# Patient Record
Sex: FEMALE | Race: WHITE | NOT HISPANIC OR LATINO | Employment: OTHER | URBAN - METROPOLITAN AREA
[De-identification: names, ages, dates, MRNs, and addresses within clinical notes are randomized per-mention and may not be internally consistent; named-entity substitution may affect disease eponyms.]

---

## 2017-03-23 ENCOUNTER — HOSPITAL ENCOUNTER (OUTPATIENT)
Dept: RADIOLOGY | Facility: HOSPITAL | Age: 77
Discharge: HOME | End: 2017-03-23
Attending: INTERNAL MEDICINE
Payer: MEDICARE

## 2017-03-23 DIAGNOSIS — Z12.31 VISIT FOR SCREENING MAMMOGRAM: ICD-10-CM

## 2017-03-23 PROCEDURE — 77063 BREAST TOMOSYNTHESIS BI: CPT | Mod: 26,,, | Performed by: RADIOLOGY

## 2017-03-23 PROCEDURE — 77067 SCR MAMMO BI INCL CAD: CPT | Mod: 26,,, | Performed by: RADIOLOGY

## 2017-03-23 PROCEDURE — 77067 SCR MAMMO BI INCL CAD: CPT | Mod: TC

## 2017-12-11 ENCOUNTER — HOSPITAL ENCOUNTER (OUTPATIENT)
Dept: RADIOLOGY | Facility: HOSPITAL | Age: 77
Discharge: HOME | End: 2017-12-11
Attending: ORTHOPAEDIC SURGERY
Payer: MEDICARE

## 2017-12-11 DIAGNOSIS — S82.009A PATELLAR FRACTURE: Primary | ICD-10-CM

## 2017-12-11 DIAGNOSIS — S82.009A PATELLAR FRACTURE: ICD-10-CM

## 2017-12-11 PROCEDURE — 73560 X-RAY EXAM OF KNEE 1 OR 2: CPT | Mod: 26,RT,, | Performed by: RADIOLOGY

## 2017-12-11 PROCEDURE — 73560 X-RAY EXAM OF KNEE 1 OR 2: CPT | Mod: TC,RT

## 2018-01-15 ENCOUNTER — HOSPITAL ENCOUNTER (OUTPATIENT)
Dept: RADIOLOGY | Facility: HOSPITAL | Age: 78
Discharge: HOME | End: 2018-01-15
Attending: ORTHOPAEDIC SURGERY
Payer: MEDICARE

## 2018-01-15 DIAGNOSIS — S82.009A: ICD-10-CM

## 2018-01-15 DIAGNOSIS — S82.009A: Primary | ICD-10-CM

## 2018-01-15 PROCEDURE — 73560 X-RAY EXAM OF KNEE 1 OR 2: CPT | Mod: TC,FY,RT

## 2018-01-15 PROCEDURE — 73560 X-RAY EXAM OF KNEE 1 OR 2: CPT | Mod: 26,RT,, | Performed by: RADIOLOGY

## 2018-03-06 DIAGNOSIS — N18.30 CHRONIC RENAL DISEASE, STAGE III: Primary | ICD-10-CM

## 2018-03-08 DIAGNOSIS — Z12.39 SCREENING BREAST EXAMINATION: Primary | ICD-10-CM

## 2018-03-12 DIAGNOSIS — M19.90 OSTEOARTHRITIS: Primary | ICD-10-CM

## 2018-03-14 ENCOUNTER — HOSPITAL ENCOUNTER (OUTPATIENT)
Dept: RADIOLOGY | Facility: HOSPITAL | Age: 78
Discharge: HOME | End: 2018-03-14
Attending: INTERNAL MEDICINE
Payer: MEDICARE

## 2018-03-14 ENCOUNTER — INFUSION (OUTPATIENT)
Dept: INFUSION THERAPY | Facility: HOSPITAL | Age: 78
End: 2018-03-14
Attending: INTERNAL MEDICINE
Payer: MEDICARE

## 2018-03-14 VITALS
OXYGEN SATURATION: 98 % | TEMPERATURE: 99 F | DIASTOLIC BLOOD PRESSURE: 86 MMHG | HEART RATE: 64 BPM | RESPIRATION RATE: 20 BRPM | SYSTOLIC BLOOD PRESSURE: 176 MMHG

## 2018-03-14 DIAGNOSIS — M17.10 PRIMARY LOCALIZED OSTEOARTHROSIS, LOWER LEG: Primary | ICD-10-CM

## 2018-03-14 DIAGNOSIS — N18.30 CHRONIC RENAL DISEASE, STAGE III: ICD-10-CM

## 2018-03-14 PROCEDURE — 76770 US EXAM ABDO BACK WALL COMP: CPT | Mod: 26,,, | Performed by: RADIOLOGY

## 2018-03-14 PROCEDURE — 63600175 PHARM REV CODE 636 W HCPCS: Mod: JG | Performed by: INTERNAL MEDICINE

## 2018-03-14 PROCEDURE — 76770 US EXAM ABDO BACK WALL COMP: CPT | Mod: TC

## 2018-03-14 PROCEDURE — 96372 THER/PROPH/DIAG INJ SC/IM: CPT

## 2018-03-14 RX ADMIN — DENOSUMAB 60 MG: 60 INJECTION SUBCUTANEOUS at 12:03

## 2018-03-14 NOTE — PLAN OF CARE
Problem: Patient Care Overview  Goal: Plan of Care Review  Outcome: Ongoing (interventions implemented as appropriate)  Patient received Prolia injection. Tolerated well. No reactions noted during visit. Received discharge instructions and verbalized understanding.

## 2018-04-04 ENCOUNTER — HOSPITAL ENCOUNTER (OUTPATIENT)
Dept: RADIOLOGY | Facility: HOSPITAL | Age: 78
Discharge: HOME | End: 2018-04-04
Attending: INTERNAL MEDICINE
Payer: MEDICARE

## 2018-04-04 VITALS — HEIGHT: 62 IN | WEIGHT: 205 LBS | BODY MASS INDEX: 37.73 KG/M2

## 2018-04-04 DIAGNOSIS — Z12.39 SCREENING BREAST EXAMINATION: ICD-10-CM

## 2018-04-04 PROCEDURE — 77067 SCR MAMMO BI INCL CAD: CPT | Mod: TC

## 2018-04-04 PROCEDURE — 77063 BREAST TOMOSYNTHESIS BI: CPT | Mod: 26,,, | Performed by: RADIOLOGY

## 2018-04-04 PROCEDURE — 77067 SCR MAMMO BI INCL CAD: CPT | Mod: 26,,, | Performed by: RADIOLOGY

## 2018-05-02 ENCOUNTER — LAB VISIT (OUTPATIENT)
Dept: LAB | Facility: HOSPITAL | Age: 78
End: 2018-05-02
Attending: INTERNAL MEDICINE
Payer: MEDICARE

## 2018-05-02 ENCOUNTER — INITIAL CONSULT (OUTPATIENT)
Dept: HEMATOLOGY/ONCOLOGY | Facility: CLINIC | Age: 78
End: 2018-05-02
Payer: MEDICARE

## 2018-05-02 VITALS
HEART RATE: 52 BPM | TEMPERATURE: 99 F | WEIGHT: 207.44 LBS | SYSTOLIC BLOOD PRESSURE: 150 MMHG | BODY MASS INDEX: 36.75 KG/M2 | OXYGEN SATURATION: 95 % | HEIGHT: 63 IN | DIASTOLIC BLOOD PRESSURE: 66 MMHG

## 2018-05-02 DIAGNOSIS — R77.8 ABNORMAL SPEP: Primary | ICD-10-CM

## 2018-05-02 DIAGNOSIS — R77.8 ABNORMAL SPEP: ICD-10-CM

## 2018-05-02 DIAGNOSIS — N18.9 CHRONIC KIDNEY DISEASE, UNSPECIFIED CKD STAGE: ICD-10-CM

## 2018-05-02 LAB
ALBUMIN SERPL BCP-MCNC: 4.2 G/DL
ALP SERPL-CCNC: 71 U/L
ALT SERPL W/O P-5'-P-CCNC: 23 U/L
ANION GAP SERPL CALC-SCNC: 9 MMOL/L
AST SERPL-CCNC: 20 U/L
BASOPHILS # BLD AUTO: 0.02 K/UL
BASOPHILS NFR BLD: 0.4 %
BILIRUB SERPL-MCNC: 0.7 MG/DL
BUN SERPL-MCNC: 24 MG/DL
CALCIUM SERPL-MCNC: 9.2 MG/DL
CHLORIDE SERPL-SCNC: 105 MMOL/L
CO2 SERPL-SCNC: 24 MMOL/L
CREAT SERPL-MCNC: 1.6 MG/DL
DIFFERENTIAL METHOD: ABNORMAL
EOSINOPHIL # BLD AUTO: 0.1 K/UL
EOSINOPHIL NFR BLD: 2.4 %
ERYTHROCYTE [DISTWIDTH] IN BLOOD BY AUTOMATED COUNT: 13.5 %
ERYTHROCYTE [SEDIMENTATION RATE] IN BLOOD BY WESTERGREN METHOD: 19 MM/HR
EST. GFR  (AFRICAN AMERICAN): 36 ML/MIN/1.73 M^2
EST. GFR  (NON AFRICAN AMERICAN): 31 ML/MIN/1.73 M^2
GLUCOSE SERPL-MCNC: 111 MG/DL
HCT VFR BLD AUTO: 46.4 %
HGB BLD-MCNC: 15.6 G/DL
LDH SERPL L TO P-CCNC: 190 U/L
LYMPHOCYTES # BLD AUTO: 1.3 K/UL
LYMPHOCYTES NFR BLD: 23.6 %
MCH RBC QN AUTO: 31.2 PG
MCHC RBC AUTO-ENTMCNC: 33.6 G/DL
MCV RBC AUTO: 93 FL
MONOCYTES # BLD AUTO: 0.5 K/UL
MONOCYTES NFR BLD: 9.5 %
NEUTROPHILS # BLD AUTO: 3.5 K/UL
NEUTROPHILS NFR BLD: 64.1 %
PLATELET # BLD AUTO: 189 K/UL
PMV BLD AUTO: 10.5 FL
POTASSIUM SERPL-SCNC: 5.3 MMOL/L
PROT SERPL-MCNC: 7.9 G/DL
RBC # BLD AUTO: 5 M/UL
SODIUM SERPL-SCNC: 138 MMOL/L
WBC # BLD AUTO: 5.5 K/UL

## 2018-05-02 PROCEDURE — 82232 ASSAY OF BETA-2 PROTEIN: CPT

## 2018-05-02 PROCEDURE — 83615 LACTATE (LD) (LDH) ENZYME: CPT

## 2018-05-02 PROCEDURE — 86334 IMMUNOFIX E-PHORESIS SERUM: CPT

## 2018-05-02 PROCEDURE — 84165 PROTEIN E-PHORESIS SERUM: CPT | Mod: 26,,, | Performed by: PATHOLOGY

## 2018-05-02 PROCEDURE — 85652 RBC SED RATE AUTOMATED: CPT

## 2018-05-02 PROCEDURE — 83520 IMMUNOASSAY QUANT NOS NONAB: CPT | Mod: 59

## 2018-05-02 PROCEDURE — 86334 IMMUNOFIX E-PHORESIS SERUM: CPT | Mod: 26,,, | Performed by: PATHOLOGY

## 2018-05-02 PROCEDURE — 99204 OFFICE O/P NEW MOD 45 MIN: CPT | Mod: S$GLB,,, | Performed by: INTERNAL MEDICINE

## 2018-05-02 PROCEDURE — 36415 COLL VENOUS BLD VENIPUNCTURE: CPT

## 2018-05-02 PROCEDURE — 82784 ASSAY IGA/IGD/IGG/IGM EACH: CPT | Mod: 59

## 2018-05-02 PROCEDURE — 3077F SYST BP >= 140 MM HG: CPT | Mod: CPTII,S$GLB,, | Performed by: INTERNAL MEDICINE

## 2018-05-02 PROCEDURE — 99999 PR PBB SHADOW E&M-EST. PATIENT-LVL IV: CPT | Mod: PBBFAC,,, | Performed by: INTERNAL MEDICINE

## 2018-05-02 PROCEDURE — 3078F DIAST BP <80 MM HG: CPT | Mod: CPTII,S$GLB,, | Performed by: INTERNAL MEDICINE

## 2018-05-02 PROCEDURE — 80053 COMPREHEN METABOLIC PANEL: CPT

## 2018-05-02 PROCEDURE — 85025 COMPLETE CBC W/AUTO DIFF WBC: CPT

## 2018-05-02 PROCEDURE — 84165 PROTEIN E-PHORESIS SERUM: CPT

## 2018-05-02 RX ORDER — METOPROLOL TARTRATE 50 MG/1
50 TABLET ORAL DAILY
COMMUNITY
Start: 2018-04-19 | End: 2018-07-02 | Stop reason: SDUPTHER

## 2018-05-02 RX ORDER — FUROSEMIDE 20 MG/1
TABLET ORAL
COMMUNITY
Start: 2018-04-26 | End: 2018-07-02 | Stop reason: SDUPTHER

## 2018-05-02 RX ORDER — FEBUXOSTAT 40 MG/1
TABLET ORAL
COMMUNITY
Start: 2018-03-07 | End: 2018-05-02

## 2018-05-02 RX ORDER — LISINOPRIL 10 MG/1
10 TABLET ORAL DAILY
COMMUNITY
Start: 2018-05-01 | End: 2018-08-20 | Stop reason: DRUGHIGH

## 2018-05-02 RX ORDER — ERGOCALCIFEROL 1.25 MG/1
50000 CAPSULE ORAL
COMMUNITY
End: 2019-09-30

## 2018-05-02 RX ORDER — ALLOPURINOL 100 MG/1
100 TABLET ORAL DAILY
COMMUNITY
Start: 2018-04-26 | End: 2019-07-02

## 2018-05-02 NOTE — LETTER
May 7, 2018      Nargis Boyle MD  [address] [place] [address] [address]  [place] [address]           [facility]  [address] [facility] [place] [address]  Phone: [phone]          Patient: Barbara Rizo   MR Number: [id number]   YOB: 1940   Date of Visit: 5/2/2018       Dear Dr. Nargis Boyle:    Thank you for referring Barbara Rizo to me for evaluation. Attached you will find relevant portions of my assessment and plan of care.    If you have questions, please do not hesitate to call me. I look forward to following Barbara Rizo along with you.    Sincerely,    Blayne Adams    Enclosure  CC:  No Recipients    If you would like to receive this communication electronically, please contact [email or web address] or [phone] to request more information on [facility] access.    For providers and/or their staff who would like to refer a patient to [facility], please contact us through our one-stop-shop provider referral line, [facility], at [phone].    If you feel you have received this communication in error or would no longer like to receive these types of communications, please e-mail [email or web address]

## 2018-05-02 NOTE — PROGRESS NOTES
Subjective:       Patient ID: Barbara Rizo is a 78 y.o. female.    Chief Complaint: Consult    HPI   REASON FOR CONSULTATION ABNORMAL SPEP  REFERRING PHYSICIAN: Nargis Boyle MD    78-year-old with medical history of CKD  stage III, hypertension, osteopenia, vitamin-D deficiency, type 2 diabetes mellitus seen today in consultation for abnormal lab findings revealing abnormal SPEP. Patient's  is a patient of mine. She is followed by Dr. Boyle for CKD stage 3. Patient reports her kidney function has been relatively stable.  Appetite and weight stable.  No shortness of breath, chest pain. No bony pain. No prior history of anemia.  Patient reports history of hemochromatosis for which she has undergone phlebotomy in  remote past.  No melena, hematochezia or change in bowel habits.  No fatigue, lightheadedness or shortness of breath.  Laboratory studies from 03/22/2018 from outside facility reveal abnormal SPEP reveals elevation of beta 2 globulins. Serum immunofixation reveals a faint IgG lambda monoclonal immunoglobulin.  Biochemical profile reveals a BUN of 33 creatinine of 1.64 mg/dL calcium of 9.9 mg/dL albumin of 4.7.  She is here for further evaluation        Past Medical History:   Diagnosis Date    Diabetes mellitus type II     Fatty liver     GERD (gastroesophageal reflux disease)     Hemochromatosis     Hyperlipidemia     Hypertension     Vaginal delivery     x2    Vitamin D deficiency disease     Wears partial dentures     upper removable bridge       Past Surgical History:   Procedure Laterality Date    BREAST BIOPSY      CHOLECYSTECTOMY  08/2015    EYE SURGERY      Cat Ext  OU    HYSTERECTOMY      partial due to uterine fibroids    TOTAL KNEE ARTHROPLASTY Right 2015    left knee done 5/2013     Current MEDS: Reviewed and as per MEDCHART    Review of patient's allergies indicates:  No Known Allergies    Review of Systems   Constitutional: Negative for appetite change, fatigue, fever  "and unexpected weight change.   HENT: Negative for mouth sores.    Eyes: Negative for visual disturbance.   Respiratory: Negative for cough and shortness of breath.    Cardiovascular: Negative for chest pain.   Gastrointestinal: Negative for abdominal pain and diarrhea.   Genitourinary: Negative for frequency.   Musculoskeletal: Negative for back pain.   Skin: Negative for rash.   Neurological: Negative for headaches.   Hematological: Negative for adenopathy.   Psychiatric/Behavioral: The patient is not nervous/anxious.        Objective:       Vitals:    05/02/18 1351 05/02/18 1355   BP: (!) 145/62 (!) 150/66   BP Location: Right arm Left arm   Patient Position: Sitting Sitting   BP Method: Medium (Automatic) Medium (Automatic)   Pulse: (!) 49 (!) 52   Temp: 98.7 °F (37.1 °C)    TempSrc: Oral    SpO2: (!) 94% 95%   Weight: 94.1 kg (207 lb 7.3 oz)    Height: 5' 3" (1.6 m)        Physical Exam   Constitutional: She is oriented to person, place, and time. She appears well-developed and well-nourished.   HENT:   Head: Normocephalic.   Mouth/Throat: Oropharynx is clear and moist. No oropharyngeal exudate.   Eyes: Conjunctivae and lids are normal. Pupils are equal, round, and reactive to light. No scleral icterus.   Neck: Normal range of motion. Neck supple. No thyromegaly present.   Cardiovascular: Normal rate, regular rhythm and normal heart sounds.    No murmur heard.  Pulmonary/Chest: Breath sounds normal. She has no wheezes. She has no rales.   Abdominal: Soft. Bowel sounds are normal. She exhibits no distension and no mass. There is no hepatosplenomegaly. There is no tenderness. There is no rebound and no guarding.   Musculoskeletal: Normal range of motion. She exhibits no edema or tenderness.   Lymphadenopathy:     She has no cervical adenopathy.     She has no axillary adenopathy.        Right: No supraclavicular adenopathy present.        Left: No supraclavicular adenopathy present.   Neurological: She is alert " and oriented to person, place, and time. No cranial nerve deficit. Coordination normal.   Skin: Skin is warm and dry. No ecchymosis, no petechiae and no rash noted. No erythema.   Psychiatric: She has a normal mood and affect.     Labs ( outside facility: Reviewed    Assessment:       1. Abnormal SPEP    2. Chronic kidney disease, unspecified CKD stage        Plan:   78-year-old with multiple medical diagnoses with abnormal SPEP of undetermined significance.  Findings suggestive possibly result of acute inflammation.  Plan further testing including repeat SPEP, serum immunoglobulin free light chains, CBC, CMP, quantitative immunoglobulins, beta 2 microglobulin, LDH, sed rate.  Patient will follow up in 2 weeks to review results of workup.  All questions posed answered to satisfaction    Thank you for allowing me to participate in the care of your patient    Cc: Nargis Boyle M.D.

## 2018-05-03 LAB
B2 MICROGLOB SERPL-MCNC: 5.4 UG/ML
IGA SERPL-MCNC: 433 MG/DL
IGG SERPL-MCNC: 1621 MG/DL
IGM SERPL-MCNC: 34 MG/DL

## 2018-05-04 LAB
ALBUMIN SERPL ELPH-MCNC: 4.4 G/DL
ALPHA1 GLOB SERPL ELPH-MCNC: 0.31 G/DL
ALPHA2 GLOB SERPL ELPH-MCNC: 1.15 G/DL
B-GLOBULIN SERPL ELPH-MCNC: 0.99 G/DL
GAMMA GLOB SERPL ELPH-MCNC: 1.65 G/DL
KAPPA LC SER QL IA: 5.09 MG/DL
KAPPA LC/LAMBDA SER IA: 1.76
LAMBDA LC SER QL IA: 2.89 MG/DL
PROT SERPL-MCNC: 8.5 G/DL

## 2018-05-07 LAB
INTERPRETATION SERPL IFE-IMP: NORMAL
PATHOLOGIST INTERPRETATION IFE: NORMAL
PATHOLOGIST INTERPRETATION SPE: NORMAL

## 2018-05-16 ENCOUNTER — OFFICE VISIT (OUTPATIENT)
Dept: HEMATOLOGY/ONCOLOGY | Facility: CLINIC | Age: 78
End: 2018-05-16
Payer: MEDICARE

## 2018-05-16 VITALS
HEART RATE: 80 BPM | TEMPERATURE: 99 F | OXYGEN SATURATION: 98 % | DIASTOLIC BLOOD PRESSURE: 74 MMHG | BODY MASS INDEX: 37.1 KG/M2 | WEIGHT: 209.44 LBS | SYSTOLIC BLOOD PRESSURE: 138 MMHG

## 2018-05-16 DIAGNOSIS — R77.8 ABNORMAL SPEP: Primary | ICD-10-CM

## 2018-05-16 DIAGNOSIS — N18.9 CHRONIC KIDNEY DISEASE, UNSPECIFIED CKD STAGE: ICD-10-CM

## 2018-05-16 PROCEDURE — 3078F DIAST BP <80 MM HG: CPT | Mod: CPTII,S$GLB,, | Performed by: INTERNAL MEDICINE

## 2018-05-16 PROCEDURE — 99999 PR PBB SHADOW E&M-EST. PATIENT-LVL III: CPT | Mod: PBBFAC,,, | Performed by: INTERNAL MEDICINE

## 2018-05-16 PROCEDURE — 99214 OFFICE O/P EST MOD 30 MIN: CPT | Mod: S$GLB,,, | Performed by: INTERNAL MEDICINE

## 2018-05-16 PROCEDURE — 3075F SYST BP GE 130 - 139MM HG: CPT | Mod: CPTII,S$GLB,, | Performed by: INTERNAL MEDICINE

## 2018-05-16 RX ORDER — AMOXICILLIN 500 MG
1 CAPSULE ORAL DAILY
COMMUNITY
End: 2021-10-15 | Stop reason: CLARIF

## 2018-05-16 NOTE — PROGRESS NOTES
Subjective:       Patient ID: Barbara Rizo is a 78 y.o. female.    Chief Complaint: Follow-up    HPI     78-year-old with medical history of CKD  stage III, hypertension, osteopenia, vitamin-D deficiency, type 2 diabetes mellitus seen today in consultation for abnormal lab findings revealing abnormal SPEP. Patient's  is a patient of mine. She is followed by Dr. Boyle for CKD stage 3. Patient reports her kidney function has been relatively stable.  Appetite and weight stable.  No shortness of breath, chest pain. No bony pain. No prior history of anemia.  Patient reports history of hemochromatosis for which she has undergone phlebotomy in  remote past.  No melena, hematochezia or change in bowel habits.  No fatigue, lightheadedness or shortness of breath.  Laboratory studies from 03/22/2018 from outside facility reveal abnormal SPEP reveals elevation of beta 2 globulins. Serum immunofixation reveals a faint IgG lambda monoclonal immunoglobulin.  Biochemical profile reveals a BUN of 33 creatinine of 1.64 mg/dL calcium of 9.9 mg/dL albumin of 4.7    Today, she is doing well.  No fatigue/SOB/lightheadedness  Appetite and weight stable.       CBC reveals wbc 5.5 Hb15.6g/dl Hct 46.4% plt 189k.    SPEP- No monoclonal peaks identified      Past Medical History:   Diagnosis Date    Diabetes mellitus type II     Fatty liver     GERD (gastroesophageal reflux disease)     Hemochromatosis     Hyperlipidemia     Hypertension     Vaginal delivery     x2    Vitamin D deficiency disease     Wears partial dentures     upper removable bridge       Past Surgical History:   Procedure Laterality Date    BREAST BIOPSY      CHOLECYSTECTOMY  08/2015    EYE SURGERY      Cat Ext  OU    HYSTERECTOMY      partial due to uterine fibroids    TOTAL KNEE ARTHROPLASTY Right 2015    left knee done 5/2013       Review of Systems   Constitutional: Negative for appetite change, fatigue, fever and unexpected weight change.   HENT:  Negative for mouth sores.    Eyes: Negative for visual disturbance.   Respiratory: Negative for cough and shortness of breath.    Cardiovascular: Negative for chest pain.   Gastrointestinal: Negative for abdominal pain and diarrhea.   Genitourinary: Negative for frequency.   Musculoskeletal: Negative for back pain.   Skin: Negative for rash.   Neurological: Negative for headaches.   Hematological: Negative for adenopathy.   Psychiatric/Behavioral: The patient is not nervous/anxious.        Objective:       Vitals:    05/16/18 1301   BP: 138/74   BP Location: Right arm   Patient Position: Sitting   BP Method: Large (Manual)   Pulse: 80   Temp: 98.5 °F (36.9 °C)   TempSrc: Oral   SpO2: 98%   Weight: 95 kg (209 lb 7 oz)       Physical Exam   Constitutional: She is oriented to person, place, and time. She appears well-developed and well-nourished.   HENT:   Head: Normocephalic.   Mouth/Throat: Oropharynx is clear and moist. No oropharyngeal exudate.   Eyes: Conjunctivae and lids are normal. Pupils are equal, round, and reactive to light. No scleral icterus.   Neck: Normal range of motion. Neck supple. No thyromegaly present.   Cardiovascular: Normal rate, regular rhythm and normal heart sounds.    No murmur heard.  Pulmonary/Chest: Breath sounds normal. She has no wheezes. She has no rales.   Abdominal: Soft. Bowel sounds are normal. She exhibits no distension and no mass. There is no hepatosplenomegaly. There is no tenderness. There is no rebound and no guarding.   Musculoskeletal: Normal range of motion. She exhibits no edema or tenderness.   Lymphadenopathy:     She has no cervical adenopathy.     She has no axillary adenopathy.        Right: No supraclavicular adenopathy present.        Left: No supraclavicular adenopathy present.   Neurological: She is alert and oriented to person, place, and time. No cranial nerve deficit. Coordination normal.   Skin: Skin is warm and dry. No ecchymosis, no petechiae and no rash  noted. No erythema.   Psychiatric: She has a normal mood and affect.       Lab Results   Component Value Date    WBC 5.50 05/02/2018    HGB 15.6 05/02/2018    HCT 46.4 05/02/2018    MCV 93 05/02/2018     05/02/2018         Results for FAISAL ORNELAS (MRN [id number]) as of 5/16/2018 13:21   Ref. Range 5/2/2018 14:54   [place]man Free Light Chains Latest Ref Range: 0.33 - 1.94 mg/dL 5.09 (H)   Lambda Free Light Chains Latest Ref Range: 0.57 - 2.63 mg/dL 2.89 (H)   Kappa/Lambda FLC Ratio Latest Ref Range: 0.26 - 1.65  1.76 (H)         Increased total protein.   No monoclonal peaks identified.   There is a linear irregularity in the gamma region in a polyclonal   background. Correlate with reflexed NERIS results for clarification.     Assessment:       1. Abnormal SPEP    2. Chronic kidney disease, unspecified CKD stage        Plan:       Pt clinically stable  Hb 15. 6g/dl  Repeat SPEP- no monoclonal peaks  Abnl K?L FLCR in setting of renal insufficiency unclear  Plan urine studies    Follow-up in 2mo with cbc,cmp, urine studies prior to f/u       Cc: Nargis Boyle M.D.

## 2018-05-18 ENCOUNTER — LAB VISIT (OUTPATIENT)
Dept: LAB | Facility: HOSPITAL | Age: 78
End: 2018-05-18
Attending: INTERNAL MEDICINE
Payer: MEDICARE

## 2018-05-18 DIAGNOSIS — R77.8 ABNORMAL SPEP: ICD-10-CM

## 2018-05-18 DIAGNOSIS — N18.9 CHRONIC KIDNEY DISEASE, UNSPECIFIED CKD STAGE: ICD-10-CM

## 2018-05-18 LAB
PROT 24H UR-MRATE: 162 MG/SPEC
PROT UR-MCNC: 8 MG/DL
URINE COLLECTION DURATION: 24 HR
URINE VOLUME: 2025 ML

## 2018-05-18 PROCEDURE — 86335 IMMUNFIX E-PHORSIS/URINE/CSF: CPT

## 2018-05-18 PROCEDURE — 86335 IMMUNFIX E-PHORSIS/URINE/CSF: CPT | Mod: 26,,, | Performed by: PATHOLOGY

## 2018-05-18 PROCEDURE — 84156 ASSAY OF PROTEIN URINE: CPT

## 2018-05-21 LAB
INTERPRETATION UR IFE-IMP: NORMAL
PATHOLOGIST INTERPRETATION UIFE: NORMAL

## 2018-07-02 ENCOUNTER — LAB VISIT (OUTPATIENT)
Dept: LAB | Facility: HOSPITAL | Age: 78
End: 2018-07-02
Attending: FAMILY MEDICINE
Payer: MEDICARE

## 2018-07-02 ENCOUNTER — OFFICE VISIT (OUTPATIENT)
Dept: FAMILY MEDICINE | Facility: CLINIC | Age: 78
End: 2018-07-02
Payer: MEDICARE

## 2018-07-02 VITALS
TEMPERATURE: 98 F | OXYGEN SATURATION: 97 % | WEIGHT: 207.25 LBS | SYSTOLIC BLOOD PRESSURE: 132 MMHG | HEART RATE: 51 BPM | DIASTOLIC BLOOD PRESSURE: 86 MMHG | BODY MASS INDEX: 33.31 KG/M2 | HEIGHT: 66 IN

## 2018-07-02 DIAGNOSIS — Z00.00 WELL WOMAN EXAM WITHOUT GYNECOLOGICAL EXAM: ICD-10-CM

## 2018-07-02 DIAGNOSIS — M85.9 DISORDER OF BONE DENSITY AND STRUCTURE, UNSPECIFIED: ICD-10-CM

## 2018-07-02 DIAGNOSIS — M89.9 DISORDER OF BONE: ICD-10-CM

## 2018-07-02 DIAGNOSIS — I11.9 BENIGN HYPERTENSIVE HEART DISEASE WITHOUT HEART FAILURE: ICD-10-CM

## 2018-07-02 DIAGNOSIS — R33.9 URINARY RETENTION: ICD-10-CM

## 2018-07-02 DIAGNOSIS — Z23 IMMUNIZATION DUE: ICD-10-CM

## 2018-07-02 DIAGNOSIS — K21.00 GASTROESOPHAGEAL REFLUX DISEASE WITH ESOPHAGITIS: ICD-10-CM

## 2018-07-02 DIAGNOSIS — N18.30 TYPE 2 DIABETES MELLITUS WITH STAGE 3 CHRONIC KIDNEY DISEASE, WITHOUT LONG-TERM CURRENT USE OF INSULIN: ICD-10-CM

## 2018-07-02 DIAGNOSIS — E83.119 HEMOCHROMATOSIS, UNSPECIFIED HEMOCHROMATOSIS TYPE: ICD-10-CM

## 2018-07-02 DIAGNOSIS — I10 ESSENTIAL HYPERTENSION: ICD-10-CM

## 2018-07-02 DIAGNOSIS — E66.01 MORBID OBESITY: ICD-10-CM

## 2018-07-02 DIAGNOSIS — N18.30 TYPE 2 DIABETES MELLITUS WITH STAGE 3 CHRONIC KIDNEY DISEASE, WITHOUT LONG-TERM CURRENT USE OF INSULIN: Primary | ICD-10-CM

## 2018-07-02 DIAGNOSIS — M17.0 PRIMARY OSTEOARTHRITIS OF BOTH KNEES: ICD-10-CM

## 2018-07-02 DIAGNOSIS — N31.2 ATONIC NEUROGENIC BLADDER: ICD-10-CM

## 2018-07-02 DIAGNOSIS — E78.2 MIXED HYPERLIPIDEMIA: ICD-10-CM

## 2018-07-02 DIAGNOSIS — E11.22 TYPE 2 DIABETES MELLITUS WITH STAGE 3 CHRONIC KIDNEY DISEASE, WITHOUT LONG-TERM CURRENT USE OF INSULIN: Primary | ICD-10-CM

## 2018-07-02 DIAGNOSIS — E11.22 TYPE 2 DIABETES MELLITUS WITH STAGE 3 CHRONIC KIDNEY DISEASE, WITHOUT LONG-TERM CURRENT USE OF INSULIN: ICD-10-CM

## 2018-07-02 DIAGNOSIS — E55.9 VITAMIN D DEFICIENCY: ICD-10-CM

## 2018-07-02 LAB
CHOLEST SERPL-MCNC: 134 MG/DL
CHOLEST/HDLC SERPL: 3 {RATIO}
HDLC SERPL-MCNC: 45 MG/DL
HDLC SERPL: 33.6 %
LDLC SERPL CALC-MCNC: 62.4 MG/DL
NONHDLC SERPL-MCNC: 89 MG/DL
TRIGL SERPL-MCNC: 133 MG/DL

## 2018-07-02 PROCEDURE — 36415 COLL VENOUS BLD VENIPUNCTURE: CPT | Mod: PN

## 2018-07-02 PROCEDURE — 83036 HEMOGLOBIN GLYCOSYLATED A1C: CPT

## 2018-07-02 PROCEDURE — 99204 OFFICE O/P NEW MOD 45 MIN: CPT | Mod: S$GLB,,, | Performed by: FAMILY MEDICINE

## 2018-07-02 PROCEDURE — 80061 LIPID PANEL: CPT

## 2018-07-02 PROCEDURE — 3075F SYST BP GE 130 - 139MM HG: CPT | Mod: CPTII,S$GLB,, | Performed by: FAMILY MEDICINE

## 2018-07-02 PROCEDURE — 99499 UNLISTED E&M SERVICE: CPT | Mod: S$GLB,,, | Performed by: FAMILY MEDICINE

## 2018-07-02 PROCEDURE — 90670 PCV13 VACCINE IM: CPT | Mod: S$GLB,,, | Performed by: FAMILY MEDICINE

## 2018-07-02 PROCEDURE — G0009 ADMIN PNEUMOCOCCAL VACCINE: HCPCS | Mod: S$GLB,,, | Performed by: FAMILY MEDICINE

## 2018-07-02 PROCEDURE — 3079F DIAST BP 80-89 MM HG: CPT | Mod: CPTII,S$GLB,, | Performed by: FAMILY MEDICINE

## 2018-07-02 PROCEDURE — 99999 PR PBB SHADOW E&M-EST. PATIENT-LVL IV: CPT | Mod: PBBFAC,,, | Performed by: FAMILY MEDICINE

## 2018-07-02 RX ORDER — METOPROLOL TARTRATE 50 MG/1
50 TABLET ORAL DAILY
Qty: 90 TABLET | Refills: 1 | Status: SHIPPED | OUTPATIENT
Start: 2018-07-02 | End: 2018-09-10 | Stop reason: SDUPTHER

## 2018-07-02 RX ORDER — FUROSEMIDE 20 MG/1
TABLET ORAL
Qty: 36 TABLET | Refills: 1 | Status: ON HOLD | OUTPATIENT
Start: 2018-07-02 | End: 2019-02-06 | Stop reason: HOSPADM

## 2018-07-02 RX ORDER — FOLIC ACID 1 MG/1
1 TABLET ORAL DAILY
Qty: 90 TABLET | Refills: 1 | Status: SHIPPED | OUTPATIENT
Start: 2018-07-02 | End: 2019-01-08 | Stop reason: SDUPTHER

## 2018-07-02 RX ORDER — ATORVASTATIN CALCIUM 40 MG/1
40 TABLET, FILM COATED ORAL DAILY
Qty: 90 TABLET | Refills: 1 | Status: SHIPPED | OUTPATIENT
Start: 2018-07-02 | End: 2019-01-08 | Stop reason: SDUPTHER

## 2018-07-02 NOTE — PROGRESS NOTES
(9:35 AM) - PCV 13 vaccine was given IM in the left deltoid. Tolerated well. Pt went to lab dept after injection.

## 2018-07-02 NOTE — PROGRESS NOTES
"Well Woman VISIT      CHIEF COMPLAINT  Chief Complaint   Patient presents with    [facility]       HPI  Barbara Rizo is a 78 y.o. female who presents for physical.     Social Factors  Tobacco use: No  Ready to Quit: No  Alcohol: No  Intimate partner violence screening  "Do you feel safe in your current relationship?" Yes   "Have you ever been in a relationship in which your partner frightened you or hurt you?" No  Living Will/POA: No  Regular Exercise: No    Depression  Over the past two weeks, have you felt down, depressed, or hopeless? No  Over the past two weeks, have you felt little interest or pleasure in doing things? No      CHD, HTN, DM2  CHD Risk Factors: advanced age (older than 55 for men, 65 for women), diabetes mellitus, dyslipidemia and obesity (BMI >= 30 kg/m2)  Women 45 years and older should be screened for dyslipidemia if at increased risk of CHD  Women 20 to 45 years of age should be screened for dyslipidemia if at increased risk of CHD  Asymptomatic adults with sustained blood pressure greater than 135/80 mm Hg (treated or untreated) should be screened for type 2 diabetes mellitus    Estimated body mass index is 33.45 kg/m² as calculated from the following:    Height as of this encounter: 5' 6" (1.676 m).    Weight as of this encounter: 94 kg (207 lb 3.7 oz).      Screening  Mammogram needed: UTD  Colonoscopy needed: UTD  Osteoporosis screen needed: ordered today     Women 50 to 74 years of age should be screened for breast cancer with mammography biennially.  Women should be screened for cervical cancer with Pap tests beginning at 21 years of age. Low-risk women should receive Pap testing every three years. Co-testing for human papillomavirus is an option beginning at 30 years of age, and can extend the screening interval to five years. Cervical cancer screening should be discontinued at 65 years of age or after total hysterectomy if the woman has a benign gynecologic " "history  Adults 50 to 75 years of age should be screened for colorectal cancer with an FOBT annually, sigmoidoscopy every five years with an FOBT every three years, or colonoscopy every 10 years.  Women 65 years and older should be screened for osteoporosis. Women younger than 65 years should be screened if the risk of fracture is greater than or equal to that of a 65-year-old white woman without additional risk factors.      Immunizations  delayed    ALLERGIES and MEDS were verified.   PMHx, PSHx, FHx, SOCIALHx were updated as pertinent.    REVIEW OF SYSTEMS  Review of Systems   Constitutional: Negative.    HENT: Negative.    Eyes: Negative.    Respiratory: Negative.    Cardiovascular: Negative.    Gastrointestinal: Negative.    Genitourinary: Negative.    Musculoskeletal: Negative.    Skin: Negative.    Neurological: Negative.          PHYSICAL EXAM  VITAL SIGNS: /86   Pulse (!) 51   Temp 98 °F (36.7 °C) (Oral)   Ht 5' 6" (1.676 m)   Wt 94 kg (207 lb 3.7 oz)   SpO2 97%   BMI 33.45 kg/m²   GEN: Well developed, Well nourished, No acute distress.  HENT: Normocephalic, Atraumatic, Bilateral external ears normal, Nose normal, Oropharynx moist, No oral exudates.   Eyes: PERRL, EOMI, Conjunctiva normal, No discharge.   Neck: Supple, No tenderness.  Lymphatic: No cervical or supraclavicular lymphadenopathy noted.   Cardiovascular: Normal heart rate, Normal rhythm, No murmurs, No rubs, No gallops.   Thorax & Lungs: Normal breath sounds, No respiratory distress, No wheezing.  Abdomen: Soft, No tenderness, Bowel sounds normal.  Breast: deferred  Genital:deferred   Skin: Warm, Dry, No erythema, No rash.   Extremities: No edema, No tenderness.       ASSESSMENT/PLAN    Barbara was seen today for establish care.    Diagnoses and all orders for this visit:    Type 2 diabetes mellitus with stage 3 chronic kidney disease, without long-term current use of insulin  -     Ambulatory referral to Optometry  -     Ambulatory " referral to Podiatry  -     Lipid panel; Future  -     Hemoglobin A1c; Future  -     atorvastatin (LIPITOR) 40 MG tablet; Take 1 tablet (40 mg total) by mouth once daily.    Vitamin D deficiency    Gastroesophageal reflux disease with esophagitis    Primary osteoarthritis of both knees  -     DXA Bone Density Spine And Hip; Future    Atonic neurogenic bladder    Urinary retention    Mixed hyperlipidemia    Benign hypertensive heart disease without heart failure    Essential hypertension  -     Hemoglobin A1c; Future  -     metoprolol tartrate (LOPRESSOR) 50 MG tablet; Take 1 tablet (50 mg total) by mouth once daily.  -     furosemide (LASIX) 20 MG tablet; Take Monday, Wednesday, friday  -     atorvastatin (LIPITOR) 40 MG tablet; Take 1 tablet (40 mg total) by mouth once daily.    Hemochromatosis, unspecified hemochromatosis type  -     folic acid (FOLVITE) 1 MG tablet; Take 1 tablet (1 mg total) by mouth once daily.    Morbid obesity    Immunization due  -     (In Office Administered) Pneumococcal Conjugate Vaccine (13 Valent) (IM)    Disorder of bone density and structure, unspecified   -     DXA Bone Density Spine And Hip; Future    Disorder of bone   -     DXA Bone Density Spine And Hip; Future    Well woman exam without gynecological exam    Other orders  -     Cancel: (In Office Administered) Tdap Vaccine       1.  Patient followed by Dr. Boyle for CKD  2.  Patient followed by [facility] cardiology  3.  Hem/onc evaluating for possible Multiple myeloma  4.  Labs to be done today  5.  Medications refilled    FOLLOW UP: 3 months    Paras Oro MD

## 2018-07-03 ENCOUNTER — HOSPITAL ENCOUNTER (OUTPATIENT)
Dept: RADIOLOGY | Facility: HOSPITAL | Age: 78
Discharge: HOME | End: 2018-07-03
Attending: FAMILY MEDICINE
Payer: MEDICARE

## 2018-07-03 DIAGNOSIS — M89.9 DISORDER OF BONE: ICD-10-CM

## 2018-07-03 DIAGNOSIS — M17.0 PRIMARY OSTEOARTHRITIS OF BOTH KNEES: ICD-10-CM

## 2018-07-03 DIAGNOSIS — M85.9 DISORDER OF BONE DENSITY AND STRUCTURE, UNSPECIFIED: ICD-10-CM

## 2018-07-03 LAB
ESTIMATED AVG GLUCOSE: 120 MG/DL
HBA1C MFR BLD HPLC: 5.8 %

## 2018-07-03 PROCEDURE — 77080 DXA BONE DENSITY AXIAL: CPT | Mod: TC

## 2018-07-03 PROCEDURE — 77080 DXA BONE DENSITY AXIAL: CPT | Mod: 26,,, | Performed by: RADIOLOGY

## 2018-07-09 ENCOUNTER — LAB VISIT (OUTPATIENT)
Dept: LAB | Facility: HOSPITAL | Age: 78
End: 2018-07-09
Attending: INTERNAL MEDICINE
Payer: MEDICARE

## 2018-07-09 DIAGNOSIS — N18.9 CHRONIC KIDNEY DISEASE, UNSPECIFIED CKD STAGE: ICD-10-CM

## 2018-07-09 DIAGNOSIS — R77.8 ABNORMAL SPEP: ICD-10-CM

## 2018-07-09 LAB
ALBUMIN SERPL BCP-MCNC: 3.7 G/DL
ALP SERPL-CCNC: 63 U/L
ALT SERPL W/O P-5'-P-CCNC: 23 U/L
ANION GAP SERPL CALC-SCNC: 8 MMOL/L
AST SERPL-CCNC: 14 U/L
BASOPHILS # BLD AUTO: 0.01 K/UL
BASOPHILS NFR BLD: 0.2 %
BILIRUB SERPL-MCNC: 0.7 MG/DL
BUN SERPL-MCNC: 31 MG/DL
CALCIUM SERPL-MCNC: 9.5 MG/DL
CHLORIDE SERPL-SCNC: 109 MMOL/L
CO2 SERPL-SCNC: 25 MMOL/L
CREAT SERPL-MCNC: 1.6 MG/DL
DIFFERENTIAL METHOD: NORMAL
EOSINOPHIL # BLD AUTO: 0.2 K/UL
EOSINOPHIL NFR BLD: 3.3 %
ERYTHROCYTE [DISTWIDTH] IN BLOOD BY AUTOMATED COUNT: 13.6 %
EST. GFR  (AFRICAN AMERICAN): 36 ML/MIN/1.73 M^2
EST. GFR  (NON AFRICAN AMERICAN): 31 ML/MIN/1.73 M^2
GLUCOSE SERPL-MCNC: 111 MG/DL
HCT VFR BLD AUTO: 40.6 %
HGB BLD-MCNC: 14.3 G/DL
IGA SERPL-MCNC: 370 MG/DL
IGG SERPL-MCNC: 1337 MG/DL
IGM SERPL-MCNC: 28 MG/DL
LYMPHOCYTES # BLD AUTO: 1.2 K/UL
LYMPHOCYTES NFR BLD: 23.8 %
MCH RBC QN AUTO: 31 PG
MCHC RBC AUTO-ENTMCNC: 35.2 G/DL
MCV RBC AUTO: 88 FL
MONOCYTES # BLD AUTO: 0.4 K/UL
MONOCYTES NFR BLD: 8.2 %
NEUTROPHILS # BLD AUTO: 3.1 K/UL
NEUTROPHILS NFR BLD: 64.3 %
PLATELET # BLD AUTO: 194 K/UL
PMV BLD AUTO: 10.9 FL
POTASSIUM SERPL-SCNC: 4.6 MMOL/L
PROT SERPL-MCNC: 7.7 G/DL
RBC # BLD AUTO: 4.62 M/UL
SODIUM SERPL-SCNC: 142 MMOL/L
WBC # BLD AUTO: 4.88 K/UL

## 2018-07-09 PROCEDURE — 86334 IMMUNOFIX E-PHORESIS SERUM: CPT

## 2018-07-09 PROCEDURE — 86334 IMMUNOFIX E-PHORESIS SERUM: CPT | Mod: 26,,, | Performed by: PATHOLOGY

## 2018-07-09 PROCEDURE — 85025 COMPLETE CBC W/AUTO DIFF WBC: CPT

## 2018-07-09 PROCEDURE — 36415 COLL VENOUS BLD VENIPUNCTURE: CPT

## 2018-07-09 PROCEDURE — 84165 PROTEIN E-PHORESIS SERUM: CPT

## 2018-07-09 PROCEDURE — 80053 COMPREHEN METABOLIC PANEL: CPT

## 2018-07-09 PROCEDURE — 83520 IMMUNOASSAY QUANT NOS NONAB: CPT

## 2018-07-09 PROCEDURE — 82784 ASSAY IGA/IGD/IGG/IGM EACH: CPT | Mod: 59

## 2018-07-09 PROCEDURE — 84165 PROTEIN E-PHORESIS SERUM: CPT | Mod: 26,,, | Performed by: PATHOLOGY

## 2018-07-10 LAB
ALBUMIN SERPL ELPH-MCNC: 3.74 G/DL
ALPHA1 GLOB SERPL ELPH-MCNC: 0.27 G/DL
ALPHA2 GLOB SERPL ELPH-MCNC: 0.99 G/DL
B-GLOBULIN SERPL ELPH-MCNC: 0.87 G/DL
GAMMA GLOB SERPL ELPH-MCNC: 1.33 G/DL
KAPPA LC SER QL IA: 4.46 MG/DL
KAPPA LC/LAMBDA SER IA: 1.72
LAMBDA LC SER QL IA: 2.6 MG/DL
PROT SERPL-MCNC: 7.2 G/DL

## 2018-07-18 ENCOUNTER — OFFICE VISIT (OUTPATIENT)
Dept: HEMATOLOGY/ONCOLOGY | Facility: CLINIC | Age: 78
End: 2018-07-18
Payer: MEDICARE

## 2018-07-18 VITALS
BODY MASS INDEX: 33.82 KG/M2 | SYSTOLIC BLOOD PRESSURE: 138 MMHG | DIASTOLIC BLOOD PRESSURE: 78 MMHG | HEART RATE: 53 BPM | OXYGEN SATURATION: 95 % | WEIGHT: 209.56 LBS | TEMPERATURE: 99 F

## 2018-07-18 DIAGNOSIS — D47.2 MGUS (MONOCLONAL GAMMOPATHY OF UNKNOWN SIGNIFICANCE): Primary | ICD-10-CM

## 2018-07-18 DIAGNOSIS — N18.9 CHRONIC KIDNEY DISEASE, UNSPECIFIED CKD STAGE: ICD-10-CM

## 2018-07-18 PROCEDURE — 99213 OFFICE O/P EST LOW 20 MIN: CPT | Mod: S$GLB,,, | Performed by: INTERNAL MEDICINE

## 2018-07-18 PROCEDURE — 99999 PR PBB SHADOW E&M-EST. PATIENT-LVL III: CPT | Mod: PBBFAC,,, | Performed by: INTERNAL MEDICINE

## 2018-07-18 PROCEDURE — 3078F DIAST BP <80 MM HG: CPT | Mod: CPTII,S$GLB,, | Performed by: INTERNAL MEDICINE

## 2018-07-18 PROCEDURE — 3075F SYST BP GE 130 - 139MM HG: CPT | Mod: CPTII,S$GLB,, | Performed by: INTERNAL MEDICINE

## 2018-07-18 NOTE — PROGRESS NOTES
Subjective:       Patient ID: Barbara Rizo is a 78 y.o. female.    Chief Complaint: Follow-up    HPI     78-year-old with medical history of CKD  stage III, hypertension, osteopenia, vitamin-D deficiency, type 2 diabetes mellitus seen today for MGUS.. Patient's  is a patient of mine. She is followed by Dr. Boyle for CKD stage 3. .  Appetite and weight stable.  No shortness of breath, chest pain. No bony pain. No prior history of anemia.  Patient reports history of hemochromatosis for which she has undergone phlebotomy in  remote past.  No melena, hematochezia or change in bowel habits.  No fatigue, lightheadedness or shortness of breath.  Laboratory studies from 03/22/2018 from outside facility reveal abnormal SPEP reveals elevation of beta 2 globulins. Serum immunofixation reveals a faint IgG lambda monoclonal immunoglobulin.  Biochemical profile reveals a BUN of 33 creatinine of 1.64 mg/dL calcium of 9.9 mg/dL albumin of 4.7    Today, she is doing well.  No fatigue  No SOB  No lightheadedness   Appetite and weight stable.     No bony pain.       CBC reveals wbc 4.8  Hb 14.3 g/dl Hct 40.6 % plt 194k.    SPEP 7/9/2018  paraprotein band is present in gamma = 0.40 g/dL.   .     Biochemical profile reveals a BUN of 31 creatinine of 1.6 mg/dL calcium of 9.5 mg/dL albumin of 3.7      Past Medical History:   Diagnosis Date    Diabetes mellitus type II     Fatty liver     GERD (gastroesophageal reflux disease)     Hemochromatosis     Hyperlipidemia     Hypertension     Vaginal delivery     x2    Vitamin D deficiency disease     Wears partial dentures     upper removable bridge       Past Surgical History:   Procedure Laterality Date    BREAST BIOPSY      CHOLECYSTECTOMY  08/2015    EYE SURGERY      Cat Ext  OU    HYSTERECTOMY      partial due to uterine fibroids    TOTAL KNEE ARTHROPLASTY Right 2015    left knee done 5/2013       Review of Systems   Constitutional: Negative for appetite change,  fatigue, fever and unexpected weight change.   HENT: Negative for mouth sores.    Eyes: Negative for visual disturbance.   Respiratory: Negative for cough and shortness of breath.    Cardiovascular: Negative for chest pain.   Gastrointestinal: Negative for abdominal pain and diarrhea.   Genitourinary: Negative for frequency.   Musculoskeletal: Negative for back pain.   Skin: Negative for rash.   Neurological: Negative for headaches.   Hematological: Negative for adenopathy.   Psychiatric/Behavioral: The patient is not nervous/anxious.        Objective:       Vitals:    07/18/18 1330   BP: 138/78   BP Location: Left arm   Patient Position: Sitting   BP Method: Medium (Automatic)   Pulse: (!) 53   Temp: 98.6 °F (37 °C)   TempSrc: Oral   SpO2: 95%   Weight: 95.1 kg (209 lb 8.8 oz)       Physical Exam   Constitutional: She is oriented to person, place, and time. She appears well-developed and well-nourished.   HENT:   Head: Normocephalic.   Mouth/Throat: Oropharynx is clear and moist. No oropharyngeal exudate.   Eyes: Conjunctivae and lids are normal. Pupils are equal, round, and reactive to light. No scleral icterus.   Neck: Normal range of motion. Neck supple. No thyromegaly present.   Cardiovascular: Normal rate, regular rhythm and normal heart sounds.    No murmur heard.  Pulmonary/Chest: Breath sounds normal. She has no wheezes. She has no rales.   Abdominal: Soft. Bowel sounds are normal. She exhibits no distension and no mass. There is no hepatosplenomegaly. There is no tenderness. There is no rebound and no guarding.   Musculoskeletal: Normal range of motion. She exhibits no edema or tenderness.   Lymphadenopathy:     She has no cervical adenopathy.     She has no axillary adenopathy.        Right: No supraclavicular adenopathy present.        Left: No supraclavicular adenopathy present.   Neurological: She is alert and oriented to person, place, and time. No cranial nerve deficit. Coordination normal.   Skin:  Skin is warm and dry. No ecchymosis, no petechiae and no rash noted. No erythema.   Psychiatric: She has a normal mood and affect.       Lab Results   Component Value Date    WBC 4.88 07/09/2018    HGB 14.3 07/09/2018    HCT 40.6 07/09/2018    MCV 88 07/09/2018     07/09/2018         Results for FAISAL ORNELAS (MRN [id number]) as of 5/16/2018 13:21   Ref. Range 5/2/2018 14:54   [place] Free Light Chains Latest Ref Range: 0.33 - 1.94 mg/dL 5.09 (H)   Lambda Free Light Chains Latest Ref Range: 0.57 - 2.63 mg/dL 2.89 (H)   Kappa/Lambda FLC Ratio Latest Ref Range: 0.26 - 1.65  1.76 (H)         SPEP 7/9/2018  paraprotein band is present in gamma = 0.40 g/dL.  A free lambda light chain specific monoclonal band is present in   gamma.     UPEP- no monoclonal peaks  Assessment:       1. MGUS (monoclonal gammopathy of unknown significance)    2. Chronic kidney disease, unspecified CKD stage        Plan:       Pt clinically stable  Hb 14.3 g/dl-stable   SPEP 7/9/2018  paraprotein band is present in gamma = 0.40 g/dL  Abnl K/L FLCR in setting of renal insufficiency -possibly related to renal insufficiency   BUN/Cr 31/1.6  ( cr 1. 6mg/dl 5/2018)   UPEP- no monoclonal peaks   No signs of disease progression     Follow-up in 3mo with cbc,cmp, SPEP,FLC, Quant Ig, B-2 microglobulin prior to f/u       Cc: Nargis Boyle M.D.

## 2018-08-20 ENCOUNTER — OFFICE VISIT (OUTPATIENT)
Dept: PODIATRY | Facility: CLINIC | Age: 78
End: 2018-08-20
Payer: MEDICARE

## 2018-08-20 VITALS
DIASTOLIC BLOOD PRESSURE: 90 MMHG | HEIGHT: 66 IN | WEIGHT: 209.69 LBS | BODY MASS INDEX: 33.7 KG/M2 | SYSTOLIC BLOOD PRESSURE: 126 MMHG

## 2018-08-20 DIAGNOSIS — E11.9 COMPREHENSIVE DIABETIC FOOT EXAMINATION, TYPE 2 DM, ENCOUNTER FOR: Primary | ICD-10-CM

## 2018-08-20 DIAGNOSIS — M20.41 HAMMER TOES OF BOTH FEET: ICD-10-CM

## 2018-08-20 DIAGNOSIS — M20.5X2 HALLUX LIMITUS, ACQUIRED, LEFT: ICD-10-CM

## 2018-08-20 DIAGNOSIS — M20.42 HAMMER TOES OF BOTH FEET: ICD-10-CM

## 2018-08-20 DIAGNOSIS — M20.5X1 HALLUX LIMITUS, ACQUIRED, RIGHT: ICD-10-CM

## 2018-08-20 PROCEDURE — 3074F SYST BP LT 130 MM HG: CPT | Mod: CPTII,S$GLB,, | Performed by: PODIATRIST

## 2018-08-20 PROCEDURE — 99999 PR PBB SHADOW E&M-EST. PATIENT-LVL III: CPT | Mod: PBBFAC,,, | Performed by: PODIATRIST

## 2018-08-20 PROCEDURE — 99203 OFFICE O/P NEW LOW 30 MIN: CPT | Mod: S$GLB,,, | Performed by: PODIATRIST

## 2018-08-20 PROCEDURE — 3080F DIAST BP >= 90 MM HG: CPT | Mod: CPTII,S$GLB,, | Performed by: PODIATRIST

## 2018-08-20 RX ORDER — LISINOPRIL 40 MG/1
TABLET ORAL
Refills: 3 | Status: ON HOLD | COMMUNITY
Start: 2018-08-16 | End: 2019-02-06 | Stop reason: HOSPADM

## 2018-08-20 NOTE — LETTER
August 20, 2018      Paras Oro MD  [address]  [place] [address]           [facility]  [address]  [place] [address]  Phone: [phone]          Patient: Barbara Rizo   MR Number: [id number]   YOB: 1940   Date of Visit: 8/20/2018       Dear Dr. Paras VALDEZ Page:    Thank you for referring Barbara Rizo to me for evaluation. Attached you will find relevant portions of my assessment and plan of care.    If you have questions, please do not hesitate to call me. I look forward to following Barbara Rizo along with you.    Sincerely,    Catrachita Alcantara DPM    Enclosure  CC:  No Recipients    If you would like to receive this communication electronically, please contact [email or web address] or [phone] to request more information on [facility] access.    For providers and/or their staff who would like to refer a patient to [facility], please contact us through our one-stop-shop provider referral line, [facility] , at [phone].    If you feel you have received this communication in error or would no longer like to receive these types of communications, please e-mail [email or web address]

## 2018-08-20 NOTE — PROGRESS NOTES
Subjective:      Patient ID: Barbara Rizo is a 78 y.o. female.    Chief Complaint: Diabetes Mellitus (pcp Page 7-2-18); Diabetic Foot Exam; and Nail Care    Barbara is a 78 y.o. female who presents to the clinic upon referral from Dr. Oro  for evaluation and treatment of diabetic feet. Barbara has a past medical history of Diabetes mellitus type II, Fatty liver, GERD (gastroesophageal reflux disease), Hemochromatosis, Hyperlipidemia, Hypertension, Vaginal delivery, Vitamin D deficiency disease, and Wears partial dentures. The patient has no major complaints with feet. Chief concern is how to care for feet as a diabetic.    PCP: Paras Oro MD    Date Last Seen by PCP:   Chief Complaint   Patient presents with    Diabetes Mellitus     pcp Page 7-2-18    Diabetic Foot Exam    Nail Care         Current shoe gear: [employer]    Hemoglobin A1C   Date Value Ref Range Status   07/02/2018 5.8 (H) 4.0 - 5.6 % Final     Comment:     ADA Screening Guidelines:  5.7-6.4%  Consistent with prediabetes  >or=6.5%  Consistent with diabetes  High levels of fetal hemoglobin interfere with the HbA1C  assay. Heterozygous hemoglobin variants (HbS, HgC, etc)do  not significantly interfere with this assay.   However, presence of multiple variants may affect accuracy.     08/29/2015 6.2 4.5 - 6.2 % Final   07/22/2014 6.2 4.5 - 6.2 % Final               Patient Active Problem List   Diagnosis    Diabetes mellitus, type 2    GERD (gastroesophageal reflux disease)    Hyperlipidemia    Essential hypertension    Hemochromatosis    DJD (degenerative joint disease) of knee    Vitamin D deficiency    Benign hypertensive heart disease without heart failure    Other and unspecified hyperlipidemia    Primary localized osteoarthrosis, lower leg    Anemia in chronic kidney disease(285.21)    Morbid obesity    Physical deconditioning    Oropharyngeal dysphagia    Esophagitis    Urinary retention    Atonic neurogenic bladder     Incomplete bladder emptying    Constipation, slow transit    OAB (overactive bladder)       Current Outpatient Medications on File Prior to Visit   Medication Sig Dispense Refill    allopurinol (ZYLOPRIM) 100 MG tablet Take 100 mg by mouth once daily.       atorvastatin (LIPITOR) 40 MG tablet Take 1 tablet (40 mg total) by mouth once daily. 90 tablet 1    blood sugar diagnostic Strp Check blood sugar twice a day.Testing strips and lancets. 50 each 22    ergocalciferol (VITAMIN D2) 50,000 unit Cap Take 50,000 Units by mouth every 7 days.      fish oil-omega-3 fatty acids 300-1,000 mg capsule Take 1 capsule by mouth once daily.      folic acid (FOLVITE) 1 MG tablet Take 1 tablet (1 mg total) by mouth once daily. 90 tablet 1    furosemide (LASIX) 20 MG tablet Take Monday, Wednesday, friday 36 tablet 1    lisinopril (PRINIVIL,ZESTRIL) 40 MG tablet TK 1 T PO HS  3    metoprolol tartrate (LOPRESSOR) 50 MG tablet Take 1 tablet (50 mg total) by mouth once daily. 90 tablet 1    blood-glucose meter (FREESTYLE SYSTEM KIT) kit Use as instructed 1 each 0    [DISCONTINUED] lisinopril 10 MG tablet Take 10 mg by mouth once daily.        No current facility-administered medications on file prior to visit.        Review of patient's allergies indicates:  No Known Allergies    Past Surgical History:   Procedure Laterality Date    BREAST BIOPSY      CHOLECYSTECTOMY  08/2015    EYE SURGERY      Cat Ext  OU    HYSTERECTOMY      partial due to uterine fibroids    TOTAL KNEE ARTHROPLASTY Right 2015    left knee done 5/2013       Family History   Problem Relation Age of Onset    Cancer Mother         stomach    Hypertension Mother     Aneurysm Father         abdominal       Social History     Socioeconomic History    Marital status:      Spouse name: Not on file    Number of children: Not on file    Years of education: Not on file    Highest education level: Not on file   Social Needs    Financial resource  "strain: Not on file    Food insecurity - worry: Not on file    Food insecurity - inability: Not on file    Transportation needs - medical: Not on file    Transportation needs - non-medical: Not on file   Occupational History    Not on file   Tobacco Use    Smoking status: Former Smoker    Smokeless tobacco: Never Used   Substance and Sexual Activity    Alcohol use: Yes     Alcohol/week: 0.0 oz     Comment: occasional/ on a weekend    Drug use: No    Sexual activity: Not Currently   Other Topics Concern    Not on file   Social History Narrative    Not on file       Review of Systems   Constitution: Negative for chills, fever and weakness.   Cardiovascular: Negative for claudication and leg swelling.   Respiratory: Negative for cough and shortness of breath.    Skin: Positive for dry skin. Negative for itching, nail changes and rash.   Musculoskeletal: Positive for arthritis, joint pain and myalgias. Negative for falls, joint swelling and muscle weakness.   Gastrointestinal: Negative for diarrhea, nausea and vomiting.   Neurological: Negative for numbness, paresthesias and tremors.   Psychiatric/Behavioral: Negative for altered mental status and hallucinations.           Objective:      Vitals:    08/20/18 0932   BP: (!) 126/90   Weight: 95.1 kg (209 lb 10.5 oz)   Height: 5' 6" (1.676 m)   PainSc: 0-No pain       Physical Exam   Constitutional: She appears well-developed and well-nourished.  Non-toxic appearance. She does not have a sickly appearance. No distress.   alert and oriented x 3.    Cardiovascular:   Pulses:       Dorsalis pedis pulses are 2+ on the right side, and 2+ on the left side.        Posterior tibial pulses are 2+ on the right side, and 2+ on the left side.    Capillary refill time is within normal limits. Digital hair present.    Pulmonary/Chest: No respiratory distress.   Musculoskeletal: She exhibits no deformity.        Right ankle: No tenderness. No lateral malleolus, no medial " malleolus, no AITFL, no CF ligament and no posterior TFL tenderness found. Achilles tendon exhibits no pain, no defect and normal Ding's test results.        Left ankle: No tenderness. No lateral malleolus, no medial malleolus, no AITFL, no CF ligament and no posterior TFL tenderness found. Achilles tendon exhibits no pain, no defect and normal Ding's test results.        Right foot: There is no tenderness and no bony tenderness.        Left foot: There is no tenderness and no bony tenderness.   Decreased first MPJ range of motion both weightbearing and nonweightbearing, no crepitus observed the first MP joint, +dorsal flag sign.Mild  bunion deformity is observed .    Decreased stride, station of gait.  apropulsive toe off.  Increased angle and base of gait.    Patient has hammertoes of digits 2-5 bilateral partially reducible without symptom today.     Muscle strength is 5/5 in all groups bilaterally.           Feet:   Right Foot:   Protective Sensation: 5 sites tested. 5 sites sensed.   Left Foot:   Protective Sensation: 5 sites tested. 5 sites sensed.   Lymphadenopathy:   No lymphatic streaking     Neurological: She displays no atrophy. No sensory deficit.   Light touch present     Skin: Skin is warm, dry and intact. No rash noted. She is not diaphoretic. No cyanosis. No pallor. Nails show no clubbing.   Skin is of normal turgor.   Normal temperature gradient.  Examination of the skin reveals no evidence of significant rashes, open lesions, suspicious appearing nevi or other concerning lesions.     Toenails 1-5 bilaterally are neatly trimmed; of normal color and thickness   Psychiatric: Her mood appears not anxious. Her affect is not inappropriate. Her speech is not slurred. She is not combative. She is communicative. She is attentive.   Nursing note and vitals reviewed.            Assessment:       Encounter Diagnoses   Name Primary?    Comprehensive diabetic foot examination, type 2 DM, encounter for  Yes    Hallux limitus, acquired, left     Hallux limitus, acquired, right     Hammer toes of both feet          Plan:     Problem List Items Addressed This Visit     None      Visit Diagnoses     Comprehensive diabetic foot examination, type 2 DM, encounter for    -  Primary    Relevant Orders    DIABETIC SHOES FOR HOME USE    Hallux limitus, acquired, left        Relevant Orders    DIABETIC SHOES FOR HOME USE    Hallux limitus, acquired, right        Relevant Orders    DIABETIC SHOES FOR HOME USE    Hammer toes of both feet        Relevant Orders    DIABETIC SHOES FOR HOME USE         I counseled the patient on her conditions, their implications and medical management.    Orders Placed This Encounter    DIABETIC SHOES FOR HOME USE       Greater than 50% of this visit spent on counseling and coordination of care.    Education about the diabetic foot, neuropathy, and prevention of limb loss.    Shoe inspection. Diabetic Foot Education. Patient reminded of the importance of good nutrition/healthy diet/weight management and blood sugar control to help prevent podiatric complications of diabetes. Patient instructed on proper foot hygeine. Wear comfortable, proper fitting shoes. Wash feet daily. Dry well. After drying, apply moisturizer to feet (no lotion to webspaces). Inspect feet daily for skin breaks, blisters, swelling, or redness. Wear cotton socks (preferably white)  Change socks every day. Do NOT walk barefoot. Do NOT use heating pads or hot water soaks. We discussed wearing proper shoe gear, daily foot inspections, never walking without protective shoe gear.     Discussed edema control and the importance of daily moisturizer to the feet such as Gold bonds diabetic foot cream    Rx diabetic shoes for protection and support    She will continue to monitor the areas daily, inspect her feet, wear protective shoe gear when ambulatory, moisturizer to maintain skin integrity and follow in this office in  approximately 12 months, sooner p.r.n.

## 2018-08-20 NOTE — PATIENT INSTRUCTIONS
Recommend lotions: eucerin, eucerin for diabetics, aquaphor, A&D ointment, gold bond for diabetics, sween, Kendleton's Bees all purpose baby ointment,  urea 40 with aloe (found on amazon.com)    Shoe recommendations: (try 6pm.com, zappos.com , nordstromrack.[email or web address], or shoes.[email or web address] for discounted prices) you can visit DSW shoes in [place]  or [employer] [facility] in the [facility] (there are also several shoe brand outlets in the [facility])    Asics (GT 2000 or gel foundations), new balance stability type shoes, saucony (stabil c3),  Canales (GTS or Beast or transcend), vionic, propet (tennis shoe)    sofft brand, clarks, crocs, aerosoles, naturalizers, SAS, ecco, born, myles chaudhari, rockports (dress shoes)    Vionic, burkenstocks, fitflops, propet (sandals)  Nike comfort thong sandals, crocs, propet (house shoes)    Nail Home remedy:  Vicks Vapor rub to nails for easier managability    Occasional soaks for 15-20 mins in luke warm water with 1 cup of listerine and 1 cup of apple cider vinegar are ok You may add several drops of oil of oregano or tea tree oil as well        Diabetes: Inspecting Your Feet  Diabetes increases your chances of developing foot problems. So inspect your feet every day. This helps you find small skin irritations before they become serious infections. If you have trouble seeing the bottoms of your feet, use a mirror or ask a family member or friend to help.     Pressure spots on the bottom of the foot are common areas where problems develop.   How to check your feet  Below are tips to help you look for foot problems. Try to check your feet at the same time each day, such as when you get out of bed in the morning:  · Check the top of each foot. The tops of toes, back of the heel, and outer edge of the foot can get a lot of rubbing from poor-fitting shoes.  · Check the bottom of each foot. Daily wear and tear often leads to problems at pressure spots.  · Check the toes and nails. Fungal infections often occur  between toes. Toenail problems can also be a sign of fungal infections or lead to breaks in the skin.  · Check your shoes, too. Loose objects inside a shoe can injure the foot. Use your hand to feel inside your shoes for things like fouzia, loose stitching, or rough areas that could irritate your skin.  Warning signs  Look for any color changes in the foot. Redness with streaks can signal a severe infection, which needs immediate medical attention. Tell your doctor right away if you have any of these problems:  · Swelling, sometimes with color changes, may be a sign of poor blood flow or infection. Symptoms include tenderness and an increase in the size of your foot.  · Warm or hot areas on your feet may be signs of infection. A foot that is cold may not be getting enough blood.  · Sensations such as burning, tingling, or pins and needles can be signs of a problem. Also check for areas that may be numb.  · Hot spots are caused by friction or pressure. Look for hot spots in areas that get a lot of rubbing. Hot spots can turn into blisters, calluses, or sores.  · Cracks and sores are caused by dry or irritated skin. They are a sign that the skin is breaking down, which can lead to infection.  · Toenail problems to watch for include nails growing into the skin (ingrown toenail) and causing redness or pain. Thick, yellow, or discolored nails can signal a fungal infection.  · Drainage and odor can develop from untreated sores and ulcers. Call your doctor right away if you notice white or yellow drainage, bleeding, or unpleasant odor.   © 2957-1394 [employer]. [address] [place] [address]. All rights reserved. This information is not intended as a substitute for professional medical care. Always follow your healthcare professional's instructions.        Step-by-Step:  Inspecting Your Feet (Diabetes)    Date Last Reviewed: 10/1/2016  © 2626-9473 [employer]. [address] [place] [address]  Road, [place] [address]. All rights reserved. This information is not intended as a substitute for professional medical care. Always follow your healthcare professional's instructions.

## 2018-09-06 NOTE — PROGRESS NOTES
Please let patient know that her bone density test showed mild thinning of the bones.  I would recommend she take OTC calcium-Vitamin D3 supplement daily and will repeat her bone density test in 2 years.    Thanks,  Dr. Oro

## 2018-09-10 DIAGNOSIS — I10 ESSENTIAL HYPERTENSION: ICD-10-CM

## 2018-09-11 ENCOUNTER — TELEPHONE (OUTPATIENT)
Dept: FAMILY MEDICINE | Facility: CLINIC | Age: 78
End: 2018-09-11

## 2018-09-11 RX ORDER — METOPROLOL TARTRATE 50 MG/1
100 TABLET ORAL DAILY
Qty: 180 TABLET | Refills: 4 | Status: SHIPPED | OUTPATIENT
Start: 2018-09-11 | End: 2019-04-17

## 2018-09-11 NOTE — TELEPHONE ENCOUNTER
----- Message from Paras Oro MD sent at 9/6/2018  2:49 PM CDT -----  Please let patient know that her bone density test showed mild thinning of the bones.  I would recommend she take OTC calcium-Vitamin D3 supplement daily and will repeat her bone density test in 2 years.    Thanks,  Dr. Oro

## 2018-09-11 NOTE — TELEPHONE ENCOUNTER
----- Message from Evelyn Flores sent at 9/11/2018 11:41 AM CDT -----  Contact: Self/ [phone]  Pt returning call to office. Thank you.

## 2018-09-17 ENCOUNTER — CLINICAL SUPPORT (OUTPATIENT)
Dept: FAMILY MEDICINE | Facility: CLINIC | Age: 78
End: 2018-09-17
Payer: MEDICARE

## 2018-09-17 VITALS
TEMPERATURE: 98 F | SYSTOLIC BLOOD PRESSURE: 145 MMHG | HEART RATE: 62 BPM | DIASTOLIC BLOOD PRESSURE: 70 MMHG | WEIGHT: 210.13 LBS | BODY MASS INDEX: 33.91 KG/M2

## 2018-09-17 DIAGNOSIS — I10 ESSENTIAL HYPERTENSION: Primary | ICD-10-CM

## 2018-09-17 PROCEDURE — 99212 OFFICE O/P EST SF 10 MIN: CPT | Mod: PBBFAC,PN

## 2018-09-17 PROCEDURE — 99999 PR PBB SHADOW E&M-EST. PATIENT-LVL II: CPT | Mod: PBBFAC,,,

## 2018-09-17 PROCEDURE — 99499 UNLISTED E&M SERVICE: CPT | Mod: S$PBB,,, | Performed by: FAMILY MEDICINE

## 2018-09-17 NOTE — PROGRESS NOTES
Barbara Rizo 78 y.o. female is here today for Blood Pressure check.   History of HTN yes.    Review of patient's allergies indicates:  No Known Allergies  Creatinine   Date Value Ref Range Status   07/09/2018 1.6 (H) 0.5 - 1.4 mg/dL Final     Sodium   Date Value Ref Range Status   07/09/2018 142 136 - 145 mmol/L Final     Potassium   Date Value Ref Range Status   07/09/2018 4.6 3.5 - 5.1 mmol/L Final   ]  Patient verifies taking blood pressure medications on a regular basis at the same time of the day.     Current Outpatient Medications:     allopurinol (ZYLOPRIM) 100 MG tablet, Take 100 mg by mouth once daily. , Disp: , Rfl:     atorvastatin (LIPITOR) 40 MG tablet, Take 1 tablet (40 mg total) by mouth once daily., Disp: 90 tablet, Rfl: 1    blood sugar diagnostic Strp, Check blood sugar twice a day.Testing strips and lancets., Disp: 50 each, Rfl: 22    blood-glucose meter (FREESTYLE SYSTEM KIT) kit, Use as instructed, Disp: 1 each, Rfl: 0    ergocalciferol (VITAMIN D2) 50,000 unit Cap, Take 50,000 Units by mouth every 7 days., Disp: , Rfl:     fish oil-omega-3 fatty acids 300-1,000 mg capsule, Take 1 capsule by mouth once daily., Disp: , Rfl:     folic acid (FOLVITE) 1 MG tablet, Take 1 tablet (1 mg total) by mouth once daily., Disp: 90 tablet, Rfl: 1    furosemide (LASIX) 20 MG tablet, Take Monday, Wednesday, friday, Disp: 36 tablet, Rfl: 1    lisinopril (PRINIVIL,ZESTRIL) 40 MG tablet, TK 1 T PO HS, Disp: , Rfl: 3    metoprolol tartrate (LOPRESSOR) 50 MG tablet, Take 2 tablets (100 mg total) by mouth once daily., Disp: 180 tablet, Rfl: 4  Does patient have record of home blood pressure readings no. Readings have been averaging n/a.   Last dose of blood pressure medication was taken at this A.M.  Patient is asymptomatic.   Complains of n/a.    BP: (!) 145/70 , Pulse: 62 .    Blood pressure reading after 15 minutes was 145/70, Pulse 62  Dr. Oro notified.

## 2018-09-19 ENCOUNTER — INFUSION (OUTPATIENT)
Dept: INFUSION THERAPY | Facility: HOSPITAL | Age: 78
End: 2018-09-19
Attending: FAMILY MEDICINE
Payer: MEDICARE

## 2018-09-19 VITALS
SYSTOLIC BLOOD PRESSURE: 167 MMHG | RESPIRATION RATE: 16 BRPM | DIASTOLIC BLOOD PRESSURE: 77 MMHG | TEMPERATURE: 98 F | OXYGEN SATURATION: 99 % | HEART RATE: 52 BPM

## 2018-09-19 DIAGNOSIS — M17.10 PRIMARY LOCALIZED OSTEOARTHROSIS, LOWER LEG: Primary | ICD-10-CM

## 2018-09-19 PROCEDURE — 96372 THER/PROPH/DIAG INJ SC/IM: CPT

## 2018-09-19 PROCEDURE — 63600175 PHARM REV CODE 636 W HCPCS: Mod: JG | Performed by: INTERNAL MEDICINE

## 2018-09-19 RX ADMIN — DENOSUMAB 60 MG: 60 INJECTION SUBCUTANEOUS at 08:09

## 2018-09-19 NOTE — PLAN OF CARE
Problem: Patient Care Overview  Goal: Plan of Care Review  Outcome: Ongoing (interventions implemented as appropriate)  Pt presented for prolia injection. VSS. Afebrile. Prolia injection to L arm. Pt tolerated well. Distress tool score of 0. Pt reports no changes in medical history or medications. AVS with future appts provided.

## 2018-09-24 ENCOUNTER — OFFICE VISIT (OUTPATIENT)
Dept: FAMILY MEDICINE | Facility: CLINIC | Age: 78
End: 2018-09-24
Payer: MEDICARE

## 2018-09-24 VITALS
WEIGHT: 210.31 LBS | RESPIRATION RATE: 14 BRPM | SYSTOLIC BLOOD PRESSURE: 132 MMHG | OXYGEN SATURATION: 94 % | HEIGHT: 66 IN | DIASTOLIC BLOOD PRESSURE: 82 MMHG | BODY MASS INDEX: 33.8 KG/M2 | HEART RATE: 56 BPM | TEMPERATURE: 98 F

## 2018-09-24 DIAGNOSIS — I10 ESSENTIAL HYPERTENSION: Primary | ICD-10-CM

## 2018-09-24 PROCEDURE — 99999 PR PBB SHADOW E&M-EST. PATIENT-LVL IV: CPT | Mod: PBBFAC,,, | Performed by: FAMILY MEDICINE

## 2018-09-24 PROCEDURE — 1101F PT FALLS ASSESS-DOCD LE1/YR: CPT | Mod: CPTII,,, | Performed by: FAMILY MEDICINE

## 2018-09-24 PROCEDURE — 90662 IIV NO PRSV INCREASED AG IM: CPT | Mod: PBBFAC,PN

## 2018-09-24 PROCEDURE — 3079F DIAST BP 80-89 MM HG: CPT | Mod: CPTII,,, | Performed by: FAMILY MEDICINE

## 2018-09-24 PROCEDURE — 99214 OFFICE O/P EST MOD 30 MIN: CPT | Mod: S$PBB,,, | Performed by: FAMILY MEDICINE

## 2018-09-24 PROCEDURE — 99214 OFFICE O/P EST MOD 30 MIN: CPT | Mod: PBBFAC,PN,25 | Performed by: FAMILY MEDICINE

## 2018-09-24 PROCEDURE — 3075F SYST BP GE 130 - 139MM HG: CPT | Mod: CPTII,,, | Performed by: FAMILY MEDICINE

## 2018-09-24 NOTE — PROGRESS NOTES
Routine Office Visit    Patient Name: Barbara Rizo    : 1940  MRN: [id number]    Subjective:  Barbara is a 78 y.o. female who presents today for:    1. htn  Patient presenting today for follow up of blood pressure.  She has been taking medications as prescribed.  She does have ckd stage 3 that is followed by nephrology (Dr. Boyle).  There has been no chest pain, weakness, numbness, or blurred vision.     Past Medical History  Past Medical History:   Diagnosis Date    Diabetes mellitus type II     Fatty liver     GERD (gastroesophageal reflux disease)     Hemochromatosis     Hyperlipidemia     Hypertension     Vaginal delivery     x2    Vitamin D deficiency disease     Wears partial dentures     upper removable bridge       Past Surgical History  Past Surgical History:   Procedure Laterality Date    ARTHROPLASTY, KNEE, TOTAL Left 2013    [facility]    ARTHROPLASTY-KNEE Right 8/3/2015    [facility]    BREAST BIOPSY      CHEST DRAINAGE N/A 2015    [facility] Bonnie Surgeon [facility]    CHOLECYSTECTOMY  2015    CHOLECYSTECTOMY-LAPAROSCOPIC N/A 2015    [facility]    CYSTOSCOPY - URODYNAMICS FLOW STUDY  (C-ARM) N/A 2016    [facility]    ESOPHAGOGASTRODUODENOSCOPY (EGD) Left 10/15/2015    [facility] Rolando [facility] ENDO    EYE SURGERY      Cat Ext  OU    HYSTERECTOMY      partial due to uterine fibroids    INCISION AND DRAINAGE (I&D), ABSCESS Right 2015    Performed by Bonnie Surgeon [facility]NIE    TOTAL KNEE ARTHROPLASTY Right     left knee done 2013    TRANSESOPHAGEAL ECHOCARDIOGRAM (TALHA) N/A 2015    Performed by [facility]uri Lomas [facility] at [facility] CATH LAB       Family History  Family History   Problem Relation Age of Onset    Cancer Mother         stomach    Hypertension Mother     Aneurysm  Father         abdominal       Social History  Social History     Socioeconomic History    Marital status:      Spouse name: Not on file    Number of children: Not on file    Years of education: Not on file    Highest education level: Not on file   Social Needs    Financial resource strain: Not on file    Food insecurity - worry: Not on file    Food insecurity - inability: Not on file    Transportation needs - medical: Not on file    Transportation needs - non-medical: Not on file   Occupational History    Not on file   Tobacco Use    Smoking status: Former Smoker    Smokeless tobacco: Never Used   Substance and Sexual Activity    Alcohol use: Yes     Alcohol/week: 0.0 oz     Comment: occasional/ on a weekend    Drug use: No    Sexual activity: Not Currently   Other Topics Concern    Not on file   Social History Narrative    Not on file       Current Medications  Current Outpatient Medications on File Prior to Visit   Medication Sig Dispense Refill    allopurinol (ZYLOPRIM) 100 MG tablet Take 100 mg by mouth once daily.       atorvastatin (LIPITOR) 40 MG tablet Take 1 tablet (40 mg total) by mouth once daily. 90 tablet 1    blood sugar diagnostic Strp Check blood sugar twice a day.Testing strips and lancets. 50 each 22    ergocalciferol (VITAMIN D2) 50,000 unit Cap Take 50,000 Units by mouth every 7 days.      fish oil-omega-3 fatty acids 300-1,000 mg capsule Take 1 capsule by mouth once daily.      folic acid (FOLVITE) 1 MG tablet Take 1 tablet (1 mg total) by mouth once daily. 90 tablet 1    furosemide (LASIX) 20 MG tablet Take Monday, Wednesday, friday 36 tablet 1    lisinopril (PRINIVIL,ZESTRIL) 40 MG tablet TK 1 T PO HS  3    metoprolol tartrate (LOPRESSOR) 50 MG tablet Take 2 tablets (100 mg total) by mouth once daily. 180 tablet 4    blood-glucose meter (FREESTYLE SYSTEM KIT) kit Use as instructed 1 each 0     No current facility-administered medications on file prior to  "visit.        Allergies   Review of patient's allergies indicates:  No Known Allergies    Review of Systems (Pertinent positives)  Review of Systems   Constitutional: Negative.    HENT: Negative.    Eyes: Negative.    Respiratory: Negative.    Cardiovascular: Negative.    Gastrointestinal: Negative.    Skin: Negative.    Neurological: Negative.          /82   Pulse (!) 56   Temp 98 °F (36.7 °C) (Oral)   Resp 14   Ht 5' 6" (1.676 m)   Wt 95.4 kg (210 lb 5.1 oz)   SpO2 (!) 94%   BMI 33.95 kg/m²     GENERAL APPEARANCE: in no apparent distress and well developed and well nourished  HEENT: PERRL, EOMI, Sclera clear, anicteric, Oropharynx clear, no lesions, Neck supple with midline trachea  NECK: normal, supple, no adenopathy, thyroid normal in size  RESPIRATORY: appears well, vitals normal, no respiratory distress, acyanotic, normal RR, chest clear, no wheezing, crepitations, rhonchi, normal symmetric air entry  HEART: regular rate and rhythm, S1, S2 normal, no murmur, click, rub or gallop.    ABDOMEN: abdomen is soft without tenderness, no masses, no hernias, no organomegaly, no rebound, no guarding. Suprapubic tenderness absent. No CVA tenderness.  NEUROLOGIC: normal without focal findings, CN II-XII are intact.  SKIN: no rashes, no wounds, no other lesions  PSYCH: Alert, oriented x 3, thought content appropriate, speech normal, pleasant and cooperative, good eye contact, well groomed   intelligence.    Assessment/Plan:  Barbara Rizo is a 78 y.o. female who presents today for :    Barbara was seen today for follow-up.    Diagnoses and all orders for this visit:    Essential hypertension    Other orders  -     Cancel: (In Office Administered) Tdap Vaccine  -     Influenza - High Dose (65+) (PF) (IM)      1.  Blood pressure stable  2.  Flu shot given  3.  Follow up 3 months or sooner if needed    Paras Oro MD    "

## 2018-10-08 ENCOUNTER — LAB VISIT (OUTPATIENT)
Dept: LAB | Facility: HOSPITAL | Age: 78
End: 2018-10-08
Attending: INTERNAL MEDICINE
Payer: MEDICARE

## 2018-10-08 DIAGNOSIS — N18.9 CHRONIC KIDNEY DISEASE, UNSPECIFIED CKD STAGE: ICD-10-CM

## 2018-10-08 DIAGNOSIS — D47.2 MGUS (MONOCLONAL GAMMOPATHY OF UNKNOWN SIGNIFICANCE): ICD-10-CM

## 2018-10-08 LAB
ALBUMIN SERPL BCP-MCNC: 3.9 G/DL
ALP SERPL-CCNC: 73 U/L
ALT SERPL W/O P-5'-P-CCNC: 17 U/L
ANION GAP SERPL CALC-SCNC: 8 MMOL/L
AST SERPL-CCNC: 13 U/L
B2 MICROGLOB SERPL-MCNC: 5 UG/ML
BASOPHILS # BLD AUTO: 0.02 K/UL
BASOPHILS NFR BLD: 0.4 %
BILIRUB SERPL-MCNC: 0.8 MG/DL
BUN SERPL-MCNC: 22 MG/DL
CALCIUM SERPL-MCNC: 10.1 MG/DL
CHLORIDE SERPL-SCNC: 102 MMOL/L
CO2 SERPL-SCNC: 28 MMOL/L
CREAT SERPL-MCNC: 1.6 MG/DL
DIFFERENTIAL METHOD: ABNORMAL
EOSINOPHIL # BLD AUTO: 0.2 K/UL
EOSINOPHIL NFR BLD: 3.6 %
ERYTHROCYTE [DISTWIDTH] IN BLOOD BY AUTOMATED COUNT: 13.3 %
EST. GFR  (AFRICAN AMERICAN): 36 ML/MIN/1.73 M^2
EST. GFR  (NON AFRICAN AMERICAN): 31 ML/MIN/1.73 M^2
GLUCOSE SERPL-MCNC: 115 MG/DL
HCT VFR BLD AUTO: 43.1 %
HGB BLD-MCNC: 14.6 G/DL
IGA SERPL-MCNC: 399 MG/DL
IGG SERPL-MCNC: 1354 MG/DL
IGM SERPL-MCNC: 36 MG/DL
LYMPHOCYTES # BLD AUTO: 1.2 K/UL
LYMPHOCYTES NFR BLD: 25.2 %
MCH RBC QN AUTO: 31.9 PG
MCHC RBC AUTO-ENTMCNC: 33.9 G/DL
MCV RBC AUTO: 94 FL
MONOCYTES # BLD AUTO: 0.4 K/UL
MONOCYTES NFR BLD: 8.6 %
NEUTROPHILS # BLD AUTO: 3 K/UL
NEUTROPHILS NFR BLD: 62.2 %
PLATELET # BLD AUTO: 177 K/UL
PMV BLD AUTO: 10.3 FL
POTASSIUM SERPL-SCNC: 4.5 MMOL/L
PROT SERPL-MCNC: 7.9 G/DL
RBC # BLD AUTO: 4.57 M/UL
SODIUM SERPL-SCNC: 138 MMOL/L
WBC # BLD AUTO: 4.76 K/UL

## 2018-10-08 PROCEDURE — 82784 ASSAY IGA/IGD/IGG/IGM EACH: CPT | Mod: 59

## 2018-10-08 PROCEDURE — 36415 COLL VENOUS BLD VENIPUNCTURE: CPT

## 2018-10-08 PROCEDURE — 84165 PROTEIN E-PHORESIS SERUM: CPT | Mod: 26,,, | Performed by: PATHOLOGY

## 2018-10-08 PROCEDURE — 83520 IMMUNOASSAY QUANT NOS NONAB: CPT | Mod: 59

## 2018-10-08 PROCEDURE — 84165 PROTEIN E-PHORESIS SERUM: CPT

## 2018-10-08 PROCEDURE — 85025 COMPLETE CBC W/AUTO DIFF WBC: CPT

## 2018-10-08 PROCEDURE — 82232 ASSAY OF BETA-2 PROTEIN: CPT

## 2018-10-08 PROCEDURE — 80053 COMPREHEN METABOLIC PANEL: CPT

## 2018-10-09 LAB
ALBUMIN SERPL ELPH-MCNC: 3.75 G/DL
ALPHA1 GLOB SERPL ELPH-MCNC: 0.29 G/DL
ALPHA2 GLOB SERPL ELPH-MCNC: 1.04 G/DL
B-GLOBULIN SERPL ELPH-MCNC: 0.89 G/DL
GAMMA GLOB SERPL ELPH-MCNC: 1.33 G/DL
KAPPA LC SER QL IA: 5.54 MG/DL
KAPPA LC/LAMBDA SER IA: 1.87
LAMBDA LC SER QL IA: 2.96 MG/DL
PATHOLOGIST INTERPRETATION SPE: NORMAL
PROT SERPL-MCNC: 7.3 G/DL

## 2018-10-18 ENCOUNTER — OFFICE VISIT (OUTPATIENT)
Dept: HEMATOLOGY/ONCOLOGY | Facility: CLINIC | Age: 78
End: 2018-10-18
Payer: MEDICARE

## 2018-10-18 VITALS
TEMPERATURE: 98 F | WEIGHT: 205 LBS | BODY MASS INDEX: 36.32 KG/M2 | SYSTOLIC BLOOD PRESSURE: 158 MMHG | HEART RATE: 55 BPM | DIASTOLIC BLOOD PRESSURE: 60 MMHG | HEIGHT: 63 IN | OXYGEN SATURATION: 96 %

## 2018-10-18 DIAGNOSIS — D47.2 MGUS (MONOCLONAL GAMMOPATHY OF UNKNOWN SIGNIFICANCE): Primary | ICD-10-CM

## 2018-10-18 PROCEDURE — 3078F DIAST BP <80 MM HG: CPT | Mod: CPTII,,, | Performed by: INTERNAL MEDICINE

## 2018-10-18 PROCEDURE — 1101F PT FALLS ASSESS-DOCD LE1/YR: CPT | Mod: CPTII,,, | Performed by: INTERNAL MEDICINE

## 2018-10-18 PROCEDURE — 99214 OFFICE O/P EST MOD 30 MIN: CPT | Mod: PBBFAC | Performed by: INTERNAL MEDICINE

## 2018-10-18 PROCEDURE — 99213 OFFICE O/P EST LOW 20 MIN: CPT | Mod: S$PBB,,, | Performed by: INTERNAL MEDICINE

## 2018-10-18 PROCEDURE — 99999 PR PBB SHADOW E&M-EST. PATIENT-LVL IV: CPT | Mod: PBBFAC,,, | Performed by: INTERNAL MEDICINE

## 2018-10-18 PROCEDURE — 3077F SYST BP >= 140 MM HG: CPT | Mod: CPTII,,, | Performed by: INTERNAL MEDICINE

## 2018-10-18 NOTE — PROGRESS NOTES
Subjective:       Patient ID: Barbara Rizo is a 78 y.o. female.    Chief Complaint: Follow-up    HPI     Diagnosis: MGUS    78-year-old with medical history of CKD  stage III, hypertension, osteopenia, vitamin-D deficiency, type 2 diabetes mellitus seen today for MGUS.. Patient's  is a patient of mine. She is followed by Dr. Byole for CKD stage 3. .  Appetite and weight stable.  No shortness of breath, chest pain. No bony pain. No prior history of anemia.  Patient reports history of hemochromatosis for which she has undergone phlebotomy in  remote past.  No melena, hematochezia or change in bowel habits.  No fatigue, lightheadedness or shortness of breath.  Laboratory studies from 03/22/2018 from outside facility reveal abnormal SPEP reveals elevation of beta 2 globulins. Serum immunofixation reveals a faint IgG lambda monoclonal immunoglobulin.  Biochemical profile reveals a BUN of 33 creatinine of 1.64 mg/dL calcium of 9.9 mg/dL albumin of 4.7    Today, she is grieving over recent death of her    was a pt of mine   recently underwent lung surgery  She reports he developed progressive weakness, fatigue   She reports she called EMS when pt unresponsive and pt pronounced dead    She has no other issues   No fatigue  No SOB  No lightheadedness   Appetite and weight stable.     No bony pain.       CBC reveals wbc 4.76  Hb 14.6 g/dl Hct 42.1 % plt 177k.    SPEP 7/9/2018  paraprotein band is present in gamma = 0.40 g/dL.   .     Biochemical profile reveals a BUN of 22 creatinine of 1.6 mg/dL calcium of 10.1mg/dL albumin of 3.9      Past Medical History:   Diagnosis Date    Diabetes mellitus type II     Fatty liver     GERD (gastroesophageal reflux disease)     Hemochromatosis     Hyperlipidemia     Hypertension     Vaginal delivery     x2    Vitamin D deficiency disease     Wears partial dentures     upper removable bridge       Past Surgical History:   Procedure Laterality Date  "   ARTHROPLASTY, KNEE, TOTAL Left 5/13/2013    [facility] OR    ARTHROPLASTY-KNEE Right 8/3/2015    [facility] OR    BREAST BIOPSY      CHEST DRAINAGE N/A 9/18/2015    Performed by Bonnie Surgeon at [facility]NIE    CHOLECYSTECTOMY  08/2015    CHOLECYSTECTOMY-LAPAROSCOPIC N/A 8/26/2015    [facility] by Bony Alexander [facility] at [facility] OR    CYSTOSCOPY - URODYNAMICS FLOW STUDY  (C-ARM) N/A 11/4/2016    Performed by Reina Camp [facility] at [facility] OR    ESOPHAGOGASTRODUODENOSCOPY (EGD) Left 10/15/2015    Performed by Rolando Robert MD at [facility] ENDO    EYE SURGERY      Cat Ext  OU    HYSTERECTOMY      partial due to uterine fibroids    INCISION AND DRAINAGE (I&D), ABSCESS Right 9/14/2015    Performed by [facility] Surgeon [facility]    TOTAL KNEE ARTHROPLASTY Right 2015    left knee done 5/2013    TRANSESOPHAGEAL ECHOCARDIOGRAM (TALAH) N/A 9/21/2015    Performed by Amauri Lomas MD at [facility] CATH LAB       Review of Systems   Constitutional: Negative for appetite change, fatigue, fever and unexpected weight change.   HENT: Negative for mouth sores.    Eyes: Negative for visual disturbance.   Respiratory: Negative for cough and shortness of breath.    Cardiovascular: Negative for chest pain.   Gastrointestinal: Negative for abdominal pain and diarrhea.   Genitourinary: Negative for frequency.   Musculoskeletal: Negative for back pain.   Skin: Negative for rash.   Neurological: Negative for headaches.   Hematological: Negative for adenopathy.   Psychiatric/Behavioral: The patient is not nervous/anxious.        Objective:       Vitals:    10/18/18 1335 10/18/18 1336   BP: (!) 195/81 (!) 158/60   BP Location: Left arm Left arm   Patient Position: Sitting Sitting   BP Method: Medium (Automatic) Medium (Automatic)   Pulse: (!) 55    Temp: 98.4 °F (36.9 °C)    TempSrc: Oral    SpO2: 96%    Weight: 93 kg (205 lb 0.4 oz)    Height: 5' 3" (1.6 m)  "       Physical Exam   Constitutional: She is oriented to person, place, and time. She appears well-developed and well-nourished.   HENT:   Head: Normocephalic.   Mouth/Throat: Oropharynx is clear and moist. No oropharyngeal exudate.   Eyes: Conjunctivae and lids are normal. Pupils are equal, round, and reactive to light. No scleral icterus.   Neck: Normal range of motion. Neck supple. No thyromegaly present.   Cardiovascular: Normal rate, regular rhythm and normal heart sounds.   No murmur heard.  Pulmonary/Chest: Breath sounds normal. She has no wheezes. She has no rales.   Abdominal: Soft. Bowel sounds are normal. She exhibits no distension and no mass. There is no hepatosplenomegaly. There is no tenderness. There is no rebound and no guarding.   Musculoskeletal: Normal range of motion. She exhibits no edema or tenderness.   Lymphadenopathy:     She has no cervical adenopathy.     She has no axillary adenopathy.        Right: No supraclavicular adenopathy present.        Left: No supraclavicular adenopathy present.   Neurological: She is alert and oriented to person, place, and time. No cranial nerve deficit. Coordination normal.   Skin: Skin is warm and dry. No ecchymosis, no petechiae and no rash noted. No erythema.   Psychiatric: She has a normal mood and affect.       Lab Results   Component Value Date    WBC 4.76 10/08/2018    HGB 14.6 10/08/2018    HCT 43.1 10/08/2018    MCV 94 10/08/2018     10/08/2018       Results for FAISAL ORNELAS (MRN [id number]) as of 10/18/2018 13:43   Ref. Range 7/9/2018 08:31 10/8/2018 09:04   Artas Free Light Chains Latest Ref Range: 0.33 - 1.94 mg/dL 4.46 (H) 5.54 (H)   Lambda Free Light Chains Latest Ref Range: 0.57 - 2.63 mg/dL 2.60 2.96 (H)   Kappa/Lambda FLC Ratio Latest Ref Range: 0.26 - 1.65  1.72 (H) 1.87 (H)             SPEP 10/8/2018  Normal total protein. A paraprotein band is present in gamma = 0.20   g/dL; previously 0.40 g/dL.            UPEP- no monoclonal  peaks  Assessment:       1. MGUS (monoclonal gammopathy of unknown significance)        Plan:       Pt clinically stable  Hb 14.6 g/dl-stable   SPEP 10/8/2018  M spike 0.2/g/dl  Abnl K/L FLCR in setting of renal insufficiency -possibly related to renal insufficiency   BUN/Cr 22/1.6  (  Cr  1. 6mg/dl 5/2018)   UPEP- no monoclonal peaks   No signs of disease progression     Follow-up in 4mo with cbc,cmp, SPEP,FLC, Quant Ig, B-2 microglobulin prior to f/u       Cc: Nargis Boyle M.D.

## 2018-11-27 ENCOUNTER — OFFICE VISIT (OUTPATIENT)
Dept: CARDIOLOGY | Facility: CLINIC | Age: 78
End: 2018-11-27
Payer: MEDICARE

## 2018-11-27 VITALS
WEIGHT: 207.25 LBS | SYSTOLIC BLOOD PRESSURE: 190 MMHG | BODY MASS INDEX: 36.72 KG/M2 | DIASTOLIC BLOOD PRESSURE: 92 MMHG | HEIGHT: 63 IN

## 2018-11-27 DIAGNOSIS — E78.2 MIXED HYPERLIPIDEMIA: Primary | ICD-10-CM

## 2018-11-27 DIAGNOSIS — I10 ESSENTIAL HYPERTENSION: ICD-10-CM

## 2018-11-27 DIAGNOSIS — I10 HYPERTENSION: ICD-10-CM

## 2018-11-27 DIAGNOSIS — N18.30 TYPE 2 DIABETES MELLITUS WITH STAGE 3 CHRONIC KIDNEY DISEASE, WITHOUT LONG-TERM CURRENT USE OF INSULIN: ICD-10-CM

## 2018-11-27 DIAGNOSIS — E11.22 TYPE 2 DIABETES MELLITUS WITH STAGE 3 CHRONIC KIDNEY DISEASE, WITHOUT LONG-TERM CURRENT USE OF INSULIN: ICD-10-CM

## 2018-11-27 DIAGNOSIS — I48.0 PAF (PAROXYSMAL ATRIAL FIBRILLATION): ICD-10-CM

## 2018-11-27 PROCEDURE — 99499 UNLISTED E&M SERVICE: CPT | Mod: S$GLB,,, | Performed by: INTERNAL MEDICINE

## 2018-11-27 PROCEDURE — 3080F DIAST BP >= 90 MM HG: CPT | Mod: CPTII,S$GLB,, | Performed by: INTERNAL MEDICINE

## 2018-11-27 PROCEDURE — 1101F PT FALLS ASSESS-DOCD LE1/YR: CPT | Mod: CPTII,S$GLB,, | Performed by: INTERNAL MEDICINE

## 2018-11-27 PROCEDURE — 93000 ELECTROCARDIOGRAM COMPLETE: CPT | Mod: S$GLB,,, | Performed by: INTERNAL MEDICINE

## 2018-11-27 PROCEDURE — 99214 OFFICE O/P EST MOD 30 MIN: CPT | Mod: S$GLB,,, | Performed by: INTERNAL MEDICINE

## 2018-11-27 PROCEDURE — 3077F SYST BP >= 140 MM HG: CPT | Mod: CPTII,S$GLB,, | Performed by: INTERNAL MEDICINE

## 2018-11-27 PROCEDURE — 99999 PR PBB SHADOW E&M-EST. PATIENT-LVL III: CPT | Mod: PBBFAC,,, | Performed by: INTERNAL MEDICINE

## 2018-11-27 RX ORDER — AMLODIPINE BESYLATE 5 MG/1
5 TABLET ORAL DAILY
Qty: 90 TABLET | Refills: 3 | Status: SHIPPED | OUTPATIENT
Start: 2018-11-27 | End: 2019-09-11

## 2018-11-27 NOTE — PROGRESS NOTES
Subjective:    Patient ID:  Barbara Rizo is a 78 y.o. female who presents for follow-up of Hypertension      HPI   A-fib - isolated episode - TALHA/CV 9/21/15 - no longer on amiodarone or OAC, HTN, DM, HLD, MGUS    Last saw me during admission 8/2015    Patient is a 75 y.o. female with history as stated below presented from home with  and report that when EMS arrived her HR was Afib in 200's. They tried to cardiovert her in home but unsuccessful. She was responsive on arrival and able to state that she has not had much of an appetite but denies abdominal pain, fever, chills, cough, chest pain, SOB, or changes to her right knee for which she recently had surgery. She denies dysuria, back pain and hematuria. Her kidney function is not at baseline with BUN/Cr 38/3.7. She has loose stools but not diarrhea and mild nausea. Her BP was quite hypotensive after 4 liters of NS IVF and she was started on levophed. She has marginal UOP and no known h/o CKD. Her sodium is also quite low at 129 and Lactic acidosis of 9.8. She has been started on broad spectrum ABX and admitted to ICU in septic shock with unknown source at this time.      EKG A-fib  LAFB - no acute STT changes  Currently NSR on IV amiodarone  Denies CP - alert today  Known to Dr Montero  Last stress test 1/20/15  LVEF: 60 %  Impression: NORMAL MYOCARDIAL PERFUSION  1. The perfusion scan is free of evidence for myocardial ischemia or injury.   2. Resting wall motion is physiologic.   3. Resting LV function is normal.     Echo 9/16/15  1 - Hyperdynamic left ventricular systolic function (EF >70%).   2 - Biatrial enlargement.   3 - The estimated PA systolic pressure is 40 mmHg.   4 - Mild mitral regurgitation.   5 - Moderate tricuspid regurgitation.   6 - Mild pulmonic regurgitation.     Recently followed with [facility]  Denies A-fib since 2015  Says she had an echo and holter 2 months ago  BP not well controlled  EKG sinus bradycardia 51 otherwise  ok    Review of Systems   Constitution: Negative for decreased appetite.   HENT: Negative for ear discharge.    Eyes: Negative for blurred vision.   Respiratory: Negative for hemoptysis.    Endocrine: Negative for polyphagia.   Hematologic/Lymphatic: Negative for adenopathy.   Skin: Negative for color change.   Musculoskeletal: Negative for joint swelling.   Neurological: Negative for brief paralysis.   Psychiatric/Behavioral: Negative for hallucinations.        Objective:    Physical Exam   Constitutional: She is oriented to person, place, and time. She appears well-developed and well-nourished.   HENT:   Head: Normocephalic and atraumatic.   Eyes: Conjunctivae are normal. Pupils are equal, round, and reactive to light.   Neck: Normal range of motion. Neck supple.   Cardiovascular: Normal rate, normal heart sounds and intact distal pulses.   Pulmonary/Chest: Effort normal and breath sounds normal.   Abdominal: Soft. Bowel sounds are normal.   Musculoskeletal: Normal range of motion.   Neurological: She is alert and oriented to person, place, and time.   Skin: Skin is warm and dry.         Assessment:       1. Mixed hyperlipidemia    2. Essential hypertension    3. Type 2 diabetes mellitus with stage 3 chronic kidney disease, without long-term current use of insulin    4. PAF (paroxysmal atrial fibrillation)         Plan:       Obtain recent echo and stress report  Add norvasc 5 qd for BP  OV 3 months

## 2018-11-30 ENCOUNTER — TELEPHONE (OUTPATIENT)
Dept: ADMINISTRATIVE | Facility: HOSPITAL | Age: 78
End: 2018-11-30

## 2019-01-08 DIAGNOSIS — I10 ESSENTIAL HYPERTENSION: ICD-10-CM

## 2019-01-08 DIAGNOSIS — N18.30 TYPE 2 DIABETES MELLITUS WITH STAGE 3 CHRONIC KIDNEY DISEASE, WITHOUT LONG-TERM CURRENT USE OF INSULIN: ICD-10-CM

## 2019-01-08 DIAGNOSIS — E11.22 TYPE 2 DIABETES MELLITUS WITH STAGE 3 CHRONIC KIDNEY DISEASE, WITHOUT LONG-TERM CURRENT USE OF INSULIN: ICD-10-CM

## 2019-01-08 DIAGNOSIS — E83.119 HEMOCHROMATOSIS, UNSPECIFIED HEMOCHROMATOSIS TYPE: ICD-10-CM

## 2019-01-08 RX ORDER — FOLIC ACID 1 MG/1
1 TABLET ORAL DAILY
Qty: 90 TABLET | Refills: 1 | Status: SHIPPED | OUTPATIENT
Start: 2019-01-08 | End: 2019-07-05 | Stop reason: SDUPTHER

## 2019-01-08 RX ORDER — ATORVASTATIN CALCIUM 40 MG/1
40 TABLET, FILM COATED ORAL DAILY
Qty: 90 TABLET | Refills: 1 | Status: SHIPPED | OUTPATIENT
Start: 2019-01-08 | End: 2019-07-08 | Stop reason: SDUPTHER

## 2019-01-08 NOTE — TELEPHONE ENCOUNTER
----- Message from Rhina Carvajal sent at 1/8/2019  8:31 AM CST -----  Contact: self  atorvastatin (LIPITOR) 40 MG tablet  folic acid (FOLVITE) 1 MG tablet    Pt calling to request a refill of the above to be sent to [facility]. Please call [phone].

## 2019-01-28 ENCOUNTER — TELEPHONE (OUTPATIENT)
Dept: FAMILY MEDICINE | Facility: CLINIC | Age: 79
End: 2019-01-28

## 2019-01-28 ENCOUNTER — HOSPITAL ENCOUNTER (INPATIENT)
Facility: HOSPITAL | Age: 79
LOS: 9 days | Discharge: HOME HEALTH | DRG: 308 | End: 2019-02-06
Attending: EMERGENCY MEDICINE | Admitting: INTERNAL MEDICINE
Payer: MEDICARE

## 2019-01-28 ENCOUNTER — OFFICE VISIT (OUTPATIENT)
Dept: FAMILY MEDICINE | Facility: CLINIC | Age: 79
End: 2019-01-28
Payer: MEDICARE

## 2019-01-28 VITALS
HEIGHT: 63 IN | SYSTOLIC BLOOD PRESSURE: 132 MMHG | BODY MASS INDEX: 36.32 KG/M2 | TEMPERATURE: 98 F | HEART RATE: 139 BPM | OXYGEN SATURATION: 87 % | DIASTOLIC BLOOD PRESSURE: 84 MMHG | WEIGHT: 205 LBS

## 2019-01-28 DIAGNOSIS — I48.0 PAROXYSMAL ATRIAL FIBRILLATION: Primary | ICD-10-CM

## 2019-01-28 DIAGNOSIS — I48.91 ATRIAL FIBRILLATION WITH RAPID VENTRICULAR RESPONSE: ICD-10-CM

## 2019-01-28 DIAGNOSIS — J06.9 UPPER RESPIRATORY TRACT INFECTION, UNSPECIFIED TYPE: ICD-10-CM

## 2019-01-28 DIAGNOSIS — R06.02 SHORTNESS OF BREATH: ICD-10-CM

## 2019-01-28 DIAGNOSIS — I48.91 ATRIAL FIBRILLATION WITH RVR: ICD-10-CM

## 2019-01-28 DIAGNOSIS — I50.32 CHRONIC DIASTOLIC CHF (CONGESTIVE HEART FAILURE): Primary | ICD-10-CM

## 2019-01-28 DIAGNOSIS — I48.91 A-FIB: ICD-10-CM

## 2019-01-28 DIAGNOSIS — I48.0 PAROXYSMAL ATRIAL FIBRILLATION: ICD-10-CM

## 2019-01-28 DIAGNOSIS — R06.02 SOB (SHORTNESS OF BREATH): ICD-10-CM

## 2019-01-28 PROBLEM — D47.2 MGUS (MONOCLONAL GAMMOPATHY OF UNKNOWN SIGNIFICANCE): Chronic | Status: ACTIVE | Noted: 2018-10-18

## 2019-01-28 PROBLEM — N18.30 CKD (CHRONIC KIDNEY DISEASE) STAGE 3, GFR 30-59 ML/MIN: Chronic | Status: ACTIVE | Noted: 2019-01-28

## 2019-01-28 PROBLEM — E66.9 OBESITY, CLASS II, BMI 35-39.9: Chronic | Status: ACTIVE | Noted: 2019-01-28

## 2019-01-28 PROBLEM — M1A.9XX0 CHRONIC GOUT: Chronic | Status: ACTIVE | Noted: 2019-01-28

## 2019-01-28 LAB
ALBUMIN SERPL BCP-MCNC: 3.7 G/DL
ALP SERPL-CCNC: 83 U/L
ALT SERPL W/O P-5'-P-CCNC: 22 U/L
ANION GAP SERPL CALC-SCNC: 13 MMOL/L
AST SERPL-CCNC: 18 U/L
BASOPHILS # BLD AUTO: 0.02 K/UL
BASOPHILS NFR BLD: 0.3 %
BILIRUB SERPL-MCNC: 1.2 MG/DL
BNP SERPL-MCNC: 1497 PG/ML
BUN SERPL-MCNC: 20 MG/DL
CALCIUM SERPL-MCNC: 9.5 MG/DL
CHLORIDE SERPL-SCNC: 100 MMOL/L
CO2 SERPL-SCNC: 22 MMOL/L
CREAT SERPL-MCNC: 1.6 MG/DL
CTP QC/QA: YES
D DIMER PPP IA.FEU-MCNC: 0.49 MG/L FEU
DIFFERENTIAL METHOD: ABNORMAL
EOSINOPHIL # BLD AUTO: 0 K/UL
EOSINOPHIL NFR BLD: 0 %
ERYTHROCYTE [DISTWIDTH] IN BLOOD BY AUTOMATED COUNT: 13.9 %
EST. GFR  (AFRICAN AMERICAN): 36 ML/MIN/1.73 M^2
EST. GFR  (NON AFRICAN AMERICAN): 31 ML/MIN/1.73 M^2
FLUAV AG NPH QL: NEGATIVE
FLUBV AG NPH QL: NEGATIVE
GLUCOSE SERPL-MCNC: 115 MG/DL
HCT VFR BLD AUTO: 45.1 %
HGB BLD-MCNC: 14.6 G/DL
LACTATE SERPL-SCNC: 1.4 MMOL/L
LYMPHOCYTES # BLD AUTO: 0.7 K/UL
LYMPHOCYTES NFR BLD: 10.5 %
MCH RBC QN AUTO: 31 PG
MCHC RBC AUTO-ENTMCNC: 32.4 G/DL
MCV RBC AUTO: 96 FL
MONOCYTES # BLD AUTO: 0.7 K/UL
MONOCYTES NFR BLD: 10.3 %
NEUTROPHILS # BLD AUTO: 5 K/UL
NEUTROPHILS NFR BLD: 78.9 %
PLATELET # BLD AUTO: 149 K/UL
PMV BLD AUTO: 10.9 FL
POCT GLUCOSE: 117 MG/DL (ref 70–110)
POCT GLUCOSE: 127 MG/DL (ref 70–110)
POTASSIUM SERPL-SCNC: 4.4 MMOL/L
PROT SERPL-MCNC: 7.5 G/DL
RBC # BLD AUTO: 4.71 M/UL
SODIUM SERPL-SCNC: 135 MMOL/L
TROPONIN I SERPL DL<=0.01 NG/ML-MCNC: 0.02 NG/ML
TROPONIN I SERPL DL<=0.01 NG/ML-MCNC: 0.03 NG/ML
TROPONIN I SERPL DL<=0.01 NG/ML-MCNC: 0.03 NG/ML
WBC # BLD AUTO: 6.38 K/UL

## 2019-01-28 PROCEDURE — 99999 PR PBB SHADOW E&M-EST. PATIENT-LVL III: ICD-10-PCS | Mod: PBBFAC,,, | Performed by: FAMILY MEDICINE

## 2019-01-28 PROCEDURE — 93005 ELECTROCARDIOGRAM TRACING: CPT | Mod: PN

## 2019-01-28 PROCEDURE — 85025 COMPLETE CBC W/AUTO DIFF WBC: CPT

## 2019-01-28 PROCEDURE — 25000003 PHARM REV CODE 250: Performed by: EMERGENCY MEDICINE

## 2019-01-28 PROCEDURE — 82962 GLUCOSE BLOOD TEST: CPT

## 2019-01-28 PROCEDURE — 96376 TX/PRO/DX INJ SAME DRUG ADON: CPT | Mod: 59

## 2019-01-28 PROCEDURE — 20000000 HC ICU ROOM

## 2019-01-28 PROCEDURE — 25000003 PHARM REV CODE 250: Performed by: INTERNAL MEDICINE

## 2019-01-28 PROCEDURE — 63600175 PHARM REV CODE 636 W HCPCS: Performed by: INTERNAL MEDICINE

## 2019-01-28 PROCEDURE — 3079F PR MOST RECENT DIASTOLIC BLOOD PRESSURE 80-89 MM HG: ICD-10-PCS | Mod: CPTII,S$GLB,, | Performed by: FAMILY MEDICINE

## 2019-01-28 PROCEDURE — 96374 THER/PROPH/DIAG INJ IV PUSH: CPT | Mod: 59

## 2019-01-28 PROCEDURE — 25000242 PHARM REV CODE 250 ALT 637 W/ HCPCS: Performed by: EMERGENCY MEDICINE

## 2019-01-28 PROCEDURE — 87040 BLOOD CULTURE FOR BACTERIA: CPT | Mod: 59

## 2019-01-28 PROCEDURE — 1101F PR PT FALLS ASSESS DOC 0-1 FALLS W/OUT INJ PAST YR: ICD-10-PCS | Mod: CPTII,S$GLB,, | Performed by: FAMILY MEDICINE

## 2019-01-28 PROCEDURE — 99499 RISK ADDL DX/OHS AUDIT: ICD-10-PCS | Mod: S$GLB,,, | Performed by: FAMILY MEDICINE

## 2019-01-28 PROCEDURE — 93005 ELECTROCARDIOGRAM TRACING: CPT

## 2019-01-28 PROCEDURE — 96367 TX/PROPH/DG ADDL SEQ IV INF: CPT

## 2019-01-28 PROCEDURE — 83605 ASSAY OF LACTIC ACID: CPT

## 2019-01-28 PROCEDURE — 93010 EKG 12-LEAD: ICD-10-PCS | Mod: S$GLB,,, | Performed by: INTERNAL MEDICINE

## 2019-01-28 PROCEDURE — 93010 ELECTROCARDIOGRAM REPORT: CPT | Mod: ,,, | Performed by: INTERNAL MEDICINE

## 2019-01-28 PROCEDURE — 99999 PR PBB SHADOW E&M-EST. PATIENT-LVL III: CPT | Mod: PBBFAC,,, | Performed by: FAMILY MEDICINE

## 2019-01-28 PROCEDURE — 3075F SYST BP GE 130 - 139MM HG: CPT | Mod: CPTII,S$GLB,, | Performed by: FAMILY MEDICINE

## 2019-01-28 PROCEDURE — 96365 THER/PROPH/DIAG IV INF INIT: CPT

## 2019-01-28 PROCEDURE — 96375 TX/PRO/DX INJ NEW DRUG ADDON: CPT

## 2019-01-28 PROCEDURE — 96366 THER/PROPH/DIAG IV INF ADDON: CPT

## 2019-01-28 PROCEDURE — 99214 PR OFFICE/OUTPT VISIT, EST, LEVL IV, 30-39 MIN: ICD-10-PCS | Mod: S$GLB,,, | Performed by: FAMILY MEDICINE

## 2019-01-28 PROCEDURE — 83880 ASSAY OF NATRIURETIC PEPTIDE: CPT

## 2019-01-28 PROCEDURE — 99291 CRITICAL CARE FIRST HOUR: CPT | Mod: 25

## 2019-01-28 PROCEDURE — 63600175 PHARM REV CODE 636 W HCPCS: Performed by: EMERGENCY MEDICINE

## 2019-01-28 PROCEDURE — 93010 ELECTROCARDIOGRAM REPORT: CPT | Mod: 76,,, | Performed by: INTERNAL MEDICINE

## 2019-01-28 PROCEDURE — 84484 ASSAY OF TROPONIN QUANT: CPT | Mod: 91

## 2019-01-28 PROCEDURE — 85379 FIBRIN DEGRADATION QUANT: CPT

## 2019-01-28 PROCEDURE — 80053 COMPREHEN METABOLIC PANEL: CPT

## 2019-01-28 PROCEDURE — 94761 N-INVAS EAR/PLS OXIMETRY MLT: CPT

## 2019-01-28 PROCEDURE — 3079F DIAST BP 80-89 MM HG: CPT | Mod: CPTII,S$GLB,, | Performed by: FAMILY MEDICINE

## 2019-01-28 PROCEDURE — 93010 ELECTROCARDIOGRAM REPORT: CPT | Mod: S$GLB,,, | Performed by: INTERNAL MEDICINE

## 2019-01-28 PROCEDURE — 3075F PR MOST RECENT SYSTOLIC BLOOD PRESS GE 130-139MM HG: ICD-10-PCS | Mod: CPTII,S$GLB,, | Performed by: FAMILY MEDICINE

## 2019-01-28 PROCEDURE — 36415 COLL VENOUS BLD VENIPUNCTURE: CPT

## 2019-01-28 PROCEDURE — 93010 EKG 12-LEAD: ICD-10-PCS | Mod: ,,, | Performed by: INTERNAL MEDICINE

## 2019-01-28 PROCEDURE — 1101F PT FALLS ASSESS-DOCD LE1/YR: CPT | Mod: CPTII,S$GLB,, | Performed by: FAMILY MEDICINE

## 2019-01-28 PROCEDURE — 94640 AIRWAY INHALATION TREATMENT: CPT

## 2019-01-28 PROCEDURE — 99214 OFFICE O/P EST MOD 30 MIN: CPT | Mod: S$GLB,,, | Performed by: FAMILY MEDICINE

## 2019-01-28 PROCEDURE — 99499 UNLISTED E&M SERVICE: CPT | Mod: S$GLB,,, | Performed by: FAMILY MEDICINE

## 2019-01-28 PROCEDURE — 27000221 HC OXYGEN, UP TO 24 HOURS

## 2019-01-28 RX ORDER — METOPROLOL TARTRATE 50 MG/1
100 TABLET ORAL DAILY
Status: DISCONTINUED | OUTPATIENT
Start: 2019-01-29 | End: 2019-01-30

## 2019-01-28 RX ORDER — GLUCAGON 1 MG
1 KIT INJECTION
Status: DISCONTINUED | OUTPATIENT
Start: 2019-01-28 | End: 2019-02-06 | Stop reason: HOSPADM

## 2019-01-28 RX ORDER — AMLODIPINE BESYLATE 5 MG/1
5 TABLET ORAL DAILY
Status: DISCONTINUED | OUTPATIENT
Start: 2019-01-29 | End: 2019-01-31

## 2019-01-28 RX ORDER — ALLOPURINOL 100 MG/1
100 TABLET ORAL DAILY
Status: DISCONTINUED | OUTPATIENT
Start: 2019-01-29 | End: 2019-02-06 | Stop reason: HOSPADM

## 2019-01-28 RX ORDER — ASPIRIN 325 MG
325 TABLET, DELAYED RELEASE (ENTERIC COATED) ORAL DAILY
Status: DISCONTINUED | OUTPATIENT
Start: 2019-01-29 | End: 2019-01-30

## 2019-01-28 RX ORDER — CLONIDINE HYDROCHLORIDE 0.1 MG/1
0.1 TABLET ORAL 3 TIMES DAILY PRN
Status: DISCONTINUED | OUTPATIENT
Start: 2019-01-28 | End: 2019-02-06 | Stop reason: HOSPADM

## 2019-01-28 RX ORDER — INSULIN ASPART 100 [IU]/ML
0-5 INJECTION, SOLUTION INTRAVENOUS; SUBCUTANEOUS
Status: DISCONTINUED | OUTPATIENT
Start: 2019-01-28 | End: 2019-02-06 | Stop reason: HOSPADM

## 2019-01-28 RX ORDER — ASPIRIN 325 MG
325 TABLET ORAL
Status: COMPLETED | OUTPATIENT
Start: 2019-01-28 | End: 2019-01-28

## 2019-01-28 RX ORDER — ATORVASTATIN CALCIUM 40 MG/1
40 TABLET, FILM COATED ORAL DAILY
Status: DISCONTINUED | OUTPATIENT
Start: 2019-01-29 | End: 2019-02-06 | Stop reason: HOSPADM

## 2019-01-28 RX ORDER — RAMELTEON 8 MG/1
8 TABLET ORAL NIGHTLY PRN
Status: DISCONTINUED | OUTPATIENT
Start: 2019-01-28 | End: 2019-02-06 | Stop reason: HOSPADM

## 2019-01-28 RX ORDER — IBUPROFEN 200 MG
24 TABLET ORAL
Status: DISCONTINUED | OUTPATIENT
Start: 2019-01-28 | End: 2019-02-06 | Stop reason: HOSPADM

## 2019-01-28 RX ORDER — AMOXICILLIN 250 MG
1 CAPSULE ORAL 2 TIMES DAILY PRN
Status: DISCONTINUED | OUTPATIENT
Start: 2019-01-28 | End: 2019-02-06 | Stop reason: HOSPADM

## 2019-01-28 RX ORDER — FUROSEMIDE 10 MG/ML
20 INJECTION INTRAMUSCULAR; INTRAVENOUS
Status: DISCONTINUED | OUTPATIENT
Start: 2019-01-28 | End: 2019-01-28

## 2019-01-28 RX ORDER — DOXYCYCLINE HYCLATE 100 MG
100 TABLET ORAL EVERY 12 HOURS
Status: DISCONTINUED | OUTPATIENT
Start: 2019-01-28 | End: 2019-01-29

## 2019-01-28 RX ORDER — LISINOPRIL 20 MG/1
40 TABLET ORAL DAILY
Status: DISCONTINUED | OUTPATIENT
Start: 2019-01-29 | End: 2019-01-31

## 2019-01-28 RX ORDER — PANTOPRAZOLE SODIUM 40 MG/1
40 TABLET, DELAYED RELEASE ORAL DAILY
Status: DISCONTINUED | OUTPATIENT
Start: 2019-01-29 | End: 2019-01-28

## 2019-01-28 RX ORDER — DILTIAZEM HYDROCHLORIDE 5 MG/ML
15 INJECTION INTRAVENOUS
Status: COMPLETED | OUTPATIENT
Start: 2019-01-28 | End: 2019-01-28

## 2019-01-28 RX ORDER — FUROSEMIDE 10 MG/ML
40 INJECTION INTRAMUSCULAR; INTRAVENOUS ONCE
Status: COMPLETED | OUTPATIENT
Start: 2019-01-28 | End: 2019-01-28

## 2019-01-28 RX ORDER — ENOXAPARIN SODIUM 100 MG/ML
1 INJECTION SUBCUTANEOUS EVERY 12 HOURS
Status: DISCONTINUED | OUTPATIENT
Start: 2019-01-28 | End: 2019-01-30

## 2019-01-28 RX ORDER — IPRATROPIUM BROMIDE AND ALBUTEROL SULFATE 2.5; .5 MG/3ML; MG/3ML
3 SOLUTION RESPIRATORY (INHALATION) EVERY 5 MIN PRN
Status: DISCONTINUED | OUTPATIENT
Start: 2019-01-28 | End: 2019-01-28

## 2019-01-28 RX ORDER — FUROSEMIDE 10 MG/ML
40 INJECTION INTRAMUSCULAR; INTRAVENOUS
Status: DISCONTINUED | OUTPATIENT
Start: 2019-01-28 | End: 2019-01-28

## 2019-01-28 RX ORDER — IBUPROFEN 200 MG
16 TABLET ORAL
Status: DISCONTINUED | OUTPATIENT
Start: 2019-01-28 | End: 2019-02-06 | Stop reason: HOSPADM

## 2019-01-28 RX ORDER — FUROSEMIDE 10 MG/ML
20 INJECTION INTRAMUSCULAR; INTRAVENOUS 2 TIMES DAILY
Status: DISCONTINUED | OUTPATIENT
Start: 2019-01-29 | End: 2019-01-31

## 2019-01-28 RX ORDER — ACETAMINOPHEN 500 MG
500 TABLET ORAL EVERY 6 HOURS PRN
Status: DISCONTINUED | OUTPATIENT
Start: 2019-01-28 | End: 2019-02-06 | Stop reason: HOSPADM

## 2019-01-28 RX ORDER — SODIUM CHLORIDE 0.9 % (FLUSH) 0.9 %
3 SYRINGE (ML) INJECTION
Status: DISCONTINUED | OUTPATIENT
Start: 2019-01-28 | End: 2019-01-28

## 2019-01-28 RX ORDER — AMOXICILLIN 500 MG/1
TABLET, FILM COATED ORAL
Refills: 0 | Status: ON HOLD | COMMUNITY
Start: 2018-12-07 | End: 2019-02-06 | Stop reason: HOSPADM

## 2019-01-28 RX ADMIN — FUROSEMIDE 20 MG: 10 INJECTION, SOLUTION INTRAMUSCULAR; INTRAVENOUS at 02:01

## 2019-01-28 RX ADMIN — CEFTRIAXONE 1 G: 1 INJECTION, SOLUTION INTRAVENOUS at 02:01

## 2019-01-28 RX ADMIN — AMIODARONE HYDROCHLORIDE 150 MG: 1.5 INJECTION, SOLUTION INTRAVENOUS at 01:01

## 2019-01-28 RX ADMIN — AMIODARONE HYDROCHLORIDE 1 MG/MIN: 1.8 INJECTION, SOLUTION INTRAVENOUS at 01:01

## 2019-01-28 RX ADMIN — FUROSEMIDE 40 MG: 10 INJECTION, SOLUTION INTRAVENOUS at 07:01

## 2019-01-28 RX ADMIN — ENOXAPARIN SODIUM 90 MG: 100 INJECTION SUBCUTANEOUS at 07:01

## 2019-01-28 RX ADMIN — DILTIAZEM HYDROCHLORIDE 15 MG: 5 INJECTION INTRAVENOUS at 12:01

## 2019-01-28 RX ADMIN — AZITHROMYCIN MONOHYDRATE 500 MG: 500 INJECTION, POWDER, LYOPHILIZED, FOR SOLUTION INTRAVENOUS at 03:01

## 2019-01-28 RX ADMIN — ASPIRIN 325 MG ORAL TABLET 325 MG: 325 PILL ORAL at 12:01

## 2019-01-28 RX ADMIN — DOXYCYCLINE HYCLATE 100 MG: 100 TABLET, COATED ORAL at 10:01

## 2019-01-28 RX ADMIN — AMIODARONE HYDROCHLORIDE 0.5 MG/MIN: 1.8 INJECTION, SOLUTION INTRAVENOUS at 07:01

## 2019-01-28 RX ADMIN — IPRATROPIUM BROMIDE AND ALBUTEROL SULFATE 3 ML: .5; 3 SOLUTION RESPIRATORY (INHALATION) at 01:01

## 2019-01-28 NOTE — PROGRESS NOTES
Patient, Barbara Rizo (MRN #[id number]), presented with a recorded BMI of 36.32 kg/m^2 and a documented comorbidity(s):  - Atrial Fibrillation  to which the severe obesity is a contributing factor. This is consistent with the definition of severe obesity (BMI 35.0-39.9) with comorbidity (ICD-10 E66.01, Z68.35). The patient's severe obesity was monitored, evaluated, addressed and/or treated. This addendum to the medical record is made on 01/28/2019.

## 2019-01-28 NOTE — ED PROVIDER NOTES
Encounter Date: 1/28/2019  SORT: This is a 78 y.o. female with PMHx Afib, DM, GERD, HLD, HTN who presents for emergent consideration of shortness of breath which began on Saturday. Pt was sent by PCP this  Morning for Afib and low O2 sats (87-89% at PCP). Patient will be moved to a room when one is available. Orders placed.  KRISTOFER Roldan, RYAN     SCRIBE #1 NOTE: I, Nicolás Frazier am scribing for, and in the presence of,  Malvin Mayen MD. I have scribed the following portions of the note - Other sections scribed: HPI, ROS.       History     Chief Complaint   Patient presents with    Shortness of Breath     sob since Saturday, nonproductive cough; sent from doctor for afib and low O2 sats; denies pain     CC: SOB    79 y/o female with a-fib with cardioversion (2015), DM type II, fatty liver, hemochromatosis, HLD, HTN, and Vitamin D deficiency disease presents to the ED for emergent evaluation of gradual onset SOB, cough, and wheezing that started x2 days ago. The patient presented to her PCP this morning for her symptoms, where she had an EKG that revealed a-fib; so she was advised to present to this facility. The patient is also c/o leg swelling. The patient is not taking any blood thinners. The patient denies chest pain, fever, chills, pain with breathing, palpitations, or abdominal pain. No other symptoms reported.      The history is provided by the patient. No  was used.     Review of patient's allergies indicates:  No Known Allergies  Past Medical History:   Diagnosis Date    A-fib     Diabetes mellitus type II     Fatty liver     GERD (gastroesophageal reflux disease)     Hemochromatosis     Hyperlipidemia     Hypertension     Vaginal delivery     x2    Vitamin D deficiency disease     Wears partial dentures     upper removable bridge     Past Surgical History:   Procedure Laterality Date    ARTHROPLASTY, KNEE, TOTAL Left 5/13/2013    Performed by Gabriel WOODSON  [facility] OR    ARTHROPLASTY-KNEE Right 8/3/2015    Performed by [facility] OR    BREAST BIOPSY      CHEST DRAINAGE N/A 9/18/2015    Performed by Bonnie Surgeon at [facility]NIE    CHOLECYSTECTOMY  08/2015    CHOLECYSTECTOMY-LAPAROSCOPIC N/A 8/26/2015    [facility]formed by Bony Alexander MD at [facility] OR    CYSTOSCOPY - URODYNAMICS FLOW STUDY  (C-ARM) N/A 11/4/2016    Performed by Reina Camp MD at [facility] OR    ESOPHAGOGASTRODUODENOSCOPY (EGD) Left 10/15/2015    Performed by Rolando Robert MD at [facility] ENDO    EYE SURGERY      Cat Ext  OU    HYSTERECTOMY      partial due to uterine fibroids    INCISION AND DRAINAGE (I&D), ABSCESS Right 9/14/2015    Performed by Bonnie Surgeon at [facility]    TOTAL KNEE ARTHROPLASTY Right 2015    left knee done 5/2013    TRANSESOPHAGEAL ECHOCARDIOGRAM (TALHA) N/A 9/21/2015    Performed by Amauri Lomas MD at [facility] CATH LAB     Family History   Problem Relation Age of Onset    Cancer Mother         stomach    Hypertension Mother     Aneurysm Father         abdominal     Social History     Tobacco Use    Smoking status: Former Smoker    Smokeless tobacco: Never Used   Substance Use Topics    Alcohol use: Yes     Alcohol/week: 0.0 oz     Comment: occasional/ on a weekend    Drug use: No     Review of Systems   Constitutional: Negative for chills and fever.   HENT: Negative for congestion, ear pain, rhinorrhea and sore throat.    Eyes: Negative for redness.   Respiratory: Positive for cough, shortness of breath and wheezing.         (-) pain with breathing   Cardiovascular: Positive for leg swelling. Negative for chest pain and palpitations.   Gastrointestinal: Negative for abdominal pain, diarrhea, nausea and vomiting.   Genitourinary: Negative for decreased urine volume, difficulty urinating, dysuria, frequency, hematuria and urgency.   Musculoskeletal: Negative for back pain and neck pain.   Skin: Negative for rash.   Neurological:  Negative for headaches.   Psychiatric/Behavioral: Negative for confusion.   All other systems reviewed and are negative.      Physical Exam     Initial Vitals   BP Pulse Resp Temp SpO2   01/28/19 1137 01/28/19 1137 01/28/19 1137 01/28/19 1315 01/28/19 1137   (!) 133/91 (!) 140 20 98.8 °F (37.1 °C) (!) 91 %      MAP       --                Physical Exam    Nursing note and vitals reviewed.  Constitutional: She appears well-developed and well-nourished.   Eyes: EOM are normal. Pupils are equal, round, and reactive to light.   Neck: Normal range of motion. Neck supple. No thyromegaly present. No JVD present.   Cardiovascular: Intact distal pulses. Exam reveals no gallop and no friction rub.    No murmur heard.  Heart rate in the 140s   Pulmonary/Chest: No respiratory distress. She has wheezes. She has rales.   Abdominal: Soft. Bowel sounds are normal. There is no tenderness.   Musculoskeletal: Normal range of motion. She exhibits no edema or tenderness.   Neurological: She is alert and oriented to person, place, and time. She has normal strength.   Skin: Skin is warm and dry.         ED Course   Critical Care  Date/Time: 1/28/2019 8:42 PM  Performed by: Malvin Mayen MD  Authorized by: Malvin Mayen MD   Direct patient critical care time: 20 minutes  Additional history critical care time: 10 minutes  Ordering / reviewing critical care time: 10 minutes  Documentation critical care time: 10 minutes  Consulting other physicians critical care time: 10 minutes  Other critical care time: 10 (admission) minutes  Total critical care time (exclusive of procedural time) : 70 minutes  Critical care time was exclusive of separately billable procedures and treating other patients and teaching time.  Critical care was necessary to treat or prevent imminent or life-threatening deterioration of the following conditions: cardiac failure, circulatory failure and respiratory failure.  Critical care was time spent personally  by me on the following activities: development of treatment plan with patient or surrogate, discussions with consultants, interpretation of cardiac output measurements, evaluation of patient's response to treatment, examination of patient, obtaining history from patient or surrogate, ordering and performing treatments and interventions, ordering and review of laboratory studies, ordering and review of radiographic studies, pulse oximetry, re-evaluation of patient's condition and review of old charts.        Labs Reviewed   CBC W/ AUTO DIFFERENTIAL - Abnormal; Notable for the following components:       Result Value    Platelets 149 (*)     Lymph # 0.7 (*)     Gran% 78.9 (*)     Lymph% 10.5 (*)     All other components within normal limits   COMPREHENSIVE METABOLIC PANEL - Abnormal; Notable for the following components:    Sodium 135 (*)     CO2 22 (*)     Glucose 115 (*)     Creatinine 1.6 (*)     Total Bilirubin 1.2 (*)     eGFR if  36 (*)     eGFR if non  31 (*)     All other components within normal limits   TROPONIN I - Abnormal; Notable for the following components:    Troponin I 0.033 (*)     All other components within normal limits   TROPONIN I - Abnormal; Notable for the following components:    Troponin I 0.033 (*)     All other components within normal limits   B-TYPE NATRIURETIC PEPTIDE - Abnormal; Notable for the following components:    BNP 1,497 (*)     All other components within normal limits   POCT GLUCOSE - Abnormal; Notable for the following components:    POCT Glucose 127 (*)     All other components within normal limits   CULTURE, BLOOD   CULTURE, BLOOD   D DIMER, QUANTITATIVE   LACTIC ACID, PLASMA   POCT INFLUENZA A/B   POCT GLUCOSE MONITORING CONTINUOUS     EKG Readings: (Independently Interpreted)   Initial Reading: No STEMI. Rhythm: Atrial Fibrillation. Heart Rate: 146. Conduction: LAFP.   Age indeterminate septal infarct     ECG Results          EKG 12-lead  (Final result)  Result time 01/28/19 16:16:05    Final result by Interface, Lab In [facility] (01/28/19 16:16:05)                 Narrative:    Test Reason : R06.02,    Vent. Rate : 121 BPM     Atrial Rate : 133 BPM     P-R Int : 000 ms          QRS Dur : 098 ms      QT Int : 348 ms       P-R-T Axes : 000 267 074 degrees     QTc Int : 494 ms    Atrial fibrillation with rapid ventricular response  Right superior axis deviation  Septal infarct (cited on or before 28-JAN-2019)  Abnormal ECG  When compared with ECG of 28-JAN-2019 12:07,  No significant change was found  Confirmed by Sundeep Montero MD ([id number]) on 1/28/2019 4:15:55 PM    Referred By: ZACHARIAH LARSON           Confirmed By:Sundeep Montero MD                             EKG 12-lead (Final result)  Result time 01/28/19 16:16:01    Final result by Interface, Lab In [facility] (01/28/19 16:16:01)                 Narrative:    Test Reason :     Vent. Rate : 146 BPM     Atrial Rate : 174 BPM     P-R Int : 000 ms          QRS Dur : 094 ms      QT Int : 318 ms       P-R-T Axes : 000 -82 086 degrees     QTc Int : 495 ms    Atrial fibrillation with rapid ventricular response with premature  ventricular or aberrantly conducted complexes  Left anterior fascicular block  Septal infarct ,age undetermined  Abnormal ECG    Confirmed by Sundeep Montero MD ([id number]) on 1/28/2019 4:15:48 PM    Referred By: ZACHARIAH LARSON           Confirmed By:Sundeep Montero MD                            Imaging Results          X-Ray Chest PA And Lateral (Final result)  Result time 01/28/19 12:47:08    Final result by Julian Rahman MD (01/28/19 12:47:08)                 Impression:      1. Right greater than left bibasilar airspace opacities which may reflect atelectasis and/or pneumonia.  2. Borderline interstitial edema or sizable pleural effusion.      Electronically signed by: Julian Rahman  Date:    01/28/2019  Time:    12:47             Narrative:    EXAMINATION:  XR CHEST PA AND LATERAL    CLINICAL  HISTORY:  Shortness of breath    TECHNIQUE:  PA and lateral views of the chest were performed.    COMPARISON:  Chest radiograph 10/12/2015    FINDINGS:  Cardiomediastinal silhouette is within normal limits.    Right greater than left bibasilar airspace opacities which may reflect atelectasis and/or pneumonia.  Borderline interstitial edema or sizable pleural effusion.  No pneumothorax.    Multilevel degenerative changes of the imaged spine.                              X-Rays:   Independently Interpreted Readings:   Chest X-Ray: Borderline interstitial edema and right greater than left bibasilar airspace opacities       patient presents with shortness of breath.  Found to have oxygen saturation of 87% on room air and be in atrial fibrillation with rapid ventricular response at primary care office.  Sent to the emergency room.  Cardizem initially lower heart rate to 105 but then heart rate went back into the 140s.  The Cardizem lower the patient's blood pressure to systolic blood pressure of 98.  Patient was asymptomatic of the relative hypotension.  Placed on amiodarone drip.  Will admit to the ICU and consult Cardiology.  Discussed with Dr. Roblero.  Chest x-ray consistent with either pneumonia or pulmonary edema.  Patient initially given IV antibiotics.  No fever no leukocytosis and no productive cough.  In discussion with admitting physician will hold further antibiotics at this time.  Will gently diurese.  Patient had episode of atrial fibrillation in 2015 and underwent transesophageal echocardiogram and synchronized cardioversion.  Patient does not have symptomatology of atrial fibrillation.  No chest pain. No palpitations.  She does not feel her heart racing.          Scribe Attestation:   Scribe #1: I performed the above scribed service and the documentation accurately describes the services I performed. I attest to the accuracy of the note.    Attending Attestation:           Physician Attestation for  Scribe:  Physician Attestation Statement for Scribe #1: I, Malvin Mayen MD, reviewed documentation, as scribed by Nicolás Frazier in my presence, and it is both accurate and complete.                    Clinical Impression:   The primary encounter diagnosis was Atrial fibrillation with RVR. Diagnoses of Shortness of breath and SOB (shortness of breath) were also pertinent to this visit.                             Malvin Mayen MD  01/28/19 [id number]

## 2019-01-28 NOTE — ED TRIAGE NOTES
"Pt c/o cough, congestion, night sweats since Saturday. Pt seen at PCP office was told oxygen levels were too low and advised pt to go to ED. Pt refused ambulance, arrived private vehicle. Pt daughter at bedside states "the doctor said she was in afib too" pt has history of Afib  "

## 2019-01-28 NOTE — PROGRESS NOTES
Subjective:       Patient ID: Barbara Rizo is a 78 y.o. female.    Chief Complaint: Cough and Wheezing    HPI   79 yo female, w/ h/o pAFib, presents w/ cough. Pt states this started about 3 days ago. Endorses fever, sob, wheezing, congestion, and rhinorrhea. Pt at the [facility] has a HR of high 130s-140s. Per chart review, cards note states she did not have afib since 2015. Pt denies any cp at this time.    Review of Systems   Constitutional: Positive for fever.   HENT: Positive for congestion, postnasal drip and rhinorrhea. Negative for sore throat.    Respiratory: Positive for cough. Negative for shortness of breath and wheezing.    Cardiovascular: Positive for palpitations.   Genitourinary: Negative.    Musculoskeletal: Negative.    Neurological: Negative.    Psychiatric/Behavioral: Negative.         Past Medical History:   Diagnosis Date    Diabetes mellitus type II     Fatty liver     GERD (gastroesophageal reflux disease)     Hemochromatosis     Hyperlipidemia     Hypertension     Vaginal delivery     x2    Vitamin D deficiency disease     Wears partial dentures     upper removable bridge     Past Surgical History:   Procedure Laterality Date    ARTHROPLASTY, KNEE, TOTAL Left 5/13/2013    [facility] by [facility] at [facility] OR    ARTHROPLASTY-KNEE Right 8/3/2015    [facility] by [facility] at [facility] OR    BREAST BIOPSY      CHEST DRAINAGE N/A 9/18/2015    Performed by Bonnie Surgeon at [facility] BONNIE    CHOLECYSTECTOMY  08/2015    CHOLECYSTECTOMY-LAPAROSCOPIC N/A 8/26/2015    Performed by Bony Alexander MD at [facility] OR    CYSTOSCOPY - URODYNAMICS FLOW STUDY  (C-ARM) N/A 11/4/2016    Performed by Reina Camp MD at [facility] OR    ESOPHAGOGASTRODUODENOSCOPY (EGD) Left 10/15/2015    Performed by Rolando Robert MD at [facility] ENDO    EYE SURGERY      Cat Ext  OU    HYSTERECTOMY      partial due to uterine fibroids    INCISION AND DRAINAGE (I&D), ABSCESS Right 9/14/2015     Performed by Bonnie [facility]geon at [facility]IE    TOTAL KNEE ARTHROPLASTY Right 2015    left knee done 5/2013    TRANSESOPHAGEAL ECHOCARDIOGRAM (TALHA) N/A 9/21/2015    Performed by Amauri Lomas MD at [facility] CATH LAB     Family History   Problem Relation Age of Onset    Cancer Mother         stomach    Hypertension Mother     Aneurysm Father         abdominal     Social History     Socioeconomic History    Marital status:      Spouse name: None    Number of children: None    Years of education: None    Highest education level: None   Social Needs    Financial resource strain: None    Food insecurity - worry: None    Food insecurity - inability: None    Transportation needs - medical: None    Transportation needs - non-medical: None   Occupational History    None   Tobacco Use    Smoking status: Former Smoker    Smokeless tobacco: Never Used   Substance and Sexual Activity    Alcohol use: Yes     Alcohol/week: 0.0 oz     Comment: occasional/ on a weekend    Drug use: No    Sexual activity: Not Currently   Other Topics Concern    None   Social History Narrative    None        Objective:      Vitals:    01/28/19 1032   BP: 132/84   Pulse: (!) 139   Temp: 98.3 °F (36.8 °C)     Physical Exam   Constitutional: She is oriented to person, place, and time. No distress.   HENT:   Head: Normocephalic and atraumatic.   Eyes: Conjunctivae are normal.   Neck: Neck supple.   Cardiovascular: Normal heart sounds. An irregularly irregular rhythm present. Tachycardia present. Exam reveals no gallop and no friction rub.   No murmur heard.  Pulmonary/Chest: Effort normal and breath sounds normal. She has no wheezes. She has no rales.   Abdominal: Soft. Bowel sounds are normal. There is no tenderness.   Musculoskeletal: She exhibits no edema.   Lymphadenopathy:     She has no cervical adenopathy.   Neurological: She is alert and oriented to person, place, and time.   Skin: Skin is warm and dry.   Psychiatric: She  has a normal mood and affect. Her behavior is normal. Judgment and thought content normal.        Assessment:       1. Paroxysmal atrial fibrillation    2. Upper respiratory tract infection, unspecified type        Plan:       Paroxysmal atrial fibrillation  - EKG confirms Afib RVR. Showed HR of 136 but her HR is around 135-140s Her O2 is 87-88%.  - Possibly pt has a URI that triggered her AFib.  - Advised pt to go to the ED due to her symptoms. She refused an ambulance and stated she will get her daughter to drive her there.  -     IN OFFICE EKG 12-LEAD (to Muse)      Follow-up if symptoms worsen or fail to improve.            Toney Brown MD  1/28/2019 11:07 AM

## 2019-01-28 NOTE — TELEPHONE ENCOUNTER
The pt calls through the emergency line to report SOB, cough, low grade fever. Advised pt if she is having trouble breathing she should go to the ER. Pt states that she is not short of breath currently. She is at a hearing aid appointment. She would like to be seen in the office today. Appointment made for 10:40AM with Dr. Brown.

## 2019-01-28 NOTE — ED NOTES
Pt AAOx4, RR even, labored. Pt O2 88% on RA. 2 L O2 applied pt sat 96%. Attached to cardiac monitor pt currently afib , pulse ox, NIBP cuff. Family at bedside. Side rails up x 2, call light in reach. Awaiting lab results. Will continue to monitor pt.

## 2019-01-29 PROBLEM — G93.41 METABOLIC ENCEPHALOPATHY: Status: ACTIVE | Noted: 2019-01-29

## 2019-01-29 LAB
ALBUMIN SERPL BCP-MCNC: 3.3 G/DL
ALLENS TEST: ABNORMAL
ALP SERPL-CCNC: 70 U/L
ALT SERPL W/O P-5'-P-CCNC: 20 U/L
ANION GAP SERPL CALC-SCNC: 13 MMOL/L
AORTIC ROOT ANNULUS: 2.54 CM
AORTIC VALVE CUSP SEPERATION: 1.37 CM
ASCENDING AORTA: 2.16 CM
AST SERPL-CCNC: 18 U/L
AV INDEX (PROSTH): 0.42
AV MEAN GRADIENT: 15.54 MMHG
AV PEAK GRADIENT: 22.28 MMHG
AV VALVE AREA: 1.3 CM2
AV VELOCITY RATIO: 0.54
BACTERIA #/AREA URNS HPF: ABNORMAL /HPF
BASOPHILS # BLD AUTO: 0.02 K/UL
BASOPHILS NFR BLD: 0.3 %
BILIRUB SERPL-MCNC: 0.7 MG/DL
BILIRUB UR QL STRIP: NEGATIVE
BSA FOR ECHO PROCEDURE: 2.01 M2
BUN SERPL-MCNC: 23 MG/DL
CALCIUM SERPL-MCNC: 8.9 MG/DL
CHLORIDE SERPL-SCNC: 98 MMOL/L
CLARITY UR: ABNORMAL
CO2 SERPL-SCNC: 24 MMOL/L
COLOR UR: YELLOW
CREAT SERPL-MCNC: 1.7 MG/DL
CV ECHO LV RWT: 0.42 CM
DELSYS: ABNORMAL
DIFFERENTIAL METHOD: ABNORMAL
DOP CALC AO PEAK VEL: 2.36 M/S
DOP CALC AO VTI: 40.57 CM
DOP CALC LVOT AREA: 3.14 CM2
DOP CALC LVOT DIAMETER: 2 CM
DOP CALC LVOT PEAK VEL: 1.28 M/S
DOP CALC LVOT STROKE VOLUME: 52.94 CM3
DOP CALCLVOT PEAK VEL VTI: 16.86 CM
ECHO LV POSTERIOR WALL: 0.94 CM (ref 0.6–1.1)
EOSINOPHIL # BLD AUTO: 0 K/UL
EOSINOPHIL NFR BLD: 0.3 %
ERYTHROCYTE [DISTWIDTH] IN BLOOD BY AUTOMATED COUNT: 13.9 %
EST. GFR  (AFRICAN AMERICAN): 33 ML/MIN/1.73 M^2
EST. GFR  (NON AFRICAN AMERICAN): 29 ML/MIN/1.73 M^2
ESTIMATED AVG GLUCOSE: 126 MG/DL
FIO2: 100
FLOW: 15
FRACTIONAL SHORTENING: 30 % (ref 28–44)
GLUCOSE SERPL-MCNC: 109 MG/DL
GLUCOSE UR QL STRIP: NEGATIVE
HBA1C MFR BLD HPLC: 6 %
HCO3 UR-SCNC: 26.4 MMOL/L (ref 24–28)
HCT VFR BLD AUTO: 42.7 %
HGB BLD-MCNC: 13.7 G/DL
HGB UR QL STRIP: ABNORMAL
HYALINE CASTS #/AREA URNS LPF: 0 /LPF
INTERVENTRICULAR SEPTUM: 1.3 CM (ref 0.6–1.1)
IVRT: 0.03 MSEC
KETONES UR QL STRIP: NEGATIVE
LA MAJOR: 6.67 CM
LA MINOR: 6.78 CM
LA WIDTH: 4.31 CM
LEFT ATRIUM SIZE: 5.57 CM
LEFT ATRIUM VOLUME INDEX: 70.8 ML/M2
LEFT ATRIUM VOLUME: 137.22 CM3
LEFT INTERNAL DIMENSION IN SYSTOLE: 3.14 CM (ref 2.1–4)
LEFT VENTRICLE DIASTOLIC VOLUME INDEX: 47.44 ML/M2
LEFT VENTRICLE DIASTOLIC VOLUME: 91.93 ML
LEFT VENTRICLE MASS INDEX: 92.3 G/M2
LEFT VENTRICLE SYSTOLIC VOLUME INDEX: 20.2 ML/M2
LEFT VENTRICLE SYSTOLIC VOLUME: 39.1 ML
LEFT VENTRICULAR INTERNAL DIMENSION IN DIASTOLE: 4.49 CM (ref 3.5–6)
LEFT VENTRICULAR MASS: 178.9 G
LEUKOCYTE ESTERASE UR QL STRIP: ABNORMAL
LYMPHOCYTES # BLD AUTO: 0.7 K/UL
LYMPHOCYTES NFR BLD: 10.8 %
MAGNESIUM SERPL-MCNC: 1.4 MG/DL
MCH RBC QN AUTO: 30.6 PG
MCHC RBC AUTO-ENTMCNC: 32.1 G/DL
MCV RBC AUTO: 96 FL
MICROSCOPIC COMMENT: ABNORMAL
MODE: ABNORMAL
MONOCYTES # BLD AUTO: 0.8 K/UL
MONOCYTES NFR BLD: 12.4 %
NEUTROPHILS # BLD AUTO: 4.7 K/UL
NEUTROPHILS NFR BLD: 76.2 %
NITRITE UR QL STRIP: NEGATIVE
PCO2 BLDA: 49.2 MMHG (ref 35–45)
PH SMN: 7.34 [PH] (ref 7.35–7.45)
PH UR STRIP: 5 [PH] (ref 5–8)
PHOSPHATE SERPL-MCNC: 3.3 MG/DL
PISA TR MAX VEL: 3.32 M/S
PLATELET # BLD AUTO: 132 K/UL
PMV BLD AUTO: 11 FL
PO2 BLDA: 76 MMHG (ref 80–100)
POC BE: 0 MMOL/L
POC SATURATED O2: 94 % (ref 95–100)
POC TCO2: 28 MMOL/L (ref 23–27)
POCT GLUCOSE: 145 MG/DL (ref 70–110)
POTASSIUM SERPL-SCNC: 3.8 MMOL/L
PROT SERPL-MCNC: 6.8 G/DL
PROT UR QL STRIP: ABNORMAL
PV PEAK VELOCITY: 1.11 CM/S
RA MAJOR: 5.84 CM
RA PRESSURE: 8 MMHG
RA WIDTH: 4.4 CM
RBC # BLD AUTO: 4.47 M/UL
RBC #/AREA URNS HPF: 5 /HPF (ref 0–4)
RIGHT VENTRICULAR END-DIASTOLIC DIMENSION: 4.72 CM
RV TISSUE DOPPLER FREE WALL SYSTOLIC VELOCITY 1 (APICAL 4 CHAMBER VIEW): 10.17 M/S
SAMPLE: ABNORMAL
SINUS: 2.69 CM
SITE: ABNORMAL
SODIUM SERPL-SCNC: 135 MMOL/L
SP GR UR STRIP: 1.01 (ref 1–1.03)
SQUAMOUS #/AREA URNS HPF: 3 /HPF
STJ: 1.94 CM
TR MAX PG: 44.09 MMHG
TRICUSPID ANNULAR PLANE SYSTOLIC EXCURSION: 1.64 CM
TV REST PULMONARY ARTERY PRESSURE: 52 MMHG
URN SPEC COLLECT METH UR: ABNORMAL
UROBILINOGEN UR STRIP-ACNC: NEGATIVE EU/DL
WBC # BLD AUTO: 6.22 K/UL
WBC #/AREA URNS HPF: 5 /HPF (ref 0–5)

## 2019-01-29 PROCEDURE — 94640 AIRWAY INHALATION TREATMENT: CPT

## 2019-01-29 PROCEDURE — 63600175 PHARM REV CODE 636 W HCPCS: Performed by: EMERGENCY MEDICINE

## 2019-01-29 PROCEDURE — 84100 ASSAY OF PHOSPHORUS: CPT

## 2019-01-29 PROCEDURE — 83735 ASSAY OF MAGNESIUM: CPT

## 2019-01-29 PROCEDURE — 82803 BLOOD GASES ANY COMBINATION: CPT

## 2019-01-29 PROCEDURE — 25000003 PHARM REV CODE 250: Performed by: HOSPITALIST

## 2019-01-29 PROCEDURE — 99291 PR CRITICAL CARE, E/M 30-74 MINUTES: ICD-10-PCS | Mod: ,,, | Performed by: INTERNAL MEDICINE

## 2019-01-29 PROCEDURE — 99291 CRITICAL CARE FIRST HOUR: CPT | Mod: ,,, | Performed by: INTERNAL MEDICINE

## 2019-01-29 PROCEDURE — 63600175 PHARM REV CODE 636 W HCPCS: Performed by: INTERNAL MEDICINE

## 2019-01-29 PROCEDURE — 81000 URINALYSIS NONAUTO W/SCOPE: CPT

## 2019-01-29 PROCEDURE — 63600175 PHARM REV CODE 636 W HCPCS: Performed by: HOSPITALIST

## 2019-01-29 PROCEDURE — 94761 N-INVAS EAR/PLS OXIMETRY MLT: CPT

## 2019-01-29 PROCEDURE — 36415 COLL VENOUS BLD VENIPUNCTURE: CPT

## 2019-01-29 PROCEDURE — 27000221 HC OXYGEN, UP TO 24 HOURS

## 2019-01-29 PROCEDURE — 25000003 PHARM REV CODE 250: Performed by: INTERNAL MEDICINE

## 2019-01-29 PROCEDURE — 80053 COMPREHEN METABOLIC PANEL: CPT

## 2019-01-29 PROCEDURE — 25000242 PHARM REV CODE 250 ALT 637 W/ HCPCS: Performed by: HOSPITALIST

## 2019-01-29 PROCEDURE — 25000242 PHARM REV CODE 250 ALT 637 W/ HCPCS: Performed by: INTERNAL MEDICINE

## 2019-01-29 PROCEDURE — 25000003 PHARM REV CODE 250: Performed by: EMERGENCY MEDICINE

## 2019-01-29 PROCEDURE — 36600 WITHDRAWAL OF ARTERIAL BLOOD: CPT

## 2019-01-29 PROCEDURE — 83036 HEMOGLOBIN GLYCOSYLATED A1C: CPT

## 2019-01-29 PROCEDURE — 85025 COMPLETE CBC W/AUTO DIFF WBC: CPT

## 2019-01-29 PROCEDURE — 20000000 HC ICU ROOM

## 2019-01-29 PROCEDURE — 99900035 HC TECH TIME PER 15 MIN (STAT)

## 2019-01-29 RX ORDER — LEVALBUTEROL INHALATION SOLUTION 0.63 MG/3ML
0.63 SOLUTION RESPIRATORY (INHALATION) EVERY 8 HOURS
Status: DISCONTINUED | OUTPATIENT
Start: 2019-01-29 | End: 2019-02-06 | Stop reason: HOSPADM

## 2019-01-29 RX ORDER — BENZONATATE 100 MG/1
100 CAPSULE ORAL 3 TIMES DAILY PRN
Status: DISCONTINUED | OUTPATIENT
Start: 2019-01-29 | End: 2019-02-06 | Stop reason: HOSPADM

## 2019-01-29 RX ORDER — LORAZEPAM 2 MG/ML
1 INJECTION INTRAMUSCULAR EVERY 8 HOURS PRN
Status: DISCONTINUED | OUTPATIENT
Start: 2019-01-29 | End: 2019-02-01

## 2019-01-29 RX ORDER — IPRATROPIUM BROMIDE 0.5 MG/2.5ML
0.5 SOLUTION RESPIRATORY (INHALATION) EVERY 8 HOURS PRN
Status: DISPENSED | OUTPATIENT
Start: 2019-01-29 | End: 2019-01-29

## 2019-01-29 RX ORDER — HALOPERIDOL 5 MG/ML
5 INJECTION INTRAMUSCULAR ONCE
Status: COMPLETED | OUTPATIENT
Start: 2019-01-29 | End: 2019-01-29

## 2019-01-29 RX ORDER — LEVALBUTEROL INHALATION SOLUTION 0.63 MG/3ML
0.63 SOLUTION RESPIRATORY (INHALATION) EVERY 8 HOURS
Status: DISCONTINUED | OUTPATIENT
Start: 2019-01-29 | End: 2019-01-29

## 2019-01-29 RX ORDER — METOPROLOL TARTRATE 1 MG/ML
5 INJECTION, SOLUTION INTRAVENOUS
Status: COMPLETED | OUTPATIENT
Start: 2019-01-29 | End: 2019-01-29

## 2019-01-29 RX ORDER — MAGNESIUM SULFATE 1 G/100ML
1 INJECTION INTRAVENOUS ONCE
Status: COMPLETED | OUTPATIENT
Start: 2019-01-29 | End: 2019-01-29

## 2019-01-29 RX ORDER — LEVALBUTEROL INHALATION SOLUTION 0.63 MG/3ML
0.63 SOLUTION RESPIRATORY (INHALATION) EVERY 8 HOURS PRN
Status: DISCONTINUED | OUTPATIENT
Start: 2019-01-29 | End: 2019-01-29

## 2019-01-29 RX ORDER — DILTIAZEM HYDROCHLORIDE 5 MG/ML
10 INJECTION INTRAVENOUS ONCE
Status: COMPLETED | OUTPATIENT
Start: 2019-01-29 | End: 2019-01-29

## 2019-01-29 RX ADMIN — ASPIRIN 325 MG: 325 TABLET, DELAYED RELEASE ORAL at 08:01

## 2019-01-29 RX ADMIN — LEVALBUTEROL HYDROCHLORIDE 0.63 MG: 0.63 SOLUTION RESPIRATORY (INHALATION) at 02:01

## 2019-01-29 RX ADMIN — AMLODIPINE BESYLATE 5 MG: 5 TABLET ORAL at 08:01

## 2019-01-29 RX ADMIN — LEVALBUTEROL HYDROCHLORIDE 0.63 MG: 0.63 SOLUTION RESPIRATORY (INHALATION) at 11:01

## 2019-01-29 RX ADMIN — AMIODARONE HYDROCHLORIDE 0.5 MG/MIN: 1.8 INJECTION, SOLUTION INTRAVENOUS at 06:01

## 2019-01-29 RX ADMIN — DILTIAZEM HYDROCHLORIDE 10 MG: 5 INJECTION INTRAVENOUS at 02:01

## 2019-01-29 RX ADMIN — LORAZEPAM 1 MG: 2 INJECTION, SOLUTION INTRAMUSCULAR; INTRAVENOUS at 01:01

## 2019-01-29 RX ADMIN — ENOXAPARIN SODIUM 90 MG: 100 INJECTION SUBCUTANEOUS at 09:01

## 2019-01-29 RX ADMIN — MAGNESIUM SULFATE 1 G: 1 INJECTION INTRAVENOUS at 03:01

## 2019-01-29 RX ADMIN — METOPROLOL TARTRATE 5 MG: 1 INJECTION, SOLUTION INTRAVENOUS at 06:01

## 2019-01-29 RX ADMIN — LISINOPRIL 40 MG: 20 TABLET ORAL at 08:01

## 2019-01-29 RX ADMIN — ENOXAPARIN SODIUM 90 MG: 100 INJECTION SUBCUTANEOUS at 08:01

## 2019-01-29 RX ADMIN — METOPROLOL TARTRATE 5 MG: 1 INJECTION, SOLUTION INTRAVENOUS at 03:01

## 2019-01-29 RX ADMIN — ATORVASTATIN CALCIUM 40 MG: 40 TABLET, FILM COATED ORAL at 08:01

## 2019-01-29 RX ADMIN — PIPERACILLIN AND TAZOBACTAM 4.5 G: 4; .5 INJECTION, POWDER, LYOPHILIZED, FOR SOLUTION INTRAVENOUS; PARENTERAL at 03:01

## 2019-01-29 RX ADMIN — DOXYCYCLINE HYCLATE 100 MG: 100 TABLET, COATED ORAL at 08:01

## 2019-01-29 RX ADMIN — HALOPERIDOL LACTATE 5 MG: 5 INJECTION, SOLUTION INTRAMUSCULAR at 02:01

## 2019-01-29 RX ADMIN — FUROSEMIDE 20 MG: 10 INJECTION, SOLUTION INTRAVENOUS at 08:01

## 2019-01-29 RX ADMIN — PIPERACILLIN AND TAZOBACTAM 4.5 G: 4; .5 INJECTION, POWDER, LYOPHILIZED, FOR SOLUTION INTRAVENOUS; PARENTERAL at 10:01

## 2019-01-29 RX ADMIN — PROMETHAZINE HYDROCHLORIDE 6.25 MG: 25 INJECTION INTRAMUSCULAR; INTRAVENOUS at 09:01

## 2019-01-29 RX ADMIN — ALLOPURINOL 100 MG: 100 TABLET ORAL at 08:01

## 2019-01-29 RX ADMIN — LEVALBUTEROL HYDROCHLORIDE 0.63 MG: 0.63 SOLUTION RESPIRATORY (INHALATION) at 10:01

## 2019-01-29 RX ADMIN — METOPROLOL TARTRATE 100 MG: 50 TABLET ORAL at 08:01

## 2019-01-29 NOTE — HOSPITAL COURSE
Pt admitted to ICU with afib rvr on amiodarone infusion. Pt with elevated HR  and after TALHA done patient became very agitated and anxious. Ativan 1mg IV given and symptoms got worse. HR up into the 170s and O2 sats dropped to 77%. Placed on NRB. Improved O2 sats. 5mg IV haldol given as patient was trying to get out of bed and pulling oxygen mask off. Cardizem 10mg Iv given with improved HR to 120s-130s. xopenex scheduled Q 8 hours. Changed to zosyn with temp 102F.   1/30- successful cardioversion, post procedure confused and pulling oxygen off, desaturation, constant re-direction, monitored in ICU overnight. Changed to oral amiodarone. eliquis for anticoagulation. Holding parameters on BP meds. IV steroids, nebs and antibiotic for probable lower resp infection. IV lasix as Bp tolerates.   1/31- HR remain under control NSR 60-70s.On amiodarone, metoprolol and eliquis.  Resp status improved and weaned oxygen. Steroids changed to oral route. DC zosyn and switch to augmentin. Add acapella to nebs. Cr increased from baseline (1.5-1.7). Gentle IVF and monitor with repeat BMP later, was stable,. PT,OT consulted for dc planning. Weaning oxygen. PT/OT consulted and rec H/H without equipment. The patient was transferred to the floor on 1/31. Nephrology was consulted for worsening renal function.     2/2- pt transferred with afib rvr. Successful cardioversion again . Continued oral amiodarone and BB.  still has aggressive cough and URI symptoms. On mucolytic and antibiotic.   2/3- HR 50-60s sinus. Cr sightly higher, sodium 128. BNP 1500. Lasix x one dose. Bringing up more mucus. Steroids weaned 20mg. Still faint wheezes on exam.  Patient had worsening ARF on CKD 3, keeped on george and monitor,nephrology was following.reconsulted PT,OT.fley was removed latera nd she had improvement in ARF.  Changed diet for low slat diet duo to hyponatremia with improvement.  She did well with PT,OT.  Her wheezing and respiratory status is  improved,  Patient has been discharged home with HH and PO Abx and OAC and amiodarone and follow up with PCP,nephrology and cardiology in next few days.

## 2019-01-29 NOTE — SUBJECTIVE & OBJECTIVE
Past Medical History:   Diagnosis Date    A-fib     Diabetes mellitus type II     Fatty liver     GERD (gastroesophageal reflux disease)     Hemochromatosis     Hyperlipidemia     Hypertension     Vaginal delivery     x2    Vitamin D deficiency disease     Wears partial dentures     upper removable bridge       Past Surgical History:   Procedure Laterality Date    ARTHROPLASTY, KNEE, TOTAL Left 5/13/2013    Performed by [facility] at [facility] OR    ARTHROPLASTY-KNEE Right 8/3/2015    Performed by Gabriel [facility] at [facility] OR    BREAST BIOPSY      CHEST DRAINAGE N/A 9/18/2015    Performed by Bonnie Surgeon at [facility] BONNIE    CHOLECYSTECTOMY  08/2015    CHOLECYSTECTOMY-LAPAROSCOPIC N/A 8/26/2015    Performed by Bony Alexander MD at [facility] OR    CYSTOSCOPY - URODYNAMICS FLOW STUDY  (C-ARM) N/A 11/4/2016    Performed by Reina Camp MD at [facility] OR    ESOPHAGOGASTRODUODENOSCOPY (EGD) Left 10/15/2015    Performed by Rolando Robert MD at [facility] ENDO    EYE SURGERY      Cat Ext  OU    HYSTERECTOMY      partial due to uterine fibroids    INCISION AND DRAINAGE (I&D), ABSCESS Right 9/14/2015    Performed by Bonnie Surgeon at [facility]NIE    TOTAL KNEE ARTHROPLASTY Right 2015    left knee done 5/2013    TRANSESOPHAGEAL ECHOCARDIOGRAM (TALHA) N/A 9/21/2015    Performed by Amauri Lomas MD at [facility] CATH LAB       Review of patient's allergies indicates:  No Known Allergies    No current facility-administered medications on file prior to encounter.      Current Outpatient Medications on File Prior to Encounter   Medication Sig    allopurinol (ZYLOPRIM) 100 MG tablet Take 100 mg by mouth once daily.     amLODIPine (NORVASC) 5 MG tablet Take 1 tablet (5 mg total) by mouth once daily.    atorvastatin (LIPITOR) 40 MG tablet Take 1 tablet (40 mg total) by mouth once daily.    ergocalciferol (VITAMIN D2) 50,000 unit Cap Take 50,000 Units by mouth every 7 days.    fish oil-omega-3 fatty acids  300-1,000 mg capsule Take 1 capsule by mouth once daily.    folic acid (FOLVITE) 1 MG tablet Take 1 tablet (1 mg total) by mouth once daily.    furosemide (LASIX) 20 MG tablet Take Monday, Wednesday, friday    amoxicillin (AMOXIL) 500 MG Tab TK 4 TS PO 1 HOUR PRIOR TO DAPP    blood sugar diagnostic Strp Check blood sugar twice a day.Testing strips and lancets.    blood-glucose meter (FREESTYLE SYSTEM KIT) kit Use as instructed    lisinopril (PRINIVIL,ZESTRIL) 40 MG tablet TK 1 T PO HS    metoprolol tartrate (LOPRESSOR) 50 MG tablet Take 2 tablets (100 mg total) by mouth once daily.     Family History     Problem Relation (Age of Onset)    Aneurysm Father    Cancer Mother    Hypertension Mother        Tobacco Use    Smoking status: Former Smoker    Smokeless tobacco: Never Used   Substance and Sexual Activity    Alcohol use: Yes     Alcohol/week: 0.0 oz     Comment: occasional/ on a weekend    Drug use: No    Sexual activity: Not Currently     Review of Systems   Constitutional: Positive for activity change, appetite change and fever. Negative for chills, diaphoresis, fatigue and unexpected weight change.   HENT: Negative.    Eyes: Negative.    Respiratory: Positive for cough, shortness of breath and wheezing. Negative for chest tightness.    Cardiovascular: Negative for chest pain, palpitations and leg swelling.   Gastrointestinal: Negative for abdominal distention, abdominal pain, blood in stool, constipation, diarrhea, nausea and vomiting.   Genitourinary: Negative for dysuria and hematuria.   Musculoskeletal: Negative.    Skin: Negative.    Neurological: Negative for dizziness, seizures, syncope, weakness and light-headedness.   Psychiatric/Behavioral: Negative.      Objective:     Vital Signs (Most Recent):  Temp: 98.2 °F (36.8 °C) (01/28/19 1901)  Pulse: (!) 122 (01/28/19 2043)  Resp: (!) 24 (01/28/19 2043)  BP: 102/68 (01/28/19 1731)  SpO2: (!) 94 % (01/28/19 2043) Vital Signs (24h Range):  Temp:   [97.9 °F (36.6 °C)-98.8 °F (37.1 °C)] 98.2 °F (36.8 °C)  Pulse:  [116-142] 122  Resp:  [17-25] 24  SpO2:  [87 %-96 %] 94 %  BP: ()/(56-91) 102/68     Weight: 92 kg (202 lb 13.2 oz)  Body mass index is 35.93 kg/m².    Physical Exam   Constitutional: She is oriented to person, place, and time. She appears well-developed and well-nourished. No distress.   HENT:   Head: Normocephalic and atraumatic.   Right Ear: External ear normal.   Left Ear: External ear normal.   Nose: Nose normal.   Eyes: Right eye exhibits no discharge. Left eye exhibits no discharge.   Neck: Normal range of motion.   Cardiovascular:   Tachycardic, irregular, nor murmurs or gallops   Pulmonary/Chest:   Mildly increased respiratory effort with diffuse bilateral wheezing, no crackles   Abdominal: Soft. Bowel sounds are normal. She exhibits no distension. There is no tenderness. There is no rebound and no guarding.   Musculoskeletal: Normal range of motion. She exhibits no edema.   Neurological: She is alert and oriented to person, place, and time.   Skin: Skin is warm and dry. She is not diaphoretic. No erythema.   Psychiatric: She has a normal mood and affect. Her behavior is normal. Judgment and thought content normal.   Nursing note and vitals reviewed.          Significant Labs: All pertinent labs within the past 24 hours have been reviewed.    Significant Imaging: I have reviewed and interpreted all pertinent imaging results/findings within the past 24 hours.

## 2019-01-29 NOTE — ASSESSMENT & PLAN NOTE
On amiodarone and metoprolol  Follow-up echo when rate and rhythm were stable  Consider ischemic workup when stable  Will need to address OAC prior to discharge  If refractory will plan for TALHA/DC cardioversion in a.m.

## 2019-01-29 NOTE — ASSESSMENT & PLAN NOTE
Pt had previous prolonged ICU stay and hospitalization due to mental status changes. Etiology unknown. Daughter reports no known use of alcohol or controlled substances for anxiety or sleep. She did to respond well to ativan. Haldol given and mental status improved. Restraints in place until patient behavior controlled and not pulling oxygen off.     Blood culture NTD  Urine culture pending  Probable pneumonia   - zosyn, dc doxycyline

## 2019-01-29 NOTE — HPI
77 y/o female with a-fib with cardioversion (2015), DM type II, fatty liver, hemochromatosis, HLD, HTN, and Vitamin D deficiency disease presents to the ED for emergent evaluation of gradual onset SOB, cough, and wheezing that started x2 days ago. The patient presented to her PCP this morning for her symptoms, where she had an EKG that revealed a-fib; so she was advised to present to this facility. The patient is also c/o leg swelling. The patient is not taking any blood thinners. The patient denies chest pain, fever, chills, pain with breathing, palpitations, or abdominal pain. No other symptoms reported.    Known to me remotely from clinic.  She was admitted in 2015 and underwent cardioversion by doctor Lomas.  She recently was seen by him in clinic.  She presents with AFib with RVR now on amiodarone infusion in the ICU.  She denies any cardiopulmonary complaints currently.  She has not experienced any chest pain, shortness of breath or palpitations.  She denies any PND, orthopnea or lower extremity edema.  She denies any dizziness, presyncope or syncope.

## 2019-01-29 NOTE — ASSESSMENT & PLAN NOTE
Her diabetes is well-controlled and she is currently not on home medications.  Will provide insulin sliding scale.

## 2019-01-29 NOTE — PROGRESS NOTES
Results for FAISAL ORNELAS (MRN [id number]) as of 1/29/2019 15:13   Ref. Range 1/29/2019 14:57   POC PH Latest Ref Range: 7.35 - 7.45  7.338 (L)   POC PCO2 Latest Ref Range: 35 - 45 mmHg 49.2 (H)   POC PO2 Latest Ref Range: 80 - 100 mmHg 76 (L)   POC BE Latest Ref Range: -2 to 2 mmol/L 0   POC HCO3 Latest Ref Range: 24 - 28 mmol/L 26.4   POC SATURATED O2 Latest Ref Range: 95 - 100 % 94 (L)   POC TCO2 Latest Ref Range: 23 - 27 mmol/L 28 (H)   FiO2 Unknown 100   Flow Unknown 15   Sample Unknown ARTERIAL   DelSys Unknown NRB   Allens Test Unknown Pass   Site Unknown RR   Mode Unknown SPONT

## 2019-01-29 NOTE — PROGRESS NOTES
[facility] Hospital Medicine  Progress Note    Patient Name: Barbara Rizo  MRN: [id number]  Patient Class: IP- Inpatient   Admission Date: 1/28/2019  Length of Stay: 1 days  Attending Physician: Emma Richards MD  Primary Care Provider: Paras Oro MD        Subjective:     Principal Problem:Atrial fibrillation with rapid ventricular response    HPI:  Ms. Barbara Rizo is a 78 y.o. female with essential hypertension, hyperlipidemia (LDL 62.4 Jul 2018), type 2 diabetes mellitus (HbA1c 5.8% Jul 2018), CKD Stage 3, paroxysmal atrial fibrillation ([id number] score 5) not on chronic anticoagulation, obesity (BMI 36.0), MGUS, and chronic gout who presents to [facility] ED with complaints of coughing for three days.  She started to have a non-productive cough, wheezing, and mild dyspnea three days ago which has steadily been worsening since onset.  She also complains of a subjective fever but otherwise denies any nausea, vomiting, chest pain, palpitations, pleurisy, nor any diaphoresis.  She denies any recent travel, hemoptysis, nor any lower extremity pain or swelling.  She does say that she's under a lot of stress recently with her ex- dying yesterday at [facility] due to complications from a heart valve replacement two weeks ago.  She does say that she may have had sick contacts while visiting her ex-.  Her appetite has been poor but she denies any weight loss.    While at her ENT's appointment today she decided to schedule an appointment with her PCP to evaluate her upper airway complaints.  She was unable to see her regular PCP, Dr. Paras Oro, but was able to be seen by another physician who became concerned when she was noted to be in AFib with RVR on EKG.  He recommended EMS transportation to the ED but she refused and decided to drive herself.  Her cardiologist is Dr. Amauri Lomas.    Hospital Course:  Pt admitted to ICU with afib rvr on amiodarone  infusion. Pt with elevated HR today and after TALHA done patient became very agitated and anxious. Ativan 1mg IV given and symptoms got worse. HR up into the 170s and O2 sats dropped to 77%. Placed on NRB. Improved O2 sats. 5mg IV haldol given as patient was trying to get out of bed and pulling oxygen mask off. Cardizem 10mg Iv given with improved HR to 120s-130s. xopenex scheduled Q 8 hours. Changed to zosyn with temp 102F.     Interval History: pt restless and trying to get out of bed + hallucinations, increased WOB    Review of Systems   Unable to perform ROS: Mental status change     Objective:     Vital Signs (Most Recent):  Temp: 99.8 °F (37.7 °C) (01/29/19 1115)  Pulse: (!) 135 (01/29/19 1448)  Resp: (!) 26 (01/29/19 1448)  BP: (!) 158/74 (01/29/19 0730)  SpO2: 100 % (01/29/19 1448) Vital Signs (24h Range):  Temp:  [97.9 °F (36.6 °C)-100.1 °F (37.8 °C)] 99.8 °F (37.7 °C)  Pulse:  [116-142] 135  Resp:  [17-37] 26  SpO2:  [89 %-100 %] 100 %  BP: ()/(56-94) 158/74     Weight: 91.2 kg (201 lb)  Body mass index is 35.61 kg/m².    Intake/Output Summary (Last 24 hours) at 1/29/2019 1512  Last data filed at 1/29/2019 1100  Gross per 24 hour   Intake 1472.22 ml   Output 2755 ml   Net -1282.78 ml      Physical Exam   Constitutional: She appears distressed.   HENT:   Head: Normocephalic and atraumatic.   Neck: Normal range of motion. Neck supple. No JVD present.   Cardiovascular:   irr irr S1 S2, tachycardia    Pulmonary/Chest: She is in respiratory distress. She has wheezes.   Increased effort   Abdominal: Soft. Bowel sounds are normal. She exhibits no distension.   Musculoskeletal: Normal range of motion. She exhibits no edema.   Neurological: She is alert.   Oriented x2   Skin: Skin is warm. Capillary refill takes 2 to 3 seconds. She is diaphoretic.   Psychiatric:   Restless and anxious       Significant Labs:   ABGs:   Recent Labs   Lab 01/29/19  1457   PH 7.338*   PCO2 49.2*   HCO3 26.4   POCSATURATED 94*   BE  0     Blood Culture:   Recent Labs   Lab 01/28/19  1427 01/28/19  1433   LABBLOO No Growth to date  No Growth to date No Growth to date  No Growth to date     BMP:   Recent Labs   Lab 01/29/19  0222      *   K 3.8   CL 98   CO2 24   BUN 23   CREATININE 1.7*   CALCIUM 8.9   MG 1.4*     CBC:   Recent Labs   Lab 01/28/19  1205 01/29/19  0222   WBC 6.38 6.22   HGB 14.6 13.7   HCT 45.1 42.7   * 132*     Cardiac Markers:   Recent Labs   Lab 01/28/19  1205   BNP 1,497*     Coagulation: No results for input(s): PT, INR, APTT in the last 48 hours.  Lactic Acid:   Recent Labs   Lab 01/28/19  1427   LACTATE 1.4     POCT Glucose:   Recent Labs   Lab 01/28/19  1211 01/28/19  2220   POCTGLUCOSE 127* 117*     Troponin:   Recent Labs   Lab 01/28/19  1205 01/28/19  1427 01/28/19  1835   TROPONINI 0.033* 0.033* 0.018     TSH: No results for input(s): TSH in the last 4320 hours.  Urine Studies: No results for input(s): COLORU, APPEARANCEUA, PHUR, SPECGRAV, PROTEINUA, GLUCUA, KETONESU, BILIRUBINUA, OCCULTUA, NITRITE, UROBILINOGEN, LEUKOCYTESUR, RBCUA, WBCUA, BACTERIA, SQUAMEPITHEL, HYALINECASTS in the last 48 hours.    Invalid input(s): WRIGHTSUR    Significant Imaging: I have reviewed all pertinent imaging results/findings within the past 24 hours.    Assessment/Plan:      * Atrial fibrillation with rapid ventricular response    She was noted to be in RVR in her PCP's clinic today for which she was instructed to present to the ED.  I have reviewed the EKG and it reveals atrial fibrillation with rapid ventricular response with an average heart rate of 146 BPMs, but it went as high as 150 BPM.  He was given a dose of diltiazem without much improvement and subsequently started on an amiodarone infusion.  She has a [id number] score of 5--unclear why she isn't on anticoagulation other than aspirin.  Her last TTE in our system was in Sept 2015 so that will be repeated.  Will consult Cardiology for further  recommendations.    Amiodarone infusion  Full dose lovenox  Plan for cardioversion if does not convert to NSR on amiodarone infusion - pending improved respiratory status       Metabolic encephalopathy    Pt had previous prolonged ICU stay and hospitalization due to mental status changes. Etiology unknown. Daughter reports no known use of alcohol or controlled substances for anxiety or sleep. She did to respond well to ativan. Haldol given and mental status improved. Restraints in place until patient behavior controlled and not pulling oxygen off.     Blood culture NTD  Urine culture pending  Probable pneumonia   - zosyn, dc doxycyline          Chronic gout    Stable; will continue her home regimen of allopurinol.     Obesity, Class II, BMI 35-39.9    The patient has been counseled on the negative impact that obesity imparts on her health and was encouraged to make lifestyle changes in order to lose weight and decrease her modifiable risk factors.     CKD Stage 3    Her renal function appears to be at her baseline; will continue to monitor her urine output.     Paroxysmal atrial fibrillation    As addressed above.     MGUS (monoclonal gammopathy of unknown significance)    Stable; will encourage continued follow-up with her hematologist.     Essential hypertension    Patient's blood pressure is well-controlled; will continue home regimen of amlodipine, furosemide, lisinopril, and metoprolol, and provide as-needed clonidine.     Hyperlipidemia    Well-controlled; will continue patient's home regimen of atorvastatin.     Type 2 diabetes mellitus, controlled, with renal complications    Her diabetes is well-controlled and she is currently not on home medications.  Will provide insulin sliding scale.       VTE Risk Mitigation (From admission, onward)        Ordered     enoxaparin injection 90 mg  Every 12 hours      01/28/19 1801     IP VTE HIGH RISK PATIENT  Once      01/28/19 1801          Critical care time spent on  the evaluation and treatment of severe organ dysfunction, review of pertinent labs and imaging studies, discussions with consulting providers and discussions with patient/family: 45 minutes.    Emma Richards MD  Department of Hospital Medicine   [facility]

## 2019-01-29 NOTE — SUBJECTIVE & OBJECTIVE
Interval History: pt restless and trying to get out of bed + hallucinations, increased WOB    Review of Systems   Unable to perform ROS: Mental status change     Objective:     Vital Signs (Most Recent):  Temp: 99.8 °F (37.7 °C) (01/29/19 1115)  Pulse: (!) 135 (01/29/19 1448)  Resp: (!) 26 (01/29/19 1448)  BP: (!) 158/74 (01/29/19 0730)  SpO2: 100 % (01/29/19 1448) Vital Signs (24h Range):  Temp:  [97.9 °F (36.6 °C)-100.1 °F (37.8 °C)] 99.8 °F (37.7 °C)  Pulse:  [116-142] 135  Resp:  [17-37] 26  SpO2:  [89 %-100 %] 100 %  BP: ()/(56-94) 158/74     Weight: 91.2 kg (201 lb)  Body mass index is 35.61 kg/m².    Intake/Output Summary (Last 24 hours) at 1/29/2019 1512  Last data filed at 1/29/2019 1100  Gross per 24 hour   Intake 1472.22 ml   Output 2755 ml   Net -1282.78 ml      Physical Exam   Constitutional: She appears distressed.   HENT:   Head: Normocephalic and atraumatic.   Neck: Normal range of motion. Neck supple. No JVD present.   Cardiovascular:   irr irr S1 S2, tachycardia    Pulmonary/Chest: She is in respiratory distress. She has wheezes.   Increased effort   Abdominal: Soft. Bowel sounds are normal. She exhibits no distension.   Musculoskeletal: Normal range of motion. She exhibits no edema.   Neurological: She is alert.   Oriented x2   Skin: Skin is warm. Capillary refill takes 2 to 3 seconds. She is diaphoretic.   Psychiatric:   Restless and anxious       Significant Labs:   ABGs:   Recent Labs   Lab 01/29/19  1457   PH 7.338*   PCO2 49.2*   HCO3 26.4   POCSATURATED 94*   BE 0     Blood Culture:   Recent Labs   Lab 01/28/19  1427 01/28/19  1433   LABBLOO No Growth to date  No Growth to date No Growth to date  No Growth to date     BMP:   Recent Labs   Lab 01/29/19  0222      *   K 3.8   CL 98   CO2 24   BUN 23   CREATININE 1.7*   CALCIUM 8.9   MG 1.4*     CBC:   Recent Labs   Lab 01/28/19  1205 01/29/19  0222   WBC 6.38 6.22   HGB 14.6 13.7   HCT 45.1 42.7   * 132*     Cardiac  Markers:   Recent Labs   Lab 01/28/19  1205   BNP 1,497*     Coagulation: No results for input(s): PT, INR, APTT in the last 48 hours.  Lactic Acid:   Recent Labs   Lab 01/28/19  1427   LACTATE 1.4     POCT Glucose:   Recent Labs   Lab 01/28/19  1211 01/28/19  2220   POCTGLUCOSE 127* 117*     Troponin:   Recent Labs   Lab 01/28/19  1205 01/28/19  1427 01/28/19  1835   TROPONINI 0.033* 0.033* 0.018     TSH: No results for input(s): TSH in the last 4320 hours.  Urine Studies: No results for input(s): COLORU, APPEARANCEUA, PHUR, SPECGRAV, PROTEINUA, GLUCUA, KETONESU, BILIRUBINUA, OCCULTUA, NITRITE, UROBILINOGEN, LEUKOCYTESUR, RBCUA, WBCUA, BACTERIA, SQUAMEPITHEL, HYALINECASTS in the last 48 hours.    Invalid input(s): RAMIRO    Significant Imaging: I have reviewed all pertinent imaging results/findings within the past 24 hours.

## 2019-01-29 NOTE — PLAN OF CARE
01/29/19 1537   Discharge Assessment   Assessment Type Discharge Planning Assessment   Confirmed/corrected address and phone number on facesheet? Yes   Assessment information obtained from? Medical Record   Prior to hospitilization cognitive status: Alert/Oriented   Prior to hospitalization functional status: Independent   Current cognitive status: Alert/Oriented   Current Functional Status: Independent   Facility Arrived From: home   Lives With friend(s)   Able to Return to Prior Arrangements yes   Is patient able to care for self after discharge? Yes   Who are your caregiver(s) and their phone number(s)? Abebe   Patient's perception of discharge disposition admitted as an inpatient   Readmission Within the Last 30 Days no previous admission in last 30 days   Patient currently being followed by outpatient case management? No   Patient currently receives any other outside agency services? No   Equipment Currently Used at Home glucometer   Do you have any problems affording any of your prescribed medications? No   Is the patient taking medications as prescribed? yes   Does the patient have transportation home? Yes   Transportation Anticipated family or friend will provide   Does the patient receive services at the Coumadin Clinic? No   Discharge Plan A Home with family;Home Health   Discharge Plan B Home with family  (with follow ups)   DME Needed Upon Discharge  none     [facility] [address] - [address] [address]  Phone: [phone] Fax: [phone]    [facility] - [facility], [address] [address]  Phone: [phone] Fax: [phone]    [facility] [address] [place] [address] [place] - [address] [facility] [address]  [address]  [place] [address]  Phone: [phone] Fax: [phone]

## 2019-01-29 NOTE — H&P
[facility] Hospital Medicine  History & Physical    Patient Name: Barbara Rizo  MRN: [id number]  Admission Date: 1/28/2019  Attending Physician: Rhina Kamara MD   Primary Care Provider: Paras Oro MD         Patient information was obtained from patient.     Subjective:     Principal Problem:Atrial fibrillation with rapid ventricular response    Chief Complaint: Coughing for three days.    HPI: Ms. Barbara Rizo is a 78 y.o. female with essential hypertension, hyperlipidemia (LDL 62.4 Jul 2018), type 2 diabetes mellitus (HbA1c 5.8% Jul 2018), CKD Stage 3, paroxysmal atrial fibrillation ([id number] score 5) not on chronic anticoagulation, obesity (BMI 36.0), MGUS, and chronic gout who presents to [facility] ED with complaints of coughing for three days.  She started to have a non-productive cough, wheezing, and mild dyspnea three days ago which has steadily been worsening since onset.  She also complains of a subjective fever but otherwise denies any nausea, vomiting, chest pain, palpitations, pleurisy, nor any diaphoresis.  She denies any recent travel, hemoptysis, nor any lower extremity pain or swelling.  She does say that she's under a lot of stress recently with her ex- dying yesterday at [facility] due to complications from a heart valve replacement two weeks ago.  She does say that she may have had sick contacts while visiting her ex-.  Her appetite has been poor but she denies any weight loss.    While at her ENT's appointment today she decided to schedule an appointment with her PCP to evaluate her upper airway complaints.  She was unable to see her regular PCP, Dr. Paras Oro, but was able to be seen by another physician who became concerned when she was noted to be in AFib with RVR on EKG.  He recommended EMS transportation to the ED but she refused and decided to drive herself.  Her cardiologist is Dr. Amauri Lomas.    Chart  Review:  Previous Hospitalizations  Date Hospital Diagnosis   Nov 2016 [facility]- Complex urodynamics with fluoroscopy   Aug 21, 2015 [facility]- AFib with RVR s/p DCCV, intubation, cholecystitis s/p cholecystectomy    Aug 3, 2015 [facility]- Right total knee arthroplasty   May 2013 [facility] Left total knee arthroplasty     Outpatient Follow-Up  Date of Visit Physician Service   Jan 2019 Toney Brown MD Primary Care   Nov 2018 Amauri Lomas MD Cardiology    Oct 2018 Lalita Rawls MD Hematology    Aug 2018 Catrachita Alcantara DPM Podiatry    Sept 2018 PETROS Camp MD Urology      Past Medical History:   Diagnosis Date    A-fib     Diabetes mellitus type II     Fatty liver     GERD (gastroesophageal reflux disease)     Hemochromatosis     Hyperlipidemia     Hypertension     Vaginal delivery     x2    Vitamin D deficiency disease     Wears partial dentures     upper removable bridge       Past Surgical History:   Procedure Laterality Date    ARTHROPLASTY, KNEE, TOTAL Left 5/13/2013    [facility]formed [facility] Gabriel Minor [facility] at [facility] OR    ARTHROPLASTY-KNEE Right 8/3/2015    [facility] Gabriel [facility] at [facility] OR    BREAST BIOPSY      CHEST DRAINAGE N/A 9/18/2015    Performed by Bonnie Surgeon at [facility]    CHOLECYSTECTOMY  08/2015    CHOLECYSTECTOMY-LAPAROSCOPIC N/A 8/26/2015    Performed by Bony Alexander MD at [facility] OR    CYSTOSCOPY - URODYNAMICS FLOW STUDY  (C-ARM) N/A 11/4/2016    Performed by Reina Camp MD at [facility] OR    ESOPHAGOGASTRODUODENOSCOPY (EGD) Left 10/15/2015    Performed by Rolando Robert MD at [facility] ENDO    EYE SURGERY      Cat Ext  OU    HYSTERECTOMY      partial due to uterine fibroids    INCISION AND DRAINAGE (I&D), ABSCESS Right 9/14/2015    Performed by Bonnie Surgeon at [facility] [place]    TOTAL KNEE ARTHROPLASTY Right 2015    left knee done 5/2013    TRANSESOPHAGEAL ECHOCARDIOGRAM (TALHA) N/A 9/21/2015    Performed by Amauri Lomas MD at [facility] CATH LAB       Review of patient's  allergies indicates:  No Known Allergies    No current facility-administered medications on file prior to encounter.      Current Outpatient Medications on File Prior to Encounter   Medication Sig    allopurinol (ZYLOPRIM) 100 MG tablet Take 100 mg by mouth once daily.     amLODIPine (NORVASC) 5 MG tablet Take 1 tablet (5 mg total) by mouth once daily.    atorvastatin (LIPITOR) 40 MG tablet Take 1 tablet (40 mg total) by mouth once daily.    ergocalciferol (VITAMIN D2) 50,000 unit Cap Take 50,000 Units by mouth every 7 days.    fish oil-omega-3 fatty acids 300-1,000 mg capsule Take 1 capsule by mouth once daily.    folic acid (FOLVITE) 1 MG tablet Take 1 tablet (1 mg total) by mouth once daily.    furosemide (LASIX) 20 MG tablet Take Monday, Wednesday, friday    amoxicillin (AMOXIL) 500 MG Tab TK 4 TS PO 1 HOUR PRIOR TO DAPP    blood sugar diagnostic Strp Check blood sugar twice a day.Testing strips and lancets.    blood-glucose meter (FREESTYLE SYSTEM KIT) kit Use as instructed    lisinopril (PRINIVIL,ZESTRIL) 40 MG tablet TK 1 T PO HS    metoprolol tartrate (LOPRESSOR) 50 MG tablet Take 2 tablets (100 mg total) by mouth once daily.     Family History     Problem Relation (Age of Onset)    Aneurysm Father    Cancer Mother    Hypertension Mother        Tobacco Use    Smoking status: Former Smoker    Smokeless tobacco: Never Used   Substance and Sexual Activity    Alcohol use: Yes     Alcohol/week: 0.0 oz     Comment: occasional/ on a weekend    Drug use: No    Sexual activity: Not Currently     Review of Systems   Constitutional: Positive for activity change, appetite change and fever. Negative for chills, diaphoresis, fatigue and unexpected weight change.   HENT: Negative.    Eyes: Negative.    Respiratory: Positive for cough, shortness of breath and wheezing. Negative for chest tightness.    Cardiovascular: Negative for chest pain, palpitations and leg swelling.   Gastrointestinal: Negative for  abdominal distention, abdominal pain, blood in stool, constipation, diarrhea, nausea and vomiting.   Genitourinary: Negative for dysuria and hematuria.   Musculoskeletal: Negative.    Skin: Negative.    Neurological: Negative for dizziness, seizures, syncope, weakness and light-headedness.   Psychiatric/Behavioral: Negative.      Objective:     Vital Signs (Most Recent):  Temp: 98.2 °F (36.8 °C) (01/28/19 1901)  Pulse: (!) 122 (01/28/19 2043)  Resp: (!) 24 (01/28/19 2043)  BP: 102/68 (01/28/19 1731)  SpO2: (!) 94 % (01/28/19 2043) Vital Signs (24h Range):  Temp:  [97.9 °F (36.6 °C)-98.8 °F (37.1 °C)] 98.2 °F (36.8 °C)  Pulse:  [116-142] 122  Resp:  [17-25] 24  SpO2:  [87 %-96 %] 94 %  BP: ()/(56-91) 102/68     Weight: 92 kg (202 lb 13.2 oz)  Body mass index is 35.93 kg/m².    Physical Exam   Constitutional: She is oriented to person, place, and time. She appears well-developed and well-nourished. No distress.   HENT:   Head: Normocephalic and atraumatic.   Right Ear: External ear normal.   Left Ear: External ear normal.   Nose: Nose normal.   Eyes: Right eye exhibits no discharge. Left eye exhibits no discharge.   Neck: Normal range of motion.   Cardiovascular:   Tachycardic, irregular, nor murmurs or gallops   Pulmonary/Chest:   Mildly increased respiratory effort with diffuse bilateral wheezing, no crackles   Abdominal: Soft. Bowel sounds are normal. She exhibits no distension. There is no tenderness. There is no rebound and no guarding.   Musculoskeletal: Normal range of motion. She exhibits no edema.   Neurological: She is alert and oriented to person, place, and time.   Skin: Skin is warm and dry. She is not diaphoretic. No erythema.   Psychiatric: She has a normal mood and affect. Her behavior is normal. Judgment and thought content normal.   Nursing note and vitals reviewed.          Significant Labs: All pertinent labs within the past 24 hours have been reviewed.    Significant Imaging: I have reviewed  and interpreted all pertinent imaging results/findings within the past 24 hours.    Assessment/Plan:     * Atrial fibrillation with rapid ventricular response    She was noted to be in RVR in her PCP's clinic today for which she was instructed to present to the ED.  I have reviewed the EKG and it reveals atrial fibrillation with rapid ventricular response with an average heart rate of 146 BPMs, but it went as high as 150 BPM.  He was given a dose of diltiazem without much improvement and subsequently started on an amiodarone infusion.  She has a [id number] score of 5--unclear why she isn't on anticoagulation other than aspirin.  Her last TTE in our system was in Sept 2015 so that will be repeated.  Will consult Cardiology for further recommendations.     Essential hypertension    Patient's blood pressure is well-controlled; will continue home regimen of amlodipine, furosemide, lisinopril, and metoprolol, and provide as-needed clonidine.     Hyperlipidemia    Well-controlled; will continue patient's home regimen of atorvastatin.     Type 2 diabetes mellitus, controlled, with renal complications    Her diabetes is well-controlled and she is currently not on home medications.  Will provide insulin sliding scale.     CKD Stage 3    Her renal function appears to be at her baseline; will continue to monitor her urine output.     Paroxysmal atrial fibrillation    As addressed above.     Obesity, Class II, BMI 35-39.9    The patient has been counseled on the negative impact that obesity imparts on her health and was encouraged to make lifestyle changes in order to lose weight and decrease her modifiable risk factors.     MGUS (monoclonal gammopathy of unknown significance)    Stable; will encourage continued follow-up with her hematologist.     Chronic gout    Stable; will continue her home regimen of allopurinol.       VTE Risk Mitigation (From admission, onward)        Ordered     enoxaparin injection 90 mg  Every 12  hours      01/28/19 1801     IP VTE HIGH RISK PATIENT  Once      01/28/19 1801        Critical care time spent on the evaluation and treatment of severe organ dysfunction, review of pertinent labs and imaging studies, discussions with consulting providers and discussions with patient/family: 60 minutes.       Joellen Frazier M.D.  [facility]  Department of Hospital Medicine  [facility]  Pager: [phone]

## 2019-01-29 NOTE — CONSULTS
[facility]  Cardiology  Consult Note    Patient Name: Barbara Rizo  MRN: [id number]  Admission Date: 1/28/2019  Hospital Length of Stay: 1 days  Code Status: Full Code   Attending Provider: Emma Richards MD   Consulting Provider: Sundeep Montero MD  Primary Care Physician: Paras Oro MD  Principal Problem:Atrial fibrillation with rapid ventricular response    Patient information was obtained from patient, past medical records and ER records.     Inpatient consult to Cardiology  Consult performed by: Sundeep Montero MD  Consult ordered by: Joellen Frazier MD  Reason for consult: AFib with RVR        Subjective:     Chief Complaint:  Shortness of breath     HPI:      79 y/o female with a-fib with cardioversion (2015), DM type II, fatty liver, hemochromatosis, HLD, HTN, and Vitamin D deficiency disease presents to the ED for emergent evaluation of gradual onset SOB, cough, and wheezing that started x2 days ago. The patient presented to her PCP this morning for her symptoms, where she had an EKG that revealed a-fib; so she was advised to present to this facility. The patient is also c/o leg swelling. The patient is not taking any blood thinners. The patient denies chest pain, fever, chills, pain with breathing, palpitations, or abdominal pain. No other symptoms reported.    Known to me remotely from clinic.  She was admitted in 2015 and underwent cardioversion by doctor Lomas.  She recently was seen by him in clinic.  She presents with AFib with RVR now on amiodarone infusion in the ICU.  She denies any cardiopulmonary complaints currently.  She has not experienced any chest pain, shortness of breath or palpitations.  She denies any PND, orthopnea or lower extremity edema.  She denies any dizziness, presyncope or syncope.    Past Medical History:   Diagnosis Date    A-fib     Diabetes mellitus type II     Fatty liver     GERD (gastroesophageal reflux disease)     Hemochromatosis      Hyperlipidemia     Hypertension     Vaginal delivery     x2    Vitamin D deficiency disease     Wears partial dentures     upper removable bridge       Past Surgical History:   Procedure Laterality Date    ARTHROPLASTY, KNEE, TOTAL Left 5/13/2013    Performed by [facility] OR    ARTHROPLASTY-KNEE Right 8/3/2015    Performed by [facility] at [facility] OR    BREAST BIOPSY      CHEST DRAINAGE N/A 9/18/2015    Performed by Bonnie Surgeon at [facility]    CHOLECYSTECTOMY  08/2015    CHOLECYSTECTOMY-LAPAROSCOPIC N/A 8/26/2015    Performed by Bony Alexander MD at [facility] OR    CYSTOSCOPY - URODYNAMICS FLOW STUDY  (C-ARM) N/A 11/4/2016    Performed by Reina Camp MD at [facility] OR    ESOPHAGOGASTRODUODENOSCOPY (EGD) Left 10/15/2015    Performed by Rolando Robert MD at [facility] ENDO    EYE SURGERY      Cat Ext  OU    HYSTERECTOMY      partial due to uterine fibroids    INCISION AND DRAINAGE (I&D), ABSCESS Right 9/14/2015    Performed by Bonnie Surgeon at [facility]    TOTAL KNEE ARTHROPLASTY Right 2015    left knee done 5/2013    TRANSESOPHAGEAL ECHOCARDIOGRAM (TALHA) N/A 9/21/2015    Performed by Amauri Lomas MD at [facility] CATH LAB       Review of patient's allergies indicates:  No Known Allergies    No current facility-administered medications on file prior to encounter.      Current Outpatient Medications on File Prior to Encounter   Medication Sig    allopurinol (ZYLOPRIM) 100 MG tablet Take 100 mg by mouth once daily.     amLODIPine (NORVASC) 5 MG tablet Take 1 tablet (5 mg total) by mouth once daily.    atorvastatin (LIPITOR) 40 MG tablet Take 1 tablet (40 mg total) by mouth once daily.    ergocalciferol (VITAMIN D2) 50,000 unit Cap Take 50,000 Units by mouth every 7 days.    fish oil-omega-3 fatty acids 300-1,000 mg capsule Take 1 capsule by mouth once daily.    folic acid (FOLVITE) 1 MG tablet Take 1 tablet (1 mg total) by mouth once daily.    furosemide  (LASIX) 20 MG tablet Take Monday, Wednesday, friday    amoxicillin (AMOXIL) 500 MG Tab TK 4 TS PO 1 HOUR PRIOR TO DAPP    blood sugar diagnostic Strp Check blood sugar twice a day.Testing strips and lancets.    blood-glucose meter (FREESTYLE SYSTEM KIT) kit Use as instructed    lisinopril (PRINIVIL,ZESTRIL) 40 MG tablet TK 1 T PO HS    metoprolol tartrate (LOPRESSOR) 50 MG tablet Take 2 tablets (100 mg total) by mouth once daily.     Family History     Problem Relation (Age of Onset)    Aneurysm Father    Cancer Mother    Hypertension Mother        Tobacco Use    Smoking status: Former Smoker    Smokeless tobacco: Never Used   Substance and Sexual Activity    Alcohol use: Yes     Alcohol/week: 0.0 oz     Comment: occasional/ on a weekend    Drug use: No    Sexual activity: Not Currently     Review of Systems   Constitution: Negative.   HENT: Negative.    Eyes: Negative.    Cardiovascular: Positive for irregular heartbeat and palpitations. Negative for chest pain, dyspnea on exertion, leg swelling, near-syncope, orthopnea, paroxysmal nocturnal dyspnea and syncope.   Respiratory: Positive for shortness of breath.    Skin: Negative.    Musculoskeletal: Negative.    Gastrointestinal: Negative for abdominal pain, constipation and diarrhea.   Genitourinary: Negative for dysuria.   Neurological: Negative.    Psychiatric/Behavioral: Negative.      Objective:     Vital Signs (Most Recent):  Temp: 99.8 °F (37.7 °C) (01/29/19 1115)  Pulse: (!) 130 (01/29/19 1030)  Resp: (!) 21 (01/29/19 1030)  BP: (!) 158/74 (01/29/19 0730)  SpO2: (!) 90 % (01/29/19 1030) Vital Signs (24h Range):  Temp:  [97.9 °F (36.6 °C)-100.1 °F (37.8 °C)] 99.8 °F (37.7 °C)  Pulse:  [116-142] 130  Resp:  [17-37] 21  SpO2:  [89 %-96 %] 90 %  BP: ()/(56-94) 158/74     Weight: 91.2 kg (201 lb)  Body mass index is 35.61 kg/m².    SpO2: (!) 90 %  O2 Device (Oxygen Therapy): nasal cannula      Intake/Output Summary (Last 24 hours) at 1/29/2019  1445  Last data filed at 1/29/2019 1100  Gross per 24 hour   Intake 1472.22 ml   Output 2755 ml   Net -1282.78 ml       Lines/Drains/Airways     Drain                 Urethral Catheter 01/29/19 0301 16 Fr. less than 1 day          Peripheral Intravenous Line                 Peripheral IV - Single Lumen 01/28/19 1200 Right Forearm 1 day         Peripheral IV - Single Lumen 01/28/19 1252 Left Antecubital 1 day         Peripheral IV - Single Lumen 01/29/19 0920 Right Wrist less than 1 day         Peripheral IV - Single Lumen 01/29/19 0930 Anterior;Right Upper Arm less than 1 day                Physical Exam   Constitutional: She is oriented to person, place, and time. She appears well-developed and well-nourished.   HENT:   Head: Normocephalic and atraumatic.   Eyes: Conjunctivae and EOM are normal. Pupils are equal, round, and reactive to light.   Neck: Normal range of motion. Neck supple. No thyromegaly present.   Cardiovascular: An irregularly irregular rhythm present. Tachycardia present.   No murmur heard.  Pulmonary/Chest: Effort normal and breath sounds normal. No respiratory distress.   Abdominal: Soft. Bowel sounds are normal.   Musculoskeletal: She exhibits no edema.   Neurological: She is alert and oriented to person, place, and time.   Skin: Skin is warm and dry.   Psychiatric: She has a normal mood and affect. Her behavior is normal.       Significant Labs:   CMP   Recent Labs   Lab 01/28/19  1205 01/29/19 0222   * 135*   K 4.4 3.8    98   CO2 22* 24   * 109   BUN 20 23   CREATININE 1.6* 1.7*   CALCIUM 9.5 8.9   PROT 7.5 6.8   ALBUMIN 3.7 3.3*   BILITOT 1.2* 0.7   ALKPHOS 83 70   AST 18 18   ALT 22 20   ANIONGAP 13 13   ESTGFRAFRICA 36* 33*   EGFRNONAA 31* 29*   , CBC   Recent Labs   Lab 01/28/19  1205 01/29/19 0222   WBC 6.38 6.22   HGB 14.6 13.7   HCT 45.1 42.7   * 132*   , INR No results for input(s): INR, PROTIME in the last 48 hours., Lipid Panel No results for input(s):  CHOL, HDL, LDLCALC, TRIG, CHOLHDL in the last 48 hours. and Troponin   Recent Labs   Lab 01/28/19  1205 01/28/19  1427 01/28/19  1835   TROPONINI 0.033* 0.033* 0.018       Significant Imaging: Echocardiogram:   2D echo with color flow doppler:   Results for orders placed or performed during the hospital encounter of 08/21/15   2D echo with color flow doppler   Result Value Ref Range    QEF 75 55 - 65    Mitral Valve Regurgitation MILD     Est. PA Systolic Pressure 40.21 (A)     Tricuspid Valve Regurgitation MODERATE (A)      Assessment and Plan:     * Atrial fibrillation with rapid ventricular response    On amiodarone and metoprolol  Follow-up echo when rate and rhythm were stable  Consider ischemic workup when stable  Will need to address OAC prior to discharge  If refractory will plan for TALHA/DC cardioversion in a.m.         Essential hypertension           Type 2 diabetes mellitus, controlled, with renal complications    Per IM     Hyperlipidemia    On statin     MGUS (monoclonal gammopathy of unknown significance)               VTE Risk Mitigation (From admission, onward)        Ordered     enoxaparin injection 90 mg  Every 12 hours      01/28/19 1801     IP VTE HIGH RISK PATIENT  Once      01/28/19 1801        * total ICU time spent on case greater than 35 minutes    Thank you for your consult. I will follow-up with patient. Please contact us if you have any additional questions.    Sundeep Montero MD  Cardiology   [facility]

## 2019-01-29 NOTE — ASSESSMENT & PLAN NOTE
She was noted to be in RVR in her PCP's clinic today for which she was instructed to present to the ED.  I have reviewed the EKG and it reveals atrial fibrillation with rapid ventricular response with an average heart rate of 146 BPMs, but it went as high as 150 BPM.  He was given a dose of diltiazem without much improvement and subsequently started on an amiodarone infusion.  She has a [id number]-[id number] score of 5--unclear why she isn't on anticoagulation other than aspirin.  Her last TTE in our system was in Sept 2015 so that will be repeated.  Will consult Cardiology for further recommendations.

## 2019-01-29 NOTE — NURSING
Pt arrived to ICU via stretcher, Amio gtt at 1mg/min, on 2L O2, SOB and non productive cough noted,   AAO X4, Skin intact, oriented pt to room, questions answered, concern addressed, call light within reach, no acute distress noted at present time, will continue to monitor.

## 2019-01-29 NOTE — ASSESSMENT & PLAN NOTE
Patient's blood pressure is well-controlled; will continue home regimen of amlodipine, furosemide, lisinopril, and metoprolol, and provide as-needed clonidine.

## 2019-01-29 NOTE — PLAN OF CARE
Patient AAOx4. No acute distress. Afebrile. A.Fib on cardiac monitor. Had to obtain 5mg of Lopressor IVP prn for increased HR. BP WNL. Nasal cannula at 2L. PRN neb treatments obtained for wheezing. 16f george inserted and draining to gravity to help prevent workload of heart while getting out of bed every hour to void. SCDs intact to bilateral lower extremities. Accu-checks AC/HS ordered. Consult cardiology this AM. No falls during shift. Skin intact. Patient aware of plan of care.

## 2019-01-29 NOTE — HPI
Ms. Barbara Rizo is a 78 y.o. female with essential hypertension, hyperlipidemia (LDL 62.4 Jul 2018), type 2 diabetes mellitus (HbA1c 5.8% Jul 2018), CKD Stage 3, paroxysmal atrial fibrillation ([id number]-LELw score 5) not on chronic anticoagulation, obesity (BMI 36.0), MGUS, and chronic gout who presents to [facility] ED with complaints of coughing for three days.  She started to have a non-productive cough, wheezing, and mild dyspnea three days ago which has steadily been worsening since onset.  She also complains of a subjective fever but otherwise denies any nausea, vomiting, chest pain, palpitations, pleurisy, nor any diaphoresis.  She denies any recent travel, hemoptysis, nor any lower extremity pain or swelling.  She does say that she's under a lot of stress recently with her ex- dying yesterday at [facility] due to complications from a heart valve replacement two weeks ago.  She does say that she may have had sick contacts while visiting her ex-.  Her appetite has been poor but she denies any weight loss.    While at her ENT's appointment today she decided to schedule an appointment with her PCP to evaluate her upper airway complaints.  She was unable to see her regular PCP, Dr. Paras Oro, but was able to be seen by another physician who became concerned when she was noted to be in AFib with RVR on EKG.  He recommended EMS transportation to the ED but she refused and decided to drive herself.  Her cardiologist is Dr. Amauri Lomas.

## 2019-01-29 NOTE — SUBJECTIVE & OBJECTIVE
Past Medical History:   Diagnosis Date    A-fib     Diabetes mellitus type II     Fatty liver     GERD (gastroesophageal reflux disease)     Hemochromatosis     Hyperlipidemia     Hypertension     Vaginal delivery     x2    Vitamin D deficiency disease     Wears partial dentures     upper removable bridge       Past Surgical History:   Procedure Laterality Date    ARTHROPLASTY, KNEE, TOTAL Left 5/13/2013    [facility]formed by [facility] OR    ARTHROPLASTY-KNEE Right 8/3/2015    Performed by Gabriel [facility] at [facility] OR    BREAST BIOPSY      CHEST DRAINAGE N/A 9/18/2015    Performed by Bonnie Surgeon at [facility]    CHOLECYSTECTOMY  08/2015    CHOLECYSTECTOMY-LAPAROSCOPIC N/A 8/26/2015    Performed by Bony Alexander MD at [facility] OR    CYSTOSCOPY - URODYNAMICS FLOW STUDY  (C-ARM) N/A 11/4/2016    Performed by Reina Camp MD at [facility] OR    ESOPHAGOGASTRODUODENOSCOPY (EGD) Left 10/15/2015    Performed by Rolando Robert MD at [facility] ENDO    EYE SURGERY      Cat Ext  OU    HYSTERECTOMY      partial due to uterine fibroids    INCISION AND DRAINAGE (I&D), ABSCESS Right 9/14/2015    Performed by Bonnie Surgeon at [facility]    TOTAL KNEE ARTHROPLASTY Right 2015    left knee done 5/2013    TRANSESOPHAGEAL ECHOCARDIOGRAM (TALHA) N/A 9/21/2015    Performed by Amauri Lomas MD at [facility] CATH LAB       Review of patient's allergies indicates:  No Known Allergies    No current facility-administered medications on file prior to encounter.      Current Outpatient Medications on File Prior to Encounter   Medication Sig    allopurinol (ZYLOPRIM) 100 MG tablet Take 100 mg by mouth once daily.     amLODIPine (NORVASC) 5 MG tablet Take 1 tablet (5 mg total) by mouth once daily.    atorvastatin (LIPITOR) 40 MG tablet Take 1 tablet (40 mg total) by mouth once daily.    ergocalciferol (VITAMIN D2) 50,000 unit Cap Take 50,000 Units by mouth every 7 days.    fish oil-omega-3 fatty acids  300-1,000 mg capsule Take 1 capsule by mouth once daily.    folic acid (FOLVITE) 1 MG tablet Take 1 tablet (1 mg total) by mouth once daily.    furosemide (LASIX) 20 MG tablet Take Monday, Wednesday, friday    amoxicillin (AMOXIL) 500 MG Tab TK 4 TS PO 1 HOUR PRIOR TO DAPP    blood sugar diagnostic Strp Check blood sugar twice a day.Testing strips and lancets.    blood-glucose meter (FREESTYLE SYSTEM KIT) kit Use as instructed    lisinopril (PRINIVIL,ZESTRIL) 40 MG tablet TK 1 T PO HS    metoprolol tartrate (LOPRESSOR) 50 MG tablet Take 2 tablets (100 mg total) by mouth once daily.     Family History     Problem Relation (Age of Onset)    Aneurysm Father    Cancer Mother    Hypertension Mother        Tobacco Use    Smoking status: Former Smoker    Smokeless tobacco: Never Used   Substance and Sexual Activity    Alcohol use: Yes     Alcohol/week: 0.0 oz     Comment: occasional/ on a weekend    Drug use: No    Sexual activity: Not Currently     Review of Systems   Constitution: Negative.   HENT: Negative.    Eyes: Negative.    Cardiovascular: Positive for irregular heartbeat and palpitations. Negative for chest pain, dyspnea on exertion, leg swelling, near-syncope, orthopnea, paroxysmal nocturnal dyspnea and syncope.   Respiratory: Positive for shortness of breath.    Skin: Negative.    Musculoskeletal: Negative.    Gastrointestinal: Negative for abdominal pain, constipation and diarrhea.   Genitourinary: Negative for dysuria.   Neurological: Negative.    Psychiatric/Behavioral: Negative.      Objective:     Vital Signs (Most Recent):  Temp: 99.8 °F (37.7 °C) (01/29/19 1115)  Pulse: (!) 130 (01/29/19 1030)  Resp: (!) 21 (01/29/19 1030)  BP: (!) 158/74 (01/29/19 0730)  SpO2: (!) 90 % (01/29/19 1030) Vital Signs (24h Range):  Temp:  [97.9 °F (36.6 °C)-100.1 °F (37.8 °C)] 99.8 °F (37.7 °C)  Pulse:  [116-142] 130  Resp:  [17-37] 21  SpO2:  [89 %-96 %] 90 %  BP: ()/(56-94) 158/74     Weight: 91.2 kg (201  lb)  Body mass index is 35.61 kg/m².    SpO2: (!) 90 %  O2 Device (Oxygen Therapy): nasal cannula      Intake/Output Summary (Last 24 hours) at 1/29/2019 1445  Last data filed at 1/29/2019 1100  Gross per 24 hour   Intake 1472.22 ml   Output 2755 ml   Net -1282.78 ml       Lines/Drains/Airways     Drain                 Urethral Catheter 01/29/19 0301 16 Fr. less than 1 day          Peripheral Intravenous Line                 Peripheral IV - Single Lumen 01/28/19 1200 Right Forearm 1 day         Peripheral IV - Single Lumen 01/28/19 1252 Left Antecubital 1 day         Peripheral IV - Single Lumen 01/29/19 0920 Right Wrist less than 1 day         Peripheral IV - Single Lumen 01/29/19 0930 Anterior;Right Upper Arm less than 1 day                Physical Exam   Constitutional: She is oriented to person, place, and time. She appears well-developed and well-nourished.   HENT:   Head: Normocephalic and atraumatic.   Eyes: Conjunctivae and EOM are normal. Pupils are equal, round, and reactive to light.   Neck: Normal range of motion. Neck supple. No thyromegaly present.   Cardiovascular: An irregularly irregular rhythm present. Tachycardia present.   No murmur heard.  Pulmonary/Chest: Effort normal and breath sounds normal. No respiratory distress.   Abdominal: Soft. Bowel sounds are normal.   Musculoskeletal: She exhibits no edema.   Neurological: She is alert and oriented to person, place, and time.   Skin: Skin is warm and dry.   Psychiatric: She has a normal mood and affect. Her behavior is normal.       Significant Labs:   CMP   Recent Labs   Lab 01/28/19  1205 01/29/19 0222   * 135*   K 4.4 3.8    98   CO2 22* 24   * 109   BUN 20 23   CREATININE 1.6* 1.7*   CALCIUM 9.5 8.9   PROT 7.5 6.8   ALBUMIN 3.7 3.3*   BILITOT 1.2* 0.7   ALKPHOS 83 70   AST 18 18   ALT 22 20   ANIONGAP 13 13   ESTGFRAFRICA 36* 33*   EGFRNONAA 31* 29*   , CBC   Recent Labs   Lab 01/28/19  1205 01/29/19 0222   WBC 6.38 6.22    HGB 14.6 13.7   HCT 45.1 42.7   * 132*   , INR No results for input(s): INR, PROTIME in the last 48 hours., Lipid Panel No results for input(s): CHOL, HDL, LDLCALC, TRIG, CHOLHDL in the last 48 hours. and Troponin   Recent Labs   Lab 01/28/19  1205 01/28/19  1427 01/28/19  1835   TROPONINI 0.033* 0.033* 0.018       Significant Imaging: Echocardiogram:   2D echo with color flow doppler:   Results for orders placed or performed during the hospital encounter of 08/21/15   2D echo with color flow doppler   Result Value Ref Range    QEF 75 55 - 65    Mitral Valve Regurgitation MILD     Est. PA Systolic Pressure 40.21 (A)     Tricuspid Valve Regurgitation MODERATE (A)

## 2019-01-30 ENCOUNTER — ANESTHESIA EVENT (OUTPATIENT)
Dept: CARDIOLOGY | Facility: HOSPITAL | Age: 79
DRG: 308 | End: 2019-01-30
Payer: MEDICARE

## 2019-01-30 ENCOUNTER — ANESTHESIA (OUTPATIENT)
Dept: CARDIOLOGY | Facility: HOSPITAL | Age: 79
DRG: 308 | End: 2019-01-30
Payer: MEDICARE

## 2019-01-30 LAB
FLUAV AG SPEC QL IA: NEGATIVE
FLUBV AG SPEC QL IA: NEGATIVE
POCT GLUCOSE: 139 MG/DL (ref 70–110)
POCT GLUCOSE: 142 MG/DL (ref 70–110)
POCT GLUCOSE: 145 MG/DL (ref 70–110)
POCT GLUCOSE: 227 MG/DL (ref 70–110)
SPECIMEN SOURCE: NORMAL

## 2019-01-30 PROCEDURE — D9220A PRA ANESTHESIA: ICD-10-PCS | Mod: CRNA,,, | Performed by: NURSE ANESTHETIST, CERTIFIED REGISTERED

## 2019-01-30 PROCEDURE — D9220A PRA ANESTHESIA: Mod: ANES,,, | Performed by: ANESTHESIOLOGY

## 2019-01-30 PROCEDURE — 94761 N-INVAS EAR/PLS OXIMETRY MLT: CPT

## 2019-01-30 PROCEDURE — 25000003 PHARM REV CODE 250: Performed by: INTERNAL MEDICINE

## 2019-01-30 PROCEDURE — 99233 PR SUBSEQUENT HOSPITAL CARE,LEVL III: ICD-10-PCS | Mod: 25,,, | Performed by: INTERNAL MEDICINE

## 2019-01-30 PROCEDURE — 87400 INFLUENZA A/B EACH AG IA: CPT | Mod: 59

## 2019-01-30 PROCEDURE — 63600175 PHARM REV CODE 636 W HCPCS: Performed by: EMERGENCY MEDICINE

## 2019-01-30 PROCEDURE — 37000008 HC ANESTHESIA 1ST 15 MINUTES: Performed by: INTERNAL MEDICINE

## 2019-01-30 PROCEDURE — 25000003 PHARM REV CODE 250: Performed by: EMERGENCY MEDICINE

## 2019-01-30 PROCEDURE — 94640 AIRWAY INHALATION TREATMENT: CPT

## 2019-01-30 PROCEDURE — D9220A PRA ANESTHESIA: ICD-10-PCS | Mod: ANES,,, | Performed by: ANESTHESIOLOGY

## 2019-01-30 PROCEDURE — 37000009 HC ANESTHESIA EA ADD 15 MINS: Performed by: INTERNAL MEDICINE

## 2019-01-30 PROCEDURE — 27000221 HC OXYGEN, UP TO 24 HOURS

## 2019-01-30 PROCEDURE — 87086 URINE CULTURE/COLONY COUNT: CPT

## 2019-01-30 PROCEDURE — 25000003 PHARM REV CODE 250: Performed by: NURSE ANESTHETIST, CERTIFIED REGISTERED

## 2019-01-30 PROCEDURE — 63600175 PHARM REV CODE 636 W HCPCS: Performed by: INTERNAL MEDICINE

## 2019-01-30 PROCEDURE — 25000003 PHARM REV CODE 250: Performed by: HOSPITALIST

## 2019-01-30 PROCEDURE — 63600175 PHARM REV CODE 636 W HCPCS: Performed by: NURSE ANESTHETIST, CERTIFIED REGISTERED

## 2019-01-30 PROCEDURE — 63600175 PHARM REV CODE 636 W HCPCS: Performed by: ANESTHESIOLOGY

## 2019-01-30 PROCEDURE — D9220A PRA ANESTHESIA: Mod: CRNA,,, | Performed by: NURSE ANESTHETIST, CERTIFIED REGISTERED

## 2019-01-30 PROCEDURE — 20000000 HC ICU ROOM

## 2019-01-30 PROCEDURE — 99233 SBSQ HOSP IP/OBS HIGH 50: CPT | Mod: 25,,, | Performed by: INTERNAL MEDICINE

## 2019-01-30 PROCEDURE — 25000242 PHARM REV CODE 250 ALT 637 W/ HCPCS: Performed by: HOSPITALIST

## 2019-01-30 PROCEDURE — 63600175 PHARM REV CODE 636 W HCPCS: Performed by: HOSPITALIST

## 2019-01-30 RX ORDER — METHYLPREDNISOLONE SOD SUCC 125 MG
80 VIAL (EA) INJECTION EVERY 6 HOURS
Status: COMPLETED | OUTPATIENT
Start: 2019-01-30 | End: 2019-01-31

## 2019-01-30 RX ORDER — METOPROLOL TARTRATE 50 MG/1
100 TABLET ORAL 2 TIMES DAILY
Status: DISCONTINUED | OUTPATIENT
Start: 2019-01-30 | End: 2019-01-31

## 2019-01-30 RX ORDER — LIDOCAINE HCL/PF 100 MG/5ML
SYRINGE (ML) INTRAVENOUS
Status: DISCONTINUED | OUTPATIENT
Start: 2019-01-30 | End: 2019-01-30

## 2019-01-30 RX ORDER — AMIODARONE HYDROCHLORIDE 200 MG/1
200 TABLET ORAL 2 TIMES DAILY
Status: DISCONTINUED | OUTPATIENT
Start: 2019-01-30 | End: 2019-02-01

## 2019-01-30 RX ORDER — SUCCINYLCHOLINE CHLORIDE 20 MG/ML
INJECTION INTRAMUSCULAR; INTRAVENOUS
Status: DISCONTINUED | OUTPATIENT
Start: 2019-01-30 | End: 2019-01-30

## 2019-01-30 RX ORDER — SODIUM CHLORIDE 9 MG/ML
INJECTION, SOLUTION INTRAVENOUS CONTINUOUS PRN
Status: DISCONTINUED | OUTPATIENT
Start: 2019-01-30 | End: 2019-01-30

## 2019-01-30 RX ORDER — FENTANYL CITRATE 50 UG/ML
100 INJECTION, SOLUTION INTRAMUSCULAR; INTRAVENOUS ONCE
Status: COMPLETED | OUTPATIENT
Start: 2019-01-30 | End: 2019-01-30

## 2019-01-30 RX ORDER — PROPOFOL 10 MG/ML
VIAL (ML) INTRAVENOUS
Status: DISCONTINUED | OUTPATIENT
Start: 2019-01-30 | End: 2019-01-30

## 2019-01-30 RX ORDER — ONDANSETRON 2 MG/ML
4 INJECTION INTRAMUSCULAR; INTRAVENOUS EVERY 8 HOURS PRN
Status: DISCONTINUED | OUTPATIENT
Start: 2019-01-30 | End: 2019-02-06 | Stop reason: HOSPADM

## 2019-01-30 RX ORDER — SODIUM CHLORIDE 9 MG/ML
INJECTION, SOLUTION INTRAVENOUS CONTINUOUS
Status: DISCONTINUED | OUTPATIENT
Start: 2019-01-30 | End: 2019-01-31

## 2019-01-30 RX ORDER — PHENYLEPHRINE HYDROCHLORIDE 10 MG/ML
INJECTION INTRAVENOUS
Status: DISCONTINUED | OUTPATIENT
Start: 2019-01-30 | End: 2019-01-30

## 2019-01-30 RX ADMIN — PHENYLEPHRINE HYDROCHLORIDE 200 MCG: 10 INJECTION INTRAVENOUS at 01:01

## 2019-01-30 RX ADMIN — PROPOFOL 150 MG: 10 INJECTION, EMULSION INTRAVENOUS at 01:01

## 2019-01-30 RX ADMIN — APIXABAN 2.5 MG: 2.5 TABLET, FILM COATED ORAL at 08:01

## 2019-01-30 RX ADMIN — METOPROLOL TARTRATE 100 MG: 50 TABLET ORAL at 09:01

## 2019-01-30 RX ADMIN — INSULIN ASPART 1 UNITS: 100 INJECTION, SOLUTION INTRAVENOUS; SUBCUTANEOUS at 10:01

## 2019-01-30 RX ADMIN — PIPERACILLIN AND TAZOBACTAM 4.5 G: 4; .5 INJECTION, POWDER, LYOPHILIZED, FOR SOLUTION INTRAVENOUS; PARENTERAL at 07:01

## 2019-01-30 RX ADMIN — PROPOFOL 20 MG: 10 INJECTION, EMULSION INTRAVENOUS at 02:01

## 2019-01-30 RX ADMIN — PIPERACILLIN AND TAZOBACTAM 4.5 G: 4; .5 INJECTION, POWDER, LYOPHILIZED, FOR SOLUTION INTRAVENOUS; PARENTERAL at 03:01

## 2019-01-30 RX ADMIN — PHENYLEPHRINE HYDROCHLORIDE 100 MCG: 10 INJECTION INTRAVENOUS at 02:01

## 2019-01-30 RX ADMIN — LEVALBUTEROL HYDROCHLORIDE 0.63 MG: 0.63 SOLUTION RESPIRATORY (INHALATION) at 11:01

## 2019-01-30 RX ADMIN — METHYLPREDNISOLONE SODIUM SUCCINATE 80 MG: 125 INJECTION, POWDER, FOR SOLUTION INTRAMUSCULAR; INTRAVENOUS at 06:01

## 2019-01-30 RX ADMIN — FUROSEMIDE 20 MG: 10 INJECTION, SOLUTION INTRAVENOUS at 09:01

## 2019-01-30 RX ADMIN — LEVALBUTEROL HYDROCHLORIDE 0.63 MG: 0.63 SOLUTION RESPIRATORY (INHALATION) at 04:01

## 2019-01-30 RX ADMIN — LEVALBUTEROL HYDROCHLORIDE 0.63 MG: 0.63 SOLUTION RESPIRATORY (INHALATION) at 08:01

## 2019-01-30 RX ADMIN — ALLOPURINOL 100 MG: 100 TABLET ORAL at 09:01

## 2019-01-30 RX ADMIN — INSULIN ASPART 2 UNITS: 100 INJECTION, SOLUTION INTRAVENOUS; SUBCUTANEOUS at 04:01

## 2019-01-30 RX ADMIN — PIPERACILLIN AND TAZOBACTAM 4.5 G: 4; .5 INJECTION, POWDER, LYOPHILIZED, FOR SOLUTION INTRAVENOUS; PARENTERAL at 10:01

## 2019-01-30 RX ADMIN — SODIUM CHLORIDE: 0.9 INJECTION, SOLUTION INTRAVENOUS at 06:01

## 2019-01-30 RX ADMIN — ATORVASTATIN CALCIUM 40 MG: 40 TABLET, FILM COATED ORAL at 09:01

## 2019-01-30 RX ADMIN — AMLODIPINE BESYLATE 5 MG: 5 TABLET ORAL at 09:01

## 2019-01-30 RX ADMIN — PHENYLEPHRINE HYDROCHLORIDE 200 MCG: 10 INJECTION INTRAVENOUS at 02:01

## 2019-01-30 RX ADMIN — PHENYLEPHRINE HYDROCHLORIDE 100 MCG: 10 INJECTION INTRAVENOUS at 01:01

## 2019-01-30 RX ADMIN — METHYLPREDNISOLONE SODIUM SUCCINATE 80 MG: 125 INJECTION, POWDER, FOR SOLUTION INTRAMUSCULAR; INTRAVENOUS at 11:01

## 2019-01-30 RX ADMIN — FENTANYL CITRATE 50 MCG: 50 INJECTION INTRAMUSCULAR; INTRAVENOUS at 01:01

## 2019-01-30 RX ADMIN — SUCCINYLCHOLINE CHLORIDE 120 MG: 20 INJECTION, SOLUTION INTRAMUSCULAR; INTRAVENOUS at 01:01

## 2019-01-30 RX ADMIN — SODIUM CHLORIDE: 9 INJECTION, SOLUTION INTRAVENOUS at 01:01

## 2019-01-30 RX ADMIN — AMIODARONE HYDROCHLORIDE 200 MG: 200 TABLET ORAL at 03:01

## 2019-01-30 RX ADMIN — METHYLPREDNISOLONE SODIUM SUCCINATE 80 MG: 125 INJECTION, POWDER, FOR SOLUTION INTRAMUSCULAR; INTRAVENOUS at 10:01

## 2019-01-30 RX ADMIN — LIDOCAINE HYDROCHLORIDE 100 MG: 20 INJECTION, SOLUTION INTRAVENOUS at 01:01

## 2019-01-30 RX ADMIN — ASPIRIN 325 MG: 325 TABLET, DELAYED RELEASE ORAL at 09:01

## 2019-01-30 RX ADMIN — AMIODARONE HYDROCHLORIDE 0.5 MG/MIN: 1.8 INJECTION, SOLUTION INTRAVENOUS at 04:01

## 2019-01-30 RX ADMIN — ENOXAPARIN SODIUM 90 MG: 100 INJECTION SUBCUTANEOUS at 09:01

## 2019-01-30 NOTE — SUBJECTIVE & OBJECTIVE
Interval History: confused and restless post procedure     Review of Systems   Unable to perform ROS: Mental status change     Objective:     Vital Signs (Most Recent):  Temp: 98.8 °F (37.1 °C) (01/30/19 1423)  Pulse: 60 (01/30/19 1609)  Resp: 18 (01/30/19 1609)  BP: 102/60 (01/30/19 1448)  SpO2: (!) 86 % (01/30/19 1609) Vital Signs (24h Range):  Temp:  [97.5 °F (36.4 °C)-100.5 °F (38.1 °C)] 98.8 °F (37.1 °C)  Pulse:  [] 60  Resp:  [17-40] 18  SpO2:  [86 %-100 %] 86 %  BP: ()/() 102/60     Weight: 91.6 kg (201 lb 15.1 oz)  Body mass index is 35.77 kg/m².    Intake/Output Summary (Last 24 hours) at 1/30/2019 1630  Last data filed at 1/30/2019 1423  Gross per 24 hour   Intake 766.71 ml   Output 1635 ml   Net -868.29 ml      Physical Exam   Constitutional: She appears distressed.   HENT:   Head: Normocephalic and atraumatic.   Neck: Normal range of motion. Neck supple. No JVD present.   Cardiovascular:   irr irr S1 S2, tachycardia    Pulmonary/Chest: She is in respiratory distress. She has wheezes.   Increased effort   Abdominal: Soft. Bowel sounds are normal. She exhibits no distension.   Musculoskeletal: Normal range of motion. She exhibits no edema.   Neurological: She is alert.   Oriented x2   Skin: Skin is warm. Capillary refill takes 2 to 3 seconds. She is diaphoretic.   Psychiatric:   Restless and anxious       Significant Labs:   Blood Culture: No results for input(s): LABBLOO in the last 48 hours.  BMP:   Recent Labs   Lab 01/29/19 0222      *   K 3.8   CL 98   CO2 24   BUN 23   CREATININE 1.7*   CALCIUM 8.9   MG 1.4*     CBC:   Recent Labs   Lab 01/29/19 0222   WBC 6.22   HGB 13.7   HCT 42.7   *     POCT Glucose:   Recent Labs   Lab 01/29/19  1152 01/29/19  1723 01/30/19  0730   POCTGLUCOSE 145* 145* 139*     Urine Culture: No results for input(s): LABURIN in the last 48 hours.  Urine Studies:   Recent Labs   Lab 01/29/19  1458   COLORU Yellow   APPEARANCEUA Cloudy*    PHUR 5.0   SPECGRAV 1.015   PROTEINUA 2+*   GLUCUA Negative   KETONESU Negative   BILIRUBINUA Negative   OCCULTUA 3+*   NITRITE Negative   UROBILINOGEN Negative   LEUKOCYTESUR 2+*   RBCUA 5*   WBCUA 5   BACTERIA Occasional   SQUAMEPITHEL 3   HYALINECASTS 0       Significant Imaging: I have reviewed all pertinent imaging results/findings within the past 24 hours.

## 2019-01-30 NOTE — PLAN OF CARE
Problem: Adult Inpatient Plan of Care  Goal: Plan of Care Review  Outcome: Ongoing (interventions implemented as appropriate)  Pt remains in ICU pleasantly confused, Haldol and ativan given X1, HR A-fib 130's on Amio gitt, temp 102.2 max, Piperacillin IV started per MD order  and UA  reflex to Cx, adequate UO per george cath,  NPO after MN for possible cardioversion, plan of care reviewed with pt and daughter, pt remains free of injury, safety maintained, no acute distress noted at present time will continue to monitor.

## 2019-01-30 NOTE — SUBJECTIVE & OBJECTIVE
Interval History:  Denies any chest pain or shortness of breath this morning.  Nursing said she had a episode of breathing issues last night this stabilized with breathing treatment.    Telemetry:  AFib with RVR    Review of Systems   All other systems reviewed and are negative.    Objective:     Vital Signs (Most Recent):  Temp: (!) 100.5 °F (38.1 °C) (01/30/19 0730)  Pulse: (!) 148 (01/30/19 0800)  Resp: (!) 25 (01/30/19 0800)  BP: (!) 151/90 (01/30/19 0800)  SpO2: (!) 90 % (01/30/19 0800) Vital Signs (24h Range):  Temp:  [97.5 °F (36.4 °C)-100.5 °F (38.1 °C)] 100.5 °F (38.1 °C)  Pulse:  [118-156] 148  Resp:  [17-40] 25  SpO2:  [87 %-100 %] 90 %  BP: ()/(51-97) 151/90     Weight: 91.2 kg (201 lb)  Body mass index is 35.61 kg/m².     SpO2: (!) 90 %  O2 Device (Oxygen Therapy): nasal cannula      Intake/Output Summary (Last 24 hours) at 1/30/2019 0812  Last data filed at 1/30/2019 0732  Gross per 24 hour   Intake 618.01 ml   Output 2005 ml   Net -1386.99 ml       Lines/Drains/Airways     Drain                 Urethral Catheter 01/29/19 0301 16 Fr. 1 day          Peripheral Intravenous Line                 Peripheral IV - Single Lumen 01/28/19 1200 Right Forearm 1 day         Peripheral IV - Single Lumen 01/29/19 0920 Right Wrist less than 1 day                Physical Exam   Constitutional: She is oriented to person, place, and time. She appears well-developed and well-nourished.   HENT:   Head: Normocephalic and atraumatic.   Eyes: Conjunctivae and EOM are normal. Pupils are equal, round, and reactive to light.   Neck: Normal range of motion. Neck supple. No thyromegaly present.   Cardiovascular: An irregularly irregular rhythm present. Tachycardia present.   No murmur heard.  Pulmonary/Chest: Effort normal and breath sounds normal. No respiratory distress.   Abdominal: Soft. Bowel sounds are normal.   Musculoskeletal: She exhibits no edema.   Neurological: She is alert and oriented to person, place, and  time.   Skin: Skin is warm and dry.   Psychiatric: She has a normal mood and affect. Her behavior is normal.       Significant Labs:   BMP:   Recent Labs   Lab 01/28/19  1205 01/29/19  0222   * 109   * 135*   K 4.4 3.8    98   CO2 22* 24   BUN 20 23   CREATININE 1.6* 1.7*   CALCIUM 9.5 8.9   MG  --  1.4*    and CBC   Recent Labs   Lab 01/28/19  1205 01/29/19  0222   WBC 6.38 6.22   HGB 14.6 13.7   HCT 45.1 42.7   * 132*       Significant Imaging: Echocardiogram:   Transthoracic echo (TTE) complete (Cupid Only):   Results for orders placed or performed during the hospital encounter of 01/28/19   Transthoracic echo (TTE) complete (Cupid Only)   Result Value Ref Range    BSA 2.01 m2    LA WIDTH 4.31 cm    AORTIC VALVE CUSP SEPERATION 1.37 cm    PV PEAK VELOCITY 1.11 cm/s    LVIDD 4.49 3.5 - 6.0 cm    IVS 1.30 (A) 0.6 - 1.1 cm    PW 0.94 0.6 - 1.1 cm    Ao root annulus 2.54 cm    LVIDS 3.14 2.1 - 4.0 cm    FS 30 28 - 44 %    LA volume 137.22 cm3    Sinus 2.69 cm    STJ 1.94 cm    Ascending aorta 2.16 cm    LV mass 178.90 g    LA size 5.57 cm    RVDD 4.72 cm    TAPSE 1.64 cm    RV S' 10.17 m/s    Left Ventricle Relative Wall Thickness 0.42 cm    AV mean gradient 15.54 mmHg    AV valve area 1.30 cm2    AV Velocity Ratio 0.54     AV index (prosthetic) 0.42     IVRT 0.03 msec    LVOT diameter 2.00 cm    LVOT area 3.14 cm2    LVOT peak wes 2.9092570532 m/s    LVOT peak VTI 16.86 cm    Ao peak wes 2.36 m/s    Ao VTI 40.57 cm    LVOT stroke volume 52.94 cm3    AV peak gradient 22.28 mmHg    TR Max Wes 3.32 m/s    LV Systolic Volume 39.10 mL    LV Systolic Volume Index 20.2 mL/m2    LV Diastolic Volume 91.93 mL    LV Diastolic Volume Index 47.44 mL/m2    LA Volume Index 70.8 mL/m2    LV Mass Index 92.3 g/m2    RA Major Axis 5.84 cm    Left Atrium Minor Axis 6.78 cm    Left Atrium Major Axis 6.67 cm    Triscuspid Valve Regurgitation Peak Gradient 44.09 mmHg    RA Width 4.40 cm    Right Atrial Pressure  (from IVC) 8 mmHg    TV rest pulmonary artery pressure 52 mmHg

## 2019-01-30 NOTE — PROGRESS NOTES
[facility]  Cardiology  Progress Note    Patient Name: Barbara Rizo  MRN: [id number]  Admission Date: 1/28/2019  Hospital Length of Stay: 2 days  Code Status: Full Code   Attending Physician: Emma Richards MD   Primary Care Physician: Paras Oro MD  Expected Discharge Date:   Principal Problem:Atrial fibrillation with rapid ventricular response    Subjective:     Hospital Course:   1-29:  Admitted with AFib with RVR    Interval History:  Denies any chest pain or shortness of breath this morning.  Nursing said she had a episode of breathing issues last night this stabilized with breathing treatment.    Telemetry:  AFib with RVR    Review of Systems   All other systems reviewed and are negative.    Objective:     Vital Signs (Most Recent):  Temp: (!) 100.5 °F (38.1 °C) (01/30/19 0730)  Pulse: (!) 148 (01/30/19 0800)  Resp: (!) 25 (01/30/19 0800)  BP: (!) 151/90 (01/30/19 0800)  SpO2: (!) 90 % (01/30/19 0800) Vital Signs (24h Range):  Temp:  [97.5 °F (36.4 °C)-100.5 °F (38.1 °C)] 100.5 °F (38.1 °C)  Pulse:  [118-156] 148  Resp:  [17-40] 25  SpO2:  [87 %-100 %] 90 %  BP: ()/(51-97) 151/90     Weight: 91.2 kg (201 lb)  Body mass index is 35.61 kg/m².     SpO2: (!) 90 %  O2 Device (Oxygen Therapy): nasal cannula      Intake/Output Summary (Last 24 hours) at 1/30/2019 0812  Last data filed at 1/30/2019 0732  Gross per 24 hour   Intake 618.01 ml   Output 2005 ml   Net -1386.99 ml       Lines/Drains/Airways     Drain                 Urethral Catheter 01/29/19 0301 16 Fr. 1 day          Peripheral Intravenous Line                 Peripheral IV - Single Lumen 01/28/19 1200 Right Forearm 1 day         Peripheral IV - Single Lumen 01/29/19 0920 Right Wrist less than 1 day                Physical Exam   Constitutional: She is oriented to person, place, and time. She appears well-developed and well-nourished.   HENT:   Head: Normocephalic and atraumatic.   Eyes: Conjunctivae and EOM are normal.  Pupils are equal, round, and reactive to light.   Neck: Normal range of motion. Neck supple. No thyromegaly present.   Cardiovascular: An irregularly irregular rhythm present. Tachycardia present.   No murmur heard.  Pulmonary/Chest: Effort normal and breath sounds normal. No respiratory distress.   Abdominal: Soft. Bowel sounds are normal.   Musculoskeletal: She exhibits no edema.   Neurological: She is alert and oriented to person, place, and time.   Skin: Skin is warm and dry.   Psychiatric: She has a normal mood and affect. Her behavior is normal.       Significant Labs:   BMP:   Recent Labs   Lab 01/28/19  1205 01/29/19  0222   * 109   * 135*   K 4.4 3.8    98   CO2 22* 24   BUN 20 23   CREATININE 1.6* 1.7*   CALCIUM 9.5 8.9   MG  --  1.4*    and CBC   Recent Labs   Lab 01/28/19  1205 01/29/19  0222   WBC 6.38 6.22   HGB 14.6 13.7   HCT 45.1 42.7   * 132*       Significant Imaging: Echocardiogram:   Transthoracic echo (TTE) complete (Cupid Only):   Results for orders placed or performed during the hospital encounter of 01/28/19   Transthoracic echo (TTE) complete (Cupid Only)   Result Value Ref Range    BSA 2.01 m2    LA WIDTH 4.31 cm    AORTIC VALVE CUSP SEPERATION 1.37 cm    PV PEAK VELOCITY 1.11 cm/s    LVIDD 4.49 3.5 - 6.0 cm    IVS 1.30 (A) 0.6 - 1.1 cm    PW 0.94 0.6 - 1.1 cm    Ao root annulus 2.54 cm    LVIDS 3.14 2.1 - 4.0 cm    FS 30 28 - 44 %    LA volume 137.22 cm3    Sinus 2.69 cm    STJ 1.94 cm    Ascending aorta 2.16 cm    LV mass 178.90 g    LA size 5.57 cm    RVDD 4.72 cm    TAPSE 1.64 cm    RV S' 10.17 m/s    Left Ventricle Relative Wall Thickness 0.42 cm    AV mean gradient 15.54 mmHg    AV valve area 1.30 cm2    AV Velocity Ratio 0.54     AV index (prosthetic) 0.42     IVRT 0.03 msec    LVOT diameter 2.00 cm    LVOT area 3.14 cm2    LVOT peak devan 4.5998445907 m/s    LVOT peak VTI 16.86 cm    Ao peak devan 2.36 m/s    Ao VTI 40.57 cm    LVOT stroke volume 52.94 cm3     AV peak gradient 22.28 mmHg    TR Max Wes 3.32 m/s    LV Systolic Volume 39.10 mL    LV Systolic Volume Index 20.2 mL/m2    LV Diastolic Volume 91.93 mL    LV Diastolic Volume Index 47.44 mL/m2    LA Volume Index 70.8 mL/m2    LV Mass Index 92.3 g/m2    RA Major Axis 5.84 cm    Left Atrium Minor Axis 6.78 cm    Left Atrium Major Axis 6.67 cm    Triscuspid Valve Regurgitation Peak Gradient 44.09 mmHg    RA Width 4.40 cm    Right Atrial Pressure (from IVC) 8 mmHg    TV rest pulmonary artery pressure 52 mmHg     Assessment and Plan:     Brief HPI:     * Atrial fibrillation with rapid ventricular response    On amiodarone and metoprolol  Follow-up echo when rate and rhythm were stable, limited post-conversion is successful  Consider ischemic workup when stable  Will need to address OAC prior to discharge  Plan for TALHA/DC cardioversion today    **Risks/benefits of the procedure were d/w the patient including throat irritation, aspiration, etc.  Patient understands and wishes to proceed.  Consent was placed on the chart.         Essential hypertension           Type 2 diabetes mellitus, controlled, with renal complications    Per IM     Hyperlipidemia    On statin     MGUS (monoclonal gammopathy of unknown significance)               VTE Risk Mitigation (From admission, onward)        Ordered     enoxaparin injection 90 mg  Every 12 hours      01/28/19 1801     IP VTE HIGH RISK PATIENT  Once      01/28/19 1801        * 25 min spent on case    Sundeep Montero MD  Cardiology  [facility]

## 2019-01-30 NOTE — ANESTHESIA PREPROCEDURE EVALUATION
01/30/2019  Barbara Rizo is a 78 y.o., female.    Anesthesia Evaluation    I have reviewed the Patient Summary Reports.    I have reviewed the Nursing Notes.   I have reviewed the Medications.     Review of Systems  Anesthesia Hx:  No problems with previous Anesthesia  History of prior surgery of interest to airway management or planning: Denies Family Hx of Anesthesia complications.   Denies Personal Hx of Anesthesia complications.   Social:  Former Smoker    Hematology/Oncology:  Hematology Normal   Oncology Normal     EENT/Dental:EENT/Dental Normal   Cardiovascular:   Hypertension Dysrhythmias atrial fibrillation ECG has been reviewed.    Pulmonary:   Shortness of breath Pulmonary HTN.    Renal/:   Chronic Renal Disease, CRI    Hepatic/GI:   GERD Liver Disease,    Musculoskeletal:   Arthritis     Neurological:   Neuromuscular Disease, Metabolic encephalopathy hx.    Endocrine:   Diabetes    Dermatological:  Skin Normal    Psych:  Psychiatric Normal           Physical Exam  General:  Well nourished    Airway/Jaw/Neck:  Airway Findings: Mallampati: II TM Distance: 4 - 6 cm      Dental:  DENTAL FINDINGS: Normal   Chest/Lungs:  Chest/Lungs Findings: (severe) Expiratory Wheezes, Mod.     Heart/Vascular:  Heart Findings: Normal       Mental Status:  Mental Status Findings:  Cooperative, Alert and Oriented         Anesthesia Plan  Type of Anesthesia, risks & benefits discussed:  Anesthesia Type:  general  Patient's Preference:   Intra-op Monitoring Plan: standard ASA monitors  Intra-op Monitoring Plan Comments:   Post Op Pain Control Plan: multimodal analgesia, IV/PO Opioids PRN and per primary service following discharge from PACU  Post Op Pain Control Plan Comments:   Induction:   IV  Beta Blocker:  Patient is on a Beta-Blocker and has received one dose within the past 24 hours (No further documentation  required).       Informed Consent: Patient understands risks and agrees with Anesthesia plan.  Questions answered. Anesthesia consent signed with patient.  ASA Score: 3     Day of Surgery Review of History & Physical: I have interviewed and examined the patient. I have reviewed the patient's H&P dated: 01/30/19. There are no significant changes.  H&P update referred to the provider.  H&P completed by Anesthesiologist.   Anesthesia Plan Notes: Patient has what appears to be a pneaumonia and a fib..would try to optimize lungs before procedure..will discuss with cardiology..        Ready For Surgery From Anesthesia Perspective.

## 2019-01-30 NOTE — ASSESSMENT & PLAN NOTE
Pt had previous prolonged ICU stay and hospitalization due to mental status changes. Etiology unknown. Daughter reports no known use of alcohol or controlled substances for anxiety or sleep. She did to respond well to ativan. Haldol given and mental status improved. Restraints in place until patient behavior controlled and not pulling oxygen off.     Blood culture NTD  Urine culture pending  Probable pneumonia   - zosyn, dc doxycyline , nebs

## 2019-01-30 NOTE — HOSPITAL COURSE
1-29:  Admitted with AFib with RVR  1-30:  Underwent TALHA/DC cardioversion successfully  2-1 Back in A-fib 120s. SOB  2-3 Still coughing and SOB. NSR 60       2/2/19 CV - 360J converted A-fib to sinus bradycardia 55. Change amiodarone to po. Ok for telemetry           Echo 1/29/19  · TDs  · Normal left ventricular systolic function. The estimated ejection fraction is 55% * specificity decreased secondary to tachyarrhythmia  · Indeterminate left ventricular diastolic function.  · Severe left atrial enlargement.  · Mild-to-moderate mitral regurgitation.  · Intermediate central venous pressure (8 mm Hg).  · The estimated PA systolic pressure is 52 mm Hg  · Pulmonary hypertension present.

## 2019-01-30 NOTE — PLAN OF CARE
Problem: Adult Inpatient Plan of Care  Goal: Plan of Care Review  Pt sleeping most of night. -160 of night. WILKINSON. NC in use. Rolle patent. No falls or injury noted. Amiodarone mg/min .5mg/min infusing w/o diff. NPO after MN for cardioversion.

## 2019-01-30 NOTE — PROGRESS NOTES
[facility] Hospital Medicine  Progress Note    Patient Name: Barbara Rizo  MRN: [id number]  Patient Class: IP- Inpatient   Admission Date: 1/28/2019  Length of Stay: 2 days  Attending Physician: Emma Richards MD  Primary Care Provider: Paras Oro MD        Subjective:     Principal Problem:Atrial fibrillation with rapid ventricular response    HPI:  Ms. Barbara Rizo is a 78 y.o. female with essential hypertension, hyperlipidemia (LDL 62.4 Jul 2018), type 2 diabetes mellitus (HbA1c 5.8% Jul 2018), CKD Stage 3, paroxysmal atrial fibrillation ([id number]-YVGd score 5) not on chronic anticoagulation, obesity (BMI 36.0), MGUS, and chronic gout who presents to [facility] ED with complaints of coughing for three days.  She started to have a non-productive cough, wheezing, and mild dyspnea three days ago which has steadily been worsening since onset.  She also complains of a subjective fever but otherwise denies any nausea, vomiting, chest pain, palpitations, pleurisy, nor any diaphoresis.  She denies any recent travel, hemoptysis, nor any lower extremity pain or swelling.  She does say that she's under a lot of stress recently with her ex- dying yesterday at [facility] due to complications from a heart valve replacement two weeks ago.  She does say that she may have had sick contacts while visiting her ex-.  Her appetite has been poor but she denies any weight loss.    While at her ENT's appointment today she decided to schedule an appointment with her PCP to evaluate her upper airway complaints.  She was unable to see her regular PCP, Dr. Paras Oro, but was able to be seen by another physician who became concerned when she was noted to be in AFib with RVR on EKG.  He recommended EMS transportation to the ED but she refused and decided to drive herself.  Her cardiologist is Dr. Amauri Lomas.    Hospital Course:  Pt admitted to ICU with afib rvr on amiodarone  infusion. Pt with elevated HR today and after TALHA done patient became very agitated and anxious. Ativan 1mg IV given and symptoms got worse. HR up into the 170s and O2 sats dropped to 77%. Placed on NRB. Improved O2 sats. 5mg IV haldol given as patient was trying to get out of bed and pulling oxygen mask off. Cardizem 10mg Iv given with improved HR to 120s-130s. xopenex scheduled Q 8 hours. Changed to zosyn with temp 102F.     1/30- successful cardioversion, post procedure confused and pulling oxygen off, desaturation, constant re-direction, monitor in ICU overnight. Changed to oral amiodarone. eliquis for anticoagulation. Holding parameters on BP meds. IV steroids, nebs and antibiotic for probable lower resp infection. IV lasix as Bp tolerates.     Interval History: confused and restless post procedure     Review of Systems   Unable to perform ROS: Mental status change     Objective:     Vital Signs (Most Recent):  Temp: 98.8 °F (37.1 °C) (01/30/19 1423)  Pulse: 60 (01/30/19 1609)  Resp: 18 (01/30/19 1609)  BP: 102/60 (01/30/19 1448)  SpO2: (!) 86 % (01/30/19 1609) Vital Signs (24h Range):  Temp:  [97.5 °F (36.4 °C)-100.5 °F (38.1 °C)] 98.8 °F (37.1 °C)  Pulse:  [] 60  Resp:  [17-40] 18  SpO2:  [86 %-100 %] 86 %  BP: ()/() 102/60     Weight: 91.6 kg (201 lb 15.1 oz)  Body mass index is 35.77 kg/m².    Intake/Output Summary (Last 24 hours) at 1/30/2019 1630  Last data filed at 1/30/2019 1423  Gross per 24 hour   Intake 766.71 ml   Output 1635 ml   Net -868.29 ml      Physical Exam   Constitutional: She appears distressed.   HENT:   Head: Normocephalic and atraumatic.   Neck: Normal range of motion. Neck supple. No JVD present.   Cardiovascular:   irr irr S1 S2, tachycardia    Pulmonary/Chest: She is in respiratory distress. She has wheezes.   Increased effort   Abdominal: Soft. Bowel sounds are normal. She exhibits no distension.   Musculoskeletal: Normal range of motion. She exhibits no edema.    Neurological: She is alert.   Oriented x2   Skin: Skin is warm. Capillary refill takes 2 to 3 seconds. She is diaphoretic.   Psychiatric:   Restless and anxious       Significant Labs:   Blood Culture: No results for input(s): LABBLOO in the last 48 hours.  BMP:   Recent Labs   Lab 01/29/19 0222      *   K 3.8   CL 98   CO2 24   BUN 23   CREATININE 1.7*   CALCIUM 8.9   MG 1.4*     CBC:   Recent Labs   Lab 01/29/19 0222   WBC 6.22   HGB 13.7   HCT 42.7   *     POCT Glucose:   Recent Labs   Lab 01/29/19  1152 01/29/19  1723 01/30/19  0730   POCTGLUCOSE 145* 145* 139*     Urine Culture: No results for input(s): LABURIN in the last 48 hours.  Urine Studies:   Recent Labs   Lab 01/29/19  1458   COLORU Yellow   APPEARANCEUA Cloudy*   PHUR 5.0   SPECGRAV 1.015   PROTEINUA 2+*   GLUCUA Negative   KETONESU Negative   BILIRUBINUA Negative   OCCULTUA 3+*   NITRITE Negative   UROBILINOGEN Negative   LEUKOCYTESUR 2+*   RBCUA 5*   WBCUA 5   BACTERIA Occasional   SQUAMEPITHEL 3   HYALINECASTS 0       Significant Imaging: I have reviewed all pertinent imaging results/findings within the past 24 hours.    Assessment/Plan:      * Atrial fibrillation with rapid ventricular response    She was noted to be in RVR in her PCP's clinic today for which she was instructed to present to the ED.  I have reviewed the EKG and it reveals atrial fibrillation with rapid ventricular response with an average heart rate of 146 BPMs, but it went as high as 150 BPM.  He was given a dose of diltiazem without much improvement and subsequently started on an amiodarone infusion.  She has a [id number] score of 5--unclear why she isn't on anticoagulation other than aspirin.  Her last TTE in our system was in Sept 2015 so that will be repeated.  Will consult Cardiology for further recommendations.    Oral amiodarone   eliquis  S/p successful cardioversion 1/30       Metabolic encephalopathy    Pt had previous prolonged ICU stay and  hospitalization due to mental status changes. Etiology unknown. Daughter reports no known use of alcohol or controlled substances for anxiety or sleep. She did to respond well to ativan. Haldol given and mental status improved. Restraints in place until patient behavior controlled and not pulling oxygen off.     Blood culture NTD  Urine culture pending  Probable pneumonia   - zosyn, dc doxycyline , nebs         Chronic gout    Stable; will continue her home regimen of allopurinol.     Obesity, Class II, BMI 35-39.9    The patient has been counseled on the negative impact that obesity imparts on her health and was encouraged to make lifestyle changes in order to lose weight and decrease her modifiable risk factors.     CKD Stage 3    Her renal function appears to be at her baseline; will continue to monitor her urine output.     Paroxysmal atrial fibrillation    As addressed above.     MGUS (monoclonal gammopathy of unknown significance)    Stable; will encourage continued follow-up with her hematologist.     Essential hypertension    Patient's blood pressure is well-controlled; will continue home regimen of amlodipine, furosemide, lisinopril, and metoprolol, and provide as-needed clonidine.     Hyperlipidemia    Well-controlled; will continue patient's home regimen of atorvastatin.     Type 2 diabetes mellitus, controlled, with renal complications    Her diabetes is well-controlled and she is currently not on home medications.  Will provide insulin sliding scale.       VTE Risk Mitigation (From admission, onward)        Ordered     apixaban tablet 2.5 mg  2 times daily      01/30/19 1542     IP VTE HIGH RISK PATIENT  Once      01/28/19 1801          Critical care time spent on the evaluation and treatment of severe organ dysfunction, review of pertinent labs and imaging studies, discussions with consulting providers and discussions with patient/family: 40 minutes.    Emma Richards MD  Department of [facility]  Medicine   [facility]

## 2019-01-30 NOTE — PROGRESS NOTES
1000 pt with wheezing auscultated, denies shortness of breath sat 88% on 5lnc. No signs of distress. Pending cardioversion today. Cath lab rn Alejandro at bedside to assess pt. Anesthesia updated per Alejandro clemente on pt's wheezing and febrile. Informed md Prejeant of pt's wheezing and febrile temp 100.5. MD will place orders.   1315 anesthesia in room for cardioversion with cath lab rn's.   1650 informed md Prejeant, pt has only had 10ml/hr of urine for the past 2 hours. md will assess pt.

## 2019-01-30 NOTE — ASSESSMENT & PLAN NOTE
On amiodarone and metoprolol  Follow-up echo when rate and rhythm were stable, limited post-conversion is successful  Consider ischemic workup when stable  Will need to address OAC prior to discharge  Plan for TALHA/DC cardioversion today    **Risks/benefits of the procedure were d/w the patient including throat irritation, aspiration, etc.  Patient understands and wishes to proceed.  Consent was placed on the chart.

## 2019-01-30 NOTE — PLAN OF CARE
Problem: Adult Inpatient Plan of Care  Goal: Plan of Care Review  Outcome: Ongoing (interventions implemented as appropriate)  No plan of care reviewed. No falls/injuries this shift. Cardioversion completed, 200Jx1, pt converted to NSR. Febrile today. BLood sugar monitored. Xray completed. Steroids started. Diet advanced.

## 2019-01-30 NOTE — TRANSFER OF CARE
"Anesthesia Transfer of Care Note    Patient: Barbara Rizo    Procedure(s) Performed: Procedure(s) (LRB):  TRANSESOPHAGEAL ECHOCARDIOGRAM WITH POSSIBLE CARDIOVERSION (TALHA W/ POSS CARDIOVERSION) (N/A)    Patient location: ICU    Anesthesia Type: general    Post pain: adequate analgesia    Post assessment: no apparent anesthetic complications    Post vital signs: stable    Level of consciousness: awake, alert and oriented    Nausea/Vomiting: no nausea/vomiting    Complications: none    Transfer of care protocol was followed      Last vitals:   Visit Vitals  BP (!) 156/69 (BP Location: Left arm)   Pulse 61   Temp 37.1 °C (98.8 °F) (Oral)   Resp (!) 23   Ht 5' 3" (1.6 m)   Wt 91.6 kg (201 lb 15.1 oz)   SpO2 95%   Breastfeeding? No   BMI 35.77 kg/m²     "

## 2019-01-30 NOTE — CARE UPDATE
Discussed with anesthesiology Dr. Carrera.  He is concerned regarding tenuous pulmonary status of the patient.  Patient has refractory AFib failing beta-blocker and amiodarone loading IV.  With high ventricular response she has had increasing pulmonary vascular congestion on top of underlying lung disease precipitating her respiratory decline.  Again patient will benefit from rhythm management at this time which will facilitate diuresis and then subsequent treatment of underlying lung disease.  They will electively intubate the patient for the procedure.

## 2019-01-30 NOTE — PROCEDURES
Transesophageal echocardiogram/DC cardioversion:    Indication:  Refractory AFib with RVR    Procedure detail:  Anesthesia performed elective intubation prior to the procedure.  TALHA probe was passed without any difficulty.  Consent was obtained for the procedure prior to initiation.  Standard images were obtained and a 200 joule shock was delivered converting the patient to normal sinus rhythm.    Recommendation:  -change to oral amiodarone  -add Eliquis for OAC  -outpatient ischemic workup  -okay to telemetry from CV standpoint if stable and patient able to be extubated

## 2019-01-30 NOTE — ASSESSMENT & PLAN NOTE
She was noted to be in RVR in her PCP's clinic today for which she was instructed to present to the ED.  I have reviewed the EKG and it reveals atrial fibrillation with rapid ventricular response with an average heart rate of 146 BPMs, but it went as high as 150 BPM.  He was given a dose of diltiazem without much improvement and subsequently started on an amiodarone infusion.  She has a [id number] score of 5--unclear why she isn't on anticoagulation other than aspirin.  Her last TTE in our system was in Sept 2015 so that will be repeated.  Will consult Cardiology for further recommendations.    Oral amiodarone   eliquis  S/p successful cardioversion 1/30

## 2019-01-31 PROBLEM — I48.91 ATRIAL FIBRILLATION WITH RAPID VENTRICULAR RESPONSE: Status: ACTIVE | Noted: 2019-01-31

## 2019-01-31 PROBLEM — I48.91 ATRIAL FIBRILLATION WITH RAPID VENTRICULAR RESPONSE: Status: RESOLVED | Noted: 2019-01-28 | Resolved: 2019-01-31

## 2019-01-31 PROBLEM — J20.9 ACUTE BRONCHITIS: Status: ACTIVE | Noted: 2019-01-31

## 2019-01-31 PROBLEM — G93.41 METABOLIC ENCEPHALOPATHY: Status: RESOLVED | Noted: 2019-01-29 | Resolved: 2019-01-31

## 2019-01-31 LAB
ANION GAP SERPL CALC-SCNC: 11 MMOL/L
ANION GAP SERPL CALC-SCNC: 11 MMOL/L
BASOPHILS # BLD AUTO: 0.02 K/UL
BASOPHILS NFR BLD: 0.4 %
BNP SERPL-MCNC: 2003 PG/ML
BUN SERPL-MCNC: 31 MG/DL
BUN SERPL-MCNC: 40 MG/DL
CALCIUM SERPL-MCNC: 7.4 MG/DL
CALCIUM SERPL-MCNC: 7.7 MG/DL
CHLORIDE SERPL-SCNC: 97 MMOL/L
CHLORIDE SERPL-SCNC: 99 MMOL/L
CO2 SERPL-SCNC: 25 MMOL/L
CO2 SERPL-SCNC: 25 MMOL/L
CREAT SERPL-MCNC: 2.6 MG/DL
CREAT SERPL-MCNC: 3 MG/DL
DIFFERENTIAL METHOD: ABNORMAL
EOSINOPHIL # BLD AUTO: 0 K/UL
EOSINOPHIL NFR BLD: 0 %
ERYTHROCYTE [DISTWIDTH] IN BLOOD BY AUTOMATED COUNT: 14.1 %
EST. GFR  (AFRICAN AMERICAN): 17 ML/MIN/1.73 M^2
EST. GFR  (AFRICAN AMERICAN): 20 ML/MIN/1.73 M^2
EST. GFR  (NON AFRICAN AMERICAN): 14 ML/MIN/1.73 M^2
EST. GFR  (NON AFRICAN AMERICAN): 17 ML/MIN/1.73 M^2
GLUCOSE SERPL-MCNC: 159 MG/DL
GLUCOSE SERPL-MCNC: 171 MG/DL
HCT VFR BLD AUTO: 42.9 %
HGB BLD-MCNC: 13.6 G/DL
LYMPHOCYTES # BLD AUTO: 0.8 K/UL
LYMPHOCYTES NFR BLD: 14.3 %
MCH RBC QN AUTO: 31.3 PG
MCHC RBC AUTO-ENTMCNC: 31.7 G/DL
MCV RBC AUTO: 99 FL
MONOCYTES # BLD AUTO: 0.1 K/UL
MONOCYTES NFR BLD: 2.6 %
NEUTROPHILS # BLD AUTO: 4.5 K/UL
NEUTROPHILS NFR BLD: 82.7 %
PLATELET # BLD AUTO: 115 K/UL
PMV BLD AUTO: 11.2 FL
POCT GLUCOSE: 155 MG/DL (ref 70–110)
POCT GLUCOSE: 163 MG/DL (ref 70–110)
POCT GLUCOSE: 170 MG/DL (ref 70–110)
POCT GLUCOSE: 203 MG/DL (ref 70–110)
POTASSIUM SERPL-SCNC: 3.8 MMOL/L
POTASSIUM SERPL-SCNC: 4 MMOL/L
RBC # BLD AUTO: 4.35 M/UL
SODIUM SERPL-SCNC: 133 MMOL/L
SODIUM SERPL-SCNC: 135 MMOL/L
WBC # BLD AUTO: 5.44 K/UL

## 2019-01-31 PROCEDURE — 25000003 PHARM REV CODE 250: Performed by: INTERNAL MEDICINE

## 2019-01-31 PROCEDURE — 36415 COLL VENOUS BLD VENIPUNCTURE: CPT

## 2019-01-31 PROCEDURE — 99232 PR SUBSEQUENT HOSPITAL CARE,LEVL II: ICD-10-PCS | Mod: ,,, | Performed by: INTERNAL MEDICINE

## 2019-01-31 PROCEDURE — 25000003 PHARM REV CODE 250: Performed by: HOSPITALIST

## 2019-01-31 PROCEDURE — 63600175 PHARM REV CODE 636 W HCPCS: Performed by: HOSPITALIST

## 2019-01-31 PROCEDURE — 85025 COMPLETE CBC W/AUTO DIFF WBC: CPT

## 2019-01-31 PROCEDURE — 21400001 HC TELEMETRY ROOM

## 2019-01-31 PROCEDURE — 99232 SBSQ HOSP IP/OBS MODERATE 35: CPT | Mod: ,,, | Performed by: INTERNAL MEDICINE

## 2019-01-31 PROCEDURE — 94761 N-INVAS EAR/PLS OXIMETRY MLT: CPT

## 2019-01-31 PROCEDURE — 25000242 PHARM REV CODE 250 ALT 637 W/ HCPCS: Performed by: HOSPITALIST

## 2019-01-31 PROCEDURE — 97162 PT EVAL MOD COMPLEX 30 MIN: CPT

## 2019-01-31 PROCEDURE — 97535 SELF CARE MNGMENT TRAINING: CPT

## 2019-01-31 PROCEDURE — 94640 AIRWAY INHALATION TREATMENT: CPT

## 2019-01-31 PROCEDURE — 83880 ASSAY OF NATRIURETIC PEPTIDE: CPT

## 2019-01-31 PROCEDURE — 80048 BASIC METABOLIC PNL TOTAL CA: CPT | Mod: 91

## 2019-01-31 PROCEDURE — 94799 UNLISTED PULMONARY SVC/PX: CPT

## 2019-01-31 PROCEDURE — 97165 OT EVAL LOW COMPLEX 30 MIN: CPT

## 2019-01-31 RX ORDER — SODIUM CHLORIDE 9 MG/ML
INJECTION, SOLUTION INTRAVENOUS CONTINUOUS
Status: DISCONTINUED | OUTPATIENT
Start: 2019-01-31 | End: 2019-02-03

## 2019-01-31 RX ORDER — AMOXICILLIN AND CLAVULANATE POTASSIUM 500; 125 MG/1; MG/1
1 TABLET, FILM COATED ORAL 2 TIMES DAILY
Status: DISCONTINUED | OUTPATIENT
Start: 2019-01-31 | End: 2019-02-06 | Stop reason: HOSPADM

## 2019-01-31 RX ORDER — PREDNISONE 20 MG/1
40 TABLET ORAL DAILY
Status: DISCONTINUED | OUTPATIENT
Start: 2019-01-31 | End: 2019-02-04

## 2019-01-31 RX ORDER — METOPROLOL TARTRATE 25 MG/1
25 TABLET, FILM COATED ORAL 2 TIMES DAILY
Status: DISCONTINUED | OUTPATIENT
Start: 2019-01-31 | End: 2019-02-06 | Stop reason: HOSPADM

## 2019-01-31 RX ORDER — GUAIFENESIN 600 MG/1
600 TABLET, EXTENDED RELEASE ORAL 2 TIMES DAILY
Status: DISCONTINUED | OUTPATIENT
Start: 2019-01-31 | End: 2019-02-06 | Stop reason: HOSPADM

## 2019-01-31 RX ADMIN — SODIUM CHLORIDE: 0.9 INJECTION, SOLUTION INTRAVENOUS at 11:01

## 2019-01-31 RX ADMIN — SODIUM CHLORIDE 1000 ML: 0.9 INJECTION, SOLUTION INTRAVENOUS at 08:01

## 2019-01-31 RX ADMIN — PREDNISONE 40 MG: 20 TABLET ORAL at 01:01

## 2019-01-31 RX ADMIN — INSULIN ASPART 3 UNITS: 100 INJECTION, SOLUTION INTRAVENOUS; SUBCUTANEOUS at 11:01

## 2019-01-31 RX ADMIN — AMIODARONE HYDROCHLORIDE 200 MG: 200 TABLET ORAL at 10:01

## 2019-01-31 RX ADMIN — APIXABAN 2.5 MG: 2.5 TABLET, FILM COATED ORAL at 10:01

## 2019-01-31 RX ADMIN — APIXABAN 2.5 MG: 2.5 TABLET, FILM COATED ORAL at 08:01

## 2019-01-31 RX ADMIN — ALLOPURINOL 100 MG: 100 TABLET ORAL at 08:01

## 2019-01-31 RX ADMIN — AMOXICILLIN AND CLAVULANATE POTASSIUM 500 MG: 500; 125 TABLET, FILM COATED ORAL at 10:01

## 2019-01-31 RX ADMIN — LEVALBUTEROL HYDROCHLORIDE 0.63 MG: 0.63 SOLUTION RESPIRATORY (INHALATION) at 07:01

## 2019-01-31 RX ADMIN — AMIODARONE HYDROCHLORIDE 200 MG: 200 TABLET ORAL at 08:01

## 2019-01-31 RX ADMIN — ATORVASTATIN CALCIUM 40 MG: 40 TABLET, FILM COATED ORAL at 08:01

## 2019-01-31 RX ADMIN — METOPROLOL TARTRATE 25 MG: 25 TABLET ORAL at 08:01

## 2019-01-31 RX ADMIN — GUAIFENESIN 600 MG: 600 TABLET, EXTENDED RELEASE ORAL at 10:01

## 2019-01-31 RX ADMIN — METOPROLOL TARTRATE 25 MG: 25 TABLET ORAL at 10:01

## 2019-01-31 RX ADMIN — METHYLPREDNISOLONE SODIUM SUCCINATE 80 MG: 125 INJECTION, POWDER, FOR SOLUTION INTRAMUSCULAR; INTRAVENOUS at 06:01

## 2019-01-31 RX ADMIN — SODIUM CHLORIDE: 0.9 INJECTION, SOLUTION INTRAVENOUS at 04:01

## 2019-01-31 RX ADMIN — LEVALBUTEROL HYDROCHLORIDE 0.63 MG: 0.63 SOLUTION RESPIRATORY (INHALATION) at 04:01

## 2019-01-31 RX ADMIN — PIPERACILLIN AND TAZOBACTAM 4.5 G: 4; .5 INJECTION, POWDER, LYOPHILIZED, FOR SOLUTION INTRAVENOUS; PARENTERAL at 06:01

## 2019-01-31 NOTE — PT/OT/SLP EVAL
Physical Therapy Evaluation    Patient Name:  Barbara Rizo   MRN:  [id number]    Recommendations:     Discharge Recommendations:  (home with home health PT and assistance from daughter )   Discharge Equipment Recommendations: none   Barriers to discharge: None    Assessment:     Barbara Rizo is a 78 y.o. female admitted with a medical diagnosis of Atrial fibrillation with rapid ventricular response.  She presents with the following impairments/functional limitations:  weakness, impaired endurance, impaired functional mobilty, gait instability, impaired balance, decreased lower extremity function, impaired cardiopulmonary response to activity.    Rehab Prognosis: Good; patient would benefit from acute skilled PT services to address these deficits and reach maximum level of function.    Recent Surgery: Procedure(s) (LRB):  TRANSESOPHAGEAL ECHOCARDIOGRAM WITH POSSIBLE CARDIOVERSION (TALHA W/ POSS CARDIOVERSION) (N/A) 1 Day Post-Op    Plan:     During this hospitalization, patient to be seen 6 x/week to address the identified rehab impairments via gait training, therapeutic activities, therapeutic exercises and progress toward the following goals:    · Plan of Care Expires:  02/14/19    Subjective     Chief Complaint: Was independent until all of this started on Monday.   Patient/Family Comments/goals: To get back to being independent.   Pain/Comfort:  · Pain Rating 1: 0/10    Living Environment:  Patient has been staying with her daughter since her partner passed away recently. Prior to admission, patients level of function was independent. She was driving and taking care of her partner. Equipment used at home: walker, rolling, bedside commode, cane, straight, shower chair (was not using DME). At her daughter's house there is 1 step to enter, an elevated toilet, and a walk in shower. Upon discharge, patient will have assistance from daughter.    Objective:     Communicated with nurse Schaefer prior to session.   Patient found supine in bed with george catheter, telemetry, peripheral IV, oxygen upon PT entry to room. She had multiple family members including her daughter in the waiting room that observed the patient walking in the hallway.     General Precautions: Standard, fall   Orthopedic Precautions:N/A   Braces: N/A     Exams:  · Cognitive Exam:  Patient is oriented to Person, Place, Time and Situation  · Gross Motor Coordination:  WFL  · Sensation:    · -       Intact  · Skin Integrity/Edema:      · -       Skin integrity: Visible skin intact  · RLE ROM: WFL  · RLE Strength: WFL  · LLE ROM: WFL  · LLE Strength: WFL    Functional Mobility:  · Bed Mobility:     · Rolling Left:  stand by assistance  · Rolling Right: stand by assistance  · Supine to Sit: contact guard assistance  · Transfers:     · Sit to Stand:  contact guard assistance with rolling walker  · Gait:  Patient ambulated 150ft with Rolling Walker and CGA using 3-point gait. Patient demonstrated decreased jocelyn and decreased velocity of limb motion during gait due to impaired balance and decreased endurance. Patient limited in ambulation distance due to decreased endurance. She needed verbal cues for pursed lip breathing.     AM-PAC 6 CLICK MOBILITY  Total Score:21     Patient left up in chair with all lines intact, call button in reach, nurse Olive notified and family present.    GOALS:   Multidisciplinary Problems     Physical Therapy Goals        Problem: Physical Therapy Goal    Goal Priority Disciplines Outcome Goal Variances Interventions   Physical Therapy Goal     PT, PT/OT      Description:  Goals to be met by: 19    Patient will increase functional independence with mobility by performin. Sit to stand transfer with Modified [place]  2. Gait x250 feet with Modified [place] using Rolling Walker if needed  3. Lower extremity exercise program x30 reps per handout, with independence                      History:     Past Medical  History:   Diagnosis Date    A-fib     Diabetes mellitus type II     Fatty liver     GERD (gastroesophageal reflux disease)     Hemochromatosis     Hyperlipidemia     Hypertension     Vaginal delivery     x2    Vitamin D deficiency disease     Wears partial dentures     upper removable bridge       Past Surgical History:   Procedure Laterality Date    ARTHROPLASTY, KNEE, TOTAL Left 5/13/2013    [facility] at [facility] OR    ARTHROPLASTY-KNEE Right 8/3/2015    Performed by [facility] at [facility] OR    BREAST BIOPSY      CHEST DRAINAGE N/A 9/18/2015    Performed by Bonnie Surgeon at [facility] BONNIE    CHOLECYSTECTOMY  08/2015    CHOLECYSTECTOMY-LAPAROSCOPIC N/A 8/26/2015    Performed by Bony Alexander MD at [facility] OR    CYSTOSCOPY - URODYNAMICS FLOW STUDY  (C-ARM) N/A 11/4/2016    Performed by Reina Camp MD at [facility] OR    ESOPHAGOGASTRODUODENOSCOPY (EGD) Left 10/15/2015    Performed by Rolando Robert MD at [facility] ENDO    EYE SURGERY      Cat Ext  OU    HYSTERECTOMY      partial due to uterine fibroids    INCISION AND DRAINAGE (I&D), ABSCESS Right 9/14/2015    Performed by Bonnie Surgeon at [facility] B[facility]NIE    TOTAL KNEE ARTHROPLASTY Right 2015    left knee done 5/2013    TRANSESOPHAGEAL ECHOCARDIOGRAM (TALHA) N/A 9/21/2015    Performed by Amauri Lomas MD at [facility] CATH LAB    TRANSESOPHAGEAL ECHOCARDIOGRAM WITH POSSIBLE CARDIOVERSION (TALHA W/ POSS CARDIOVERSION) N/A 1/30/2019    Performed by Sundeep Monetro MD at [facility] CATH LAB       Clinical Decision Making:     History  Co-morbidities and personal factors that may impact the plan of care Examination  Body Structures and Functions, activity limitations and participation restrictions that may impact the plan of care Clinical Presentation   Decision Making/ Complexity Score   Co-morbidities:   [] Time since onset of injury / illness / exacerbation  [x] Status of current condition  []Patient's cognitive status and  safety concerns    [] Multiple Medical Problems (see med hx)  Personal Factors:   [] Patient's age  [] Prior Level of function   [] Patient's home situation (environment and family support)  [] Patient's level of motivation  [] Expected progression of patient      HISTORY:(criteria)    [] 83019 - no personal factors/history    [x] 38323 - has 1-2 personal factor/comorbidity     [] 28120 - has >3 personal factor/comorbidity     Body Regions:  [] Objective examination findings  [] Head     []  Neck  [] Trunk   [] Upper Extremity  [] Lower Extremity    Body Systems:  [] For communication ability, affect, cognition, language, and learning style: the assessment of the ability to make needs known, consciousness, orientation (person, place, and time), expected emotional /behavioral responses, and learning preferences (eg, learning barriers, education  needs)  [x] For the neuromuscular system: a general assessment of gross coordinated movement (eg, balance, gait, locomotion, transfers, and transitions) and motor function  (motor control and motor learning)  [] For the musculoskeletal system: the assessment of gross symmetry, gross range of motion, gross strength, height, and weight  [] For the integumentary system: the assessment of pliability(texture), presence of scar formation, skin color, and skin integrity  [x] For cardiovascular/pulmonary system: the assessment of heart rate, respiratory rate, blood pressure, and edema     Activity limitations:    [] Patient's cognitive status and saf ety concerns          [] Status of current condition      [] Weight bearing restriction  [x] Cardiopulmunary Restriction    Participation Restrictions:   [] Goals and goal agreement with the patient     [] Rehab potential (prognosis) and probable outcome      Examination of Body System: (criteria)    [] 20284 - addressing 1-2 elements    [x] 98909 - addressing a total of 3 or more elements     [] 49609 -  Addressing a total of 4 or more  elements         Clinical Presentation: (criteria)  Evolving - 98769     On examination of body system using standardized tests and measures patient presents with 3 or more elements from any of the following: body structures and functions, activity limitations, and/or participation restrictions.  Leading to a clinical presentation that is considered evolving with changing characteristics                              Clinical Decision Making  (Eval Complexity):  Moderate - 80692     Time Tracking:     PT Received On: 01/31/19  PT Start Time: 1615     PT Stop Time: 1642  PT Total Time (min): 27 min     Billable Minutes: Evaluation  15 with OT eval and treat      Karla Guadalupe, PT  01/31/2019

## 2019-01-31 NOTE — NURSING
2100: Held metoprolol d/t low HR and BP.   0030: Pt with continued decreased UO approx 10cc/hr. Bladder scan showed 15cc. Rolle irrigated with 60cc sterile water. Returned 75cc. MD notified of low UO and borderline low BP.   0130: MD updated NS orders to 125cc/hr for 25 hrs. Change made. Will continue to monitor.

## 2019-01-31 NOTE — SUBJECTIVE & OBJECTIVE
Interval History: pt oriented and calm, cooperative    Review of Systems   HENT: Positive for congestion.    Eyes: Negative.    Respiratory: Positive for cough. Negative for shortness of breath.    Cardiovascular: Negative.    Gastrointestinal: Negative.    Genitourinary: Negative.    Musculoskeletal: Positive for arthralgias.   Neurological: Positive for weakness.     Objective:     Vital Signs (Most Recent):  Temp: 98 °F (36.7 °C) (01/31/19 1145)  Pulse: 65 (01/31/19 1307)  Resp: (!) 21 (01/31/19 1307)  BP: 110/61 (01/31/19 1302)  SpO2: (!) 92 % (01/31/19 1307) Vital Signs (24h Range):  Temp:  [97.7 °F (36.5 °C)-98.8 °F (37.1 °C)] 98 °F (36.7 °C)  Pulse:  [54-82] 65  Resp:  [16-33] 21  SpO2:  [86 %-100 %] 92 %  BP: ()/(48-73) 110/61     Weight: 93.2 kg (205 lb 7.5 oz)  Body mass index is 36.4 kg/m².    Intake/Output Summary (Last 24 hours) at 1/31/2019 1317  Last data filed at 1/31/2019 1200  Gross per 24 hour   Intake 3632.56 ml   Output 250 ml   Net 3382.56 ml      Physical Exam   Constitutional: She is oriented to person, place, and time. She appears well-developed. No distress.   HENT:   Head: Normocephalic and atraumatic.   Mouth/Throat: Oropharynx is clear and moist.   Neck: Normal range of motion. Neck supple. No JVD present.   Cardiovascular: Normal rate, regular rhythm and intact distal pulses.   Pulmonary/Chest: She has no wheezes.   Bilateral rhonchi    Abdominal: Soft. Bowel sounds are normal. She exhibits no distension.   Musculoskeletal: Normal range of motion. She exhibits no edema.   Neurological: She is alert and oriented to person, place, and time.   Skin: Skin is warm and dry. Capillary refill takes 2 to 3 seconds. No erythema.   Psychiatric: She has a normal mood and affect. Her behavior is normal.       Significant Labs:   Blood Culture: No results for input(s): LABBLOO in the last 48 hours.  BMP:   Recent Labs   Lab 01/31/19  0217   *   *   K 4.0   CL 99   CO2 25   BUN 31*    CREATININE 2.6*   CALCIUM 7.7*     CBC:   Recent Labs   Lab 01/31/19  0217   WBC 5.44   HGB 13.6   HCT 42.9   *     POCT Glucose:   Recent Labs   Lab 01/30/19  1629 01/30/19  2230 01/31/19  0638   POCTGLUCOSE 203* 227* 155*     Urine Culture:   Recent Labs   Lab 01/30/19  1356   LABURIN No growth to date       Significant Imaging: I have reviewed all pertinent imaging results/findings within the past 24 hours.

## 2019-01-31 NOTE — PROGRESS NOTES
1800 Pt with decreased urine output 10 ml/hr last 3 hours. sbp 90's. Bladder scan 13 ml. Discussed plan of care with MD Richards new orders received. Lasix due, md informed rn to not give this dose of lasix due to decreased bp. Parameters placed on bp medications.

## 2019-01-31 NOTE — NURSING TRANSFER
Nursing Transfer Note      1/31/2019 1420    Transfer To: [id number]    Transfer via bed    Transfer with cardiac monitoring    Transported by rn and transport    Medicines sent: novolog and amoxcicillan    Chart send with patient: Yes    Notified: daughter    Patient reassessed at: upon arrival  Upon arrival to floor: cardiac monitor applied, patient oriented to room, call bell in reach and bed in lowest position

## 2019-01-31 NOTE — PROGRESS NOTES
[facility] Hospital Medicine  Progress Note    Patient Name: Barbara Rizo  MRN: [id number]  Patient Class: IP- Inpatient   Admission Date: 1/28/2019  Length of Stay: 3 days  Attending Physician: Emma Richards MD  Primary Care Provider: Paras Oro MD        Subjective:     Principal Problem:Atrial fibrillation with rapid ventricular response    HPI:  Ms. Barbara Rizo is a 78 y.o. female with essential hypertension, hyperlipidemia (LDL 62.4 Jul 2018), type 2 diabetes mellitus (HbA1c 5.8% Jul 2018), CKD Stage 3, paroxysmal atrial fibrillation ([id number] score 5) not on chronic anticoagulation, obesity (BMI 36.0), MGUS, and chronic gout who presents to [facility] ED with complaints of coughing for three days.  She started to have a non-productive cough, wheezing, and mild dyspnea three days ago which has steadily been worsening since onset.  She also complains of a subjective fever but otherwise denies any nausea, vomiting, chest pain, palpitations, pleurisy, nor any diaphoresis.  She denies any recent travel, hemoptysis, nor any lower extremity pain or swelling.  She does say that she's under a lot of stress recently with her ex- dying yesterday at [facility] due to complications from a heart valve replacement two weeks ago.  She does say that she may have had sick contacts while visiting her ex-.  Her appetite has been poor but she denies any weight loss.    While at her ENT's appointment today she decided to schedule an appointment with her PCP to evaluate her upper airway complaints.  She was unable to see her regular PCP, Dr. Paras Oro, but was able to be seen by another physician who became concerned when she was noted to be in AFib with RVR on EKG.  He recommended EMS transportation to the ED but she refused and decided to drive herself.  Her cardiologist is Dr. Amauri Lomas.    Hospital Course:  Pt admitted to ICU with afib rvr on amiodarone  infusion. Pt with elevated HR today and after TALHA done patient became very agitated and anxious. Ativan 1mg IV given and symptoms got worse. HR up into the 170s and O2 sats dropped to 77%. Placed on NRB. Improved O2 sats. 5mg IV haldol given as patient was trying to get out of bed and pulling oxygen mask off. Cardizem 10mg Iv given with improved HR to 120s-130s. xopenex scheduled Q 8 hours. Changed to zosyn with temp 102F.     1/30- successful cardioversion, post procedure confused and pulling oxygen off, desaturation, constant re-direction, monitor in ICU overnight. Changed to oral amiodarone. eliquis for anticoagulation. Holding parameters on BP meds. IV steroids, nebs and antibiotic for probable lower resp infection. IV lasix as Bp tolerates.   1/31- HR remain under control NSR 60-70s.On amiodarone, metoprolol and eliquis.  Resp status improved and weaning oxygen. Steroids changed to oral route. DC zosyn and switch to augmentin. Add acapella to nebs. Cr increased from baseline (1.5-1.7). Gentle IVF and monitor with repeat BMP later this evening. PT?OT consulted for dc planning. Weaning oxygen.     Interval History: pt oriented and calm, cooperative    Review of Systems   HENT: Positive for congestion.    Eyes: Negative.    Respiratory: Positive for cough. Negative for shortness of breath.    Cardiovascular: Negative.    Gastrointestinal: Negative.    Genitourinary: Negative.    Musculoskeletal: Positive for arthralgias.   Neurological: Positive for weakness.     Objective:     Vital Signs (Most Recent):  Temp: 98 °F (36.7 °C) (01/31/19 1145)  Pulse: 65 (01/31/19 1307)  Resp: (!) 21 (01/31/19 1307)  BP: 110/61 (01/31/19 1302)  SpO2: (!) 92 % (01/31/19 1307) Vital Signs (24h Range):  Temp:  [97.7 °F (36.5 °C)-98.8 °F (37.1 °C)] 98 °F (36.7 °C)  Pulse:  [54-82] 65  Resp:  [16-33] 21  SpO2:  [86 %-100 %] 92 %  BP: ()/(48-73) 110/61     Weight: 93.2 kg (205 lb 7.5 oz)  Body mass index is 36.4  kg/m².    Intake/Output Summary (Last 24 hours) at 1/31/2019 1317  Last data filed at 1/31/2019 1200  Gross per 24 hour   Intake 3632.56 ml   Output 250 ml   Net 3382.56 ml      Physical Exam   Constitutional: She is oriented to person, place, and time. She appears well-developed. No distress.   HENT:   Head: Normocephalic and atraumatic.   Mouth/Throat: Oropharynx is clear and moist.   Neck: Normal range of motion. Neck supple. No JVD present.   Cardiovascular: Normal rate, regular rhythm and intact distal pulses.   Pulmonary/Chest: She has no wheezes.   Bilateral rhonchi    Abdominal: Soft. Bowel sounds are normal. She exhibits no distension.   Musculoskeletal: Normal range of motion. She exhibits no edema.   Neurological: She is alert and oriented to person, place, and time.   Skin: Skin is warm and dry. Capillary refill takes 2 to 3 seconds. No erythema.   Psychiatric: She has a normal mood and affect. Her behavior is normal.       Significant Labs:   Blood Culture: No results for input(s): LABBLOO in the last 48 hours.  BMP:   Recent Labs   Lab 01/31/19 0217   *   *   K 4.0   CL 99   CO2 25   BUN 31*   CREATININE 2.6*   CALCIUM 7.7*     CBC:   Recent Labs   Lab 01/31/19 0217   WBC 5.44   HGB 13.6   HCT 42.9   *     POCT Glucose:   Recent Labs   Lab 01/30/19  1629 01/30/19  2230 01/31/19  0638   POCTGLUCOSE 203* 227* 155*     Urine Culture:   Recent Labs   Lab 01/30/19  1356   LABURIN No growth to date       Significant Imaging: I have reviewed all pertinent imaging results/findings within the past 24 hours.    Assessment/Plan:      Acute bronchitis    Nebs  acapella  Wean oxygen as tolerated  Mucolytic  augmentin   Prednisone taper        Atrial fibrillation with rapid ventricular response    Successful cardioversion 1/30  Amiodarone, metoprolol and eliquis  Follow up cardiology        Chronic gout    Stable; will continue her home regimen of allopurinol.     Obesity, Class II, BMI 35-39.9     The patient has been counseled on the negative impact that obesity imparts on her health and was encouraged to make lifestyle changes in order to lose weight and decrease her modifiable risk factors.     CKD Stage 3    FELICIA on CKD secondary to intravascular hypovolemia  Gentle IVF   BMP later today and in am  Avoid nephrotoxins     Paroxysmal atrial fibrillation    As addressed above.     MGUS (monoclonal gammopathy of unknown significance)    Stable; will encourage continued follow-up with her hematologist.     Essential hypertension    Patient's blood pressure is well-controlled; will continue home regimen of amlodipine, furosemide, lisinopril, and metoprolol, and provide as-needed clonidine.     Hyperlipidemia    Well-controlled; will continue patient's home regimen of atorvastatin.     Type 2 diabetes mellitus, controlled, with renal complications    Her diabetes is well-controlled and she is currently not on home medications.  Will provide insulin sliding scale.       VTE Risk Mitigation (From admission, onward)        Ordered     apixaban tablet 2.5 mg  2 times daily      01/30/19 1542     IP VTE HIGH RISK PATIENT  Once      01/28/19 1801          Critical care time spent on the evaluation and treatment of severe organ dysfunction, review of pertinent labs and imaging studies, discussions with consulting providers and discussions with patient/family: 40 minutes.    Emma Richards MD  Department of Hospital Medicine   [facility]

## 2019-01-31 NOTE — ASSESSMENT & PLAN NOTE
FELICIA on CKD secondary to intravascular hypovolemia  Gentle IVF   BMP later today and in am  Avoid nephrotoxins

## 2019-01-31 NOTE — SUBJECTIVE & OBJECTIVE
Interval History:  Resting comfortably this a.m..  Discussed with nursing regarding increase in serum creatinine and patient not eating or drinking with recent death of partner of 30 years.    Telemetry:  Normal sinus rhythm    Review of Systems   All other systems reviewed and are negative.    Objective:     Vital Signs (Most Recent):  Temp: 97.7 °F (36.5 °C) (01/31/19 0301)  Pulse: 66 (01/31/19 0746)  Resp: (!) 22 (01/31/19 0746)  BP: 109/65 (01/31/19 0700)  SpO2: 100 % (01/31/19 0746) Vital Signs (24h Range):  Temp:  [97.7 °F (36.5 °C)-100.5 °F (38.1 °C)] 97.7 °F (36.5 °C)  Pulse:  [] 66  Resp:  [16-33] 22  SpO2:  [86 %-100 %] 100 %  BP: ()/() 109/65     Weight: 93.2 kg (205 lb 7.5 oz)  Body mass index is 36.4 kg/m².     SpO2: 100 %  O2 Device (Oxygen Therapy): nasal cannula      Intake/Output Summary (Last 24 hours) at 1/31/2019 0817  Last data filed at 1/31/2019 0700  Gross per 24 hour   Intake 1869.53 ml   Output 415 ml   Net 1454.53 ml       Lines/Drains/Airways     Drain                 Urethral Catheter 01/29/19 0301 16 Fr. 2 days          Peripheral Intravenous Line                 Peripheral IV - Single Lumen 01/29/19 0920 Right Wrist 1 day         Peripheral IV - Single Lumen 01/29/19 0930 Anterior;Right Upper Arm 1 day                Physical Exam   Constitutional: She is oriented to person, place, and time. No distress.   Cardiovascular: Normal rate and regular rhythm.   Pulmonary/Chest: Effort normal and breath sounds normal.   Musculoskeletal: She exhibits no edema.   Neurological: She is alert and oriented to person, place, and time.       Significant Labs:   BMP:   Recent Labs   Lab 01/31/19 0217   *   *   K 4.0   CL 99   CO2 25   BUN 31*   CREATININE 2.6*   CALCIUM 7.7*    and CBC   Recent Labs   Lab 01/31/19 0217   WBC 5.44   HGB 13.6   HCT 42.9   *       Significant Imaging: Echocardiogram:   Transthoracic echo (TTE) complete (Cupid Only):   Results for  orders placed or performed during the hospital encounter of 01/28/19   Transthoracic echo (TTE) complete (Cupid Only)   Result Value Ref Range    BSA 2.01 m2    LA WIDTH 4.31 cm    AORTIC VALVE CUSP SEPERATION 1.37 cm    PV PEAK VELOCITY 1.11 cm/s    LVIDD 4.49 3.5 - 6.0 cm    IVS 1.30 (A) 0.6 - 1.1 cm    PW 0.94 0.6 - 1.1 cm    Ao root annulus 2.54 cm    LVIDS 3.14 2.1 - 4.0 cm    FS 30 28 - 44 %    LA volume 137.22 cm3    Sinus 2.69 cm    STJ 1.94 cm    Ascending aorta 2.16 cm    LV mass 178.90 g    LA size 5.57 cm    RVDD 4.72 cm    TAPSE 1.64 cm    RV S' 10.17 m/s    Left Ventricle Relative Wall Thickness 0.42 cm    AV mean gradient 15.54 mmHg    AV valve area 1.30 cm2    AV Velocity Ratio 0.54     AV index (prosthetic) 0.42     IVRT 0.03 msec    LVOT diameter 2.00 cm    LVOT area 3.14 cm2    LVOT peak wes 9.7488122085 m/s    LVOT peak VTI 16.86 cm    Ao peak wes 2.36 m/s    Ao VTI 40.57 cm    LVOT stroke volume 52.94 cm3    AV peak gradient 22.28 mmHg    TR Max Wes 3.32 m/s    LV Systolic Volume 39.10 mL    LV Systolic Volume Index 20.2 mL/m2    LV Diastolic Volume 91.93 mL    LV Diastolic Volume Index 47.44 mL/m2    LA Volume Index 70.8 mL/m2    LV Mass Index 92.3 g/m2    RA Major Axis 5.84 cm    Left Atrium Minor Axis 6.78 cm    Left Atrium Major Axis 6.67 cm    Triscuspid Valve Regurgitation Peak Gradient 44.09 mmHg    RA Width 4.40 cm    Right Atrial Pressure (from IVC) 8 mmHg    TV rest pulmonary artery pressure 52 mmHg

## 2019-01-31 NOTE — PLAN OF CARE
Problem: Physical Therapy Goal  Goal: Physical Therapy Goal  Goals to be met by: 19    Patient will increase functional independence with mobility by performin. Sit to stand transfer with Modified [place]  2. Gait x250 feet with Modified [place] using Rolling Walker if needed  3. Lower extremity exercise program x30 reps per handout, with independence      Patient ambulated 150ft with RW and CGA. She would benefit from HH PT at discharge.

## 2019-01-31 NOTE — ASSESSMENT & PLAN NOTE
Status post TALHA/DC cardioversion to normal sinus rhythm  EF post cardioversion appears low normal  Stable on p.o. metoprolol and amiodarone --> decreased dosing with bradycardia and marginal SBP  Added Eliquis for OAC

## 2019-01-31 NOTE — PLAN OF CARE
Problem: Adult Inpatient Plan of Care  Goal: Plan of Care Review  Pt able to transfer to tele. No falls/injuries this shift. Skin intact. Monitoring blood sugar. Encouraging po intake. Pt remains in NSR. IVF continued. Rolle remains intact, pt with low urine output, monitoring closely.

## 2019-01-31 NOTE — PROGRESS NOTES
[facility]  Cardiology  Progress Note    Patient Name: Barbara Rizo  MRN: [id number]  Admission Date: 1/28/2019  Hospital Length of Stay: 3 days  Code Status: Full Code   Attending Physician: Emma Richards MD   Primary Care Physician: Paras Oro MD  Expected Discharge Date:   Principal Problem:Atrial fibrillation with rapid ventricular response    Subjective:     Hospital Course:   1-29:  Admitted with AFib with RVR  1-30:  Underwent TALHA/DC cardioversion successfully    Interval History:  Resting comfortably this a.m..  Discussed with nursing regarding increase in serum creatinine and patient not eating or drinking with recent death of partner of 30 years.    Telemetry:  Normal sinus rhythm    Review of Systems   All other systems reviewed and are negative.    Objective:     Vital Signs (Most Recent):  Temp: 97.7 °F (36.5 °C) (01/31/19 0301)  Pulse: 66 (01/31/19 0746)  Resp: (!) 22 (01/31/19 0746)  BP: 109/65 (01/31/19 0700)  SpO2: 100 % (01/31/19 0746) Vital Signs (24h Range):  Temp:  [97.7 °F (36.5 °C)-100.5 °F (38.1 °C)] 97.7 °F (36.5 °C)  Pulse:  [] 66  Resp:  [16-33] 22  SpO2:  [86 %-100 %] 100 %  BP: ()/() 109/65     Weight: 93.2 kg (205 lb 7.5 oz)  Body mass index is 36.4 kg/m².     SpO2: 100 %  O2 Device (Oxygen Therapy): nasal cannula      Intake/Output Summary (Last 24 hours) at 1/31/2019 0817  Last data filed at 1/31/2019 0700  Gross per 24 hour   Intake 1869.53 ml   Output 415 ml   Net 1454.53 ml       Lines/Drains/Airways     Drain                 Urethral Catheter 01/29/19 0301 16 Fr. 2 days          Peripheral Intravenous Line                 Peripheral IV - Single Lumen 01/29/19 0920 Right Wrist 1 day         Peripheral IV - Single Lumen 01/29/19 0930 Anterior;Right Upper Arm 1 day                Physical Exam   Constitutional: She is oriented to person, place, and time. No distress.   Cardiovascular: Normal rate and regular rhythm.   Pulmonary/Chest:  Effort normal and breath sounds normal.   Musculoskeletal: She exhibits no edema.   Neurological: She is alert and oriented to person, place, and time.       Significant Labs:   BMP:   Recent Labs   Lab 01/31/19 0217   *   *   K 4.0   CL 99   CO2 25   BUN 31*   CREATININE 2.6*   CALCIUM 7.7*    and CBC   Recent Labs   Lab 01/31/19 0217   WBC 5.44   HGB 13.6   HCT 42.9   *       Significant Imaging: Echocardiogram:   Transthoracic echo (TTE) complete (Cupid Only):   Results for orders placed or performed during the hospital encounter of 01/28/19   Transthoracic echo (TTE) complete (Cupid Only)   Result Value Ref Range    BSA 2.01 m2    LA WIDTH 4.31 cm    AORTIC VALVE CUSP SEPERATION 1.37 cm    PV PEAK VELOCITY 1.11 cm/s    LVIDD 4.49 3.5 - 6.0 cm    IVS 1.30 (A) 0.6 - 1.1 cm    PW 0.94 0.6 - 1.1 cm    Ao root annulus 2.54 cm    LVIDS 3.14 2.1 - 4.0 cm    FS 30 28 - 44 %    LA volume 137.22 cm3    Sinus 2.69 cm    STJ 1.94 cm    Ascending aorta 2.16 cm    LV mass 178.90 g    LA size 5.57 cm    RVDD 4.72 cm    TAPSE 1.64 cm    RV S' 10.17 m/s    Left Ventricle Relative Wall Thickness 0.42 cm    AV mean gradient 15.54 mmHg    AV valve area 1.30 cm2    AV Velocity Ratio 0.54     AV index (prosthetic) 0.42     IVRT 0.03 msec    LVOT diameter 2.00 cm    LVOT area 3.14 cm2    LVOT peak wes 7.5420440120 m/s    LVOT peak VTI 16.86 cm    Ao peak wes 2.36 m/s    Ao VTI 40.57 cm    LVOT stroke volume 52.94 cm3    AV peak gradient 22.28 mmHg    TR Max Wes 3.32 m/s    LV Systolic Volume 39.10 mL    LV Systolic Volume Index 20.2 mL/m2    LV Diastolic Volume 91.93 mL    LV Diastolic Volume Index 47.44 mL/m2    LA Volume Index 70.8 mL/m2    LV Mass Index 92.3 g/m2    RA Major Axis 5.84 cm    Left Atrium Minor Axis 6.78 cm    Left Atrium Major Axis 6.67 cm    Triscuspid Valve Regurgitation Peak Gradient 44.09 mmHg    RA Width 4.40 cm    Right Atrial Pressure (from IVC) 8 mmHg    TV rest pulmonary artery pressure  52 mmHg     Assessment and Plan:     Brief HPI:     * Atrial fibrillation with rapid ventricular response    Status post TALHA/DC cardioversion to normal sinus rhythm  EF post cardioversion appears low normal  Stable on p.o. metoprolol and amiodarone --> decreased dosing with bradycardia and marginal SBP  Added Eliquis for OAC       Essential hypertension    Currently holding ACE-inhibitor and amlodipine  Add back if needed       Type 2 diabetes mellitus, controlled, with renal complications    Per IM     Hyperlipidemia    On statin     MGUS (monoclonal gammopathy of unknown significance)           CKD Stage 3    Acute on chronic failure this a.m.  Bolus 1 L and continue IVF with normal saline         VTE Risk Mitigation (From admission, onward)        Ordered     apixaban tablet 2.5 mg  2 times daily      01/30/19 1542     IP VTE HIGH RISK PATIENT  Once      01/28/19 1801          Sundeep Montero MD  Cardiology  [facility]

## 2019-01-31 NOTE — NURSING
Note that patient transferred from room 339 B to room 326 as patient and family reported requesting a private room prior to leaving ICU;    Family happy with new room;    Will continue to f/u;

## 2019-01-31 NOTE — PLAN OF CARE
Problem: Adult Inpatient Plan of Care  Goal: Plan of Care Review  Outcome: Ongoing (interventions implemented as appropriate)  Pt AAOx4, 5 L NC, NSR/SB on cardiac monitor. BP running low but MAP >65. Low UO this shift despite increase of fluids to 125 cc/hr. MD aware. No BM. No falls, injury, or skin breakdown this shift. Plan of care reviewed.

## 2019-02-01 LAB
ANION GAP SERPL CALC-SCNC: 13 MMOL/L
BACTERIA UR CULT: NO GROWTH
BUN SERPL-MCNC: 46 MG/DL
CALCIUM SERPL-MCNC: 7.2 MG/DL
CHLORIDE SERPL-SCNC: 95 MMOL/L
CO2 SERPL-SCNC: 20 MMOL/L
CREAT SERPL-MCNC: 3.5 MG/DL
CREAT UR-MCNC: 66.6 MG/DL
CREAT UR-MCNC: 66.6 MG/DL
EOSINOPHIL URNS QL WRIGHT STN: NORMAL
EST. GFR  (AFRICAN AMERICAN): 14 ML/MIN/1.73 M^2
EST. GFR  (NON AFRICAN AMERICAN): 12 ML/MIN/1.73 M^2
GLUCOSE SERPL-MCNC: 150 MG/DL
POCT GLUCOSE: 140 MG/DL (ref 70–110)
POCT GLUCOSE: 145 MG/DL (ref 70–110)
POCT GLUCOSE: 221 MG/DL (ref 70–110)
POCT GLUCOSE: 236 MG/DL (ref 70–110)
POCT GLUCOSE: 256 MG/DL (ref 70–110)
POTASSIUM SERPL-SCNC: 3.6 MMOL/L
PROT UR-MCNC: 39 MG/DL
PROT/CREAT UR: 0.59 MG/G{CREAT}
SODIUM SERPL-SCNC: 128 MMOL/L
SODIUM UR-SCNC: 20 MMOL/L

## 2019-02-01 PROCEDURE — 63600175 PHARM REV CODE 636 W HCPCS: Performed by: HOSPITALIST

## 2019-02-01 PROCEDURE — 25000242 PHARM REV CODE 250 ALT 637 W/ HCPCS: Performed by: HOSPITALIST

## 2019-02-01 PROCEDURE — 25000003 PHARM REV CODE 250: Performed by: INTERNAL MEDICINE

## 2019-02-01 PROCEDURE — 82570 ASSAY OF URINE CREATININE: CPT

## 2019-02-01 PROCEDURE — 99233 PR SUBSEQUENT HOSPITAL CARE,LEVL III: ICD-10-PCS | Mod: ,,, | Performed by: INTERNAL MEDICINE

## 2019-02-01 PROCEDURE — 99233 SBSQ HOSP IP/OBS HIGH 50: CPT | Mod: ,,, | Performed by: INTERNAL MEDICINE

## 2019-02-01 PROCEDURE — 84300 ASSAY OF URINE SODIUM: CPT

## 2019-02-01 PROCEDURE — 87205 SMEAR GRAM STAIN: CPT

## 2019-02-01 PROCEDURE — 20000000 HC ICU ROOM

## 2019-02-01 PROCEDURE — 94761 N-INVAS EAR/PLS OXIMETRY MLT: CPT

## 2019-02-01 PROCEDURE — 94799 UNLISTED PULMONARY SVC/PX: CPT

## 2019-02-01 PROCEDURE — 36415 COLL VENOUS BLD VENIPUNCTURE: CPT

## 2019-02-01 PROCEDURE — 80048 BASIC METABOLIC PNL TOTAL CA: CPT

## 2019-02-01 PROCEDURE — 63600175 PHARM REV CODE 636 W HCPCS: Performed by: INTERNAL MEDICINE

## 2019-02-01 PROCEDURE — 94640 AIRWAY INHALATION TREATMENT: CPT

## 2019-02-01 PROCEDURE — 25000003 PHARM REV CODE 250: Performed by: HOSPITALIST

## 2019-02-01 PROCEDURE — 99900035 HC TECH TIME PER 15 MIN (STAT)

## 2019-02-01 PROCEDURE — 27000221 HC OXYGEN, UP TO 24 HOURS

## 2019-02-01 RX ORDER — LOPERAMIDE HYDROCHLORIDE 2 MG/1
2 CAPSULE ORAL 4 TIMES DAILY PRN
Status: DISCONTINUED | OUTPATIENT
Start: 2019-02-01 | End: 2019-02-06 | Stop reason: HOSPADM

## 2019-02-01 RX ADMIN — ALLOPURINOL 100 MG: 100 TABLET ORAL at 09:02

## 2019-02-01 RX ADMIN — METOPROLOL TARTRATE 25 MG: 25 TABLET ORAL at 09:02

## 2019-02-01 RX ADMIN — PREDNISONE 40 MG: 20 TABLET ORAL at 09:02

## 2019-02-01 RX ADMIN — AMIODARONE HYDROCHLORIDE 1 MG/MIN: 1.8 INJECTION, SOLUTION INTRAVENOUS at 12:02

## 2019-02-01 RX ADMIN — AMIODARONE HYDROCHLORIDE 200 MG: 200 TABLET ORAL at 09:02

## 2019-02-01 RX ADMIN — BENZONATATE 100 MG: 100 CAPSULE ORAL at 05:02

## 2019-02-01 RX ADMIN — AMOXICILLIN AND CLAVULANATE POTASSIUM 500 MG: 500; 125 TABLET, FILM COATED ORAL at 09:02

## 2019-02-01 RX ADMIN — LEVALBUTEROL HYDROCHLORIDE 0.63 MG: 0.63 SOLUTION RESPIRATORY (INHALATION) at 07:02

## 2019-02-01 RX ADMIN — APIXABAN 2.5 MG: 2.5 TABLET, FILM COATED ORAL at 09:02

## 2019-02-01 RX ADMIN — LOPERAMIDE HYDROCHLORIDE 2 MG: 2 CAPSULE ORAL at 11:02

## 2019-02-01 RX ADMIN — LEVALBUTEROL HYDROCHLORIDE 0.63 MG: 0.63 SOLUTION RESPIRATORY (INHALATION) at 11:02

## 2019-02-01 RX ADMIN — GUAIFENESIN 600 MG: 600 TABLET, EXTENDED RELEASE ORAL at 09:02

## 2019-02-01 RX ADMIN — ATORVASTATIN CALCIUM 40 MG: 40 TABLET, FILM COATED ORAL at 09:02

## 2019-02-01 RX ADMIN — INSULIN ASPART 2 UNITS: 100 INJECTION, SOLUTION INTRAVENOUS; SUBCUTANEOUS at 05:02

## 2019-02-01 RX ADMIN — INSULIN ASPART 2 UNITS: 100 INJECTION, SOLUTION INTRAVENOUS; SUBCUTANEOUS at 09:02

## 2019-02-01 RX ADMIN — LEVALBUTEROL HYDROCHLORIDE 0.63 MG: 0.63 SOLUTION RESPIRATORY (INHALATION) at 03:02

## 2019-02-01 RX ADMIN — AMIODARONE HYDROCHLORIDE 150 MG: 1.5 INJECTION, SOLUTION INTRAVENOUS at 12:02

## 2019-02-01 NOTE — PT/OT/SLP DISCHARGE
Physical Therapy Discharge Summary    Name: Barbara Rizo  MRN: [id number]   Principal Problem: Atrial fibrillation with rapid ventricular response     Patient Discharged from acute Physical Therapy on 19.  Please refer to prior PT noted date on 19 for functional status.     Assessment:     Patient transferred to ICU secondary to A fib with RVR and will be started on IV amiodarone drip per Dr Lomas note.      Objective:     GOALS:   Multidisciplinary Problems     Physical Therapy Goals        Problem: Physical Therapy Goal    Goal Priority Disciplines Outcome Goal Variances Interventions   Physical Therapy Goal     PT, PT/OT      Description:  Goals to be met by: 19    Patient will increase functional independence with mobility by performin. Sit to stand transfer with Modified [place]  2. Gait x250 feet with Modified [place] using Rolling Walker if needed  3. Lower extremity exercise program x30 reps per handout, with independence                      Reasons for Discontinuation of Therapy Services  Patient is unable to continue work toward goals because of medical complications.      Plan:     Patient Discharged to: ICU. Please re-consult PT when patient is medically able to participate in PT again.     Karla Guadalupe, PT  2019

## 2019-02-01 NOTE — SUBJECTIVE & OBJECTIVE
Interval History:     Review of Systems   Constitution: Negative for decreased appetite.   HENT: Negative for ear discharge.    Eyes: Negative for blurred vision.   Respiratory: Negative for hemoptysis.    Endocrine: Negative for polyphagia.   Hematologic/Lymphatic: Negative for adenopathy.   Skin: Negative for color change.   Musculoskeletal: Negative for joint swelling.   Neurological: Negative for brief paralysis.   Psychiatric/Behavioral: Negative for hallucinations.     Objective:     Vital Signs (Most Recent):  Temp: 97.6 °F (36.4 °C) (02/01/19 0731)  Pulse: 66 (02/01/19 0738)  Resp: 18 (02/01/19 0738)  BP: 113/61 (02/01/19 0731)  SpO2: (!) 93 % (02/01/19 0738) Vital Signs (24h Range):  Temp:  [97.6 °F (36.4 °C)-98 °F (36.7 °C)] 97.6 °F (36.4 °C)  Pulse:  [60-74] 66  Resp:  [17-26] 18  SpO2:  [92 %-95 %] 93 %  BP: (101-139)/(58-88) 113/61     Weight: 93.2 kg (205 lb 7.5 oz)  Body mass index is 36.4 kg/m².     SpO2: (!) 93 %  O2 Device (Oxygen Therapy): nasal cannula      Intake/Output Summary (Last 24 hours) at 2/1/2019 1056  Last data filed at 2/1/2019 0600  Gross per 24 hour   Intake 400 ml   Output 1045 ml   Net -645 ml       Lines/Drains/Airways     Drain                 Urethral Catheter 01/29/19 0301 16 Fr. 3 days          Peripheral Intravenous Line                 Peripheral IV - Single Lumen 01/29/19 0920 Right Wrist 3 days         Peripheral IV - Single Lumen 01/29/19 0930 Anterior;Right Upper Arm 3 days                Physical Exam   Constitutional: She is oriented to person, place, and time. She appears well-developed and well-nourished.   HENT:   Head: Normocephalic and atraumatic.   Eyes: Conjunctivae and EOM are normal. Pupils are equal, round, and reactive to light.   Neck: Normal range of motion. Neck supple. No thyromegaly present.   Cardiovascular: An irregularly irregular rhythm present. Tachycardia present.   No murmur heard.  Pulmonary/Chest: Effort normal and breath sounds normal. No  respiratory distress.   Abdominal: Soft. Bowel sounds are normal.   Musculoskeletal: She exhibits no edema.   Neurological: She is alert and oriented to person, place, and time.   Skin: Skin is warm and dry.   Psychiatric: She has a normal mood and affect. Her behavior is normal.       Significant Labs: All pertinent lab results from the last 24 hours have been reviewed.    Significant Imaging: Echocardiogram:   2D echo with color flow doppler:   Results for orders placed or performed during the hospital encounter of 08/21/15   2D echo with color flow doppler   Result Value Ref Range    QEF 75 55 - 65    Mitral Valve Regurgitation MILD     Est. PA Systolic Pressure 40.21 (A)     Tricuspid Valve Regurgitation MODERATE (A)

## 2019-02-01 NOTE — CONSULTS
Dictation #1  MRN:[id number]  CSN:[id number]    Consult dictated # [id number]    Reduce IVF rate to 75 cc/hr  Avoid nephrotoxin  CMP in am  We'll follow  Thanks

## 2019-02-01 NOTE — ASSESSMENT & PLAN NOTE
The patient has been counseled on the negative impact that obesity imparts on her health and was encouraged to make lifestyle changes in order to lose weight and decrease her modifiable risk factors. Body mass index is 36.4 kg/m².

## 2019-02-01 NOTE — PT/OT/SLP PROGRESS
Occupational Therapy      Patient Name:  Barbara Rizo   MRN:  [id number]    Patient not seen today secondary to Other (Per patient's nurse, patient is back in AFib with RVR and being transferred to ICU ). Will follow-up as able.    NIKO Young  2/1/2019

## 2019-02-01 NOTE — PT/OT/SLP EVAL
Occupational Therapy   Evaluation/Treatment    Name: Barbara Rizo  MRN: [id number]  Admitting Diagnosis:  Atrial fibrillation with rapid ventricular response 2 Days Post-Op    Recommendations:     Discharge Recommendations: ( OT with family assistance)  Discharge Equipment Recommendations:  none  Barriers to discharge:  None    Assessment:     Barbara Rizo is a 78 y.o. female with a medical diagnosis of Atrial fibrillation with rapid ventricular response.  She presents with self care and functional mobility deficits R/T generalized weakness . Performance deficits affecting function: weakness, impaired endurance, impaired self care skills, impaired balance, gait instability, impaired functional mobilty, impaired cardiopulmonary response to activity.      Rehab Prognosis: Good; patient would benefit from acute skilled OT services to address these deficits and reach maximum level of function.       Plan:     Patient to be seen 3 x/week to address the above listed problems via self-care/home management, therapeutic exercises, therapeutic activities  · Plan of Care Expires: 02/14/19  · Plan of Care Reviewed with: patient, daughter    Subjective     Chief Complaint: having too many bowel movements  Patient/Family Comments/goals:The patient will go to her daughter's house.    Occupational Profile:  Living Environment: Patient has been staying with her daughter since her partner passed away recently. .  Previous level of function: Prior to admission, patients level of function was independent  Roles and Routines: The patient was driving and assisting with the care of her partner.  Equipment Used at Home:  walker, rolling, bedside commode, cane, straight, shower chair  Assistance upon Discharge:  daughter    Pain/Comfort:  · Pain Rating 1: 0/10    Patients cultural, spiritual, Yarsanism conflicts given the current situation: no    Objective:     Communicated with:nurseOlive* prior to session.  Patient found HOB  elevated with peripheral IV, oxygen, telemetry, george catheter upon OT entry to room.    General Precautions: Standard, fall, diabetic   Orthopedic Precautions:N/A   Braces: N/A     Occupational Performance:    Bed Mobility:    · Patient completed Rolling/Turning to Left with  stand by assistance  · Patient completed Rolling/Turning to Right with stand by assistance  · Patient completed Scooting/Bridging with stand by assistance  · Patient completed Supine to Sit with contact guard assistance    Functional Mobility/Transfers:  · Patient completed Sit <> Stand Transfer with contact guard assistance  with  rolling walker   · Functional Mobility: The patient was able to amb using a RW with CGA. The patient removed O2 to amb. The patient's PSO2 was in the 80's after amb and O2 NC 2L was placed on the patient and nurse was notified.    Activities of Daily Living:  · Upper Body Dressing: contact guard assistance to don back gown  · Lower Body Dressing: dependence to don B socks  · Toileting: The patient was incontinent of liquid stool and was dependent for pericare      Cognitive/Visual Perceptual:  Cognitive/Psychosocial Skills:     -       Oriented to: Person, Place, Time and Situation   -       Follows Commands/attention:Follows two-step commands  -       Communication: clear/fluent  -       Memory: No Deficits noted  -       Safety awareness/insight to disability: intact   -       Mood/Affect/Coping skills/emotional control: Appropriate to situation    Physical Exam:  Balance: -       fair+  Postural examination/scapula alignment:    -       No postural abnormalities identified  Skin integrity: Visible skin intact  Edema:  None noted  Upper Extremity Range of Motion:     -       Right Upper Extremity: WFL  -       Left Upper Extremity: WFL  Upper Extremity Strength:    -       Right Upper Extremity: WFL  -       Left Upper Extremity: WFL    AMPAC 6 Click ADL:  AMPAC Total Score: 19    Treatment &  Education:  Education:  OT eval is complete. The patient was educated re: OT role and POC. The patient was able to roll L<>R in bed for pericare and don of diaper. The patient was educated re: the need to request nursing assist to return to bed.    Patient left up in chair with all lines intact, call button in reach, nurseOlive notified and family present    GOALS:   Multidisciplinary Problems     Occupational Therapy Goals        Problem: Occupational Therapy Goal    Goal Priority Disciplines Outcome Interventions   Occupational Therapy Goal     OT, PT/OT Ongoing (interventions implemented as appropriate)    Description:  Goals to be met by: 2/15/19    Patient will increase functional independence with ADLs by performing:    UE Dressing with Modified [place] and Supervision.  Grooming while standing at sink with Modified [place] and Supervision.  Toileting from toilet with Supervision for hygiene and clothing management.   Supine to sit with Modified [place] and Supervision.  Step transfer with Supervision  Toilet transfer to toilet with Supervision.  Upper extremity exercise program x15 reps per handout, with assistance as needed.                      History:     Past Medical History:   Diagnosis Date    A-fib     Diabetes mellitus type II     Fatty liver     GERD (gastroesophageal reflux disease)     Hemochromatosis     Hyperlipidemia     Hypertension     Vaginal delivery     x2    Vitamin D deficiency disease     Wears partial dentures     upper removable bridge       Past Surgical History:   Procedure Laterality Date    ARTHROPLASTY, KNEE, TOTAL Left 5/13/2013    [facility]    ARTHROPLASTY-KNEE Right 8/3/2015    [facility]    BREAST BIOPSY      CHEST DRAINAGE N/A 9/18/2015    [facility]formed [facility] Joel [facility] JOEL    CHOLECYSTECTOMY  08/2015    CHOLECYSTECTOMY-LAPAROSCOPIC N/A 8/26/2015    Performed by  "Bony Alexander MD at [facility] OR    CYSTOSCOPY - URODYNAMICS FLOW STUDY  (C-ARM) N/A 2016    Performed by Reina Camp MD at [facility] OR    ESOPHAGOGASTRODUODENOSCOPY (EGD) Left 10/15/2015    Performed by Rolando Robert MD at [facility] ENDO    EYE SURGERY      Cat Ext  OU    HYSTERECTOMY      partial due to uterine fibroids    INCISION AND DRAINAGE (I&D), ABSCESS Right 2015    Performed by Bonnie Surgeon at [facility] BONNIE    TOTAL KNEE ARTHROPLASTY Right     left knee done 2013    TRANSESOPHAGEAL ECHOCARDIOGRAM (TALHA) N/A 2015    Performed by Amauri Lomas MD at [facility] CATH LAB    TRANSESOPHAGEAL ECHOCARDIOGRAM WITH POSSIBLE CARDIOVERSION (TALHA W/ POSS CARDIOVERSION) N/A 2019    Performed by Sundeep Montero MD at [facility] CATH LAB       Clinical Decision Makin.  OT Low:  "Pt evaluation falls under low complexity for evaluation coding due to performance deficits noted in 1-3 areas as stated above and 0 co-morbities affecting current functional status. Data obtained from problem focused assessments. No modifications or assistance was required for completion of evaluation. Only brief occupational profile and history review completed."     Time Tracking:     OT Date of Treatment: 19  OT Start Time:   OT Stop Time: 164  OT Total Time (min): 25 min    Billable Minutes:Evaluation 15 (with PT)  Self Care/Home Management 10  Total Time 25    Mary Cox OT  2019    "

## 2019-02-01 NOTE — PROGRESS NOTES
[facility]  Cardiology  Progress Note    Patient Name: Barbara Rizo  MRN: [id number]  Admission Date: 1/28/2019  Hospital Length of Stay: 4 days  Code Status: Full Code   Attending Physician: Az Stapleton MD   Primary Care Physician: Paras Oro MD  Expected Discharge Date:   Principal Problem:Atrial fibrillation with rapid ventricular response    Subjective:     Hospital Course:   1-29:  Admitted with AFib with RVR  1-30:  Underwent TALHA/DC cardioversion successfully  2-1 Back in A-fib 120s. SOB    Echo 1/29/19  · TDs  · Normal left ventricular systolic function. The estimated ejection fraction is 55% * specificity decreased secondary to tachyarrhythmia  · Indeterminate left ventricular diastolic function.  · Severe left atrial enlargement.  · Mild-to-moderate mitral regurgitation.  · Intermediate central venous pressure (8 mm Hg).  · The estimated PA systolic pressure is 52 mm Hg  Pulmonary hypertension present.    No new subjective & objective note has been filed under this hospital service since the last note was generated.    Assessment and Plan:     Brief HPI:     Atrial fibrillation with rapid ventricular response    Back in A-fib RVR. Transfer back to ICU for IV amiodarone. Consider CV in AM if A-fib persists     CKD Stage 3    Acute on chronic failure this a.m.  Bolus 1 L and continue IVF with normal saline     MGUS (monoclonal gammopathy of unknown significance)           Essential hypertension    Currently holding ACE-inhibitor and amlodipine  Add back if needed       Hyperlipidemia    On statin     Type 2 diabetes mellitus, controlled, with renal complications    Per IM         VTE Risk Mitigation (From admission, onward)        Ordered     apixaban tablet 2.5 mg  2 times daily      01/30/19 1542     IP VTE HIGH RISK PATIENT  Once      01/28/19 1801          Amauri Lomas MD  Cardiology  [facility]   normal...

## 2019-02-01 NOTE — PLAN OF CARE
Problem: Fall Injury Risk  Goal: Absence of Fall and Fall-Related Injury  Outcome: Outcome(s) achieved Date Met: 02/01/19  Fall risk screening assessment completed.  Call light within reach.  Family at bedside to assist with basic needs.  Pt rounded on a minimum of an hour each shift.    Problem: Adult Inpatient Plan of Care  Goal: Absence of Hospital-Acquired Illness or Injury  Outcome: Outcome(s) achieved Date Met: 02/01/19  SCD's placed.   Fall risk screening assessment completed.  Goal: Optimal Comfort and Wellbeing  Outcome: Ongoing (interventions implemented as appropriate)  Denies c/o pain at this point of shift.       Problem: Diabetes Comorbidity  Goal: Blood Glucose Level Within Desired Range  Outcome: Ongoing (interventions implemented as appropriate)  Monitoring blood glucose before meals and at bedtime.   Sliding scale insulin administered as needed.       Problem: Arrhythmia/Dysrhythmia  Goal: Normalized Cardiac Rhythm  Outcome: Ongoing (interventions implemented as appropriate)  Pt transferred from telemetry to the ICU due to converting into A-fib with rapid RVR.   Started Pt on an Amiodarone gtt.   Amiodarone bolus administered and currently infusing at 1 mcg/min.  Remains in atrial fibrillation at this time.      Problem: Skin Injury Risk Increased  Goal: Skin Health and Integrity  Outcome: Ongoing (interventions implemented as appropriate)  Non-blanchable redness noted to buttocks.  Pt able to move and reposition self with minimal assistance.

## 2019-02-01 NOTE — ASSESSMENT & PLAN NOTE
FELICIA on CKD secondary to intravascular hypovolemia  Gentle IVF   BMP later today and in am  Avoid nephrotoxins    Now with acute renal failure. Nephrology consulted.

## 2019-02-01 NOTE — PLAN OF CARE
Problem: Occupational Therapy Goal  Goal: Occupational Therapy Goal  Goals to be met by: 2/15/19    Patient will increase functional independence with ADLs by performing:    UE Dressing with Modified [place] and Supervision.  Grooming while standing at sink with Modified [place] and Supervision.  Toileting from toilet with Supervision for hygiene and clothing management.   Supine to sit with Modified [place] and Supervision.  Step transfer with Supervision  Toilet transfer to toilet with Supervision.  Upper extremity exercise program x15 reps per handout, with assistance as needed.    Outcome: Ongoing (interventions implemented as appropriate)  The patient will benefit from OT to address functional deficits.    Comments: The patient is able to amb using a RW with CGA to the bathroom.    The patient will benefit from  OT and family assistance upon D/C to home.

## 2019-02-01 NOTE — ASSESSMENT & PLAN NOTE
She was noted to be in RVR in her PCP's clinic today for which she was instructed to present to the ED.  I have reviewed the EKG and it reveals atrial fibrillation with rapid ventricular response with an average heart rate of 146 BPMs, but it went as high as 150 BPM.  He was given a dose of diltiazem without much improvement and subsequently started on an amiodarone infusion.  She has a [id number] score of 5--unclear why she isn't on anticoagulation other than aspirin.  Her last TTE in our system was in Sept 2015 so that will be repeated.  Will consult Cardiology for further recommendations.    Oral amiodarone   eliquis  S/p successful cardioversion 1/30

## 2019-02-01 NOTE — PT/OT/SLP PROGRESS
Occupational Therapy      Patient Name:  Barbara Rizo   MRN:  [id number]     The patient was transferred to ICU 2* A fib with RVR. OT will Discontinue. Please re-consult when appropriate.    Mary Cox OT  2/1/2019

## 2019-02-01 NOTE — PLAN OF CARE
Problem: Diabetes Comorbidity  Goal: Blood Glucose Level Within Desired Range  Outcome: Ongoing (interventions implemented as appropriate)  Intervention: Maintain Glycemic Control   02/01/19 0746   Monitor and Manage Ketoacidosis   Glycemic Management blood glucose monitoring         Problem: Infection  Goal: Infection Symptom Resolution  Outcome: Ongoing (interventions implemented as appropriate)  Intervention: Prevent or Manage Infection   02/01/19 0746   Prevent or Manage Infection   Fever Reduction/Comfort Measures medication administered;fluid intake increased;lightweight clothing   Infection Management aseptic technique maintained

## 2019-02-01 NOTE — PLAN OF CARE
02/01/19 1632   Discharge Reassessment   Assessment Type Discharge Planning Reassessment   Provided patient/caregiver education on the expected discharge date and the discharge plan Yes   Do you have any problems affording any of your prescribed medications? No   Discharge Plan A Home;Home with family;Home Health   Discharge Plan B (tbd)   Can the patient answer the patient profile reliably? Yes, cognitively intact   How does the patient rate their overall health at the present time? Good   Describe the patient's ability to walk at the present time. No restrictions   How often would a person be available to care for the patient? Whenever needed   Number of comorbid conditions (as recorded on the chart) None   During the past month, has the patient often been bothered by feeling down, depressed or hopeless? No   During the past month, has the patient often been bothered by little interest or pleasure in doing things? No

## 2019-02-01 NOTE — PROVIDER TRANSFER
Pts HR between 115-120's, no complaints of SOB or discomfort.  notified, new orders will be placed to send pt back to ICU.

## 2019-02-01 NOTE — SUBJECTIVE & OBJECTIVE
Interval History: No new issues.       Review of Systems   Constitutional: Negative for activity change.   Respiratory: Negative for chest tightness and shortness of breath.    Cardiovascular: Negative for chest pain.   Gastrointestinal: Negative for abdominal pain.   Genitourinary: Negative for difficulty urinating.     Objective:     Vital Signs (Most Recent):  Temp: 97.6 °F (36.4 °C) (02/01/19 0731)  Pulse: 66 (02/01/19 0738)  Resp: 18 (02/01/19 0738)  BP: 113/61 (02/01/19 0731)  SpO2: (!) 93 % (02/01/19 0738) Vital Signs (24h Range):  Temp:  [97.6 °F (36.4 °C)-98 °F (36.7 °C)] 97.6 °F (36.4 °C)  Pulse:  [60-74] 66  Resp:  [17-26] 18  SpO2:  [92 %-95 %] 93 %  BP: (101-139)/(58-88) 113/61     Weight: 93.2 kg (205 lb 7.5 oz)  Body mass index is 36.4 kg/m².    Intake/Output Summary (Last 24 hours) at 2/1/2019 1009  Last data filed at 2/1/2019 0600  Gross per 24 hour   Intake 400 ml   Output 1045 ml   Net -645 ml      Physical Exam   Constitutional: She is oriented to person, place, and time. She appears well-developed and well-nourished.   HENT:   Head: Atraumatic.   Cardiovascular: Normal rate and regular rhythm. Exam reveals no gallop and no friction rub.   Pulmonary/Chest: Effort normal and breath sounds normal. No respiratory distress. She has no wheezes. She has no rales.   Abdominal: Soft. Bowel sounds are normal.   Neurological: She is alert and oriented to person, place, and time.   Vitals reviewed.      Significant Labs:   BMP:   Recent Labs   Lab 01/31/19  1554   *   *   K 3.8   CL 97   CO2 25   BUN 40*   CREATININE 3.0*   CALCIUM 7.4*     CBC:   Recent Labs   Lab 01/31/19  0217   WBC 5.44   HGB 13.6   HCT 42.9   *       Significant Imaging:

## 2019-02-01 NOTE — PROGRESS NOTES
[facility]  Cardiology  Progress Note    Patient Name: Barbara Rizo  MRN: [id number]  Admission Date: 1/28/2019  Hospital Length of Stay: 4 days  Code Status: Full Code   Attending Physician: Az Stapleton MD   Primary Care Physician: Paras Oro MD  Expected Discharge Date:   Principal Problem:Atrial fibrillation with rapid ventricular response    Subjective:     Hospital Course:   1-29:  Admitted with AFib with RVR  1-30:  Underwent TALHA/DC cardioversion successfully  2-1 Back in A-fib 120s. SOB    Echo 1/29/19  · TDs  · Normal left ventricular systolic function. The estimated ejection fraction is 55% * specificity decreased secondary to tachyarrhythmia  · Indeterminate left ventricular diastolic function.  · Severe left atrial enlargement.  · Mild-to-moderate mitral regurgitation.  · Intermediate central venous pressure (8 mm Hg).  · The estimated PA systolic pressure is 52 mm Hg  Pulmonary hypertension present.    Interval History:     Review of Systems   Constitution: Negative for decreased appetite.   HENT: Negative for ear discharge.    Eyes: Negative for blurred vision.   Respiratory: Negative for hemoptysis.    Endocrine: Negative for polyphagia.   Hematologic/Lymphatic: Negative for adenopathy.   Skin: Negative for color change.   Musculoskeletal: Negative for joint swelling.   Neurological: Negative for brief paralysis.   Psychiatric/Behavioral: Negative for hallucinations.     Objective:     Vital Signs (Most Recent):  Temp: 97.6 °F (36.4 °C) (02/01/19 0731)  Pulse: 66 (02/01/19 0738)  Resp: 18 (02/01/19 0738)  BP: 113/61 (02/01/19 0731)  SpO2: (!) 93 % (02/01/19 0738) Vital Signs (24h Range):  Temp:  [97.6 °F (36.4 °C)-98 °F (36.7 °C)] 97.6 °F (36.4 °C)  Pulse:  [60-74] 66  Resp:  [17-26] 18  SpO2:  [92 %-95 %] 93 %  BP: (101-139)/(58-88) 113/61     Weight: 93.2 kg (205 lb 7.5 oz)  Body mass index is 36.4 kg/m².     SpO2: (!) 93 %  O2 Device (Oxygen Therapy): nasal  cannula      Intake/Output Summary (Last 24 hours) at 2/1/2019 1056  Last data filed at 2/1/2019 0600  Gross per 24 hour   Intake 400 ml   Output 1045 ml   Net -645 ml       Lines/Drains/Airways     Drain                 Urethral Catheter 01/29/19 0301 16 Fr. 3 days          Peripheral Intravenous Line                 Peripheral IV - Single Lumen 01/29/19 0920 Right Wrist 3 days         Peripheral IV - Single Lumen 01/29/19 0930 Anterior;Right Upper Arm 3 days                Physical Exam   Constitutional: She is oriented to person, place, and time. She appears well-developed and well-nourished.   HENT:   Head: Normocephalic and atraumatic.   Eyes: Conjunctivae and EOM are normal. Pupils are equal, round, and reactive to light.   Neck: Normal range of motion. Neck supple. No thyromegaly present.   Cardiovascular: An irregularly irregular rhythm present. Tachycardia present.   No murmur heard.  Pulmonary/Chest: Effort normal and breath sounds normal. No respiratory distress.   Abdominal: Soft. Bowel sounds are normal.   Musculoskeletal: She exhibits no edema.   Neurological: She is alert and oriented to person, place, and time.   Skin: Skin is warm and dry.   Psychiatric: She has a normal mood and affect. Her behavior is normal.       Significant Labs: All pertinent lab results from the last 24 hours have been reviewed.    Significant Imaging: Echocardiogram:   2D echo with color flow doppler:   Results for orders placed or performed during the hospital encounter of 08/21/15   2D echo with color flow doppler   Result Value Ref Range    QEF 75 55 - 65    Mitral Valve Regurgitation MILD     Est. PA Systolic Pressure 40.21 (A)     Tricuspid Valve Regurgitation MODERATE (A)      Assessment and Plan:     Brief HPI:     Atrial fibrillation with rapid ventricular response    Back in A-fib RVR. Transfer back to ICU for IV amiodarone. Consider CV in AM if A-fib persists     CKD Stage 3    Acute on chronic failure this  a.m.  Bolus 1 L and continue IVF with normal saline     MGUS (monoclonal gammopathy of unknown significance)           Essential hypertension    Currently holding ACE-inhibitor and amlodipine  Add back if needed       Hyperlipidemia    On statin     Type 2 diabetes mellitus, controlled, with renal complications    Per IM         VTE Risk Mitigation (From admission, onward)        Ordered     apixaban tablet 2.5 mg  2 times daily      01/30/19 1542     IP VTE HIGH RISK PATIENT  Once      01/28/19 1801          Amauri Lomas MD  Cardiology  [facility]

## 2019-02-01 NOTE — PROGRESS NOTES
[facility] Hospital Medicine  Progress Note    Patient Name: Barbara Rizo  MRN: [id number]  Patient Class: IP- Inpatient   Admission Date: 1/28/2019  Length of Stay: 4 days  Attending Physician: Az Stapleton MD  Primary Care Provider: Paras Oro MD        Subjective:     Principal Problem:Atrial fibrillation with rapid ventricular response    HPI:  Ms. Barbara Rizo is a 78 y.o. female with essential hypertension, hyperlipidemia (LDL 62.4 Jul 2018), type 2 diabetes mellitus (HbA1c 5.8% Jul 2018), CKD Stage 3, paroxysmal atrial fibrillation (ABX2VL1-BDIm score 5) not on chronic anticoagulation, obesity (BMI 36.0), MGUS, and chronic gout who presents to [facility] ED with complaints of coughing for three days.  She started to have a non-productive cough, wheezing, and mild dyspnea three days ago which has steadily been worsening since onset.  She also complains of a subjective fever but otherwise denies any nausea, vomiting, chest pain, palpitations, pleurisy, nor any diaphoresis.  She denies any recent travel, hemoptysis, nor any lower extremity pain or swelling.  She does say that she's under a lot of stress recently with her ex- dying yesterday at [facility] due to complications from a heart valve replacement two weeks ago.  She does say that she may have had sick contacts while visiting her ex-.  Her appetite has been poor but she denies any weight loss.    While at her ENT's appointment today she decided to schedule an appointment with her PCP to evaluate her upper airway complaints.  She was unable to see her regular PCP, Dr. Paras Oro, but was able to be seen by another physician who became concerned when she was noted to be in AFib with RVR on EKG.  He recommended EMS transportation to the ED but she refused and decided to drive herself.  Her cardiologist is Dr. Amauri Lomas.    Hospital Course:  Pt admitted to ICU with afib rvr on  amiodarone infusion. Pt with elevated HR today and after TALHA done patient became very agitated and anxious. Ativan 1mg IV given and symptoms got worse. HR up into the 170s and O2 sats dropped to 77%. Placed on NRB. Improved O2 sats. 5mg IV haldol given as patient was trying to get out of bed and pulling oxygen mask off. Cardizem 10mg Iv given with improved HR to 120s-130s. xopenex scheduled Q 8 hours. Changed to zosyn with temp 102F.     1/30- successful cardioversion, post procedure confused and pulling oxygen off, desaturation, constant re-direction, monitor in ICU overnight. Changed to oral amiodarone. eliquis for anticoagulation. Holding parameters on BP meds. IV steroids, nebs and antibiotic for probable lower resp infection. IV lasix as Bp tolerates.   1/31- HR remain under control NSR 60-70s.On amiodarone, metoprolol and eliquis.  Resp status improved and weaning oxygen. Steroids changed to oral route. DC zosyn and switch to augmentin. Add acapella to nebs. Cr increased from baseline (1.5-1.7). Gentle IVF and monitor with repeat BMP later this evening. PT?OT consulted for dc planning. Weaning oxygen. PT/OT consulted and rec H/H without equipment. The patient was transferred to the floor on 1/31. Nephrology was consulted for worsening renal function.     Interval History: No new issues.       Review of Systems   Constitutional: Negative for activity change.   Respiratory: Negative for chest tightness and shortness of breath.    Cardiovascular: Negative for chest pain.   Gastrointestinal: Negative for abdominal pain.   Genitourinary: Negative for difficulty urinating.     Objective:     Vital Signs (Most Recent):  Temp: 97.6 °F (36.4 °C) (02/01/19 0731)  Pulse: 66 (02/01/19 0738)  Resp: 18 (02/01/19 0738)  BP: 113/61 (02/01/19 0731)  SpO2: (!) 93 % (02/01/19 0738) Vital Signs (24h Range):  Temp:  [97.6 °F (36.4 °C)-98 °F (36.7 °C)] 97.6 °F (36.4 °C)  Pulse:  [60-74] 66  Resp:  [17-26] 18  SpO2:  [92 %-95 %] 93  %  BP: (101-139)/(58-88) 113/61     Weight: 93.2 kg (205 lb 7.5 oz)  Body mass index is 36.4 kg/m².    Intake/Output Summary (Last 24 hours) at 2/1/2019 1009  Last data filed at 2/1/2019 0600  Gross per 24 hour   Intake 400 ml   Output 1045 ml   Net -645 ml      Physical Exam   Constitutional: She is oriented to person, place, and time. She appears well-developed and well-nourished.   HENT:   Head: Atraumatic.   Cardiovascular: Normal rate and regular rhythm. Exam reveals no gallop and no friction rub.   Pulmonary/Chest: Effort normal and breath sounds normal. No respiratory distress. She has no wheezes. She has no rales.   Abdominal: Soft. Bowel sounds are normal.   Neurological: She is alert and oriented to person, place, and time.   Vitals reviewed.      Significant Labs:   BMP:   Recent Labs   Lab 01/31/19  1554   *   *   K 3.8   CL 97   CO2 25   BUN 40*   CREATININE 3.0*   CALCIUM 7.4*     CBC:   Recent Labs   Lab 01/31/19  0217   WBC 5.44   HGB 13.6   HCT 42.9   *       Significant Imaging:    Assessment/Plan:      Acute bronchitis    Nebs  acapella  Wean oxygen as tolerated  Mucolytic  augmentin   Prednisone taper        Atrial fibrillation with rapid ventricular response    Successful cardioversion 1/30  Amiodarone, metoprolol and eliquis  Follow up cardiology        Chronic gout    Stable; will continue her home regimen of allopurinol.     Obesity, Class II, BMI 35-39.9    The patient has been counseled on the negative impact that obesity imparts on her health and was encouraged to make lifestyle changes in order to lose weight and decrease her modifiable risk factors. Body mass index is 36.4 kg/m².       CKD Stage 3    FELICIA on CKD secondary to intravascular hypovolemia  Gentle IVF   BMP later today and in am  Avoid nephrotoxins    Now with acute renal failure. Nephrology consulted.      Paroxysmal atrial fibrillation    As addressed above.     MGUS (monoclonal gammopathy of unknown  significance)    Stable; will encourage continued follow-up with her hematologist.     Essential hypertension    Patient's blood pressure is well-controlled; will continue home regimen of amlodipine, furosemide, lisinopril, and metoprolol, and provide as-needed clonidine.     Hyperlipidemia    Well-controlled; will continue patient's home regimen of atorvastatin.     Type 2 diabetes mellitus, controlled, with renal complications    Her diabetes is well-controlled and she is currently not on home medications.  Will provide insulin sliding scale.     Debility- PT/OT rec: H/H no equipment.     VTE Risk Mitigation (From admission, onward)        Ordered     apixaban tablet 2.5 mg  2 times daily      01/30/19 1542     IP VTE HIGH RISK PATIENT  Once      01/28/19 1801        Renal consult today. PT/OT. Follow cards recs. Home Sun/Mon?       Az Hansen MD  Department of Hospital Medicine   [facility]

## 2019-02-01 NOTE — NURSING
Received report from TOSHIA Schaefer. Pt Awake, alert, and without any signs of distress. Bed low, side  Rails up x3 , call bell placed within reach. Instructed to call for assistance. Will continue to monitor

## 2019-02-02 ENCOUNTER — ANESTHESIA (OUTPATIENT)
Dept: CARDIOLOGY | Facility: HOSPITAL | Age: 79
DRG: 308 | End: 2019-02-02
Payer: MEDICARE

## 2019-02-02 ENCOUNTER — ANESTHESIA EVENT (OUTPATIENT)
Dept: CARDIOLOGY | Facility: HOSPITAL | Age: 79
DRG: 308 | End: 2019-02-02
Payer: MEDICARE

## 2019-02-02 LAB
ALBUMIN SERPL BCP-MCNC: 2.8 G/DL
ALP SERPL-CCNC: 48 U/L
ALT SERPL W/O P-5'-P-CCNC: 23 U/L
ANION GAP SERPL CALC-SCNC: 11 MMOL/L
AST SERPL-CCNC: 16 U/L
BACTERIA BLD CULT: NORMAL
BACTERIA BLD CULT: NORMAL
BILIRUB SERPL-MCNC: 0.4 MG/DL
BUN SERPL-MCNC: 51 MG/DL
CALCIUM SERPL-MCNC: 6.8 MG/DL
CHLORIDE SERPL-SCNC: 96 MMOL/L
CO2 SERPL-SCNC: 19 MMOL/L
CREAT SERPL-MCNC: 3.7 MG/DL
EST. GFR  (AFRICAN AMERICAN): 13 ML/MIN/1.73 M^2
EST. GFR  (NON AFRICAN AMERICAN): 11 ML/MIN/1.73 M^2
GLUCOSE SERPL-MCNC: 138 MG/DL
POCT GLUCOSE: 126 MG/DL (ref 70–110)
POCT GLUCOSE: 154 MG/DL (ref 70–110)
POCT GLUCOSE: 182 MG/DL (ref 70–110)
POTASSIUM SERPL-SCNC: 4 MMOL/L
PROT SERPL-MCNC: 6.3 G/DL
SODIUM SERPL-SCNC: 126 MMOL/L
URATE SERPL-MCNC: 6 MG/DL

## 2019-02-02 PROCEDURE — 25000003 PHARM REV CODE 250: Performed by: NURSE ANESTHETIST, CERTIFIED REGISTERED

## 2019-02-02 PROCEDURE — 93005 ELECTROCARDIOGRAM TRACING: CPT

## 2019-02-02 PROCEDURE — 80053 COMPREHEN METABOLIC PANEL: CPT

## 2019-02-02 PROCEDURE — D9220A PRA ANESTHESIA: Mod: CRNA,,, | Performed by: NURSE ANESTHETIST, CERTIFIED REGISTERED

## 2019-02-02 PROCEDURE — 93010 EKG 12-LEAD: ICD-10-PCS | Mod: ,,, | Performed by: INTERNAL MEDICINE

## 2019-02-02 PROCEDURE — 25000242 PHARM REV CODE 250 ALT 637 W/ HCPCS: Performed by: HOSPITALIST

## 2019-02-02 PROCEDURE — 63600175 PHARM REV CODE 636 W HCPCS: Performed by: HOSPITALIST

## 2019-02-02 PROCEDURE — D9220A PRA ANESTHESIA: Mod: ANES,,, | Performed by: ANESTHESIOLOGY

## 2019-02-02 PROCEDURE — D9220A PRA ANESTHESIA: ICD-10-PCS | Mod: ANES,,, | Performed by: ANESTHESIOLOGY

## 2019-02-02 PROCEDURE — 92960 PR CARDIOVERSION, ELECTIVE;EXTERN: ICD-10-PCS | Mod: ,,, | Performed by: INTERNAL MEDICINE

## 2019-02-02 PROCEDURE — 25000003 PHARM REV CODE 250: Performed by: INTERNAL MEDICINE

## 2019-02-02 PROCEDURE — 94799 UNLISTED PULMONARY SVC/PX: CPT

## 2019-02-02 PROCEDURE — 63600175 PHARM REV CODE 636 W HCPCS: Performed by: INTERNAL MEDICINE

## 2019-02-02 PROCEDURE — 92960 CARDIOVERSION ELECTRIC EXT: CPT | Performed by: INTERNAL MEDICINE

## 2019-02-02 PROCEDURE — 36415 COLL VENOUS BLD VENIPUNCTURE: CPT

## 2019-02-02 PROCEDURE — 37000008 HC ANESTHESIA 1ST 15 MINUTES: Performed by: ANESTHESIOLOGY

## 2019-02-02 PROCEDURE — 27000221 HC OXYGEN, UP TO 24 HOURS

## 2019-02-02 PROCEDURE — 20000000 HC ICU ROOM

## 2019-02-02 PROCEDURE — 63600175 PHARM REV CODE 636 W HCPCS: Performed by: NURSE ANESTHETIST, CERTIFIED REGISTERED

## 2019-02-02 PROCEDURE — 84550 ASSAY OF BLOOD/URIC ACID: CPT

## 2019-02-02 PROCEDURE — D9220A PRA ANESTHESIA: ICD-10-PCS | Mod: CRNA,,, | Performed by: NURSE ANESTHETIST, CERTIFIED REGISTERED

## 2019-02-02 PROCEDURE — 87205 SMEAR GRAM STAIN: CPT

## 2019-02-02 PROCEDURE — 87070 CULTURE OTHR SPECIMN AEROBIC: CPT

## 2019-02-02 PROCEDURE — 94761 N-INVAS EAR/PLS OXIMETRY MLT: CPT

## 2019-02-02 PROCEDURE — 25000003 PHARM REV CODE 250: Performed by: HOSPITALIST

## 2019-02-02 PROCEDURE — 87106 FUNGI IDENTIFICATION YEAST: CPT

## 2019-02-02 PROCEDURE — 92960 CARDIOVERSION ELECTRIC EXT: CPT | Mod: ,,, | Performed by: INTERNAL MEDICINE

## 2019-02-02 PROCEDURE — 93010 ELECTROCARDIOGRAM REPORT: CPT | Mod: ,,, | Performed by: INTERNAL MEDICINE

## 2019-02-02 PROCEDURE — 94640 AIRWAY INHALATION TREATMENT: CPT

## 2019-02-02 RX ORDER — LIDOCAINE HCL/PF 100 MG/5ML
SYRINGE (ML) INTRAVENOUS
Status: DISCONTINUED | OUTPATIENT
Start: 2019-02-02 | End: 2019-02-02

## 2019-02-02 RX ORDER — SODIUM BICARBONATE 1 MEQ/ML
50 SYRINGE (ML) INTRAVENOUS ONCE
Status: COMPLETED | OUTPATIENT
Start: 2019-02-02 | End: 2019-02-02

## 2019-02-02 RX ORDER — PROPOFOL 10 MG/ML
VIAL (ML) INTRAVENOUS
Status: DISCONTINUED | OUTPATIENT
Start: 2019-02-02 | End: 2019-02-02

## 2019-02-02 RX ORDER — AMIODARONE HYDROCHLORIDE 200 MG/1
200 TABLET ORAL 2 TIMES DAILY
Status: DISCONTINUED | OUTPATIENT
Start: 2019-02-02 | End: 2019-02-06 | Stop reason: HOSPADM

## 2019-02-02 RX ORDER — SODIUM CHLORIDE 9 MG/ML
INJECTION, SOLUTION INTRAVENOUS CONTINUOUS PRN
Status: DISCONTINUED | OUTPATIENT
Start: 2019-02-02 | End: 2019-02-02

## 2019-02-02 RX ORDER — TRAMADOL HYDROCHLORIDE 50 MG/1
50 TABLET ORAL EVERY 8 HOURS PRN
Status: DISCONTINUED | OUTPATIENT
Start: 2019-02-02 | End: 2019-02-06 | Stop reason: HOSPADM

## 2019-02-02 RX ADMIN — GUAIFENESIN 600 MG: 600 TABLET, EXTENDED RELEASE ORAL at 09:02

## 2019-02-02 RX ADMIN — APIXABAN 2.5 MG: 2.5 TABLET, FILM COATED ORAL at 09:02

## 2019-02-02 RX ADMIN — SODIUM CHLORIDE: 0.9 INJECTION, SOLUTION INTRAVENOUS at 12:02

## 2019-02-02 RX ADMIN — PREDNISONE 40 MG: 20 TABLET ORAL at 09:02

## 2019-02-02 RX ADMIN — AMIODARONE HYDROCHLORIDE 0.5 MG/MIN: 1.8 INJECTION, SOLUTION INTRAVENOUS at 06:02

## 2019-02-02 RX ADMIN — AMIODARONE HYDROCHLORIDE 200 MG: 200 TABLET ORAL at 09:02

## 2019-02-02 RX ADMIN — LIDOCAINE HYDROCHLORIDE 100 MG: 20 INJECTION, SOLUTION INTRAVENOUS at 12:02

## 2019-02-02 RX ADMIN — ATORVASTATIN CALCIUM 40 MG: 40 TABLET, FILM COATED ORAL at 09:02

## 2019-02-02 RX ADMIN — LEVALBUTEROL HYDROCHLORIDE 0.63 MG: 0.63 SOLUTION RESPIRATORY (INHALATION) at 03:02

## 2019-02-02 RX ADMIN — SODIUM BICARBONATE 50 MEQ: 84 INJECTION INTRAVENOUS at 10:02

## 2019-02-02 RX ADMIN — BENZONATATE 100 MG: 100 CAPSULE ORAL at 04:02

## 2019-02-02 RX ADMIN — AMOXICILLIN AND CLAVULANATE POTASSIUM 500 MG: 500; 125 TABLET, FILM COATED ORAL at 09:02

## 2019-02-02 RX ADMIN — LEVALBUTEROL HYDROCHLORIDE 0.63 MG: 0.63 SOLUTION RESPIRATORY (INHALATION) at 07:02

## 2019-02-02 RX ADMIN — SODIUM CHLORIDE: 0.9 INJECTION, SOLUTION INTRAVENOUS at 04:02

## 2019-02-02 RX ADMIN — AMIODARONE HYDROCHLORIDE 200 MG: 200 TABLET ORAL at 01:02

## 2019-02-02 RX ADMIN — PROPOFOL 50 MG: 10 INJECTION, EMULSION INTRAVENOUS at 12:02

## 2019-02-02 RX ADMIN — METOPROLOL TARTRATE 25 MG: 25 TABLET ORAL at 10:02

## 2019-02-02 RX ADMIN — AMOXICILLIN AND CLAVULANATE POTASSIUM 500 MG: 500; 125 TABLET, FILM COATED ORAL at 10:02

## 2019-02-02 RX ADMIN — ALLOPURINOL 100 MG: 100 TABLET ORAL at 09:02

## 2019-02-02 RX ADMIN — METOPROLOL TARTRATE 25 MG: 25 TABLET ORAL at 09:02

## 2019-02-02 RX ADMIN — SODIUM CHLORIDE: 0.9 INJECTION, SOLUTION INTRAVENOUS at 01:02

## 2019-02-02 NOTE — ANESTHESIA PREPROCEDURE EVALUATION
02/02/2019  Barbara Rizo is a 78 y.o., female.    Anesthesia Evaluation         Review of Systems  Anesthesia Hx:  No problems with previous Anesthesia   Social:  Non-Smoker, Social Alcohol Use    Hematology/Oncology:  Hematology Normal   Oncology Normal     EENT/Dental:EENT/Dental Normal   Cardiovascular:   Hypertension Dysrhythmias (new onset ..cecilio done on wednesday..back in afib) atrial fibrillation    Pulmonary:   Pneumonia (being treated)    Renal/:   Chronic Renal Disease (new onset insuff..seen by renal)    Hepatic/GI:   GERD Liver Disease,    Musculoskeletal:   Arthritis     Neurological:   Neuromuscular Disease,    Endocrine:   Diabetes    Dermatological:  Skin Normal    Psych:  Psychiatric Normal           Physical Exam  General:  Well nourished    Airway/Jaw/Neck:  Airway Findings: Mallampati: II TM Distance: 4 - 6 cm      Dental:  Dental Findings: (upper incisors capped)   Chest/Lungs:  Chest/Lungs Clear    Heart/Vascular:  Heart Findings: Normal       Mental Status:  Mental Status Findings:  Cooperative, Alert and Oriented         Anesthesia Plan  Type of Anesthesia, risks & benefits discussed:  Anesthesia Type:  general  Patient's Preference:   Intra-op Monitoring Plan: standard ASA monitors  Intra-op Monitoring Plan Comments:   Post Op Pain Control Plan: multimodal analgesia, IV/PO Opioids PRN and per primary service following discharge from PACU  Post Op Pain Control Plan Comments:   Induction:    Beta Blocker:  Patient is not currently on a Beta-Blocker (No further documentation required).       Informed Consent: Patient understands risks and agrees with Anesthesia plan.  Questions answered. Anesthesia consent signed with patient.  ASA Score: 3     Day of Surgery Review of History & Physical:    H&P update referred to the provider.  H&P completed by Anesthesiologist.       Ready For  Surgery From Anesthesia Perspective.

## 2019-02-02 NOTE — PLAN OF CARE
Problem: Adult Inpatient Plan of Care  Goal: Plan of Care Review  Outcome: Ongoing (interventions implemented as appropriate)  Pt remains in the ICU this shift. AAO. Cardioversion done today, now in NSR. amio gtt turned off and started on PO amio. 50 mEq of Bicarb given x1. NS decreased to 50mL/HR. Pt with low appetite, boost supplement ordered. Rolle remains in place. No falls, injuries, or new skin breakdown, precautions maintained for all.

## 2019-02-02 NOTE — TRANSFER OF CARE
"Anesthesia Transfer of Care Note    Patient: Barbara Rizo    Procedure(s) Performed: Procedure(s) (LRB):  Cardioversion/Defibrillation (N/A)    Patient location: ICU    Anesthesia Type: general    Transport from OR: Transported from OR on 100% O2 by closed face mask with adequate spontaneous ventilation    Post pain: adequate analgesia    Post assessment: no apparent anesthetic complications and tolerated procedure well    Post vital signs: stable    Level of consciousness: awake, alert and oriented    Nausea/Vomiting: no nausea/vomiting    Complications: none    Transfer of care protocol was followed      Last vitals:   Visit Vitals  BP (!) 98/54 (BP Location: Left arm, Patient Position: Lying)   Pulse (!) 50   Temp 36.5 °C (97.7 °F) (Oral)   Resp (!) 22   Ht 5' 3" (1.6 m)   Wt 102 kg (224 lb 13.9 oz)   SpO2 100%   Breastfeeding? No   BMI 39.83 kg/m²     "

## 2019-02-02 NOTE — PROGRESS NOTES
[facility] Hospital Medicine  Progress Note    Patient Name: Barbara Rizo  MRN: [id number]  Patient Class: IP- Inpatient   Admission Date: 1/28/2019  Length of Stay: 5 days  Attending Physician: Emma Richards MD  Primary Care Provider: Paras Oro MD        Subjective:     Principal Problem:Atrial fibrillation with rapid ventricular response    HPI:  Ms. Barbara Rizo is a 78 y.o. female with essential hypertension, hyperlipidemia (LDL 62.4 Jul 2018), type 2 diabetes mellitus (HbA1c 5.8% Jul 2018), CKD Stage 3, paroxysmal atrial fibrillation ([id number] score 5) not on chronic anticoagulation, obesity (BMI 36.0), MGUS, and chronic gout who presents to [facility] ED with complaints of coughing for three days.  She started to have a non-productive cough, wheezing, and mild dyspnea three days ago which has steadily been worsening since onset.  She also complains of a subjective fever but otherwise denies any nausea, vomiting, chest pain, palpitations, pleurisy, nor any diaphoresis.  She denies any recent travel, hemoptysis, nor any lower extremity pain or swelling.  She does say that she's under a lot of stress recently with her ex- dying yesterday at [facility] due to complications from a heart valve replacement two weeks ago.  She does say that she may have had sick contacts while visiting her ex-.  Her appetite has been poor but she denies any weight loss.    While at her ENT's appointment today she decided to schedule an appointment with her PCP to evaluate her upper airway complaints.  She was unable to see her regular PCP, Dr. Paras Oro, but was able to be seen by another physician who became concerned when she was noted to be in AFib with RVR on EKG.  He recommended EMS transportation to the ED but she refused and decided to drive herself.  Her cardiologist is Dr. Amauri Lomas.    Hospital Course:  Pt admitted to ICU with afib rvr on amiodarone  infusion. Pt with elevated HR today and after TALHA done patient became very agitated and anxious. Ativan 1mg IV given and symptoms got worse. HR up into the 170s and O2 sats dropped to 77%. Placed on NRB. Improved O2 sats. 5mg IV haldol given as patient was trying to get out of bed and pulling oxygen mask off. Cardizem 10mg Iv given with improved HR to 120s-130s. xopenex scheduled Q 8 hours. Changed to zosyn with temp 102F.     1/30- successful cardioversion, post procedure confused and pulling oxygen off, desaturation, constant re-direction, monitor in ICU overnight. Changed to oral amiodarone. eliquis for anticoagulation. Holding parameters on BP meds. IV steroids, nebs and antibiotic for probable lower resp infection. IV lasix as Bp tolerates.   1/31- HR remain under control NSR 60-70s.On amiodarone, metoprolol and eliquis.  Resp status improved and weaning oxygen. Steroids changed to oral route. DC zosyn and switch to augmentin. Add acapella to nebs. Cr increased from baseline (1.5-1.7). Gentle IVF and monitor with repeat BMP later this evening. PT?OT consulted for dc planning. Weaning oxygen. PT/OT consulted and rec H/H without equipment. The patient was transferred to the floor on 1/31. Nephrology was consulted for worsening renal function.     2/2- pt transferred with blayne rvr. Successful cardioversion again today. Continue oral amiodarone and BB. CXR pending, still has aggressive cough and URI symptoms. On mucolytic and antibiotic.     Interval History: cough not improving, drop in O2 sats    Review of Systems   HENT: Positive for congestion.    Eyes: Negative.    Respiratory: Positive for cough. Negative for shortness of breath.    Cardiovascular: Negative.    Gastrointestinal: Negative.    Genitourinary: Negative.    Musculoskeletal: Positive for arthralgias.   Neurological: Positive for weakness.     Objective:     Vital Signs (Most Recent):  Temp: 97.7 °F (36.5 °C) (02/02/19 1246)  Pulse: 64 (02/02/19  1545)  Resp: (!) 54 (02/02/19 1545)  BP: 135/60 (02/02/19 1530)  SpO2: (!) 89 % (02/02/19 1545) Vital Signs (24h Range):  Temp:  [97.7 °F (36.5 °C)-98 °F (36.7 °C)] 97.7 °F (36.5 °C)  Pulse:  [] 64  Resp:  [11-54] 54  SpO2:  [82 %-100 %] 89 %  BP: ()/(54-89) 135/60     Weight: 102 kg (224 lb 13.9 oz)  Body mass index is 39.83 kg/m².    Intake/Output Summary (Last 24 hours) at 2/2/2019 1617  Last data filed at 2/2/2019 1500  Gross per 24 hour   Intake 2032.22 ml   Output 980 ml   Net 1052.22 ml      Physical Exam   Constitutional: She is oriented to person, place, and time. She appears well-developed. No distress.   HENT:   Head: Normocephalic and atraumatic.   Mouth/Throat: Oropharynx is clear and moist.   Neck: Normal range of motion. Neck supple. No JVD present.   Cardiovascular: Normal rate, regular rhythm and intact distal pulses.   Pulmonary/Chest: She has no wheezes.   Bilateral rhonchi    Abdominal: Soft. Bowel sounds are normal. She exhibits no distension.   Musculoskeletal: Normal range of motion. She exhibits no edema.   Neurological: She is alert and oriented to person, place, and time.   Skin: Skin is warm and dry. Capillary refill takes 2 to 3 seconds. No erythema.   Psychiatric: She has a normal mood and affect. Her behavior is normal.       Significant Labs:   BMP:   Recent Labs   Lab 02/02/19  0442   *   *   K 4.0   CL 96   CO2 19*   BUN 51*   CREATININE 3.7*   CALCIUM 6.8*     CBC: No results for input(s): WBC, HGB, HCT, PLT in the last 48 hours.  POCT Glucose:   Recent Labs   Lab 02/01/19  2125 02/02/19  0718 02/02/19  1122   POCTGLUCOSE 236* 154* 126*       Significant Imaging: I have reviewed all pertinent imaging results/findings within the past 24 hours.    Assessment/Plan:      Acute bronchitis    Nebs  acapella  Wean oxygen as tolerated  Mucolytic  augmentin   Prednisone taper     CXR pending     Atrial fibrillation with rapid ventricular response    Successful  cardioversion 1/30, went back into afib rvr and transferred back to ICU 2/1- cardioverted into NSR 2/2  Amiodarone, metoprolol and eliquis  Follow up cardiology     Monitor in ICU overnight      Chronic gout    Stable; will continue her home regimen of allopurinol.     Obesity, Class II, BMI 35-39.9    The patient has been counseled on the negative impact that obesity imparts on her health and was encouraged to make lifestyle changes in order to lose weight and decrease her modifiable risk factors. Body mass index is 36.4 kg/m².       CKD Stage 3    FELICIA on CKD secondary to intravascular hypovolemia  Gentle IVF   BMP later today and in am  Avoid nephrotoxins    Now with acute renal failure. Nephrology consulted.      Paroxysmal atrial fibrillation    As addressed above.     MGUS (monoclonal gammopathy of unknown significance)    Stable; will encourage continued follow-up with her hematologist.     Essential hypertension    Patient's blood pressure is well-controlled; will continue home regimen of amlodipine, furosemide, lisinopril, and metoprolol, and provide as-needed clonidine.     Hyperlipidemia    Well-controlled; will continue patient's home regimen of atorvastatin.     Type 2 diabetes mellitus, controlled, with renal complications    Her diabetes is well-controlled and she is currently not on home medications.  Will provide insulin sliding scale.       VTE Risk Mitigation (From admission, onward)        Ordered     apixaban tablet 2.5 mg  2 times daily      01/30/19 1542     IP VTE HIGH RISK PATIENT  Once      01/28/19 1801          Critical care time spent on the evaluation and treatment of severe organ dysfunction, review of pertinent labs and imaging studies, discussions with consulting providers and discussions with patient/family: 30 minutes.    Emma Richards MD  Department of Hospital Medicine   [facility]

## 2019-02-02 NOTE — PROCEDURES
"Barbara Rizo is a 78 y.o. female patient.    Temp: 98 °F (36.7 °C) (02/02/19 1200)  Pulse: 109 (02/02/19 1200)  Resp: 19 (02/02/19 1200)  BP: 117/78 (02/02/19 1200)  SpO2: (!) 92 % (02/02/19 1200)  Weight: 102 kg (224 lb 13.9 oz) (02/02/19 0300)  Height: 5' 3" (160 cm) (01/29/19 1115)       Procedures     CV   Dr Lomas  Pre-op Dx A-fib  Post-op Dx same  Specimen none  EBL < 50 cc    2/2/19 CV - 360J converted A-fib to sinus bradycardia 55. Change amiodarone to po. Ok for telemetry    Amauri Lomas  2/2/2019  "

## 2019-02-02 NOTE — CONSULTS
REASON FOR CONSULTATION:  Renal failure.    HISTORY OF PRESENT ILLNESS:  The patient is a 78-year-old lady with past medical   history significant for hypertension, diabetes type 2, paroxysmal AFib,   monoclonal gammopathy with uncertain significant who was admitted to the   hospital with AFib with rapid ventricular response on January 28th and the   patient underwent TALHA with DC cardioversion which was successful and the patient   was transferred to the Telemetry Unit; however, the patient was also found with   elevation of serum BUN and creatinine and we were consulted to see the patient   for evaluation of acute renal failure.  The patient went back to AFib with rapid   ventricular response earlier and the patient was transferred back to the ICU.    At this time, the patient denied any chest pain, shortness of breath, fever,   chills, nausea or vomiting.    PAST MEDICAL HISTORY:  As above.    MEDICATIONS:  The patient is currently receiving allopurinol 100 mg p.o. daily,   Augmentin 500 mg p.o. b.i.d., apixaban 2.5 mg p.o. b.i.d., Lipitor 40 mg p.o.   daily, nebulizer with Xopenex at q. 8 hours, Lopressor at 25 mg p.o. b.i.d. and   prednisone 40 mg p.o. daily and the patient currently also on IV fluid with   normal saline at 125 mL per hour.    FAMILY HISTORY:  Noncontributory.    SOCIAL HISTORY:  The patient denied any history of tobacco use, alcohol abuse or   IV drug use.    PHYSICAL EXAMINATION:  GENERAL:  The patient is awake, alert and in no apparent distress.  VITAL SIGNS:  Temperature 97.8 with a blood pressure of 131/72 with a pulse of   115, respirations of 19.  HEENT:  Pupils equal, round and reactive to light.  EOMI.  Oral mucosa is dry.  HEART:  Irregular irregular rhythm and rate.  LUNGS:  Clear to auscultation and percussion bilaterally.  ABDOMEN:  Soft, positive bowel sounds, nondistended, nontender.  EXTREMITIES:  Without any edema.    LABORATORY DATA:  WBC is 5.44, hemoglobin is 13.6, hematocrit  is 42.9 and   platelet count is 115.  Sodium is 128, potassium is 3.6, chloride 95, CO2 20,   BUN is 46, creatinine 3.5 and glucose 150.    IMPRESSION:  1.  Acute kidney injury.  Clinically, the patient appeared to be prerenal.  2.  Chronic kidney disease, stage III.  3.  Atrial fibrillation with rapid ventricular response.  4.  Hypertension.  5.  Diabetes type 2.  6.  Monoclonal gammopathy of uncertain significance.    DISCUSSION AND RECOMMENDATION:  At this time, we will continue the patient with   IV fluid; however, we got to reduce the IV fluid rate to 75 mL per hour.  We   will also send urine for sodium, creatinine and eosinophils.  We will watch the   patient's renal function closely.  We will continue to follow the patient with   you.    Thank you for the courtesy of the consultation and allowing me to participate in   the patient's care.      JENNIFER  dd: 02/01/2019 12:41:21 (CST)  td: 02/01/2019 18:40:05 (CST)  Doc ID   #[id number]  Job ID #[id number]    CC:

## 2019-02-02 NOTE — SUBJECTIVE & OBJECTIVE
Interval History: cough not improving, drop in O2 sats    Review of Systems   HENT: Positive for congestion.    Eyes: Negative.    Respiratory: Positive for cough. Negative for shortness of breath.    Cardiovascular: Negative.    Gastrointestinal: Negative.    Genitourinary: Negative.    Musculoskeletal: Positive for arthralgias.   Neurological: Positive for weakness.     Objective:     Vital Signs (Most Recent):  Temp: 97.7 °F (36.5 °C) (02/02/19 1246)  Pulse: 64 (02/02/19 1545)  Resp: (!) 54 (02/02/19 1545)  BP: 135/60 (02/02/19 1530)  SpO2: (!) 89 % (02/02/19 1545) Vital Signs (24h Range):  Temp:  [97.7 °F (36.5 °C)-98 °F (36.7 °C)] 97.7 °F (36.5 °C)  Pulse:  [] 64  Resp:  [11-54] 54  SpO2:  [82 %-100 %] 89 %  BP: ()/(54-89) 135/60     Weight: 102 kg (224 lb 13.9 oz)  Body mass index is 39.83 kg/m².    Intake/Output Summary (Last 24 hours) at 2/2/2019 1617  Last data filed at 2/2/2019 1500  Gross per 24 hour   Intake 2032.22 ml   Output 980 ml   Net 1052.22 ml      Physical Exam   Constitutional: She is oriented to person, place, and time. She appears well-developed. No distress.   HENT:   Head: Normocephalic and atraumatic.   Mouth/Throat: Oropharynx is clear and moist.   Neck: Normal range of motion. Neck supple. No JVD present.   Cardiovascular: Normal rate, regular rhythm and intact distal pulses.   Pulmonary/Chest: She has no wheezes.   Bilateral rhonchi    Abdominal: Soft. Bowel sounds are normal. She exhibits no distension.   Musculoskeletal: Normal range of motion. She exhibits no edema.   Neurological: She is alert and oriented to person, place, and time.   Skin: Skin is warm and dry. Capillary refill takes 2 to 3 seconds. No erythema.   Psychiatric: She has a normal mood and affect. Her behavior is normal.       Significant Labs:   BMP:   Recent Labs   Lab 02/02/19  0442   *   *   K 4.0   CL 96   CO2 19*   BUN 51*   CREATININE 3.7*   CALCIUM 6.8*     CBC: No results for  input(s): WBC, HGB, HCT, PLT in the last 48 hours.  POCT Glucose:   Recent Labs   Lab 02/01/19  2125 02/02/19  0718 02/02/19  1122   POCTGLUCOSE 236* 154* 126*       Significant Imaging: I have reviewed all pertinent imaging results/findings within the past 24 hours.

## 2019-02-02 NOTE — CARE UPDATE
Still with A-fib on IV amiodarone  CV today - no TALHA needed with recent negative TALHA for thrombus

## 2019-02-02 NOTE — PLAN OF CARE
Problem: Adult Inpatient Plan of Care  Goal: Plan of Care Review  Outcome: Ongoing (interventions implemented as appropriate)  Patient remains in ICU in Atrial fib with rate 105. Continues on amiodarone drip at 0.5mg. Wheezing noted and SOB with minimal exertion. Resp tx q6hr. Rolle drained 325ml clear yellow urine. Patient face is red and blanchable redness noted to groin and buttocks. Turns self in bed. Fall prevention discussed. Continues on NS at 75ml/hr.

## 2019-02-02 NOTE — ASSESSMENT & PLAN NOTE
Successful cardioversion 1/30, went back into afib rvr and transferred back to ICU 2/1- cardioverted into NSR 2/2  Amiodarone, metoprolol and eliquis  Follow up cardiology     Monitor in ICU overnight

## 2019-02-02 NOTE — PROGRESS NOTES
Date of Admission:1/28/2019    SUBJECTIVE: notes feeling ok    Current Facility-Administered Medications   Medication    0.9%  NaCl infusion    acetaminophen tablet 500 mg    allopurinol tablet 100 mg    amiodarone 360 mg/200 mL (1.8 mg/mL) infusion    amoxicillin-clavulanate 500-125mg per tablet 500 mg    apixaban tablet 2.5 mg    atorvastatin tablet 40 mg    benzonatate capsule 100 mg    cloNIDine tablet 0.1 mg    dextrose 50% injection 12.5 g    dextrose 50% injection 25 g    glucagon (human recombinant) injection 1 mg    glucose chewable tablet 16 g    glucose chewable tablet 24 g    guaiFENesin 12 hr tablet 600 mg    insulin aspart U-100 pen 0-5 Units    levalbuterol nebulizer solution 0.63 mg    loperamide capsule 2 mg    metoprolol tartrate (LOPRESSOR) tablet 25 mg    ondansetron injection 4 mg    predniSONE tablet 40 mg    promethazine (PHENERGAN) 6.25 mg in dextrose 5 % 50 mL IVPB    ramelteon tablet 8 mg    senna-docusate 8.6-50 mg per tablet 1 tablet    sodium bicarbonate solution 50 mEq       Wt Readings from Last 3 Encounters:   02/02/19 102 kg (224 lb 13.9 oz)   01/30/19 91.2 kg (201 lb)   01/28/19 93 kg (205 lb 0.4 oz)     Temp Readings from Last 3 Encounters:   02/02/19 98 °F (36.7 °C) (Oral)   01/28/19 98.3 °F (36.8 °C) (Oral)   10/18/18 98.4 °F (36.9 °C) (Oral)     BP Readings from Last 3 Encounters:   02/02/19 113/73   01/28/19 132/84   11/27/18 (!) 190/92     Pulse Readings from Last 3 Encounters:   02/02/19 110   01/28/19 (!) 139   10/18/18 (!) 55       Intake/Output Summary (Last 24 hours) at 2/2/2019 0945  Last data filed at 2/2/2019 0800  Gross per 24 hour   Intake 7723.94 ml   Output 565 ml   Net 7158.94 ml       PE:  GEN:wd female in nad  HEENT:ncat,eomi,mm  CVS:s1s2 tachy  PULM:ctab  ABD:+bs,soft,nd  EXT:no leg edema  NEURO:awake    Recent Labs   Lab 02/02/19  0442   *   *   K 4.0   CL 96   CO2 19*   BUN 51*   CREATININE 3.7*   CALCIUM 6.8*       Lab  Results   Component Value Date    CALCIUM 6.8 (LL) 02/02/2019    PHOS 3.3 01/29/2019     Corrected ca- 7.8    No results for input(s): WBC, RBC, HGB, HCT, PLT, MCV, MCH, MCHC in the last 24 hours.    correcte  A/P:  1.FELICIA. On ckd3. Poor uop. Following.  Ck urine cx ngtd. Following creatinine. atn 2nd to afib I suspect.  2.AFIB w/rvr. Rate up. Cardioversion today. Following. Cont eliquis.  3.hyponatremia. Ck urine sodium/osm.  4.MGUS.  Following. Mild proteinuria.   5.bronchitis. Consider weaning steroids.  Following augmentin. Dose ok for gfr.  6.htn. NO ace/arb. bp ok.  7.gout.cont tx.

## 2019-02-03 PROBLEM — N18.9 ACUTE KIDNEY INJURY SUPERIMPOSED ON CHRONIC KIDNEY DISEASE: Status: ACTIVE | Noted: 2019-01-28

## 2019-02-03 PROBLEM — N17.9 ACUTE KIDNEY INJURY SUPERIMPOSED ON CHRONIC KIDNEY DISEASE: Status: ACTIVE | Noted: 2019-01-28

## 2019-02-03 PROBLEM — I48.91 ATRIAL FIBRILLATION WITH RAPID VENTRICULAR RESPONSE: Status: RESOLVED | Noted: 2019-01-31 | Resolved: 2019-02-03

## 2019-02-03 PROBLEM — I48.91 ATRIAL FIBRILLATION WITH RAPID VENTRICULAR RESPONSE: Status: ACTIVE | Noted: 2019-02-03

## 2019-02-03 LAB
ANION GAP SERPL CALC-SCNC: 13 MMOL/L
BNP SERPL-MCNC: 1539 PG/ML
BUN SERPL-MCNC: 57 MG/DL
CA-I BLDV-SCNC: 0.95 MMOL/L
CALCIUM SERPL-MCNC: 6.9 MG/DL
CHLORIDE SERPL-SCNC: 97 MMOL/L
CO2 SERPL-SCNC: 18 MMOL/L
CREAT SERPL-MCNC: 3.9 MG/DL
EST. GFR  (AFRICAN AMERICAN): 12 ML/MIN/1.73 M^2
EST. GFR  (NON AFRICAN AMERICAN): 11 ML/MIN/1.73 M^2
GLUCOSE SERPL-MCNC: 129 MG/DL
OSMOLALITY SERPL: 287 MOSM/KG
OSMOLALITY UR: 220 MOSM/KG
POCT GLUCOSE: 114 MG/DL (ref 70–110)
POCT GLUCOSE: 115 MG/DL (ref 70–110)
POCT GLUCOSE: 153 MG/DL (ref 70–110)
POCT GLUCOSE: 166 MG/DL (ref 70–110)
POCT GLUCOSE: 216 MG/DL (ref 70–110)
POTASSIUM SERPL-SCNC: 4 MMOL/L
SODIUM SERPL-SCNC: 128 MMOL/L

## 2019-02-03 PROCEDURE — 25000003 PHARM REV CODE 250: Performed by: INTERNAL MEDICINE

## 2019-02-03 PROCEDURE — 99232 PR SUBSEQUENT HOSPITAL CARE,LEVL II: ICD-10-PCS | Mod: ,,, | Performed by: INTERNAL MEDICINE

## 2019-02-03 PROCEDURE — 25000003 PHARM REV CODE 250: Performed by: HOSPITALIST

## 2019-02-03 PROCEDURE — 63600175 PHARM REV CODE 636 W HCPCS: Performed by: INTERNAL MEDICINE

## 2019-02-03 PROCEDURE — 83880 ASSAY OF NATRIURETIC PEPTIDE: CPT

## 2019-02-03 PROCEDURE — 63600175 PHARM REV CODE 636 W HCPCS: Performed by: HOSPITALIST

## 2019-02-03 PROCEDURE — 80048 BASIC METABOLIC PNL TOTAL CA: CPT

## 2019-02-03 PROCEDURE — 99232 SBSQ HOSP IP/OBS MODERATE 35: CPT | Mod: ,,, | Performed by: INTERNAL MEDICINE

## 2019-02-03 PROCEDURE — 94799 UNLISTED PULMONARY SVC/PX: CPT

## 2019-02-03 PROCEDURE — 83930 ASSAY OF BLOOD OSMOLALITY: CPT

## 2019-02-03 PROCEDURE — 27000221 HC OXYGEN, UP TO 24 HOURS

## 2019-02-03 PROCEDURE — 36415 COLL VENOUS BLD VENIPUNCTURE: CPT

## 2019-02-03 PROCEDURE — 21400001 HC TELEMETRY ROOM

## 2019-02-03 PROCEDURE — 25000242 PHARM REV CODE 250 ALT 637 W/ HCPCS: Performed by: HOSPITALIST

## 2019-02-03 PROCEDURE — 94640 AIRWAY INHALATION TREATMENT: CPT

## 2019-02-03 PROCEDURE — 83935 ASSAY OF URINE OSMOLALITY: CPT

## 2019-02-03 PROCEDURE — 82330 ASSAY OF CALCIUM: CPT

## 2019-02-03 RX ORDER — FUROSEMIDE 10 MG/ML
40 INJECTION INTRAMUSCULAR; INTRAVENOUS ONCE
Status: COMPLETED | OUTPATIENT
Start: 2019-02-03 | End: 2019-02-03

## 2019-02-03 RX ADMIN — LEVALBUTEROL HYDROCHLORIDE 0.63 MG: 0.63 SOLUTION RESPIRATORY (INHALATION) at 03:02

## 2019-02-03 RX ADMIN — APIXABAN 2.5 MG: 2.5 TABLET, FILM COATED ORAL at 09:02

## 2019-02-03 RX ADMIN — RAMELTEON 8 MG: 8 TABLET, FILM COATED ORAL at 08:02

## 2019-02-03 RX ADMIN — GUAIFENESIN 600 MG: 600 TABLET, EXTENDED RELEASE ORAL at 09:02

## 2019-02-03 RX ADMIN — LEVALBUTEROL HYDROCHLORIDE 0.63 MG: 0.63 SOLUTION RESPIRATORY (INHALATION) at 12:02

## 2019-02-03 RX ADMIN — APIXABAN 2.5 MG: 2.5 TABLET, FILM COATED ORAL at 08:02

## 2019-02-03 RX ADMIN — PREDNISONE 40 MG: 20 TABLET ORAL at 09:02

## 2019-02-03 RX ADMIN — FUROSEMIDE 40 MG: 10 INJECTION, SOLUTION INTRAVENOUS at 10:02

## 2019-02-03 RX ADMIN — BENZONATATE 100 MG: 100 CAPSULE ORAL at 12:02

## 2019-02-03 RX ADMIN — LEVALBUTEROL HYDROCHLORIDE 0.63 MG: 0.63 SOLUTION RESPIRATORY (INHALATION) at 08:02

## 2019-02-03 RX ADMIN — AMIODARONE HYDROCHLORIDE 200 MG: 200 TABLET ORAL at 08:02

## 2019-02-03 RX ADMIN — AMIODARONE HYDROCHLORIDE 200 MG: 200 TABLET ORAL at 09:02

## 2019-02-03 RX ADMIN — ACETAMINOPHEN 500 MG: 500 TABLET, FILM COATED ORAL at 02:02

## 2019-02-03 RX ADMIN — ATORVASTATIN CALCIUM 40 MG: 40 TABLET, FILM COATED ORAL at 09:02

## 2019-02-03 RX ADMIN — GUAIFENESIN 600 MG: 600 TABLET, EXTENDED RELEASE ORAL at 08:02

## 2019-02-03 RX ADMIN — AMOXICILLIN AND CLAVULANATE POTASSIUM 500 MG: 500; 125 TABLET, FILM COATED ORAL at 08:02

## 2019-02-03 RX ADMIN — LOPERAMIDE HYDROCHLORIDE 2 MG: 2 CAPSULE ORAL at 11:02

## 2019-02-03 RX ADMIN — INSULIN ASPART 2 UNITS: 100 INJECTION, SOLUTION INTRAVENOUS; SUBCUTANEOUS at 04:02

## 2019-02-03 RX ADMIN — ALLOPURINOL 100 MG: 100 TABLET ORAL at 09:02

## 2019-02-03 RX ADMIN — CALCIUM GLUCONATE 500 MG: 94 INJECTION, SOLUTION INTRAVENOUS at 08:02

## 2019-02-03 RX ADMIN — METOPROLOL TARTRATE 25 MG: 25 TABLET ORAL at 08:02

## 2019-02-03 RX ADMIN — AMOXICILLIN AND CLAVULANATE POTASSIUM 500 MG: 500; 125 TABLET, FILM COATED ORAL at 09:02

## 2019-02-03 NOTE — PROGRESS NOTES
[facility] Hospital Medicine  Progress Note    Patient Name: Barbara Rizo  MRN: [id number]  Patient Class: IP- Inpatient   Admission Date: 1/28/2019  Length of Stay: 6 days  Attending Physician: Emma Richards MD  Primary Care Provider: Paras Oro MD        Subjective:     Principal Problem:Atrial fibrillation with rapid ventricular response    HPI:  Ms. Barbara Rizo is a 78 y.o. female with essential hypertension, hyperlipidemia (LDL 62.4 Jul 2018), type 2 diabetes mellitus (HbA1c 5.8% Jul 2018), CKD Stage 3, paroxysmal atrial fibrillation ([id number] score 5) not on chronic anticoagulation, obesity (BMI 36.0), MGUS, and chronic gout who presents to [facility] ED with complaints of coughing for three days.  She started to have a non-productive cough, wheezing, and mild dyspnea three days ago which has steadily been worsening since onset.  She also complains of a subjective fever but otherwise denies any nausea, vomiting, chest pain, palpitations, pleurisy, nor any diaphoresis.  She denies any recent travel, hemoptysis, nor any lower extremity pain or swelling.  She does say that she's under a lot of stress recently with her ex- dying yesterday at [facility] due to complications from a heart valve replacement two weeks ago.  She does say that she may have had sick contacts while visiting her ex-.  Her appetite has been poor but she denies any weight loss.    While at her ENT's appointment today she decided to schedule an appointment with her PCP to evaluate her upper airway complaints.  She was unable to see her regular PCP, Dr. Paras Oro, but was able to be seen by another physician who became concerned when she was noted to be in AFib with RVR on EKG.  He recommended EMS transportation to the ED but she refused and decided to drive herself.  Her cardiologist is Dr. Amauri Lomas.    Hospital Course:  Pt admitted to ICU with afib rvr on amiodarone  infusion. Pt with elevated HR today and after TALHA done patient became very agitated and anxious. Ativan 1mg IV given and symptoms got worse. HR up into the 170s and O2 sats dropped to 77%. Placed on NRB. Improved O2 sats. 5mg IV haldol given as patient was trying to get out of bed and pulling oxygen mask off. Cardizem 10mg Iv given with improved HR to 120s-130s. xopenex scheduled Q 8 hours. Changed to zosyn with temp 102F.     1/30- successful cardioversion, post procedure confused and pulling oxygen off, desaturation, constant re-direction, monitor in ICU overnight. Changed to oral amiodarone. eliquis for anticoagulation. Holding parameters on BP meds. IV steroids, nebs and antibiotic for probable lower resp infection. IV lasix as Bp tolerates.   1/31- HR remain under control NSR 60-70s.On amiodarone, metoprolol and eliquis.  Resp status improved and weaning oxygen. Steroids changed to oral route. DC zosyn and switch to augmentin. Add acapella to nebs. Cr increased from baseline (1.5-1.7). Gentle IVF and monitor with repeat BMP later this evening. PT?OT consulted for dc planning. Weaning oxygen. PT/OT consulted and rec H/H without equipment. The patient was transferred to the floor on 1/31. Nephrology was consulted for worsening renal function.     2/2- pt transferred with blayne rvr. Successful cardioversion again today. Continue oral amiodarone and BB. CXR pending, still has aggressive cough and URI symptoms. On mucolytic and antibiotic.   2/3- HR 50-60s sinus. Cr sightly higher, sodium 128. BNP 1500. Lasix x one dose. Bringing up more mucus. Steroids weaned 20mg. Still faint wheezes on exam. Stable for transfer to telemetry.    Interval History: pt reports bringing up more mucus, chest is sore from cough    Review of Systems   HENT: Positive for congestion.    Eyes: Negative.    Respiratory: Positive for cough. Negative for shortness of breath.    Cardiovascular: Negative.    Gastrointestinal: Negative.     Genitourinary: Negative.    Musculoskeletal: Positive for arthralgias.   Neurological: Positive for weakness.     Objective:     Vital Signs (Most Recent):  Temp: 98.3 °F (36.8 °C) (02/03/19 1115)  Pulse: 71 (02/03/19 1200)  Resp: (!) 28 (02/03/19 1200)  BP: 117/61 (02/03/19 1200)  SpO2: 96 % (02/03/19 1200) Vital Signs (24h Range):  Temp:  [96.6 °F (35.9 °C)-98.3 °F (36.8 °C)] 98.3 °F (36.8 °C)  Pulse:  [50-72] 71  Resp:  [13-54] 28  SpO2:  [82 %-100 %] 96 %  BP: ()/(53-71) 117/61     Weight: 102 kg (224 lb 13.9 oz)  Body mass index is 39.83 kg/m².    Intake/Output Summary (Last 24 hours) at 2/3/2019 1233  Last data filed at 2/3/2019 1115  Gross per 24 hour   Intake 1440 ml   Output 952 ml   Net 488 ml      Physical Exam   Constitutional: She is oriented to person, place, and time. She appears well-developed. No distress.   HENT:   Head: Normocephalic and atraumatic.   Mouth/Throat: Oropharynx is clear and moist.   Neck: Normal range of motion. Neck supple. No JVD present.   Cardiovascular: Normal rate, regular rhythm and intact distal pulses.   Pulmonary/Chest: She has wheezes.   Bilateral rhonchi    Abdominal: Soft. Bowel sounds are normal. She exhibits no distension.   Musculoskeletal: Normal range of motion. She exhibits no edema.   Neurological: She is alert and oriented to person, place, and time.   Skin: Skin is warm and dry. Capillary refill takes 2 to 3 seconds. No erythema.   Psychiatric: She has a normal mood and affect. Her behavior is normal.       Significant Labs:   BMP:   Recent Labs   Lab 02/03/19  0450   *   *   K 4.0   CL 97   CO2 18*   BUN 57*   CREATININE 3.9*   CALCIUM 6.9*       Significant Imaging: I have reviewed all pertinent imaging results/findings within the past 24 hours.    Assessment/Plan:      Atrial fibrillation with rapid ventricular response    Resolved   Cardioverted x2        Acute bronchitis    Nebs  acapella - not available   Wean oxygen as  tolerated  Mucolytic  augmentin   Prednisone taper   Sputum culture- no significant growth, normal clifford     CXR 2/2- no acute changes     Chronic gout    Stable; will continue her home regimen of allopurinol.     Obesity, Class II, BMI 35-39.9    The patient has been counseled on the negative impact that obesity imparts on her health and was encouraged to make lifestyle changes in order to lose weight and decrease her modifiable risk factors. Body mass index is 36.4 kg/m².       Acute kidney injury superimposed on chronic kidney disease    FELICIA on CKD secondary to intravascular hypovolemia  Gentle IVF - dc'd  BMP daily  Avoid nephrotoxins    Now with acute renal failure. Nephrology consulted.   LAsix x one dose        Paroxysmal atrial fibrillation    Amiodarone   Beta blocker  eliquis - renal dose   Follow up cardiology      MGUS (monoclonal gammopathy of unknown significance)    Stable; will encourage continued follow-up with her hematologist.     Essential hypertension    Patient's blood pressure is well-controlled; will continue home regimen of amlodipine, furosemide, lisinopril, and metoprolol, and provide as-needed clonidine.     Hyperlipidemia    Well-controlled; will continue patient's home regimen of atorvastatin.     Type 2 diabetes mellitus, controlled, with renal complications    Her diabetes is well-controlled and she is currently not on home medications.  Will provide insulin sliding scale.       VTE Risk Mitigation (From admission, onward)        Ordered     apixaban tablet 2.5 mg  2 times daily      01/30/19 1542     IP VTE HIGH RISK PATIENT  Once      01/28/19 1801          Critical care time spent on the evaluation and treatment of severe organ dysfunction, review of pertinent labs and imaging studies, discussions with consulting providers and discussions with patient/family: 35 minutes.    Emma Richards MD  Department of Hospital Medicine   [facility]

## 2019-02-03 NOTE — PROGRESS NOTES
[facility]  Cardiology  Progress Note    Patient Name: Barbara Rizo  MRN: [id number]  Admission Date: 1/28/2019  Hospital Length of Stay: 6 days  Code Status: Full Code   Attending Physician: Emma Richards MD   Primary Care Physician: Paras Oro MD  Expected Discharge Date:   Principal Problem:Atrial fibrillation with rapid ventricular response    Subjective:     Hospital Course:   1-29:  Admitted with AFib with RVR  1-30:  Underwent TALHA/DC cardioversion successfully  2-1 Back in A-fib 120s. SOB  2-3 Still coughing and SOB. NSR 60       2/2/19 CV - 360J converted A-fib to sinus bradycardia 55. Change amiodarone to po. Ok for telemetry           Echo 1/29/19  · TDs  · Normal left ventricular systolic function. The estimated ejection fraction is 55% * specificity decreased secondary to tachyarrhythmia  · Indeterminate left ventricular diastolic function.  · Severe left atrial enlargement.  · Mild-to-moderate mitral regurgitation.  · Intermediate central venous pressure (8 mm Hg).  · The estimated PA systolic pressure is 52 mm Hg  Pulmonary hypertension present.    Interval History:     Review of Systems   Constitution: Negative for decreased appetite.   HENT: Negative for ear discharge.    Eyes: Negative for blurred vision.   Respiratory: Negative for hemoptysis.    Endocrine: Negative for polyphagia.   Hematologic/Lymphatic: Negative for adenopathy.   Skin: Negative for color change.   Musculoskeletal: Negative for joint swelling.   Neurological: Negative for brief paralysis.   Psychiatric/Behavioral: Negative for hallucinations.     Objective:     Vital Signs (Most Recent):  Temp: 96.6 °F (35.9 °C) (02/03/19 0715)  Pulse: 62 (02/03/19 0900)  Resp: 19 (02/03/19 0900)  BP: (!) 115/53 (02/03/19 0900)  SpO2: 98 % (02/03/19 0900) Vital Signs (24h Range):  Temp:  [96.6 °F (35.9 °C)-98.1 °F (36.7 °C)] 96.6 °F (35.9 °C)  Pulse:  [] 62  Resp:  [13-54] 19  SpO2:  [82 %-100 %] 98 %  BP:  ()/(53-87) 115/53     Weight: 102 kg (224 lb 13.9 oz)  Body mass index is 39.83 kg/m².     SpO2: 98 %  O2 Device (Oxygen Therapy): nasal cannula      Intake/Output Summary (Last 24 hours) at 2/3/2019 0921  Last data filed at 2/3/2019 0900  Gross per 24 hour   Intake 1682.18 ml   Output 940 ml   Net 742.18 ml       Lines/Drains/Airways     Drain                 Urethral Catheter 01/29/19 0301 16 Fr. 5 days          Peripheral Intravenous Line                 Peripheral IV - Single Lumen 01/29/19 0920 Right Wrist 5 days         Peripheral IV - Single Lumen 01/29/19 0930 Anterior;Right Upper Arm 4 days                Physical Exam   Constitutional: She is oriented to person, place, and time. She appears well-developed and well-nourished.   HENT:   Head: Normocephalic and atraumatic.   Eyes: Conjunctivae are normal. Pupils are equal, round, and reactive to light.   Neck: Normal range of motion. Neck supple.   Cardiovascular: Normal rate, normal heart sounds and intact distal pulses.   Pulmonary/Chest: Effort normal. She has wheezes.   Abdominal: Soft. Bowel sounds are normal.   Musculoskeletal: Normal range of motion.   Neurological: She is alert and oriented to person, place, and time.   Skin: Skin is warm and dry.       Significant Labs: All pertinent lab results from the last 24 hours have been reviewed.    Significant Imaging: Echocardiogram:   2D echo with color flow doppler:   Results for orders placed or performed during the hospital encounter of 08/21/15   2D echo with color flow doppler   Result Value Ref Range    QEF 75 55 - 65    Mitral Valve Regurgitation MILD     Est. PA Systolic Pressure 40.21 (A)     Tricuspid Valve Regurgitation MODERATE (A)      Assessment and Plan:     Brief HPI:     Atrial fibrillation with rapid ventricular response    Back in A-fib RVR. S/P CV to NSR. On PO amiodarone. Will f/u prn     CKD Stage 3    Acute on chronic failure this a.m.  Bolus 1 L and continue IVF with normal  saline     MGUS (monoclonal gammopathy of unknown significance)           Essential hypertension    Currently holding ACE-inhibitor and amlodipine  Add back if needed       Hyperlipidemia    On statin     Type 2 diabetes mellitus, controlled, with renal complications    Per IM         VTE Risk Mitigation (From admission, onward)        Ordered     apixaban tablet 2.5 mg  2 times daily      01/30/19 1542     IP VTE HIGH RISK PATIENT  Once      01/28/19 1809          Amauri Lomas MD  Cardiology  [facility]

## 2019-02-03 NOTE — PROGRESS NOTES
Date of Admission:1/28/2019    SUBJECTIVE: notes  stll coughing    Current Facility-Administered Medications   Medication    acetaminophen tablet 500 mg    allopurinol tablet 100 mg    amiodarone tablet 200 mg    amoxicillin-clavulanate 500-125mg per tablet 500 mg    apixaban tablet 2.5 mg    atorvastatin tablet 40 mg    benzonatate capsule 100 mg    cloNIDine tablet 0.1 mg    dextrose 50% injection 12.5 g    dextrose 50% injection 25 g    glucagon (human recombinant) injection 1 mg    glucose chewable tablet 16 g    glucose chewable tablet 24 g    guaiFENesin 12 hr tablet 600 mg    insulin aspart U-100 pen 0-5 Units    levalbuterol nebulizer solution 0.63 mg    loperamide capsule 2 mg    metoprolol tartrate (LOPRESSOR) tablet 25 mg    ondansetron injection 4 mg    predniSONE tablet 40 mg    promethazine (PHENERGAN) 6.25 mg in dextrose 5 % 50 mL IVPB    ramelteon tablet 8 mg    senna-docusate 8.6-50 mg per tablet 1 tablet    traMADol tablet 50 mg       Wt Readings from Last 3 Encounters:   02/02/19 102 kg (224 lb 13.9 oz)   01/30/19 91.2 kg (201 lb)   01/28/19 93 kg (205 lb 0.4 oz)     Temp Readings from Last 3 Encounters:   02/03/19 96.6 °F (35.9 °C) (Axillary)   01/28/19 98.3 °F (36.8 °C) (Oral)   10/18/18 98.4 °F (36.9 °C) (Oral)     BP Readings from Last 3 Encounters:   02/03/19 (!) 118/56   01/28/19 132/84   11/27/18 (!) 190/92     Pulse Readings from Last 3 Encounters:   02/03/19 (!) 57   01/28/19 (!) 139   10/18/18 (!) 55       Intake/Output Summary (Last 24 hours) at 2/3/2019 1031  Last data filed at 2/3/2019 1000  Gross per 24 hour   Intake 1573.4 ml   Output 957 ml   Net 616.4 ml       PE:  GEN:wd female in nad  HEENT:ncat,eomi,mm  CVS:s1s2 marlon  PULM:some wheezing  ABD:+bs,soft,nd  EXT:1+ leg edema  NEURO:awake    Recent Labs   Lab 02/03/19  0450   *   *   K 4.0   CL 97   CO2 18*   BUN 57*   CREATININE 3.9*   CALCIUM 6.9*       Lab Results   Component Value Date     CALCIUM 6.9 (LL) 02/03/2019    PHOS 3.3 01/29/2019     Corrected ca- 7.9    No results for input(s): WBC, RBC, HGB, HCT, PLT, MCV, MCH, MCHC in the last 24 hours.    correcte  A/P:  1.FELICIA. On ckd3. Poor uop. Following.  Ck urine cx ngtd. Following creatinine. atn 2nd to afib I suspect. Rate better. Dc ivfs now pt drinking well.  2.AFIB w/rvr. Rate up. Cardioversion today. Following. Cont eliquis.  3.hyponatremia. Ck urine sodium/osm.  Camejo  4.MGUS.  Following. Mild proteinuria.   5.bronchitis. Consider weaning steroids.  Following augmentin. Dose ok for gfr.  6.htn. NO ace/arb. bp ok.  7.gout.cont tx.  8.volume overload some. Dc ivfs. Lasix x 1.

## 2019-02-03 NOTE — SUBJECTIVE & OBJECTIVE
Interval History: pt reports bringing up more mucus, chest is sore from cough    Review of Systems   HENT: Positive for congestion.    Eyes: Negative.    Respiratory: Positive for cough. Negative for shortness of breath.    Cardiovascular: Negative.    Gastrointestinal: Negative.    Genitourinary: Negative.    Musculoskeletal: Positive for arthralgias.   Neurological: Positive for weakness.     Objective:     Vital Signs (Most Recent):  Temp: 98.3 °F (36.8 °C) (02/03/19 1115)  Pulse: 71 (02/03/19 1200)  Resp: (!) 28 (02/03/19 1200)  BP: 117/61 (02/03/19 1200)  SpO2: 96 % (02/03/19 1200) Vital Signs (24h Range):  Temp:  [96.6 °F (35.9 °C)-98.3 °F (36.8 °C)] 98.3 °F (36.8 °C)  Pulse:  [50-72] 71  Resp:  [13-54] 28  SpO2:  [82 %-100 %] 96 %  BP: ()/(53-71) 117/61     Weight: 102 kg (224 lb 13.9 oz)  Body mass index is 39.83 kg/m².    Intake/Output Summary (Last 24 hours) at 2/3/2019 1233  Last data filed at 2/3/2019 1115  Gross per 24 hour   Intake 1440 ml   Output 952 ml   Net 488 ml      Physical Exam   Constitutional: She is oriented to person, place, and time. She appears well-developed. No distress.   HENT:   Head: Normocephalic and atraumatic.   Mouth/Throat: Oropharynx is clear and moist.   Neck: Normal range of motion. Neck supple. No JVD present.   Cardiovascular: Normal rate, regular rhythm and intact distal pulses.   Pulmonary/Chest: She has wheezes.   Bilateral rhonchi    Abdominal: Soft. Bowel sounds are normal. She exhibits no distension.   Musculoskeletal: Normal range of motion. She exhibits no edema.   Neurological: She is alert and oriented to person, place, and time.   Skin: Skin is warm and dry. Capillary refill takes 2 to 3 seconds. No erythema.   Psychiatric: She has a normal mood and affect. Her behavior is normal.       Significant Labs:   BMP:   Recent Labs   Lab 02/03/19  0450   *   *   K 4.0   CL 97   CO2 18*   BUN 57*   CREATININE 3.9*   CALCIUM 6.9*       Significant  Imaging: I have reviewed all pertinent imaging results/findings within the past 24 hours.

## 2019-02-03 NOTE — SUBJECTIVE & OBJECTIVE
Interval History:     Review of Systems   Constitution: Negative for decreased appetite.   HENT: Negative for ear discharge.    Eyes: Negative for blurred vision.   Respiratory: Negative for hemoptysis.    Endocrine: Negative for polyphagia.   Hematologic/Lymphatic: Negative for adenopathy.   Skin: Negative for color change.   Musculoskeletal: Negative for joint swelling.   Neurological: Negative for brief paralysis.   Psychiatric/Behavioral: Negative for hallucinations.     Objective:     Vital Signs (Most Recent):  Temp: 96.6 °F (35.9 °C) (02/03/19 0715)  Pulse: 62 (02/03/19 0900)  Resp: 19 (02/03/19 0900)  BP: (!) 115/53 (02/03/19 0900)  SpO2: 98 % (02/03/19 0900) Vital Signs (24h Range):  Temp:  [96.6 °F (35.9 °C)-98.1 °F (36.7 °C)] 96.6 °F (35.9 °C)  Pulse:  [] 62  Resp:  [13-54] 19  SpO2:  [82 %-100 %] 98 %  BP: ()/(53-87) 115/53     Weight: 102 kg (224 lb 13.9 oz)  Body mass index is 39.83 kg/m².     SpO2: 98 %  O2 Device (Oxygen Therapy): nasal cannula      Intake/Output Summary (Last 24 hours) at 2/3/2019 0921  Last data filed at 2/3/2019 0900  Gross per 24 hour   Intake 1682.18 ml   Output 940 ml   Net 742.18 ml       Lines/Drains/Airways     Drain                 Urethral Catheter 01/29/19 0301 16 Fr. 5 days          Peripheral Intravenous Line                 Peripheral IV - Single Lumen 01/29/19 0920 Right Wrist 5 days         Peripheral IV - Single Lumen 01/29/19 0930 Anterior;Right Upper Arm 4 days                Physical Exam   Constitutional: She is oriented to person, place, and time. She appears well-developed and well-nourished.   HENT:   Head: Normocephalic and atraumatic.   Eyes: Conjunctivae are normal. Pupils are equal, round, and reactive to light.   Neck: Normal range of motion. Neck supple.   Cardiovascular: Normal rate, normal heart sounds and intact distal pulses.   Pulmonary/Chest: Effort normal. She has wheezes.   Abdominal: Soft. Bowel sounds are normal.   Musculoskeletal:  Normal range of motion.   Neurological: She is alert and oriented to person, place, and time.   Skin: Skin is warm and dry.       Significant Labs: All pertinent lab results from the last 24 hours have been reviewed.    Significant Imaging: Echocardiogram:   2D echo with color flow doppler:   Results for orders placed or performed during the hospital encounter of 08/21/15   2D echo with color flow doppler   Result Value Ref Range    QEF 75 55 - 65    Mitral Valve Regurgitation MILD     Est. PA Systolic Pressure 40.21 (A)     Tricuspid Valve Regurgitation MODERATE (A)

## 2019-02-03 NOTE — PLAN OF CARE
Problem: Adult Inpatient Plan of Care  Goal: Plan of Care Review  Outcome: Ongoing (interventions implemented as appropriate)  Pt being transferred to tele this evening. AAOx4. VSS. Oxygen weaned to 2L NC, tolerating well. Remains with poor appetite  40mg of lasix given x1. No falls, injuries, or new skin breakdown, precautions maintained for all.

## 2019-02-03 NOTE — ASSESSMENT & PLAN NOTE
Nebs  acapella - not available   Wean oxygen as tolerated  Mucolytic  augmentin   Prednisone taper   Sputum culture- no significant growth, normal clifford     CXR 2/2- no acute changes

## 2019-02-03 NOTE — ASSESSMENT & PLAN NOTE
FELICIA on CKD secondary to intravascular hypovolemia  Gentle IVF - dc'd  BMP daily  Avoid nephrotoxins    Now with acute renal failure. Nephrology consulted.   LAsix x one dose

## 2019-02-03 NOTE — PROVIDER TRANSFER
Pt admitted to ICU with afib rvr on amiodarone infusion 1/28. Successful cardioversion 1/30. Also has fever and URI. Treated initially with zosyn, IV steroids and nebs. Antibiotic chnaged to augmentin and weaned to prednisone. Pt was transferred to floor 1/31. Nephrology was consulted on transfer as Cr began to rise and UOP dropped. She was given IVF with very little oral intake at the time. She went back into afib rvr and transferred to ICU 2/1. Successful cardioversion 2/2. HR in 60s. Weaning oxygen, prednisone down to 20mg. Still has faint wheeze and cough, but improved. BNP elevated and appear volume overloaded. Lasix x one dose given. Nephrology following. PT/OT already made recs for HH. Ok for transfer to floor.

## 2019-02-03 NOTE — PLAN OF CARE
Problem: Adult Inpatient Plan of Care  Goal: Plan of Care Review  Outcome: Ongoing (interventions implemented as appropriate)  Patient AAO Xs 4 on 3 L NC. Multiple BM this shift. PRN cough medicine given once. Rolle in place with adequate clear urine. VSS and afebrile. Normal sinus on monitor. Patient has forceful cough. Patient a little anxious but was able to redirect attention and calm down. Call light given to patient. No falls, injuries, or skin breakdown this shift.

## 2019-02-04 PROBLEM — N18.30 ACUTE RENAL FAILURE SUPERIMPOSED ON STAGE 3 CHRONIC KIDNEY DISEASE: Status: ACTIVE | Noted: 2019-01-28

## 2019-02-04 PROBLEM — N18.30 STAGE 3 CHRONIC KIDNEY DISEASE: Status: ACTIVE | Noted: 2019-02-04

## 2019-02-04 LAB
ANION GAP SERPL CALC-SCNC: 11 MMOL/L
BUN SERPL-MCNC: 63 MG/DL
CALCIUM SERPL-MCNC: 7 MG/DL
CHLORIDE SERPL-SCNC: 96 MMOL/L
CO2 SERPL-SCNC: 21 MMOL/L
CREAT SERPL-MCNC: 4 MG/DL
EST. GFR  (AFRICAN AMERICAN): 12 ML/MIN/1.73 M^2
EST. GFR  (NON AFRICAN AMERICAN): 10 ML/MIN/1.73 M^2
GLUCOSE SERPL-MCNC: 127 MG/DL
POCT GLUCOSE: 118 MG/DL (ref 70–110)
POCT GLUCOSE: 124 MG/DL (ref 70–110)
POCT GLUCOSE: 149 MG/DL (ref 70–110)
POCT GLUCOSE: 207 MG/DL (ref 70–110)
POCT GLUCOSE: 224 MG/DL (ref 70–110)
POTASSIUM SERPL-SCNC: 4.1 MMOL/L
SODIUM SERPL-SCNC: 128 MMOL/L

## 2019-02-04 PROCEDURE — 25000003 PHARM REV CODE 250: Performed by: HOSPITALIST

## 2019-02-04 PROCEDURE — 94799 UNLISTED PULMONARY SVC/PX: CPT

## 2019-02-04 PROCEDURE — 36415 COLL VENOUS BLD VENIPUNCTURE: CPT

## 2019-02-04 PROCEDURE — 25000242 PHARM REV CODE 250 ALT 637 W/ HCPCS: Performed by: HOSPITALIST

## 2019-02-04 PROCEDURE — 80048 BASIC METABOLIC PNL TOTAL CA: CPT

## 2019-02-04 PROCEDURE — 25000003 PHARM REV CODE 250: Performed by: INTERNAL MEDICINE

## 2019-02-04 PROCEDURE — 94761 N-INVAS EAR/PLS OXIMETRY MLT: CPT

## 2019-02-04 PROCEDURE — 27000221 HC OXYGEN, UP TO 24 HOURS

## 2019-02-04 PROCEDURE — 21400001 HC TELEMETRY ROOM

## 2019-02-04 PROCEDURE — 94640 AIRWAY INHALATION TREATMENT: CPT

## 2019-02-04 PROCEDURE — 99900035 HC TECH TIME PER 15 MIN (STAT)

## 2019-02-04 PROCEDURE — 63600175 PHARM REV CODE 636 W HCPCS: Performed by: HOSPITALIST

## 2019-02-04 RX ORDER — PREDNISONE 20 MG/1
20 TABLET ORAL DAILY
Status: DISCONTINUED | OUTPATIENT
Start: 2019-02-04 | End: 2019-02-06 | Stop reason: HOSPADM

## 2019-02-04 RX ADMIN — METOPROLOL TARTRATE 25 MG: 25 TABLET ORAL at 08:02

## 2019-02-04 RX ADMIN — GUAIFENESIN 600 MG: 600 TABLET, EXTENDED RELEASE ORAL at 09:02

## 2019-02-04 RX ADMIN — AMOXICILLIN AND CLAVULANATE POTASSIUM 500 MG: 500; 125 TABLET, FILM COATED ORAL at 09:02

## 2019-02-04 RX ADMIN — ALLOPURINOL 100 MG: 100 TABLET ORAL at 08:02

## 2019-02-04 RX ADMIN — ATORVASTATIN CALCIUM 40 MG: 40 TABLET, FILM COATED ORAL at 08:02

## 2019-02-04 RX ADMIN — APIXABAN 2.5 MG: 2.5 TABLET, FILM COATED ORAL at 08:02

## 2019-02-04 RX ADMIN — ACETAMINOPHEN 500 MG: 500 TABLET, FILM COATED ORAL at 09:02

## 2019-02-04 RX ADMIN — ACETAMINOPHEN 500 MG: 500 TABLET, FILM COATED ORAL at 05:02

## 2019-02-04 RX ADMIN — LEVALBUTEROL HYDROCHLORIDE 0.63 MG: 0.63 SOLUTION RESPIRATORY (INHALATION) at 03:02

## 2019-02-04 RX ADMIN — AMIODARONE HYDROCHLORIDE 200 MG: 200 TABLET ORAL at 09:02

## 2019-02-04 RX ADMIN — LEVALBUTEROL HYDROCHLORIDE 0.63 MG: 0.63 SOLUTION RESPIRATORY (INHALATION) at 08:02

## 2019-02-04 RX ADMIN — AMIODARONE HYDROCHLORIDE 200 MG: 200 TABLET ORAL at 08:02

## 2019-02-04 RX ADMIN — INSULIN ASPART 2 UNITS: 100 INJECTION, SOLUTION INTRAVENOUS; SUBCUTANEOUS at 06:02

## 2019-02-04 RX ADMIN — APIXABAN 2.5 MG: 2.5 TABLET, FILM COATED ORAL at 09:02

## 2019-02-04 RX ADMIN — AMOXICILLIN AND CLAVULANATE POTASSIUM 500 MG: 500; 125 TABLET, FILM COATED ORAL at 08:02

## 2019-02-04 RX ADMIN — PREDNISONE 20 MG: 20 TABLET ORAL at 08:02

## 2019-02-04 RX ADMIN — LEVALBUTEROL HYDROCHLORIDE 0.63 MG: 0.63 SOLUTION RESPIRATORY (INHALATION) at 12:02

## 2019-02-04 RX ADMIN — GUAIFENESIN 600 MG: 600 TABLET, EXTENDED RELEASE ORAL at 08:02

## 2019-02-04 NOTE — NURSING TRANSFER
Nursing Transfer Note      2/3/2019     Transfer To: [id number]    Transfer via bed    Transfer with  to O2, cardiac monitoring    Transported by RN and transport    Medicines sent: yes    Chart send with patient: Yes    Notified: niece at bedside     Patient reassessed at: 2/3/19 1800     Upon arrival to floor: cardiac monitor applied, patient oriented to room, call bell in reach and bed in lowest position

## 2019-02-04 NOTE — SUBJECTIVE & OBJECTIVE
Interval History: pt. Is sinus at this time.    Review of Systems   HENT: Positive for congestion.    Eyes: Negative.    Respiratory: Positive for cough. Negative for shortness of breath.    Cardiovascular: Negative.    Gastrointestinal: Negative.    Genitourinary: Negative.    Musculoskeletal: Positive for arthralgias.   Neurological: Positive for weakness.     Objective:     Vital Signs (Most Recent):  Temp: 98.3 °F (36.8 °C) (02/04/19 0727)  Pulse: 68 (02/04/19 0817)  Resp: 20 (02/04/19 0817)  BP: 124/61 (02/04/19 0727)  SpO2: (!) 94 % (02/04/19 0817) Vital Signs (24h Range):  Temp:  [97.5 °F (36.4 °C)-98.3 °F (36.8 °C)] 98.3 °F (36.8 °C)  Pulse:  [56-71] 68  Resp:  [15-32] 20  SpO2:  [92 %-98 %] 94 %  BP: ()/(47-64) 124/61     Weight: 102 kg (224 lb 13.9 oz)  Body mass index is 39.83 kg/m².    Intake/Output Summary (Last 24 hours) at 2/4/2019 1047  Last data filed at 2/4/2019 0800  Gross per 24 hour   Intake 700 ml   Output 1525 ml   Net -825 ml      Physical Exam   Constitutional: She is oriented to person, place, and time. She appears well-developed. No distress.   HENT:   Head: Normocephalic and atraumatic.   Mouth/Throat: Oropharynx is clear and moist.   Neck: Normal range of motion. Neck supple. No JVD present.   Cardiovascular: Normal rate, regular rhythm and intact distal pulses.   Pulmonary/Chest: She has wheezes.   Bilateral rhonchi    Abdominal: Soft. Bowel sounds are normal. She exhibits no distension.   Musculoskeletal: Normal range of motion. She exhibits no edema.   Neurological: She is alert and oriented to person, place, and time.   Skin: Skin is warm and dry. Capillary refill takes 2 to 3 seconds. No erythema.   Psychiatric: She has a normal mood and affect. Her behavior is normal.       Significant Labs:   BMP:   Recent Labs   Lab 02/04/19  0524   *   *   K 4.1   CL 96   CO2 21*   BUN 63*   CREATININE 4.0*   CALCIUM 7.0*       Significant Imaging: I have reviewed all  pertinent imaging results/findings within the past 24 hours.

## 2019-02-04 NOTE — PROGRESS NOTES
Awake alert oriented NAD    Denies CNS ENT CP GI  RHEUM OR DERM SX  Past Medical History:   Diagnosis Date    A-fib     Diabetes mellitus type II     Fatty liver     GERD (gastroesophageal reflux disease)     Hemochromatosis     Hyperlipidemia     Hypertension     Vaginal delivery     x2    Vitamin D deficiency disease     Wears partial dentures     upper removable bridge     Review of patient's allergies indicates:  No Known Allergies    Current Facility-Administered Medications   Medication    acetaminophen tablet 500 mg    allopurinol tablet 100 mg    amiodarone tablet 200 mg    amoxicillin-clavulanate 500-125mg per tablet 500 mg    apixaban tablet 2.5 mg    atorvastatin tablet 40 mg    benzonatate capsule 100 mg    cloNIDine tablet 0.1 mg    dextrose 50% injection 12.5 g    dextrose 50% injection 25 g    glucagon (human recombinant) injection 1 mg    glucose chewable tablet 16 g    glucose chewable tablet 24 g    guaiFENesin 12 hr tablet 600 mg    insulin aspart U-100 pen 0-5 Units    levalbuterol nebulizer solution 0.63 mg    loperamide capsule 2 mg    metoprolol tartrate (LOPRESSOR) tablet 25 mg    ondansetron injection 4 mg    predniSONE tablet 20 mg    promethazine (PHENERGAN) 6.25 mg in dextrose 5 % 50 mL IVPB    ramelteon tablet 8 mg    senna-docusate 8.6-50 mg per tablet 1 tablet    traMADol tablet 50 mg       LABS    Recent Results (from the past 24 hour(s))   POCT glucose    Collection Time: 02/03/19  4:38 PM   Result Value Ref Range    POCT Glucose 216 (H) 70 - 110 mg/dL   POCT glucose    Collection Time: 02/03/19  9:53 PM   Result Value Ref Range    POCT Glucose 153 (H) 70 - 110 mg/dL   Basic metabolic panel    Collection Time: 02/04/19  5:24 AM   Result Value Ref Range    Sodium 128 (L) 136 - 145 mmol/L    Potassium 4.1 3.5 - 5.1 mmol/L    Chloride 96 95 - 110 mmol/L    CO2 21 (L) 23 - 29 mmol/L    Glucose 127 (H) 70 - 110 mg/dL    BUN, Bld 63 (H) 8 - 23 mg/dL     Creatinine 4.0 (H) 0.5 - 1.4 mg/dL    Calcium 7.0 (L) 8.7 - 10.5 mg/dL    Anion Gap 11 8 - 16 mmol/L    eGFR if African American 12 (A) >60 mL/min/1.73 m^2    eGFR if non African American 10 (A) >60 mL/min/1.73 m^2   POCT glucose    Collection Time: 02/04/19  7:23 AM   Result Value Ref Range    POCT Glucose 124 (H) 70 - 110 mg/dL   POCT glucose    Collection Time: 02/04/19 11:37 AM   Result Value Ref Range    POCT Glucose 118 (H) 70 - 110 mg/dL   ]    I/O last 3 completed shifts:  In: 1770 [P.O.:920; I.V.:750; IV Piggyback:100]  Out: 1147 [Urine:1147]    Vitals:    02/04/19 0426 02/04/19 0727 02/04/19 0817 02/04/19 1141   BP: 127/63 124/61  (!) 129/57   Pulse: 62 65 68 65   Resp: 17 16 20 18   Temp: 97.5 °F (36.4 °C) 98.3 °F (36.8 °C)  97.8 °F (36.6 °C)   TempSrc: Oral Oral  Oral   SpO2: (!) 93% (!) 92% (!) 94% (!) 92%   Weight:       Height:           No Jvd, Thyromegaly or Lymphadenopathy  Lungs: Fairly clear anteriorly and laterally  Cor: IRR no G or rubs  Abd: Soft benign good bowel sounds non tender  Ext: Pos edema    A)  FELICIA with fair uo, creat up but seems to be peaking  Mild proteinuria  Hyponatremia  Afib  MGUS  Bronchitis  HTN well controled on present rx  Gout asx at this time  DM  Pulmonary     P)  Renal diabetic diet  Maintain hydration  Avoid nephrotoxic medications  Adjust all medications to the degree of renal function  No hd for now

## 2019-02-04 NOTE — NURSING
Patient arrived to unit via bed with transport, from ICU. Telemetry monitor in place, O2 in place. Patient appears to be in no apparent distress. Will continue to monitor.

## 2019-02-04 NOTE — ASSESSMENT & PLAN NOTE
FELICIA on CKD secondary to intravascular hypovolemia  Gentle IVF - dc'd  BMP daily  Avoid nephrotoxins    Now with acute renal failure. Nephrology consulted.   LAsix x one dose   Patient has worsening ARF,will keep on george and monitor,nephrology is following.reconsulted PT,OT.

## 2019-02-04 NOTE — ANESTHESIA POSTPROCEDURE EVALUATION
"Anesthesia Post Evaluation    Patient: Barbara Rizo    Procedure(s) Performed: Procedure(s) (LRB):  Cardioversion/Defibrillation (N/A)    Final Anesthesia Type: general  Patient location during evaluation: PACU  Patient participation: Yes- Able to Participate  Level of consciousness: awake and alert, oriented and awake  Post-procedure vital signs: reviewed and stable  Pain management: adequate  Airway patency: patent  PONV status at discharge: No PONV  Anesthetic complications: no      Cardiovascular status: blood pressure returned to baseline, hemodynamically stable and stable  Respiratory status: unassisted and spontaneous ventilation  Hydration status: euvolemic  Follow-up not needed.        Visit Vitals  /63 (BP Location: Left arm, Patient Position: Lying)   Pulse 62   Temp 36.4 °C (97.5 °F) (Oral)   Resp 17   Ht 5' 3" (1.6 m)   Wt 102 kg (224 lb 13.9 oz)   SpO2 (!) 93%   Breastfeeding? No   BMI 39.83 kg/m²       Pain/Yessy Score: Pain Rating Prior to Med Admin: 3 (2/4/2019  5:48 AM)  Pain Rating Post Med Admin: 0 (2/3/2019  3:15 PM)        "

## 2019-02-04 NOTE — PT/OT/SLP PROGRESS
Physical Therapy      Patient Name:  Barbara Rizo   MRN:  [id number]    Patient not seen today secondary to other (just got back in bed after sitting in the chair with nursing staff assistance). Patient agreeable to participate in PT in AM. Will follow-up again in AM.    Karla Guadalupe, PT

## 2019-02-04 NOTE — PLAN OF CARE
02/04/19 1126   Discharge Reassessment   Assessment Type Discharge Planning Reassessment   Provided patient/caregiver education on the expected discharge date and the discharge plan Yes   Do you have any problems affording any of your prescribed medications? No   Discharge Plan A Home with family;Home Health   Discharge Plan B (TBD)   DME Needed Upon Discharge  none   Patient choice form signed by patient/caregiver N/A   Anticipated Discharge Disposition Home-Health   Can the patient answer the patient profile reliably? Yes, cognitively intact   How does the patient rate their overall health at the present time? Good   Describe the patient's ability to walk at the present time. No restrictions   How often would a person be available to care for the patient? Whenever needed   Number of comorbid conditions (as recorded on the chart) None   During the past month, has the patient often been bothered by feeling down, depressed or hopeless? No   During the past month, has the patient often been bothered by little interest or pleasure in doing things? No   Post-Acute Status   Post-Acute Authorization (home possible HH)

## 2019-02-04 NOTE — NURSING
End of shift bedside report given to TOSHIA Crain. Patient in no apparent distress.     12 hour chart check complete.

## 2019-02-04 NOTE — PROGRESS NOTES
[facility] Hospital Medicine  Progress Note    Patient Name: Barbara Rizo  MRN: [id number]  Patient Class: IP- Inpatient   Admission Date: 1/28/2019  Length of Stay: 7 days  Attending Physician: Salazar Kirby MD  Primary Care Provider: Paras Oro MD        Subjective:     Principal Problem:Atrial fibrillation with rapid ventricular response    HPI:  Ms. Barbara Rizo is a 78 y.o. female with essential hypertension, hyperlipidemia (LDL 62.4 Jul 2018), type 2 diabetes mellitus (HbA1c 5.8% Jul 2018), CKD Stage 3, paroxysmal atrial fibrillation ([id number] score 5) not on chronic anticoagulation, obesity (BMI 36.0), MGUS, and chronic gout who presents to [facility] ED with complaints of coughing for three days.  She started to have a non-productive cough, wheezing, and mild dyspnea three days ago which has steadily been worsening since onset.  She also complains of a subjective fever but otherwise denies any nausea, vomiting, chest pain, palpitations, pleurisy, nor any diaphoresis.  She denies any recent travel, hemoptysis, nor any lower extremity pain or swelling.  She does say that she's under a lot of stress recently with her ex- dying yesterday at [facility] due to complications from a heart valve replacement two weeks ago.  She does say that she may have had sick contacts while visiting her ex-.  Her appetite has been poor but she denies any weight loss.    While at her ENT's appointment today she decided to schedule an appointment with her PCP to evaluate her upper airway complaints.  She was unable to see her regular PCP, Dr. Paras Oro, but was able to be seen by another physician who became concerned when she was noted to be in AFib with RVR on EKG.  He recommended EMS transportation to the ED but she refused and decided to drive herself.  Her cardiologist is Dr. Amauri Lomas.    Hospital Course:  Pt admitted to ICU with afib rvr on  amiodarone infusion. Pt with elevated HR today and after TALHA done patient became very agitated and anxious. Ativan 1mg IV given and symptoms got worse. HR up into the 170s and O2 sats dropped to 77%. Placed on NRB. Improved O2 sats. 5mg IV haldol given as patient was trying to get out of bed and pulling oxygen mask off. Cardizem 10mg Iv given with improved HR to 120s-130s. xopenex scheduled Q 8 hours. Changed to zosyn with temp 102F.     1/30- successful cardioversion, post procedure confused and pulling oxygen off, desaturation, constant re-direction, monitor in ICU overnight. Changed to oral amiodarone. eliquis for anticoagulation. Holding parameters on BP meds. IV steroids, nebs and antibiotic for probable lower resp infection. IV lasix as Bp tolerates.   1/31- HR remain under control NSR 60-70s.On amiodarone, metoprolol and eliquis.  Resp status improved and weaning oxygen. Steroids changed to oral route. DC zosyn and switch to augmentin. Add acapella to nebs. Cr increased from baseline (1.5-1.7). Gentle IVF and monitor with repeat BMP later this evening. PT?OT consulted for dc planning. Weaning oxygen. PT/OT consulted and rec H/H without equipment. The patient was transferred to the floor on 1/31. Nephrology was consulted for worsening renal function.     2/2- pt transferred with afib rvr. Successful cardioversion again today. Continue oral amiodarone and BB. CXR pending, still has aggressive cough and URI symptoms. On mucolytic and antibiotic.   2/3- HR 50-60s sinus. Cr sightly higher, sodium 128. BNP 1500. Lasix x one dose. Bringing up more mucus. Steroids weaned 20mg. Still faint wheezes on exam.  Patient has worsening ARF,will keep on george and monitor,nephrology is following.reconsulted PT,OT.    Interval History: pt. Is sinus at this time.    Review of Systems   HENT: Positive for congestion.    Eyes: Negative.    Respiratory: Positive for cough. Negative for shortness of breath.    Cardiovascular:  Negative.    Gastrointestinal: Negative.    Genitourinary: Negative.    Musculoskeletal: Positive for arthralgias.   Neurological: Positive for weakness.     Objective:     Vital Signs (Most Recent):  Temp: 98.3 °F (36.8 °C) (02/04/19 0727)  Pulse: 68 (02/04/19 0817)  Resp: 20 (02/04/19 0817)  BP: 124/61 (02/04/19 0727)  SpO2: (!) 94 % (02/04/19 0817) Vital Signs (24h Range):  Temp:  [97.5 °F (36.4 °C)-98.3 °F (36.8 °C)] 98.3 °F (36.8 °C)  Pulse:  [56-71] 68  Resp:  [15-32] 20  SpO2:  [92 %-98 %] 94 %  BP: ()/(47-64) 124/61     Weight: 102 kg (224 lb 13.9 oz)  Body mass index is 39.83 kg/m².    Intake/Output Summary (Last 24 hours) at 2/4/2019 1047  Last data filed at 2/4/2019 0800  Gross per 24 hour   Intake 700 ml   Output 1525 ml   Net -825 ml      Physical Exam   Constitutional: She is oriented to person, place, and time. She appears well-developed. No distress.   HENT:   Head: Normocephalic and atraumatic.   Mouth/Throat: Oropharynx is clear and moist.   Neck: Normal range of motion. Neck supple. No JVD present.   Cardiovascular: Normal rate, regular rhythm and intact distal pulses.   Pulmonary/Chest: She has wheezes.   Bilateral rhonchi    Abdominal: Soft. Bowel sounds are normal. She exhibits no distension.   Musculoskeletal: Normal range of motion. She exhibits no edema.   Neurological: She is alert and oriented to person, place, and time.   Skin: Skin is warm and dry. Capillary refill takes 2 to 3 seconds. No erythema.   Psychiatric: She has a normal mood and affect. Her behavior is normal.       Significant Labs:   BMP:   Recent Labs   Lab 02/04/19  0524   *   *   K 4.1   CL 96   CO2 21*   BUN 63*   CREATININE 4.0*   CALCIUM 7.0*       Significant Imaging: I have reviewed all pertinent imaging results/findings within the past 24 hours.    Assessment/Plan:      Stage 3 chronic kidney disease    Will be monitored.       Atrial fibrillation with rapid ventricular response    Resolved    Cardioverted x2   Sinus on tele.       Acute bronchitis    Nebs  acapella - not available   Wean oxygen as tolerated  Mucolytic  augmentin   Prednisone taper   Sputum culture- no significant growth, normal clifford     CXR 2/2- no acute changes     Chronic gout    Stable; will continue her home regimen of allopurinol.     Obesity, Class II, BMI 35-39.9    The patient has been counseled on the negative impact that obesity imparts on her health and was encouraged to make lifestyle changes in order to lose weight and decrease her modifiable risk factors. Body mass index is 36.4 kg/m².       Acute renal failure superimposed on stage 3 chronic kidney disease    FELICIA on CKD secondary to intravascular hypovolemia  Gentle IVF - dc'd  BMP daily  Avoid nephrotoxins    Now with acute renal failure. Nephrology consulted.   LAsix x one dose   Patient has worsening ARF,will keep on george and monitor,nephrology is following.reconsulted PT,OT.       Paroxysmal atrial fibrillation    Amiodarone   Beta blocker  eliquis - renal dose   Follow up cardiology   Sinus at this time.     MGUS (monoclonal gammopathy of unknown significance)    Stable; will encourage continued follow-up with her hematologist.     Essential hypertension    Patient's blood pressure is well-controlled; will continue home regimen of amlodipine, furosemide, lisinopril, and metoprolol, and provide as-needed clonidine.     Hyperlipidemia    Well-controlled; will continue patient's home regimen of atorvastatin.     Type 2 diabetes mellitus, controlled, with renal complications    Her diabetes is well-controlled and she is currently not on home medications.  Will provide insulin sliding scale.       VTE Risk Mitigation (From admission, onward)        Ordered     apixaban tablet 2.5 mg  2 times daily      01/30/19 1542     IP VTE HIGH RISK PATIENT  Once      01/28/19 1801              Salazar Kirby MD  Department of Hospital Medicine   [facility]

## 2019-02-05 LAB
ANION GAP SERPL CALC-SCNC: 9 MMOL/L
BACTERIA SPEC AEROBE CULT: NORMAL
BUN SERPL-MCNC: 61 MG/DL
CALCIUM SERPL-MCNC: 7.4 MG/DL
CHLORIDE SERPL-SCNC: 97 MMOL/L
CO2 SERPL-SCNC: 21 MMOL/L
CREAT SERPL-MCNC: 3.8 MG/DL
EST. GFR  (AFRICAN AMERICAN): 13 ML/MIN/1.73 M^2
EST. GFR  (NON AFRICAN AMERICAN): 11 ML/MIN/1.73 M^2
GLUCOSE SERPL-MCNC: 106 MG/DL
GRAM STN SPEC: NORMAL
POCT GLUCOSE: 105 MG/DL (ref 70–110)
POCT GLUCOSE: 106 MG/DL (ref 70–110)
POCT GLUCOSE: 137 MG/DL (ref 70–110)
POCT GLUCOSE: 203 MG/DL (ref 70–110)
POCT GLUCOSE: 205 MG/DL (ref 70–110)
POTASSIUM SERPL-SCNC: 4.3 MMOL/L
SODIUM SERPL-SCNC: 127 MMOL/L

## 2019-02-05 PROCEDURE — 27000221 HC OXYGEN, UP TO 24 HOURS

## 2019-02-05 PROCEDURE — 25000003 PHARM REV CODE 250: Performed by: INTERNAL MEDICINE

## 2019-02-05 PROCEDURE — 99900035 HC TECH TIME PER 15 MIN (STAT)

## 2019-02-05 PROCEDURE — 80048 BASIC METABOLIC PNL TOTAL CA: CPT

## 2019-02-05 PROCEDURE — 94640 AIRWAY INHALATION TREATMENT: CPT

## 2019-02-05 PROCEDURE — 97168 OT RE-EVAL EST PLAN CARE: CPT

## 2019-02-05 PROCEDURE — 94799 UNLISTED PULMONARY SVC/PX: CPT

## 2019-02-05 PROCEDURE — 63600175 PHARM REV CODE 636 W HCPCS: Performed by: HOSPITALIST

## 2019-02-05 PROCEDURE — 25000003 PHARM REV CODE 250: Performed by: HOSPITALIST

## 2019-02-05 PROCEDURE — 97164 PT RE-EVAL EST PLAN CARE: CPT

## 2019-02-05 PROCEDURE — 21400001 HC TELEMETRY ROOM

## 2019-02-05 PROCEDURE — 25000242 PHARM REV CODE 250 ALT 637 W/ HCPCS: Performed by: HOSPITALIST

## 2019-02-05 PROCEDURE — 36415 COLL VENOUS BLD VENIPUNCTURE: CPT

## 2019-02-05 PROCEDURE — 94761 N-INVAS EAR/PLS OXIMETRY MLT: CPT

## 2019-02-05 RX ADMIN — PREDNISONE 20 MG: 20 TABLET ORAL at 09:02

## 2019-02-05 RX ADMIN — METOPROLOL TARTRATE 25 MG: 25 TABLET ORAL at 09:02

## 2019-02-05 RX ADMIN — BENZONATATE 100 MG: 100 CAPSULE ORAL at 09:02

## 2019-02-05 RX ADMIN — LEVALBUTEROL HYDROCHLORIDE 0.63 MG: 0.63 SOLUTION RESPIRATORY (INHALATION) at 01:02

## 2019-02-05 RX ADMIN — AMIODARONE HYDROCHLORIDE 200 MG: 200 TABLET ORAL at 09:02

## 2019-02-05 RX ADMIN — ACETAMINOPHEN 500 MG: 500 TABLET, FILM COATED ORAL at 05:02

## 2019-02-05 RX ADMIN — GUAIFENESIN 600 MG: 600 TABLET, EXTENDED RELEASE ORAL at 09:02

## 2019-02-05 RX ADMIN — ALLOPURINOL 100 MG: 100 TABLET ORAL at 09:02

## 2019-02-05 RX ADMIN — ATORVASTATIN CALCIUM 40 MG: 40 TABLET, FILM COATED ORAL at 09:02

## 2019-02-05 RX ADMIN — INSULIN ASPART 2 UNITS: 100 INJECTION, SOLUTION INTRAVENOUS; SUBCUTANEOUS at 06:02

## 2019-02-05 RX ADMIN — LEVALBUTEROL HYDROCHLORIDE 0.63 MG: 0.63 SOLUTION RESPIRATORY (INHALATION) at 08:02

## 2019-02-05 RX ADMIN — APIXABAN 2.5 MG: 2.5 TABLET, FILM COATED ORAL at 09:02

## 2019-02-05 RX ADMIN — TRAMADOL HYDROCHLORIDE 50 MG: 50 TABLET, FILM COATED ORAL at 09:02

## 2019-02-05 RX ADMIN — AMOXICILLIN AND CLAVULANATE POTASSIUM 500 MG: 500; 125 TABLET, FILM COATED ORAL at 09:02

## 2019-02-05 RX ADMIN — LEVALBUTEROL HYDROCHLORIDE 0.63 MG: 0.63 SOLUTION RESPIRATORY (INHALATION) at 05:02

## 2019-02-05 NOTE — PLAN OF CARE
Problem: Diabetes Comorbidity  Goal: Blood Glucose Level Within Desired Range    Intervention: Maintain Glycemic Control   02/05/19 0607   Monitor and Manage Ketoacidosis   Glycemic Management blood glucose monitoring         Problem: Skin Injury Risk Increased  Goal: Skin Health and Integrity    Intervention: Optimize Skin Protection   02/05/19 0607   Prevent Additional Skin Injury   Head of Bed (HOB) HOB at 30-45 degrees;HOB at 20-30 degrees   Pressure Reduction Techniques frequent weight shift encouraged;weight shift assistance provided   Monitor and Manage Hypervolemia   Skin Protection incontinence pads utilized     Intervention: Promote and Optimize Oral Intake   02/05/19 0607   Monitor and Manage Anemia   Oral Nutrition Promotion rest periods promoted         Problem: Infection  Goal: Infection Symptom Resolution    Intervention: Prevent or Manage Infection   02/05/19 0607   Prevent or Manage Infection   Fever Reduction/Comfort Measures medication administered   Infection Management cultures obtained and sent to lab

## 2019-02-05 NOTE — SUBJECTIVE & OBJECTIVE
Interval History: pt. Is sinus at this time.    Review of Systems   HENT: Positive for congestion.    Eyes: Negative.    Respiratory: Positive for cough. Negative for shortness of breath.    Cardiovascular: Negative.    Gastrointestinal: Negative.    Genitourinary: Negative.    Musculoskeletal: Positive for arthralgias.   Neurological: Positive for weakness.     Objective:     Vital Signs (Most Recent):  Temp: 98.1 °F (36.7 °C) (02/05/19 1136)  Pulse: 60 (02/05/19 1136)  Resp: 18 (02/05/19 1136)  BP: 132/63 (02/05/19 1136)  SpO2: 97 % (02/05/19 1136) Vital Signs (24h Range):  Temp:  [97.5 °F (36.4 °C)-98.3 °F (36.8 °C)] 98.1 °F (36.7 °C)  Pulse:  [57-75] 60  Resp:  [17-19] 18  SpO2:  [91 %-97 %] 97 %  BP: (127-142)/(60-70) 132/63     Weight: 102 kg (224 lb 13.9 oz)  Body mass index is 39.83 kg/m².    Intake/Output Summary (Last 24 hours) at 2/5/2019 1239  Last data filed at 2/5/2019 1145  Gross per 24 hour   Intake 40 ml   Output 2325 ml   Net -2285 ml      Physical Exam   Constitutional: She is oriented to person, place, and time. She appears well-developed. No distress.   HENT:   Head: Normocephalic and atraumatic.   Mouth/Throat: Oropharynx is clear and moist.   Neck: Normal range of motion. Neck supple. No JVD present.   Cardiovascular: Normal rate, regular rhythm and intact distal pulses.   Pulmonary/Chest: She has wheezes.   Bilateral rhonchi    Abdominal: Soft. Bowel sounds are normal. She exhibits no distension.   Musculoskeletal: Normal range of motion. She exhibits no edema.   Neurological: She is alert and oriented to person, place, and time.   Skin: Skin is warm and dry. Capillary refill takes 2 to 3 seconds. No erythema.   Psychiatric: She has a normal mood and affect. Her behavior is normal.       Significant Labs:   BMP:   Recent Labs   Lab 02/05/19  0434      *   K 4.3   CL 97   CO2 21*   BUN 61*   CREATININE 3.8*   CALCIUM 7.4*       Significant Imaging: I have reviewed all pertinent  imaging results/findings within the past 24 hours.

## 2019-02-05 NOTE — ASSESSMENT & PLAN NOTE
FELICIA on CKD secondary to intravascular hypovolemia  Gentle IVF - dc'd  BMP daily  Avoid nephrotoxins    Now with acute renal failure. Nephrology consulted.   LAsix x one dose   Patient has worsening ARF,will keep on george and monitor,nephrology is following.reconsulted PT,OT.  Some improvement today,need ,monitior another 1-2 days.

## 2019-02-05 NOTE — PROGRESS NOTES
[facility] Hospital Medicine  Progress Note    Patient Name: Barbara Rizo  MRN: [id number]  Patient Class: IP- Inpatient   Admission Date: 1/28/2019  Length of Stay: 8 days  Attending Physician: Salazar Kirby MD  Primary Care Provider: Paras Oro MD        Subjective:     Principal Problem:Atrial fibrillation with rapid ventricular response    HPI:  Ms. Barbara Rizo is a 78 y.o. female with essential hypertension, hyperlipidemia (LDL 62.4 Jul 2018), type 2 diabetes mellitus (HbA1c 5.8% Jul 2018), CKD Stage 3, paroxysmal atrial fibrillation ([id number] score 5) not on chronic anticoagulation, obesity (BMI 36.0), MGUS, and chronic gout who presents to [facility] ED with complaints of coughing for three days.  She started to have a non-productive cough, wheezing, and mild dyspnea three days ago which has steadily been worsening since onset.  She also complains of a subjective fever but otherwise denies any nausea, vomiting, chest pain, palpitations, pleurisy, nor any diaphoresis.  She denies any recent travel, hemoptysis, nor any lower extremity pain or swelling.  She does say that she's under a lot of stress recently with her ex- dying yesterday at [facility] due to complications from a heart valve replacement two weeks ago.  She does say that she may have had sick contacts while visiting her ex-.  Her appetite has been poor but she denies any weight loss.    While at her ENT's appointment today she decided to schedule an appointment with her PCP to evaluate her upper airway complaints.  She was unable to see her regular PCP, Dr. Paras Oro, but was able to be seen by another physician who became concerned when she was noted to be in AFib with RVR on EKG.  He recommended EMS transportation to the ED but she refused and decided to drive herself.  Her cardiologist is Dr. Amauri Lomas.    Hospital Course:  Pt admitted to ICU with afib rvr on  amiodarone infusion. Pt with elevated HR today and after TALHA done patient became very agitated and anxious. Ativan 1mg IV given and symptoms got worse. HR up into the 170s and O2 sats dropped to 77%. Placed on NRB. Improved O2 sats. 5mg IV haldol given as patient was trying to get out of bed and pulling oxygen mask off. Cardizem 10mg Iv given with improved HR to 120s-130s. xopenex scheduled Q 8 hours. Changed to zosyn with temp 102F.     1/30- successful cardioversion, post procedure confused and pulling oxygen off, desaturation, constant re-direction, monitor in ICU overnight. Changed to oral amiodarone. eliquis for anticoagulation. Holding parameters on BP meds. IV steroids, nebs and antibiotic for probable lower resp infection. IV lasix as Bp tolerates.   1/31- HR remain under control NSR 60-70s.On amiodarone, metoprolol and eliquis.  Resp status improved and weaning oxygen. Steroids changed to oral route. DC zosyn and switch to augmentin. Add acapella to nebs. Cr increased from baseline (1.5-1.7). Gentle IVF and monitor with repeat BMP later this evening. PT?OT consulted for dc planning. Weaning oxygen. PT/OT consulted and rec H/H without equipment. The patient was transferred to the floor on 1/31. Nephrology was consulted for worsening renal function.     2/2- pt transferred with afib rvr. Successful cardioversion again today. Continue oral amiodarone and BB. CXR pending, still has aggressive cough and URI symptoms. On mucolytic and antibiotic.   2/3- HR 50-60s sinus. Cr sightly higher, sodium 128. BNP 1500. Lasix x one dose. Bringing up more mucus. Steroids weaned 20mg. Still faint wheezes on exam.  Patient has worsening ARF,will keep on george and monitor,nephrology is following.reconsulted PT,OT.today slight improvement in CRT.  Changed diet for low slat diet duo to hyponatremia.    Interval History: pt. Is sinus at this time.    Review of Systems   HENT: Positive for congestion.    Eyes: Negative.     Respiratory: Positive for cough. Negative for shortness of breath.    Cardiovascular: Negative.    Gastrointestinal: Negative.    Genitourinary: Negative.    Musculoskeletal: Positive for arthralgias.   Neurological: Positive for weakness.     Objective:     Vital Signs (Most Recent):  Temp: 98.1 °F (36.7 °C) (02/05/19 1136)  Pulse: 60 (02/05/19 1136)  Resp: 18 (02/05/19 1136)  BP: 132/63 (02/05/19 1136)  SpO2: 97 % (02/05/19 1136) Vital Signs (24h Range):  Temp:  [97.5 °F (36.4 °C)-98.3 °F (36.8 °C)] 98.1 °F (36.7 °C)  Pulse:  [57-75] 60  Resp:  [17-19] 18  SpO2:  [91 %-97 %] 97 %  BP: (127-142)/(60-70) 132/63     Weight: 102 kg (224 lb 13.9 oz)  Body mass index is 39.83 kg/m².    Intake/Output Summary (Last 24 hours) at 2/5/2019 1239  Last data filed at 2/5/2019 1145  Gross per 24 hour   Intake 40 ml   Output 2325 ml   Net -2285 ml      Physical Exam   Constitutional: She is oriented to person, place, and time. She appears well-developed. No distress.   HENT:   Head: Normocephalic and atraumatic.   Mouth/Throat: Oropharynx is clear and moist.   Neck: Normal range of motion. Neck supple. No JVD present.   Cardiovascular: Normal rate, regular rhythm and intact distal pulses.   Pulmonary/Chest: She has wheezes.   Bilateral rhonchi    Abdominal: Soft. Bowel sounds are normal. She exhibits no distension.   Musculoskeletal: Normal range of motion. She exhibits no edema.   Neurological: She is alert and oriented to person, place, and time.   Skin: Skin is warm and dry. Capillary refill takes 2 to 3 seconds. No erythema.   Psychiatric: She has a normal mood and affect. Her behavior is normal.       Significant Labs:   BMP:   Recent Labs   Lab 02/05/19  0434      *   K 4.3   CL 97   CO2 21*   BUN 61*   CREATININE 3.8*   CALCIUM 7.4*       Significant Imaging: I have reviewed all pertinent imaging results/findings within the past 24 hours.    Assessment/Plan:      Stage 3 chronic kidney disease    Will be  monitored.       Atrial fibrillation with rapid ventricular response    Resolved   Cardioverted x2   Sinus on tele.       Acute bronchitis    Nebs  acapella - not available   Wean oxygen as tolerated  Mucolytic  augmentin   Prednisone taper   Sputum culture- no significant growth, normal clifford     CXR 2/2- no acute changes     Chronic gout    Stable; will continue her home regimen of allopurinol.     Obesity, Class II, BMI 35-39.9    The patient has been counseled on the negative impact that obesity imparts on her health and was encouraged to make lifestyle changes in order to lose weight and decrease her modifiable risk factors. Body mass index is 36.4 kg/m².       Acute renal failure superimposed on stage 3 chronic kidney disease    FELICIA on CKD secondary to intravascular hypovolemia  Gentle IVF - dc'd  BMP daily  Avoid nephrotoxins    Now with acute renal failure. Nephrology consulted.   LAsix x one dose   Patient has worsening ARF,will keep on george and monitor,nephrology is following.reconsulted PT,OT.  Some improvement today,need ,monitior another 1-2 days.       Paroxysmal atrial fibrillation    Amiodarone   Beta blocker  eliquis - renal dose   Follow up cardiology   Sinus at this time.     MGUS (monoclonal gammopathy of unknown significance)    Stable; will encourage continued follow-up with her hematologist.     Essential hypertension    Patient's blood pressure is well-controlled; will continue home regimen of amlodipine, furosemide, lisinopril, and metoprolol, and provide as-needed clonidine.     Hyperlipidemia    Well-controlled; will continue patient's home regimen of atorvastatin.     Type 2 diabetes mellitus, controlled, with renal complications    Her diabetes is well-controlled and she is currently not on home medications.  Will provide insulin sliding scale.     chronic diastolic CHF,lasxi on hold dup to ARF        VTE Risk Mitigation (From admission, onward)        Ordered     apixaban tablet 2.5 mg   2 times daily      01/30/19 1542     IP VTE HIGH RISK PATIENT  Once      01/28/19 1801              Salazar Kirby MD  Department of Hospital Medicine   [facility]

## 2019-02-05 NOTE — PLAN OF CARE
Problem: Physical Therapy Goal  Goal: Physical Therapy Goal  Goals to be met by: 19    Patient will increase functional independence with mobility by performin. Sit to stand transfer with Modified [place]  2. Gait x250 feet with Modified [place] using Rolling Walker if needed  3. Lower extremity exercise program x30 reps per handout, with independence     Outcome: Ongoing (interventions implemented as appropriate)    Patient would continue to benefit from PT while in the hospital. She is okay to ambulate with nursing staff assistance and rolling walker.

## 2019-02-05 NOTE — PROGRESS NOTES
Awake alert oriented NAD    Still coughing     Denies CNS ENT GI  RHEUM OR DERM SX  Past Medical History:   Diagnosis Date    A-fib     Diabetes mellitus type II     Fatty liver     GERD (gastroesophageal reflux disease)     Hemochromatosis     Hyperlipidemia     Hypertension     Vaginal delivery     x2    Vitamin D deficiency disease     Wears partial dentures     upper removable bridge     Review of patient's allergies indicates:  No Known Allergies    Current Facility-Administered Medications   Medication    acetaminophen tablet 500 mg    allopurinol tablet 100 mg    amiodarone tablet 200 mg    amoxicillin-clavulanate 500-125mg per tablet 500 mg    apixaban tablet 2.5 mg    atorvastatin tablet 40 mg    benzonatate capsule 100 mg    cloNIDine tablet 0.1 mg    dextrose 50% injection 12.5 g    dextrose 50% injection 25 g    glucagon (human recombinant) injection 1 mg    glucose chewable tablet 16 g    glucose chewable tablet 24 g    guaiFENesin 12 hr tablet 600 mg    insulin aspart U-100 pen 0-5 Units    levalbuterol nebulizer solution 0.63 mg    loperamide capsule 2 mg    metoprolol tartrate (LOPRESSOR) tablet 25 mg    ondansetron injection 4 mg    predniSONE tablet 20 mg    promethazine (PHENERGAN) 6.25 mg in dextrose 5 % 50 mL IVPB    ramelteon tablet 8 mg    senna-docusate 8.6-50 mg per tablet 1 tablet    traMADol tablet 50 mg       LABS    Recent Results (from the past 24 hour(s))   POCT glucose    Collection Time: 02/04/19  3:33 PM   Result Value Ref Range    POCT Glucose 224 (H) 70 - 110 mg/dL   POCT glucose    Collection Time: 02/04/19  8:34 PM   Result Value Ref Range    POCT Glucose 207 (H) 70 - 110 mg/dL   POCT glucose    Collection Time: 02/04/19 10:40 PM   Result Value Ref Range    POCT Glucose 149 (H) 70 - 110 mg/dL   Basic metabolic panel    Collection Time: 02/05/19  4:34 AM   Result Value Ref Range    Sodium 127 (L) 136 - 145 mmol/L    Potassium 4.3 3.5 - 5.1  mmol/L    Chloride 97 95 - 110 mmol/L    CO2 21 (L) 23 - 29 mmol/L    Glucose 106 70 - 110 mg/dL    BUN, Bld 61 (H) 8 - 23 mg/dL    Creatinine 3.8 (H) 0.5 - 1.4 mg/dL    Calcium 7.4 (L) 8.7 - 10.5 mg/dL    Anion Gap 9 8 - 16 mmol/L    eGFR if African American 13 (A) >60 mL/min/1.73 m^2    eGFR if non African American 11 (A) >60 mL/min/1.73 m^2   POCT glucose    Collection Time: 02/05/19  7:34 AM   Result Value Ref Range    POCT Glucose 106 70 - 110 mg/dL   ]    I/O last 3 completed shifts:  In: 80 [P.O.:80]  Out: 2825 [Urine:2825]    Vitals:    02/05/19 0521 02/05/19 0737 02/05/19 0833 02/05/19 1136   BP: 132/60 133/63  132/63   Pulse: 60 60 75 60   Resp: 18 18 19 18   Temp: 97.5 °F (36.4 °C) 97.6 °F (36.4 °C)  98.1 °F (36.7 °C)   TempSrc: Oral Oral  Oral   SpO2: (!) 93% (!) 93% (!) 92% 97%   Weight:       Height:           No Jvd, Thyromegaly or Lymphadenopathy  Lungs: Fairly clear anteriorly and laterally  Cor: RRR no G or rubs  Abd: Soft benign good bowel sounds non tender  Ext: No E C C    A)  FELICIA with good uo, creat better seems to be peaking  Mild proteinuria  Hyponatremia  Afib  MGUS  Bronchitis  HTN well controled on present rx  Gout asx at this time  DM  Pulmonary      P)  Renal diabetic diet  Maintain hydration  Avoid nephrotoxic medications  Adjust all medications to the degree of renal function  No hd for now

## 2019-02-05 NOTE — PLAN OF CARE
Problem: Occupational Therapy Goal  Goal: Occupational Therapy Goal  Goals to be met by: 2/19/19    Patient will increase functional independence with ADLs by performing:    UE Dressing with Modified [place] and Supervision.  Grooming while standing at sink with Modified [place] and Supervision.  Toileting from toilet with Supervision for hygiene and clothing management.   Supine to sit with Modified [place] and Supervision.  Step transfer with Supervision  Toilet transfer to toilet with Supervision.  Upper extremity exercise program x15 reps per handout, with assistance as needed.     Outcome: Ongoing (interventions implemented as appropriate)  The patient participated in OT re_eval. Patient will benefit from OT to address functional deficits.    Comments: The patient will benefit from  OT.

## 2019-02-05 NOTE — ANESTHESIA POSTPROCEDURE EVALUATION
"Anesthesia Post Evaluation    Patient: Barbara Rizo    Procedure(s) Performed: Procedure(s) (LRB):  TRANSESOPHAGEAL ECHOCARDIOGRAM WITH POSSIBLE CARDIOVERSION (TALHA W/ POSS CARDIOVERSION) (N/A)    Final Anesthesia Type: general  Patient location during evaluation: ICU  Patient participation: Yes- Able to Participate  Level of consciousness: awake and alert  Post-procedure vital signs: reviewed and stable  Pain management: adequate  Airway patency: patent  PONV status at discharge: No PONV  Anesthetic complications: no      Cardiovascular status: blood pressure returned to baseline and hemodynamically stable  Respiratory status: unassisted and spontaneous ventilation  Hydration status: euvolemic  Follow-up not needed (Report given to ICU staff. Please call with further concerns. ).        Visit Vitals  /63 (BP Location: Left arm, Patient Position: Lying)   Pulse (!) 57   Temp 36.6 °C (97.9 °F) (Oral)   Resp 17   Ht 5' 3" (1.6 m)   Wt 102 kg (224 lb 13.9 oz)   SpO2 (!) 93%   Breastfeeding? No   BMI 39.83 kg/m²       Pain/Yessy Score: Pain Rating Prior to Med Admin: 5 (2/4/2019  9:01 PM)  Pain Rating Post Med Admin: 0 (2/3/2019  3:15 PM)        "

## 2019-02-05 NOTE — PT/OT/SLP RE-EVAL
Physical Therapy Re-evaluation    Patient Name:  Barbara Rizo   MRN:  [id number]    Recommendations:     Discharge Recommendations:  (home health PT with assistance from daughter)   Discharge Equipment Recommendations: none   Barriers to discharge: None    Assessment:     Barbara Rizo is a 78 y.o. female admitted with a medical diagnosis of Atrial fibrillation with rapid ventricular response.  She presents with the following impairments/functional limitations:  weakness, impaired endurance, impaired functional mobilty, gait instability, impaired balance, decreased lower extremity function, decreased safety awareness, impaired cardiopulmonary response to activity.    Rehab Prognosis:  good; patient would benefit from acute skilled PT services to address these deficits and reach maximum level of function.      Recent Surgery: Procedure(s) (LRB):  Cardioversion/Defibrillation (N/A) 3 Days Post-Op    Plan:     During this hospitalization, patient to be seen 5 x/week to address the above listed problems via gait training, therapeutic activities, therapeutic exercises  · Plan of Care Expires:  02/19/19   Plan of Care Reviewed with: patient, son    Subjective     Communicated with nurse Crowley prior to session.  Patient found supine in bed with son present upon PT entry to room, agreeable to evaluation.      Chief Complaint: Tired from sitting on edge of bed for breakfast.   Patient comments/goals: To go home.   Pain/Comfort:  · Pain Rating 1: 5/10  · Location - Side 1: Right  · Location 1: (due to coughing)  · Pain Addressed 1: Distraction, Cessation of Activity    Objective:     Patient found with: george catheter, telemetry, peripheral IV, oxygen     General Precautions: Standard, fall   Orthopedic Precautions:N/A   Braces: N/A     Exams:  · Cognitive Exam:  Patient is oriented to Person, Place and Situation  · Gross Motor Coordination:  WFL  · Sensation:    · -       Intact  · Skin Integrity/Edema:      · -        Skin integrity: Visible skin intact  · RLE ROM: WFL  · RLE Strength: WFL  · LLE ROM: WFL  · LLE Strength: WFL    Functional Mobility:  · Bed Mobility:     · Supine to Sit: stand by assistance  · Sit to Supine: stand by assistance  · Transfers:     · Sit to Stand:  stand by assistance with rolling walker  · Gait:  Patient ambulated 100ft with 3L NC O2, Rolling Walker, and CGA using 3-point gait. Patient demonstrated decreased jocelyn and decreased velocity of limb motion during gait due to impaired balance and decreased endurance. She required verbal cues for pursed lip breathing.     AM-PAC 6 CLICK MOBILITY  Total Score:21     Patient declined wanting to sit in the chair due to fatigue from sitting on edge of bed for breakfast this AM. Patient left supine with all lines intact, call button in reach, nurse Carline notified and son present.    GOALS:   Multidisciplinary Problems     Physical Therapy Goals        Problem: Physical Therapy Goal    Goal Priority Disciplines Outcome Goal Variances Interventions   Physical Therapy Goal     PT, PT/OT Ongoing (interventions implemented as appropriate)     Description:  Goals to be met by: 19    Patient will increase functional independence with mobility by performin. Sit to stand transfer with Modified [place]  2. Gait x250 feet with Modified [place] using Rolling Walker if needed  3. Lower extremity exercise program x30 reps per handout, with independence                      History:     Past Medical History:   Diagnosis Date    A-fib     Diabetes mellitus type II     Fatty liver     GERD (gastroesophageal reflux disease)     Hemochromatosis     Hyperlipidemia     Hypertension     Vaginal delivery     x2    Vitamin D deficiency disease     Wears partial dentures     upper removable bridge       Past Surgical History:   Procedure Laterality Date    ARTHROPLASTY, KNEE, TOTAL Left 2013    [facility]     ARTHROPLASTY-KNEE Right 8/3/2015    [facility]    BREAST BIOPSY      CHEST DRAINAGE N/A 9/18/2015    Performed by Bonnie Surgeon at [facility] BONNIE    CHOLECYSTECTOMY  08/2015    CHOLECYSTECTOMY-LAPAROSCOPIC N/A 8/26/2015    Performed by Bony Alexander MD at [facility] OR    CYSTOSCOPY - URODYNAMICS FLOW STUDY  (C-ARM) N/A 11/4/2016    Performed by Reina Camp MD at [facility] OR    ESOPHAGOGASTRODUODENOSCOPY (EGD) Left 10/15/2015    Performed by Rolando Robert MD at [facility] ENDO    EYE SURGERY      Cat Ext  OU    HYSTERECTOMY      partial due to uterine fibroids    INCISION AND DRAINAGE (I&D), ABSCESS Right 9/14/2015    Performed by Bonnie Surgeon at [facility] BONNIE    TOTAL KNEE ARTHROPLASTY Right 2015    left knee done 5/2013    TRANSESOPHAGEAL ECHOCARDIOGRAM (TALHA) N/A 9/21/2015    Performed by Amauri Lomas MD at [facility] CATH LAB    TRANSESOPHAGEAL ECHOCARDIOGRAM WITH POSSIBLE CARDIOVERSION (TALHA W/ POSS CARDIOVERSION) N/A 1/30/2019    Performed by Sundeep Montero MD at [facility] CATH LAB       Time Tracking:     PT Received On: 02/05/19  PT Start Time: 0957     PT Stop Time: 1015  PT Total Time (min): 18 min     Billable Minutes: Re-eval 18      Karla Guadalupe, PT  02/05/2019

## 2019-02-06 ENCOUNTER — NURSE TRIAGE (OUTPATIENT)
Dept: ADMINISTRATIVE | Facility: CLINIC | Age: 79
End: 2019-02-06

## 2019-02-06 VITALS
TEMPERATURE: 99 F | BODY MASS INDEX: 39.77 KG/M2 | SYSTOLIC BLOOD PRESSURE: 137 MMHG | RESPIRATION RATE: 18 BRPM | OXYGEN SATURATION: 97 % | HEIGHT: 63 IN | HEART RATE: 57 BPM | DIASTOLIC BLOOD PRESSURE: 65 MMHG | WEIGHT: 224.44 LBS

## 2019-02-06 PROBLEM — I50.32 CHRONIC DIASTOLIC CHF (CONGESTIVE HEART FAILURE): Status: ACTIVE | Noted: 2019-02-06

## 2019-02-06 LAB
ANION GAP SERPL CALC-SCNC: 9 MMOL/L
BUN SERPL-MCNC: 59 MG/DL
CALCIUM SERPL-MCNC: 8.4 MG/DL
CHLORIDE SERPL-SCNC: 99 MMOL/L
CO2 SERPL-SCNC: 23 MMOL/L
CREAT SERPL-MCNC: 3.7 MG/DL
EST. GFR  (AFRICAN AMERICAN): 13 ML/MIN/1.73 M^2
EST. GFR  (NON AFRICAN AMERICAN): 11 ML/MIN/1.73 M^2
GLUCOSE SERPL-MCNC: 89 MG/DL
POCT GLUCOSE: 112 MG/DL (ref 70–110)
POCT GLUCOSE: 91 MG/DL (ref 70–110)
POTASSIUM SERPL-SCNC: 5 MMOL/L
SODIUM SERPL-SCNC: 131 MMOL/L

## 2019-02-06 PROCEDURE — 94799 UNLISTED PULMONARY SVC/PX: CPT

## 2019-02-06 PROCEDURE — 94640 AIRWAY INHALATION TREATMENT: CPT

## 2019-02-06 PROCEDURE — 25000003 PHARM REV CODE 250: Performed by: INTERNAL MEDICINE

## 2019-02-06 PROCEDURE — 36415 COLL VENOUS BLD VENIPUNCTURE: CPT

## 2019-02-06 PROCEDURE — 80048 BASIC METABOLIC PNL TOTAL CA: CPT

## 2019-02-06 PROCEDURE — 25000003 PHARM REV CODE 250: Performed by: HOSPITALIST

## 2019-02-06 PROCEDURE — 94761 N-INVAS EAR/PLS OXIMETRY MLT: CPT

## 2019-02-06 PROCEDURE — 63600175 PHARM REV CODE 636 W HCPCS: Performed by: HOSPITALIST

## 2019-02-06 PROCEDURE — 25000242 PHARM REV CODE 250 ALT 637 W/ HCPCS: Performed by: HOSPITALIST

## 2019-02-06 PROCEDURE — 27000221 HC OXYGEN, UP TO 24 HOURS

## 2019-02-06 RX ORDER — AMOXICILLIN AND CLAVULANATE POTASSIUM 500; 125 MG/1; MG/1
1 TABLET, FILM COATED ORAL 2 TIMES DAILY
Qty: 10 TABLET | Refills: 0 | Status: SHIPPED | OUTPATIENT
Start: 2019-02-06 | End: 2019-02-11

## 2019-02-06 RX ORDER — AMIODARONE HYDROCHLORIDE 200 MG/1
200 TABLET ORAL 2 TIMES DAILY
Qty: 60 TABLET | Refills: 0 | Status: SHIPPED | OUTPATIENT
Start: 2019-02-06 | End: 2019-02-13 | Stop reason: SDUPTHER

## 2019-02-06 RX ADMIN — AMIODARONE HYDROCHLORIDE 200 MG: 200 TABLET ORAL at 08:02

## 2019-02-06 RX ADMIN — METOPROLOL TARTRATE 25 MG: 25 TABLET ORAL at 08:02

## 2019-02-06 RX ADMIN — LOPERAMIDE HYDROCHLORIDE 2 MG: 2 CAPSULE ORAL at 05:02

## 2019-02-06 RX ADMIN — PREDNISONE 20 MG: 20 TABLET ORAL at 08:02

## 2019-02-06 RX ADMIN — AMOXICILLIN AND CLAVULANATE POTASSIUM 500 MG: 500; 125 TABLET, FILM COATED ORAL at 08:02

## 2019-02-06 RX ADMIN — LEVALBUTEROL HYDROCHLORIDE 0.63 MG: 0.63 SOLUTION RESPIRATORY (INHALATION) at 12:02

## 2019-02-06 RX ADMIN — APIXABAN 2.5 MG: 2.5 TABLET, FILM COATED ORAL at 08:02

## 2019-02-06 RX ADMIN — ATORVASTATIN CALCIUM 40 MG: 40 TABLET, FILM COATED ORAL at 08:02

## 2019-02-06 RX ADMIN — ALLOPURINOL 100 MG: 100 TABLET ORAL at 08:02

## 2019-02-06 RX ADMIN — LEVALBUTEROL HYDROCHLORIDE 0.63 MG: 0.63 SOLUTION RESPIRATORY (INHALATION) at 07:02

## 2019-02-06 RX ADMIN — GUAIFENESIN 600 MG: 600 TABLET, EXTENDED RELEASE ORAL at 08:02

## 2019-02-06 RX ADMIN — LEVALBUTEROL HYDROCHLORIDE 0.63 MG: 0.63 SOLUTION RESPIRATORY (INHALATION) at 03:02

## 2019-02-06 NOTE — NURSING
Testing for home O2    O2 Sats on RA at rest=81 %    O2 Sats on RA with exercise=77 %    O2 Sats on 2L O2 with exercise 91 %

## 2019-02-06 NOTE — PLAN OF CARE
Problem: Diabetes Comorbidity  Goal: Blood Glucose Level Within Desired Range  Outcome: Ongoing (interventions implemented as appropriate)  Intervention: Maintain Glycemic Control   02/06/19 0512   Monitor and Manage Ketoacidosis   Glycemic Management blood glucose monitoring         Problem: Infection  Goal: Infection Symptom Resolution  Outcome: Ongoing (interventions implemented as appropriate)  Intervention: Prevent or Manage Infection   02/06/19 0512   Prevent or Manage Infection   Fever Reduction/Comfort Measures medication administered   Infection Management aseptic technique maintained         Problem: Renal Function Impairment (Acute Kidney Injury/Impairment)  Goal: Effective Renal Function  Outcome: Ongoing (interventions implemented as appropriate)  Intervention: Monitor and Support Renal Function   02/06/19 0512   Manage Acute Allergic Reaction   Medication Review/Management medications reviewed

## 2019-02-06 NOTE — CONSULTS
Follow-up Information     Lalita Rawls MD On 2/18/2019.    Specialties:  Hematology and Oncology, Hematology  Why:  Outpatient Services @9am   Contact information:  [address]  [address]  [place] [address]  [phone]             Amauri Lomas MD On 2/13/2019.    Specialty:  Cardiology  Why:  Outpatient Services@ 9:15am   Contact information:  [address]  [address]  [place] [address]  [phone]             Paras Oro MD On 2/11/2019.    Specialty:  Family Medicine  Why:  Outpatient Services@1:00pm   Contact information:  [address] [facility] [address]  [phone]             Jaime Campos MD.    Specialty:  Nephrology  Why:  Ronni will contact patient with appointment  Contact information:  [address] [place] [address] [address]  [place] [address]  [phone]             [facility].    Why:  Home Health  Contact information:  [address]  [place] [id number] [phone]

## 2019-02-06 NOTE — PLAN OF CARE
Patient's nurse Negin informed that patient can discharge from  standpoint once patient's oxygen tank is delivered to patient's room.         02/06/19 1720   Final Note   Assessment Type Final Discharge Note   Anticipated Discharge Disposition Home   What phone number can be called within the next 1-3 days to see how you are doing after discharge? ([phone])   Hospital Follow Up  Appt(s) scheduled? Yes   Discharge plans and expectations educations in teach back method with documentation complete? Yes   Right Care Referral Info   Post Acute Recommendation No Care

## 2019-02-06 NOTE — PLAN OF CARE
[facility]    HOME HEALTH ORDERS  FACE TO FACE ENCOUNTER    Patient Name: Barbara Rizo  YOB: 1940    PCP: Paras Oro MD   PCP Address: [address] / [place] [address]  PCP Phone Number: [phone]  PCP Fax: [phone]    Encounter Date: 02/06/2019    Admit to Home Health    Diagnoses:  Active Hospital Problems    Diagnosis  POA    Stage 3 chronic kidney disease [N18.3]  Yes    Atrial fibrillation with rapid ventricular response [I48.91]  Yes    Acute bronchitis [J20.9]  Yes    Acute renal failure superimposed on stage 3 chronic kidney disease [N17.9, N18.3]  Yes    Obesity, Class II, BMI 35-39.9 [E66.9]  Yes     Chronic    Chronic gout [M1A.9XX0]  Yes     Chronic    Paroxysmal atrial fibrillation [I48.0]  Yes     Chronic    MGUS (monoclonal gammopathy of unknown significance) [D47.2]  Yes     Chronic    Essential hypertension [I10]  Yes     Chronic    Hyperlipidemia [E78.5]  Yes     Chronic    Type 2 diabetes mellitus, controlled, 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.29]  Yes     Chronic      Resolved Hospital Problems    Diagnosis Date Resolved POA    *Atrial fibrillation with rapid ventricular response [I48.91] 01/31/2019 Yes    Atrial fibrillation with rapid ventricular response [I48.91] 02/03/2019 Yes    Metabolic encephalopathy [G93.41] 01/31/2019 No       Future Appointments   Date Time Provider Department Center   2/11/2019  9:00 AM LAB,  HOSPITAL [facility] LAB [facility] Hos   2/18/2019  9:00 AM Lalita Rawls MD [facility] HEM ONC [facility] Cli   2/27/2019  1:00 PM Amauri Lomas MD [facility] CARDIO [facility] Hos   3/20/2019  1:00 PM CHAIR 06 [facility] CHEMO [facility]     Follow-up Information     Lalita Rawls MD On 2/18/2019.    Specialties:  Hematology and Oncology, Hematology  Why:  at 9am  Contact information:  [address]  [address]  [place] [address]  [phone]             Amauri Lomas MD On 2/27/2019.    Specialty:  Cardiology  Why:   at 1pm  Contact information:  [address]  [address]  [place] [facility]  [phone]             Paras Oro MD On 2/4/2019.    Specialty:  Family Medicine  Why:  at 1pm  Contact information:  [address] [address]  [phone]             Paras Oro MD In 1 week.    Specialty:  Family Medicine  Contact information:  [address] [facility]  [phone]             nephrology   In 1 week.                   I have seen and examined this patient face to face today. My clinical findings that support the need for the home health skilled services and home bound status are the following:  Weakness/numbness causing balance and gait disturbance due to Coronary Heart Disease and Weakness/Debility making it taxing to leave home.    Allergies:Review of patient's allergies indicates:  No Known Allergies    Diet: renal diet and 2 gram sodium diet 1800 ADA diet     Activities: activity as tolerated    Nursing:   SN to complete comprehensive assessment including routine vital signs. Instruct on disease process and s/s of complications to report to MD. Review/verify medication list sent home with the patient at time of discharge  and instruct patient/caregiver as needed. Frequency may be adjusted depending on start of care date.    Notify MD if SBP > 160 or < 90; DBP > 90 or < 50; HR > 120 or < 50; Temp > 101; Other:         CONSULTS:    Physical Therapy to evaluate and treat. Evaluate for home safety and equipment needs; Establish/upgrade home exercise program. Perform / instruct on therapeutic exercises, gait training, transfer training, and Range of Motion.  Occupational Therapy to evaluate and treat. Evaluate home environment for safety and equipment needs. Perform/Instruct on transfers, ADL training, ROM, and therapeutic exercises.    MISCELLANEOUS CARE:  Routine Skin for Bedridden Patients: Instruct patient/caregiver to apply moisture barrier cream to all skin folds and wet areas  in perineal area daily and after baths and all bowel movements.    WOUND CARE ORDERS  n/a      Medications: Review discharge medications with patient and family and provide education.      Current Discharge Medication List      START taking these medications    Details   amiodarone (PACERONE) 200 MG Tab Take 1 tablet (200 mg total) by mouth 2 (two) times daily.  Qty: 60 tablet, Refills: 0      amoxicillin-clavulanate 500-125mg (AUGMENTIN) 500-125 mg Tab Take 1 tablet (500 mg total) by mouth 2 (two) times daily. for 5 days  Qty: 10 tablet, Refills: 0      apixaban (ELIQUIS) 2.5 mg Tab Take 1 tablet (2.5 mg total) by mouth 2 (two) times daily.  Qty: 60 tablet, Refills: 5         CONTINUE these medications which have NOT CHANGED    Details   allopurinol (ZYLOPRIM) 100 MG tablet Take 100 mg by mouth once daily.       amLODIPine (NORVASC) 5 MG tablet Take 1 tablet (5 mg total) by mouth once daily.  Qty: 90 tablet, Refills: 3      atorvastatin (LIPITOR) 40 MG tablet Take 1 tablet (40 mg total) by mouth once daily.  Qty: 90 tablet, Refills: 1    Associated Diagnoses: Type 2 diabetes mellitus with stage 3 chronic kidney disease, without long-term current use of insulin; Essential hypertension      ergocalciferol (VITAMIN D2) 50,000 unit Cap Take 50,000 Units by mouth every 7 days.      fish oil-omega-3 fatty acids 300-1,000 mg capsule Take 1 capsule by mouth once daily.      folic acid (FOLVITE) 1 MG tablet Take 1 tablet (1 mg total) by mouth once daily.  Qty: 90 tablet, Refills: 1    Associated Diagnoses: Hemochromatosis, unspecified hemochromatosis type      blood sugar diagnostic Strp Check blood sugar twice a day.Testing strips and lancets.  Qty: 50 each, Refills: 22    Associated Diagnoses: Diabetes mellitus, new onset      blood-glucose meter (FREESTYLE SYSTEM KIT) kit Use as instructed  Qty: 1 each, Refills: 0    Associated Diagnoses: Diabetes mellitus, new onset      metoprolol tartrate (LOPRESSOR) 50 MG tablet Take  2 tablets (100 mg total) by mouth once daily.  Qty: 180 tablet, Refills: 4    Associated Diagnoses: Essential hypertension         STOP taking these medications       furosemide (LASIX) 20 MG tablet Comments:   Reason for Stopping:         amoxicillin (AMOXIL) 500 MG Tab Comments:   Reason for Stopping:         lisinopril (PRINIVIL,ZESTRIL) 40 MG tablet Comments:   Reason for Stopping:               I certify that this patient is confined to her home and needs intermittent skilled nursing care, physical therapy and occupational therapy.

## 2019-02-06 NOTE — PROGRESS NOTES
[place] contacted N to inquire of patient's auth for home oxygen. Auth # [id number]. Spoke with Saritha.     1718- [place] contacted Respiratory. Spoke with Christophe. Christophe will pull patient's oxygen. Oxygen approved by Muriel with [facility].

## 2019-02-06 NOTE — DISCHARGE SUMMARY
[facility] Hospital Medicine  Discharge Summary      Patient Name: Barbara Rizo  MRN: [id number]  Admission Date: 1/28/2019  Hospital Length of Stay: 9 days  Discharge Date and Time:  02/06/2019 10:41 AM  Attending Physician: Salazar Kirby MD   Discharging Provider: Salazar Kirby MD  Primary Care Provider: Paras Oro MD      HPI:   Ms. Barbara Rizo is a 78 y.o. female with essential hypertension, hyperlipidemia (LDL 62.4 Jul 2018), type 2 diabetes mellitus (HbA1c 5.8% Jul 2018), CKD Stage 3, paroxysmal atrial fibrillation ([id number] score 5) not on chronic anticoagulation, obesity (BMI 36.0), MGUS, and chronic gout who presents to [facility] ED with complaints of coughing for three days.  She started to have a non-productive cough, wheezing, and mild dyspnea three days ago which has steadily been worsening since onset.  She also complains of a subjective fever but otherwise denies any nausea, vomiting, chest pain, palpitations, pleurisy, nor any diaphoresis.  She denies any recent travel, hemoptysis, nor any lower extremity pain or swelling.  She does say that she's under a lot of stress recently with her ex- dying yesterday at [facility] due to complications from a heart valve replacement two weeks ago.  She does say that she may have had sick contacts while visiting her ex-.  Her appetite has been poor but she denies any weight loss.    While at her ENT's appointment today she decided to schedule an appointment with her PCP to evaluate her upper airway complaints.  She was unable to see her regular PCP, Dr. Paras Oro, but was able to be seen by another physician who became concerned when she was noted to be in AFib with RVR on EKG.  He recommended EMS transportation to the ED but she refused and decided to drive herself.  Her cardiologist is Dr. Amauri Lomas.    Procedure(s) (LRB):  Cardioversion/Defibrillation (N/A)       Hospital Course:   Pt admitted to ICU with afib rvr on amiodarone infusion. Pt with elevated HR  and after TALHA done patient became very agitated and anxious. Ativan 1mg IV given and symptoms got worse. HR up into the 170s and O2 sats dropped to 77%. Placed on NRB. Improved O2 sats. 5mg IV haldol given as patient was trying to get out of bed and pulling oxygen mask off. Cardizem 10mg Iv given with improved HR to 120s-130s. xopenex scheduled Q 8 hours. Changed to zosyn with temp 102F.   1/30- successful cardioversion, post procedure confused and pulling oxygen off, desaturation, constant re-direction, monitored in ICU overnight. Changed to oral amiodarone. eliquis for anticoagulation. Holding parameters on BP meds. IV steroids, nebs and antibiotic for probable lower resp infection. IV lasix as Bp tolerates.   1/31- HR remain under control NSR 60-70s.On amiodarone, metoprolol and eliquis.  Resp status improved and weaned oxygen. Steroids changed to oral route. DC zosyn and switch to augmentin. Add acapella to nebs. Cr increased from baseline (1.5-1.7). Gentle IVF and monitor with repeat BMP later, was stable,. PT,OT consulted for dc planning. Weaning oxygen. PT/OT consulted and rec H/H without equipment. The patient was transferred to the floor on 1/31. Nephrology was consulted for worsening renal function.     2/2- pt transferred with afib rvr. Successful cardioversion again . Continued oral amiodarone and BB.  still has aggressive cough and URI symptoms. On mucolytic and antibiotic.   2/3- HR 50-60s sinus. Cr sightly higher, sodium 128. BNP 1500. Lasix x one dose. Bringing up more mucus. Steroids weaned 20mg. Still faint wheezes on exam.  Patient had worsening ARF on CKD 3, keeped on george and monitor,nephrology was following.reconsulted PT,OT.fley was removed latera nd she had improvement in ARF.  Changed diet for low slat diet duo to hyponatremia with improvement.  She did well with PT,OT.  Her wheezing and  respiratory status is improved,  Patient has been discharged home with HH and PO Abx and OAC and amiodarone and follow up with PCP,nephrology and cardiology in next few days.     Consults:   Consults (From admission, onward)        Status Ordering Provider     Inpatient consult to Cardiology  Once     Provider:  Sundeep Montero MD    Completed JA PHILLIPS     Inpatient consult to Nephrology  Once     Provider:  Miguelito Wright MD    Completed BETTY SETH     Inpatient consult to Social Work  Once     Provider:  (Not yet assigned)    Acknowledged JONI SUTHERLAND     Inpatient consult to Social Work  Once     Provider:  (Not yet assigned)    Acknowledged JONI SUTHERLAND          No new Assessment & Plan notes have been filed under this hospital service since the last note was generated.  Service: Hospital Medicine    Final Active Diagnoses:    Diagnosis Date Noted POA    Stage 3 chronic kidney disease [N18.3] 02/04/2019 Yes    Atrial fibrillation with rapid ventricular response [I48.91] 02/03/2019 Yes    Acute bronchitis [J20.9] 01/31/2019 Yes    Acute renal failure superimposed on stage 3 chronic kidney disease [N17.9, N18.3] 01/28/2019 Yes    Obesity, Class II, BMI 35-39.9 [E66.9] 01/28/2019 Yes     Chronic    Chronic gout [M1A.9XX0] 01/28/2019 Yes     Chronic    Paroxysmal atrial fibrillation [I48.0] 11/27/2018 Yes     Chronic    MGUS (monoclonal gammopathy of unknown significance) [D47.2] 10/18/2018 Yes     Chronic    Essential hypertension [I10]  Yes     Chronic    Hyperlipidemia [E78.5]  Yes     Chronic    Type 2 diabetes mellitus, controlled, 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    Specialties:  Hematology and Oncology, Hematology  Why:  at 9am  Contact information:  [address]  [address]  [place] [address]  [phone]             Amauri Lomas MD On 2/27/2019.    Specialty:  Cardiology  Why:  at 1pm  Contact information:  [address]  [address]  [place] [address]  [phone]             Paras Oro MD On 2/4/2019.    Specialty:  Family Medicine  Why:  at 1pm  Contact information:  [address]  [place] [id number] [phone]             Paras Oro MD In 1 week.    Specialty:  Family Medicine  Contact information:  [address]  [place] [id number] [phone]             nephrology   In 1 week.               Patient Instructions:      Transesophageal echo (TALHA) with possible cardioversion   Standing Status: Future Number of Occurrences: 1 Standing Exp. Date: 01/30/20     Case Request-Cath Lab: TRANSESOPHAGEAL ECHOCARDIOGRAM WITH POSSIBLE CARDIOVERSION (TALHA W/ POSS CARDIOVERSION)     Order Specific Question Answer Comments   CPT Code: [place] ECHO HEART,TRANSESOPHAGEAL,COMPLETE [44815]    CPT Code: [place] CARDIOVERSION, ELECTIVE;EXTERN [16769]    Post-Procedure Disposition: ICU [40]    Medical Necessity: Medically Urgent [101]      Case Request-Cath Lab: Cardioversion/Defibrillation     Order Specific Question Answer Comments   CPT Code: TN CARDIOVERSION, ELECTIVE;EXTERN [28260]    Post-Procedure Disposition: ICU [40]    Medical Necessity: Medically Urgent [101]      Activity as tolerated       Significant Diagnostic Studies: Labs:   BMP:   Recent Labs   Lab 02/05/19  0434 02/06/19  0733    89   * 131*   K 4.3 5.0   CL 97 99   CO2 21* 23   BUN 61* 59*   CREATININE 3.8* 3.7*   CALCIUM 7.4* 8.4*    and CBC No results for  input(s): WBC, HGB, HCT, PLT in the last 48 hours.  Microbiology:   Blood Culture   Lab Results   Component Value Date    LABBLOO No growth after 5 days. 01/28/2019    and Urine Culture    Lab Results   Component Value Date    LABURIN No growth 01/30/2019     Radiology: X-Ray: CXR: X-Ray Chest 1 View (CXR):   Results for orders placed or performed during the hospital encounter of 01/28/19   X-Ray Chest 1 View    Narrative    EXAMINATION:  XR CHEST 1 VIEW    CLINICAL HISTORY:  cough, hypoxia;    TECHNIQUE:  Single frontal view of the chest was performed.    COMPARISON:  01/30/2019.    FINDINGS:  Heart is stable in size.  There is mild elevation of the right hemidiaphragm.  No evidence of new focal consolidation, pneumothorax, or significant effusion.      Impression    No significant change in cardiopulmonary status.      Electronically signed by: Sabrina Wagoner MD  Date:    02/02/2019  Time:    18:34    and X-Ray Chest PA and Lateral (CXR):   Results for orders placed or performed during the hospital encounter of 01/28/19   X-Ray Chest PA And Lateral    Narrative    EXAMINATION:  XR CHEST PA AND LATERAL    CLINICAL HISTORY:  Shortness of breath    TECHNIQUE:  PA and lateral views of the chest were performed.    COMPARISON:  Chest radiograph 10/12/2015    FINDINGS:  Cardiomediastinal silhouette is within normal limits.    Right greater than left bibasilar airspace opacities which may reflect atelectasis and/or pneumonia.  Borderline interstitial edema or sizable pleural effusion.  No pneumothorax.    Multilevel degenerative changes of the imaged spine.      Impression    1. Right greater than left bibasilar airspace opacities which may reflect atelectasis and/or pneumonia.  2. Borderline interstitial edema or sizable pleural effusion.      Electronically signed by: Julian Rahman  Date:    01/28/2019  Time:    12:47     Cardiac Graphics: Echocardiogram:   2D echo with color flow doppler:   Results for orders placed or  performed during the hospital encounter of 08/21/15   2D echo with color flow doppler   Result Value Ref Range    QEF 75 55 - 65    Mitral Valve Regurgitation MILD     Est. PA Systolic Pressure 40.21 (A)     Tricuspid Valve Regurgitation MODERATE (A)     and Transthoracic echo (TTE) complete (Cupid Only):   Results for orders placed or performed during the hospital encounter of 01/28/19   Transthoracic echo (TTE) complete (Cupid Only)   Result Value Ref Range    BSA 2.01 m2    LA WIDTH 4.31 cm    AORTIC VALVE CUSP SEPERATION 1.37 cm    PV PEAK VELOCITY 1.11 cm/s    LVIDD 4.49 3.5 - 6.0 cm    IVS 1.30 (A) 0.6 - 1.1 cm    PW 0.94 0.6 - 1.1 cm    Ao root annulus 2.54 cm    LVIDS 3.14 2.1 - 4.0 cm    FS 30 28 - 44 %    LA volume 137.22 cm3    Sinus 2.69 cm    STJ 1.94 cm    Ascending aorta 2.16 cm    LV mass 178.90 g    LA size 5.57 cm    RVDD 4.72 cm    TAPSE 1.64 cm    RV S' 10.17 m/s    Left Ventricle Relative Wall Thickness 0.42 cm    AV mean gradient 15.54 mmHg    AV valve area 1.30 cm2    AV Velocity Ratio 0.54     AV index (prosthetic) 0.42     IVRT 0.03 msec    LVOT diameter 2.00 cm    LVOT area 3.14 cm2    LVOT peak wes 1.1625494586 m/s    LVOT peak VTI 16.86 cm    Ao peak wes 2.36 m/s    Ao VTI 40.57 cm    LVOT stroke volume 52.94 cm3    AV peak gradient 22.28 mmHg    TR Max Wes 3.32 m/s    LV Systolic Volume 39.10 mL    LV Systolic Volume Index 20.2 mL/m2    LV Diastolic Volume 91.93 mL    LV Diastolic Volume Index 47.44 mL/m2    LA Volume Index 70.8 mL/m2    LV Mass Index 92.3 g/m2    RA Major Axis 5.84 cm    Left Atrium Minor Axis 6.78 cm    Left Atrium Major Axis 6.67 cm    Triscuspid Valve Regurgitation Peak Gradient 44.09 mmHg    RA Width 4.40 cm    Right Atrial Pressure (from IVC) 8 mmHg    TV rest pulmonary artery pressure 52 mmHg       Pending Diagnostic Studies:     None         Medications:  Reconciled Home Medications:      Medication List      START taking these medications    amiodarone 200 MG  Tab  Commonly known as:  PACERONE  Take 1 tablet (200 mg total) by mouth 2 (two) times daily.     amoxicillin-clavulanate 500-125mg 500-125 mg Tab  Commonly known as:  AUGMENTIN  Take 1 tablet (500 mg total) by mouth 2 (two) times daily. for 5 days     apixaban 2.5 mg Tab  Commonly known as:  ELIQUIS  Take 1 tablet (2.5 mg total) by mouth 2 (two) times daily.        CONTINUE taking these medications    allopurinol 100 MG tablet  Commonly known as:  ZYLOPRIM  Take 100 mg by mouth once daily.     amLODIPine 5 MG tablet  Commonly known as:  NORVASC  Take 1 tablet (5 mg total) by mouth once daily.     atorvastatin 40 MG tablet  Commonly known as:  LIPITOR  Take 1 tablet (40 mg total) by mouth once daily.     blood sugar diagnostic Strp  Check blood sugar twice a day.Testing strips and lancets.     blood-glucose meter kit  Commonly known as:  FREESTYLE SYSTEM KIT  Use as instructed     fish oil-omega-3 fatty acids 300-1,000 mg capsule  Take 1 capsule by mouth once daily.     folic acid 1 MG tablet  Commonly known as:  FOLVITE  Take 1 tablet (1 mg total) by mouth once daily.     metoprolol tartrate 50 MG tablet  Commonly known as:  LOPRESSOR  Take 2 tablets (100 mg total) by mouth once daily.     VITAMIN D2 50,000 unit Cap  Generic drug:  ergocalciferol  Take 50,000 Units by mouth every 7 days.        STOP taking these medications    amoxicillin 500 MG Tab  Commonly known as:  AMOXIL     furosemide 20 MG tablet  Commonly known as:  LASIX     lisinopril 40 MG tablet  Commonly known as:  PRINIVIL,ZESTRIL            Indwelling Lines/Drains at time of discharge:   Lines/Drains/Airways     Drain                 Urethral Catheter 01/29/19 0301 16 Fr. 8 days                Time spent on the discharge of patient: over 30  minutes  Patient was seen and examined on the date of discharge and determined to be suitable for discharge.         Salazar Kirby MD  Department of Hospital Medicine  [facility]

## 2019-02-06 NOTE — PROGRESS NOTES
Awake alert oriented NAD    Denies CNS ENT GI  RHEUM OR DERM SX    Still wheezing on coughing  Past Medical History:   Diagnosis Date    A-fib     Diabetes mellitus type II     Fatty liver     GERD (gastroesophageal reflux disease)     Hemochromatosis     Hyperlipidemia     Hypertension     Vaginal delivery     x2    Vitamin D deficiency disease     Wears partial dentures     upper removable bridge     Review of patient's allergies indicates:  No Known Allergies    Current Facility-Administered Medications   Medication    acetaminophen tablet 500 mg    allopurinol tablet 100 mg    amiodarone tablet 200 mg    amoxicillin-clavulanate 500-125mg per tablet 500 mg    apixaban tablet 2.5 mg    atorvastatin tablet 40 mg    benzonatate capsule 100 mg    cloNIDine tablet 0.1 mg    dextrose 50% injection 12.5 g    dextrose 50% injection 25 g    glucagon (human recombinant) injection 1 mg    glucose chewable tablet 16 g    glucose chewable tablet 24 g    guaiFENesin 12 hr tablet 600 mg    insulin aspart U-100 pen 0-5 Units    levalbuterol nebulizer solution 0.63 mg    loperamide capsule 2 mg    metoprolol tartrate (LOPRESSOR) tablet 25 mg    ondansetron injection 4 mg    predniSONE tablet 20 mg    promethazine (PHENERGAN) 6.25 mg in dextrose 5 % 50 mL IVPB    ramelteon tablet 8 mg    senna-docusate 8.6-50 mg per tablet 1 tablet    traMADol tablet 50 mg       LABS    Recent Results (from the past 24 hour(s))   POCT glucose    Collection Time: 02/05/19  4:23 PM   Result Value Ref Range    POCT Glucose 203 (H) 70 - 110 mg/dL   POCT glucose    Collection Time: 02/05/19  7:46 PM   Result Value Ref Range    POCT Glucose 205 (H) 70 - 110 mg/dL   POCT glucose    Collection Time: 02/05/19 10:39 PM   Result Value Ref Range    POCT Glucose 137 (H) 70 - 110 mg/dL   Basic metabolic panel    Collection Time: 02/06/19  7:33 AM   Result Value Ref Range    Sodium 131 (L) 136 - 145 mmol/L    Potassium 5.0 3.5  - 5.1 mmol/L    Chloride 99 95 - 110 mmol/L    CO2 23 23 - 29 mmol/L    Glucose 89 70 - 110 mg/dL    BUN, Bld 59 (H) 8 - 23 mg/dL    Creatinine 3.7 (H) 0.5 - 1.4 mg/dL    Calcium 8.4 (L) 8.7 - 10.5 mg/dL    Anion Gap 9 8 - 16 mmol/L    eGFR if African American 13 (A) >60 mL/min/1.73 m^2    eGFR if non African American 11 (A) >60 mL/min/1.73 m^2   POCT glucose    Collection Time: 02/06/19  7:33 AM   Result Value Ref Range    POCT Glucose 91 70 - 110 mg/dL   ]    I/O last 3 completed shifts:  In: 160 [P.O.:160]  Out: 4425 [Urine:4425]    Vitals:    02/06/19 0105 02/06/19 0427 02/06/19 0727 02/06/19 0735   BP: 127/62 (!) 146/63  136/66   Pulse: (!) 57 (!) 59 (!) 58 71   Resp: 18 20 18 18   Temp: 98.2 °F (36.8 °C) 98.6 °F (37 °C)  98.4 °F (36.9 °C)   TempSrc: Oral Oral     SpO2: 97% 97% 98%    Weight:  101.8 kg (224 lb 6.9 oz)     Height:           No Jvd, Thyromegaly or Lymphadenopathy  Lungs: Fairly clear anteriorly and laterally  Cor: RRR no G or rubs  Abd: Soft benign good bowel sounds non tender  Ext: Some edema but min    A)  FELICIA with good uo, creat in a down trend, baseline @ 1.8   Mild proteinuria  Hyponatremia  Afib  MGUS  Bronchitis  HTN well controled on present rx  Gout asx at this time  DM  Pulmonary      P)  Renal diabetic diet  Maintain hydration  Avoid nephrotoxic medications  Adjust all medications to the degree of renal function  No hd for now  Ok to dc with close follow up

## 2019-02-06 NOTE — UM SECONDARY REVIEW
[facility]    IP Extended Stay > 10     Dc home today     LOS: approved an agreement with D/C plan     Approved per  list

## 2019-02-06 NOTE — PLAN OF CARE
Problem: Infection  Goal: Infection Symptom Resolution    Intervention: Prevent or Manage Infection   02/06/19 0512   Prevent or Manage Infection   Fever Reduction/Comfort Measures medication administered   Infection Management aseptic technique maintained

## 2019-02-06 NOTE — NURSING
Bedside report received from  TOSHIA Trammell. Patient is awake and alert. Patient appears to be in no apparent distress. Safety maintained. Will continue to monitor.

## 2019-02-07 ENCOUNTER — TELEPHONE (OUTPATIENT)
Dept: CARDIOLOGY | Facility: CLINIC | Age: 79
End: 2019-02-07

## 2019-02-07 ENCOUNTER — PATIENT OUTREACH (OUTPATIENT)
Dept: ADMINISTRATIVE | Facility: CLINIC | Age: 79
End: 2019-02-07

## 2019-02-07 NOTE — PT/OT/SLP DISCHARGE
Physical Therapy Discharge Summary    Name: Barbara Rizo  MRN: [id number]   Principal Problem: Atrial fibrillation with rapid ventricular response     Patient Discharged from acute Physical Therapy on 19.  Please refer to prior PT noted date on 19 for functional status.     Assessment:     Patient appropriate for care in another setting.    Objective:     GOALS:   Multidisciplinary Problems     Physical Therapy Goals        Problem: Physical Therapy Goal    Goal Priority Disciplines Outcome Goal Variances Interventions   Physical Therapy Goal     PT, PT/OT Ongoing (interventions implemented as appropriate)     Description:  Goals to be met by: 19    Patient will increase functional independence with mobility by performin. Sit to stand transfer with Modified [place]  2. Gait x250 feet with Modified [place] using Rolling Walker if needed  3. Lower extremity exercise program x30 reps per handout, with independence                      Reasons for Discontinuation of Therapy Services  Transfer to alternate level of care.      Plan:     Patient Discharged to: Home with Home Health Service.    Karla Guadalupe, PT  2019

## 2019-02-07 NOTE — TELEPHONE ENCOUNTER
Family has questions about patient's medications. Pt was discharged today and was told that her metoprolol dose was going to be lowered. It appears that the dosage stayed the same, and patient was placed on amiodarone. Advised family to hold Metoprolol dose tonight and contact PCP in the morning for further advisement.    Reason for Disposition   Caller has NON-URGENT medication question about med that PCP prescribed and triager unable to answer question    Protocols used: ST MEDICATION QUESTION CALL-A-AH

## 2019-02-07 NOTE — PT/OT/SLP DISCHARGE
Occupational Therapy Discharge Summary    Barbara Rizo  MRN: [id number]   Principal Problem: Atrial fibrillation with rapid ventricular response      Patient Discharged from acute Occupational Therapy on 2/6/19.  Please refer to prior OT notes for functional status.    Assessment:      Patient was discharged unexpectedly.  Information required to complete an accurate discharge summary is unknown.  Refer to therapy initial evaluation and last progress note for initial and most recent functional status and goal achievement.  Recommendations made may be found in medical record.    Objective:     GOALS:   Multidisciplinary Problems     Occupational Therapy Goals        Problem: Occupational Therapy Goal    Goal Priority Disciplines Outcome Interventions   Occupational Therapy Goal     OT, PT/OT Ongoing (interventions implemented as appropriate)    Description:  Goals to be met by: 2/19/19    Patient will increase functional independence with ADLs by performing:    UE Dressing with Modified [place] and Supervision.  Grooming while standing at sink with Modified [place] and Supervision.  Toileting from toilet with Supervision for hygiene and clothing management.   Supine to sit with Modified [place] and Supervision.  Step transfer with Supervision  Toilet transfer to toilet with Supervision.  Upper extremity exercise program x15 reps per handout, with assistance as needed.                       Reasons for Discontinuation of Therapy Services  Transfer to alternate level of care.      Plan:     Patient Discharged to: Home with Home Health Service    Mary Cox OT  2/7/2019

## 2019-02-07 NOTE — Clinical Note
"Please forward this important TCC information to your provider in order to maximize the post discharge care delivery of this patient.C3 nurse spoke with Barbara Rizo  for a TCC post hospital discharge follow up call. The patient has a scheduled HOSFU appointment with Paras Oro MD on 2/11 @ 1300.Pt is still coughing per dtr, Tamar and was taking Benzonatate(Tessalon) in hosp; Tamar is req a cough med and would like to discuss w/. Also, req a glucometer called "Freestyle," to check cbg. Tamar may be reached at .THANKING YOU IN ADVANCE.Respectfully,Alyson Rangel RN.[facility]  [email or web address]   Please do not reply to this message, as this inbox is not routinely monitored. "

## 2019-02-07 NOTE — PATIENT INSTRUCTIONS
"Discharge Instructions for Heart Failure  You have been diagnosed with heart failure. The term "heart failure" sounds scary as it suggests the heart has stopped working. But it actually means the heart is not doing its job as well as it should. Heart failure happens when your heart muscle cannot keep up with your body's need for blood flow. Symptoms of heart failure can be controlled by changes in your lifestyle and by following your doctor's advice.   Home Care  Activity   Ask your doctor about an exercise program. You can benefit from simple activities such as walking or gardening. Exercising most days of the week can make you feel better. Don't be discouraged if your progress is slow at first. Rest as needed and stop activity if you develop symptoms such as chest pain, lightheadedness, or significant shortness of breath. Your doctor may prescribe a cardiac rehabilitation program. This is a program to help recover from heart disease through professional lifestyle counseling and education and medically supervised physical activity.   Diet   Follow a heart healthy diet and work hard to remove salt from your diet. Try to limit total salt intake to 2000 mg a day or less. Salt causes your body to retain water, which can make it harder for your heart to pump. You can limit salt by doing the following:  Limit canned, dried, packaged, and fast foods.  Don't add salt to your food at the table.  Season foods with herbs instead of salt when you cook.  Monitor your fluid intake. Drinking too much fluid can make heart failure worse. It is commonly advised to limit total fluid intake to less than 66 ounces (2 liters) a day.  Limit alcohol. Too much alcohol can be harmful to the heart. Alcohol should be limited to no more than one serving a day for women and two servings a day for men.  Tobacco   Break the smoking habit. Smoking increases your chance of having a heart attack, which will worsen heart failure. Quitting smoking is " the number one thing you can do to improve your health. Enroll in a stop smoking program and ask your doctor about medications or nicotine replacement therapy. These methods improve your chances of success.  Medications   Take your medications exactly as prescribed. Learn the names and purpose of each of your medications. Keep an accurate medication list with you at all times including current dosages. Don't skip doses. If you miss a dose of your medication, take it as soon as you remember, unless it's almost time for your next dose. In that case, just wait and take your next dose at the normal time. Don't take a double dose. If you are unsure, contact your doctor or pharmacist.  Weight monitoring   Weigh yourself every day. Do this at the same time of day and in the same kind of clothes. Ideally, weigh yourself first thing every morning after you empty your bladder, but before you eat breakfast. Record your weight and take a record of it to each of your doctor's visit. If your weight increases by 3 pounds in one day or 5 pounds in one week, you should contact your doctor. This is a sign that you are retaining more fluid than you should be, which can worsen heart failure.  Symptoms   Heart failure can cause a variety of symptoms including the following:  Shortness of breath  Difficulty breathing at night  Swelling in the legs and feet or in the abdomen  Becoming easily fatigued  Irregular or rapid heartbeat  Weakness or lightheadedness  It is important to know what to do if you develop signs of worsening heart failure.  Follow-Up  Make a follow-up appointment as directed by our staff. They will provide specific instruction for timing of appointments. Depending on the type and severity of heart failure you have, you may require follow up as early as 7 days from hospital discharge. Keep appointments for checkups and lab tests that are needed to check your medications and condition.  .    When to Call Your Doctor  Call  your doctor right away if you have any of the following signs of worsening heart failure:  Sudden weight gain (3 or more pounds in one day or 5 or more pounds in one week)  Trouble breathing not related to being active  New or increased swelling of your legs or ankles  Swelling or pain in your abdomen  Breathing trouble at night (waking up short of breath, needing more pillows to breathe)  Frequent coughing that doesnt go away  Feeling much more tired than usual  When to Seek Emergency Medical Attention   Call 911 right away if you develop:  Severe shortness of breath, such that you cannot catch your breath, even resting  Severe chest pain that does not resolve with rest or nitroglycerin  Pink, foamy mucus with cough and shortness of breath  A continuous rapid or irregular heartbeat  Passing out or fainting  Acute stroke symptoms such as sudden numbness or weakness on one side of your face, arm, or leg, or sudden confusion, trouble speaking or vision changes.   © 7355-1611 [employer], [address] [place] [address], [place] [address]. All rights reserved. This information is not intended as a substitute for professional medical care. Always follow your healthcare professional's instructions.

## 2019-02-07 NOTE — TELEPHONE ENCOUNTER
Patient needs to call Dr. Lomas.  I don't see any notes regarding them adjusting her medication.    Thanks  Dr. Oro

## 2019-02-07 NOTE — TELEPHONE ENCOUNTER
Returned Tamar's call. She states while patient was In the hospital she was taking metoprolol 25mg bid. She was taking 100mg prior to going in the hospital. She wants to know if she should return to 100mg daily?     Also pt. Was put on Amiodarone and Eliquis and was told by phar patient heart rate will decrease if she takes Metoprolol with these 2 new meds.    Please advise

## 2019-02-11 ENCOUNTER — LAB VISIT (OUTPATIENT)
Dept: LAB | Facility: HOSPITAL | Age: 79
End: 2019-02-11
Attending: INTERNAL MEDICINE
Payer: MEDICARE

## 2019-02-11 ENCOUNTER — OFFICE VISIT (OUTPATIENT)
Dept: FAMILY MEDICINE | Facility: CLINIC | Age: 79
End: 2019-02-11
Payer: MEDICARE

## 2019-02-11 VITALS
HEART RATE: 109 BPM | HEIGHT: 63 IN | SYSTOLIC BLOOD PRESSURE: 144 MMHG | WEIGHT: 213.63 LBS | DIASTOLIC BLOOD PRESSURE: 72 MMHG | BODY MASS INDEX: 37.85 KG/M2 | RESPIRATION RATE: 24 BRPM | TEMPERATURE: 98 F

## 2019-02-11 DIAGNOSIS — R73.03 PREDIABETES: ICD-10-CM

## 2019-02-11 DIAGNOSIS — D47.2 MGUS (MONOCLONAL GAMMOPATHY OF UNKNOWN SIGNIFICANCE): Primary | Chronic | ICD-10-CM

## 2019-02-11 DIAGNOSIS — D47.2 MGUS (MONOCLONAL GAMMOPATHY OF UNKNOWN SIGNIFICANCE): ICD-10-CM

## 2019-02-11 DIAGNOSIS — N18.3 ACUTE RENAL FAILURE SUPERIMPOSED ON STAGE 3 CHRONIC KIDNEY DISEASE, UNSPECIFIED ACUTE RENAL FAILURE TYPE: ICD-10-CM

## 2019-02-11 DIAGNOSIS — N17.9 ACUTE RENAL FAILURE SUPERIMPOSED ON STAGE 3 CHRONIC KIDNEY DISEASE, UNSPECIFIED ACUTE RENAL FAILURE TYPE: ICD-10-CM

## 2019-02-11 DIAGNOSIS — E66.01 MORBID OBESITY: ICD-10-CM

## 2019-02-11 LAB
ALBUMIN SERPL BCP-MCNC: 2.9 G/DL
ALP SERPL-CCNC: 58 U/L
ALT SERPL W/O P-5'-P-CCNC: 13 U/L
ANION GAP SERPL CALC-SCNC: 7 MMOL/L
AST SERPL-CCNC: 11 U/L
BASOPHILS # BLD AUTO: 0.01 K/UL
BASOPHILS NFR BLD: 0.1 %
BILIRUB SERPL-MCNC: 1.1 MG/DL
BUN SERPL-MCNC: 31 MG/DL
CALCIUM SERPL-MCNC: 9.7 MG/DL
CHLORIDE SERPL-SCNC: 99 MMOL/L
CO2 SERPL-SCNC: 32 MMOL/L
CREAT SERPL-MCNC: 2.5 MG/DL
DIFFERENTIAL METHOD: ABNORMAL
EOSINOPHIL # BLD AUTO: 0.1 K/UL
EOSINOPHIL NFR BLD: 1.2 %
ERYTHROCYTE [DISTWIDTH] IN BLOOD BY AUTOMATED COUNT: 14 %
EST. GFR  (AFRICAN AMERICAN): 21 ML/MIN/1.73 M^2
EST. GFR  (NON AFRICAN AMERICAN): 18 ML/MIN/1.73 M^2
GLUCOSE SERPL-MCNC: 95 MG/DL
HCT VFR BLD AUTO: 35.5 %
HGB BLD-MCNC: 11.3 G/DL
LYMPHOCYTES # BLD AUTO: 1.3 K/UL
LYMPHOCYTES NFR BLD: 11.8 %
MCH RBC QN AUTO: 30.6 PG
MCHC RBC AUTO-ENTMCNC: 31.8 G/DL
MCV RBC AUTO: 96 FL
MONOCYTES # BLD AUTO: 0.8 K/UL
MONOCYTES NFR BLD: 6.9 %
NEUTROPHILS # BLD AUTO: 9.1 K/UL
NEUTROPHILS NFR BLD: 80 %
PLATELET # BLD AUTO: 211 K/UL
PMV BLD AUTO: 10.7 FL
POTASSIUM SERPL-SCNC: 4.6 MMOL/L
PROT SERPL-MCNC: 6.1 G/DL
RBC # BLD AUTO: 3.69 M/UL
SODIUM SERPL-SCNC: 138 MMOL/L
WBC # BLD AUTO: 11.38 K/UL

## 2019-02-11 PROCEDURE — 99499 RISK ADDL DX/OHS AUDIT: ICD-10-PCS | Mod: S$GLB,,, | Performed by: FAMILY MEDICINE

## 2019-02-11 PROCEDURE — 84165 PROTEIN E-PHORESIS SERUM: CPT | Mod: 26,,, | Performed by: PATHOLOGY

## 2019-02-11 PROCEDURE — 85025 COMPLETE CBC W/AUTO DIFF WBC: CPT

## 2019-02-11 PROCEDURE — 80053 COMPREHEN METABOLIC PANEL: CPT

## 2019-02-11 PROCEDURE — 36415 COLL VENOUS BLD VENIPUNCTURE: CPT | Mod: PN

## 2019-02-11 PROCEDURE — 84165 PATHOLOGIST INTERPRETATION SPE: ICD-10-PCS | Mod: 26,,, | Performed by: PATHOLOGY

## 2019-02-11 PROCEDURE — 82784 ASSAY IGA/IGD/IGG/IGM EACH: CPT | Mod: 59

## 2019-02-11 PROCEDURE — 99496 TRANSITIONAL CARE MANAGE SERVICE 7 DAY DISCHARGE: ICD-10-PCS | Mod: S$GLB,,, | Performed by: FAMILY MEDICINE

## 2019-02-11 PROCEDURE — 99499 UNLISTED E&M SERVICE: CPT | Mod: S$GLB,,, | Performed by: FAMILY MEDICINE

## 2019-02-11 PROCEDURE — 83520 IMMUNOASSAY QUANT NOS NONAB: CPT | Mod: 59

## 2019-02-11 PROCEDURE — 84165 PROTEIN E-PHORESIS SERUM: CPT

## 2019-02-11 PROCEDURE — 99496 TRANSJ CARE MGMT HIGH F2F 7D: CPT | Mod: S$GLB,,, | Performed by: FAMILY MEDICINE

## 2019-02-11 PROCEDURE — 99999 PR PBB SHADOW E&M-EST. PATIENT-LVL III: ICD-10-PCS | Mod: PBBFAC,,, | Performed by: FAMILY MEDICINE

## 2019-02-11 PROCEDURE — 99999 PR PBB SHADOW E&M-EST. PATIENT-LVL III: CPT | Mod: PBBFAC,,, | Performed by: FAMILY MEDICINE

## 2019-02-11 RX ORDER — LISINOPRIL 40 MG/1
TABLET ORAL
COMMUNITY
Start: 2019-02-09 | End: 2019-02-18

## 2019-02-11 NOTE — PROGRESS NOTES
Transitional Care Note  Subjective:       Patient ID: Barbara Rizo is a 79 y.o. female.  Chief Complaint: hospital f/u (CHF and AFIB)    Family and/or Caretaker present at visit?  Yes, her daughter.  Diagnostic tests reviewed/disposition: No diagnosic tests pending after this hospitalization.  Disease/illness education: discussed that she has prediabetes and not type 2 DM, her CHF, and CKD  Home health/community services discussion/referrals: Patient does not have home health established from hospital visit.  They do need home health.  If needed, we will set up home health for the patient.   Establishment or re-establishment of referral orders for community resources: No other necessary community resources.   Discussion with other health care providers: No discussion with other health care providers necessary.   Patient presenting today after being admitted to the hospital for a-fib.  She states that she is doing much better.  No recent chest pain, but does continue to have shortness of breath requiring supplemental O2.  While in the hospital she suffered acute on chronic kidney disease.  She is scheduled to follow up with Dr. Boyle (nephrology), Dr. Lomas (cardiology), and will follow up with me for elevated blood sugars.  Her main concern at this time is feeling depressed due to the number of family deaths including the death of her long time boyfriend and her ex .  She feels that she may need to be on medication, but doesn't want to take anything that would effect her kidneys  She suffers with bowel incontinence that is chronic and would like medication for this.  She would like something to control her bowels.      Review of Systems   Constitutional: Positive for activity change. Negative for diaphoresis, fatigue and fever.   HENT: Positive for nosebleeds. Negative for congestion, sinus pressure and sore throat.    Eyes: Negative.    Respiratory: Negative.    Cardiovascular: Negative.     Gastrointestinal: Positive for diarrhea.   Genitourinary: Negative for decreased urine volume, dysuria, frequency and urgency.   Skin: Negative for color change, pallor, rash and wound.   Psychiatric/Behavioral: Positive for dysphoric mood.       Objective:      Physical Exam   Constitutional: She is oriented to person, place, and time. She appears well-developed and well-nourished.   HENT:   Head: Normocephalic and atraumatic.   Right Ear: External ear normal.   Left Ear: External ear normal.   Mouth/Throat: Oropharynx is clear and moist.   Eyes: Conjunctivae and EOM are normal. Pupils are equal, round, and reactive to light.   Neck: Normal range of motion. Neck supple.   Cardiovascular: Normal rate, regular rhythm and normal heart sounds.   Pulmonary/Chest: Effort normal and breath sounds normal.   Abdominal: Soft. Bowel sounds are normal.   Musculoskeletal: Normal range of motion.   Neurological: She is alert and oriented to person, place, and time.   Skin: Skin is warm and dry.       Assessment:       1. MGUS (monoclonal gammopathy of unknown significance)    2. Prediabetes    3. Acute renal failure superimposed on stage 3 chronic kidney disease, unspecified acute renal failure type    4. Morbid obesity        Plan:       1.  Labs to be done today to recheck kidney function  2.  Check labs ordered by hem/onc for MGUS  3.  Follow up with nephrology and cardiology as scheduled  4.  Gave patient information on renal diet in AVS  5.  She is to take medication as prescribed      Paras Oro MD

## 2019-02-11 NOTE — PATIENT INSTRUCTIONS
Diet for Chronic Kidney Disease  Following a special diet when you have kidney disease can help you stay as healthy as possible. Your healthcare provider or dietitian should make a special diet plan just for you.    Eating right  Here are some good eating rules to follow:  · Protein. Eating protein is important for your body. But too much protein can put a strain on your kidneys. Eating less protein may slow the progression of chronic kidney disease. Foods high in protein include meat, fish, eggs, cheese, and other dairy products. A registered dietitian can help you plan a diet that has the right amount of protein for you.  · Sodium. Having too much salt in your diet can make your body hold onto (retain) water. Ask your provider or dietitian how much sodium per day you are allowed. This will help you avoid fluid buildup in your body (fluid retention). It can also help control high blood pressure. Learn to read food labels to know how much sodium is in one serving. Foods high in salt include processed meats, canned and boxed foods, sauces, salted chips and snacks, pickled foods, frozen dinners, and restaurant and fast food.  · Fluids. If you have advanced kidney disease, you will need to limit the water and fluids you drink. If you dont, then too much water will build up in your body. The exact amount of fluid you can drink depends on how well your kidneys are working. Ask your provider how much water you can safely drink each day.  · Potassium. In advanced kidney disease, your potassium level can go dangerously high. This affects your heart. It can cause an irregular heartbeat (arrhythmia). Ask your provider or dietitian if you should limit potassium in your diet. Foods high in potassium include dairy products (milk, yogurt, cheese), dried beans, bananas, oranges, potatoes, tomatoes, spinach, cantaloupe, honeydew melon, dried fruits, and nuts.   · Calcium. Calcium is important to build strong bones. But foods  high in calcium are also high in phosphorus, which can take calcium from your bones. Limiting foods high in phosphorus will help keep calcium in your bones. Ask your provider how much calcium you should get each day.  · Phosphorus. In advanced kidney disease, your phosphorus level can go dangerously high. This affects many systems in the body and can damage your heart. Limit your intake of phosphorus-rich foods. These include dried beans and peas, nuts, peanut butter, cocoa, beer, cola drinks, and dairy products.  Date Last Reviewed: 8/1/2016 © 2000-2017 [employer]. [address] [place] [address]. All rights reserved. This information is not intended as a substitute for professional medical care. Always follow your healthcare professional's instructions.        Kidney Disease: Choosing the Right Protein for Your Body  Choosing the right type and quantity of proteins you eat is important when you have chronic kidney disease. This can affect your overall health and kidney function we well.    Choosing the right amount of protein  If you have kidney disease but are not yet on dialysis, you will most likely need a low-protein diet. This is important in slowing down the speed at which your kidneys are failing. The amount of protein you can eat daily will be calculated by the dietitian in the kidney clinic. It is based on your body weight, the degree of kidney failure, and your daily activities.  Eat your daily protein  The amount of protein that you can eat each day may change with time. Your healthcare provider determines your protein intake according to the stage of your kidney disease.  Your body weight is also a factor. If your protein intake is decreased, you may need to eat more calories from other types of food. Carbohydrates, such as bread and pasta, make good choices.  · I can eat _____ grams of protein each day.  · I should eat a total of _____ calories each day to maintain my body  weight and muscle mass.  Choosing the right type of protein  People with kidney failure have to limit the amount of phosphorus in the diet. Unfortunately, many proteins contain high amounts of phosphorus and may have to be taken in limited amounts. Milk products are a good example because they have a lot of phosphorus. The choices of protein may also be limited because of cultural, Druze, and social values. Vegetarian, vegan, kosher, and halal diets are all good examples of diets with limited protein choices. Dietitians in the clinic can work out the protein types and amounts to suit your needs.  Date Last Reviewed: 1/1/2017 © 2000-2017 [employer]. [address] [place] [address], [place] [address]. All rights reserved. This information is not intended as a substitute for professional medical care. Always follow your healthcare professional's instructions.        Diet for Chronic Kidney Disease  Following a special diet when you have kidney disease can help you stay as healthy as possible. Your healthcare provider or dietitian should make a special diet plan just for you.    Eating right  Here are some good eating rules to follow:  · Protein. Eating protein is important for your body. But too much protein can put a strain on your kidneys. Eating less protein may slow the progression of chronic kidney disease. Foods high in protein include meat, fish, eggs, cheese, and other dairy products. A registered dietitian can help you plan a diet that has the right amount of protein for you.  · Sodium. Having too much salt in your diet can make your body hold onto (retain) water. Ask your provider or dietitian how much sodium per day you are allowed. This will help you avoid fluid buildup in your body (fluid retention). It can also help control high blood pressure. Learn to read food labels to know how much sodium is in one serving. Foods high in salt include processed meats, canned and boxed foods, sauces, salted  chips and snacks, pickled foods, frozen dinners, and restaurant and fast food.  · Fluids. If you have advanced kidney disease, you will need to limit the water and fluids you drink. If you dont, then too much water will build up in your body. The exact amount of fluid you can drink depends on how well your kidneys are working. Ask your provider how much water you can safely drink each day.  · Potassium. In advanced kidney disease, your potassium level can go dangerously high. This affects your heart. It can cause an irregular heartbeat (arrhythmia). Ask your provider or dietitian if you should limit potassium in your diet. Foods high in potassium include dairy products (milk, yogurt, cheese), dried beans, bananas, oranges, potatoes, tomatoes, spinach, cantaloupe, honeydew melon, dried fruits, and nuts.   · Calcium. Calcium is important to build strong bones. But foods high in calcium are also high in phosphorus, which can take calcium from your bones. Limiting foods high in phosphorus will help keep calcium in your bones. Ask your provider how much calcium you should get each day.  · Phosphorus. In advanced kidney disease, your phosphorus level can go dangerously high. This affects many systems in the body and can damage your heart. Limit your intake of phosphorus-rich foods. These include dried beans and peas, nuts, peanut butter, cocoa, beer, cola drinks, and dairy products.  Date Last Reviewed: 8/1/2016  © 7728-1526 [employer]. [address] [place] [address], [place] [address]. All rights reserved. This information is not intended as a substitute for professional medical care. Always follow your healthcare professional's instructions.        Kidney Disease: Balancing Calcium and Phosphorus    Calcium and phosphorus are minerals found in many foods. Your body works best when these minerals are in balance. But if you have kidney disease, phosphorus may build up in your blood. This can weaken your bones over  time. To help keep your bones strong, control the amount of phosphorus in your body. This sheet tells you how.  Take phosphate binders  Phosphate binders are medicines that stick to the phosphorus in the food you eat. This keeps the phosphorus from being absorbed into your body. Instead, the phosphorus passes from your body with stool (solid waste). For best results, keep these tips in mind:  ? Use only the type of phosphate binder that your healthcare provider recommends. The type of binder that you should be taking is ___________________________  ? Always take phosphate binders as directed.  ? With meals  ? Other _________________________  ? Phosphate binders can cause constipation. You may need to eat more fiber or take stool softeners.  Limit these foods   Do not eat these foods   To keep calcium and phosphorus in balance, limit the amount of phosphorus you eat. To do so, eat less of the following foods:  · Milk  · Chocolate  · Cheese  · Beer  · Yogurt  · Soybeans  · Ice cream   Some foods are so high in phosphorus that you may need to stop eating them. Talk with your dietitian before eating these foods:  · Cola drinks  · Dried or baked beans  · Nuts and seeds of all kinds  · Peanut butter  · Split peas  · Whole-grain cereals   Date Last Reviewed: 1/1/2017  © 7447-1929 [employer]. [address] [place] [address], [place] [address]. All rights reserved. This information is not intended as a substitute for professional medical care. Always follow your healthcare professional's instructions.

## 2019-02-12 ENCOUNTER — TELEPHONE (OUTPATIENT)
Dept: FAMILY MEDICINE | Facility: CLINIC | Age: 79
End: 2019-02-12

## 2019-02-12 DIAGNOSIS — I48.0 PAROXYSMAL ATRIAL FIBRILLATION: Chronic | ICD-10-CM

## 2019-02-12 DIAGNOSIS — I50.32 CHRONIC DIASTOLIC CHF (CONGESTIVE HEART FAILURE): Primary | ICD-10-CM

## 2019-02-12 LAB
IGA SERPL-MCNC: 256 MG/DL
IGG SERPL-MCNC: 931 MG/DL
IGM SERPL-MCNC: 22 MG/DL

## 2019-02-12 NOTE — TELEPHONE ENCOUNTER
----- Message from Paras Oro MD sent at 2/12/2019  8:54 AM CST -----  Contact: daughter- Tamar- [phone]  Order has been placed    Thanks  Dr. Oro     ----- Message -----  From: Nati Ibarra LPN  Sent: 2/11/2019   4:07 PM  To: Paras Oro MD    Patient's daughter states patient needs orders for portable o2. Please advise.   ----- Message -----  From: Judie Longo  Sent: 2/11/2019   3:53 PM  To: [facility] Staff    Pt's daughter asking for a call back. She states pt just left appt, they spoke to oxygen company and they will need orders for portable oxygen.

## 2019-02-12 NOTE — TELEPHONE ENCOUNTER
Attempted to contact patient's daughter to inform her that the order has been placed for home O2. No answer, unable to LVM.

## 2019-02-13 ENCOUNTER — OFFICE VISIT (OUTPATIENT)
Dept: CARDIOLOGY | Facility: CLINIC | Age: 79
End: 2019-02-13
Payer: MEDICARE

## 2019-02-13 ENCOUNTER — TELEPHONE (OUTPATIENT)
Dept: FAMILY MEDICINE | Facility: CLINIC | Age: 79
End: 2019-02-13

## 2019-02-13 VITALS
HEART RATE: 65 BPM | SYSTOLIC BLOOD PRESSURE: 159 MMHG | DIASTOLIC BLOOD PRESSURE: 68 MMHG | WEIGHT: 211.63 LBS | HEIGHT: 63 IN | BODY MASS INDEX: 37.5 KG/M2

## 2019-02-13 DIAGNOSIS — I50.32 CHRONIC DIASTOLIC CHF (CONGESTIVE HEART FAILURE): ICD-10-CM

## 2019-02-13 DIAGNOSIS — I10 HTN (HYPERTENSION): ICD-10-CM

## 2019-02-13 DIAGNOSIS — E78.2 MIXED HYPERLIPIDEMIA: Primary | Chronic | ICD-10-CM

## 2019-02-13 DIAGNOSIS — I48.91 ATRIAL FIBRILLATION WITH RAPID VENTRICULAR RESPONSE: Primary | ICD-10-CM

## 2019-02-13 DIAGNOSIS — I48.0 PAROXYSMAL ATRIAL FIBRILLATION: Chronic | ICD-10-CM

## 2019-02-13 DIAGNOSIS — I10 ESSENTIAL HYPERTENSION: Chronic | ICD-10-CM

## 2019-02-13 LAB
ALBUMIN SERPL ELPH-MCNC: 2.89 G/DL
ALPHA1 GLOB SERPL ELPH-MCNC: 0.39 G/DL
ALPHA2 GLOB SERPL ELPH-MCNC: 0.85 G/DL
B-GLOBULIN SERPL ELPH-MCNC: 0.63 G/DL
GAMMA GLOB SERPL ELPH-MCNC: 0.84 G/DL
KAPPA LC SER QL IA: 1.9 MG/DL
KAPPA LC/LAMBDA SER IA: 1.07
LAMBDA LC SER QL IA: 1.77 MG/DL
PATHOLOGIST INTERPRETATION SPE: NORMAL
PROT SERPL-MCNC: 5.6 G/DL

## 2019-02-13 PROCEDURE — 99999 PR PBB SHADOW E&M-EST. PATIENT-LVL III: CPT | Mod: PBBFAC,,, | Performed by: INTERNAL MEDICINE

## 2019-02-13 PROCEDURE — 1101F PR PT FALLS ASSESS DOC 0-1 FALLS W/OUT INJ PAST YR: ICD-10-PCS | Mod: CPTII,S$GLB,, | Performed by: INTERNAL MEDICINE

## 2019-02-13 PROCEDURE — 99999 PR PBB SHADOW E&M-EST. PATIENT-LVL III: ICD-10-PCS | Mod: PBBFAC,,, | Performed by: INTERNAL MEDICINE

## 2019-02-13 PROCEDURE — 99499 UNLISTED E&M SERVICE: CPT | Mod: S$GLB,,, | Performed by: INTERNAL MEDICINE

## 2019-02-13 PROCEDURE — 3077F SYST BP >= 140 MM HG: CPT | Mod: CPTII,S$GLB,, | Performed by: INTERNAL MEDICINE

## 2019-02-13 PROCEDURE — 3077F PR MOST RECENT SYSTOLIC BLOOD PRESSURE >= 140 MM HG: ICD-10-PCS | Mod: CPTII,S$GLB,, | Performed by: INTERNAL MEDICINE

## 2019-02-13 PROCEDURE — 99214 PR OFFICE/OUTPT VISIT, EST, LEVL IV, 30-39 MIN: ICD-10-PCS | Mod: S$GLB,,, | Performed by: INTERNAL MEDICINE

## 2019-02-13 PROCEDURE — 93000 ELECTROCARDIOGRAM COMPLETE: CPT | Mod: S$GLB,,, | Performed by: INTERNAL MEDICINE

## 2019-02-13 PROCEDURE — 99499 RISK ADDL DX/OHS AUDIT: ICD-10-PCS | Mod: S$GLB,,, | Performed by: INTERNAL MEDICINE

## 2019-02-13 PROCEDURE — 93000 EKG 12-LEAD: ICD-10-PCS | Mod: S$GLB,,, | Performed by: INTERNAL MEDICINE

## 2019-02-13 PROCEDURE — 1101F PT FALLS ASSESS-DOCD LE1/YR: CPT | Mod: CPTII,S$GLB,, | Performed by: INTERNAL MEDICINE

## 2019-02-13 PROCEDURE — 3078F DIAST BP <80 MM HG: CPT | Mod: CPTII,S$GLB,, | Performed by: INTERNAL MEDICINE

## 2019-02-13 PROCEDURE — 3078F PR MOST RECENT DIASTOLIC BLOOD PRESSURE < 80 MM HG: ICD-10-PCS | Mod: CPTII,S$GLB,, | Performed by: INTERNAL MEDICINE

## 2019-02-13 PROCEDURE — 99214 OFFICE O/P EST MOD 30 MIN: CPT | Mod: S$GLB,,, | Performed by: INTERNAL MEDICINE

## 2019-02-13 RX ORDER — AMIODARONE HYDROCHLORIDE 200 MG/1
200 TABLET ORAL DAILY
Qty: 30 TABLET | Refills: 2 | Status: SHIPPED | OUTPATIENT
Start: 2019-02-13 | End: 2019-05-27 | Stop reason: SDUPTHER

## 2019-02-13 NOTE — PROGRESS NOTES
Subjective:    Patient ID:  Barbara Rizo is a 79 y.o. female who presents for follow-up of Hospital Follow Up      HPI     PAF - TALHA/CV 9/21/15, 1/30/19, 2/2/19 -  on amiodarone and eliquis, HTN, DM, HLD, MGUS        Admitted 1/28/19  Ms. Barbara Rizo is a 78 y.o. female with essential hypertension, hyperlipidemia (LDL 62.4 Jul 2018), type 2 diabetes mellitus (HbA1c 5.8% Jul 2018), CKD Stage 3, paroxysmal atrial fibrillation ([id number] score 5) not on chronic anticoagulation, obesity (BMI 36.0), MGUS, and chronic gout who presents to [facility] ED with complaints of coughing for three days.  She started to have a non-productive cough, wheezing, and mild dyspnea three days ago which has steadily been worsening since onset.  She also complains of a subjective fever but otherwise denies any nausea, vomiting, chest pain, palpitations, pleurisy, nor any diaphoresis.  She denies any recent travel, hemoptysis, nor any lower extremity pain or swelling.  She does say that she's under a lot of stress recently with her ex- dying yesterday at [facility] due to complications from a heart valve replacement two weeks ago.  She does say that she may have had sick contacts while visiting her ex-.  Her appetite has been poor but she denies any weight loss.     While at her ENT's appointment today she decided to schedule an appointment with her PCP to evaluate her upper airway complaints.  She was unable to see her regular PCP, Dr. Paras Oro, but was able to be seen by another physician who became concerned when she was noted to be in AFib with RVR on EKG.  He recommended EMS transportation to the ED but she refused and decided to drive herself.  Her cardiologist is Dr. Amauri Lomas.    Pt admitted to ICU with afib rvr on amiodarone infusion. Pt with elevated HR  and after TALHA done patient became very agitated and anxious. Ativan 1mg IV given and symptoms got worse. HR up into the 170s and  O2 sats dropped to 77%. Placed on NRB. Improved O2 sats. 5mg IV haldol given as patient was trying to get out of bed and pulling oxygen mask off. Cardizem 10mg Iv given with improved HR to 120s-130s. xopenex scheduled Q 8 hours. Changed to zosyn with temp 102F.   1/30- successful cardioversion, post procedure confused and pulling oxygen off, desaturation, constant re-direction, monitored in ICU overnight. Changed to oral amiodarone. eliquis for anticoagulation. Holding parameters on BP meds. IV steroids, nebs and antibiotic for probable lower resp infection. IV lasix as Bp tolerates.   1/31- HR remain under control NSR 60-70s.On amiodarone, metoprolol and eliquis.  Resp status improved and weaned oxygen. Steroids changed to oral route. DC zosyn and switch to augmentin. Add acapella to nebs. Cr increased from baseline (1.5-1.7). Gentle IVF and monitor with repeat BMP later, was stable,. PT,OT consulted for dc planning. Weaning oxygen. PT/OT consulted and rec H/H without equipment. The patient was transferred to the floor on 1/31. Nephrology was consulted for worsening renal function.      2/2- pt transferred with afib rvr. Successful cardioversion again . Continued oral amiodarone and BB.  still has aggressive cough and URI symptoms. On mucolytic and antibiotic.   2/3- HR 50-60s sinus. Cr sightly higher, sodium 128. BNP 1500. Lasix x one dose. Bringing up more mucus. Steroids weaned 20mg. Still faint wheezes on exam.  Patient had worsening ARF on CKD 3, keeped on george and monitor,nephrology was following.reconsulted PT,OT.fley was removed latera nd she had improvement in ARF.  Changed diet for low slat diet duo to hyponatremia with improvement.  She did well with PT,OT.  Her wheezing and respiratory status is improved,  Patient has been discharged home with HH and PO Abx and OAC and amiodarone and follow up with PCP,nephrology and cardiology in next few days.     1-29:  Admitted with AFib with RVR  1-30:  Underwent  TALHA/DC cardioversion successfully  2-1 Back in A-fib 120s. SOB  2-3 Still coughing and SOB. NSR 60      2/2/19 CV - 360J converted A-fib to sinus bradycardia 55. Change amiodarone to po. Ok for telemetry     Echo 1/29/19  · TDs  · Normal left ventricular systolic function. The estimated ejection fraction is 55% * specificity decreased secondary to tachyarrhythmia  · Indeterminate left ventricular diastolic function.  · Severe left atrial enlargement.  · Mild-to-moderate mitral regurgitation.  · Intermediate central venous pressure (8 mm Hg).  · The estimated PA systolic pressure is 52 mm Hg            Pulmonary hypertension present.    Less SOB since discharge  EKG NSR with PACs 65        Review of Systems   Constitution: Negative for decreased appetite.   HENT: Negative for ear discharge.    Eyes: Negative for blurred vision.   Respiratory: Negative for hemoptysis.    Endocrine: Negative for polyphagia.   Hematologic/Lymphatic: Negative for adenopathy.   Skin: Negative for color change.   Musculoskeletal: Negative for joint swelling.   Neurological: Negative for brief paralysis.   Psychiatric/Behavioral: Negative for hallucinations.        Objective:    Physical Exam   Constitutional: She is oriented to person, place, and time. She appears well-developed and well-nourished.   HENT:   Head: Normocephalic and atraumatic.   Eyes: Conjunctivae are normal. Pupils are equal, round, and reactive to light.   Neck: Normal range of motion. Neck supple.   Cardiovascular: Normal rate, normal heart sounds and intact distal pulses.   Pulmonary/Chest: Effort normal and breath sounds normal.   Abdominal: Soft. Bowel sounds are normal.   Musculoskeletal: Normal range of motion.   Neurological: She is alert and oriented to person, place, and time.   Skin: Skin is warm and dry.         Assessment:       1. Mixed hyperlipidemia    2. Essential hypertension    3. Paroxysmal atrial fibrillation    4. Chronic diastolic CHF (congestive  heart failure)         Plan:       Decrease amiodarone 200 qd  OV 2 months - will discuss changing amiodarone to flecainide at that time

## 2019-02-13 NOTE — TELEPHONE ENCOUNTER
----- Message from Karena Barraza sent at 2/13/2019 12:56 PM CST -----  [facility]  Lizbet called to request a medical necessity form for home health aide (CNA) to assist with bathing and dressing. Documents can be faxed to [facility]. Please call to advise at [phone]

## 2019-02-14 ENCOUNTER — TELEPHONE (OUTPATIENT)
Dept: FAMILY MEDICINE | Facility: CLINIC | Age: 79
End: 2019-02-14

## 2019-02-14 NOTE — TELEPHONE ENCOUNTER
----- Message from Jahaira Lazo sent at 2/14/2019  3:29 PM CST -----  Contact: Daughter- Tamar  Patient's daughter states Dr. Oro wanted to get her lab results before he would prescribe anything for her diarrhea. She would like to know if he decided what he wanted to do. Please call at [phone].

## 2019-02-15 ENCOUNTER — TELEPHONE (OUTPATIENT)
Dept: FAMILY MEDICINE | Facility: CLINIC | Age: 79
End: 2019-02-15

## 2019-02-15 NOTE — TELEPHONE ENCOUNTER
Please let patients daughter know that for now immodium is all they can do.  Lomotil as atropine and that probably wont do well with Mrs. Rizo's heart condition.  If they would like, I can get them in to GI for evaluation.    Dr. Oro

## 2019-02-15 NOTE — TELEPHONE ENCOUNTER
Patient's daughter states mother is doing much better with the diahrrea.    She states her mother is severely depressed and spoke with Dr. Oro re: getting an anti-depressant once labs were in.     She would like to know how her mother's labs came out, especially her kidney functions.     If possible she would like Dr. Oro to send her the answers through the portal.

## 2019-02-15 NOTE — TELEPHONE ENCOUNTER
----- Message from Paras Oro MD sent at 2/12/2019  3:32 PM CST -----  Contact: VANESSA Faith  Order was placed this morning    Thanks  Dr. Oro     ----- Message -----  From: Shashi Lares MA  Sent: 2/12/2019   3:29 PM  To: Paras Oro MD        ----- Message -----  From: Gavi Galvez  Sent: 2/12/2019   3:13 PM  To: Preethi Crain Staff    Patient requested portable oxygen but Home oxygen was ordered. She needs portable oxygen with the shoulder straps Please call at [phone].

## 2019-02-18 ENCOUNTER — OFFICE VISIT (OUTPATIENT)
Dept: HEMATOLOGY/ONCOLOGY | Facility: CLINIC | Age: 79
End: 2019-02-18
Payer: MEDICARE

## 2019-02-18 VITALS
WEIGHT: 192.69 LBS | SYSTOLIC BLOOD PRESSURE: 148 MMHG | HEIGHT: 62 IN | DIASTOLIC BLOOD PRESSURE: 62 MMHG | HEART RATE: 61 BPM | OXYGEN SATURATION: 96 % | BODY MASS INDEX: 35.46 KG/M2 | TEMPERATURE: 98 F

## 2019-02-18 DIAGNOSIS — N18.30 STAGE 3 CHRONIC KIDNEY DISEASE: ICD-10-CM

## 2019-02-18 DIAGNOSIS — N18.9 ANEMIA IN CHRONIC KIDNEY DISEASE, UNSPECIFIED CKD STAGE: ICD-10-CM

## 2019-02-18 DIAGNOSIS — D47.2 MGUS (MONOCLONAL GAMMOPATHY OF UNKNOWN SIGNIFICANCE): Primary | Chronic | ICD-10-CM

## 2019-02-18 DIAGNOSIS — D63.1 ANEMIA IN CHRONIC KIDNEY DISEASE, UNSPECIFIED CKD STAGE: ICD-10-CM

## 2019-02-18 DIAGNOSIS — F32.A DEPRESSION, UNSPECIFIED DEPRESSION TYPE: Primary | ICD-10-CM

## 2019-02-18 PROCEDURE — 1101F PT FALLS ASSESS-DOCD LE1/YR: CPT | Mod: CPTII,S$GLB,, | Performed by: INTERNAL MEDICINE

## 2019-02-18 PROCEDURE — 99999 PR PBB SHADOW E&M-EST. PATIENT-LVL IV: ICD-10-PCS | Mod: PBBFAC,,, | Performed by: INTERNAL MEDICINE

## 2019-02-18 PROCEDURE — 3077F SYST BP >= 140 MM HG: CPT | Mod: CPTII,S$GLB,, | Performed by: INTERNAL MEDICINE

## 2019-02-18 PROCEDURE — 99213 OFFICE O/P EST LOW 20 MIN: CPT | Mod: S$GLB,,, | Performed by: INTERNAL MEDICINE

## 2019-02-18 PROCEDURE — 3078F PR MOST RECENT DIASTOLIC BLOOD PRESSURE < 80 MM HG: ICD-10-PCS | Mod: CPTII,S$GLB,, | Performed by: INTERNAL MEDICINE

## 2019-02-18 PROCEDURE — 1101F PR PT FALLS ASSESS DOC 0-1 FALLS W/OUT INJ PAST YR: ICD-10-PCS | Mod: CPTII,S$GLB,, | Performed by: INTERNAL MEDICINE

## 2019-02-18 PROCEDURE — 3078F DIAST BP <80 MM HG: CPT | Mod: CPTII,S$GLB,, | Performed by: INTERNAL MEDICINE

## 2019-02-18 PROCEDURE — 99213 PR OFFICE/OUTPT VISIT, EST, LEVL III, 20-29 MIN: ICD-10-PCS | Mod: S$GLB,,, | Performed by: INTERNAL MEDICINE

## 2019-02-18 PROCEDURE — 3077F PR MOST RECENT SYSTOLIC BLOOD PRESSURE >= 140 MM HG: ICD-10-PCS | Mod: CPTII,S$GLB,, | Performed by: INTERNAL MEDICINE

## 2019-02-18 PROCEDURE — 99499 RISK ADDL DX/OHS AUDIT: ICD-10-PCS | Mod: S$GLB,,, | Performed by: INTERNAL MEDICINE

## 2019-02-18 PROCEDURE — 99499 UNLISTED E&M SERVICE: CPT | Mod: S$GLB,,, | Performed by: INTERNAL MEDICINE

## 2019-02-18 PROCEDURE — 99999 PR PBB SHADOW E&M-EST. PATIENT-LVL IV: CPT | Mod: PBBFAC,,, | Performed by: INTERNAL MEDICINE

## 2019-02-18 RX ORDER — FLUOXETINE 10 MG/1
10 CAPSULE ORAL DAILY
Qty: 30 CAPSULE | Refills: 1 | Status: SHIPPED | OUTPATIENT
Start: 2019-02-18 | End: 2019-04-18 | Stop reason: SDUPTHER

## 2019-02-18 NOTE — PROGRESS NOTES
Subjective:       Patient ID: Barbara Rizo is a 79 y.o. female.    Chief Complaint: Follow-up    HPI     Diagnosis: MGUS    79-year-old with medical history of CKD  stage III, hypertension, osteopenia, vitamin-D deficiency, type 2 diabetes mellitus seen today for MGUS.. Patient's  is a patient of mine. She is followed by Dr. Boyle for CKD stage 3. .  Appetite and weight stable.  No shortness of breath, chest pain. No bony pain. No prior history of anemia.  Patient reports history of hemochromatosis for which she has undergone phlebotomy in  remote past.  No melena, hematochezia or change in bowel habits.  No fatigue, lightheadedness or shortness of breath.  Laboratory studies from 03/22/2018 from outside facility reveal abnormal SPEP reveals elevation of beta 2 globulins. Serum immunofixation reveals a faint IgG lambda monoclonal immunoglobulin.  Biochemical profile reveals a BUN of 33 creatinine of 1.64 mg/dL calcium of 9.9 mg/dL albumin of 4.7    She was recently hospitalized with  AFib with rapid ventricular response on January 28th and she underwent TALHA with DC cardioversion which was successful.She was also found to have acute renal failure on CKD with peak BUN/Cr 63/4.0.   She was also treated for bronchitis    Today, she reports she is doing better  She is on home 02  Mild WILKINSON  No CP/cough  She reports swelling LE since hosp discharge  No lightheadedness   Appetite stable  She has 13 lb wt loss last month  She is following low salt diet        CBC reveals wbc 35938/mm3   Hb 11.3 g/dl Hct 35% plt 211k.    SPEP 2/11/2019 - no paraprotein bands    Biochemical profile reveals a BUN of 31  creatinine of 2.5 mg/dL calcium of 9.7mg/dL albumin of 2.9      Past Medical History:   Diagnosis Date    A-fib     Diabetes mellitus type II     Fatty liver     GERD (gastroesophageal reflux disease)     Hemochromatosis     Hyperlipidemia     Hypertension     Vaginal delivery     x2    Vitamin D deficiency  disease     Wears partial dentures     upper removable bridge       Past Surgical History:   Procedure Laterality Date    ARTHROPLASTY, KNEE, TOTAL Left 5/13/2013    [facility]    ARTHROPLASTY-KNEE Right 8/3/2015    [facility]    BREAST BIOPSY      CHEST DRAINAGE N/A 9/18/2015    Performed by Bonnie Surgeon at [facility]IE    CHOLECYSTECTOMY  08/2015    CHOLECYSTECTOMY-LAPAROSCOPIC N/A 8/26/2015    [facility] by [facility]ony Alexander [facility] at [facility] OR    CYSTOSCOPY - URODYNAMICS FLOW STUDY  (C-ARM) N/A 11/4/2016    Performed by Reina Camp [facility] at [facility] OR    ESOPHAGOGASTRODUODENOSCOPY (EGD) Left 10/15/2015    Performed by Rolando Robert [facility] at [facility] ENDO    EYE SURGERY      Cat Ext  OU    HYSTERECTOMY      partial due to uterine fibroids    INCISION AND DRAINAGE (I&D), ABSCESS Right 9/14/2015    Performed by Bonnie Surgeon [facility]    TOTAL KNEE ARTHROPLASTY Right 2015    left knee done 5/2013    TRANSESOPHAGEAL ECHOCARDIOGRAM (TALHA) N/A 9/21/2015    Performed by [facility]uri [facility]omas [facility] at [facility] CATH LAB    TRANSESOPHAGEAL ECHOCARDIOGRAM WITH POSSIBLE CARDIOVERSION (TALHA W/ POSS CARDIOVERSION) N/A 1/30/2019    Performed by Sundeep Montero MD at [facility] CATH LAB       Review of Systems   Constitutional: Negative for appetite change, fatigue, fever and unexpected weight change.   HENT: Negative for mouth sores.    Eyes: Negative for visual disturbance.   Respiratory: Negative for cough and shortness of breath.    Cardiovascular: Negative for chest pain.   Gastrointestinal: Negative for abdominal pain and diarrhea.   Genitourinary: Negative for frequency.   Musculoskeletal: Negative for back pain.   Skin: Negative for rash.   Neurological: Negative for headaches.   Hematological: Negative for adenopathy.   Psychiatric/Behavioral: The patient is not nervous/anxious.        Objective:       Vitals:    02/18/19 0903 02/18/19 0906   BP: (!)  "156/67 (!) 148/62   BP Location: Left arm Left arm   Patient Position: Sitting Sitting   BP Method: Large (Automatic) Large (Automatic)   Pulse: 61    Temp: 98.4 °F (36.9 °C)    TempSrc: Oral    SpO2: 96%    Weight: 87.4 kg (192 lb 10.9 oz)    Height: 5' 2" (1.575 m)        Physical Exam   Constitutional: She is oriented to person, place, and time. She appears well-developed and well-nourished.   On nasal cannula 02   HENT:   Head: Normocephalic.   Mouth/Throat: Oropharynx is clear and moist. No oropharyngeal exudate.   Eyes: Conjunctivae and lids are normal. No scleral icterus.   Neck: Normal range of motion. Neck supple. No thyromegaly present.   Cardiovascular: Normal rate and regular rhythm.   Murmur heard.  Pulmonary/Chest: Breath sounds normal. She has no wheezes. She has no rales.   Abdominal: Soft. Bowel sounds are normal. There is no hepatosplenomegaly. There is no tenderness. There is no rebound and no guarding.   Musculoskeletal: Normal range of motion. She exhibits edema. She exhibits no tenderness.   Neurological: She is alert and oriented to person, place, and time. No cranial nerve deficit. Coordination normal.   Skin: Skin is warm and dry. No ecchymosis, no petechiae and no rash noted. No erythema.   Psychiatric: She has a normal mood and affect.       Lab Results   Component Value Date    WBC 11.38 02/11/2019    HGB 11.3 (L) 02/11/2019    HCT 35.5 (L) 02/11/2019    MCV 96 02/11/2019     02/11/2019         Results for FAISAL ORNELAS (MRN [id number]) as of 2/18/2019 09:46   Ref. Range 7/9/2018 08:31 10/8/2018 09:04 2/11/2019 15:39   Beach Free Light Chains Latest Ref Range: 0.33 - 1.94 mg/dL 4.46 (H) 5.54 (H) 1.90   Lambda Free Light Chains Latest Ref Range: 0.57 - 2.63 mg/dL 2.60 2.96 (H) 1.77   Kappa/Lambda FLC Ratio Latest Ref Range: 0.26 - 1.65  1.72 (H) 1.87 (H) 1.07               Results for FAISAL ORNELAS (MRN [id number]) as of 2/18/2019 09:11   Ref. Range 2/11/2019 15:40   IgG - " Serum Latest Ref Range: 650 - 1600 mg/dL 931   IgM Latest Ref Range: 50 - 300 mg/dL 22 (L)   IgA Latest Ref Range: 40 - 350 mg/dL 256          SPEP 2/11/2019   Decreased total protein.No paraprotein bands identified.     UPEP- no monoclonal peaks  Assessment:       1. MGUS (monoclonal gammopathy of unknown significance)    2. Anemia in chronic kidney disease, unspecified CKD stage    3. Stage 3 chronic kidney disease        Plan:       1-3 Pt clinically stable  Hb 11.3 g/dl-stable   SPEP -no paraproteins  Serum FLC wnl   Renal function improved since recent hospitalization with BUN/Cr   31/2.5   (  Cr  1. 6mg/dl 5/2018)   UPEP- no monoclonal peaks   No signs of disease progression     Follow-up in 4mo with cbc,cmp, SPEP,FLC, Quant Ig, B-2 microglobulin prior to f/u       Cc: Nargis Boyle M.D.

## 2019-02-19 ENCOUNTER — TELEPHONE (OUTPATIENT)
Dept: FAMILY MEDICINE | Facility: CLINIC | Age: 79
End: 2019-02-19

## 2019-02-19 NOTE — TELEPHONE ENCOUNTER
----- Message from Wendy Barajas sent at 2/19/2019  9:06 AM CST -----  Contact: Self/[phone]  Patient called to request a sooner appointment. She did not leave a detailed message regarding the reason for the appointment. Thank you.

## 2019-02-19 NOTE — TELEPHONE ENCOUNTER
Patient's daughter requesting depression medication as well as home O2 orders. Message previously sent to PCP.

## 2019-02-20 ENCOUNTER — TELEPHONE (OUTPATIENT)
Dept: FAMILY MEDICINE | Facility: CLINIC | Age: 79
End: 2019-02-20

## 2019-02-20 NOTE — TELEPHONE ENCOUNTER
----- Message from Matty Brown sent at 2/20/2019  8:11 AM CST -----  Contact: SELF: [phone]  Pt 2nd request;she would like speak to nurse in regards to oxygen, lab results, and other things she may be experiencing. Please call to advise, thank you

## 2019-02-20 NOTE — TELEPHONE ENCOUNTER
They will need to call [facility] to get the forms necessary for the O2 machine she is wanting.  I have placed this order several times now    Thanks  Dr. Oro

## 2019-03-11 ENCOUNTER — OFFICE VISIT (OUTPATIENT)
Dept: FAMILY MEDICINE | Facility: CLINIC | Age: 79
End: 2019-03-11
Payer: MEDICARE

## 2019-03-11 VITALS
TEMPERATURE: 98 F | WEIGHT: 192.69 LBS | BODY MASS INDEX: 35.24 KG/M2 | DIASTOLIC BLOOD PRESSURE: 72 MMHG | OXYGEN SATURATION: 97 % | SYSTOLIC BLOOD PRESSURE: 138 MMHG | HEART RATE: 48 BPM

## 2019-03-11 DIAGNOSIS — I48.0 PAROXYSMAL ATRIAL FIBRILLATION: Chronic | ICD-10-CM

## 2019-03-11 DIAGNOSIS — N18.4 ACUTE RENAL FAILURE SUPERIMPOSED ON STAGE 4 CHRONIC KIDNEY DISEASE, UNSPECIFIED ACUTE RENAL FAILURE TYPE: Primary | ICD-10-CM

## 2019-03-11 DIAGNOSIS — N17.9 ACUTE RENAL FAILURE SUPERIMPOSED ON STAGE 4 CHRONIC KIDNEY DISEASE, UNSPECIFIED ACUTE RENAL FAILURE TYPE: Primary | ICD-10-CM

## 2019-03-11 DIAGNOSIS — I10 ESSENTIAL HYPERTENSION: Chronic | ICD-10-CM

## 2019-03-11 PROBLEM — I48.91 ATRIAL FIBRILLATION WITH RAPID VENTRICULAR RESPONSE: Status: RESOLVED | Noted: 2019-02-03 | Resolved: 2019-03-11

## 2019-03-11 PROCEDURE — 3078F PR MOST RECENT DIASTOLIC BLOOD PRESSURE < 80 MM HG: ICD-10-PCS | Mod: CPTII,S$GLB,, | Performed by: FAMILY MEDICINE

## 2019-03-11 PROCEDURE — 99214 OFFICE O/P EST MOD 30 MIN: CPT | Mod: S$GLB,,, | Performed by: FAMILY MEDICINE

## 2019-03-11 PROCEDURE — 3078F DIAST BP <80 MM HG: CPT | Mod: CPTII,S$GLB,, | Performed by: FAMILY MEDICINE

## 2019-03-11 PROCEDURE — 3075F PR MOST RECENT SYSTOLIC BLOOD PRESS GE 130-139MM HG: ICD-10-PCS | Mod: CPTII,S$GLB,, | Performed by: FAMILY MEDICINE

## 2019-03-11 PROCEDURE — 99999 PR PBB SHADOW E&M-EST. PATIENT-LVL III: ICD-10-PCS | Mod: PBBFAC,,, | Performed by: FAMILY MEDICINE

## 2019-03-11 PROCEDURE — 99499 UNLISTED E&M SERVICE: CPT | Mod: S$GLB,,, | Performed by: FAMILY MEDICINE

## 2019-03-11 PROCEDURE — 99999 PR PBB SHADOW E&M-EST. PATIENT-LVL III: CPT | Mod: PBBFAC,,, | Performed by: FAMILY MEDICINE

## 2019-03-11 PROCEDURE — 1101F PT FALLS ASSESS-DOCD LE1/YR: CPT | Mod: CPTII,S$GLB,, | Performed by: FAMILY MEDICINE

## 2019-03-11 PROCEDURE — 1101F PR PT FALLS ASSESS DOC 0-1 FALLS W/OUT INJ PAST YR: ICD-10-PCS | Mod: CPTII,S$GLB,, | Performed by: FAMILY MEDICINE

## 2019-03-11 PROCEDURE — 3075F SYST BP GE 130 - 139MM HG: CPT | Mod: CPTII,S$GLB,, | Performed by: FAMILY MEDICINE

## 2019-03-11 PROCEDURE — 99499 RISK ADDL DX/OHS AUDIT: ICD-10-PCS | Mod: S$GLB,,, | Performed by: FAMILY MEDICINE

## 2019-03-11 PROCEDURE — 99214 PR OFFICE/OUTPT VISIT, EST, LEVL IV, 30-39 MIN: ICD-10-PCS | Mod: S$GLB,,, | Performed by: FAMILY MEDICINE

## 2019-03-11 NOTE — PROGRESS NOTES
Routine Office Visit    Patient Name: Barbara Rizo    : 1940  MRN: [id number]    Subjective:  Barbara is a 79 y.o. female who presents today for:    1. FELICIA and a-fib  Patient presenting today for follow up of her FELICIA and a-fib.  She states that she weaned herself off O2 and has been doing well.  She states that she had blood work done for Dr. Boyle and was told she had improved.  She continues to be on amiodarone and metoprolol.  No chest pain or dyspnea on exertion.  She states that she feels much better.  She has no new concerns.  She states that the swelling in her arms and legs has improved significantly.  She was recently discharged from home health based off how well she is doing.  She is scheduled to see Dr. Lomas with cardiology in a couple of months and with Dr. Boyle in .    Past Medical History  Past Medical History:   Diagnosis Date    A-fib     Diabetes mellitus type II     Fatty liver     GERD (gastroesophageal reflux disease)     Hemochromatosis     Hyperlipidemia     Hypertension     Vaginal delivery     x2    Vitamin D deficiency disease     Wears partial dentures     upper removable bridge       Past Surgical History  Past Surgical History:   Procedure Laterality Date    ARTHROPLASTY, KNEE, TOTAL Left 2013    [facility]    ARTHROPLASTY-KNEE Right 8/3/2015    [facility]    BREAST BIOPSY      CHEST DRAINAGE N/A 2015    Performed by Bonnie Surgeon at [facility] B[facility]NIE    CHOLECYSTECTOMY  2015    CHOLECYSTECTOMY-LAPAROSCOPIC N/A 2015    [facility] OR    CYSTOSCOPY - URODYNAMICS FLOW STUDY  (C-ARM) N/A 2016    [facility] Camp [facility] OR    ESOPHAGOGASTRODUODENOSCOPY (EGD) Left 10/15/2015    [facility] at [facility] ENDO    EYE SURGERY      Cat Ext  OU    HYSTERECTOMY      partial due to uterine fibroids    INCISION  AND DRAINAGE (I&D), ABSCESS Right 9/14/2015    [facility]    TOTAL KNEE ARTHROPLASTY Right 2015    left knee done 5/2013    TRANSESOPHAGEAL ECHOCARDIOGRAM (TALHA) N/A 9/21/2015    [facility] C[facility] LAB    TRANSESOPHAGEAL ECHOCARDIOGRAM WITH POSSIBLE CARDIOVERSION (TALHA W/ POSS CARDIOVERSION) N/A 1/30/2019    [facility] by [facility] at [facility] CATH LAB       Family History  Family History   Problem Relation Age of Onset    Cancer Mother         stomach    Hypertension Mother     Aneurysm Father         abdominal       Social History  Social History     Socioeconomic History    Marital status:      Spouse name: Not on file    Number of children: Not on file    Years of education: Not on file    Highest education level: Not on file   Social Needs    Financial resource strain: Not on file    Food insecurity - worry: Not on file    Food insecurity - inability: Not on file    Transportation needs - medical: Not on file    Transportation needs - non-medical: Not on file   Occupational History    Not on file   Tobacco Use    Smoking status: Former Smoker    Smokeless tobacco: Never Used   Substance and Sexual Activity    Alcohol use: Yes     Alcohol/week: 0.0 oz     Comment: occasional/ on a weekend    Drug use: No    Sexual activity: Not Currently   Other Topics Concern    Not on file   Social History Narrative    Not on file       Current Medications  Current Outpatient Medications on File Prior to Visit   Medication Sig Dispense Refill    allopurinol (ZYLOPRIM) 100 MG tablet Take 100 mg by mouth once daily.       amiodarone (PACERONE) 200 MG Tab Take 1 tablet (200 mg total) by mouth once daily. 30 tablet 2    amLODIPine (NORVASC) 5 MG tablet Take 1 tablet (5 mg total) by mouth once daily. 90 tablet 3    atorvastatin (LIPITOR) 40 MG tablet Take 1 tablet (40 mg total) by mouth once daily. 90 tablet 1    ergocalciferol (VITAMIN  D2) 50,000 unit Cap Take 50,000 Units by mouth every 7 days.      fish oil-omega-3 fatty acids 300-1,000 mg capsule Take 1 capsule by mouth once daily.      FLUoxetine 10 MG capsule Take 1 capsule (10 mg total) by mouth once daily. 30 capsule 1    folic acid (FOLVITE) 1 MG tablet Take 1 tablet (1 mg total) by mouth once daily. 90 tablet 1    metoprolol tartrate (LOPRESSOR) 50 MG tablet Take 2 tablets (100 mg total) by mouth once daily. 180 tablet 4    blood-glucose meter (FREESTYLE SYSTEM KIT) kit Use as instructed 1 each 0     No current facility-administered medications on file prior to visit.        Allergies   Review of patient's allergies indicates:  No Known Allergies    Review of Systems (Pertinent positives)  Review of Systems   Constitutional: Negative.    HENT: Negative.    Eyes: Negative.    Respiratory: Negative.    Cardiovascular: Negative.    Gastrointestinal: Negative.    Genitourinary: Negative.    Musculoskeletal: Negative.    Skin: Negative.    Neurological: Negative.          /72   Pulse (!) 48   Temp 98.1 °F (36.7 °C)   Wt 87.4 kg (192 lb 10.9 oz)   SpO2 97%   BMI 35.24 kg/m²     GENERAL APPEARANCE: in no apparent distress and well developed and well nourished  HEENT: PERRLA, EOMI, Sclera clear, anicteric, Oropharynx clear, no lesions, Neck supple with midline trachea  NECK: normal, supple, no adenopathy, thyroid normal in size  RESPIRATORY: appears well, vitals normal, no respiratory distress, acyanotic, normal RR, chest clear, no wheezing, crepitations, rhonchi, normal symmetric air entry  HEART: regular rate and rhythm, S1, S2 normal, no murmur, click, rub or gallop.    ABDOMEN: abdomen is soft without tenderness, no masses, no hernias, no organomegaly, no rebound, no guarding. Suprapubic tenderness absent. No CVA tenderness.  NEUROLOGIC: normal without focal findings, CN II-XII are intact.    SKIN: no rashes, no wounds, no other lesions  PSYCH: Alert, oriented x 3, thought  content appropriate, speech normal, pleasant and cooperative, good eye contact, well groomed    Assessment/Plan:  Barbara Rizo is a 79 y.o. female who presents today for :    Barbara was seen today for 4wk follow up.    Diagnoses and all orders for this visit:    Acute renal failure superimposed on stage 4 chronic kidney disease, unspecified acute renal failure type  -     Comprehensive metabolic panel; Future    Essential hypertension    Paroxysmal atrial fibrillation      1.  Repeat CMP in 2 weeks to monitor kidney function  2.  Keep all scheduled appointments  3.  She is to monitor blood pressure and pulse and send me the readings  4.  Follow up with specialists as scheduled  5.  Avoid use of alcohol due to effects on amiodarone      Paras Oro MD

## 2019-03-12 ENCOUNTER — TELEPHONE (OUTPATIENT)
Dept: FAMILY MEDICINE | Facility: CLINIC | Age: 79
End: 2019-03-12

## 2019-03-12 NOTE — TELEPHONE ENCOUNTER
I spoke to the pt and she is requesting an order for her oxygen tank to be picked up from her home. Please advise.

## 2019-03-12 NOTE — TELEPHONE ENCOUNTER
----- Message from Wendy Barajas sent at 3/12/2019  9:38 AM CDT -----  Contact: Self/[phone]  Type: Patient Call Back    Who called: Patient    What is the request in detail: Patient would like to speak to the staff. She would like to discuss picking up her Oxygen Tank from her home. Thank you.

## 2019-03-14 ENCOUNTER — TELEPHONE (OUTPATIENT)
Dept: FAMILY MEDICINE | Facility: CLINIC | Age: 79
End: 2019-03-14

## 2019-03-14 NOTE — TELEPHONE ENCOUNTER
Please find out which oxygen supplier she is using and see if there is a form to fill out.  I have never had to order a  of an oxygen tank    Thanks    Dr. Oro

## 2019-03-14 NOTE — TELEPHONE ENCOUNTER
I spoke to the pt and she reports that [facility] supplied her oxygen. I called [employer] and they stated it would be an independent vendor that supplies the oxygen. I asked the pt to check for information at her home and to call me back. Pt verbalizes understanding.

## 2019-03-14 NOTE — TELEPHONE ENCOUNTER
Prescription written out and will be placed on your desk to be faxed tomorrow    Thanks  Dr. Oro

## 2019-03-14 NOTE — TELEPHONE ENCOUNTER
"----- Message from Kayleigh Hester sent at 3/14/2019 10:14 AM CDT -----  Contact: Danyell"[facility]" [phone]  Returning a call to Laverne"

## 2019-03-14 NOTE — TELEPHONE ENCOUNTER
Dr. Oro please write an order to discontinue oxygen and we can fax it to Amisha at [facility] [phone]

## 2019-03-18 ENCOUNTER — TELEPHONE (OUTPATIENT)
Dept: FAMILY MEDICINE | Facility: CLINIC | Age: 79
End: 2019-03-18

## 2019-03-18 NOTE — TELEPHONE ENCOUNTER
Spoke to patient. States that someone from [facility] has contacted her and they will  the tank some time tomorrow between 3pm-5pm.

## 2019-03-18 NOTE — TELEPHONE ENCOUNTER
----- Message from Judie Longo sent at 3/18/2019  8:31 AM CDT -----  Contact: self - [phone]  Pt asking to speak back to nurse regarding having her oxygen tank picked up. She states she got it from [facility] and the tank has the number [phone] on it. She states it was delivered to her daughter's house- [address]-  and that may be the reason they could not find her in their database. She states it is important to have this figured out today or she will be charged for oxygen that she is not using.

## 2019-03-27 ENCOUNTER — LAB VISIT (OUTPATIENT)
Dept: LAB | Facility: HOSPITAL | Age: 79
End: 2019-03-27
Attending: FAMILY MEDICINE
Payer: MEDICARE

## 2019-03-27 DIAGNOSIS — N18.4 ACUTE RENAL FAILURE SUPERIMPOSED ON STAGE 4 CHRONIC KIDNEY DISEASE, UNSPECIFIED ACUTE RENAL FAILURE TYPE: ICD-10-CM

## 2019-03-27 DIAGNOSIS — N17.9 ACUTE RENAL FAILURE SUPERIMPOSED ON STAGE 4 CHRONIC KIDNEY DISEASE, UNSPECIFIED ACUTE RENAL FAILURE TYPE: ICD-10-CM

## 2019-03-27 LAB
ALBUMIN SERPL BCP-MCNC: 4 G/DL (ref 3.5–5.2)
ALP SERPL-CCNC: 75 U/L (ref 55–135)
ALT SERPL W/O P-5'-P-CCNC: 12 U/L (ref 10–44)
ANION GAP SERPL CALC-SCNC: 7 MMOL/L (ref 8–16)
AST SERPL-CCNC: 10 U/L (ref 10–40)
BILIRUB SERPL-MCNC: 0.7 MG/DL (ref 0.1–1)
BUN SERPL-MCNC: 38 MG/DL (ref 8–23)
CALCIUM SERPL-MCNC: 10.2 MG/DL (ref 8.7–10.5)
CHLORIDE SERPL-SCNC: 98 MMOL/L (ref 95–110)
CO2 SERPL-SCNC: 30 MMOL/L (ref 23–29)
CREAT SERPL-MCNC: 2.4 MG/DL (ref 0.5–1.4)
EST. GFR  (AFRICAN AMERICAN): 22 ML/MIN/1.73 M^2
EST. GFR  (NON AFRICAN AMERICAN): 19 ML/MIN/1.73 M^2
GLUCOSE SERPL-MCNC: 105 MG/DL (ref 70–110)
POTASSIUM SERPL-SCNC: 4.9 MMOL/L (ref 3.5–5.1)
PROT SERPL-MCNC: 7.1 G/DL (ref 6–8.4)
SODIUM SERPL-SCNC: 135 MMOL/L (ref 136–145)

## 2019-03-27 PROCEDURE — 36415 COLL VENOUS BLD VENIPUNCTURE: CPT | Mod: PN

## 2019-03-27 PROCEDURE — 80053 COMPREHEN METABOLIC PANEL: CPT

## 2019-03-28 ENCOUNTER — TELEPHONE (OUTPATIENT)
Dept: FAMILY MEDICINE | Facility: CLINIC | Age: 79
End: 2019-03-28

## 2019-03-28 DIAGNOSIS — Z12.39 BREAST CANCER SCREENING: Primary | ICD-10-CM

## 2019-03-28 NOTE — TELEPHONE ENCOUNTER
----- Message from Paras Oro MD sent at 3/27/2019 12:04 PM CDT -----  Contact: pt  Unless she wants it, she no longer needs mammograms    Thanks  Dr. Oro     ----- Message -----  From: Shashi Lares MA  Sent: 3/27/2019   8:21 AM  To: Paras Oro MD     Pt need order for mammo   ----- Message -----  From: Melly Cardenas  Sent: 3/27/2019   8:13 AM  To: Preethi Crain Staff    Pt due for a  Mammo, please call pt to schedule..

## 2019-03-28 NOTE — TELEPHONE ENCOUNTER
Pt called back message sent to pt mammogram due .MD sent message pt no longer need unless pt wants pt called back states would like to have mammogram done after April 4th   Order needed for mammogram   Per Dr. Oro

## 2019-04-01 ENCOUNTER — TELEPHONE (OUTPATIENT)
Dept: FAMILY MEDICINE | Facility: CLINIC | Age: 79
End: 2019-04-01

## 2019-04-01 NOTE — TELEPHONE ENCOUNTER
----- Message from Jahaira Lazo sent at 4/1/2019  3:19 PM CDT -----  Contact: Self   Type:  Test Results    Who Called: Self     Name of Test (Lab/Mammo/Etc): labs     Date of Test: 3/27/19    Ordering Provider: Dr. Oro    Where the test was performed: [facility]    Would the patient rather a call back or a response via [facility]? Call     Best Call Back Number: [phone]

## 2019-04-01 NOTE — TELEPHONE ENCOUNTER
I spoke to the pt and she states that she does not have a computer and is not on the portal. Please advise.

## 2019-04-02 ENCOUNTER — TELEPHONE (OUTPATIENT)
Dept: FAMILY MEDICINE | Facility: CLINIC | Age: 79
End: 2019-04-02

## 2019-04-02 NOTE — TELEPHONE ENCOUNTER
----- Message from Paras Oro MD sent at 4/2/2019  1:24 PM CDT -----  Contact: pt  Her labs were stable.  No changes    Thanks  Dr. Oro     ----- Message -----  From: Shashi Lares MA  Sent: 4/2/2019  11:54 AM  To: Paras Oro MD    Pt requesting lab results  ----- Message -----  From: Evelyn Driver  Sent: 4/2/2019  11:30 AM  To: Preethi Crain Staff    Name of Who is Calling: pt      What is the request in detail: pt needs blood work results. Call pt      Can the clinic reply by [facility]: no      [facility]: [phone]

## 2019-04-02 NOTE — TELEPHONE ENCOUNTER
Everything looked fine.  She still needs to be drinking water more than soft drinks or flavored drinks.  Some one set up her portal account at 3/12/2019, so maybe it is her daughter that is checking??    Thanks  Dr. Oro

## 2019-04-03 ENCOUNTER — DOCUMENTATION ONLY (OUTPATIENT)
Dept: INFUSION THERAPY | Facility: HOSPITAL | Age: 79
End: 2019-04-03

## 2019-04-03 ENCOUNTER — TELEPHONE (OUTPATIENT)
Dept: FAMILY MEDICINE | Facility: CLINIC | Age: 79
End: 2019-04-03

## 2019-04-03 NOTE — TELEPHONE ENCOUNTER
Pt. Calling on lab results pt. Would like to know the Kidney level results   pt. Was advised to call medical records to have her lab results Transferred to her Dr. Which is at [facility] Dr. Boyle.  Pt. Labs are stable as per DR. Oro

## 2019-04-03 NOTE — PROGRESS NOTES
Attempted to speak with Dr. Boyle for 2 weeks. Her office paged her to the Infusion center and no response. Spoke with Dr. Boyle on 4/3. Informed her of patient's last creat of 2.4. RN asked if it is ok to give as her creatinine is higher than usual. Dr. Boyle stated that she ordered the Prolia and to give the injection. Pt informed and rescheduled for 4/4/19.

## 2019-04-03 NOTE — TELEPHONE ENCOUNTER
----- Message from Jahaira Lazo sent at 4/3/2019 10:19 AM CDT -----  Contact: Self   Type:  Patient Returning Call    Who Called: Self     Who Left Message for Patient: Shashi    Does the patient know what this is regarding?: yes , lab results     Would the patient rather a call back or a response via [facility]? Call    Best Call Back Number: [phone]

## 2019-04-04 ENCOUNTER — INFUSION (OUTPATIENT)
Dept: INFUSION THERAPY | Facility: HOSPITAL | Age: 79
End: 2019-04-04
Attending: INTERNAL MEDICINE
Payer: MEDICARE

## 2019-04-04 VITALS
RESPIRATION RATE: 18 BRPM | HEART RATE: 44 BPM | DIASTOLIC BLOOD PRESSURE: 68 MMHG | OXYGEN SATURATION: 100 % | SYSTOLIC BLOOD PRESSURE: 156 MMHG | TEMPERATURE: 98 F

## 2019-04-04 DIAGNOSIS — M17.10 PRIMARY LOCALIZED OSTEOARTHROSIS, LOWER LEG: Primary | ICD-10-CM

## 2019-04-04 PROCEDURE — 63600175 PHARM REV CODE 636 W HCPCS: Mod: JG | Performed by: INTERNAL MEDICINE

## 2019-04-04 PROCEDURE — 96372 THER/PROPH/DIAG INJ SC/IM: CPT

## 2019-04-04 RX ADMIN — DENOSUMAB 60 MG: 60 INJECTION SUBCUTANEOUS at 09:04

## 2019-04-04 NOTE — PLAN OF CARE
Problem: Adult Inpatient Plan of Care  Goal: Plan of Care Review  Outcome: Ongoing (interventions implemented as appropriate)  Arrived to unit. Pt taking calcium and vitamin d. Tolerated Proila. Pt discharged from unit. No complaints voiced.

## 2019-04-05 ENCOUNTER — TELEPHONE (OUTPATIENT)
Dept: ADMINISTRATIVE | Facility: CLINIC | Age: 79
End: 2019-04-05

## 2019-04-09 ENCOUNTER — HOSPITAL ENCOUNTER (OUTPATIENT)
Dept: RADIOLOGY | Facility: HOSPITAL | Age: 79
Discharge: HOME | End: 2019-04-09
Attending: FAMILY MEDICINE
Payer: MEDICARE

## 2019-04-09 VITALS — WEIGHT: 190 LBS | BODY MASS INDEX: 34.96 KG/M2 | HEIGHT: 62 IN

## 2019-04-09 DIAGNOSIS — Z12.39 BREAST CANCER SCREENING: ICD-10-CM

## 2019-04-09 PROCEDURE — 77067 SCR MAMMO BI INCL CAD: CPT | Mod: TC

## 2019-04-09 PROCEDURE — 77067 SCR MAMMO BI INCL CAD: CPT | Mod: 26,,, | Performed by: RADIOLOGY

## 2019-04-09 PROCEDURE — 77063 MAMMO DIGITAL SCREENING BILAT WITH TOMOSYNTHESIS_CAD: ICD-10-PCS | Mod: 26,,, | Performed by: RADIOLOGY

## 2019-04-09 PROCEDURE — 77063 BREAST TOMOSYNTHESIS BI: CPT | Mod: 26,,, | Performed by: RADIOLOGY

## 2019-04-09 PROCEDURE — 77067 MAMMO DIGITAL SCREENING BILAT WITH TOMOSYNTHESIS_CAD: ICD-10-PCS | Mod: 26,,, | Performed by: RADIOLOGY

## 2019-04-17 ENCOUNTER — OFFICE VISIT (OUTPATIENT)
Dept: CARDIOLOGY | Facility: CLINIC | Age: 79
End: 2019-04-17
Payer: MEDICARE

## 2019-04-17 VITALS
DIASTOLIC BLOOD PRESSURE: 72 MMHG | HEART RATE: 46 BPM | OXYGEN SATURATION: 97 % | SYSTOLIC BLOOD PRESSURE: 163 MMHG | BODY MASS INDEX: 33.68 KG/M2 | HEIGHT: 62 IN | WEIGHT: 183 LBS

## 2019-04-17 DIAGNOSIS — I48.0 PAROXYSMAL ATRIAL FIBRILLATION: Chronic | ICD-10-CM

## 2019-04-17 DIAGNOSIS — I50.32 CHRONIC DIASTOLIC CHF (CONGESTIVE HEART FAILURE): ICD-10-CM

## 2019-04-17 DIAGNOSIS — I10 HYPERTENSION: ICD-10-CM

## 2019-04-17 DIAGNOSIS — I10 ESSENTIAL HYPERTENSION: Primary | Chronic | ICD-10-CM

## 2019-04-17 PROCEDURE — 99499 RISK ADDL DX/OHS AUDIT: ICD-10-PCS | Mod: S$GLB,,, | Performed by: INTERNAL MEDICINE

## 2019-04-17 PROCEDURE — 3077F PR MOST RECENT SYSTOLIC BLOOD PRESSURE >= 140 MM HG: ICD-10-PCS | Mod: CPTII,S$GLB,, | Performed by: INTERNAL MEDICINE

## 2019-04-17 PROCEDURE — 99214 PR OFFICE/OUTPT VISIT, EST, LEVL IV, 30-39 MIN: ICD-10-PCS | Mod: S$GLB,,, | Performed by: INTERNAL MEDICINE

## 2019-04-17 PROCEDURE — 93000 ELECTROCARDIOGRAM COMPLETE: CPT | Mod: S$GLB,,, | Performed by: INTERNAL MEDICINE

## 2019-04-17 PROCEDURE — 1101F PR PT FALLS ASSESS DOC 0-1 FALLS W/OUT INJ PAST YR: ICD-10-PCS | Mod: CPTII,S$GLB,, | Performed by: INTERNAL MEDICINE

## 2019-04-17 PROCEDURE — 3078F PR MOST RECENT DIASTOLIC BLOOD PRESSURE < 80 MM HG: ICD-10-PCS | Mod: CPTII,S$GLB,, | Performed by: INTERNAL MEDICINE

## 2019-04-17 PROCEDURE — 99999 PR PBB SHADOW E&M-EST. PATIENT-LVL IV: CPT | Mod: PBBFAC,,, | Performed by: INTERNAL MEDICINE

## 2019-04-17 PROCEDURE — 99214 OFFICE O/P EST MOD 30 MIN: CPT | Mod: S$GLB,,, | Performed by: INTERNAL MEDICINE

## 2019-04-17 PROCEDURE — 3078F DIAST BP <80 MM HG: CPT | Mod: CPTII,S$GLB,, | Performed by: INTERNAL MEDICINE

## 2019-04-17 PROCEDURE — 99999 PR PBB SHADOW E&M-EST. PATIENT-LVL IV: ICD-10-PCS | Mod: PBBFAC,,, | Performed by: INTERNAL MEDICINE

## 2019-04-17 PROCEDURE — 3077F SYST BP >= 140 MM HG: CPT | Mod: CPTII,S$GLB,, | Performed by: INTERNAL MEDICINE

## 2019-04-17 PROCEDURE — 93000 EKG 12-LEAD: ICD-10-PCS | Mod: S$GLB,,, | Performed by: INTERNAL MEDICINE

## 2019-04-17 PROCEDURE — 1101F PT FALLS ASSESS-DOCD LE1/YR: CPT | Mod: CPTII,S$GLB,, | Performed by: INTERNAL MEDICINE

## 2019-04-17 PROCEDURE — 99499 UNLISTED E&M SERVICE: CPT | Mod: S$GLB,,, | Performed by: INTERNAL MEDICINE

## 2019-04-17 RX ORDER — METOPROLOL SUCCINATE 50 MG/1
50 TABLET, EXTENDED RELEASE ORAL DAILY
Qty: 30 TABLET | Refills: 11 | Status: SHIPPED | OUTPATIENT
Start: 2019-04-17 | End: 2019-09-11 | Stop reason: SDUPTHER

## 2019-04-18 DIAGNOSIS — F32.A DEPRESSION, UNSPECIFIED DEPRESSION TYPE: ICD-10-CM

## 2019-04-18 RX ORDER — FLUOXETINE 10 MG/1
10 CAPSULE ORAL DAILY
Qty: 30 CAPSULE | Refills: 1 | Status: SHIPPED | OUTPATIENT
Start: 2019-04-18 | End: 2019-06-18 | Stop reason: SDUPTHER

## 2019-05-17 ENCOUNTER — LAB VISIT (OUTPATIENT)
Dept: LAB | Facility: HOSPITAL | Age: 79
End: 2019-05-17
Attending: INTERNAL MEDICINE
Payer: MEDICARE

## 2019-05-17 DIAGNOSIS — D47.2 MGUS (MONOCLONAL GAMMOPATHY OF UNKNOWN SIGNIFICANCE): Chronic | ICD-10-CM

## 2019-05-17 LAB
ALBUMIN SERPL BCP-MCNC: 3.8 G/DL (ref 3.5–5.2)
ALP SERPL-CCNC: 74 U/L (ref 55–135)
ALT SERPL W/O P-5'-P-CCNC: 9 U/L (ref 10–44)
ANION GAP SERPL CALC-SCNC: 8 MMOL/L (ref 8–16)
AST SERPL-CCNC: 11 U/L (ref 10–40)
BASOPHILS # BLD AUTO: 0.01 K/UL (ref 0–0.2)
BASOPHILS NFR BLD: 0.2 % (ref 0–1.9)
BILIRUB SERPL-MCNC: 0.5 MG/DL (ref 0.1–1)
BUN SERPL-MCNC: 22 MG/DL (ref 8–23)
CALCIUM SERPL-MCNC: 10.2 MG/DL (ref 8.7–10.5)
CHLORIDE SERPL-SCNC: 98 MMOL/L (ref 95–110)
CO2 SERPL-SCNC: 28 MMOL/L (ref 23–29)
CREAT SERPL-MCNC: 1.9 MG/DL (ref 0.5–1.4)
DIFFERENTIAL METHOD: NORMAL
EOSINOPHIL # BLD AUTO: 0.1 K/UL (ref 0–0.5)
EOSINOPHIL NFR BLD: 2.3 % (ref 0–8)
ERYTHROCYTE [DISTWIDTH] IN BLOOD BY AUTOMATED COUNT: 14.3 % (ref 11.5–14.5)
EST. GFR  (AFRICAN AMERICAN): 29 ML/MIN/1.73 M^2
EST. GFR  (NON AFRICAN AMERICAN): 25 ML/MIN/1.73 M^2
GLUCOSE SERPL-MCNC: 100 MG/DL (ref 70–110)
HCT VFR BLD AUTO: 43 % (ref 37–48.5)
HGB BLD-MCNC: 14 G/DL (ref 12–16)
LYMPHOCYTES # BLD AUTO: 1.3 K/UL (ref 1–4.8)
LYMPHOCYTES NFR BLD: 21.5 % (ref 18–48)
MCH RBC QN AUTO: 30.6 PG (ref 27–31)
MCHC RBC AUTO-ENTMCNC: 32.6 G/DL (ref 32–36)
MCV RBC AUTO: 94 FL (ref 82–98)
MONOCYTES # BLD AUTO: 0.4 K/UL (ref 0.3–1)
MONOCYTES NFR BLD: 7.2 % (ref 4–15)
NEUTROPHILS # BLD AUTO: 4.2 K/UL (ref 1.8–7.7)
NEUTROPHILS NFR BLD: 68.8 % (ref 38–73)
PLATELET # BLD AUTO: 243 K/UL (ref 150–350)
PMV BLD AUTO: 10.6 FL (ref 9.2–12.9)
POTASSIUM SERPL-SCNC: 4.4 MMOL/L (ref 3.5–5.1)
PROT SERPL-MCNC: 7.7 G/DL (ref 6–8.4)
RBC # BLD AUTO: 4.57 M/UL (ref 4–5.4)
SODIUM SERPL-SCNC: 134 MMOL/L (ref 136–145)
WBC # BLD AUTO: 6.1 K/UL (ref 3.9–12.7)

## 2019-05-17 PROCEDURE — 84165 PROTEIN E-PHORESIS SERUM: CPT

## 2019-05-17 PROCEDURE — 84165 PROTEIN E-PHORESIS SERUM: CPT | Mod: 26,,, | Performed by: PATHOLOGY

## 2019-05-17 PROCEDURE — 80053 COMPREHEN METABOLIC PANEL: CPT

## 2019-05-17 PROCEDURE — 83520 IMMUNOASSAY QUANT NOS NONAB: CPT | Mod: 59

## 2019-05-17 PROCEDURE — 85025 COMPLETE CBC W/AUTO DIFF WBC: CPT

## 2019-05-17 PROCEDURE — 36415 COLL VENOUS BLD VENIPUNCTURE: CPT | Mod: PN

## 2019-05-17 PROCEDURE — 84165 PATHOLOGIST INTERPRETATION SPE: ICD-10-PCS | Mod: 26,,, | Performed by: PATHOLOGY

## 2019-05-20 ENCOUNTER — OFFICE VISIT (OUTPATIENT)
Dept: HEMATOLOGY/ONCOLOGY | Facility: CLINIC | Age: 79
End: 2019-05-20
Payer: MEDICARE

## 2019-05-20 ENCOUNTER — LAB VISIT (OUTPATIENT)
Dept: LAB | Facility: HOSPITAL | Age: 79
End: 2019-05-20
Attending: INTERNAL MEDICINE
Payer: MEDICARE

## 2019-05-20 VITALS
SYSTOLIC BLOOD PRESSURE: 153 MMHG | BODY MASS INDEX: 32.14 KG/M2 | HEART RATE: 50 BPM | TEMPERATURE: 99 F | OXYGEN SATURATION: 95 % | HEIGHT: 62 IN | DIASTOLIC BLOOD PRESSURE: 69 MMHG | WEIGHT: 174.63 LBS

## 2019-05-20 DIAGNOSIS — D47.2 MGUS (MONOCLONAL GAMMOPATHY OF UNKNOWN SIGNIFICANCE): Primary | Chronic | ICD-10-CM

## 2019-05-20 DIAGNOSIS — I10 ESSENTIAL HYPERTENSION: Chronic | ICD-10-CM

## 2019-05-20 DIAGNOSIS — Z71.89 ADVANCED CARE PLANNING/COUNSELING DISCUSSION: ICD-10-CM

## 2019-05-20 DIAGNOSIS — I48.0 PAROXYSMAL ATRIAL FIBRILLATION: Chronic | ICD-10-CM

## 2019-05-20 DIAGNOSIS — N18.30 STAGE 3 CHRONIC KIDNEY DISEASE: ICD-10-CM

## 2019-05-20 DIAGNOSIS — I50.32 CHRONIC DIASTOLIC CHF (CONGESTIVE HEART FAILURE): ICD-10-CM

## 2019-05-20 LAB
ALBUMIN SERPL BCP-MCNC: 3.9 G/DL (ref 3.5–5.2)
ALBUMIN SERPL ELPH-MCNC: 3.62 G/DL (ref 3.35–5.55)
ALP SERPL-CCNC: 76 U/L (ref 55–135)
ALPHA1 GLOB SERPL ELPH-MCNC: 0.37 G/DL (ref 0.17–0.41)
ALPHA2 GLOB SERPL ELPH-MCNC: 1.19 G/DL (ref 0.43–0.99)
ALT SERPL W/O P-5'-P-CCNC: 11 U/L (ref 10–44)
ANION GAP SERPL CALC-SCNC: 10 MMOL/L (ref 8–16)
AST SERPL-CCNC: 11 U/L (ref 10–40)
B-GLOBULIN SERPL ELPH-MCNC: 0.84 G/DL (ref 0.5–1.1)
BILIRUB SERPL-MCNC: 0.6 MG/DL (ref 0.1–1)
BUN SERPL-MCNC: 26 MG/DL (ref 8–23)
CALCIUM SERPL-MCNC: 10.1 MG/DL (ref 8.7–10.5)
CHLORIDE SERPL-SCNC: 98 MMOL/L (ref 95–110)
CO2 SERPL-SCNC: 28 MMOL/L (ref 23–29)
CREAT SERPL-MCNC: 2.2 MG/DL (ref 0.5–1.4)
EST. GFR  (AFRICAN AMERICAN): 24 ML/MIN/1.73 M^2
EST. GFR  (NON AFRICAN AMERICAN): 21 ML/MIN/1.73 M^2
GAMMA GLOB SERPL ELPH-MCNC: 1.18 G/DL (ref 0.67–1.58)
GLUCOSE SERPL-MCNC: 110 MG/DL (ref 70–110)
KAPPA LC SER QL IA: 3.99 MG/DL (ref 0.33–1.94)
KAPPA LC/LAMBDA SER IA: 1.55 (ref 0.26–1.65)
LAMBDA LC SER QL IA: 2.58 MG/DL (ref 0.57–2.63)
PATHOLOGIST INTERPRETATION SPE: NORMAL
POTASSIUM SERPL-SCNC: 4.4 MMOL/L (ref 3.5–5.1)
PROT SERPL-MCNC: 7.2 G/DL (ref 6–8.4)
PROT SERPL-MCNC: 7.9 G/DL (ref 6–8.4)
SODIUM SERPL-SCNC: 136 MMOL/L (ref 136–145)
TSH SERPL DL<=0.005 MIU/L-ACNC: 3.5 UIU/ML (ref 0.4–4)

## 2019-05-20 PROCEDURE — 99499 UNLISTED E&M SERVICE: CPT | Mod: S$GLB,,, | Performed by: INTERNAL MEDICINE

## 2019-05-20 PROCEDURE — 99499 RISK ADDL DX/OHS AUDIT: ICD-10-PCS | Mod: S$GLB,,, | Performed by: INTERNAL MEDICINE

## 2019-05-20 PROCEDURE — 3077F SYST BP >= 140 MM HG: CPT | Mod: CPTII,S$GLB,, | Performed by: INTERNAL MEDICINE

## 2019-05-20 PROCEDURE — 99999 PR PBB SHADOW E&M-EST. PATIENT-LVL IV: ICD-10-PCS | Mod: PBBFAC,,, | Performed by: INTERNAL MEDICINE

## 2019-05-20 PROCEDURE — 99487 PR COMPLX CHRON CARE MGMT, 1ST HR, PER MONTH: ICD-10-PCS | Mod: S$GLB,,, | Performed by: INTERNAL MEDICINE

## 2019-05-20 PROCEDURE — 99487 CPLX CHRNC CARE 1ST 60 MIN: CPT | Mod: S$GLB,,, | Performed by: INTERNAL MEDICINE

## 2019-05-20 PROCEDURE — 80053 COMPREHEN METABOLIC PANEL: CPT

## 2019-05-20 PROCEDURE — 3077F PR MOST RECENT SYSTOLIC BLOOD PRESSURE >= 140 MM HG: ICD-10-PCS | Mod: CPTII,S$GLB,, | Performed by: INTERNAL MEDICINE

## 2019-05-20 PROCEDURE — 99213 PR OFFICE/OUTPT VISIT, EST, LEVL III, 20-29 MIN: ICD-10-PCS | Mod: S$GLB,,, | Performed by: INTERNAL MEDICINE

## 2019-05-20 PROCEDURE — 1101F PR PT FALLS ASSESS DOC 0-1 FALLS W/OUT INJ PAST YR: ICD-10-PCS | Mod: CPTII,S$GLB,, | Performed by: INTERNAL MEDICINE

## 2019-05-20 PROCEDURE — 36415 COLL VENOUS BLD VENIPUNCTURE: CPT

## 2019-05-20 PROCEDURE — 3078F DIAST BP <80 MM HG: CPT | Mod: CPTII,S$GLB,, | Performed by: INTERNAL MEDICINE

## 2019-05-20 PROCEDURE — 84443 ASSAY THYROID STIM HORMONE: CPT

## 2019-05-20 PROCEDURE — 99999 PR PBB SHADOW E&M-EST. PATIENT-LVL IV: CPT | Mod: PBBFAC,,, | Performed by: INTERNAL MEDICINE

## 2019-05-20 PROCEDURE — 3078F PR MOST RECENT DIASTOLIC BLOOD PRESSURE < 80 MM HG: ICD-10-PCS | Mod: CPTII,S$GLB,, | Performed by: INTERNAL MEDICINE

## 2019-05-20 PROCEDURE — 99213 OFFICE O/P EST LOW 20 MIN: CPT | Mod: S$GLB,,, | Performed by: INTERNAL MEDICINE

## 2019-05-20 PROCEDURE — 1101F PT FALLS ASSESS-DOCD LE1/YR: CPT | Mod: CPTII,S$GLB,, | Performed by: INTERNAL MEDICINE

## 2019-05-20 NOTE — PROGRESS NOTES
Subjective:       Patient ID: Barbara Rizo is a 79 y.o. female.    Chief Complaint: Follow-up    HPI     Diagnosis: MGUS    79-year-old with medical history of CKD  stage III, hypertension, osteopenia, vitamin-D deficiency, type 2 diabetes mellitus seen today for MGUS.. . She is followed by Dr. Boyle for CKD stage 3. .  Appetite and weight stable.  No shortness of breath, chest pain. No bony pain. No prior history of anemia.  Patient reports history of hemochromatosis for which she has undergone phlebotomy in  remote past.  No melena, hematochezia or change in bowel habits.  No fatigue, lightheadedness or shortness of breath.  Laboratory studies from 03/22/2018 from outside facility reveal abnormal SPEP reveals elevation of beta 2 globulins. Serum immunofixation reveals a faint IgG lambda monoclonal immunoglobulin.  Biochemical profile reveals a BUN of 33 creatinine of 1.64 mg/dL calcium of 9.9 mg/dL albumin of 4.7    She was recently hospitalized with  AFib with rapid ventricular response on January 28th and she underwent TALHA with DC cardioversion which was successful.She was also found to have acute renal failure on CKD with peak BUN/Cr 63/4.0.   She was also treated for bronchitis    Today, she is on doing well  No new issues  Appetite stable  She is no longer on  home 02  No CP/cough/SOB  No lightheadedness   She is on modified diet and has had some Intentional wt loss   She is following modified low salt diet        CBC reveals wbc 6100/mm3   Hb 14 g/dl Hct 43% plt 242k.    SPEP 5/20/2019 - no paraprotein bands    Biochemical profile reveals a BUN of 26 creatinine of 2.2 mg/dL calcium of 10.1mg/dL albumin of 3.9      Past Medical History:   Diagnosis Date    A-fib     Diabetes mellitus type II     Fatty liver     GERD (gastroesophageal reflux disease)     Hemochromatosis     Hyperlipidemia     Hypertension     Vaginal delivery     x2    Vitamin D deficiency disease     Wears partial dentures      upper removable bridge       Past Surgical History:   Procedure Laterality Date    ARTHROPLASTY, KNEE, TOTAL Left 5/13/2013    [facility] OR    ARTHROPLASTY-KNEE Right 8/3/2015    [facility]formed by [facility] at [facility] OR    BREAST BIOPSY      CHEST DRAINAGE N/A 9/18/2015    Performed by Bonnie Surgeon at [facility] B[facility]NIE    CHOLECYSTECTOMY  08/2015    CHOLECYSTECTOMY-LAPAROSCOPIC N/A 8/26/2015    Performed by [facility]ony Alexander [facility] at [facility] OR    CYSTOSCOPY - URODYNAMICS FLOW STUDY  (C-ARM) N/A 11/4/2016    Performed by Reina Camp MD at [facility] OR    ESOPHAGOGASTRODUODENOSCOPY (EGD) Left 10/15/2015    Performed by Rolando Robert MD at [facility] ENDO    EYE SURGERY      Cat Ext  OU    HYSTERECTOMY      partial due to uterine fibroids    INCISION AND DRAINAGE (I&D), ABSCESS Right 9/14/2015    Performed by Bonnie Surgeon at [facility]    TOTAL KNEE ARTHROPLASTY Right 2015    left knee done 5/2013    TRANSESOPHAGEAL ECHOCARDIOGRAM (TALHA) N/A 9/21/2015    Performed by Amauri Lomas MD at [facility] CATH LAB    TRANSESOPHAGEAL ECHOCARDIOGRAM WITH POSSIBLE CARDIOVERSION (TALHA W/ POSS CARDIOVERSION) N/A 1/30/2019    Performed by Sundeep Montero MD at [facility] CATH LAB       Review of Systems   Constitutional: Negative for appetite change, fatigue, fever and unexpected weight change.   HENT: Negative for mouth sores.    Eyes: Negative for visual disturbance.   Respiratory: Negative for cough and shortness of breath.    Cardiovascular: Negative for chest pain.   Gastrointestinal: Negative for abdominal pain and diarrhea.   Genitourinary: Negative for frequency.   Musculoskeletal: Negative for back pain.   Skin: Negative for rash.   Neurological: Negative for headaches.   Hematological: Negative for adenopathy.   Psychiatric/Behavioral: The patient is not nervous/anxious.        Objective:       Vitals:    05/20/19 1411   BP: (!) 153/69   BP Location: Left arm   Patient Position:  "Sitting   BP Method: Medium (Automatic)   Pulse: (!) 50   Temp: 98.6 °F (37 °C)   TempSrc: Oral   SpO2: 95%   Weight: 79.2 kg (174 lb 9.7 oz)   Height: 5' 2" (1.575 m)       Physical Exam   Constitutional: She is oriented to person, place, and time. She appears well-developed and well-nourished.   On nasal cannula 02   HENT:   Head: Normocephalic.   Mouth/Throat: Oropharynx is clear and moist. No oropharyngeal exudate.   Eyes: Conjunctivae and lids are normal. No scleral icterus.   Neck: Normal range of motion. Neck supple. No thyromegaly present.   Cardiovascular: Normal rate and regular rhythm.   Murmur heard.  Pulmonary/Chest: Breath sounds normal. She has no wheezes. She has no rales.   Abdominal: Soft. Bowel sounds are normal. There is no hepatosplenomegaly. There is no tenderness. There is no rebound and no guarding.   Musculoskeletal: Normal range of motion. She exhibits edema. She exhibits no tenderness.   Neurological: She is alert and oriented to person, place, and time. No cranial nerve deficit. Coordination normal.   Skin: Skin is warm and dry. No ecchymosis, no petechiae and no rash noted. No erythema.   Psychiatric: She has a normal mood and affect.       Lab Results   Component Value Date    WBC 6.10 05/17/2019    HGB 14.0 05/17/2019    HCT 43.0 05/17/2019    MCV 94 05/17/2019     05/17/2019         Results for FAISAL ORNELAS (MRN [id number]) as of 6/1/2019 13:16   Ref. Range 10/8/2018 09:04 2/11/2019 15:39 5/17/2019 09:58   [place] Free Light Chains Latest Ref Range: 0.33 - 1.94 mg/dL 5.54 (H) 1.90 3.99 (H)   Lambda Free Light Chains Latest Ref Range: 0.57 - 2.63 mg/dL 2.96 (H) 1.77 2.58   Kappa/Lambda FLC Ratio Latest Ref Range: 0.26 - 1.65  1.87 (H) 1.07 1.55         Results for FAISAL ORNELAS (MRN [id number]) as of 2/18/2019 09:11   Ref. Range 2/11/2019 15:40   IgG - Serum Latest Ref Range: 650 - 1600 mg/dL 931   IgM Latest Ref Range: 50 - 300 mg/dL 22 (L)   IgA Latest Ref Range: 40 - 350 " mg/dL 256     SPEP [id number]   Normal total protein. A paraprotein band is present in gamma = 0.20   g/dL; previously 0.40 g/dL.          SPEP 5/20/2019  Electronically reviewed and signed by:   Zena Arora MD   Signed on 05/20/19 at 14:10   Normal total protein. No paraprotein bands identified.   UPEP- no monoclonal peaks  Assessment:       1. MGUS (monoclonal gammopathy of unknown significance)    2. Stage 3 chronic kidney disease    3. Advanced care planning/counseling discussion        Plan:       1-2 Pt clinically stable  Hb 14 g/dl-stable   SPEP -no paraproteins   SPEP 10/2018 - A paraprotein band is present in gamma = 0.20   g/dL; previously 0.40 g/dL  Pt no longer has M spike since 2018   Serum FLC reveals mildly elevated kappa    BUN/Cr  26/2.2   (  Cr  1. 6mg/dl 5/2018)   UPEP- no monoclonal peaks   No signs of disease progression     Follow-up in  6 mo with cbc,cmp, SPEP,FLC, Quant Ig, B-2 microglobulin prior to f/u       Advance Care Planning     Power of   I initiated the process of advance care planning today and explained the importance of this process to the patient.  I introduced the concept of advance directives to the patient, as well. Then the patient received detailed information about the importance of designating a Health Care Power of  (HCPOA). She was also instructed to communicate with this person about their wishes for future healthcare, should she become sick and lose decision-making capacity. The patient has previously appointed a HCPOA. After our discussion, the patient has decided to complete a HCPOA and has appointed her daughter and NAME:Tamar Arechiga [phone] I spent a total time of 16 minutes discussing this issue with the patient.    Advance Care Planning     Living Will  During this visit, I engaged the patient in the advance care planning process.  The patient and I reviewed the role for advance directives and their purpose in directing future healthcare if  the patient's unable to speak for him/herself.  At this point in time, the patient does have full decision-making capacity.  We discussed different extreme health states that she could experience, and reviewed what kind of medical care she would want in those situations.  The patient communicated that if she were comatose and had little chance of a meaningful recovery, she would not want machines/life-sustaining treatments used.  The patient has not completed a living will to reflect these preferences.  The patient  has already designated a healthcare power of  to make decisions on her behalf.   I spent a total of 16  minutes engaging the patient in this advance care planning discussion.                 Cc: Nargis Boyle M.D.

## 2019-05-27 ENCOUNTER — OFFICE VISIT (OUTPATIENT)
Dept: CARDIOLOGY | Facility: CLINIC | Age: 79
End: 2019-05-27
Payer: MEDICARE

## 2019-05-27 VITALS
WEIGHT: 174.19 LBS | HEIGHT: 62 IN | DIASTOLIC BLOOD PRESSURE: 70 MMHG | OXYGEN SATURATION: 97 % | HEART RATE: 50 BPM | BODY MASS INDEX: 32.05 KG/M2 | SYSTOLIC BLOOD PRESSURE: 154 MMHG

## 2019-05-27 DIAGNOSIS — I50.32 CHRONIC DIASTOLIC CHF (CONGESTIVE HEART FAILURE): ICD-10-CM

## 2019-05-27 DIAGNOSIS — I10 ESSENTIAL HYPERTENSION: Chronic | ICD-10-CM

## 2019-05-27 DIAGNOSIS — I48.0 PAROXYSMAL ATRIAL FIBRILLATION: Primary | Chronic | ICD-10-CM

## 2019-05-27 DIAGNOSIS — E78.2 MIXED HYPERLIPIDEMIA: Chronic | ICD-10-CM

## 2019-05-27 PROCEDURE — 99213 OFFICE O/P EST LOW 20 MIN: CPT | Mod: S$GLB,,, | Performed by: INTERNAL MEDICINE

## 2019-05-27 PROCEDURE — 3078F DIAST BP <80 MM HG: CPT | Mod: CPTII,S$GLB,, | Performed by: INTERNAL MEDICINE

## 2019-05-27 PROCEDURE — 99999 PR PBB SHADOW E&M-EST. PATIENT-LVL III: ICD-10-PCS | Mod: PBBFAC,,, | Performed by: INTERNAL MEDICINE

## 2019-05-27 PROCEDURE — 99213 PR OFFICE/OUTPT VISIT, EST, LEVL III, 20-29 MIN: ICD-10-PCS | Mod: S$GLB,,, | Performed by: INTERNAL MEDICINE

## 2019-05-27 PROCEDURE — 3077F PR MOST RECENT SYSTOLIC BLOOD PRESSURE >= 140 MM HG: ICD-10-PCS | Mod: CPTII,S$GLB,, | Performed by: INTERNAL MEDICINE

## 2019-05-27 PROCEDURE — 99499 RISK ADDL DX/OHS AUDIT: ICD-10-PCS | Mod: S$GLB,,, | Performed by: INTERNAL MEDICINE

## 2019-05-27 PROCEDURE — 3078F PR MOST RECENT DIASTOLIC BLOOD PRESSURE < 80 MM HG: ICD-10-PCS | Mod: CPTII,S$GLB,, | Performed by: INTERNAL MEDICINE

## 2019-05-27 PROCEDURE — 99499 UNLISTED E&M SERVICE: CPT | Mod: S$GLB,,, | Performed by: INTERNAL MEDICINE

## 2019-05-27 PROCEDURE — 1101F PR PT FALLS ASSESS DOC 0-1 FALLS W/OUT INJ PAST YR: ICD-10-PCS | Mod: CPTII,S$GLB,, | Performed by: INTERNAL MEDICINE

## 2019-05-27 PROCEDURE — 99999 PR PBB SHADOW E&M-EST. PATIENT-LVL III: CPT | Mod: PBBFAC,,, | Performed by: INTERNAL MEDICINE

## 2019-05-27 PROCEDURE — 1101F PT FALLS ASSESS-DOCD LE1/YR: CPT | Mod: CPTII,S$GLB,, | Performed by: INTERNAL MEDICINE

## 2019-05-27 PROCEDURE — 3077F SYST BP >= 140 MM HG: CPT | Mod: CPTII,S$GLB,, | Performed by: INTERNAL MEDICINE

## 2019-05-27 RX ORDER — AMIODARONE HYDROCHLORIDE 200 MG/1
TABLET ORAL
Qty: 90 TABLET | Refills: 3 | Status: SHIPPED | OUTPATIENT
Start: 2019-05-27 | End: 2020-05-17

## 2019-05-27 NOTE — PROGRESS NOTES
Subjective:    Patient ID:  Barbara Rizo is a 79 y.o. female who presents for follow-up of Hypertension      HPI     PAF - TALHA/CV 9/21/15, 1/30/19, 2/2/19 -  on amiodarone and eliquis, HTN, DM, HLD, MGUS, CRI - Cr 2.5     Admitted 1/28/19  Ms. Barbara Rizo is a 78 y.o. female with essential hypertension, hyperlipidemia (LDL 62.4 Jul 2018), type 2 diabetes mellitus (HbA1c 5.8% Jul 2018), CKD Stage 3, paroxysmal atrial fibrillation ([id number] score 5) not on chronic anticoagulation, obesity (BMI 36.0), MGUS, and chronic gout who presents to [facility] ED with complaints of coughing for three days.  She started to have a non-productive cough, wheezing, and mild dyspnea three days ago which has steadily been worsening since onset.  She also complains of a subjective fever but otherwise denies any nausea, vomiting, chest pain, palpitations, pleurisy, nor any diaphoresis.  She denies any recent travel, hemoptysis, nor any lower extremity pain or swelling.  She does say that she's under a lot of stress recently with her ex- dying yesterday at [facility] due to complications from a heart valve replacement two weeks ago.  She does say that she may have had sick contacts while visiting her ex-.  Her appetite has been poor but she denies any weight loss.     While at her ENT's appointment today she decided to schedule an appointment with her PCP to evaluate her upper airway complaints.  She was unable to see her regular PCP, Dr. Paras Oro, but was able to be seen by another physician who became concerned when she was noted to be in AFib with RVR on EKG.  He recommended EMS transportation to the ED but she refused and decided to drive herself.  Her cardiologist is Dr. Amauri Lomas.     Pt admitted to ICU with afib rvr on amiodarone infusion. Pt with elevated HR  and after TALHA done patient became very agitated and anxious. Ativan 1mg IV given and symptoms got worse. HR up into the  170s and O2 sats dropped to 77%. Placed on NRB. Improved O2 sats. 5mg IV haldol given as patient was trying to get out of bed and pulling oxygen mask off. Cardizem 10mg Iv given with improved HR to 120s-130s. xopenex scheduled Q 8 hours. Changed to zosyn with temp 102F.   1/30- successful cardioversion, post procedure confused and pulling oxygen off, desaturation, constant re-direction, monitored in ICU overnight. Changed to oral amiodarone. eliquis for anticoagulation. Holding parameters on BP meds. IV steroids, nebs and antibiotic for probable lower resp infection. IV lasix as Bp tolerates.   1/31- HR remain under control NSR 60-70s.On amiodarone, metoprolol and eliquis.  Resp status improved and weaned oxygen. Steroids changed to oral route. DC zosyn and switch to augmentin. Add acapella to nebs. Cr increased from baseline (1.5-1.7). Gentle IVF and monitor with repeat BMP later, was stable,. PT,OT consulted for dc planning. Weaning oxygen. PT/OT consulted and rec H/H without equipment. The patient was transferred to the floor on 1/31. Nephrology was consulted for worsening renal function.      2/2- pt transferred with afib rvr. Successful cardioversion again . Continued oral amiodarone and BB.  still has aggressive cough and URI symptoms. On mucolytic and antibiotic.   2/3- HR 50-60s sinus. Cr sightly higher, sodium 128. BNP 1500. Lasix x one dose. Bringing up more mucus. Steroids weaned 20mg. Still faint wheezes on exam.  Patient had worsening ARF on CKD 3, keeped on george and monitor,nephrology was following.reconsulted PT,OT.fley was removed latera nd she had improvement in ARF.  Changed diet for low slat diet duo to hyponatremia with improvement.  She did well with PT,OT.  Her wheezing and respiratory status is improved,  Patient has been discharged home with HH and PO Abx and OAC and amiodarone and follow up with PCP,nephrology and cardiology in next few days.      1-29:  Admitted with AFib with RVR  1-30:   Underwent TALHA/DC cardioversion successfully  2-1 Back in A-fib 120s. SOB  2-3 Still coughing and SOB. NSR 60      2/2/19 CV - 360J converted A-fib to sinus bradycardia 55. Change amiodarone to po. Ok for telemetry     Echo 1/29/19  · TDs  · Normal left ventricular systolic function. The estimated ejection fraction is 55% * specificity decreased secondary to tachyarrhythmia  · Indeterminate left ventricular diastolic function.  · Severe left atrial enlargement.  · Mild-to-moderate mitral regurgitation.  · Intermediate central venous pressure (8 mm Hg).  · The estimated PA systolic pressure is 52 mm Hg            Pulmonary hypertension present.     2/13/19 Less SOB since discharge  EKG NSR with PACs 65   Decrease amiodarone 200 qd  OV 2 months - will discuss changing amiodarone to flecainide at that time     4/17/19 Denies CP or SOB  EKG sinus bradycardia 44  HR mostly runs 40-50 at home  Change metoprolol 100 qd to toprol XL 50 qd - may decrease further if bradycardia persists  Discussed alternative AAD - given her repeated episodes of PAF we are both in agreement to stay on amiodarone for now  OV 1 month with TSH, CMP, EKG    Labs 5/20/19  K 4.4  Cr 2.2  TSH 3.5  LFT ok    HR improved to the low 50s  Has held eliquis the last 2 days due to bleeding from a recent skin CA removal  Denies CP or SOB    Review of Systems   Constitution: Negative for decreased appetite.   HENT: Negative for ear discharge.    Eyes: Negative for blurred vision.   Respiratory: Negative for hemoptysis.    Endocrine: Negative for polyphagia.   Hematologic/Lymphatic: Negative for adenopathy.   Skin: Negative for color change.   Musculoskeletal: Negative for joint swelling.   Genitourinary: Negative for bladder incontinence.   Neurological: Negative for brief paralysis.   Psychiatric/Behavioral: Negative for hallucinations.   Allergic/Immunologic: Negative for hives.        Objective:    Physical Exam   Constitutional: She is oriented to  person, place, and time. She appears well-developed and well-nourished.   HENT:   Head: Normocephalic and atraumatic.   Eyes: Pupils are equal, round, and reactive to light. Conjunctivae are normal.   Neck: Normal range of motion. Neck supple.   Cardiovascular: Normal rate, normal heart sounds and intact distal pulses.   Pulmonary/Chest: Effort normal and breath sounds normal.   Abdominal: Soft. Bowel sounds are normal.   Musculoskeletal: Normal range of motion.   Neurological: She is alert and oriented to person, place, and time.   Skin: Skin is warm and dry.         Assessment:       1. Paroxysmal atrial fibrillation    2. Essential hypertension    3. Mixed hyperlipidemia    4. Chronic diastolic CHF (congestive heart failure)         Plan:       Cardiac stable  OV 3 months

## 2019-06-18 ENCOUNTER — TELEPHONE (OUTPATIENT)
Dept: FAMILY MEDICINE | Facility: CLINIC | Age: 79
End: 2019-06-18

## 2019-06-18 DIAGNOSIS — F32.A DEPRESSION, UNSPECIFIED DEPRESSION TYPE: ICD-10-CM

## 2019-06-18 RX ORDER — FLUOXETINE 10 MG/1
10 CAPSULE ORAL DAILY
Qty: 30 CAPSULE | Refills: 1 | Status: SHIPPED | OUTPATIENT
Start: 2019-06-18 | End: 2019-06-18 | Stop reason: SDUPTHER

## 2019-06-18 RX ORDER — FLUOXETINE 10 MG/1
10 CAPSULE ORAL DAILY
Qty: 90 CAPSULE | Refills: 1 | Status: SHIPPED | OUTPATIENT
Start: 2019-06-18 | End: 2019-12-23 | Stop reason: SDUPTHER

## 2019-07-05 DIAGNOSIS — E83.119 HEMOCHROMATOSIS, UNSPECIFIED HEMOCHROMATOSIS TYPE: ICD-10-CM

## 2019-07-05 RX ORDER — FOLIC ACID 1 MG/1
1 TABLET ORAL DAILY
Qty: 90 TABLET | Refills: 1 | Status: SHIPPED | OUTPATIENT
Start: 2019-07-05 | End: 2020-01-21 | Stop reason: SDUPTHER

## 2019-07-08 DIAGNOSIS — I10 ESSENTIAL HYPERTENSION: ICD-10-CM

## 2019-07-08 DIAGNOSIS — E11.22 TYPE 2 DIABETES MELLITUS WITH STAGE 3 CHRONIC KIDNEY DISEASE, WITHOUT LONG-TERM CURRENT USE OF INSULIN: ICD-10-CM

## 2019-07-08 DIAGNOSIS — N18.30 TYPE 2 DIABETES MELLITUS WITH STAGE 3 CHRONIC KIDNEY DISEASE, WITHOUT LONG-TERM CURRENT USE OF INSULIN: ICD-10-CM

## 2019-07-08 RX ORDER — ATORVASTATIN CALCIUM 40 MG/1
40 TABLET, FILM COATED ORAL DAILY
Qty: 90 TABLET | Refills: 1 | Status: SHIPPED | OUTPATIENT
Start: 2019-07-08 | End: 2020-01-02 | Stop reason: SDUPTHER

## 2019-08-09 ENCOUNTER — OFFICE VISIT (OUTPATIENT)
Dept: FAMILY MEDICINE | Facility: CLINIC | Age: 79
End: 2019-08-09
Payer: MEDICARE

## 2019-08-09 VITALS
SYSTOLIC BLOOD PRESSURE: 132 MMHG | DIASTOLIC BLOOD PRESSURE: 73 MMHG | TEMPERATURE: 98 F | HEART RATE: 40 BPM | RESPIRATION RATE: 17 BRPM | BODY MASS INDEX: 34.45 KG/M2 | HEIGHT: 62 IN | OXYGEN SATURATION: 90 % | WEIGHT: 187.19 LBS

## 2019-08-09 DIAGNOSIS — I50.32 CHRONIC DIASTOLIC CHF (CONGESTIVE HEART FAILURE): Primary | ICD-10-CM

## 2019-08-09 DIAGNOSIS — I11.9 BENIGN HYPERTENSIVE HEART DISEASE WITHOUT HEART FAILURE: ICD-10-CM

## 2019-08-09 DIAGNOSIS — I10 ESSENTIAL HYPERTENSION: Chronic | ICD-10-CM

## 2019-08-09 PROCEDURE — 3078F PR MOST RECENT DIASTOLIC BLOOD PRESSURE < 80 MM HG: ICD-10-PCS | Mod: CPTII,S$GLB,, | Performed by: FAMILY MEDICINE

## 2019-08-09 PROCEDURE — 1101F PT FALLS ASSESS-DOCD LE1/YR: CPT | Mod: CPTII,S$GLB,, | Performed by: FAMILY MEDICINE

## 2019-08-09 PROCEDURE — 99214 PR OFFICE/OUTPT VISIT, EST, LEVL IV, 30-39 MIN: ICD-10-PCS | Mod: S$GLB,,, | Performed by: FAMILY MEDICINE

## 2019-08-09 PROCEDURE — 1101F PR PT FALLS ASSESS DOC 0-1 FALLS W/OUT INJ PAST YR: ICD-10-PCS | Mod: CPTII,S$GLB,, | Performed by: FAMILY MEDICINE

## 2019-08-09 PROCEDURE — 3075F SYST BP GE 130 - 139MM HG: CPT | Mod: CPTII,S$GLB,, | Performed by: FAMILY MEDICINE

## 2019-08-09 PROCEDURE — 3078F DIAST BP <80 MM HG: CPT | Mod: CPTII,S$GLB,, | Performed by: FAMILY MEDICINE

## 2019-08-09 PROCEDURE — 99999 PR PBB SHADOW E&M-EST. PATIENT-LVL IV: CPT | Mod: PBBFAC,,, | Performed by: FAMILY MEDICINE

## 2019-08-09 PROCEDURE — 99499 RISK ADDL DX/OHS AUDIT: ICD-10-PCS | Mod: S$GLB,,, | Performed by: FAMILY MEDICINE

## 2019-08-09 PROCEDURE — 99499 UNLISTED E&M SERVICE: CPT | Mod: S$GLB,,, | Performed by: FAMILY MEDICINE

## 2019-08-09 PROCEDURE — 3075F PR MOST RECENT SYSTOLIC BLOOD PRESS GE 130-139MM HG: ICD-10-PCS | Mod: CPTII,S$GLB,, | Performed by: FAMILY MEDICINE

## 2019-08-09 PROCEDURE — 99214 OFFICE O/P EST MOD 30 MIN: CPT | Mod: S$GLB,,, | Performed by: FAMILY MEDICINE

## 2019-08-09 PROCEDURE — 99999 PR PBB SHADOW E&M-EST. PATIENT-LVL IV: ICD-10-PCS | Mod: PBBFAC,,, | Performed by: FAMILY MEDICINE

## 2019-08-09 NOTE — PROGRESS NOTES
Routine Office Visit    Patient Name: Barbara Rizo    : 1940  MRN: [id number]    Subjective:  Barbara is a 79 y.o. female who presents today for:    1. Swelling of ankles  Patient presenting today for recurring swelling in both ankles.  She states that they are fine in the morning when she wakes up, but then worsens throughout the day.  She suffers with afib, htn, type 2 DM, and CKD stage 4.  She states that she sees Dr. Lomas for afib and Dr. Boyle for CKD.  Patient also follows up with Dr. Palmer for MGUS.  She states that she last saw Dr. Boyle last month and was told everything looked better.  She is not currently on any diuretic medications.  She has not had chest pain or shortness of breath that is new.  She does not wear compression socks/stokcings.  She states she would like a medication to help with swelling.    Past Medical History  Past Medical History:   Diagnosis Date    A-fib     Diabetes mellitus type II     Fatty liver     GERD (gastroesophageal reflux disease)     Hemochromatosis     Hyperlipidemia     Hypertension     Vaginal delivery     x2    Vitamin D deficiency disease     Wears partial dentures     upper removable bridge       Past Surgical History  Past Surgical History:   Procedure Laterality Date    ARTHROPLASTY, KNEE, TOTAL Left 2013    [facility]    ARTHROPLASTY-KNEE Right 8/3/2015    [facility]    BREAST BIOPSY      CHEST DRAINAGE N/A 2015    Performed by Bonnie Surgeon at [facility]    CHOLECYSTECTOMY  2015    CHOLECYSTECTOMY-LAPAROSCOPIC N/A 2015    [facility] OR    CYSTOSCOPY - URODYNAMICS FLOW STUDY  (C-ARM) N/A 2016    Performed by Reina Camp MD at [facility] OR    ESOPHAGOGASTRODUODENOSCOPY (EGD) Left 10/15/2015    Performed by Rolando Robert MD at [facility] ENDO    EYE SURGERY      Cat Ext  OU    HYSTERECTOMY      partial due  to uterine fibroids    INCISION AND DRAINAGE (I&D), ABSCESS Right 9/14/2015    [facility]    TOTAL KNEE ARTHROPLASTY Right 2015    left knee done 5/2013    TRANSESOPHAGEAL ECHOCARDIOGRAM (TALHA) N/A 9/21/2015    [facility]formed [facility] at [facility] CATH LAB    TRANSESOPHAGEAL ECHOCARDIOGRAM WITH POSSIBLE CARDIOVERSION (TALHA W/ POSS CARDIOVERSION) N/A 1/30/2019    Performed by Sundeep [facility]tero [facility] at [facility] CATH LAB       Family History  Family History   Problem Relation Age of Onset    Cancer Mother         stomach    Hypertension Mother     Aneurysm Father         abdominal       Social History  Social History     Socioeconomic History    Marital status:      Spouse name: Not on file    Number of children: Not on file    Years of education: Not on file    Highest education level: Not on file   Occupational History    Not on file   Social Needs    Financial resource strain: Not on file    Food insecurity:     Worry: Not on file     Inability: Not on file    Transportation needs:     Medical: Not on file     Non-medical: Not on file   Tobacco Use    Smoking status: Former Smoker    Smokeless tobacco: Never Used   Substance and Sexual Activity    Alcohol use: Yes     Alcohol/week: 0.0 oz     Comment: occasional/ on a weekend    Drug use: No    Sexual activity: Not Currently   Lifestyle    Physical activity:     Days per week: Not on file     Minutes per session: Not on file    Stress: Not on file   Relationships    Social connections:     Talks on phone: Not on file     Gets together: Not on file     Attends Hindu service: Not on file     Active member of club or organization: Not on file     Attends meetings of clubs or organizations: Not on file     Relationship status: Not on file   Other Topics Concern    Not on file   Social History Narrative    Not on file       Current Medications  Current Outpatient Medications on File Prior to Visit   Medication  "Sig Dispense Refill    amiodarone (PACERONE) 200 MG Tab TAKE 1 TABLET(200 MG) BY MOUTH EVERY DAY 90 tablet 3    amLODIPine (NORVASC) 5 MG tablet Take 1 tablet (5 mg total) by mouth once daily. 90 tablet 3    apixaban (ELIQUIS) 2.5 mg Tab Take 2.5 mg by mouth 2 (two) times daily.      atorvastatin (LIPITOR) 40 MG tablet Take 1 tablet (40 mg total) by mouth once daily. 90 tablet 1    ergocalciferol (VITAMIN D2) 50,000 unit Cap Take 50,000 Units by mouth every 7 days.      fish oil-omega-3 fatty acids 300-1,000 mg capsule Take 1 capsule by mouth once daily.      FLUoxetine 10 MG capsule Take 1 capsule (10 mg total) by mouth once daily. 90 capsule 1    folic acid (FOLVITE) 1 MG tablet Take 1 tablet (1 mg total) by mouth once daily. 90 tablet 1    metoprolol succinate (TOPROL-XL) 50 MG 24 hr tablet Take 1 tablet (50 mg total) by mouth once daily. 30 tablet 11    blood-glucose meter (FREESTYLE SYSTEM KIT) kit Use as instructed 1 each 0     Current Facility-Administered Medications on File Prior to Visit   Medication Dose Route Frequency Provider Last Rate Last Dose    denosumab (PROLIA) injection 60 mg  60 mg Subcutaneous 1 time in Clinic/HOD Nargis Boyle MD           Allergies   Review of patient's allergies indicates:  No Known Allergies    Review of Systems (Pertinent positives)  Review of Systems   Constitutional: Negative.    HENT: Negative.    Eyes: Negative.    Respiratory: Negative.    Cardiovascular: Positive for leg swelling. Negative for chest pain and palpitations.   Gastrointestinal: Negative.    Skin: Negative.    Neurological: Negative.          /73 (BP Location: Left arm, Patient Position: Sitting, BP Method: Medium (Automatic))   Pulse (!) 40   Temp 98.2 °F (36.8 °C) (Oral)   Resp 17   Ht 5' 2.01" (1.575 m)   Wt 84.9 kg (187 lb 2.7 oz)   SpO2 (!) 90%   BMI 34.23 kg/m²     GENERAL APPEARANCE: in no apparent distress and well developed and well nourished  HEENT: PERRL, EOMI, Sclera " clear, anicteric, Oropharynx clear, no lesions, Neck supple with midline trachea  NECK: normal, supple, no adenopathy, thyroid normal in size  RESPIRATORY: appears well, vitals normal, no respiratory distress, acyanotic, normal RR, fine crackles in bilateral bases  HEART: regular rate and rhythm, S1, S2 normal, no murmur, click, rub or gallop.    ABDOMEN: abdomen is soft without tenderness, no masses, no hernias, no organomegaly, no rebound, no guarding. Suprapubic tenderness absent. No CVA tenderness.  Extremities: warm/well perfused.  No abnormal hair patterns.  No clubbing, cyanosis; +2 edema in BLE  SKIN: no rashes, no wounds, no other lesions  PSYCH: Alert, oriented x 3, thought content appropriate, speech normal, pleasant and cooperative, good eye contact, well groomed    Assessment/Plan:  Barbara Rizo is a 79 y.o. female who presents today for :    Barbara was seen today for edema.    Diagnoses and all orders for this visit:    Chronic diastolic CHF (congestive heart failure)    Benign hypertensive heart disease without heart failure    Essential hypertension      1.  Patients weight is up today  2.  No current distress  3.  Will contact Dr. Boyle about fluid pills  4.  She is to follow up with cardiology and nephrology as scheduled  5.  Call with any concerns  6.  Follow up with me in 3 months or sooner if needed      Paras Oro MD

## 2019-08-15 ENCOUNTER — TELEPHONE (OUTPATIENT)
Dept: FAMILY MEDICINE | Facility: CLINIC | Age: 79
End: 2019-08-15

## 2019-08-15 NOTE — TELEPHONE ENCOUNTER
Spoke with pt and notified her that Dr. Oro has not heard back from the provider to refill medication.

## 2019-08-20 ENCOUNTER — TELEPHONE (OUTPATIENT)
Dept: FAMILY MEDICINE | Facility: CLINIC | Age: 79
End: 2019-08-20

## 2019-08-20 NOTE — TELEPHONE ENCOUNTER
Pt called stating her feet are swollen and she is getting ready to go on a trip. She wants to know have you heard from Dr. Boyle about receiving medication for fluid.

## 2019-08-20 NOTE — TELEPHONE ENCOUNTER
----- Message from Teetee Avila sent at 8/20/2019  2:02 PM CDT -----  Contact: self  Type: Patient Call Back    What is the request in detail: Pt calling to speak to a nurse regarding feet swelling.     Can the clinic reply by [facility]? No    Would the patient rather a call back or a response via [facility]? Call back     Best call back number: [phone]

## 2019-08-26 DIAGNOSIS — R60.0 BILATERAL LEG EDEMA: Primary | ICD-10-CM

## 2019-08-26 DIAGNOSIS — R60.0 BILATERAL LEG EDEMA: ICD-10-CM

## 2019-08-26 RX ORDER — FUROSEMIDE 20 MG/1
20 TABLET ORAL DAILY PRN
Qty: 30 TABLET | Refills: 0 | Status: SHIPPED | OUTPATIENT
Start: 2019-08-26 | End: 2019-08-26 | Stop reason: SDUPTHER

## 2019-08-26 RX ORDER — FUROSEMIDE 20 MG/1
TABLET ORAL
Qty: 90 TABLET | Refills: 0 | Status: SHIPPED | OUTPATIENT
Start: 2019-08-26 | End: 2019-11-21 | Stop reason: SDUPTHER

## 2019-08-26 NOTE — TELEPHONE ENCOUNTER
----- Message from Paras Oro MD sent at 8/26/2019 11:55 AM CDT -----  Contact: Self  Her nephrologist responded over the weekend and lasix sent in for her to take daily as needed.  She needs to follow up with Dr. Boyle as scheduled (her nephrologist)      Dr. Oro     ----- Message -----  From: Nati Ibarra LPN  Sent: 8/26/2019  10:03 AM  To: Paras Oro MD    Is patient referring to the Lasix? Please advise.   ----- Message -----  From: Nirmala Nick  Sent: 8/26/2019   9:15 AM  To: Preethi Crain Staff    Type: Patient Call Back    Who called:self    What is the request in detail: Calling to check the status on prescriptions verification from Nephrologist. Patient going out of town on Friday and need medications     Can the clinic reply by [facility]? no    Would the patient rather a call back or a response via [facility]? call    Best call back number: [phone]    Additional Information: Please contact the patient to clarify

## 2019-08-26 NOTE — TELEPHONE ENCOUNTER
Patient advised of medication called in and the need to follow up with her Nephrologist Dr. Boyle. Patient verbalized understanding of all of the above.

## 2019-09-07 ENCOUNTER — PATIENT OUTREACH (OUTPATIENT)
Dept: ADMINISTRATIVE | Facility: OTHER | Age: 79
End: 2019-09-07

## 2019-09-07 DIAGNOSIS — R73.03 PREDIABETES: Primary | ICD-10-CM

## 2019-09-11 ENCOUNTER — OFFICE VISIT (OUTPATIENT)
Dept: CARDIOLOGY | Facility: CLINIC | Age: 79
End: 2019-09-11
Payer: MEDICARE

## 2019-09-11 VITALS
WEIGHT: 187.38 LBS | OXYGEN SATURATION: 94 % | HEIGHT: 62 IN | BODY MASS INDEX: 34.48 KG/M2 | SYSTOLIC BLOOD PRESSURE: 157 MMHG | HEART RATE: 46 BPM | DIASTOLIC BLOOD PRESSURE: 77 MMHG

## 2019-09-11 DIAGNOSIS — I48.0 PAROXYSMAL ATRIAL FIBRILLATION: Primary | Chronic | ICD-10-CM

## 2019-09-11 DIAGNOSIS — I50.32 CHRONIC DIASTOLIC CHF (CONGESTIVE HEART FAILURE): ICD-10-CM

## 2019-09-11 DIAGNOSIS — E78.2 MIXED HYPERLIPIDEMIA: Chronic | ICD-10-CM

## 2019-09-11 DIAGNOSIS — I10 HYPERTENSION: ICD-10-CM

## 2019-09-11 DIAGNOSIS — I10 ESSENTIAL HYPERTENSION: Chronic | ICD-10-CM

## 2019-09-11 PROCEDURE — 3077F PR MOST RECENT SYSTOLIC BLOOD PRESSURE >= 140 MM HG: ICD-10-PCS | Mod: CPTII,S$GLB,, | Performed by: INTERNAL MEDICINE

## 2019-09-11 PROCEDURE — 99499 UNLISTED E&M SERVICE: CPT | Mod: S$GLB,,, | Performed by: INTERNAL MEDICINE

## 2019-09-11 PROCEDURE — 1101F PR PT FALLS ASSESS DOC 0-1 FALLS W/OUT INJ PAST YR: ICD-10-PCS | Mod: CPTII,S$GLB,, | Performed by: INTERNAL MEDICINE

## 2019-09-11 PROCEDURE — 99214 PR OFFICE/OUTPT VISIT, EST, LEVL IV, 30-39 MIN: ICD-10-PCS | Mod: S$GLB,,, | Performed by: INTERNAL MEDICINE

## 2019-09-11 PROCEDURE — 3078F PR MOST RECENT DIASTOLIC BLOOD PRESSURE < 80 MM HG: ICD-10-PCS | Mod: CPTII,S$GLB,, | Performed by: INTERNAL MEDICINE

## 2019-09-11 PROCEDURE — 99999 PR PBB SHADOW E&M-EST. PATIENT-LVL III: ICD-10-PCS | Mod: PBBFAC,,, | Performed by: INTERNAL MEDICINE

## 2019-09-11 PROCEDURE — 3077F SYST BP >= 140 MM HG: CPT | Mod: CPTII,S$GLB,, | Performed by: INTERNAL MEDICINE

## 2019-09-11 PROCEDURE — 99499 RISK ADDL DX/OHS AUDIT: ICD-10-PCS | Mod: S$GLB,,, | Performed by: INTERNAL MEDICINE

## 2019-09-11 PROCEDURE — 1101F PT FALLS ASSESS-DOCD LE1/YR: CPT | Mod: CPTII,S$GLB,, | Performed by: INTERNAL MEDICINE

## 2019-09-11 PROCEDURE — 3078F DIAST BP <80 MM HG: CPT | Mod: CPTII,S$GLB,, | Performed by: INTERNAL MEDICINE

## 2019-09-11 PROCEDURE — 93000 EKG 12-LEAD: ICD-10-PCS | Mod: S$GLB,,, | Performed by: INTERNAL MEDICINE

## 2019-09-11 PROCEDURE — 99999 PR PBB SHADOW E&M-EST. PATIENT-LVL III: CPT | Mod: PBBFAC,,, | Performed by: INTERNAL MEDICINE

## 2019-09-11 PROCEDURE — 93000 ELECTROCARDIOGRAM COMPLETE: CPT | Mod: S$GLB,,, | Performed by: INTERNAL MEDICINE

## 2019-09-11 PROCEDURE — 99214 OFFICE O/P EST MOD 30 MIN: CPT | Mod: S$GLB,,, | Performed by: INTERNAL MEDICINE

## 2019-09-11 RX ORDER — DOXAZOSIN 4 MG/1
4 TABLET ORAL NIGHTLY
Qty: 90 TABLET | Refills: 3 | Status: SHIPPED | OUTPATIENT
Start: 2019-09-11 | End: 2020-08-25

## 2019-09-11 RX ORDER — METOPROLOL SUCCINATE 25 MG/1
25 TABLET, EXTENDED RELEASE ORAL DAILY
Qty: 90 TABLET | Refills: 3 | Status: SHIPPED | OUTPATIENT
Start: 2019-09-11 | End: 2020-08-25

## 2019-09-11 NOTE — PROGRESS NOTES
Subjective:    Patient ID:  Barbara Rizo is a 79 y.o. female who presents for follow-up of Atrial Fibrillation      HPI     PAF - TALHA/CV 9/21/15, 1/30/19, 2/2/19 -  on amiodarone and eliquis, HTN, DM, HLD, MGUS, CRI - Cr 2.5     Admitted 1/28/19  Ms. Barbara Rizo is a 78 y.o. female with essential hypertension, hyperlipidemia (LDL 62.4 Jul 2018), type 2 diabetes mellitus (HbA1c 5.8% Jul 2018), CKD Stage 3, paroxysmal atrial fibrillation (VCJ5OP4-TDHf score 5) not on chronic anticoagulation, obesity (BMI 36.0), MGUS, and chronic gout who presents to [facility] ED with complaints of coughing for three days.  She started to have a non-productive cough, wheezing, and mild dyspnea three days ago which has steadily been worsening since onset.  She also complains of a subjective fever but otherwise denies any nausea, vomiting, chest pain, palpitations, pleurisy, nor any diaphoresis.  She denies any recent travel, hemoptysis, nor any lower extremity pain or swelling.  She does say that she's under a lot of stress recently with her ex- dying yesterday at [facility] due to complications from a heart valve replacement two weeks ago.  She does say that she may have had sick contacts while visiting her ex-.  Her appetite has been poor but she denies any weight loss.     While at her ENT's appointment today she decided to schedule an appointment with her PCP to evaluate her upper airway complaints.  She was unable to see her regular PCP, Dr. Paras Oro, but was able to be seen by another physician who became concerned when she was noted to be in AFib with RVR on EKG.  He recommended EMS transportation to the ED but she refused and decided to drive herself.  Her cardiologist is Dr. Amauri Lomas.     Pt admitted to ICU with afib rvr on amiodarone infusion. Pt with elevated HR  and after TALHA done patient became very agitated and anxious. Ativan 1mg IV given and symptoms got worse. HR up into  the 170s and O2 sats dropped to 77%. Placed on NRB. Improved O2 sats. 5mg IV haldol given as patient was trying to get out of bed and pulling oxygen mask off. Cardizem 10mg Iv given with improved HR to 120s-130s. xopenex scheduled Q 8 hours. Changed to zosyn with temp 102F.   1/30- successful cardioversion, post procedure confused and pulling oxygen off, desaturation, constant re-direction, monitored in ICU overnight. Changed to oral amiodarone. eliquis for anticoagulation. Holding parameters on BP meds. IV steroids, nebs and antibiotic for probable lower resp infection. IV lasix as Bp tolerates.   1/31- HR remain under control NSR 60-70s.On amiodarone, metoprolol and eliquis.  Resp status improved and weaned oxygen. Steroids changed to oral route. DC zosyn and switch to augmentin. Add acapella to nebs. Cr increased from baseline (1.5-1.7). Gentle IVF and monitor with repeat BMP later, was stable,. PT,OT consulted for dc planning. Weaning oxygen. PT/OT consulted and rec H/H without equipment. The patient was transferred to the floor on 1/31. Nephrology was consulted for worsening renal function.      2/2- pt transferred with afib rvr. Successful cardioversion again . Continued oral amiodarone and BB.  still has aggressive cough and URI symptoms. On mucolytic and antibiotic.   2/3- HR 50-60s sinus. Cr sightly higher, sodium 128. BNP 1500. Lasix x one dose. Bringing up more mucus. Steroids weaned 20mg. Still faint wheezes on exam.  Patient had worsening ARF on CKD 3, keeped on george and monitor,nephrology was following.reconsulted PT,OT.fley was removed latera nd she had improvement in ARF.  Changed diet for low slat diet duo to hyponatremia with improvement.  She did well with PT,OT.  Her wheezing and respiratory status is improved,  Patient has been discharged home with HH and PO Abx and OAC and amiodarone and follow up with PCP,nephrology and cardiology in next few days.      1-29:  Admitted with AFib with  RVR  1-30:  Underwent TALHA/DC cardioversion successfully  2-1 Back in A-fib 120s. SOB  2-3 Still coughing and SOB. NSR 60      2/2/19 CV - 360J converted A-fib to sinus bradycardia 55. Change amiodarone to po. Ok for telemetry     Echo 1/29/19  · TDs  · Normal left ventricular systolic function. The estimated ejection fraction is 55% * specificity decreased secondary to tachyarrhythmia  · Indeterminate left ventricular diastolic function.  · Severe left atrial enlargement.  · Mild-to-moderate mitral regurgitation.  · Intermediate central venous pressure (8 mm Hg).  · The estimated PA systolic pressure is 52 mm Hg            Pulmonary hypertension present.     2/13/19 Less SOB since discharge  EKG NSR with PACs 65   Decrease amiodarone 200 qd  OV 2 months - will discuss changing amiodarone to flecainide at that time     4/17/19 Denies CP or SOB  EKG sinus bradycardia 44  HR mostly runs 40-50 at home  Change metoprolol 100 qd to toprol XL 50 qd - may decrease further if bradycardia persists  Discussed alternative AAD - given her repeated episodes of PAF we are both in agreement to stay on amiodarone for now  OV 1 month with TSH, CMP, EKG     5/27/19 HR improved to the low 50s  Has held eliquis the last 2 days due to bleeding from a recent skin CA removal  Denies CP or SOB    Denies CP or SOB  Some LE edema  EKG sinus bradycardia 46 NSSTT changes    Review of Systems   Constitution: Negative for decreased appetite.   HENT: Negative for ear discharge.    Eyes: Negative for blurred vision.   Respiratory: Negative for hemoptysis.    Endocrine: Negative for polyphagia.   Hematologic/Lymphatic: Negative for adenopathy.   Skin: Negative for color change.   Musculoskeletal: Negative for joint swelling.   Genitourinary: Negative for bladder incontinence.   Neurological: Negative for brief paralysis.   Psychiatric/Behavioral: Negative for hallucinations.   Allergic/Immunologic: Negative for hives.        Objective:    Physical  Exam   Constitutional: She is oriented to person, place, and time. She appears well-developed and well-nourished.   HENT:   Head: Normocephalic and atraumatic.   Eyes: Pupils are equal, round, and reactive to light. Conjunctivae are normal.   Neck: Normal range of motion. Neck supple.   Cardiovascular: Normal rate, normal heart sounds and intact distal pulses.   Pulmonary/Chest: Effort normal and breath sounds normal.   Abdominal: Soft. Bowel sounds are normal.   Musculoskeletal: Normal range of motion.   Neurological: She is alert and oriented to person, place, and time.   Skin: Skin is warm and dry.         Assessment:       1. Paroxysmal atrial fibrillation    2. Mixed hyperlipidemia    3. Essential hypertension    4. Chronic diastolic CHF (congestive heart failure)         Plan:       Stop norvasc with LE edema  Add cardura 4 mg qd  Decrease toprol 25 qd with bradycardia  OV 3 months with CMP, TSH

## 2019-09-12 ENCOUNTER — TELEPHONE (OUTPATIENT)
Dept: CARDIOLOGY | Facility: CLINIC | Age: 79
End: 2019-09-12

## 2019-09-12 NOTE — TELEPHONE ENCOUNTER
Spoke with Patient and she will reach out to her Pharmacy to see as to why her co pay is higher than $30 that she has paid in the past for the same prescription generic Eliquis.  Patient will contact office back to see where the problem is.  Maybe trying to give the patient name brand versus generic?

## 2019-09-23 ENCOUNTER — LAB VISIT (OUTPATIENT)
Dept: LAB | Facility: HOSPITAL | Age: 79
End: 2019-09-23
Attending: INTERNAL MEDICINE
Payer: MEDICARE

## 2019-09-23 DIAGNOSIS — D47.2 MGUS (MONOCLONAL GAMMOPATHY OF UNKNOWN SIGNIFICANCE): Chronic | ICD-10-CM

## 2019-09-23 DIAGNOSIS — N18.30 STAGE 3 CHRONIC KIDNEY DISEASE: ICD-10-CM

## 2019-09-23 LAB
ALBUMIN SERPL BCP-MCNC: 3.4 G/DL (ref 3.5–5.2)
ALBUMIN SERPL BCP-MCNC: 3.4 G/DL (ref 3.5–5.2)
ALP SERPL-CCNC: 78 U/L (ref 55–135)
ALP SERPL-CCNC: 78 U/L (ref 55–135)
ALT SERPL W/O P-5'-P-CCNC: 14 U/L (ref 10–44)
ALT SERPL W/O P-5'-P-CCNC: 14 U/L (ref 10–44)
ANION GAP SERPL CALC-SCNC: 7 MMOL/L (ref 8–16)
ANION GAP SERPL CALC-SCNC: 7 MMOL/L (ref 8–16)
AST SERPL-CCNC: 12 U/L (ref 10–40)
AST SERPL-CCNC: 12 U/L (ref 10–40)
BASOPHILS # BLD AUTO: 0.02 K/UL (ref 0–0.2)
BASOPHILS # BLD AUTO: 0.02 K/UL (ref 0–0.2)
BASOPHILS NFR BLD: 0.4 % (ref 0–1.9)
BASOPHILS NFR BLD: 0.4 % (ref 0–1.9)
BILIRUB SERPL-MCNC: 0.6 MG/DL (ref 0.1–1)
BILIRUB SERPL-MCNC: 0.6 MG/DL (ref 0.1–1)
BUN SERPL-MCNC: 24 MG/DL (ref 8–23)
BUN SERPL-MCNC: 24 MG/DL (ref 8–23)
CALCIUM SERPL-MCNC: 9.2 MG/DL (ref 8.7–10.5)
CALCIUM SERPL-MCNC: 9.2 MG/DL (ref 8.7–10.5)
CHLORIDE SERPL-SCNC: 100 MMOL/L (ref 95–110)
CHLORIDE SERPL-SCNC: 100 MMOL/L (ref 95–110)
CO2 SERPL-SCNC: 30 MMOL/L (ref 23–29)
CO2 SERPL-SCNC: 30 MMOL/L (ref 23–29)
CREAT SERPL-MCNC: 2.1 MG/DL (ref 0.5–1.4)
CREAT SERPL-MCNC: 2.1 MG/DL (ref 0.5–1.4)
DIFFERENTIAL METHOD: ABNORMAL
DIFFERENTIAL METHOD: ABNORMAL
EOSINOPHIL # BLD AUTO: 0.1 K/UL (ref 0–0.5)
EOSINOPHIL # BLD AUTO: 0.1 K/UL (ref 0–0.5)
EOSINOPHIL NFR BLD: 2.2 % (ref 0–8)
EOSINOPHIL NFR BLD: 2.2 % (ref 0–8)
ERYTHROCYTE [DISTWIDTH] IN BLOOD BY AUTOMATED COUNT: 14.1 % (ref 11.5–14.5)
ERYTHROCYTE [DISTWIDTH] IN BLOOD BY AUTOMATED COUNT: 14.1 % (ref 11.5–14.5)
EST. GFR  (AFRICAN AMERICAN): 25 ML/MIN/1.73 M^2
EST. GFR  (AFRICAN AMERICAN): 25 ML/MIN/1.73 M^2
EST. GFR  (NON AFRICAN AMERICAN): 22 ML/MIN/1.73 M^2
EST. GFR  (NON AFRICAN AMERICAN): 22 ML/MIN/1.73 M^2
GLUCOSE SERPL-MCNC: 115 MG/DL (ref 70–110)
GLUCOSE SERPL-MCNC: 115 MG/DL (ref 70–110)
HCT VFR BLD AUTO: 42.6 % (ref 37–48.5)
HCT VFR BLD AUTO: 42.6 % (ref 37–48.5)
HGB BLD-MCNC: 13.6 G/DL (ref 12–16)
HGB BLD-MCNC: 13.6 G/DL (ref 12–16)
LYMPHOCYTES # BLD AUTO: 1 K/UL (ref 1–4.8)
LYMPHOCYTES # BLD AUTO: 1 K/UL (ref 1–4.8)
LYMPHOCYTES NFR BLD: 17.5 % (ref 18–48)
LYMPHOCYTES NFR BLD: 17.5 % (ref 18–48)
MCH RBC QN AUTO: 30.2 PG (ref 27–31)
MCH RBC QN AUTO: 30.2 PG (ref 27–31)
MCHC RBC AUTO-ENTMCNC: 31.9 G/DL (ref 32–36)
MCHC RBC AUTO-ENTMCNC: 31.9 G/DL (ref 32–36)
MCV RBC AUTO: 95 FL (ref 82–98)
MCV RBC AUTO: 95 FL (ref 82–98)
MONOCYTES # BLD AUTO: 0.3 K/UL (ref 0.3–1)
MONOCYTES # BLD AUTO: 0.3 K/UL (ref 0.3–1)
MONOCYTES NFR BLD: 5.7 % (ref 4–15)
MONOCYTES NFR BLD: 5.7 % (ref 4–15)
NEUTROPHILS # BLD AUTO: 4 K/UL (ref 1.8–7.7)
NEUTROPHILS # BLD AUTO: 4 K/UL (ref 1.8–7.7)
NEUTROPHILS NFR BLD: 74.2 % (ref 38–73)
NEUTROPHILS NFR BLD: 74.2 % (ref 38–73)
PLATELET # BLD AUTO: 175 K/UL (ref 150–350)
PLATELET # BLD AUTO: 175 K/UL (ref 150–350)
PMV BLD AUTO: 10.5 FL (ref 9.2–12.9)
PMV BLD AUTO: 10.5 FL (ref 9.2–12.9)
POTASSIUM SERPL-SCNC: 4.5 MMOL/L (ref 3.5–5.1)
POTASSIUM SERPL-SCNC: 4.5 MMOL/L (ref 3.5–5.1)
PROT SERPL-MCNC: 7.1 G/DL (ref 6–8.4)
PROT SERPL-MCNC: 7.1 G/DL (ref 6–8.4)
RBC # BLD AUTO: 4.51 M/UL (ref 4–5.4)
RBC # BLD AUTO: 4.51 M/UL (ref 4–5.4)
SODIUM SERPL-SCNC: 137 MMOL/L (ref 136–145)
SODIUM SERPL-SCNC: 137 MMOL/L (ref 136–145)
WBC # BLD AUTO: 5.43 K/UL (ref 3.9–12.7)
WBC # BLD AUTO: 5.43 K/UL (ref 3.9–12.7)

## 2019-09-23 PROCEDURE — 85025 COMPLETE CBC W/AUTO DIFF WBC: CPT

## 2019-09-23 PROCEDURE — 84165 PROTEIN E-PHORESIS SERUM: CPT

## 2019-09-23 PROCEDURE — 80053 COMPREHEN METABOLIC PANEL: CPT

## 2019-09-23 PROCEDURE — 83520 IMMUNOASSAY QUANT NOS NONAB: CPT | Mod: 59

## 2019-09-23 PROCEDURE — 36415 COLL VENOUS BLD VENIPUNCTURE: CPT | Mod: PN

## 2019-09-23 PROCEDURE — 84165 PROTEIN E-PHORESIS SERUM: CPT | Mod: 26,,, | Performed by: PATHOLOGY

## 2019-09-23 PROCEDURE — 84165 PATHOLOGIST INTERPRETATION SPE: ICD-10-PCS | Mod: 26,,, | Performed by: PATHOLOGY

## 2019-09-24 LAB
ALBUMIN SERPL ELPH-MCNC: 3.44 G/DL (ref 3.35–5.55)
ALBUMIN SERPL ELPH-MCNC: 3.44 G/DL (ref 3.35–5.55)
ALPHA1 GLOB SERPL ELPH-MCNC: 0.32 G/DL (ref 0.17–0.41)
ALPHA1 GLOB SERPL ELPH-MCNC: 0.32 G/DL (ref 0.17–0.41)
ALPHA2 GLOB SERPL ELPH-MCNC: 0.88 G/DL (ref 0.43–0.99)
ALPHA2 GLOB SERPL ELPH-MCNC: 0.88 G/DL (ref 0.43–0.99)
B-GLOBULIN SERPL ELPH-MCNC: 0.82 G/DL (ref 0.5–1.1)
B-GLOBULIN SERPL ELPH-MCNC: 0.82 G/DL (ref 0.5–1.1)
GAMMA GLOB SERPL ELPH-MCNC: 1.24 G/DL (ref 0.67–1.58)
GAMMA GLOB SERPL ELPH-MCNC: 1.24 G/DL (ref 0.67–1.58)
KAPPA LC SER QL IA: 6.71 MG/DL (ref 0.33–1.94)
KAPPA LC SER QL IA: 6.71 MG/DL (ref 0.33–1.94)
KAPPA LC/LAMBDA SER IA: 1.95 (ref 0.26–1.65)
KAPPA LC/LAMBDA SER IA: 1.95 (ref 0.26–1.65)
LAMBDA LC SER QL IA: 3.44 MG/DL (ref 0.57–2.63)
LAMBDA LC SER QL IA: 3.44 MG/DL (ref 0.57–2.63)
PATHOLOGIST INTERPRETATION SPE: NORMAL
PROT SERPL-MCNC: 6.7 G/DL (ref 6–8.4)
PROT SERPL-MCNC: 6.7 G/DL (ref 6–8.4)

## 2019-09-30 ENCOUNTER — OFFICE VISIT (OUTPATIENT)
Dept: HEMATOLOGY/ONCOLOGY | Facility: CLINIC | Age: 79
End: 2019-09-30
Payer: MEDICARE

## 2019-09-30 VITALS
WEIGHT: 180.75 LBS | BODY MASS INDEX: 33.26 KG/M2 | DIASTOLIC BLOOD PRESSURE: 65 MMHG | HEIGHT: 62 IN | TEMPERATURE: 99 F | SYSTOLIC BLOOD PRESSURE: 138 MMHG | OXYGEN SATURATION: 94 % | HEART RATE: 46 BPM

## 2019-09-30 DIAGNOSIS — N18.30 STAGE 3 CHRONIC KIDNEY DISEASE: ICD-10-CM

## 2019-09-30 DIAGNOSIS — D47.2 MGUS (MONOCLONAL GAMMOPATHY OF UNKNOWN SIGNIFICANCE): Primary | ICD-10-CM

## 2019-09-30 PROCEDURE — 99999 PR PBB SHADOW E&M-EST. PATIENT-LVL IV: ICD-10-PCS | Mod: PBBFAC,,, | Performed by: INTERNAL MEDICINE

## 2019-09-30 PROCEDURE — 99214 OFFICE O/P EST MOD 30 MIN: CPT | Mod: S$GLB,,, | Performed by: INTERNAL MEDICINE

## 2019-09-30 PROCEDURE — 99999 PR PBB SHADOW E&M-EST. PATIENT-LVL IV: CPT | Mod: PBBFAC,,, | Performed by: INTERNAL MEDICINE

## 2019-09-30 PROCEDURE — 99499 UNLISTED E&M SERVICE: CPT | Mod: S$GLB,,, | Performed by: INTERNAL MEDICINE

## 2019-09-30 PROCEDURE — 3075F PR MOST RECENT SYSTOLIC BLOOD PRESS GE 130-139MM HG: ICD-10-PCS | Mod: CPTII,S$GLB,, | Performed by: INTERNAL MEDICINE

## 2019-09-30 PROCEDURE — 99214 PR OFFICE/OUTPT VISIT, EST, LEVL IV, 30-39 MIN: ICD-10-PCS | Mod: S$GLB,,, | Performed by: INTERNAL MEDICINE

## 2019-09-30 PROCEDURE — 3075F SYST BP GE 130 - 139MM HG: CPT | Mod: CPTII,S$GLB,, | Performed by: INTERNAL MEDICINE

## 2019-09-30 PROCEDURE — 3078F PR MOST RECENT DIASTOLIC BLOOD PRESSURE < 80 MM HG: ICD-10-PCS | Mod: CPTII,S$GLB,, | Performed by: INTERNAL MEDICINE

## 2019-09-30 PROCEDURE — 3078F DIAST BP <80 MM HG: CPT | Mod: CPTII,S$GLB,, | Performed by: INTERNAL MEDICINE

## 2019-09-30 PROCEDURE — 1101F PT FALLS ASSESS-DOCD LE1/YR: CPT | Mod: CPTII,S$GLB,, | Performed by: INTERNAL MEDICINE

## 2019-09-30 PROCEDURE — 99499 RISK ADDL DX/OHS AUDIT: ICD-10-PCS | Mod: S$GLB,,, | Performed by: INTERNAL MEDICINE

## 2019-09-30 PROCEDURE — 1101F PR PT FALLS ASSESS DOC 0-1 FALLS W/OUT INJ PAST YR: ICD-10-PCS | Mod: CPTII,S$GLB,, | Performed by: INTERNAL MEDICINE

## 2019-09-30 RX ORDER — ALLOPURINOL 100 MG/1
TABLET ORAL
Status: ON HOLD | COMMUNITY
Start: 2019-09-23 | End: 2020-10-01 | Stop reason: SDUPTHER

## 2019-09-30 NOTE — PROGRESS NOTES
Subjective:       Patient ID: Barbara Rizo is a 79 y.o. female.    Chief Complaint: Follow-up         Diagnosis: MGUS    79-year-old with medical history of CKD  stage III, hypertension, osteopenia, vitamin-D deficiency, type 2 diabetes mellitus seen today for MGUS.. . She is followed by Dr. Boyle for CKD stage 3. .  Appetite and weight stable.  No shortness of breath, chest pain. No bony pain. No prior history of anemia.  Patient reports history of hemochromatosis for which she has undergone phlebotomy in  remote past.  No melena, hematochezia or change in bowel habits.  No fatigue, lightheadedness or shortness of breath.  Laboratory studies from 03/22/2018 from outside facility reveal abnormal SPEP reveals elevation of beta 2 globulins. Serum immunofixation reveals a faint IgG lambda monoclonal immunoglobulin.  Biochemical profile reveals a BUN of 33 creatinine of 1.64 mg/dL calcium of 9.9 mg/dL albumin of 4.7    She was recently hospitalized with  AFib with rapid ventricular response on January 28th and she underwent TALHA with DC cardioversion which was successful.She was also found to have acute renal failure on CKD with peak BUN/Cr 63/4.0.   She was also treated for bronchitis    Today, she is on doing well  No new issues  Appetite and weight  stable  No CP/cough/SOB  No recent infections      CBC reveals wbc 5430/mm3   Hb 13.6 g/dl Hct 43% plt 175k.    SPEP 9/23/2019 - no paraprotein bands    Biochemical profile reveals a BUN of 24 creatinine of 2.1 mg/dL calcium of 9.2mg/dL albumin of 3.4      Past Medical History:   Diagnosis Date    A-fib     Diabetes mellitus type II     Fatty liver     GERD (gastroesophageal reflux disease)     Hemochromatosis     Hyperlipidemia     Hypertension     Macular degeneration     Vaginal delivery     x2    Vitamin D deficiency disease     Wears partial dentures     upper removable bridge       Past Surgical History:   Procedure Laterality Date    BREAST BIOPSY    "   CHOLECYSTECTOMY  08/2015    EYE SURGERY      Cat Ext  OU    HYSTERECTOMY      partial due to uterine fibroids    TOTAL KNEE ARTHROPLASTY Right 2015    left knee done 5/2013       Review of Systems   Constitutional: Negative for appetite change, fatigue, fever and unexpected weight change.   HENT: Negative for mouth sores.    Eyes: Negative for visual disturbance.   Respiratory: Negative for cough and shortness of breath.    Cardiovascular: Negative for chest pain.   Gastrointestinal: Negative for abdominal pain and diarrhea.   Genitourinary: Negative for frequency.   Musculoskeletal: Negative for back pain.   Skin: Negative for rash.   Neurological: Negative for headaches.   Hematological: Negative for adenopathy.   Psychiatric/Behavioral: The patient is not nervous/anxious.        Objective:       Vitals:    09/30/19 1324   BP: 138/65   BP Location: Left arm   Patient Position: Sitting   BP Method: Medium (Automatic)   Pulse: (!) 46   Temp: 98.8 °F (37.1 °C)   TempSrc: Oral   SpO2: (!) 94%   Weight: 82 kg (180 lb 12.4 oz)   Height: 5' 2" (1.575 m)       Physical Exam   Constitutional: She is oriented to person, place, and time. She appears well-developed and well-nourished.   On nasal cannula 02   HENT:   Head: Normocephalic.   Mouth/Throat: Oropharynx is clear and moist. No oropharyngeal exudate.   Eyes: Conjunctivae and lids are normal. No scleral icterus.   Neck: Normal range of motion. Neck supple. No thyromegaly present.   Cardiovascular: Normal rate and regular rhythm.   Murmur heard.  Pulmonary/Chest: Breath sounds normal. She has no wheezes. She has no rales.   Abdominal: Soft. Bowel sounds are normal. There is no hepatosplenomegaly. There is no tenderness. There is no rebound and no guarding.   Musculoskeletal: Normal range of motion. She exhibits edema. She exhibits no tenderness.   Neurological: She is alert and oriented to person, place, and time. No cranial nerve deficit. Coordination normal. "   Skin: Skin is warm and dry. No ecchymosis, no petechiae and no rash noted. No erythema.   Psychiatric: She has a normal mood and affect.       Lab Results   Component Value Date    WBC 5.43 09/23/2019    WBC 5.43 09/23/2019    HGB 13.6 09/23/2019    HGB 13.6 09/23/2019    HCT 42.6 09/23/2019    HCT 42.6 09/23/2019    MCV 95 09/23/2019    MCV 95 09/23/2019     09/23/2019     09/23/2019         Results for FAISAL ORNELAS (MRN [id number]) as of 6/1/2019 13:16   Ref. Range 10/8/2018 09:04 2/11/2019 15:39 5/17/2019 09:58   [place] Free Light Chains Latest Ref Range: 0.33 - 1.94 mg/dL 5.54 (H) 1.90 3.99 (H)   Lambda Free Light Chains Latest Ref Range: 0.57 - 2.63 mg/dL 2.96 (H) 1.77 2.58   Kappa/Lambda FLC Ratio Latest Ref Range: 0.26 - 1.65  1.87 (H) 1.07 1.55         Results for FAISAL ORNELAS (MRN [id number]) as of 2/18/2019 09:11   Ref. Range 2/11/2019 15:40   IgG - Serum Latest Ref Range: 650 - 1600 mg/dL 931   IgM Latest Ref Range: 50 - 300 mg/dL 22 (L)   IgA Latest Ref Range: 40 - 350 mg/dL 256     SPEP [id number]   Normal total protein. A paraprotein band is present in gamma = 0.20   g/dL; previously 0.40 g/dL.        SPEP 9/23/2019 -no monoclonal peaks    UPEP- no monoclonal peaks  Assessment:       1. MGUS (monoclonal gammopathy of unknown significance)    2. Stage 3 chronic kidney disease        Plan:       1-2 Pt clinically stable  Hb 13.6  g/dl-stable   SPEP -no paraproteins   SPEP 10/2018 - A paraprotein band is present in gamma = 0.20   g/dL; previously 0.40 g/dL  Pt no longer has M spike since 2018   Serum FLC reveals mildly elevated kappa    BUN/Cr  24/2.1   (  Cr  1. 6mg/dl 5/2018)   UPEP- no monoclonal peaks   No signs of disease progression     Follow-up in  6 mo with cbc,cmp, SPEP,FLC, Quant Ig, B-2 microglobulin prior to f/u       Advance Care Planning     Power of   I initiated the process of advance care planning today and explained the importance of this process to the  patient.  I introduced the concept of advance directives to the patient, as well. Then the patient received detailed information about the importance of designating a Health Care Power of  (HCPOA). She was also instructed to communicate with this person about their wishes for future healthcare, should she become sick and lose decision-making capacity. The patient has previously appointed a HCPOA. After our discussion, the patient has decided to complete a HCPOA and has appointed her daughter and NAME:Tamar Arechiga 504  [phone] I spent a total time of 16 minutes discussing this issue with the patient.    Advance Care Planning     Living Will  During this visit, I engaged the patient in the advance care planning process.  The patient and I reviewed the role for advance directives and their purpose in directing future healthcare if the patient's unable to speak for him/herself.  At this point in time, the patient does have full decision-making capacity.  We discussed different extreme health states that she could experience, and reviewed what kind of medical care she would want in those situations.  The patient communicated that if she were comatose and had little chance of a meaningful recovery, she would not want machines/life-sustaining treatments used.  The patient has not completed a living will to reflect these preferences.  The patient  has already designated a healthcare power of  to make decisions on her behalf.   I spent a total of 16  minutes engaging the patient in this advance care planning discussion.                 Cc: Nargis Boyle M.D.

## 2019-10-07 ENCOUNTER — INFUSION (OUTPATIENT)
Dept: INFUSION THERAPY | Facility: HOSPITAL | Age: 79
End: 2019-10-07
Attending: INTERNAL MEDICINE
Payer: MEDICARE

## 2019-10-07 VITALS
TEMPERATURE: 98 F | OXYGEN SATURATION: 100 % | DIASTOLIC BLOOD PRESSURE: 68 MMHG | SYSTOLIC BLOOD PRESSURE: 135 MMHG | RESPIRATION RATE: 17 BRPM | HEART RATE: 86 BPM

## 2019-10-07 DIAGNOSIS — M17.10 PRIMARY LOCALIZED OSTEOARTHROSIS, LOWER LEG: Primary | ICD-10-CM

## 2019-10-07 PROCEDURE — 63600175 PHARM REV CODE 636 W HCPCS: Mod: JG | Performed by: INTERNAL MEDICINE

## 2019-10-07 PROCEDURE — 96372 THER/PROPH/DIAG INJ SC/IM: CPT

## 2019-10-07 RX ADMIN — DENOSUMAB 60 MG: 60 INJECTION SUBCUTANEOUS at 01:10

## 2019-10-07 NOTE — PLAN OF CARE
Pt arrived to unit. No complaints voiced. VSS. Pt denies recent dental work in the past 3 months. Pt taking calcium and vitamin d. Redness noted to left sclera. Pt states she woke up with it this morning and is using Visine. RN encouraged pt to follow up with PCP. Pt verbalized understanding. Tolerated Prolia. No reactions noted. Pt given next appt. Pt ambulated off unit, no distress noted.

## 2019-11-19 RX ORDER — AMLODIPINE BESYLATE 5 MG/1
TABLET ORAL
Qty: 90 TABLET | Refills: 3 | Status: SHIPPED | OUTPATIENT
Start: 2019-11-19 | End: 2020-06-02

## 2019-11-21 DIAGNOSIS — R60.0 BILATERAL LEG EDEMA: ICD-10-CM

## 2019-11-21 RX ORDER — FUROSEMIDE 20 MG/1
TABLET ORAL
Qty: 90 TABLET | Refills: 0 | Status: SHIPPED | OUTPATIENT
Start: 2019-11-21 | End: 2020-02-19

## 2019-12-09 ENCOUNTER — PATIENT OUTREACH (OUTPATIENT)
Dept: ADMINISTRATIVE | Facility: OTHER | Age: 79
End: 2019-12-09

## 2019-12-10 ENCOUNTER — TELEPHONE (OUTPATIENT)
Dept: FAMILY MEDICINE | Facility: CLINIC | Age: 79
End: 2019-12-10

## 2019-12-10 NOTE — TELEPHONE ENCOUNTER
----- Message from Kayleigh Hester sent at 12/10/2019  9:20 AM CST -----  Contact: self [phone]  .Type: Patient Call Back    Who called: self    What is the request in detail: Pt stated that she missed a call from staff     Can the clinic reply by [facility]? Call back     Would the patient rather a call back or a response via [facility]?  call back     Best call back number: [phone]

## 2019-12-10 NOTE — TELEPHONE ENCOUNTER
I spoke to the pt and advised that I could not see where anyone from Dr. Oro's office has called her. She will listen to her message to see what the call was regarding.

## 2019-12-11 ENCOUNTER — LAB VISIT (OUTPATIENT)
Dept: LAB | Facility: HOSPITAL | Age: 79
End: 2019-12-11
Attending: INTERNAL MEDICINE
Payer: MEDICARE

## 2019-12-11 ENCOUNTER — OFFICE VISIT (OUTPATIENT)
Dept: CARDIOLOGY | Facility: CLINIC | Age: 79
End: 2019-12-11
Payer: MEDICARE

## 2019-12-11 VITALS
SYSTOLIC BLOOD PRESSURE: 183 MMHG | DIASTOLIC BLOOD PRESSURE: 76 MMHG | WEIGHT: 183 LBS | OXYGEN SATURATION: 92 % | HEART RATE: 44 BPM | HEIGHT: 62 IN | BODY MASS INDEX: 33.68 KG/M2

## 2019-12-11 DIAGNOSIS — E78.2 MIXED HYPERLIPIDEMIA: Chronic | ICD-10-CM

## 2019-12-11 DIAGNOSIS — I10 ESSENTIAL HYPERTENSION: Primary | Chronic | ICD-10-CM

## 2019-12-11 DIAGNOSIS — I10 ESSENTIAL HYPERTENSION: Chronic | ICD-10-CM

## 2019-12-11 DIAGNOSIS — I10 HYPERTENSION: ICD-10-CM

## 2019-12-11 DIAGNOSIS — I48.0 PAROXYSMAL ATRIAL FIBRILLATION: Chronic | ICD-10-CM

## 2019-12-11 DIAGNOSIS — I50.32 CHRONIC DIASTOLIC CHF (CONGESTIVE HEART FAILURE): ICD-10-CM

## 2019-12-11 LAB
ALBUMIN SERPL BCP-MCNC: 3.8 G/DL (ref 3.5–5.2)
ALP SERPL-CCNC: 90 U/L (ref 55–135)
ALT SERPL W/O P-5'-P-CCNC: 18 U/L (ref 10–44)
ANION GAP SERPL CALC-SCNC: 10 MMOL/L (ref 8–16)
AST SERPL-CCNC: 14 U/L (ref 10–40)
BILIRUB SERPL-MCNC: 0.9 MG/DL (ref 0.1–1)
BUN SERPL-MCNC: 25 MG/DL (ref 8–23)
CALCIUM SERPL-MCNC: 9.7 MG/DL (ref 8.7–10.5)
CHLORIDE SERPL-SCNC: 104 MMOL/L (ref 95–110)
CO2 SERPL-SCNC: 24 MMOL/L (ref 23–29)
CREAT SERPL-MCNC: 2.1 MG/DL (ref 0.5–1.4)
EST. GFR  (AFRICAN AMERICAN): 25 ML/MIN/1.73 M^2
EST. GFR  (NON AFRICAN AMERICAN): 22 ML/MIN/1.73 M^2
GLUCOSE SERPL-MCNC: 107 MG/DL (ref 70–110)
POTASSIUM SERPL-SCNC: 4.9 MMOL/L (ref 3.5–5.1)
PROT SERPL-MCNC: 8.2 G/DL (ref 6–8.4)
SODIUM SERPL-SCNC: 138 MMOL/L (ref 136–145)
TSH SERPL DL<=0.005 MIU/L-ACNC: 3.96 UIU/ML (ref 0.4–4)

## 2019-12-11 PROCEDURE — 80053 COMPREHEN METABOLIC PANEL: CPT

## 2019-12-11 PROCEDURE — 99499 RISK ADDL DX/OHS AUDIT: ICD-10-PCS | Mod: S$GLB,,, | Performed by: INTERNAL MEDICINE

## 2019-12-11 PROCEDURE — 1101F PT FALLS ASSESS-DOCD LE1/YR: CPT | Mod: CPTII,S$GLB,, | Performed by: INTERNAL MEDICINE

## 2019-12-11 PROCEDURE — 93000 EKG 12-LEAD: ICD-10-PCS | Mod: S$GLB,,, | Performed by: INTERNAL MEDICINE

## 2019-12-11 PROCEDURE — 1101F PR PT FALLS ASSESS DOC 0-1 FALLS W/OUT INJ PAST YR: ICD-10-PCS | Mod: CPTII,S$GLB,, | Performed by: INTERNAL MEDICINE

## 2019-12-11 PROCEDURE — 99999 PR PBB SHADOW E&M-EST. PATIENT-LVL III: CPT | Mod: PBBFAC,,, | Performed by: INTERNAL MEDICINE

## 2019-12-11 PROCEDURE — 84443 ASSAY THYROID STIM HORMONE: CPT

## 2019-12-11 PROCEDURE — 93000 ELECTROCARDIOGRAM COMPLETE: CPT | Mod: S$GLB,,, | Performed by: INTERNAL MEDICINE

## 2019-12-11 PROCEDURE — 99214 PR OFFICE/OUTPT VISIT, EST, LEVL IV, 30-39 MIN: ICD-10-PCS | Mod: S$GLB,,, | Performed by: INTERNAL MEDICINE

## 2019-12-11 PROCEDURE — 1159F PR MEDICATION LIST DOCUMENTED IN MEDICAL RECORD: ICD-10-PCS | Mod: S$GLB,,, | Performed by: INTERNAL MEDICINE

## 2019-12-11 PROCEDURE — 3077F SYST BP >= 140 MM HG: CPT | Mod: CPTII,S$GLB,, | Performed by: INTERNAL MEDICINE

## 2019-12-11 PROCEDURE — 3078F DIAST BP <80 MM HG: CPT | Mod: CPTII,S$GLB,, | Performed by: INTERNAL MEDICINE

## 2019-12-11 PROCEDURE — 99499 UNLISTED E&M SERVICE: CPT | Mod: S$GLB,,, | Performed by: INTERNAL MEDICINE

## 2019-12-11 PROCEDURE — 99999 PR PBB SHADOW E&M-EST. PATIENT-LVL III: ICD-10-PCS | Mod: PBBFAC,,, | Performed by: INTERNAL MEDICINE

## 2019-12-11 PROCEDURE — 3077F PR MOST RECENT SYSTOLIC BLOOD PRESSURE >= 140 MM HG: ICD-10-PCS | Mod: CPTII,S$GLB,, | Performed by: INTERNAL MEDICINE

## 2019-12-11 PROCEDURE — 3078F PR MOST RECENT DIASTOLIC BLOOD PRESSURE < 80 MM HG: ICD-10-PCS | Mod: CPTII,S$GLB,, | Performed by: INTERNAL MEDICINE

## 2019-12-11 PROCEDURE — 1126F AMNT PAIN NOTED NONE PRSNT: CPT | Mod: S$GLB,,, | Performed by: INTERNAL MEDICINE

## 2019-12-11 PROCEDURE — 36415 COLL VENOUS BLD VENIPUNCTURE: CPT

## 2019-12-11 PROCEDURE — 99214 OFFICE O/P EST MOD 30 MIN: CPT | Mod: S$GLB,,, | Performed by: INTERNAL MEDICINE

## 2019-12-11 PROCEDURE — 1126F PR PAIN SEVERITY QUANTIFIED, NO PAIN PRESENT: ICD-10-PCS | Mod: S$GLB,,, | Performed by: INTERNAL MEDICINE

## 2019-12-11 PROCEDURE — 1159F MED LIST DOCD IN RCRD: CPT | Mod: S$GLB,,, | Performed by: INTERNAL MEDICINE

## 2019-12-11 NOTE — PROGRESS NOTES
Subjective:    Patient ID:  Barbara Rizo is a 79 y.o. female who presents for follow-up of Atrial Fibrillation      HPI     PAF - TALHA/CV 9/21/15, 1/30/19, 2/2/19 -  on amiodarone and eliquis, HTN, DM, HLD, MGUS, CRI - Cr 2.5     Admitted 1/28/19  Ms. Barbara Rizo is a 78 y.o. female with essential hypertension, hyperlipidemia (LDL 62.4 Jul 2018), type 2 diabetes mellitus (HbA1c 5.8% Jul 2018), CKD Stage 3, paroxysmal atrial fibrillation ([id number] score 5) not on chronic anticoagulation, obesity (BMI 36.0), MGUS, and chronic gout who presents to [facility] ED with complaints of coughing for three days.  She started to have a non-productive cough, wheezing, and mild dyspnea three days ago which has steadily been worsening since onset.  She also complains of a subjective fever but otherwise denies any nausea, vomiting, chest pain, palpitations, pleurisy, nor any diaphoresis.  She denies any recent travel, hemoptysis, nor any lower extremity pain or swelling.  She does say that she's under a lot of stress recently with her ex- dying yesterday at [facility] due to complications from a heart valve replacement two weeks ago.  She does say that she may have had sick contacts while visiting her ex-.  Her appetite has been poor but she denies any weight loss.     While at her ENT's appointment today she decided to schedule an appointment with her PCP to evaluate her upper airway complaints.  She was unable to see her regular PCP, Dr. Paras Oro, but was able to be seen by another physician who became concerned when she was noted to be in AFib with RVR on EKG.  He recommended EMS transportation to the ED but she refused and decided to drive herself.  Her cardiologist is Dr. Amauri Lomas.     Pt admitted to ICU with afib rvr on amiodarone infusion. Pt with elevated HR  and after TALHA done patient became very agitated and anxious. Ativan 1mg IV given and symptoms got worse. HR up into  the 170s and O2 sats dropped to 77%. Placed on NRB. Improved O2 sats. 5mg IV haldol given as patient was trying to get out of bed and pulling oxygen mask off. Cardizem 10mg Iv given with improved HR to 120s-130s. xopenex scheduled Q 8 hours. Changed to zosyn with temp 102F.   1/30- successful cardioversion, post procedure confused and pulling oxygen off, desaturation, constant re-direction, monitored in ICU overnight. Changed to oral amiodarone. eliquis for anticoagulation. Holding parameters on BP meds. IV steroids, nebs and antibiotic for probable lower resp infection. IV lasix as Bp tolerates.   1/31- HR remain under control NSR 60-70s.On amiodarone, metoprolol and eliquis.  Resp status improved and weaned oxygen. Steroids changed to oral route. DC zosyn and switch to augmentin. Add acapella to nebs. Cr increased from baseline (1.5-1.7). Gentle IVF and monitor with repeat BMP later, was stable,. PT,OT consulted for dc planning. Weaning oxygen. PT/OT consulted and rec H/H without equipment. The patient was transferred to the floor on 1/31. Nephrology was consulted for worsening renal function.      2/2- pt transferred with afib rvr. Successful cardioversion again . Continued oral amiodarone and BB.  still has aggressive cough and URI symptoms. On mucolytic and antibiotic.   2/3- HR 50-60s sinus. Cr sightly higher, sodium 128. BNP 1500. Lasix x one dose. Bringing up more mucus. Steroids weaned 20mg. Still faint wheezes on exam.  Patient had worsening ARF on CKD 3, keeped on george and monitor,nephrology was following.reconsulted PT,OT.fley was removed latera nd she had improvement in ARF.  Changed diet for low slat diet duo to hyponatremia with improvement.  She did well with PT,OT.  Her wheezing and respiratory status is improved,  Patient has been discharged home with HH and PO Abx and OAC and amiodarone and follow up with PCP,nephrology and cardiology in next few days.      1-29:  Admitted with AFib with  RVR  1-30:  Underwent TALHA/DC cardioversion successfully  2-1 Back in A-fib 120s. SOB  2-3 Still coughing and SOB. NSR 60      2/2/19 CV - 360J converted A-fib to sinus bradycardia 55. Change amiodarone to po. Ok for telemetry     Echo 1/29/19  · TDs  · Normal left ventricular systolic function. The estimated ejection fraction is 55% * specificity decreased secondary to tachyarrhythmia  · Indeterminate left ventricular diastolic function.  · Severe left atrial enlargement.  · Mild-to-moderate mitral regurgitation.  · Intermediate central venous pressure (8 mm Hg).  · The estimated PA systolic pressure is 52 mm Hg            Pulmonary hypertension present.     2/13/19 Less SOB since discharge  EKG NSR with PACs 65   Decrease amiodarone 200 qd  OV 2 months - will discuss changing amiodarone to flecainide at that time     4/17/19 Denies CP or SOB  EKG sinus bradycardia 44  HR mostly runs 40-50 at home  Change metoprolol 100 qd to toprol XL 50 qd - may decrease further if bradycardia persists  Discussed alternative AAD - given her repeated episodes of PAF we are both in agreement to stay on amiodarone for now  OV 1 month with TSH, CMP, EKG     5/27/19 HR improved to the low 50s  Has held eliquis the last 2 days due to bleeding from a recent skin CA removal  Denies CP or SOB     9/11/19 Denies CP or SOB  Some LE edema  EKG sinus bradycardia 46 NSSTT changes  Stop norvasc with LE edema  Add cardura 4 mg qd  Decrease toprol 25 qd with bradycardia  OV 3 months with CMP, TSH    Labs 12/11/19  K 4.9  Cr 2.1 - same  LFT ok  TSH 3.5 (5/20/19) - today's pending    12/11/19 Denies CP or SOB  LE edema resolved after stopping norvasc  BP controlled by home readings  HR runs 45-50  EKG sinus bradycardia 47 1st degree AVB  Denies dizziness    Review of Systems   Constitution: Negative for decreased appetite.   HENT: Negative for ear discharge.    Eyes: Negative for blurred vision.   Respiratory: Negative for hemoptysis.    Endocrine:  Negative for polyphagia.   Hematologic/Lymphatic: Negative for adenopathy.   Skin: Negative for color change.   Musculoskeletal: Negative for joint swelling.   Genitourinary: Negative for bladder incontinence.   Neurological: Negative for brief paralysis.   Psychiatric/Behavioral: Negative for hallucinations.   Allergic/Immunologic: Negative for hives.        Objective:    Physical Exam   Constitutional: She is oriented to person, place, and time. She appears well-developed and well-nourished.   HENT:   Head: Normocephalic and atraumatic.   Eyes: Pupils are equal, round, and reactive to light. Conjunctivae are normal.   Neck: Normal range of motion. Neck supple.   Cardiovascular: Normal rate, normal heart sounds and intact distal pulses.   Pulmonary/Chest: Effort normal and breath sounds normal.   Abdominal: Soft. Bowel sounds are normal.   Musculoskeletal: Normal range of motion.   Neurological: She is alert and oriented to person, place, and time.   Skin: Skin is warm and dry.         Assessment:       1. Essential hypertension    2. Mixed hyperlipidemia    3. Paroxysmal atrial fibrillation         Plan:       Bradycardia well tolerated - will continue Rx  OV 6 months with echo

## 2019-12-23 DIAGNOSIS — F32.A DEPRESSION, UNSPECIFIED DEPRESSION TYPE: ICD-10-CM

## 2019-12-23 RX ORDER — FLUOXETINE 10 MG/1
10 CAPSULE ORAL DAILY
Qty: 90 CAPSULE | Refills: 1 | Status: SHIPPED | OUTPATIENT
Start: 2019-12-23 | End: 2020-08-31

## 2020-01-02 DIAGNOSIS — E11.22 TYPE 2 DIABETES MELLITUS WITH STAGE 3 CHRONIC KIDNEY DISEASE, WITHOUT LONG-TERM CURRENT USE OF INSULIN: ICD-10-CM

## 2020-01-02 DIAGNOSIS — N18.30 TYPE 2 DIABETES MELLITUS WITH STAGE 3 CHRONIC KIDNEY DISEASE, WITHOUT LONG-TERM CURRENT USE OF INSULIN: ICD-10-CM

## 2020-01-02 DIAGNOSIS — I10 ESSENTIAL HYPERTENSION: ICD-10-CM

## 2020-01-02 RX ORDER — ATORVASTATIN CALCIUM 40 MG/1
40 TABLET, FILM COATED ORAL DAILY
Qty: 90 TABLET | Refills: 1 | Status: SHIPPED | OUTPATIENT
Start: 2020-01-02 | End: 2020-06-26

## 2020-01-21 DIAGNOSIS — E83.119 HEMOCHROMATOSIS, UNSPECIFIED HEMOCHROMATOSIS TYPE: ICD-10-CM

## 2020-01-21 RX ORDER — FOLIC ACID 1 MG/1
1 TABLET ORAL DAILY
Qty: 90 TABLET | Refills: 1 | Status: SHIPPED | OUTPATIENT
Start: 2020-01-21 | End: 2020-07-20

## 2020-02-19 DIAGNOSIS — R60.0 BILATERAL LEG EDEMA: ICD-10-CM

## 2020-02-19 RX ORDER — FUROSEMIDE 20 MG/1
TABLET ORAL
Qty: 90 TABLET | Refills: 0 | Status: SHIPPED | OUTPATIENT
Start: 2020-02-19 | End: 2020-05-18

## 2020-04-16 ENCOUNTER — TELEPHONE (OUTPATIENT)
Dept: CARDIOLOGY | Facility: CLINIC | Age: 80
End: 2020-04-16

## 2020-04-16 NOTE — TELEPHONE ENCOUNTER
Spoke to pt to let her know Dr. Lomas didnt order her any lab work only an echo. Pt was ok with that. I schedule her appointment for the echo but couldn't for f/u with Dr. Lomas his schedule is not open yet.     ----- Message from Kayleigh Hester sent at 4/16/2020 10:22 AM CDT -----  Contact: self : [phone]  .Type: Lab    Caller is requesting to schedule their Lab appointment prior to annual appointment.    Order is not listed in EPIC.  Please enter order and contact patient to schedule.    Name of Caller self     Preferred Date and Time of Labs: anytime before june    [place] [facility] [place]    Would the patient rather a call back or a response via [facility]? Call back     Best Call Back Number: [phone]

## 2020-05-17 DIAGNOSIS — R60.0 BILATERAL LEG EDEMA: ICD-10-CM

## 2020-05-17 RX ORDER — AMIODARONE HYDROCHLORIDE 200 MG/1
TABLET ORAL
Qty: 90 TABLET | Refills: 3 | Status: ON HOLD | OUTPATIENT
Start: 2020-05-17 | End: 2021-04-25 | Stop reason: SDUPTHER

## 2020-05-18 DIAGNOSIS — R60.0 BILATERAL LEG EDEMA: ICD-10-CM

## 2020-05-18 RX ORDER — FUROSEMIDE 20 MG/1
TABLET ORAL
Qty: 45 TABLET | Refills: 0 | Status: SHIPPED | OUTPATIENT
Start: 2020-05-18 | End: 2020-05-18

## 2020-05-18 RX ORDER — FUROSEMIDE 20 MG/1
TABLET ORAL
Qty: 45 TABLET | Refills: 0 | OUTPATIENT
Start: 2020-05-18 | End: 2020-07-04

## 2020-05-20 ENCOUNTER — TELEPHONE (OUTPATIENT)
Dept: FAMILY MEDICINE | Facility: CLINIC | Age: 80
End: 2020-05-20

## 2020-05-20 ENCOUNTER — LAB VISIT (OUTPATIENT)
Dept: LAB | Facility: HOSPITAL | Age: 80
End: 2020-05-20
Attending: INTERNAL MEDICINE
Payer: MEDICARE

## 2020-05-20 DIAGNOSIS — D47.2 MGUS (MONOCLONAL GAMMOPATHY OF UNKNOWN SIGNIFICANCE): ICD-10-CM

## 2020-05-20 DIAGNOSIS — Z12.31 ENCOUNTER FOR SCREENING MAMMOGRAM FOR MALIGNANT NEOPLASM OF BREAST: ICD-10-CM

## 2020-05-20 DIAGNOSIS — N18.30 STAGE 3 CHRONIC KIDNEY DISEASE: ICD-10-CM

## 2020-05-20 DIAGNOSIS — Z12.39 BREAST CANCER SCREENING: Primary | ICD-10-CM

## 2020-05-20 LAB
ALBUMIN SERPL BCP-MCNC: 3.8 G/DL (ref 3.5–5.2)
ALP SERPL-CCNC: 75 U/L (ref 55–135)
ALT SERPL W/O P-5'-P-CCNC: 18 U/L (ref 10–44)
ANION GAP SERPL CALC-SCNC: 10 MMOL/L (ref 8–16)
AST SERPL-CCNC: 16 U/L (ref 10–40)
BASOPHILS # BLD AUTO: 0.04 K/UL (ref 0–0.2)
BASOPHILS NFR BLD: 0.6 % (ref 0–1.9)
BILIRUB SERPL-MCNC: 0.7 MG/DL (ref 0.1–1)
BUN SERPL-MCNC: 35 MG/DL (ref 8–23)
CALCIUM SERPL-MCNC: 9.4 MG/DL (ref 8.7–10.5)
CHLORIDE SERPL-SCNC: 102 MMOL/L (ref 95–110)
CO2 SERPL-SCNC: 25 MMOL/L (ref 23–29)
CREAT SERPL-MCNC: 2.4 MG/DL (ref 0.5–1.4)
DIFFERENTIAL METHOD: ABNORMAL
EOSINOPHIL # BLD AUTO: 0.1 K/UL (ref 0–0.5)
EOSINOPHIL NFR BLD: 2.2 % (ref 0–8)
ERYTHROCYTE [DISTWIDTH] IN BLOOD BY AUTOMATED COUNT: 13.9 % (ref 11.5–14.5)
EST. GFR  (AFRICAN AMERICAN): 21 ML/MIN/1.73 M^2
EST. GFR  (NON AFRICAN AMERICAN): 19 ML/MIN/1.73 M^2
GLUCOSE SERPL-MCNC: 112 MG/DL (ref 70–110)
HCT VFR BLD AUTO: 43.6 % (ref 37–48.5)
HGB BLD-MCNC: 13.9 G/DL (ref 12–16)
IMM GRANULOCYTES # BLD AUTO: 0.02 K/UL (ref 0–0.04)
IMM GRANULOCYTES NFR BLD AUTO: 0.3 % (ref 0–0.5)
LYMPHOCYTES # BLD AUTO: 1.2 K/UL (ref 1–4.8)
LYMPHOCYTES NFR BLD: 19.4 % (ref 18–48)
MCH RBC QN AUTO: 30.5 PG (ref 27–31)
MCHC RBC AUTO-ENTMCNC: 31.9 G/DL (ref 32–36)
MCV RBC AUTO: 96 FL (ref 82–98)
MONOCYTES # BLD AUTO: 0.4 K/UL (ref 0.3–1)
MONOCYTES NFR BLD: 6.1 % (ref 4–15)
NEUTROPHILS # BLD AUTO: 4.5 K/UL (ref 1.8–7.7)
NEUTROPHILS NFR BLD: 71.4 % (ref 38–73)
NRBC BLD-RTO: 0 /100 WBC
PLATELET # BLD AUTO: 162 K/UL (ref 150–350)
PMV BLD AUTO: 10.5 FL (ref 9.2–12.9)
POTASSIUM SERPL-SCNC: 4.9 MMOL/L (ref 3.5–5.1)
PROT SERPL-MCNC: 7.5 G/DL (ref 6–8.4)
RBC # BLD AUTO: 4.56 M/UL (ref 4–5.4)
SODIUM SERPL-SCNC: 137 MMOL/L (ref 136–145)
WBC # BLD AUTO: 6.24 K/UL (ref 3.9–12.7)

## 2020-05-20 PROCEDURE — 83520 IMMUNOASSAY QUANT NOS NONAB: CPT | Mod: 59

## 2020-05-20 PROCEDURE — 84165 PROTEIN E-PHORESIS SERUM: CPT

## 2020-05-20 PROCEDURE — 84165 PROTEIN E-PHORESIS SERUM: CPT | Mod: 26,,, | Performed by: PATHOLOGY

## 2020-05-20 PROCEDURE — 82784 ASSAY IGA/IGD/IGG/IGM EACH: CPT | Mod: 59

## 2020-05-20 PROCEDURE — 80053 COMPREHEN METABOLIC PANEL: CPT

## 2020-05-20 PROCEDURE — 84165 PATHOLOGIST INTERPRETATION SPE: ICD-10-PCS | Mod: 26,,, | Performed by: PATHOLOGY

## 2020-05-20 PROCEDURE — 86334 IMMUNOFIX E-PHORESIS SERUM: CPT | Mod: 26,,, | Performed by: PATHOLOGY

## 2020-05-20 PROCEDURE — 36415 COLL VENOUS BLD VENIPUNCTURE: CPT | Mod: PN

## 2020-05-20 PROCEDURE — 85025 COMPLETE CBC W/AUTO DIFF WBC: CPT

## 2020-05-20 PROCEDURE — 86334 IMMUNOFIX E-PHORESIS SERUM: CPT

## 2020-05-20 PROCEDURE — 86334 PATHOLOGIST INTERPRETATION IFE: ICD-10-PCS | Mod: 26,,, | Performed by: PATHOLOGY

## 2020-05-20 NOTE — TELEPHONE ENCOUNTER
----- Message from Maria E Lazo sent at 5/20/2020 10:01 AM CDT -----  Patient is needing a mammogram order. Thank you!

## 2020-05-21 LAB
IGA SERPL-MCNC: 332 MG/DL (ref 40–350)
IGG SERPL-MCNC: 1338 MG/DL (ref 650–1600)
IGM SERPL-MCNC: 31 MG/DL (ref 50–300)

## 2020-05-22 LAB
ALBUMIN SERPL ELPH-MCNC: 3.72 G/DL (ref 3.35–5.55)
ALPHA1 GLOB SERPL ELPH-MCNC: 0.27 G/DL (ref 0.17–0.41)
ALPHA2 GLOB SERPL ELPH-MCNC: 0.9 G/DL (ref 0.43–0.99)
B-GLOBULIN SERPL ELPH-MCNC: 0.74 G/DL (ref 0.5–1.1)
GAMMA GLOB SERPL ELPH-MCNC: 1.16 G/DL (ref 0.67–1.58)
INTERPRETATION SERPL IFE-IMP: NORMAL
KAPPA LC SER QL IA: 5.61 MG/DL (ref 0.33–1.94)
KAPPA LC/LAMBDA SER IA: 1.79 (ref 0.26–1.65)
LAMBDA LC SER QL IA: 3.14 MG/DL (ref 0.57–2.63)
PATHOLOGIST INTERPRETATION IFE: NORMAL
PATHOLOGIST INTERPRETATION SPE: NORMAL
PROT SERPL-MCNC: 6.8 G/DL (ref 6–8.4)

## 2020-05-26 ENCOUNTER — HOSPITAL ENCOUNTER (OUTPATIENT)
Dept: RADIOLOGY | Facility: HOSPITAL | Age: 80
Discharge: HOME | End: 2020-05-26
Attending: FAMILY MEDICINE
Payer: MEDICARE

## 2020-05-26 DIAGNOSIS — Z12.31 ENCOUNTER FOR SCREENING MAMMOGRAM FOR MALIGNANT NEOPLASM OF BREAST: ICD-10-CM

## 2020-05-26 DIAGNOSIS — Z12.39 BREAST CANCER SCREENING: ICD-10-CM

## 2020-05-26 PROCEDURE — 77063 MAMMO DIGITAL SCREENING BILAT WITH TOMOSYNTHESIS_CAD: ICD-10-PCS | Mod: 26,,, | Performed by: RADIOLOGY

## 2020-05-26 PROCEDURE — 77067 MAMMO DIGITAL SCREENING BILAT WITH TOMOSYNTHESIS_CAD: ICD-10-PCS | Mod: 26,,, | Performed by: RADIOLOGY

## 2020-05-26 PROCEDURE — 77067 SCR MAMMO BI INCL CAD: CPT | Mod: TC

## 2020-05-26 PROCEDURE — 77063 BREAST TOMOSYNTHESIS BI: CPT | Mod: 26,,, | Performed by: RADIOLOGY

## 2020-05-26 PROCEDURE — 77067 SCR MAMMO BI INCL CAD: CPT | Mod: 26,,, | Performed by: RADIOLOGY

## 2020-05-27 DIAGNOSIS — Z13.9 SCREENING FOR CONDITION: Primary | ICD-10-CM

## 2020-05-27 NOTE — PROGRESS NOTES
05/27/2020      In an effort to protect our immunocompromised patients from potential exposure to COVID-19, [facility] will now require all patients receiving an infusion, an injection, and/or radiation therapy to be tested for COVID-19 prior to their appointment.  All patients currently under treatment will be tested immediately, and patients initiating new treatment cycles or with one-time appointments (injections, transfusions, etc.) must be tested within 72 hours of their appointment.     Placed COVID-19 test order for patient.  A staff member is to contact the patient in the near future to explain this process and the rationale behind it, to ask the COVID-19 screening questions, and to get the patient scheduled for their COVID-19 test.     The above was completed in accordance with instructions and guidelines set forth by [facility].     Signed,    Viraj Bravo, MANOLO     Date:  05/27/2020

## 2020-05-29 ENCOUNTER — PATIENT MESSAGE (OUTPATIENT)
Dept: ADMINISTRATIVE | Facility: HOSPITAL | Age: 80
End: 2020-05-29

## 2020-06-01 ENCOUNTER — LAB VISIT (OUTPATIENT)
Dept: FAMILY MEDICINE | Facility: CLINIC | Age: 80
End: 2020-06-01
Payer: MEDICARE

## 2020-06-01 DIAGNOSIS — Z13.9 SCREENING FOR CONDITION: ICD-10-CM

## 2020-06-01 LAB — SARS-COV-2 RNA RESP QL NAA+PROBE: NOT DETECTED

## 2020-06-01 PROCEDURE — U0003 INFECTIOUS AGENT DETECTION BY NUCLEIC ACID (DNA OR RNA); SEVERE ACUTE RESPIRATORY SYNDROME CORONAVIRUS 2 (SARS-COV-2) (CORONAVIRUS DISEASE [COVID-19]), AMPLIFIED PROBE TECHNIQUE, MAKING USE OF HIGH THROUGHPUT TECHNOLOGIES AS DESCRIBED BY CMS-2020-01-R: HCPCS

## 2020-06-02 ENCOUNTER — TELEPHONE (OUTPATIENT)
Dept: HEMATOLOGY/ONCOLOGY | Facility: CLINIC | Age: 80
End: 2020-06-02

## 2020-06-02 ENCOUNTER — OFFICE VISIT (OUTPATIENT)
Dept: HEMATOLOGY/ONCOLOGY | Facility: CLINIC | Age: 80
End: 2020-06-02
Payer: MEDICARE

## 2020-06-02 VITALS
DIASTOLIC BLOOD PRESSURE: 67 MMHG | TEMPERATURE: 98 F | BODY MASS INDEX: 35.75 KG/M2 | WEIGHT: 194.25 LBS | HEIGHT: 62 IN | OXYGEN SATURATION: 97 % | HEART RATE: 49 BPM | SYSTOLIC BLOOD PRESSURE: 156 MMHG

## 2020-06-02 DIAGNOSIS — D47.2 MGUS (MONOCLONAL GAMMOPATHY OF UNKNOWN SIGNIFICANCE): Primary | ICD-10-CM

## 2020-06-02 DIAGNOSIS — N18.30 STAGE 3 CHRONIC KIDNEY DISEASE: ICD-10-CM

## 2020-06-02 PROCEDURE — 1159F PR MEDICATION LIST DOCUMENTED IN MEDICAL RECORD: ICD-10-PCS | Mod: S$GLB,,, | Performed by: INTERNAL MEDICINE

## 2020-06-02 PROCEDURE — 3078F PR MOST RECENT DIASTOLIC BLOOD PRESSURE < 80 MM HG: ICD-10-PCS | Mod: CPTII,S$GLB,, | Performed by: INTERNAL MEDICINE

## 2020-06-02 PROCEDURE — 3078F DIAST BP <80 MM HG: CPT | Mod: CPTII,S$GLB,, | Performed by: INTERNAL MEDICINE

## 2020-06-02 PROCEDURE — 1101F PT FALLS ASSESS-DOCD LE1/YR: CPT | Mod: CPTII,S$GLB,, | Performed by: INTERNAL MEDICINE

## 2020-06-02 PROCEDURE — 1126F AMNT PAIN NOTED NONE PRSNT: CPT | Mod: S$GLB,,, | Performed by: INTERNAL MEDICINE

## 2020-06-02 PROCEDURE — 99499 UNLISTED E&M SERVICE: CPT | Mod: S$GLB,,, | Performed by: INTERNAL MEDICINE

## 2020-06-02 PROCEDURE — 99499 RISK ADDL DX/OHS AUDIT: ICD-10-PCS | Mod: S$GLB,,, | Performed by: INTERNAL MEDICINE

## 2020-06-02 PROCEDURE — 1101F PR PT FALLS ASSESS DOC 0-1 FALLS W/OUT INJ PAST YR: ICD-10-PCS | Mod: CPTII,S$GLB,, | Performed by: INTERNAL MEDICINE

## 2020-06-02 PROCEDURE — 99999 PR PBB SHADOW E&M-EST. PATIENT-LVL IV: CPT | Mod: PBBFAC,,, | Performed by: INTERNAL MEDICINE

## 2020-06-02 PROCEDURE — 3077F PR MOST RECENT SYSTOLIC BLOOD PRESSURE >= 140 MM HG: ICD-10-PCS | Mod: CPTII,S$GLB,, | Performed by: INTERNAL MEDICINE

## 2020-06-02 PROCEDURE — 99213 PR OFFICE/OUTPT VISIT, EST, LEVL III, 20-29 MIN: ICD-10-PCS | Mod: S$GLB,,, | Performed by: INTERNAL MEDICINE

## 2020-06-02 PROCEDURE — 99999 PR PBB SHADOW E&M-EST. PATIENT-LVL IV: ICD-10-PCS | Mod: PBBFAC,,, | Performed by: INTERNAL MEDICINE

## 2020-06-02 PROCEDURE — 99213 OFFICE O/P EST LOW 20 MIN: CPT | Mod: S$GLB,,, | Performed by: INTERNAL MEDICINE

## 2020-06-02 PROCEDURE — 1159F MED LIST DOCD IN RCRD: CPT | Mod: S$GLB,,, | Performed by: INTERNAL MEDICINE

## 2020-06-02 PROCEDURE — 3077F SYST BP >= 140 MM HG: CPT | Mod: CPTII,S$GLB,, | Performed by: INTERNAL MEDICINE

## 2020-06-02 PROCEDURE — 1126F PR PAIN SEVERITY QUANTIFIED, NO PAIN PRESENT: ICD-10-PCS | Mod: S$GLB,,, | Performed by: INTERNAL MEDICINE

## 2020-06-02 RX ORDER — ERGOCALCIFEROL 1.25 MG/1
50000 CAPSULE ORAL
Status: ON HOLD | COMMUNITY
End: 2023-01-01

## 2020-06-02 RX ORDER — CALCIUM CARBONATE 200(500)MG
1 TABLET,CHEWABLE ORAL DAILY
COMMUNITY
End: 2021-10-15 | Stop reason: CLARIF

## 2020-06-02 RX ORDER — LOSARTAN POTASSIUM 25 MG/1
TABLET ORAL
Status: ON HOLD | COMMUNITY
Start: 2020-04-13 | End: 2020-10-01 | Stop reason: HOSPADM

## 2020-06-02 NOTE — PROGRESS NOTES
Subjective:       Patient ID: Barbara Rizo is a 80 y.o. female.    Chief Complaint: Follow-up         Diagnosis: MGUS    80-year-old with medical history of CKD  stage III, hypertension, osteopenia, vitamin-D deficiency, type 2 diabetes mellitus seen today for MGUS.. . She is followed by Dr. Boyle for CKD stage 3. .  Appetite and weight stable.  No shortness of breath, chest pain. No bony pain. No prior history of anemia.  Patient reports history of hemochromatosis for which she has undergone phlebotomy in  remote past.  No melena, hematochezia or change in bowel habits.  No fatigue, lightheadedness or shortness of breath.  Laboratory studies from 03/22/2018 from outside facility reveal abnormal SPEP reveals elevation of beta 2 globulins. Serum immunofixation reveals a faint IgG lambda monoclonal immunoglobulin.  Biochemical profile reveals a BUN of 33 creatinine of 1.64 mg/dL calcium of 9.9 mg/dL albumin of 4.7    She was recently hospitalized with  AFib with rapid ventricular response on January 28th and she underwent TALHA with DC cardioversion which was successful.She was also found to have acute renal failure on CKD with peak BUN/Cr 63/4.0.   She was also treated for bronchitis    Doing well  No new issues  Appetite and weight  stable  No CP/cough/SOB  No feversNo recent infections      CBC reveals wbc 6240/mm3   Hb 13.9 g/dl Hct 43. 6% plt 162k.    SPEP No monoclonal peaks identified.     Biochemical profile reveals a BUN of 24 creatinine of 2.1 mg/dL calcium of 9.2mg/dL albumin of 3.4      Past Medical History:   Diagnosis Date    A-fib     Diabetes mellitus type II     Fatty liver     GERD (gastroesophageal reflux disease)     Hemochromatosis     Hyperlipidemia     Hypertension     Macular degeneration     Osteoporosis     Vaginal delivery     x2    Vitamin D deficiency disease     Wears partial dentures     upper removable bridge       Past Surgical History:   Procedure Laterality Date     "BREAST BIOPSY      CHOLECYSTECTOMY  08/2015    EYE SURGERY      Cat Ext  OU    HYSTERECTOMY      partial due to uterine fibroids    TOTAL KNEE ARTHROPLASTY Right 2015    left knee done 5/2013       Review of Systems   Constitutional: Negative for appetite change, fatigue, fever and unexpected weight change.   HENT: Negative for mouth sores.    Eyes: Negative for visual disturbance.   Respiratory: Negative for cough and shortness of breath.    Cardiovascular: Negative for chest pain.   Gastrointestinal: Negative for abdominal pain and diarrhea.   Genitourinary: Negative for frequency.   Musculoskeletal: Negative for back pain.   Skin: Negative for rash.   Neurological: Negative for headaches.   Hematological: Negative for adenopathy.   Psychiatric/Behavioral: The patient is not nervous/anxious.        Objective:       Vitals:    06/02/20 1501   BP: (!) 156/67   BP Location: Left arm   Patient Position: Sitting   BP Method: Medium (Automatic)   Pulse: (!) 49   Temp: 97.8 °F (36.6 °C)   TempSrc: Oral   SpO2: 97%   Weight: 88.1 kg (194 lb 3.6 oz)   Height: 5' 2" (1.575 m)       Physical Exam   Constitutional: She is oriented to person, place, and time. She appears well-developed and well-nourished.   On nasal cannula 02   HENT:   Head: Normocephalic.   Mouth/Throat: Oropharynx is clear and moist. No oropharyngeal exudate.   Eyes: Conjunctivae and lids are normal. No scleral icterus.   Neck: Normal range of motion. Neck supple. No thyromegaly present.   Cardiovascular: Normal rate and regular rhythm.   Murmur heard.  Pulmonary/Chest: Breath sounds normal. She has no wheezes. She has no rales.   Abdominal: Soft. Bowel sounds are normal. There is no hepatosplenomegaly. There is no tenderness. There is no rebound and no guarding.   Musculoskeletal: Normal range of motion. She exhibits edema. She exhibits no tenderness.   Neurological: She is alert and oriented to person, place, and time. No cranial nerve deficit. " Coordination normal.   Skin: Skin is warm and dry. No ecchymosis, no petechiae and no rash noted. No erythema.   Psychiatric: She has a normal mood and affect.       Lab Results   Component Value Date    WBC 6.24 05/20/2020    HGB 13.9 05/20/2020    HCT 43.6 05/20/2020    MCV 96 05/20/2020     05/20/2020         Results for FAISAL ORNELAS (MRN [id number]) as of 6/1/2019 13:16   Ref. Range 10/8/2018 09:04 2/11/2019 15:39 5/17/2019 09:58   [place] Free Light Chains Latest Ref Range: 0.33 - 1.94 mg/dL 5.54 (H) 1.90 3.99 (H)   Lambda Free Light Chains Latest Ref Range: 0.57 - 2.63 mg/dL 2.96 (H) 1.77 2.58   Kappa/Lambda FLC Ratio Latest Ref Range: 0.26 - 1.65  1.87 (H) 1.07 1.55         Results for FAISAL ORNELAS (MRN [id number]) as of 2/18/2019 09:11   Ref. Range 2/11/2019 15:40   IgG - Serum Latest Ref Range: 650 - 1600 mg/dL 931   IgM Latest Ref Range: 50 - 300 mg/dL 22 (L)   IgA Latest Ref Range: 40 - 350 mg/dL 256     SPEP [id number]   Normal total protein. A paraprotein band is present in gamma = 0.20   g/dL; previously 0.40 g/dL.        SPEP 9/23/2019 -no monoclonal peaks    UPEP- no monoclonal peaks      Results for FAISAL ORNELAS (MRN [id number]) as of 6/2/2020 15:12   Ref. Range 5/20/2020 09:55   IgG - Serum Latest Ref Range: 650 - 1600 mg/dL 1338   IgM Latest Ref Range: 50 - 300 mg/dL 31 (L)   IgA Latest Ref Range: 40 - 350 mg/dL 332       Assessment:       1. MGUS (monoclonal gammopathy of unknown significance)    2. Stage 3 chronic kidney disease        Plan:       1-2 Pt clinically stable  Hb 13.9  g/dl-stable   SPEP -no paraproteins   Pt with hx of MGUS  SPEP 5/22/2020 - No monoclonal peaks identified.   Pt no longer has M spike since 2018   Serum FLC reveals mildly elevated kappa lambda light chains and abnl K/L flcr   BUN/Cr  35/2.4   (  Cr  1. 6mg/dl 5/2018)   UPEP- no monoclonal peaks     Pt advised she no longer needs to follow-up  Pt elects to continue f/u for     Follow-up in  6 mo with  cbc,cmp, SPEP,FLC, Quant Ig, B-2 microglobulin prior to f/u       Advance Care Planning     Power of   I previously  initiated the process of advance care planning today and explained the importance of this process to the patient.  I introduced the concept of advance directives to the patient, as well. Then the patient received detailed information about the importance of designating a Health Care Power of  (HCPOA). She was also instructed to communicate with this person about their wishes for future healthcare, should she become sick and lose decision-making capacity. The patient has previously appointed a HCPOA. After our discussion, the patient has decided to complete a HCPOA and has appointed her daughter and NAME:Tamar Arechiga [phone] I spent a total time of 16 minutes discussing this issue with the patient.    Advance Care Planning     Living Will  During previous visit, I engaged the patient in the advance care planning process.  The patient and I reviewed the role for advance directives and their purpose in directing future healthcare if the patient's unable to speak for him/herself.  At this point in time, the patient does have full decision-making capacity.  We discussed different extreme health states that she could experience, and reviewed what kind of medical care she would want in those situations.  The patient communicated that if she were comatose and had little chance of a meaningful recovery, she would not want machines/life-sustaining treatments used.  The patient has not completed a living will to reflect these preferences.  The patient  has already designated a healthcare power of  to make decisions on her behalf.   I spent a total of 16  minutes engaging the patient in this advance care planning discussion.                 Cc: Nargis Byole M.D.

## 2020-06-02 NOTE — PROGRESS NOTES
Dr. Rawls & team-  The patient's COVID test came back negative, and she can therefore proceed with her appointments as scheduled unless otherwise contraindicated.  -Viraj

## 2020-06-03 ENCOUNTER — INFUSION (OUTPATIENT)
Dept: INFUSION THERAPY | Facility: HOSPITAL | Age: 80
End: 2020-06-03
Attending: INTERNAL MEDICINE
Payer: MEDICARE

## 2020-06-03 VITALS
HEART RATE: 49 BPM | TEMPERATURE: 98 F | DIASTOLIC BLOOD PRESSURE: 66 MMHG | RESPIRATION RATE: 16 BRPM | SYSTOLIC BLOOD PRESSURE: 164 MMHG | OXYGEN SATURATION: 95 %

## 2020-06-03 DIAGNOSIS — M17.10 PRIMARY LOCALIZED OSTEOARTHROSIS, LOWER LEG: Primary | ICD-10-CM

## 2020-06-03 PROCEDURE — 63600175 PHARM REV CODE 636 W HCPCS: Mod: JG | Performed by: INTERNAL MEDICINE

## 2020-06-03 PROCEDURE — 96372 THER/PROPH/DIAG INJ SC/IM: CPT

## 2020-06-03 RX ADMIN — DENOSUMAB 60 MG: 60 INJECTION SUBCUTANEOUS at 01:06

## 2020-06-03 NOTE — PLAN OF CARE
Pt arrived to unit. VSS. Pt afebrile. Covid test negative. Pt denies recent dental work or falls. Pt taking calcium and vitamin d. Tolerated Prolia. Pt has next appts. Pt ambulated off unit. No distress noted.

## 2020-06-11 ENCOUNTER — HOSPITAL ENCOUNTER (OUTPATIENT)
Dept: CARDIOLOGY | Facility: HOSPITAL | Age: 80
Discharge: HOME | End: 2020-06-11
Attending: INTERNAL MEDICINE
Payer: MEDICARE

## 2020-06-11 VITALS
HEIGHT: 62 IN | BODY MASS INDEX: 33.49 KG/M2 | WEIGHT: 182 LBS | DIASTOLIC BLOOD PRESSURE: 67 MMHG | HEART RATE: 56 BPM | SYSTOLIC BLOOD PRESSURE: 156 MMHG

## 2020-06-11 DIAGNOSIS — I10 ESSENTIAL HYPERTENSION: ICD-10-CM

## 2020-06-11 DIAGNOSIS — E78.2 MIXED HYPERLIPIDEMIA: ICD-10-CM

## 2020-06-11 DIAGNOSIS — I48.0 PAROXYSMAL ATRIAL FIBRILLATION: ICD-10-CM

## 2020-06-11 LAB
AORTIC ROOT ANNULUS: 2.35 CM
AORTIC VALVE CUSP SEPERATION: 1.69 CM
AV INDEX (PROSTH): 0.48
AV MEAN GRADIENT: 17 MMHG
AV PEAK GRADIENT: 32 MMHG
AV VALVE AREA: 1.57 CM2
AV VELOCITY RATIO: 0.51
BSA FOR ECHO PROCEDURE: 1.9 M2
CV ECHO LV RWT: 0.45 CM
DOP CALC AO PEAK VEL: 2.81 M/S
DOP CALC AO VTI: 85.41 CM
DOP CALC LVOT AREA: 3.3 CM2
DOP CALC LVOT DIAMETER: 2.04 CM
DOP CALC LVOT PEAK VEL: 1.44 M/S
DOP CALC LVOT STROKE VOLUME: 134.14 CM3
DOP CALCLVOT PEAK VEL VTI: 41.06 CM
E WAVE DECELERATION TIME: 218.83 MSEC
E/A RATIO: 2.25
ECHO LV POSTERIOR WALL: 1.24 CM (ref 0.6–1.1)
FRACTIONAL SHORTENING: 27 % (ref 28–44)
INTERVENTRICULAR SEPTUM: 1.18 CM (ref 0.6–1.1)
IVRT: 83.04 MSEC
LA MAJOR: 6.63 CM
LA MINOR: 6.93 CM
LA WIDTH: 5.75 CM
LEFT ATRIUM SIZE: 5.79 CM
LEFT ATRIUM VOLUME INDEX: 104.4 ML/M2
LEFT ATRIUM VOLUME: 191.77 CM3
LEFT INTERNAL DIMENSION IN SYSTOLE: 4.05 CM (ref 2.1–4)
LEFT VENTRICLE DIASTOLIC VOLUME INDEX: 82.75 ML/M2
LEFT VENTRICLE DIASTOLIC VOLUME: 151.96 ML
LEFT VENTRICLE MASS INDEX: 153 G/M2
LEFT VENTRICLE SYSTOLIC VOLUME INDEX: 39.2 ML/M2
LEFT VENTRICLE SYSTOLIC VOLUME: 71.92 ML
LEFT VENTRICULAR INTERNAL DIMENSION IN DIASTOLE: 5.57 CM (ref 3.5–6)
LEFT VENTRICULAR MASS: 281.21 G
MV PEAK A VEL: 0.57 M/S
MV PEAK E VEL: 1.28 M/S
PISA TR MAX VEL: 3.97 M/S
PULM VEIN S/D RATIO: 0.53
PV PEAK D VEL: 0.78 M/S
PV PEAK S VEL: 0.41 M/S
PV PEAK VELOCITY: 1.35 CM/S
RA MAJOR: 5.35 CM
RA PRESSURE: 3 MMHG
RA WIDTH: 4.46 CM
RIGHT VENTRICULAR END-DIASTOLIC DIMENSION: 3.53 CM
RV TISSUE DOPPLER FREE WALL SYSTOLIC VELOCITY 1 (APICAL 4 CHAMBER VIEW): 9.59 CM/S
SINUS: 3.04 CM
STJ: 2.13 CM
TR MAX PG: 63 MMHG
TRICUSPID ANNULAR PLANE SYSTOLIC EXCURSION: 3.05 CM
TV REST PULMONARY ARTERY PRESSURE: 66 MMHG

## 2020-06-11 PROCEDURE — 93306 TTE W/DOPPLER COMPLETE: CPT

## 2020-06-11 PROCEDURE — 93306 ECHO (CUPID ONLY): ICD-10-PCS | Mod: 26,,, | Performed by: INTERNAL MEDICINE

## 2020-06-11 PROCEDURE — 93306 TTE W/DOPPLER COMPLETE: CPT | Mod: 26,,, | Performed by: INTERNAL MEDICINE

## 2020-06-17 ENCOUNTER — PATIENT OUTREACH (OUTPATIENT)
Dept: ADMINISTRATIVE | Facility: OTHER | Age: 80
End: 2020-06-17

## 2020-06-18 ENCOUNTER — OFFICE VISIT (OUTPATIENT)
Dept: CARDIOLOGY | Facility: CLINIC | Age: 80
End: 2020-06-18
Payer: MEDICARE

## 2020-06-18 VITALS
BODY MASS INDEX: 36.1 KG/M2 | DIASTOLIC BLOOD PRESSURE: 63 MMHG | WEIGHT: 196.19 LBS | HEIGHT: 62 IN | SYSTOLIC BLOOD PRESSURE: 133 MMHG | OXYGEN SATURATION: 98 % | HEART RATE: 90 BPM

## 2020-06-18 DIAGNOSIS — I10 HYPERTENSION: ICD-10-CM

## 2020-06-18 DIAGNOSIS — I10 ESSENTIAL HYPERTENSION: Primary | Chronic | ICD-10-CM

## 2020-06-18 DIAGNOSIS — I48.0 PAROXYSMAL ATRIAL FIBRILLATION: Chronic | ICD-10-CM

## 2020-06-18 DIAGNOSIS — I50.32 CHRONIC DIASTOLIC CHF (CONGESTIVE HEART FAILURE): ICD-10-CM

## 2020-06-18 DIAGNOSIS — E78.00 PURE HYPERCHOLESTEROLEMIA: Chronic | ICD-10-CM

## 2020-06-18 PROCEDURE — 3078F DIAST BP <80 MM HG: CPT | Mod: CPTII,S$GLB,, | Performed by: INTERNAL MEDICINE

## 2020-06-18 PROCEDURE — 1101F PR PT FALLS ASSESS DOC 0-1 FALLS W/OUT INJ PAST YR: ICD-10-PCS | Mod: CPTII,S$GLB,, | Performed by: INTERNAL MEDICINE

## 2020-06-18 PROCEDURE — 99999 PR PBB SHADOW E&M-EST. PATIENT-LVL IV: CPT | Mod: PBBFAC,,, | Performed by: INTERNAL MEDICINE

## 2020-06-18 PROCEDURE — 3075F SYST BP GE 130 - 139MM HG: CPT | Mod: CPTII,S$GLB,, | Performed by: INTERNAL MEDICINE

## 2020-06-18 PROCEDURE — 1126F PR PAIN SEVERITY QUANTIFIED, NO PAIN PRESENT: ICD-10-PCS | Mod: S$GLB,,, | Performed by: INTERNAL MEDICINE

## 2020-06-18 PROCEDURE — 1159F MED LIST DOCD IN RCRD: CPT | Mod: S$GLB,,, | Performed by: INTERNAL MEDICINE

## 2020-06-18 PROCEDURE — 99214 PR OFFICE/OUTPT VISIT, EST, LEVL IV, 30-39 MIN: ICD-10-PCS | Mod: S$GLB,,, | Performed by: INTERNAL MEDICINE

## 2020-06-18 PROCEDURE — 1101F PT FALLS ASSESS-DOCD LE1/YR: CPT | Mod: CPTII,S$GLB,, | Performed by: INTERNAL MEDICINE

## 2020-06-18 PROCEDURE — 99999 PR PBB SHADOW E&M-EST. PATIENT-LVL IV: ICD-10-PCS | Mod: PBBFAC,,, | Performed by: INTERNAL MEDICINE

## 2020-06-18 PROCEDURE — 93000 EKG 12-LEAD: ICD-10-PCS | Mod: S$GLB,,, | Performed by: INTERNAL MEDICINE

## 2020-06-18 PROCEDURE — 99214 OFFICE O/P EST MOD 30 MIN: CPT | Mod: S$GLB,,, | Performed by: INTERNAL MEDICINE

## 2020-06-18 PROCEDURE — 3075F PR MOST RECENT SYSTOLIC BLOOD PRESS GE 130-139MM HG: ICD-10-PCS | Mod: CPTII,S$GLB,, | Performed by: INTERNAL MEDICINE

## 2020-06-18 PROCEDURE — 1159F PR MEDICATION LIST DOCUMENTED IN MEDICAL RECORD: ICD-10-PCS | Mod: S$GLB,,, | Performed by: INTERNAL MEDICINE

## 2020-06-18 PROCEDURE — 99499 UNLISTED E&M SERVICE: CPT | Mod: S$GLB,,, | Performed by: INTERNAL MEDICINE

## 2020-06-18 PROCEDURE — 1126F AMNT PAIN NOTED NONE PRSNT: CPT | Mod: S$GLB,,, | Performed by: INTERNAL MEDICINE

## 2020-06-18 PROCEDURE — 93000 ELECTROCARDIOGRAM COMPLETE: CPT | Mod: S$GLB,,, | Performed by: INTERNAL MEDICINE

## 2020-06-18 PROCEDURE — 3078F PR MOST RECENT DIASTOLIC BLOOD PRESSURE < 80 MM HG: ICD-10-PCS | Mod: CPTII,S$GLB,, | Performed by: INTERNAL MEDICINE

## 2020-06-18 PROCEDURE — 99499 RISK ADDL DX/OHS AUDIT: ICD-10-PCS | Mod: S$GLB,,, | Performed by: INTERNAL MEDICINE

## 2020-06-18 NOTE — PROGRESS NOTES
Patient, Barbara Rizo (MRN #[id number]), presented with a recorded BMI of 35.89 kg/m^2 and a documented comorbidity(s):  - Hypertension  - Hyperlipidemia  - Atrial Fibrillation  - Atrial Fibrillation  to which the severe obesity is a contributing factor. This is consistent with the definition of severe obesity (BMI 35.0-39.9) with comorbidity (ICD-10 E66.01, Z68.35). The patient's severe obesity was monitored, evaluated, addressed and/or treated. This addendum to the medical record is made on 06/18/2020.

## 2020-06-18 NOTE — PROGRESS NOTES
Subjective:    Patient ID:  Barbara Rizo is a 80 y.o. female who presents for follow-up of Results      HPI     PAF - TALHA/CV 9/21/15, 1/30/19, 2/2/19 -  on amiodarone and eliquis, HTN, DM, HLD, MGUS, CRI - Cr 2.5     Admitted 1/28/19  Ms. Barbara Rizo is a 78 y.o. female with essential hypertension, hyperlipidemia (LDL 62.4 Jul 2018), type 2 diabetes mellitus (HbA1c 5.8% Jul 2018), CKD Stage 3, paroxysmal atrial fibrillation ([id number] score 5) not on chronic anticoagulation, obesity (BMI 36.0), MGUS, and chronic gout who presents to [facility] ED with complaints of coughing for three days.  She started to have a non-productive cough, wheezing, and mild dyspnea three days ago which has steadily been worsening since onset.  She also complains of a subjective fever but otherwise denies any nausea, vomiting, chest pain, palpitations, pleurisy, nor any diaphoresis.  She denies any recent travel, hemoptysis, nor any lower extremity pain or swelling.  She does say that she's under a lot of stress recently with her ex- dying yesterday at [facility] due to complications from a heart valve replacement two weeks ago.  She does say that she may have had sick contacts while visiting her ex-.  Her appetite has been poor but she denies any weight loss.     While at her ENT's appointment today she decided to schedule an appointment with her PCP to evaluate her upper airway complaints.  She was unable to see her regular PCP, Dr. Paras Oro, but was able to be seen by another physician who became concerned when she was noted to be in AFib with RVR on EKG.  He recommended EMS transportation to the ED but she refused and decided to drive herself.  Her cardiologist is Dr. Amauri Lomas.     Pt admitted to ICU with afib rvr on amiodarone infusion. Pt with elevated HR  and after TALHA done patient became very agitated and anxious. Ativan 1mg IV given and symptoms got worse. HR up into the 170s  and O2 sats dropped to 77%. Placed on NRB. Improved O2 sats. 5mg IV haldol given as patient was trying to get out of bed and pulling oxygen mask off. Cardizem 10mg Iv given with improved HR to 120s-130s. xopenex scheduled Q 8 hours. Changed to zosyn with temp 102F.   1/30- successful cardioversion, post procedure confused and pulling oxygen off, desaturation, constant re-direction, monitored in ICU overnight. Changed to oral amiodarone. eliquis for anticoagulation. Holding parameters on BP meds. IV steroids, nebs and antibiotic for probable lower resp infection. IV lasix as Bp tolerates.   1/31- HR remain under control NSR 60-70s.On amiodarone, metoprolol and eliquis.  Resp status improved and weaned oxygen. Steroids changed to oral route. DC zosyn and switch to augmentin. Add acapella to nebs. Cr increased from baseline (1.5-1.7). Gentle IVF and monitor with repeat BMP later, was stable,. PT,OT consulted for dc planning. Weaning oxygen. PT/OT consulted and rec H/H without equipment. The patient was transferred to the floor on 1/31. Nephrology was consulted for worsening renal function.      2/2- pt transferred with afib rvr. Successful cardioversion again . Continued oral amiodarone and BB.  still has aggressive cough and URI symptoms. On mucolytic and antibiotic.   2/3- HR 50-60s sinus. Cr sightly higher, sodium 128. BNP 1500. Lasix x one dose. Bringing up more mucus. Steroids weaned 20mg. Still faint wheezes on exam.  Patient had worsening ARF on CKD 3, keeped on george and monitor,nephrology was following.reconsulted PT,OT.fley was removed latera nd she had improvement in ARF.  Changed diet for low slat diet duo to hyponatremia with improvement.  She did well with PT,OT.  Her wheezing and respiratory status is improved,  Patient has been discharged home with HH and PO Abx and OAC and amiodarone and follow up with PCP,nephrology and cardiology in next few days.      1-29:  Admitted with AFib with RVR  1-30:   Underwent TALHA/DC cardioversion successfully  2-1 Back in A-fib 120s. SOB  2-3 Still coughing and SOB. NSR 60      2/2/19 CV - 360J converted A-fib to sinus bradycardia 55. Change amiodarone to po. Ok for telemetry     Echo 6/11/20  · Normal left ventricular systolic function. The estimated ejection fraction is 55%.  · Concentric left ventricular hypertrophy.  · Severe left atrial enlargement.  · Indeterminate left ventricular diastolic function.  · Normal right ventricular systolic function.  · Mild-to-moderate mitral regurgitation.  · Moderate tricuspid regurgitation.  · Mild pulmonic regurgitation.  · Moderate pulmonary hypertension present.  · Normal central venous pressure (3 mmHg).  · The estimated PA systolic pressure is 66 mmHg.     2/13/19 Less SOB since discharge  EKG NSR with PACs 65   Decrease amiodarone 200 qd  OV 2 months - will discuss changing amiodarone to flecainide at that time     4/17/19 Denies CP or SOB  EKG sinus bradycardia 44  HR mostly runs 40-50 at home  Change metoprolol 100 qd to toprol XL 50 qd - may decrease further if bradycardia persists  Discussed alternative AAD - given her repeated episodes of PAF we are both in agreement to stay on amiodarone for now  OV 1 month with TSH, CMP, EKG     5/27/19 HR improved to the low 50s  Has held eliquis the last 2 days due to bleeding from a recent skin CA removal  Denies CP or SOB     9/11/19 Denies CP or SOB  Some LE edema  EKG sinus bradycardia 46 NSSTT changes  Stop norvasc with LE edema  Add cardura 4 mg qd  Decrease toprol 25 qd with bradycardia  OV 3 months with CMP, TSH     12/11/19 Denies CP or SOB  LE edema resolved after stopping norvasc  BP controlled by home readings  HR runs 45-50  EKG sinus bradycardia 47 1st degree AVB  Denies dizziness  Bradycardia well tolerated - will continue Rx  OV 6 months with echo      6/18/20 Denies CP or dizziness  Mild stable WILKINSON  EKG sinus bradycardia 44 otherwise ok      Review of Systems    Constitution: Negative for decreased appetite.   HENT: Negative for ear discharge.    Eyes: Negative for blurred vision.   Respiratory: Negative for hemoptysis.    Endocrine: Negative for polyphagia.   Hematologic/Lymphatic: Negative for adenopathy.   Skin: Negative for color change.   Musculoskeletal: Negative for joint swelling.   Genitourinary: Negative for bladder incontinence.   Neurological: Negative for brief paralysis.   Psychiatric/Behavioral: Negative for hallucinations.   Allergic/Immunologic: Negative for hives.        Objective:    Physical Exam   Constitutional: She is oriented to person, place, and time. She appears well-developed and well-nourished.   HENT:   Head: Normocephalic and atraumatic.   Eyes: Pupils are equal, round, and reactive to light. Conjunctivae are normal.   Neck: Normal range of motion. Neck supple.   Cardiovascular: Normal rate, normal heart sounds and intact distal pulses.   Pulmonary/Chest: Effort normal and breath sounds normal.   Abdominal: Soft. Bowel sounds are normal.   Musculoskeletal: Normal range of motion.   Neurological: She is alert and oriented to person, place, and time.   Skin: Skin is warm and dry.         Assessment:       1. Essential hypertension    2. Paroxysmal atrial fibrillation    3. Pure hypercholesterolemia    4. Chronic diastolic CHF (congestive heart failure)         Plan:       Bradycardia continues to be well tolerated  OV 6 months with CMP, TSH on chronic amiodarone

## 2020-07-24 ENCOUNTER — OFFICE VISIT (OUTPATIENT)
Dept: FAMILY MEDICINE | Facility: CLINIC | Age: 80
End: 2020-07-24
Payer: MEDICARE

## 2020-07-24 VITALS
TEMPERATURE: 98 F | OXYGEN SATURATION: 93 % | HEART RATE: 42 BPM | HEIGHT: 62 IN | RESPIRATION RATE: 17 BRPM | WEIGHT: 194.44 LBS | SYSTOLIC BLOOD PRESSURE: 136 MMHG | BODY MASS INDEX: 35.78 KG/M2 | DIASTOLIC BLOOD PRESSURE: 80 MMHG

## 2020-07-24 DIAGNOSIS — E55.9 VITAMIN D DEFICIENCY: ICD-10-CM

## 2020-07-24 DIAGNOSIS — M1A.09X0 IDIOPATHIC CHRONIC GOUT OF MULTIPLE SITES WITHOUT TOPHUS: Chronic | ICD-10-CM

## 2020-07-24 DIAGNOSIS — E78.00 PURE HYPERCHOLESTEROLEMIA: Chronic | ICD-10-CM

## 2020-07-24 DIAGNOSIS — N31.2 ATONIC NEUROGENIC BLADDER: ICD-10-CM

## 2020-07-24 DIAGNOSIS — I50.32 CHRONIC DIASTOLIC CHF (CONGESTIVE HEART FAILURE): ICD-10-CM

## 2020-07-24 DIAGNOSIS — I11.9 BENIGN HYPERTENSIVE HEART DISEASE WITHOUT HEART FAILURE: ICD-10-CM

## 2020-07-24 DIAGNOSIS — R73.03 PREDIABETES: ICD-10-CM

## 2020-07-24 DIAGNOSIS — I48.0 PAROXYSMAL ATRIAL FIBRILLATION: Chronic | ICD-10-CM

## 2020-07-24 DIAGNOSIS — D63.1 ANEMIA IN CHRONIC KIDNEY DISEASE, UNSPECIFIED CKD STAGE: ICD-10-CM

## 2020-07-24 DIAGNOSIS — N18.9 ANEMIA IN CHRONIC KIDNEY DISEASE, UNSPECIFIED CKD STAGE: ICD-10-CM

## 2020-07-24 DIAGNOSIS — I10 ESSENTIAL HYPERTENSION: Chronic | ICD-10-CM

## 2020-07-24 DIAGNOSIS — E66.9 OBESITY (BMI 30-39.9): ICD-10-CM

## 2020-07-24 DIAGNOSIS — D47.2 MGUS (MONOCLONAL GAMMOPATHY OF UNKNOWN SIGNIFICANCE): Chronic | ICD-10-CM

## 2020-07-24 DIAGNOSIS — Z23 IMMUNIZATION DUE: ICD-10-CM

## 2020-07-24 DIAGNOSIS — E83.119 HEMOCHROMATOSIS, UNSPECIFIED HEMOCHROMATOSIS TYPE: ICD-10-CM

## 2020-07-24 DIAGNOSIS — M17.10 PRIMARY LOCALIZED OSTEOARTHROSIS OF LOWER LEG, UNSPECIFIED LATERALITY: ICD-10-CM

## 2020-07-24 DIAGNOSIS — N18.30 STAGE 3 CHRONIC KIDNEY DISEASE: ICD-10-CM

## 2020-07-24 DIAGNOSIS — Z00.00 WELL WOMAN EXAM (NO GYNECOLOGICAL EXAM): Primary | ICD-10-CM

## 2020-07-24 PROBLEM — N17.9 ACUTE RENAL FAILURE SUPERIMPOSED ON STAGE 3 CHRONIC KIDNEY DISEASE: Status: RESOLVED | Noted: 2019-01-28 | Resolved: 2020-07-24

## 2020-07-24 PROCEDURE — 99213 OFFICE O/P EST LOW 20 MIN: CPT | Mod: 25,S$GLB,, | Performed by: FAMILY MEDICINE

## 2020-07-24 PROCEDURE — 3075F PR MOST RECENT SYSTOLIC BLOOD PRESS GE 130-139MM HG: ICD-10-PCS | Mod: CPTII,S$GLB,, | Performed by: FAMILY MEDICINE

## 2020-07-24 PROCEDURE — G0009 ADMIN PNEUMOCOCCAL VACCINE: HCPCS | Mod: PBBFAC

## 2020-07-24 PROCEDURE — 90750 HZV VACC RECOMBINANT IM: CPT | Mod: S$GLB,,, | Performed by: FAMILY MEDICINE

## 2020-07-24 PROCEDURE — 99999 PR PBB SHADOW E&M-EST. PATIENT-LVL V: CPT | Mod: PBBFAC,,, | Performed by: FAMILY MEDICINE

## 2020-07-24 PROCEDURE — 3075F SYST BP GE 130 - 139MM HG: CPT | Mod: CPTII,S$GLB,, | Performed by: FAMILY MEDICINE

## 2020-07-24 PROCEDURE — 90732 PPSV23 VACC 2 YRS+ SUBQ/IM: CPT | Mod: S$GLB,,, | Performed by: FAMILY MEDICINE

## 2020-07-24 PROCEDURE — 99999 PR PBB SHADOW E&M-EST. PATIENT-LVL V: ICD-10-PCS | Mod: PBBFAC,,, | Performed by: FAMILY MEDICINE

## 2020-07-24 PROCEDURE — 90732 PPSV23 VACC 2 YRS+ SUBQ/IM: CPT | Mod: PBBFAC

## 2020-07-24 PROCEDURE — 99213 PR OFFICE/OUTPT VISIT, EST, LEVL III, 20-29 MIN: ICD-10-PCS | Mod: 25,S$GLB,, | Performed by: FAMILY MEDICINE

## 2020-07-24 PROCEDURE — 99499 UNLISTED E&M SERVICE: CPT | Mod: S$GLB,,, | Performed by: FAMILY MEDICINE

## 2020-07-24 PROCEDURE — 90471 IMMUNIZATION ADMIN: CPT | Mod: 59,S$GLB,, | Performed by: FAMILY MEDICINE

## 2020-07-24 PROCEDURE — 90732 PNEUMOCOCCAL POLYSACCHARIDE VACCINE 23-VALENT =>2YO SQ IM: ICD-10-PCS | Mod: S$GLB,,, | Performed by: FAMILY MEDICINE

## 2020-07-24 PROCEDURE — G0009 ADMIN PNEUMOCOCCAL VACCINE: HCPCS | Mod: S$GLB,,, | Performed by: FAMILY MEDICINE

## 2020-07-24 PROCEDURE — 3079F DIAST BP 80-89 MM HG: CPT | Mod: CPTII,S$GLB,, | Performed by: FAMILY MEDICINE

## 2020-07-24 PROCEDURE — 90471 PNEUMOCOCCAL POLYSACCHARIDE VACCINE 23-VALENT =>2YO SQ IM: ICD-10-PCS | Mod: 59,S$GLB,, | Performed by: FAMILY MEDICINE

## 2020-07-24 PROCEDURE — 3079F PR MOST RECENT DIASTOLIC BLOOD PRESSURE 80-89 MM HG: ICD-10-PCS | Mod: CPTII,S$GLB,, | Performed by: FAMILY MEDICINE

## 2020-07-24 PROCEDURE — G0009 ZOSTER RECOMBINANT VACCINE: ICD-10-PCS | Mod: S$GLB,,, | Performed by: FAMILY MEDICINE

## 2020-07-24 PROCEDURE — 99499 RISK ADDL DX/OHS AUDIT: ICD-10-PCS | Mod: S$GLB,,, | Performed by: FAMILY MEDICINE

## 2020-07-24 PROCEDURE — 90750 ZOSTER RECOMBINANT VACCINE: ICD-10-PCS | Mod: S$GLB,,, | Performed by: FAMILY MEDICINE

## 2020-07-24 NOTE — PROGRESS NOTES
Pt tolerated Shingles and PCv23 injection well. Instructed to wait in the clinic for 15 minutes and report any adverse effects immediately to the nurse. Verbalized understanding.

## 2020-07-24 NOTE — PROGRESS NOTES
"  Well Woman VISIT      CHIEF COMPLAINT  Chief Complaint   Patient presents with    Follow-up       HPI  Barbara Rizo is a 80 y.o. female who presents for physical.     Social Factors  Tobacco use: No  Ready to Quit: No  Alcohol: Yes on occasion  Intimate partner violence screening  "Do you feel safe in your current relationship?" single  "Have you ever been in a relationship in which your partner frightened you or hurt you?" No  Living Will/POA: No  Regular Exercise: No    Depression  Over the past two weeks, have you felt down, depressed, or hopeless? No  Over the past two weeks, have you felt little interest or pleasure in doing things? No      CHD, HTN, DM2  CHD Risk Factors: advanced age (older than 55 for men, 65 for women), dyslipidemia, hypertension and obesity (BMI >= 30 kg/m2)  Women 45 years and older should be screened for dyslipidemia if at increased risk of CHD  Women 20 to 45 years of age should be screened for dyslipidemia if at increased risk of CHD  Asymptomatic adults with sustained blood pressure greater than 135/80 mm Hg (treated or untreated) should be screened for type 2 diabetes mellitus    Estimated body mass index is 35.56 kg/m² as calculated from the following:    Height as of this encounter: 5' 2.01" (1.575 m).    Weight as of this encounter: 88.2 kg (194 lb 7.1 oz).      Screening  Mammogram needed: n/a  Colonoscopy needed: n/a  Osteoporosis screen needed: n/a     Women 50 to 74 years of age should be screened for breast cancer with mammography biennially.  Women should be screened for cervical cancer with Pap tests beginning at 21 years of age. Low-risk women should receive Pap testing every three years. Co-testing for human papillomavirus is an option beginning at 30 years of age, and can extend the screening interval to five years. Cervical cancer screening should be discontinued at 65 years of age or after total hysterectomy if the woman has a benign gynecologic " "history  Adults 50 to 75 years of age should be screened for colorectal cancer with an FOBT annually, sigmoidoscopy every five years with an FOBT every three years, or colonoscopy every 10 years.  Women 65 years and older should be screened for osteoporosis. Women younger than 65 years should be screened if the risk of fracture is greater than or equal to that of a 65-year-old white woman without additional risk factors.      Immunizations  delayed    ALLERGIES and MEDS were verified.   PMHx, PSHx, FHx, SOCIALHx were updated as pertinent.    REVIEW OF SYSTEMS  Review of Systems   Constitutional: Negative.    HENT: Negative.    Eyes: Negative.    Respiratory: Negative.    Cardiovascular: Negative.    Genitourinary: Negative.    Musculoskeletal: Positive for joint pain.   Skin: Negative.    Neurological: Negative.    Psychiatric/Behavioral: Negative.          PHYSICAL EXAM  VITAL SIGNS: /80   Pulse (!) 42   Temp 98.2 °F (36.8 °C) (Oral)   Resp 17   Ht 5' 2.01" (1.575 m)   Wt 88.2 kg (194 lb 7.1 oz)   SpO2 (!) 93%   BMI 35.56 kg/m²   GEN: Well developed, Well nourished, No acute distress.  HENT: Normocephalic, Atraumatic, Bilateral external ears normal, Nose normal, Oropharynx moist, No oral exudates.   Eyes: PERRLA, EOMI, Conjunctiva normal, No discharge.   Neck: Supple, No tenderness.  Lymphatic: No cervical or supraclavicular lymphadenopathy noted.   Cardiovascular: Normal heart rate, Normal rhythm, No murmurs, No rubs, No gallops.   Thorax & Lungs: Normal breath sounds, No respiratory distress, No wheezing.  Abdomen: Soft, No tenderness, Bowel sounds normal.  Breast: deferred  Genital:deferred  Skin: Warm, Dry, No erythema, No rash.   Extremities: No edema, No tenderness.       ASSESSMENT/PLAN    Barbara was seen today for follow-up.    Diagnoses and all orders for this visit:    Well woman exam (no gynecological exam)    Pure hypercholesterolemia  -     Comprehensive metabolic panel; Future  -     " Lipid Panel; Future    Essential hypertension    MGUS (monoclonal gammopathy of unknown significance)    Idiopathic chronic gout of multiple sites without tophus  -     Uric acid; Future    Prediabetes  -     Hemoglobin A1C; Future    Immunization due  -     (In Office Administered) Zoster Recombinant Vaccine  -     (In Office Administered) Pneumococcal Polysaccharide Vaccine (23 Valent) (SQ/IM)    Paroxysmal atrial fibrillation    Benign hypertensive heart disease without heart failure    Chronic diastolic CHF (congestive heart failure)    Atonic neurogenic bladder    Stage 3 chronic kidney disease    Anemia in chronic kidney disease, unspecified CKD stage    Hemochromatosis, unspecified hemochromatosis type    Vitamin D deficiency    Obesity (BMI 30-39.9)    Primary localized osteoarthrosis of lower leg, unspecified laterality           FOLLOW UP: 6 months or sooner if needed      Paras Oro MD

## 2020-08-04 ENCOUNTER — TELEPHONE (OUTPATIENT)
Dept: FAMILY MEDICINE | Facility: CLINIC | Age: 80
End: 2020-08-04

## 2020-08-04 NOTE — TELEPHONE ENCOUNTER
----- Message from Lashae Wong sent at 8/4/2020 11:05 AM CDT -----  Regarding: RESULTS  Contact: Patient  Type:  Test Results    Who Called: Patient     Name of Test (Lab/Mammo/Etc):     Date of Test: 7/31/2020    Ordering Provider:  Dr. Oor     Where the test was performed: [facility]    Would the patient rather a call back or a response via [facility]? Call back     Best Call Back Number: [phone]            For Clinical Team:Has the provider reviewed the results?

## 2020-08-04 NOTE — TELEPHONE ENCOUNTER
Results were sent to her [facility] account.  Overall everything was ok, but she needs to cut back on sugar intake    Thanks  Dr. Oro

## 2020-08-10 ENCOUNTER — TELEPHONE (OUTPATIENT)
Dept: CARDIOLOGY | Facility: CLINIC | Age: 80
End: 2020-08-10

## 2020-08-10 NOTE — TELEPHONE ENCOUNTER
----- Message from Michelle Benz MA sent at 8/10/2020  2:11 PM CDT -----  Regarding: FW: Call  Contact: Patient    ----- Message -----  From: Odell Lazo  Sent: 8/10/2020   1:30 PM CDT  To: [facility] Staff  Subject: Call                                             Type: Patient Call Back    Who called:Patient    What is the request in detail: Patient is requesting an alternative for apixaban (ELIQUIS) 2.5 mg Tab. Insurance does not cover.    Can the clinic reply by [facility]? No    Would the patient rather a call back or a response via [facility]? Call    Best call back number: [phone]    Additional Information:    [id number] - [place] - [address] AT [facility] [address]  [address]  [place] [address]  Phone: [phone] Fax: [phone]

## 2020-09-10 ENCOUNTER — TELEPHONE (OUTPATIENT)
Dept: CARDIOLOGY | Facility: CLINIC | Age: 80
End: 2020-09-10

## 2020-09-10 NOTE — TELEPHONE ENCOUNTER
----- Message from Amanda Oneal MA sent at 9/10/2020  3:17 PM CDT -----  Contact: Patient [phone]    ----- Message -----  From: Dottie Lyon  Sent: 9/10/2020   3:03 PM CDT  To: [facility] Staff    Type: Patient Call Back    Who called: Patient    What is the request in detail: Patient states that she had knee replacements a few years ago. Fell on left knee 2 weeks ago. She's still bleeding from the fall, not a lot, but she's still bleeding. Legs are all red, with bruising. She's on Eliquis and is wondering if the bleeding or fall had anything to do with her heart? Didn't mess up her knee, nothing's wrong with the knee. Just took off top layer of her skin. Made a blood blister and than popped. States she stopped taking the Eliquis a week Tuesday. Would like to speak to nurse in regards to this. Please call.     Would the patient rather a call back or a response via [facility]? Call back    Best call back number: [phone]

## 2020-09-17 ENCOUNTER — ANESTHESIA (OUTPATIENT)
Dept: SURGERY | Facility: HOSPITAL | Age: 80
DRG: 605 | End: 2020-09-17
Payer: MEDICARE

## 2020-09-17 ENCOUNTER — ANESTHESIA EVENT (OUTPATIENT)
Dept: SURGERY | Facility: HOSPITAL | Age: 80
DRG: 605 | End: 2020-09-17
Payer: MEDICARE

## 2020-09-17 ENCOUNTER — HOSPITAL ENCOUNTER (INPATIENT)
Facility: HOSPITAL | Age: 80
LOS: 2 days | Discharge: HOME HEALTH | DRG: 605 | End: 2020-09-19
Attending: EMERGENCY MEDICINE | Admitting: ORTHOPAEDIC SURGERY
Payer: MEDICARE

## 2020-09-17 DIAGNOSIS — M79.89 LEFT LEG SWELLING: ICD-10-CM

## 2020-09-17 DIAGNOSIS — R00.1 BRADYCARDIA: ICD-10-CM

## 2020-09-17 DIAGNOSIS — Z87.828 H/O OPEN LEG WOUND: Primary | ICD-10-CM
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
## 2020-09-17 LAB
ALBUMIN SERPL BCP-MCNC: 3.1 G/DL (ref 3.5–5.2)
ALP SERPL-CCNC: 69 U/L (ref 55–135)
ALT SERPL W/O P-5'-P-CCNC: 35 U/L (ref 10–44)
ANION GAP SERPL CALC-SCNC: 9 MMOL/L (ref 8–16)
APTT BLDCRRT: 38.5 SEC (ref 21–32)
AST SERPL-CCNC: 20 U/L (ref 10–40)
BACTERIA #/AREA URNS HPF: ABNORMAL /HPF
BASOPHILS # BLD AUTO: 0.03 K/UL (ref 0–0.2)
BASOPHILS NFR BLD: 0.5 % (ref 0–1.9)
BILIRUB SERPL-MCNC: 0.9 MG/DL (ref 0.1–1)
BILIRUB UR QL STRIP: NEGATIVE
BUN SERPL-MCNC: 29 MG/DL (ref 8–23)
CALCIUM SERPL-MCNC: 8.2 MG/DL (ref 8.7–10.5)
CHLORIDE SERPL-SCNC: 105 MMOL/L (ref 95–110)
CLARITY UR: CLEAR
CO2 SERPL-SCNC: 23 MMOL/L (ref 23–29)
COLOR UR: YELLOW
CREAT SERPL-MCNC: 2 MG/DL (ref 0.5–1.4)
CRP SERPL-MCNC: 46.1 MG/L (ref 0–8.2)
CTP QC/QA: YES
DIFFERENTIAL METHOD: ABNORMAL
EOSINOPHIL # BLD AUTO: 0 K/UL (ref 0–0.5)
EOSINOPHIL NFR BLD: 0.7 % (ref 0–8)
ERYTHROCYTE [DISTWIDTH] IN BLOOD BY AUTOMATED COUNT: 13.6 % (ref 11.5–14.5)
ERYTHROCYTE [SEDIMENTATION RATE] IN BLOOD BY WESTERGREN METHOD: 27 MM/HR (ref 0–20)
EST. GFR  (AFRICAN AMERICAN): 27 ML/MIN/1.73 M^2
EST. GFR  (NON AFRICAN AMERICAN): 23 ML/MIN/1.73 M^2
GLUCOSE SERPL-MCNC: 104 MG/DL (ref 70–110)
GLUCOSE UR QL STRIP: NEGATIVE
HCT VFR BLD AUTO: 33.8 % (ref 37–48.5)
HGB BLD-MCNC: 10.8 G/DL (ref 12–16)
HGB UR QL STRIP: ABNORMAL
IMM GRANULOCYTES # BLD AUTO: 0.02 K/UL (ref 0–0.04)
IMM GRANULOCYTES NFR BLD AUTO: 0.4 % (ref 0–0.5)
INR PPP: 1.1 (ref 0.8–1.2)
KETONES UR QL STRIP: NEGATIVE
LEUKOCYTE ESTERASE UR QL STRIP: NEGATIVE
LYMPHOCYTES # BLD AUTO: 0.6 K/UL (ref 1–4.8)
LYMPHOCYTES NFR BLD: 10.3 % (ref 18–48)
MCH RBC QN AUTO: 31.3 PG (ref 27–31)
MCHC RBC AUTO-ENTMCNC: 32 G/DL (ref 32–36)
MCV RBC AUTO: 98 FL (ref 82–98)
MICROSCOPIC COMMENT: ABNORMAL
MONOCYTES # BLD AUTO: 0.4 K/UL (ref 0.3–1)
MONOCYTES NFR BLD: 8 % (ref 4–15)
NEUTROPHILS # BLD AUTO: 4.4 K/UL (ref 1.8–7.7)
NEUTROPHILS NFR BLD: 80.1 % (ref 38–73)
NITRITE UR QL STRIP: NEGATIVE
NRBC BLD-RTO: 0 /100 WBC
PH UR STRIP: 5 [PH] (ref 5–8)
PLATELET # BLD AUTO: 210 K/UL (ref 150–350)
PMV BLD AUTO: 10.2 FL (ref 9.2–12.9)
POTASSIUM SERPL-SCNC: 4.4 MMOL/L (ref 3.5–5.1)
PROT SERPL-MCNC: 6.8 G/DL (ref 6–8.4)
PROT UR QL STRIP: NEGATIVE
PROTHROMBIN TIME: 12.7 SEC (ref 9–12.5)
RBC # BLD AUTO: 3.45 M/UL (ref 4–5.4)
RBC #/AREA URNS HPF: 1 /HPF (ref 0–4)
SARS-COV-2 RDRP RESP QL NAA+PROBE: NEGATIVE
SODIUM SERPL-SCNC: 137 MMOL/L (ref 136–145)
SP GR UR STRIP: 1.01 (ref 1–1.03)
SQUAMOUS #/AREA URNS HPF: 0 /HPF
URN SPEC COLLECT METH UR: ABNORMAL
UROBILINOGEN UR STRIP-ACNC: NEGATIVE EU/DL
WBC # BLD AUTO: 5.52 K/UL (ref 3.9–12.7)
WBC #/AREA URNS HPF: 1 /HPF (ref 0–5)
YEAST URNS QL MICRO: ABNORMAL

## 2020-09-17 PROCEDURE — 96367 TX/PROPH/DG ADDL SEQ IV INF: CPT

## 2020-09-17 PROCEDURE — D9220A PRA ANESTHESIA: Mod: CRNA,,, | Performed by: REGISTERED NURSE

## 2020-09-17 PROCEDURE — 25000003 PHARM REV CODE 250: Performed by: ORTHOPAEDIC SURGERY

## 2020-09-17 PROCEDURE — 85025 COMPLETE CBC W/AUTO DIFF WBC: CPT

## 2020-09-17 PROCEDURE — 85610 PROTHROMBIN TIME: CPT

## 2020-09-17 PROCEDURE — 36000704 HC OR TIME LEV I 1ST 15 MIN: Performed by: ORTHOPAEDIC SURGERY

## 2020-09-17 PROCEDURE — 87186 SC STD MICRODIL/AGAR DIL: CPT | Mod: 59

## 2020-09-17 PROCEDURE — 25000003 PHARM REV CODE 250: Performed by: REGISTERED NURSE

## 2020-09-17 PROCEDURE — D9220A PRA ANESTHESIA: ICD-10-PCS | Mod: ANES,,, | Performed by: ANESTHESIOLOGY

## 2020-09-17 PROCEDURE — 87086 URINE CULTURE/COLONY COUNT: CPT

## 2020-09-17 PROCEDURE — 85652 RBC SED RATE AUTOMATED: CPT

## 2020-09-17 PROCEDURE — 27201423 OPTIME MED/SURG SUP & DEVICES STERILE SUPPLY: Performed by: ORTHOPAEDIC SURGERY

## 2020-09-17 PROCEDURE — 37000009 HC ANESTHESIA EA ADD 15 MINS: Performed by: ORTHOPAEDIC SURGERY

## 2020-09-17 PROCEDURE — D9220A PRA ANESTHESIA: ICD-10-PCS | Mod: CRNA,,, | Performed by: REGISTERED NURSE

## 2020-09-17 PROCEDURE — 71000033 HC RECOVERY, INTIAL HOUR: Performed by: ORTHOPAEDIC SURGERY

## 2020-09-17 PROCEDURE — 86140 C-REACTIVE PROTEIN: CPT

## 2020-09-17 PROCEDURE — U0002 COVID-19 LAB TEST NON-CDC: HCPCS | Performed by: NURSE PRACTITIONER

## 2020-09-17 PROCEDURE — 63600175 PHARM REV CODE 636 W HCPCS: Performed by: REGISTERED NURSE

## 2020-09-17 PROCEDURE — 63600175 PHARM REV CODE 636 W HCPCS: Performed by: NURSE PRACTITIONER

## 2020-09-17 PROCEDURE — 63600175 PHARM REV CODE 636 W HCPCS: Performed by: ORTHOPAEDIC SURGERY

## 2020-09-17 PROCEDURE — 37000008 HC ANESTHESIA 1ST 15 MINUTES: Performed by: ORTHOPAEDIC SURGERY

## 2020-09-17 PROCEDURE — 36000705 HC OR TIME LEV I EA ADD 15 MIN: Performed by: ORTHOPAEDIC SURGERY

## 2020-09-17 PROCEDURE — 96366 THER/PROPH/DIAG IV INF ADDON: CPT

## 2020-09-17 PROCEDURE — 87040 BLOOD CULTURE FOR BACTERIA: CPT | Mod: 59

## 2020-09-17 PROCEDURE — 87077 CULTURE AEROBIC IDENTIFY: CPT | Mod: 59

## 2020-09-17 PROCEDURE — 93010 ELECTROCARDIOGRAM REPORT: CPT | Mod: ,,, | Performed by: INTERNAL MEDICINE

## 2020-09-17 PROCEDURE — 93005 ELECTROCARDIOGRAM TRACING: CPT

## 2020-09-17 PROCEDURE — 11000001 HC ACUTE MED/SURG PRIVATE ROOM

## 2020-09-17 PROCEDURE — 80053 COMPREHEN METABOLIC PANEL: CPT

## 2020-09-17 PROCEDURE — 96365 THER/PROPH/DIAG IV INF INIT: CPT

## 2020-09-17 PROCEDURE — 99285 EMERGENCY DEPT VISIT HI MDM: CPT | Mod: 25

## 2020-09-17 PROCEDURE — 25000003 PHARM REV CODE 250: Performed by: NURSE PRACTITIONER

## 2020-09-17 PROCEDURE — 85730 THROMBOPLASTIN TIME PARTIAL: CPT

## 2020-09-17 PROCEDURE — 81000 URINALYSIS NONAUTO W/SCOPE: CPT

## 2020-09-17 PROCEDURE — 87070 CULTURE OTHR SPECIMN AEROBIC: CPT

## 2020-09-17 PROCEDURE — 71000039 HC RECOVERY, EACH ADD'L HOUR: Performed by: ORTHOPAEDIC SURGERY

## 2020-09-17 PROCEDURE — 93010 EKG 12-LEAD: ICD-10-PCS | Mod: ,,, | Performed by: INTERNAL MEDICINE

## 2020-09-17 PROCEDURE — 96366 THER/PROPH/DIAG IV INF ADDON: CPT | Performed by: EMERGENCY MEDICINE

## 2020-09-17 PROCEDURE — D9220A PRA ANESTHESIA: Mod: ANES,,, | Performed by: ANESTHESIOLOGY

## 2020-09-17 RX ORDER — ERGOCALCIFEROL 1.25 MG/1
50000 CAPSULE ORAL
Status: DISCONTINUED | OUTPATIENT
Start: 2020-09-18 | End: 2020-09-19 | Stop reason: HOSPADM

## 2020-09-17 RX ORDER — SODIUM CHLORIDE 0.9 % (FLUSH) 0.9 %
10 SYRINGE (ML) INJECTION
Status: DISCONTINUED | OUTPATIENT
Start: 2020-09-17 | End: 2020-09-17

## 2020-09-17 RX ORDER — CALCIUM CARBONATE 200(500)MG
1 TABLET,CHEWABLE ORAL DAILY
Status: DISCONTINUED | OUTPATIENT
Start: 2020-09-18 | End: 2020-09-19 | Stop reason: HOSPADM

## 2020-09-17 RX ORDER — DOXAZOSIN 4 MG/1
4 TABLET ORAL NIGHTLY
Status: DISCONTINUED | OUTPATIENT
Start: 2020-09-17 | End: 2020-09-19 | Stop reason: HOSPADM

## 2020-09-17 RX ORDER — FUROSEMIDE 20 MG/1
20 TABLET ORAL DAILY
Status: DISCONTINUED | OUTPATIENT
Start: 2020-09-18 | End: 2020-09-19 | Stop reason: HOSPADM

## 2020-09-17 RX ORDER — PROPOFOL 10 MG/ML
VIAL (ML) INTRAVENOUS
Status: DISCONTINUED | OUTPATIENT
Start: 2020-09-17 | End: 2020-09-17

## 2020-09-17 RX ORDER — MORPHINE SULFATE 10 MG/ML
1 INJECTION INTRAMUSCULAR; INTRAVENOUS; SUBCUTANEOUS
Status: DISCONTINUED | OUTPATIENT
Start: 2020-09-17 | End: 2020-09-19 | Stop reason: HOSPADM

## 2020-09-17 RX ORDER — HYDROCODONE BITARTRATE AND ACETAMINOPHEN 5; 325 MG/1; MG/1
1 TABLET ORAL EVERY 6 HOURS PRN
Status: DISCONTINUED | OUTPATIENT
Start: 2020-09-17 | End: 2020-09-19 | Stop reason: HOSPADM

## 2020-09-17 RX ORDER — EPHEDRINE SULFATE 50 MG/ML
INJECTION, SOLUTION INTRAVENOUS
Status: DISCONTINUED | OUTPATIENT
Start: 2020-09-17 | End: 2020-09-17

## 2020-09-17 RX ORDER — SODIUM CHLORIDE 0.9 % (FLUSH) 0.9 %
10 SYRINGE (ML) INJECTION
Status: DISCONTINUED | OUTPATIENT
Start: 2020-09-17 | End: 2020-09-19 | Stop reason: HOSPADM

## 2020-09-17 RX ORDER — LOSARTAN POTASSIUM 25 MG/1
25 TABLET ORAL DAILY
Status: DISCONTINUED | OUTPATIENT
Start: 2020-09-18 | End: 2020-09-19 | Stop reason: HOSPADM

## 2020-09-17 RX ORDER — AMIODARONE HYDROCHLORIDE 200 MG/1
200 TABLET ORAL DAILY
Status: DISCONTINUED | OUTPATIENT
Start: 2020-09-18 | End: 2020-09-19 | Stop reason: HOSPADM

## 2020-09-17 RX ORDER — FLUOXETINE 10 MG/1
10 CAPSULE ORAL DAILY
Status: DISCONTINUED | OUTPATIENT
Start: 2020-09-18 | End: 2020-09-19 | Stop reason: HOSPADM

## 2020-09-17 RX ORDER — ALLOPURINOL 100 MG/1
100 TABLET ORAL DAILY
Status: DISCONTINUED | OUTPATIENT
Start: 2020-09-18 | End: 2020-09-19 | Stop reason: HOSPADM

## 2020-09-17 RX ORDER — LIDOCAINE HYDROCHLORIDE 20 MG/ML
INJECTION INTRAVENOUS
Status: DISCONTINUED | OUTPATIENT
Start: 2020-09-17 | End: 2020-09-17

## 2020-09-17 RX ORDER — FENTANYL CITRATE 50 UG/ML
INJECTION, SOLUTION INTRAMUSCULAR; INTRAVENOUS
Status: DISCONTINUED | OUTPATIENT
Start: 2020-09-17 | End: 2020-09-17

## 2020-09-17 RX ORDER — CEFAZOLIN SODIUM 1 G/50ML
1 SOLUTION INTRAVENOUS
Status: DISCONTINUED | OUTPATIENT
Start: 2020-09-17 | End: 2020-09-19 | Stop reason: HOSPADM

## 2020-09-17 RX ORDER — ATORVASTATIN CALCIUM 40 MG/1
40 TABLET, FILM COATED ORAL DAILY
Status: DISCONTINUED | OUTPATIENT
Start: 2020-09-18 | End: 2020-09-19 | Stop reason: HOSPADM

## 2020-09-17 RX ORDER — FOLIC ACID 1 MG/1
1 TABLET ORAL DAILY
Status: DISCONTINUED | OUTPATIENT
Start: 2020-09-18 | End: 2020-09-19 | Stop reason: HOSPADM

## 2020-09-17 RX ORDER — METOPROLOL SUCCINATE 25 MG/1
25 TABLET, EXTENDED RELEASE ORAL DAILY
Status: DISCONTINUED | OUTPATIENT
Start: 2020-09-18 | End: 2020-09-19 | Stop reason: HOSPADM

## 2020-09-17 RX ORDER — ONDANSETRON 2 MG/ML
INJECTION INTRAMUSCULAR; INTRAVENOUS
Status: DISCONTINUED | OUTPATIENT
Start: 2020-09-17 | End: 2020-09-17

## 2020-09-17 RX ORDER — SODIUM CHLORIDE, SODIUM LACTATE, POTASSIUM CHLORIDE, CALCIUM CHLORIDE 600; 310; 30; 20 MG/100ML; MG/100ML; MG/100ML; MG/100ML
INJECTION, SOLUTION INTRAVENOUS CONTINUOUS PRN
Status: DISCONTINUED | OUTPATIENT
Start: 2020-09-17 | End: 2020-09-17

## 2020-09-17 RX ORDER — HYDROMORPHONE HYDROCHLORIDE 2 MG/ML
0.2 INJECTION, SOLUTION INTRAMUSCULAR; INTRAVENOUS; SUBCUTANEOUS EVERY 5 MIN PRN
Status: DISCONTINUED | OUTPATIENT
Start: 2020-09-17 | End: 2020-09-17 | Stop reason: HOSPADM

## 2020-09-17 RX ADMIN — CEFAZOLIN SODIUM 1 G: 1 SOLUTION INTRAVENOUS at 11:09

## 2020-09-17 RX ADMIN — Medication 100 MG: at 03:09

## 2020-09-17 RX ADMIN — ONDANSETRON 4 MG: 2 INJECTION, SOLUTION INTRAMUSCULAR; INTRAVENOUS at 03:09

## 2020-09-17 RX ADMIN — FENTANYL CITRATE 25 MCG: 50 INJECTION INTRAMUSCULAR; INTRAVENOUS at 03:09

## 2020-09-17 RX ADMIN — GLYCOPYRROLATE 0.2 MG: 0.2 INJECTION, SOLUTION INTRAMUSCULAR; INTRAVITREAL at 03:09

## 2020-09-17 RX ADMIN — MORPHINE SULFATE 1 MG: 10 INJECTION, SOLUTION INTRAMUSCULAR; INTRAVENOUS at 11:09

## 2020-09-17 RX ADMIN — DOXAZOSIN 4 MG: 4 TABLET ORAL at 08:09

## 2020-09-17 RX ADMIN — PROPOFOL 50 MG: 10 INJECTION, EMULSION INTRAVENOUS at 03:09

## 2020-09-17 RX ADMIN — VANCOMYCIN HYDROCHLORIDE 1250 MG: 1.25 INJECTION, POWDER, LYOPHILIZED, FOR SOLUTION INTRAVENOUS at 09:09

## 2020-09-17 RX ADMIN — EPHEDRINE SULFATE 10 MG: 50 INJECTION INTRAVENOUS at 03:09

## 2020-09-17 RX ADMIN — PIPERACILLIN AND TAZOBACTAM 4.5 G: 4; .5 INJECTION, POWDER, LYOPHILIZED, FOR SOLUTION INTRAVENOUS; PARENTERAL at 08:09

## 2020-09-17 RX ADMIN — FENTANYL CITRATE 50 MCG: 50 INJECTION INTRAMUSCULAR; INTRAVENOUS at 03:09

## 2020-09-17 RX ADMIN — HYDROCODONE BITARTRATE AND ACETAMINOPHEN 1 TABLET: 5; 325 TABLET ORAL at 08:09

## 2020-09-17 RX ADMIN — APIXABAN 2.5 MG: 2.5 TABLET, FILM COATED ORAL at 08:09

## 2020-09-17 RX ADMIN — CEFAZOLIN SODIUM 2 G: 1 POWDER, FOR SOLUTION INTRAMUSCULAR; INTRAVENOUS at 03:09

## 2020-09-17 RX ADMIN — SODIUM CHLORIDE, SODIUM LACTATE, POTASSIUM CHLORIDE, AND CALCIUM CHLORIDE: .6; .31; .03; .02 INJECTION, SOLUTION INTRAVENOUS at 02:09

## 2020-09-17 RX ADMIN — PROPOFOL 150 MG: 10 INJECTION, EMULSION INTRAVENOUS at 03:09

## 2020-09-17 NOTE — NURSING
Pt arrived AAO, unlabored breathing, calm and cooperative, orientated to the room, IV intact and infusing, ace wrap with wound vac to BERTIN Meyers at bedside.  ILIANA

## 2020-09-17 NOTE — ED PROVIDER NOTES
"Encounter Date: 9/17/2020    SCRIBE #1 NOTE: I, Katharine Ley, am scribing for, and in the presence of,  LUCIANA Brown. I have scribed the following portions of the note - Other sections scribed: HPI, ROS .       History     Chief Complaint   Patient presents with    Wound Check     Pt states she has "wound to left knee that needs to be scraped and drained". Denies pain at this time     This is a 80 y.o female who has DM, GERD, A-fib, HTN, Vitamin D deficiency presents to the ED for an evaluation for a 3 week h/o a wound to the left knee s/p a trip and fall.  She reports she is currently under the care of Dr. Minor, Orthopedics, for this problem.  She reports being placed on an antibiotic after initial onset, which she can not recall the name at this time.  She states yesterday, her antibiotic was changed to Augmentin. She reports receiving a phone call today advising that she come to the ED for an evaluation. Patient denies fever, chills, nausea, emesis, or ay other associated symptoms. No alleviating factors.    The history is provided by the patient.     Review of patient's allergies indicates:  No Known Allergies  Past Medical History:   Diagnosis Date    A-fib     Diabetes mellitus type II     Fatty liver     GERD (gastroesophageal reflux disease)     Hemochromatosis     Hyperlipidemia     Hypertension     Macular degeneration     Osteoporosis     Vaginal delivery     x2    Vitamin D deficiency disease     Wears partial dentures     upper removable bridge     Past Surgical History:   Procedure Laterality Date    BREAST BIOPSY      CHOLECYSTECTOMY  08/2015    EYE SURGERY      Cat Ext  OU    HYSTERECTOMY      partial due to uterine fibroids    TOTAL KNEE ARTHROPLASTY Right 2015    left knee done 5/2013     Family History   Problem Relation Age of Onset    Cancer Mother         stomach    Hypertension Mother     Aneurysm Father         abdominal     Social History     Tobacco Use    " Smoking status: Former Smoker    Smokeless tobacco: Never Used   Substance Use Topics    Alcohol use: Yes     Alcohol/week: 0.0 standard drinks     Comment: occasional/ on a weekend    Drug use: No     Review of Systems   Constitutional: Negative for chills and fever.   HENT: Negative for congestion, ear pain, rhinorrhea and sore throat.    Eyes: Negative for pain and visual disturbance.   Respiratory: Negative for cough and shortness of breath.    Cardiovascular: Negative for chest pain.   Gastrointestinal: Negative for abdominal pain, diarrhea, nausea and vomiting.   Genitourinary: Negative for dysuria.   Musculoskeletal: Negative for back pain and neck pain.   Skin: Positive for wound. Negative for rash.   Neurological: Negative for headaches.       Physical Exam     Initial Vitals [09/17/20 0749]   BP Pulse Resp Temp SpO2   (!) 173/68 (!) 49 20 98.4 °F (36.9 °C) 99 %      MAP       --         Physical Exam    Constitutional: She appears well-developed and well-nourished. She is not diaphoretic. No distress.   HENT:   Head: Normocephalic and atraumatic.   Neck: Normal range of motion.   Cardiovascular: Intact distal pulses. Bradycardia present.    Pulmonary/Chest: No respiratory distress.   Musculoskeletal: Normal range of motion.      Left hip: Normal.      Left knee: She exhibits laceration.        Left ankle: She exhibits swelling.      Left lower leg: Edema present.   Neurological: She is alert and oriented to person, place, and time.   Skin: Skin is warm and dry.   Psychiatric: She has a normal mood and affect. Her behavior is normal.                 ED Course   Procedures  Labs Reviewed   CBC W/ AUTO DIFFERENTIAL - Abnormal; Notable for the following components:       Result Value    RBC 3.45 (*)     Hemoglobin 10.8 (*)     Hematocrit 33.8 (*)     Mean Corpuscular Hemoglobin 31.3 (*)     Lymph # 0.6 (*)     Gran% 80.1 (*)     Lymph% 10.3 (*)     All other components within normal limits   COMPREHENSIVE  METABOLIC PANEL - Abnormal; Notable for the following components:    BUN, Bld 29 (*)     Creatinine 2.0 (*)     Calcium 8.2 (*)     Albumin 3.1 (*)     eGFR if  27 (*)     eGFR if non  23 (*)     All other components within normal limits   SEDIMENTATION RATE - Abnormal; Notable for the following components:    Sed Rate 27 (*)     All other components within normal limits   C-REACTIVE PROTEIN - Abnormal; Notable for the following components:    CRP 46.1 (*)     All other components within normal limits   CULTURE, BLOOD   CULTURE, BLOOD   CULTURE, URINE   CULTURE, AEROBIC  (SPECIFY SOURCE)   PROTIME-INR   APTT   URINALYSIS, REFLEX TO URINE CULTURE   PROTIME-INR   APTT   SARS-COV-2 RDRP GENE          Imaging Results          US Lower Extremity Veins Left (Final result)  Result time 09/17/20 10:43:20    Final result by Jewel Mckeon MD (09/17/20 10:43:20)                 Impression:      No evidence of deep venous thrombosis in the left lower extremity.      Electronically signed by: Jewel Mckeon MD  Date:    09/17/2020  Time:    10:43             Narrative:    EXAMINATION:  US LOWER EXTREMITY VEINS LEFT    CLINICAL HISTORY:  Other specified soft tissue disorders    TECHNIQUE:  Duplex and color flow Doppler evaluation and graded compression of the left lower extremity veins was performed.    COMPARISON:  None    FINDINGS:  Left thigh veins: The common femoral, femoral, popliteal, upper greater saphenous, and deep femoral veins are patent and free of thrombus. The veins are normally compressible and have normal phasic flow and augmentation response.    Left calf veins: The visualized calf veins are patent.    Contralateral CFV: The contralateral (right) common femoral vein is patent and free of thrombus.                               X-Ray Knee 3 View Left (Final result)  Result time 09/17/20 08:57:46    Final result by Jewel Mckeon MD (09/17/20 08:57:46)                 Impression:       1. No acute fractures.  2. Questionable small suprapatellar joint effusion.  3. Extensive prepatellar soft tissue swelling associated with laceration as above.      Electronically signed by: Jewel Mckeon MD  Date:    09/17/2020  Time:    08:57             Narrative:    EXAMINATION:  XR KNEE 3 VIEW LEFT    CLINICAL HISTORY:  Unspecified fall, initial encounter    TECHNIQUE:  AP, lateral, and Merchant views of the left knee were performed.    COMPARISON:  Left knee 05/13/2013    FINDINGS:  Large cutaneous defects along the anterior knee below the level of the patella likely representing lacerations.  There is significant subcutaneous soft tissue swelling in the prepatellar region likely related to the laceration.    Stable postoperative changes from total left knee arthroplasty.  There is no loosening of the prostheses or acute fractures.  There is no dislocations.  There is a questionable small joint effusion.                                 Medical Decision Making:   ED Management:  80-year-old female presents the ED with left knee wound x 3 weeks.  She denies fever, chills, nausea, vomiting.  Chronic appearing wound noted to the left knee with edema to the lower leg.  Pulses intact.  Patient given vanc and Zosyn for possible cellulitis.  Afebrile.  No leukocytosis on CBC.  Blood cultures are pending.  Ultrasound negative for DVT.  Ortho advised the patient to come to the ED for admission for I and D and wound VAC placement in the OR.  Patient took all of her medications today, including her Eliquis.  Will make patient NPO.  Ortho will admit the patient to the hospital.  Patient is agreeable to the plan of care.    This case was discussed my attending physician who examined the patient and agrees my plan of care.            Scribe Attestation:   Scribe #1: I performed the above scribed service and the documentation accurately describes the services I performed. I attest to the accuracy of the note.                       Clinical Impression:     ICD-10-CM ICD-9-CM   1. H/O open leg wound  Z87.828 V15.59   2. Fall  W19.XXXA E888.9   3. Bradycardia  R00.1 427.89   4. Left leg swelling  M79.89 729.81                      Disposition:   Disposition: Placed in Observation  Condition: Stable     ED Disposition Condition    Observation                  IKEN, personally performed the services described in this documentation. All medical record entries made by the scribe were at my direction and in my presence. I have reviewed the chart and agree that the record reflects my personal performance and is accurate and complete.           Judie George, ARISTIDES  09/17/20 1123

## 2020-09-17 NOTE — H&P
Patient known to us. Will plan for I&D of left knee today at 230/3. Will see, consent, and full H&P to follow. Keep  NPO.

## 2020-09-17 NOTE — H&P
"[facility]  Orthopedics  H&P    Patient Name: Barbara Rizo  MRN: [id number]  Admission Date: 9/17/2020  Primary Care Provider: Paras Oro MD    Patient information was obtained from patient and ER records.     Subjective:     Principal Problem:<principal problem not specified> left knee pain and swelling    Chief Complaint:   Chief Complaint   Patient presents with    Wound Check     Pt states she has "wound to left knee that needs to be scraped and drained". Denies pain at this time        HPI: Patient is a 79 yo female who has been followed recently in clinic by Dr Wagoner. She has a sig pshx of left TKA. Presented to the clinic yesterday  with a CC of right knee pain and swelling after a fall 3 weeks ago. Her wound has been worsening since her fall and she was advised to come to the ED to be admitted for surgical care.     Past Medical History:   Diagnosis Date    A-fib     CKD (chronic kidney disease)     Diabetes mellitus type II     Fatty liver     GERD (gastroesophageal reflux disease)     Hearing loss of both ears     wears bilateral hearing aids    Hemochromatosis     History of anemia due to CKD     History of pleural effusion     with thoracentesis    Hyperlipidemia     Hypertension     Macular degeneration     Osteoarthritis     Left knee    Osteoporosis     Vaginal delivery     x2    Vitamin D deficiency disease     Wears partial dentures     upper removable bridge       Past Surgical History:   Procedure Laterality Date    BREAST BIOPSY      BREAST SURGERY Bilateral     Reduction r/t h/o fibrocystic disease    CHOLECYSTECTOMY  08/2015    EYE SURGERY      Cat Ext  OU    HYSTERECTOMY      partial due to uterine fibroids    JOINT REPLACEMENT Left 05/13/2013    TKR    JOINT REPLACEMENT Right 08/03/2015    TKR    THORACENTESIS      TOTAL KNEE ARTHROPLASTY Right 2015    left knee done 5/2013       Review of patient's allergies indicates:  No Known " "Allergies    No current facility-administered medications for this encounter.      Facility-Administered Medications Ordered in Other Encounters   Medication    denosumab (PROLIA) injection 60 mg     Family History     Problem Relation (Age of Onset)    Aneurysm Father    Cancer Mother    Hypertension Mother        Tobacco Use    Smoking status: Former Smoker    Smokeless tobacco: Never Used   Substance and Sexual Activity    Alcohol use: Not Currently     Alcohol/week: 0.0 standard drinks     Comment: occasional/ on a weekend    Drug use: No    Sexual activity: Not Currently     Review of Systems   HENT: Negative.    Eyes: Negative.    Cardiovascular: Negative.    Respiratory: Negative.    Endocrine: Negative.    Hematologic/Lymphatic: Negative.    Skin: Negative.    Musculoskeletal:        Left knee pain, swelling, drainage   Gastrointestinal: Negative.    Genitourinary: Negative.    Neurological: Negative.    Psychiatric/Behavioral: Negative.      Objective:     Vital Signs (Most Recent):  Temp: 98.4 °F (36.9 °C) (09/17/20 0749)  Pulse: (!) 41 (09/17/20 1302)  Resp: 15 (09/17/20 1302)  BP: (!) 163/70 (09/17/20 1333)  SpO2: 99 % (09/17/20 0749) Vital Signs (24h Range):  Temp:  [98.4 °F (36.9 °C)] 98.4 °F (36.9 °C)  Pulse:  [41-49] 41  Resp:  [13-22] 15  SpO2:  [99 %] 99 %  BP: (148-173)/(65-70) 163/70     Weight: 88 kg (194 lb)  Height: 5' 2" (157.5 cm)  Body mass index is 35.48 kg/m².      Intake/Output Summary (Last 24 hours) at 9/17/2020 1459  Last data filed at 9/17/2020 0944  Gross per 24 hour   Intake 100 ml   Output --   Net 100 ml       General    Constitutional: She is oriented to person, place, and time. She appears well-developed and well-nourished.   HENT:   Nose: Nose normal.   Eyes: Pupils are equal, round, and reactive to light.   Neck: Normal range of motion.   Cardiovascular: Normal rate.    Pulmonary/Chest: Effort normal.   Abdominal: Soft.   Neurological: She is alert and oriented to " person, place, and time.   Psychiatric: She has a normal mood and affect.             Left Knee Exam     Comments:  Left knee: + Swelling and erythema throughout left LE.  2 areas over anterior knee with deeper tissue exposed. No active bleeding. Appears to have been where previous blisters ruptured over TKA scar. Confirms sensation throughout left LE. Able to wiggle all toes and flex/extend her ankle. Skin warm to touch. DP pulse appreciated.       Significant Labs:   CBC:   Recent Labs   Lab 09/17/20  0815   WBC 5.52   HGB 10.8*   HCT 33.8*        All pertinent labs within the past 24 hours have been reviewed.    Significant Imaging: X-Ray: I have reviewed all pertinent results/findings and my personal findings are:  1. No acute fractures. 1. No acute fractures.  2. Questionable small suprapatellar joint effusion.  3. Extensive prepatellar soft tissue swelling associated with laceration as above.    WBC: 5.52  Hct: 33.8    Assessment/Plan:     Active Diagnoses:    Diagnosis Date Noted POA    H/O open leg wound [Z87.828] 09/17/2020 Not Applicable      Problems Resolved During this Admission:     A/P: Left knee open wound  1) admit to ortho surgery  2) NPO  3) to OR today for I&D left knee with possible wound closure    MARY Lucas  Orthopedics  [facility]

## 2020-09-17 NOTE — OP NOTE
[facility]  Surgery Department  Operative Note    SUMMARY     Date of Procedure: 9/17/2020     Procedure: Procedure(s) (LRB):  INCISION AND DRAINAGE, KNEE (Left)  WOUND VAC APPLICATION (Left)     Surgeon(s) and Role:     * Rolando Wagoner MD - Primary    Assisting Surgeon: James    Pre-Operative Diagnosis: Left knee skin necrosis    Post-Operative Diagnosis: Post-Op Diagnosis Codes:     same    Anesthesia: Choice    Technical Procedures Used: I&D left knee with wound vac placement    Description of the Findings of the Procedure: open wound    Significant Surgical Tasks Conducted by the Assistant(s), if Applicable:  Position, prep, drape, retract, placement of wound VAC and transported the patient    Complications: No    Estimated Blood Loss (EBL):minimal           Implants: * No implants in log * none    Specimens:   Specimen (12h ago, onward)    None                  Condition: Good    Disposition: PACU - hemodynamically stable.    Attestation: I was present and scrubbed for the entire procedure.     Procedure in detail:    After proper consents were obtained, patient was taken operating room and administered general anesthesia.  Left lower extremity was then prepped and draped in normal sterile fashion and the limb was elevated, tourniquet was inflated.  Incision was made around the open wounds with removal of the skin edges.  Deeper dissection revealed a large hematoma which was evacuated in its entirety.  Some friable fat was removed but there was no purulence or sign of infection and there was no deep tracking into the knee joint itself.  Once debridement was complete the wound was irrigated with 3 L normal saline with Betadine with the Pulsavac.  Proximal and distal extent of the incision was closed with 3 O nylon.  Small wound VAC was then fashioned to fit the defect in the skin.  Dressing was placed and the wound VAC was turned on and noted to be functioning appropriately.  Tourniquet  was then deflated and patient was aroused in the operating room, transferred to recovery in stable condition

## 2020-09-17 NOTE — ANESTHESIA PREPROCEDURE EVALUATION
09/17/2020  Barbara Rizo is a 80 y.o., female.    Anesthesia Evaluation    I have reviewed the Patient Summary Reports.    I have reviewed the Nursing Notes.       Review of Systems  Anesthesia Hx:  No problems with previous Anesthesia    Social:  Former Smoker, Alcohol Use    Hematology/Oncology:         -- Anemia: Hematology Comments: On Eliquis-last dose   Cardiovascular:   Denies Pacemaker. Hypertension Dysrhythmias atrial fibrillation CHF hyperlipidemia 6/1/2020 Echo:  · Normal left ventricular systolic function. The estimated ejection fraction is 55%.  · Concentric left ventricular hypertrophy.  · Severe left atrial enlargement.  · Indeterminate left ventricular diastolic function.  · Normal right ventricular systolic function.  · Mild-to-moderate mitral regurgitation.  · Moderate tricuspid regurgitation.  · Mild pulmonic regurgitation.  · Moderate pulmonary hypertension present.  · Normal central venous pressure (3 mmHg).  · The estimated PA systolic pressure is 66 mmHg.   Pulmonary:   Denies Pneumonia Denies COPD.  Denies Asthma.  Denies Recent URI. pulm HTN; PAP 66   Renal/:   Chronic Renal Disease, CRI    Hepatic/GI:   GERD Liver Disease,    Musculoskeletal:   Arthritis  Left knee wound/swelling s/p fall   Neurological:  Neurology Normal    Endocrine:   Diabetes        Physical Exam  General:  Well nourished, Morbid Obesity    Airway/Jaw/Neck:  AIRWAY FINDINGS: Normal      Chest/Lungs:  Chest/Lungs Clear    Heart/Vascular:  Heart Findings: Normal       Mental Status:  Mental Status Findings:  Cooperative       Wt Readings from Last 3 Encounters:   09/17/20 88 kg (194 lb)   07/24/20 88.2 kg (194 lb 7.1 oz)   06/18/20 89 kg (196 lb 3.4 oz)     Temp Readings from Last 3 Encounters:   09/17/20 36.9 °C (98.4 °F) (Oral)   07/24/20 36.8 °C (98.2 °F) (Oral)   06/03/20 36.9 °C (98.4 °F)     BP  Readings from Last 3 Encounters:   09/17/20 (!) 166/70   07/24/20 136/80   06/18/20 133/63     Pulse Readings from Last 3 Encounters:   09/17/20 (!) 48   07/24/20 (!) 42   06/18/20 90     Lab Results   Component Value Date    WBC 5.52 09/17/2020    HGB 10.8 (L) 09/17/2020    HCT 33.8 (L) 09/17/2020    MCV 98 09/17/2020     09/17/2020       CMP  Sodium   Date Value Ref Range Status   09/17/2020 137 136 - 145 mmol/L Final     Potassium   Date Value Ref Range Status   09/17/2020 4.4 3.5 - 5.1 mmol/L Final     Chloride   Date Value Ref Range Status   09/17/2020 105 95 - 110 mmol/L Final     CO2   Date Value Ref Range Status   09/17/2020 23 23 - 29 mmol/L Final     Glucose   Date Value Ref Range Status   09/17/2020 104 70 - 110 mg/dL Final     BUN, Bld   Date Value Ref Range Status   09/17/2020 29 (H) 8 - 23 mg/dL Final     Creatinine   Date Value Ref Range Status   09/17/2020 2.0 (H) 0.5 - 1.4 mg/dL Final     Calcium   Date Value Ref Range Status   09/17/2020 8.2 (L) 8.7 - 10.5 mg/dL Final     Total Protein   Date Value Ref Range Status   09/17/2020 6.8 6.0 - 8.4 g/dL Final     Albumin   Date Value Ref Range Status   09/17/2020 3.1 (L) 3.5 - 5.2 g/dL Final     Total Bilirubin   Date Value Ref Range Status   09/17/2020 0.9 0.1 - 1.0 mg/dL Final     Comment:     For infants and newborns, interpretation of results should be based  on gestational age, weight and in agreement with clinical  observations.  Premature Infant recommended reference ranges:  Up to 24 hours.............<8.0 mg/dL  Up to 48 hours............<12.0 mg/dL  3-5 days..................<15.0 mg/dL  6-29 days.................<15.0 mg/dL       Alkaline Phosphatase   Date Value Ref Range Status   09/17/2020 69 55 - 135 U/L Final     AST   Date Value Ref Range Status   09/17/2020 20 10 - 40 U/L Final     ALT   Date Value Ref Range Status   09/17/2020 35 10 - 44 U/L Final     Anion Gap   Date Value Ref Range Status   09/17/2020 9 8 - 16 mmol/L Final      eGFR if    Date Value Ref Range Status   09/17/2020 27 (A) >60 mL/min/1.73 m^2 Final     eGFR if non    Date Value Ref Range Status   09/17/2020 23 (A) >60 mL/min/1.73 m^2 Final     Comment:     Calculation used to obtain the estimated glomerular filtration  rate (eGFR) is the CKD-EPI equation.        9/17/2020 COVID negative    Anesthesia Plan  Type of Anesthesia, risks & benefits discussed:  Anesthesia Type:  general  Patient's Preference:   Intra-op Monitoring Plan: standard ASA monitors  Intra-op Monitoring Plan Comments:   Post Op Pain Control Plan:   Post Op Pain Control Plan Comments:   Induction:   IV  Beta Blocker:  Patient is on a Beta-Blocker and has received one dose within the past 24 hours (No further documentation required).       Informed Consent: Patient understands risks and agrees with Anesthesia plan.  Questions answered. Anesthesia consent signed with patient.  ASA Score: 3     Day of Surgery Review of History & Physical:  There are no significant changes.          Ready For Surgery From Anesthesia Perspective.

## 2020-09-17 NOTE — ANESTHESIA PROCEDURE NOTES
Intubation  Performed by: Tess Santiago CRNA  Authorized by: Skip Savage MD     Intubation:     Induction:  Intravenous    Intubated:  Postinduction    Attempts:  1    Attempted By:  CRNA    Difficult Airway Encountered?: No      Complications:  None    Airway Device:  Supraglottic airway/LMA    Airway Device Size:  4.0    Style/Cuff Inflation:  Cuffed    Placement Verified By:  Capnometry    Complicating Factors:  None

## 2020-09-17 NOTE — ED NOTES
"Patient arrived accompanied by daughter with report of tripping and falling approx 3 weeks ago with resulting wound to left knee.  Initially patient noted a "carpet burn type wound but states blood blister developed at site by evening.  Yesterday patient noted that blister opened and drained dark red liquid and she noted wounds were much worse than she expected.  She is supposed to have wound "scraped" today.  "

## 2020-09-17 NOTE — ED NOTES
Assisted up to bathroom for void and specimen collection.  Tolerated activity without complication.

## 2020-09-17 NOTE — TRANSFER OF CARE
"Anesthesia Transfer of Care Note    Patient: Barbara Rizo    Procedure(s) Performed: Procedure(s) (LRB):  INCISION AND DRAINAGE, KNEE (Left)  WOUND VAC APPLICATION (Left)    Patient location: PACU    Anesthesia Type: general    Transport from OR: Transported from OR on room air with adequate spontaneous ventilation    Post pain: adequate analgesia    Post assessment: no apparent anesthetic complications and tolerated procedure well    Post vital signs: stable    Level of consciousness: awake    Nausea/Vomiting: no nausea/vomiting    Complications: none    Transfer of care protocol was followed      Last vitals:   Visit Vitals  BP (!) 120/53 (BP Location: Right arm, Patient Position: Lying)   Pulse 60   Temp 36.4 °C (97.5 °F) (Tympanic)   Resp 16   Ht 5' 2" (1.575 m)   Wt 88 kg (194 lb)   SpO2 100%   Breastfeeding No   BMI 35.48 kg/m²     "

## 2020-09-18 LAB
ALBUMIN SERPL BCP-MCNC: 2.5 G/DL (ref 3.5–5.2)
ALP SERPL-CCNC: 54 U/L (ref 55–135)
ALT SERPL W/O P-5'-P-CCNC: 27 U/L (ref 10–44)
ANION GAP SERPL CALC-SCNC: 5 MMOL/L (ref 8–16)
AST SERPL-CCNC: 19 U/L (ref 10–40)
BASOPHILS # BLD AUTO: 0.03 K/UL (ref 0–0.2)
BASOPHILS NFR BLD: 0.6 % (ref 0–1.9)
BILIRUB SERPL-MCNC: 0.4 MG/DL (ref 0.1–1)
BUN SERPL-MCNC: 23 MG/DL (ref 8–23)
CALCIUM SERPL-MCNC: 7.5 MG/DL (ref 8.7–10.5)
CHLORIDE SERPL-SCNC: 104 MMOL/L (ref 95–110)
CO2 SERPL-SCNC: 25 MMOL/L (ref 23–29)
CREAT SERPL-MCNC: 1.9 MG/DL (ref 0.5–1.4)
DIFFERENTIAL METHOD: ABNORMAL
EOSINOPHIL # BLD AUTO: 0.1 K/UL (ref 0–0.5)
EOSINOPHIL NFR BLD: 1.8 % (ref 0–8)
ERYTHROCYTE [DISTWIDTH] IN BLOOD BY AUTOMATED COUNT: 13.9 % (ref 11.5–14.5)
EST. GFR  (AFRICAN AMERICAN): 28 ML/MIN/1.73 M^2
EST. GFR  (NON AFRICAN AMERICAN): 25 ML/MIN/1.73 M^2
GLUCOSE SERPL-MCNC: 91 MG/DL (ref 70–110)
HCT VFR BLD AUTO: 28.2 % (ref 37–48.5)
HGB BLD-MCNC: 9.2 G/DL (ref 12–16)
IMM GRANULOCYTES # BLD AUTO: 0.01 K/UL (ref 0–0.04)
IMM GRANULOCYTES NFR BLD AUTO: 0.2 % (ref 0–0.5)
LYMPHOCYTES # BLD AUTO: 0.7 K/UL (ref 1–4.8)
LYMPHOCYTES NFR BLD: 14.6 % (ref 18–48)
MCH RBC QN AUTO: 33.2 PG (ref 27–31)
MCHC RBC AUTO-ENTMCNC: 32.6 G/DL (ref 32–36)
MCV RBC AUTO: 102 FL (ref 82–98)
MONOCYTES # BLD AUTO: 0.5 K/UL (ref 0.3–1)
MONOCYTES NFR BLD: 9.1 % (ref 4–15)
NEUTROPHILS # BLD AUTO: 3.8 K/UL (ref 1.8–7.7)
NEUTROPHILS NFR BLD: 73.7 % (ref 38–73)
NRBC BLD-RTO: 0 /100 WBC
PLATELET # BLD AUTO: 154 K/UL (ref 150–350)
PMV BLD AUTO: 9.7 FL (ref 9.2–12.9)
POTASSIUM SERPL-SCNC: 4.6 MMOL/L (ref 3.5–5.1)
PROT SERPL-MCNC: 5.6 G/DL (ref 6–8.4)
RBC # BLD AUTO: 2.77 M/UL (ref 4–5.4)
SODIUM SERPL-SCNC: 134 MMOL/L (ref 136–145)
WBC # BLD AUTO: 5.08 K/UL (ref 3.9–12.7)

## 2020-09-18 PROCEDURE — 85025 COMPLETE CBC W/AUTO DIFF WBC: CPT

## 2020-09-18 PROCEDURE — 63600175 PHARM REV CODE 636 W HCPCS: Performed by: ORTHOPAEDIC SURGERY

## 2020-09-18 PROCEDURE — 94761 N-INVAS EAR/PLS OXIMETRY MLT: CPT

## 2020-09-18 PROCEDURE — 25000003 PHARM REV CODE 250: Performed by: ORTHOPAEDIC SURGERY

## 2020-09-18 PROCEDURE — 11000001 HC ACUTE MED/SURG PRIVATE ROOM

## 2020-09-18 PROCEDURE — 36415 COLL VENOUS BLD VENIPUNCTURE: CPT

## 2020-09-18 PROCEDURE — 97116 GAIT TRAINING THERAPY: CPT

## 2020-09-18 PROCEDURE — 97161 PT EVAL LOW COMPLEX 20 MIN: CPT

## 2020-09-18 PROCEDURE — 80053 COMPREHEN METABOLIC PANEL: CPT

## 2020-09-18 RX ADMIN — CEFAZOLIN SODIUM 1 G: 1 SOLUTION INTRAVENOUS at 06:09

## 2020-09-18 RX ADMIN — CEFAZOLIN SODIUM 1 G: 1 SOLUTION INTRAVENOUS at 04:09

## 2020-09-18 RX ADMIN — AMIODARONE HYDROCHLORIDE 200 MG: 200 TABLET ORAL at 09:09

## 2020-09-18 RX ADMIN — FLUOXETINE 10 MG: 10 CAPSULE ORAL at 09:09

## 2020-09-18 RX ADMIN — DOXAZOSIN 4 MG: 4 TABLET ORAL at 09:09

## 2020-09-18 RX ADMIN — CEFAZOLIN SODIUM 1 G: 1 SOLUTION INTRAVENOUS at 11:09

## 2020-09-18 RX ADMIN — CALCIUM CARBONATE (ANTACID) CHEW TAB 500 MG 500 MG: 500 CHEW TAB at 09:09

## 2020-09-18 RX ADMIN — ERGOCALCIFEROL 50000 UNITS: 1.25 CAPSULE ORAL at 09:09

## 2020-09-18 RX ADMIN — APIXABAN 2.5 MG: 2.5 TABLET, FILM COATED ORAL at 09:09

## 2020-09-18 RX ADMIN — LOSARTAN POTASSIUM 25 MG: 25 TABLET, FILM COATED ORAL at 09:09

## 2020-09-18 RX ADMIN — ATORVASTATIN CALCIUM 40 MG: 40 TABLET, FILM COATED ORAL at 09:09

## 2020-09-18 RX ADMIN — METOPROLOL SUCCINATE 25 MG: 25 TABLET, EXTENDED RELEASE ORAL at 09:09

## 2020-09-18 RX ADMIN — FUROSEMIDE 20 MG: 20 TABLET ORAL at 09:09

## 2020-09-18 RX ADMIN — ALLOPURINOL 100 MG: 100 TABLET ORAL at 09:09

## 2020-09-18 RX ADMIN — FOLIC ACID 1 MG: 1 TABLET ORAL at 09:09

## 2020-09-18 NOTE — PLAN OF CARE
Problem: Physical Therapy Goal  Goal: Physical Therapy Goal  Description: Goals to be met by:      Patient will increase functional independence with mobility by performin. Sit to stand transfer with Modified [place]  2. Bed to chair transfer with Modified [place]   3. Gait  x 150 feet with Modified [place].     Outcome: Ongoing, Progressing    HHPT. Already has RW.   See on Sat

## 2020-09-18 NOTE — PT/OT/SLP EVAL
Physical Therapy Evaluation    Patient Name:  Barbara Rizo   MRN:  [id number]    Recommendations:     Discharge Recommendations:  home health PT   Discharge Equipment Recommendations: none   Barriers to discharge: None    Assessment:     Barbara Rizo is a 80 y.o. female admitted with a medical diagnosis of H/O open leg wound.  She presents with the following impairments/functional limitations:  weakness, gait instability, impaired balance, impaired joint extensibility, impaired muscle length, impaired skin, impaired functional mobilty, edema .    Pt is safely ambulating with RW today. Pain well controlled. Encouraged pt to mobilize with RN staff for assist with wound vac. Progressive Mobility Level 3: Recommend patient be assisted out of bed to chair, toilet, and/or bedside commode with </= minimum assistance.       Rehab Prognosis: Good; patient would benefit from acute skilled PT services to address these deficits and reach maximum level of function.    Recent Surgery: Procedure(s) (LRB):  INCISION AND DRAINAGE, KNEE (Left)  WOUND VAC APPLICATION (Left) 1 Day Post-Op    Plan:     During this hospitalization, patient to be seen 6 x/week to address the identified rehab impairments via gait training, therapeutic activities, therapeutic exercises and progress toward the following goals:    · Plan of Care Expires:  09/25/20    Subjective     Chief Complaint: diaper loose  Patient/Family Comments/goals: agreed to get OOB and ambulate  Pain/Comfort:  · Pain Rating 1: 0/10    Patients cultural, spiritual, Jew conflicts given the current situation: no    Living Environment:  Home alone but to stay with dtr temporarily upon DC where there are no RADHA.   Prior to admission, patients level of function was Ind with all. amb with RW upon waking but not all day and not in community.  Equipment used at home: walker, rolling.  DME owned (not currently used): none.  Upon discharge, patient will have assistance from  matthew  .    Objective:     Communicated with TOSHIA Pagan prior to session.  Patient found HOB elevated with wound vac, peripheral IV  upon PT entry to room.    General Precautions: Standard, fall   Orthopedic Precautions:(min L knee flex per ortho)   Braces: N/A     Exams:  · very [place], even with B/L hearing aids in  · Cognitive Exam:  Patient is oriented to Person, Place, Time and Situation  · Gross Motor Coordination:  WFL  · Postural Exam:  Patient presented with the following abnormalities:    · -       No postural abnormalities identified  · Sensation:    · -       Intact  · Skin Integrity/Edema:      · -       Edema: Pitting , moderate, L lower leg  · RUE ROM: WFL  · RUE Strength: WFL  · LUE ROM: WFL  · LUE Strength: WFL  · RLE ROM: WFL  · RLE Strength: WFL  · LLE ROM: Deficits: knee AROM ~ 50% normal due to ortho precautions. Ankle and hip WFL  · LLE Strength: WFL    Functional Mobility:  · Bed Mobility:     · Supine to Sit: modified independence  · Transfers:     · Sit to Stand:  modified independence with no AD  · Gait: with RW ~ 100 ft with decreased speed, no LOB or SOB on RA O2, smooth and reciprocal stride  · Balance: good with gait    Therapeutic Activities and Exercises:   pt performed x 20 reps AROM APs and x 15 reps AROM short arc quads    AM-PAC 6 CLICK MOBILITY  Total Score:22     Patient left up in chair with call button in reach and PCT and dietary  present.    GOALS:   Multidisciplinary Problems     Physical Therapy Goals        Problem: Physical Therapy Goal    Goal Priority Disciplines Outcome Goal Variances Interventions   Physical Therapy Goal     PT, PT/OT Ongoing, Progressing     Description: Goals to be met by:      Patient will increase functional independence with mobility by performin. Sit to stand transfer with Modified [place]  2. Bed to chair transfer with Modified [place]   3. Gait  x 150 feet with Modified [place].                      History:     Past  Medical History:   Diagnosis Date    A-fib     CKD (chronic kidney disease)     Diabetes mellitus type II     Fatty liver     GERD (gastroesophageal reflux disease)     Hearing loss of both ears     wears bilateral hearing aids    Hemochromatosis     History of anemia due to CKD     History of pleural effusion     with thoracentesis    Hyperlipidemia     Hypertension     Macular degeneration     Osteoarthritis     Left knee    Osteoporosis     Vaginal delivery     x2    Vitamin D deficiency disease     Wears partial dentures     upper removable bridge       Past Surgical History:   Procedure Laterality Date    BREAST BIOPSY      BREAST SURGERY Bilateral     Reduction r/t h/o fibrocystic disease    CHOLECYSTECTOMY  08/2015    EYE SURGERY      Cat Ext  OU    HYSTERECTOMY      partial due to uterine fibroids    JOINT REPLACEMENT Left 05/13/2013    TKR    JOINT REPLACEMENT Right 08/03/2015    TKR    THORACENTESIS      TOTAL KNEE ARTHROPLASTY Right 2015    left knee done 5/2013       Time Tracking:     PT Received On: 09/18/20  PT Start Time: 0938     PT Stop Time: 1003  PT Total Time (min): 25 min     Billable Minutes: Evaluation 15 and Gait Training 10      Ana Eason, PT  09/18/2020

## 2020-09-18 NOTE — PLAN OF CARE
09/18/20 1146   Discharge Assessment   Assessment Type Discharge Planning Assessment   Confirmed/corrected address and phone number on facesheet? Yes   Assessment information obtained from? Patient;Caregiver   Communicated expected length of stay with patient/caregiver yes   Prior to hospitilization cognitive status: Alert/Oriented   Prior to hospitalization functional status: Assistive Equipment   Current cognitive status: Alert/Oriented   Current Functional Status: Assistive Equipment   Facility Arrived From: Home   Able to Return to Prior Arrangements yes   Is patient able to care for self after discharge? Yes   Who are your caregiver(s) and their phone number(s)? Tamar Foster [phone]   Patient's perception of discharge disposition home or selfcare;home health   Readmission Within the Last 30 Days no previous admission in last 30 days   Patient currently being followed by outpatient case management? No   Patient currently receives any other outside agency services? No   Equipment Currently Used at Home walker, rolling;bedside commode;shower chair   Do you have any problems affording any of your prescribed medications? No   Is the patient taking medications as prescribed? yes   Does the patient have transportation home? Yes   Transportation Anticipated family or friend will provide   Dialysis Name and Scheduled days N/A   Does the patient receive services at the Coumadin Clinic? No  (According to pt, she takes eliquis.)   Discharge Plan B Home with family;Home Health   Patient/Family in Agreement with Plan yes   Does the patient have transportation to healthcare appointments? Yes

## 2020-09-18 NOTE — PLAN OF CARE
09/18/20 1148   Post-Acute Status   Post-Acute Authorization Home Health;HME   HME Status Pending Payor Review  (Waiting approval for wound vac.)   Home Health Status Pending Payor Review   Patient choice form signed by patient/caregiver List with quality metrics by geographic area provided   Discharge Delays None known at this time   Discharge Plan   Discharge Plan A Home with family;Home Health     SW to pt's room ro discuss d/c planning and help at home. Pt's daughter, Tamar, at bedside and confirm pt will stay at her home for recovery and she will help at home. Tamar provided SW with address: [address] [place] [address]. Pt confirmed she had [facility] in the past and would like to resume.     Referral for wound vac sent to Soraya with MARILY[employer] @ [email or web address] waiting response. JEANNE also faxed medical necessity form, HH orders, and supporting documentation to N via fax at [phone]. SW waiting confirmation services will be provided.

## 2020-09-18 NOTE — NURSING
Pt resting quietly in bed with eyes closed. Resp unlabored. NAD noted. Easily arousable to verbal stimuli. Offers no complaints at present. Awaiting MD orders/ disposition. Bed rails up x 2 with bed locked in lowest position. Call light inr each. Will continue to monitor. ADMIT evaluation continues.

## 2020-09-18 NOTE — NURSING
Pt with incontinent episode of bowel and bladder. Padding changed and full hygiene measures performed.

## 2020-09-18 NOTE — PROGRESS NOTES
Patient doing well. Working on home wound vac. Once that is set up, she will be able to d/c home.   VSSAF    Left knee: wound vac in place and functioning properly. NVI distally.  Hct: 28.2    A/P: POD#1 I&D left knee with wound vac placement  1) up with PT- WBAT  2) wound vac for home use  3) ok to discharge once HH is set up and she has wound vac- rx's in chart  4) f/u with Dr Wagoner 1 week postop

## 2020-09-18 NOTE — PLAN OF CARE
Problem: Fall Injury Risk  Goal: Absence of Fall and Fall-Related Injury  Outcome: Ongoing, Progressing  Intervention: Identify and Manage Contributors to Fall Injury Risk  Flowsheets (Taken 9/18/2020 1703)  Self-Care Promotion:   independence encouraged   BADL personal objects within reach   BADL personal routines maintained  Medication Review/Management: medications reviewed  Intervention: Promote Injury-Free Environment  Flowsheets (Taken 9/18/2020 1703)  Safety Promotion/Fall Prevention:   assistive device/personal item within reach   bed alarm set   Fall Risk reviewed with patient/family   medications reviewed   nonskid shoes/socks when out of bed   side rails raised x 2   instructed to call staff for mobility  Environmental Safety Modification:   assistive device/personal items within reach   clutter free environment maintained   room near unit station   room organization consistent     Problem: Adult Inpatient Plan of Care  Goal: Plan of Care Review  Outcome: Ongoing, Progressing  Goal: Patient-Specific Goal (Individualization)  Outcome: Ongoing, Progressing  Goal: Absence of Hospital-Acquired Illness or Injury  Outcome: Ongoing, Progressing  Intervention: Identify and Manage Fall Risk  Flowsheets (Taken 9/18/2020 1703)  Safety Promotion/Fall Prevention:   assistive device/personal item within reach   bed alarm set   Fall Risk reviewed with patient/family   medications reviewed   nonskid shoes/socks when out of bed   side rails raised x 2   instructed to call staff for mobility  Intervention: Prevent VTE (venous thromboembolism)  Flowsheets (Taken 9/18/2020 1703)  VTE Prevention/Management:   fluids promoted   ambulation promoted   bleeding risk assessed  Goal: Optimal Comfort and Wellbeing  Outcome: Ongoing, Progressing  Intervention: Provide Person-Centered Care  Flowsheets (Taken 9/18/2020 1703)  Trust Relationship/Rapport:   care explained   questions answered   questions encouraged  Goal: Readiness for  Transition of Care  Outcome: Ongoing, Progressing  Goal: Rounds/Family Conference  Outcome: Ongoing, Progressing     Problem: Skin Injury Risk Increased  Goal: Skin Health and Integrity  Outcome: Ongoing, Progressing  Intervention: Optimize Skin Protection  Flowsheets (Taken 9/18/2020 1703)  Pressure Reduction Techniques: frequent weight shift encouraged  Head of Bed (HOB): HOB at 30-45 degrees  Intervention: Promote and Optimize Oral Intake  Flowsheets (Taken 9/18/2020 1703)  Oral Nutrition Promotion: rest periods promoted

## 2020-09-18 NOTE — PLAN OF CARE
[facility]  Home Health Hub: [phone]      HOME HEALTH ORDERS    Patient Name: Barbara Rizo  YOB: 1940    PCP: Paras Oro MD   PCP Address: [address] [place] [address]  PCP Phone Number: [phone]  PCP Fax: [phone]    Admit to Home Health    Diagnosis:   Active Hospital Problems    Diagnosis  POA    *H/O open leg wound [Z87.828]  Not Applicable      Resolved Hospital Problems   No resolved problems to display.       Patient is homebound due to:  H/O open leg wound    Allergies: Review of patient's allergies indicates:  No Known Allergies    Activities as tolerated: SN to instruct patient on importance of home exercise program. Utilize tool for education.    Nursing:     Skilled nurse to assess all home equipment such as oxygen, nebulizers, hospital beds etc to determine if the equipment is functioning properly and patient/caregiver understanding how to use properly.     If equipment is not functioning properly please contact supplier/ to resolve.        SN to reinforce education as instructed on admit visit and continue education on the below items on subsequent visits:   Wound vac changes every 3 days    CONSULTS:    Aide to provide assistance with personal care, ADLs, and vital signs.    MISCELLANEOUS CARE  Wound Care Orders:  yes:  Wound Vac:   Location:  Left knee           Dressing changes every Monday, Wednesday and Friday.        ET Consult      WOUND CARE ORDERS  yes:  Wound Vac:   Location:  Left knee           Dressing changes every Monday, Wednesday and Friday.        ET Consult      Medications: Review discharge medications with patient and family and provide education.      Current Discharge Medication List      CONTINUE these medications which have NOT CHANGED    Details   allopurinol (ZYLOPRIM) 100 MG tablet       amiodarone (PACERONE) 200 MG Tab TAKE 1 TABLET(200 MG) BY MOUTH EVERY DAY  Qty: 90 tablet, Refills: 3      apixaban  (ELIQUIS) 2.5 mg Tab Take 1 tablet (2.5 mg total) by mouth 2 (two) times daily.  Qty: 180 tablet, Refills: 3      atorvastatin (LIPITOR) 40 MG tablet TAKE 1 TABLET(40 MG) BY MOUTH EVERY DAY  Qty: 90 tablet, Refills: 1    Associated Diagnoses: Type 2 diabetes mellitus with stage 3 chronic kidney disease, without long-term current use of insulin; Essential hypertension      calcium carbonate (TUMS) 200 mg calcium (500 mg) chewable tablet Take 1 tablet by mouth once daily.      doxazosin (CARDURA) 4 MG tablet TAKE 1 TABLET(4 MG) BY MOUTH EVERY EVENING  Qty: 90 tablet, Refills: 3      fish oil-omega-3 fatty acids 300-1,000 mg capsule Take 1 capsule by mouth once daily.      FLUoxetine 10 MG capsule TAKE 1 CAPSULE(10 MG) BY MOUTH EVERY DAY  Qty: 90 capsule, Refills: 1    Associated Diagnoses: Depression, unspecified depression type      folic acid (FOLVITE) 1 MG tablet TAKE 1 TABLET(1 MG) BY MOUTH EVERY DAY  Qty: 90 tablet, Refills: 1    Associated Diagnoses: Hemochromatosis, unspecified hemochromatosis type      furosemide (LASIX) 20 MG tablet TAKE 1 TABLET BY MOUTH EVERY DAY AS NEEDED  Qty: 45 tablet, Refills: 0    Associated Diagnoses: Bilateral leg edema      metoprolol succinate (TOPROL-XL) 25 MG 24 hr tablet TAKE 1 TABLET(25 MG) BY MOUTH EVERY DAY  Qty: 90 tablet, Refills: 3      blood-glucose meter (FREESTYLE SYSTEM KIT) kit Use as instructed  Qty: 1 each, Refills: 0    Associated Diagnoses: Diabetes mellitus, new onset      ergocalciferol (ERGOCALCIFEROL) 50,000 unit Cap Take 50,000 Units by mouth every 7 days.      losartan (COZAAR) 25 MG tablet TK 1 T PO QD    Comments: .         Norco 5/325 1 tablet po, q 6hours prn pain  Bactrim DS 1 tablet po q 12 hours for 7 days    I have seen and examined this patient face to face today. My clinical findings that support the need for the home health skilled services and home bound status are the following:  Requiring assistive device to leave home due to unsteady gait  caused by  Surgery.    I certify that this patient is confined to her home and needs intermittent skilled nursing care.    MARY Lucas  09/18/2020

## 2020-09-18 NOTE — NURSING
"Assumed care of pt bed 34. Pt complaint of fall x 3 weeks ago. Pt with wound vac to LLE. Pt states " I fell at a restaurant. It made a blood blister on my knee and it was about three weeks yesterday that I fell. Well that blood blister ended up popping and all this stuff came out so I had to come here. " Wound vac in place with sanguinous drainage approx 125. Will document in flow sheets. Pt states " I sometimes have little accidents would you all be able to put some extra pads beneath me." Will place padding beneath patient. Assessment done per flow sheet. NAD noted. Awaiting MD orders/ disposition. Bed rails up x 2 with bed locked in lowest position. Call light in reach. Will continue to monitor. ADMIT evaluation continues.   "

## 2020-09-19 ENCOUNTER — HOSPITAL ENCOUNTER (EMERGENCY)
Facility: HOSPITAL | Age: 80
Discharge: HOME | End: 2020-09-19
Attending: EMERGENCY MEDICINE
Payer: MEDICARE

## 2020-09-19 ENCOUNTER — NURSE TRIAGE (OUTPATIENT)
Dept: ADMINISTRATIVE | Facility: CLINIC | Age: 80
End: 2020-09-19

## 2020-09-19 VITALS
BODY MASS INDEX: 37.08 KG/M2 | WEIGHT: 201.5 LBS | RESPIRATION RATE: 18 BRPM | DIASTOLIC BLOOD PRESSURE: 65 MMHG | SYSTOLIC BLOOD PRESSURE: 152 MMHG | HEIGHT: 62 IN | TEMPERATURE: 98 F | HEART RATE: 42 BPM | OXYGEN SATURATION: 99 %

## 2020-09-19 VITALS
DIASTOLIC BLOOD PRESSURE: 68 MMHG | HEART RATE: 49 BPM | TEMPERATURE: 98 F | SYSTOLIC BLOOD PRESSURE: 154 MMHG | HEIGHT: 55 IN | OXYGEN SATURATION: 95 % | WEIGHT: 194 LBS | BODY MASS INDEX: 44.9 KG/M2 | RESPIRATION RATE: 16 BRPM

## 2020-09-19 DIAGNOSIS — Z98.890 STATUS POST KNEE SURGERY: ICD-10-CM

## 2020-09-19 DIAGNOSIS — Z74.1: Primary | ICD-10-CM

## 2020-09-19 LAB
ALBUMIN SERPL BCP-MCNC: 2.7 G/DL (ref 3.5–5.2)
ALP SERPL-CCNC: 62 U/L (ref 55–135)
ALT SERPL W/O P-5'-P-CCNC: 23 U/L (ref 10–44)
ANION GAP SERPL CALC-SCNC: 7 MMOL/L (ref 8–16)
AST SERPL-CCNC: 24 U/L (ref 10–40)
BACTERIA UR CULT: NORMAL
BASOPHILS # BLD AUTO: 0.03 K/UL (ref 0–0.2)
BASOPHILS NFR BLD: 0.5 % (ref 0–1.9)
BILIRUB SERPL-MCNC: 0.4 MG/DL (ref 0.1–1)
BUN SERPL-MCNC: 18 MG/DL (ref 8–23)
CALCIUM SERPL-MCNC: 7.7 MG/DL (ref 8.7–10.5)
CHLORIDE SERPL-SCNC: 102 MMOL/L (ref 95–110)
CO2 SERPL-SCNC: 23 MMOL/L (ref 23–29)
CREAT SERPL-MCNC: 1.9 MG/DL (ref 0.5–1.4)
DIFFERENTIAL METHOD: ABNORMAL
EOSINOPHIL # BLD AUTO: 0.1 K/UL (ref 0–0.5)
EOSINOPHIL NFR BLD: 2.2 % (ref 0–8)
ERYTHROCYTE [DISTWIDTH] IN BLOOD BY AUTOMATED COUNT: 13.2 % (ref 11.5–14.5)
EST. GFR  (AFRICAN AMERICAN): 28 ML/MIN/1.73 M^2
EST. GFR  (NON AFRICAN AMERICAN): 25 ML/MIN/1.73 M^2
GLUCOSE SERPL-MCNC: 95 MG/DL (ref 70–110)
HCT VFR BLD AUTO: 31.5 % (ref 37–48.5)
HGB BLD-MCNC: 9.9 G/DL (ref 12–16)
IMM GRANULOCYTES # BLD AUTO: 0.02 K/UL (ref 0–0.04)
IMM GRANULOCYTES NFR BLD AUTO: 0.4 % (ref 0–0.5)
LYMPHOCYTES # BLD AUTO: 0.8 K/UL (ref 1–4.8)
LYMPHOCYTES NFR BLD: 14.6 % (ref 18–48)
MCH RBC QN AUTO: 31.1 PG (ref 27–31)
MCHC RBC AUTO-ENTMCNC: 31.4 G/DL (ref 32–36)
MCV RBC AUTO: 99 FL (ref 82–98)
MONOCYTES # BLD AUTO: 0.3 K/UL (ref 0.3–1)
MONOCYTES NFR BLD: 5.8 % (ref 4–15)
NEUTROPHILS # BLD AUTO: 4.3 K/UL (ref 1.8–7.7)
NEUTROPHILS NFR BLD: 76.5 % (ref 38–73)
NRBC BLD-RTO: 0 /100 WBC
PLATELET # BLD AUTO: 184 K/UL (ref 150–350)
PMV BLD AUTO: 10.3 FL (ref 9.2–12.9)
POTASSIUM SERPL-SCNC: 4.8 MMOL/L (ref 3.5–5.1)
PROT SERPL-MCNC: 6 G/DL (ref 6–8.4)
RBC # BLD AUTO: 3.18 M/UL (ref 4–5.4)
SODIUM SERPL-SCNC: 132 MMOL/L (ref 136–145)
WBC # BLD AUTO: 5.56 K/UL (ref 3.9–12.7)

## 2020-09-19 PROCEDURE — 99281 EMR DPT VST MAYX REQ PHY/QHP: CPT

## 2020-09-19 PROCEDURE — 85025 COMPLETE CBC W/AUTO DIFF WBC: CPT

## 2020-09-19 PROCEDURE — 97110 THERAPEUTIC EXERCISES: CPT | Mod: CQ

## 2020-09-19 PROCEDURE — 25000003 PHARM REV CODE 250: Performed by: ORTHOPAEDIC SURGERY

## 2020-09-19 PROCEDURE — 94761 N-INVAS EAR/PLS OXIMETRY MLT: CPT

## 2020-09-19 PROCEDURE — 63600175 PHARM REV CODE 636 W HCPCS: Performed by: ORTHOPAEDIC SURGERY

## 2020-09-19 PROCEDURE — 80053 COMPREHEN METABOLIC PANEL: CPT

## 2020-09-19 PROCEDURE — 36415 COLL VENOUS BLD VENIPUNCTURE: CPT

## 2020-09-19 PROCEDURE — 97116 GAIT TRAINING THERAPY: CPT | Mod: CQ

## 2020-09-19 RX ORDER — LOPERAMIDE HYDROCHLORIDE 2 MG/1
2 CAPSULE ORAL ONCE
Status: COMPLETED | OUTPATIENT
Start: 2020-09-19 | End: 2020-09-19

## 2020-09-19 RX ORDER — LOPERAMIDE HYDROCHLORIDE 2 MG/1
2 CAPSULE ORAL 4 TIMES DAILY PRN
Status: DISCONTINUED | OUTPATIENT
Start: 2020-09-19 | End: 2020-09-19

## 2020-09-19 RX ADMIN — FOLIC ACID 1 MG: 1 TABLET ORAL at 08:09

## 2020-09-19 RX ADMIN — ALLOPURINOL 100 MG: 100 TABLET ORAL at 08:09

## 2020-09-19 RX ADMIN — APIXABAN 2.5 MG: 2.5 TABLET, FILM COATED ORAL at 08:09

## 2020-09-19 RX ADMIN — ATORVASTATIN CALCIUM 40 MG: 40 TABLET, FILM COATED ORAL at 08:09

## 2020-09-19 RX ADMIN — CEFAZOLIN SODIUM 1 G: 1 SOLUTION INTRAVENOUS at 06:09

## 2020-09-19 RX ADMIN — CALCIUM CARBONATE (ANTACID) CHEW TAB 500 MG 500 MG: 500 CHEW TAB at 08:09

## 2020-09-19 RX ADMIN — LOPERAMIDE HYDROCHLORIDE 2 MG: 2 CAPSULE ORAL at 10:09

## 2020-09-19 RX ADMIN — LOSARTAN POTASSIUM 25 MG: 25 TABLET, FILM COATED ORAL at 08:09

## 2020-09-19 RX ADMIN — FLUOXETINE 10 MG: 10 CAPSULE ORAL at 08:09

## 2020-09-19 RX ADMIN — AMIODARONE HYDROCHLORIDE 200 MG: 200 TABLET ORAL at 08:09

## 2020-09-19 NOTE — PROGRESS NOTES
[facility] CASE MANAGEMENT                  WRITTEN DISCHARGE INFORMATION      APPOINTMENTS AND RESOURCES TO HELP YOU MANAGE YOUR CARE AT HOME BASED ON YOUR PREFERENCES:  (If an appointment is not scheduled for you when you leave the hospital, call your doctor to schedule a follow up visit within a week)    Follow-up Information     Paras Oro MD. Schedule an appointment as soon as possible for a visit in 1 week.    Specialties: Family Medicine, Wound Care  Why: for Hospital Follow up  Contact information:  [address]  [place] [address]  [phone]             Rolando Wagoner MD. Schedule an appointment as soon as possible for a visit in 2 weeks.    Specialty: Orthopedic Surgery  Why: for Orthopedic follow up  Contact information:  [address]  [address]  [place] [address]  [phone]             [facility].    Specialty: Home Health Services  Why: Home Health  Contact information:  [address]  [place] [address]  [phone]             [employer] wound vac.    Why: for wound vac supplies                 Healthy Living Instructions to HELP MANAGE YOUR CARE AT HOME:  Things You are responsible for:  1.    Getting your prescriptions filled   2.    Taking your medications as directed, DO NOT MISS ANY DOSES!  3.    Following the diet and exercise recommended by your doctor  4.    Going to your follow-up doctor appointment. This is important because it allows the doctor to monitor your progress and determine if any changes need to made to your treatment plan.  5. If you have any questions about MANAGING YOUR CARE AT HOME Call the Nurse Care Line for 24/7 Assistance [phone]       Please answer any calls you may receive from [facility]. We want to continue to support you as you manage your healthcare needs. [facility] is happy to have the opportunity to serve you.      Thank you for choosing [facility] for your healthcare needs!  Your [facility] Case Management  Team,

## 2020-09-19 NOTE — PT/OT/SLP PROGRESS
Physical Therapy Treatment    Patient Name:  Barbara Rizo   MRN:  [id number]    Recommendations:     Discharge Recommendations:  home health PT   Discharge Equipment Recommendations: none   Barriers to discharge: None    Assessment:     Barbara Rizo is a 80 y.o. female admitted with a medical diagnosis of H/O open leg wound.  She presents with the following impairments/functional limitations:  weakness, decreased lower extremity function, decreased ROM, edema, orthopedic precautions, impaired skin, impaired muscle length, impaired endurance, gait instability.    Pt continues to make progress towards goals. Pt ambulated ~200ft with RW, Mod A and ~12ft x2 trials with no AD, Supervision. Received handout for HEP with education of minimal L knee bending, pt verbalized understanding. All questions/concerns met prior to pt discharging today.    Rehab Prognosis: Good; patient would benefit from acute skilled PT services to address these deficits and reach maximum level of function.    Recent Surgery: Procedure(s) (LRB):  INCISION AND DRAINAGE, KNEE (Left)  WOUND VAC APPLICATION (Left) 2 Days Post-Op    Plan:     During this hospitalization, patient to be seen 6 x/week to address the identified rehab impairments via gait training, therapeutic activities, therapeutic exercises and progress toward the following goals:    · Plan of Care Expires:  09/25/20    Subjective     Chief Complaint: wanting to go home  Patient/Family Comments/goals: Pt reports she is able to walk.  Pain/Comfort:  · Pain Rating 1: 0/10      Objective:     Communicated with pt's nurseCaroline, prior to session.  Patient found up in BSchair with daughter present with peripheral IV, wound vac upon PT entry to room.     General Precautions: Standard, fall   Orthopedic Precautions:(minimal L knee flexion per ortho)   Braces: N/A     Functional Mobility:  · Transfers: from B[place] x2 trials    · Sit to Stand:  modified independence with no AD and  rolling walker  · Gait: Pt ambulated ~200ft with RW, Mod I and ~12ft x2 trials with no AD, Sup. Assistance for wound vac. Decreased jocelyn, step length, velocity of limb, postural control, safety and endurance.  · Balance: good sitting and standing      AM-PAC 6 CLICK MOBILITY  Turning over in bed (including adjusting bedclothes, sheets and blankets)?: 4  Sitting down on and standing up from a chair with arms (e.g., wheelchair, bedside commode, etc.): 4  Moving from lying on back to sitting on the side of the bed?: 4  Moving to and from a bed to a chair (including a wheelchair)?: 4  Need to walk in hospital room?: 4  Climbing 3-5 steps with a railing?: 3  Basic Mobility Total Score: 23       Therapeutic Activities and Exercises:   Educated pt not to place pillow under knee for comfort, daughter and pt verbalized understanding.  Received handout for HEP with education provided on all exercises. Omitted the following exercises on pt's handout: supine heel slides, bridges. Informed pt of minimal knee flexion, pt aware and verbalized understanding. Minimal knee flexion also written on HEP that was placed in pt's blue folder.    Seated therex to BLEs x15 reps AROM: Marching, LAQs (minimal knee flexion), heel raises/toe raises, hip abduction/adduction, quat sets reclined in BSchair  Dynamic standing at sink for handwashing, Mod I with no AD.    · Pt educated on PTA role/POC.   · Importance of OOB activity with staff assistance.  · Importance of sitting up in the chair throughout the day as tolerated, especially for meals   · Safety during functional t/f and mobility with use of nursing/rehab staff  · White board updated   · Multiple self-care tasks/functional mobility completed- assistance level noted above   · All questions/concerns answered within  scope of practice      Patient left up in BSchair with tray table and all needs in reach/met with all lines intact, call button in reach, pt's nurseCaroline, notified and  daughter present..    GOALS:   Multidisciplinary Problems     Physical Therapy Goals        Problem: Physical Therapy Goal    Goal Priority Disciplines Outcome Goal Variances Interventions   Physical Therapy Goal     PT, PT/OT Ongoing, Progressing     Description: Goals to be met by:      Patient will increase functional independence with mobility by performin. Sit to stand transfer with Modified [place]  2. Bed to chair transfer with Modified [place]   3. Gait  x 150 feet with Modified [place].                      Time Tracking:     PT Received On: 20  PT Start Time: 1000     PT Stop Time: 1026  PT Total Time (min): 26 min     Billable Minutes: Gait Training 15 and Therapeutic Exercise 11    Treatment Type: Treatment  PT/PTA: PTA     PTA Visit Number: 1     Sirena Fatou, PTA  2020

## 2020-09-19 NOTE — ED PROVIDER NOTES
Encounter Date: 9/19/2020       History     Chief Complaint   Patient presents with    wound vac malfunction     pt discharged from [facility] at 12n with woundvac on left knee.machine started t obeep.told by company to come to ED     HPI   This 80-year-old female presents to the emergency room complaining that her wound VAC giving an air signal.  It states that line is occluded.  The patient is in the emergency room requesting to have the wound VAC per sore to a functional condition.  The patient was discharged from the hospital at 12 noon today.  She is recovering from a procedure to open her left knee scraped it and clean it out.  Review of patient's allergies indicates:  No Known Allergies  Past Medical History:   Diagnosis Date    A-fib     CKD (chronic kidney disease)     Diabetes mellitus type II     Fatty liver     GERD (gastroesophageal reflux disease)     Hearing loss of both ears     wears bilateral hearing aids    Hemochromatosis     History of anemia due to CKD     History of pleural effusion     with thoracentesis    Hyperlipidemia     Hypertension     Macular degeneration     Osteoarthritis     Left knee    Osteoporosis     Vaginal delivery     x2    Vitamin D deficiency disease     Wears partial dentures     upper removable bridge     Past Surgical History:   Procedure Laterality Date    BREAST BIOPSY      BREAST SURGERY Bilateral     Reduction r/t h/o fibrocystic disease    CHOLECYSTECTOMY  08/2015    EYE SURGERY      Cat Ext  OU    HYSTERECTOMY      partial due to uterine fibroids    INCISION AND DRAINAGE OF KNEE Left 9/17/2020    Procedure: INCISION AND DRAINAGE, KNEE;  Surgeon: Rolando Wagoner MD;  Location: [facility];  Service: Orthopedics;  Laterality: Left;    JOINT REPLACEMENT Left 05/13/2013    TKR    JOINT REPLACEMENT Right 08/03/2015    TKR    THORACENTESIS      TOTAL KNEE ARTHROPLASTY Right 2015    left knee done 5/2013    WOUND DRESSING Left 9/17/2020    Procedure:  WOUND VAC APPLICATION;  Surgeon: Rolando Wagoner MD;  Location: [facility];  Service: Orthopedics;  Laterality: Left;     Family History   Problem Relation Age of Onset    Cancer Mother         stomach    Hypertension Mother     Aneurysm Father         abdominal     Social History     Tobacco Use    Smoking status: Former Smoker    Smokeless tobacco: Never Used   Substance Use Topics    Alcohol use: Not Currently     Alcohol/week: 0.0 standard drinks     Comment: occasional/ on a weekend    Drug use: No     Review of Systems  The patient was questioned specifically with regard to the following.  General: Fever, chills, sweats. Neuro: Headache. Eyes: eye problems. ENT: Ear pain, sore throat. Cardiovascular: Chest pain. Respiratory: Cough, shortness of breath. Gastrointestinal: Abdominal pain, vomiting, diarrhea. Genitourinary: Painful urination.  Musculoskeletal: Arm and leg problems. Skin: Rash.  The review of systems was negative except for the following:  Left knee pain, wound VAC not working.  Physical Exam     Initial Vitals [09/19/20 1621]   BP Pulse Resp Temp SpO2   (!) 154/65 (!) 52 18 98.5 °F (36.9 °C) 100 %      MAP       --         Physical Exam  The patient was examined specifically for the following:   General:No significant distress, Good color, Warm and dry. Head and neck:Scalp atraumatic, Neck supple. Neurological:Appropriate conversation, Gross motor deficits. Eyes:Conjugate gaze, Clear corneas. ENT: No epistaxis. Cardiac: Regular rate and rhythm, Grossly normal heart tones. Pulmonary: Wheezing, Rales. Gastrointestinal: Abdominal tenderness, Abdominal distention. Musculoskeletal: Extremity deformity, Apparent pain with range of motion of the joints. Skin: Rash.   The findings on examination were normal except for the following:  The patient has a dressing on the left leg.  There is a wound VAC line coming dressing.  The wound VAC is flashing in air message that tube is blocked.  ED Course    Procedures  Labs Reviewed - No data to display       Imaging Results    None       Medical decision making:  Given the above the nursing supervisor raciel was able to restore the function of the wound VAC.  The patient will be discharged.                                 Clinical Impression:     ICD-10-CM ICD-9-CM   1. Assistance needed for dressing  Z74.1 V60.89   2. Status post knee surgery  Z98.890 V45.89                          ED Disposition Condition    Discharge Stable        ED Prescriptions     None        Follow-up Information     Follow up With Specialties Details Why Contact Info    Rolando Wagoner MD Orthopedic Surgery In 3 days  [address]  [address]  [place] [facility] [address]  [phone]                                         Selwyn Damon MD  09/19/20 8244     ACT Team informed of plan

## 2020-09-19 NOTE — NURSING
Gave patient and daughter discharge instructions, prescriptions for antibiotic and pain med. Discussed signs and symptoms of infection and DVT; verbalized understanding with teach back. Patient dc home via wheelchair with transporter with home wound vac in place, CDI, wound vac supplies in box. No distress noted.

## 2020-09-19 NOTE — PLAN OF CARE
Awake, Alert, and oriented. Wound Vac in place and draining with occasional blockage alarms. Voids on BSC with standby assistance. Bradycardic.  Vital signs stable.  No S/S of distress.

## 2020-09-19 NOTE — ED TRIAGE NOTES
"Patient presents to the ED via personal transport with c/o wound vac malfunction on L knee. Pt's daughter states that the machine said "low pressure". Pt was d/c from [facility] today at 1200. Daughter states that pt got knee scraped and holes are draining. Home health nurse can't go see patient until tomorrow. Daughter stated that machine went from stating "low pressure" to "blockage" and was told by the  nurse to come to the ED. NAD noted.   "

## 2020-09-19 NOTE — ED NOTES
Called house sup about wound vac. States that he will send a charge nurse from upstairs to try and work on it.

## 2020-09-19 NOTE — PROGRESS NOTES
Patient doing well. Working on home wound vac.  Patient reports minimal pain nurse reports no acute events overnight     VSSAF      Physical exam:  Alert awake and oriented fully  Neurovascular intact operative extremity warm well perfused with palpable pulses    Left knee: wound vac in place and functioning properly. NVI distally.       A/P:  Status post I&D left knee with wound vac placement  1) up with PT- [facility]AT  2) wound vac for home use  3) ok to discharge as  reports home health and wound VAC is established    Mckenzie RUVALCABA

## 2020-09-19 NOTE — NURSING
Notified Dr Rincon patient has diarrhea, 4 today, requesting immodium. Verbalized understanding, received order for Immodium now and instruct patient to get over the counter med if needed.    Instructed patient and daughter of the above; says she has Immodium at home.

## 2020-09-19 NOTE — PLAN OF CARE
09/18/20 1130   Medicare Message   Important Message from Medicare regarding Discharge Appeal Rights Given to patient/caregiver;Explained to patient/caregiver;Signed/date by patient/caregiver

## 2020-09-19 NOTE — DISCHARGE INSTRUCTIONS
Please return immediately if you get worse or if new problems develop.  Please follow-up with your orthopedist this week.

## 2020-09-19 NOTE — PLAN OF CARE
"   09/19/20 1516   Final Note   Assessment Type Final Discharge Note   Anticipated Discharge Disposition [facility]   Hospital Follow Up  Appt(s) scheduled? Yes   Discharge plans and expectations educations in teach back method with documentation complete? Yes   Right Care Referral Info   Post Acute Recommendation Home-care   Referral Type    Facility Name [facility]   Post-Acute Status   Post-Acute Authorization Home [facility]   Home Health Status Set-up Complete   Discharge Delays None known at this time   EDUCATION:  Things You are responsible For To Manage Your Care At Home:  1.    Getting your prescriptions filled   2.    Taking your medications as directed, DO NOT MISS ANY DOSES!  3.    Going to your follow-up doctor appointment. This is important because it  allow the doctor to monitor your progress and determine if  any changes need to made to your treatment plan.  Call [facility] Help at home number for new or repeated problems / symptoms   Call 911 for CP and / or SOB    Patient agreed to all above    Ms Lopez provided with educational information on post op.  Information reviewed and placed in :My Healthcare Packet" to be brought home for her to use as resource after discharge.  Information included:  problems and symptoms to look for and call the doctor if experiencing, and symptoms that may indicate a medical emergency: CALL 911.      All questions answered.  Teach back method used.    Patient stated, "Temp 100.5 or nausea call the doctor".       "

## 2020-09-19 NOTE — NURSING
Patient wound vac dressing on LLE is CDI wound; wound vac wnl.  Switched to home wound vac as ordered; educated patient and daughter on use and to call MD, company or home health with concerns, excess drainage or to trouble shoot for beeping.

## 2020-09-19 NOTE — PLAN OF CARE
Problem: Physical Therapy Goal  Goal: Physical Therapy Goal  Description: Goals to be met by:      Patient will increase functional independence with mobility by performin. Sit to stand transfer with Modified [place]  2. Bed to chair transfer with Modified [place]   3. Gait  x 150 feet with Modified [place].     Outcome: Ongoing, Progressing   Pt continues to make progress towards goals. Pt ambulated ~200ft with RW, Mod A and ~12ft x2 trials with no AD, Supervision. Received handout for HEP with education of minimal L knee bending, pt verbalized understanding. All questions/concerns met prior to pt discharging today

## 2020-09-19 NOTE — TELEPHONE ENCOUNTER
Reason for Disposition   Health Information question, no triage required and triager able to answer question    Additional Information   Negative: [1] Caller is not with the adult (patient) AND [2] reporting urgent symptoms   Negative: Lab result questions   Negative: Medication questions   Negative: Caller can't be reached by phone   Negative: Caller has already spoken to PCP or another triager   Negative: RN needs further essential information from caller in order to complete triage   Negative: Requesting regular office appointment   Negative: [1] Caller requesting NON-URGENT health information AND [2] PCP's office is the best resource    Protocols used: INFORMATION ONLY CALL-A-  Daughter called re d/d today with wound vac. Just off phone with wound vac machine company . Device saying low pressure alarm. Caller states pt has [employer]  [phone]. Spoke with nurse Luna with [employer]. Daughter on conference call with [employer] nurse. Pt has [employer] wound vac.   states that the soonest she can come out if the day after discharge. wound vac was disconnected for pt to go to bathroom. Appears that wound vac is working properly now.  Nurse states to apply dry dressing if machine not working.  nurse will come out to see pt pepe. call back with questions.

## 2020-09-20 LAB
BACTERIA SPEC AEROBE CULT: ABNORMAL

## 2020-09-21 ENCOUNTER — PATIENT OUTREACH (OUTPATIENT)
Dept: ADMINISTRATIVE | Facility: CLINIC | Age: 80
End: 2020-09-21

## 2020-09-21 NOTE — PATIENT INSTRUCTIONS
Wound Check After Surgery, No Complication  Surgery involves cutting through layers of skin, fatty tissue, muscle, and sometimes bone and cartilage. Sutures (stitches) or staples are used to close all layers of the wound. The sutures on the inside will dissolve in about 2 to 3 weeks. Any sutures or staples used on the outside need to be removed in about 7 to 14 days, depending on the location.  It is normal to have some clear or bloody discharge on the wound covering or bandage (dressing) for the first few days after surgery. If your wound was sutured (sewn) closed, you should not have to change the dressing more than twice a day in the first few days. Bleeding or discharge requiring more frequent dressing changes can be a sign of a problem.  It is normal to feel pain at the incision site. The pain decreases as the wound heals. Most of the pain and soreness from the skin incision should go away by the time the sutures or staples are removed. Soreness and pain from deeper tissues may last another week or two.  Pain that continues more than a few weeks after surgery or pain that worsens anytime after surgery can be a sign of a problem, such as:  · Infection  · Separation of wound edges  · Collection of blood or other below the skin  Home care  Different types of surgery require different types of care and dressing changes. It is important to follow all instructions and advice from your surgeon, as well as other members of your healthcare team.  Wound care  · Keep the wound clean, as directed by your healthcare provider.  · Change the dressing as directed. Change the dressing sooner if it becomes wet or stained with blood or fluid from the wound.  · Bathe with a sponge (no shower or tub baths) for the first few days after surgery, or until there is no more drainage from the wound. Unless you received different instructions from your surgeon, you can then shower. Do not soak the area in water (no baths or swimming)  until the tape, sutures, or staples are removed and any wound opening has dried out and healed.  Changing the dressing  · Wash your hands before changing the dressings.  · Carefully remove the dressing and tape; dont just yank it off. If it sticks to the wound, you may need to wet it a little to remove it, unless your healthcare provider told you not to wet it.  · Wash your hands again before putting on a new, clean dressing.  · Gently clean the wound with clean water (or saline) using gauze or a clean washcloth. Do not rub it or pick at it.  · Do not use soap, alcohol, hydrogen peroxide, or any other cleanser.  · If you were told to dry the wound before putting on a new dressing, gently pat it dry. Do not rub.  · Throw out the old dressing. Do not reuse it!  · Wash your hands again when you are done.  Types of dressings  Your healthcare team will tell you what type of dressing to put on your wound. Follow your healthcare teams instructions carefully, and contact them if you have any questions. Two common types of dressings are described below. You may have one of these or another type.  · Dry dressing. Use dry gauze. If the wound is still draining, use a nonadherent dressing, which shouldnt stick to the wound.  · Wet-to-dry dressing. Wet the gauze, and squeeze out the excess water (or saline), before putting it on. Then, cover this with a dry pad.  Medicines  · If you were given antibiotics, take them until they are used up or your healthcare provider tells you to stop. It is important to finish the antibiotics even though you feel better, to make sure the infection has cleared.  · You can take acetaminophen or ibuprofen for pain, unless you were given a different pain medicine to use. (Note: If you have chronic liver or kidney disease, or have ever had a stomach ulcer or gastrointestinal bleeding, or are taking blood thinner medicines, talk with your healthcare provider before using these  medicines.)  · Aspirin should never be used in anyone under 18 years of age who is ill with a fever. It may cause severe liver damage.  Follow-up care  Follow up with your healthcare provider, or as advised, for your next wound check or removal of your sutures, staples, or tape.  · If a culture was done, you will be notified if the results will affect your treatment. You can call as directed for the results.  · If imaging tests, such as X-rays, an ultrasound, or CT scan were done, they will be reviewed by a specialist. You will be notified of the results, especially if they affect treatment.  Call 911  Call emergency services right away if any of these occur:  · Trouble breathing or swallowing, wheezing  · Hoarse voice or trouble speaking  · Extreme confusion  · Extreme drowsiness or trouble awakening  · Fainting or loss of consciousness  · Rapid heart rate or very slow heart rate  · Vomiting blood, or large amounts of blood in stool  · Discomfort in the center of the chest that feels like pressure, squeezing, a sense of fullness, or pain.  · Discomfort or pain in other upper body areas, such as the back, one or both arms, neck, jaw, or stomach  · Stroke symptoms (spot a stroke FAST)  ¨ F: Face drooping. One side of the face is numb or droops.  ¨ A: Arm weakness. One arm feels weak or numb.  ¨ S: Speech difficulty: Speech is slurred, or the person is unable to speak.  ¨ T: Time to call 911. Even if symptoms go away, call 911.  When to seek medical advice  Call your healthcare provider right away if any of the following occur:  · Increasing pain at the site of surgery  · Fever over 100.4º F (38º C)  · Redness around the wound  · Fluid, pus, or blood draining from the wound  · Vomiting, constipation, or diarrhea  Date Last Reviewed: 9/27/2015  © 0466-7379 [employer]. [address] [place] [address], [place] [address]. All rights reserved. This information is not intended as a substitute for professional  medical care. Always follow your healthcare professional's instructions.

## 2020-09-22 LAB
BACTERIA BLD CULT: NORMAL
BACTERIA BLD CULT: NORMAL

## 2020-09-23 NOTE — PHYSICIAN QUERY
"PT Name: Barbara Rizo  MR #: [id number]    Procedure Clarification     CDS/: Vero Lord RN               Contact information:[email or web address]  This form is a permanent document in the medical record.    Query Date: September 23, 2020  By submitting this query, we are merely seeking further clarification of documentation. Please utilize your independent clinical judgment when addressing the question(s) below.    The Medical Record contains the following:   Indicator Supporting Clinical Findings Location in Medical Record   x Documentation of "Debridement" -Left knee skin necrosis  -Incision was made around the open wounds with removal of the skin edges.  Deeper dissection revealed a large hematoma which was evacuated in its entirety. -Some friable fat was removed but there was no purulence or sign of infection and there was no deep tracking into the knee joint itself.  - Once debridement was complete the wound was irrigated with 3 L normal saline with Betadine with the Pulsavac.   - Proximal and distal extent of the incision was closed with 3 O nylon.    -Small wound VAC was then fashioned to fit the defect in the skin.     OP note 9/17   x Documentation of "I&D" - I&D left knee with wound vac placement   OP note 9/17   x Other DM2, CKD, A-fib, osteoporosis, vitamin D deficiency.  Left knee: + Swelling and erythema throughout left LE.  2 areas over anterior knee with deeper tissue exposed. No active bleeding. Appears to have been where previous blisters ruptured over TKA scar     Orthopedic surgery H&P     Excisional debridement is a surgical removal of nonvitalized tissue, necrosis or slough. The use of a sharp instrument does not always indicate that an excisional debridement was performed.    Nonexcisional debridement is the scraping, washing, irrigating, brushing away or removal of loose tissue fragments.    Provider, please provide further clarification on the procedure performed on ___L " knee open wounds            :    [   ] Excisional Debridement of subcutaneous tissue/fascia   [   ] Excisional Debridement of muscle        [  X ] Nonexcisional Debridement of subcutaneous tissue/fascia   [   ] Nonexcisional Debridement of muscle     [   ] Other Procedure (please specify): _____________   [  ] Clinically Undetermined

## 2020-09-24 NOTE — ANESTHESIA POSTPROCEDURE EVALUATION
Anesthesia Post Evaluation    Patient: Barbara Rizo    Procedure(s) Performed: Procedure(s) (LRB):  INCISION AND DRAINAGE, KNEE (Left)  WOUND VAC APPLICATION (Left)    Final Anesthesia Type: general    Patient location during evaluation: PACU  Patient participation: Yes- Able to Participate  Level of consciousness: awake and alert  Post-procedure vital signs: reviewed and stable  Pain management: adequate  Airway patency: patent    PONV status at discharge: No PONV  Anesthetic complications: no      Cardiovascular status: blood pressure returned to baseline and hemodynamically stable  Respiratory status: unassisted and spontaneous ventilation  Hydration status: euvolemic  Follow-up not needed.          Vitals Value Taken Time   /68 09/19/20 1842   Temp 36.8 °C (98.3 °F) 09/19/20 1842   Pulse 49 09/19/20 1842   Resp 16 09/19/20 1842   SpO2 95 % 09/19/20 1842         Event Time   Out of Recovery 17:06:26         Pain/Yessy Score: No data recorded

## 2020-09-25 NOTE — DISCHARGE SUMMARY
Orthopaedic Discharge Summary  Barbara Rizo, 1940   [id number], [id number]      SUMMARY     Admit Date: 9/17/2020    Attending Physician:Alonzo Rincon MD    Discharge Date: 9/19/2020 12:06 PM ;     Principal Diagnosis: total joint arthroplasty  Secondary Diagnosis:   1. H/O open leg wound    2. Fall    3. Bradycardia    4. Left leg swelling              History of Present Illness: Barbara Rizo is a 80 y.o. female with a history significant for degenerative joint disease that has been refractory to all conservative/non-operative modalities.  An extensive discussion was had concerning the risks, benefits, alternatives, and indications for surgical intervention.  All questions were answered, and informed consent was obtained. No guarantees were made. The patient elected to proceed with surgery.      Hospital Course: On the aforementioned date, the patient underwent a total joint replacement. See the operative note for details. Post-operatively the patient was transferred to the recovery room and then to the floor.    The interim of the hospital stay from arrival on the floor up to discharge has been largely uncomplicated. The patient progressed well with physical therapy on a daily basis. See the PT notes for details. Post operative pain was controlled and the patient was felt ready for discharge.     Currently the patient is ambulating with moderate assistance and the physical therapy team feels that the patient's progress is sufficient to allow the patient to be discharged home safely.     Disposition: Stable. Discharged to home with home health.    Activity: As tolerated with assistive devices PRN.     Diet: Resume a healthy, balanced diet    Follow-up: Patient will follow-up with the attending surgeon [facility] within two weeks of the operative date for wound check and re-evaluation of the post-operative plan.     Medications: See below for detailed medication reconciliation. This  includes  DVT prophylaxis  for 30 days following joint replacement surgery.    The patient was instructed to monitor for signs and symptoms of infection and to contact the orthopaedic surgery clinic with any questions or concerns.      Patient Instructions:   Discharge Medication List as of 9/19/2020 11:23 AM      CONTINUE these medications which have NOT CHANGED    Details   allopurinol (ZYLOPRIM) 100 MG tablet Starting Mon 9/23/2019, Historical Med      amiodarone (PACERONE) 200 MG Tab TAKE 1 TABLET(200 MG) BY MOUTH EVERY DAY, Normal      apixaban (ELIQUIS) 2.5 mg Tab Take 1 tablet (2.5 mg total) by mouth 2 (two) times daily., Starting Wed 9/11/2019, Normal      atorvastatin (LIPITOR) 40 MG tablet TAKE 1 TABLET(40 MG) BY MOUTH EVERY DAY, Normal      blood-glucose meter (FREESTYLE SYSTEM KIT) kit Use as instructed, Print      calcium carbonate (TUMS) 200 mg calcium (500 mg) chewable tablet Take 1 tablet by mouth once daily., Historical Med      doxazosin (CARDURA) 4 MG tablet TAKE 1 TABLET(4 MG) BY MOUTH EVERY EVENING, Normal      ergocalciferol (ERGOCALCIFEROL) 50,000 unit Cap Take 50,000 Units by mouth every 7 days., Historical Med      fish oil-omega-3 fatty acids 300-1,000 mg capsule Take 1 capsule by mouth once daily., Historical Med      FLUoxetine 10 MG capsule TAKE 1 CAPSULE(10 MG) BY MOUTH EVERY DAY, Normal      folic acid (FOLVITE) 1 MG tablet TAKE 1 TABLET(1 MG) BY MOUTH EVERY DAY, Normal      furosemide (LASIX) 20 MG tablet TAKE 1 TABLET BY MOUTH EVERY DAY AS NEEDED, Normal      losartan (COZAAR) 25 MG tablet TK 1 T PO QD, Historical Med      metoprolol succinate (TOPROL-XL) 25 MG 24 hr tablet TAKE 1 TABLET(25 MG) BY MOUTH EVERY DAY, Normal             Discharge Procedure Orders (must include Diet, Follow-up, Activity)  No discharge procedures on file.         DAYSI Rincon.

## 2020-09-27 ENCOUNTER — HOSPITAL ENCOUNTER (INPATIENT)
Facility: HOSPITAL | Age: 80
LOS: 4 days | Discharge: HOME HEALTH | DRG: 641 | End: 2020-10-01
Attending: EMERGENCY MEDICINE | Admitting: INTERNAL MEDICINE
Payer: MEDICARE

## 2020-09-27 DIAGNOSIS — E11.9 DIABETES MELLITUS, NEW ONSET: ICD-10-CM

## 2020-09-27 DIAGNOSIS — T14.90XA WOUNDS AND INJURIES: ICD-10-CM

## 2020-09-27 DIAGNOSIS — I70.448: ICD-10-CM

## 2020-09-27 DIAGNOSIS — R42 DIZZINESS: ICD-10-CM

## 2020-09-27 DIAGNOSIS — E87.1 HYPONATREMIA: Primary | ICD-10-CM

## 2020-09-27 DIAGNOSIS — R60.0 BILATERAL LEG EDEMA: ICD-10-CM

## 2020-09-27 LAB
ALBUMIN SERPL BCP-MCNC: 2.9 G/DL (ref 3.5–5.2)
ALP SERPL-CCNC: 55 U/L (ref 55–135)
ALT SERPL W/O P-5'-P-CCNC: 26 U/L (ref 10–44)
ANION GAP SERPL CALC-SCNC: 6 MMOL/L (ref 8–16)
ANION GAP SERPL CALC-SCNC: 9 MMOL/L (ref 8–16)
AST SERPL-CCNC: 20 U/L (ref 10–40)
BASOPHILS # BLD AUTO: 0.02 K/UL (ref 0–0.2)
BASOPHILS NFR BLD: 0.3 % (ref 0–1.9)
BILIRUB SERPL-MCNC: 0.4 MG/DL (ref 0.1–1)
BNP SERPL-MCNC: 1057 PG/ML (ref 0–99)
BUN SERPL-MCNC: 17 MG/DL (ref 8–23)
BUN SERPL-MCNC: 17 MG/DL (ref 8–23)
CALCIUM SERPL-MCNC: 7.3 MG/DL (ref 8.7–10.5)
CALCIUM SERPL-MCNC: 7.8 MG/DL (ref 8.7–10.5)
CHLORIDE SERPL-SCNC: 93 MMOL/L (ref 95–110)
CHLORIDE SERPL-SCNC: 95 MMOL/L (ref 95–110)
CO2 SERPL-SCNC: 21 MMOL/L (ref 23–29)
CO2 SERPL-SCNC: 22 MMOL/L (ref 23–29)
CREAT SERPL-MCNC: 2 MG/DL (ref 0.5–1.4)
CREAT SERPL-MCNC: 2 MG/DL (ref 0.5–1.4)
CTP QC/QA: YES
DIFFERENTIAL METHOD: ABNORMAL
EOSINOPHIL # BLD AUTO: 0 K/UL (ref 0–0.5)
EOSINOPHIL NFR BLD: 0.5 % (ref 0–8)
ERYTHROCYTE [DISTWIDTH] IN BLOOD BY AUTOMATED COUNT: 13.6 % (ref 11.5–14.5)
EST. GFR  (AFRICAN AMERICAN): 27 ML/MIN/1.73 M^2
EST. GFR  (AFRICAN AMERICAN): 27 ML/MIN/1.73 M^2
EST. GFR  (NON AFRICAN AMERICAN): 23 ML/MIN/1.73 M^2
EST. GFR  (NON AFRICAN AMERICAN): 23 ML/MIN/1.73 M^2
GLUCOSE SERPL-MCNC: 115 MG/DL (ref 70–110)
GLUCOSE SERPL-MCNC: 122 MG/DL (ref 70–110)
HCT VFR BLD AUTO: 27 % (ref 37–48.5)
HGB BLD-MCNC: 9 G/DL (ref 12–16)
IMM GRANULOCYTES # BLD AUTO: 0.04 K/UL (ref 0–0.04)
IMM GRANULOCYTES NFR BLD AUTO: 0.7 % (ref 0–0.5)
LYMPHOCYTES # BLD AUTO: 0.6 K/UL (ref 1–4.8)
LYMPHOCYTES NFR BLD: 9.7 % (ref 18–48)
MCH RBC QN AUTO: 31 PG (ref 27–31)
MCHC RBC AUTO-ENTMCNC: 33.3 G/DL (ref 32–36)
MCV RBC AUTO: 93 FL (ref 82–98)
MONOCYTES # BLD AUTO: 0.5 K/UL (ref 0.3–1)
MONOCYTES NFR BLD: 7.5 % (ref 4–15)
NEUTROPHILS # BLD AUTO: 5 K/UL (ref 1.8–7.7)
NEUTROPHILS NFR BLD: 81.3 % (ref 38–73)
NRBC BLD-RTO: 0 /100 WBC
PLATELET # BLD AUTO: 171 K/UL (ref 150–350)
PMV BLD AUTO: 11.4 FL (ref 9.2–12.9)
POTASSIUM SERPL-SCNC: 5.5 MMOL/L (ref 3.5–5.1)
POTASSIUM SERPL-SCNC: 5.5 MMOL/L (ref 3.5–5.1)
PROT SERPL-MCNC: 5.5 G/DL (ref 6–8.4)
RBC # BLD AUTO: 2.9 M/UL (ref 4–5.4)
SARS-COV-2 RDRP RESP QL NAA+PROBE: NEGATIVE
SODIUM SERPL-SCNC: 123 MMOL/L (ref 136–145)
SODIUM SERPL-SCNC: 123 MMOL/L (ref 136–145)
TROPONIN I SERPL DL<=0.01 NG/ML-MCNC: 0.02 NG/ML (ref 0–0.03)
WBC # BLD AUTO: 6.11 K/UL (ref 3.9–12.7)

## 2020-09-27 PROCEDURE — 36415 COLL VENOUS BLD VENIPUNCTURE: CPT

## 2020-09-27 PROCEDURE — 93010 EKG 12-LEAD: ICD-10-PCS | Mod: ,,, | Performed by: INTERNAL MEDICINE

## 2020-09-27 PROCEDURE — 85025 COMPLETE CBC W/AUTO DIFF WBC: CPT

## 2020-09-27 PROCEDURE — 96360 HYDRATION IV INFUSION INIT: CPT

## 2020-09-27 PROCEDURE — 94761 N-INVAS EAR/PLS OXIMETRY MLT: CPT

## 2020-09-27 PROCEDURE — 25000003 PHARM REV CODE 250: Performed by: PHYSICIAN ASSISTANT

## 2020-09-27 PROCEDURE — 96361 HYDRATE IV INFUSION ADD-ON: CPT

## 2020-09-27 PROCEDURE — 93010 ELECTROCARDIOGRAM REPORT: CPT | Mod: ,,, | Performed by: INTERNAL MEDICINE

## 2020-09-27 PROCEDURE — 80048 BASIC METABOLIC PNL TOTAL CA: CPT

## 2020-09-27 PROCEDURE — 25000003 PHARM REV CODE 250: Performed by: INTERNAL MEDICINE

## 2020-09-27 PROCEDURE — 84484 ASSAY OF TROPONIN QUANT: CPT

## 2020-09-27 PROCEDURE — 93005 ELECTROCARDIOGRAM TRACING: CPT

## 2020-09-27 PROCEDURE — 21400001 HC TELEMETRY ROOM

## 2020-09-27 PROCEDURE — U0002 COVID-19 LAB TEST NON-CDC: HCPCS | Performed by: PHYSICIAN ASSISTANT

## 2020-09-27 PROCEDURE — 99285 EMERGENCY DEPT VISIT HI MDM: CPT | Mod: 25

## 2020-09-27 PROCEDURE — 87040 BLOOD CULTURE FOR BACTERIA: CPT

## 2020-09-27 PROCEDURE — 83880 ASSAY OF NATRIURETIC PEPTIDE: CPT

## 2020-09-27 PROCEDURE — 80053 COMPREHEN METABOLIC PANEL: CPT

## 2020-09-27 RX ORDER — ALBUTEROL SULFATE 2.5 MG/.5ML
2.5 SOLUTION RESPIRATORY (INHALATION) EVERY 4 HOURS PRN
Status: DISCONTINUED | OUTPATIENT
Start: 2020-09-27 | End: 2020-10-01 | Stop reason: HOSPADM

## 2020-09-27 RX ORDER — ALLOPURINOL 100 MG/1
100 TABLET ORAL DAILY
Status: DISCONTINUED | OUTPATIENT
Start: 2020-09-27 | End: 2020-10-01 | Stop reason: HOSPADM

## 2020-09-27 RX ORDER — FLUOXETINE 10 MG/1
10 CAPSULE ORAL DAILY
Status: DISCONTINUED | OUTPATIENT
Start: 2020-09-27 | End: 2020-09-28

## 2020-09-27 RX ORDER — ENOXAPARIN SODIUM 100 MG/ML
30 INJECTION SUBCUTANEOUS EVERY 24 HOURS
Status: DISCONTINUED | OUTPATIENT
Start: 2020-09-27 | End: 2020-09-27

## 2020-09-27 RX ORDER — SODIUM CHLORIDE 9 MG/ML
INJECTION, SOLUTION INTRAVENOUS CONTINUOUS
Status: DISCONTINUED | OUTPATIENT
Start: 2020-09-27 | End: 2020-09-28

## 2020-09-27 RX ORDER — SODIUM CHLORIDE 9 MG/ML
1000 INJECTION, SOLUTION INTRAVENOUS
Status: COMPLETED | OUTPATIENT
Start: 2020-09-27 | End: 2020-09-27

## 2020-09-27 RX ORDER — AMIODARONE HYDROCHLORIDE 200 MG/1
200 TABLET ORAL DAILY
Status: DISCONTINUED | OUTPATIENT
Start: 2020-09-27 | End: 2020-10-01 | Stop reason: HOSPADM

## 2020-09-27 RX ORDER — SODIUM CHLORIDE 0.9 % (FLUSH) 0.9 %
10 SYRINGE (ML) INJECTION
Status: DISCONTINUED | OUTPATIENT
Start: 2020-09-27 | End: 2020-10-01 | Stop reason: HOSPADM

## 2020-09-27 RX ORDER — ATORVASTATIN CALCIUM 40 MG/1
40 TABLET, FILM COATED ORAL DAILY
Status: DISCONTINUED | OUTPATIENT
Start: 2020-09-27 | End: 2020-10-01 | Stop reason: HOSPADM

## 2020-09-27 RX ORDER — LOSARTAN POTASSIUM 25 MG/1
25 TABLET ORAL DAILY
Status: DISCONTINUED | OUTPATIENT
Start: 2020-09-27 | End: 2020-10-01

## 2020-09-27 RX ORDER — FOLIC ACID 1 MG/1
1 TABLET ORAL DAILY
Status: DISCONTINUED | OUTPATIENT
Start: 2020-09-27 | End: 2020-10-01 | Stop reason: HOSPADM

## 2020-09-27 RX ORDER — METOPROLOL SUCCINATE 25 MG/1
25 TABLET, EXTENDED RELEASE ORAL DAILY
Status: DISCONTINUED | OUTPATIENT
Start: 2020-09-27 | End: 2020-09-28

## 2020-09-27 RX ORDER — DOXAZOSIN 4 MG/1
4 TABLET ORAL NIGHTLY
Status: DISCONTINUED | OUTPATIENT
Start: 2020-09-27 | End: 2020-10-01 | Stop reason: HOSPADM

## 2020-09-27 RX ORDER — ONDANSETRON 2 MG/ML
4 INJECTION INTRAMUSCULAR; INTRAVENOUS EVERY 6 HOURS PRN
Status: DISCONTINUED | OUTPATIENT
Start: 2020-09-27 | End: 2020-10-01 | Stop reason: HOSPADM

## 2020-09-27 RX ORDER — CALCIUM CARBONATE 200(500)MG
1 TABLET,CHEWABLE ORAL DAILY
Status: DISCONTINUED | OUTPATIENT
Start: 2020-09-27 | End: 2020-10-01 | Stop reason: HOSPADM

## 2020-09-27 RX ADMIN — FOLIC ACID 1 MG: 1 TABLET ORAL at 04:09

## 2020-09-27 RX ADMIN — SODIUM CHLORIDE 1000 ML: 0.9 INJECTION, SOLUTION INTRAVENOUS at 10:09

## 2020-09-27 RX ADMIN — FLUOXETINE 10 MG: 10 CAPSULE ORAL at 04:09

## 2020-09-27 RX ADMIN — ALLOPURINOL 100 MG: 100 TABLET ORAL at 04:09

## 2020-09-27 RX ADMIN — SODIUM CHLORIDE: 0.9 INJECTION, SOLUTION INTRAVENOUS at 04:09

## 2020-09-27 RX ADMIN — LOSARTAN POTASSIUM 25 MG: 25 TABLET, FILM COATED ORAL at 04:09

## 2020-09-27 RX ADMIN — APIXABAN 2.5 MG: 2.5 TABLET, FILM COATED ORAL at 09:09

## 2020-09-27 RX ADMIN — METOPROLOL SUCCINATE 25 MG: 25 TABLET, EXTENDED RELEASE ORAL at 04:09

## 2020-09-27 RX ADMIN — DOXAZOSIN 4 MG: 4 TABLET ORAL at 09:09

## 2020-09-27 RX ADMIN — CALCIUM CARBONATE (ANTACID) CHEW TAB 500 MG 500 MG: 500 CHEW TAB at 04:09

## 2020-09-27 RX ADMIN — AMIODARONE HYDROCHLORIDE 200 MG: 200 TABLET ORAL at 04:09

## 2020-09-27 RX ADMIN — ATORVASTATIN CALCIUM 40 MG: 40 TABLET, FILM COATED ORAL at 04:09

## 2020-09-27 NOTE — NURSING
Received report from TOSHIA Michelle in the ER. Pt received bolus in ER.  Awaiting pt's arrival to the floor

## 2020-09-27 NOTE — ED PROVIDER NOTES
Encounter Date: 9/27/2020    SCRIBE #1 NOTE: I, Raffy Sorensen, am scribing for, and in the presence of,  Fish Serrano PA-C. I have scribed the following portions of the note - Other sections scribed: HPI, ROS, PE.       History     Chief Complaint   Patient presents with    Fatigue     pt here for gen weakness and dizziness onset one week. seen by primary last week, also fall while at provider, wound to right knee and right forearm, bandages in place.      This is a 80-year-old female with a PMHx of Diabetes Mellitus, GERD, Hyperlipidemia, and Hypertension who presents to the ED complaining of generalized weakness that onset one week ago. The patient's daughter reports the patient has had left knee surgery on 9/17 in which a wound vac was placed. She also notes that the wound vac keeps getting clogged. Associated symptoms include diaphoresis, lightheadedness, loss of appetite, and fatigue. Patient denies fever, chills, rhinorrhea, sore throat, abdominal pain, vomiting, diarrhea, nausea, urinary symptoms, or dizziness. No alleviating or worsening factors. The patient has incurred 2 new scrapes since the surgery, one being on her right knee. Patient's daughter notes visible discoloration to the lower left extremity. Patient is taking Eliquis (2.5 mg). No known drug allergies.     The history is provided by a relative and the patient. No  was used.     Review of patient's allergies indicates:  No Known Allergies  Past Medical History:   Diagnosis Date    A-fib     CKD (chronic kidney disease)     Diabetes mellitus type II     Fatty liver     GERD (gastroesophageal reflux disease)     Hearing loss of both ears     wears bilateral hearing aids    Hemochromatosis     History of anemia due to CKD     History of pleural effusion     with thoracentesis    Hyperlipidemia     Hypertension     Macular degeneration     Osteoarthritis     Left knee    Osteoporosis     Vaginal delivery     x2     Vitamin D deficiency disease     Wears partial dentures     upper removable bridge     Past Surgical History:   Procedure Laterality Date    BREAST BIOPSY      BREAST SURGERY Bilateral     Reduction r/t h/o fibrocystic disease    CHOLECYSTECTOMY  08/2015    EYE SURGERY      Cat Ext  OU    HYSTERECTOMY      partial due to uterine fibroids    INCISION AND DRAINAGE OF KNEE Left 9/17/2020    Procedure: INCISION AND DRAINAGE, KNEE;  Surgeon: Rolando Wagoner MD;  Location: [facility] OR;  Service: Orthopedics;  Laterality: Left;    JOINT REPLACEMENT Left 05/13/2013    TKR    JOINT REPLACEMENT Right 08/03/2015    TKR    THORACENTESIS      TOTAL KNEE ARTHROPLASTY Right 2015    left knee done 5/2013    WOUND DRESSING Left 9/17/2020    Procedure: WOUND VAC APPLICATION;  Surgeon: Rolando Wagoner MD;  Location: [facility] OR;  Service: Orthopedics;  Laterality: Left;     Family History   Problem Relation Age of Onset    Cancer Mother         stomach    Hypertension Mother     Aneurysm Father         abdominal     Social History     Tobacco Use    Smoking status: Former Smoker    Smokeless tobacco: Never Used   Substance Use Topics    Alcohol use: Not Currently     Alcohol/week: 0.0 standard drinks     Comment: occasional/ on a weekend    Drug use: No     Review of Systems   Constitutional: Positive for appetite change (reduced), diaphoresis and fatigue. Negative for chills and fever.   HENT: Negative for congestion, rhinorrhea and sore throat.    Eyes: Negative for visual disturbance.   Respiratory: Negative for cough and shortness of breath.    Cardiovascular: Negative for chest pain.   Gastrointestinal: Negative for abdominal pain, diarrhea, nausea and vomiting.   Genitourinary: Negative for dysuria, frequency and hematuria.   Musculoskeletal: Negative for back pain.   Skin: Positive for color change (left lower extremity). Negative for rash.   Neurological: Positive for weakness (generalized) and  light-headedness. Negative for dizziness and headaches.       Physical Exam     Initial Vitals [09/27/20 0942]   BP Pulse Resp Temp SpO2   (!) 149/61 (!) 44 18 98 °F (36.7 °C) 96 %      MAP       --         Physical Exam    Nursing note and vitals reviewed.  Constitutional: She appears well-developed and well-nourished. No distress.   HENT:   Head: Normocephalic and atraumatic.   Right Ear: Tympanic membrane normal.   Left Ear: Tympanic membrane normal.   Nose: Nose normal.   Mouth/Throat: Uvula is midline, oropharynx is clear and moist and mucous membranes are normal.   Eyes: EOM are normal. Pupils are equal, round, and reactive to light.   Neck: Trachea normal, normal range of motion, full passive range of motion without pain and phonation normal. Neck supple. No stridor present. No spinous process tenderness and no muscular tenderness present. Normal range of motion present. No neck rigidity.   Cardiovascular: Normal rate, regular rhythm and normal heart sounds. Exam reveals no gallop and no friction rub.    No murmur heard.  Pulmonary/Chest: Effort normal and breath sounds normal. No respiratory distress. She has no wheezes. She has no rhonchi. She has no rales.   Abdominal: Soft. Bowel sounds are normal. There is no abdominal tenderness. There is no rebound and no guarding.   Musculoskeletal: Normal range of motion.   Neurological: She is alert and oriented to person, place, and time. She has normal strength. No cranial nerve deficit or sensory deficit.   Skin: Skin is warm and dry. Capillary refill takes less than 2 seconds.   Psychiatric: She has a normal mood and affect.         ED Course   Procedures  Labs Reviewed   CBC W/ AUTO DIFFERENTIAL - Abnormal; Notable for the following components:       Result Value    RBC 2.90 (*)     Hemoglobin 9.0 (*)     Hematocrit 27.0 (*)     Immature Granulocytes 0.7 (*)     Lymph # 0.6 (*)     Gran% 81.3 (*)     Lymph% 9.7 (*)     All other components within normal limits    COMPREHENSIVE METABOLIC PANEL - Abnormal; Notable for the following components:    Sodium 123 (*)     Potassium 5.5 (*)     CO2 22 (*)     Glucose 115 (*)     Creatinine 2.0 (*)     Calcium 7.3 (*)     Total Protein 5.5 (*)     Albumin 2.9 (*)     Anion Gap 6 (*)     eGFR if  27 (*)     eGFR if non  23 (*)     All other components within normal limits    Narrative:     Recoll. [id number] by [facility] at 09/27/2020 11:29, reason: Specimen   hemolyzed; Tube has been refrigerated; CALLED TO JEAN PIERRE MAGUIRE   B-TYPE NATRIURETIC PEPTIDE - Abnormal; Notable for the following components:    BNP 1,057 (*)     All other components within normal limits   TROPONIN I    Narrative:     Recoll. [id number] by [facility] at 09/27/2020 11:29, reason: Specimen   hemolyzed; Tube has been refrigerated; CALLED TO JEAN PIERRE MAGUIRE   SARS-COV-2 RDRP GENE        ECG Results          EKG 12-lead (Final result)  Result time 09/28/20 18:21:29    Final result by Interface, Lab In [facility] (09/28/20 18:21:29)                 Narrative:    Test Reason : R42,    Vent. Rate : 043 BPM     Atrial Rate : 043 BPM     P-R Int : 282 ms          QRS Dur : 136 ms      QT Int : 536 ms       P-R-T Axes : 078 -83 070 degrees     QTc Int : 452 ms    Marked sinus bradycardia with 1st degree A-V block  Left axis deviation  Nonspecific intraventricular block  Lateral infarct (cited on or before 18-JUN-2020)  Abnormal ECG  When compared with ECG of 17-SEP-2020 08:41,  Significant changes have occurred  Confirmed by Topher Simons MD ([id number]) on 9/28/2020 6:21:18 PM    Referred By: [email or web address]   SELF           Confirmed By:Topher Simons MD                            Imaging Results          X-Ray Chest AP Portable (Final result)  Result time 09/27/20 10:34:32    Final result by Rui Tomlin MD (09/27/20 10:34:32)                 Impression:      As above      Electronically signed by: Rui Tomlin MD  Date:    09/27/2020  Time:    10:34              Narrative:    EXAMINATION:  XR CHEST AP PORTABLE    CLINICAL HISTORY:  WEAKNESS;    TECHNIQUE:  Single frontal view of the chest was performed.    COMPARISON:  02/02/2019    FINDINGS:  Cardiac silhouette appears enlarged.  There is suggestion of some enlargement of the main pulmonary trunk which can be associated with pulmonary arterial hypertension.  Atherosclerotic calcifications are again noted involving the abdominal aorta.    Elevation of the right hemidiaphragm appears similar to prior.  Right hilar fullness is likely related to prominent vasculature accentuated by low lung volumes and appears similar to some prior examinations.  Consider follow-up examination with PA and lateral views of the chest.  Subsegmental atelectasis noted in the right lung base.  Left lung appears grossly clear.  No pneumothorax visualized.                                 Medical Decision Making:   Clinical Tests:   Lab Tests: Ordered and Reviewed  Radiological Study: Ordered and Reviewed  Medical Tests: Ordered and Reviewed  ED Management:  Hemodynamically stable.  Nontoxic and in no acute distress.  Patient is overall well-appearing, pleasant, conversational.  Hyponatremic with sodium of 123.  Creatinine 2.0 but not far from baseline.  Stable anemia.  Discussed patient with Hospital Medicine and patient will be admitted to inpatient medicine for further treatment evaluation of hyponatremia.  Patient and patient's daughter verbalized understanding and are agreeable with plan.            Scribe Attestation:   Scribe #1: I performed the above scribed service and the documentation accurately describes the services I performed. I attest to the accuracy of the note.                      Clinical Impression:     ICD-10-CM ICD-9-CM   1. Dizziness  R42 780.4   2. Hyponatremia  E87.1 276.1   3. Wounds and injuries  T14.90XA 959.9   4. 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 Dizziness    2. Hyponatremia    3. Wounds and injuries    4. 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 All medical record entries made by the scribe were at my direction and in my presence.  I have reviewed the chart and agree that the record reflects my personal performance and is accurate and complete.                 Fish Serrano PA-C  09/30/20 2100

## 2020-09-27 NOTE — ASSESSMENT & PLAN NOTE
Likely from excess H20. Will start on NS. BMP's every 6 hours.  Should mostly correct in next 24 hours

## 2020-09-27 NOTE — SUBJECTIVE & OBJECTIVE
Past Medical History:   Diagnosis Date    A-fib     CKD (chronic kidney disease)     Diabetes mellitus type II     Fatty liver     GERD (gastroesophageal reflux disease)     Hearing loss of both ears     wears bilateral hearing aids    Hemochromatosis     History of anemia due to CKD     History of pleural effusion     with thoracentesis    Hyperlipidemia     Hypertension     Macular degeneration     Osteoarthritis     Left knee    Osteoporosis     Vaginal delivery     x2    Vitamin D deficiency disease     Wears partial dentures     upper removable bridge       Past Surgical History:   Procedure Laterality Date    BREAST BIOPSY      BREAST SURGERY Bilateral     Reduction r/t h/o fibrocystic disease    CHOLECYSTECTOMY  08/2015    EYE SURGERY      Cat Ext  OU    HYSTERECTOMY      partial due to uterine fibroids    INCISION AND DRAINAGE OF KNEE Left 9/17/2020    Procedure: INCISION AND DRAINAGE, KNEE;  Surgeon: Rolando Wagoner MD;  Location: [facility] OR;  Service: Orthopedics;  Laterality: Left;    JOINT REPLACEMENT Left 05/13/2013    TKR    JOINT REPLACEMENT Right 08/03/2015    TKR    THORACENTESIS      TOTAL KNEE ARTHROPLASTY Right 2015    left knee done 5/2013    WOUND DRESSING Left 9/17/2020    Procedure: WOUND VAC APPLICATION;  Surgeon: Rolando Wagoner MD;  Location: [facility] OR;  Service: Orthopedics;  Laterality: Left;       Review of patient's allergies indicates:  No Known Allergies    Current Facility-Administered Medications on File Prior to Encounter   Medication    denosumab (PROLIA) injection 60 mg     Current Outpatient Medications on File Prior to Encounter   Medication Sig    allopurinol (ZYLOPRIM) 100 MG tablet     amiodarone (PACERONE) 200 MG Tab TAKE 1 TABLET(200 MG) BY MOUTH EVERY DAY    apixaban (ELIQUIS) 2.5 mg Tab Take 1 tablet (2.5 mg total) by mouth 2 (two) times daily.    atorvastatin (LIPITOR) 40 MG tablet TAKE 1 TABLET(40 MG) BY MOUTH EVERY DAY    calcium  carbonate (TUMS) 200 mg calcium (500 mg) chewable tablet Take 1 tablet by mouth once daily.    doxazosin (CARDURA) 4 MG tablet TAKE 1 TABLET(4 MG) BY MOUTH EVERY EVENING    ergocalciferol (ERGOCALCIFEROL) 50,000 unit Cap Take 50,000 Units by mouth every 7 days.    fish oil-omega-3 fatty acids 300-1,000 mg capsule Take 1 capsule by mouth once daily.    FLUoxetine 10 MG capsule TAKE 1 CAPSULE(10 MG) BY MOUTH EVERY DAY    folic acid (FOLVITE) 1 MG tablet TAKE 1 TABLET(1 MG) BY MOUTH EVERY DAY    furosemide (LASIX) 20 MG tablet TAKE 1 TABLET BY MOUTH EVERY DAY AS NEEDED    losartan (COZAAR) 25 MG tablet TK 1 T PO QD    metoprolol succinate (TOPROL-XL) 25 MG 24 hr tablet TAKE 1 TABLET(25 MG) BY MOUTH EVERY DAY    blood-glucose meter (FREESTYLE SYSTEM KIT) kit Use as instructed     Family History     Problem Relation (Age of Onset)    Aneurysm Father    Cancer Mother    Hypertension Mother        Tobacco Use    Smoking status: Former Smoker    Smokeless tobacco: Never Used   Substance and Sexual Activity    Alcohol use: Not Currently     Alcohol/week: 0.0 standard drinks     Comment: occasional/ on a weekend    Drug use: No    Sexual activity: Not Currently     Review of Systems   Constitutional: Positive for activity change, appetite change and fatigue. Negative for chills, diaphoresis, fever and unexpected weight change.   HENT: Negative for congestion and dental problem.    Eyes: Negative for discharge.   Respiratory: Negative for apnea, cough, choking, chest tightness, shortness of breath and stridor.    Cardiovascular: Negative for chest pain, palpitations and leg swelling.   Gastrointestinal: Negative for abdominal distention, abdominal pain, anal bleeding, blood in stool, constipation, rectal pain and vomiting.   Endocrine: Negative for cold intolerance.   Genitourinary: Negative for difficulty urinating and dyspareunia.   Musculoskeletal: Negative for arthralgias.   Neurological: Positive for  dizziness and light-headedness.   Psychiatric/Behavioral: Negative for agitation, behavioral problems and confusion.     Objective:     Vital Signs (Most Recent):  Temp: 98 °F (36.7 °C) (09/27/20 0942)  Pulse: (!) 44(pt states normally runs low) (09/27/20 0942)  Resp: 18 (09/27/20 0942)  BP: (!) 149/61 (09/27/20 0942)  SpO2: 96 % (09/27/20 0942) Vital Signs (24h Range):  Temp:  [98 °F (36.7 °C)] 98 °F (36.7 °C)  Pulse:  [44] 44  Resp:  [18] 18  SpO2:  [96 %] 96 %  BP: (149)/(61) 149/61     Weight: 88 kg (194 lb)  Body mass index is 35.48 kg/m².    Physical Exam  Vitals signs and nursing note reviewed.   Constitutional:       General: She is not in acute distress.     Appearance: Normal appearance. She is obese. She is not ill-appearing, toxic-appearing or diaphoretic.   HENT:      Head: Normocephalic and atraumatic.   Cardiovascular:      Rate and Rhythm: Normal rate and regular rhythm.      Heart sounds: No friction rub. No gallop.    Pulmonary:      Effort: Pulmonary effort is normal. No respiratory distress.      Breath sounds: No wheezing or rales.   Abdominal:      General: Abdomen is flat. Bowel sounds are normal. There is no distension.      Palpations: Abdomen is soft.   Skin:     Comments: L knee wrapped    Neurological:      Mental Status: She is alert and oriented to person, place, and time.   Psychiatric:         Mood and Affect: Mood normal.         Behavior: Behavior normal.             Significant Labs:   BMP:   Recent Labs   Lab 09/27/20  1133   *   *   K 5.5*   CL 95   CO2 22*   BUN 17   CREATININE 2.0*   CALCIUM 7.3*     CBC:   Recent Labs   Lab 09/27/20  1027   WBC 6.11   HGB 9.0*   HCT 27.0*          Significant Imaging:

## 2020-09-27 NOTE — HPI
Mrs. Rizo is a 81 yo F who presents with a CC of fatigue, dizziness and falls at home. She is s/p recent surgery with a discharge a week ago. She presents to the ED and is found to have a NA of 123. The patient's daughter states that the patient has been drinking a lot of water. No HCTZ. No new meds.  No ETOH use.  The patient is non-ill appearing. Lives currently with patient's daughter while recovering from surgery.

## 2020-09-27 NOTE — H&P
[facility] Medicine  History & Physical    Patient Name: Brabara Rizo  MRN: [id number]  Admission Date: 9/27/2020  Attending Physician: Sherman Valadez MD   Primary Care Provider: Paras Oro MD         Patient information was obtained from patient and ER records.     Subjective:     Principal Problem:Hyponatremia    Chief Complaint:   Chief Complaint   Patient presents with    Fatigue     pt here for gen weakness and dizziness onset one week. seen by primary last week, also fall while at provider, wound to right knee and right forearm, bandages in place.         HPI: Mrs. Rizo is a 79 yo F who presents with a CC of fatigue, dizziness and falls at home. She is s/p recent surgery with a discharge a week ago. She presents to the ED and is found to have a NA of 123. The patient's daughter states that the patient has been drinking a lot of water. No HCTZ. No new meds.  No ETOH use.  The patient is non-ill appearing. Lives currently with patient's daughter while recovering from surgery.     Past Medical History:   Diagnosis Date    A-fib     CKD (chronic kidney disease)     Diabetes mellitus type II     Fatty liver     GERD (gastroesophageal reflux disease)     Hearing loss of both ears     wears bilateral hearing aids    Hemochromatosis     History of anemia due to CKD     History of pleural effusion     with thoracentesis    Hyperlipidemia     Hypertension     Macular degeneration     Osteoarthritis     Left knee    Osteoporosis     Vaginal delivery     x2    Vitamin D deficiency disease     Wears partial dentures     upper removable bridge       Past Surgical History:   Procedure Laterality Date    BREAST BIOPSY      BREAST SURGERY Bilateral     Reduction r/t h/o fibrocystic disease    CHOLECYSTECTOMY  08/2015    EYE SURGERY      Cat Ext  OU    HYSTERECTOMY      partial due to uterine fibroids    INCISION AND DRAINAGE OF KNEE Left 9/17/2020     Procedure: INCISION AND DRAINAGE, KNEE;  Surgeon: Rolando Wagoner MD;  Location: [facility] OR;  Service: Orthopedics;  Laterality: Left;    JOINT REPLACEMENT Left 05/13/2013    TKR    JOINT REPLACEMENT Right 08/03/2015    TKR    THORACENTESIS      TOTAL KNEE ARTHROPLASTY Right 2015    left knee done 5/2013    WOUND DRESSING Left 9/17/2020    Procedure: WOUND VAC APPLICATION;  Surgeon: Rolando Wagoner MD;  Location: [facility] OR;  Service: Orthopedics;  Laterality: Left;       Review of patient's allergies indicates:  No Known Allergies    Current Facility-Administered Medications on File Prior to Encounter   Medication    denosumab (PROLIA) injection 60 mg     Current Outpatient Medications on File Prior to Encounter   Medication Sig    allopurinol (ZYLOPRIM) 100 MG tablet     amiodarone (PACERONE) 200 MG Tab TAKE 1 TABLET(200 MG) BY MOUTH EVERY DAY    apixaban (ELIQUIS) 2.5 mg Tab Take 1 tablet (2.5 mg total) by mouth 2 (two) times daily.    atorvastatin (LIPITOR) 40 MG tablet TAKE 1 TABLET(40 MG) BY MOUTH EVERY DAY    calcium carbonate (TUMS) 200 mg calcium (500 mg) chewable tablet Take 1 tablet by mouth once daily.    doxazosin (CARDURA) 4 MG tablet TAKE 1 TABLET(4 MG) BY MOUTH EVERY EVENING    ergocalciferol (ERGOCALCIFEROL) 50,000 unit Cap Take 50,000 Units by mouth every 7 days.    fish oil-omega-3 fatty acids 300-1,000 mg capsule Take 1 capsule by mouth once daily.    FLUoxetine 10 MG capsule TAKE 1 CAPSULE(10 MG) BY MOUTH EVERY DAY    folic acid (FOLVITE) 1 MG tablet TAKE 1 TABLET(1 MG) BY MOUTH EVERY DAY    furosemide (LASIX) 20 MG tablet TAKE 1 TABLET BY MOUTH EVERY DAY AS NEEDED    losartan (COZAAR) 25 MG tablet TK 1 T PO QD    metoprolol succinate (TOPROL-XL) 25 MG 24 hr tablet TAKE 1 TABLET(25 MG) BY MOUTH EVERY DAY    blood-glucose meter (FREESTYLE SYSTEM KIT) kit Use as instructed     Family History     Problem Relation (Age of Onset)    Aneurysm Father    Cancer Mother    Hypertension  Mother        Tobacco Use    Smoking status: Former Smoker    Smokeless tobacco: Never Used   Substance and Sexual Activity    Alcohol use: Not Currently     Alcohol/week: 0.0 standard drinks     Comment: occasional/ on a weekend    Drug use: No    Sexual activity: Not Currently     Review of Systems   Constitutional: Positive for activity change, appetite change and fatigue. Negative for chills, diaphoresis, fever and unexpected weight change.   HENT: Negative for congestion and dental problem.    Eyes: Negative for discharge.   Respiratory: Negative for apnea, cough, choking, chest tightness, shortness of breath and stridor.    Cardiovascular: Negative for chest pain, palpitations and leg swelling.   Gastrointestinal: Negative for abdominal distention, abdominal pain, anal bleeding, blood in stool, constipation, rectal pain and vomiting.   Endocrine: Negative for cold intolerance.   Genitourinary: Negative for difficulty urinating and dyspareunia.   Musculoskeletal: Negative for arthralgias.   Neurological: Positive for dizziness and light-headedness.   Psychiatric/Behavioral: Negative for agitation, behavioral problems and confusion.     Objective:     Vital Signs (Most Recent):  Temp: 98 °F (36.7 °C) (09/27/20 0942)  Pulse: (!) 44(pt states normally runs low) (09/27/20 0942)  Resp: 18 (09/27/20 0942)  BP: (!) 149/61 (09/27/20 0942)  SpO2: 96 % (09/27/20 0942) Vital Signs (24h Range):  Temp:  [98 °F (36.7 °C)] 98 °F (36.7 °C)  Pulse:  [44] 44  Resp:  [18] 18  SpO2:  [96 %] 96 %  BP: (149)/(61) 149/61     Weight: 88 kg (194 lb)  Body mass index is 35.48 kg/m².    Physical Exam  Vitals signs and nursing note reviewed.   Constitutional:       General: She is not in acute distress.     Appearance: Normal appearance. She is obese. She is not ill-appearing, toxic-appearing or diaphoretic.   HENT:      Head: Normocephalic and atraumatic.   Cardiovascular:      Rate and Rhythm: Normal rate and regular rhythm.       Heart sounds: No friction rub. No gallop.    Pulmonary:      Effort: Pulmonary effort is normal. No respiratory distress.      Breath sounds: No wheezing or rales.   Abdominal:      General: Abdomen is flat. Bowel sounds are normal. There is no distension.      Palpations: Abdomen is soft.   Skin:     Comments: L knee wrapped    Neurological:      Mental Status: She is alert and oriented to person, place, and time.   Psychiatric:         Mood and Affect: Mood normal.         Behavior: Behavior normal.             Significant Labs:   BMP:   Recent Labs   Lab 09/27/20  1133   *   *   K 5.5*   CL 95   CO2 22*   BUN 17   CREATININE 2.0*   CALCIUM 7.3*     CBC:   Recent Labs   Lab 09/27/20  1027   WBC 6.11   HGB 9.0*   HCT 27.0*          Significant Imaging:     Assessment/Plan:     * Hyponatremia  Likely from excess H20. Will start on NS. BMP's every 6 hours.  Should mostly correct in next 24 hours        Chronic diastolic CHF (congestive heart failure)  No acute issues       Stage 3 chronic kidney disease  At baseline       Chronic gout  Continue Allopurinol.      Obesity, Class II, BMI 35-39.9  Body mass index is 35.48 kg/m².  Weight loss as out patient       Physical deconditioning  With falls. Will consult PT/OT      Anemia in chronic kidney disease  No acute issues       Primary localized osteoarthrosis, lower leg  Recent knee surgery. Wet to dry dressing's. Daily       Essential hypertension  Resume home meds      Hyperlipidemia  Resume statin       GERD (gastroesophageal reflux disease)  Resume home meds.        VTE Risk Mitigation (From admission, onward)    None             Az Hansen MD  Department of Hospital Medicine   [facility]

## 2020-09-27 NOTE — HOSPITAL COURSE
Admitted with hyponatremia, 123 on admit, down to 119 at the lowest. Started on IVF. Nephrology consulted. Started free water restriction. Suspect euvolemic hyponatremia due to excess free water intake + fluoxetine use. TSH and cortisol normal. Hyponatremia improved to 131 on day of discharge. Patient is not symptomatic. Discharged to home with home health. Follow up with Nephrology, PCP, and Ortho.

## 2020-09-27 NOTE — NURSING
Pt H2T assessment completed. LLE warm to touch with cold foot. Bruising noted to LLE. Ace bandage intact from wound care performed by ED. Skin dry and peeling on LLE. Notified charge nurse Samira to assess leg. Dr. Stapleton to be  Notified of finding. Will continue to monitor.

## 2020-09-27 NOTE — ED TRIAGE NOTES
Patient report generalized weakness, multiple falls past week, recent washout of right knee incision on 09/17/20, is doing daily wet to dry dressings. Skin teras to left knee and left forearm.

## 2020-09-28 PROBLEM — R00.1 BRADYCARDIA: Status: ACTIVE | Noted: 2020-09-28

## 2020-09-28 PROBLEM — E87.5 HYPERKALEMIA: Status: ACTIVE | Noted: 2020-09-28

## 2020-09-28 PROBLEM — I27.20 PULMONARY HYPERTENSION: Status: ACTIVE | Noted: 2020-09-28

## 2020-09-28 LAB
ANION GAP SERPL CALC-SCNC: 3 MMOL/L (ref 8–16)
ANION GAP SERPL CALC-SCNC: 7 MMOL/L (ref 8–16)
ANION GAP SERPL CALC-SCNC: 7 MMOL/L (ref 8–16)
ANION GAP SERPL CALC-SCNC: 8 MMOL/L (ref 8–16)
BACTERIA #/AREA URNS HPF: ABNORMAL /HPF
BILIRUB UR QL STRIP: NEGATIVE
BUN SERPL-MCNC: 17 MG/DL (ref 8–23)
CALCIUM SERPL-MCNC: 7.3 MG/DL (ref 8.7–10.5)
CALCIUM SERPL-MCNC: 7.3 MG/DL (ref 8.7–10.5)
CALCIUM SERPL-MCNC: 7.4 MG/DL (ref 8.7–10.5)
CALCIUM SERPL-MCNC: 7.6 MG/DL (ref 8.7–10.5)
CHLORIDE SERPL-SCNC: 93 MMOL/L (ref 95–110)
CHLORIDE SERPL-SCNC: 95 MMOL/L (ref 95–110)
CHLORIDE SERPL-SCNC: 95 MMOL/L (ref 95–110)
CHLORIDE SERPL-SCNC: 97 MMOL/L (ref 95–110)
CLARITY UR: CLEAR
CO2 SERPL-SCNC: 17 MMOL/L (ref 23–29)
CO2 SERPL-SCNC: 19 MMOL/L (ref 23–29)
CO2 SERPL-SCNC: 21 MMOL/L (ref 23–29)
CO2 SERPL-SCNC: 21 MMOL/L (ref 23–29)
COLOR UR: YELLOW
CREAT SERPL-MCNC: 1.9 MG/DL (ref 0.5–1.4)
EST. GFR  (AFRICAN AMERICAN): 28 ML/MIN/1.73 M^2
EST. GFR  (NON AFRICAN AMERICAN): 25 ML/MIN/1.73 M^2
GLUCOSE SERPL-MCNC: 100 MG/DL (ref 70–110)
GLUCOSE SERPL-MCNC: 103 MG/DL (ref 70–110)
GLUCOSE SERPL-MCNC: 113 MG/DL (ref 70–110)
GLUCOSE SERPL-MCNC: 47 MG/DL (ref 70–110)
GLUCOSE UR QL STRIP: NEGATIVE
HGB UR QL STRIP: NEGATIVE
HYALINE CASTS #/AREA URNS LPF: ABNORMAL /LPF
KETONES UR QL STRIP: NEGATIVE
LEUKOCYTE ESTERASE UR QL STRIP: ABNORMAL
MICROSCOPIC COMMENT: ABNORMAL
NITRITE UR QL STRIP: NEGATIVE
PH UR STRIP: 6 [PH] (ref 5–8)
POCT GLUCOSE: 106 MG/DL (ref 70–110)
POCT GLUCOSE: 108 MG/DL (ref 70–110)
POCT GLUCOSE: 116 MG/DL (ref 70–110)
POCT GLUCOSE: 174 MG/DL (ref 70–110)
POTASSIUM SERPL-SCNC: 5.3 MMOL/L (ref 3.5–5.1)
POTASSIUM SERPL-SCNC: 5.4 MMOL/L (ref 3.5–5.1)
POTASSIUM SERPL-SCNC: 5.5 MMOL/L (ref 3.5–5.1)
POTASSIUM SERPL-SCNC: 6.6 MMOL/L (ref 3.5–5.1)
PROT UR QL STRIP: ABNORMAL
RBC #/AREA URNS HPF: 2 /HPF (ref 0–4)
SODIUM SERPL-SCNC: 119 MMOL/L (ref 136–145)
SODIUM SERPL-SCNC: 119 MMOL/L (ref 136–145)
SODIUM SERPL-SCNC: 122 MMOL/L (ref 136–145)
SODIUM SERPL-SCNC: 123 MMOL/L (ref 136–145)
SP GR UR STRIP: 1.01 (ref 1–1.03)
SQUAMOUS #/AREA URNS HPF: ABNORMAL /HPF
URN SPEC COLLECT METH UR: ABNORMAL
UROBILINOGEN UR STRIP-ACNC: NEGATIVE EU/DL
WBC #/AREA URNS HPF: 10 /HPF (ref 0–5)

## 2020-09-28 PROCEDURE — 25000003 PHARM REV CODE 250: Performed by: INTERNAL MEDICINE

## 2020-09-28 PROCEDURE — 97161 PT EVAL LOW COMPLEX 20 MIN: CPT

## 2020-09-28 PROCEDURE — 97110 THERAPEUTIC EXERCISES: CPT

## 2020-09-28 PROCEDURE — 21400001 HC TELEMETRY ROOM

## 2020-09-28 PROCEDURE — 94761 N-INVAS EAR/PLS OXIMETRY MLT: CPT

## 2020-09-28 PROCEDURE — 83930 ASSAY OF BLOOD OSMOLALITY: CPT

## 2020-09-28 PROCEDURE — 97530 THERAPEUTIC ACTIVITIES: CPT

## 2020-09-28 PROCEDURE — 36415 COLL VENOUS BLD VENIPUNCTURE: CPT

## 2020-09-28 PROCEDURE — 81000 URINALYSIS NONAUTO W/SCOPE: CPT

## 2020-09-28 PROCEDURE — 99222 PR INITIAL HOSPITAL CARE,LEVL II: ICD-10-PCS | Mod: ,,, | Performed by: INTERNAL MEDICINE

## 2020-09-28 PROCEDURE — 25000003 PHARM REV CODE 250: Performed by: HOSPITALIST

## 2020-09-28 PROCEDURE — 63600175 PHARM REV CODE 636 W HCPCS: Performed by: HOSPITALIST

## 2020-09-28 PROCEDURE — 80048 BASIC METABOLIC PNL TOTAL CA: CPT | Mod: 91

## 2020-09-28 PROCEDURE — 80048 BASIC METABOLIC PNL TOTAL CA: CPT

## 2020-09-28 PROCEDURE — 99222 1ST HOSP IP/OBS MODERATE 55: CPT | Mod: ,,, | Performed by: INTERNAL MEDICINE

## 2020-09-28 PROCEDURE — 97166 OT EVAL MOD COMPLEX 45 MIN: CPT

## 2020-09-28 RX ORDER — SODIUM CHLORIDE 9 MG/ML
INJECTION, SOLUTION INTRAVENOUS CONTINUOUS
Status: ACTIVE | OUTPATIENT
Start: 2020-09-28 | End: 2020-09-30

## 2020-09-28 RX ADMIN — INSULIN HUMAN 10 UNITS: 100 INJECTION, SOLUTION PARENTERAL at 03:09

## 2020-09-28 RX ADMIN — SODIUM CHLORIDE: 0.9 INJECTION, SOLUTION INTRAVENOUS at 01:09

## 2020-09-28 RX ADMIN — DOXAZOSIN 4 MG: 4 TABLET ORAL at 08:09

## 2020-09-28 RX ADMIN — SODIUM CHLORIDE 150 ML/HR: 0.9 INJECTION, SOLUTION INTRAVENOUS at 09:09

## 2020-09-28 RX ADMIN — FOLIC ACID 1 MG: 1 TABLET ORAL at 09:09

## 2020-09-28 RX ADMIN — SODIUM CHLORIDE: 0.9 INJECTION, SOLUTION INTRAVENOUS at 04:09

## 2020-09-28 RX ADMIN — CALCIUM CARBONATE (ANTACID) CHEW TAB 500 MG 500 MG: 500 CHEW TAB at 09:09

## 2020-09-28 RX ADMIN — APIXABAN 2.5 MG: 2.5 TABLET, FILM COATED ORAL at 08:09

## 2020-09-28 RX ADMIN — DEXTROSE MONOHYDRATE 25 G: 25 INJECTION, SOLUTION INTRAVENOUS at 07:09

## 2020-09-28 RX ADMIN — ATORVASTATIN CALCIUM 40 MG: 40 TABLET, FILM COATED ORAL at 09:09

## 2020-09-28 RX ADMIN — AMIODARONE HYDROCHLORIDE 200 MG: 200 TABLET ORAL at 09:09

## 2020-09-28 RX ADMIN — APIXABAN 2.5 MG: 2.5 TABLET, FILM COATED ORAL at 09:09

## 2020-09-28 RX ADMIN — FLUOXETINE 10 MG: 10 CAPSULE ORAL at 09:09

## 2020-09-28 RX ADMIN — CALCIUM GLUCONATE 1000 MG: 98 INJECTION, SOLUTION INTRAVENOUS at 03:09

## 2020-09-28 RX ADMIN — LOSARTAN POTASSIUM 25 MG: 25 TABLET, FILM COATED ORAL at 09:09

## 2020-09-28 RX ADMIN — ALLOPURINOL 100 MG: 100 TABLET ORAL at 09:09

## 2020-09-28 RX ADMIN — SODIUM CHLORIDE: 0.9 INJECTION, SOLUTION INTRAVENOUS at 08:09

## 2020-09-28 NOTE — PT/OT/SLP EVAL
Occupational Therapy   Evaluation and Treatment    Name: Barbara Rizo  MRN: [id number]  Admitting Diagnosis:  Hyponatremia      Recommendations:     Discharge Recommendations: home health OT, home health PT  Discharge Equipment Recommendations:  other (see comments)(No DME needs anticipated)  Barriers to discharge:       Assessment:   Initial OT eval/treat complete.  Supine>sit with HOB slightly elevated and sit>supine with bed flat with supervision.  Increased time and 4-5 bouts of forward scoots seated EOB with supervision to allow for feet to be supported at floor.  Sit>stand from EOB with RW and CGA for steadying/safety; verbal cue needed for safe hand placement prior to stance.  Ambulating short household distance bed<>bathroom with RW and CGA for steadying/safety.  Step transfer to standard toilet with MIN A for lowering.  Able to doff socks seated EOB with downward reaching and use of plantar and dorsi flexion to clear toes/heels for task and also able to don sock to RLE with same technique and increased time with verbal cues for seated rest breaks and deep breathing throughout task; required assist to don sock to LLE this day.  Has RW, SPC, grab bars outside tub/shower combo, shower chair, and 3-in-1 commode.  No DME needs anticipated.  Recommend post acute HH OT/PT.  Daughter able to assist at home as needed.  To benefit from continued acute care OT services to increase independence in self-care/functional transfers.  OT to follow.     Barbara Rizo is a 80 y.o. female with a medical diagnosis of Hyponatremia.  She presents with below deficits decreasing independence in self-care/functional transfers.  Performance deficits affecting function: weakness, impaired endurance, impaired self care skills, impaired functional mobilty, gait instability, impaired balance, orthopedic precautions, impaired skin, decreased upper extremity function, decreased lower extremity function, decreased safety awareness,  decreased ROM.      Rehab Prognosis: Good; patient would benefit from acute skilled OT services to address these deficits and reach maximum level of function.       Plan:     Patient to be seen 5 x/week to address the above listed problems via self-care/home management, therapeutic activities, therapeutic exercises  · Plan of Care Expires: 10/12/20  · Plan of Care Reviewed with: patient, daughter    Subjective     Chief Complaint: No c/o pain.   Patient/Family Comments/goals: Daughter reports that prior to 9/17/2020, Pt. Was completely independent.    Occupational Profile:  Currently living with her daughter at her daughter's home; [facility] with threshold entry with walk-in shower and convenience height toilet.  Pt. Reports that at her house she has a tub/shower combo with exterior grab bar and shower chair and standard toilet.  Pt. Also reports that she has a 3-in-1 commode and RW.  Prior to 9/17/20 Pt. And daughter reporting that she was living alone and completely independent.  Daughter assists occasionally with LB dressing tasks.  Equipment Used at Home:  3-in-1 commode, shower chair, walker, rolling  Assistance upon Discharge: Daughter able to assist.    Pain/Comfort:  · Pain Rating 1: 0/10  · Pain Rating Post-Intervention 1: 0/10    Patients cultural, spiritual, Religion conflicts given the current situation: other (see comments)(None stated.)    Objective:     Communicated with: Bebeto WYATT RN prior to session.  Patient found HOB elevated with peripheral IV, telemetry upon OT entry to room.    General Precautions: Standard, fall   Orthopedic Precautions:LLE weight bearing as tolerated(minimal LLE-knee flex per ortho note from last admit)   Braces: N/A     Occupational Performance:    Bed Mobility:    Supine>sit with HOB slightly elevated and sit>supine with bed flat with supervision.  Increased time and 4-5 bouts of forward scoots seated EOB with supervision to allow for feet to be supported at floor.      Functional Mobility/Transfers:  Sit>stand from EOB with RW and CGA for steadying/safety; verbal cue needed for safe hand placement prior to stance.  Ambulating short household distance bed<>bathroom with RW and CGA for steadying/safety.  Step transfer to standard toilet with MIN A for lowering.        Activities of Daily Living:  Able to doff socks seated EOB with downward reaching and use of plantar and dorsi flexion to clear toes/heels for task and also able to don sock to RLE with same technique and increased time with verbal cues for seated rest breaks and deep breathing throughout task; required assist to don sock to LLE this day.      Cognitive/Visual Perceptual:  Cognitive/Psychosocial Skills:  -       Oriented to: Person, Place, Time and Situation   -       Follows Commands/attention:Follows one-step commands and Follows two-step commands  -       Communication: clear/fluent; [place]  -       Memory: No Deficits noted  -       Safety awareness/insight to disability: impaired   -       Mood/Affect/Coping skills/emotional control: Appropriate to situation and Cooperative  Visual/Perceptual:  -grossly intact    Physical Exam:  Postural examination/scapula alignment: -       Rounded shoulders  -       Forward head  Skin integrity: Visible skin intact with dressing noted to B-knee  Edema:  None noted to BUE  Sensation: -       Intact light touch  Upper Extremity Range of Motion:  -       Right Upper Extremity: WFL  -       Left Upper Extremity: WFL  Upper Extremity Strength: -       Right Upper Extremity: 4-/5 gross except shoulder 3+/5  -       Left Upper Extremity: 4-/5 gross except shoulder 3+/5   Strength: -       Right Upper Extremity: WFL  -       Left Upper Extremity: WFL  Fine Motor Coordination: -       Intact  Left hand thumb/finger opposition skills and Right hand thumb/finger opposition skills    AMPAC 6 Click ADL:  AMPAC Total Score: 18    Treatment & Education:  Educated on role of OT, POC,  energy conservation techniques, and functional transfer/ADL safety.  Education:    Patient left HOB elevated with all lines intact, call button in reach, bed alarm on, nursing notified and daughter present    GOALS:   Multidisciplinary Problems     Occupational Therapy Goals        Problem: Occupational Therapy Goal    Goal Priority Disciplines Outcome Interventions   Occupational Therapy Goal     OT, PT/OT Ongoing, Progressing    Description: Goals to be met by: 10/12/2020     Patient will increase functional independence with ADLs by performing:    UE Dressing with Modified [place].  LE Dressing with Modified [place] and Assistive Devices as needed.  Grooming while standing at sink with Modified [place].  Toileting from bedside commode with Modified [place] for hygiene and clothing management.   Toilet transfer to bedside commode with Modified [place].                     History:     Past Medical History:   Diagnosis Date    A-fib     CKD (chronic kidney disease)     Diabetes mellitus type II     Fatty liver     GERD (gastroesophageal reflux disease)     Hearing loss of both ears     wears bilateral hearing aids    Hemochromatosis     History of anemia due to CKD     History of pleural effusion     with thoracentesis    Hyperlipidemia     Hypertension     Macular degeneration     Osteoarthritis     Left knee    Osteoporosis     Vaginal delivery     x2    Vitamin D deficiency disease     Wears partial dentures     upper removable bridge       Past Surgical History:   Procedure Laterality Date    BREAST BIOPSY      BREAST SURGERY Bilateral     Reduction r/t h/o fibrocystic disease    CHOLECYSTECTOMY  08/2015    EYE SURGERY      Cat Ext  OU    HYSTERECTOMY      partial due to uterine fibroids    INCISION AND DRAINAGE OF KNEE Left 9/17/2020    Procedure: INCISION AND DRAINAGE, KNEE;  Surgeon: Rolando Wagoner MD;  Location: [facility] OR;  Service: Orthopedics;  Laterality:  Left;    JOINT REPLACEMENT Left 05/13/2013    TKR    JOINT REPLACEMENT Right 08/03/2015    TKR    THORACENTESIS      TOTAL KNEE ARTHROPLASTY Right 2015    left knee done 5/2013    WOUND DRESSING Left 9/17/2020    Procedure: WOUND VAC APPLICATION;  Surgeon: Rolando Wagoner MD;  Location: [facility];  Service: Orthopedics;  Laterality: Left;       Time Tracking:     OT Date of Treatment: 09/28/20  OT Start Time: 1339  OT Stop Time: 1404  OT Total Time (min): 25 min    Billable Minutes:Evaluation 15  Therapeutic Activity 10    Bina Polanco, OT  9/28/2020

## 2020-09-28 NOTE — ASSESSMENT & PLAN NOTE
Likely from excess H20. Will start on NS. BMP's every 6 hours.  Should mostly correct in next 24 hours    No change. Fluid restriction. NS.  Continue current plan   Patient feels much better.

## 2020-09-28 NOTE — PLAN OF CARE
Problem: Physical Therapy Goal  Goal: Physical Therapy Goal  Description: Goals to be met by: 10/12/20     Patient will increase functional independence with mobility by performin. Supine to sit with Modified [place]  2. Rolling to Left and Right with Modified [place]  3. Sit to stand transfer with Modified [place] using RW  4. Bed to chair transfer with Modified [place] using Rolling Walker  5. Gait >150 feet with Modified [place] using Rolling Walker   6. Lower extremity exercise program 3 sets x10 reps per handout, with independence    Outcome: Ongoing, Progressing    Pt ambulated ~70 ft with CGA using RW.

## 2020-09-28 NOTE — CONSULTS
[facility]  Cardiology  Consult Note    Patient Name: Barbara Rizo  MRN: [id number]  Admission Date: 9/27/2020  Hospital Length of Stay: 1 days  Code Status: Full Code   Attending Provider: Az Stapleton MD   Consulting Provider: Topher Simons MD  Primary Care Physician: Paras Oro MD  Principal Problem:Hyponatremia    Patient information was obtained from patient, relative(s), past medical records and ER records.     Inpatient consult to Cardiology  Consult performed by: Topher Simons MD  Consult ordered by: Az Stapleton MD        Subjective:     Chief Complaint:  Dizziness and fatigue     HPI:   Barbara Rizo is a 79 yo F who presents with complaints of fatigue, dizziness and falls at home.  Recent total joint arthroplasty.  She has been living with her daughter while she recovers from surgery.  We are consulted secondary to bradycardia.  She has a history of paroxysmal atrial fibrillation.  On amiodarone and metoprolol.  On presentation sodium noted to be 123.  Glucose 47.  H&H 9/27.  H&H was 10 and 33 around time of surgery.  EKG showed sinus bradycardia with heart rate in 40s.  Interventricular conduction delay.  Similar to EKG from 09/17/2020.  Patient is followed by Dr. Lomas.    Echocardiogram 06/11/2020:    · Normal left ventricular systolic function. The estimated ejection fraction is 55%.  · Concentric left ventricular hypertrophy.  · Severe left atrial enlargement.  · Indeterminate left ventricular diastolic function.  · Normal right ventricular systolic function.  · Mild-to-moderate mitral regurgitation.  · Moderate tricuspid regurgitation.  · Mild pulmonic regurgitation.  · Moderate pulmonary hypertension present.  · Normal central venous pressure (3 mmHg).  The estimated PA systolic pressure is 66 mmHg.    Past Medical History:   Diagnosis Date    A-fib     CKD (chronic kidney disease)     Diabetes mellitus type II     Fatty liver     GERD  (gastroesophageal reflux disease)     Hearing loss of both ears     wears bilateral hearing aids    Hemochromatosis     History of anemia due to CKD     History of pleural effusion     with thoracentesis    Hyperlipidemia     Hypertension     Macular degeneration     Osteoarthritis     Left knee    Osteoporosis     Vaginal delivery     x2    Vitamin D deficiency disease     Wears partial dentures     upper removable bridge       Past Surgical History:   Procedure Laterality Date    BREAST BIOPSY      BREAST SURGERY Bilateral     Reduction r/t h/o fibrocystic disease    CHOLECYSTECTOMY  08/2015    EYE SURGERY      Cat Ext  OU    HYSTERECTOMY      partial due to uterine fibroids    INCISION AND DRAINAGE OF KNEE Left 9/17/2020    Procedure: INCISION AND DRAINAGE, KNEE;  Surgeon: Rolando Wagoner MD;  Location: [facility] OR;  Service: Orthopedics;  Laterality: Left;    JOINT REPLACEMENT Left 05/13/2013    TKR    JOINT REPLACEMENT Right 08/03/2015    TKR    THORACENTESIS      TOTAL KNEE ARTHROPLASTY Right 2015    left knee done 5/2013    WOUND DRESSING Left 9/17/2020    Procedure: WOUND VAC APPLICATION;  Surgeon: Rolando Wagoner MD;  Location: [facility] OR;  Service: Orthopedics;  Laterality: Left;       Review of patient's allergies indicates:  No Known Allergies    Current Facility-Administered Medications on File Prior to Encounter   Medication    denosumab (PROLIA) injection 60 mg     Current Outpatient Medications on File Prior to Encounter   Medication Sig    allopurinol (ZYLOPRIM) 100 MG tablet     amiodarone (PACERONE) 200 MG Tab TAKE 1 TABLET(200 MG) BY MOUTH EVERY DAY    apixaban (ELIQUIS) 2.5 mg Tab Take 1 tablet (2.5 mg total) by mouth 2 (two) times daily.    atorvastatin (LIPITOR) 40 MG tablet TAKE 1 TABLET(40 MG) BY MOUTH EVERY DAY    calcium carbonate (TUMS) 200 mg calcium (500 mg) chewable tablet Take 1 tablet by mouth once daily.    doxazosin (CARDURA) 4 MG tablet TAKE 1 TABLET(4 MG)  BY MOUTH EVERY EVENING    ergocalciferol (ERGOCALCIFEROL) 50,000 unit Cap Take 50,000 Units by mouth every 7 days.    fish oil-omega-3 fatty acids 300-1,000 mg capsule Take 1 capsule by mouth once daily.    FLUoxetine 10 MG capsule TAKE 1 CAPSULE(10 MG) BY MOUTH EVERY DAY    folic acid (FOLVITE) 1 MG tablet TAKE 1 TABLET(1 MG) BY MOUTH EVERY DAY    furosemide (LASIX) 20 MG tablet TAKE 1 TABLET BY MOUTH EVERY DAY AS NEEDED    losartan (COZAAR) 25 MG tablet TK 1 T PO QD    metoprolol succinate (TOPROL-XL) 25 MG 24 hr tablet TAKE 1 TABLET(25 MG) BY MOUTH EVERY DAY    blood-glucose meter (FREESTYLE SYSTEM KIT) kit Use as instructed     Family History     Problem Relation (Age of Onset)    Aneurysm Father    Cancer Mother    Hypertension Mother        Tobacco Use    Smoking status: Former Smoker    Smokeless tobacco: Never Used   Substance and Sexual Activity    Alcohol use: Not Currently     Alcohol/week: 0.0 standard drinks     Comment: occasional/ on a weekend    Drug use: No    Sexual activity: Not Currently     Review of Systems   Cardiovascular: Negative for chest pain, dyspnea on exertion, irregular heartbeat, leg swelling, near-syncope, orthopnea, palpitations, paroxysmal nocturnal dyspnea and syncope.   Respiratory: Negative for shortness of breath.    Musculoskeletal: Positive for joint pain.   Neurological: Positive for weakness. Negative for dizziness, focal weakness, light-headedness and numbness.     Objective:     Vital Signs (Most Recent):  Temp: 98 °F (36.7 °C) (09/28/20 1100)  Pulse: (!) 37 (09/28/20 1100)  Resp: 16 (09/28/20 1100)  BP: (!) 144/66 (09/28/20 1100)  SpO2: 96 % (09/28/20 1100) Vital Signs (24h Range):  Temp:  [97.4 °F (36.3 °C)-98 °F (36.7 °C)] 98 °F (36.7 °C)  Pulse:  [35-42] 37  Resp:  [16-20] 16  SpO2:  [94 %-97 %] 96 %  BP: (111-150)/(56-84) 144/66     Weight: 88 kg (194 lb)  Body mass index is 35.48 kg/m².    SpO2: 96 %  O2 Device (Oxygen Therapy): room  air      Intake/Output Summary (Last 24 hours) at 9/28/2020 1239  Last data filed at 9/28/2020 0830  Gross per 24 hour   Intake 220 ml   Output --   Net 220 ml       Lines/Drains/Airways     Peripheral Intravenous Line                 Peripheral IV - Single Lumen 09/27/20 1025 20 G Left Wrist 1 day                Physical Exam   Constitutional: She is oriented to person, place, and time. No distress.   Neck: No JVD present. Carotid bruit is not present.   Cardiovascular: Regular rhythm and intact distal pulses. Bradycardia present.   Murmur heard.   Systolic murmur is present with a grade of 2/6.  Pulmonary/Chest: Effort normal and breath sounds normal.   Abdominal: Soft. Bowel sounds are normal. There is no abdominal tenderness.   Musculoskeletal:      Left ankle: She exhibits swelling.      Right lower leg: No edema.      Left lower leg: No edema.   Neurological: She is alert and oriented to person, place, and time.   Skin: She is not diaphoretic.   Psychiatric: She has a normal mood and affect. Her speech is normal and behavior is normal.   Vitals reviewed.      Significant Labs:   CMP   Recent Labs   Lab 09/27/20  1133 09/27/20  1754 09/28/20  0021 09/28/20  0611   * 123* 122* 123*   K 5.5* 5.5* 6.6* 5.4*   CL 95 93* 97 95   CO2 22* 21* 17* 21*   * 122* 103 47*   BUN 17 17 17 17   CREATININE 2.0* 2.0* 1.9* 1.9*   CALCIUM 7.3* 7.8* 7.3* 7.4*   PROT 5.5*  --   --   --    ALBUMIN 2.9*  --   --   --    BILITOT 0.4  --   --   --    ALKPHOS 55  --   --   --    AST 20  --   --   --    ALT 26  --   --   --    ANIONGAP 6* 9 8 7*   ESTGFRAFRICA 27* 27* 28* 28*   EGFRNONAA 23* 23* 25* 25*   , CBC   Recent Labs   Lab 09/27/20  1027   WBC 6.11   HGB 9.0*   HCT 27.0*      , Lipid Panel No results for input(s): CHOL, HDL, LDLCALC, TRIG, CHOLHDL in the last 48 hours. and Troponin   Recent Labs   Lab 09/27/20  1133   TROPONINI 0.021       Significant Imaging: Echocardiogram:   Transthoracic echo (TTE)  complete (Cupid Only):   Results for orders placed or performed during the hospital encounter of 06/11/20   Echo   Result Value Ref Range    BSA 1.9 m2    LA WIDTH 5.75 cm    AORTIC VALVE CUSP SEPERATION 1.69 cm    PV PEAK VELOCITY 1.35 cm/s    LVIDd 5.57 3.5 - 6.0 cm    IVS 1.18 (A) 0.6 - 1.1 cm    Posterior Wall 1.24 (A) 0.6 - 1.1 cm    Ao root annulus 2.35 cm    LVIDs 4.05 (A) 2.1 - 4.0 cm    FS 27 28 - 44 %    LA volume 191.77 cm3    Sinus 3.04 cm    STJ 2.13 cm    LV mass 281.21 g    LA size 5.79 cm    RVDD 3.53 cm    TAPSE 3.05 cm    RV S' 9.59 cm/s    Left Ventricle Relative Wall Thickness 0.45 cm    AV mean gradient 17 mmHg    AV valve area 1.57 cm2    AV Velocity Ratio 0.51     AV index (prosthetic) 0.48     E/A ratio 2.25     E wave decelartion time 218.83 msec    IVRT 83.04 msec    Pulm vein S/D ratio 0.53     LVOT diameter 2.04 cm    LVOT area 3.3 cm2    LVOT peak wes 1.44 m/s    LVOT peak VTI 41.06 cm    Ao peak wes 2.81 m/s    Ao VTI 85.41 cm    LVOT stroke volume 134.14 cm3    AV peak gradient 32 mmHg    MV Peak E Wes 1.28 m/s    TR Max Wes 3.97 m/s    MV Peak A Wes 0.57 m/s    PV Peak S Wes 0.41 m/s    PV Peak D Wes 0.78 m/s    LV Systolic Volume 71.92 mL    LV Systolic Volume Index 39.2 mL/m2    LV Diastolic Volume 151.96 mL    LV Diastolic Volume Index 82.75 mL/m2    LA Volume Index 104.4 mL/m2    LV Mass Index 153 g/m2    RA Major Axis 5.35 cm    Left Atrium Minor Axis 6.93 cm    Left Atrium Major Axis 6.63 cm    Triscuspid Valve Regurgitation Peak Gradient 63 mmHg    RA Width 4.46 cm    Right Atrial Pressure (from IVC) 3 mmHg    TV rest pulmonary artery pressure 66 mmHg    Narrative    · Normal left ventricular systolic function. The estimated ejection   fraction is 55%.  · Concentric left ventricular hypertrophy.  · Severe left atrial enlargement.  · Indeterminate left ventricular diastolic function.  · Normal right ventricular systolic function.  · Mild-to-moderate mitral regurgitation.  ·  Moderate tricuspid regurgitation.  · Mild pulmonic regurgitation.  · Moderate pulmonary hypertension present.  · Normal central venous pressure (3 mmHg).  · The estimated PA systolic pressure is 66 mmHg.        Assessment and Plan:     Pulmonary hypertension  Moderate by last echocardiogram    Bradycardia  On amiodarone and metoprolol.  History of paroxysmal atrial fibrillation.  No evidence of chronotropic incompetence at this time.  As an outpatient she can have an event monitor.  At this point she is limited physically.  Can it say that her weakness is solely due to her heart rate.  She has had poor p.o. intake.  Hypoglycemic.  Hyponatremic.  We can decrease her amiodarone and metoprolol.  If her current AFib can consider sick sinus syndrome.    Paroxysmal atrial fibrillation  Anticoagulated.  On rate control medications.    Oropharyngeal dysphagia  Poor p.o. intake postprocedure.  Patient states that she does have difficulty swallowing.    Essential hypertension  Monitor    Hyperlipidemia  On statin      VTE Risk Mitigation (From admission, onward)         Ordered     apixaban tablet 2.5 mg  2 times daily      09/27/20 1409     IP VTE HIGH RISK PATIENT  Once      09/27/20 1545     Place sequential compression device  Until discontinued      09/27/20 1545                Thank you for your consult. I will follow-up with patient. Please contact us if you have any additional questions.    Topher Simons MD  Cardiology   [facility]

## 2020-09-28 NOTE — NURSING
Bedside Report given to TOSHIA Jones Walking rounds completed. Visualized and assessed patient NAD noted. Safety precautions maintained and call light within reach.     Chart check completed.

## 2020-09-28 NOTE — PT/OT/SLP EVAL
Physical Therapy Evaluation    Patient Name:  Barbara Rizo   MRN:  [id number]    Recommendations:     Discharge Recommendations:  home health PT(with family assistance)   Discharge Equipment Recommendations: none   Barriers to discharge: None    Assessment:     Barbara Rizo is a 80 y.o. female admitted with a medical diagnosis of Hyponatremia.  She presents with the following impairments/functional limitations:  weakness, impaired endurance, impaired functional mobilty, impaired balance, decreased lower extremity function, decreased safety awareness, decreased ROM, impaired skin, edema, orthopedic precautions.    Rehab Prognosis: Good; patient would benefit from acute skilled PT services to address these deficits and reach maximum level of function.    Recent Surgery: * No surgery found *      Plan:     During this hospitalization, patient to be seen 5 x/week to address the identified rehab impairments via gait training, therapeutic activities, therapeutic exercises and progress toward the following goals:    · Plan of Care Expires:  10/12/20    Subjective     Chief Complaint: weakness  Patient/Family Comments/goals: Pt agreeable to therapy.   Pain/Comfort:  · Pain Rating 1: 0/10    Living Environment:  Pt lives with DTR in a [facility] with 1 RADHA at entry.   Prior to admission, patients level of function was mod I with ambulation using RW.  Pt was driving and living by herself prior to getting sick.  Equipment used at home: walker, rolling, bedside commode, shower chair.  Upon discharge, patient will have assistance from daughter.    Objective:     Communicated with nurse Tate prior to session.  Patient found HOB elevated with bed alarm, peripheral IV, telemetry upon PT entry to room.    General Precautions: Standard, fall   Orthopedic Precautions:LLE weight bearing as tolerated(Per ortho from last admission, pt to be limited with minimal L knee flex.)   Braces: N/A     Exams:  · Cognitive Exam:  Patient was  able to follow 2 step commands 2* [place].   · Gross Motor Coordination:  WFL  · Postural Exam:  Patient presented with the following abnormalities:    · -       Rounded shoulders  · Sensation:    · -       Intact  light/touch BLE  · Skin Integrity/Edema:      · -       Skin integrity: L knee ACE wrap intact (Per chart pt still with L knee open wound, wound care consulted on this admission.  Per pt/DTR, pt had a wound vac on L knee at home however was not working appropriately.)  · -       Edema: Mild LLE  · BLE ROM: WFL; Due to ortho restriction for minimal L knee flex, FROM of L knee flex NT.  · BLE Strength: WFL    Functional Mobility:  Pt was pleasant, just [place] despite B hearing aids.  Pt with generalized weakness and decreased endurance, DTR would like HHPT to restart.    · Bed Mobility:     · Scooting: contact guard assistance  · Supine to Sit: contact guard assistance with HOB elevated  · Transfers:     · Sit to Stand:  contact guard assistance with rolling walker  · Bed to Chair: minimum assistance with  rolling walker  using  Step Transfer; Pt with fatigue after ambulation, decreased safety awareness with stand>sit into bedside chair.  Pt required min VC's for safety and proper technique.    · Gait: Pt ambulated ~70 ft with CGA using RW.  Pt with decreased step length and jocelyn.  Pt with decreased endurance.    · Balance: Pt with fair dynamic standing balance.    Therapeutic Activities and Exercises:  BLE seated therex x10 reps: AP, LAQ, and hip flex    Pt reported that she was given HEP for supine/seated LE therex from last hospital admission and does not need a new one.  Pt was encouraged to continue LE therex at home with L knee precaution for minimal knee flex only.  Pt verbalized good understanding.      AM-PAC 6 CLICK MOBILITY  Total Score:20     Patient left up in chair with all lines intact, call button in reach, chair alarm on, nurse Bebeto notified and daughter present.  Lunch tray set-up.      GOALS:   Multidisciplinary Problems     Physical Therapy Goals        Problem: Physical Therapy Goal    Goal Priority Disciplines Outcome Goal Variances Interventions   Physical Therapy Goal     PT, PT/OT Ongoing, Progressing     Description: Goals to be met by: 10/12/20     Patient will increase functional independence with mobility by performin. Supine to sit with Modified [place]  2. Rolling to Left and Right with Modified [place]  3. Sit to stand transfer with Modified [place] using RW  4. Bed to chair transfer with Modified [place] using Rolling Walker  5. Gait >150 feet with Modified [place] using Rolling Walker   6. Lower extremity exercise program 3 sets x10 reps per handout, with independence                     History:     Past Medical History:   Diagnosis Date    A-fib     CKD (chronic kidney disease)     Diabetes mellitus type II     Fatty liver     GERD (gastroesophageal reflux disease)     Hearing loss of both ears     wears bilateral hearing aids    Hemochromatosis     History of anemia due to CKD     History of pleural effusion     with thoracentesis    Hyperlipidemia     Hypertension     Macular degeneration     Osteoarthritis     Left knee    Osteoporosis     Vaginal delivery     x2    Vitamin D deficiency disease     Wears partial dentures     upper removable bridge       Past Surgical History:   Procedure Laterality Date    BREAST BIOPSY      BREAST SURGERY Bilateral     Reduction r/t h/o fibrocystic disease    CHOLECYSTECTOMY  2015    EYE SURGERY      Cat Ext  OU    HYSTERECTOMY      partial due to uterine fibroids    INCISION AND DRAINAGE OF KNEE Left 2020    Procedure: INCISION AND DRAINAGE, KNEE;  Surgeon: Rolando Wagoner MD;  Location: [facility] OR;  Service: Orthopedics;  Laterality: Left;    JOINT REPLACEMENT Left 2013    TKR    JOINT REPLACEMENT Right 2015    TKR    THORACENTESIS      TOTAL KNEE ARTHROPLASTY  Right 2015    left knee done 5/2013    WOUND DRESSING Left 9/17/2020    Procedure: WOUND VAC APPLICATION;  Surgeon: Rolando Wagoner MD;  Location: [facility];  Service: Orthopedics;  Laterality: Left;       Time Tracking:     PT Received On: 09/28/20  PT Start Time: 1124     PT Stop Time: 1154  PT Total Time (min): 30 min     Billable Minutes: Evaluation 20 min and Therapeutic Exercise 10 min      Sole [place], PT  09/28/2020

## 2020-09-28 NOTE — CONSULTS
81 yo female well known to us from a recent left knee I&D presented to the ED yesterday with a CC of generalized weakness. Her daughter was with her and states that she has been more fatigued and dizzy since her surgery approx 2 weeks ago. She was seen in the office last week and had a fall while in clinic.   VSSAF    Left knee: Lower leg cool to touch, worse distal to knee and foot. Confirms sensation throughout left, foot, and toes. Left knee skin with darkened edges around surgical wound. No bleeding, no active drainage.   WBC: 6.11  Hct: 27.0    A/P: s/p I&D left knee/ hyponatremia  1) cont med mgmt  2) vascular consulted for left LE eval  3) ortho will continue to follow

## 2020-09-28 NOTE — CONSULTS
Consulted per Ortho for VAC to left knee  A 80 year old female admitted 9/27/20 from home with hyponatremia; chronic diastolic CHF; CKD stage 3; chronic gout; obesity; physical deconditioning; anemia in CKD; primary localized osteoarthrosis lower leg; essential hypertension; hyperlipidemia; GERD  PMH: Afib; CKD; DM II; fatty liver; GERD; hearing loss; hemochromatosis; anemia due to CKD; pleural effusion; HLD; HTN; macular degeneration; osteoarthritis left knee; osteoporosis; Vit D deficiency; S/P left TKR May 2013; S/P right TKR August 2015; S/P I&D left knee and placement of VAC 9/17/2020 9/19 Patient to ER after discharge due to problem with VAC; family reports VAC removed 9/21 and local wound care with NS gauze started due to wound bleeding and foam becoming occluded  Family reports having an appointment to see Dr. Clements, Plastics, tomorrow, but rescheduled due to current hospitalization.  Assessment:  Photodocumentation    Left knee wound- Opening 10 cm(L) 6 cm(W) 2 mm(D) with buds of granulation. Left lower leg with evolving ecchymosis from fall. Discoloration extends to toes, but family reports much better. Nursing reports getting pulse with doppler. Left foot warm to touch.   Daughter reports patient slipping, but not falling last week at MD office with development of skin tears on right arm and right leg.  Right lower leg- Skin tear with foam dressing with scant bloody drainage visible on dressing. No surrounding erythema/ecchymosis    Right forearm skin tear- 2 cm skin tear with skin flap and clot. No surrounding erythema/ecchymosis.   Treatment:  Ortho PA visited patient and said Dr. Wagoner to visit and assess wounds.  Will await Dr. Wagoner visit to determine if NPWT is to be replaced since was not effective at home and treatment plan was changed to local wound care.   Continue local wound care to skin tears on right arm and lower leg per WOGs.   Discussed with family returning NPWT to [facility] if not used  when discharged home.   1600 Assessed left knee wound with Dr. Wagoner. Will perform local wound care to left knee wound with Endoform every Monday and Thursday.   Treatment plan discussed with family and MD.

## 2020-09-28 NOTE — PLAN OF CARE
Problem: Occupational Therapy Goal  Goal: Occupational Therapy Goal  Description: Goals to be met by: 10/12/2020     Patient will increase functional independence with ADLs by performing:    UE Dressing with Modified [place].  LE Dressing with Modified [place] and Assistive Devices as needed.  Grooming while standing at sink with Modified [place].  Toileting from bedside commode with Modified [place] for hygiene and clothing management.   Toilet transfer to bedside commode with Modified [place].    Outcome: Ongoing, Progressing     Initial OT eval/treat complete.  Has RW, SPC, grab bars outside tub/shower combo, shower chair, and 3-in-1 commode.  No DME needs anticipated.  Recommend post acute HH OT/PT.  Daughter able to assist at home.  To benefit from continued acute care OT services to increase independence in self-care/functional transfers.  OT to follow.

## 2020-09-28 NOTE — NURSING
Lab called patient's glucose 47 mg/dl . D50 25 gms ivp given will recheck glucose in 15 minutes.

## 2020-09-28 NOTE — PLAN OF CARE
Problem: Fall Injury Risk  Goal: Absence of Fall and Fall-Related Injury  Outcome: Ongoing, Progressing     Problem: Adult Inpatient Plan of Care  Goal: Plan of Care Review  Outcome: Ongoing, Progressing     Problem: Adult Inpatient Plan of Care  Goal: Absence of Hospital-Acquired Illness or Injury  Outcome: Ongoing, Progressing     Problem: Adult Inpatient Plan of Care  Goal: Optimal Comfort and Wellbeing  Outcome: Ongoing, Progressing     Problem: Adult Inpatient Plan of Care  Goal: Readiness for Transition of Care  Outcome: Ongoing, Progressing     Problem: Skin Injury Risk Increased  Goal: Skin Health and Integrity  Outcome: Ongoing, Progressing

## 2020-09-28 NOTE — PLAN OF CARE
09/28/20 0850   Discharge Assessment   Assessment Type Discharge Planning Assessment   Confirmed/corrected address and phone number on facesheet? Yes   Assessment information obtained from? Patient;Caregiver   Communicated expected length of stay with patient/caregiver yes   Prior to hospitilization cognitive status: Alert/Oriented   Prior to hospitalization functional status: Independent;Assistive Equipment   Current cognitive status: Alert/Oriented   Current Functional Status: Independent;Assistive Equipment   Facility Arrived From: Home   Lives With alone   Able to Return to Prior Arrangements yes  (Live around around daughter, but has been staying with daughter since surgery.)   Is patient able to care for self after discharge? Yes   Who are your caregiver(s) and their phone number(s)? Tamar pt's daughter, [phone]   Patient's perception of discharge disposition home or selfcare;home health   Readmission Within the Last 30 Days current reason for admission unrelated to previous admission   If yes, most recent facility name: [facility]   Patient currently being followed by outpatient case management? Yes   Patient currently receives any other outside agency services? No   Equipment Currently Used at Home walker, rolling;bedside commode;shower chair   Do you have any problems affording any of your prescribed medications? No   Is the patient taking medications as prescribed? yes   Does the patient have transportation home? Yes   Transportation Anticipated family or friend will provide   Dialysis Name and Scheduled days N.A   Does the patient receive services at the [facility]? No  (Pt takes eliquis.)   Discharge Plan A Home with family;Home Health   DME Needed Upon Discharge  other (see comments)  (TBD.)   Patient/Family in Agreement with Plan no   Readmission Questionnaire   At the time of your discharge, did someone talk to you about what your health problems were? Yes   At the time of discharge, did  someone talk to you about what to watch out for regarding worsening of your health problem? Yes   At the time of discharge, did someone talk to you about what to do if you experienced worsening of your health problem? Yes   At the time of discharge, did someone talk to you about which medication to take when you left the hospital and which ones to stop taking? Yes   At the time of discharge, did someone talk to you about when and where to follow up with a doctor after you left the hospital? Yes   What do you believe caused you to be sick enough to be re-admitted? Weakness   How often do you need to have someone help you when you read instructions, pamphlets, or other written material from your doctor or pharmacy? Always   Do you have problems taking your medications as prescribed? Yes   Do you have any problems affording any of  your prescribed medications? Yes   Do you have problems obtaining/receiving your medications? No   Does the patient have transportation to healthcare appointments? Yes   Living Arrangements house   Does the patient have family/friends to help with healtcare needs after discharge? yes   Does your caregiver provide all the help you need? Yes   Are you currently feeling confused? No   Are you currently having problems thinking? No   Are you currently having memory problems? No   Have you felt down, depressed, or hopeless? 0   Have you felt little interest or pleasure in doing things? 0     Pt was previously admitted for surgery. Pt discharged home to daughter's home, and Tamar helps at home. According to Tamar, therapy came out, assessed pt, and determined pt did not need therapy. According to Tamar, therapy for discontinued, however, pt begun to get weaker throughout the week.     Family requested pt d/c with HH.     Person who will help at home if needed:  Tamar, pt's daughter.     Preferred pharmacy:   [facility] [place] [facility] [address] [place] [address]  "[id number]  Phone: [phone] Fax: [phone]    [address] [facility] [place] [place] - [address] [place] [address]  Phone: [phone] Fax: [phone]    [id number] [place] - [address] AT [facility] [address]  [address]  [place] [address]  Phone: [phone] Fax: [phone]     "Help at home Questions" discussed and placed in "My Health Packet" and placed at bedside.     Preferred Appointment time: Morning appts.        "

## 2020-09-28 NOTE — SUBJECTIVE & OBJECTIVE
Interval History: Feels better.       Review of Systems   Constitutional: Negative for activity change, appetite change, chills, diaphoresis, fatigue, fever and unexpected weight change.   HENT: Negative for congestion and dental problem.    Eyes: Negative for discharge.   Respiratory: Negative for apnea, cough, choking, chest tightness, shortness of breath and stridor.    Cardiovascular: Negative for chest pain, palpitations and leg swelling.   Gastrointestinal: Negative for abdominal distention, abdominal pain, anal bleeding, blood in stool, constipation, rectal pain and vomiting.   Endocrine: Negative for cold intolerance.   Genitourinary: Negative for difficulty urinating and dyspareunia.   Musculoskeletal: Negative for arthralgias.   Neurological: Negative for dizziness and light-headedness.   Psychiatric/Behavioral: Negative for agitation, behavioral problems and confusion.     Objective:     Vital Signs (Most Recent):  Temp: 97.4 °F (36.3 °C) (09/28/20 0729)  Pulse: (!) 39 (09/28/20 0729)  Resp: 16 (09/28/20 0729)  BP: (!) 140/65 (09/28/20 0729)  SpO2: 96 % (09/28/20 0729) Vital Signs (24h Range):  Temp:  [97.4 °F (36.3 °C)-98 °F (36.7 °C)] 97.4 °F (36.3 °C)  Pulse:  [35-42] 39  Resp:  [16-20] 16  SpO2:  [94 %-97 %] 96 %  BP: (111-150)/(56-84) 140/65     Weight: 88 kg (194 lb)  Body mass index is 35.48 kg/m².    Intake/Output Summary (Last 24 hours) at 9/28/2020 1011  Last data filed at 9/28/2020 0514  Gross per 24 hour   Intake 297.92 ml   Output --   Net 297.92 ml      Physical Exam  Vitals signs and nursing note reviewed.   Constitutional:       General: She is not in acute distress.     Appearance: Normal appearance. She is obese. She is not ill-appearing, toxic-appearing or diaphoretic.   HENT:      Head: Normocephalic and atraumatic.   Cardiovascular:      Rate and Rhythm: Normal rate and regular rhythm.      Heart sounds: No friction rub. No gallop.    Pulmonary:      Effort: Pulmonary effort is  normal. No respiratory distress.      Breath sounds: No wheezing or rales.   Abdominal:      General: Abdomen is flat. Bowel sounds are normal. There is no distension.      Palpations: Abdomen is soft.   Skin:     Comments: L knee wrapped    Neurological:      Mental Status: She is alert and oriented to person, place, and time.   Psychiatric:         Mood and Affect: Mood normal.         Behavior: Behavior normal.         Significant Labs:   CBC:   Recent Labs   Lab 09/27/20  1027   WBC 6.11   HGB 9.0*   HCT 27.0*        CMP:   Recent Labs   Lab 09/27/20  1133 09/27/20  1754 09/28/20  0021 09/28/20  0611   * 123* 122* 123*   K 5.5* 5.5* 6.6* 5.4*   CL 95 93* 97 95   CO2 22* 21* 17* 21*   * 122* 103 47*   BUN 17 17 17 17   CREATININE 2.0* 2.0* 1.9* 1.9*   CALCIUM 7.3* 7.8* 7.3* 7.4*   PROT 5.5*  --   --   --    ALBUMIN 2.9*  --   --   --    BILITOT 0.4  --   --   --    ALKPHOS 55  --   --   --    AST 20  --   --   --    ALT 26  --   --   --    ANIONGAP 6* 9 8 7*   EGFRNONAA 23* 23* 25* 25*       Significant Imaging

## 2020-09-28 NOTE — ASSESSMENT & PLAN NOTE
On amiodarone and metoprolol.  History of paroxysmal atrial fibrillation.  No evidence of chronotropic incompetence at this time.  As an outpatient she can have an event monitor.  At this point she is limited physically.  Can it say that her weakness is solely due to her heart rate.  She has had poor p.o. intake.  Hypoglycemic.  Hyponatremic.  We can decrease her amiodarone and metoprolol.  If her current AFib can consider sick sinus syndrome.

## 2020-09-28 NOTE — NURSING
[facility] [place]    Date of Visit: 09/28/2020  Time of Visit: 0550    Admit Date: 9/27/2020  LOS: 1    Code Status: Full Code     Attending Physician: Az Stapleton MD    TRIGGER:    Reason for Round:  RN concerned    ASSESSMENT:     Abnormal Vital Signs:  Clinical Issues: Circulatory  MEWS Score:3     INTERVENTIONS/ RECOMMENDATIONS:     Pt Hr 35, asymptomatic. AAOx4. No weakness, lightheadedness, N/V. K was 6.6 earlier in shift, orders received by nurse from Dr. Joshua to correct. Dr. Joshua notified of pt heart rate and orders to add cards consult received.    Discussed plan of care with RNsarah.    PHYSICIAN ESCALATION:     Yes/No  yes    Orders received and case discussed with Dr. Joshua .    Disposition: Remain in room 307.    FOLLOW-UP/CONTINGENCY:     Call back the Rapid Response Nurse at [phone] for additional questions or concerns.

## 2020-09-28 NOTE — SUBJECTIVE & OBJECTIVE
Past Medical History:   Diagnosis Date    A-fib     CKD (chronic kidney disease)     Diabetes mellitus type II     Fatty liver     GERD (gastroesophageal reflux disease)     Hearing loss of both ears     wears bilateral hearing aids    Hemochromatosis     History of anemia due to CKD     History of pleural effusion     with thoracentesis    Hyperlipidemia     Hypertension     Macular degeneration     Osteoarthritis     Left knee    Osteoporosis     Vaginal delivery     x2    Vitamin D deficiency disease     Wears partial dentures     upper removable bridge       Past Surgical History:   Procedure Laterality Date    BREAST BIOPSY      BREAST SURGERY Bilateral     Reduction r/t h/o fibrocystic disease    CHOLECYSTECTOMY  08/2015    EYE SURGERY      Cat Ext  OU    HYSTERECTOMY      partial due to uterine fibroids    INCISION AND DRAINAGE OF KNEE Left 9/17/2020    Procedure: INCISION AND DRAINAGE, KNEE;  Surgeon: Rolando Wagoner MD;  Location: [facility] OR;  Service: Orthopedics;  Laterality: Left;    JOINT REPLACEMENT Left 05/13/2013    TKR    JOINT REPLACEMENT Right 08/03/2015    TKR    THORACENTESIS      TOTAL KNEE ARTHROPLASTY Right 2015    left knee done 5/2013    WOUND DRESSING Left 9/17/2020    Procedure: WOUND VAC APPLICATION;  Surgeon: Rolando Wagoner MD;  Location: [facility] OR;  Service: Orthopedics;  Laterality: Left;       Review of patient's allergies indicates:  No Known Allergies    Current Facility-Administered Medications on File Prior to Encounter   Medication    denosumab (PROLIA) injection 60 mg     Current Outpatient Medications on File Prior to Encounter   Medication Sig    allopurinol (ZYLOPRIM) 100 MG tablet     amiodarone (PACERONE) 200 MG Tab TAKE 1 TABLET(200 MG) BY MOUTH EVERY DAY    apixaban (ELIQUIS) 2.5 mg Tab Take 1 tablet (2.5 mg total) by mouth 2 (two) times daily.    atorvastatin (LIPITOR) 40 MG tablet TAKE 1 TABLET(40 MG) BY MOUTH EVERY DAY    calcium  carbonate (TUMS) 200 mg calcium (500 mg) chewable tablet Take 1 tablet by mouth once daily.    doxazosin (CARDURA) 4 MG tablet TAKE 1 TABLET(4 MG) BY MOUTH EVERY EVENING    ergocalciferol (ERGOCALCIFEROL) 50,000 unit Cap Take 50,000 Units by mouth every 7 days.    fish oil-omega-3 fatty acids 300-1,000 mg capsule Take 1 capsule by mouth once daily.    FLUoxetine 10 MG capsule TAKE 1 CAPSULE(10 MG) BY MOUTH EVERY DAY    folic acid (FOLVITE) 1 MG tablet TAKE 1 TABLET(1 MG) BY MOUTH EVERY DAY    furosemide (LASIX) 20 MG tablet TAKE 1 TABLET BY MOUTH EVERY DAY AS NEEDED    losartan (COZAAR) 25 MG tablet TK 1 T PO QD    metoprolol succinate (TOPROL-XL) 25 MG 24 hr tablet TAKE 1 TABLET(25 MG) BY MOUTH EVERY DAY    blood-glucose meter (FREESTYLE SYSTEM KIT) kit Use as instructed     Family History     Problem Relation (Age of Onset)    Aneurysm Father    Cancer Mother    Hypertension Mother        Tobacco Use    Smoking status: Former Smoker    Smokeless tobacco: Never Used   Substance and Sexual Activity    Alcohol use: Not Currently     Alcohol/week: 0.0 standard drinks     Comment: occasional/ on a weekend    Drug use: No    Sexual activity: Not Currently     Review of Systems   Cardiovascular: Negative for chest pain, dyspnea on exertion, irregular heartbeat, leg swelling, near-syncope, orthopnea, palpitations, paroxysmal nocturnal dyspnea and syncope.   Respiratory: Negative for shortness of breath.    Musculoskeletal: Positive for joint pain.   Neurological: Positive for weakness. Negative for dizziness, focal weakness, light-headedness and numbness.     Objective:     Vital Signs (Most Recent):  Temp: 98 °F (36.7 °C) (09/28/20 1100)  Pulse: (!) 37 (09/28/20 1100)  Resp: 16 (09/28/20 1100)  BP: (!) 144/66 (09/28/20 1100)  SpO2: 96 % (09/28/20 1100) Vital Signs (24h Range):  Temp:  [97.4 °F (36.3 °C)-98 °F (36.7 °C)] 98 °F (36.7 °C)  Pulse:  [35-42] 37  Resp:  [16-20] 16  SpO2:  [94 %-97 %] 96 %  BP:  (111-150)/(56-84) 144/66     Weight: 88 kg (194 lb)  Body mass index is 35.48 kg/m².    SpO2: 96 %  O2 Device (Oxygen Therapy): room air      Intake/Output Summary (Last 24 hours) at 9/28/2020 1239  Last data filed at 9/28/2020 0830  Gross per 24 hour   Intake 220 ml   Output --   Net 220 ml       Lines/Drains/Airways     Peripheral Intravenous Line                 Peripheral IV - Single Lumen 09/27/20 1025 20 G Left Wrist 1 day                Physical Exam   Constitutional: She is oriented to person, place, and time. No distress.   Neck: No JVD present. Carotid bruit is not present.   Cardiovascular: Regular rhythm and intact distal pulses. Bradycardia present.   Murmur heard.   Systolic murmur is present with a grade of 2/6.  Pulmonary/Chest: Effort normal and breath sounds normal.   Abdominal: Soft. Bowel sounds are normal. There is no abdominal tenderness.   Musculoskeletal:      Left ankle: She exhibits swelling.      Right lower leg: No edema.      Left lower leg: No edema.   Neurological: She is alert and oriented to person, place, and time.   Skin: She is not diaphoretic.   Psychiatric: She has a normal mood and affect. Her speech is normal and behavior is normal.   Vitals reviewed.      Significant Labs:   CMP   Recent Labs   Lab 09/27/20  1133 09/27/20  1754 09/28/20  0021 09/28/20  0611   * 123* 122* 123*   K 5.5* 5.5* 6.6* 5.4*   CL 95 93* 97 95   CO2 22* 21* 17* 21*   * 122* 103 47*   BUN 17 17 17 17   CREATININE 2.0* 2.0* 1.9* 1.9*   CALCIUM 7.3* 7.8* 7.3* 7.4*   PROT 5.5*  --   --   --    ALBUMIN 2.9*  --   --   --    BILITOT 0.4  --   --   --    ALKPHOS 55  --   --   --    AST 20  --   --   --    ALT 26  --   --   --    ANIONGAP 6* 9 8 7*   ESTGFRAFRICA 27* 27* 28* 28*   EGFRNONAA 23* 23* 25* 25*   , CBC   Recent Labs   Lab 09/27/20  1027   WBC 6.11   HGB 9.0*   HCT 27.0*      , Lipid Panel No results for input(s): CHOL, HDL, LDLCALC, TRIG, CHOLHDL in the last 48 hours. and  Troponin   Recent Labs   Lab 09/27/20  1133   TROPONINI 0.021       Significant Imaging: Echocardiogram:   Transthoracic echo (TTE) complete (Cupid Only):   Results for orders placed or performed during the hospital encounter of 06/11/20   Echo   Result Value Ref Range    BSA 1.9 m2    LA WIDTH 5.75 cm    AORTIC VALVE CUSP SEPERATION 1.69 cm    PV PEAK VELOCITY 1.35 cm/s    LVIDd 5.57 3.5 - 6.0 cm    IVS 1.18 (A) 0.6 - 1.1 cm    Posterior Wall 1.24 (A) 0.6 - 1.1 cm    Ao root annulus 2.35 cm    LVIDs 4.05 (A) 2.1 - 4.0 cm    FS 27 28 - 44 %    LA volume 191.77 cm3    Sinus 3.04 cm    STJ 2.13 cm    LV mass 281.21 g    LA size 5.79 cm    RVDD 3.53 cm    TAPSE 3.05 cm    RV S' 9.59 cm/s    Left Ventricle Relative Wall Thickness 0.45 cm    AV mean gradient 17 mmHg    AV valve area 1.57 cm2    AV Velocity Ratio 0.51     AV index (prosthetic) 0.48     E/A ratio 2.25     E wave decelartion time 218.83 msec    IVRT 83.04 msec    Pulm vein S/D ratio 0.53     LVOT diameter 2.04 cm    LVOT area 3.3 cm2    LVOT peak wes 1.44 m/s    LVOT peak VTI 41.06 cm    Ao peak wes 2.81 m/s    Ao VTI 85.41 cm    LVOT stroke volume 134.14 cm3    AV peak gradient 32 mmHg    MV Peak E Wes 1.28 m/s    TR Max Wes 3.97 m/s    MV Peak A Wes 0.57 m/s    PV Peak S Wes 0.41 m/s    PV Peak D Wes 0.78 m/s    LV Systolic Volume 71.92 mL    LV Systolic Volume Index 39.2 mL/m2    LV Diastolic Volume 151.96 mL    LV Diastolic Volume Index 82.75 mL/m2    LA Volume Index 104.4 mL/m2    LV Mass Index 153 g/m2    RA Major Axis 5.35 cm    Left Atrium Minor Axis 6.93 cm    Left Atrium Major Axis 6.63 cm    Triscuspid Valve Regurgitation Peak Gradient 63 mmHg    RA Width 4.46 cm    Right Atrial Pressure (from IVC) 3 mmHg    TV rest pulmonary artery pressure 66 mmHg    Narrative    · Normal left ventricular systolic function. The estimated ejection   fraction is 55%.  · Concentric left ventricular hypertrophy.  · Severe left atrial enlargement.  · Indeterminate  left ventricular diastolic function.  · Normal right ventricular systolic function.  · Mild-to-moderate mitral regurgitation.  · Moderate tricuspid regurgitation.  · Mild pulmonic regurgitation.  · Moderate pulmonary hypertension present.  · Normal central venous pressure (3 mmHg).  · The estimated PA systolic pressure is 66 mmHg.

## 2020-09-28 NOTE — HPI
Barbara Rizo is a 79 yo F who presents with complaints of fatigue, dizziness and falls at home.  Recent total joint arthroplasty.  She has been living with her daughter while she recovers from surgery.  We are consulted secondary to bradycardia.  She has a history of paroxysmal atrial fibrillation.  On amiodarone and metoprolol.  On presentation sodium noted to be 123.  Glucose 47.  H&H 9/27.  H&H was 10 and 33 around time of surgery.  EKG showed sinus bradycardia with heart rate in 40s.  Interventricular conduction delay.  Similar to EKG from 09/17/2020.  Patient is followed by Dr. Lomas.    Echocardiogram 06/11/2020:    · Normal left ventricular systolic function. The estimated ejection fraction is 55%.  · Concentric left ventricular hypertrophy.  · Severe left atrial enlargement.  · Indeterminate left ventricular diastolic function.  · Normal right ventricular systolic function.  · Mild-to-moderate mitral regurgitation.  · Moderate tricuspid regurgitation.  · Mild pulmonic regurgitation.  · Moderate pulmonary hypertension present.  · Normal central venous pressure (3 mmHg).  · The estimated PA systolic pressure is 66 mmHg.

## 2020-09-28 NOTE — PROGRESS NOTES
[facility] Hospital Medicine  Progress Note    Patient Name: Barbara Rizo  MRN: [id number]  Patient Class: IP- Inpatient   Admission Date: 9/27/2020  Length of Stay: 1 days  Attending Physician: Az Stapleton MD  Primary Care Provider: Paras Oro MD        Subjective:     Principal Problem:Hyponatremia        HPI:  Mrs. Rizo is a 81 yo F who presents with a CC of fatigue, dizziness and falls at home. She is s/p recent surgery with a discharge a week ago. She presents to the ED and is found to have a NA of 123. The patient's daughter states that the patient has been drinking a lot of water. No HCTZ. No new meds.  No ETOH use.  The patient is non-ill appearing. Lives currently with patient's daughter while recovering from surgery.     Overview/Hospital Course:  peri Rizo is a 81 yo F who presents with a CC of fatigue, dizziness and falls at home. She is s/p recent surgery with a discharge a week ago. She presents to the ED and is found to have a NA of 123. The patient's daughter states that the patient has been drinking a lot of water. No HCTZ. No new meds.  No ETOH use.  The patient was started on NS with BMP's every 6 hours. PT/OT were consulted.     Interval History: Feels better.       Review of Systems   Constitutional: Negative for activity change, appetite change, chills, diaphoresis, fatigue, fever and unexpected weight change.   HENT: Negative for congestion and dental problem.    Eyes: Negative for discharge.   Respiratory: Negative for apnea, cough, choking, chest tightness, shortness of breath and stridor.    Cardiovascular: Negative for chest pain, palpitations and leg swelling.   Gastrointestinal: Negative for abdominal distention, abdominal pain, anal bleeding, blood in stool, constipation, rectal pain and vomiting.   Endocrine: Negative for cold intolerance.   Genitourinary: Negative for difficulty urinating and dyspareunia.   Musculoskeletal: Negative for  arthralgias.   Neurological: Negative for dizziness and light-headedness.   Psychiatric/Behavioral: Negative for agitation, behavioral problems and confusion.     Objective:     Vital Signs (Most Recent):  Temp: 97.4 °F (36.3 °C) (09/28/20 0729)  Pulse: (!) 39 (09/28/20 0729)  Resp: 16 (09/28/20 0729)  BP: (!) 140/65 (09/28/20 0729)  SpO2: 96 % (09/28/20 0729) Vital Signs (24h Range):  Temp:  [97.4 °F (36.3 °C)-98 °F (36.7 °C)] 97.4 °F (36.3 °C)  Pulse:  [35-42] 39  Resp:  [16-20] 16  SpO2:  [94 %-97 %] 96 %  BP: (111-150)/(56-84) 140/65     Weight: 88 kg (194 lb)  Body mass index is 35.48 kg/m².    Intake/Output Summary (Last 24 hours) at 9/28/2020 1011  Last data filed at 9/28/2020 0514  Gross per 24 hour   Intake 297.92 ml   Output --   Net 297.92 ml      Physical Exam  Vitals signs and nursing note reviewed.   Constitutional:       General: She is not in acute distress.     Appearance: Normal appearance. She is obese. She is not ill-appearing, toxic-appearing or diaphoretic.   HENT:      Head: Normocephalic and atraumatic.   Cardiovascular:      Rate and Rhythm: Normal rate and regular rhythm.      Heart sounds: No friction rub. No gallop.    Pulmonary:      Effort: Pulmonary effort is normal. No respiratory distress.      Breath sounds: No wheezing or rales.   Abdominal:      General: Abdomen is flat. Bowel sounds are normal. There is no distension.      Palpations: Abdomen is soft.   Skin:     Comments: L knee wrapped    Neurological:      Mental Status: She is alert and oriented to person, place, and time.   Psychiatric:         Mood and Affect: Mood normal.         Behavior: Behavior normal.         Significant Labs:   CBC:   Recent Labs   Lab 09/27/20  1027   WBC 6.11   HGB 9.0*   HCT 27.0*        CMP:   Recent Labs   Lab 09/27/20  1133 09/27/20  1754 09/28/20  0021 09/28/20  0611   * 123* 122* 123*   K 5.5* 5.5* 6.6* 5.4*   CL 95 93* 97 95   CO2 22* 21* 17* 21*   * 122* 103 47*   BUN 17  17 17 17   CREATININE 2.0* 2.0* 1.9* 1.9*   CALCIUM 7.3* 7.8* 7.3* 7.4*   PROT 5.5*  --   --   --    ALBUMIN 2.9*  --   --   --    BILITOT 0.4  --   --   --    ALKPHOS 55  --   --   --    AST 20  --   --   --    ALT 26  --   --   --    ANIONGAP 6* 9 8 7*   EGFRNONAA 23* 23* 25* 25*       Significant Imaging      Assessment/Plan:      * Hyponatremia  Likely from excess H20. Will start on NS. BMP's every 6 hours.  Should mostly correct in next 24 hours    No change. Fluid restriction. NS.  Continue current plan   Patient feels much better.         Bradycardia  HR of 39. D/c BB for now.         Chronic diastolic CHF (congestive heart failure)  No acute issues       Stage 3 chronic kidney disease  At baseline       Chronic gout  Continue Allopurinol.      Obesity, Class II, BMI 35-39.9  Body mass index is 35.48 kg/m².  Weight loss as out patient       Physical deconditioning  With falls. Will consult PT/OT      Anemia in chronic kidney disease  No acute issues       Primary localized osteoarthrosis, lower leg  Recent knee surgery. Wet to dry dressing's. Daily       Essential hypertension  Resume home meds      Hyperlipidemia  Resume statin       GERD (gastroesophageal reflux disease)  Resume home meds.      Hypoglycemia this am. Not sure why. Will follow.     VTE Risk Mitigation (From admission, onward)         Ordered     apixaban tablet 2.5 mg  2 times daily      09/27/20 1409     IP VTE HIGH RISK PATIENT  Once      09/27/20 1545     Place sequential compression device  Until discontinued      09/27/20 1545                Discharge Planning   SALLY:      Code Status: Full Code   Is the patient medically ready for discharge?:     Reason for patient still in hospital (select all that apply): Patient unstable  Discharge Plan A: Home with family, Home Health                  Az Hansen MD  Department of Hospital Medicine   [facility]

## 2020-09-29 PROBLEM — S81.001A OPEN WOUND OF RIGHT KNEE: Status: ACTIVE | Noted: 2020-09-29

## 2020-09-29 PROBLEM — S81.002A OPEN WOUND OF LEFT KNEE: Status: ACTIVE | Noted: 2020-09-29

## 2020-09-29 LAB
ANION GAP SERPL CALC-SCNC: 6 MMOL/L (ref 8–16)
ANION GAP SERPL CALC-SCNC: 6 MMOL/L (ref 8–16)
ANION GAP SERPL CALC-SCNC: 9 MMOL/L (ref 8–16)
BUN SERPL-MCNC: 14 MG/DL (ref 8–23)
BUN SERPL-MCNC: 14 MG/DL (ref 8–23)
BUN SERPL-MCNC: 15 MG/DL (ref 8–23)
CALCIUM SERPL-MCNC: 7.3 MG/DL (ref 8.7–10.5)
CALCIUM SERPL-MCNC: 7.3 MG/DL (ref 8.7–10.5)
CALCIUM SERPL-MCNC: 7.5 MG/DL (ref 8.7–10.5)
CHLORIDE SERPL-SCNC: 100 MMOL/L (ref 95–110)
CHLORIDE SERPL-SCNC: 97 MMOL/L (ref 95–110)
CHLORIDE SERPL-SCNC: 98 MMOL/L (ref 95–110)
CHLORIDE UR-SCNC: 49 MMOL/L (ref 25–200)
CO2 SERPL-SCNC: 15 MMOL/L (ref 23–29)
CO2 SERPL-SCNC: 19 MMOL/L (ref 23–29)
CO2 SERPL-SCNC: 22 MMOL/L (ref 23–29)
CREAT SERPL-MCNC: 1.8 MG/DL (ref 0.5–1.4)
CREAT SERPL-MCNC: 1.8 MG/DL (ref 0.5–1.4)
CREAT SERPL-MCNC: 1.9 MG/DL (ref 0.5–1.4)
CREAT UR-MCNC: 40.8 MG/DL (ref 15–325)
CREAT UR-MCNC: 40.8 MG/DL (ref 15–325)
EOSINOPHIL URNS QL WRIGHT STN: NORMAL
EST. GFR  (AFRICAN AMERICAN): 28 ML/MIN/1.73 M^2
EST. GFR  (AFRICAN AMERICAN): 30 ML/MIN/1.73 M^2
EST. GFR  (AFRICAN AMERICAN): 30 ML/MIN/1.73 M^2
EST. GFR  (NON AFRICAN AMERICAN): 25 ML/MIN/1.73 M^2
EST. GFR  (NON AFRICAN AMERICAN): 26 ML/MIN/1.73 M^2
EST. GFR  (NON AFRICAN AMERICAN): 26 ML/MIN/1.73 M^2
GLUCOSE SERPL-MCNC: 107 MG/DL (ref 70–110)
GLUCOSE SERPL-MCNC: 86 MG/DL (ref 70–110)
GLUCOSE SERPL-MCNC: 91 MG/DL (ref 70–110)
LEFT ARM BP: 119 MMHG
LEFT TBI: 0
LEFT TOE PRESSURE: 0 MMHG
OSMOLALITY SERPL: 265 MOSM/KG (ref 275–295)
POCT GLUCOSE: 115 MG/DL (ref 70–110)
POCT GLUCOSE: 116 MG/DL (ref 70–110)
POCT GLUCOSE: 135 MG/DL (ref 70–110)
POCT GLUCOSE: 93 MG/DL (ref 70–110)
POTASSIUM SERPL-SCNC: 5.2 MMOL/L (ref 3.5–5.1)
POTASSIUM SERPL-SCNC: 5.2 MMOL/L (ref 3.5–5.1)
POTASSIUM SERPL-SCNC: 5.6 MMOL/L (ref 3.5–5.1)
POTASSIUM UR-SCNC: 12 MMOL/L (ref 15–95)
PROT UR-MCNC: 14 MG/DL
PROT/CREAT UR: 0.34 MG/G{CREAT} (ref 0–0.2)
RIGHT ARM BP: 114 MMHG
RIGHT TBI: 0
RIGHT TOE PRESSURE: 0 MMHG
SODIUM SERPL-SCNC: 122 MMOL/L (ref 136–145)
SODIUM SERPL-SCNC: 124 MMOL/L (ref 136–145)
SODIUM SERPL-SCNC: 126 MMOL/L (ref 136–145)
SODIUM UR-SCNC: 46 MMOL/L (ref 20–250)
SODIUM UR-SCNC: 46 MMOL/L (ref 20–250)

## 2020-09-29 PROCEDURE — 80048 BASIC METABOLIC PNL TOTAL CA: CPT

## 2020-09-29 PROCEDURE — 99900035 HC TECH TIME PER 15 MIN (STAT)

## 2020-09-29 PROCEDURE — 84540 ASSAY OF URINE/UREA-N: CPT

## 2020-09-29 PROCEDURE — 84156 ASSAY OF PROTEIN URINE: CPT

## 2020-09-29 PROCEDURE — 97535 SELF CARE MNGMENT TRAINING: CPT

## 2020-09-29 PROCEDURE — 83930 ASSAY OF BLOOD OSMOLALITY: CPT

## 2020-09-29 PROCEDURE — 80048 BASIC METABOLIC PNL TOTAL CA: CPT | Mod: 91

## 2020-09-29 PROCEDURE — 97530 THERAPEUTIC ACTIVITIES: CPT | Mod: CQ

## 2020-09-29 PROCEDURE — 87205 SMEAR GRAM STAIN: CPT

## 2020-09-29 PROCEDURE — 25000003 PHARM REV CODE 250: Performed by: INTERNAL MEDICINE

## 2020-09-29 PROCEDURE — 99232 PR SUBSEQUENT HOSPITAL CARE,LEVL II: ICD-10-PCS | Mod: ,,, | Performed by: INTERNAL MEDICINE

## 2020-09-29 PROCEDURE — 99232 SBSQ HOSP IP/OBS MODERATE 35: CPT | Mod: ,,, | Performed by: INTERNAL MEDICINE

## 2020-09-29 PROCEDURE — 94761 N-INVAS EAR/PLS OXIMETRY MLT: CPT

## 2020-09-29 PROCEDURE — 83935 ASSAY OF URINE OSMOLALITY: CPT

## 2020-09-29 PROCEDURE — 97116 GAIT TRAINING THERAPY: CPT | Mod: CQ

## 2020-09-29 PROCEDURE — 36415 COLL VENOUS BLD VENIPUNCTURE: CPT

## 2020-09-29 PROCEDURE — 82436 ASSAY OF URINE CHLORIDE: CPT

## 2020-09-29 PROCEDURE — 84300 ASSAY OF URINE SODIUM: CPT

## 2020-09-29 PROCEDURE — 99223 1ST HOSP IP/OBS HIGH 75: CPT | Mod: ,,, | Performed by: SURGERY

## 2020-09-29 PROCEDURE — 84133 ASSAY OF URINE POTASSIUM: CPT

## 2020-09-29 PROCEDURE — 21400001 HC TELEMETRY ROOM

## 2020-09-29 PROCEDURE — 99223 PR INITIAL HOSPITAL CARE,LEVL III: ICD-10-PCS | Mod: ,,, | Performed by: SURGERY

## 2020-09-29 RX ADMIN — FOLIC ACID 1 MG: 1 TABLET ORAL at 10:09

## 2020-09-29 RX ADMIN — CALCIUM CARBONATE (ANTACID) CHEW TAB 500 MG 500 MG: 500 CHEW TAB at 10:09

## 2020-09-29 RX ADMIN — ATORVASTATIN CALCIUM 40 MG: 40 TABLET, FILM COATED ORAL at 10:09

## 2020-09-29 RX ADMIN — APIXABAN 2.5 MG: 2.5 TABLET, FILM COATED ORAL at 08:09

## 2020-09-29 RX ADMIN — AMIODARONE HYDROCHLORIDE 200 MG: 200 TABLET ORAL at 10:09

## 2020-09-29 RX ADMIN — LOSARTAN POTASSIUM 25 MG: 25 TABLET, FILM COATED ORAL at 10:09

## 2020-09-29 RX ADMIN — APIXABAN 2.5 MG: 2.5 TABLET, FILM COATED ORAL at 10:09

## 2020-09-29 RX ADMIN — DOXAZOSIN 4 MG: 4 TABLET ORAL at 08:09

## 2020-09-29 RX ADMIN — ALLOPURINOL 100 MG: 100 TABLET ORAL at 10:09

## 2020-09-29 NOTE — PLAN OF CARE
Nephrology consult received. Follows with [facility] outpatient, will reconsult their group.    Thank you for this consult. Please call with any questions or concerns    Tess Kirkpatrick PA-C  [facility]  [phone]

## 2020-09-29 NOTE — ASSESSMENT & PLAN NOTE
09/28/20: No acute issues   09/29/20: Cardiology following; Creatine 2.0->1.8; improving continue current treatment. Holding nephrotoxic meds for now including lasix - continue at discharge. Monitor [facility] closely

## 2020-09-29 NOTE — ASSESSMENT & PLAN NOTE
-Imaging reviewed.  Pt appears to have adequate perfusion to heal wound.  Will continue to follow closely.

## 2020-09-29 NOTE — ASSESSMENT & PLAN NOTE
Recent surgery as stated in HPI; PHI's 9/29/20 Show good flow; no fever or white count  Home with Home Health & PT

## 2020-09-29 NOTE — ASSESSMENT & PLAN NOTE
On amiodarone and metoprolol.  History of paroxysmal atrial fibrillation.  No evidence of chronotropic incompetence at this time.  As an outpatient she can have an event monitor.  At this point she is limited physically.  Can it say that her weakness is solely due to her heart rate.  She has had poor p.o. intake.  Hypoglycemic.  Hyponatremic.  We can decrease her amiodarone and metoprolol.  If her current AFib can consider sick sinus syndrome.    09/29/2020-outpatient event monitor

## 2020-09-29 NOTE — PT/OT/SLP PROGRESS
Physical Therapy Treatment    Patient Name:  Barbara Rizo   MRN:  [id number]    Recommendations:     Discharge Recommendations:  home health PT(with family assistance)   Discharge Equipment Recommendations: (possible needs for home O2 with activity)   Barriers to discharge: decreased O2 sats with ambulation    Assessment:     Barbara Rizo is a 80 y.o. female admitted with a medical diagnosis of Hyponatremia.  She presents with the following impairments/functional limitations:  weakness, impaired functional mobilty, impaired endurance, impaired self care skills, decreased lower extremity function, decreased ROM, edema, orthopedic precautions, impaired skin, impaired balance, decreased upper extremity function, decreased coordination, gait instability, decreased safety awareness, impaired cardiopulmonary response to activity.    Pt continues to make progress towards goals. Pt ambulated ~200ft with RW, SBA on RA with fatigue and SOB noted. Pt's O2 sats ~77% requiring seated rest break and pursed lip breathing to increase to 97%. Pt's HR ~44-50 BPM.  Pt up in chair at end of session. Pt would continue to benefit from HHPT services at time of discharge.     Rehab Prognosis: Fair; patient would benefit from acute skilled PT services to address these deficits and reach maximum level of function.    Recent Surgery: * No surgery found *      Plan:     During this hospitalization, patient to be seen 5 x/week to address the identified rehab impairments via gait training, therapeutic activities, therapeutic exercises and progress toward the following goals:    · Plan of Care Expires:  10/12/20    Subjective     Chief Complaint: tired  Patient/Family Comments/goals: Pt reports she returned to her room not to long ago.   Pain/Comfort:  · Pain Rating 1: 0/10      Objective:     Communicated with pt's nurse, Bebeto, prior to session.  Patient found HOB elevated with daughter present with telemetry, peripheral  IV(ecchymosis to LLE and foot) upon PT entry to room.     General Precautions: Standard, fall, hearing impaired([place] (has hearing aids)- speak into pt's ears)   Orthopedic Precautions:LLE weight bearing as tolerated(minimal knee flexion to LLE- per ortho note from last admit)   Braces: N/A     Functional Mobility:  · Bed Mobility:     · Scooting: stand by assistance  · Supine to Sit: stand by assistance  · Transfers:     · Sit to Stand:  contact guard assistance with rolling walker  · Gait: ~8ft with RW, SBA from EOB>sink and ~200ft with RW, SBA on RA with O2 sats dropping to 77%. Required pursed lip breathing and seated rest break to increase to 97%. Pt recovered quickly. SOB and fatigue noted. Decreased jocelyn, step length, velocity of limb, postural control and endurance. Decreased HR 44-50 BPM.  · Balance: good sitting and fair+ standing      AM-PAC 6 CLICK MOBILITY  Turning over in bed (including adjusting bedclothes, sheets and blankets)?: 4  Sitting down on and standing up from a chair with arms (e.g., wheelchair, bedside commode, etc.): 3  Moving from lying on back to sitting on the side of the bed?: 4  Moving to and from a bed to a chair (including a wheelchair)?: 3  Need to walk in hospital room?: 3  Climbing 3-5 steps with a railing?: 3  Basic Mobility Total Score: 20       Therapeutic Activities and Exercises:  Dynamic standing at the sink with RW, SBA for brushing of teeth.   Unable to perform exercises due to Phlebotomist present to take blood samples from pt and lunch being delivered. Pt set up for lunch with lunch tray. All needs within reach/met at this time.    · Pt educated on PTA role/POC.   · Importance of OOB activity with staff assistance.  · Importance of sitting up in the chair throughout the day as tolerated, especially for meals   · Safety during functional t/f and mobility with use of AD and nursing or rehab staff  · White board updated   · Multiple self-care tasks/functional mobility  completed- assistance level noted above   · All questions/concerns answered within  scope of practice    Pt encouraged to use call button for assistance for all OOB/OOchair activity 2/2 fall risk. Pt verbalized understanding      Patient left reclined in [place] with B heels elevated, lunch tray with all lines intact, call button in reach, chair alarm on, pt's nurse, Bebeto, notified and daughter present..    GOALS:   Multidisciplinary Problems     Physical Therapy Goals        Problem: Physical Therapy Goal    Goal Priority Disciplines Outcome Goal Variances Interventions   Physical Therapy Goal     PT, PT/OT Ongoing, Progressing     Description: Goals to be met by: 10/12/20     Patient will increase functional independence with mobility by performin. Supine to sit with Modified [place]  2. Rolling to Left and Right with Modified [place]  3. Sit to stand transfer with Modified [place] using RW  4. Bed to chair transfer with Modified [place] using Rolling Walker  5. Gait >150 feet with Modified [place] using Rolling Walker   6. Lower extremity exercise program 3 sets x10 reps per handout, with independence                     Time Tracking:     PT Received On:    PT Start Time: 1122     PT Stop Time: 1150  PT Total Time (min): 28 min     Billable Minutes: Gait Training 14 and Therapeutic Activity 14    Treatment Type: Treatment  PT/PTA: PTA     PTA Visit Number: 1     Sirena Fatou, PTA  2020

## 2020-09-29 NOTE — SUBJECTIVE & OBJECTIVE
Medications Prior to Admission   Medication Sig Dispense Refill Last Dose    allopurinol (ZYLOPRIM) 100 MG tablet    9/27/2020    amiodarone (PACERONE) 200 MG Tab TAKE 1 TABLET(200 MG) BY MOUTH EVERY DAY 90 tablet 3 9/27/2020    apixaban (ELIQUIS) 2.5 mg Tab Take 1 tablet (2.5 mg total) by mouth 2 (two) times daily. 180 tablet 3 9/27/2020    atorvastatin (LIPITOR) 40 MG tablet TAKE 1 TABLET(40 MG) BY MOUTH EVERY DAY 90 tablet 1 9/27/2020    calcium carbonate (TUMS) 200 mg calcium (500 mg) chewable tablet Take 1 tablet by mouth once daily.   9/27/2020    doxazosin (CARDURA) 4 MG tablet TAKE 1 TABLET(4 MG) BY MOUTH EVERY EVENING 90 tablet 3 9/27/2020    ergocalciferol (ERGOCALCIFEROL) 50,000 unit Cap Take 50,000 Units by mouth every 7 days.   9/27/2020    fish oil-omega-3 fatty acids 300-1,000 mg capsule Take 1 capsule by mouth once daily.   9/27/2020    FLUoxetine 10 MG capsule TAKE 1 CAPSULE(10 MG) BY MOUTH EVERY DAY 90 capsule 1 9/27/2020    folic acid (FOLVITE) 1 MG tablet TAKE 1 TABLET(1 MG) BY MOUTH EVERY DAY 90 tablet 1 9/27/2020    furosemide (LASIX) 20 MG tablet TAKE 1 TABLET BY MOUTH EVERY DAY AS NEEDED 45 tablet 0 Past Week    losartan (COZAAR) 25 MG tablet TK 1 T PO QD   9/27/2020    metoprolol succinate (TOPROL-XL) 25 MG 24 hr tablet TAKE 1 TABLET(25 MG) BY MOUTH EVERY DAY 90 tablet 3 9/27/2020    blood-glucose meter (FREESTYLE SYSTEM KIT) kit Use as instructed 1 each 0        Review of patient's allergies indicates:  No Known Allergies    Past Medical History:   Diagnosis Date    A-fib     CKD (chronic kidney disease)     Diabetes mellitus type II     Fatty liver     GERD (gastroesophageal reflux disease)     Hearing loss of both ears     wears bilateral hearing aids    Hemochromatosis     History of anemia due to CKD     History of pleural effusion     with thoracentesis    Hyperlipidemia     Hypertension     Macular degeneration     Osteoarthritis     Left knee     Osteoporosis     Vaginal delivery     x2    Vitamin D deficiency disease     Wears partial dentures     upper removable bridge     Past Surgical History:   Procedure Laterality Date    BREAST BIOPSY      BREAST SURGERY Bilateral     Reduction r/t h/o fibrocystic disease    CHOLECYSTECTOMY  08/2015    EYE SURGERY      Cat Ext  OU    HYSTERECTOMY      partial due to uterine fibroids    INCISION AND DRAINAGE OF KNEE Left 9/17/2020    Procedure: INCISION AND DRAINAGE, KNEE;  Surgeon: Rolando Wagoner MD;  Location: [facility] OR;  Service: Orthopedics;  Laterality: Left;    JOINT REPLACEMENT Left 05/13/2013    TKR    JOINT REPLACEMENT Right 08/03/2015    TKR    THORACENTESIS      TOTAL KNEE ARTHROPLASTY Right 2015    left knee done 5/2013    WOUND DRESSING Left 9/17/2020    Procedure: WOUND VAC APPLICATION;  Surgeon: Rolando Wagoner MD;  Location: [facility] OR;  Service: Orthopedics;  Laterality: Left;     Family History     Problem Relation (Age of Onset)    Aneurysm Father    Cancer Mother    Hypertension Mother        Tobacco Use    Smoking status: Former Smoker    Smokeless tobacco: Never Used   Substance and Sexual Activity    Alcohol use: Not Currently     Alcohol/week: 0.0 standard drinks     Comment: occasional/ on a weekend    Drug use: No    Sexual activity: Not Currently     Review of Systems   Constitutional: Negative for chills and fever.   HENT: Negative for congestion.    Eyes: Negative for visual disturbance.   Respiratory: Negative for shortness of breath.    Cardiovascular: Negative for chest pain.   Gastrointestinal: Negative for abdominal distention.   Endocrine: Negative for cold intolerance.   Genitourinary: Negative for flank pain.   Musculoskeletal: Negative for back pain.   Skin: Positive for color change and wound. Negative for pallor and rash.   Allergic/Immunologic: Negative for immunocompromised state.   Neurological: Negative for dizziness.   Hematological: Does not bruise/bleed  easily.   Psychiatric/Behavioral: Negative for agitation.     Objective:     Vital Signs (Most Recent):  Temp: 97.9 °F (36.6 °C) (09/29/20 1500)  Pulse: (!) 42 (09/29/20 1500)  Resp: 17 (09/29/20 1500)  BP: (!) 158/67 (09/29/20 1500)  SpO2: 97 % (09/29/20 1500) Vital Signs (24h Range):  Temp:  [97.4 °F (36.3 °C)-97.9 °F (36.6 °C)] 97.9 °F (36.6 °C)  Pulse:  [40-43] 42  Resp:  [16-19] 17  SpO2:  [93 %-97 %] 97 %  BP: (109-158)/(61-70) 158/67     Weight: 88 kg (194 lb)  Body mass index is 35.48 kg/m².    Physical Exam  Vitals signs reviewed.   Constitutional:       General: She is not in acute distress.     Appearance: She is well-developed. She is not diaphoretic.   HENT:      Head: Normocephalic and atraumatic.   Eyes:      Conjunctiva/sclera: Conjunctivae normal.   Neck:      Musculoskeletal: Neck supple.   Cardiovascular:      Rate and Rhythm: Normal rate.      Pulses:           Femoral pulses are 2+ on the right side and 2+ on the left side.       Popliteal pulses are 2+ on the left side.        Dorsalis pedis pulses are detected w/ Doppler on the left side.        Posterior tibial pulses are detected w/ Doppler on the left side.   Pulmonary:      Effort: Pulmonary effort is normal.   Abdominal:      General: There is no distension.      Palpations: Abdomen is soft. There is no mass.      Tenderness: There is no abdominal tenderness. There is no guarding or rebound.      Hernia: No hernia is present.   Musculoskeletal: Normal range of motion.         General: No deformity.   Skin:     Findings: No rash.          Neurological:      Mental Status: She is alert and oriented to person, place, and time.         Significant Labs:  All pertinent labs from the last 24 hours have been reviewed.    Significant Diagnostics:  I have reviewed all pertinent imaging results/findings within the past 24 hours.

## 2020-09-29 NOTE — PLAN OF CARE
Problem: Physical Therapy Goal  Goal: Physical Therapy Goal  Description: Goals to be met by: 10/12/20     Patient will increase functional independence with mobility by performin. Supine to sit with Modified [place]  2. Rolling to Left and Right with Modified [place]  3. Sit to stand transfer with Modified [place] using RW  4. Bed to chair transfer with Modified [place] using Rolling Walker  5. Gait >150 feet with Modified [place] using Rolling Walker   6. Lower extremity exercise program 3 sets x10 reps per handout, with independence    Outcome: Ongoing, Progressing   Pt continues to make progress towards goals. Pt ambulated ~200ft with RW, SBA on RA with fatigue and SOB noted. Pt's O2 sats ~77% requiring seated rest break and pursed lip breathing to increase to 97%. Pt's HR ~44-50 BPM.  Pt up in chair at end of session. Pt would continue to benefit from HHPT services at time of discharge.

## 2020-09-29 NOTE — CONSULTS
[facility]  Vascular Surgery  Consult Note    Inpatient consult to Vascular Surgery  Consult performed by: Dru Leblanc MD  Consult ordered by: MARY Lucas        Subjective:     Chief Complaint/Reason for Admission: L knee wound    History of Present Illness:             Dru Leblanc MD [facility]                         09/29/2020    HPI:  Barbara Rizo is a 80 y.o. female with   Patient Active Problem List   Diagnosis    GERD (gastroesophageal reflux disease)    Hyperlipidemia    Essential hypertension    Hemochromatosis    DJD (degenerative joint disease) of knee    Vitamin D deficiency    Benign hypertensive heart disease without heart failure    Other and unspecified hyperlipidemia    Primary localized osteoarthrosis, lower leg    Anemia in chronic kidney disease    Morbid obesity    Physical deconditioning    Oropharyngeal dysphagia    Esophagitis    Urinary retention    Atonic neurogenic bladder    Incomplete bladder emptying    Constipation, slow transit    OAB (overactive bladder)    MGUS (monoclonal gammopathy of unknown significance)    Paroxysmal atrial fibrillation    Obesity, Class II, BMI 35-39.9    Chronic gout    Acute bronchitis    Stage 3 chronic kidney disease    Chronic diastolic CHF (congestive heart failure)    Prediabetes    H/O open leg wound    Hyponatremia    Hyperkalemia    Bradycardia    Pulmonary hypertension    Open wound of right knee    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  Patient states location is L knee occurring for a few days.  Associated signs and symptoms include discoloration and pain.  Quality is aching and severity is 5/10; s/p debridement L knee wound by Ortho this week.  Symptoms began after a fall recently.  Alleviating factors include  wound care.  Worsening factors include pressure.    no MI  no Stroke  Tobacco use: denies    Past Medical History:   Diagnosis Date    A-fib     CKD (chronic kidney disease)     Diabetes mellitus type II     Fatty liver     GERD (gastroesophageal reflux disease)     Hearing loss of both ears     wears bilateral hearing aids    Hemochromatosis     History of anemia due to CKD     History of pleural effusion     with thoracentesis    Hyperlipidemia     Hypertension     Macular degeneration     Osteoarthritis     Left knee    Osteoporosis     Vaginal delivery     x2    Vitamin D deficiency disease     Wears partial dentures     upper removable bridge     Past Surgical History:   Procedure Laterality Date    BREAST BIOPSY      BREAST SURGERY Bilateral     Reduction r/t h/o fibrocystic disease    CHOLECYSTECTOMY  08/2015    EYE SURGERY      Cat Ext  OU    HYSTERECTOMY      partial due to uterine fibroids    INCISION AND DRAINAGE OF KNEE Left 9/17/2020    Procedure: INCISION AND DRAINAGE, KNEE;  Surgeon: Rolando Wagoner MD;  Location: [facility] OR;  Service: Orthopedics;  Laterality: Left;    JOINT REPLACEMENT Left 05/13/2013    TKR    JOINT REPLACEMENT Right 08/03/2015    TKR    THORACENTESIS      TOTAL KNEE ARTHROPLASTY Right 2015    left knee done 5/2013    WOUND DRESSING Left 9/17/2020    Procedure: WOUND VAC APPLICATION;  Surgeon: Rolando Wagoner MD;  Location: [facility] OR;  Service: Orthopedics;  Laterality: Left;     Family History   Problem Relation Age of Onset    Cancer Mother         stomach    Hypertension Mother     Aneurysm Father         abdominal     Social History     Socioeconomic History    Marital status:      Spouse name: Not on file    Number of children: Not on file    Years of education: Not on file    Highest education level: Not on file   Occupational History    Not on file   Social Needs    Financial resource strain: Not on file    Food insecurity      Worry: Not on file     Inability: Not on file    Transportation needs     Medical: Not on file     Non-medical: Not on file   Tobacco Use    Smoking status: Former Smoker    Smokeless tobacco: Never Used   Substance and Sexual Activity    Alcohol use: Not Currently     Alcohol/week: 0.0 standard drinks     Comment: occasional/ on a weekend    Drug use: No    Sexual activity: Not Currently   Lifestyle    Physical activity     Days per week: Not on file     Minutes per session: Not on file    Stress: Not on file   Relationships    Social connections     Talks on phone: Not on file     Gets together: Not on file     Attends Synagogue service: Not on file     Active member of club or organization: Not on file     Attends meetings of clubs or organizations: Not on file     Relationship status: Not on file   Other Topics Concern    Not on file   Social History Narrative    Not on file       Current Facility-Administered Medications:     0.9%  NaCl infusion, , Intravenous, Continuous, Vishal Vizcaino MD, Last Rate: 75 mL/hr at 09/29/20 0842    albuterol sulfate nebulizer solution 2.5 mg, 2.5 mg, Nebulization, Q4H PRN, Az Stapleton MD    allopurinoL tablet 100 mg, 100 mg, Oral, Daily, Az Stapleton MD, 100 mg at 09/29/20 1028    amiodarone tablet 200 mg, 200 mg, Oral, Daily, Az Stapleton MD, 200 mg at 09/29/20 1028    apixaban tablet 2.5 mg, 2.5 mg, Oral, BID, Az Stapleton MD, 2.5 mg at 09/29/20 1028    atorvastatin tablet 40 mg, 40 mg, Oral, Daily, Az Stapleton MD, 40 mg at 09/29/20 1026    calcium carbonate 200 mg calcium (500 mg) chewable tablet 500 mg, 1 tablet, Oral, Daily, Az Stapleton MD, 500 mg at 09/29/20 1028    dextrose 50% injection 25 g, 25 g, Intravenous, PRN, 25 g at 09/28/20 0733 **AND** [COMPLETED] insulin regular injection 10 Units, 10 Units, Intravenous, Once, 10 Units at 09/28/20 0348 **AND** [COMPLETED] calcium gluconate 1g in  dextrose 5% 100mL (ready to mix system), 1,000 mg, Intravenous, Once, Fish Joshua MD, 1,000 mg at 09/28/20 0311    doxazosin tablet 4 mg, 4 mg, Oral, QHS, Az Stapleton MD, 4 mg at 09/28/20 2033    folic acid tablet 1 mg, 1 mg, Oral, Daily, Az Stapleton MD, 1 mg at 09/29/20 1028    influenza (QUADRIVALENT ADJUVANTED PF) vaccine 0.5 mL, 0.5 mL, Intramuscular, Prior to discharge, Az Stapleton MD    losartan tablet 25 mg, 25 mg, Oral, Daily, Az Stapleton MD, 25 mg at 09/29/20 1026    ondansetron injection 4 mg, 4 mg, Intravenous, Q6H PRN, Az Stapleton MD    sodium chloride 0.9% flush 10 mL, 10 mL, Intravenous, PRN, Fish Serrano PATessC    Facility-Administered Medications Ordered in Other Encounters:     denosumab (PROLIA) injection 60 mg, 60 mg, Subcutaneous, 1 time in Clinic/HOD, Nargis Boyle MD      Medications Prior to Admission   Medication Sig Dispense Refill Last Dose    allopurinol (ZYLOPRIM) 100 MG tablet    9/27/2020    amiodarone (PACERONE) 200 MG Tab TAKE 1 TABLET(200 MG) BY MOUTH EVERY DAY 90 tablet 3 9/27/2020    apixaban (ELIQUIS) 2.5 mg Tab Take 1 tablet (2.5 mg total) by mouth 2 (two) times daily. 180 tablet 3 9/27/2020    atorvastatin (LIPITOR) 40 MG tablet TAKE 1 TABLET(40 MG) BY MOUTH EVERY DAY 90 tablet 1 9/27/2020    calcium carbonate (TUMS) 200 mg calcium (500 mg) chewable tablet Take 1 tablet by mouth once daily.   9/27/2020    doxazosin (CARDURA) 4 MG tablet TAKE 1 TABLET(4 MG) BY MOUTH EVERY EVENING 90 tablet 3 9/27/2020    ergocalciferol (ERGOCALCIFEROL) 50,000 unit Cap Take 50,000 Units by mouth every 7 days.   9/27/2020    fish oil-omega-3 fatty acids 300-1,000 mg capsule Take 1 capsule by mouth once daily.   9/27/2020    FLUoxetine 10 MG capsule TAKE 1 CAPSULE(10 MG) BY MOUTH EVERY DAY 90 capsule 1 9/27/2020    folic acid (FOLVITE) 1 MG tablet TAKE 1 TABLET(1 MG) BY MOUTH EVERY DAY 90 tablet 1 9/27/2020    furosemide  (LASIX) 20 MG tablet TAKE 1 TABLET BY MOUTH EVERY DAY AS NEEDED 45 tablet 0 Past Week    losartan (COZAAR) 25 MG tablet TK 1 T PO QD   9/27/2020    metoprolol succinate (TOPROL-XL) 25 MG 24 hr tablet TAKE 1 TABLET(25 MG) BY MOUTH EVERY DAY 90 tablet 3 9/27/2020    blood-glucose meter (FREESTYLE SYSTEM KIT) kit Use as instructed 1 each 0        Review of patient's allergies indicates:  No Known Allergies    Past Medical History:   Diagnosis Date    A-fib     CKD (chronic kidney disease)     Diabetes mellitus type II     Fatty liver     GERD (gastroesophageal reflux disease)     Hearing loss of both ears     wears bilateral hearing aids    Hemochromatosis     History of anemia due to CKD     History of pleural effusion     with thoracentesis    Hyperlipidemia     Hypertension     Macular degeneration     Osteoarthritis     Left knee    Osteoporosis     Vaginal delivery     x2    Vitamin D deficiency disease     Wears partial dentures     upper removable bridge     Past Surgical History:   Procedure Laterality Date    BREAST BIOPSY      BREAST SURGERY Bilateral     Reduction r/t h/o fibrocystic disease    CHOLECYSTECTOMY  08/2015    EYE SURGERY      Cat Ext  OU    HYSTERECTOMY      partial due to uterine fibroids    INCISION AND DRAINAGE OF KNEE Left 9/17/2020    Procedure: INCISION AND DRAINAGE, KNEE;  Surgeon: Rolando Wagoner MD;  Location: [facility] OR;  Service: Orthopedics;  Laterality: Left;    JOINT REPLACEMENT Left 05/13/2013    TKR    JOINT REPLACEMENT Right 08/03/2015    TKR    THORACENTESIS      TOTAL KNEE ARTHROPLASTY Right 2015    left knee done 5/2013    WOUND DRESSING Left 9/17/2020    Procedure: WOUND VAC APPLICATION;  Surgeon: Rolando Wagoner MD;  Location: [facility] OR;  Service: Orthopedics;  Laterality: Left;     Family History     Problem Relation (Age of Onset)    Aneurysm Father    Cancer Mother    Hypertension Mother        Tobacco Use    Smoking status: Former Smoker     Smokeless tobacco: Never Used   Substance and Sexual Activity    Alcohol use: Not Currently     Alcohol/week: 0.0 standard drinks     Comment: occasional/ on a weekend    Drug use: No    Sexual activity: Not Currently     Review of Systems   Constitutional: Negative for chills and fever.   HENT: Negative for congestion.    Eyes: Negative for visual disturbance.   Respiratory: Negative for shortness of breath.    Cardiovascular: Negative for chest pain.   Gastrointestinal: Negative for abdominal distention.   Endocrine: Negative for cold intolerance.   Genitourinary: Negative for flank pain.   Musculoskeletal: Negative for back pain.   Skin: Positive for color change and wound. Negative for pallor and rash.   Allergic/Immunologic: Negative for immunocompromised state.   Neurological: Negative for dizziness.   Hematological: Does not bruise/bleed easily.   Psychiatric/Behavioral: Negative for agitation.     Objective:     Vital Signs (Most Recent):  Temp: 97.9 °F (36.6 °C) (09/29/20 1500)  Pulse: (!) 42 (09/29/20 1500)  Resp: 17 (09/29/20 1500)  BP: (!) 158/67 (09/29/20 1500)  SpO2: 97 % (09/29/20 1500) Vital Signs (24h Range):  Temp:  [97.4 °F (36.3 °C)-97.9 °F (36.6 °C)] 97.9 °F (36.6 °C)  Pulse:  [40-43] 42  Resp:  [16-19] 17  SpO2:  [93 %-97 %] 97 %  BP: (109-158)/(61-70) 158/67     Weight: 88 kg (194 lb)  Body mass index is 35.48 kg/m².    Physical Exam  Vitals signs reviewed.   Constitutional:       General: She is not in acute distress.     Appearance: She is well-developed. She is not diaphoretic.   HENT:      Head: Normocephalic and atraumatic.   Eyes:      Conjunctiva/sclera: Conjunctivae normal.   Neck:      Musculoskeletal: Neck supple.   Cardiovascular:      Rate and Rhythm: Normal rate.      Pulses:           Femoral pulses are 2+ on the right side and 2+ on the left side.       Popliteal pulses are 2+ on the left side.        Dorsalis pedis pulses are detected w/ Doppler on the left side.         Posterior tibial pulses are detected w/ Doppler on the left side.   Pulmonary:      Effort: Pulmonary effort is normal.   Abdominal:      General: There is no distension.      Palpations: Abdomen is soft. There is no mass.      Tenderness: There is no abdominal tenderness. There is no guarding or rebound.      Hernia: No hernia is present.   Musculoskeletal: Normal range of motion.         General: No deformity.   Skin:     Findings: No rash.          Neurological:      Mental Status: She is alert and oriented to person, place, and time.         Significant Labs:  All pertinent labs from the last 24 hours have been reviewed.    Significant Diagnostics:  I have reviewed all pertinent imaging results/findings within the past 24 hours.    Assessment/Plan:     Open wound of left knee  -Imaging reviewed.  Pt appears to have adequate perfusion to heal wound.  Will continue to follow closely.    Stage 3 chronic kidney disease  -rec Nephrology mgmt        Thank you for your consult. I will follow-up with patient. Please contact us if you have any additional questions.    Dru Leblanc MD  Vascular Surgery  [facility]

## 2020-09-29 NOTE — ASSESSMENT & PLAN NOTE
Seen by cardiology - has been on amiodarone and metoprolol - suggest event monitor outpatient after discharge - recommended decreasing metoprolol & amio; Anticoagulated & on rate controlled medications

## 2020-09-29 NOTE — SUBJECTIVE & OBJECTIVE
Interval History: Feels better. Na+, Ka+, and creatine slowly improving, continues to have residual profound fatigue      Review of Systems   Constitutional: Positive for fatigue. Negative for activity change, appetite change, chills, diaphoresis, fever and unexpected weight change.   HENT: Negative for congestion and dental problem.    Eyes: Negative for discharge.   Respiratory: Negative for apnea, cough, choking, chest tightness, shortness of breath and stridor.    Cardiovascular: Negative for chest pain, palpitations and leg swelling.   Gastrointestinal: Negative for abdominal distention, abdominal pain, anal bleeding, blood in stool, constipation, rectal pain and vomiting.   Endocrine: Negative for cold intolerance.   Genitourinary: Negative for difficulty urinating and dyspareunia.   Musculoskeletal: Positive for arthralgias and joint swelling.   Neurological: Positive for weakness and numbness. Negative for dizziness and light-headedness.   Psychiatric/Behavioral: Negative for agitation, behavioral problems and confusion.     Objective:     Vital Signs (Most Recent):  Temp: 97.9 °F (36.6 °C) (09/29/20 1500)  Pulse: (!) 42 (09/29/20 1500)  Resp: 17 (09/29/20 1500)  BP: (!) 158/67 (09/29/20 1500)  SpO2: 97 % (09/29/20 1500) Vital Signs (24h Range):  Temp:  [97.4 °F (36.3 °C)-97.9 °F (36.6 °C)] 97.9 °F (36.6 °C)  Pulse:  [40-43] 42  Resp:  [16-19] 17  SpO2:  [93 %-97 %] 97 %  BP: (109-158)/(61-70) 158/67     Weight: 88 kg (194 lb)  Body mass index is 35.48 kg/m².    Intake/Output Summary (Last 24 hours) at 9/29/2020 1505  Last data filed at 9/29/2020 1219  Gross per 24 hour   Intake 300 ml   Output 300 ml   Net 0 ml      Physical Exam  Vitals signs and nursing note reviewed.   Constitutional:       General: She is not in acute distress.     Appearance: Normal appearance. She is obese. She is not ill-appearing, toxic-appearing or diaphoretic.   HENT:      Head: Normocephalic and atraumatic.      Nose: Nose  normal.      Mouth/Throat:      Mouth: Mucous membranes are dry.   Eyes:      Extraocular Movements: Extraocular movements intact.      Pupils: Pupils are equal, round, and reactive to light.   Neck:      Musculoskeletal: Normal range of motion and neck supple.   Cardiovascular:      Rate and Rhythm: Normal rate and regular rhythm.      Heart sounds: No friction rub. No gallop.    Pulmonary:      Effort: Pulmonary effort is normal. No respiratory distress.      Breath sounds: No wheezing or rales.   Abdominal:      General: Abdomen is flat. Bowel sounds are normal. There is no distension.      Palpations: Abdomen is soft.   Musculoskeletal:      Comments: Debility; Right knee dressing steri-strips intact, pulses palpated, no draining or streaking noted   Skin:     Comments: L knee wrapped    Neurological:      Mental Status: She is alert and oriented to person, place, and time.      Motor: Weakness present.      Gait: Gait abnormal.   Psychiatric:         Mood and Affect: Mood normal.         Behavior: Behavior normal.         Significant Labs:   CBC:   No results for input(s): WBC, HGB, HCT, PLT in the last 48 hours.  CMP:   Recent Labs   Lab 09/29/20  0019 09/29/20  0607 09/29/20  1153   * 126* 124*   K 5.6* 5.2* 5.2*   CL 97 98 100   CO2 19* 22* 15*   GLU 91 86 107   BUN 15 14 14   CREATININE 1.9* 1.8* 1.8*   CALCIUM 7.3* 7.3* 7.5*   ANIONGAP 6* 6* 9   EGFRNONAA 25* 26* 26*       Significant Imaging

## 2020-09-29 NOTE — CONSULTS
"                                         Renal Consult    Date of Admission:  9/27/2020  9:45 AM        Chief Complaint:   Chief Complaint   Patient presents with    Fatigue     pt here for gen weakness and dizziness onset one week. seen by primary last week, also fall while at provider, wound to right knee and right forearm, bandages in place.        HPI: 79 y/o female with a PMHx. Significant for: DM-2, HTN, CKD-3 followed by Dr. Boyle (baseline creatinine around 2) Fatty liver, osteoporosis, anemia due to CKD, fatty liver and HLD  who presented to [facility] with a CC of nausea,  fatigue, dizziness and falls at home since discharged from Hospital.  Pte. s/p recent surgery 2nd. Open leg wound the result of a fall at home and discharged a week ago (9/25)  Initial Labs. In ED significant for a serum Na+ of 123,  K+ of 5.5 and creatinine of 2.  As per H&P:  "daughter states that the patient has been drinking a lot of water lately"  No HCTZ, no new meds and no ETOH use.        PMH:  Past Medical History:   Diagnosis Date    A-fib     CKD (chronic kidney disease)     Diabetes mellitus type II     Fatty liver     GERD (gastroesophageal reflux disease)     Hearing loss of both ears     wears bilateral hearing aids    Hemochromatosis     History of anemia due to CKD     History of pleural effusion     with thoracentesis    Hyperlipidemia     Hypertension     Macular degeneration     Osteoarthritis     Left knee    Osteoporosis     Vaginal delivery     x2    Vitamin D deficiency disease     Wears partial dentures     upper removable bridge       PSH:  Past Surgical History:   Procedure Laterality Date    BREAST BIOPSY      BREAST SURGERY Bilateral     Reduction r/t h/o fibrocystic disease    CHOLECYSTECTOMY  08/2015    EYE SURGERY      Cat Ext  OU    HYSTERECTOMY      partial due to uterine fibroids    INCISION AND DRAINAGE OF KNEE Left 9/17/2020    Procedure: INCISION AND DRAINAGE, KNEE;  Surgeon: Rolando STUART" MD Rizwana;  Location: [facility] OR;  Service: Orthopedics;  Laterality: Left;    JOINT REPLACEMENT Left 05/13/2013    TKR    JOINT REPLACEMENT Right 08/03/2015    TKR    THORACENTESIS      TOTAL KNEE ARTHROPLASTY Right 2015    left knee done 5/2013    WOUND DRESSING Left 9/17/2020    Procedure: WOUND VAC APPLICATION;  Surgeon: Rolando Wagoner MD;  Location: [facility] OR;  Service: Orthopedics;  Laterality: Left;       Allergies:  Review of patient's allergies indicates:  No Known Allergies    Current Facility-Administered Medications on File Prior to Encounter   Medication Dose Route Frequency Provider Last Rate Last Dose    denosumab (PROLIA) injection 60 mg  60 mg Subcutaneous 1 time in Clinic/HOD Nargis Boyle MD         Current Outpatient Medications on File Prior to Encounter   Medication Sig Dispense Refill    allopurinol (ZYLOPRIM) 100 MG tablet       amiodarone (PACERONE) 200 MG Tab TAKE 1 TABLET(200 MG) BY MOUTH EVERY DAY 90 tablet 3    apixaban (ELIQUIS) 2.5 mg Tab Take 1 tablet (2.5 mg total) by mouth 2 (two) times daily. 180 tablet 3    atorvastatin (LIPITOR) 40 MG tablet TAKE 1 TABLET(40 MG) BY MOUTH EVERY DAY 90 tablet 1    calcium carbonate (TUMS) 200 mg calcium (500 mg) chewable tablet Take 1 tablet by mouth once daily.      doxazosin (CARDURA) 4 MG tablet TAKE 1 TABLET(4 MG) BY MOUTH EVERY EVENING 90 tablet 3    ergocalciferol (ERGOCALCIFEROL) 50,000 unit Cap Take 50,000 Units by mouth every 7 days.      fish oil-omega-3 fatty acids 300-1,000 mg capsule Take 1 capsule by mouth once daily.      FLUoxetine 10 MG capsule TAKE 1 CAPSULE(10 MG) BY MOUTH EVERY DAY 90 capsule 1    folic acid (FOLVITE) 1 MG tablet TAKE 1 TABLET(1 MG) BY MOUTH EVERY DAY 90 tablet 1    furosemide (LASIX) 20 MG tablet TAKE 1 TABLET BY MOUTH EVERY DAY AS NEEDED 45 tablet 0    losartan (COZAAR) 25 MG tablet TK 1 T PO QD      metoprolol succinate (TOPROL-XL) 25 MG 24 hr tablet TAKE 1 TABLET(25 MG) BY MOUTH EVERY DAY 90  tablet 3    blood-glucose meter (FREESTYLE SYSTEM KIT) kit Use as instructed 1 each 0       Medications:  Current Facility-Administered Medications   Medication Dose Route Frequency Provider Last Rate Last Dose    0.9%  NaCl infusion   Intravenous Continuous Rhina Kamara MD 75 mL/hr at 09/29/20 0842      albuterol sulfate nebulizer solution 2.5 mg  2.5 mg Nebulization Q4H PRN Az Stapleton MD        allopurinoL tablet 100 mg  100 mg Oral Daily Az Stapleton MD   100 mg at 09/29/20 1028    amiodarone tablet 200 mg  200 mg Oral Daily Az Stapleton MD   200 mg at 09/29/20 1028    apixaban tablet 2.5 mg  2.5 mg Oral BID Az Stapleton MD   2.5 mg at 09/29/20 1028    atorvastatin tablet 40 mg  40 mg Oral Daily Az Stapleton MD   40 mg at 09/29/20 1026    calcium carbonate 200 mg calcium (500 mg) chewable tablet 500 mg  1 tablet Oral Daily Az Stapleton MD   500 mg at 09/29/20 1028    dextrose 50% injection 25 g  25 g Intravenous PRN Fish Joshua MD   25 g at 09/28/20 0733    doxazosin tablet 4 mg  4 mg Oral QHS Az Stapleton MD   4 mg at 09/28/20 2033    folic acid tablet 1 mg  1 mg Oral Daily Az Stapleton MD   1 mg at 09/29/20 1028    influenza (QUADRIVALENT ADJUVANTED PF) vaccine 0.5 mL  0.5 mL Intramuscular Prior to discharge Az Stapleton MD        losartan tablet 25 mg  25 mg Oral Daily Az Stapleton MD   25 mg at 09/29/20 1026    ondansetron injection 4 mg  4 mg Intravenous Q6H PRN Az Stapleton MD        sodium chloride 0.9% flush 10 mL  10 mL Intravenous PRN Fish Serrano PA-C         Facility-Administered Medications Ordered in Other Encounters   Medication Dose Route Frequency Provider Last Rate Last Dose    denosumab (PROLIA) injection 60 mg  60 mg Subcutaneous 1 time in Clinic/HOD Nargis Boyle MD           FamHx:  Family History   Problem Relation Age of Onset    Cancer Mother         stomach     Hypertension Mother     Aneurysm Father         abdominal       SocHx:  Social History     Socioeconomic History    Marital status:      Spouse name: Not on file    Number of children: Not on file    Years of education: Not on file    Highest education level: Not on file   Occupational History    Not on file   Social Needs    Financial resource strain: Not on file    Food insecurity     Worry: Not on file     Inability: Not on file    Transportation needs     Medical: Not on file     Non-medical: Not on file   Tobacco Use    Smoking status: Former Smoker    Smokeless tobacco: Never Used   Substance and Sexual Activity    Alcohol use: Not Currently     Alcohol/week: 0.0 standard drinks     Comment: occasional/ on a weekend    Drug use: No    Sexual activity: Not Currently   Lifestyle    Physical activity     Days per week: Not on file     Minutes per session: Not on file    Stress: Not on file   Relationships    Social connections     Talks on phone: Not on file     Gets together: Not on file     Attends [employer] service: Not on file     Active member of club or organization: Not on file     Attends meetings of clubs or organizations: Not on file     Relationship status: Not on file   Other Topics Concern    Not on file   Social History Narrative    Not on file           Review of Systems: see H&P       Physical Exam:  Vitals:   Vitals:    09/29/20 1105   BP: 109/61   Pulse: (!) 41   Resp: 16   Temp: 97.9 °F (36.6 °C)       I/O last 3 completed shifts:  In: 400 [P.O.:300; Other:100]  Out: -   I/O this shift:  In: 300 [P.O.:300]  Out: 300 [Urine:300]    General: No apparent distress.   Neck: supple   Lungs: Unlabored breathing  Heart: RRR  Abdomen: n/a  Ext: n/a  Neurologic: Awake and alert, oriented x 3      Laboratories:    No results for input(s): WBC, RBC, HGB, HCT, PLT, MCV, MCH, MCHC in the last 24 hours.    Recent Labs   Lab 09/29/20  0607   CALCIUM 7.3*   *   K 5.2*   CO2 22*    CL 98   BUN 14   CREATININE 1.8*       No results for input(s): COLORU, CLARITYU, SPECGRAV, PHUR, PROTEINUA, GLUCOSEU, BLOODU, WBCU, RBCU, BACTERIA, MUCUS in the last 24 hours.    Invalid input(s):  BILIRUBINCON    Microbiology Results (last 7 days)     Procedure Component Value Units Date/Time    Blood culture #1 **CANNOT BE ORDERED STAT** [[id number]] Collected: 09/27/20 1027    Order Status: Completed Specimen: Blood from Peripheral, Wrist, Left Updated: 09/28/20 1503     Blood Culture, Routine No Growth to date      No Growth to date    Blood culture #2 **CANNOT BE ORDERED STAT** [[id number]] Collected: 09/27/20 1045    Order Status: Completed Specimen: Blood from Peripheral, Hand, Left Updated: 09/28/20 1503     Blood Culture, Routine No Growth to date      No Growth to date            Diagnostic Tests:    CXR:    FINDINGS:   Cardiac silhouette appears enlarged.  There is suggestion of some enlargement of the main pulmonary trunk which can be associated with pulmonary arterial hypertension.  Atherosclerotic calcifications are again noted involving the abdominal aorta.    Elevation of the right hemidiaphragm appears similar to prior.  Right hilar fullness is likely related to prominent vasculature accentuated by low lung volumes and appears similar to some prior examinations.  Consider follow-up examination with PA and lateral views of the chest.  Subsegmental atelectasis noted in the right lung base.  Left lung appears grossly clear.  No pneumothorax visualized.    Impression:       As above       Electronically signed by: Rui Tomlin MD   Date: 09/27/2020              Assessment:    79 y/o female admitted with:    - Hyponatremia likely euvolemic: pain, nausea, excessive water intake  - CKD-3  - HyperK+ pte. Was on ACEI at home  - Hx. DM-2  - UA with 1+ protein  - Hx. HTN  - Hypoalbuminemia  - Hx. Recent fall with open leg wound requiring Surgery        Plan:    - IV n.s.   - p.o. fluid restriction  -  Check urine Osm  - Cardiac ADA diet o.k. for now  - Check TSH and Cortisol  - Glycemic control and other problems per admitting    Above discussed with Pte. And Daughter in detail.

## 2020-09-29 NOTE — PROGRESS NOTES
[facility] Medicine  Progress Note    Patient Name: Barbara Rizo  MRN: [id number]  Patient Class: IP- Inpatient   Admission Date: 9/27/2020  Length of Stay: 2 days  Attending Physician: Rhina Kamara MD  Primary Care Provider: Paars Oro MD        Subjective:     Principal Problem:Hyponatremia        HPI:  Mrs. Rizo is a 79 yo F who presents with a CC of fatigue, dizziness and falls at home. She is s/p recent surgery with a discharge a week ago. She presents to the ED and is found to have a NA of 123. The patient's daughter states that the patient has been drinking a lot of water. No HCTZ. No new meds.  No ETOH use.  The patient is non-ill appearing. Lives currently with patient's daughter while recovering from surgery.     Overview/Hospital Course:  Sallie is a 79 yo F who presents with a CC of fatigue, dizziness and falls at home. She is s/p recent surgery (Right Knee I&D [9/17/20]) with a discharge a week ago. She presents to the ED and is found to have a NA of 123. The patient's daughter states that the patient has been drinking a lot of water. No HCTZ. No new meds.  No ETOH use. She is noted to be taking amiodarone which although rarely, can contribute to hyponatremia. On lasix + lisinopril at home. The patient was started on NS with BMP's every 6 hours. PT/OT were consulted.     PLAN: Continue close monitoring of electrolytes, Cardiology and Nephrology following; Cardiology following medication changes [dec amio/meto]; Nephrology consulted forCKDIII-IV [urine studies]; (at discharge FU w/Cards - OP event monitor; HH/PT/OT)        Interval History: Feels better. Na+, Ka+, and creatine slowly improving, continues to have residual profound fatigue      Review of Systems   Constitutional: Positive for fatigue. Negative for activity change, appetite change, chills, diaphoresis, fever and unexpected weight change.   HENT: Negative for congestion and dental problem.     Eyes: Negative for discharge.   Respiratory: Negative for apnea, cough, choking, chest tightness, shortness of breath and stridor.    Cardiovascular: Negative for chest pain, palpitations and leg swelling.   Gastrointestinal: Negative for abdominal distention, abdominal pain, anal bleeding, blood in stool, constipation, rectal pain and vomiting.   Endocrine: Negative for cold intolerance.   Genitourinary: Negative for difficulty urinating and dyspareunia.   Musculoskeletal: Positive for arthralgias and joint swelling.   Neurological: Positive for weakness and numbness. Negative for dizziness and light-headedness.   Psychiatric/Behavioral: Negative for agitation, behavioral problems and confusion.     Objective:     Vital Signs (Most Recent):  Temp: 97.9 °F (36.6 °C) (09/29/20 1500)  Pulse: (!) 42 (09/29/20 1500)  Resp: 17 (09/29/20 1500)  BP: (!) 158/67 (09/29/20 1500)  SpO2: 97 % (09/29/20 1500) Vital Signs (24h Range):  Temp:  [97.4 °F (36.3 °C)-97.9 °F (36.6 °C)] 97.9 °F (36.6 °C)  Pulse:  [40-43] 42  Resp:  [16-19] 17  SpO2:  [93 %-97 %] 97 %  BP: (109-158)/(61-70) 158/67     Weight: 88 kg (194 lb)  Body mass index is 35.48 kg/m².    Intake/Output Summary (Last 24 hours) at 9/29/2020 1505  Last data filed at 9/29/2020 1219  Gross per 24 hour   Intake 300 ml   Output 300 ml   Net 0 ml      Physical Exam  Vitals signs and nursing note reviewed.   Constitutional:       General: She is not in acute distress.     Appearance: Normal appearance. She is obese. She is not ill-appearing, toxic-appearing or diaphoretic.   HENT:      Head: Normocephalic and atraumatic.      Nose: Nose normal.      Mouth/Throat:      Mouth: Mucous membranes are dry.   Eyes:      Extraocular Movements: Extraocular movements intact.      Pupils: Pupils are equal, round, and reactive to light.   Neck:      Musculoskeletal: Normal range of motion and neck supple.   Cardiovascular:      Rate and Rhythm: Normal rate and regular rhythm.       Heart sounds: No friction rub. No gallop.    Pulmonary:      Effort: Pulmonary effort is normal. No respiratory distress.      Breath sounds: No wheezing or rales.   Abdominal:      General: Abdomen is flat. Bowel sounds are normal. There is no distension.      Palpations: Abdomen is soft.   Musculoskeletal:      Comments: Debility; Right knee dressing steri-strips intact, pulses palpated, no draining or streaking noted   Skin:     Comments: L knee wrapped    Neurological:      Mental Status: She is alert and oriented to person, place, and time.      Motor: Weakness present.      Gait: Gait abnormal.   Psychiatric:         Mood and Affect: Mood normal.         Behavior: Behavior normal.         Significant Labs:   CBC:   No results for input(s): WBC, HGB, HCT, PLT in the last 48 hours.  CMP:   Recent Labs   Lab 09/29/20  0019 09/29/20  0607 09/29/20  1153   * 126* 124*   K 5.6* 5.2* 5.2*   CL 97 98 100   CO2 19* 22* 15*   GLU 91 86 107   BUN 15 14 14   CREATININE 1.9* 1.8* 1.8*   CALCIUM 7.3* 7.3* 7.5*   ANIONGAP 6* 6* 9   EGFRNONAA 25* 26* 26*       Significant Imaging      Assessment/Plan:      * Hyponatremia  Likely from excess H20. Will start on NS. BMP's every 6 hours.  Improving with fluid restriction, NS; monitor labs closely - fatigue        Stage 3 chronic kidney disease  At baseline       Paroxysmal atrial fibrillation  Seen by cardiology - has been on amiodarone and metoprolol - suggest event monitor outpatient after discharge - recommended decreasing metoprolol & amio; Anticoagulated & on rate controlled medications      Open wound of right knee  Recent surgery as stated in HPI; PHI's 9/29/20 Show good flow; no fever or white count  Home with Home Health & PT    Chronic diastolic CHF (congestive heart failure)  09/28/20: No acute issues   09/29/20: Cardiology following; Creatine 2.0->1.8; improving continue current treatment. Holding nephrotoxic meds for now including lasix - continue at discharge.  Monitor FV closely      Anemia in chronic kidney disease  09/28/20: No acute issues  09/29/20: Nephrology now following      Primary localized osteoarthrosis, lower leg  Recent knee surgery. Wet to dry dressing's. Daily -- PHI showed patent major arteries BL with good flow: Seen by PT/OT, discharge with HH/PT/OT s    Essential hypertension  Resume home meds      Hyperlipidemia  Resume statin       Physical deconditioning  With falls. Evaluated by PT/OT - Home with Home Health  PT/OT      Bradycardia  HR of 39. D/c BB for now.         Chronic gout  Continue Allopurinol.      Obesity, Class II, BMI 35-39.9  Body mass index is 35.48 kg/m².  Weight loss as out patient       GERD (gastroesophageal reflux disease)  Resume home meds.      VTE Risk Mitigation (From admission, onward)         Ordered     apixaban tablet 2.5 mg  2 times daily      09/27/20 1409     IP VTE HIGH RISK PATIENT  Once      09/27/20 1545     Place sequential compression device  Until discontinued      09/27/20 1545                Discharge Planning   SALLY:      Code Status: Full Code   Is the patient medically ready for discharge?:     Reason for patient still in hospital (select all that apply): Patient trending condition, Treatment and Consult recommendations  Discharge Plan A: Home with family, Home Health        Li Iqbal, MANOLO, APRN, FNP-C  Department of [facility] Medicine - [facility]  [address] [place] [address]  Office [phone]; Pager [phone]

## 2020-09-29 NOTE — HPI
Dru Leblanc MD [facility]                         09/29/2020    HPI:  Barbara Rizo is a 80 y.o. female with   Patient Active Problem List   Diagnosis    GERD (gastroesophageal reflux disease)    Hyperlipidemia    Essential hypertension    Hemochromatosis    DJD (degenerative joint disease) of knee    Vitamin D deficiency    Benign hypertensive heart disease without heart failure    Other and unspecified hyperlipidemia    Primary localized osteoarthrosis, lower leg    Anemia in chronic kidney disease    Morbid obesity    Physical deconditioning    Oropharyngeal dysphagia    Esophagitis    Urinary retention    Atonic neurogenic bladder    Incomplete bladder emptying    Constipation, slow transit    OAB (overactive bladder)    MGUS (monoclonal gammopathy of unknown significance)    Paroxysmal atrial fibrillation    Obesity, Class II, BMI 35-39.9    Chronic gout    Acute bronchitis    Stage 3 chronic kidney disease    Chronic diastolic CHF (congestive heart failure)    Prediabetes    H/O open leg wound    Hyponatremia    Hyperkalemia    Bradycardia    Pulmonary hypertension    Open wound of right knee    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  Patient states location is L knee occurring for a few days.  Associated signs and symptoms include discoloration and pain.  Quality is aching and severity is 5/10; s/p debridement L knee wound by Ortho this week.  Symptoms began after a fall recently.  Alleviating factors include wound care.  Worsening factors include pressure.    no MI  no Stroke  Tobacco use: denies    Past Medical History:   Diagnosis Date    A-fib     CKD (chronic kidney disease)     Diabetes mellitus type II     Fatty liver     GERD (gastroesophageal reflux disease)     Hearing loss of both ears      wears bilateral hearing aids    Hemochromatosis     History of anemia due to CKD     History of pleural effusion     with thoracentesis    Hyperlipidemia     Hypertension     Macular degeneration     Osteoarthritis     Left knee    Osteoporosis     Vaginal delivery     x2    Vitamin D deficiency disease     Wears partial dentures     upper removable bridge     Past Surgical History:   Procedure Laterality Date    BREAST BIOPSY      BREAST SURGERY Bilateral     Reduction r/t h/o fibrocystic disease    CHOLECYSTECTOMY  08/2015    EYE SURGERY      Cat Ext  OU    HYSTERECTOMY      partial due to uterine fibroids    INCISION AND DRAINAGE OF KNEE Left 9/17/2020    Procedure: INCISION AND DRAINAGE, KNEE;  Surgeon: Rolando Wagoner MD;  Location: [facility] OR;  Service: Orthopedics;  Laterality: Left;    JOINT REPLACEMENT Left 05/13/2013    TKR    JOINT REPLACEMENT Right 08/03/2015    TKR    THORACENTESIS      TOTAL KNEE ARTHROPLASTY Right 2015    left knee done 5/2013    WOUND DRESSING Left 9/17/2020    Procedure: WOUND VAC APPLICATION;  Surgeon: Rolando Wagoner MD;  Location: [facility] OR;  Service: Orthopedics;  Laterality: Left;     Family History   Problem Relation Age of Onset    Cancer Mother         stomach    Hypertension Mother     Aneurysm Father         abdominal     Social History     Socioeconomic History    Marital status:      Spouse name: Not on file    Number of children: Not on file    Years of education: Not on file    Highest education level: Not on file   Occupational History    Not on file   Social Needs    Financial resource strain: Not on file    Food insecurity     Worry: Not on file     Inability: Not on file    Transportation needs     Medical: Not on file     Non-medical: Not on file   Tobacco Use    Smoking status: Former Smoker    Smokeless tobacco: Never Used   Substance and Sexual Activity    Alcohol use: Not Currently     Alcohol/week: 0.0 standard  drinks     Comment: occasional/ on a weekend    Drug use: No    Sexual activity: Not Currently   Lifestyle    Physical activity     Days per week: Not on file     Minutes per session: Not on file    Stress: Not on file   Relationships    Social connections     Talks on phone: Not on file     Gets together: Not on file     Attends L[employer] service: Not on file     Active member of club or organization: Not on file     Attends meetings of clubs or organizations: Not on file     Relationship status: Not on file   Other Topics Concern    Not on file   Social History Narrative    Not on file       Current Facility-Administered Medications:     0.9%  NaCl infusion, , Intravenous, Continuous, Vishal Vizcaino MD, Last Rate: 75 mL/hr at 09/29/20 0842    albuterol sulfate nebulizer solution 2.5 mg, 2.5 mg, Nebulization, Q4H PRN, Az Stapleton MD    allopurinoL tablet 100 mg, 100 mg, Oral, Daily, Az Stapleton MD, 100 mg at 09/29/20 1028    amiodarone tablet 200 mg, 200 mg, Oral, Daily, Az Stapleton MD, 200 mg at 09/29/20 1028    apixaban tablet 2.5 mg, 2.5 mg, Oral, BID, Az Stapleton MD, 2.5 mg at 09/29/20 1028    atorvastatin tablet 40 mg, 40 mg, Oral, Daily, Az Stapleton MD, 40 mg at 09/29/20 1026    calcium carbonate 200 mg calcium (500 mg) chewable tablet 500 mg, 1 tablet, Oral, Daily, Az Stapleton MD, 500 mg at 09/29/20 1028    dextrose 50% injection 25 g, 25 g, Intravenous, PRN, 25 g at 09/28/20 0733 **AND** [COMPLETED] insulin regular injection 10 Units, 10 Units, Intravenous, Once, 10 Units at 09/28/20 0348 **AND** [COMPLETED] calcium gluconate 1g in dextrose 5% 100mL (ready to mix system), 1,000 mg, Intravenous, Once, Fish Joshua MD, 1,000 mg at 09/28/20 0311    doxazosin tablet 4 mg, 4 mg, Oral, QHS, Az Stapleton MD, 4 mg at 09/28/20 2033    folic acid tablet 1 mg, 1 mg, Oral, Daily, Az Stapleton MD, 1 mg at 09/29/20  1028    influenza (QUADRIVALENT ADJUVANTED PF) vaccine 0.5 mL, 0.5 mL, Intramuscular, Prior to discharge, Az Stapleton MD    losartan tablet 25 mg, 25 mg, Oral, Daily, Az Stapleton MD, 25 mg at 09/29/20 1026    ondansetron injection 4 mg, 4 mg, Intravenous, Q6H PRN, Az Stapleton MD    sodium chloride 0.9% flush 10 mL, 10 mL, Intravenous, PRN, Fish Serrano PA-C    Facility-Administered Medications Ordered in Other Encounters:     denosumab (PROLIA) injection 60 mg, 60 mg, Subcutaneous, 1 time in Clinic/HOD, Nargis Boyle MD

## 2020-09-29 NOTE — PT/OT/SLP PROGRESS
Occupational Therapy   Treatment    Name: Barbara Rizo  MRN: [id number]  Admitting Diagnosis:  Hyponatremia       Recommendations:     Discharge Recommendations: home health OT  Discharge Equipment Recommendations:  other (see comments)(per PT, pt may need home O2)  Barriers to discharge:  None    Assessment:     Barbara Rizo is a 80 y.o. female with a medical diagnosis of Hyponatremia.  She presents with improving self care and functional mobility. Performance deficits affecting function are impaired endurance, weakness, impaired self care skills, impaired functional mobilty, decreased coordination, impaired fine motor, orthopedic precautions.     Rehab Prognosis:  Good; patient would benefit from acute skilled OT services to address these deficits and reach maximum level of function.       Plan:     Patient to be seen 3 x/week, 5 x/week to address the above listed problems via self-care/home management, therapeutic activities, therapeutic exercises  · Plan of Care Expires: 10/12/20  · Plan of Care Reviewed with: patient    Subjective     Pain/Comfort:  Pain Rating 1: 0/10    Objective:     Communicated with: Nurse Tate prior to session.  Patient found HOB elevated with peripheral IV, telemetry upon OT entry to room.    General Precautions: Standard, fall, hearing impaired   Orthopedic Precautions:LLE weight bearing as tolerated   Braces: N/A     Occupational Performance:     Bed Mobility:    · Patient completed Rolling/Turning to Left with  modified independence and for increased time and effort  · Patient completed Scooting/Bridging with modified independence  · Patient completed Supine to Sit with modified independence, with side rail and increased time and effort     Functional Mobility/Transfers:  · Patient completed Sit <> Stand Transfer with stand by assistance  with  rolling walker   · Patient completed Bed <> Chair Transfer using Step Transfer technique with contact guard assistance with  rolling walker  · Functional Mobility: Pt performed functional ambulation within room between bed and chair across room with CGA, no LOB, no c/o pain, demoing proper safety awareness.     Activities of Daily Living:  · Feeding:  minimum assistance requires assist to open factory sealed pudding container d/t weakness in pinch / hand strength  · Upper Body Dressing: minimum assistance to don back gown.      [facility] 6 Click ADL: 16    Treatment & Education:  Bed mobility, OT educated pt on increasing activity level to promote recovery, ADL training, T/F training.     Patient left up in chair with all lines intact, call button in reach, Nurse Bebeto  present and eating dinner seated in chairEducation:      GOALS:   Multidisciplinary Problems     Occupational Therapy Goals        Problem: Occupational Therapy Goal    Goal Priority Disciplines Outcome Interventions   Occupational Therapy Goal     OT, PT/OT Ongoing, Progressing    Description: Goals to be met by: 10/12/2020     Patient will increase functional independence with ADLs by performing:    UE Dressing with Modified [place].  LE Dressing with Modified [place] and Assistive Devices as needed.  Grooming while standing at sink with Modified [place].  Toileting from bedside commode with Modified [place] for hygiene and clothing management.   Toilet transfer to bedside commode with Modified [place].                     Time Tracking:     OT Date of Treatment: 09/29/20  OT Start Time: 1630  OT Stop Time: 1654  OT Total Time (min): 24 mins     Billable Minutes:Self Care/Home Management 24 mins  Total Time 24 mins    Adelaida Wing, OT  9/29/2020

## 2020-09-29 NOTE — PLAN OF CARE
Problem: Fall Injury Risk  Goal: Absence of Fall and Fall-Related Injury  Outcome: Ongoing, Not Progressing     Problem: Adult Inpatient Plan of Care  Goal: Plan of Care Review  Outcome: Ongoing, Not Progressing  Goal: Patient-Specific Goal (Individualization)  Outcome: Ongoing, Not Progressing  Goal: Absence of Hospital-Acquired Illness or Injury  Outcome: Ongoing, Not Progressing  Goal: Optimal Comfort and Wellbeing  Outcome: Ongoing, Not Progressing  Goal: Readiness for Transition of Care  Outcome: Ongoing, Not Progressing  Goal: Rounds/Family Conference  Outcome: Ongoing, Not Progressing     Problem: Skin Injury Risk Increased  Goal: Skin Health and Integrity  Outcome: Ongoing, Not Progressing     Problem: Infection  Goal: Infection Symptom Resolution  Outcome: Ongoing, Not Progressing

## 2020-09-29 NOTE — PLAN OF CARE
Problem: Fall Injury Risk  Goal: Absence of Fall and Fall-Related Injury  Outcome: Ongoing, Progressing     Problem: Adult Inpatient Plan of Care  Goal: Plan of Care Review  Outcome: Ongoing, Progressing     Problem: Adult Inpatient Plan of Care  Goal: Absence of Hospital-Acquired Illness or Injury  Outcome: Ongoing, Progressing     Problem: Adult Inpatient Plan of Care  Goal: Optimal Comfort and Wellbeing  Outcome: Ongoing, Progressing     Problem: Adult Inpatient Plan of Care  Goal: Readiness for Transition of Care  Outcome: Ongoing, Progressing     Problem: Skin Injury Risk Increased  Goal: Skin Health and Integrity  Outcome: Ongoing, Progressing     Problem: Infection  Goal: Infection Symptom Resolution  Outcome: Ongoing, Progressing

## 2020-09-29 NOTE — ASSESSMENT & PLAN NOTE
Likely from excess H20. Will start on NS. BMP's every 6 hours.  Improving with fluid restriction, NS; monitor labs closely - fatigue

## 2020-09-29 NOTE — NURSING
Patient returned to unit from scheduled procedure. Patient awake, alert and oriented. Patient without c/o discomfort. Tele monitoring in progress. No apparent distress noted.

## 2020-09-29 NOTE — PROGRESS NOTES
[facility]  Cardiology  Progress Note    Patient Name: Barbara Rizo  MRN: [id number]  Admission Date: 9/27/2020  Hospital Length of Stay: 2 days  Code Status: Full Code   Attending Physician: Rhina Kamara MD   Primary Care Physician: Paras Oro MD  Expected Discharge Date:   Principal Problem:Hyponatremia    Subjective:     Hospital Course:   No notes on file        Review of Systems   Cardiovascular: Negative for chest pain, dyspnea on exertion, irregular heartbeat, leg swelling, near-syncope, orthopnea, palpitations, paroxysmal nocturnal dyspnea and syncope.   Respiratory: Negative for shortness of breath.    Neurological: Positive for weakness.     Objective:     Vital Signs (Most Recent):  Temp: 97.6 °F (36.4 °C) (09/29/20 0734)  Pulse: (!) 41 (09/29/20 0734)  Resp: 16 (09/29/20 0734)  BP: (!) 143/64 (09/29/20 0734)  SpO2: 95 % (09/29/20 0734) Vital Signs (24h Range):  Temp:  [97.4 °F (36.3 °C)-98 °F (36.7 °C)] 97.6 °F (36.4 °C)  Pulse:  [37-43] 41  Resp:  [16-19] 16  SpO2:  [93 %-96 %] 95 %  BP: (138-156)/(63-70) 143/64     Weight: 88 kg (194 lb)  Body mass index is 35.48 kg/m².     SpO2: 95 %  O2 Device (Oxygen Therapy): room air      Intake/Output Summary (Last 24 hours) at 9/29/2020 1032  Last data filed at 9/29/2020 0537  Gross per 24 hour   Intake 180 ml   Output --   Net 180 ml       Lines/Drains/Airways     Peripheral Intravenous Line                 Peripheral IV - Single Lumen 09/27/20 1025 20 G Left Wrist 2 days                Physical Exam   Constitutional: She is oriented to person, place, and time. No distress.   Neck: No JVD present. Carotid bruit is not present.   Cardiovascular: Regular rhythm. Bradycardia present.   Murmur heard.   Holosystolic murmur is present with a grade of 2/6.  Pulmonary/Chest: Effort normal and breath sounds normal.   Abdominal: Soft. Bowel sounds are normal. There is no abdominal tenderness.   Musculoskeletal:      Right lower leg: No  edema.      Left lower leg: No edema.   Neurological: She is alert and oriented to person, place, and time.   Skin: She is not diaphoretic.        Psychiatric: She has a normal mood and affect. Her speech is normal and behavior is normal.   Vitals reviewed.      Significant Labs:   BMP:   Recent Labs   Lab 09/28/20  1831 09/29/20  0019 09/29/20  0607   * 91 86   * 122* 126*   K 5.5* 5.6* 5.2*   CL 95 97 98   CO2 21* 19* 22*   BUN 17 15 14   CREATININE 1.9* 1.9* 1.8*   CALCIUM 7.3* 7.3* 7.3*   , CBC No results for input(s): WBC, HGB, HCT, PLT in the last 48 hours., Lipid Panel No results for input(s): CHOL, HDL, LDLCALC, TRIG, CHOLHDL in the last 48 hours. and Troponin   Recent Labs   Lab 09/27/20  1133   TROPONINI 0.021       Significant Imaging: Echocardiogram:   Transthoracic echo (TTE) complete (Cupid Only):   Results for orders placed or performed during the hospital encounter of 06/11/20   Echo   Result Value Ref Range    BSA 1.9 m2    LA WIDTH 5.75 cm    AORTIC VALVE CUSP SEPERATION 1.69 cm    PV PEAK VELOCITY 1.35 cm/s    LVIDd 5.57 3.5 - 6.0 cm    IVS 1.18 (A) 0.6 - 1.1 cm    Posterior Wall 1.24 (A) 0.6 - 1.1 cm    Ao root annulus 2.35 cm    LVIDs 4.05 (A) 2.1 - 4.0 cm    FS 27 28 - 44 %    LA volume 191.77 cm3    Sinus 3.04 cm    STJ 2.13 cm    LV mass 281.21 g    LA size 5.79 cm    RVDD 3.53 cm    TAPSE 3.05 cm    RV S' 9.59 cm/s    Left Ventricle Relative Wall Thickness 0.45 cm    AV mean gradient 17 mmHg    AV valve area 1.57 cm2    AV Velocity Ratio 0.51     AV index (prosthetic) 0.48     E/A ratio 2.25     E wave decelartion time 218.83 msec    IVRT 83.04 msec    Pulm vein S/D ratio 0.53     LVOT diameter 2.04 cm    LVOT area 3.3 cm2    LVOT peak wes 1.44 m/s    LVOT peak VTI 41.06 cm    Ao peak wes 2.81 m/s    Ao VTI 85.41 cm    LVOT stroke volume 134.14 cm3    AV peak gradient 32 mmHg    MV Peak E Wes 1.28 m/s    TR Max Wes 3.97 m/s    MV Peak A Wes 0.57 m/s    PV Peak S Wes 0.41 m/s     PV Peak D Wes 0.78 m/s    LV Systolic Volume 71.92 mL    LV Systolic Volume Index 39.2 mL/m2    LV Diastolic Volume 151.96 mL    LV Diastolic Volume Index 82.75 mL/m2    LA Volume Index 104.4 mL/m2    LV Mass Index 153 g/m2    RA Major Axis 5.35 cm    Left Atrium Minor Axis 6.93 cm    Left Atrium Major Axis 6.63 cm    Triscuspid Valve Regurgitation Peak Gradient 63 mmHg    RA Width 4.46 cm    Right Atrial Pressure (from IVC) 3 mmHg    TV rest pulmonary artery pressure 66 mmHg    Narrative    · Normal left ventricular systolic function. The estimated ejection   fraction is 55%.  · Concentric left ventricular hypertrophy.  · Severe left atrial enlargement.  · Indeterminate left ventricular diastolic function.  · Normal right ventricular systolic function.  · Mild-to-moderate mitral regurgitation.  · Moderate tricuspid regurgitation.  · Mild pulmonic regurgitation.  · Moderate pulmonary hypertension present.  · Normal central venous pressure (3 mmHg).  · The estimated PA systolic pressure is 66 mmHg.        Assessment and Plan:         Pulmonary hypertension  Moderate by last echocardiogram    Bradycardia  On amiodarone and metoprolol.  History of paroxysmal atrial fibrillation.  No evidence of chronotropic incompetence at this time.  As an outpatient she can have an event monitor.  At this point she is limited physically.  Can it say that her weakness is solely due to her heart rate.  She has had poor p.o. intake.  Hypoglycemic.  Hyponatremic.  We can decrease her amiodarone and metoprolol.  If her current AFib can consider sick sinus syndrome.    09/29/2020-outpatient event monitor    Chronic diastolic CHF (congestive heart failure)  Compensated    Paroxysmal atrial fibrillation  Anticoagulated.  On rate control medications.    Oropharyngeal dysphagia  Poor p.o. intake postprocedure.  Patient states that she does have difficulty swallowing.    Essential hypertension  Monitor    Hyperlipidemia  On statin      VTE Risk  Mitigation (From admission, onward)         Ordered     apixaban tablet 2.5 mg  2 times daily      09/27/20 1409     IP VTE HIGH RISK PATIENT  Once      09/27/20 1545     Place sequential compression device  Until discontinued      09/27/20 1545                Will sign off for now.  Please re-consult if needed.  Patient to follow-up with Dr. Lomas 2 weeks post hospitalization.    Topher Simons MD  Cardiology  [facility]

## 2020-09-29 NOTE — PLAN OF CARE
Problem: Occupational Therapy Goal  Goal: Occupational Therapy Goal  Description: Goals to be met by: 10/12/2020     Patient will increase functional independence with ADLs by performing:    UE Dressing with Modified [place].  LE Dressing with Modified [place] and Assistive Devices as needed.  Grooming while standing at sink with Modified [place].  Toileting from bedside commode with Modified [place] for hygiene and clothing management.   Toilet transfer to bedside commode with Modified [place].    Outcome: Ongoing, Progressing  Pt progressing well towards goals.

## 2020-09-29 NOTE — SUBJECTIVE & OBJECTIVE
Review of Systems   Cardiovascular: Negative for chest pain, dyspnea on exertion, irregular heartbeat, leg swelling, near-syncope, orthopnea, palpitations, paroxysmal nocturnal dyspnea and syncope.   Respiratory: Negative for shortness of breath.    Neurological: Positive for weakness.     Objective:     Vital Signs (Most Recent):  Temp: 97.6 °F (36.4 °C) (09/29/20 0734)  Pulse: (!) 41 (09/29/20 0734)  Resp: 16 (09/29/20 0734)  BP: (!) 143/64 (09/29/20 0734)  SpO2: 95 % (09/29/20 0734) Vital Signs (24h Range):  Temp:  [97.4 °F (36.3 °C)-98 °F (36.7 °C)] 97.6 °F (36.4 °C)  Pulse:  [37-43] 41  Resp:  [16-19] 16  SpO2:  [93 %-96 %] 95 %  BP: (138-156)/(63-70) 143/64     Weight: 88 kg (194 lb)  Body mass index is 35.48 kg/m².     SpO2: 95 %  O2 Device (Oxygen Therapy): room air      Intake/Output Summary (Last 24 hours) at 9/29/2020 1032  Last data filed at 9/29/2020 0537  Gross per 24 hour   Intake 180 ml   Output --   Net 180 ml       Lines/Drains/Airways     Peripheral Intravenous Line                 Peripheral IV - Single Lumen 09/27/20 1025 20 G Left Wrist 2 days                Physical Exam   Constitutional: She is oriented to person, place, and time. No distress.   Neck: No JVD present. Carotid bruit is not present.   Cardiovascular: Regular rhythm. Bradycardia present.   Murmur heard.   Holosystolic murmur is present with a grade of 2/6.  Pulmonary/Chest: Effort normal and breath sounds normal.   Abdominal: Soft. Bowel sounds are normal. There is no abdominal tenderness.   Musculoskeletal:      Right lower leg: No edema.      Left lower leg: No edema.   Neurological: She is alert and oriented to person, place, and time.   Skin: She is not diaphoretic.        Psychiatric: She has a normal mood and affect. Her speech is normal and behavior is normal.   Vitals reviewed.      Significant Labs:   BMP:   Recent Labs   Lab 09/28/20  1831 09/29/20  0019 09/29/20  0607   * 91 86   * 122* 126*   K 5.5*  5.6* 5.2*   CL 95 97 98   CO2 21* 19* 22*   BUN 17 15 14   CREATININE 1.9* 1.9* 1.8*   CALCIUM 7.3* 7.3* 7.3*   , CBC No results for input(s): WBC, HGB, HCT, PLT in the last 48 hours., Lipid Panel No results for input(s): CHOL, HDL, LDLCALC, TRIG, CHOLHDL in the last 48 hours. and Troponin   Recent Labs   Lab 09/27/20  1133   TROPONINI 0.021       Significant Imaging: Echocardiogram:   Transthoracic echo (TTE) complete (Cupid Only):   Results for orders placed or performed during the hospital encounter of 06/11/20   Echo   Result Value Ref Range    BSA 1.9 m2    LA WIDTH 5.75 cm    AORTIC VALVE CUSP SEPERATION 1.69 cm    PV PEAK VELOCITY 1.35 cm/s    LVIDd 5.57 3.5 - 6.0 cm    IVS 1.18 (A) 0.6 - 1.1 cm    Posterior Wall 1.24 (A) 0.6 - 1.1 cm    Ao root annulus 2.35 cm    LVIDs 4.05 (A) 2.1 - 4.0 cm    FS 27 28 - 44 %    LA volume 191.77 cm3    Sinus 3.04 cm    STJ 2.13 cm    LV mass 281.21 g    LA size 5.79 cm    RVDD 3.53 cm    TAPSE 3.05 cm    RV S' 9.59 cm/s    Left Ventricle Relative Wall Thickness 0.45 cm    AV mean gradient 17 mmHg    AV valve area 1.57 cm2    AV Velocity Ratio 0.51     AV index (prosthetic) 0.48     E/A ratio 2.25     E wave decelartion time 218.83 msec    IVRT 83.04 msec    Pulm vein S/D ratio 0.53     LVOT diameter 2.04 cm    LVOT area 3.3 cm2    LVOT peak wes 1.44 m/s    LVOT peak VTI 41.06 cm    Ao peak wes 2.81 m/s    Ao VTI 85.41 cm    LVOT stroke volume 134.14 cm3    AV peak gradient 32 mmHg    MV Peak E Wes 1.28 m/s    TR Max Wes 3.97 m/s    MV Peak A Wes 0.57 m/s    PV Peak S Wes 0.41 m/s    PV Peak D Wes 0.78 m/s    LV Systolic Volume 71.92 mL    LV Systolic Volume Index 39.2 mL/m2    LV Diastolic Volume 151.96 mL    LV Diastolic Volume Index 82.75 mL/m2    LA Volume Index 104.4 mL/m2    LV Mass Index 153 g/m2    RA Major Axis 5.35 cm    Left Atrium Minor Axis 6.93 cm    Left Atrium Major Axis 6.63 cm    Triscuspid Valve Regurgitation Peak Gradient 63 mmHg    RA Width 4.46 cm     Right Atrial Pressure (from IVC) 3 mmHg    TV rest pulmonary artery pressure 66 mmHg    Narrative    · Normal left ventricular systolic function. The estimated ejection   fraction is 55%.  · Concentric left ventricular hypertrophy.  · Severe left atrial enlargement.  · Indeterminate left ventricular diastolic function.  · Normal right ventricular systolic function.  · Mild-to-moderate mitral regurgitation.  · Moderate tricuspid regurgitation.  · Mild pulmonic regurgitation.  · Moderate pulmonary hypertension present.  · Normal central venous pressure (3 mmHg).  · The estimated PA systolic pressure is 66 mmHg.

## 2020-09-29 NOTE — CARE UPDATE
S:   Call was serum sodium of 119.  Same result as 6 hr previously.    O:   Recent Labs   Lab 09/27/20  1133  09/28/20  0611 09/28/20  1202 09/28/20  1831   *   < > 123* 119* 119*   K 5.5*   < > 5.4* 5.3* 5.5*   CL 95   < > 95 93* 95   CO2 22*   < > 21* 19* 21*   BUN 17   < > 17 17 17   CREATININE 2.0*   < > 1.9* 1.9* 1.9*   CALCIUM 7.3*   < > 7.4* 7.6* 7.3*   PROT 5.5*  --   --   --   --    BILITOT 0.4  --   --   --   --    ALKPHOS 55  --   --   --   --    ALT 26  --   --   --   --    AST 20  --   --   --   --     < > = values in this interval not displayed.         A/P:  Persistent hyponatremia.    · Fluoxetine discontinued as this can cause hyponatremia  · Continue fluid restriction 1000 cc per day  · Urine electrolytes and urine/serum osmolality ordered  · Nephrology consult        ===============================================================    Fish Joshua MD, MPH  Department of Hospital Medicine   [facility]  [phone]  (7pm - 6am)

## 2020-09-29 NOTE — ASSESSMENT & PLAN NOTE
Recent knee surgery. Wet to dry dressing's. Daily -- PHI showed patent major arteries BL with good flow: Seen by PT/OT, discharge with HH/PT/OT s

## 2020-09-30 LAB
ANION GAP SERPL CALC-SCNC: 7 MMOL/L (ref 8–16)
BASOPHILS # BLD AUTO: 0.03 K/UL (ref 0–0.2)
BASOPHILS NFR BLD: 0.5 % (ref 0–1.9)
BUN SERPL-MCNC: 12 MG/DL (ref 8–23)
CALCIUM SERPL-MCNC: 7.4 MG/DL (ref 8.7–10.5)
CHLORIDE SERPL-SCNC: 103 MMOL/L (ref 95–110)
CO2 SERPL-SCNC: 18 MMOL/L (ref 23–29)
CORTIS SERPL-MCNC: 15.2 UG/DL
CREAT SERPL-MCNC: 1.8 MG/DL (ref 0.5–1.4)
DIFFERENTIAL METHOD: ABNORMAL
EOSINOPHIL # BLD AUTO: 0.2 K/UL (ref 0–0.5)
EOSINOPHIL NFR BLD: 2.9 % (ref 0–8)
ERYTHROCYTE [DISTWIDTH] IN BLOOD BY AUTOMATED COUNT: 13.7 % (ref 11.5–14.5)
EST. GFR  (AFRICAN AMERICAN): 30 ML/MIN/1.73 M^2
EST. GFR  (NON AFRICAN AMERICAN): 26 ML/MIN/1.73 M^2
GLUCOSE SERPL-MCNC: 86 MG/DL (ref 70–110)
HCT VFR BLD AUTO: 29.7 % (ref 37–48.5)
HGB BLD-MCNC: 9.5 G/DL (ref 12–16)
IMM GRANULOCYTES # BLD AUTO: 0.02 K/UL (ref 0–0.04)
IMM GRANULOCYTES NFR BLD AUTO: 0.4 % (ref 0–0.5)
LYMPHOCYTES # BLD AUTO: 0.7 K/UL (ref 1–4.8)
LYMPHOCYTES NFR BLD: 13.2 % (ref 18–48)
MCH RBC QN AUTO: 30.4 PG (ref 27–31)
MCHC RBC AUTO-ENTMCNC: 32 G/DL (ref 32–36)
MCV RBC AUTO: 95 FL (ref 82–98)
MONOCYTES # BLD AUTO: 0.5 K/UL (ref 0.3–1)
MONOCYTES NFR BLD: 8.6 % (ref 4–15)
NEUTROPHILS # BLD AUTO: 4.2 K/UL (ref 1.8–7.7)
NEUTROPHILS NFR BLD: 74.4 % (ref 38–73)
NRBC BLD-RTO: 0 /100 WBC
OSMOLALITY SERPL: 259 MOSM/KG (ref 275–295)
OSMOLALITY UR: 201 MOSM/KG (ref 50–1200)
PLATELET # BLD AUTO: 148 K/UL (ref 150–350)
PMV BLD AUTO: 10 FL (ref 9.2–12.9)
POCT GLUCOSE: 121 MG/DL (ref 70–110)
POCT GLUCOSE: 131 MG/DL (ref 70–110)
POCT GLUCOSE: 188 MG/DL (ref 70–110)
POCT GLUCOSE: 98 MG/DL (ref 70–110)
POTASSIUM SERPL-SCNC: 5.1 MMOL/L (ref 3.5–5.1)
RBC # BLD AUTO: 3.13 M/UL (ref 4–5.4)
SODIUM SERPL-SCNC: 128 MMOL/L (ref 136–145)
TSH SERPL DL<=0.005 MIU/L-ACNC: 2.31 UIU/ML (ref 0.4–4)
UUN UR-MCNC: 154 MG/DL (ref 140–1050)
WBC # BLD AUTO: 5.6 K/UL (ref 3.9–12.7)

## 2020-09-30 PROCEDURE — 84443 ASSAY THYROID STIM HORMONE: CPT

## 2020-09-30 PROCEDURE — 85025 COMPLETE CBC W/AUTO DIFF WBC: CPT

## 2020-09-30 PROCEDURE — 25000003 PHARM REV CODE 250: Performed by: INTERNAL MEDICINE

## 2020-09-30 PROCEDURE — 36415 COLL VENOUS BLD VENIPUNCTURE: CPT

## 2020-09-30 PROCEDURE — 99900035 HC TECH TIME PER 15 MIN (STAT)

## 2020-09-30 PROCEDURE — 80048 BASIC METABOLIC PNL TOTAL CA: CPT

## 2020-09-30 PROCEDURE — 94761 N-INVAS EAR/PLS OXIMETRY MLT: CPT

## 2020-09-30 PROCEDURE — 97110 THERAPEUTIC EXERCISES: CPT | Mod: CQ

## 2020-09-30 PROCEDURE — 97116 GAIT TRAINING THERAPY: CPT | Mod: CQ

## 2020-09-30 PROCEDURE — 82533 TOTAL CORTISOL: CPT

## 2020-09-30 PROCEDURE — 21400001 HC TELEMETRY ROOM

## 2020-09-30 RX ORDER — SODIUM BICARBONATE 325 MG/1
1300 TABLET ORAL 3 TIMES DAILY
Status: DISCONTINUED | OUTPATIENT
Start: 2020-09-30 | End: 2020-10-01 | Stop reason: HOSPADM

## 2020-09-30 RX ADMIN — APIXABAN 2.5 MG: 2.5 TABLET, FILM COATED ORAL at 08:09

## 2020-09-30 RX ADMIN — AMIODARONE HYDROCHLORIDE 200 MG: 200 TABLET ORAL at 08:09

## 2020-09-30 RX ADMIN — ALLOPURINOL 100 MG: 100 TABLET ORAL at 08:09

## 2020-09-30 RX ADMIN — ATORVASTATIN CALCIUM 40 MG: 40 TABLET, FILM COATED ORAL at 08:09

## 2020-09-30 RX ADMIN — DOXAZOSIN 4 MG: 4 TABLET ORAL at 08:09

## 2020-09-30 RX ADMIN — SODIUM BICARBONATE TAB 325 MG 1300 MG: 325 TAB at 08:09

## 2020-09-30 RX ADMIN — SODIUM BICARBONATE TAB 325 MG 1300 MG: 325 TAB at 04:09

## 2020-09-30 RX ADMIN — LOSARTAN POTASSIUM 25 MG: 25 TABLET, FILM COATED ORAL at 08:09

## 2020-09-30 RX ADMIN — SODIUM CHLORIDE: 0.9 INJECTION, SOLUTION INTRAVENOUS at 06:09

## 2020-09-30 RX ADMIN — CALCIUM CARBONATE (ANTACID) CHEW TAB 500 MG 500 MG: 500 CHEW TAB at 08:09

## 2020-09-30 RX ADMIN — FOLIC ACID 1 MG: 1 TABLET ORAL at 08:09

## 2020-09-30 NOTE — PROGRESS NOTES
[facility] Hospital Medicine  Progress Note    Patient Name: Barbara Rizo  MRN: [id number]  Patient Class: IP- Inpatient   Admission Date: 9/27/2020  Length of Stay: 3 days  Attending Physician: Wendy Drew MD  Primary Care Provider: Paras Oro MD        Subjective:     Principal Problem:Hyponatremia        HPI:  Mrs. Rizo is a 79 yo F who presents with a CC of fatigue, dizziness and falls at home. She is s/p recent surgery with a discharge a week ago. She presents to the ED and is found to have a NA of 123. The patient's daughter states that the patient has been drinking a lot of water. No HCTZ. No new meds.  No ETOH use.  The patient is non-ill appearing. Lives currently with patient's daughter while recovering from surgery.     Overview/Hospital Course:  Admitted with hyponatremia, 123 on admit, down to 119 at the lowest. Started on IVF. Nephrology consulted. Started free water restriction. Suspect euvolemic hyponatremia due to excess free water intake. TSH and cortisol normal. Hyponatremia improving.       Interval History: Feeling well today with no complaints. Does not complain of fatigue.     Review of Systems   Constitutional: Negative for chills, fatigue and fever.   Respiratory: Negative for shortness of breath.    Cardiovascular: Negative for chest pain.   Gastrointestinal: Negative for abdominal pain, nausea and vomiting.   Musculoskeletal: Negative for arthralgias.   Skin: Positive for wound.   Neurological: Negative for light-headedness and headaches.     Objective:     Vital Signs (Most Recent):  Temp: 98.3 °F (36.8 °C) (09/30/20 1111)  Pulse: (!) 44 (09/30/20 1111)  Resp: 16 (09/30/20 1111)  BP: (!) 119/57 (09/30/20 1111)  SpO2: 95 % (09/30/20 1111) Vital Signs (24h Range):  Temp:  [97.6 °F (36.4 °C)-98.3 °F (36.8 °C)] 98.3 °F (36.8 °C)  Pulse:  [42-62] 44  Resp:  [16-18] 16  SpO2:  [93 %-97 %] 95 %  BP: (119-158)/(57-69) 119/57     Weight: 88 kg (194 lb)  Body  mass index is 35.48 kg/m².    Intake/Output Summary (Last 24 hours) at 9/30/2020 1202  Last data filed at 9/30/2020 0500  Gross per 24 hour   Intake 540 ml   Output --   Net 540 ml      Physical Exam  Vitals signs and nursing note reviewed.   Constitutional:       General: She is not in acute distress.     Appearance: She is obese. She is not ill-appearing or toxic-appearing.   HENT:      Head: Normocephalic and atraumatic.      Nose: Nose normal.      Mouth/Throat:      Mouth: Mucous membranes are moist.   Cardiovascular:      Rate and Rhythm: Regular rhythm. Bradycardia present.      Heart sounds: Normal heart sounds. No murmur. No gallop.    Pulmonary:      Effort: Pulmonary effort is normal. No respiratory distress.      Breath sounds: Normal breath sounds. No wheezing or rales.      Comments: Room air  Abdominal:      General: Bowel sounds are normal. There is no distension.      Palpations: Abdomen is soft.      Tenderness: There is no abdominal tenderness. There is no guarding.   Musculoskeletal:         General: No swelling.      Comments: R knee with bandage in place, some blood on bandage. L knee with bandage in place, some serous fluid on bandage   Neurological:      General: No focal deficit present.      Mental Status: She is alert and oriented to person, place, and time.         Significant Labs: All pertinent labs within the past 24 hours have been reviewed.    Significant Imaging: I have reviewed and interpreted all pertinent imaging results/findings within the past 24 hours.      Assessment/Plan:      * Hyponatremia  Na 123 on admit, decreased to 119, now improving  Previously on IVF. Now on free water restriction  TSH, cortisol normal  Does not appear volume overloaded-- not from CHF  Suspect from excess water intake  Nephro following  Monitor BID        Atherosclerosis of native artery of left lower extremity  As seen on toe pressures 9/29  Vascular consulted-- has enough flow to heal her knee  wounds      Open wound of left knee  Recent surgery as stated in HPI; PHI's 9/29/20 Show good flow; no fever or white count  Home with Home Health & PT    Pulmonary hypertension  As seen on TTE 6/2020  Suspect WHO2 due to L heart disease      Bradycardia  Sinus bradycardia  Discontinued BB  Continue amio per Cardiology  Cardiology following         Hyperkalemia  Improving-- monitor on losartan      Chronic diastolic CHF (congestive heart failure)  Appears compensated with no signs of volume overload  Holding lasix in setting of hyponatremia  Monitor volume status      Stage 3 chronic kidney disease  At baseline - Cr 1.8  On sodium bicarb per Nephro  K 5.1 today-- monitor  Does not appear hypervolemic  Nephro following      Chronic gout  Continue Allopurinol.      Obesity, Class II, BMI 35-39.9  Body mass index is 35.48 kg/m².  Weight loss as out patient       Paroxysmal atrial fibrillation  Currently in sinus rhythm    Rate control: HR in 40s-50s. BB discontinued  Rhythm control: on amio, decreased by Cardiology due to bradycardia  Anticoagulation: eliquis    Discussed HR persistently 40s-50s with cardiology. Recommend continuing amio and following up as outpatient      Physical deconditioning  With falls. Evaluated by PT/OT - Home with Home Health  PT/OT      Anemia in chronic kidney disease  No acute issues  No recent iron panel- will need to check at follow up      Primary localized osteoarthrosis, lower leg  Recent knee surgery. Wet to dry dressing's. Daily -- PHI showed patent major arteries BL with good flow: Seen by PT/OT, discharge with HH/PT/OT     Essential hypertension  BP is generally controlled  Continue losartan-- monitor K (previously hyperkalemic) and Cr (at baseline 1.8 today)      Hyperlipidemia  Last lipid panel 7/2020 well controlled  Continue atorvastatin, though this may be able to be discontinued as outpatient         VTE Risk Mitigation (From admission, onward)         Ordered     apixaban  tablet 2.5 mg  2 times daily      09/27/20 1409     IP VTE HIGH RISK PATIENT  Once      09/27/20 1545     Place sequential compression device  Until discontinued      09/27/20 1545                Discharge Planning   SALLY:      Code Status: Full Code   Is the patient medically ready for discharge?:     Reason for patient still in hospital (select all that apply): Patient trending condition  Discharge Plan A: Home with family, Home Health          12:16 PM  Called daughter Tamar Arechiga. Updated her on plan of care. Plan for home health at discharge. Daughter asking for aide- will see if that is available.         Wendy Drew MD  Department of Hospital Medicine   [facility]

## 2020-09-30 NOTE — PROGRESS NOTES
Patient doing well and understands the plan to see plastics as scheduled and follow up with Dr Wagoner as needed. Vascular saw her and feels she has adequate perfusion.  VSSAF    Left LE: Skin over foot warmer to touch today. Dressing c/d/i.   Hct: 29.7    A/P: s/p I&d left knee  1) cont med mgmt per primary and consulting teams  2) cont wound care  3) no further plans from ortho on this admission

## 2020-09-30 NOTE — ASSESSMENT & PLAN NOTE
Recent knee surgery. Wet to dry dressing's. Daily -- PHI showed patent major arteries BL with good flow: Seen by PT/OT, discharge with HH/PT/OT

## 2020-09-30 NOTE — PT/OT/SLP PROGRESS
Physical Therapy Treatment    Patient Name:  Barbara Rizo   MRN:  [id number]    Recommendations:     Discharge Recommendations:  home health PT(with family assistance)   Discharge Equipment Recommendations: (possible needs for home O2 with activity)   Barriers to discharge: decreased O2 sats with ambulation    Assessment:     Barbara Rizo is a 80 y.o. female admitted with a medical diagnosis of Hyponatremia.  She presents with the following impairments/functional limitations:  weakness, impaired endurance, impaired self care skills, impaired functional mobilty, gait instability, impaired balance, pain, impaired cardiopulmonary response to activity, decreased safety awareness, edema, impaired skin, decreased lower extremity function, decreased ROM, decreased coordination .    Pt continues to make progress towards goals. Pt ambulated ~160,100ft with RW, SBA on RA with pt's O2 sats dropping as low as 78% during each trial. Pt able to quickly recover to 93% with standing/seated rest break and pursed lip breathing. Pt up in chair at end of session. Pt would continue to benefit from HHPT services at time of discharge.          Rehab Prognosis: Fair; patient would benefit from acute skilled PT services to address these deficits and reach maximum level of function.    Recent Surgery: * No surgery found *      Plan:     During this hospitalization, patient to be seen 5 x/week to address the identified rehab impairments via gait training, therapeutic activities, therapeutic exercises and progress toward the following goals:    · Plan of Care Expires:  10/12/20    Subjective     Chief Complaint: IV placement  Patient/Family Comments/goals: Pt agreeable to ambulate. Pt reports her O2 was in the high 90s this morning.   Pain/Comfort:  · Pain Rating 1: 0/10      Objective:     Communicated with pt's nurseMaile prior to session.  Patient found HOB elevated with son present with peripheral IV, telemetry upon PT entry to  room.     General Precautions: Standard, fall, hearing impaired   Orthopedic Precautions:LLE weight bearing as tolerated   Braces: N/A     Functional Mobility:  · Bed Mobility:     · Scooting: stand by assistance  · Supine to Sit: stand by assistance  · Transfers: x2 from EOB     · Sit to Stand:  stand by assistance with rolling walker  · Gait: Pt ambulated ~160ft with RW, SBA with O2 sats dropping to 78% requiring standing rest break and pursed lip breathing to quickly recover to 93%. Pt ambulated ~100ft with RW, SBA with O2 sats dropping to 78% again with seated rest break and pursed lip breathingt to quickly recover to 93%.  · Balance: good sitting and fair+ standing      AM-PAC 6 CLICK MOBILITY  Turning over in bed (including adjusting bedclothes, sheets and blankets)?: 4  Sitting down on and standing up from a chair with arms (e.g., wheelchair, bedside commode, etc.): 4  Moving from lying on back to sitting on the side of the bed?: 4  Moving to and from a bed to a chair (including a wheelchair)?: 3  Need to walk in hospital room?: 3  Climbing 3-5 steps with a railing?: 3  Basic Mobility Total Score: 21       Therapeutic Activities and Exercises:   Seated therex to BLEs x15 reps AROM: Marching, LAQs, Heel raises, toe raises, hip abduction, hip adduction  Pt with decreased endurance and SOB.  Pt requires verbal cues for pursed lip breathing technique.        · Pt educated on PTA role/POC.   · Importance of OOB activity with staff assistance.  · Importance of sitting up in the chair throughout the day as tolerated, especially for meals   · Safety during functional t/f and mobility with use of AD and nursing or rehab staff  · White board updated   · Multiple self-care tasks/functional mobility completed- assistance level noted above   · All questions/concerns answered within  scope of practice     Pt encouraged to use call button for assistance for all OOB/OOchair activity 2/2 fall risk. Pt verbalized  understanding         Patient left reclinced in BSchairw tih B heels elevated, tray table and all needs within reach with all lines intact, call button in reach, chair alarm on and pt's son present and pt's nurse, Roxane  notified..    GOALS:   Multidisciplinary Problems     Physical Therapy Goals        Problem: Physical Therapy Goal    Goal Priority Disciplines Outcome Goal Variances Interventions   Physical Therapy Goal     PT, PT/OT Ongoing, Progressing     Description: Goals to be met by: 10/12/20     Patient will increase functional independence with mobility by performin. Supine to sit with Modified [place]  2. Rolling to Left and Right with Modified [place]  3. Sit to stand transfer with Modified [place] using RW  4. Bed to chair transfer with Modified [place] using Rolling Walker  5. Gait >150 feet with Modified [place] using Rolling Walker   6. Lower extremity exercise program 3 sets x10 reps per handout, with independence                     Time Tracking:     PT Received On: 20  PT Start Time: 1035     PT Stop Time: 1059  PT Total Time (min): 24 min     Billable Minutes: Gait Training 14 and Therapeutic Exercise 10    Treatment Type: Treatment  PT/PTA: PTA     PTA Visit Number: 2     Sirena Lainez, PTA  2020

## 2020-09-30 NOTE — ASSESSMENT & PLAN NOTE
Appears compensated with no signs of volume overload  Holding lasix in setting of hyponatremia  Monitor volume status

## 2020-09-30 NOTE — PLAN OF CARE
09/30/20 1530   Medicare Message   Important Message from Medicare regarding Discharge Appeal Rights Other (comments)   To patient's room to discuss IMM.  Procedure in progress.  TN to return at a later time.

## 2020-09-30 NOTE — NURSING
Patient resting in bed. Telemetry monitoring in progress. Reviewed POC with patient, patient verbalized understanding. Bed in lowest position, bed alarm set, wheels locked and call light within reach. Will continue to monitor.

## 2020-09-30 NOTE — PLAN OF CARE
Problem: Physical Therapy Goal  Goal: Physical Therapy Goal  Description: Goals to be met by: 10/12/20     Patient will increase functional independence with mobility by performin. Supine to sit with Modified [place]  2. Rolling to Left and Right with Modified [place]  3. Sit to stand transfer with Modified [place] using RW  4. Bed to chair transfer with Modified [place] using Rolling Walker  5. Gait >150 feet with Modified [place] using Rolling Walker   6. Lower extremity exercise program 3 sets x10 reps per handout, with independence    Outcome: Ongoing, Progressing   Pt continues to make progress towards goals. Pt ambulated ~160,100ft with RW, SBA on RA with pt's O2 sats dropping as low as 78% during each trial. Pt able to quickly recover to 93% with standing/seated rest break and pursed lip breathing. Pt up in chair at end of session. Pt would continue to benefit from HHPT services at time of discharge.

## 2020-09-30 NOTE — PT/OT/SLP PROGRESS
Occupational Therapy      Patient Name:  Barbara Rizo   MRN:  [id number]    Patient not seen today secondary to Other (Comment)(pt eating dinner and requested OT come back tomorrow). Will follow-up as able per POC.    Adelaida Wing, OT  9/30/2020

## 2020-09-30 NOTE — ASSESSMENT & PLAN NOTE
Currently in sinus rhythm    Rate control: HR in 40s-50s. BB discontinued  Rhythm control: on amio, decreased by Cardiology due to bradycardia  Anticoagulation: eliquis    Discussed HR persistently 40s-50s with cardiology. Recommend continuing amio and following up as outpatient

## 2020-09-30 NOTE — ASSESSMENT & PLAN NOTE
Na 123 on admit, decreased to 119, now improving  Previously on IVF. Now on free water restriction  TSH, cortisol normal  Does not appear volume overloaded-- not from CHF  Suspect from excess water intake  Nephro following  Monitor BID

## 2020-09-30 NOTE — SUBJECTIVE & OBJECTIVE
Interval History: Feeling well today with no complaints. Does not complain of fatigue.     Review of Systems   Constitutional: Negative for chills, fatigue and fever.   Respiratory: Negative for shortness of breath.    Cardiovascular: Negative for chest pain.   Gastrointestinal: Negative for abdominal pain, nausea and vomiting.   Musculoskeletal: Negative for arthralgias.   Skin: Positive for wound.   Neurological: Negative for light-headedness and headaches.     Objective:     Vital Signs (Most Recent):  Temp: 98.3 °F (36.8 °C) (09/30/20 1111)  Pulse: (!) 44 (09/30/20 1111)  Resp: 16 (09/30/20 1111)  BP: (!) 119/57 (09/30/20 1111)  SpO2: 95 % (09/30/20 1111) Vital Signs (24h Range):  Temp:  [97.6 °F (36.4 °C)-98.3 °F (36.8 °C)] 98.3 °F (36.8 °C)  Pulse:  [42-62] 44  Resp:  [16-18] 16  SpO2:  [93 %-97 %] 95 %  BP: (119-158)/(57-69) 119/57     Weight: 88 kg (194 lb)  Body mass index is 35.48 kg/m².    Intake/Output Summary (Last 24 hours) at 9/30/2020 1202  Last data filed at 9/30/2020 0500  Gross per 24 hour   Intake 540 ml   Output --   Net 540 ml      Physical Exam  Vitals signs and nursing note reviewed.   Constitutional:       General: She is not in acute distress.     Appearance: She is obese. She is not ill-appearing or toxic-appearing.   HENT:      Head: Normocephalic and atraumatic.      Nose: Nose normal.      Mouth/Throat:      Mouth: Mucous membranes are moist.   Cardiovascular:      Rate and Rhythm: Regular rhythm. Bradycardia present.      Heart sounds: Normal heart sounds. No murmur. No gallop.    Pulmonary:      Effort: Pulmonary effort is normal. No respiratory distress.      Breath sounds: Normal breath sounds. No wheezing or rales.      Comments: Room air  Abdominal:      General: Bowel sounds are normal. There is no distension.      Palpations: Abdomen is soft.      Tenderness: There is no abdominal tenderness. There is no guarding.   Musculoskeletal:         General: No swelling.      Comments: DEVIKA  knee with bandage in place, some blood on bandage. L knee with bandage in place, some serous fluid on bandage   Neurological:      General: No focal deficit present.      Mental Status: She is alert and oriented to person, place, and time.         Significant Labs: All pertinent labs within the past 24 hours have been reviewed.    Significant Imaging: I have reviewed and interpreted all pertinent imaging results/findings within the past 24 hours.

## 2020-09-30 NOTE — NURSING
IV placement by house supervisor unsuccessful and this primary RN. Dr. Drew notified, order received for consult for IV placement by anesthesia.

## 2020-09-30 NOTE — ASSESSMENT & PLAN NOTE
BP is generally controlled  Continue losartan-- monitor K (previously hyperkalemic) and Cr (at baseline 1.8 today)

## 2020-09-30 NOTE — ASSESSMENT & PLAN NOTE
Last lipid panel 7/2020 well controlled  Continue atorvastatin, though this may be able to be discontinued as outpatient

## 2020-09-30 NOTE — ASSESSMENT & PLAN NOTE
At baseline - Cr 1.8  On sodium bicarb per Nephro  K 5.1 today-- monitor  Does not appear hypervolemic  Nephro following

## 2020-10-01 VITALS
TEMPERATURE: 99 F | SYSTOLIC BLOOD PRESSURE: 152 MMHG | BODY MASS INDEX: 35.7 KG/M2 | WEIGHT: 194 LBS | OXYGEN SATURATION: 97 % | RESPIRATION RATE: 19 BRPM | HEART RATE: 53 BPM | DIASTOLIC BLOOD PRESSURE: 67 MMHG | HEIGHT: 62 IN

## 2020-10-01 LAB
ANION GAP SERPL CALC-SCNC: 6 MMOL/L (ref 8–16)
BUN SERPL-MCNC: 12 MG/DL (ref 8–23)
CALCIUM SERPL-MCNC: 7.3 MG/DL (ref 8.7–10.5)
CHLORIDE SERPL-SCNC: 105 MMOL/L (ref 95–110)
CO2 SERPL-SCNC: 20 MMOL/L (ref 23–29)
CREAT SERPL-MCNC: 1.7 MG/DL (ref 0.5–1.4)
EST. GFR  (AFRICAN AMERICAN): 33 ML/MIN/1.73 M^2
EST. GFR  (NON AFRICAN AMERICAN): 28 ML/MIN/1.73 M^2
GLUCOSE SERPL-MCNC: 88 MG/DL (ref 70–110)
POCT GLUCOSE: 109 MG/DL (ref 70–110)
POCT GLUCOSE: 83 MG/DL (ref 70–110)
POTASSIUM SERPL-SCNC: 5.3 MMOL/L (ref 3.5–5.1)
SODIUM SERPL-SCNC: 131 MMOL/L (ref 136–145)

## 2020-10-01 PROCEDURE — 97530 THERAPEUTIC ACTIVITIES: CPT | Mod: CQ

## 2020-10-01 PROCEDURE — 25000003 PHARM REV CODE 250: Performed by: INTERNAL MEDICINE

## 2020-10-01 PROCEDURE — 80048 BASIC METABOLIC PNL TOTAL CA: CPT

## 2020-10-01 RX ORDER — LOSARTAN POTASSIUM 25 MG/1
25 TABLET ORAL DAILY
Status: DISCONTINUED | OUTPATIENT
Start: 2020-10-02 | End: 2020-10-01 | Stop reason: HOSPADM

## 2020-10-01 RX ORDER — FUROSEMIDE 20 MG/1
20 TABLET ORAL DAILY PRN
Qty: 45 TABLET | Refills: 1 | Status: SHIPPED | OUTPATIENT
Start: 2020-10-01 | End: 2020-11-12

## 2020-10-01 RX ORDER — SODIUM BICARBONATE 650 MG/1
1300 TABLET ORAL 3 TIMES DAILY
Qty: 180 TABLET | Refills: 11 | COMMUNITY
Start: 2020-10-01 | End: 2021-04-24

## 2020-10-01 RX ORDER — ALLOPURINOL 100 MG/1
100 TABLET ORAL DAILY
Qty: 30 TABLET | Refills: 2 | Status: SHIPPED | OUTPATIENT
Start: 2020-10-01 | End: 2020-12-30

## 2020-10-01 RX ADMIN — APIXABAN 2.5 MG: 2.5 TABLET, FILM COATED ORAL at 08:10

## 2020-10-01 RX ADMIN — SODIUM BICARBONATE TAB 325 MG 1300 MG: 325 TAB at 08:10

## 2020-10-01 RX ADMIN — CALCIUM CARBONATE (ANTACID) CHEW TAB 500 MG 500 MG: 500 CHEW TAB at 08:10

## 2020-10-01 RX ADMIN — ATORVASTATIN CALCIUM 40 MG: 40 TABLET, FILM COATED ORAL at 08:10

## 2020-10-01 RX ADMIN — ALLOPURINOL 100 MG: 100 TABLET ORAL at 08:10

## 2020-10-01 RX ADMIN — AMIODARONE HYDROCHLORIDE 200 MG: 200 TABLET ORAL at 08:10

## 2020-10-01 RX ADMIN — FOLIC ACID 1 MG: 1 TABLET ORAL at 08:10

## 2020-10-01 NOTE — PROGRESS NOTES
Renal Progress Note    Date of Admission:  9/27/2020  9:45 AM    Length of Stay: 4  Days    Subjective: No complaints    Objective:    Current Facility-Administered Medications   Medication    albuterol sulfate nebulizer solution 2.5 mg    allopurinoL tablet 100 mg    amiodarone tablet 200 mg    apixaban tablet 2.5 mg    atorvastatin tablet 40 mg    calcium carbonate 200 mg calcium (500 mg) chewable tablet 500 mg    dextrose 50% injection 25 g    doxazosin tablet 4 mg    folic acid tablet 1 mg    influenza (QUADRIVALENT ADJUVANTED PF) vaccine 0.5 mL    [START ON 10/2/2020] losartan tablet 25 mg    ondansetron injection 4 mg    sodium bicarbonate tablet 1,300 mg    sodium chloride 0.9% flush 10 mL     Facility-Administered Medications Ordered in Other Encounters   Medication    denosumab (PROLIA) injection 60 mg       Vitals:    09/30/20 1953 10/01/20 0021 10/01/20 0554 10/01/20 0732   BP: (!) 148/65 (!) 148/70 (!) 148/64 (!) 159/72   BP Location: Left arm Left arm Left arm Left arm   Patient Position: Lying Lying Lying Lying   Pulse: (!) 49 (!) 50 66 (!) 51   Resp: 18 18 18 19   Temp: 97.5 °F (36.4 °C) 98.1 °F (36.7 °C) 98.1 °F (36.7 °C) 98.1 °F (36.7 °C)   TempSrc: Oral Oral Oral Oral   SpO2: 95% (!) 94% (!) 94% 95%   Weight:       Height:           I/O last 3 completed shifts:  In: 417 [P.O.:417]  Out: 600 [Urine:600]  I/O this shift:  In: 240 [P.O.:240]  Out: 300 [Urine:300]      Physical Exam:     General: NAD  Neck: supple  Heart: RRR  Lungs: unlabored breathing  Abdomen: n/a  Limbs: n/a  Neurologic: AAO x 3      Laboratories:    No results for input(s): WBC, RBC, HGB, HCT, PLT, MCV, MCH, MCHC in the last 24 hours.    Recent Labs   Lab 10/01/20  0405   CALCIUM 7.3*   *   K 5.3*   CO2 20*      BUN 12   CREATININE 1.7*       No results for input(s): COLORU, CLARITYU, SPECGRAV, PHUR, PROTEINUA, GLUCOSEU, BLOODU, WBCU, RBCU, BACTERIA, MUCUS in the last 24  hours.    Invalid input(s):  BILIRUBINCON    Microbiology Results (last 7 days)     Procedure Component Value Units Date/Time    Blood culture #1 **CANNOT BE ORDERED STAT** [[id number]] Collected: 09/27/20 1027    Order Status: Completed Specimen: Blood from Peripheral, Wrist, Left Updated: 09/30/20 1503     Blood Culture, Routine No Growth to date      No Growth to date      No Growth to date      No Growth to date    Blood culture #2 **CANNOT BE ORDERED STAT** [[id number]] Collected: 09/27/20 1045    Order Status: Completed Specimen: Blood from Peripheral, Hand, Left Updated: 09/30/20 1503     Blood Culture, Routine No Growth to date      No Growth to date      No Growth to date      No Growth to date            Diagnostic Tests: n/a        Assessment:       79 y/o female admitted with:     - Hyponatremia slowly improving  - Normal TSH and Cortisol  - CKD-3  - HyperK+  - Mild NAGMA  - Hx. DM-2  - UA with 1+ protein  - Hx. HTN  - Hypoalbuminemia  - Hx. Recent fall with open leg wound requiring Surgery  - Anemia et?        Plan:         - 1200cc p.o. fluid restriction  - Oral Na+HCO3-  - Oral K+ binder (Na+ zirconium p.o.)  - Cardiac ADA diet o.k. for now  - NO ACEI or ARBs  - Discharge home today as per admitting  - Dr. Tamar rider f/u Pte. In Clinic 2 weeks from today (office will contact Pte.)

## 2020-10-01 NOTE — NURSING
Report given to PAUL Ruiz. Patient resting comfortably in bed. Denies any complaints or needs at this time. Bed in lowest position, wheels locked and call light within reach. POC reviewed with patient, patient verbalized understanding.

## 2020-10-01 NOTE — PROGRESS NOTES
"Routine dressing change to left lower leg wound- Small amount serous drainage from wound with moderate amount of "melt-down" of Endoform. Wound has developed multiple buds of granulation in base. Remains discolored along edges, but slightly lighter in color.   Cleansed wound with Vashe. Applied 3M Cavilon No Sting Film Barrier to wound edges. Covered with Endoform and bordered gauze.   HH to continue treatment plan under appointment with Plastics. Patient and daughter's questions answered. Daughter to return [facility] to [facility].   "

## 2020-10-01 NOTE — PROGRESS NOTES
TN taught Symptoms and Problems for HYPONATREMIA home care with pt and DERRICK/LOUIE  with teach back:  1. NAUSEA, 2.DIZZINESS. TN placed education sheet in [employer]..     Help at home will be from LOUIE MEZA, assisting in pt's recovery.    Preference is for [facility] on [address] and [address].     TN taught patient about things SHE is responsible for when discharged TO HELP WITH HIS RECOVERY:  Particularly on how to Manage HER Care At Home:  1. Getting her prescriptions filled.  2. Taking her medications as directed. DO NOT MISS ANY DOSES!  3. Going to her follow-up doctor appointments.   .  Judie Dumont, RN, BSN, STN CCM

## 2020-10-01 NOTE — NURSING
Received report from night nurse PAUL Ruiz. Patient comfortable in bed with no signs of acute distress at time of report. Safety precautions maintained. Will continue to monitor.

## 2020-10-01 NOTE — PLAN OF CARE
[facility] HOME HEALTH ORDERS  FACE TO FACE ENCOUNTER    Patient Name: Barbara Rizo  YOB: 1940    PCP: Paras Oro MD   PCP Address: [address] [facility] / [place] [address]  PCP Phone Number: [phone]  PCP Fax: [phone]    Encounter Date: 10/01/2020    Admit to Home Health    Diagnoses:  Active Hospital Problems    Diagnosis  POA    *Hyponatremia [E87.1]  Yes    Open wound of left knee [S81.002A]  Yes    Atherosclerosis of native artery of left lower extremity [I70.202]  Yes    Hyperkalemia [E87.5]  Yes    Bradycardia [R00.1]  Yes    Pulmonary hypertension [I27.20]  Yes    Chronic diastolic CHF (congestive heart failure) [I50.32]  Yes    Stage 3 chronic kidney disease [N18.30]  Yes    Chronic gout [M1A.9XX0]  Yes     Chronic    Obesity, Class II, BMI 35-39.9 [E66.9]  Yes     Chronic    Paroxysmal atrial fibrillation [I48.0]  Yes     Chronic    Physical deconditioning [R53.81]  Yes    Anemia in chronic kidney disease [N18.9, D63.1]  Yes    Primary localized osteoarthrosis, lower leg [M17.10]  Yes    Essential hypertension [I10]  Yes     Chronic    Hyperlipidemia [E78.5]  Yes     Chronic      Resolved Hospital Problems    Diagnosis Date Resolved POA    GERD (gastroesophageal reflux disease) [K21.9] 09/30/2020 Yes       Future Appointments   Date Time Provider Department Center   10/5/2020  1:00 PM Paras Oro MD [facility]   12/4/2020  1:30 PM [address]   1/25/2021  9:00 AM Paras Oro MD [facility]     Follow-up Information     Amauri Lomas MD In 2 weeks.    Specialty: Cardiology  Contact information:  [address]  [address]  [place] [address] [phone]             Dur Leblanc MD In 4 weeks.    Specialties: Vascular Surgery, Surgery  Contact information:  [address]  [address]  [place] [id number] [phone]                     I have seen and examined this patient  face to face today. My clinical findings that support the need for the home health skilled services and home bound status are the following:  Weakness/numbness causing balance and gait disturbance due to Joint Replacement and Weakness/Debility making it taxing to leave home.    Allergies:Review of patient's allergies indicates:  No Known Allergies    Diet: renal diet    Activities: activity as tolerated    Nursing:   SN to complete comprehensive assessment including routine vital signs. Instruct on disease process and s/s of complications to report to MD. Review/verify medication list sent home with the patient at time of discharge  and instruct patient/caregiver as needed. Frequency may be adjusted depending on start of care date.    Notify MD if SBP > 160 or < 90; DBP > 90 or < 50; HR > 120 or < 50; Temp > 101      CONSULTS:    Physical Therapy to evaluate and treat. Evaluate for home safety and equipment needs; Establish/upgrade home exercise program. Perform / instruct on therapeutic exercises, gait training, transfer training, and Range of Motion.  Occupational Therapy to evaluate and treat. Evaluate home environment for safety and equipment needs. Perform/Instruct on transfers, ADL training, ROM, and therapeutic exercises.  Aide to provide assistance with personal care, ADLs, and vital signs.    MISCELLANEOUS CARE:  N/A    WOUND CARE ORDERS  yes:  Surgical Wound:  Location: left knee, right lower leg, right forearm    Consult ET nurse  Local wound care to left knee every Monday and Thursday- Cleanse with Vashe. Apply Endoform to wound. Cover with bordered gauze dressing.    Right lower leg and right forearm skin tears: cleanse with normal saline, apply foam dressing. Change weekly.       Medications: Review discharge medications with patient and family and provide education.      Current Discharge Medication List      START taking these medications    Details   sodium bicarbonate 650 MG tablet Take 2 tablets  (1,300 mg total) by mouth 3 (three) times daily.  Qty: 180 tablet, Refills: 11         CONTINUE these medications which have CHANGED    Details   allopurinoL (ZYLOPRIM) 100 MG tablet Take 1 tablet (100 mg total) by mouth once daily.  Qty: 30 tablet, Refills: 2      furosemide (LASIX) 20 MG tablet Take 1 tablet (20 mg total) by mouth daily as needed.  Qty: 45 tablet, Refills: 1    Associated Diagnoses: Bilateral leg edema         CONTINUE these medications which have NOT CHANGED    Details   amiodarone (PACERONE) 200 MG Tab TAKE 1 TABLET(200 MG) BY MOUTH EVERY DAY  Qty: 90 tablet, Refills: 3      apixaban (ELIQUIS) 2.5 mg Tab Take 1 tablet (2.5 mg total) by mouth 2 (two) times daily.  Qty: 180 tablet, Refills: 3      atorvastatin (LIPITOR) 40 MG tablet TAKE 1 TABLET(40 MG) BY MOUTH EVERY DAY  Qty: 90 tablet, Refills: 1    Associated Diagnoses: Type 2 diabetes mellitus with stage 3 chronic kidney disease, without long-term current use of insulin; Essential hypertension      calcium carbonate (TUMS) 200 mg calcium (500 mg) chewable tablet Take 1 tablet by mouth once daily.      ergocalciferol (ERGOCALCIFEROL) 50,000 unit Cap Take 50,000 Units by mouth every 7 days.      fish oil-omega-3 fatty acids 300-1,000 mg capsule Take 1 capsule by mouth once daily.      folic acid (FOLVITE) 1 MG tablet TAKE 1 TABLET(1 MG) BY MOUTH EVERY DAY  Qty: 90 tablet, Refills: 1    Associated Diagnoses: Hemochromatosis, unspecified hemochromatosis type         STOP taking these medications       doxazosin (CARDURA) 4 MG tablet Comments:   Reason for Stopping:         FLUoxetine 10 MG capsule Comments:   Reason for Stopping:         losartan (COZAAR) 25 MG tablet Comments:   Reason for Stopping:         metoprolol succinate (TOPROL-XL) 25 MG 24 hr tablet Comments:   Reason for Stopping:         blood-glucose meter (FREESTYLE SYSTEM KIT) kit Comments:   Reason for Stopping:               I certify that this patient is confined to her home and  needs intermittent skilled nursing care, physical therapy and occupational therapy.    Wendy Drew MD  10/01/2020  9:59 AM

## 2020-10-01 NOTE — PROGRESS NOTES
[facility] CASE MANAGEMENT                  WRITTEN DISCHARGE INFORMATION      APPOINTMENTS AND RESOURCES TO HELP YOU MANAGE YOUR CARE AT HOME BASED ON YOUR PREFERENCES:  (If an appointment is not scheduled for you when you leave the hospital, call your doctor to schedule a follow up visit within a week)    Follow-up Information     Amauri Lomas MD On 10/14/2020.    Specialty: Cardiology  Why: @1:00pm, for Cardiology follow up  Contact information:  [address]  [address]  [facility] [address]  [phone]             Dru Leblanc MD On 10/22/2020.    Specialties: Vascular Surgery, Surgery  Why: @3:30pm for vascular follow up  Contact information:  [address]  [address]  [place] [address]  [phone]             Paras Oro MD On 10/5/2020.    Specialties: Family Medicine, Wound Care  Why: @1:00pm, for Hospital Follow up  Contact information:  [address] [facility] [address]  [phone]             Nargis Boyle MD.    Specialty: Nephrology  Why: OFFICE WILL CALL HER WITH APPOINTMENT  Contact information:  [address] [place] [address] [address]  [facility] [place] [address]  [phone]             [facility]    Specialty: Home Health Services  Why: Home Health  Contact information:  [address]  [facility] [id number] [phone]                   Healthy Living Instructions to HELP MANAGE YOUR CARE AT HOME:  Things You are responsible for:  1.    Getting your prescriptions filled   2.    Taking your medications as directed, DO NOT MISS ANY DOSES!  3.    Following the diet and exercise recommended by your doctor  4.    Going to your follow-up doctor appointment. This is important because it allows the doctor to monitor your progress and determine if any changes need to made to your treatment plan.  5. If you have any questions about MANAGING YOUR CARE AT HOME Call the Nurse Care Line for 24/7 Assistance [phone]       Please answer any calls you may receive from [facility].  We want to continue to support you as you manage your healthcare needs. [facility] is happy to have the opportunity to serve you.      Thank you for choosing [facility] for your healthcare needs!  Your [facility] Case Management Team,

## 2020-10-01 NOTE — NURSING
Provided patient with discharge education. Discussed medications, upcoming appointments, and signs and symptoms that would indicate return to hospital following discharge. Patient was able to understand instructions as well as caregiver. Patient comfortable with no signs of distress during instructions.

## 2020-10-01 NOTE — PLAN OF CARE
Problem: Fall Injury Risk  Goal: Absence of Fall and Fall-Related Injury  Outcome: Met     Problem: Adult Inpatient Plan of Care  Goal: Plan of Care Review  Outcome: Met  Goal: Patient-Specific Goal (Individualization)  Outcome: Met  Goal: Absence of Hospital-Acquired Illness or Injury  Outcome: Met  Goal: Optimal Comfort and Wellbeing  Outcome: Met  Goal: Readiness for Transition of Care  Outcome: Met  Goal: Rounds/Family Conference  Outcome: Met     Problem: Skin Injury Risk Increased  Goal: Skin Health and Integrity  Outcome: Met     Problem: Infection  Goal: Infection Symptom Resolution  Outcome: Met     Problem: Wound  Goal: Optimal Wound Healing  Outcome: Met

## 2020-10-01 NOTE — PLAN OF CARE
Problem: Physical Therapy Goal  Goal: Physical Therapy Goal  Description: Goals to be met by: 10/12/20     Patient will increase functional independence with mobility by performin. Supine to sit with Modified [place]  2. Rolling to Left and Right with Modified [place]  3. Sit to stand transfer with Modified [place] using RW  4. Bed to chair transfer with Modified [place] using Rolling Walker  5. Gait >150 feet with Modified [place] using Rolling Walker   6. Lower extremity exercise program 3 sets x10 reps per handout, with independence    Outcome: Ongoing, Progressing   Pt ambulated 20 ft in room with RW,SBA/S .

## 2020-10-01 NOTE — PT/OT/SLP PROGRESS
Physical Therapy Treatment    Patient Name:  Barbara Rizo   MRN:  [id number]    Recommendations:     Discharge Recommendations:  home health PT(with family assistance)   Discharge Equipment Recommendations: (possible needs for home O2 with activity)   Barriers to discharge: None    Assessment:     Barbara Rizo is a 80 y.o. female admitted with a medical diagnosis of Hyponatremia.  She presents with the following impairments/functional limitations:  weakness, gait instability, impaired balance, decreased upper extremity function, decreased lower extremity function, pain, decreased safety awareness, decreased ROM, orthopedic precautions, impaired skin .    Rehab Prognosis: Good; patient would benefit from acute skilled PT services to address these deficits and reach maximum level of function.    Recent Surgery: * No surgery found *      Plan:     During this hospitalization, patient to be seen 5 x/week to address the identified rehab impairments via gait training, therapeutic activities, therapeutic exercises and progress toward the following goals:    · Plan of Care Expires:  10/12/20    Subjective     Chief Complaint: none   Patient/Family Comments/goals: to go home  Pain/Comfort:  · Pain Rating 1: 0/10  · Pain Rating Post-Intervention 1: 0/10      Objective:     Communicated with nurse  prior to session.  Patient found HOB elevated with bed alarm, telemetry upon PT entry to room.     General Precautions: Standard, fall, respiratory   Orthopedic Precautions:LLE weight bearing as tolerated   Braces: N/A     Functional Mobility:  · Bed Mobility:     · Rolling Right: modified independence  · Scooting: modified independence  · Supine to Sit: modified independence,HOB elevated   · Transfers:     · Sit to Stand:  stand by assistance with rolling walker  · Gait: pt ambulated ~ 20 ft from bed>the sink > chair with RW, SBA/S. Noted with decreased jocelyn, decreased step length, decreased weight shifting ability, no  LOB. VC's for safety technique and walker management, limited with further gait training 2* to lunch tray arrived , pt requested to eat prior going home.    · Balance:  Good in sitting, fair+ in standing.       AM-PAC 6 CLICK MOBILITY  Turning over in bed (including adjusting bedclothes, sheets and blankets)?: 4  Sitting down on and standing up from a chair with arms (e.g., wheelchair, bedside commode, etc.): 4  Moving from lying on back to sitting on the side of the bed?: 4  Moving to and from a bed to a chair (including a wheelchair)?: 4  Need to walk in hospital room?: 4  Climbing 3-5 steps with a railing?: 3  Basic Mobility Total Score: 23       Therapeutic Activities and Exercises:   pt tolerated standing balance at the sink for ADL's with RW, SBA/S.  Educated pt on safety awareness/ technique with all OOB mobility     Patient left up in chair with lunch tray table set up  all lines intact, call button in reach, nurse  notified and daughter  present..    GOALS:   Multidisciplinary Problems     Physical Therapy Goals        Problem: Physical Therapy Goal    Goal Priority Disciplines Outcome Goal Variances Interventions   Physical Therapy Goal     PT, PT/OT Ongoing, Progressing     Description: Goals to be met by: 10/12/20     Patient will increase functional independence with mobility by performin. Supine to sit with Modified [place]  2. Rolling to Left and Right with Modified [place]  3. Sit to stand transfer with Modified [place] using RW  4. Bed to chair transfer with Modified [place] using Rolling Walker  5. Gait >150 feet with Modified [place] using Rolling Walker   6. Lower extremity exercise program 3 sets x10 reps per handout, with independence                     Time Tracking:     PT Received On: 10/01/20  PT Start Time: 1138     PT Stop Time: 1153  PT Total Time (min): 15 min     Billable Minutes: Therapeutic Activity 15    Treatment Type: Treatment  PT/PTA: PTA     PTA  Visit Number: 2     Verena Collins, PTA  10/01/2020

## 2020-10-01 NOTE — PLAN OF CARE
10/01/20 1115   Medicare Message   Important Message from Medicare regarding Discharge Appeal Rights Given to patient/caregiver;Explained to patient/caregiver;Signed/date by patient/caregiver   BEGUM Message   Medicare Outpatient and Observation Notification regarding financial responsibility Given to patient/caregiver;Explained to patient/caregiver;Signed/date by patient/caregiver   Error (No moon delivered)

## 2020-10-01 NOTE — PLAN OF CARE
10/01/20 1528   Final Note   Assessment Type Final Discharge Note   Anticipated Discharge Disposition Home-Health   Hospital Follow Up  Appt(s) scheduled? Yes   Discharge plans and expectations educations in teach back method with documentation complete? Yes   Right Care Referral Info   Post Acute Recommendation Home-care   Referral Type    Facility Name [facility]   Post-Acute Status   Post-Acute Authorization Home [facility]   Home Health Status Set-up Complete   Discharge Delays None known at this time   Call received from Zena at [facility] that patient will resume with [facility]  Nurse informed that all CM needs are met

## 2020-10-01 NOTE — DISCHARGE SUMMARY
[facility] Hospital Medicine  Discharge Summary      Patient Name: Barbara Rizo  MRN: [id number]  Admission Date: 9/27/2020  Hospital Length of Stay: 4 days  Discharge Date and Time:  10/01/2020 10:00 AM  Attending Physician: Leon Worthington MD   Discharging Provider: Leon Worthington MD  Primary Care Provider: Paras Oro MD      HPI:   Mrs. Rizo is a 79 yo F who presents with a CC of fatigue, dizziness and falls at home. She is s/p recent surgery with a discharge a week ago. She presents to the ED and is found to have a NA of 123. The patient's daughter states that the patient has been drinking a lot of water. No HCTZ. No new meds.  No ETOH use.  The patient is non-ill appearing. Lives currently with patient's daughter while recovering from surgery.     * No surgery found *      Hospital Course:   Admitted with hyponatremia, 123 on admit, down to 119 at the lowest. Started on IVF. Nephrology consulted. Started free water restriction. Suspect euvolemic hyponatremia due to excess free water intake + fluoxetine use. TSH and cortisol normal. Hyponatremia improved to 131 on day of discharge. Patient is not symptomatic. Discharged to home with home health. Follow up with Nephrology, PCP, and Ortho.     On discharge, metoprolol discontinued for bradycardia after discussing with Cardiology. Amiodarone continued. Losartan also discontinued on discharge due to intermittent hyperkalemia. Started bicarb supplementation for acidosis in CKD3. Fluoxetine discontinued after discussing with patient; she has no depressive symptoms. Discussed plan of care with patient and her daughter at bedside.        Consults:   Consults (From admission, onward)        Status Ordering Provider     Inpatient consult to Anesthesiology  Once     Provider:  (Not yet assigned)    Acknowledged LEON WORTHINGTON     Inpatient consult to Cardiology  Once     Provider:  Topher Simons MD    Completed DEV LAMB  NIRALI.     Inpatient consult to Nephrology  Once     Provider:  Nargis Boyle MD    Acknowledged JUNE LOZANO     Inpatient consult to Orthopedic Surgery  Once     Provider:  Jim Eastman MD    Completed BETTY SETH     Inpatient consult to Vascular Surgery  Once     Provider:  Dru Leblanc MD    Completed MARILEE DE DIOS          No new Assessment & Plan notes have been filed under this hospital service since the last note was generated.  Service: Hospital Medicine    Final Active Diagnoses:    Diagnosis Date Noted POA    PRINCIPAL PROBLEM:  Hyponatremia [E87.1] 09/27/2020 Yes    Open wound of left knee [S81.002A] 09/29/2020 Yes    Atherosclerosis of native artery of left lower extremity [I70.202]  Yes    Hyperkalemia [E87.5] 09/28/2020 Yes    Bradycardia [R00.1] 09/28/2020 Yes    Pulmonary hypertension [I27.20] 09/28/2020 Yes    Chronic diastolic CHF (congestive heart failure) [I50.32] 02/06/2019 Yes    Stage 3 chronic kidney disease [N18.30] 02/04/2019 Yes    Chronic gout [M1A.9XX0] 01/28/2019 Yes     Chronic    Obesity, Class II, BMI 35-39.9 [E66.9] 01/28/2019 Yes     Chronic    Paroxysmal atrial fibrillation [I48.0] 11/27/2018 Yes     Chronic    Physical deconditioning [R53.81] 10/16/2015 Yes    Anemia in chronic kidney disease [N18.9, D63.1] 09/01/2015 Yes    Primary localized osteoarthrosis, lower leg [M17.10] 08/03/2015 Yes    Essential hypertension [I10]  Yes     Chronic    Hyperlipidemia [E78.5]  Yes     Chronic      Problems Resolved During this Admission:    Diagnosis Date Noted Date Resolved POA    GERD (gastroesophageal reflux disease) [K21.9]  09/30/2020 Yes       Discharged Condition: good    Disposition: [place]-Health Care [facility]    Follow Up:  Follow-up Information     Amauri Lomas MD In 2 weeks.    Specialty: Cardiology  Contact information:  [address]  [address] [place] [id number] [phone]             Dru Leblanc MD In 4 weeks.     Specialties: Vascular Surgery, Surgery  Contact information:  [address]  [address]  [place] [address]  [phone]                 Patient Instructions:      Diet renal     Notify your health care provider if you experience any of the following:  temperature >100.4     Notify your health care provider if you experience any of the following:  persistent nausea and vomiting or diarrhea     Notify your health care provider if you experience any of the following:  severe uncontrolled pain     Notify your health care provider if you experience any of the following:  redness, tenderness, or signs of infection (pain, swelling, redness, odor or green/yellow discharge around incision site)     Notify your health care provider if you experience any of the following:  difficulty breathing or increased cough     Notify your health care provider if you experience any of the following:  severe persistent headache     Notify your health care provider if you experience any of the following:  worsening rash     Notify your health care provider if you experience any of the following:  persistent dizziness, light-headedness, or visual disturbances     Notify your health care provider if you experience any of the following:  increased confusion or weakness     Activity as tolerated       Significant Diagnostic Studies: Labs: All labs within the past 24 hours have been reviewed    Pending Diagnostic Studies:     None         Medications:  Reconciled Home Medications:      Medication List      START taking these medications    sodium bicarbonate 650 MG tablet  Take 2 tablets (1,300 mg total) by mouth 3 (three) times daily.        CHANGE how you take these medications    allopurinoL 100 MG tablet  Commonly known as: ZYLOPRIM  Take 1 tablet (100 mg total) by mouth once daily.  What changed:   · how much to take  · how to take this  · when to take this        CONTINUE taking these medications    amiodarone 200 MG Tab  Commonly known  as: PACERONE  TAKE 1 TABLET(200 MG) BY MOUTH EVERY DAY     apixaban 2.5 mg Tab  Commonly known as: ELIQUIS  Take 1 tablet (2.5 mg total) by mouth 2 (two) times daily.     atorvastatin 40 MG tablet  Commonly known as: LIPITOR  TAKE 1 TABLET(40 MG) BY MOUTH EVERY DAY     calcium carbonate 200 mg calcium (500 mg) chewable tablet  Commonly known as: TUMS  Take 1 tablet by mouth once daily.     ergocalciferol 50,000 unit Cap  Commonly known as: ERGOCALCIFEROL  Take 50,000 Units by mouth every 7 days.     fish oil-omega-3 fatty acids 300-1,000 mg capsule  Take 1 capsule by mouth once daily.     folic acid 1 MG tablet  Commonly known as: FOLVITE  TAKE 1 TABLET(1 MG) BY MOUTH EVERY DAY     furosemide 20 MG tablet  Commonly known as: LASIX  Take 1 tablet (20 mg total) by mouth daily as needed.        STOP taking these medications    blood-glucose meter kit  Commonly known as: FREESTYLE SYSTEM KIT     doxazosin 4 MG tablet  Commonly known as: CARDURA     FLUoxetine 10 MG capsule     losartan 25 MG tablet  Commonly known as: COZAAR     metoprolol succinate 25 MG 24 hr tablet  Commonly known as: TOPROL-XL            Indwelling Lines/Drains at time of discharge:   Lines/Drains/Airways     None                 Time spent on the discharge of patient: 45 minutes  Patient was seen and examined on the date of discharge and determined to be suitable for discharge.         Wendy Drew MD  Department of Hospital Medicine  [facility]

## 2020-10-02 ENCOUNTER — PATIENT OUTREACH (OUTPATIENT)
Dept: ADMINISTRATIVE | Facility: CLINIC | Age: 80
End: 2020-10-02

## 2020-10-02 LAB
BACTERIA BLD CULT: NORMAL
BACTERIA BLD CULT: NORMAL

## 2020-10-05 ENCOUNTER — OFFICE VISIT (OUTPATIENT)
Dept: FAMILY MEDICINE | Facility: CLINIC | Age: 80
End: 2020-10-05
Payer: MEDICARE

## 2020-10-05 VITALS
SYSTOLIC BLOOD PRESSURE: 124 MMHG | WEIGHT: 192.25 LBS | TEMPERATURE: 99 F | RESPIRATION RATE: 17 BRPM | HEART RATE: 66 BPM | OXYGEN SATURATION: 97 % | BODY MASS INDEX: 35.38 KG/M2 | DIASTOLIC BLOOD PRESSURE: 62 MMHG | HEIGHT: 62 IN

## 2020-10-05 DIAGNOSIS — E87.1 HYPONATREMIA: Primary | ICD-10-CM

## 2020-10-05 DIAGNOSIS — Z23 IMMUNIZATION DUE: ICD-10-CM

## 2020-10-05 PROCEDURE — 90471 ZOSTER RECOMBINANT VACCINE: ICD-10-PCS | Mod: S$GLB,,, | Performed by: FAMILY MEDICINE

## 2020-10-05 PROCEDURE — 90694 FLU VACCINE - QUADRIVALENT - ADJUVANTED: ICD-10-PCS | Mod: S$GLB,,, | Performed by: FAMILY MEDICINE

## 2020-10-05 PROCEDURE — 99213 PR OFFICE/OUTPT VISIT, EST, LEVL III, 20-29 MIN: ICD-10-PCS | Mod: 25,S$GLB,, | Performed by: FAMILY MEDICINE

## 2020-10-05 PROCEDURE — 99999 PR PBB SHADOW E&M-EST. PATIENT-LVL V: CPT | Mod: PBBFAC,,, | Performed by: FAMILY MEDICINE

## 2020-10-05 PROCEDURE — 90750 HZV VACC RECOMBINANT IM: CPT | Mod: S$GLB,,, | Performed by: FAMILY MEDICINE

## 2020-10-05 PROCEDURE — 90694 VACC AIIV4 NO PRSRV 0.5ML IM: CPT | Mod: S$GLB,,, | Performed by: FAMILY MEDICINE

## 2020-10-05 PROCEDURE — 90750 ZOSTER RECOMBINANT VACCINE: ICD-10-PCS | Mod: S$GLB,,, | Performed by: FAMILY MEDICINE

## 2020-10-05 PROCEDURE — G0008 ADMIN INFLUENZA VIRUS VAC: HCPCS | Mod: 59,S$GLB,, | Performed by: FAMILY MEDICINE

## 2020-10-05 PROCEDURE — 99999 PR PBB SHADOW E&M-EST. PATIENT-LVL V: ICD-10-PCS | Mod: PBBFAC,,, | Performed by: FAMILY MEDICINE

## 2020-10-05 PROCEDURE — 99213 OFFICE O/P EST LOW 20 MIN: CPT | Mod: 25,S$GLB,, | Performed by: FAMILY MEDICINE

## 2020-10-05 PROCEDURE — G0008 FLU VACCINE - QUADRIVALENT - ADJUVANTED: ICD-10-PCS | Mod: 59,S$GLB,, | Performed by: FAMILY MEDICINE

## 2020-10-05 PROCEDURE — 90471 IMMUNIZATION ADMIN: CPT | Mod: S$GLB,,, | Performed by: FAMILY MEDICINE

## 2020-10-05 RX ORDER — SULFAMETHOXAZOLE AND TRIMETHOPRIM 800; 160 MG/1; MG/1
TABLET ORAL
COMMUNITY
Start: 2020-09-25 | End: 2021-04-24

## 2020-10-05 RX ORDER — HYDROCODONE BITARTRATE AND ACETAMINOPHEN 5; 325 MG/1; MG/1
1 TABLET ORAL EVERY 6 HOURS PRN
Status: ON HOLD | COMMUNITY
Start: 2020-09-19 | End: 2021-04-25 | Stop reason: HOSPADM

## 2020-10-05 RX ORDER — MINOCYCLINE HYDROCHLORIDE 100 MG/1
TABLET ORAL
COMMUNITY
Start: 2020-09-08 | End: 2021-04-24

## 2020-10-05 RX ORDER — AMLODIPINE BESYLATE 5 MG/1
TABLET ORAL
COMMUNITY
Start: 2020-08-13 | End: 2020-11-10 | Stop reason: SDUPTHER

## 2020-10-05 RX ORDER — AMOXICILLIN AND CLAVULANATE POTASSIUM 500; 125 MG/1; MG/1
TABLET, FILM COATED ORAL
COMMUNITY
Start: 2020-09-16 | End: 2021-04-24

## 2020-10-05 NOTE — PROGRESS NOTES
Transitional Care Note  Subjective:       Patient ID: Barbara Rizo is a 80 y.o. female.  Chief Complaint: Hospital Follow Up    Family and/or Caretaker present at visit?  Yes.  Diagnostic tests reviewed/disposition: No diagnosic tests pending after this hospitalization.  Disease/illness education: eating and drinking the proper foods and drinks  Home health/community services discussion/referrals: Patient does not have home health established from hospital visit.  They do not need home health.  If needed, we will set up home health for the patient.   Establishment or re-establishment of referral orders for community resources: No other necessary community resources.   Discussion with other health care providers: No discussion with other health care providers necessary.   Patient presenting today for follow up after being admitted for dizziness secondary to hyponatremia.  She states that she had thought she was anemic from her surgery in early September, but when she got to the hospital it was found that her sodium was very low.  She states that she had not been eating well or drinking well prior to this most recent admission.  She was stopped on her zoloft and metoprolol and states she does feel better.  She reports being told by cardiology that it was ok for her pulse to be in the 40's if she wasn't feeling dizzy or light headed.  Her daughter states that between her surgery in early September and being admitted in late September she had not eaten much.  After her most recent admission she is eating and drinking much better    Review of Systems   Constitutional: Positive for activity change and appetite change. Negative for chills, diaphoresis, fatigue and fever.   HENT: Negative for congestion, drooling, ear discharge, ear pain, postnasal drip and rhinorrhea.    Eyes: Negative.    Respiratory: Negative.    Cardiovascular: Negative.    Gastrointestinal: Negative.    Endocrine: Negative.    Genitourinary:  Negative.    Musculoskeletal: Negative.    Neurological: Positive for dizziness.       Objective:      Physical Exam  Constitutional:       Appearance: Normal appearance.   HENT:      Right Ear: External ear normal.      Left Ear: External ear normal.      Nose: Nose normal.      Mouth/Throat:      Mouth: Mucous membranes are moist.      Pharynx: Oropharynx is clear.   Eyes:      Extraocular Movements: Extraocular movements intact.      Pupils: Pupils are equal, round, and reactive to light.   Neck:      Musculoskeletal: Normal range of motion and neck supple.   Cardiovascular:      Rate and Rhythm: Normal rate and regular rhythm.      Pulses: Normal pulses.      Heart sounds: Normal heart sounds.   Pulmonary:      Effort: Pulmonary effort is normal. No respiratory distress.      Breath sounds: No wheezing.   Abdominal:      General: There is no distension.      Palpations: Abdomen is soft.      Tenderness: There is no abdominal tenderness.   Skin:     General: Skin is warm and dry.   Neurological:      General: No focal deficit present.      Mental Status: She is alert. Mental status is at baseline.         Assessment:       1. Hyponatremia    2. Immunization due        Plan:       1. Repeat labs to be done today  2.  Immunizations to be done today  3.  Call with any concerns  4.  Follow up with specialists as scheduled  5.  Take all medications as dircted      Paras Oro MD

## 2020-10-14 ENCOUNTER — HOSPITAL ENCOUNTER (EMERGENCY)
Facility: HOSPITAL | Age: 80
Discharge: HOME | End: 2020-10-14
Attending: STUDENT IN AN ORGANIZED HEALTH CARE EDUCATION/TRAINING PROGRAM
Payer: MEDICARE

## 2020-10-14 ENCOUNTER — OFFICE VISIT (OUTPATIENT)
Dept: CARDIOLOGY | Facility: CLINIC | Age: 80
End: 2020-10-14
Payer: MEDICARE

## 2020-10-14 VITALS
BODY MASS INDEX: 32.46 KG/M2 | HEIGHT: 62 IN | HEART RATE: 130 BPM | DIASTOLIC BLOOD PRESSURE: 60 MMHG | WEIGHT: 176.38 LBS | OXYGEN SATURATION: 94 % | SYSTOLIC BLOOD PRESSURE: 130 MMHG

## 2020-10-14 VITALS
HEART RATE: 64 BPM | OXYGEN SATURATION: 96 % | SYSTOLIC BLOOD PRESSURE: 132 MMHG | RESPIRATION RATE: 15 BRPM | DIASTOLIC BLOOD PRESSURE: 63 MMHG | HEIGHT: 62 IN | TEMPERATURE: 98 F | WEIGHT: 176 LBS | BODY MASS INDEX: 32.39 KG/M2

## 2020-10-14 DIAGNOSIS — E78.00 PURE HYPERCHOLESTEROLEMIA: Chronic | ICD-10-CM

## 2020-10-14 DIAGNOSIS — I48.91 ATRIAL FIBRILLATION WITH RVR: Primary | ICD-10-CM

## 2020-10-14 DIAGNOSIS — I48.0 PAROXYSMAL ATRIAL FIBRILLATION: Primary | Chronic | ICD-10-CM

## 2020-10-14 DIAGNOSIS — R00.0 TACHYCARDIA: ICD-10-CM

## 2020-10-14 DIAGNOSIS — I10 HYPERTENSION: ICD-10-CM

## 2020-10-14 DIAGNOSIS — I10 ESSENTIAL HYPERTENSION: Chronic | ICD-10-CM

## 2020-10-14 DIAGNOSIS — R00.1 BRADYCARDIA: ICD-10-CM

## 2020-10-14 DIAGNOSIS — I50.32 CHRONIC DIASTOLIC CHF (CONGESTIVE HEART FAILURE): ICD-10-CM

## 2020-10-14 PROCEDURE — 99283 EMERGENCY DEPT VISIT LOW MDM: CPT | Mod: 25

## 2020-10-14 PROCEDURE — 93010 EKG 12-LEAD: ICD-10-PCS | Mod: S$GLB,,, | Performed by: INTERNAL MEDICINE

## 2020-10-14 PROCEDURE — 1159F MED LIST DOCD IN RCRD: CPT | Mod: S$GLB,,, | Performed by: INTERNAL MEDICINE

## 2020-10-14 PROCEDURE — 3075F PR MOST RECENT SYSTOLIC BLOOD PRESS GE 130-139MM HG: ICD-10-PCS | Mod: CPTII,S$GLB,, | Performed by: INTERNAL MEDICINE

## 2020-10-14 PROCEDURE — 93005 ELECTROCARDIOGRAM TRACING: CPT

## 2020-10-14 PROCEDURE — 93010 ELECTROCARDIOGRAM REPORT: CPT | Mod: S$GLB,,, | Performed by: INTERNAL MEDICINE

## 2020-10-14 PROCEDURE — 1126F PR PAIN SEVERITY QUANTIFIED, NO PAIN PRESENT: ICD-10-PCS | Mod: S$GLB,,, | Performed by: INTERNAL MEDICINE

## 2020-10-14 PROCEDURE — 99499 RISK ADDL DX/OHS AUDIT: ICD-10-PCS | Mod: S$GLB,,, | Performed by: INTERNAL MEDICINE

## 2020-10-14 PROCEDURE — 1101F PT FALLS ASSESS-DOCD LE1/YR: CPT | Mod: CPTII,S$GLB,, | Performed by: INTERNAL MEDICINE

## 2020-10-14 PROCEDURE — 99214 PR OFFICE/OUTPT VISIT, EST, LEVL IV, 30-39 MIN: ICD-10-PCS | Mod: S$GLB,,, | Performed by: INTERNAL MEDICINE

## 2020-10-14 PROCEDURE — 3075F SYST BP GE 130 - 139MM HG: CPT | Mod: CPTII,S$GLB,, | Performed by: INTERNAL MEDICINE

## 2020-10-14 PROCEDURE — 99999 PR PBB SHADOW E&M-EST. PATIENT-LVL IV: CPT | Mod: PBBFAC,,, | Performed by: INTERNAL MEDICINE

## 2020-10-14 PROCEDURE — 3078F DIAST BP <80 MM HG: CPT | Mod: CPTII,S$GLB,, | Performed by: INTERNAL MEDICINE

## 2020-10-14 PROCEDURE — 3078F PR MOST RECENT DIASTOLIC BLOOD PRESSURE < 80 MM HG: ICD-10-PCS | Mod: CPTII,S$GLB,, | Performed by: INTERNAL MEDICINE

## 2020-10-14 PROCEDURE — 99999 PR PBB SHADOW E&M-EST. PATIENT-LVL IV: ICD-10-PCS | Mod: PBBFAC,,, | Performed by: INTERNAL MEDICINE

## 2020-10-14 PROCEDURE — 1101F PR PT FALLS ASSESS DOC 0-1 FALLS W/OUT INJ PAST YR: ICD-10-PCS | Mod: CPTII,S$GLB,, | Performed by: INTERNAL MEDICINE

## 2020-10-14 PROCEDURE — 1126F AMNT PAIN NOTED NONE PRSNT: CPT | Mod: S$GLB,,, | Performed by: INTERNAL MEDICINE

## 2020-10-14 PROCEDURE — 99499 UNLISTED E&M SERVICE: CPT | Mod: S$GLB,,, | Performed by: INTERNAL MEDICINE

## 2020-10-14 PROCEDURE — 1159F PR MEDICATION LIST DOCUMENTED IN MEDICAL RECORD: ICD-10-PCS | Mod: S$GLB,,, | Performed by: INTERNAL MEDICINE

## 2020-10-14 PROCEDURE — 99214 OFFICE O/P EST MOD 30 MIN: CPT | Mod: S$GLB,,, | Performed by: INTERNAL MEDICINE

## 2020-10-14 NOTE — ED PROVIDER NOTES
Encounter Date: 10/14/2020    SCRIBE #1 NOTE: I, Karena Boateng, am scribing for, and in the presence of,  Nelly Butts DO. I have scribed the following portions of the note - Other sections scribed: HPI, ROS, PE.       History     Chief Complaint   Patient presents with    Tachycardia     Patient sent from Dr. Lomas office due to rapid heartbeat. Patient denies chest pain, shortness of breath, palpitations.      CC: Tachycardia    81 y/o female with a PMHx of Atrial fibrillation, CKD, DM, and HTN who presents to the ED from cardiology clinic for evaluation after being found to have AFib with RVR.  She is currently asymptomatic at this time. Pt was seen for a follow up visit after recent hospital admission for hyponatremia, hyperkalemia, and hyperglycemia on 09/27/2020 and discharged on 10/01/2020. Per medical records, she was noted to have Atrial flutter with RVR on EKG done at 13:52 during clinic visit by Dr. Lomas. Denies having any other symptoms prior to her clinic visit. No nausea, emesis, abdominal pain, numbness, tingling, leg swelling, or any other associated symptoms. No known COVID-19 exposure. No cough, congestion, sore throat, fever, or other COVID-19 related symptoms. Patient takes Eliquis. Hx of cardioversion twice last year. Most recent ECHO on 6/11/2020 showed Normal left ventricular systolic function. The estimated ejection fraction is 55%.         The history is provided by the patient.     Review of patient's allergies indicates:  No Known Allergies  Past Medical History:   Diagnosis Date    A-fib     CKD (chronic kidney disease)     Diabetes mellitus type II     Fatty liver     GERD (gastroesophageal reflux disease)     Hearing loss of both ears     wears bilateral hearing aids    Hemochromatosis     History of anemia due to CKD     History of pleural effusion     with thoracentesis    Hyperlipidemia     Hypertension     Macular degeneration     Osteoarthritis     Left  knee    Osteoporosis     Vaginal delivery     x2    Vitamin D deficiency disease     Wears partial dentures     upper removable bridge     Past Surgical History:   Procedure Laterality Date    BREAST BIOPSY      BREAST SURGERY Bilateral     Reduction r/t h/o fibrocystic disease    CHOLECYSTECTOMY  08/2015    EYE SURGERY      Cat Ext  OU    HYSTERECTOMY      partial due to uterine fibroids    INCISION AND DRAINAGE OF KNEE Left 9/17/2020    Procedure: INCISION AND DRAINAGE, KNEE;  Surgeon: Rolando Wagoner MD;  Location: [facility] OR;  Service: Orthopedics;  Laterality: Left;    JOINT REPLACEMENT Left 05/13/2013    TKR    JOINT REPLACEMENT Right 08/03/2015    TKR    THORACENTESIS      TOTAL KNEE ARTHROPLASTY Right 2015    left knee done 5/2013    WOUND DRESSING Left 9/17/2020    Procedure: WOUND VAC APPLICATION;  Surgeon: Rolando Wagoner MD;  Location: [facility] OR;  Service: Orthopedics;  Laterality: Left;     Family History   Problem Relation Age of Onset    Cancer Mother         stomach    Hypertension Mother     Aneurysm Father         abdominal     Social History     Tobacco Use    Smoking status: Former Smoker    Smokeless tobacco: Never Used   Substance Use Topics    Alcohol use: Not Currently     Alcohol/week: 0.0 standard drinks     Comment: occasional/ on a weekend    Drug use: No     Review of Systems   Constitutional: Negative for appetite change, chills and fever.   HENT: Negative for congestion and drooling.    Eyes: Negative for redness and visual disturbance.   Respiratory: Negative for cough and shortness of breath.    Cardiovascular: Negative for chest pain, palpitations and leg swelling.   Gastrointestinal: Negative for abdominal pain, nausea and vomiting.   Genitourinary: Negative for dysuria and hematuria.   Musculoskeletal: Negative for back pain.   Skin: Negative for rash.   Neurological: Negative for syncope, weakness, light-headedness and numbness.   Psychiatric/Behavioral:  Negative for confusion.       Physical Exam     Initial Vitals   BP Pulse Resp Temp SpO2   10/14/20 1410 10/14/20 1410 10/14/20 1410 10/14/20 1410 10/14/20 1430   104/68 (!) 135 18 98.1 °F (36.7 °C) 97 %      MAP       --                Physical Exam    Nursing note and vitals reviewed.  Constitutional: She appears well-developed and well-nourished. She is not diaphoretic.  Non-toxic appearance. She does not have a sickly appearance. She does not appear ill.   HENT:   Head: Normocephalic and atraumatic.   Eyes: Conjunctivae and EOM are normal. Pupils are equal, round, and reactive to light.   Neck: Normal range of motion.   Cardiovascular: Normal rate, regular rhythm and normal heart sounds. Exam reveals no gallop and no friction rub.    No murmur heard.  Pulmonary/Chest: Breath sounds normal. No respiratory distress. She has no wheezes. She has no rhonchi. She has no rales.   Abdominal: Soft. She exhibits no distension. There is no abdominal tenderness. There is no rebound and no guarding.   Musculoskeletal: Normal range of motion. No tenderness or edema.   Neurological: She is alert and oriented to person, place, and time. She has normal strength.   Skin: Skin is warm and dry. Capillary refill takes less than 2 seconds.         ED Course   Procedures  Labs Reviewed - No data to display  EKG Readings: (Independently Interpreted)   Initial Reading: No STEMI. Previous EKG: Compared with most recent EKG Previous EKG Date: 9/27/2020. Rhythm: Atrial fibrillation VS Atrial flutter. Heart Rate: 91 bpm. Conduction: RBBB.   No obvious changes.      ECG Results          EKG 12-lead (In process)  Result time 10/15/20 08:34:52    In process by Interface, Lab In [facility] (10/15/20 08:34:52)                 Narrative:    Test Reason : R00.0,    Vent. Rate : 133 BPM     Atrial Rate : 072 BPM     P-R Int : 000 ms          QRS Dur : 162 ms      QT Int : 392 ms       P-R-T Axes : 000 257 031 degrees     QTc Int : 583 ms    Wide QRS  tachycardia  Right bundle branch block  Septal infarct ,age undetermined  Lateral infarct ,age undetermined  Abnormal ECG  When compared with ECG of 27-SEP-2020 10:05,  Wide QRS tachycardia has replaced Sinus rhythm  Vent. rate has increased BY  90 BPM    Referred By: [email or web address]   SELF           Confirmed By:                             Imaging Results    None          Medical Decision Making:   History:   Old Medical Records: I decided to obtain old medical records.  Independently Interpreted Test(s):   I have ordered and independently interpreted EKG Reading(s) - see prior notes  Clinical Tests:   Medical Tests: Ordered and Reviewed  ED Management:  This is an emergent evaluation of a 79 y/o female with a PMHx of Atrial fibrillation, CKD, DM, and HTN who presents to the ED from cardiology clinic for evaluation after being found to have AFib with RVR and who is currently asymptomatic.  Initial vitals in the emergency department with a temperature of 98.1°, pulse of 135, respirations 18 with a blood pressure 104/68.  Repeat vitals pulse 65, blood pressure 143/71 with a respirations of 20 in pulse ox of 96% on room air.  Physical exam notable for nontoxic-appearing female. Heart RRR, no murmurs, gallops or rubs. Lungs CTAB, no wheeze, rales or rhonchi.  Remainder of exam benign.  DDx includes but is not limited to AFib versus a flutter versus other atrial tachycardia versus ACS.  Given history and presentation, an EKG was obtained in triage which showed AFib versus a flutter with a rate of 91 beats per minute, right bundle-branch block no obvious ischemic changes.  EKG from clinic visit today at bedside does show AFib with RVR with a rate of 133. Case discussed with Dr. Lomas who recommended no further workup at this time given rate control atrial tachycardia and that patient restart her home metoprolol 25 mg daily, continue previously prescribed amiodarone, and follow-up with him in his office in 1 week.           Nelly Butts D.O  EMERGENCY MEDICINE  3:06 PM 10/14/2020              Scribe Attestation:   Scribe #1: I performed the above scribed service and the documentation accurately describes the services I performed. I attest to the accuracy of the note.                      Clinical Impression:     ICD-10-CM ICD-9-CM   1. Atrial fibrillation with RVR  I48.91 427.31   2. Tachycardia  R00.0 785.0                          ED Disposition Condition    Discharge Stable        ED Prescriptions     None        Follow-up Information     Follow up With Specialties Details Why Contact Info    Amauri Lomas MD Cardiology Schedule an appointment as soon as possible for a visit in 1 week Emergency Room Follow-up [address]  [address]  [place] [address]  [phone]      [facility] Emergency Medicine Go to  If symptoms worsen [address]  [facility] [id number] [phone]                            I, Nelly Butts DO, personally performed the services described in this documentation. All medical record entries made by the scribe were at my direction and in my presence.  I have reviewed the chart and agree that the record reflects my personal performance and is accurate and complete.           Nelly Butts DO  10/15/20 [id number]

## 2020-10-14 NOTE — PROGRESS NOTES
Subjective:    Patient ID:  Barbara Rizo is a 80 y.o. female who presents for follow-up of Hypertension      HPI     PAF - TALHA/CV 9/21/15, 1/30/19, 2/2/19 -  on amiodarone and eliquis, HTN, DM, HLD, MGUS, CRI - Cr 2.5     Admitted 1/28/19  Ms. Barbara Rizo is a 78 y.o. female with essential hypertension, hyperlipidemia (LDL 62.4 Jul 2018), type 2 diabetes mellitus (HbA1c 5.8% Jul 2018), CKD Stage 3, paroxysmal atrial fibrillation ([id number] score 5) not on chronic anticoagulation, obesity (BMI 36.0), MGUS, and chronic gout who presents to [facility] ED with complaints of coughing for three days.  She started to have a non-productive cough, wheezing, and mild dyspnea three days ago which has steadily been worsening since onset.  She also complains of a subjective fever but otherwise denies any nausea, vomiting, chest pain, palpitations, pleurisy, nor any diaphoresis.  She denies any recent travel, hemoptysis, nor any lower extremity pain or swelling.  She does say that she's under a lot of stress recently with her ex- dying yesterday at [facility] due to complications from a heart valve replacement two weeks ago.  She does say that she may have had sick contacts while visiting her ex-.  Her appetite has been poor but she denies any weight loss.     While at her ENT's appointment today she decided to schedule an appointment with her PCP to evaluate her upper airway complaints.  She was unable to see her regular PCP, Dr. Paras Oro, but was able to be seen by another physician who became concerned when she was noted to be in AFib with RVR on EKG.  He recommended EMS transportation to the ED but she refused and decided to drive herself.  Her cardiologist is Dr. Amauri Lomas.     Pt admitted to ICU with afib rvr on amiodarone infusion. Pt with elevated HR  and after TALHA done patient became very agitated and anxious. Ativan 1mg IV given and symptoms got worse. HR up into the  170s and O2 sats dropped to 77%. Placed on NRB. Improved O2 sats. 5mg IV haldol given as patient was trying to get out of bed and pulling oxygen mask off. Cardizem 10mg Iv given with improved HR to 120s-130s. xopenex scheduled Q 8 hours. Changed to zosyn with temp 102F.   1/30- successful cardioversion, post procedure confused and pulling oxygen off, desaturation, constant re-direction, monitored in ICU overnight. Changed to oral amiodarone. eliquis for anticoagulation. Holding parameters on BP meds. IV steroids, nebs and antibiotic for probable lower resp infection. IV lasix as Bp tolerates.   1/31- HR remain under control NSR 60-70s.On amiodarone, metoprolol and eliquis.  Resp status improved and weaned oxygen. Steroids changed to oral route. DC zosyn and switch to augmentin. Add acapella to nebs. Cr increased from baseline (1.5-1.7). Gentle IVF and monitor with repeat BMP later, was stable,. PT,OT consulted for dc planning. Weaning oxygen. PT/OT consulted and rec H/H without equipment. The patient was transferred to the floor on 1/31. Nephrology was consulted for worsening renal function.      2/2- pt transferred with afib rvr. Successful cardioversion again . Continued oral amiodarone and BB.  still has aggressive cough and URI symptoms. On mucolytic and antibiotic.   2/3- HR 50-60s sinus. Cr sightly higher, sodium 128. BNP 1500. Lasix x one dose. Bringing up more mucus. Steroids weaned 20mg. Still faint wheezes on exam.  Patient had worsening ARF on CKD 3, keeped on george and monitor,nephrology was following.reconsulted PT,OT.fley was removed latera nd she had improvement in ARF.  Changed diet for low slat diet duo to hyponatremia with improvement.  She did well with PT,OT.  Her wheezing and respiratory status is improved,  Patient has been discharged home with HH and PO Abx and OAC and amiodarone and follow up with PCP,nephrology and cardiology in next few days.      1-29:  Admitted with AFib with RVR  1-30:   Underwent TALHA/DC cardioversion successfully  2-1 Back in A-fib 120s. SOB  2-3 Still coughing and SOB. NSR 60      2/2/19 CV - 360J converted A-fib to sinus bradycardia 55. Change amiodarone to po. Ok for telemetry     Echo 6/11/20  · Normal left ventricular systolic function. The estimated ejection fraction is 55%.  · Concentric left ventricular hypertrophy.  · Severe left atrial enlargement.  · Indeterminate left ventricular diastolic function.  · Normal right ventricular systolic function.  · Mild-to-moderate mitral regurgitation.  · Moderate tricuspid regurgitation.  · Mild pulmonic regurgitation.  · Moderate pulmonary hypertension present.  · Normal central venous pressure (3 mmHg).  · The estimated PA systolic pressure is 66 mmHg.     2/13/19 Less SOB since discharge  EKG NSR with PACs 65   Decrease amiodarone 200 qd  OV 2 months - will discuss changing amiodarone to flecainide at that time     4/17/19 Denies CP or SOB  EKG sinus bradycardia 44  HR mostly runs 40-50 at home  Change metoprolol 100 qd to toprol XL 50 qd - may decrease further if bradycardia persists  Discussed alternative AAD - given her repeated episodes of PAF we are both in agreement to stay on amiodarone for now  OV 1 month with TSH, CMP, EKG     5/27/19 HR improved to the low 50s  Has held eliquis the last 2 days due to bleeding from a recent skin CA removal  Denies CP or SOB     9/11/19 Denies CP or SOB  Some LE edema  EKG sinus bradycardia 46 NSSTT changes  Stop norvasc with LE edema  Add cardura 4 mg qd  Decrease toprol 25 qd with bradycardia  OV 3 months with CMP, TSH     12/11/19 Denies CP or SOB  LE edema resolved after stopping norvasc  BP controlled by home readings  HR runs 45-50  EKG sinus bradycardia 47 1st degree AVB  Denies dizziness  Bradycardia well tolerated - will continue Rx  OV 6 months with echo      6/18/20 Denies CP or dizziness  Mild stable WILKINSON  EKG sinus bradycardia 44 otherwise ok  Bradycardia continues to be well  tolerated  OV 6 months with CMP, TSH on chronic amiodarone     Admitted 9/27/20  Mrs. Rizo is a 81 yo F who presents with a CC of fatigue, dizziness and falls at home. She is s/p recent surgery with a discharge a week ago. She presents to the ED and is found to have a NA of 123. The patient's daughter states that the patient has been drinking a lot of water. No HCTZ. No new meds.  No ETOH use.  The patient is non-ill appearing. Lives currently with patient's daughter while recovering from surgery.     Admitted with hyponatremia, 123 on admit, down to 119 at the lowest. Started on IVF. Nephrology consulted. Started free water restriction. Suspect euvolemic hyponatremia due to excess free water intake + fluoxetine use. TSH and cortisol normal. Hyponatremia improved to 131 on day of discharge. Patient is not symptomatic. Discharged to home with home health. Follow up with Nephrology, PCP, and Ortho.      On discharge, metoprolol discontinued for bradycardia after discussing with Cardiology. Amiodarone continued. Losartan also discontinued on discharge due to intermittent hyperkalemia. Started bicarb supplementation for acidosis in CKD3. Fluoxetine discontinued after discussing with patient; she has no depressive symptoms. Discussed plan of care with patient and her daughter at bedside.     10/14/20 EKG  - suspect this is A-flutter 2:1 with aberrancy  Denies CP, SOB, or palpitations    Review of Systems   Constitution: Negative for decreased appetite.   HENT: Negative for ear discharge.    Eyes: Negative for blurred vision.   Respiratory: Negative for hemoptysis.    Endocrine: Negative for polyphagia.   Hematologic/Lymphatic: Negative for adenopathy.   Skin: Negative for color change.   Musculoskeletal: Negative for joint swelling.   Genitourinary: Negative for bladder incontinence.   Neurological: Negative for brief paralysis.   Psychiatric/Behavioral: Negative for hallucinations.   Allergic/Immunologic:  Negative for hives.        Objective:    Physical Exam   Constitutional: She is oriented to person, place, and time. She appears well-developed and well-nourished.   HENT:   Head: Normocephalic and atraumatic.   Eyes: Pupils are equal, round, and reactive to light. Conjunctivae are normal.   Neck: Normal range of motion. Neck supple.   Cardiovascular: Normal rate, normal heart sounds and intact distal pulses.   Pulmonary/Chest: Effort normal and breath sounds normal.   Abdominal: Soft. Bowel sounds are normal.   Musculoskeletal: Normal range of motion.   Neurological: She is alert and oriented to person, place, and time.   Skin: Skin is warm and dry.         Assessment:       1. Paroxysmal atrial fibrillation    2. Essential hypertension    3. Pure hypercholesterolemia    4. Chronic diastolic CHF (congestive heart failure)    5. Bradycardia         Plan:       Appears to be in A-flutter RVR - will send to the ER for admission. If issues with tachy-marlon continue will likely end up needing PPM

## 2020-10-14 NOTE — DISCHARGE INSTRUCTIONS
Please return to the ER if you have worsening symptoms, chest pain, shortness of breath, if you pass out, if you are unable to keep down fluids, if you are unable urinate or have other concerning symptoms.    Please restart home metoprolol 25 mg daily and continue home Amiodarone.

## 2020-10-22 ENCOUNTER — OFFICE VISIT (OUTPATIENT)
Dept: VASCULAR SURGERY | Facility: CLINIC | Age: 80
End: 2020-10-22
Payer: MEDICARE

## 2020-10-22 VITALS
WEIGHT: 184.31 LBS | HEIGHT: 62 IN | DIASTOLIC BLOOD PRESSURE: 78 MMHG | SYSTOLIC BLOOD PRESSURE: 150 MMHG | BODY MASS INDEX: 33.92 KG/M2

## 2020-10-22 DIAGNOSIS — I70.248 ATHEROSCLEROSIS OF NATIVE ARTERY OF LEFT LOWER EXTREMITY WITH ULCERATION OF OTHER PART OF LOWER LEG: Primary | ICD-10-CM

## 2020-10-22 PROCEDURE — 99999 PR PBB SHADOW E&M-EST. PATIENT-LVL III: CPT | Mod: PBBFAC,,, | Performed by: SURGERY

## 2020-10-22 PROCEDURE — 99999 PR PBB SHADOW E&M-EST. PATIENT-LVL III: ICD-10-PCS | Mod: PBBFAC,,, | Performed by: SURGERY

## 2020-10-22 PROCEDURE — 3077F PR MOST RECENT SYSTOLIC BLOOD PRESSURE >= 140 MM HG: ICD-10-PCS | Mod: CPTII,S$GLB,, | Performed by: SURGERY

## 2020-10-22 PROCEDURE — 1159F PR MEDICATION LIST DOCUMENTED IN MEDICAL RECORD: ICD-10-PCS | Mod: S$GLB,,, | Performed by: SURGERY

## 2020-10-22 PROCEDURE — 99499 UNLISTED E&M SERVICE: CPT | Mod: S$GLB,,, | Performed by: SURGERY

## 2020-10-22 PROCEDURE — 1126F PR PAIN SEVERITY QUANTIFIED, NO PAIN PRESENT: ICD-10-PCS | Mod: S$GLB,,, | Performed by: SURGERY

## 2020-10-22 PROCEDURE — 3288F PR FALLS RISK ASSESSMENT DOCUMENTED: ICD-10-PCS | Mod: CPTII,S$GLB,, | Performed by: SURGERY

## 2020-10-22 PROCEDURE — 1126F AMNT PAIN NOTED NONE PRSNT: CPT | Mod: S$GLB,,, | Performed by: SURGERY

## 2020-10-22 PROCEDURE — 99215 OFFICE O/P EST HI 40 MIN: CPT | Mod: S$GLB,,, | Performed by: SURGERY

## 2020-10-22 PROCEDURE — 3077F SYST BP >= 140 MM HG: CPT | Mod: CPTII,S$GLB,, | Performed by: SURGERY

## 2020-10-22 PROCEDURE — 1100F PR PT FALLS ASSESS DOC 2+ FALLS/FALL W/INJURY/YR: ICD-10-PCS | Mod: CPTII,S$GLB,, | Performed by: SURGERY

## 2020-10-22 PROCEDURE — 3288F FALL RISK ASSESSMENT DOCD: CPT | Mod: CPTII,S$GLB,, | Performed by: SURGERY

## 2020-10-22 PROCEDURE — 1100F PTFALLS ASSESS-DOCD GE2>/YR: CPT | Mod: CPTII,S$GLB,, | Performed by: SURGERY

## 2020-10-22 PROCEDURE — 3078F DIAST BP <80 MM HG: CPT | Mod: CPTII,S$GLB,, | Performed by: SURGERY

## 2020-10-22 PROCEDURE — 3078F PR MOST RECENT DIASTOLIC BLOOD PRESSURE < 80 MM HG: ICD-10-PCS | Mod: CPTII,S$GLB,, | Performed by: SURGERY

## 2020-10-22 PROCEDURE — 99215 PR OFFICE/OUTPT VISIT, EST, LEVL V, 40-54 MIN: ICD-10-PCS | Mod: S$GLB,,, | Performed by: SURGERY

## 2020-10-22 PROCEDURE — 1159F MED LIST DOCD IN RCRD: CPT | Mod: S$GLB,,, | Performed by: SURGERY

## 2020-10-22 PROCEDURE — 99499 RISK ADDL DX/OHS AUDIT: ICD-10-PCS | Mod: S$GLB,,, | Performed by: SURGERY

## 2020-10-22 RX ORDER — METOPROLOL SUCCINATE 25 MG/1
25 TABLET, EXTENDED RELEASE ORAL DAILY
COMMUNITY
End: 2021-01-04 | Stop reason: SDUPTHER

## 2020-10-22 NOTE — LETTER
October 22, 2020        Paras Oro MD  [address] [facility] [address]             [facility]  [address]  [facility] [address]  Phone: [phone]  Fax: [phone]   Patient: Barbara Rizo   MR Number: [id number]   YOB: 1940   Date of Visit: 10/22/2020       Dear Dr. Oro:    Thank you for referring Barbara Rizo to me for evaluation. Below are the relevant portions of my assessment and plan of care.            If you have questions, please do not hesitate to call me. I look forward to following Barbara along with you.    Sincerely,      MD KYLAH Flores MD Hui Jin Kim, MD Jonathan C. Boraski, MD

## 2020-10-22 NOTE — PROGRESS NOTES
Dru Leblanc MD [facility]                         10/22/2020    HPI:  Barbara Rizo is a 80 y.o. female with   Patient Active Problem List   Diagnosis    Hyperlipidemia    Essential hypertension    Hemochromatosis    DJD (degenerative joint disease) of knee    Vitamin D deficiency    Benign hypertensive heart disease without heart failure    Other and unspecified hyperlipidemia    Primary localized osteoarthrosis, lower leg    Anemia in chronic kidney disease    Morbid obesity    Physical deconditioning    Esophagitis    Urinary retention    Atonic neurogenic bladder    Incomplete bladder emptying    Constipation, slow transit    OAB (overactive bladder)    MGUS (monoclonal gammopathy of unknown significance)    Paroxysmal atrial fibrillation    Obesity, Class II, BMI 35-39.9    Chronic gout    Acute bronchitis    Stage 3 chronic kidney disease    Chronic diastolic CHF (congestive heart failure)    Prediabetes    H/O open leg wound    Hyponatremia    Hyperkalemia    Bradycardia    Pulmonary hypertension    Open wound of left knee    Atherosclerosis of native artery of left lower extremity    being managed by PCP and specialists who is here today for evaluation of L knee wound.  Patient states location is L knee occurring for a few days.  Associated signs and symptoms include discoloration and pain.  Quality is aching and severity is 5/10; s/p debridement L knee wound by Ortho this week.  Symptoms began after a fall recently.  Alleviating factors include wound care.  Worsening factors include pressure.    no MI  no Stroke  Tobacco use: denies    10/2020:  States wound is healing and planning for skin graft with Plastics next week pending Cardiac clearance.    Past Medical History:   Diagnosis Date    A-fib     CKD (chronic kidney disease)     Diabetes mellitus type II     Fatty liver     GERD (gastroesophageal reflux  disease)     Hearing loss of both ears     wears bilateral hearing aids    Hemochromatosis     History of anemia due to CKD     History of pleural effusion     with thoracentesis    Hyperlipidemia     Hypertension     Macular degeneration     Osteoarthritis     Left knee    Osteoporosis     Vaginal delivery     x2    Vitamin D deficiency disease     Wears partial dentures     upper removable bridge     Past Surgical History:   Procedure Laterality Date    BREAST BIOPSY      BREAST SURGERY Bilateral     Reduction r/t h/o fibrocystic disease    CHOLECYSTECTOMY  08/2015    EYE SURGERY      Cat Ext  OU    HYSTERECTOMY      partial due to uterine fibroids    INCISION AND DRAINAGE OF KNEE Left 9/17/2020    Procedure: INCISION AND DRAINAGE, KNEE;  Surgeon: Rolando Wagoner MD;  Location: [facility] OR;  Service: Orthopedics;  Laterality: Left;    JOINT REPLACEMENT Left 05/13/2013    TKR    JOINT REPLACEMENT Right 08/03/2015    TKR    THORACENTESIS      TOTAL KNEE ARTHROPLASTY Right 2015    left knee done 5/2013    WOUND DRESSING Left 9/17/2020    Procedure: WOUND VAC APPLICATION;  Surgeon: Rolando Wagoner MD;  Location: [facility] OR;  Service: Orthopedics;  Laterality: Left;     Family History   Problem Relation Age of Onset    Cancer Mother         stomach    Hypertension Mother     Aneurysm Father         abdominal     Social History     Socioeconomic History    Marital status:      Spouse name: Not on file    Number of children: Not on file    Years of education: Not on file    Highest education level: Not on file   Occupational History    Not on file   Social Needs    Financial resource strain: Not on file    Food insecurity     Worry: Not on file     Inability: Not on file    Transportation needs     Medical: Not on file     Non-medical: Not on file   Tobacco Use    Smoking status: Former Smoker    Smokeless tobacco: Never Used   Substance and Sexual Activity    Alcohol use: Not  Currently     Alcohol/week: 0.0 standard drinks     Comment: occasional/ on a weekend    Drug use: No    Sexual activity: Not Currently   Lifestyle    Physical activity     Days per week: Not on file     Minutes per session: Not on file    Stress: Not on file   Relationships    Social connections     Talks on phone: Not on file     Gets together: Not on file     Attends Gnosticism service: Not on file     Active member of club or organization: Not on file     Attends meetings of clubs or organizations: Not on file     Relationship status: Not on file   Other Topics Concern    Not on file   Social History Narrative    Not on file       Current Outpatient Medications:     allopurinoL (ZYLOPRIM) 100 MG tablet, Take 1 tablet (100 mg total) by mouth once daily., Disp: 30 tablet, Rfl: 2    amiodarone (PACERONE) 200 MG Tab, TAKE 1 TABLET(200 MG) BY MOUTH EVERY DAY, Disp: 90 tablet, Rfl: 3    apixaban (ELIQUIS) 2.5 mg Tab, Take 1 tablet (2.5 mg total) by mouth 2 (two) times daily., Disp: 180 tablet, Rfl: 3    atorvastatin (LIPITOR) 40 MG tablet, TAKE 1 TABLET(40 MG) BY MOUTH EVERY DAY, Disp: 90 tablet, Rfl: 1    calcium carbonate (TUMS) 200 mg calcium (500 mg) chewable tablet, Take 1 tablet by mouth once daily., Disp: , Rfl:     ergocalciferol (ERGOCALCIFEROL) 50,000 unit Cap, Take 50,000 Units by mouth every 7 days., Disp: , Rfl:     fish oil-omega-3 fatty acids 300-1,000 mg capsule, Take 1 capsule by mouth once daily., Disp: , Rfl:     folic acid (FOLVITE) 1 MG tablet, TAKE 1 TABLET(1 MG) BY MOUTH EVERY DAY, Disp: 90 tablet, Rfl: 1    furosemide (LASIX) 20 MG tablet, Take 1 tablet (20 mg total) by mouth daily as needed., Disp: 45 tablet, Rfl: 1    metoprolol succinate (TOPROL-XL) 25 MG 24 hr tablet, Take 25 mg by mouth once daily., Disp: , Rfl:     sodium bicarbonate 650 MG tablet, Take 2 tablets (1,300 mg total) by mouth 3 (three) times daily., Disp: 180 tablet, Rfl: 11    amLODIPine (NORVASC) 5 MG  tablet, , Disp: , Rfl:     amoxicillin-clavulanate 500-125mg (AUGMENTIN) 500-125 mg Tab, TK 1 T PO TID FOR 5 DAYS, Disp: , Rfl:     HYDROcodone-acetaminophen (NORCO) 5-325 mg per tablet, Take 1 tablet by mouth every 6 (six) hours as needed., Disp: , Rfl:     minocycline (DYNACIN) 100 MG tablet, TK 1 T PO BID FOR 10 DOSES, Disp: , Rfl:     sulfamethoxazole-trimethoprim 800-160mg (BACTRIM DS) 800-160 mg Tab, TK 1 T PO  Q 12 H, Disp: , Rfl:   No current facility-administered medications for this visit.     Facility-Administered Medications Ordered in Other Visits:     denosumab (PROLIA) injection 60 mg, 60 mg, Subcutaneous, 1 time in Clinic/HOD, Nargis Boyle MD    REVIEW OF SYSTEMS:  General: No fevers or chills; ENT: No sore throat; Allergy and Immunology: no persistent infections; Hematological and Lymphatic: No history of bleeding or easy bruising; Endocrine: negative; Respiratory: no cough, shortness of breath, or wheezing; Cardiovascular: no chest pain or dyspnea on exertion; no claudication, no rest pain; Gastrointestinal: no abdominal pain/back, change in bowel habits, or bloody stools; Genito-Urinary: no dysuria, trouble voiding, or hematuria; Musculoskeletal: negative, + wound; Neurological: no TIA or stroke symptoms; Psychiatric: no nervousness, anxiety or depression.    PHYSICAL EXAM:                General appearance:  Alert, well-appearing, and in no distress.  Oriented to person, place, and time                    Neurological: Normal speech, no focal findings noted; CN II - XII grossly intact. RLE with sensation to light touch, LLE with sensation to light touch.            Musculoskeletal: Digits/nail without cyanosis/clubbing.  Strength 5/5 BLE.                    Neck: Supple, no significant adenopathy, no carotid bruit can be auscultated                  Chest:  Clear to auscultation, no wheezes, rales or rhonchi, symmetric air entry. No use of accessory muscles               Cardiac: Normal rate  and regular rhythm, S1 and S2 normal            Abdomen: Soft, nontender, nondistended, no masses or organomegaly, no hernia     No rebound tenderness noted; bowel sounds normal     Pulsatile aortic mass is non palpable.     No groin adenopathy      Extremities:2+ R femoral pulse, 2+ L femoral pulse     2+ R popliteal pulse, 2+ L popliteal pulse     doppler+ R PT pulse, doppler+ L PT pulse     doppler+ R DP pulse, doppler+ L DP pulse     1+ RLE edema, 1+ LLE edema    Skin: RLE without tissue loss; LLE with improved L knee wound without infection, no other LLE tissue loss    LAB RESULTS:  No results found for: CBC  Lab Results   Component Value Date    LABPROT 12.7 (H) 09/17/2020    INR 1.1 09/17/2020     Lab Results   Component Value Date     (L) 10/05/2020    K 5.1 10/05/2020    CL 97 10/05/2020    CO2 29 10/05/2020     (H) 10/05/2020    BUN 14 10/05/2020    CREATININE 2.1 (H) 10/05/2020    CALCIUM 8.7 10/05/2020    ANIONGAP 7 (L) 10/05/2020    EGFRNONAA 22 (A) 10/05/2020     Lab Results   Component Value Date    WBC 5.60 09/30/2020    RBC 3.13 (L) 09/30/2020    HGB 9.5 (L) 09/30/2020    HCT 29.7 (L) 09/30/2020    MCV 95 09/30/2020    MCH 30.4 09/30/2020    MCHC 32.0 09/30/2020    RDW 13.7 09/30/2020     (L) 09/30/2020    MPV 10.0 09/30/2020    GRAN 4.2 09/30/2020    GRAN 74.4 (H) 09/30/2020    LYMPH 0.7 (L) 09/30/2020    LYMPH 13.2 (L) 09/30/2020    MONO 0.5 09/30/2020    MONO 8.6 09/30/2020    EOS 0.2 09/30/2020    BASO 0.03 09/30/2020    EOSINOPHIL 2.9 09/30/2020    BASOPHIL 0.5 09/30/2020    DIFFMETHOD Automated 09/30/2020     .  Lab Results   Component Value Date    HGBA1C 5.4 07/24/2020       IMAGING:  All pertinent imaging has been reviewed and interpreted independently.    PHI reviewed.  >1 bilaterally    IMP/PLAN:  80 y.o. female with   Patient Active Problem List   Diagnosis    Hyperlipidemia    Essential hypertension    Hemochromatosis    DJD (degenerative joint disease) of knee     Vitamin D deficiency    Benign hypertensive heart disease without heart failure    Other and unspecified hyperlipidemia    Primary localized osteoarthrosis, lower leg    Anemia in chronic kidney disease    Morbid obesity    Physical deconditioning    Esophagitis    Urinary retention    Atonic neurogenic bladder    Incomplete bladder emptying    Constipation, slow transit    OAB (overactive bladder)    MGUS (monoclonal gammopathy of unknown significance)    Paroxysmal atrial fibrillation    Obesity, Class II, BMI 35-39.9    Chronic gout    Acute bronchitis    Stage 3 chronic kidney disease    Chronic diastolic CHF (congestive heart failure)    Prediabetes    H/O open leg wound    Hyponatremia    Hyperkalemia    Bradycardia    Pulmonary hypertension    Open wound of left knee    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    -BLE microvascular PVD with no claudication, no rest pain, healing L knee wound.  Imaging reviewed. - rec daily ASA, cont wound care  -Exercise  -Heart healthy lifestyle  -RTC 6 weeks for further evaluation    I spent 13 minutes evaluating this patient and greater than 50% of the time was spent counseling, coordinator care and discussing the plan of care.  All questions were answered and patient stated understanding with agreement with the above treatment plan.    Dru Leblanc MD [facility]  Vascular and Endovascular Surgery

## 2020-10-22 NOTE — PATIENT INSTRUCTIONS
Wound Care  Taking proper care of your wound will help it heal. Your healthcare provider may show you how to clean and dress the wound. He or she will also explain how to tell if the wound is healing normally. If you are unsure of how to take care of the wound, be sure to clarify what dressing to use and how often you should change the bandages. Here are the basic steps.     A wound that's not healing normally may be dark in color or have white streaks.   Wash your hands  Tips for washing your hands include:  · Use liquid soap and lather for 2 minutes. Scrub between your fingers and under your nails.  · Rinse with warm water, keeping your fingers pointing down.  · Use a paper towel to dry your hands and to turn off the faucet.  Remove the used dressing  Here are suggestions for removing the dressing:  · If dressing changes cause you pain, be sure to take your pain medicine as prescribed by your healthcare provider 30 minutes before dressing changes.  · Set up your supplies.  · Put on disposable gloves if youre dressing a wound for someone else or your wound is infected.  · Loosen the tape by pulling gently toward the wound.  · Gently take off the old dressing. If the dressing is stuck to the wound, moisten it with saline (if available) or clean water.  · If you have a drain or tube in the wound, be careful not to pull on it.  · Remove the dressing 1 layer at a time and put it in a plastic bag.  · Remove your gloves.  Inspect and dress the wound  Check the wound carefully:  · Each time you change the dressing, inspect the wound carefully to be sure its healing normally by making sure your wound appears to be pink and moist, and is free of infection.    · Wash your hands again. Put on a new pair of gloves.  · Clean and dress the wound as directed by your healthcare provider or nurse. Do not put anything in the wound that is not prescribed or directed by your healthcare provider. If you have a drain or tube, be  careful not to pull on it. Make sure to secure the drain or tube as well.  · Put all supplies in a plastic bag. Seal the bag and put it in the trash.  · Be sure to wash your hands again.  Call your healthcare provider  Call your healthcare provider if you see any of the following signs of a problem:  · Bleeding that soaks the dressing  · Pink fluid weeping from the wound  · Increased drainage or drainage that is yellow, yellow-green, or foul-smelling  · Increased swelling or pain, or redness or swelling in the skin around the wound  · A change in the color of the wound, or if streaks develop in a direction away from the wound  · The area between any stitches opens up  · An increase in the size of the wound  · A fever of 100.4°F (38°C) or higher, or as directed by your healthcare provider  · Chills, increased fatigue, or a loss of appetite      Date Last Reviewed: 7/30/2015  © 9744-1847 [employer]. [address] [place] [address], [place] [address]. All rights reserved. This information is not intended as a substitute for professional medical care. Always follow your healthcare professional's instructions.

## 2020-10-23 ENCOUNTER — OFFICE VISIT (OUTPATIENT)
Dept: CARDIOLOGY | Facility: CLINIC | Age: 80
End: 2020-10-23
Payer: MEDICARE

## 2020-10-23 VITALS
WEIGHT: 180.75 LBS | OXYGEN SATURATION: 93 % | BODY MASS INDEX: 33.26 KG/M2 | SYSTOLIC BLOOD PRESSURE: 187 MMHG | HEART RATE: 43 BPM | HEIGHT: 62 IN | DIASTOLIC BLOOD PRESSURE: 74 MMHG

## 2020-10-23 DIAGNOSIS — R00.1 BRADYCARDIA: ICD-10-CM

## 2020-10-23 DIAGNOSIS — I11.9 BENIGN HYPERTENSIVE HEART DISEASE WITHOUT HEART FAILURE: ICD-10-CM

## 2020-10-23 DIAGNOSIS — I48.0 PAROXYSMAL ATRIAL FIBRILLATION: Chronic | ICD-10-CM

## 2020-10-23 DIAGNOSIS — I48.91 ATRIAL FIBRILLATION: ICD-10-CM

## 2020-10-23 DIAGNOSIS — I50.32 CHRONIC DIASTOLIC CHF (CONGESTIVE HEART FAILURE): Primary | ICD-10-CM

## 2020-10-23 DIAGNOSIS — I10 ESSENTIAL HYPERTENSION: Chronic | ICD-10-CM

## 2020-10-23 DIAGNOSIS — E78.00 PURE HYPERCHOLESTEROLEMIA: Chronic | ICD-10-CM

## 2020-10-23 PROCEDURE — 99999 PR PBB SHADOW E&M-EST. PATIENT-LVL IV: ICD-10-PCS | Mod: PBBFAC,,, | Performed by: INTERNAL MEDICINE

## 2020-10-23 PROCEDURE — 3077F SYST BP >= 140 MM HG: CPT | Mod: CPTII,S$GLB,, | Performed by: INTERNAL MEDICINE

## 2020-10-23 PROCEDURE — 99999 PR PBB SHADOW E&M-EST. PATIENT-LVL IV: CPT | Mod: PBBFAC,,, | Performed by: INTERNAL MEDICINE

## 2020-10-23 PROCEDURE — 1159F MED LIST DOCD IN RCRD: CPT | Mod: S$GLB,,, | Performed by: INTERNAL MEDICINE

## 2020-10-23 PROCEDURE — 3077F PR MOST RECENT SYSTOLIC BLOOD PRESSURE >= 140 MM HG: ICD-10-PCS | Mod: CPTII,S$GLB,, | Performed by: INTERNAL MEDICINE

## 2020-10-23 PROCEDURE — 1101F PT FALLS ASSESS-DOCD LE1/YR: CPT | Mod: CPTII,S$GLB,, | Performed by: INTERNAL MEDICINE

## 2020-10-23 PROCEDURE — 93000 EKG 12-LEAD: ICD-10-PCS | Mod: S$GLB,,, | Performed by: INTERNAL MEDICINE

## 2020-10-23 PROCEDURE — 3078F DIAST BP <80 MM HG: CPT | Mod: CPTII,S$GLB,, | Performed by: INTERNAL MEDICINE

## 2020-10-23 PROCEDURE — 99214 OFFICE O/P EST MOD 30 MIN: CPT | Mod: S$GLB,,, | Performed by: INTERNAL MEDICINE

## 2020-10-23 PROCEDURE — 1159F PR MEDICATION LIST DOCUMENTED IN MEDICAL RECORD: ICD-10-PCS | Mod: S$GLB,,, | Performed by: INTERNAL MEDICINE

## 2020-10-23 PROCEDURE — 99499 RISK ADDL DX/OHS AUDIT: ICD-10-PCS | Mod: S$GLB,,, | Performed by: INTERNAL MEDICINE

## 2020-10-23 PROCEDURE — 3078F PR MOST RECENT DIASTOLIC BLOOD PRESSURE < 80 MM HG: ICD-10-PCS | Mod: CPTII,S$GLB,, | Performed by: INTERNAL MEDICINE

## 2020-10-23 PROCEDURE — 1101F PR PT FALLS ASSESS DOC 0-1 FALLS W/OUT INJ PAST YR: ICD-10-PCS | Mod: CPTII,S$GLB,, | Performed by: INTERNAL MEDICINE

## 2020-10-23 PROCEDURE — 93000 ELECTROCARDIOGRAM COMPLETE: CPT | Mod: S$GLB,,, | Performed by: INTERNAL MEDICINE

## 2020-10-23 PROCEDURE — 1126F AMNT PAIN NOTED NONE PRSNT: CPT | Mod: S$GLB,,, | Performed by: INTERNAL MEDICINE

## 2020-10-23 PROCEDURE — 99214 PR OFFICE/OUTPT VISIT, EST, LEVL IV, 30-39 MIN: ICD-10-PCS | Mod: S$GLB,,, | Performed by: INTERNAL MEDICINE

## 2020-10-23 PROCEDURE — 1126F PR PAIN SEVERITY QUANTIFIED, NO PAIN PRESENT: ICD-10-PCS | Mod: S$GLB,,, | Performed by: INTERNAL MEDICINE

## 2020-10-23 PROCEDURE — 99499 UNLISTED E&M SERVICE: CPT | Mod: S$GLB,,, | Performed by: INTERNAL MEDICINE

## 2020-10-23 NOTE — PROGRESS NOTES
Subjective:    Patient ID:  Barbara Rizo is a 80 y.o. female who presents for follow-up of Atrial Fibrillation      HPI        PAF - TALHA/CV 9/21/15, 1/30/19, 2/2/19 -  on amiodarone and eliquis, HTN, DM, HLD, MGUS, CRI - Cr 2.5     Admitted 1/28/19  Ms. Barbara Rizo is a 78 y.o. female with essential hypertension, hyperlipidemia (LDL 62.4 Jul 2018), type 2 diabetes mellitus (HbA1c 5.8% Jul 2018), CKD Stage 3, paroxysmal atrial fibrillation ([id number] score 5) not on chronic anticoagulation, obesity (BMI 36.0), MGUS, and chronic gout who presents to [facility] ED with complaints of coughing for three days.  She started to have a non-productive cough, wheezing, and mild dyspnea three days ago which has steadily been worsening since onset.  She also complains of a subjective fever but otherwise denies any nausea, vomiting, chest pain, palpitations, pleurisy, nor any diaphoresis.  She denies any recent travel, hemoptysis, nor any lower extremity pain or swelling.  She does say that she's under a lot of stress recently with her ex- dying yesterday at [facility] due to complications from a heart valve replacement two weeks ago.  She does say that she may have had sick contacts while visiting her ex-.  Her appetite has been poor but she denies any weight loss.     While at her ENT's appointment today she decided to schedule an appointment with her PCP to evaluate her upper airway complaints.  She was unable to see her regular PCP, Dr. Paras Oro, but was able to be seen by another physician who became concerned when she was noted to be in AFib with RVR on EKG.  He recommended EMS transportation to the ED but she refused and decided to drive herself.  Her cardiologist is Dr. Amauri Lomas.     Pt admitted to ICU with afib rvr on amiodarone infusion. Pt with elevated HR  and after TALHA done patient became very agitated and anxious. Ativan 1mg IV given and symptoms got worse. HR up  into the 170s and O2 sats dropped to 77%. Placed on NRB. Improved O2 sats. 5mg IV haldol given as patient was trying to get out of bed and pulling oxygen mask off. Cardizem 10mg Iv given with improved HR to 120s-130s. xopenex scheduled Q 8 hours. Changed to zosyn with temp 102F.   1/30- successful cardioversion, post procedure confused and pulling oxygen off, desaturation, constant re-direction, monitored in ICU overnight. Changed to oral amiodarone. eliquis for anticoagulation. Holding parameters on BP meds. IV steroids, nebs and antibiotic for probable lower resp infection. IV lasix as Bp tolerates.   1/31- HR remain under control NSR 60-70s.On amiodarone, metoprolol and eliquis.  Resp status improved and weaned oxygen. Steroids changed to oral route. DC zosyn and switch to augmentin. Add acapella to nebs. Cr increased from baseline (1.5-1.7). Gentle IVF and monitor with repeat BMP later, was stable,. PT,OT consulted for dc planning. Weaning oxygen. PT/OT consulted and rec H/H without equipment. The patient was transferred to the floor on 1/31. Nephrology was consulted for worsening renal function.      2/2- pt transferred with afib rvr. Successful cardioversion again . Continued oral amiodarone and BB.  still has aggressive cough and URI symptoms. On mucolytic and antibiotic.   2/3- HR 50-60s sinus. Cr sightly higher, sodium 128. BNP 1500. Lasix x one dose. Bringing up more mucus. Steroids weaned 20mg. Still faint wheezes on exam.  Patient had worsening ARF on CKD 3, keeped on george and monitor,nephrology was following.reconsulted PT,OT.fley was removed latera nd she had improvement in ARF.  Changed diet for low slat diet duo to hyponatremia with improvement.  She did well with PT,OT.  Her wheezing and respiratory status is improved,  Patient has been discharged home with HH and PO Abx and OAC and amiodarone and follow up with PCP,nephrology and cardiology in next few days.      1-29:  Admitted with AFib with  RVR  1-30:  Underwent TALHA/DC cardioversion successfully  2-1 Back in A-fib 120s. SOB  2-3 Still coughing and SOB. NSR 60      2/2/19 CV - 360J converted A-fib to sinus bradycardia 55. Change amiodarone to po. Ok for telemetry     Echo 6/11/20  · Normal left ventricular systolic function. The estimated ejection fraction is 55%.  · Concentric left ventricular hypertrophy.  · Severe left atrial enlargement.  · Indeterminate left ventricular diastolic function.  · Normal right ventricular systolic function.  · Mild-to-moderate mitral regurgitation.  · Moderate tricuspid regurgitation.  · Mild pulmonic regurgitation.  · Moderate pulmonary hypertension present.  · Normal central venous pressure (3 mmHg).  · The estimated PA systolic pressure is 66 mmHg.     2/13/19 Less SOB since discharge  EKG NSR with PACs 65   Decrease amiodarone 200 qd  OV 2 months - will discuss changing amiodarone to flecainide at that time     4/17/19 Denies CP or SOB  EKG sinus bradycardia 44  HR mostly runs 40-50 at home  Change metoprolol 100 qd to toprol XL 50 qd - may decrease further if bradycardia persists  Discussed alternative AAD - given her repeated episodes of PAF we are both in agreement to stay on amiodarone for now  OV 1 month with TSH, CMP, EKG     5/27/19 HR improved to the low 50s  Has held eliquis the last 2 days due to bleeding from a recent skin CA removal  Denies CP or SOB     9/11/19 Denies CP or SOB  Some LE edema  EKG sinus bradycardia 46 NSSTT changes  Stop norvasc with LE edema  Add cardura 4 mg qd  Decrease toprol 25 qd with bradycardia  OV 3 months with CMP, TSH     12/11/19 Denies CP or SOB  LE edema resolved after stopping norvasc  BP controlled by home readings  HR runs 45-50  EKG sinus bradycardia 47 1st degree AVB  Denies dizziness  Bradycardia well tolerated - will continue Rx  OV 6 months with echo      6/18/20 Denies CP or dizziness  Mild stable WILKINSON  EKG sinus bradycardia 44 otherwise ok  Bradycardia continues  to be well tolerated  OV 6 months with CMP, TSH on chronic amiodarone     Admitted 9/27/20  Mrs. Rizo is a 79 yo F who presents with a CC of fatigue, dizziness and falls at home. She is s/p recent surgery with a discharge a week ago. She presents to the ED and is found to have a NA of 123. The patient's daughter states that the patient has been drinking a lot of water. No HCTZ. No new meds.  No ETOH use.  The patient is non-ill appearing. Lives currently with patient's daughter while recovering from surgery.      Admitted with hyponatremia, 123 on admit, down to 119 at the lowest. Started on IVF. Nephrology consulted. Started free water restriction. Suspect euvolemic hyponatremia due to excess free water intake + fluoxetine use. TSH and cortisol normal. Hyponatremia improved to 131 on day of discharge. Patient is not symptomatic. Discharged to home with home health. Follow up with Nephrology, PCP, and Ortho.      On discharge, metoprolol discontinued for bradycardia after discussing with Cardiology. Amiodarone continued. Losartan also discontinued on discharge due to intermittent hyperkalemia. Started bicarb supplementation for acidosis in CKD3. Fluoxetine discontinued after discussing with patient; she has no depressive symptoms. Discussed plan of care with patient and her daughter at bedside.      10/14/20 EKG  - suspect this is A-flutter 2:1 with aberrancy  Denies CP, SOB, or palpitations  Appears to be in A-flutter RVR - will send to the ER for admission. If issues with tachy-marlon continue will likely end up needing PPM    10/23/20 HR were better controlled when she went to ER. She was restarted on metoprolol and discharged. HR mostly 40-50s at home. Bradycardia well tolerated. Need clearance for skin graft surgery at  on left leg  EKG sinus bradycardia 43       Review of Systems   Constitution: Negative for decreased appetite.   HENT: Negative for ear discharge.    Eyes: Negative for blurred  vision.   Respiratory: Negative for hemoptysis.    Endocrine: Negative for polyphagia.   Hematologic/Lymphatic: Negative for adenopathy.   Skin: Negative for color change.   Musculoskeletal: Negative for joint swelling.   Genitourinary: Negative for bladder incontinence.   Neurological: Negative for brief paralysis.   Psychiatric/Behavioral: Negative for hallucinations.   Allergic/Immunologic: Negative for hives.        Objective:    Physical Exam   Constitutional: She is oriented to person, place, and time. She appears well-developed and well-nourished.   HENT:   Head: Normocephalic and atraumatic.   Eyes: Pupils are equal, round, and reactive to light. Conjunctivae are normal.   Neck: Normal range of motion. Neck supple.   Cardiovascular: Normal rate, normal heart sounds and intact distal pulses.   Pulmonary/Chest: Effort normal and breath sounds normal.   Abdominal: Soft. Bowel sounds are normal.   Musculoskeletal: Normal range of motion.   Neurological: She is alert and oriented to person, place, and time.   Skin: Skin is warm and dry.         Assessment:       1. Chronic diastolic CHF (congestive heart failure)    2. Bradycardia    3. Paroxysmal atrial fibrillation    4. Essential hypertension    5. Pure hypercholesterolemia    6. Benign hypertensive heart disease without heart failure         Plan:       Back in NSR - bradycardia well tolerated. No indication for PPM at this point  Cleared for skin graft surgery at moderate cardiac risk - ok to hold eliquis 2 days before  OV 3 months  Continue Rx for PAF, HTN, CHF

## 2020-10-26 ENCOUNTER — DOCUMENT SCAN (OUTPATIENT)
Dept: HOME HEALTH SERVICES | Facility: HOSPITAL | Age: 80
End: 2020-10-26
Payer: MEDICARE

## 2020-10-29 ENCOUNTER — DOCUMENT SCAN (OUTPATIENT)
Dept: HOME HEALTH SERVICES | Facility: HOSPITAL | Age: 80
End: 2020-10-29
Payer: MEDICARE

## 2020-11-10 RX ORDER — AMLODIPINE BESYLATE 5 MG/1
5 TABLET ORAL DAILY
Qty: 90 TABLET | Refills: 3 | Status: SHIPPED | OUTPATIENT
Start: 2020-11-10 | End: 2020-11-11 | Stop reason: SDUPTHER

## 2020-11-11 RX ORDER — AMLODIPINE BESYLATE 5 MG/1
5 TABLET ORAL DAILY
Qty: 90 TABLET | Refills: 3 | Status: SHIPPED | OUTPATIENT
Start: 2020-11-11 | End: 2021-01-25 | Stop reason: SDUPTHER

## 2020-12-01 ENCOUNTER — OFFICE VISIT (OUTPATIENT)
Dept: CARDIOLOGY | Facility: CLINIC | Age: 80
End: 2020-12-01
Payer: MEDICARE

## 2020-12-01 VITALS
HEIGHT: 62 IN | BODY MASS INDEX: 33.26 KG/M2 | OXYGEN SATURATION: 96 % | SYSTOLIC BLOOD PRESSURE: 193 MMHG | HEART RATE: 48 BPM | WEIGHT: 180.75 LBS | DIASTOLIC BLOOD PRESSURE: 81 MMHG

## 2020-12-01 DIAGNOSIS — E78.00 PURE HYPERCHOLESTEROLEMIA: Chronic | ICD-10-CM

## 2020-12-01 DIAGNOSIS — I50.32 CHRONIC DIASTOLIC CHF (CONGESTIVE HEART FAILURE): ICD-10-CM

## 2020-12-01 DIAGNOSIS — R00.1 BRADYCARDIA: ICD-10-CM

## 2020-12-01 DIAGNOSIS — I48.0 PAROXYSMAL ATRIAL FIBRILLATION: Chronic | ICD-10-CM

## 2020-12-01 DIAGNOSIS — I10 ESSENTIAL HYPERTENSION: Primary | Chronic | ICD-10-CM

## 2020-12-01 PROCEDURE — 1126F PR PAIN SEVERITY QUANTIFIED, NO PAIN PRESENT: ICD-10-PCS | Mod: S$GLB,,, | Performed by: INTERNAL MEDICINE

## 2020-12-01 PROCEDURE — 3288F PR FALLS RISK ASSESSMENT DOCUMENTED: ICD-10-PCS | Mod: CPTII,S$GLB,, | Performed by: INTERNAL MEDICINE

## 2020-12-01 PROCEDURE — 99999 PR PBB SHADOW E&M-EST. PATIENT-LVL III: ICD-10-PCS | Mod: PBBFAC,,, | Performed by: INTERNAL MEDICINE

## 2020-12-01 PROCEDURE — 1101F PR PT FALLS ASSESS DOC 0-1 FALLS W/OUT INJ PAST YR: ICD-10-PCS | Mod: CPTII,S$GLB,, | Performed by: INTERNAL MEDICINE

## 2020-12-01 PROCEDURE — 1159F MED LIST DOCD IN RCRD: CPT | Mod: S$GLB,,, | Performed by: INTERNAL MEDICINE

## 2020-12-01 PROCEDURE — 3077F SYST BP >= 140 MM HG: CPT | Mod: CPTII,S$GLB,, | Performed by: INTERNAL MEDICINE

## 2020-12-01 PROCEDURE — 99499 UNLISTED E&M SERVICE: CPT | Mod: S$GLB,,, | Performed by: INTERNAL MEDICINE

## 2020-12-01 PROCEDURE — 99214 OFFICE O/P EST MOD 30 MIN: CPT | Mod: S$GLB,,, | Performed by: INTERNAL MEDICINE

## 2020-12-01 PROCEDURE — 1126F AMNT PAIN NOTED NONE PRSNT: CPT | Mod: S$GLB,,, | Performed by: INTERNAL MEDICINE

## 2020-12-01 PROCEDURE — 99214 PR OFFICE/OUTPT VISIT, EST, LEVL IV, 30-39 MIN: ICD-10-PCS | Mod: S$GLB,,, | Performed by: INTERNAL MEDICINE

## 2020-12-01 PROCEDURE — 3077F PR MOST RECENT SYSTOLIC BLOOD PRESSURE >= 140 MM HG: ICD-10-PCS | Mod: CPTII,S$GLB,, | Performed by: INTERNAL MEDICINE

## 2020-12-01 PROCEDURE — 3079F PR MOST RECENT DIASTOLIC BLOOD PRESSURE 80-89 MM HG: ICD-10-PCS | Mod: CPTII,S$GLB,, | Performed by: INTERNAL MEDICINE

## 2020-12-01 PROCEDURE — 1159F PR MEDICATION LIST DOCUMENTED IN MEDICAL RECORD: ICD-10-PCS | Mod: S$GLB,,, | Performed by: INTERNAL MEDICINE

## 2020-12-01 PROCEDURE — 99499 RISK ADDL DX/OHS AUDIT: ICD-10-PCS | Mod: S$GLB,,, | Performed by: INTERNAL MEDICINE

## 2020-12-01 PROCEDURE — 1101F PT FALLS ASSESS-DOCD LE1/YR: CPT | Mod: CPTII,S$GLB,, | Performed by: INTERNAL MEDICINE

## 2020-12-01 PROCEDURE — 3079F DIAST BP 80-89 MM HG: CPT | Mod: CPTII,S$GLB,, | Performed by: INTERNAL MEDICINE

## 2020-12-01 PROCEDURE — 3288F FALL RISK ASSESSMENT DOCD: CPT | Mod: CPTII,S$GLB,, | Performed by: INTERNAL MEDICINE

## 2020-12-01 PROCEDURE — 99999 PR PBB SHADOW E&M-EST. PATIENT-LVL III: CPT | Mod: PBBFAC,,, | Performed by: INTERNAL MEDICINE

## 2020-12-01 NOTE — PROGRESS NOTES
Subjective:    Patient ID:  Barbara Rizo is a 80 y.o. female who presents for follow-up of Atrial Fibrillation      HPI     PAF - TALHA/CV 9/21/15, 1/30/19, 2/2/19 -  on amiodarone and eliquis, HTN, DM, HLD, MGUS, CRI - Cr 2.5     Admitted 1/28/19  Ms. Barbara Rizo is a 78 y.o. female with essential hypertension, hyperlipidemia (LDL 62.4 Jul 2018), type 2 diabetes mellitus (HbA1c 5.8% Jul 2018), CKD Stage 3, paroxysmal atrial fibrillation ([id number] score 5) not on chronic anticoagulation, obesity (BMI 36.0), MGUS, and chronic gout who presents to [facility] ED with complaints of coughing for three days.  She started to have a non-productive cough, wheezing, and mild dyspnea three days ago which has steadily been worsening since onset.  She also complains of a subjective fever but otherwise denies any nausea, vomiting, chest pain, palpitations, pleurisy, nor any diaphoresis.  She denies any recent travel, hemoptysis, nor any lower extremity pain or swelling.  She does say that she's under a lot of stress recently with her ex- dying yesterday at [facility] due to complications from a heart valve replacement two weeks ago.  She does say that she may have had sick contacts while visiting her ex-.  Her appetite has been poor but she denies any weight loss.     While at her ENT's appointment today she decided to schedule an appointment with her PCP to evaluate her upper airway complaints.  She was unable to see her regular PCP, Dr. Paras Oro, but was able to be seen by another physician who became concerned when she was noted to be in AFib with RVR on EKG.  He recommended EMS transportation to the ED but she refused and decided to drive herself.  Her cardiologist is Dr. Amauri Lomas.     Pt admitted to ICU with afib rvr on amiodarone infusion. Pt with elevated HR  and after TALHA done patient became very agitated and anxious. Ativan 1mg IV given and symptoms got worse. HR up into  the 170s and O2 sats dropped to 77%. Placed on NRB. Improved O2 sats. 5mg IV haldol given as patient was trying to get out of bed and pulling oxygen mask off. Cardizem 10mg Iv given with improved HR to 120s-130s. xopenex scheduled Q 8 hours. Changed to zosyn with temp 102F.   1/30- successful cardioversion, post procedure confused and pulling oxygen off, desaturation, constant re-direction, monitored in ICU overnight. Changed to oral amiodarone. eliquis for anticoagulation. Holding parameters on BP meds. IV steroids, nebs and antibiotic for probable lower resp infection. IV lasix as Bp tolerates.   1/31- HR remain under control NSR 60-70s.On amiodarone, metoprolol and eliquis.  Resp status improved and weaned oxygen. Steroids changed to oral route. DC zosyn and switch to augmentin. Add acapella to nebs. Cr increased from baseline (1.5-1.7). Gentle IVF and monitor with repeat BMP later, was stable,. PT,OT consulted for dc planning. Weaning oxygen. PT/OT consulted and rec H/H without equipment. The patient was transferred to the floor on 1/31. Nephrology was consulted for worsening renal function.      2/2- pt transferred with afib rvr. Successful cardioversion again . Continued oral amiodarone and BB.  still has aggressive cough and URI symptoms. On mucolytic and antibiotic.   2/3- HR 50-60s sinus. Cr sightly higher, sodium 128. BNP 1500. Lasix x one dose. Bringing up more mucus. Steroids weaned 20mg. Still faint wheezes on exam.  Patient had worsening ARF on CKD 3, keeped on george and monitor,nephrology was following.reconsulted PT,OT.fley was removed latera nd she had improvement in ARF.  Changed diet for low slat diet duo to hyponatremia with improvement.  She did well with PT,OT.  Her wheezing and respiratory status is improved,  Patient has been discharged home with HH and PO Abx and OAC and amiodarone and follow up with PCP,nephrology and cardiology in next few days.      1-29:  Admitted with AFib with  RVR  1-30:  Underwent TALHA/DC cardioversion successfully  2-1 Back in A-fib 120s. SOB  2-3 Still coughing and SOB. NSR 60      2/2/19 CV - 360J converted A-fib to sinus bradycardia 55. Change amiodarone to po. Ok for telemetry     Echo 6/11/20  · Normal left ventricular systolic function. The estimated ejection fraction is 55%.  · Concentric left ventricular hypertrophy.  · Severe left atrial enlargement.  · Indeterminate left ventricular diastolic function.  · Normal right ventricular systolic function.  · Mild-to-moderate mitral regurgitation.  · Moderate tricuspid regurgitation.  · Mild pulmonic regurgitation.  · Moderate pulmonary hypertension present.  · Normal central venous pressure (3 mmHg).  · The estimated PA systolic pressure is 66 mmHg.     2/13/19 Less SOB since discharge  EKG NSR with PACs 65   Decrease amiodarone 200 qd  OV 2 months - will discuss changing amiodarone to flecainide at that time     4/17/19 Denies CP or SOB  EKG sinus bradycardia 44  HR mostly runs 40-50 at home  Change metoprolol 100 qd to toprol XL 50 qd - may decrease further if bradycardia persists  Discussed alternative AAD - given her repeated episodes of PAF we are both in agreement to stay on amiodarone for now  OV 1 month with TSH, CMP, EKG     5/27/19 HR improved to the low 50s  Has held eliquis the last 2 days due to bleeding from a recent skin CA removal  Denies CP or SOB     9/11/19 Denies CP or SOB  Some LE edema  EKG sinus bradycardia 46 NSSTT changes  Stop norvasc with LE edema  Add cardura 4 mg qd  Decrease toprol 25 qd with bradycardia  OV 3 months with CMP, TSH     12/11/19 Denies CP or SOB  LE edema resolved after stopping norvasc  BP controlled by home readings  HR runs 45-50  EKG sinus bradycardia 47 1st degree AVB  Denies dizziness  Bradycardia well tolerated - will continue Rx  OV 6 months with echo      6/18/20 Denies CP or dizziness  Mild stable WILKINSON  EKG sinus bradycardia 44 otherwise ok  Bradycardia continues  to be well tolerated  OV 6 months with CMP, TSH on chronic amiodarone     Admitted 9/27/20  Mrs. Rizo is a 81 yo F who presents with a CC of fatigue, dizziness and falls at home. She is s/p recent surgery with a discharge a week ago. She presents to the ED and is found to have a NA of 123. The patient's daughter states that the patient has been drinking a lot of water. No HCTZ. No new meds.  No ETOH use.  The patient is non-ill appearing. Lives currently with patient's daughter while recovering from surgery.      Admitted with hyponatremia, 123 on admit, down to 119 at the lowest. Started on IVF. Nephrology consulted. Started free water restriction. Suspect euvolemic hyponatremia due to excess free water intake + fluoxetine use. TSH and cortisol normal. Hyponatremia improved to 131 on day of discharge. Patient is not symptomatic. Discharged to home with home health. Follow up with Nephrology, PCP, and Ortho.      On discharge, metoprolol discontinued for bradycardia after discussing with Cardiology. Amiodarone continued. Losartan also discontinued on discharge due to intermittent hyperkalemia. Started bicarb supplementation for acidosis in CKD3. Fluoxetine discontinued after discussing with patient; she has no depressive symptoms. Discussed plan of care with patient and her daughter at bedside.      10/14/20 EKG  - suspect this is A-flutter 2:1 with aberrancy  Denies CP, SOB, or palpitations  Appears to be in A-flutter RVR - will send to the ER for admission. If issues with tachy-marlon continue will likely end up needing PPM     10/23/20 HR were better controlled when she went to ER. She was restarted on metoprolol and discharged. HR mostly 40-50s at home. Bradycardia well tolerated. Need clearance for skin graft surgery at  on left leg  EKG sinus bradycardia 43   Back in NSR - bradycardia well tolerated. No indication for PPM at this point  Cleared for skin graft surgery at moderate cardiac risk - ok  to hold eliquis 2 days before  OV 3 months  Continue Rx for PAF, HTN, CHF     12/1/20 Denies CP or SOB. Did well after her skin graft surgery  HR 45-50. BP elevated - controlled by outside readings    Review of Systems   Constitution: Negative for decreased appetite.   HENT: Negative for ear discharge.    Eyes: Negative for blurred vision.   Respiratory: Negative for hemoptysis.    Endocrine: Negative for polyphagia.   Hematologic/Lymphatic: Negative for adenopathy.   Skin: Negative for color change.   Musculoskeletal: Negative for joint swelling.   Genitourinary: Negative for bladder incontinence.   Neurological: Negative for brief paralysis.   Psychiatric/Behavioral: Negative for hallucinations.   Allergic/Immunologic: Negative for hives.        Objective:    Physical Exam   Constitutional: She is oriented to person, place, and time. She appears well-developed and well-nourished.   HENT:   Head: Normocephalic and atraumatic.   Eyes: Pupils are equal, round, and reactive to light. Conjunctivae are normal.   Neck: Normal range of motion. Neck supple.   Cardiovascular: Normal rate, normal heart sounds and intact distal pulses.   Pulmonary/Chest: Effort normal and breath sounds normal.   Abdominal: Soft. Bowel sounds are normal.   Musculoskeletal: Normal range of motion.   Neurological: She is alert and oriented to person, place, and time.   Skin: Skin is warm and dry.         Assessment:       1. Essential hypertension    2. Paroxysmal atrial fibrillation    3. Pure hypercholesterolemia    4. Chronic diastolic CHF (congestive heart failure)    5. Bradycardia         Plan:       Continue Rx for PAF, HTN, HLD, CHF  OV 3 months

## 2020-12-02 ENCOUNTER — DOCUMENT SCAN (OUTPATIENT)
Dept: HOME HEALTH SERVICES | Facility: HOSPITAL | Age: 80
End: 2020-12-02
Payer: MEDICARE

## 2020-12-03 ENCOUNTER — OFFICE VISIT (OUTPATIENT)
Dept: VASCULAR SURGERY | Facility: CLINIC | Age: 80
End: 2020-12-03
Payer: MEDICARE

## 2020-12-03 ENCOUNTER — PATIENT OUTREACH (OUTPATIENT)
Dept: ADMINISTRATIVE | Facility: OTHER | Age: 80
End: 2020-12-03

## 2020-12-03 VITALS
BODY MASS INDEX: 33.26 KG/M2 | HEIGHT: 62 IN | WEIGHT: 180.75 LBS | SYSTOLIC BLOOD PRESSURE: 190 MMHG | DIASTOLIC BLOOD PRESSURE: 88 MMHG

## 2020-12-03 DIAGNOSIS — I70.248 ATHEROSCLEROSIS OF NATIVE ARTERY OF LEFT LOWER EXTREMITY WITH ULCERATION OF OTHER PART OF LOWER LEG: Primary | ICD-10-CM

## 2020-12-03 PROCEDURE — 99999 PR PBB SHADOW E&M-EST. PATIENT-LVL IV: CPT | Mod: PBBFAC,,, | Performed by: SURGERY

## 2020-12-03 PROCEDURE — 3288F FALL RISK ASSESSMENT DOCD: CPT | Mod: CPTII,S$GLB,, | Performed by: SURGERY

## 2020-12-03 PROCEDURE — 1101F PR PT FALLS ASSESS DOC 0-1 FALLS W/OUT INJ PAST YR: ICD-10-PCS | Mod: CPTII,S$GLB,, | Performed by: SURGERY

## 2020-12-03 PROCEDURE — 3079F DIAST BP 80-89 MM HG: CPT | Mod: CPTII,S$GLB,, | Performed by: SURGERY

## 2020-12-03 PROCEDURE — 99499 UNLISTED E&M SERVICE: CPT | Mod: S$GLB,,, | Performed by: SURGERY

## 2020-12-03 PROCEDURE — 99499 RISK ADDL DX/OHS AUDIT: ICD-10-PCS | Mod: S$GLB,,, | Performed by: SURGERY

## 2020-12-03 PROCEDURE — 1126F AMNT PAIN NOTED NONE PRSNT: CPT | Mod: S$GLB,,, | Performed by: SURGERY

## 2020-12-03 PROCEDURE — 1101F PT FALLS ASSESS-DOCD LE1/YR: CPT | Mod: CPTII,S$GLB,, | Performed by: SURGERY

## 2020-12-03 PROCEDURE — 99214 OFFICE O/P EST MOD 30 MIN: CPT | Mod: S$GLB,,, | Performed by: SURGERY

## 2020-12-03 PROCEDURE — 99999 PR PBB SHADOW E&M-EST. PATIENT-LVL IV: ICD-10-PCS | Mod: PBBFAC,,, | Performed by: SURGERY

## 2020-12-03 PROCEDURE — 1126F PR PAIN SEVERITY QUANTIFIED, NO PAIN PRESENT: ICD-10-PCS | Mod: S$GLB,,, | Performed by: SURGERY

## 2020-12-03 PROCEDURE — 99214 PR OFFICE/OUTPT VISIT, EST, LEVL IV, 30-39 MIN: ICD-10-PCS | Mod: S$GLB,,, | Performed by: SURGERY

## 2020-12-03 PROCEDURE — 3077F SYST BP >= 140 MM HG: CPT | Mod: CPTII,S$GLB,, | Performed by: SURGERY

## 2020-12-03 PROCEDURE — 1159F MED LIST DOCD IN RCRD: CPT | Mod: S$GLB,,, | Performed by: SURGERY

## 2020-12-03 PROCEDURE — 1159F PR MEDICATION LIST DOCUMENTED IN MEDICAL RECORD: ICD-10-PCS | Mod: S$GLB,,, | Performed by: SURGERY

## 2020-12-03 PROCEDURE — 3077F PR MOST RECENT SYSTOLIC BLOOD PRESSURE >= 140 MM HG: ICD-10-PCS | Mod: CPTII,S$GLB,, | Performed by: SURGERY

## 2020-12-03 PROCEDURE — 3079F PR MOST RECENT DIASTOLIC BLOOD PRESSURE 80-89 MM HG: ICD-10-PCS | Mod: CPTII,S$GLB,, | Performed by: SURGERY

## 2020-12-03 PROCEDURE — 3288F PR FALLS RISK ASSESSMENT DOCUMENTED: ICD-10-PCS | Mod: CPTII,S$GLB,, | Performed by: SURGERY

## 2020-12-03 NOTE — PATIENT INSTRUCTIONS
Peripheral Artery Disease (PAD)     Peripheral artery disease (PAD) occurs when the arteries that carry blood to the limbs are narrowed or blocked. This is usually due to a buildup of a fatty substance called plaque in the walls of the arteries.  PAD most often affects the arteries in the legs. When these arteries are narrowed or blocked, blood flow to the legs is reduced. This can cause leg and foot pain and other symptoms. If severe enough, reduced blood flow to the legs can lead to tissue death (gangrene) and the loss of a toe, foot, or leg. Having PAD also makes it more likely that arteries in other body areas are blocked. For instance, arteries that carry blood to the heart or brain may be affected. This raises the chances of heart attack and stroke.  Risk factors  Certain factors can make PAD more likely. They include:  · Smoking  · Diabetes  · High blood pressure  · Unhealthy cholesterol levels  · Obesity  · Inactive lifestyle  · Older age  · Family history of PAD  Symptoms  Many people with PAD have no symptoms. If symptoms do occur, they can include:  · Pain in the muscles of the calves, thighs or hips that gets worse with activity and better with rest (intermittent claudication)  · Achy, tired, or heavy feeling in the legs  · Weakness, numbness, tingling, or loss of feeling in the legs  · Changes in skin color of the legs  · Sores on the legs and feet  · Cold leg, feet, or toes  · Pain the feet or toes even when lying down (rest pain)  Home care  PAD is a chronic (lifelong) condition. Treatment is focused on managing your condition and lowering your health risks. This may include doing the following:  · If you smoke, quit. This helps prevent further damage to your arteries and lowers your health risks. Ask your provider about medicines or products that can help you quit smoking. Also consider joining a stop-smoking program or support group.  · Be more active. This helps you lose weight and manage  problems such as high blood pressure and unhealthy cholesterol levels. Start a walking program if advised to by your provider. Your provider may also help you form a safe exercise program that is right for your needs.  · Make healthy eating changes. This includes eating less fat, salt, and sugar.  · Take medicines for high blood pressure, unhealthy cholesterol levels, and diabetes as directed.  · Have your blood pressure and cholesterol levels checked as often as directed.  · If you have diabetes, try to keep your blood sugar well controlled. Test your blood sugar as directed.  · If you are overweight, talk to your provider about forming a weight-loss plan.  · Watch for cuts, scrapes, or open sores on your feet. Poor blood flow to the feet may slow healing and increase the risk of infection from these problems.   Follow-up care  Follow up with your healthcare provider, or as advised. If imaging tests such as ultrasound were done, they will be reviewed by a doctor. You will be told the results and any new findings that may affect your care.  When to seek medical advice   Call your healthcare provider right away if any of these occur:  · Sudden severe pain in the legs or feet  · Sudden cold, pale or blue color in the legs or feet  · Weakness or numbness in the legs or feet that worsens  · Any sore or wound in the legs or feet that wont heal  · Weak pulse in your legs or feet  Know the Signs of Heart Attack and Stroke  People with PAD are at high risk for heart attack and stroke. Knowing the signs of these problems can help you protect your health and get help when you need it. Call 911 right away if you have any of the following:  Signs of a Heart Attack  · Chest discomfort, such as pain, aching, tightness, or pressure that lasts more than a few minutes, or that comes and goes  · Pain or discomfort in the arms, back, shoulders, neck, or jaw  · Shortness of breath  · Sweating (often a cold, clammy  sweat)  · Nausea  · Lightheadedness  Signs of a Stroke  · Sudden numbness or weakness of the face, arms, or legs, especially on one side  · Sudden confusion or trouble speaking or understanding  · Sudden trouble seeing in one or both eyes  · Sudden trouble walking, dizziness, or loss of balance  · Sudden, severe headache with no known cause   Date Last Reviewed: 9/21/2015  © 5424-5893 [employer]. [address] [place] [address], [place] [id number]. All rights reserved. This information is not intended as a substitute for professional medical care. Always follow your healthcare professional's instructions.

## 2020-12-03 NOTE — PROGRESS NOTES
Dru Leblanc MD [facility]                         12/03/2020    HPI:  Barbara Rizo is a 80 y.o. female with   Patient Active Problem List   Diagnosis    Hyperlipidemia    Essential hypertension    Hemochromatosis    DJD (degenerative joint disease) of knee    Vitamin D deficiency    Benign hypertensive heart disease without heart failure    Other and unspecified hyperlipidemia    Primary localized osteoarthrosis, lower leg    Anemia in chronic kidney disease    Morbid obesity    Physical deconditioning    Esophagitis    Urinary retention    Atonic neurogenic bladder    Incomplete bladder emptying    Constipation, slow transit    OAB (overactive bladder)    MGUS (monoclonal gammopathy of unknown significance)    Paroxysmal atrial fibrillation    Obesity, Class II, BMI 35-39.9    Chronic gout    Acute bronchitis    Stage 3 chronic kidney disease    Chronic diastolic CHF (congestive heart failure)    Prediabetes    H/O open leg wound    Hyponatremia    Hyperkalemia    Bradycardia    Pulmonary hypertension    Open wound of left knee    Atherosclerosis of native artery of left lower extremity    being managed by PCP and specialists who is here today for evaluation of L knee wound.  Patient states location is L knee occurring for a few days.  Associated signs and symptoms include discoloration and pain.  Quality is aching and severity is 5/10; s/p debridement L knee wound by Ortho this week.  Symptoms began after a fall recently.  Alleviating factors include wound care.  Worsening factors include pressure.    no MI  no Stroke  Tobacco use: denies    10/2020:  States wound is healing and planning for skin graft with Plastics next week pending Cardiac clearance.    Past Medical History:   Diagnosis Date    A-fib     CKD (chronic kidney disease)     Diabetes mellitus type II     Fatty liver     GERD (gastroesophageal reflux  disease)     Hearing loss of both ears     wears bilateral hearing aids    Hemochromatosis     History of anemia due to CKD     History of pleural effusion     with thoracentesis    Hyperlipidemia     Hypertension     Macular degeneration     Osteoarthritis     Left knee    Osteoporosis     Vaginal delivery     x2    Vitamin D deficiency disease     Wears partial dentures     upper removable bridge     Past Surgical History:   Procedure Laterality Date    BREAST BIOPSY      BREAST SURGERY Bilateral     Reduction r/t h/o fibrocystic disease    CHOLECYSTECTOMY  08/2015    EYE SURGERY      Cat Ext  OU    HYSTERECTOMY      partial due to uterine fibroids    INCISION AND DRAINAGE OF KNEE Left 9/17/2020    Procedure: INCISION AND DRAINAGE, KNEE;  Surgeon: Rolando Wagoner MD;  Location: [facility] OR;  Service: Orthopedics;  Laterality: Left;    JOINT REPLACEMENT Left 05/13/2013    TKR    JOINT REPLACEMENT Right 08/03/2015    TKR    THORACENTESIS      TOTAL KNEE ARTHROPLASTY Right 2015    left knee done 5/2013    WOUND DRESSING Left 9/17/2020    Procedure: WOUND VAC APPLICATION;  Surgeon: Rolando Wagoner MD;  Location: [facility] OR;  Service: Orthopedics;  Laterality: Left;     Family History   Problem Relation Age of Onset    Cancer Mother         stomach    Hypertension Mother     Aneurysm Father         abdominal     Social History     Socioeconomic History    Marital status:      Spouse name: Not on file    Number of children: Not on file    Years of education: Not on file    Highest education level: Not on file   Occupational History    Not on file   Social Needs    Financial resource strain: Not on file    Food insecurity     Worry: Not on file     Inability: Not on file    Transportation needs     Medical: Not on file     Non-medical: Not on file   Tobacco Use    Smoking status: Former Smoker    Smokeless tobacco: Never Used   Substance and Sexual Activity    Alcohol use: Not  Currently     Alcohol/week: 0.0 standard drinks     Comment: occasional/ on a weekend    Drug use: No    Sexual activity: Not Currently   Lifestyle    Physical activity     Days per week: Not on file     Minutes per session: Not on file    Stress: Not on file   Relationships    Social connections     Talks on phone: Not on file     Gets together: Not on file     Attends Congregation service: Not on file     Active member of club or organization: Not on file     Attends meetings of clubs or organizations: Not on file     Relationship status: Not on file   Other Topics Concern    Not on file   Social History Narrative    Not on file       Current Outpatient Medications:     allopurinoL (ZYLOPRIM) 100 MG tablet, Take 1 tablet (100 mg total) by mouth once daily., Disp: 30 tablet, Rfl: 2    amiodarone (PACERONE) 200 MG Tab, TAKE 1 TABLET(200 MG) BY MOUTH EVERY DAY, Disp: 90 tablet, Rfl: 3    amLODIPine (NORVASC) 5 MG tablet, Take 1 tablet (5 mg total) by mouth once daily., Disp: 90 tablet, Rfl: 3    apixaban (ELIQUIS) 2.5 mg Tab, Take 1 tablet (2.5 mg total) by mouth 2 (two) times daily., Disp: 180 tablet, Rfl: 3    atorvastatin (LIPITOR) 40 MG tablet, TAKE 1 TABLET(40 MG) BY MOUTH EVERY DAY, Disp: 90 tablet, Rfl: 1    calcium carbonate (TUMS) 200 mg calcium (500 mg) chewable tablet, Take 1 tablet by mouth once daily., Disp: , Rfl:     ergocalciferol (ERGOCALCIFEROL) 50,000 unit Cap, Take 50,000 Units by mouth every 7 days., Disp: , Rfl:     folic acid (FOLVITE) 1 MG tablet, TAKE 1 TABLET(1 MG) BY MOUTH EVERY DAY, Disp: 90 tablet, Rfl: 1    furosemide (LASIX) 20 MG tablet, TAKE 1 TABLET BY MOUTH EVERY DAY AS NEEDED, Disp: 45 tablet, Rfl: 1    metoprolol succinate (TOPROL-XL) 25 MG 24 hr tablet, Take 25 mg by mouth once daily., Disp: , Rfl:     sodium bicarbonate 650 MG tablet, Take 2 tablets (1,300 mg total) by mouth 3 (three) times daily., Disp: 180 tablet, Rfl: 11    amoxicillin-clavulanate 500-125mg  (AUGMENTIN) 500-125 mg Tab, TK 1 T PO TID FOR 5 DAYS, Disp: , Rfl:     fish oil-omega-3 fatty acids 300-1,000 mg capsule, Take 1 capsule by mouth once daily., Disp: , Rfl:     HYDROcodone-acetaminophen (NORCO) 5-325 mg per tablet, Take 1 tablet by mouth every 6 (six) hours as needed., Disp: , Rfl:     minocycline (DYNACIN) 100 MG tablet, TK 1 T PO BID FOR 10 DOSES, Disp: , Rfl:     sulfamethoxazole-trimethoprim 800-160mg (BACTRIM DS) 800-160 mg Tab, TK 1 T PO  Q 12 H, Disp: , Rfl:   No current facility-administered medications for this visit.     Facility-Administered Medications Ordered in Other Visits:     denosumab (PROLIA) injection 60 mg, 60 mg, Subcutaneous, 1 time in Clinic/HOD, Nargis Boyle MD    REVIEW OF SYSTEMS:  General: No fevers or chills; ENT: No sore throat; Allergy and Immunology: no persistent infections; Hematological and Lymphatic: No history of bleeding or easy bruising; Endocrine: negative; Respiratory: no cough, shortness of breath, or wheezing; Cardiovascular: no chest pain or dyspnea on exertion; no claudication, no rest pain; Gastrointestinal: no abdominal pain/back, change in bowel habits, or bloody stools; Genito-Urinary: no dysuria, trouble voiding, or hematuria; Musculoskeletal: negative, + wound; Neurological: no TIA or stroke symptoms; Psychiatric: no nervousness, anxiety or depression.    PHYSICAL EXAM:                General appearance:  Alert, well-appearing, and in no distress.  Oriented to person, place, and time                    Neurological: Normal speech, no focal findings noted; CN II - XII grossly intact. RLE with sensation to light touch, LLE with sensation to light touch.            Musculoskeletal: Digits/nail without cyanosis/clubbing.  Strength 5/5 BLE.                    Neck: Supple, no significant adenopathy, no carotid bruit can be auscultated                  Chest:  Clear to auscultation, no wheezes, rales or rhonchi, symmetric air entry. No use of  accessory muscles               Cardiac: Normal rate and regular rhythm, S1 and S2 normal            Abdomen: Soft, nontender, nondistended, no masses or organomegaly, no hernia     No rebound tenderness noted; bowel sounds normal     Pulsatile aortic mass is non palpable.     No groin adenopathy      Extremities:2+ R femoral pulse, 2+ L femoral pulse     2+ R popliteal pulse, 2+ L popliteal pulse     doppler+ R PT pulse, doppler+ L PT pulse     doppler+ R DP pulse, doppler+ L DP pulse     1+ RLE edema, 1+ LLE edema    Skin: RLE without tissue loss; LLE with improved L knee wound without infection, no other LLE tissue loss    LAB RESULTS:  No results found for: CBC  Lab Results   Component Value Date    LABPROT 12.7 (H) 09/17/2020    INR 1.1 09/17/2020     Lab Results   Component Value Date     (L) 10/05/2020    K 5.1 10/05/2020    CL 97 10/05/2020    CO2 29 10/05/2020     (H) 10/05/2020    BUN 14 10/05/2020    CREATININE 2.1 (H) 10/05/2020    CALCIUM 8.7 10/05/2020    ANIONGAP 7 (L) 10/05/2020    EGFRNONAA 22 (A) 10/05/2020     Lab Results   Component Value Date    WBC 5.60 09/30/2020    RBC 3.13 (L) 09/30/2020    HGB 9.5 (L) 09/30/2020    HCT 29.7 (L) 09/30/2020    MCV 95 09/30/2020    MCH 30.4 09/30/2020    MCHC 32.0 09/30/2020    RDW 13.7 09/30/2020     (L) 09/30/2020    MPV 10.0 09/30/2020    GRAN 4.2 09/30/2020    GRAN 74.4 (H) 09/30/2020    LYMPH 0.7 (L) 09/30/2020    LYMPH 13.2 (L) 09/30/2020    MONO 0.5 09/30/2020    MONO 8.6 09/30/2020    EOS 0.2 09/30/2020    BASO 0.03 09/30/2020    EOSINOPHIL 2.9 09/30/2020    BASOPHIL 0.5 09/30/2020    DIFFMETHOD Automated 09/30/2020     .  Lab Results   Component Value Date    HGBA1C 5.4 07/24/2020       IMAGING:  All pertinent imaging has been reviewed and interpreted independently.    PHI reviewed.  >1 bilaterally    IMP/PLAN:  80 y.o. female with   Patient Active Problem List   Diagnosis    Hyperlipidemia    Essential hypertension     Hemochromatosis    DJD (degenerative joint disease) of knee    Vitamin D deficiency    Benign hypertensive heart disease without heart failure    Other and unspecified hyperlipidemia    Primary localized osteoarthrosis, lower leg    Anemia in chronic kidney disease    Morbid obesity    Physical deconditioning    Esophagitis    Urinary retention    Atonic neurogenic bladder    Incomplete bladder emptying    Constipation, slow transit    OAB (overactive bladder)    MGUS (monoclonal gammopathy of unknown significance)    Paroxysmal atrial fibrillation    Obesity, Class II, BMI 35-39.9    Chronic gout    Acute bronchitis    Stage 3 chronic kidney disease    Chronic diastolic CHF (congestive heart failure)    Prediabetes    H/O open leg wound    Hyponatremia    Hyperkalemia    Bradycardia    Pulmonary hypertension    Open wound of left knee    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    -BLE microvascular PVD with no claudication, no rest pain, healing L knee wound.  Imaging reviewed. - rec daily ASA, cont wound care  -Exercise  -Heart healthy lifestyle  -RTC 6 weeks for further evaluation    I spent 13 minutes evaluating this patient and greater than 50% of the time was spent counseling, coordinator care and discussing the plan of care.  All questions were answered and patient stated understanding with agreement with the above treatment plan.    Dru Leblanc MD [facility]  Vascular and Endovascular Surgery

## 2020-12-03 NOTE — PROGRESS NOTES
Dru Leblanc MD [facility]                         12/03/2020    HPI:  Barbara Rizo is a 80 y.o. female with   Patient Active Problem List   Diagnosis    Hyperlipidemia    Essential hypertension    Hemochromatosis    DJD (degenerative joint disease) of knee    Vitamin D deficiency    Benign hypertensive heart disease without heart failure    Other and unspecified hyperlipidemia    Primary localized osteoarthrosis, lower leg    Anemia in chronic kidney disease    Morbid obesity    Physical deconditioning    Esophagitis    Urinary retention    Atonic neurogenic bladder    Incomplete bladder emptying    Constipation, slow transit    OAB (overactive bladder)    MGUS (monoclonal gammopathy of unknown significance)    Paroxysmal atrial fibrillation    Obesity, Class II, BMI 35-39.9    Chronic gout    Acute bronchitis    Stage 3 chronic kidney disease    Chronic diastolic CHF (congestive heart failure)    Prediabetes    H/O open leg wound    Hyponatremia    Hyperkalemia    Bradycardia    Pulmonary hypertension    Open wound of left knee    Atherosclerosis of native artery of left lower extremity    being managed by PCP and specialists who is here today for evaluation of L knee wound.  Patient states location is L knee occurring for a few days.  Associated signs and symptoms include discoloration and pain.  Quality is aching and severity is 5/10; s/p debridement L knee wound by Ortho this week.  Symptoms began after a fall recently.  Alleviating factors include wound care.  Worsening factors include pressure.    no MI  no Stroke  Tobacco use: denies    10/2020:  States wound is healing and planning for skin graft with Plastics next week pending Cardiac clearance.    12/2020:  S/p skin graft and healing well.    Past Medical History:   Diagnosis Date    A-fib     CKD (chronic kidney disease)     Diabetes mellitus type II     Fatty  liver     GERD (gastroesophageal reflux disease)     Hearing loss of both ears     wears bilateral hearing aids    Hemochromatosis     History of anemia due to CKD     History of pleural effusion     with thoracentesis    Hyperlipidemia     Hypertension     Macular degeneration     Osteoarthritis     Left knee    Osteoporosis     Vaginal delivery     x2    Vitamin D deficiency disease     Wears partial dentures     upper removable bridge     Past Surgical History:   Procedure Laterality Date    BREAST BIOPSY      BREAST SURGERY Bilateral     Reduction r/t h/o fibrocystic disease    CHOLECYSTECTOMY  08/2015    EYE SURGERY      Cat Ext  OU    HYSTERECTOMY      partial due to uterine fibroids    INCISION AND DRAINAGE OF KNEE Left 9/17/2020    Procedure: INCISION AND DRAINAGE, KNEE;  Surgeon: Rolando Wagoner MD;  Location: [facility] OR;  Service: Orthopedics;  Laterality: Left;    JOINT REPLACEMENT Left 05/13/2013    TKR    JOINT REPLACEMENT Right 08/03/2015    TKR    THORACENTESIS      TOTAL KNEE ARTHROPLASTY Right 2015    left knee done 5/2013    WOUND DRESSING Left 9/17/2020    Procedure: WOUND VAC APPLICATION;  Surgeon: Rolando Wagoner MD;  Location: [facility] OR;  Service: Orthopedics;  Laterality: Left;     Family History   Problem Relation Age of Onset    Cancer Mother         stomach    Hypertension Mother     Aneurysm Father         abdominal     Social History     Socioeconomic History    Marital status:      Spouse name: Not on file    Number of children: Not on file    Years of education: Not on file    Highest education level: Not on file   Occupational History    Not on file   Social Needs    Financial resource strain: Not on file    Food insecurity     Worry: Not on file     Inability: Not on file    Transportation needs     Medical: Not on file     Non-medical: Not on file   Tobacco Use    Smoking status: Former Smoker    Smokeless tobacco: Never Used   Substance and  Sexual Activity    Alcohol use: Not Currently     Alcohol/week: 0.0 standard drinks     Comment: occasional/ on a weekend    Drug use: No    Sexual activity: Not Currently   Lifestyle    Physical activity     Days per week: Not on file     Minutes per session: Not on file    Stress: Not on file   Relationships    Social connections     Talks on phone: Not on file     Gets together: Not on file     Attends Faith service: Not on file     Active member of club or organization: Not on file     Attends meetings of clubs or organizations: Not on file     Relationship status: Not on file   Other Topics Concern    Not on file   Social History Narrative    Not on file       Current Outpatient Medications:     allopurinoL (ZYLOPRIM) 100 MG tablet, Take 1 tablet (100 mg total) by mouth once daily., Disp: 30 tablet, Rfl: 2    amiodarone (PACERONE) 200 MG Tab, TAKE 1 TABLET(200 MG) BY MOUTH EVERY DAY, Disp: 90 tablet, Rfl: 3    amLODIPine (NORVASC) 5 MG tablet, Take 1 tablet (5 mg total) by mouth once daily., Disp: 90 tablet, Rfl: 3    apixaban (ELIQUIS) 2.5 mg Tab, Take 1 tablet (2.5 mg total) by mouth 2 (two) times daily., Disp: 180 tablet, Rfl: 3    atorvastatin (LIPITOR) 40 MG tablet, TAKE 1 TABLET(40 MG) BY MOUTH EVERY DAY, Disp: 90 tablet, Rfl: 1    calcium carbonate (TUMS) 200 mg calcium (500 mg) chewable tablet, Take 1 tablet by mouth once daily., Disp: , Rfl:     ergocalciferol (ERGOCALCIFEROL) 50,000 unit Cap, Take 50,000 Units by mouth every 7 days., Disp: , Rfl:     folic acid (FOLVITE) 1 MG tablet, TAKE 1 TABLET(1 MG) BY MOUTH EVERY DAY, Disp: 90 tablet, Rfl: 1    furosemide (LASIX) 20 MG tablet, TAKE 1 TABLET BY MOUTH EVERY DAY AS NEEDED, Disp: 45 tablet, Rfl: 1    metoprolol succinate (TOPROL-XL) 25 MG 24 hr tablet, Take 25 mg by mouth once daily., Disp: , Rfl:     sodium bicarbonate 650 MG tablet, Take 2 tablets (1,300 mg total) by mouth 3 (three) times daily., Disp: 180 tablet, Rfl: 11     amoxicillin-clavulanate 500-125mg (AUGMENTIN) 500-125 mg Tab, TK 1 T PO TID FOR 5 DAYS, Disp: , Rfl:     fish oil-omega-3 fatty acids 300-1,000 mg capsule, Take 1 capsule by mouth once daily., Disp: , Rfl:     HYDROcodone-acetaminophen (NORCO) 5-325 mg per tablet, Take 1 tablet by mouth every 6 (six) hours as needed., Disp: , Rfl:     minocycline (DYNACIN) 100 MG tablet, TK 1 T PO BID FOR 10 DOSES, Disp: , Rfl:     sulfamethoxazole-trimethoprim 800-160mg (BACTRIM DS) 800-160 mg Tab, TK 1 T PO  Q 12 H, Disp: , Rfl:   No current facility-administered medications for this visit.     Facility-Administered Medications Ordered in Other Visits:     denosumab (PROLIA) injection 60 mg, 60 mg, Subcutaneous, 1 time in Clinic/HOD, Nargis Boyle MD    REVIEW OF SYSTEMS:  General: No fevers or chills; ENT: No sore throat; Allergy and Immunology: no persistent infections; Hematological and Lymphatic: No history of bleeding or easy bruising; Endocrine: negative; Respiratory: no cough, shortness of breath, or wheezing; Cardiovascular: no chest pain or dyspnea on exertion; no claudication, no rest pain; Gastrointestinal: no abdominal pain/back, change in bowel habits, or bloody stools; Genito-Urinary: no dysuria, trouble voiding, or hematuria; Musculoskeletal: negative, + wound; Neurological: no TIA or stroke symptoms; Psychiatric: no nervousness, anxiety or depression.    PHYSICAL EXAM:                General appearance:  Alert, well-appearing, and in no distress.  Oriented to person, place, and time                    Neurological: Normal speech, no focal findings noted; CN II - XII grossly intact. RLE with sensation to light touch, LLE with sensation to light touch.            Musculoskeletal: Digits/nail without cyanosis/clubbing.  Strength 5/5 BLE.                    Neck: Supple, no significant adenopathy, no carotid bruit can be auscultated                  Chest:  Clear to auscultation, no wheezes, rales or rhonchi,  symmetric air entry. No use of accessory muscles               Cardiac: Normal rate and regular rhythm, S1 and S2 normal            Abdomen: Soft, nontender, nondistended, no masses or organomegaly, no hernia     No rebound tenderness noted; bowel sounds normal     Pulsatile aortic mass is non palpable.     No groin adenopathy      Extremities:2+ R femoral pulse, 2+ L femoral pulse     2+ R popliteal pulse, 2+ L popliteal pulse     doppler+ R PT pulse, doppler+ L PT pulse     doppler+ R DP pulse, doppler+ L DP pulse     1+ RLE edema, 1+ LLE edema    Skin: RLE without tissue loss; LLE with skin graft to L knee wound without infection, healing well, no other LLE tissue loss    LAB RESULTS:  No results found for: CBC  Lab Results   Component Value Date    LABPROT 12.7 (H) 09/17/2020    INR 1.1 09/17/2020     Lab Results   Component Value Date     (L) 10/05/2020    K 5.1 10/05/2020    CL 97 10/05/2020    CO2 29 10/05/2020     (H) 10/05/2020    BUN 14 10/05/2020    CREATININE 2.1 (H) 10/05/2020    CALCIUM 8.7 10/05/2020    ANIONGAP 7 (L) 10/05/2020    EGFRNONAA 22 (A) 10/05/2020     Lab Results   Component Value Date    WBC 5.60 09/30/2020    RBC 3.13 (L) 09/30/2020    HGB 9.5 (L) 09/30/2020    HCT 29.7 (L) 09/30/2020    MCV 95 09/30/2020    MCH 30.4 09/30/2020    MCHC 32.0 09/30/2020    RDW 13.7 09/30/2020     (L) 09/30/2020    MPV 10.0 09/30/2020    GRAN 4.2 09/30/2020    GRAN 74.4 (H) 09/30/2020    LYMPH 0.7 (L) 09/30/2020    LYMPH 13.2 (L) 09/30/2020    MONO 0.5 09/30/2020    MONO 8.6 09/30/2020    EOS 0.2 09/30/2020    BASO 0.03 09/30/2020    EOSINOPHIL 2.9 09/30/2020    BASOPHIL 0.5 09/30/2020    DIFFMETHOD Automated 09/30/2020     .  Lab Results   Component Value Date    HGBA1C 5.4 07/24/2020       IMAGING:  All pertinent imaging has been reviewed and interpreted independently.    PHI reviewed.  >1 bilaterally    IMP/PLAN:  80 y.o. female with   Patient Active Problem List   Diagnosis     Hyperlipidemia    Essential hypertension    Hemochromatosis    DJD (degenerative joint disease) of knee    Vitamin D deficiency    Benign hypertensive heart disease without heart failure    Other and unspecified hyperlipidemia    Primary localized osteoarthrosis, lower leg    Anemia in chronic kidney disease    Morbid obesity    Physical deconditioning    Esophagitis    Urinary retention    Atonic neurogenic bladder    Incomplete bladder emptying    Constipation, slow transit    OAB (overactive bladder)    MGUS (monoclonal gammopathy of unknown significance)    Paroxysmal atrial fibrillation    Obesity, Class II, BMI 35-39.9    Chronic gout    Acute bronchitis    Stage 3 chronic kidney disease    Chronic diastolic CHF (congestive heart failure)    Prediabetes    H/O open leg wound    Hyponatremia    Hyperkalemia    Bradycardia    Pulmonary hypertension    Open wound of left knee    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    -BLE microvascular PVD with no claudication, no rest pain, healing L knee wound s/p skin grafting healing well.  Imaging reviewed. - rec daily ASA, cont postop skin graft care  -Exercise  -Heart healthy lifestyle  -RTC prn    I spent 11 minutes evaluating this patient and greater than 50% of the time was spent counseling, coordinator care and discussing the plan of care.  All questions were answered and patient stated understanding with agreement with the above treatment plan.    Dru Leblanc MD [facility]  Vascular and Endovascular Surgery

## 2020-12-03 NOTE — LETTER
December 3, 2020        Paras Oro MD  [address] [facility] [address]             [facility] Vascular Surgery  [address]  [facility] [address]  Phone: [phone]  Fax: [phone]   Patient: Barbara Rizo   MR Number: [id number]   YOB: 1940   Date of Visit: 12/3/2020       Dear Dr. Oro:    Thank you for referring Barbara Rizo to me for evaluation. Below are the relevant portions of my assessment and plan of care.            If you have questions, please do not hesitate to call me. I look forward to following Barbara along with you.    Sincerely,      Dru Leblanc MD           CC  No Recipients

## 2020-12-04 ENCOUNTER — DOCUMENT SCAN (OUTPATIENT)
Dept: HOME HEALTH SERVICES | Facility: HOSPITAL | Age: 80
End: 2020-12-04
Payer: MEDICARE

## 2020-12-08 ENCOUNTER — INFUSION (OUTPATIENT)
Dept: INFUSION THERAPY | Facility: HOSPITAL | Age: 80
End: 2020-12-08
Attending: INTERNAL MEDICINE
Payer: MEDICARE

## 2020-12-08 VITALS
OXYGEN SATURATION: 96 % | RESPIRATION RATE: 17 BRPM | TEMPERATURE: 98 F | SYSTOLIC BLOOD PRESSURE: 170 MMHG | HEART RATE: 50 BPM | DIASTOLIC BLOOD PRESSURE: 84 MMHG

## 2020-12-08 DIAGNOSIS — M17.10 PRIMARY LOCALIZED OSTEOARTHROSIS, LOWER LEG: Primary | ICD-10-CM

## 2020-12-08 PROCEDURE — 63600175 PHARM REV CODE 636 W HCPCS: Mod: JG | Performed by: INTERNAL MEDICINE

## 2020-12-08 PROCEDURE — 96372 THER/PROPH/DIAG INJ SC/IM: CPT

## 2020-12-08 RX ADMIN — DENOSUMAB 60 MG: 60 INJECTION SUBCUTANEOUS at 01:12

## 2020-12-08 NOTE — PLAN OF CARE
Patient arrived to unit for Prolia injection. Pt reports recent fall with injury. Left leg swollen, bruised, and had skin graft to knee due to fall. Pt ambulating well. Labs reviewed, Calcium level 9.6 noted. Pt reports continued Vitamin D and Calcium supplements regimen. Denies dental procedures or jaw pain in the last 3 months.Plan of care reviewed, patient agreeable to plan. Patient tolerated injection well. VSS. Discharge instructions reviewed, patient instructed to return Laxmi 15. Patient ambulated off unit unassisted by self. Patient in NAD at time of discharge.

## 2020-12-17 ENCOUNTER — DOCUMENT SCAN (OUTPATIENT)
Dept: HOME HEALTH SERVICES | Facility: HOSPITAL | Age: 80
End: 2020-12-17
Payer: MEDICARE

## 2020-12-23 DIAGNOSIS — E11.22 TYPE 2 DIABETES MELLITUS WITH STAGE 3 CHRONIC KIDNEY DISEASE, WITHOUT LONG-TERM CURRENT USE OF INSULIN: ICD-10-CM

## 2020-12-23 DIAGNOSIS — N18.30 TYPE 2 DIABETES MELLITUS WITH STAGE 3 CHRONIC KIDNEY DISEASE, WITHOUT LONG-TERM CURRENT USE OF INSULIN: ICD-10-CM

## 2020-12-23 DIAGNOSIS — I10 ESSENTIAL HYPERTENSION: ICD-10-CM

## 2020-12-24 RX ORDER — ATORVASTATIN CALCIUM 40 MG/1
40 TABLET, FILM COATED ORAL DAILY
Qty: 90 TABLET | Refills: 1 | Status: SHIPPED | OUTPATIENT
Start: 2020-12-24 | End: 2021-07-15

## 2021-01-04 RX ORDER — METOPROLOL SUCCINATE 25 MG/1
25 TABLET, EXTENDED RELEASE ORAL DAILY
Qty: 90 TABLET | Refills: 1 | Status: ON HOLD | OUTPATIENT
Start: 2021-01-04 | End: 2021-04-25 | Stop reason: HOSPADM

## 2021-01-05 ENCOUNTER — TELEPHONE (OUTPATIENT)
Dept: FAMILY MEDICINE | Facility: CLINIC | Age: 81
End: 2021-01-05

## 2021-01-05 DIAGNOSIS — M1A.09X0 IDIOPATHIC CHRONIC GOUT OF MULTIPLE SITES WITHOUT TOPHUS: Chronic | ICD-10-CM

## 2021-01-05 DIAGNOSIS — E78.00 PURE HYPERCHOLESTEROLEMIA: Primary | Chronic | ICD-10-CM

## 2021-01-05 DIAGNOSIS — I10 ESSENTIAL HYPERTENSION: Chronic | ICD-10-CM

## 2021-01-05 DIAGNOSIS — I48.0 PAROXYSMAL ATRIAL FIBRILLATION: Chronic | ICD-10-CM

## 2021-01-05 DIAGNOSIS — I50.32 CHRONIC DIASTOLIC CHF (CONGESTIVE HEART FAILURE): ICD-10-CM

## 2021-01-05 DIAGNOSIS — D47.2 MGUS (MONOCLONAL GAMMOPATHY OF UNKNOWN SIGNIFICANCE): Chronic | ICD-10-CM

## 2021-01-05 DIAGNOSIS — E83.119 HEMOCHROMATOSIS, UNSPECIFIED HEMOCHROMATOSIS TYPE: ICD-10-CM

## 2021-01-05 DIAGNOSIS — R73.03 PREDIABETES: ICD-10-CM

## 2021-01-05 DIAGNOSIS — I70.202 ATHEROSCLEROSIS OF NATIVE ARTERY OF LEFT LOWER EXTREMITY, WITH UNSPECIFIED PRESENCE OF CLINICAL MANIFESTATION: ICD-10-CM

## 2021-01-05 DIAGNOSIS — E66.9 OBESITY, CLASS II, BMI 35-39.9: Chronic | ICD-10-CM

## 2021-01-12 LAB
LEFT EYE DM RETINOPATHY: NEGATIVE
RIGHT EYE DM RETINOPATHY: NEGATIVE

## 2021-01-19 ENCOUNTER — LAB VISIT (OUTPATIENT)
Dept: LAB | Facility: HOSPITAL | Age: 81
End: 2021-01-19
Attending: FAMILY MEDICINE
Payer: MEDICARE

## 2021-01-19 DIAGNOSIS — E78.00 PURE HYPERCHOLESTEROLEMIA: Chronic | ICD-10-CM

## 2021-01-19 DIAGNOSIS — E66.9 OBESITY, CLASS II, BMI 35-39.9: Chronic | ICD-10-CM

## 2021-01-19 DIAGNOSIS — I10 ESSENTIAL HYPERTENSION: Chronic | ICD-10-CM

## 2021-01-19 DIAGNOSIS — M1A.09X0 IDIOPATHIC CHRONIC GOUT OF MULTIPLE SITES WITHOUT TOPHUS: Chronic | ICD-10-CM

## 2021-01-19 DIAGNOSIS — D47.2 MGUS (MONOCLONAL GAMMOPATHY OF UNKNOWN SIGNIFICANCE): Chronic | ICD-10-CM

## 2021-01-19 DIAGNOSIS — R73.03 PREDIABETES: ICD-10-CM

## 2021-01-19 LAB
ALBUMIN SERPL BCP-MCNC: 3.7 G/DL (ref 3.5–5.2)
ALP SERPL-CCNC: 88 U/L (ref 55–135)
ALT SERPL W/O P-5'-P-CCNC: 16 U/L (ref 10–44)
ANION GAP SERPL CALC-SCNC: 6 MMOL/L (ref 8–16)
AST SERPL-CCNC: 14 U/L (ref 10–40)
BASOPHILS # BLD AUTO: 0.04 K/UL (ref 0–0.2)
BASOPHILS NFR BLD: 0.6 % (ref 0–1.9)
BILIRUB SERPL-MCNC: 1 MG/DL (ref 0.1–1)
BUN SERPL-MCNC: 29 MG/DL (ref 8–23)
CALCIUM SERPL-MCNC: 9 MG/DL (ref 8.7–10.5)
CHLORIDE SERPL-SCNC: 104 MMOL/L (ref 95–110)
CHOLEST SERPL-MCNC: 127 MG/DL (ref 120–199)
CHOLEST/HDLC SERPL: 2.5 {RATIO} (ref 2–5)
CO2 SERPL-SCNC: 30 MMOL/L (ref 23–29)
CREAT SERPL-MCNC: 1.8 MG/DL (ref 0.5–1.4)
DIFFERENTIAL METHOD: NORMAL
EOSINOPHIL # BLD AUTO: 0.1 K/UL (ref 0–0.5)
EOSINOPHIL NFR BLD: 1.3 % (ref 0–8)
ERYTHROCYTE [DISTWIDTH] IN BLOOD BY AUTOMATED COUNT: 14.5 % (ref 11.5–14.5)
EST. GFR  (AFRICAN AMERICAN): 30 ML/MIN/1.73 M^2
EST. GFR  (NON AFRICAN AMERICAN): 26 ML/MIN/1.73 M^2
GLUCOSE SERPL-MCNC: 102 MG/DL (ref 70–110)
HCT VFR BLD AUTO: 45.6 % (ref 37–48.5)
HDLC SERPL-MCNC: 51 MG/DL (ref 40–75)
HDLC SERPL: 40.2 % (ref 20–50)
HGB BLD-MCNC: 14.9 G/DL (ref 12–16)
IMM GRANULOCYTES # BLD AUTO: 0.03 K/UL (ref 0–0.04)
IMM GRANULOCYTES NFR BLD AUTO: 0.5 % (ref 0–0.5)
LDLC SERPL CALC-MCNC: 56.4 MG/DL (ref 63–159)
LYMPHOCYTES # BLD AUTO: 1.2 K/UL (ref 1–4.8)
LYMPHOCYTES NFR BLD: 19.4 % (ref 18–48)
MCH RBC QN AUTO: 30.2 PG (ref 27–31)
MCHC RBC AUTO-ENTMCNC: 32.7 G/DL (ref 32–36)
MCV RBC AUTO: 92 FL (ref 82–98)
MONOCYTES # BLD AUTO: 0.5 K/UL (ref 0.3–1)
MONOCYTES NFR BLD: 8.3 % (ref 4–15)
NEUTROPHILS # BLD AUTO: 4.4 K/UL (ref 1.8–7.7)
NEUTROPHILS NFR BLD: 69.9 % (ref 38–73)
NONHDLC SERPL-MCNC: 76 MG/DL
NRBC BLD-RTO: 0 /100 WBC
PLATELET # BLD AUTO: 163 K/UL (ref 150–350)
PMV BLD AUTO: 10.8 FL (ref 9.2–12.9)
POTASSIUM SERPL-SCNC: 5 MMOL/L (ref 3.5–5.1)
PROT SERPL-MCNC: 7.5 G/DL (ref 6–8.4)
RBC # BLD AUTO: 4.94 M/UL (ref 4–5.4)
SODIUM SERPL-SCNC: 140 MMOL/L (ref 136–145)
TRIGL SERPL-MCNC: 98 MG/DL (ref 30–150)
URATE SERPL-MCNC: 5.1 MG/DL (ref 2.4–5.7)
WBC # BLD AUTO: 6.23 K/UL (ref 3.9–12.7)

## 2021-01-19 PROCEDURE — 80053 COMPREHEN METABOLIC PANEL: CPT

## 2021-01-19 PROCEDURE — 84550 ASSAY OF BLOOD/URIC ACID: CPT

## 2021-01-19 PROCEDURE — 85025 COMPLETE CBC W/AUTO DIFF WBC: CPT

## 2021-01-19 PROCEDURE — 80061 LIPID PANEL: CPT

## 2021-01-19 PROCEDURE — 83036 HEMOGLOBIN GLYCOSYLATED A1C: CPT

## 2021-01-19 PROCEDURE — 36415 COLL VENOUS BLD VENIPUNCTURE: CPT | Mod: PN

## 2021-01-20 LAB
ESTIMATED AVG GLUCOSE: 120 MG/DL (ref 68–131)
HBA1C MFR BLD HPLC: 5.8 % (ref 4–5.6)

## 2021-01-25 ENCOUNTER — OFFICE VISIT (OUTPATIENT)
Dept: FAMILY MEDICINE | Facility: CLINIC | Age: 81
End: 2021-01-25
Payer: MEDICARE

## 2021-01-25 VITALS
HEIGHT: 62 IN | WEIGHT: 191.13 LBS | OXYGEN SATURATION: 98 % | HEART RATE: 50 BPM | BODY MASS INDEX: 35.17 KG/M2 | TEMPERATURE: 98 F | DIASTOLIC BLOOD PRESSURE: 73 MMHG | RESPIRATION RATE: 18 BRPM | SYSTOLIC BLOOD PRESSURE: 127 MMHG

## 2021-01-25 DIAGNOSIS — I70.248 ATHEROSCLEROSIS OF NATIVE ARTERY OF LEFT LOWER EXTREMITY WITH ULCERATION OF OTHER PART OF LOWER LEG: ICD-10-CM

## 2021-01-25 DIAGNOSIS — I48.0 PAROXYSMAL ATRIAL FIBRILLATION: ICD-10-CM

## 2021-01-25 DIAGNOSIS — I50.32 CHRONIC DIASTOLIC CHF (CONGESTIVE HEART FAILURE): ICD-10-CM

## 2021-01-25 DIAGNOSIS — I70.202 ATHEROSCLEROSIS OF NATIVE ARTERY OF LEFT LOWER EXTREMITY, WITH UNSPECIFIED PRESENCE OF CLINICAL MANIFESTATION: ICD-10-CM

## 2021-01-25 DIAGNOSIS — N18.32 TYPE 2 DIABETES MELLITUS WITH STAGE 3B CHRONIC KIDNEY DISEASE, WITHOUT LONG-TERM CURRENT USE OF INSULIN: ICD-10-CM

## 2021-01-25 DIAGNOSIS — E11.22 TYPE 2 DIABETES MELLITUS WITH STAGE 3B CHRONIC KIDNEY DISEASE, WITHOUT LONG-TERM CURRENT USE OF INSULIN: ICD-10-CM

## 2021-01-25 DIAGNOSIS — E66.01 MORBID (SEVERE) OBESITY DUE TO EXCESS CALORIES: ICD-10-CM

## 2021-01-25 DIAGNOSIS — I10 BENIGN ESSENTIAL HTN: Primary | ICD-10-CM

## 2021-01-25 DIAGNOSIS — N18.4 CHRONIC KIDNEY DISEASE, STAGE 4 (SEVERE): ICD-10-CM

## 2021-01-25 DIAGNOSIS — N25.81 SECONDARY HYPERPARATHYROIDISM OF RENAL ORIGIN: ICD-10-CM

## 2021-01-25 PROBLEM — I70.25 ATHEROSCLEROSIS OF NATIVE ARTERY OF EXTREMITY WITH ULCERATION: Status: ACTIVE | Noted: 2021-01-25

## 2021-01-25 PROCEDURE — 3074F PR MOST RECENT SYSTOLIC BLOOD PRESSURE < 130 MM HG: ICD-10-PCS | Mod: CPTII,S$GLB,, | Performed by: FAMILY MEDICINE

## 2021-01-25 PROCEDURE — 99999 PR PBB SHADOW E&M-EST. PATIENT-LVL V: CPT | Mod: PBBFAC,,, | Performed by: FAMILY MEDICINE

## 2021-01-25 PROCEDURE — 1101F PT FALLS ASSESS-DOCD LE1/YR: CPT | Mod: CPTII,S$GLB,, | Performed by: FAMILY MEDICINE

## 2021-01-25 PROCEDURE — 99214 PR OFFICE/OUTPT VISIT, EST, LEVL IV, 30-39 MIN: ICD-10-PCS | Mod: S$GLB,,, | Performed by: FAMILY MEDICINE

## 2021-01-25 PROCEDURE — 1101F PR PT FALLS ASSESS DOC 0-1 FALLS W/OUT INJ PAST YR: ICD-10-PCS | Mod: CPTII,S$GLB,, | Performed by: FAMILY MEDICINE

## 2021-01-25 PROCEDURE — 1159F PR MEDICATION LIST DOCUMENTED IN MEDICAL RECORD: ICD-10-PCS | Mod: S$GLB,,, | Performed by: FAMILY MEDICINE

## 2021-01-25 PROCEDURE — 99499 RISK ADDL DX/OHS AUDIT: ICD-10-PCS | Mod: S$GLB,,, | Performed by: FAMILY MEDICINE

## 2021-01-25 PROCEDURE — 99214 OFFICE O/P EST MOD 30 MIN: CPT | Mod: S$GLB,,, | Performed by: FAMILY MEDICINE

## 2021-01-25 PROCEDURE — 3288F FALL RISK ASSESSMENT DOCD: CPT | Mod: CPTII,S$GLB,, | Performed by: FAMILY MEDICINE

## 2021-01-25 PROCEDURE — 99499 UNLISTED E&M SERVICE: CPT | Mod: S$GLB,,, | Performed by: FAMILY MEDICINE

## 2021-01-25 PROCEDURE — 1126F PR PAIN SEVERITY QUANTIFIED, NO PAIN PRESENT: ICD-10-PCS | Mod: S$GLB,,, | Performed by: FAMILY MEDICINE

## 2021-01-25 PROCEDURE — 1159F MED LIST DOCD IN RCRD: CPT | Mod: S$GLB,,, | Performed by: FAMILY MEDICINE

## 2021-01-25 PROCEDURE — 3288F PR FALLS RISK ASSESSMENT DOCUMENTED: ICD-10-PCS | Mod: CPTII,S$GLB,, | Performed by: FAMILY MEDICINE

## 2021-01-25 PROCEDURE — 99999 PR PBB SHADOW E&M-EST. PATIENT-LVL V: ICD-10-PCS | Mod: PBBFAC,,, | Performed by: FAMILY MEDICINE

## 2021-01-25 PROCEDURE — 3078F DIAST BP <80 MM HG: CPT | Mod: CPTII,S$GLB,, | Performed by: FAMILY MEDICINE

## 2021-01-25 PROCEDURE — 1126F AMNT PAIN NOTED NONE PRSNT: CPT | Mod: S$GLB,,, | Performed by: FAMILY MEDICINE

## 2021-01-25 PROCEDURE — 3078F PR MOST RECENT DIASTOLIC BLOOD PRESSURE < 80 MM HG: ICD-10-PCS | Mod: CPTII,S$GLB,, | Performed by: FAMILY MEDICINE

## 2021-01-25 PROCEDURE — 3074F SYST BP LT 130 MM HG: CPT | Mod: CPTII,S$GLB,, | Performed by: FAMILY MEDICINE

## 2021-01-25 RX ORDER — AMLODIPINE BESYLATE 5 MG/1
5 TABLET ORAL DAILY
Qty: 90 TABLET | Refills: 3 | Status: SHIPPED | OUTPATIENT
Start: 2021-01-25 | End: 2021-10-06

## 2021-01-25 RX ORDER — ALLOPURINOL 100 MG/1
TABLET ORAL
COMMUNITY
Start: 2020-12-22 | End: 2023-01-01

## 2021-02-05 ENCOUNTER — PATIENT OUTREACH (OUTPATIENT)
Dept: ADMINISTRATIVE | Facility: HOSPITAL | Age: 81
End: 2021-02-05

## 2021-03-01 ENCOUNTER — OFFICE VISIT (OUTPATIENT)
Dept: CARDIOLOGY | Facility: CLINIC | Age: 81
End: 2021-03-01
Payer: MEDICARE

## 2021-03-01 VITALS
DIASTOLIC BLOOD PRESSURE: 72 MMHG | OXYGEN SATURATION: 95 % | BODY MASS INDEX: 35.86 KG/M2 | SYSTOLIC BLOOD PRESSURE: 128 MMHG | HEIGHT: 62 IN | WEIGHT: 194.88 LBS | HEART RATE: 60 BPM

## 2021-03-01 DIAGNOSIS — R00.1 BRADYCARDIA: ICD-10-CM

## 2021-03-01 DIAGNOSIS — E78.00 PURE HYPERCHOLESTEROLEMIA: Chronic | ICD-10-CM

## 2021-03-01 DIAGNOSIS — I50.32 CHRONIC DIASTOLIC CHF (CONGESTIVE HEART FAILURE): ICD-10-CM

## 2021-03-01 DIAGNOSIS — I10 ESSENTIAL HYPERTENSION: Chronic | ICD-10-CM

## 2021-03-01 DIAGNOSIS — I48.0 PAROXYSMAL ATRIAL FIBRILLATION: Primary | Chronic | ICD-10-CM

## 2021-03-01 PROCEDURE — 1126F PR PAIN SEVERITY QUANTIFIED, NO PAIN PRESENT: ICD-10-PCS | Mod: S$GLB,,, | Performed by: INTERNAL MEDICINE

## 2021-03-01 PROCEDURE — 3078F DIAST BP <80 MM HG: CPT | Mod: CPTII,S$GLB,, | Performed by: INTERNAL MEDICINE

## 2021-03-01 PROCEDURE — 99499 RISK ADDL DX/OHS AUDIT: ICD-10-PCS | Mod: S$GLB,,, | Performed by: INTERNAL MEDICINE

## 2021-03-01 PROCEDURE — 99499 UNLISTED E&M SERVICE: CPT | Mod: S$GLB,,, | Performed by: INTERNAL MEDICINE

## 2021-03-01 PROCEDURE — 3288F FALL RISK ASSESSMENT DOCD: CPT | Mod: CPTII,S$GLB,, | Performed by: INTERNAL MEDICINE

## 2021-03-01 PROCEDURE — 99214 OFFICE O/P EST MOD 30 MIN: CPT | Mod: S$GLB,,, | Performed by: INTERNAL MEDICINE

## 2021-03-01 PROCEDURE — 3078F PR MOST RECENT DIASTOLIC BLOOD PRESSURE < 80 MM HG: ICD-10-PCS | Mod: CPTII,S$GLB,, | Performed by: INTERNAL MEDICINE

## 2021-03-01 PROCEDURE — 1100F PTFALLS ASSESS-DOCD GE2>/YR: CPT | Mod: CPTII,S$GLB,, | Performed by: INTERNAL MEDICINE

## 2021-03-01 PROCEDURE — 1159F PR MEDICATION LIST DOCUMENTED IN MEDICAL RECORD: ICD-10-PCS | Mod: S$GLB,,, | Performed by: INTERNAL MEDICINE

## 2021-03-01 PROCEDURE — 3074F PR MOST RECENT SYSTOLIC BLOOD PRESSURE < 130 MM HG: ICD-10-PCS | Mod: CPTII,S$GLB,, | Performed by: INTERNAL MEDICINE

## 2021-03-01 PROCEDURE — 3074F SYST BP LT 130 MM HG: CPT | Mod: CPTII,S$GLB,, | Performed by: INTERNAL MEDICINE

## 2021-03-01 PROCEDURE — 1100F PR PT FALLS ASSESS DOC 2+ FALLS/FALL W/INJURY/YR: ICD-10-PCS | Mod: CPTII,S$GLB,, | Performed by: INTERNAL MEDICINE

## 2021-03-01 PROCEDURE — 93000 ELECTROCARDIOGRAM COMPLETE: CPT | Mod: S$GLB,,, | Performed by: INTERNAL MEDICINE

## 2021-03-01 PROCEDURE — 99999 PR PBB SHADOW E&M-EST. PATIENT-LVL IV: CPT | Mod: PBBFAC,,, | Performed by: INTERNAL MEDICINE

## 2021-03-01 PROCEDURE — 1159F MED LIST DOCD IN RCRD: CPT | Mod: S$GLB,,, | Performed by: INTERNAL MEDICINE

## 2021-03-01 PROCEDURE — 99214 PR OFFICE/OUTPT VISIT, EST, LEVL IV, 30-39 MIN: ICD-10-PCS | Mod: S$GLB,,, | Performed by: INTERNAL MEDICINE

## 2021-03-01 PROCEDURE — 93000 EKG 12-LEAD: ICD-10-PCS | Mod: S$GLB,,, | Performed by: INTERNAL MEDICINE

## 2021-03-01 PROCEDURE — 1126F AMNT PAIN NOTED NONE PRSNT: CPT | Mod: S$GLB,,, | Performed by: INTERNAL MEDICINE

## 2021-03-01 PROCEDURE — 3288F PR FALLS RISK ASSESSMENT DOCUMENTED: ICD-10-PCS | Mod: CPTII,S$GLB,, | Performed by: INTERNAL MEDICINE

## 2021-03-01 PROCEDURE — 99999 PR PBB SHADOW E&M-EST. PATIENT-LVL IV: ICD-10-PCS | Mod: PBBFAC,,, | Performed by: INTERNAL MEDICINE

## 2021-04-24 PROBLEM — R55 SYNCOPE: Status: ACTIVE | Noted: 2021-04-24

## 2021-04-24 PROBLEM — I49.5 SINUS BRADY-TACHY SYNDROME: Status: ACTIVE | Noted: 2021-04-24

## 2021-04-24 PROBLEM — Z71.89 ACP (ADVANCE CARE PLANNING): Status: ACTIVE | Noted: 2021-04-24

## 2021-04-24 PROBLEM — R79.89 ELEVATED TROPONIN: Status: ACTIVE | Noted: 2021-04-24

## 2021-04-26 ENCOUNTER — TELEPHONE (OUTPATIENT)
Dept: CARDIOLOGY | Facility: CLINIC | Age: 81
End: 2021-04-26

## 2021-04-26 ENCOUNTER — TELEPHONE (OUTPATIENT)
Dept: FAMILY MEDICINE | Facility: CLINIC | Age: 81
End: 2021-04-26

## 2021-05-03 ENCOUNTER — OFFICE VISIT (OUTPATIENT)
Dept: FAMILY MEDICINE | Facility: CLINIC | Age: 81
End: 2021-05-03
Payer: MEDICARE

## 2021-05-03 VITALS
OXYGEN SATURATION: 95 % | SYSTOLIC BLOOD PRESSURE: 148 MMHG | HEIGHT: 62 IN | RESPIRATION RATE: 16 BRPM | HEART RATE: 65 BPM | WEIGHT: 198.63 LBS | BODY MASS INDEX: 36.55 KG/M2 | DIASTOLIC BLOOD PRESSURE: 86 MMHG | TEMPERATURE: 98 F

## 2021-05-03 DIAGNOSIS — I70.202 ATHEROSCLEROSIS OF NATIVE ARTERY OF LEFT LOWER EXTREMITY, WITH UNSPECIFIED PRESENCE OF CLINICAL MANIFESTATION: ICD-10-CM

## 2021-05-03 DIAGNOSIS — R55 SYNCOPE, UNSPECIFIED SYNCOPE TYPE: ICD-10-CM

## 2021-05-03 DIAGNOSIS — N18.4 CHRONIC KIDNEY DISEASE, STAGE 4 (SEVERE): ICD-10-CM

## 2021-05-03 DIAGNOSIS — I10 ESSENTIAL HYPERTENSION: Chronic | ICD-10-CM

## 2021-05-03 DIAGNOSIS — I48.0 PAROXYSMAL ATRIAL FIBRILLATION: Primary | Chronic | ICD-10-CM

## 2021-05-03 DIAGNOSIS — M17.10 PRIMARY LOCALIZED OSTEOARTHROSIS OF LOWER LEG, UNSPECIFIED LATERALITY: ICD-10-CM

## 2021-05-03 PROBLEM — J20.9 ACUTE BRONCHITIS: Status: RESOLVED | Noted: 2019-01-31 | Resolved: 2021-05-03

## 2021-05-03 PROBLEM — I70.25 ATHEROSCLEROSIS OF NATIVE ARTERY OF EXTREMITY WITH ULCERATION: Status: RESOLVED | Noted: 2021-01-25 | Resolved: 2021-05-03

## 2021-05-03 PROBLEM — E87.1 HYPONATREMIA: Status: RESOLVED | Noted: 2020-09-27 | Resolved: 2021-05-03

## 2021-05-03 PROBLEM — Z87.828 H/O OPEN LEG WOUND: Status: RESOLVED | Noted: 2020-09-17 | Resolved: 2021-05-03

## 2021-05-03 PROBLEM — N18.30 STAGE 3 CHRONIC KIDNEY DISEASE: Status: RESOLVED | Noted: 2019-02-04 | Resolved: 2021-05-03

## 2021-05-03 PROCEDURE — 1126F AMNT PAIN NOTED NONE PRSNT: CPT | Mod: S$GLB,,, | Performed by: FAMILY MEDICINE

## 2021-05-03 PROCEDURE — 1101F PR PT FALLS ASSESS DOC 0-1 FALLS W/OUT INJ PAST YR: ICD-10-PCS | Mod: CPTII,S$GLB,, | Performed by: FAMILY MEDICINE

## 2021-05-03 PROCEDURE — 99499 UNLISTED E&M SERVICE: CPT | Mod: S$GLB,,, | Performed by: FAMILY MEDICINE

## 2021-05-03 PROCEDURE — 1126F PR PAIN SEVERITY QUANTIFIED, NO PAIN PRESENT: ICD-10-PCS | Mod: S$GLB,,, | Performed by: FAMILY MEDICINE

## 2021-05-03 PROCEDURE — 99499 RISK ADDL DX/OHS AUDIT: ICD-10-PCS | Mod: S$GLB,,, | Performed by: FAMILY MEDICINE

## 2021-05-03 PROCEDURE — 99214 PR OFFICE/OUTPT VISIT, EST, LEVL IV, 30-39 MIN: ICD-10-PCS | Mod: S$GLB,,, | Performed by: FAMILY MEDICINE

## 2021-05-03 PROCEDURE — 1159F PR MEDICATION LIST DOCUMENTED IN MEDICAL RECORD: ICD-10-PCS | Mod: S$GLB,,, | Performed by: FAMILY MEDICINE

## 2021-05-03 PROCEDURE — 99999 PR PBB SHADOW E&M-EST. PATIENT-LVL IV: ICD-10-PCS | Mod: PBBFAC,,, | Performed by: FAMILY MEDICINE

## 2021-05-03 PROCEDURE — 1159F MED LIST DOCD IN RCRD: CPT | Mod: S$GLB,,, | Performed by: FAMILY MEDICINE

## 2021-05-03 PROCEDURE — 3288F FALL RISK ASSESSMENT DOCD: CPT | Mod: CPTII,S$GLB,, | Performed by: FAMILY MEDICINE

## 2021-05-03 PROCEDURE — 1101F PT FALLS ASSESS-DOCD LE1/YR: CPT | Mod: CPTII,S$GLB,, | Performed by: FAMILY MEDICINE

## 2021-05-03 PROCEDURE — 99214 OFFICE O/P EST MOD 30 MIN: CPT | Mod: S$GLB,,, | Performed by: FAMILY MEDICINE

## 2021-05-03 PROCEDURE — 99999 PR PBB SHADOW E&M-EST. PATIENT-LVL IV: CPT | Mod: PBBFAC,,, | Performed by: FAMILY MEDICINE

## 2021-05-03 PROCEDURE — 3288F PR FALLS RISK ASSESSMENT DOCUMENTED: ICD-10-PCS | Mod: CPTII,S$GLB,, | Performed by: FAMILY MEDICINE

## 2021-05-06 ENCOUNTER — OFFICE VISIT (OUTPATIENT)
Dept: CARDIOLOGY | Facility: CLINIC | Age: 81
End: 2021-05-06
Payer: MEDICARE

## 2021-05-06 VITALS
OXYGEN SATURATION: 98 % | BODY MASS INDEX: 36.17 KG/M2 | DIASTOLIC BLOOD PRESSURE: 84 MMHG | WEIGHT: 196.56 LBS | HEART RATE: 70 BPM | SYSTOLIC BLOOD PRESSURE: 130 MMHG | HEIGHT: 62 IN

## 2021-05-06 DIAGNOSIS — E11.22 TYPE 2 DIABETES MELLITUS WITH STAGE 3B CHRONIC KIDNEY DISEASE, WITHOUT LONG-TERM CURRENT USE OF INSULIN: ICD-10-CM

## 2021-05-06 DIAGNOSIS — I10 ESSENTIAL HYPERTENSION: Primary | Chronic | ICD-10-CM

## 2021-05-06 DIAGNOSIS — N18.32 TYPE 2 DIABETES MELLITUS WITH STAGE 3B CHRONIC KIDNEY DISEASE, WITHOUT LONG-TERM CURRENT USE OF INSULIN: ICD-10-CM

## 2021-05-06 DIAGNOSIS — I50.32 CHRONIC DIASTOLIC CHF (CONGESTIVE HEART FAILURE): ICD-10-CM

## 2021-05-06 DIAGNOSIS — E78.5 HYPERLIPIDEMIA, UNSPECIFIED HYPERLIPIDEMIA TYPE: Chronic | ICD-10-CM

## 2021-05-06 DIAGNOSIS — I70.202 ATHEROSCLEROSIS OF NATIVE ARTERY OF LEFT LOWER EXTREMITY, WITH UNSPECIFIED PRESENCE OF CLINICAL MANIFESTATION: ICD-10-CM

## 2021-05-06 DIAGNOSIS — R00.1 SINUS BRADYCARDIA: ICD-10-CM

## 2021-05-06 DIAGNOSIS — R55 SYNCOPE, UNSPECIFIED SYNCOPE TYPE: ICD-10-CM

## 2021-05-06 DIAGNOSIS — I48.0 PAROXYSMAL ATRIAL FIBRILLATION: Chronic | ICD-10-CM

## 2021-05-06 PROCEDURE — 1126F PR PAIN SEVERITY QUANTIFIED, NO PAIN PRESENT: ICD-10-PCS | Mod: S$GLB,,, | Performed by: INTERNAL MEDICINE

## 2021-05-06 PROCEDURE — 99214 PR OFFICE/OUTPT VISIT, EST, LEVL IV, 30-39 MIN: ICD-10-PCS | Mod: S$GLB,,, | Performed by: INTERNAL MEDICINE

## 2021-05-06 PROCEDURE — 93000 EKG 12-LEAD: ICD-10-PCS | Mod: S$GLB,,, | Performed by: INTERNAL MEDICINE

## 2021-05-06 PROCEDURE — 3288F FALL RISK ASSESSMENT DOCD: CPT | Mod: CPTII,S$GLB,, | Performed by: INTERNAL MEDICINE

## 2021-05-06 PROCEDURE — 3288F PR FALLS RISK ASSESSMENT DOCUMENTED: ICD-10-PCS | Mod: CPTII,S$GLB,, | Performed by: INTERNAL MEDICINE

## 2021-05-06 PROCEDURE — 99214 OFFICE O/P EST MOD 30 MIN: CPT | Mod: S$GLB,,, | Performed by: INTERNAL MEDICINE

## 2021-05-06 PROCEDURE — 1159F MED LIST DOCD IN RCRD: CPT | Mod: S$GLB,,, | Performed by: INTERNAL MEDICINE

## 2021-05-06 PROCEDURE — 3079F PR MOST RECENT DIASTOLIC BLOOD PRESSURE 80-89 MM HG: ICD-10-PCS | Mod: CPTII,S$GLB,, | Performed by: INTERNAL MEDICINE

## 2021-05-06 PROCEDURE — 99999 PR PBB SHADOW E&M-EST. PATIENT-LVL IV: CPT | Mod: PBBFAC,,, | Performed by: INTERNAL MEDICINE

## 2021-05-06 PROCEDURE — 3079F DIAST BP 80-89 MM HG: CPT | Mod: CPTII,S$GLB,, | Performed by: INTERNAL MEDICINE

## 2021-05-06 PROCEDURE — 99499 RISK ADDL DX/OHS AUDIT: ICD-10-PCS | Mod: S$GLB,,, | Performed by: INTERNAL MEDICINE

## 2021-05-06 PROCEDURE — 1101F PR PT FALLS ASSESS DOC 0-1 FALLS W/OUT INJ PAST YR: ICD-10-PCS | Mod: CPTII,S$GLB,, | Performed by: INTERNAL MEDICINE

## 2021-05-06 PROCEDURE — 3075F SYST BP GE 130 - 139MM HG: CPT | Mod: CPTII,S$GLB,, | Performed by: INTERNAL MEDICINE

## 2021-05-06 PROCEDURE — 3075F PR MOST RECENT SYSTOLIC BLOOD PRESS GE 130-139MM HG: ICD-10-PCS | Mod: CPTII,S$GLB,, | Performed by: INTERNAL MEDICINE

## 2021-05-06 PROCEDURE — 1126F AMNT PAIN NOTED NONE PRSNT: CPT | Mod: S$GLB,,, | Performed by: INTERNAL MEDICINE

## 2021-05-06 PROCEDURE — 99499 UNLISTED E&M SERVICE: CPT | Mod: S$GLB,,, | Performed by: INTERNAL MEDICINE

## 2021-05-06 PROCEDURE — 99999 PR PBB SHADOW E&M-EST. PATIENT-LVL IV: ICD-10-PCS | Mod: PBBFAC,,, | Performed by: INTERNAL MEDICINE

## 2021-05-06 PROCEDURE — 1159F PR MEDICATION LIST DOCUMENTED IN MEDICAL RECORD: ICD-10-PCS | Mod: S$GLB,,, | Performed by: INTERNAL MEDICINE

## 2021-05-06 PROCEDURE — 93000 ELECTROCARDIOGRAM COMPLETE: CPT | Mod: S$GLB,,, | Performed by: INTERNAL MEDICINE

## 2021-05-06 PROCEDURE — 1101F PT FALLS ASSESS-DOCD LE1/YR: CPT | Mod: CPTII,S$GLB,, | Performed by: INTERNAL MEDICINE

## 2021-05-07 ENCOUNTER — TELEPHONE (OUTPATIENT)
Dept: CARDIOLOGY | Facility: CLINIC | Age: 81
End: 2021-05-07

## 2021-06-01 DIAGNOSIS — Z12.31 BREAST CANCER SCREENING BY MAMMOGRAM: ICD-10-CM

## 2021-06-01 DIAGNOSIS — Z12.31 ENCOUNTER FOR SCREENING MAMMOGRAM FOR MALIGNANT NEOPLASM OF BREAST: Primary | ICD-10-CM

## 2021-06-08 ENCOUNTER — HOSPITAL ENCOUNTER (OUTPATIENT)
Dept: RADIOLOGY | Facility: HOSPITAL | Age: 81
Discharge: HOME | End: 2021-06-08
Attending: FAMILY MEDICINE
Payer: MEDICARE

## 2021-06-08 VITALS — BODY MASS INDEX: 36.15 KG/M2 | HEIGHT: 62 IN | WEIGHT: 196.44 LBS

## 2021-06-08 DIAGNOSIS — Z12.31 ENCOUNTER FOR SCREENING MAMMOGRAM FOR MALIGNANT NEOPLASM OF BREAST: ICD-10-CM

## 2021-06-08 PROCEDURE — 77067 SCR MAMMO BI INCL CAD: CPT | Mod: 26,,, | Performed by: RADIOLOGY

## 2021-06-08 PROCEDURE — 77067 MAMMO DIGITAL SCREENING BILAT WITH TOMO: ICD-10-PCS | Mod: 26,,, | Performed by: RADIOLOGY

## 2021-06-08 PROCEDURE — 77063 MAMMO DIGITAL SCREENING BILAT WITH TOMO: ICD-10-PCS | Mod: 26,,, | Performed by: RADIOLOGY

## 2021-06-08 PROCEDURE — 77063 BREAST TOMOSYNTHESIS BI: CPT | Mod: 26,,, | Performed by: RADIOLOGY

## 2021-06-08 PROCEDURE — 77067 SCR MAMMO BI INCL CAD: CPT | Mod: TC

## 2021-06-11 ENCOUNTER — PATIENT MESSAGE (OUTPATIENT)
Dept: FAMILY MEDICINE | Facility: CLINIC | Age: 81
End: 2021-06-11

## 2021-06-11 DIAGNOSIS — R92.8 ABNORMAL MAMMOGRAM: Primary | ICD-10-CM

## 2021-06-15 ENCOUNTER — INFUSION (OUTPATIENT)
Dept: INFUSION THERAPY | Facility: HOSPITAL | Age: 81
End: 2021-06-15
Attending: INTERNAL MEDICINE
Payer: MEDICARE

## 2021-06-15 VITALS
SYSTOLIC BLOOD PRESSURE: 117 MMHG | DIASTOLIC BLOOD PRESSURE: 82 MMHG | TEMPERATURE: 98 F | HEART RATE: 76 BPM | RESPIRATION RATE: 18 BRPM | OXYGEN SATURATION: 100 %

## 2021-06-15 DIAGNOSIS — M17.10 PRIMARY LOCALIZED OSTEOARTHROSIS, LOWER LEG: Primary | ICD-10-CM

## 2021-06-15 PROCEDURE — 63600175 PHARM REV CODE 636 W HCPCS: Mod: JG | Performed by: INTERNAL MEDICINE

## 2021-06-15 PROCEDURE — 96372 THER/PROPH/DIAG INJ SC/IM: CPT

## 2021-06-15 RX ADMIN — DENOSUMAB 60 MG: 60 INJECTION SUBCUTANEOUS at 01:06

## 2021-06-21 ENCOUNTER — HOSPITAL ENCOUNTER (OUTPATIENT)
Dept: RADIOLOGY | Facility: HOSPITAL | Age: 81
Discharge: HOME | End: 2021-06-21
Attending: FAMILY MEDICINE
Payer: MEDICARE

## 2021-06-21 ENCOUNTER — TELEPHONE (OUTPATIENT)
Dept: CARDIOLOGY | Facility: CLINIC | Age: 81
End: 2021-06-21

## 2021-06-21 DIAGNOSIS — R92.8 ABNORMAL MAMMOGRAM: ICD-10-CM

## 2021-06-21 PROCEDURE — 77061 BREAST TOMOSYNTHESIS UNI: CPT | Mod: 26,LT,, | Performed by: RADIOLOGY

## 2021-06-21 PROCEDURE — 77065 DX MAMMO INCL CAD UNI: CPT | Mod: 26,LT,, | Performed by: RADIOLOGY

## 2021-06-21 PROCEDURE — 76642 US BREAST LEFT LIMITED: ICD-10-PCS | Mod: 26,LT,, | Performed by: RADIOLOGY

## 2021-06-21 PROCEDURE — 77065 MAMMO DIGITAL DIAGNOSTIC LEFT WITH TOMO: ICD-10-PCS | Mod: 26,LT,, | Performed by: RADIOLOGY

## 2021-06-21 PROCEDURE — 76642 ULTRASOUND BREAST LIMITED: CPT | Mod: 26,LT,, | Performed by: RADIOLOGY

## 2021-06-21 PROCEDURE — 77061 MAMMO DIGITAL DIAGNOSTIC LEFT WITH TOMO: ICD-10-PCS | Mod: 26,LT,, | Performed by: RADIOLOGY

## 2021-06-21 PROCEDURE — 77061 BREAST TOMOSYNTHESIS UNI: CPT | Mod: TC,LT

## 2021-06-21 PROCEDURE — 76642 ULTRASOUND BREAST LIMITED: CPT | Mod: TC,LT

## 2021-07-14 ENCOUNTER — HOSPITAL ENCOUNTER (OUTPATIENT)
Dept: RADIOLOGY | Facility: HOSPITAL | Age: 81
Discharge: HOME | End: 2021-07-14
Attending: FAMILY MEDICINE
Payer: MEDICARE

## 2021-07-14 ENCOUNTER — TELEPHONE (OUTPATIENT)
Dept: CARDIOLOGY | Facility: HOSPITAL | Age: 81
End: 2021-07-14

## 2021-07-14 DIAGNOSIS — N64.89 HEMATOMA (NONTRAUMATIC) OF BREAST: ICD-10-CM

## 2021-07-14 DIAGNOSIS — R92.8 ABNORMAL MAMMOGRAM OF LEFT BREAST: ICD-10-CM

## 2021-07-14 PROCEDURE — 76642 ULTRASOUND BREAST LIMITED: CPT | Mod: TC,LT

## 2021-07-14 PROCEDURE — 76642 ULTRASOUND BREAST LIMITED: CPT | Mod: 26,LT,, | Performed by: RADIOLOGY

## 2021-07-14 PROCEDURE — 88305 TISSUE EXAM BY PATHOLOGIST: ICD-10-PCS | Mod: 26,,, | Performed by: PATHOLOGY

## 2021-07-14 PROCEDURE — 19081 BX BREAST 1ST LESION STRTCTC: CPT | Mod: LT,,, | Performed by: RADIOLOGY

## 2021-07-14 PROCEDURE — 19081 MAMMO BREAST STEREOTACTIC BREAST BIOPSY LEFT: ICD-10-PCS | Mod: LT,,, | Performed by: RADIOLOGY

## 2021-07-14 PROCEDURE — 88305 TISSUE EXAM BY PATHOLOGIST: CPT | Mod: 26,,, | Performed by: PATHOLOGY

## 2021-07-14 PROCEDURE — 76642 US BREAST LEFT LIMITED: ICD-10-PCS | Mod: 26,LT,, | Performed by: RADIOLOGY

## 2021-07-14 PROCEDURE — 25000003 PHARM REV CODE 250: Performed by: RADIOLOGY

## 2021-07-14 PROCEDURE — 19081 BX BREAST 1ST LESION STRTCTC: CPT | Mod: LT

## 2021-07-14 PROCEDURE — 88305 TISSUE EXAM BY PATHOLOGIST: CPT | Performed by: PATHOLOGY

## 2021-07-14 RX ORDER — LIDOCAINE HYDROCHLORIDE AND EPINEPHRINE 10; 10 MG/ML; UG/ML
20 INJECTION, SOLUTION INFILTRATION; PERINEURAL ONCE
Status: COMPLETED | OUTPATIENT
Start: 2021-07-14 | End: 2021-07-14

## 2021-07-14 RX ORDER — LIDOCAINE HYDROCHLORIDE 20 MG/ML
20 INJECTION, SOLUTION INFILTRATION; PERINEURAL ONCE
Status: COMPLETED | OUTPATIENT
Start: 2021-07-14 | End: 2021-07-14

## 2021-07-14 RX ADMIN — LIDOCAINE HYDROCHLORIDE 7 ML: 20 INJECTION, SOLUTION INFILTRATION; PERINEURAL at 10:07

## 2021-07-14 RX ADMIN — LIDOCAINE HYDROCHLORIDE,EPINEPHRINE BITARTRATE 15 ML: 10; .01 INJECTION, SOLUTION INFILTRATION; PERINEURAL at 10:07

## 2021-07-19 ENCOUNTER — OFFICE VISIT (OUTPATIENT)
Dept: SURGERY | Facility: CLINIC | Age: 81
End: 2021-07-19
Payer: MEDICARE

## 2021-07-19 ENCOUNTER — PATIENT OUTREACH (OUTPATIENT)
Dept: ADMINISTRATIVE | Facility: OTHER | Age: 81
End: 2021-07-19

## 2021-07-19 ENCOUNTER — HOSPITAL ENCOUNTER (OUTPATIENT)
Dept: RADIOLOGY | Facility: HOSPITAL | Age: 81
Discharge: HOME | End: 2021-07-19
Attending: FAMILY MEDICINE
Payer: MEDICARE

## 2021-07-19 ENCOUNTER — TELEPHONE (OUTPATIENT)
Dept: CARDIOLOGY | Facility: CLINIC | Age: 81
End: 2021-07-19

## 2021-07-19 VITALS
SYSTOLIC BLOOD PRESSURE: 157 MMHG | HEART RATE: 67 BPM | BODY MASS INDEX: 36.07 KG/M2 | DIASTOLIC BLOOD PRESSURE: 72 MMHG | HEIGHT: 62 IN | WEIGHT: 196 LBS

## 2021-07-19 DIAGNOSIS — R92.8 ABNORMAL MAMMOGRAM: ICD-10-CM

## 2021-07-19 DIAGNOSIS — N64.89 HEMATOMA OF LEFT BREAST: Primary | ICD-10-CM

## 2021-07-19 DIAGNOSIS — R92.8 ABNORMAL MAMMOGRAM OF LEFT BREAST: ICD-10-CM

## 2021-07-19 PROCEDURE — 99202 PR OFFICE/OUTPT VISIT, NEW, LEVL II, 15-29 MIN: ICD-10-PCS | Mod: S$GLB,,, | Performed by: SURGERY

## 2021-07-19 PROCEDURE — 76642 ULTRASOUND BREAST LIMITED: CPT | Mod: TC,LT

## 2021-07-19 PROCEDURE — 3077F PR MOST RECENT SYSTOLIC BLOOD PRESSURE >= 140 MM HG: ICD-10-PCS | Mod: CPTII,S$GLB,, | Performed by: SURGERY

## 2021-07-19 PROCEDURE — 76642 US BREAST LEFT LIMITED: ICD-10-PCS | Mod: 26,LT,, | Performed by: RADIOLOGY

## 2021-07-19 PROCEDURE — 3077F SYST BP >= 140 MM HG: CPT | Mod: CPTII,S$GLB,, | Performed by: SURGERY

## 2021-07-19 PROCEDURE — 1126F PR PAIN SEVERITY QUANTIFIED, NO PAIN PRESENT: ICD-10-PCS | Mod: CPTII,S$GLB,, | Performed by: SURGERY

## 2021-07-19 PROCEDURE — 3078F DIAST BP <80 MM HG: CPT | Mod: CPTII,S$GLB,, | Performed by: SURGERY

## 2021-07-19 PROCEDURE — 99202 OFFICE O/P NEW SF 15 MIN: CPT | Mod: S$GLB,,, | Performed by: SURGERY

## 2021-07-19 PROCEDURE — 3288F PR FALLS RISK ASSESSMENT DOCUMENTED: ICD-10-PCS | Mod: CPTII,S$GLB,, | Performed by: SURGERY

## 2021-07-19 PROCEDURE — 1160F RVW MEDS BY RX/DR IN RCRD: CPT | Mod: CPTII,S$GLB,, | Performed by: SURGERY

## 2021-07-19 PROCEDURE — 1101F PR PT FALLS ASSESS DOC 0-1 FALLS W/OUT INJ PAST YR: ICD-10-PCS | Mod: CPTII,S$GLB,, | Performed by: SURGERY

## 2021-07-19 PROCEDURE — 99999 PR PBB SHADOW E&M-EST. PATIENT-LVL III: ICD-10-PCS | Mod: PBBFAC,,, | Performed by: SURGERY

## 2021-07-19 PROCEDURE — 76642 ULTRASOUND BREAST LIMITED: CPT | Mod: 26,LT,, | Performed by: RADIOLOGY

## 2021-07-19 PROCEDURE — 1159F MED LIST DOCD IN RCRD: CPT | Mod: CPTII,S$GLB,, | Performed by: SURGERY

## 2021-07-19 PROCEDURE — 3288F FALL RISK ASSESSMENT DOCD: CPT | Mod: CPTII,S$GLB,, | Performed by: SURGERY

## 2021-07-19 PROCEDURE — 99999 PR PBB SHADOW E&M-EST. PATIENT-LVL III: CPT | Mod: PBBFAC,,, | Performed by: SURGERY

## 2021-07-19 PROCEDURE — 1101F PT FALLS ASSESS-DOCD LE1/YR: CPT | Mod: CPTII,S$GLB,, | Performed by: SURGERY

## 2021-07-19 PROCEDURE — 1159F PR MEDICATION LIST DOCUMENTED IN MEDICAL RECORD: ICD-10-PCS | Mod: CPTII,S$GLB,, | Performed by: SURGERY

## 2021-07-19 PROCEDURE — 1160F PR REVIEW ALL MEDS BY PRESCRIBER/CLIN PHARMACIST DOCUMENTED: ICD-10-PCS | Mod: CPTII,S$GLB,, | Performed by: SURGERY

## 2021-07-19 PROCEDURE — 1126F AMNT PAIN NOTED NONE PRSNT: CPT | Mod: CPTII,S$GLB,, | Performed by: SURGERY

## 2021-07-19 PROCEDURE — 3078F PR MOST RECENT DIASTOLIC BLOOD PRESSURE < 80 MM HG: ICD-10-PCS | Mod: CPTII,S$GLB,, | Performed by: SURGERY

## 2021-07-21 LAB
COMMENT: NORMAL
FINAL PATHOLOGIC DIAGNOSIS: NORMAL
GROSS: NORMAL
Lab: NORMAL

## 2021-08-02 ENCOUNTER — OFFICE VISIT (OUTPATIENT)
Dept: SURGERY | Facility: CLINIC | Age: 81
End: 2021-08-02
Payer: MEDICARE

## 2021-08-02 ENCOUNTER — OFFICE VISIT (OUTPATIENT)
Dept: FAMILY MEDICINE | Facility: CLINIC | Age: 81
End: 2021-08-02
Payer: MEDICARE

## 2021-08-02 VITALS
RESPIRATION RATE: 16 BRPM | WEIGHT: 200.38 LBS | OXYGEN SATURATION: 95 % | SYSTOLIC BLOOD PRESSURE: 120 MMHG | HEIGHT: 62 IN | DIASTOLIC BLOOD PRESSURE: 80 MMHG | BODY MASS INDEX: 36.87 KG/M2 | HEART RATE: 66 BPM

## 2021-08-02 VITALS
WEIGHT: 196 LBS | DIASTOLIC BLOOD PRESSURE: 89 MMHG | HEART RATE: 74 BPM | HEIGHT: 62 IN | SYSTOLIC BLOOD PRESSURE: 162 MMHG | BODY MASS INDEX: 36.07 KG/M2

## 2021-08-02 DIAGNOSIS — I50.32 CHRONIC DIASTOLIC CHF (CONGESTIVE HEART FAILURE): ICD-10-CM

## 2021-08-02 DIAGNOSIS — N18.4 CHRONIC KIDNEY DISEASE, STAGE 4 (SEVERE): ICD-10-CM

## 2021-08-02 DIAGNOSIS — D63.1 ANEMIA IN CHRONIC KIDNEY DISEASE, UNSPECIFIED CKD STAGE: ICD-10-CM

## 2021-08-02 DIAGNOSIS — E66.9 OBESITY, CLASS II, BMI 35-39.9: Chronic | ICD-10-CM

## 2021-08-02 DIAGNOSIS — D47.2 MGUS (MONOCLONAL GAMMOPATHY OF UNKNOWN SIGNIFICANCE): Chronic | ICD-10-CM

## 2021-08-02 DIAGNOSIS — I10 ESSENTIAL HYPERTENSION: Chronic | ICD-10-CM

## 2021-08-02 DIAGNOSIS — C50.812 MALIGNANT NEOPLASM OF OVERLAPPING SITES OF LEFT FEMALE BREAST, UNSPECIFIED ESTROGEN RECEPTOR STATUS: Primary | ICD-10-CM

## 2021-08-02 DIAGNOSIS — I70.202 ATHEROSCLEROSIS OF NATIVE ARTERY OF LEFT LOWER EXTREMITY, WITH UNSPECIFIED PRESENCE OF CLINICAL MANIFESTATION: ICD-10-CM

## 2021-08-02 DIAGNOSIS — C50.912 CARCINOMA OF LEFT FEMALE BREAST, UNSPECIFIED ESTROGEN RECEPTOR STATUS, UNSPECIFIED SITE OF BREAST: Primary | ICD-10-CM

## 2021-08-02 DIAGNOSIS — I48.0 PAROXYSMAL ATRIAL FIBRILLATION: Chronic | ICD-10-CM

## 2021-08-02 DIAGNOSIS — N18.9 ANEMIA IN CHRONIC KIDNEY DISEASE, UNSPECIFIED CKD STAGE: ICD-10-CM

## 2021-08-02 DIAGNOSIS — R73.03 PREDIABETES: ICD-10-CM

## 2021-08-02 PROBLEM — C50.919 CARCINOMA OF BREAST: Status: ACTIVE | Noted: 2021-08-02

## 2021-08-02 PROCEDURE — 1160F PR REVIEW ALL MEDS BY PRESCRIBER/CLIN PHARMACIST DOCUMENTED: ICD-10-PCS | Mod: CPTII,S$GLB,, | Performed by: FAMILY MEDICINE

## 2021-08-02 PROCEDURE — 3288F PR FALLS RISK ASSESSMENT DOCUMENTED: ICD-10-PCS | Mod: CPTII,S$GLB,, | Performed by: SURGERY

## 2021-08-02 PROCEDURE — 1101F PR PT FALLS ASSESS DOC 0-1 FALLS W/OUT INJ PAST YR: ICD-10-PCS | Mod: CPTII,S$GLB,, | Performed by: FAMILY MEDICINE

## 2021-08-02 PROCEDURE — 99999 PR PBB SHADOW E&M-EST. PATIENT-LVL III: ICD-10-PCS | Mod: PBBFAC,,, | Performed by: SURGERY

## 2021-08-02 PROCEDURE — 3288F FALL RISK ASSESSMENT DOCD: CPT | Mod: CPTII,S$GLB,, | Performed by: FAMILY MEDICINE

## 2021-08-02 PROCEDURE — 3074F SYST BP LT 130 MM HG: CPT | Mod: CPTII,S$GLB,, | Performed by: FAMILY MEDICINE

## 2021-08-02 PROCEDURE — 3079F DIAST BP 80-89 MM HG: CPT | Mod: CPTII,S$GLB,, | Performed by: SURGERY

## 2021-08-02 PROCEDURE — 1159F MED LIST DOCD IN RCRD: CPT | Mod: CPTII,S$GLB,, | Performed by: SURGERY

## 2021-08-02 PROCEDURE — 99215 OFFICE O/P EST HI 40 MIN: CPT | Mod: S$GLB,,, | Performed by: SURGERY

## 2021-08-02 PROCEDURE — 1160F PR REVIEW ALL MEDS BY PRESCRIBER/CLIN PHARMACIST DOCUMENTED: ICD-10-PCS | Mod: CPTII,S$GLB,, | Performed by: SURGERY

## 2021-08-02 PROCEDURE — 1101F PT FALLS ASSESS-DOCD LE1/YR: CPT | Mod: CPTII,S$GLB,, | Performed by: FAMILY MEDICINE

## 2021-08-02 PROCEDURE — 1101F PR PT FALLS ASSESS DOC 0-1 FALLS W/OUT INJ PAST YR: ICD-10-PCS | Mod: CPTII,S$GLB,, | Performed by: SURGERY

## 2021-08-02 PROCEDURE — 99215 OFFICE O/P EST HI 40 MIN: CPT | Mod: S$GLB,,, | Performed by: FAMILY MEDICINE

## 2021-08-02 PROCEDURE — 1160F RVW MEDS BY RX/DR IN RCRD: CPT | Mod: CPTII,S$GLB,, | Performed by: FAMILY MEDICINE

## 2021-08-02 PROCEDURE — 3079F PR MOST RECENT DIASTOLIC BLOOD PRESSURE 80-89 MM HG: ICD-10-PCS | Mod: CPTII,S$GLB,, | Performed by: FAMILY MEDICINE

## 2021-08-02 PROCEDURE — 1101F PT FALLS ASSESS-DOCD LE1/YR: CPT | Mod: CPTII,S$GLB,, | Performed by: SURGERY

## 2021-08-02 PROCEDURE — 1160F RVW MEDS BY RX/DR IN RCRD: CPT | Mod: CPTII,S$GLB,, | Performed by: SURGERY

## 2021-08-02 PROCEDURE — 3288F PR FALLS RISK ASSESSMENT DOCUMENTED: ICD-10-PCS | Mod: CPTII,S$GLB,, | Performed by: FAMILY MEDICINE

## 2021-08-02 PROCEDURE — 99215 PR OFFICE/OUTPT VISIT, EST, LEVL V, 40-54 MIN: ICD-10-PCS | Mod: S$GLB,,, | Performed by: FAMILY MEDICINE

## 2021-08-02 PROCEDURE — 3074F PR MOST RECENT SYSTOLIC BLOOD PRESSURE < 130 MM HG: ICD-10-PCS | Mod: CPTII,S$GLB,, | Performed by: FAMILY MEDICINE

## 2021-08-02 PROCEDURE — 3077F PR MOST RECENT SYSTOLIC BLOOD PRESSURE >= 140 MM HG: ICD-10-PCS | Mod: CPTII,S$GLB,, | Performed by: SURGERY

## 2021-08-02 PROCEDURE — 99999 PR PBB SHADOW E&M-EST. PATIENT-LVL III: CPT | Mod: PBBFAC,,, | Performed by: SURGERY

## 2021-08-02 PROCEDURE — 1159F MED LIST DOCD IN RCRD: CPT | Mod: CPTII,S$GLB,, | Performed by: FAMILY MEDICINE

## 2021-08-02 PROCEDURE — 1126F PR PAIN SEVERITY QUANTIFIED, NO PAIN PRESENT: ICD-10-PCS | Mod: CPTII,S$GLB,, | Performed by: SURGERY

## 2021-08-02 PROCEDURE — 3288F FALL RISK ASSESSMENT DOCD: CPT | Mod: CPTII,S$GLB,, | Performed by: SURGERY

## 2021-08-02 PROCEDURE — 3079F PR MOST RECENT DIASTOLIC BLOOD PRESSURE 80-89 MM HG: ICD-10-PCS | Mod: CPTII,S$GLB,, | Performed by: SURGERY

## 2021-08-02 PROCEDURE — 1126F AMNT PAIN NOTED NONE PRSNT: CPT | Mod: CPTII,S$GLB,, | Performed by: FAMILY MEDICINE

## 2021-08-02 PROCEDURE — 1159F PR MEDICATION LIST DOCUMENTED IN MEDICAL RECORD: ICD-10-PCS | Mod: CPTII,S$GLB,, | Performed by: FAMILY MEDICINE

## 2021-08-02 PROCEDURE — 1159F PR MEDICATION LIST DOCUMENTED IN MEDICAL RECORD: ICD-10-PCS | Mod: CPTII,S$GLB,, | Performed by: SURGERY

## 2021-08-02 PROCEDURE — 99999 PR PBB SHADOW E&M-EST. PATIENT-LVL III: CPT | Mod: PBBFAC,,, | Performed by: FAMILY MEDICINE

## 2021-08-02 PROCEDURE — 1126F AMNT PAIN NOTED NONE PRSNT: CPT | Mod: CPTII,S$GLB,, | Performed by: SURGERY

## 2021-08-02 PROCEDURE — 3079F DIAST BP 80-89 MM HG: CPT | Mod: CPTII,S$GLB,, | Performed by: FAMILY MEDICINE

## 2021-08-02 PROCEDURE — 3077F SYST BP >= 140 MM HG: CPT | Mod: CPTII,S$GLB,, | Performed by: SURGERY

## 2021-08-02 PROCEDURE — 99215 PR OFFICE/OUTPT VISIT, EST, LEVL V, 40-54 MIN: ICD-10-PCS | Mod: S$GLB,,, | Performed by: SURGERY

## 2021-08-02 PROCEDURE — 99999 PR PBB SHADOW E&M-EST. PATIENT-LVL III: ICD-10-PCS | Mod: PBBFAC,,, | Performed by: FAMILY MEDICINE

## 2021-08-02 PROCEDURE — 1126F PR PAIN SEVERITY QUANTIFIED, NO PAIN PRESENT: ICD-10-PCS | Mod: CPTII,S$GLB,, | Performed by: FAMILY MEDICINE

## 2021-08-04 ENCOUNTER — PATIENT MESSAGE (OUTPATIENT)
Dept: ADMINISTRATIVE | Facility: HOSPITAL | Age: 81
End: 2021-08-04

## 2021-08-06 ENCOUNTER — OFFICE VISIT (OUTPATIENT)
Dept: CARDIOLOGY | Facility: CLINIC | Age: 81
End: 2021-08-06
Payer: MEDICARE

## 2021-08-06 VITALS
WEIGHT: 191.81 LBS | BODY MASS INDEX: 35.3 KG/M2 | OXYGEN SATURATION: 95 % | HEIGHT: 62 IN | SYSTOLIC BLOOD PRESSURE: 140 MMHG | DIASTOLIC BLOOD PRESSURE: 86 MMHG | HEART RATE: 67 BPM

## 2021-08-06 DIAGNOSIS — I27.20 PULMONARY HYPERTENSION: Primary | ICD-10-CM

## 2021-08-06 DIAGNOSIS — I50.32 CHRONIC DIASTOLIC CHF (CONGESTIVE HEART FAILURE): ICD-10-CM

## 2021-08-06 DIAGNOSIS — E78.5 HYPERLIPIDEMIA, UNSPECIFIED HYPERLIPIDEMIA TYPE: Chronic | ICD-10-CM

## 2021-08-06 DIAGNOSIS — I10 ESSENTIAL HYPERTENSION: Chronic | ICD-10-CM

## 2021-08-06 DIAGNOSIS — I48.0 PAROXYSMAL ATRIAL FIBRILLATION: Chronic | ICD-10-CM

## 2021-08-06 PROCEDURE — 1160F PR REVIEW ALL MEDS BY PRESCRIBER/CLIN PHARMACIST DOCUMENTED: ICD-10-PCS | Mod: CPTII,S$GLB,, | Performed by: INTERNAL MEDICINE

## 2021-08-06 PROCEDURE — 1126F PR PAIN SEVERITY QUANTIFIED, NO PAIN PRESENT: ICD-10-PCS | Mod: CPTII,S$GLB,, | Performed by: INTERNAL MEDICINE

## 2021-08-06 PROCEDURE — 3079F PR MOST RECENT DIASTOLIC BLOOD PRESSURE 80-89 MM HG: ICD-10-PCS | Mod: CPTII,S$GLB,, | Performed by: INTERNAL MEDICINE

## 2021-08-06 PROCEDURE — 93000 ELECTROCARDIOGRAM COMPLETE: CPT | Mod: S$GLB,,, | Performed by: INTERNAL MEDICINE

## 2021-08-06 PROCEDURE — 1159F MED LIST DOCD IN RCRD: CPT | Mod: CPTII,S$GLB,, | Performed by: INTERNAL MEDICINE

## 2021-08-06 PROCEDURE — 3079F DIAST BP 80-89 MM HG: CPT | Mod: CPTII,S$GLB,, | Performed by: INTERNAL MEDICINE

## 2021-08-06 PROCEDURE — 99499 UNLISTED E&M SERVICE: CPT | Mod: S$GLB,,, | Performed by: INTERNAL MEDICINE

## 2021-08-06 PROCEDURE — 3077F PR MOST RECENT SYSTOLIC BLOOD PRESSURE >= 140 MM HG: ICD-10-PCS | Mod: CPTII,S$GLB,, | Performed by: INTERNAL MEDICINE

## 2021-08-06 PROCEDURE — 99999 PR PBB SHADOW E&M-EST. PATIENT-LVL IV: CPT | Mod: PBBFAC,,, | Performed by: INTERNAL MEDICINE

## 2021-08-06 PROCEDURE — 3288F PR FALLS RISK ASSESSMENT DOCUMENTED: ICD-10-PCS | Mod: CPTII,S$GLB,, | Performed by: INTERNAL MEDICINE

## 2021-08-06 PROCEDURE — 3288F FALL RISK ASSESSMENT DOCD: CPT | Mod: CPTII,S$GLB,, | Performed by: INTERNAL MEDICINE

## 2021-08-06 PROCEDURE — 1101F PT FALLS ASSESS-DOCD LE1/YR: CPT | Mod: CPTII,S$GLB,, | Performed by: INTERNAL MEDICINE

## 2021-08-06 PROCEDURE — 1160F RVW MEDS BY RX/DR IN RCRD: CPT | Mod: CPTII,S$GLB,, | Performed by: INTERNAL MEDICINE

## 2021-08-06 PROCEDURE — 99999 PR PBB SHADOW E&M-EST. PATIENT-LVL IV: ICD-10-PCS | Mod: PBBFAC,,, | Performed by: INTERNAL MEDICINE

## 2021-08-06 PROCEDURE — 1159F PR MEDICATION LIST DOCUMENTED IN MEDICAL RECORD: ICD-10-PCS | Mod: CPTII,S$GLB,, | Performed by: INTERNAL MEDICINE

## 2021-08-06 PROCEDURE — 3077F SYST BP >= 140 MM HG: CPT | Mod: CPTII,S$GLB,, | Performed by: INTERNAL MEDICINE

## 2021-08-06 PROCEDURE — 1126F AMNT PAIN NOTED NONE PRSNT: CPT | Mod: CPTII,S$GLB,, | Performed by: INTERNAL MEDICINE

## 2021-08-06 PROCEDURE — 93000 EKG 12-LEAD: ICD-10-PCS | Mod: S$GLB,,, | Performed by: INTERNAL MEDICINE

## 2021-08-06 PROCEDURE — 99214 PR OFFICE/OUTPT VISIT, EST, LEVL IV, 30-39 MIN: ICD-10-PCS | Mod: 25,S$GLB,, | Performed by: INTERNAL MEDICINE

## 2021-08-06 PROCEDURE — 99499 RISK ADDL DX/OHS AUDIT: ICD-10-PCS | Mod: S$GLB,,, | Performed by: INTERNAL MEDICINE

## 2021-08-06 PROCEDURE — 1101F PR PT FALLS ASSESS DOC 0-1 FALLS W/OUT INJ PAST YR: ICD-10-PCS | Mod: CPTII,S$GLB,, | Performed by: INTERNAL MEDICINE

## 2021-08-06 PROCEDURE — 99214 OFFICE O/P EST MOD 30 MIN: CPT | Mod: 25,S$GLB,, | Performed by: INTERNAL MEDICINE

## 2021-08-09 LAB
LEFT EYE DM RETINOPATHY: NEGATIVE
RIGHT EYE DM RETINOPATHY: NEGATIVE

## 2021-08-10 ENCOUNTER — PATIENT OUTREACH (OUTPATIENT)
Dept: ADMINISTRATIVE | Facility: HOSPITAL | Age: 81
End: 2021-08-10

## 2021-08-22 PROBLEM — C50.812 MALIGNANT NEOPLASM OF OVERLAPPING SITES OF LEFT FEMALE BREAST: Status: ACTIVE | Noted: 2021-08-22

## 2021-09-20 ENCOUNTER — OFFICE VISIT (OUTPATIENT)
Dept: SURGERY | Facility: CLINIC | Age: 81
End: 2021-09-20
Payer: MEDICARE

## 2021-09-20 VITALS
WEIGHT: 191 LBS | DIASTOLIC BLOOD PRESSURE: 70 MMHG | HEIGHT: 62 IN | BODY MASS INDEX: 35.15 KG/M2 | HEART RATE: 71 BPM | SYSTOLIC BLOOD PRESSURE: 148 MMHG

## 2021-09-20 DIAGNOSIS — Z17.0 MALIGNANT NEOPLASM OF OVERLAPPING SITES OF LEFT BREAST IN FEMALE, ESTROGEN RECEPTOR POSITIVE: Primary | ICD-10-CM

## 2021-09-20 DIAGNOSIS — C50.812 MALIGNANT NEOPLASM OF OVERLAPPING SITES OF LEFT BREAST IN FEMALE, ESTROGEN RECEPTOR POSITIVE: Primary | ICD-10-CM

## 2021-09-20 PROCEDURE — 99999 PR PBB SHADOW E&M-EST. PATIENT-LVL III: CPT | Mod: PBBFAC,,, | Performed by: SURGERY

## 2021-09-20 PROCEDURE — 99213 OFFICE O/P EST LOW 20 MIN: CPT | Mod: S$GLB,,, | Performed by: SURGERY

## 2021-09-20 PROCEDURE — 99999 PR PBB SHADOW E&M-EST. PATIENT-LVL III: ICD-10-PCS | Mod: PBBFAC,,, | Performed by: SURGERY

## 2021-09-20 PROCEDURE — 99213 PR OFFICE/OUTPT VISIT, EST, LEVL III, 20-29 MIN: ICD-10-PCS | Mod: S$GLB,,, | Performed by: SURGERY

## 2021-09-22 ENCOUNTER — TELEPHONE (OUTPATIENT)
Dept: SURGERY | Facility: CLINIC | Age: 81
End: 2021-09-22

## 2021-09-22 DIAGNOSIS — C50.812 MALIGNANT NEOPLASM OF OVERLAPPING SITES OF LEFT FEMALE BREAST, UNSPECIFIED ESTROGEN RECEPTOR STATUS: Primary | ICD-10-CM

## 2021-09-25 ENCOUNTER — TELEPHONE (OUTPATIENT)
Dept: CARDIOLOGY | Facility: CLINIC | Age: 81
End: 2021-09-25

## 2021-09-27 ENCOUNTER — PATIENT MESSAGE (OUTPATIENT)
Dept: SURGERY | Facility: CLINIC | Age: 81
End: 2021-09-27

## 2021-09-27 ENCOUNTER — TELEPHONE (OUTPATIENT)
Dept: CARDIOLOGY | Facility: CLINIC | Age: 81
End: 2021-09-27

## 2021-10-05 ENCOUNTER — IMMUNIZATION (OUTPATIENT)
Dept: PHARMACY | Facility: CLINIC | Age: 81
End: 2021-10-05
Payer: MEDICARE

## 2021-10-05 DIAGNOSIS — Z23 NEED FOR VACCINATION: Primary | ICD-10-CM

## 2021-10-06 ENCOUNTER — OFFICE VISIT (OUTPATIENT)
Dept: FAMILY MEDICINE | Facility: CLINIC | Age: 81
End: 2021-10-06
Payer: MEDICARE

## 2021-10-06 VITALS
WEIGHT: 200.19 LBS | BODY MASS INDEX: 36.84 KG/M2 | SYSTOLIC BLOOD PRESSURE: 138 MMHG | HEIGHT: 62 IN | DIASTOLIC BLOOD PRESSURE: 84 MMHG | HEART RATE: 69 BPM | OXYGEN SATURATION: 99 % | RESPIRATION RATE: 16 BRPM

## 2021-10-06 DIAGNOSIS — R73.03 PREDIABETES: ICD-10-CM

## 2021-10-06 DIAGNOSIS — I10 ESSENTIAL HYPERTENSION: Primary | Chronic | ICD-10-CM

## 2021-10-06 PROCEDURE — 3288F FALL RISK ASSESSMENT DOCD: CPT | Mod: CPTII,S$GLB,, | Performed by: FAMILY MEDICINE

## 2021-10-06 PROCEDURE — 1159F MED LIST DOCD IN RCRD: CPT | Mod: CPTII,S$GLB,, | Performed by: FAMILY MEDICINE

## 2021-10-06 PROCEDURE — 99999 PR PBB SHADOW E&M-EST. PATIENT-LVL III: ICD-10-PCS | Mod: PBBFAC,,, | Performed by: FAMILY MEDICINE

## 2021-10-06 PROCEDURE — 1101F PR PT FALLS ASSESS DOC 0-1 FALLS W/OUT INJ PAST YR: ICD-10-PCS | Mod: CPTII,S$GLB,, | Performed by: FAMILY MEDICINE

## 2021-10-06 PROCEDURE — 3079F PR MOST RECENT DIASTOLIC BLOOD PRESSURE 80-89 MM HG: ICD-10-PCS | Mod: CPTII,S$GLB,, | Performed by: FAMILY MEDICINE

## 2021-10-06 PROCEDURE — 3075F SYST BP GE 130 - 139MM HG: CPT | Mod: CPTII,S$GLB,, | Performed by: FAMILY MEDICINE

## 2021-10-06 PROCEDURE — 1160F PR REVIEW ALL MEDS BY PRESCRIBER/CLIN PHARMACIST DOCUMENTED: ICD-10-PCS | Mod: CPTII,S$GLB,, | Performed by: FAMILY MEDICINE

## 2021-10-06 PROCEDURE — 1126F PR PAIN SEVERITY QUANTIFIED, NO PAIN PRESENT: ICD-10-PCS | Mod: CPTII,S$GLB,, | Performed by: FAMILY MEDICINE

## 2021-10-06 PROCEDURE — 1159F PR MEDICATION LIST DOCUMENTED IN MEDICAL RECORD: ICD-10-PCS | Mod: CPTII,S$GLB,, | Performed by: FAMILY MEDICINE

## 2021-10-06 PROCEDURE — 99999 PR PBB SHADOW E&M-EST. PATIENT-LVL III: CPT | Mod: PBBFAC,,, | Performed by: FAMILY MEDICINE

## 2021-10-06 PROCEDURE — 1126F AMNT PAIN NOTED NONE PRSNT: CPT | Mod: CPTII,S$GLB,, | Performed by: FAMILY MEDICINE

## 2021-10-06 PROCEDURE — 3079F DIAST BP 80-89 MM HG: CPT | Mod: CPTII,S$GLB,, | Performed by: FAMILY MEDICINE

## 2021-10-06 PROCEDURE — 1101F PT FALLS ASSESS-DOCD LE1/YR: CPT | Mod: CPTII,S$GLB,, | Performed by: FAMILY MEDICINE

## 2021-10-06 PROCEDURE — 3075F PR MOST RECENT SYSTOLIC BLOOD PRESS GE 130-139MM HG: ICD-10-PCS | Mod: CPTII,S$GLB,, | Performed by: FAMILY MEDICINE

## 2021-10-06 PROCEDURE — 99214 PR OFFICE/OUTPT VISIT, EST, LEVL IV, 30-39 MIN: ICD-10-PCS | Mod: S$GLB,,, | Performed by: FAMILY MEDICINE

## 2021-10-06 PROCEDURE — 3288F PR FALLS RISK ASSESSMENT DOCUMENTED: ICD-10-PCS | Mod: CPTII,S$GLB,, | Performed by: FAMILY MEDICINE

## 2021-10-06 PROCEDURE — 1160F RVW MEDS BY RX/DR IN RCRD: CPT | Mod: CPTII,S$GLB,, | Performed by: FAMILY MEDICINE

## 2021-10-06 PROCEDURE — 99214 OFFICE O/P EST MOD 30 MIN: CPT | Mod: S$GLB,,, | Performed by: FAMILY MEDICINE

## 2021-10-06 RX ORDER — AMLODIPINE BESYLATE 10 MG/1
10 TABLET ORAL DAILY
Qty: 90 TABLET | Refills: 3 | Status: SHIPPED | OUTPATIENT
Start: 2021-10-06 | End: 2022-09-28

## 2021-10-15 ENCOUNTER — TELEPHONE (OUTPATIENT)
Dept: SURGERY | Facility: CLINIC | Age: 81
End: 2021-10-15

## 2021-10-15 ENCOUNTER — HOSPITAL ENCOUNTER (OUTPATIENT)
Dept: PREADMISSION TESTING | Facility: HOSPITAL | Age: 81
Discharge: HOME | End: 2021-10-15
Attending: SURGERY
Payer: MEDICARE

## 2021-10-15 VITALS
RESPIRATION RATE: 18 BRPM | SYSTOLIC BLOOD PRESSURE: 165 MMHG | HEIGHT: 62 IN | DIASTOLIC BLOOD PRESSURE: 84 MMHG | HEART RATE: 67 BPM | TEMPERATURE: 97 F | WEIGHT: 202.38 LBS | BODY MASS INDEX: 37.24 KG/M2 | OXYGEN SATURATION: 96 %

## 2021-10-15 DIAGNOSIS — E83.119 HEMOCHROMATOSIS, UNSPECIFIED HEMOCHROMATOSIS TYPE: ICD-10-CM

## 2021-10-15 DIAGNOSIS — Z01.818 PREOPERATIVE TESTING: Primary | ICD-10-CM

## 2021-10-15 LAB
ALBUMIN SERPL BCP-MCNC: 4 G/DL (ref 3.5–5.2)
ALP SERPL-CCNC: 73 U/L (ref 55–135)
ALT SERPL W/O P-5'-P-CCNC: 19 U/L (ref 10–44)
ANION GAP SERPL CALC-SCNC: 12 MMOL/L (ref 8–16)
AST SERPL-CCNC: 20 U/L (ref 10–40)
BASOPHILS # BLD AUTO: 0.05 K/UL (ref 0–0.2)
BASOPHILS NFR BLD: 0.7 % (ref 0–1.9)
BILIRUB SERPL-MCNC: 0.7 MG/DL (ref 0.1–1)
BUN SERPL-MCNC: 37 MG/DL (ref 8–23)
CALCIUM SERPL-MCNC: 10.5 MG/DL (ref 8.7–10.5)
CHLORIDE SERPL-SCNC: 103 MMOL/L (ref 95–110)
CO2 SERPL-SCNC: 22 MMOL/L (ref 23–29)
CREAT SERPL-MCNC: 2.1 MG/DL (ref 0.5–1.4)
DIFFERENTIAL METHOD: ABNORMAL
EOSINOPHIL # BLD AUTO: 0.1 K/UL (ref 0–0.5)
EOSINOPHIL NFR BLD: 1.1 % (ref 0–8)
ERYTHROCYTE [DISTWIDTH] IN BLOOD BY AUTOMATED COUNT: 13.2 % (ref 11.5–14.5)
EST. GFR  (AFRICAN AMERICAN): 25 ML/MIN/1.73 M^2
EST. GFR  (NON AFRICAN AMERICAN): 22 ML/MIN/1.73 M^2
GLUCOSE SERPL-MCNC: 110 MG/DL (ref 70–110)
HCT VFR BLD AUTO: 49.3 % (ref 37–48.5)
HGB BLD-MCNC: 16.9 G/DL (ref 12–16)
IMM GRANULOCYTES # BLD AUTO: 0.01 K/UL (ref 0–0.04)
IMM GRANULOCYTES NFR BLD AUTO: 0.1 % (ref 0–0.5)
LYMPHOCYTES # BLD AUTO: 1.5 K/UL (ref 1–4.8)
LYMPHOCYTES NFR BLD: 20.3 % (ref 18–48)
MCH RBC QN AUTO: 31.8 PG (ref 27–31)
MCHC RBC AUTO-ENTMCNC: 34.3 G/DL (ref 32–36)
MCV RBC AUTO: 93 FL (ref 82–98)
MONOCYTES # BLD AUTO: 0.6 K/UL (ref 0.3–1)
MONOCYTES NFR BLD: 8.1 % (ref 4–15)
NEUTROPHILS # BLD AUTO: 5.2 K/UL (ref 1.8–7.7)
NEUTROPHILS NFR BLD: 69.7 % (ref 38–73)
NRBC BLD-RTO: 0 /100 WBC
PLATELET # BLD AUTO: 199 K/UL (ref 150–450)
PMV BLD AUTO: 9.5 FL (ref 9.2–12.9)
POTASSIUM SERPL-SCNC: 4.4 MMOL/L (ref 3.5–5.1)
PROT SERPL-MCNC: 8.7 G/DL (ref 6–8.4)
RBC # BLD AUTO: 5.32 M/UL (ref 4–5.4)
SODIUM SERPL-SCNC: 137 MMOL/L (ref 136–145)
WBC # BLD AUTO: 7.49 K/UL (ref 3.9–12.7)

## 2021-10-15 PROCEDURE — 36415 COLL VENOUS BLD VENIPUNCTURE: CPT | Performed by: SURGERY

## 2021-10-15 PROCEDURE — 80053 COMPREHEN METABOLIC PANEL: CPT | Performed by: SURGERY

## 2021-10-15 PROCEDURE — 85025 COMPLETE CBC W/AUTO DIFF WBC: CPT | Performed by: SURGERY

## 2021-10-15 RX ORDER — FOLIC ACID 1 MG/1
1000 TABLET ORAL DAILY
Qty: 90 TABLET | Refills: 0 | Status: SHIPPED | OUTPATIENT
Start: 2021-10-15 | End: 2022-01-28

## 2021-10-18 ENCOUNTER — HOSPITAL ENCOUNTER (OUTPATIENT)
Facility: HOSPITAL | Age: 81
Discharge: HOME | End: 2021-10-18
Attending: SURGERY | Admitting: SURGERY
Payer: MEDICARE

## 2021-10-18 ENCOUNTER — ANESTHESIA EVENT (OUTPATIENT)
Dept: SURGERY | Facility: HOSPITAL | Age: 81
End: 2021-10-18
Payer: MEDICARE

## 2021-10-18 ENCOUNTER — ANESTHESIA (OUTPATIENT)
Dept: SURGERY | Facility: HOSPITAL | Age: 81
End: 2021-10-18
Payer: MEDICARE

## 2021-10-18 ENCOUNTER — HOSPITAL ENCOUNTER (OUTPATIENT)
Dept: RADIOLOGY | Facility: HOSPITAL | Age: 81
Discharge: HOME | End: 2021-10-18
Attending: SURGERY
Payer: MEDICARE

## 2021-10-18 ENCOUNTER — HOSPITAL ENCOUNTER (OUTPATIENT)
Dept: RADIOLOGY | Facility: HOSPITAL | Age: 81
Discharge: HOME | End: 2021-10-18
Attending: SURGERY | Admitting: SURGERY
Payer: MEDICARE

## 2021-10-18 VITALS
OXYGEN SATURATION: 94 % | RESPIRATION RATE: 16 BRPM | HEART RATE: 77 BPM | TEMPERATURE: 98 F | BODY MASS INDEX: 37.02 KG/M2 | SYSTOLIC BLOOD PRESSURE: 144 MMHG | DIASTOLIC BLOOD PRESSURE: 72 MMHG | WEIGHT: 202.38 LBS

## 2021-10-18 DIAGNOSIS — C50.812 MALIGNANT NEOPLASM OF OVERLAPPING SITES OF LEFT FEMALE BREAST, UNSPECIFIED ESTROGEN RECEPTOR STATUS: ICD-10-CM

## 2021-10-18 DIAGNOSIS — C50.812 MALIGNANT NEOPLASM OF OVERLAPPING SITES OF LEFT FEMALE BREAST: ICD-10-CM

## 2021-10-18 DIAGNOSIS — C50.919 BREAST CANCER: ICD-10-CM

## 2021-10-18 DIAGNOSIS — C50.812 MALIGNANT NEOPLASM OF OVERLAPPING SITES OF LEFT FEMALE BREAST, UNSPECIFIED ESTROGEN RECEPTOR STATUS: Primary | ICD-10-CM

## 2021-10-18 PROCEDURE — 38525 PR BIOPSY/REM LYMPH NODES, AXILLARY: ICD-10-PCS | Mod: 51,LT,, | Performed by: SURGERY

## 2021-10-18 PROCEDURE — D9220A PRA ANESTHESIA: Mod: CRNA,,, | Performed by: REGISTERED NURSE

## 2021-10-18 PROCEDURE — 27201423 OPTIME MED/SURG SUP & DEVICES STERILE SUPPLY: Performed by: SURGERY

## 2021-10-18 PROCEDURE — 88342 IMHCHEM/IMCYTCHM 1ST ANTB: CPT | Mod: 26,XU,, | Performed by: PATHOLOGY

## 2021-10-18 PROCEDURE — 63600175 PHARM REV CODE 636 W HCPCS: Performed by: REGISTERED NURSE

## 2021-10-18 PROCEDURE — 38792 PR IDENTIFY SENTINEL 2DE: ICD-10-PCS | Mod: LT,,, | Performed by: SURGERY

## 2021-10-18 PROCEDURE — D9220A PRA ANESTHESIA: ICD-10-PCS | Mod: ANES,,, | Performed by: ANESTHESIOLOGY

## 2021-10-18 PROCEDURE — 88341 PR IHC OR ICC EACH ADD'L SINGLE ANTIBODY  STAINPR: ICD-10-PCS | Mod: 26,XU,, | Performed by: PATHOLOGY

## 2021-10-18 PROCEDURE — 88307 TISSUE EXAM BY PATHOLOGIST: CPT | Mod: 26,,, | Performed by: PATHOLOGY

## 2021-10-18 PROCEDURE — 71000016 HC POSTOP RECOV ADDL HR: Performed by: SURGERY

## 2021-10-18 PROCEDURE — 76098 X-RAY EXAM SURGICAL SPECIMEN: CPT | Mod: TC

## 2021-10-18 PROCEDURE — 71000015 HC POSTOP RECOV 1ST HR: Performed by: SURGERY

## 2021-10-18 PROCEDURE — 88341 IMHCHEM/IMCYTCHM EA ADD ANTB: CPT | Mod: 26,XU,, | Performed by: PATHOLOGY

## 2021-10-18 PROCEDURE — 19281 PERQ DEVICE BREAST 1ST IMAG: CPT | Mod: LT

## 2021-10-18 PROCEDURE — 38525 BIOPSY/REMOVAL LYMPH NODES: CPT | Mod: 51,LT,, | Performed by: SURGERY

## 2021-10-18 PROCEDURE — D9220A PRA ANESTHESIA: ICD-10-PCS | Mod: CRNA,,, | Performed by: REGISTERED NURSE

## 2021-10-18 PROCEDURE — 25000003 PHARM REV CODE 250: Performed by: REGISTERED NURSE

## 2021-10-18 PROCEDURE — 88342 CHG IMMUNOCYTOCHEMISTRY: ICD-10-PCS | Mod: 26,XU,, | Performed by: PATHOLOGY

## 2021-10-18 PROCEDURE — 88307 TISSUE EXAM BY PATHOLOGIST: CPT | Mod: 59 | Performed by: PATHOLOGY

## 2021-10-18 PROCEDURE — 88307 PR  SURG PATH,LEVEL V: ICD-10-PCS | Mod: 26,,, | Performed by: PATHOLOGY

## 2021-10-18 PROCEDURE — 88341 IMHCHEM/IMCYTCHM EA ADD ANTB: CPT | Performed by: PATHOLOGY

## 2021-10-18 PROCEDURE — 71000039 HC RECOVERY, EACH ADD'L HOUR: Performed by: SURGERY

## 2021-10-18 PROCEDURE — 63600175 PHARM REV CODE 636 W HCPCS: Performed by: STUDENT IN AN ORGANIZED HEALTH CARE EDUCATION/TRAINING PROGRAM

## 2021-10-18 PROCEDURE — 37000009 HC ANESTHESIA EA ADD 15 MINS: Performed by: SURGERY

## 2021-10-18 PROCEDURE — 25000003 PHARM REV CODE 250: Performed by: ANESTHESIOLOGY

## 2021-10-18 PROCEDURE — 19281 MAMMO NEEDLE LOC WITH MAMMO GUIDANCE 1ST SITE: ICD-10-PCS | Mod: LT,,, | Performed by: RADIOLOGY

## 2021-10-18 PROCEDURE — 19301 PARTIAL MASTECTOMY: CPT | Mod: LT,,, | Performed by: SURGERY

## 2021-10-18 PROCEDURE — 36000707: Performed by: SURGERY

## 2021-10-18 PROCEDURE — 71000033 HC RECOVERY, INTIAL HOUR: Performed by: SURGERY

## 2021-10-18 PROCEDURE — 63600175 PHARM REV CODE 636 W HCPCS: Performed by: ANESTHESIOLOGY

## 2021-10-18 PROCEDURE — D9220A PRA ANESTHESIA: Mod: ANES,,, | Performed by: ANESTHESIOLOGY

## 2021-10-18 PROCEDURE — 25000003 PHARM REV CODE 250: Performed by: STUDENT IN AN ORGANIZED HEALTH CARE EDUCATION/TRAINING PROGRAM

## 2021-10-18 PROCEDURE — 25000003 PHARM REV CODE 250: Performed by: SURGERY

## 2021-10-18 PROCEDURE — 88360 TUMOR IMMUNOHISTOCHEM/MANUAL: CPT | Mod: 26,,, | Performed by: PATHOLOGY

## 2021-10-18 PROCEDURE — 38792 RA TRACER ID OF SENTINL NODE: CPT | Mod: LT,,, | Performed by: SURGERY

## 2021-10-18 PROCEDURE — 36000706: Performed by: SURGERY

## 2021-10-18 PROCEDURE — 37000008 HC ANESTHESIA 1ST 15 MINUTES: Performed by: SURGERY

## 2021-10-18 PROCEDURE — 19301 PR MASTECTOMY, PARTIAL: ICD-10-PCS | Mod: LT,,, | Performed by: SURGERY

## 2021-10-18 PROCEDURE — A9520 TC99 TILMANOCEPT DIAG 0.5MCI: HCPCS

## 2021-10-18 PROCEDURE — 88360 TUMOR IMMUNOHISTOCHEM/MANUAL: CPT | Mod: 59 | Performed by: PATHOLOGY

## 2021-10-18 PROCEDURE — 19281 PERQ DEVICE BREAST 1ST IMAG: CPT | Mod: LT,,, | Performed by: RADIOLOGY

## 2021-10-18 PROCEDURE — 88342 IMHCHEM/IMCYTCHM 1ST ANTB: CPT | Performed by: PATHOLOGY

## 2021-10-18 PROCEDURE — 88360 PR  TUMOR IMMUNOHISTOCHEM/MANUAL: ICD-10-PCS | Mod: 26,,, | Performed by: PATHOLOGY

## 2021-10-18 RX ORDER — MIDAZOLAM HYDROCHLORIDE 1 MG/ML
INJECTION, SOLUTION INTRAMUSCULAR; INTRAVENOUS
Status: DISCONTINUED | OUTPATIENT
Start: 2021-10-18 | End: 2021-10-18

## 2021-10-18 RX ORDER — LIDOCAINE HYDROCHLORIDE 20 MG/ML
20 INJECTION, SOLUTION INFILTRATION; PERINEURAL ONCE
Status: COMPLETED | OUTPATIENT
Start: 2021-10-18 | End: 2021-10-18

## 2021-10-18 RX ORDER — DEXAMETHASONE SODIUM PHOSPHATE 4 MG/ML
INJECTION, SOLUTION INTRA-ARTICULAR; INTRALESIONAL; INTRAMUSCULAR; INTRAVENOUS; SOFT TISSUE
Status: DISCONTINUED | OUTPATIENT
Start: 2021-10-18 | End: 2021-10-18

## 2021-10-18 RX ORDER — ONDANSETRON 4 MG/1
8 TABLET, ORALLY DISINTEGRATING ORAL EVERY 8 HOURS PRN
Status: DISCONTINUED | OUTPATIENT
Start: 2021-10-18 | End: 2021-10-18 | Stop reason: HOSPADM

## 2021-10-18 RX ORDER — ONDANSETRON 2 MG/ML
INJECTION INTRAMUSCULAR; INTRAVENOUS
Status: DISCONTINUED
Start: 2021-10-18 | End: 2021-10-18 | Stop reason: HOSPADM

## 2021-10-18 RX ORDER — ONDANSETRON 2 MG/ML
4 INJECTION INTRAMUSCULAR; INTRAVENOUS ONCE
Status: COMPLETED | OUTPATIENT
Start: 2021-10-18 | End: 2021-10-18

## 2021-10-18 RX ORDER — SUCCINYLCHOLINE CHLORIDE 20 MG/ML
INJECTION INTRAMUSCULAR; INTRAVENOUS
Status: DISCONTINUED | OUTPATIENT
Start: 2021-10-18 | End: 2021-10-18

## 2021-10-18 RX ORDER — SODIUM CHLORIDE 0.9 G/100ML
IRRIGANT IRRIGATION
Status: DISCONTINUED | OUTPATIENT
Start: 2021-10-18 | End: 2021-10-18 | Stop reason: HOSPADM

## 2021-10-18 RX ORDER — HYDROMORPHONE HYDROCHLORIDE 2 MG/ML
0.2 INJECTION, SOLUTION INTRAMUSCULAR; INTRAVENOUS; SUBCUTANEOUS EVERY 5 MIN PRN
Status: DISCONTINUED | OUTPATIENT
Start: 2021-10-18 | End: 2021-10-18 | Stop reason: HOSPADM

## 2021-10-18 RX ORDER — KETAMINE HYDROCHLORIDE 100 MG/ML
INJECTION, SOLUTION INTRAMUSCULAR; INTRAVENOUS
Status: DISCONTINUED | OUTPATIENT
Start: 2021-10-18 | End: 2021-10-18

## 2021-10-18 RX ORDER — FENTANYL CITRATE 50 UG/ML
INJECTION, SOLUTION INTRAMUSCULAR; INTRAVENOUS
Status: DISCONTINUED | OUTPATIENT
Start: 2021-10-18 | End: 2021-10-18

## 2021-10-18 RX ORDER — LIDOCAINE HYDROCHLORIDE 20 MG/ML
INJECTION INTRAVENOUS
Status: DISCONTINUED | OUTPATIENT
Start: 2021-10-18 | End: 2021-10-18

## 2021-10-18 RX ORDER — HYDROCODONE BITARTRATE AND ACETAMINOPHEN 5; 325 MG/1; MG/1
1 TABLET ORAL EVERY 4 HOURS PRN
Status: DISCONTINUED | OUTPATIENT
Start: 2021-10-18 | End: 2021-10-18 | Stop reason: HOSPADM

## 2021-10-18 RX ORDER — FENTANYL CITRATE 50 UG/ML
25 INJECTION, SOLUTION INTRAMUSCULAR; INTRAVENOUS EVERY 5 MIN PRN
Status: DISCONTINUED | OUTPATIENT
Start: 2021-10-18 | End: 2021-10-18 | Stop reason: HOSPADM

## 2021-10-18 RX ORDER — CEFAZOLIN SODIUM 2 G/50ML
2 SOLUTION INTRAVENOUS
Status: COMPLETED | OUTPATIENT
Start: 2021-10-18 | End: 2021-10-18

## 2021-10-18 RX ORDER — BUPIVACAINE HYDROCHLORIDE 2.5 MG/ML
INJECTION, SOLUTION EPIDURAL; INFILTRATION; INTRACAUDAL
Status: DISCONTINUED | OUTPATIENT
Start: 2021-10-18 | End: 2021-10-18 | Stop reason: HOSPADM

## 2021-10-18 RX ORDER — HYDROCODONE BITARTRATE AND ACETAMINOPHEN 5; 325 MG/1; MG/1
1 TABLET ORAL EVERY 6 HOURS PRN
Qty: 10 TABLET | Refills: 0 | Status: SHIPPED | OUTPATIENT
Start: 2021-10-18 | End: 2021-12-26

## 2021-10-18 RX ORDER — ONDANSETRON 2 MG/ML
INJECTION INTRAMUSCULAR; INTRAVENOUS
Status: DISCONTINUED | OUTPATIENT
Start: 2021-10-18 | End: 2021-10-18

## 2021-10-18 RX ORDER — PROPOFOL 10 MG/ML
VIAL (ML) INTRAVENOUS
Status: DISCONTINUED | OUTPATIENT
Start: 2021-10-18 | End: 2021-10-18

## 2021-10-18 RX ORDER — EPHEDRINE SULFATE 50 MG/ML
INJECTION, SOLUTION INTRAVENOUS
Status: DISCONTINUED | OUTPATIENT
Start: 2021-10-18 | End: 2021-10-18

## 2021-10-18 RX ORDER — ACETAMINOPHEN 10 MG/ML
1000 INJECTION, SOLUTION INTRAVENOUS ONCE
Status: COMPLETED | OUTPATIENT
Start: 2021-10-18 | End: 2021-10-18

## 2021-10-18 RX ORDER — SODIUM CHLORIDE 0.9 % (FLUSH) 0.9 %
3 SYRINGE (ML) INJECTION
Status: DISCONTINUED | OUTPATIENT
Start: 2021-10-18 | End: 2021-10-18 | Stop reason: HOSPADM

## 2021-10-18 RX ORDER — SODIUM CHLORIDE 9 MG/ML
INJECTION, SOLUTION INTRAVENOUS CONTINUOUS
Status: DISCONTINUED | OUTPATIENT
Start: 2021-10-18 | End: 2021-10-18 | Stop reason: HOSPADM

## 2021-10-18 RX ADMIN — CEFAZOLIN SODIUM 2 G: 2 SOLUTION INTRAVENOUS at 12:10

## 2021-10-18 RX ADMIN — ACETAMINOPHEN 1000 MG: 1000 INJECTION, SOLUTION INTRAVENOUS at 03:10

## 2021-10-18 RX ADMIN — PROPOFOL 50 MG: 10 INJECTION, EMULSION INTRAVENOUS at 12:10

## 2021-10-18 RX ADMIN — KETAMINE HYDROCHLORIDE 10 MG: 100 INJECTION, SOLUTION, CONCENTRATE INTRAMUSCULAR; INTRAVENOUS at 02:10

## 2021-10-18 RX ADMIN — PROMETHAZINE HYDROCHLORIDE 12.5 MG: 25 INJECTION INTRAMUSCULAR; INTRAVENOUS at 03:10

## 2021-10-18 RX ADMIN — FENTANYL CITRATE 25 MCG: 50 INJECTION, SOLUTION INTRAMUSCULAR; INTRAVENOUS at 12:10

## 2021-10-18 RX ADMIN — EPHEDRINE SULFATE 10 MG: 50 INJECTION INTRAVENOUS at 12:10

## 2021-10-18 RX ADMIN — KETAMINE HYDROCHLORIDE 20 MG: 100 INJECTION, SOLUTION, CONCENTRATE INTRAMUSCULAR; INTRAVENOUS at 01:10

## 2021-10-18 RX ADMIN — SODIUM CHLORIDE: 0.9 INJECTION, SOLUTION INTRAVENOUS at 09:10

## 2021-10-18 RX ADMIN — PROPOFOL 80 MG: 10 INJECTION, EMULSION INTRAVENOUS at 12:10

## 2021-10-18 RX ADMIN — PROPOFOL 40 MG: 10 INJECTION, EMULSION INTRAVENOUS at 01:10

## 2021-10-18 RX ADMIN — FENTANYL CITRATE 75 MCG: 50 INJECTION, SOLUTION INTRAMUSCULAR; INTRAVENOUS at 12:10

## 2021-10-18 RX ADMIN — MIDAZOLAM HYDROCHLORIDE 1 MG: 1 INJECTION, SOLUTION INTRAMUSCULAR; INTRAVENOUS at 12:10

## 2021-10-18 RX ADMIN — LIDOCAINE HYDROCHLORIDE 100 MG: 20 INJECTION, SOLUTION INTRAVENOUS at 12:10

## 2021-10-18 RX ADMIN — KETAMINE HYDROCHLORIDE 10 MG: 100 INJECTION, SOLUTION, CONCENTRATE INTRAMUSCULAR; INTRAVENOUS at 01:10

## 2021-10-18 RX ADMIN — SUCCINYLCHOLINE CHLORIDE 140 MG: 20 INJECTION, SOLUTION INTRAMUSCULAR; INTRAVENOUS at 12:10

## 2021-10-18 RX ADMIN — DEXAMETHASONE SODIUM PHOSPHATE 4 MG: 4 INJECTION, SOLUTION INTRAMUSCULAR; INTRAVENOUS at 12:10

## 2021-10-18 RX ADMIN — ONDANSETRON 4 MG: 2 INJECTION, SOLUTION INTRAMUSCULAR; INTRAVENOUS at 02:10

## 2021-10-18 RX ADMIN — ONDANSETRON 4 MG: 2 INJECTION INTRAMUSCULAR; INTRAVENOUS at 03:10

## 2021-11-01 LAB
COMMENT: NORMAL
FINAL PATHOLOGIC DIAGNOSIS: NORMAL
GROSS: NORMAL
Lab: NORMAL
SUPPLEMENTAL DIAGNOSIS: NORMAL

## 2021-11-03 ENCOUNTER — PATIENT MESSAGE (OUTPATIENT)
Dept: ADMINISTRATIVE | Facility: HOSPITAL | Age: 81
End: 2021-11-03
Payer: MEDICARE

## 2021-11-07 NOTE — PROGRESS NOTES
[facility]       Post-Op        REFERRING PHYSICIAN:  Paras Oro MD    MEDICAL ONCOLOGIST:    Pending   RADIATION ONCOLOGIST:   Pending    DIAGNOSIS:    This is a 81 y.o. 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      TREATMENT SUMMARY:  The patient is status post   MASTECTOMY, PARTIAL -- wire - Left, Injection, For [place] Node Identification - Left, Biopsy, Lymph Node, [place] - Left, and Lymphadenectomy, Axillary - Left on 10/18/2021.  Final pathology showed     Final Pathologic Diagnosis   Date/Time Value Ref Range Status   10/18/2021 02:22 PM   Corrected    1. BREAST, LEFT, LUMPECTOMY:  - Invasive ductal carcinoma of breast with lobular features, see Tumor  Synoptic.  - Size of carcinoma:  5 MM.  - Mayur Histologic Score: Grade 1 of 3.                    Tubule Formation:  3                    Nuclear Pleomorphism:  1                    Mitotic Activity:  1  - No definitive in-situ carcinoma identified.  - Margins:  ·tab Invasive carcinoma is less than 1 MM from nearest medial margin.  ·tab Invasive carcinoma is 23 MM from nearest lateral margin.  ·tab All nonspecifically stated margins are presumed to be at least 10 MM.  - No lymphovascular invasion.  - No skin present for evaluation.  - Microcalcifications:  Not identified.  - Pathologic staging: pT1a (sn)N0  2. AXILLARY SENTINEL LYMPH NODE, , BIOPSY:  - One benign axillary sentinel lymph node, negative for metastatic carcinoma  (0/1).  - Immunohistochemical staining for CK WSK, Cam 5.2 and CK AE1/AE3.  3. BREAST, PRESUMED LEFT, NEW INFERIOR MARGIN, EXCISION:  - Negative for atypia or carcinoma.  - Benign breast tissue with predominantly fatty stroma.  4. BREAST, PRESUMED LEFT, NEW POSTERIOR MARGIN, EXCISION:  - Negative for atypia or carcinoma.  - Benign breast tissue with predominantly fatty stroma.  TUMOR SYNOPTIC: (INVASIVE CARCINOMA OF THE BREAST:  Resection)  Standard(s) : AJCC-UICC 8  SPECIMEN  Procedure  Excision (less than total mastectomy)  Specimen Laterality  Left  TUMOR  +Tumor Site  Clock position  Specify Clock Position (select all that apply)    6 o'clock  Histologic Type  Invasive carcinoma with features of (specify): lobular carcinoma  Histologic Grade (Majestic Histologic Score)  Majestic Score  Glandular (Acinar) / Tubular Differentiation    Score 3 (less than 10% of tumor area forming glandular / tubular  structures)  Nuclear Pleomorphism    Score 1 (Nuclei small with little increase in size in comparison with  normal breast epithelial cells, regular outlines, uniform nuclear    chromatin, little variation in size)  Mitotic Rate    Score 1  Overall Grade    Grade 1 (scores of 3, 4 or 5)  Tumor Size  Greatest dimension of largest invasive focus greater than 1 mm (specify exact  measurement in Millimeters (mm)): 5 mm  +Tumor Focality  Single focus of invasive carcinoma  Ductal Carcinoma In Situ (DCIS)  Not identified  +Lobular Carcinoma In Situ (LCIS)  Not identified  Tumor Extent  Tumor Extent (required only if skin, nipple, or skeletal muscle  are present and involved) (select all that apply)    Not applicable (skin, nipple, and skeletal muscle are absent OR are  uninvolved)  +Lymphovascular Invasion  Not identified  +Dermal Lymphovascular Invasion  No skin present  +Microcalcifications  Not identified  Treatment Effect in the Breast  No known presurgical therapy  Treatment Effect in the Lymph Nodes  Not applicable  MARGINS  Margin Status for Invasive Carcinoma  All margins negative for invasive carcinoma  Distance from Invasive Carcinoma  to Closest Margin    Specify in Millimeters (mm)    Less than: 1 mm  +Closest Margin(s) to Invasive Carcinoma (select all that apply)    Medial  Margin Status for DCIS  Not applicable (no DCIS in specimen)  REGIONAL LYMPH NODES  Regional Lymph Node Status  Regional lymph nodes present: All regional lymph  nodes negative for tumor  Total Number of Lymph Nodes Examined (sentinel and non-sentinel)    Exact number (specify): 1  Number of Stendal Nodes Examined (if applicable)    Exact number (specify): 1  PATHOLOGIC STAGE CLASSIFICATION (pTNM, AJCC 8th Edition)  pT Category  pT1a: Tumor greater than 1 mm but less than or equal to 5 mm in greatest  dimension  Regional Lymph Nodes Modifier  (sn): Stendal node(s) evaluated. If 6 or more nodes (sentinel or  nonsentinel) are removed, this modifier should not be used.  pN Category  pN0: No regional lymph node metastasis identified or ITCs only  SPECIAL STUDIES  +Breast Biomarker Testing Pending and will be included in a supplemental  Report    Supplemental Diagnosis BREAST BIOMARKERS:   ER:  Positive (99% of tumor cells demonstrate strong nuclear   immunoreactivity).   KS:  Positive (95% of tumor cells demonstrate moderate to strong nuclear   immunoreactivity).   [id number]:  Negative (0).   Ki-67 proliferative index:  10%.   Immunohistochemical staining is interpreted in the setting of appropriate   positive and negative controls.                Comment:     Interp By nIge Vazquez MD, Signed on 11/01/2021 at 10:01       INTERVAL HISTORY:   Barbara Rizo comes in for a post-op check.  She denies fever, chills, chest pain or shortness of breath.  Her pain is well controlled.      MEDICATIONS:  Current Outpatient Medications   Medication Sig Dispense Refill    allopurinoL (ZYLOPRIM) 100 MG tablet TAKE 1 TABLET BY MOUTH ONCE DAILY      amiodarone (PACERONE) 200 MG Tab TAKE ONE-HALF TABLET BY MOUTH EVERY DAY 45 tablet 3    amLODIPine (NORVASC) 10 MG tablet Take 1 tablet (10 mg total) by mouth once daily. 90 tablet 3    atorvastatin (LIPITOR) 40 MG tablet TAKE ONE TABLET BY MOUTH EVERY DAY 90 tablet 1    ELIQUIS 2.5 mg Tab TAKE ONE TABLET BY MOUTH TWICE DAILY 60 tablet 11    ergocalciferol (ERGOCALCIFEROL) 50,000 unit Cap Take 50,000 Units by mouth every 30 days.        folic acid (FOLVITE) 1 MG tablet Take 1 tablet (1,000 mcg total) by mouth once daily. 90 tablet 0    furosemide (LASIX) 20 MG tablet TAKE ONE TABLET BY MOUTH EVERY DAY AS NEEDED 90 tablet 3    HYDROcodone-acetaminophen (NORCO) 5-325 mg per tablet Take 1 tablet by mouth every 6 (six) hours as needed for Pain. 10 tablet 0    metoprolol succinate (TOPROL-XL) 25 MG 24 hr tablet Take 0.5 tablets (12.5 mg total) by mouth once daily. Only take if HR > 60 15 tablet 0     No current facility-administered medications for this visit.     Facility-Administered Medications Ordered in Other Visits   Medication Dose Route Frequency Provider Last Rate Last Admin    denosumab (PROLIA) injection 60 mg  60 mg Subcutaneous 1 time in Clinic/HOD Nargis Boyle MD           ALLERGIES:   Review of patient's allergies indicates:  No Known Allergies    PHYSICAL EXAMINATION:   General:  This is a well appearing female with appropriate speech, affect and gait.     Breast:  Incision clean, dry, and intact. Bruising of the lateral left breast.    IMPRESSION:   The patient has had an uneventful postoperative course.    PLAN:   1. return in June 2022 for a follow up office visit and breast exam  2. bilateral mammogram in June 2022.  3. The patient is advised in continued exam of the breast chest wall and to report to this office sooner should she note any areas of abnormality or concern.   4.  She has been instructed to meet with med onc and rad onc for discussion of adjuvant treatment recommendations

## 2021-11-08 ENCOUNTER — OFFICE VISIT (OUTPATIENT)
Dept: SURGERY | Facility: CLINIC | Age: 81
End: 2021-11-08
Payer: MEDICARE

## 2021-11-08 VITALS
DIASTOLIC BLOOD PRESSURE: 85 MMHG | SYSTOLIC BLOOD PRESSURE: 161 MMHG | BODY MASS INDEX: 37.17 KG/M2 | HEIGHT: 62 IN | HEART RATE: 70 BPM | WEIGHT: 202 LBS

## 2021-11-08 DIAGNOSIS — C50.812 MALIGNANT NEOPLASM OF OVERLAPPING SITES OF LEFT BREAST IN FEMALE, ESTROGEN RECEPTOR POSITIVE: ICD-10-CM

## 2021-11-08 DIAGNOSIS — Z17.0 MALIGNANT NEOPLASM OF OVERLAPPING SITES OF LEFT BREAST IN FEMALE, ESTROGEN RECEPTOR POSITIVE: ICD-10-CM

## 2021-11-08 DIAGNOSIS — Z12.31 SCREENING MAMMOGRAM FOR HIGH-RISK PATIENT: Primary | ICD-10-CM

## 2021-11-08 PROCEDURE — 3077F PR MOST RECENT SYSTOLIC BLOOD PRESSURE >= 140 MM HG: ICD-10-PCS | Mod: CPTII,S$GLB,, | Performed by: SURGERY

## 2021-11-08 PROCEDURE — 1159F MED LIST DOCD IN RCRD: CPT | Mod: CPTII,S$GLB,, | Performed by: SURGERY

## 2021-11-08 PROCEDURE — 1101F PT FALLS ASSESS-DOCD LE1/YR: CPT | Mod: CPTII,S$GLB,, | Performed by: SURGERY

## 2021-11-08 PROCEDURE — 1159F PR MEDICATION LIST DOCUMENTED IN MEDICAL RECORD: ICD-10-PCS | Mod: CPTII,S$GLB,, | Performed by: SURGERY

## 2021-11-08 PROCEDURE — 3077F SYST BP >= 140 MM HG: CPT | Mod: CPTII,S$GLB,, | Performed by: SURGERY

## 2021-11-08 PROCEDURE — 1160F PR REVIEW ALL MEDS BY PRESCRIBER/CLIN PHARMACIST DOCUMENTED: ICD-10-PCS | Mod: CPTII,S$GLB,, | Performed by: SURGERY

## 2021-11-08 PROCEDURE — 3288F FALL RISK ASSESSMENT DOCD: CPT | Mod: CPTII,S$GLB,, | Performed by: SURGERY

## 2021-11-08 PROCEDURE — 1101F PR PT FALLS ASSESS DOC 0-1 FALLS W/OUT INJ PAST YR: ICD-10-PCS | Mod: CPTII,S$GLB,, | Performed by: SURGERY

## 2021-11-08 PROCEDURE — 99999 PR PBB SHADOW E&M-EST. PATIENT-LVL III: ICD-10-PCS | Mod: PBBFAC,,, | Performed by: SURGERY

## 2021-11-08 PROCEDURE — 1126F AMNT PAIN NOTED NONE PRSNT: CPT | Mod: CPTII,S$GLB,, | Performed by: SURGERY

## 2021-11-08 PROCEDURE — 99024 POSTOP FOLLOW-UP VISIT: CPT | Mod: S$GLB,,, | Performed by: SURGERY

## 2021-11-08 PROCEDURE — 99999 PR PBB SHADOW E&M-EST. PATIENT-LVL III: CPT | Mod: PBBFAC,,, | Performed by: SURGERY

## 2021-11-08 PROCEDURE — 99024 PR POST-OP FOLLOW-UP VISIT: ICD-10-PCS | Mod: S$GLB,,, | Performed by: SURGERY

## 2021-11-08 PROCEDURE — 1160F RVW MEDS BY RX/DR IN RCRD: CPT | Mod: CPTII,S$GLB,, | Performed by: SURGERY

## 2021-11-08 PROCEDURE — 3288F PR FALLS RISK ASSESSMENT DOCUMENTED: ICD-10-PCS | Mod: CPTII,S$GLB,, | Performed by: SURGERY

## 2021-11-08 PROCEDURE — 3079F DIAST BP 80-89 MM HG: CPT | Mod: CPTII,S$GLB,, | Performed by: SURGERY

## 2021-11-08 PROCEDURE — 3079F PR MOST RECENT DIASTOLIC BLOOD PRESSURE 80-89 MM HG: ICD-10-PCS | Mod: CPTII,S$GLB,, | Performed by: SURGERY

## 2021-11-08 PROCEDURE — 1126F PR PAIN SEVERITY QUANTIFIED, NO PAIN PRESENT: ICD-10-PCS | Mod: CPTII,S$GLB,, | Performed by: SURGERY

## 2021-11-09 ENCOUNTER — TELEPHONE (OUTPATIENT)
Dept: SURGERY | Facility: CLINIC | Age: 81
End: 2021-11-09
Payer: MEDICARE

## 2021-11-10 ENCOUNTER — TELEPHONE (OUTPATIENT)
Dept: SURGERY | Facility: CLINIC | Age: 81
End: 2021-11-10
Payer: MEDICARE

## 2021-11-24 ENCOUNTER — PES CALL (OUTPATIENT)
Dept: ADMINISTRATIVE | Facility: CLINIC | Age: 81
End: 2021-11-24
Payer: MEDICARE

## 2021-12-16 ENCOUNTER — INFUSION (OUTPATIENT)
Dept: INFUSION THERAPY | Facility: HOSPITAL | Age: 81
End: 2021-12-16
Attending: INTERNAL MEDICINE
Payer: MEDICARE

## 2021-12-16 VITALS
RESPIRATION RATE: 16 BRPM | HEART RATE: 71 BPM | DIASTOLIC BLOOD PRESSURE: 71 MMHG | TEMPERATURE: 99 F | OXYGEN SATURATION: 95 % | SYSTOLIC BLOOD PRESSURE: 169 MMHG

## 2021-12-16 DIAGNOSIS — M17.10 PRIMARY LOCALIZED OSTEOARTHROSIS, LOWER LEG: Primary | ICD-10-CM

## 2021-12-16 PROCEDURE — 96372 THER/PROPH/DIAG INJ SC/IM: CPT

## 2021-12-16 PROCEDURE — 63600175 PHARM REV CODE 636 W HCPCS: Mod: JG | Performed by: INTERNAL MEDICINE

## 2021-12-16 RX ADMIN — DENOSUMAB 60 MG: 60 INJECTION SUBCUTANEOUS at 01:12

## 2021-12-26 ENCOUNTER — HOSPITAL ENCOUNTER (OUTPATIENT)
Facility: HOSPITAL | Age: 81
Discharge: HOME HEALTH | End: 2021-12-29
Attending: EMERGENCY MEDICINE | Admitting: INTERNAL MEDICINE
Payer: MEDICARE

## 2021-12-26 DIAGNOSIS — R07.9 ACUTE CHEST PAIN: Primary | ICD-10-CM

## 2021-12-26 DIAGNOSIS — I50.33 ACUTE ON CHRONIC DIASTOLIC HEART FAILURE: ICD-10-CM

## 2021-12-26 DIAGNOSIS — R06.02 SHORTNESS OF BREATH: ICD-10-CM

## 2021-12-26 DIAGNOSIS — I50.33 ACUTE ON CHRONIC DIASTOLIC CONGESTIVE HEART FAILURE: ICD-10-CM

## 2021-12-26 DIAGNOSIS — N18.9 CHRONIC RENAL FAILURE, UNSPECIFIED CKD STAGE: ICD-10-CM

## 2021-12-26 DIAGNOSIS — R06.02 SOB (SHORTNESS OF BREATH): ICD-10-CM

## 2021-12-26 LAB
ALBUMIN SERPL BCP-MCNC: 3.7 G/DL (ref 3.5–5.2)
ALP SERPL-CCNC: 89 U/L (ref 55–135)
ALT SERPL W/O P-5'-P-CCNC: 22 U/L (ref 10–44)
ANION GAP SERPL CALC-SCNC: 14 MMOL/L (ref 8–16)
AST SERPL-CCNC: 23 U/L (ref 10–40)
BASOPHILS # BLD AUTO: 0.03 K/UL (ref 0–0.2)
BASOPHILS NFR BLD: 0.3 % (ref 0–1.9)
BILIRUB SERPL-MCNC: 1.2 MG/DL (ref 0.1–1)
BNP SERPL-MCNC: 1190 PG/ML (ref 0–99)
BUN SERPL-MCNC: 28 MG/DL (ref 8–23)
CALCIUM SERPL-MCNC: 8 MG/DL (ref 8.7–10.5)
CHLORIDE SERPL-SCNC: 94 MMOL/L (ref 95–110)
CO2 SERPL-SCNC: 19 MMOL/L (ref 23–29)
CREAT SERPL-MCNC: 2.3 MG/DL (ref 0.5–1.4)
CTP QC/QA: YES
CTP QC/QA: YES
DIFFERENTIAL METHOD: ABNORMAL
EOSINOPHIL # BLD AUTO: 0 K/UL (ref 0–0.5)
EOSINOPHIL NFR BLD: 0.1 % (ref 0–8)
ERYTHROCYTE [DISTWIDTH] IN BLOOD BY AUTOMATED COUNT: 13.7 % (ref 11.5–14.5)
EST. GFR  (AFRICAN AMERICAN): 22 ML/MIN/1.73 M^2
EST. GFR  (NON AFRICAN AMERICAN): 19 ML/MIN/1.73 M^2
GLUCOSE SERPL-MCNC: 96 MG/DL (ref 70–110)
HCT VFR BLD AUTO: 39.9 % (ref 37–48.5)
HGB BLD-MCNC: 13.5 G/DL (ref 12–16)
IMM GRANULOCYTES # BLD AUTO: 0.04 K/UL (ref 0–0.04)
IMM GRANULOCYTES NFR BLD AUTO: 0.5 % (ref 0–0.5)
LYMPHOCYTES # BLD AUTO: 0.9 K/UL (ref 1–4.8)
LYMPHOCYTES NFR BLD: 10.8 % (ref 18–48)
MAGNESIUM SERPL-MCNC: 2 MG/DL (ref 1.6–2.6)
MCH RBC QN AUTO: 31 PG (ref 27–31)
MCHC RBC AUTO-ENTMCNC: 33.8 G/DL (ref 32–36)
MCV RBC AUTO: 92 FL (ref 82–98)
MONOCYTES # BLD AUTO: 0.5 K/UL (ref 0.3–1)
MONOCYTES NFR BLD: 6 % (ref 4–15)
NEUTROPHILS # BLD AUTO: 7.1 K/UL (ref 1.8–7.7)
NEUTROPHILS NFR BLD: 82.3 % (ref 38–73)
NRBC BLD-RTO: 0 /100 WBC
PLATELET # BLD AUTO: 195 K/UL (ref 150–450)
PMV BLD AUTO: 10.1 FL (ref 9.2–12.9)
POTASSIUM SERPL-SCNC: 5 MMOL/L (ref 3.5–5.1)
PROT SERPL-MCNC: 8.1 G/DL (ref 6–8.4)
RBC # BLD AUTO: 4.35 M/UL (ref 4–5.4)
SARS-COV-2 RDRP RESP QL NAA+PROBE: NEGATIVE
SARS-COV-2 RDRP RESP QL NAA+PROBE: NEGATIVE
SODIUM SERPL-SCNC: 127 MMOL/L (ref 136–145)
TROPONIN I SERPL DL<=0.01 NG/ML-MCNC: 0.05 NG/ML (ref 0–0.03)
TROPONIN I SERPL DL<=0.01 NG/ML-MCNC: 0.05 NG/ML (ref 0–0.03)
TSH SERPL DL<=0.005 MIU/L-ACNC: 1.87 UIU/ML (ref 0.4–4)
WBC # BLD AUTO: 8.6 K/UL (ref 3.9–12.7)

## 2021-12-26 PROCEDURE — 63600175 PHARM REV CODE 636 W HCPCS: Performed by: EMERGENCY MEDICINE

## 2021-12-26 PROCEDURE — G0378 HOSPITAL OBSERVATION PER HR: HCPCS

## 2021-12-26 PROCEDURE — 21400001 HC TELEMETRY ROOM

## 2021-12-26 PROCEDURE — 93005 ELECTROCARDIOGRAM TRACING: CPT

## 2021-12-26 PROCEDURE — 84484 ASSAY OF TROPONIN QUANT: CPT | Mod: 91 | Performed by: EMERGENCY MEDICINE

## 2021-12-26 PROCEDURE — 96374 THER/PROPH/DIAG INJ IV PUSH: CPT

## 2021-12-26 PROCEDURE — 99285 EMERGENCY DEPT VISIT HI MDM: CPT | Mod: 25

## 2021-12-26 PROCEDURE — 84484 ASSAY OF TROPONIN QUANT: CPT | Performed by: HOSPITALIST

## 2021-12-26 PROCEDURE — 85025 COMPLETE CBC W/AUTO DIFF WBC: CPT | Performed by: EMERGENCY MEDICINE

## 2021-12-26 PROCEDURE — 93010 EKG 12-LEAD: ICD-10-PCS | Mod: ,,, | Performed by: INTERNAL MEDICINE

## 2021-12-26 PROCEDURE — U0002 COVID-19 LAB TEST NON-CDC: HCPCS | Performed by: EMERGENCY MEDICINE

## 2021-12-26 PROCEDURE — 83735 ASSAY OF MAGNESIUM: CPT | Performed by: HOSPITALIST

## 2021-12-26 PROCEDURE — 83036 HEMOGLOBIN GLYCOSYLATED A1C: CPT | Performed by: HOSPITALIST

## 2021-12-26 PROCEDURE — 25000003 PHARM REV CODE 250: Performed by: HOSPITALIST

## 2021-12-26 PROCEDURE — 84443 ASSAY THYROID STIM HORMONE: CPT | Performed by: EMERGENCY MEDICINE

## 2021-12-26 PROCEDURE — 83880 ASSAY OF NATRIURETIC PEPTIDE: CPT | Performed by: EMERGENCY MEDICINE

## 2021-12-26 PROCEDURE — 80053 COMPREHEN METABOLIC PANEL: CPT | Performed by: EMERGENCY MEDICINE

## 2021-12-26 PROCEDURE — 93010 ELECTROCARDIOGRAM REPORT: CPT | Mod: ,,, | Performed by: INTERNAL MEDICINE

## 2021-12-26 RX ORDER — ALLOPURINOL 100 MG/1
100 TABLET ORAL DAILY
Status: DISCONTINUED | OUTPATIENT
Start: 2021-12-27 | End: 2021-12-29 | Stop reason: HOSPADM

## 2021-12-26 RX ORDER — AMLODIPINE BESYLATE 5 MG/1
10 TABLET ORAL DAILY
Status: DISCONTINUED | OUTPATIENT
Start: 2021-12-27 | End: 2021-12-29 | Stop reason: HOSPADM

## 2021-12-26 RX ORDER — FUROSEMIDE 10 MG/ML
40 INJECTION INTRAMUSCULAR; INTRAVENOUS 2 TIMES DAILY WITH MEALS
Status: DISCONTINUED | OUTPATIENT
Start: 2021-12-27 | End: 2021-12-27

## 2021-12-26 RX ORDER — FOLIC ACID 1 MG/1
1000 TABLET ORAL DAILY
Status: DISCONTINUED | OUTPATIENT
Start: 2021-12-27 | End: 2021-12-29 | Stop reason: HOSPADM

## 2021-12-26 RX ORDER — ONDANSETRON 2 MG/ML
4 INJECTION INTRAMUSCULAR; INTRAVENOUS EVERY 8 HOURS PRN
Status: DISCONTINUED | OUTPATIENT
Start: 2021-12-26 | End: 2021-12-29 | Stop reason: HOSPADM

## 2021-12-26 RX ORDER — ATORVASTATIN CALCIUM 40 MG/1
40 TABLET, FILM COATED ORAL DAILY
Status: DISCONTINUED | OUTPATIENT
Start: 2021-12-27 | End: 2021-12-29 | Stop reason: HOSPADM

## 2021-12-26 RX ORDER — AMIODARONE HYDROCHLORIDE 100 MG/1
100 TABLET ORAL DAILY
Status: DISCONTINUED | OUTPATIENT
Start: 2021-12-27 | End: 2021-12-29 | Stop reason: HOSPADM

## 2021-12-26 RX ORDER — TALC
6 POWDER (GRAM) TOPICAL NIGHTLY PRN
Status: DISCONTINUED | OUTPATIENT
Start: 2021-12-26 | End: 2021-12-29 | Stop reason: HOSPADM

## 2021-12-26 RX ORDER — POLYETHYLENE GLYCOL 3350 17 G/17G
17 POWDER, FOR SOLUTION ORAL DAILY
Status: DISCONTINUED | OUTPATIENT
Start: 2021-12-27 | End: 2021-12-29 | Stop reason: HOSPADM

## 2021-12-26 RX ORDER — ACETAMINOPHEN 325 MG/1
650 TABLET ORAL EVERY 6 HOURS PRN
Status: DISCONTINUED | OUTPATIENT
Start: 2021-12-26 | End: 2021-12-29 | Stop reason: HOSPADM

## 2021-12-26 RX ORDER — SODIUM CHLORIDE 0.9 % (FLUSH) 0.9 %
10 SYRINGE (ML) INJECTION
Status: DISCONTINUED | OUTPATIENT
Start: 2021-12-26 | End: 2021-12-29 | Stop reason: HOSPADM

## 2021-12-26 RX ORDER — FUROSEMIDE 10 MG/ML
80 INJECTION INTRAMUSCULAR; INTRAVENOUS
Status: COMPLETED | OUTPATIENT
Start: 2021-12-26 | End: 2021-12-26

## 2021-12-26 RX ADMIN — APIXABAN 2.5 MG: 2.5 TABLET, FILM COATED ORAL at 09:12

## 2021-12-26 RX ADMIN — FUROSEMIDE 80 MG: 10 INJECTION, SOLUTION INTRAMUSCULAR; INTRAVENOUS at 05:12

## 2021-12-26 NOTE — ED PROVIDER NOTES
Encounter Date: 12/26/2021       History     Chief Complaint   Patient presents with    Back Pain     Patient reports SOB with CP , states more SOB with exertion, denies any CP at present but back pain.     Chest Pain    Shortness of Breath     HPI   This 81-year-old white female presents to the emergency room complaining of shortness of breath and chest tightness with exertion over the course of the last 4 days.  The patient also has some thoracic back pain, midthoracic back around her bra strap.  Her symptoms are definitely worse with exertion.  The patient has a history of atrial fibrillation she has been cardioverted in the past.  She has no history of coronary artery disease respiratory illness.  She has had atrial fibrillation.  Review of patient's allergies indicates:  No Known Allergies  Past Medical History:   Diagnosis Date    A-fib     CKD (chronic kidney disease)     Diabetes mellitus type II     Fatty liver     GERD (gastroesophageal reflux disease)     Hearing loss of both ears     wears bilateral hearing aids    Hemochromatosis     History of anemia due to CKD     History of pleural effusion     with thoracentesis    Hyperlipidemia     Hypertension     Macular degeneration     Osteoarthritis     Left knee    Osteoporosis     Vaginal delivery     x2    Vitamin D deficiency disease     Wears partial dentures     upper removable bridge     Past Surgical History:   Procedure Laterality Date    AXILLARY NODE DISSECTION Left 10/18/2021    Procedure: LYMPHADENECTOMY, AXILLARY;  Surgeon: Darlene Roca MD;  [facility];  Service: General;  Laterality: Left;    BREAST BIOPSY      BREAST SURGERY Bilateral     Reduction r/t h/o fibrocystic disease    CHOLECYSTECTOMY  08/2015    EYE SURGERY      Cat Ext  OU    HYSTERECTOMY      partial due to uterine fibroids    INCISION AND DRAINAGE OF KNEE Left 9/17/2020    Procedure: INCISION AND DRAINAGE, KNEE;  Surgeon: Rolando Wagoner MD;   Location: [facility] OR;  Service: Orthopedics;  Laterality: Left;    INJECTION FOR SENTINEL NODE IDENTIFICATION Left 10/18/2021    Procedure: INJECTION, FOR SENTINEL NODE IDENTIFICATION;  Surgeon: Darlene Roca MD;  Location: [facility] OR;  Service: General;  Laterality: Left;    JOINT REPLACEMENT Left 05/13/2013    TKR    JOINT REPLACEMENT Right 08/03/2015    TKR    MASTECTOMY, PARTIAL Left 10/18/2021    Procedure: MASTECTOMY, PARTIAL -- wire;  Surgeon: Darlene Roca MD;  Location: [facility] OR;  Service: General;  Laterality: Left;  RN PREOP 10/15/2021   VACCINATED-----NEED H/P AND ORDERS    SENTINEL LYMPH NODE BIOPSY Left 10/18/2021    Procedure: BIOPSY, LYMPH NODE, SENTINEL;  Surgeon: Darlene Roca MD;  Location: [facility] OR;  Service: General;  Laterality: Left;    THORACENTESIS      TOTAL KNEE ARTHROPLASTY Right 2015    left knee done 5/2013    WOUND DRESSING Left 9/17/2020    Procedure: WOUND VAC APPLICATION;  Surgeon: Rolando Wagoner MD;  Location: [facility] OR;  Service: Orthopedics;  Laterality: Left;     Family History   Problem Relation Age of Onset    Cancer Mother         stomach    Hypertension Mother     Aneurysm Father         abdominal     Social History     Tobacco Use    Smoking status: Former Smoker    Smokeless tobacco: Never Used   Substance Use Topics    Alcohol use: Not Currently     Alcohol/week: 0.0 standard drinks     Comment: occasional/ on a weekend    Drug use: No     Review of Systems  The patient was questioned specifically with regard to the following.  General: Fever, chills, sweats. Neuro: Headache. Eyes: eye problems. ENT: Ear pain, sore throat. Cardiovascular: Chest pain. Respiratory: Cough, shortness of breath. Gastrointestinal: Abdominal pain, vomiting, diarrhea. Genitourinary: Painful urination.  Musculoskeletal: Arm and leg problems. Skin: Rash.  The review of systems was negative except for the following:  Exertional dyspnea and chest pressure.  Physical Exam     Initial Vitals  [12/26/21 1248]   BP Pulse Resp Temp SpO2   (!) 153/67 64 18 98.4 °F (36.9 °C) (!) 89 %      MAP       --         Physical Exam  The patient was examined specifically for the following:   General:No significant distress, Good color, Warm and dry. Head and neck:Scalp atraumatic, Neck supple. Neurological:Appropriate conversation, Gross motor deficits. Eyes:Conjugate gaze, Clear corneas. ENT: No epistaxis. Cardiac: Regular rate and rhythm, Grossly normal heart tones. Pulmonary: Wheezing, Rales. Gastrointestinal: Abdominal tenderness, Abdominal distention. Musculoskeletal: Extremity deformity, Apparent pain with range of motion of the joints. Skin: Rash.   The findings on examination were normal except for the following:  The patient has an elevated BMI.  The lungs are clear and free of wheezing rales rubs or rhonchi.  Heart tones are normal.  The patient has regular rate and rhythm.  Oxygen saturations are 89%.  ED Course   Critical Care    Date/Time: 1/19/2022 4:29 PM  Performed by: Selwyn Damon MD  Authorized by: Dejan Herrera MD   Direct patient critical care time: 22 minutes  Additional history critical care time: 11 minutes  Ordering / reviewing critical care time: 11 minutes  Documentation critical care time: 11 minutes  Consulting other physicians critical care time: 5 minutes  Total critical care time (exclusive of procedural time) : 60 minutes  Critical care time was exclusive of separately billable procedures and treating other patients and teaching time.  Critical care was necessary to treat or prevent imminent or life-threatening deterioration of the following conditions: respiratory failure.  Critical care was time spent personally by me on the following activities: discussions with primary provider, development of treatment plan with patient or surrogate, examination of patient, ordering and performing treatments and interventions, ordering and review of radiographic studies, re-evaluation of  patient's condition, review of old charts, pulse oximetry, ordering and review of laboratory studies, obtaining history from patient or surrogate and evaluation of patient's response to treatment.        Labs Reviewed   CBC W/ AUTO DIFFERENTIAL - Abnormal; Notable for the following components:       Result Value    Lymph # 0.9 (*)     Gran % 82.3 (*)     Lymph % 10.8 (*)     All other components within normal limits   COMPREHENSIVE METABOLIC PANEL - Abnormal; Notable for the following components:    Sodium 127 (*)     Chloride 94 (*)     CO2 19 (*)     BUN 28 (*)     Creatinine 2.3 (*)     Calcium 8.0 (*)     Total Bilirubin 1.2 (*)     eGFR if  22 (*)     eGFR if non  19 (*)     All other components within normal limits   TROPONIN I - Abnormal; Notable for the following components:    Troponin I 0.051 (*)     All other components within normal limits   B-TYPE NATRIURETIC PEPTIDE - Abnormal; Notable for the following components:    BNP 1,190 (*)     All other components within normal limits   TSH   SARS-COV-2 RDRP GENE    Narrative:     This test utilizes isothermal nucleic acid amplification   technology to detect the SARS-CoV-2 RdRp nucleic acid segment.   The analytical sensitivity (limit of detection) is 125 genome   equivalents/mL.   A POSITIVE result implies infection with the SARS-CoV-2 virus;   the patient is presumed to be contagious.     A NEGATIVE result means that SARS-CoV-2 nucleic acids are not   present above the limit of detection. A NEGATIVE result should be   treated as presumptive. It does not rule out the possibility of   COVID-19 and should not be the sole basis for treatment decisions.   If COVID-19 is strongly suspected based on clinical and exposure   history, re-testing using an alternate molecular assay should be   considered.   This test is only for use under the Food and Drug   Administration s Emergency Use Authorization (EUA).   Commercial kits are  provided by [employer].   Performance characteristics of the EUA have been independently   verified by [facility] Department of   Pathology and Laboratory Medicine.   _________________________________________________________________   The authorized Fact Sheet for Healthcare Providers and the authorized Fact   Sheet for Patients of the ID NOW COVID-19 are available on the FDA   website:     https://www.fda.gov/media/075521/download  https://www.fda.gov/media/413955/download       SARS-COV-2 RDRP GENE     EKG Readings: (Independently Interpreted)   This patient is in a right bundle branch block.  The patient is in a sinus rhythm with a first-degree AV block.  There are nonspecific ST segment and T-wave changes.  There is no definite evidence of acute myocardial infarction or malignant arrhythmia.       Imaging Results          X-Ray Chest 1 View (Final result)  Result time 12/26/21 16:43:53    Final result by Negro Head MD (12/26/21 16:43:53)                 Impression:      As above.      Electronically signed by: Negro Head MD  Date:    12/26/2021  Time:    16:43             Narrative:    EXAMINATION:  XR CHEST 1 VIEW    CLINICAL HISTORY:  Shortness of breath    TECHNIQUE:  Single frontal view of the chest was performed.    COMPARISON:  04/24/2021    FINDINGS:  There is bilateral perihilar and lower lung zone interstitial prominence, increased from prior.  No confluent consolidation.  No pneumothorax.  There is persistent elevation of the right hemidiaphragm.  No pleural effusion or pneumothorax.  Cardiac silhouette is stable.                              Medical decision making:  Given the above this patient presents to the emergency with exertional dyspnea and shortness of breath.  She has a history of congestive heart failure.  Chest x-ray shows pulmonary edema.  The BNP is elevated troponin is elevated.  I will admit this patient to trend troponins, diuresis, and further evaluation  by Cardiology.  Oxygen saturations are 89% on room air in the emergency room.  Patient has chronic renal insufficiency.  She may benefit from diuresis.    The following is taken from the discharge summary on April 25th 2021.   Patient is followed by Dr. Lomas.      HPI:   The patient is a pleasant 81 year old female with history of paroxysmal atrial fibrillation on amiodarone, metoprolol, eliquis , sinus bradycardia, hypertension, hyperlipidemia (followed by Dr. Lomas)  CKD stage 4, followed by Dr. Boyle, was brought to the ER for evaluation of syncopal episode. Patient reported to have loss of consciousness for a brief time, per daughter.  She was noted to be unconscious then sternal rub was done and then was able to regain consciousness. Per patient, she felt hot prior to LOC.  She denies palpitations, lightheadedness. She reported drinking two small flutes of champagne few minutes prior and 3 mixed drinks last night.  In the ER, she was noted to have sinus bradycardia with RBBB, troponin mildly elevated 0.04 and creatinine 2.3 (from 1.8 in January).  Per daughter, she was seen by Dr. Boyle on 4/20 and was restarted on Losartan 25 mg po daily due to uncontrolled BP.  Per patient, she too has not drank fluids as usual in the last couple of days.  Patient was ordered to give NS 500ml bolus. [facility] called for admission.      No noted seizure like activity, incontinence.        * No surgery found *       Hospital Course:   The patient is placed under observation in telemetry floor with consult to cardiology. Patient's home losartan was discontinued and given IVF. Renal functions improved. Patient had mildly elevated troponin. ECHO showed normal systolic function (EF70%) with grade 2 diastolic dysfunction , moderate AS.  Patient was also noted to be bradycardic and toprol xl was discontinued and Amiodarone decreased to 100 mg from 200 mg daily. Patient was instructed to check HR and if it is greater than 60, may take 0.5  tab (I.e. 12.5 mg) of toprol.  Patient to follow up with her primary cardiologist (Dr. Lomas).  More so, patient 's BP stable (off losartan). She needs to check her BP and if SBP greater than 160 , may get Norvasc 10 mg till seen by PCP or nephrologist. Repeat BMP as OP.  The patient will follow up with her nephrologist (Dr. Boyle). With no recurrence of symptoms and clearance from consultants, the patient was discharged home with stable vital signs.     Medications   furosemide injection 80 mg (has no administration in time range)                          Clinical Impression:   Final diagnoses:  [R06.02] SOB (shortness of breath)  [R06.02] Shortness of breath  [R07.9] Acute chest pain (Primary)  [N18.9] Chronic renal failure, unspecified CKD stage  [I50.33] Acute on chronic diastolic congestive heart failure          ED Disposition Condition    Admit               Selwyn Damon MD  12/26/21 1749       Selwyn Damon MD  01/19/22 3953     acne vulgaris to forehead

## 2021-12-27 PROBLEM — I50.33 ACUTE ON CHRONIC DIASTOLIC HEART FAILURE: Status: RESOLVED | Noted: 2019-02-06 | Resolved: 2021-12-27

## 2021-12-27 PROBLEM — E87.1 HYPONATREMIA: Status: RESOLVED | Noted: 2020-09-27 | Resolved: 2021-12-27

## 2021-12-27 PROBLEM — N18.4 TYPE 2 DIABETES MELLITUS WITH STAGE 4 CHRONIC KIDNEY DISEASE, WITHOUT LONG-TERM CURRENT USE OF INSULIN: Status: ACTIVE | Noted: 2021-01-25

## 2021-12-27 PROBLEM — R79.89 ELEVATED TROPONIN: Status: RESOLVED | Noted: 2021-04-24 | Resolved: 2021-12-27

## 2021-12-27 PROBLEM — I70.0 AORTIC ATHEROSCLEROSIS: Status: ACTIVE | Noted: 2021-12-27

## 2021-12-27 LAB
ANION GAP SERPL CALC-SCNC: 14 MMOL/L (ref 8–16)
AORTIC ROOT ANNULUS: 2.93 CM
AORTIC VALVE CUSP SEPERATION: 1.81 CM
ASCENDING AORTA: 2.53 CM
AV INDEX (PROSTH): 0.43
AV MEAN GRADIENT: 22 MMHG
AV PEAK GRADIENT: 33 MMHG
AV VALVE AREA: 1.29 CM2
AV VELOCITY RATIO: 0.44
BSA FOR ECHO PROCEDURE: 1.98 M2
BUN SERPL-MCNC: 29 MG/DL (ref 8–23)
CALCIUM SERPL-MCNC: 7.8 MG/DL (ref 8.7–10.5)
CHLORIDE SERPL-SCNC: 97 MMOL/L (ref 95–110)
CO2 SERPL-SCNC: 21 MMOL/L (ref 23–29)
CREAT SERPL-MCNC: 2.2 MG/DL (ref 0.5–1.4)
CV ECHO LV RWT: 0.64 CM
DOP CALC AO PEAK VEL: 2.88 M/S
DOP CALC AO VTI: 72.97 CM
DOP CALC LVOT AREA: 3 CM2
DOP CALC LVOT DIAMETER: 1.96 CM
DOP CALC LVOT PEAK VEL: 1.27 M/S
DOP CALC LVOT STROKE VOLUME: 93.85 CM3
DOP CALCLVOT PEAK VEL VTI: 31.12 CM
E WAVE DECELERATION TIME: 214.35 MSEC
E/A RATIO: 1.13
E/E' RATIO: 21.64 M/S
ECHO LV POSTERIOR WALL: 1.55 CM (ref 0.6–1.1)
EJECTION FRACTION: 60 %
EST. GFR  (AFRICAN AMERICAN): 24 ML/MIN/1.73 M^2
EST. GFR  (NON AFRICAN AMERICAN): 21 ML/MIN/1.73 M^2
ESTIMATED AVG GLUCOSE: 111 MG/DL (ref 68–131)
FRACTIONAL SHORTENING: 34 % (ref 28–44)
GLUCOSE SERPL-MCNC: 93 MG/DL (ref 70–110)
HBA1C MFR BLD: 5.5 % (ref 4–5.6)
INTERVENTRICULAR SEPTUM: 1.51 CM (ref 0.6–1.1)
IVRT: 141.87 MSEC
LA MAJOR: 7.35 CM
LA MINOR: 6.77 CM
LA WIDTH: 4.91 CM
LEFT ATRIUM SIZE: 5.5 CM
LEFT ATRIUM VOLUME INDEX: 85.1 ML/M2
LEFT ATRIUM VOLUME: 161.78 CM3
LEFT INTERNAL DIMENSION IN SYSTOLE: 3.19 CM (ref 2.1–4)
LEFT VENTRICLE DIASTOLIC VOLUME INDEX: 58.21 ML/M2
LEFT VENTRICLE DIASTOLIC VOLUME: 110.59 ML
LEFT VENTRICLE MASS INDEX: 168 G/M2
LEFT VENTRICLE SYSTOLIC VOLUME INDEX: 21.4 ML/M2
LEFT VENTRICLE SYSTOLIC VOLUME: 40.63 ML
LEFT VENTRICULAR INTERNAL DIMENSION IN DIASTOLE: 4.86 CM (ref 3.5–6)
LEFT VENTRICULAR MASS: 318.43 G
LV LATERAL E/E' RATIO: 17 M/S
LV SEPTAL E/E' RATIO: 29.75 M/S
MV PEAK A VEL: 1.05 M/S
MV PEAK E VEL: 1.19 M/S
MV STENOSIS PRESSURE HALF TIME: 62.16 MS
MV VALVE AREA P 1/2 METHOD: 3.54 CM2
PISA TR MAX VEL: 3.56 M/S
POTASSIUM SERPL-SCNC: 3.9 MMOL/L (ref 3.5–5.1)
PULM VEIN S/D RATIO: 0.98
PV PEAK D VEL: 0.44 M/S
PV PEAK S VEL: 0.43 M/S
PV PEAK VELOCITY: 1.45 CM/S
RA MAJOR: 5.83 CM
RA PRESSURE: 15 MMHG
RA WIDTH: 4.57 CM
RIGHT VENTRICULAR END-DIASTOLIC DIMENSION: 4.84 CM
RV TISSUE DOPPLER FREE WALL SYSTOLIC VELOCITY 1 (APICAL 4 CHAMBER VIEW): 11.78 CM/S
SINUS: 2.82 CM
SODIUM SERPL-SCNC: 132 MMOL/L (ref 136–145)
STJ: 1.94 CM
TDI LATERAL: 0.07 M/S
TDI SEPTAL: 0.04 M/S
TDI: 0.06 M/S
TR MAX PG: 51 MMHG
TRICUSPID ANNULAR PLANE SYSTOLIC EXCURSION: 2.19 CM
TROPONIN I SERPL DL<=0.01 NG/ML-MCNC: 0.05 NG/ML (ref 0–0.03)
TV REST PULMONARY ARTERY PRESSURE: 66 MMHG

## 2021-12-27 PROCEDURE — 36415 COLL VENOUS BLD VENIPUNCTURE: CPT | Performed by: HOSPITALIST

## 2021-12-27 PROCEDURE — 25000003 PHARM REV CODE 250: Performed by: INTERNAL MEDICINE

## 2021-12-27 PROCEDURE — 25000003 PHARM REV CODE 250: Performed by: HOSPITALIST

## 2021-12-27 PROCEDURE — 96376 TX/PRO/DX INJ SAME DRUG ADON: CPT

## 2021-12-27 PROCEDURE — G0378 HOSPITAL OBSERVATION PER HR: HCPCS

## 2021-12-27 PROCEDURE — 99223 1ST HOSP IP/OBS HIGH 75: CPT | Mod: ,,, | Performed by: INTERNAL MEDICINE

## 2021-12-27 PROCEDURE — 63600175 PHARM REV CODE 636 W HCPCS: Performed by: INTERNAL MEDICINE

## 2021-12-27 PROCEDURE — 80048 BASIC METABOLIC PNL TOTAL CA: CPT | Performed by: HOSPITALIST

## 2021-12-27 PROCEDURE — 99223 PR INITIAL HOSPITAL CARE,LEVL III: ICD-10-PCS | Mod: ,,, | Performed by: INTERNAL MEDICINE

## 2021-12-27 PROCEDURE — 84484 ASSAY OF TROPONIN QUANT: CPT | Performed by: HOSPITALIST

## 2021-12-27 RX ORDER — FUROSEMIDE 10 MG/ML
60 INJECTION INTRAMUSCULAR; INTRAVENOUS 2 TIMES DAILY
Status: DISCONTINUED | OUTPATIENT
Start: 2021-12-27 | End: 2021-12-28

## 2021-12-27 RX ORDER — FUROSEMIDE 20 MG/1
20 TABLET ORAL 2 TIMES DAILY
Qty: 60 TABLET | Refills: 5 | Status: SHIPPED | OUTPATIENT
Start: 2021-12-27 | End: 2022-01-01

## 2021-12-27 RX ORDER — FUROSEMIDE 40 MG/1
40 TABLET ORAL 2 TIMES DAILY
Status: DISCONTINUED | OUTPATIENT
Start: 2021-12-27 | End: 2021-12-27

## 2021-12-27 RX ADMIN — AMLODIPINE BESYLATE 10 MG: 5 TABLET ORAL at 10:12

## 2021-12-27 RX ADMIN — AMIODARONE HYDROCHLORIDE 100 MG: 100 TABLET ORAL at 10:12

## 2021-12-27 RX ADMIN — FUROSEMIDE 40 MG: 40 TABLET ORAL at 10:12

## 2021-12-27 RX ADMIN — APIXABAN 2.5 MG: 2.5 TABLET, FILM COATED ORAL at 10:12

## 2021-12-27 RX ADMIN — FOLIC ACID 1000 MCG: 1 TABLET ORAL at 10:12

## 2021-12-27 RX ADMIN — FUROSEMIDE 60 MG: 10 INJECTION, SOLUTION INTRAMUSCULAR; INTRAVENOUS at 09:12

## 2021-12-27 RX ADMIN — APIXABAN 2.5 MG: 2.5 TABLET, FILM COATED ORAL at 09:12

## 2021-12-27 RX ADMIN — ATORVASTATIN CALCIUM 40 MG: 40 TABLET, FILM COATED ORAL at 10:12

## 2021-12-27 RX ADMIN — ALLOPURINOL 100 MG: 100 TABLET ORAL at 10:12

## 2021-12-27 RX ADMIN — FUROSEMIDE 60 MG: 10 INJECTION, SOLUTION INTRAMUSCULAR; INTRAVENOUS at 01:12

## 2021-12-27 NOTE — NURSING
Testing for Home O2    O2 Sats on RA at rest = 88%    O2 sats on RA with exercise  = 83%    O2 sats on _2_L O2 with exercise = 93%

## 2021-12-27 NOTE — HPI
81-year-old white female presents to the emergency room complaining of shortness of breath and chest tightness with exertion over the course of the last 4 days.  The patient also has some thoracic back pain, midthoracic back around her bra strap.  Her symptoms are definitely worse with exertion.  The patient has a history of atrial fibrillation she has been cardioverted in the past.  She has no history of coronary artery disease respiratory illness.    Pt follows with Dr. Lomas.    Cardiology consulted for CP/SOB/CHF.    The patient presents with several days of progressive lower extremity edema and dyspnea on exertion.  She is also describing some posterior thoracic pain, although this is not clearly exertional.  She denies any palpitations or syncope.  She did receive diuresis and is feeling much better with resolution of her shortness of breath and edema.  She denies any marvin angina or chest discomfort.  Her troponins are flat in the setting of CKD 4.

## 2021-12-27 NOTE — H&P
[facility] Medicine  History & Physical    Patient Name: Barbara Rizo  MRN: [id number]  Patient Class: IP- Inpatient  Admission Date: 12/26/2021  Attending Physician: Az Stapleton MD   Primary Care Provider: Paras Oro MD         Patient information was obtained from patient and past medical records.     Subjective:     Principal Problem:<principal problem not specified>    Chief Complaint:   Chief Complaint   Patient presents with    Back Pain     Patient reports SOB with CP , states more SOB with exertion, denies any CP at present but back pain.     Chest Pain    Shortness of Breath        HPI: 81 year old female presents to ED with c/o back pain and shortness of breath for 4 days. Patient symptoms worsen with exertion.  Patient currently O2 @2L NC sats 94-96%.  PMHx of AFIB, CKD, GERD, [place] in both ears, HLD, HTN, Left breast cancer, S/p mastectomy.        Past Medical History:   Diagnosis Date    A-fib     CKD (chronic kidney disease)     Diabetes mellitus type II     Fatty liver     GERD (gastroesophageal reflux disease)     Hearing loss of both ears     wears bilateral hearing aids    Hemochromatosis     History of anemia due to CKD     History of pleural effusion     with thoracentesis    Hyperlipidemia     Hypertension     Macular degeneration     Osteoarthritis     Left knee    Osteoporosis     Vaginal delivery     x2    Vitamin D deficiency disease     Wears partial dentures     upper removable bridge       Past Surgical History:   Procedure Laterality Date    AXILLARY NODE DISSECTION Left 10/18/2021    Procedure: LYMPHADENECTOMY, AXILLARY;  Surgeon: Darlene Roca MD;  Location[facility];  Service: General;  Laterality: Left;    BREAST BIOPSY      BREAST SURGERY Bilateral     Reduction r/t h/o fibrocystic disease    CHOLECYSTECTOMY  08/2015    EYE SURGERY      Cat Ext  OU    HYSTERECTOMY      partial due to uterine fibroids    INCISION AND  DRAINAGE OF KNEE Left 9/17/2020    Procedure: INCISION AND DRAINAGE, KNEE;  Surgeon: Rolando Wagoner MD;  Location: [facility] OR;  Service: Orthopedics;  Laterality: Left;    INJECTION FOR SENTINEL NODE IDENTIFICATION Left 10/18/2021    Procedure: INJECTION, FOR SENTINEL NODE IDENTIFICATION;  Surgeon: Darlene Roca MD;  Location: [facility] OR;  Service: General;  Laterality: Left;    JOINT REPLACEMENT Left 05/13/2013    TKR    JOINT REPLACEMENT Right 08/03/2015    TKR    MASTECTOMY, PARTIAL Left 10/18/2021    Procedure: MASTECTOMY, PARTIAL -- wire;  Surgeon: Darlene Roca MD;  Location: [facility] OR;  Service: General;  Laterality: Left;  RN PREOP 10/15/2021   VACCINATED-----NEED H/P AND ORDERS    SENTINEL LYMPH NODE BIOPSY Left 10/18/2021    Procedure: BIOPSY, LYMPH NODE, SENTINEL;  Surgeon: Darlene Roca MD;  Location: [facility] OR;  Service: General;  Laterality: Left;    THORACENTESIS      TOTAL KNEE ARTHROPLASTY Right 2015    left knee done 5/2013    WOUND DRESSING Left 9/17/2020    Procedure: WOUND VAC APPLICATION;  Surgeon: Rolando Wagoner MD;  Location: [facility] OR;  Service: Orthopedics;  Laterality: Left;       Review of patient's allergies indicates:  No Known Allergies    Current Facility-Administered Medications on File Prior to Encounter   Medication    denosumab (PROLIA) injection 60 mg     Current Outpatient Medications on File Prior to Encounter   Medication Sig    allopurinoL (ZYLOPRIM) 100 MG tablet TAKE 1 TABLET BY MOUTH ONCE DAILY    amiodarone (PACERONE) 200 MG Tab TAKE ONE-HALF TABLET BY MOUTH EVERY DAY    amLODIPine (NORVASC) 10 MG tablet Take 1 tablet (10 mg total) by mouth once daily.    atorvastatin (LIPITOR) 40 MG tablet TAKE ONE TABLET BY MOUTH EVERY DAY    ELIQUIS 2.5 mg Tab TAKE ONE TABLET BY MOUTH TWICE DAILY    ergocalciferol (ERGOCALCIFEROL) 50,000 unit Cap Take 50,000 Units by mouth every 30 days.     folic acid (FOLVITE) 1 MG tablet Take 1 tablet (1,000 mcg total) by mouth once  daily.    furosemide (LASIX) 20 MG tablet TAKE ONE TABLET BY MOUTH EVERY DAY AS NEEDED    metoprolol succinate (TOPROL-XL) 25 MG 24 hr tablet Take 0.5 tablets (12.5 mg total) by mouth once daily. Only take if HR > 60    [DISCONTINUED] HYDROcodone-acetaminophen (NORCO) 5-325 mg per tablet Take 1 tablet by mouth every 6 (six) hours as needed for Pain. (Patient not taking: Reported on 11/8/2021)     Family History     Problem Relation (Age of Onset)    Aneurysm Father    Cancer Mother    Hypertension Mother        Tobacco Use    Smoking status: Former Smoker    Smokeless tobacco: Never Used   Substance and Sexual Activity    Alcohol use: Not Currently     Alcohol/week: 0.0 standard drinks     Comment: occasional/ on a weekend    Drug use: No    Sexual activity: Not Currently     Review of Systems   Constitutional: Negative for chills, fatigue and fever.   Eyes: Negative for photophobia and visual disturbance.   Respiratory: Positive for shortness of breath. Negative for cough.    Cardiovascular: Negative for chest pain, palpitations and leg swelling.   Gastrointestinal: Negative for abdominal pain, diarrhea, nausea and vomiting.   Genitourinary: Negative for dysuria, frequency and urgency.   Musculoskeletal: Positive for back pain.   Skin: Negative for pallor, rash and wound.   Neurological: Negative for light-headedness and headaches.   Psychiatric/Behavioral: Negative for confusion and decreased concentration.     Objective:     Vital Signs (Most Recent):  Temp: 97.2 °F (36.2 °C) (12/26/21 2014)  Pulse: 62 (12/26/21 2014)  Resp: 20 (12/26/21 2014)  BP: 128/74 (12/26/21 2014)  SpO2: 96 % (12/26/21 2014) Vital Signs (24h Range):  Temp:  [97.2 °F (36.2 °C)-98.4 °F (36.9 °C)] 97.2 °F (36.2 °C)  Pulse:  [60-67] 62  Resp:  [18-57] 20  SpO2:  [88 %-98 %] 96 %  BP: (128-156)/(57-87) 128/74     Weight: 89.4 kg (197 lb)  Body mass index is 36.03 kg/m².    Physical Exam  HENT:      Head: Normocephalic and atraumatic.       Right Ear: Decreased hearing noted.      Left Ear: Decreased hearing noted.   Eyes:      Extraocular Movements: Extraocular movements intact.      Pupils: Pupils are equal, round, and reactive to light.   Cardiovascular:      Rate and Rhythm: Normal rate and regular rhythm.      Pulses: Normal pulses.   Pulmonary:      Breath sounds: Normal breath sounds.   Abdominal:      General: Bowel sounds are normal.      Palpations: Abdomen is soft.   Musculoskeletal:      Cervical back: Normal range of motion.   Skin:     General: Skin is warm and dry.   Neurological:      Mental Status: She is alert and oriented to person, place, and time.   Psychiatric:         Mood and Affect: Mood normal.         Behavior: Behavior normal.           CRANIAL NERVES     CN III, IV, VI   Pupils are equal, round, and reactive to light.       Significant Labs: All pertinent labs within the past 24 hours have been reviewed.    Significant Imaging: I have reviewed all pertinent imaging results/findings within the past 24 hours.  I have reviewed and interpreted all pertinent imaging results/findings within the past 24 hours.    Assessment/Plan:     * Acute on chronic diastolic heart failure  -consulted cardiology  -IV Lasix  -echo in am  -strict I&Os  -daily wgt  -fluid restriction  -trend troponin      Chronic kidney disease, stage 4 (severe)  -baseline creatinine between 2.0-2.3  -trend level       Hyponatremia  -free water fluid restriction  -trend level      Chronic gout  -resume home med      Obesity, Class II, BMI 35-39.9  -consult dietician      Paroxysmal atrial fibrillation  -resume home med      Essential hypertension  -resume homed med      Hyperlipidemia  Resume home med      VTE Risk Mitigation (From admission, onward)         Ordered     apixaban tablet 2.5 mg  2 times daily         12/26/21 1915     IP VTE HIGH RISK PATIENT  Once         12/26/21 1915     Place sequential compression device  Until discontinued         12/26/21  1915              Meng Montanez NP  Department of Hospital Medicine   [facility] - Telemetry

## 2021-12-27 NOTE — SUBJECTIVE & OBJECTIVE
Past Medical History:   Diagnosis Date    A-fib     CKD (chronic kidney disease)     Diabetes mellitus type II     Fatty liver     GERD (gastroesophageal reflux disease)     Hearing loss of both ears     wears bilateral hearing aids    Hemochromatosis     History of anemia due to CKD     History of pleural effusion     with thoracentesis    Hyperlipidemia     Hypertension     Macular degeneration     Osteoarthritis     Left knee    Osteoporosis     Vaginal delivery     x2    Vitamin D deficiency disease     Wears partial dentures     upper removable bridge       Past Surgical History:   Procedure Laterality Date    AXILLARY NODE DISSECTION Left 10/18/2021    Procedure: LYMPHADENECTOMY, AXILLARY;  Surgeon: Darlene Roca MD;  Location: [facility] OR;  Service: General;  Laterality: Left;    BREAST BIOPSY      BREAST SURGERY Bilateral     Reduction r/t h/o fibrocystic disease    CHOLECYSTECTOMY  08/2015    EYE SURGERY      Cat Ext  OU    HYSTERECTOMY      partial due to uterine fibroids    INCISION AND DRAINAGE OF KNEE Left 9/17/2020    Procedure: INCISION AND DRAINAGE, KNEE;  Surgeon: Rolando Wagoner MD;  Location: [facility] OR;  Service: Orthopedics;  Laterality: Left;    INJECTION FOR SENTINEL NODE IDENTIFICATION Left 10/18/2021    Procedure: INJECTION, FOR SENTINEL NODE IDENTIFICATION;  Surgeon: Darlene Roca MD;  Location: [facility];  Service: General;  Laterality: Left;    JOINT REPLACEMENT Left 05/13/2013    TKR    JOINT REPLACEMENT Right 08/03/2015    TKR    MASTECTOMY, PARTIAL Left 10/18/2021    Procedure: MASTECTOMY, PARTIAL -- wire;  Surgeon: Darlene Roca MD;  Location: [facility] OR;  Service: General;  Laterality: Left;  RN PREOP 10/15/2021   VACCINATED-----NEED H/P AND ORDERS    SENTINEL LYMPH NODE BIOPSY Left 10/18/2021    Procedure: BIOPSY, LYMPH NODE, SENTINEL;  Surgeon: Darlene Roca MD;  Location: [facility];  Service: General;  Laterality: Left;    THORACENTESIS      TOTAL KNEE  ARTHROPLASTY Right 2015    left knee done 5/2013    WOUND DRESSING Left 9/17/2020    Procedure: WOUND VAC APPLICATION;  Surgeon: Rolando Wagoner MD;  Location: [facility];  Service: Orthopedics;  Laterality: Left;       Review of patient's allergies indicates:  No Known Allergies    Current Facility-Administered Medications on File Prior to Encounter   Medication    denosumab (PROLIA) injection 60 mg     Current Outpatient Medications on File Prior to Encounter   Medication Sig    allopurinoL (ZYLOPRIM) 100 MG tablet TAKE 1 TABLET BY MOUTH ONCE DAILY    amiodarone (PACERONE) 200 MG Tab TAKE ONE-HALF TABLET BY MOUTH EVERY DAY    amLODIPine (NORVASC) 10 MG tablet Take 1 tablet (10 mg total) by mouth once daily.    atorvastatin (LIPITOR) 40 MG tablet TAKE ONE TABLET BY MOUTH EVERY DAY    ELIQUIS 2.5 mg Tab TAKE ONE TABLET BY MOUTH TWICE DAILY    ergocalciferol (ERGOCALCIFEROL) 50,000 unit Cap Take 50,000 Units by mouth every 30 days.     folic acid (FOLVITE) 1 MG tablet Take 1 tablet (1,000 mcg total) by mouth once daily.    furosemide (LASIX) 20 MG tablet TAKE ONE TABLET BY MOUTH EVERY DAY AS NEEDED    metoprolol succinate (TOPROL-XL) 25 MG 24 hr tablet Take 0.5 tablets (12.5 mg total) by mouth once daily. Only take if HR > 60    [DISCONTINUED] HYDROcodone-acetaminophen (NORCO) 5-325 mg per tablet Take 1 tablet by mouth every 6 (six) hours as needed for Pain. (Patient not taking: Reported on 11/8/2021)     Family History     Problem Relation (Age of Onset)    Aneurysm Father    Cancer Mother    Hypertension Mother        Tobacco Use    Smoking status: Former Smoker    Smokeless tobacco: Never Used   Substance and Sexual Activity    Alcohol use: Not Currently     Alcohol/week: 0.0 standard drinks     Comment: occasional/ on a weekend    Drug use: No    Sexual activity: Not Currently     Review of Systems   Constitutional: Negative for chills, fatigue and fever.   Eyes: Negative for photophobia and visual  disturbance.   Respiratory: Positive for shortness of breath. Negative for cough.    Cardiovascular: Negative for chest pain, palpitations and leg swelling.   Gastrointestinal: Negative for abdominal pain, diarrhea, nausea and vomiting.   Genitourinary: Negative for dysuria, frequency and urgency.   Musculoskeletal: Positive for back pain.   Skin: Negative for pallor, rash and wound.   Neurological: Negative for light-headedness and headaches.   Psychiatric/Behavioral: Negative for confusion and decreased concentration.     Objective:     Vital Signs (Most Recent):  Temp: 97.2 °F (36.2 °C) (12/26/21 2014)  Pulse: 62 (12/26/21 2014)  Resp: 20 (12/26/21 2014)  BP: 128/74 (12/26/21 2014)  SpO2: 96 % (12/26/21 2014) Vital Signs (24h Range):  Temp:  [97.2 °F (36.2 °C)-98.4 °F (36.9 °C)] 97.2 °F (36.2 °C)  Pulse:  [60-67] 62  Resp:  [18-57] 20  SpO2:  [88 %-98 %] 96 %  BP: (128-156)/(57-87) 128/74     Weight: 89.4 kg (197 lb)  Body mass index is 36.03 kg/m².    Physical Exam  HENT:      Head: Normocephalic and atraumatic.      Right Ear: Decreased hearing noted.      Left Ear: Decreased hearing noted.   Eyes:      Extraocular Movements: Extraocular movements intact.      Pupils: Pupils are equal, round, and reactive to light.   Cardiovascular:      Rate and Rhythm: Normal rate and regular rhythm.      Pulses: Normal pulses.   Pulmonary:      Breath sounds: Normal breath sounds.   Abdominal:      General: Bowel sounds are normal.      Palpations: Abdomen is soft.   Musculoskeletal:      Cervical back: Normal range of motion.   Skin:     General: Skin is warm and dry.   Neurological:      Mental Status: She is alert and oriented to person, place, and time.   Psychiatric:         Mood and Affect: Mood normal.         Behavior: Behavior normal.           CRANIAL NERVES     CN III, IV, VI   Pupils are equal, round, and reactive to light.       Significant Labs: All pertinent labs within the past 24 hours have been  reviewed.    Significant Imaging: I have reviewed all pertinent imaging results/findings within the past 24 hours.  I have reviewed and interpreted all pertinent imaging results/findings within the past 24 hours.

## 2021-12-27 NOTE — ASSESSMENT & PLAN NOTE
Seems to be presenting issue  Pt has known diastolich CHF and AS (moderate)  Sxs much improved after diuresis  No overt angina (posterior thoracic pain noted)  Troponins flat and similar to prior levels in setting of CKD4.  EKG unchanged, doubt ACS.  Check echo  If no changes noted, no plan for inpatient isch eval.  Can consider as outpatient if sxs recur.  Change lasix to PO  Cont amlod, hydrala/imdur as contingency

## 2021-12-27 NOTE — HPI
81 year old female presents to ED with c/o back pain and shortness of breath for 4 days. Patient symptoms worsen with exertion.  Patient currently O2 @2L NC sats 94-96%.  PMHx of AFIB, CKD, GERD, [place] in both ears, HLD, HTN, Left breast cancer, S/p mastectomy.

## 2021-12-27 NOTE — PROGRESS NOTES
Follow-up Information     Anjel Shaikh NP. Go on 12/30/2021.    Specialty: Family Medicine  Why: Primary Care follow up, please arrive at 10:00 AM.   Contact information:  [address] [id number]  [phone]             Amauri Lomas MD. Go on 1/19/2022.    Specialty: Cardiology  Why: Cardiology follow up, please arrive at 10:00 AM. Dr. Lomas' office may contact you with a sooner appointment date.   Contact information:  [address]  [address]  [facility] [address]  [phone]

## 2021-12-27 NOTE — NURSING
Received report from TOSHIA Purcell. Patient lying in bed resting, NAD noted. Safety Precautions maintained, Will Monitor.

## 2021-12-27 NOTE — CONSULTS
[facility] - Telemetry  Cardiology  Consult Note    Patient Name: Barbara Rizo  MRN: [id number]  Admission Date: 12/26/2021  Hospital Length of Stay: 1 days  Code Status: Full Code   Attending Provider: Az Stapleton MD   Consulting Provider: Fish Vera MD  Primary Care Physician: Paras Oro MD  Principal Problem:Acute on chronic diastolic heart failure    Patient information was obtained from patient and ER records.     Inpatient consult to Cardiology  Consult performed by: Fish Vera MD  Consult ordered by: Diego Dela Cruz Jr., NP  Reason for consult: CHF        Subjective:     Chief Complaint:  SOB     HPI:   81-year-old white female presents to the emergency room complaining of shortness of breath over the course of the last 4 days.  The patient also has some thoracic back pain, midthoracic back around her bra strap.  Her symptoms are definitely worse with exertion.  The patient has a history of atrial fibrillation she has been cardioverted in the past.  She has no history of coronary artery disease respiratory illness.    Pt follows with Dr. Lomas.    Cardiology consulted for SOB/CHF.    The patient presents with several days of progressive lower extremity edema and dyspnea on exertion.  She is also describing some posterior thoracic pain, although this is not clearly exertional.  She denies any palpitations or syncope.  She did receive diuresis and is feeling much better with resolution of her shortness of breath and edema.  She denies any marvin angina or chest discomfort.  Her troponins are flat in the setting of CKD 4.       Past Medical History:   Diagnosis Date    A-fib     CKD (chronic kidney disease)     Diabetes mellitus type II     Fatty liver     GERD (gastroesophageal reflux disease)     Hearing loss of both ears     wears bilateral hearing aids    Hemochromatosis     History of anemia due to CKD     History of pleural effusion     with thoracentesis     Hyperlipidemia     Hypertension     Macular degeneration     Osteoarthritis     Left knee    Osteoporosis     Vaginal delivery     x2    Vitamin D deficiency disease     Wears partial dentures     upper removable bridge       Past Surgical History:   Procedure Laterality Date    AXILLARY NODE DISSECTION Left 10/18/2021    Procedure: LYMPHADENECTOMY, AXILLARY;  Surgeon: Darlene Roca MD;  Location: [facility];  Service: General;  Laterality: Left;    BREAST BIOPSY      BREAST SURGERY Bilateral     Reduction r/t h/o fibrocystic disease    CHOLECYSTECTOMY  08/2015    EYE SURGERY      Cat Ext  OU    HYSTERECTOMY      partial due to uterine fibroids    INCISION AND DRAINAGE OF KNEE Left 9/17/2020    Procedure: INCISION AND DRAINAGE, KNEE;  Surgeon: Rolando Wagoner MD;  Location: [facility];  Service: Orthopedics;  Laterality: Left;    INJECTION FOR SENTINEL NODE IDENTIFICATION Left 10/18/2021    Procedure: INJECTION, FOR SENTINEL NODE IDENTIFICATION;  Surgeon: Darlene Roca MD;  Location: [facility];  Service: General;  Laterality: Left;    JOINT REPLACEMENT Left 05/13/2013    TKR    JOINT REPLACEMENT Right 08/03/2015    TKR    MASTECTOMY, PARTIAL Left 10/18/2021    Procedure: MASTECTOMY, PARTIAL -- wire;  Surgeon: Darlene Roca MD;  Location: [facility];  Service: General;  Laterality: Left;  RN PREOP 10/15/2021   VACCINATED-----NEED H/P AND ORDERS    SENTINEL LYMPH NODE BIOPSY Left 10/18/2021    Procedure: BIOPSY, LYMPH NODE, SENTINEL;  Surgeon: Darlene Roca MD;  Location: [facility];  Service: General;  Laterality: Left;    THORACENTESIS      TOTAL KNEE ARTHROPLASTY Right 2015    left knee done 5/2013    WOUND DRESSING Left 9/17/2020    Procedure: WOUND VAC APPLICATION;  Surgeon: Rolando Wagoner MD;  Location: [facility];  Service: Orthopedics;  Laterality: Left;       Review of patient's allergies indicates:  No Known Allergies    Current Facility-Administered Medications on File Prior to Encounter    Medication    denosumab (PROLIA) injection 60 mg     Current Outpatient Medications on File Prior to Encounter   Medication Sig    allopurinoL (ZYLOPRIM) 100 MG tablet TAKE 1 TABLET BY MOUTH ONCE DAILY    amiodarone (PACERONE) 200 MG Tab TAKE ONE-HALF TABLET BY MOUTH EVERY DAY    amLODIPine (NORVASC) 10 MG tablet Take 1 tablet (10 mg total) by mouth once daily.    atorvastatin (LIPITOR) 40 MG tablet TAKE ONE TABLET BY MOUTH EVERY DAY    ELIQUIS 2.5 mg Tab TAKE ONE TABLET BY MOUTH TWICE DAILY    ergocalciferol (ERGOCALCIFEROL) 50,000 unit Cap Take 50,000 Units by mouth every 30 days.     folic acid (FOLVITE) 1 MG tablet Take 1 tablet (1,000 mcg total) by mouth once daily.    furosemide (LASIX) 20 MG tablet TAKE ONE TABLET BY MOUTH EVERY DAY AS NEEDED    metoprolol succinate (TOPROL-XL) 25 MG 24 hr tablet Take 0.5 tablets (12.5 mg total) by mouth once daily. Only take if HR > 60     Family History     Problem Relation (Age of Onset)    Aneurysm Father    Cancer Mother    Hypertension Mother        Tobacco Use    Smoking status: Former Smoker    Smokeless tobacco: Never Used   Substance and Sexual Activity    Alcohol use: Not Currently     Alcohol/week: 0.0 standard drinks     Comment: occasional/ on a weekend    Drug use: No    Sexual activity: Not Currently     Review of Systems   Constitutional: Negative for chills, diaphoresis, fever and malaise/fatigue.   HENT: Negative for nosebleeds.    Eyes: Negative for blurred vision and double vision.   Cardiovascular: Positive for dyspnea on exertion and leg swelling. Negative for chest pain, claudication, cyanosis, orthopnea, palpitations, paroxysmal nocturnal dyspnea and syncope.   Respiratory: Negative for cough, shortness of breath and wheezing.    Skin: Negative for dry skin and poor wound healing.   Musculoskeletal: Positive for back pain. Negative for joint swelling and myalgias.   Gastrointestinal: Negative for abdominal pain, nausea and vomiting.    Genitourinary: Negative for hematuria.   Neurological: Negative for dizziness, headaches, numbness, seizures and weakness.   Psychiatric/Behavioral: Negative for altered mental status and depression.     Objective:     Vital Signs (Most Recent):  Temp: 98.5 °F (36.9 °C) (12/27/21 0453)  Pulse: 62 (12/27/21 0453)  Resp: 18 (12/27/21 0453)  BP: (!) 147/68 (12/27/21 0453)  SpO2: (!) 91 % (12/27/21 0453) Vital Signs (24h Range):  Temp:  [97.2 °F (36.2 °C)-98.5 °F (36.9 °C)] 98.5 °F (36.9 °C)  Pulse:  [59-67] 62  Resp:  [18-57] 18  SpO2:  [88 %-98 %] 91 %  BP: (128-156)/(57-87) 147/68     Weight: 89.4 kg (197 lb)  Body mass index is 36.03 kg/m².    SpO2: (!) 91 %  O2 Device (Oxygen Therapy): room air      Intake/Output Summary (Last 24 hours) at 12/27/2021 0651  Last data filed at 12/26/2021 2046  Gross per 24 hour   Intake --   Output 850 ml   Net -850 ml       Lines/Drains/Airways     Peripheral Intravenous Line                 Peripheral IV - Single Lumen 12/26/21 2300 20 G Right Antecubital <1 day                Physical Exam  Constitutional:       General: She is not in acute distress.     Appearance: She is well-developed and well-nourished. She is obese. She is not ill-appearing, toxic-appearing or diaphoretic.   HENT:      Head: Normocephalic and atraumatic.      Mouth/Throat:      Pharynx: No oropharyngeal exudate.   Eyes:      General: No scleral icterus.     Extraocular Movements: Extraocular movements intact and EOM normal.      Conjunctiva/sclera: Conjunctivae normal.      Pupils: Pupils are equal, round, and reactive to light.   Neck:      Vascular: No JVD.      Trachea: No tracheal deviation.   Cardiovascular:      Rate and Rhythm: Normal rate and regular rhythm.      Heart sounds: S1 normal and S2 normal. Murmur heard.    Systolic murmur is present with a grade of 2/6.  No friction rub. No gallop.    Pulmonary:      Effort: Pulmonary effort is normal. No respiratory distress.      Breath sounds: Normal  breath sounds. No stridor. No wheezing, rhonchi or rales.   Chest:      Chest wall: No tenderness.   Abdominal:      General: There is no distension.      Palpations: Abdomen is soft.   Musculoskeletal:         General: No swelling or edema. Normal range of motion.      Cervical back: Normal range of motion and neck supple. No rigidity.      Right lower leg: No edema.      Left lower leg: No edema.   Skin:     General: Skin is warm and dry.      Coloration: Skin is not jaundiced.   Neurological:      General: No focal deficit present.      Mental Status: She is alert and oriented to person, place, and time.      Cranial Nerves: No cranial nerve deficit (presbycusis).   Psychiatric:         Mood and Affect: Mood and affect and mood normal.         Behavior: Behavior normal.         Judgment: Judgment normal.         Current Medications:   allopurinoL  100 mg Oral Daily    amiodarone  100 mg Oral Daily    amLODIPine  10 mg Oral Daily    apixaban  2.5 mg Oral BID    atorvastatin  40 mg Oral Daily    folic acid  1,000 mcg Oral Daily    furosemide (LASIX) injection  40 mg Intravenous BID WM    polyethylene glycol  17 g Oral Daily       acetaminophen, melatonin, ondansetron, sodium chloride 0.9%    Laboratory (all labs reviewed):  CBC:  Recent Labs   Lab 09/30/20  0354 01/19/21  1448 04/24/21  1643 10/15/21  1211 12/26/21  1630   WBC 5.60 6.23 8.02 7.49 8.60   Hemoglobin 9.5 L 14.9 15.2 16.9 H 13.5   Hematocrit 29.7 L 45.6 45.0 49.3 H 39.9   Platelets 148 L 163 168 199 195       CHEMISTRIES:  Recent Labs   Lab 01/29/19  0222 01/31/19  0217 04/24/21  1643 04/25/21  0701 10/15/21  1211 12/26/21  1630 12/26/21  2039 12/27/21  0415   Glucose 109   < > 146 H 100 110 96  --  93   Sodium 135 L   < > 137 139 137 127 L  --  132 L   Potassium 3.8   < > 4.5 4.2 4.4 5.0  --  3.9   BUN 23   < > 52 H 40 H 37 H 28 H  --  29 H   Creatinine 1.7 H   < > 2.38 H 2.01 H 2.1 H 2.3 H  --  2.2 H   eGFR if  33 A   < > 22 A  26 A 25 A 22 A  --  24 A   eGFR if non  29 A   < > 19 A 23 A 22 A 19 A  --  21 A   Calcium 8.9   < > 9.2 8.8 10.5 8.0 L  --  7.8 L   Magnesium 1.4 L  --   --  2.2  --   --  2.0  --     < > = values in this interval not displayed.       CARDIAC BIOMARKERS:  Recent Labs   Lab 04/24/21  1913 04/25/21  0052 12/26/21  1630 12/26/21  2039 12/27/21  0216   Troponin I 0.042 H 0.042 H 0.051 H 0.053 H 0.052 H       COAGS:  Recent Labs   Lab 09/17/20  1041 04/24/21  1643   INR 1.1 1.2       LIPIDS/LFTS:  Recent Labs   Lab 07/24/20  0944 09/17/20  0815 10/05/20  1350 01/19/21  1443 04/24/21  1643 10/15/21  1211 12/26/21  1630   Cholesterol 116 L  --   --  127  --   --   --    Triglycerides 110  --   --  98  --   --   --    HDL 45  --   --  51  --   --   --    LDL Cholesterol 49.0 L  --   --  56.4 L  --   --   --    Non-HDL Cholesterol 71  --   --  76  --   --   --    AST 16   < > 25 14 24 20 23   ALT 19   < > 41 16 20 19 22    < > = values in this interval not displayed.       BNP:  Recent Labs   Lab 01/28/19  1205 01/31/19  0217 02/03/19  0450 09/27/20  1027 12/26/21  1630   BNP 1,497 H 2,003 H 1,539 H 1,057 H 1,190 H       TSH:  Recent Labs   Lab 05/20/19  0829 12/11/19  0805 09/30/20  0354 04/24/21  1913 12/26/21  1635   TSH 3.500 3.956 2.311 2.410 1.869       Free T4:        Diagnostic Results:  ECG (personally reviewed and interpreted tracing(s)):  12/26/21 1550 SR 63, RBBB/LAD, similar to 8/6/21    Chest X-Ray (personally reviewed and interpreted image(s)): 12/26/21 CHF, aortic atherosclerosis    Echo: 4/25/21 (repeat ordered)  · The left ventricle is normal in size with eccentric hypertrophy and normal systolic function.  · Grade II left ventricular diastolic dysfunction.  · The estimated ejection fraction is 70%.  · Normal right ventricular size with normal right ventricular systolic function.  · Severe left atrial enlargement.  · There is moderate aortic valve stenosis.  · Aortic valve area is 1.04 cm2;  peak velocity is 2.82 m/s; mean gradient is 21 mmHg.  · Mild-to-moderate mitral regurgitation.  · Mild tricuspid regurgitation.  · Normal central venous pressure (3 mmHg).  · The estimated PA systolic pressure is 49 mmHg.  · There is pulmonary hypertension.        Assessment and Plan:     * Acute on chronic diastolic heart failure  Seems to be presenting issue  Pt has known diastolich CHF and AS (moderate)  Sxs much improved after diuresis  No overt angina (posterior thoracic pain noted)  Troponins flat and similar to prior levels in setting of CKD4.  EKG unchanged, doubt ACS.  Check echo  If no changes noted, no plan for inpatient isch eval.  Can consider as outpatient if sxs recur.  Change lasix to PO  Cont amlod, hydrala/imdur as contingency      Elevated troponin  As above, doubt ACS  Check echo  Outpat stress test if no significant changes.    Chronic kidney disease, stage 4 (severe)  Monitor creat with diuresis    Hyperlipidemia  Cont statin    Essential hypertension  Cont med rx    Paroxysmal atrial fibrillation  In SR  Cont amio/eliquis 2.5mg bid    Aortic atherosclerosis  Noted on CXR 12/26/21  Cont statin        VTE Risk Mitigation (From admission, onward)         Ordered     apixaban tablet 2.5 mg  2 times daily         12/26/21 1915     IP VTE HIGH RISK PATIENT  Once         12/26/21 1915     Place sequential compression device  Until discontinued         12/26/21 1915                Thank you for your consult. I will follow-up with patient. Please contact us if you have any additional questions.    Fish Vera MD  Cardiology   [facility] - Telemetry    Addendum 1pm    Echo 12/27/21 (images personally reviewed and interpreted)  · The left ventricle is normal in size with moderate concentric hypertrophy and normal systolic function.  · The estimated ejection fraction is 60%.  · Grade II left ventricular diastolic dysfunction.  · Mild right ventricular enlargement with normal right ventricular  systolic function.  · There is moderate aortic valve stenosis.  · Aortic valve area is 1.29 cm2; peak velocity is 2.88 m/s; mean gradient is 22 mmHg.  · Mild mitral regurgitation.  · Moderate tricuspid regurgitation.  · The estimated PA systolic pressure is 66 mmHg.  · There is pulmonary hypertension.        No further inpat cardiac testing planned.  Dispo planning appropriate  Pt to follow up with Dr. Lomas.  Cardiology will sign off, pls call with questions.

## 2021-12-27 NOTE — PLAN OF CARE
[facility] - Telemetry  Discharge Assessment  Tamar Arechiga (Daughter)   [phone] (Mobile)  Primary Care Provider: Paras Oro MD     Discharge Assessment (most recent)       BRIEF DISCHARGE ASSESSMENT - 12/27/21 1055          Discharge Planning    Assessment Type Discharge Planning Brief Assessment (P)      Resource/Environmental Concerns none (P)      Support Systems Children (P)      Assistance Needed Tamar Arechiga (Daughter)   [phone] (Mobile) (P)      Current Living Arrangements home/apartment/condo (P)      Patient/Family Anticipates Transition to home with family (P)      DME Needed Upon Discharge  other (see comments) (P)      Discharge Plan A Home with family (P)      Discharge Plan B Home with family (P)

## 2021-12-27 NOTE — SUBJECTIVE & OBJECTIVE
Interval History:  CC resolved. Would like to go home       Review of Systems   Constitutional: Negative for chills, fatigue and fever.   Eyes: Negative for photophobia and visual disturbance.   Respiratory: Negative for cough and shortness of breath.    Cardiovascular: Negative for chest pain, palpitations and leg swelling.   Gastrointestinal: Negative for abdominal pain, diarrhea, nausea and vomiting.   Genitourinary: Negative for dysuria, frequency and urgency.   Skin: Negative for pallor, rash and wound.   Neurological: Negative for light-headedness and headaches.   Psychiatric/Behavioral: Negative for confusion and decreased concentration.     Objective:     Vital Signs (Most Recent):  Temp: 97.9 °F (36.6 °C) (12/27/21 0814)  Pulse: 64 (12/27/21 0814)  Resp: 18 (12/27/21 0814)  BP: (!) 149/67 (12/27/21 0814)  SpO2: (!) 93 % (12/27/21 0814) Vital Signs (24h Range):  Temp:  [97.2 °F (36.2 °C)-98.5 °F (36.9 °C)] 97.9 °F (36.6 °C)  Pulse:  [59-67] 64  Resp:  [18-57] 18  SpO2:  [88 %-98 %] 93 %  BP: (128-156)/(57-87) 149/67     Weight: 89.4 kg (197 lb)  Body mass index is 36.03 kg/m².    Intake/Output Summary (Last 24 hours) at 12/27/2021 1128  Last data filed at 12/26/2021 2046  Gross per 24 hour   Intake --   Output 850 ml   Net -850 ml      Physical Exam  Vitals and nursing note reviewed.   Constitutional:       General: She is not in acute distress.     Appearance: Normal appearance. She is obese. She is not ill-appearing, toxic-appearing or diaphoretic.   HENT:      Head: Normocephalic and atraumatic.   Cardiovascular:      Rate and Rhythm: Normal rate and regular rhythm.   Pulmonary:      Effort: Pulmonary effort is normal. No respiratory distress.      Breath sounds: Normal breath sounds.   Abdominal:      General: There is no distension.      Tenderness: There is no abdominal tenderness.   Skin:     General: Skin is warm and dry.   Neurological:      Mental Status: She is alert and oriented to person, place,  and time.   Psychiatric:         Mood and Affect: Mood normal.         Behavior: Behavior normal.         Thought Content: Thought content normal.         Significant Labs:   All pertinent labs within the past 24 hours have been reviewed.  BMP:   Recent Labs   Lab 12/26/21  1630 12/26/21 2039 12/27/21  0415   GLU   < >  --  93   NA   < >  --  132*   K   < >  --  3.9   CL   < >  --  97   CO2   < >  --  21*   BUN   < >  --  29*   CREATININE   < >  --  2.2*   CALCIUM   < >  --  7.8*   MG  --  2.0  --     < > = values in this interval not displayed.     CBC:   Recent Labs   Lab 12/26/21  1630   WBC 8.60   HGB 13.5   HCT 39.9

## 2021-12-27 NOTE — HOSPITAL COURSE
Patient admitted to the hospital for acute on chronic diastolic heart failure. She received IV lasix with resolution of her SOB. She only takes PRN lasix but now will go home on 20 po bid. However, patient dropped to 83% on RA with exercise on 12/27.  Despite continued lasix- she was 87% on RA on 12/28. Former smoker. Pulmonary consulted for eval.  Hypoxemic respiratory failure probably multifactorial. Primary contributor is acute decompensated heart failure. 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 Lomas. Also has chronic R hemidiaphragm elevation which is likely a complication of her pleural effusion in 2015 based on review of imaging since then. Tobacco history is noncontributory as she smoked 1-2 cig/wk in her 20s.  Patient will require home oxygen upon discharge.  She will follow up with Pulmonary for PFT's.  Patient will also follow up with PCP and Cardiology.  She will be discharged home once home oxygen arranged.

## 2021-12-27 NOTE — CONSULTS
Food & Nutrition  Education    Diet Education: Low Salt, Heart Healthy  Time Spent: 15 Minutes  Learners: Patient      Nutrition Education provided with handouts:   Heart Healthy Nutrition Therapy Diet Education  + D/C attachements      Comments: Spoke with Patient about home diet, encourage Low Na intake, encouraged lean proteins, heart healthy fats, Whole grains and increased F/V intake.  Patient expressed understanding, answered all questions and concerns.    Please Re-consult as needed    Thanks!

## 2021-12-27 NOTE — SUBJECTIVE & OBJECTIVE
Past Medical History:   Diagnosis Date    A-fib     CKD (chronic kidney disease)     Diabetes mellitus type II     Fatty liver     GERD (gastroesophageal reflux disease)     Hearing loss of both ears     wears bilateral hearing aids    Hemochromatosis     History of anemia due to CKD     History of pleural effusion     with thoracentesis    Hyperlipidemia     Hypertension     Macular degeneration     Osteoarthritis     Left knee    Osteoporosis     Vaginal delivery     x2    Vitamin D deficiency disease     Wears partial dentures     upper removable bridge       Past Surgical History:   Procedure Laterality Date    AXILLARY NODE DISSECTION Left 10/18/2021    Procedure: LYMPHADENECTOMY, AXILLARY;  Surgeon: Darlene Roca MD;  Location: [facility] OR;  Service: General;  Laterality: Left;    BREAST BIOPSY      BREAST SURGERY Bilateral     Reduction r/t h/o fibrocystic disease    CHOLECYSTECTOMY  08/2015    EYE SURGERY      Cat Ext  OU    HYSTERECTOMY      partial due to uterine fibroids    INCISION AND DRAINAGE OF KNEE Left 9/17/2020    Procedure: INCISION AND DRAINAGE, KNEE;  Surgeon: Rolando Wagoner MD;  Location: [facility] OR;  Service: Orthopedics;  Laterality: Left;    INJECTION FOR SENTINEL NODE IDENTIFICATION Left 10/18/2021    Procedure: INJECTION, FOR SENTINEL NODE IDENTIFICATION;  Surgeon: Darlene Roca MD;  Location: [facility];  Service: General;  Laterality: Left;    JOINT REPLACEMENT Left 05/13/2013    TKR    JOINT REPLACEMENT Right 08/03/2015    TKR    MASTECTOMY, PARTIAL Left 10/18/2021    Procedure: MASTECTOMY, PARTIAL -- wire;  Surgeon: Darlene Roca MD;  Location: [facility] OR;  Service: General;  Laterality: Left;  RN PREOP 10/15/2021   VACCINATED-----NEED H/P AND ORDERS    SENTINEL LYMPH NODE BIOPSY Left 10/18/2021    Procedure: BIOPSY, LYMPH NODE, SENTINEL;  Surgeon: Darlene Roca MD;  Location: [facility];  Service: General;  Laterality: Left;    THORACENTESIS      TOTAL KNEE  ARTHROPLASTY Right 2015    left knee done 5/2013    WOUND DRESSING Left 9/17/2020    Procedure: WOUND VAC APPLICATION;  Surgeon: Rolando Wagoner MD;  Location: [facility];  Service: Orthopedics;  Laterality: Left;       Review of patient's allergies indicates:  No Known Allergies    Current Facility-Administered Medications on File Prior to Encounter   Medication    denosumab (PROLIA) injection 60 mg     Current Outpatient Medications on File Prior to Encounter   Medication Sig    allopurinoL (ZYLOPRIM) 100 MG tablet TAKE 1 TABLET BY MOUTH ONCE DAILY    amiodarone (PACERONE) 200 MG Tab TAKE ONE-HALF TABLET BY MOUTH EVERY DAY    amLODIPine (NORVASC) 10 MG tablet Take 1 tablet (10 mg total) by mouth once daily.    atorvastatin (LIPITOR) 40 MG tablet TAKE ONE TABLET BY MOUTH EVERY DAY    ELIQUIS 2.5 mg Tab TAKE ONE TABLET BY MOUTH TWICE DAILY    ergocalciferol (ERGOCALCIFEROL) 50,000 unit Cap Take 50,000 Units by mouth every 30 days.     folic acid (FOLVITE) 1 MG tablet Take 1 tablet (1,000 mcg total) by mouth once daily.    furosemide (LASIX) 20 MG tablet TAKE ONE TABLET BY MOUTH EVERY DAY AS NEEDED    metoprolol succinate (TOPROL-XL) 25 MG 24 hr tablet Take 0.5 tablets (12.5 mg total) by mouth once daily. Only take if HR > 60     Family History     Problem Relation (Age of Onset)    Aneurysm Father    Cancer Mother    Hypertension Mother        Tobacco Use    Smoking status: Former Smoker    Smokeless tobacco: Never Used   Substance and Sexual Activity    Alcohol use: Not Currently     Alcohol/week: 0.0 standard drinks     Comment: occasional/ on a weekend    Drug use: No    Sexual activity: Not Currently     Review of Systems   Constitutional: Negative for chills, diaphoresis, fever and malaise/fatigue.   HENT: Negative for nosebleeds.    Eyes: Negative for blurred vision and double vision.   Cardiovascular: Positive for dyspnea on exertion and leg swelling. Negative for chest pain, claudication,  cyanosis, orthopnea, palpitations, paroxysmal nocturnal dyspnea and syncope.   Respiratory: Negative for cough, shortness of breath and wheezing.    Skin: Negative for dry skin and poor wound healing.   Musculoskeletal: Positive for back pain. Negative for joint swelling and myalgias.   Gastrointestinal: Negative for abdominal pain, nausea and vomiting.   Genitourinary: Negative for hematuria.   Neurological: Negative for dizziness, headaches, numbness, seizures and weakness.   Psychiatric/Behavioral: Negative for altered mental status and depression.     Objective:     Vital Signs (Most Recent):  Temp: 98.5 °F (36.9 °C) (12/27/21 0453)  Pulse: 62 (12/27/21 0453)  Resp: 18 (12/27/21 0453)  BP: (!) 147/68 (12/27/21 0453)  SpO2: (!) 91 % (12/27/21 0453) Vital Signs (24h Range):  Temp:  [97.2 °F (36.2 °C)-98.5 °F (36.9 °C)] 98.5 °F (36.9 °C)  Pulse:  [59-67] 62  Resp:  [18-57] 18  SpO2:  [88 %-98 %] 91 %  BP: (128-156)/(57-87) 147/68     Weight: 89.4 kg (197 lb)  Body mass index is 36.03 kg/m².    SpO2: (!) 91 %  O2 Device (Oxygen Therapy): room air      Intake/Output Summary (Last 24 hours) at 12/27/2021 0651  Last data filed at 12/26/2021 2046  Gross per 24 hour   Intake --   Output 850 ml   Net -850 ml       Lines/Drains/Airways     Peripheral Intravenous Line                 Peripheral IV - Single Lumen 12/26/21 2300 20 G Right Antecubital <1 day                Physical Exam  Constitutional:       General: She is not in acute distress.     Appearance: She is well-developed and well-nourished. She is obese. She is not ill-appearing, toxic-appearing or diaphoretic.   HENT:      Head: Normocephalic and atraumatic.      Mouth/Throat:      Pharynx: No oropharyngeal exudate.   Eyes:      General: No scleral icterus.     Extraocular Movements: Extraocular movements intact and EOM normal.      Conjunctiva/sclera: Conjunctivae normal.      Pupils: Pupils are equal, round, and reactive to light.   Neck:      Vascular: No  JVD.      Trachea: No tracheal deviation.   Cardiovascular:      Rate and Rhythm: Normal rate and regular rhythm.      Heart sounds: S1 normal and S2 normal. Murmur heard.    Systolic murmur is present with a grade of 2/6.  No friction rub. No gallop.    Pulmonary:      Effort: Pulmonary effort is normal. No respiratory distress.      Breath sounds: Normal breath sounds. No stridor. No wheezing, rhonchi or rales.   Chest:      Chest wall: No tenderness.   Abdominal:      General: There is no distension.      Palpations: Abdomen is soft.   Musculoskeletal:         General: No swelling or edema. Normal range of motion.      Cervical back: Normal range of motion and neck supple. No rigidity.      Right lower leg: No edema.      Left lower leg: No edema.   Skin:     General: Skin is warm and dry.      Coloration: Skin is not jaundiced.   Neurological:      General: No focal deficit present.      Mental Status: She is alert and oriented to person, place, and time.      Cranial Nerves: No cranial nerve deficit (presbycusis).   Psychiatric:         Mood and Affect: Mood and affect and mood normal.         Behavior: Behavior normal.         Judgment: Judgment normal.         Current Medications:   allopurinoL  100 mg Oral Daily    amiodarone  100 mg Oral Daily    amLODIPine  10 mg Oral Daily    apixaban  2.5 mg Oral BID    atorvastatin  40 mg Oral Daily    folic acid  1,000 mcg Oral Daily    furosemide (LASIX) injection  40 mg Intravenous BID WM    polyethylene glycol  17 g Oral Daily       acetaminophen, melatonin, ondansetron, sodium chloride 0.9%    Laboratory (all labs reviewed):  CBC:  Recent Labs   Lab 09/30/20  0354 01/19/21  1448 04/24/21  1643 10/15/21  1211 12/26/21  1630   WBC 5.60 6.23 8.02 7.49 8.60   Hemoglobin 9.5 L 14.9 15.2 16.9 H 13.5   Hematocrit 29.7 L 45.6 45.0 49.3 H 39.9   Platelets 148 L 163 168 199 195       CHEMISTRIES:  Recent Labs   Lab 01/29/19  0222 01/31/19  0217 04/24/21  1643  04/25/21  0701 10/15/21  1211 12/26/21  1630 12/26/21  2039 12/27/21  0415   Glucose 109   < > 146 H 100 110 96  --  93   Sodium 135 L   < > 137 139 137 127 L  --  132 L   Potassium 3.8   < > 4.5 4.2 4.4 5.0  --  3.9   BUN 23   < > 52 H 40 H 37 H 28 H  --  29 H   Creatinine 1.7 H   < > 2.38 H 2.01 H 2.1 H 2.3 H  --  2.2 H   eGFR if  33 A   < > 22 A 26 A 25 A 22 A  --  24 A   eGFR if non  29 A   < > 19 A 23 A 22 A 19 A  --  21 A   Calcium 8.9   < > 9.2 8.8 10.5 8.0 L  --  7.8 L   Magnesium 1.4 L  --   --  2.2  --   --  2.0  --     < > = values in this interval not displayed.       CARDIAC BIOMARKERS:  Recent Labs   Lab 04/24/21  1913 04/25/21  0052 12/26/21  1630 12/26/21 2039 12/27/21  0216   Troponin I 0.042 H 0.042 H 0.051 H 0.053 H 0.052 H       COAGS:  Recent Labs   Lab 09/17/20  1041 04/24/21  1643   INR 1.1 1.2       LIPIDS/LFTS:  Recent Labs   Lab 07/24/20  0944 09/17/20  0815 10/05/20  1350 01/19/21  1443 04/24/21  1643 10/15/21  1211 12/26/21  1630   Cholesterol 116 L  --   --  127  --   --   --    Triglycerides 110  --   --  98  --   --   --    HDL 45  --   --  51  --   --   --    LDL Cholesterol 49.0 L  --   --  56.4 L  --   --   --    Non-HDL Cholesterol 71  --   --  76  --   --   --    AST 16   < > 25 14 24 20 23   ALT 19   < > 41 16 20 19 22    < > = values in this interval not displayed.       BNP:  Recent Labs   Lab 01/28/19  1205 01/31/19  0217 02/03/19  0450 09/27/20  1027 12/26/21  1630   BNP 1,497 H 2,003 H 1,539 H 1,057 H 1,190 H       TSH:  Recent Labs   Lab 05/20/19  0829 12/11/19  0805 09/30/20  0354 04/24/21  1913 12/26/21  1635   TSH 3.500 3.956 2.311 2.410 1.869       Free T4:        Diagnostic Results:  ECG (personally reviewed and interpreted tracing(s)):  12/26/21 1550 SR 63, RBBB/LAD, similar to 8/6/21    Chest X-Ray (personally reviewed and interpreted image(s)): 12/26/21 CHF, aortic atherosclerosis    Echo: 4/25/21 (repeat ordered)  · The left  ventricle is normal in size with eccentric hypertrophy and normal systolic function.  · Grade II left ventricular diastolic dysfunction.  · The estimated ejection fraction is 70%.  · Normal right ventricular size with normal right ventricular systolic function.  · Severe left atrial enlargement.  · There is moderate aortic valve stenosis.  · Aortic valve area is 1.04 cm2; peak velocity is 2.82 m/s; mean gradient is 21 mmHg.  · Mild-to-moderate mitral regurgitation.  · Mild tricuspid regurgitation.  · Normal central venous pressure (3 mmHg).  · The estimated PA systolic pressure is 49 mmHg.  · There is pulmonary hypertension.

## 2021-12-27 NOTE — ASSESSMENT & PLAN NOTE
-consulted cardiology  -IV Lasix  -echo in am  -strict I&Os  -daily wgt  -fluid restriction  -trend troponin

## 2021-12-27 NOTE — DISCHARGE SUMMARY
[facility]  Discharge Summary      Patient Name: Barbara Rizo  MRN: [id number]  Patient Class: OP- Observation   Admission Date: 12/26/2021  Hospital Length of Stay: 1 days  Discharge Date and Time:  12/27/2021 11:34 AM  Attending Physician: Az Stapleton MD   Discharging Provider: Az Stapleton MD  Primary Care Provider: Paras Oro MD      HPI:   81 year old female presents to ED with c/o back pain and shortness of breath for 4 days. Patient symptoms worsen with exertion.  Patient currently O2 @2L NC sats 94-96%.  PMHx of AFIB, CKD, GERD, [place] in both ears, HLD, HTN, Left breast cancer, S/p mastectomy.        * No surgery found *      Hospital Course:   Patient admitted to the hospital for acute diastolic heart failure. She received IV lasix with resolution of her SOB. She only takes PRN lasix but now will go home on 20 po bid.  Follow up with Dr. Lomas in one week. Activity as tolerated. Diet- low NA.         Goals of Care Treatment Preferences:  Code Status: Full Code      Consults:   Consults (From admission, onward)        Status Ordering Provider     Inpatient consult to Social Work  Once        Provider:  (Not yet assigned)    Acknowledged FISH LOPEZ     Inpatient consult to Cardiology  Once        Provider:  Fish Lopez MD    Completed JENNIFFER JACQUES JR     Inpatient consult to Social Work/Case Management  Once        Provider:  (Not yet assigned)    Acknowledged JENNIFFER JACQUES JR     Inpatient consult to Registered Dietitian/Nutritionist  Once        Provider:  (Not yet assigned)    Acknowledged JENNIFFER JACQUES JR          No new Assessment & Plan notes have been filed under this hospital service since the last note was generated.  Service: Hospital Medicine    Final Active Diagnoses:    Diagnosis Date Noted POA    Aortic atherosclerosis [I70.0] 12/27/2021 Yes    Chronic kidney disease, stage 4 (severe) [N18.4] 01/25/2021 Yes     Obesity, Class II, BMI 35-39.9 [E66.9] 01/28/2019 Yes     Chronic    Chronic gout [M1A.9XX0] 01/28/2019 Yes     Chronic    Paroxysmal atrial fibrillation [I48.0] 11/27/2018 Yes     Chronic    Hyperlipidemia [E78.5]  Yes     Chronic    Essential hypertension [I10]  Yes     Chronic      Problems Resolved During this Admission:    Diagnosis Date Noted Date Resolved POA    PRINCIPAL PROBLEM:  Acute on chronic diastolic heart failure [I50.33] 02/06/2019 12/27/2021 Yes    Hyponatremia [E87.1] 09/27/2020 12/27/2021 Yes    Elevated troponin [R77.8] 04/24/2021 12/27/2021 Yes       Discharged Condition: good    Disposition: Home or Self Care    Follow Up:   Follow-up Information     Paras Oro MD In 1 week.    Specialties: Family Medicine, Wound Care  Contact information:  [address] [id number]  [phone]             Amauri Lomas MD In 1 week.    Specialty: Cardiology  Contact information:  [address]  [address]  [place] [id number]  [phone]                       Patient Instructions:   No discharge procedures on file.    Significant Diagnostic Studies:     Pending Diagnostic Studies:     Procedure Component Value Units Date/Time    Echo [[id number]]     Order Status: Sent Lab Status: No result          Medications:  Reconciled Home Medications:      Medication List      CHANGE how you take these medications    furosemide 20 MG tablet  Commonly known as: LASIX  Take 1 tablet (20 mg total) by mouth 2 (two) times daily.  What changed:   · when to take this  · reasons to take this        CONTINUE taking these medications    allopurinoL 100 MG tablet  Commonly known as: ZYLOPRIM  TAKE 1 TABLET BY MOUTH ONCE DAILY     amiodarone 200 MG Tab  Commonly known as: PACERONE  TAKE ONE-HALF TABLET BY MOUTH EVERY DAY     amLODIPine 10 MG tablet  Commonly known as: NORVASC  Take 1 tablet (10 mg total) by mouth once daily.     atorvastatin 40 MG tablet  Commonly known as: LIPITOR  TAKE ONE TABLET BY MOUTH  EVERY DAY     ELIQUIS 2.5 mg Tab  Generic drug: apixaban  TAKE ONE TABLET BY MOUTH TWICE DAILY     ergocalciferol 50,000 unit Cap  Commonly known as: ERGOCALCIFEROL  Take 50,000 Units by mouth every 30 days.     folic acid 1 MG tablet  Commonly known as: FOLVITE  Take 1 tablet (1,000 mcg total) by mouth once daily.     metoprolol succinate 25 MG 24 hr tablet  Commonly known as: TOPROL-XL  Take 0.5 tablets (12.5 mg total) by mouth once daily. Only take if HR > 60            Indwelling Lines/Drains at time of discharge:   Lines/Drains/Airways     None                 Time spent on the discharge of patient:  > 35 minutes         Az Hansen MD  Department of Hospital Medicine  [facility] [place] Tele[place]

## 2021-12-27 NOTE — ASSESSMENT & PLAN NOTE
-consulted cardiology  -IV Lasix  -echo in am  -strict I&Os  -daily wgt  -fluid restriction  -trend troponin    Resolved. Will send home on lasix 20 bid

## 2021-12-27 NOTE — PROGRESS NOTES
[facility] Medicine  Progress Note    Patient Name: Barbara Rizo  MRN: [id number]  Patient Class: IP- Inpatient   Admission Date: 12/26/2021  Length of Stay: 1 days  Attending Physician: Az Stapleton MD  Primary Care Provider: Paras Oro MD        Subjective:     Principal Problem:Acute on chronic diastolic heart failure        HPI:  81 year old female presents to ED with c/o back pain and shortness of breath for 4 days. Patient symptoms worsen with exertion.  Patient currently O2 @2L NC sats 94-96%.  PMHx of AFIB, CKD, GERD, [place] in both ears, HLD, HTN, Left breast cancer, S/p mastectomy.        Overview/Hospital Course:  Patient admitted to the hospital for acute diastolic heart failure. She received IV lasix with resolution of her SOB. She only takes PRN lasix but now will go home on 20 po bid.  Follow up with Dr. Lomas in one week. Activity as tolerated. Diet- low NA.        Interval History:  CC resolved. Would like to go home       Review of Systems   Constitutional: Negative for chills, fatigue and fever.   Eyes: Negative for photophobia and visual disturbance.   Respiratory: Negative for cough and shortness of breath.    Cardiovascular: Negative for chest pain, palpitations and leg swelling.   Gastrointestinal: Negative for abdominal pain, diarrhea, nausea and vomiting.   Genitourinary: Negative for dysuria, frequency and urgency.   Skin: Negative for pallor, rash and wound.   Neurological: Negative for light-headedness and headaches.   Psychiatric/Behavioral: Negative for confusion and decreased concentration.     Objective:     Vital Signs (Most Recent):  Temp: 97.9 °F (36.6 °C) (12/27/21 0814)  Pulse: 64 (12/27/21 0814)  Resp: 18 (12/27/21 0814)  BP: (!) 149/67 (12/27/21 0814)  SpO2: (!) 93 % (12/27/21 0814) Vital Signs (24h Range):  Temp:  [97.2 °F (36.2 °C)-98.5 °F (36.9 °C)] 97.9 °F (36.6 °C)  Pulse:  [59-67] 64  Resp:  [18-57] 18  SpO2:  [88 %-98 %] 93 %  BP:  (128-156)/(57-87) 149/67     Weight: 89.4 kg (197 lb)  Body mass index is 36.03 kg/m².    Intake/Output Summary (Last 24 hours) at 12/27/2021 1128  Last data filed at 12/26/2021 2046  Gross per 24 hour   Intake --   Output 850 ml   Net -850 ml      Physical Exam  Vitals and nursing note reviewed.   Constitutional:       General: She is not in acute distress.     Appearance: Normal appearance. She is obese. She is not ill-appearing, toxic-appearing or diaphoretic.   HENT:      Head: Normocephalic and atraumatic.   Cardiovascular:      Rate and Rhythm: Normal rate and regular rhythm.   Pulmonary:      Effort: Pulmonary effort is normal. No respiratory distress.      Breath sounds: Normal breath sounds.   Abdominal:      General: There is no distension.      Tenderness: There is no abdominal tenderness.   Skin:     General: Skin is warm and dry.   Neurological:      Mental Status: She is alert and oriented to person, place, and time.   Psychiatric:         Mood and Affect: Mood normal.         Behavior: Behavior normal.         Thought Content: Thought content normal.         Significant Labs:   All pertinent labs within the past 24 hours have been reviewed.  BMP:   Recent Labs   Lab 12/26/21  1630 12/26/21  2039 12/27/21  0415   GLU   < >  --  93   NA   < >  --  132*   K   < >  --  3.9   CL   < >  --  97   CO2   < >  --  21*   BUN   < >  --  29*   CREATININE   < >  --  2.2*   CALCIUM   < >  --  7.8*   MG  --  2.0  --     < > = values in this interval not displayed.     CBC:   Recent Labs   Lab 12/26/21  1630   WBC 8.60   HGB 13.5   HCT 39.9              Assessment/Plan:      * Acute on chronic diastolic heart failure  -consulted cardiology  -IV Lasix  -echo in am  -strict I&Os  -daily wgt  -fluid restriction  -trend troponin    Resolved. Will send home on lasix 20 bid         Hyponatremia  -free water fluid restriction  -trend level      Chronic kidney disease, stage 4 (severe)  -baseline creatinine between  2.0-2.3  -trend level       Chronic gout  -resume home med      Obesity, Class II, BMI 35-39.9  -consult dietician      Paroxysmal atrial fibrillation  -resume home med      Essential hypertension  -resume homed med      Hyperlipidemia  Resume home med        VTE Risk Mitigation (From admission, onward)         Ordered     apixaban tablet 2.5 mg  2 times daily         12/26/21 1915     IP VTE HIGH RISK PATIENT  Once         12/26/21 1915     Place sequential compression device  Until discontinued         12/26/21 1915                Discharge Planning   SALLY: 12/27/2021     Code Status: Full Code   Is the patient medically ready for discharge?:     Reason for patient still in hospital (select all that apply): Patient unstable                     Az Hansen MD  Department of Hospital Medicine   [facility] [place] - Telemetry

## 2021-12-28 PROBLEM — I50.9 ACUTE DECOMPENSATED HEART FAILURE: Status: ACTIVE | Noted: 2021-12-28

## 2021-12-28 LAB
ALLENS TEST: ABNORMAL
ANION GAP SERPL CALC-SCNC: 12 MMOL/L (ref 8–16)
BUN SERPL-MCNC: 26 MG/DL (ref 8–23)
CALCIUM SERPL-MCNC: 7.6 MG/DL (ref 8.7–10.5)
CHLORIDE SERPL-SCNC: 95 MMOL/L (ref 95–110)
CO2 SERPL-SCNC: 28 MMOL/L (ref 23–29)
CREAT SERPL-MCNC: 2.2 MG/DL (ref 0.5–1.4)
DELSYS: ABNORMAL
EST. GFR  (AFRICAN AMERICAN): 24 ML/MIN/1.73 M^2
EST. GFR  (NON AFRICAN AMERICAN): 21 ML/MIN/1.73 M^2
GLUCOSE SERPL-MCNC: 110 MG/DL (ref 70–110)
HCO3 UR-SCNC: 29.4 MMOL/L (ref 24–28)
PCO2 BLDA: 42.6 MMHG (ref 35–45)
PH SMN: 7.45 [PH] (ref 7.35–7.45)
PO2 BLDA: 56 MMHG (ref 80–100)
POC BE: 5 MMOL/L
POC PCO2 TEMP: 42.6 MMHG
POC PH TEMP: 7.45
POC PO2 TEMP: 56 MMHG
POC SATURATED O2: 90 % (ref 95–100)
POC TCO2: 31 MMOL/L (ref 23–27)
POC TEMPERATURE: ABNORMAL
POTASSIUM SERPL-SCNC: 3.3 MMOL/L (ref 3.5–5.1)
SAMPLE: ABNORMAL
SITE: ABNORMAL
SODIUM SERPL-SCNC: 135 MMOL/L (ref 136–145)

## 2021-12-28 PROCEDURE — 25000242 PHARM REV CODE 250 ALT 637 W/ HCPCS: Performed by: INTERNAL MEDICINE

## 2021-12-28 PROCEDURE — 99900035 HC TECH TIME PER 15 MIN (STAT)

## 2021-12-28 PROCEDURE — 27000221 HC OXYGEN, UP TO 24 HOURS

## 2021-12-28 PROCEDURE — 25000003 PHARM REV CODE 250: Performed by: HOSPITALIST

## 2021-12-28 PROCEDURE — 99204 OFFICE O/P NEW MOD 45 MIN: CPT | Mod: ,,, | Performed by: INTERNAL MEDICINE

## 2021-12-28 PROCEDURE — 63600175 PHARM REV CODE 636 W HCPCS: Performed by: INTERNAL MEDICINE

## 2021-12-28 PROCEDURE — 80048 BASIC METABOLIC PNL TOTAL CA: CPT | Performed by: HOSPITALIST

## 2021-12-28 PROCEDURE — G0378 HOSPITAL OBSERVATION PER HR: HCPCS

## 2021-12-28 PROCEDURE — 25000003 PHARM REV CODE 250: Performed by: INTERNAL MEDICINE

## 2021-12-28 PROCEDURE — 82803 BLOOD GASES ANY COMBINATION: CPT

## 2021-12-28 PROCEDURE — 94760 N-INVAS EAR/PLS OXIMETRY 1: CPT

## 2021-12-28 PROCEDURE — 94640 AIRWAY INHALATION TREATMENT: CPT

## 2021-12-28 PROCEDURE — 99204 PR OFFICE/OUTPT VISIT, NEW, LEVL IV, 45-59 MIN: ICD-10-PCS | Mod: ,,, | Performed by: INTERNAL MEDICINE

## 2021-12-28 PROCEDURE — 94645 CONT INHLJ TX EACH ADDL HOUR: CPT

## 2021-12-28 PROCEDURE — 36600 WITHDRAWAL OF ARTERIAL BLOOD: CPT

## 2021-12-28 PROCEDURE — 96376 TX/PRO/DX INJ SAME DRUG ADON: CPT

## 2021-12-28 PROCEDURE — 36415 COLL VENOUS BLD VENIPUNCTURE: CPT | Performed by: HOSPITALIST

## 2021-12-28 RX ORDER — FUROSEMIDE 10 MG/ML
80 INJECTION INTRAMUSCULAR; INTRAVENOUS 2 TIMES DAILY
Status: DISCONTINUED | OUTPATIENT
Start: 2021-12-28 | End: 2021-12-29 | Stop reason: HOSPADM

## 2021-12-28 RX ORDER — IPRATROPIUM BROMIDE AND ALBUTEROL SULFATE 2.5; .5 MG/3ML; MG/3ML
3 SOLUTION RESPIRATORY (INHALATION) EVERY 12 HOURS
Status: DISCONTINUED | OUTPATIENT
Start: 2021-12-28 | End: 2021-12-29 | Stop reason: HOSPADM

## 2021-12-28 RX ORDER — POTASSIUM CHLORIDE 20 MEQ/1
60 TABLET, EXTENDED RELEASE ORAL ONCE
Status: COMPLETED | OUTPATIENT
Start: 2021-12-28 | End: 2021-12-28

## 2021-12-28 RX ADMIN — ATORVASTATIN CALCIUM 40 MG: 40 TABLET, FILM COATED ORAL at 09:12

## 2021-12-28 RX ADMIN — FUROSEMIDE 80 MG: 10 INJECTION, SOLUTION INTRAMUSCULAR; INTRAVENOUS at 08:12

## 2021-12-28 RX ADMIN — APIXABAN 2.5 MG: 2.5 TABLET, FILM COATED ORAL at 08:12

## 2021-12-28 RX ADMIN — AMLODIPINE BESYLATE 10 MG: 5 TABLET ORAL at 09:12

## 2021-12-28 RX ADMIN — IPRATROPIUM BROMIDE AND ALBUTEROL SULFATE 3 ML: 2.5; .5 SOLUTION RESPIRATORY (INHALATION) at 05:12

## 2021-12-28 RX ADMIN — FOLIC ACID 1000 MCG: 1 TABLET ORAL at 09:12

## 2021-12-28 RX ADMIN — AMIODARONE HYDROCHLORIDE 100 MG: 100 TABLET ORAL at 09:12

## 2021-12-28 RX ADMIN — APIXABAN 2.5 MG: 2.5 TABLET, FILM COATED ORAL at 09:12

## 2021-12-28 RX ADMIN — ALLOPURINOL 100 MG: 100 TABLET ORAL at 09:12

## 2021-12-28 RX ADMIN — POTASSIUM CHLORIDE 60 MEQ: 1500 TABLET, EXTENDED RELEASE ORAL at 09:12

## 2021-12-28 NOTE — ASSESSMENT & PLAN NOTE
Multifactorial. Primary contributor is acute decompensated heart failure. 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 Lomas. Also has chronic R hemidiaphragm elevation which is likely a complication of her pleural effusion in 2015 based on review of imaging since then. STOP-BANG score is low making JOCELIN unlikely. Tobacco history is noncontributory as she smoked 1-2 cig/wk in her 20s.    She will likely need home oxygen at the very least in the short term.    - Check an ABG to eval for concomitant hypercapnia given chronic R hemidiaphragm elevation    - Agree w/ continued diuresis to achieve euvolemia  - Trial of nebs  - IS, OOB as tolerated  - Pulmonary f/u w/ PFTs

## 2021-12-28 NOTE — NURSING
Reported off to oncoming nurse, patient resting in bed, aaox3. Patient can make needs known to staff, minimal assist required for adls and transfers, no acute distress noted, safety precautions maintained.      Chart check completed.

## 2021-12-28 NOTE — CONSULTS
[facility] - Telemetry  Pulmonology  Consult Note    Patient Name: Barbara Rizo  MRN: [id number]  Admission Date: 12/26/2021  Hospital Length of Stay: 1 days  Code Status: Full Code  Attending Physician: Az Stapleton MD  Primary Care Provider: Paras Oro MD   Principal Problem: Acute on chronic diastolic heart failure    [unfilled]  Subjective:     HPI:  81 F w/ a h/o CHF (HFpEF), CKD w/ baseline Cr ~2 who presented on 12/26 with an acute exacerbation of CHF. Patient reported progressive WILKINSON and chest discomfort that worsened 1wk PTA. Also noted LE edema. Denies orthopnea, weakness, syncope, worsened cough, sputum production, f/c/ns.     She required 2-3L of oxygen on admission. Underwent diuresis. Reports improvement in her respiratory status. Continues to require oxygen so pulmonary was consulted.    Prolonged admission in 2015 for gangrenous cholecystitis that was complicated by a R, complicated pleural effusion. Chest imaging since that time shows R basilar atelectasis and R hemidiaphragm elevation. She denies recurrent URI/LRTIs. Tob history is very minimal as she quit in her 20s. Denies other significant exposures.    TTEs w/ pulmonary HTN, PAP 66.      Past Medical History:   Diagnosis Date    A-fib     CKD (chronic kidney disease)     Diabetes mellitus type II     Fatty liver     GERD (gastroesophageal reflux disease)     Hearing loss of both ears     wears bilateral hearing aids    Hemochromatosis     History of anemia due to CKD     History of pleural effusion     with thoracentesis    Hyperlipidemia     Hypertension     Macular degeneration     Osteoarthritis     Left knee    Osteoporosis     Vaginal delivery     x2    Vitamin D deficiency disease     Wears partial dentures     upper removable bridge       Past Surgical History:   Procedure Laterality Date    AXILLARY NODE DISSECTION Left 10/18/2021    Procedure: LYMPHADENECTOMY, AXILLARY;  Surgeon: Darlene Roca MD;   Location: [facility] OR;  Service: General;  Laterality: Left;    BREAST BIOPSY      BREAST SURGERY Bilateral     Reduction r/t h/o fibrocystic disease    CHOLECYSTECTOMY  08/2015    EYE SURGERY      Cat Ext  OU    HYSTERECTOMY      partial due to uterine fibroids    INCISION AND DRAINAGE OF KNEE Left 9/17/2020    Procedure: INCISION AND DRAINAGE, KNEE;  Surgeon: Rolando Wagoner MD;  Location: [facility];  Service: Orthopedics;  Laterality: Left;    INJECTION FOR SENTINEL NODE IDENTIFICATION Left 10/18/2021    Procedure: INJECTION, FOR SENTINEL NODE IDENTIFICATION;  Surgeon: Darlene Roca MD;  Location: [facility];  Service: General;  Laterality: Left;    JOINT REPLACEMENT Left 05/13/2013    TKR    JOINT REPLACEMENT Right 08/03/2015    TKR    MASTECTOMY, PARTIAL Left 10/18/2021    Procedure: MASTECTOMY, PARTIAL -- wire;  Surgeon: Darlene Roca MD;  Location: [facility];  Service: General;  Laterality: Left;  RN PREOP 10/15/2021   VACCINATED-----NEED H/P AND ORDERS    SENTINEL LYMPH NODE BIOPSY Left 10/18/2021    Procedure: BIOPSY, LYMPH NODE, SENTINEL;  Surgeon: Darlene Roca MD;  Location: [facility];  Service: General;  Laterality: Left;    THORACENTESIS      TOTAL KNEE ARTHROPLASTY Right 2015    left knee done 5/2013    WOUND DRESSING Left 9/17/2020    Procedure: WOUND VAC APPLICATION;  Surgeon: Rolando Wagoner MD;  Location: [facility];  Service: Orthopedics;  Laterality: Left;       Review of patient's allergies indicates:  No Known Allergies    Family History     Problem Relation (Age of Onset)    Aneurysm Father    Cancer Mother    Hypertension Mother        Tobacco Use    Smoking status: Former Smoker    Smokeless tobacco: Never Used   Substance and Sexual Activity    Alcohol use: Not Currently     Alcohol/week: 0.0 standard drinks     Comment: occasional/ on a weekend    Drug use: No    Sexual activity: Not Currently         Review of Systems   Constitutional: Positive for activity change. Negative for  chills, diaphoresis, fatigue and fever.   HENT: Negative for congestion, postnasal drip, sinus pressure, sinus pain, sneezing, sore throat, trouble swallowing and voice change.    Respiratory: Positive for shortness of breath and wheezing. Negative for cough, chest tightness and stridor.    Cardiovascular: Positive for chest pain. Negative for palpitations and leg swelling.   Gastrointestinal: Negative for diarrhea, nausea and vomiting.   Musculoskeletal: Negative for arthralgias and myalgias.   Skin: Negative for color change and rash.   Neurological: Negative for dizziness, light-headedness and headaches.   Hematological: Negative for adenopathy.   Psychiatric/Behavioral: Negative for agitation.     Objective:     Vital Signs (Most Recent):  Temp: 98.4 °F (36.9 °C) (12/28/21 1556)  Pulse: 61 (12/28/21 1556)  Resp: 18 (12/28/21 1556)  BP: (!) 148/64 (12/28/21 1556)  SpO2: (!) 92 % (12/28/21 1556) Vital Signs (24h Range):  Temp:  [97.6 °F (36.4 °C)-98.4 °F (36.9 °C)] 98.4 °F (36.9 °C)  Pulse:  [61-68] 61  Resp:  [18-20] 18  SpO2:  [90 %-95 %] 92 %  BP: (118-148)/(57-78) 148/64     Weight: 89.4 kg (197 lb)  Body mass index is 36.03 kg/m².      Intake/Output Summary (Last 24 hours) at 12/28/2021 1602  Last data filed at 12/28/2021 0900  Gross per 24 hour   Intake --   Output 900 ml   Net -900 ml       Physical Exam  Vitals and nursing note reviewed.   Constitutional:       Appearance: She is obese. She is not ill-appearing.   Eyes:      Extraocular Movements: Extraocular movements intact.      Pupils: Pupils are equal, round, and reactive to light.   Cardiovascular:      Rate and Rhythm: Normal rate and regular rhythm.      Heart sounds: No murmur heard.  No friction rub. No gallop.    Pulmonary:      Effort: No respiratory distress.      Breath sounds: Wheezing present. No rhonchi or rales.   Abdominal:      General: Bowel sounds are normal.   Musculoskeletal:      Right lower leg: No edema.      Left lower leg: No  edema.   Skin:     General: Skin is warm and dry.      Capillary Refill: Capillary refill takes less than 2 seconds.   Neurological:      General: No focal deficit present.      Mental Status: She is oriented to person, place, and time.   Psychiatric:         Mood and Affect: Mood normal.         Behavior: Behavior normal.         Vents:       Lines/Drains/Airways     Peripheral Intravenous Line                 Peripheral IV - Single Lumen 12/26/21 2300 20 G Right Antecubital 1 day                Significant Labs:    CBC/Anemia Profile:  Recent Labs   Lab 12/26/21  1630   WBC 8.60   HGB 13.5   HCT 39.9      MCV 92   RDW 13.7        Chemistries:  Recent Labs   Lab 12/26/21  1630 12/26/21  2039 12/27/21  0415 12/28/21  0357   *  --  132* 135*   K 5.0  --  3.9 3.3*   CL 94*  --  97 95   CO2 19*  --  21* 28   BUN 28*  --  29* 26*   CREATININE 2.3*  --  2.2* 2.2*   CALCIUM 8.0*  --  7.8* 7.6*   ALBUMIN 3.7  --   --   --    PROT 8.1  --   --   --    BILITOT 1.2*  --   --   --    ALKPHOS 89  --   --   --    ALT 22  --   --   --    AST 23  --   --   --    MG  --  2.0  --   --        All pertinent labs within the past 24 hours have been reviewed.    Significant Imaging:   I have reviewed all pertinent imaging results/findings within the past 24 hours.      ABG  No results for input(s): PH, PO2, PCO2, HCO3, BE in the last 168 hours.  Assessment/Plan:     Acute decompensated heart failure  HFpEF. Pulmonary HTN. Improving with diuresis    Chronic kidney disease, stage 4 (severe)  Cr at baseline    Obesity, Class II, BMI 35-39.9  STOP-BANG score low    Acute respiratory failure with hypoxia  Multifactorial. Primary contributor is acute decompensated heart failure. 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 Lomas. Also has chronic R hemidiaphragm elevation which is likely a complication of her pleural effusion in 2015 based on review of imaging since then. STOP-BANG score is low  making JOCELIN unlikely. Tobacco history is noncontributory as she smoked 1-2 cig/wk in her 20s.    She will likely need home oxygen at the very least in the short term.    - Check an ABG to eval for concomitant hypercapnia given chronic R hemidiaphragm elevation    - Agree w/ continued diuresis to achieve euvolemia  - Trial of nebs  - IS, OOB as tolerated  - Pulmonary f/u w/ PFTs            Thank you for your consult. If ABG unremarkable, I will sign off. Please contact us if you have any additional questions.     Patel King MD  Pulmonology  [facility] - Telemetry

## 2021-12-28 NOTE — SUBJECTIVE & OBJECTIVE
Interval History:  No new issues     Review of Systems   Constitutional: Negative for chills, fatigue and fever.   Eyes: Negative for photophobia and visual disturbance.   Respiratory: Negative for cough and shortness of breath.    Cardiovascular: Negative for chest pain, palpitations and leg swelling.   Gastrointestinal: Negative for abdominal pain, diarrhea, nausea and vomiting.   Genitourinary: Negative for dysuria, frequency and urgency.   Skin: Negative for pallor, rash and wound.   Neurological: Negative for light-headedness and headaches.   Psychiatric/Behavioral: Negative for confusion and decreased concentration.     Objective:     Vital Signs (Most Recent):  Temp: 97.9 °F (36.6 °C) (12/28/21 0520)  Pulse: 67 (12/28/21 0520)  Resp: 20 (12/28/21 0520)  BP: 134/78 (12/28/21 0520)  SpO2: 95 % (12/28/21 0520) Vital Signs (24h Range):  Temp:  [97.7 °F (36.5 °C)-98.2 °F (36.8 °C)] 97.9 °F (36.6 °C)  Pulse:  [63-68] 67  Resp:  [18-20] 20  SpO2:  [90 %-95 %] 95 %  BP: (115-135)/(57-78) 134/78     Weight: 89.4 kg (197 lb)  Body mass index is 36.03 kg/m².    Intake/Output Summary (Last 24 hours) at 12/28/2021 0913  Last data filed at 12/28/2021 0200  Gross per 24 hour   Intake --   Output 300 ml   Net -300 ml      Physical Exam  Vitals and nursing note reviewed.   Constitutional:       General: She is not in acute distress.     Appearance: Normal appearance. She is obese. She is not ill-appearing, toxic-appearing or diaphoretic.   HENT:      Head: Normocephalic and atraumatic.   Cardiovascular:      Rate and Rhythm: Normal rate and regular rhythm.   Pulmonary:      Effort: Pulmonary effort is normal. No respiratory distress.      Breath sounds: Normal breath sounds.   Abdominal:      General: There is no distension.      Tenderness: There is no abdominal tenderness.   Skin:     General: Skin is warm and dry.   Neurological:      Mental Status: She is alert and oriented to person, place, and time.   Psychiatric:          Mood and Affect: Mood normal.         Behavior: Behavior normal.         Thought Content: Thought content normal.         Significant Labs:   All pertinent labs within the past 24 hours have been reviewed.  BMP:   Recent Labs   Lab 12/26/21 2039 12/27/21 0415 12/28/21  0357   GLU  --    < > 110   NA  --    < > 135*   K  --    < > 3.3*   CL  --    < > 95   CO2  --    < > 28   BUN  --    < > 26*   CREATININE  --    < > 2.2*   CALCIUM  --    < > 7.6*   MG 2.0  --   --     < > = values in this interval not displayed.     CBC:   Recent Labs   Lab 12/26/21  1630   WBC 8.60   HGB 13.5   HCT 39.9          Significant Imaging:

## 2021-12-28 NOTE — SUBJECTIVE & OBJECTIVE
Past Medical History:   Diagnosis Date    A-fib     CKD (chronic kidney disease)     Diabetes mellitus type II     Fatty liver     GERD (gastroesophageal reflux disease)     Hearing loss of both ears     wears bilateral hearing aids    Hemochromatosis     History of anemia due to CKD     History of pleural effusion     with thoracentesis    Hyperlipidemia     Hypertension     Macular degeneration     Osteoarthritis     Left knee    Osteoporosis     Vaginal delivery     x2    Vitamin D deficiency disease     Wears partial dentures     upper removable bridge       Past Surgical History:   Procedure Laterality Date    AXILLARY NODE DISSECTION Left 10/18/2021    Procedure: LYMPHADENECTOMY, AXILLARY;  Surgeon: Darlene Roca MD;  Location: [facility] OR;  Service: General;  Laterality: Left;    BREAST BIOPSY      BREAST SURGERY Bilateral     Reduction r/t h/o fibrocystic disease    CHOLECYSTECTOMY  08/2015    EYE SURGERY      Cat Ext  OU    HYSTERECTOMY      partial due to uterine fibroids    INCISION AND DRAINAGE OF KNEE Left 9/17/2020    Procedure: INCISION AND DRAINAGE, KNEE;  Surgeon: Rolando Wagoner MD;  Location: [facility] OR;  Service: Orthopedics;  Laterality: Left;    INJECTION FOR SENTINEL NODE IDENTIFICATION Left 10/18/2021    Procedure: INJECTION, FOR SENTINEL NODE IDENTIFICATION;  Surgeon: Darlene Roca MD;  Location: [facility];  Service: General;  Laterality: Left;    JOINT REPLACEMENT Left 05/13/2013    TKR    JOINT REPLACEMENT Right 08/03/2015    TKR    MASTECTOMY, PARTIAL Left 10/18/2021    Procedure: MASTECTOMY, PARTIAL -- wire;  Surgeon: Darlene Roca MD;  Location: [facility] OR;  Service: General;  Laterality: Left;  RN PREOP 10/15/2021   VACCINATED-----NEED H/P AND ORDERS    SENTINEL LYMPH NODE BIOPSY Left 10/18/2021    Procedure: BIOPSY, LYMPH NODE, SENTINEL;  Surgeon: Darlene Roca MD;  Location: [facility];  Service: General;  Laterality: Left;    THORACENTESIS      TOTAL KNEE  ARTHROPLASTY Right 2015    left knee done 5/2013    WOUND DRESSING Left 9/17/2020    Procedure: WOUND VAC APPLICATION;  Surgeon: Rolando Wagoner MD;  Location: [facility];  Service: Orthopedics;  Laterality: Left;       Review of patient's allergies indicates:  No Known Allergies    Family History     Problem Relation (Age of Onset)    Aneurysm Father    Cancer Mother    Hypertension Mother        Tobacco Use    Smoking status: Former Smoker    Smokeless tobacco: Never Used   Substance and Sexual Activity    Alcohol use: Not Currently     Alcohol/week: 0.0 standard drinks     Comment: occasional/ on a weekend    Drug use: No    Sexual activity: Not Currently         Review of Systems   Constitutional: Positive for activity change. Negative for chills, diaphoresis, fatigue and fever.   HENT: Negative for congestion, postnasal drip, sinus pressure, sinus pain, sneezing, sore throat, trouble swallowing and voice change.    Respiratory: Positive for shortness of breath and wheezing. Negative for cough, chest tightness and stridor.    Cardiovascular: Positive for chest pain. Negative for palpitations and leg swelling.   Gastrointestinal: Negative for diarrhea, nausea and vomiting.   Musculoskeletal: Negative for arthralgias and myalgias.   Skin: Negative for color change and rash.   Neurological: Negative for dizziness, light-headedness and headaches.   Hematological: Negative for adenopathy.   Psychiatric/Behavioral: Negative for agitation.     Objective:     Vital Signs (Most Recent):  Temp: 98.4 °F (36.9 °C) (12/28/21 1556)  Pulse: 61 (12/28/21 1556)  Resp: 18 (12/28/21 1556)  BP: (!) 148/64 (12/28/21 1556)  SpO2: (!) 92 % (12/28/21 1556) Vital Signs (24h Range):  Temp:  [97.6 °F (36.4 °C)-98.4 °F (36.9 °C)] 98.4 °F (36.9 °C)  Pulse:  [61-68] 61  Resp:  [18-20] 18  SpO2:  [90 %-95 %] 92 %  BP: (118-148)/(57-78) 148/64     Weight: 89.4 kg (197 lb)  Body mass index is 36.03 kg/m².      Intake/Output Summary (Last 24  hours) at 12/28/2021 1602  Last data filed at 12/28/2021 0900  Gross per 24 hour   Intake --   Output 900 ml   Net -900 ml       Physical Exam  Vitals and nursing note reviewed.   Constitutional:       Appearance: She is obese. She is not ill-appearing.   Eyes:      Extraocular Movements: Extraocular movements intact.      Pupils: Pupils are equal, round, and reactive to light.   Cardiovascular:      Rate and Rhythm: Normal rate and regular rhythm.      Heart sounds: No murmur heard.  No friction rub. No gallop.    Pulmonary:      Effort: No respiratory distress.      Breath sounds: Wheezing present. No rhonchi or rales.   Abdominal:      General: Bowel sounds are normal.   Musculoskeletal:      Right lower leg: No edema.      Left lower leg: No edema.   Skin:     General: Skin is warm and dry.      Capillary Refill: Capillary refill takes less than 2 seconds.   Neurological:      General: No focal deficit present.      Mental Status: She is oriented to person, place, and time.   Psychiatric:         Mood and Affect: Mood normal.         Behavior: Behavior normal.         Vents:       Lines/Drains/Airways     Peripheral Intravenous Line                 Peripheral IV - Single Lumen 12/26/21 2300 20 G Right Antecubital 1 day                Significant Labs:    CBC/Anemia Profile:  Recent Labs   Lab 12/26/21  1630   WBC 8.60   HGB 13.5   HCT 39.9      MCV 92   RDW 13.7        Chemistries:  Recent Labs   Lab 12/26/21  1630 12/26/21  2039 12/27/21  0415 12/28/21  0357   *  --  132* 135*   K 5.0  --  3.9 3.3*   CL 94*  --  97 95   CO2 19*  --  21* 28   BUN 28*  --  29* 26*   CREATININE 2.3*  --  2.2* 2.2*   CALCIUM 8.0*  --  7.8* 7.6*   ALBUMIN 3.7  --   --   --    PROT 8.1  --   --   --    BILITOT 1.2*  --   --   --    ALKPHOS 89  --   --   --    ALT 22  --   --   --    AST 23  --   --   --    MG  --  2.0  --   --        All pertinent labs within the past 24 hours have been reviewed.    Significant Imaging:    I have reviewed all pertinent imaging results/findings within the past 24 hours.

## 2021-12-28 NOTE — PLAN OF CARE
Problem: Adult Inpatient Plan of Care  Goal: Plan of Care Review  Outcome: Ongoing, Progressing  Goal: Patient-Specific Goal (Individualized)  Outcome: Ongoing, Progressing  Goal: Absence of Hospital-Acquired Illness or Injury  Outcome: Ongoing, Progressing  Goal: Optimal Comfort and Wellbeing  Outcome: Ongoing, Progressing  Goal: Readiness for Transition of Care  Outcome: Ongoing, Progressing     Problem: Diabetes Comorbidity  Goal: Blood Glucose Level Within Targeted Range  Outcome: Ongoing, Progressing     Problem: Impaired Wound Healing  Goal: Optimal Wound Healing  Outcome: Ongoing, Progressing

## 2021-12-28 NOTE — PROGRESS NOTES
[facility] Medicine  Progress Note    Patient Name: Barbara Rizo  MRN: [id number]  Patient Class: OP- Observation   Admission Date: 12/26/2021  Length of Stay: 1 days  Attending Physician: Az Stapleton MD  Primary Care Provider: Paras Oro MD        Subjective:     Principal Problem:Acute on chronic diastolic heart failure        HPI:  81 year old female presents to ED with c/o back pain and shortness of breath for 4 days. Patient symptoms worsen with exertion.  Patient currently O2 @2L NC sats 94-96%.  PMHx of AFIB, CKD, GERD, [place] in both ears, HLD, HTN, Left breast cancer, S/p mastectomy.        Overview/Hospital Course:  Patient admitted to the hospital for acute diastolic heart failure. She received IV lasix with resolution of her SOB. She only takes PRN lasix but now will go home on 20 po bid. However, patient dropped to 83% on RA with exercise on 12/27.  Despite continued lasix- she was 87% on RA on 12/28. Former smoker. Pulmonary consulted for eval.       Interval History:  No new issues     Review of Systems   Constitutional: Negative for chills, fatigue and fever.   Eyes: Negative for photophobia and visual disturbance.   Respiratory: Negative for cough and shortness of breath.    Cardiovascular: Negative for chest pain, palpitations and leg swelling.   Gastrointestinal: Negative for abdominal pain, diarrhea, nausea and vomiting.   Genitourinary: Negative for dysuria, frequency and urgency.   Skin: Negative for pallor, rash and wound.   Neurological: Negative for light-headedness and headaches.   Psychiatric/Behavioral: Negative for confusion and decreased concentration.     Objective:     Vital Signs (Most Recent):  Temp: 97.9 °F (36.6 °C) (12/28/21 0520)  Pulse: 67 (12/28/21 0520)  Resp: 20 (12/28/21 0520)  BP: 134/78 (12/28/21 0520)  SpO2: 95 % (12/28/21 0520) Vital Signs (24h Range):  Temp:  [97.7 °F (36.5 °C)-98.2 °F (36.8 °C)] 97.9 °F (36.6 °C)  Pulse:  [63-68]  67  Resp:  [18-20] 20  SpO2:  [90 %-95 %] 95 %  BP: (115-135)/(57-78) 134/78     Weight: 89.4 kg (197 lb)  Body mass index is 36.03 kg/m².    Intake/Output Summary (Last 24 hours) at 12/28/2021 0913  Last data filed at 12/28/2021 0200  Gross per 24 hour   Intake --   Output 300 ml   Net -300 ml      Physical Exam  Vitals and nursing note reviewed.   Constitutional:       General: She is not in acute distress.     Appearance: Normal appearance. She is obese. She is not ill-appearing, toxic-appearing or diaphoretic.   HENT:      Head: Normocephalic and atraumatic.   Cardiovascular:      Rate and Rhythm: Normal rate and regular rhythm.   Pulmonary:      Effort: Pulmonary effort is normal. No respiratory distress.      Breath sounds: Normal breath sounds.   Abdominal:      General: There is no distension.      Tenderness: There is no abdominal tenderness.   Skin:     General: Skin is warm and dry.   Neurological:      Mental Status: She is alert and oriented to person, place, and time.   Psychiatric:         Mood and Affect: Mood normal.         Behavior: Behavior normal.         Thought Content: Thought content normal.         Significant Labs:   All pertinent labs within the past 24 hours have been reviewed.  BMP:   Recent Labs   Lab 12/26/21 2039 12/27/21  0415 12/28/21  0357   GLU  --    < > 110   NA  --    < > 135*   K  --    < > 3.3*   CL  --    < > 95   CO2  --    < > 28   BUN  --    < > 26*   CREATININE  --    < > 2.2*   CALCIUM  --    < > 7.6*   MG 2.0  --   --     < > = values in this interval not displayed.     CBC:   Recent Labs   Lab 12/26/21  1630   WBC 8.60   HGB 13.5   HCT 39.9          Significant Imaging:      Assessment/Plan:      Chronic kidney disease, stage 4 (severe)  -baseline creatinine between 2.0-2.3  -trend level       Chronic gout  -resume home med      Obesity, Class II, BMI 35-39.9  -consult dietician      Paroxysmal atrial fibrillation  -resume home med      Essential  hypertension  -resume homed med      Hyperlipidemia  Resume home med      acute on chronic DD.  Continue lasix today    Hypoxia- 87% at rest today. Not convinced this is all heart failure in this former smoker. Will consult pulmonary     VTE Risk Mitigation (From admission, onward)         Ordered     apixaban tablet 2.5 mg  2 times daily         12/26/21 1915     IP VTE HIGH RISK PATIENT  Once         12/26/21 1915     Place sequential compression device  Until discontinued         12/26/21 1915                Discharge Planning   SALLY: 12/27/2021     Code Status: Full Code   Is the patient medically ready for discharge?:     Reason for patient still in hospital (select all that apply): Patient unstable  Discharge Plan A: Home with family                  Az Hansen MD  Department of Hospital Medicine   [facility] [place] [facility]

## 2021-12-28 NOTE — HPI
81 F w/ a h/o CHF (HFpEF), CKD w/ baseline Cr ~2 who presented on 12/26 with an acute exacerbation of CHF. Patient reported progressive WILKINSON and chest discomfort that worsened 1wk PTA. Also noted LE edema. Denies orthopnea, weakness, syncope, worsened cough, sputum production, f/c/ns.     She required 2-3L of oxygen on admission. Underwent diuresis. Reports improvement in her respiratory status. Continues to require oxygen so pulmonary was consulted.    Prolonged admission in 2015 for gangrenous cholecystitis that was complicated by a R, complicated pleural effusion. Chest imaging since that time shows R basilar atelectasis and R hemidiaphragm elevation. She denies recurrent URI/LRTIs. Tob history is very minimal as she quit in her 20s. Denies other significant exposures.    TTEs w/ pulmonary HTN, PAP 66.

## 2021-12-29 ENCOUNTER — PATIENT MESSAGE (OUTPATIENT)
Dept: FAMILY MEDICINE | Facility: CLINIC | Age: 81
End: 2021-12-29
Payer: MEDICARE

## 2021-12-29 VITALS
BODY MASS INDEX: 36.25 KG/M2 | WEIGHT: 197 LBS | HEIGHT: 62 IN | OXYGEN SATURATION: 93 % | DIASTOLIC BLOOD PRESSURE: 57 MMHG | TEMPERATURE: 99 F | RESPIRATION RATE: 19 BRPM | SYSTOLIC BLOOD PRESSURE: 121 MMHG | HEART RATE: 87 BPM

## 2021-12-29 PROBLEM — I50.9 ACUTE DECOMPENSATED HEART FAILURE: Status: RESOLVED | Noted: 2021-12-28 | Resolved: 2021-12-29

## 2021-12-29 LAB
ANION GAP SERPL CALC-SCNC: 11 MMOL/L (ref 8–16)
BUN SERPL-MCNC: 23 MG/DL (ref 8–23)
CALCIUM SERPL-MCNC: 7.2 MG/DL (ref 8.7–10.5)
CHLORIDE SERPL-SCNC: 94 MMOL/L (ref 95–110)
CO2 SERPL-SCNC: 30 MMOL/L (ref 23–29)
CREAT SERPL-MCNC: 1.9 MG/DL (ref 0.5–1.4)
EST. GFR  (AFRICAN AMERICAN): 28 ML/MIN/1.73 M^2
EST. GFR  (NON AFRICAN AMERICAN): 25 ML/MIN/1.73 M^2
GLUCOSE SERPL-MCNC: 102 MG/DL (ref 70–110)
POTASSIUM SERPL-SCNC: 3.8 MMOL/L (ref 3.5–5.1)
SODIUM SERPL-SCNC: 135 MMOL/L (ref 136–145)

## 2021-12-29 PROCEDURE — 94645 CONT INHLJ TX EACH ADDL HOUR: CPT

## 2021-12-29 PROCEDURE — 25000003 PHARM REV CODE 250: Performed by: HOSPITALIST

## 2021-12-29 PROCEDURE — 96376 TX/PRO/DX INJ SAME DRUG ADON: CPT

## 2021-12-29 PROCEDURE — 80048 BASIC METABOLIC PNL TOTAL CA: CPT | Performed by: HOSPITALIST

## 2021-12-29 PROCEDURE — G0378 HOSPITAL OBSERVATION PER HR: HCPCS

## 2021-12-29 PROCEDURE — 27000221 HC OXYGEN, UP TO 24 HOURS

## 2021-12-29 PROCEDURE — 63600175 PHARM REV CODE 636 W HCPCS: Performed by: INTERNAL MEDICINE

## 2021-12-29 PROCEDURE — 94760 N-INVAS EAR/PLS OXIMETRY 1: CPT

## 2021-12-29 PROCEDURE — 25000242 PHARM REV CODE 250 ALT 637 W/ HCPCS: Performed by: INTERNAL MEDICINE

## 2021-12-29 PROCEDURE — 94640 AIRWAY INHALATION TREATMENT: CPT

## 2021-12-29 PROCEDURE — 36415 COLL VENOUS BLD VENIPUNCTURE: CPT | Performed by: HOSPITALIST

## 2021-12-29 RX ADMIN — FOLIC ACID 1000 MCG: 1 TABLET ORAL at 08:12

## 2021-12-29 RX ADMIN — APIXABAN 2.5 MG: 2.5 TABLET, FILM COATED ORAL at 08:12

## 2021-12-29 RX ADMIN — IPRATROPIUM BROMIDE AND ALBUTEROL SULFATE 3 ML: 2.5; .5 SOLUTION RESPIRATORY (INHALATION) at 07:12

## 2021-12-29 RX ADMIN — FUROSEMIDE 80 MG: 10 INJECTION, SOLUTION INTRAMUSCULAR; INTRAVENOUS at 08:12

## 2021-12-29 RX ADMIN — AMIODARONE HYDROCHLORIDE 100 MG: 100 TABLET ORAL at 08:12

## 2021-12-29 RX ADMIN — ATORVASTATIN CALCIUM 40 MG: 40 TABLET, FILM COATED ORAL at 08:12

## 2021-12-29 RX ADMIN — AMLODIPINE BESYLATE 10 MG: 5 TABLET ORAL at 08:12

## 2021-12-29 RX ADMIN — ALLOPURINOL 100 MG: 100 TABLET ORAL at 08:12

## 2021-12-29 NOTE — NURSING
Reported off to oncoming nurse, patient resting in bed, [id number]. Patient can make needs known to staff, stand by assist required for adls and transfers, no acute distress noted, safety precautions maintained.       Chart check completed.

## 2021-12-29 NOTE — PROGRESS NOTES
Patient Education        Heart Failure Discharge Instructions, Adult   About this topic   Heart failure is a condition where your heart does not pump well. Your heart has trouble pumping the right amount of blood throughout your body. Because of this, fluid can back up in your body and your organs may not get as much blood as they need.  Heart failure is a long-term problem and will get worse over time. Your doctor will work hard to treat your heart failure and to keep you as healthy as possible.     What care is needed at home?   · Ask your doctor what you need to do when you go home. Make sure you ask questions if you do not understand what the doctor says. This way you will know what you need to do.  · Get lots of rest. Sleep when you feel tired. Avoid doing tiring activities which can make you more short of breath.  · Ask your doctor how much exercise you should do every day, such as taking a walk.  · Limit how much beer, wine, and mixed drinks (alcohol) you drink.  · Your doctor may ask you to limit the amount of salt in your diet. You may also be asked to limit how much fluid you drink.  · Try to lose weight if you are overweight. Your doctor or nurse can help.  · If you smoke, try to quit. Your doctor or nurse can help.  · Let your doctor know if you get more tired while you do an activity or you are not able to do as much activity as you did before.  · Be careful that you take your drugs each day as ordered by your doctor.  ? If you cannot afford your drugs, talk to your doctor.  ? Do not stop your drugs if you have side effects. Talk to your doctor about them.  ? Take your drugs even if you feel well.  · Check your weight each morning and write down your weight in a notebook. This will tell you if you are building up too much fluid. Weigh in the morning after you have passed urine. Weigh yourself with clothes on or off, but do it the same way each day. Make sure your scale is on a hard surface, not on  carpet. Your doctor will tell you when you should call based on how much weight you gain in a day or over a week. Take your notebook to your doctor on your next visit.  What follow-up care is needed?   · Your condition needs to be watched closely. Your doctor may ask you to make visits to the office to check on your progress. Be sure to keep these visits. You may need to have other tests. Your doctor's nurse may also call on a regular basis to ask you about your signs and whether your weight has increased.  · Your doctor may order a heart rehab program for you after you leave the hospital. This is an important part of your care. Share your discharge information with the rehab staff so they can plan a program to help you recover. Let your doctor know if you need help finding a program.  · You may see a dietitian to help you understand your diet. Some doctors and hospitals have classes to help you learn more about your heart failure and how to manage your symptoms.  What drugs may be needed?   Often patients will need to take more than one drug. Together, they will help the heart work as well as it can. Do not take any other prescription drugs, over-the-counter (OTC) drugs, herbals, or diet aids without asking your doctor.  The doctor may order drugs to:  · Help relax blood vessels. This makes it easier for the heart to work and may also lower your blood pressure. These are ACE inhibitors, ARBs, and calcium channel blockers.  · Slow down the heart rate so that it doesn't have to work as hard. These are beta-blockers.  · Help the heart beat stronger and better. This is digoxin.  · Get rid of extra salt and water in the body. These are water pills or diuretics.  Will physical activity be limited?   · Talk to your doctor about when you may go back to your normal activities like work, driving, or sex.  · Unless your doctor tells you to limit your activities, try to be active. Walk, garden, or do something active for 30  minutes or more on most days of the week. Stop activity if you have symptoms like chest pain, shortness of breath, or feel dizzy.  What changes to diet are needed?   · Ask your doctor or dietician what diet is best for you. The doctor may tell you to limit your salt and fat or how much fluid you drink.  · The DASH diet may be helpful. The DASH diet helps to lower blood pressure. This diet includes lots of fruits, vegetables, low-fat dairy foods, and foods that are low in saturated fat, total fat, and cholesterol. Using the DASH diet with a low salt diet may lower blood pressure even more.  · You will learn to read labels to see how much salt or sodium is in foods. Lowering your salt intake will help control some of your symptoms.  When do I need to call the doctor?   Activate the emergency medical system right away if you have signs of a heart attack. Call 911 in the [place] or [place]. The sooner treatment begins, the better your chances for recovery. Call for emergency help right away if you have:  · Signs of heart attack, which may include:  ? Severe chest pain, pressure, or discomfort with:  § Breathing trouble; sweating; upset stomach; or cold, clammy skin.  § Pain in your arms, back, or jaw.  § Worse pain with activity like walking up stairs.  ? Fast or irregular heartbeat.  ? Feeling dizzy, faint, or weak.     Call your doctor if:  · You have so much trouble breathing that you can only say one or two words at a time.  · You need to sit upright at all times to be able to breathe and/or cannot lie down.  · You are very tired from working to catch your breath or you are sweating from trying to breathe.  · You have trouble breathing when talking, sitting still, or with activity.  · You have wheezing or chest tightness when you are resting.  · You wake up at night because you cant breathe well.  · You become very confused or you faint.  · You have a very fast or irregular heartbeat.  · You cough more than  normal or cough up frothy or pink saliva.  · You feel very weak, dizzy, or more tired than normal.  · You need more pillows than normal to sleep.  · You gain 2 to 3 pounds (0.9 to 1.4 kg) overnight or more than 5 pounds (2.3 kg) in 1 week.  · You have more swelling than usual, especially in your feet and ankles or in your belly.  · You feel like your heart is beating very fast.

## 2021-12-29 NOTE — DISCHARGE SUMMARY
[facility] Medicine  Discharge Summary      Patient Name: Barbara Rizo  MRN: [id number]  Patient Class: OP- Observation  Admission Date: 12/26/2021  Hospital Length of Stay: 1 days  Discharge Date and Time:  12/29/2021 10:58 AM  Attending Physician: Guru Herrera MD   Discharging Provider: Guru Herrera MD  Primary Care Provider: Paras Oro MD      HPI:   81 year old female presents to ED with c/o back pain and shortness of breath for 4 days. Patient symptoms worsen with exertion.  Patient currently O2 @2L NC sats 94-96%.  PMHx of AFIB, CKD, GERD, [place] in both ears, HLD, HTN, Left breast cancer, S/p mastectomy.        * No surgery found *      Hospital Course:   Patient admitted to the hospital for acute on chronic diastolic heart failure. She received IV lasix with resolution of her SOB. She only takes PRN lasix but now will go home on 20 po bid. However, patient dropped to 83% on RA with exercise on 12/27.  Despite continued lasix- she was 87% on RA on 12/28. Former smoker. Pulmonary consulted for eval.  Hypoxemic respiratory failure probably multifactorial. Primary contributor is acute decompensated heart failure. 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 Lomas. Also has chronic R hemidiaphragm elevation which is likely a complication of her pleural effusion in 2015 based on review of imaging since then. Tobacco history is noncontributory as she smoked 1-2 cig/wk in her 20s.  Patient will require home oxygen upon discharge.  She will follow up with Pulmonary for PFT's.  Patient will also follow up with PCP and Cardiology.  She will be discharged home once home oxygen arranged.       Goals of Care Treatment Preferences:  Code Status: Full Code      Consults:   Consults (From admission, onward)        Status Ordering Provider     Inpatient consult to Social Work  Once        Provider:  (Not yet assigned)    Ordered GURU HERRERA      Inpatient consult to Pulmonology  Once        Provider:  (Not yet assigned)    Completed BETTY SETH     Inpatient consult to Social Work  Once        Provider:  (Not yet assigned)    Completed FISH LOPEZ     Inpatient consult to Cardiology  Once        Provider:  Fish Lopez MD    Completed JENNIFFER JACQUES JR     Inpatient consult to Social Work/Case Management  Once        Provider:  (Not yet assigned)    Completed JENNIFFER JACQUES JR     Inpatient consult to Registered Dietitian/Nutritionist  Once        Provider:  (Not yet assigned)    Completed JENNIFFER JACQUES JR          No new Assessment & Plan notes have been filed under this hospital service since the last note was generated.  Service: Hospital Medicine    Final Active Diagnoses:    Diagnosis Date Noted POA    Aortic atherosclerosis [I70.0] 12/27/2021 Yes    Chronic kidney disease, stage 4 (severe) [N18.4] 01/25/2021 Yes    Obesity, Class II, BMI 35-39.9 [E66.9] 01/28/2019 Yes     Chronic    Chronic gout [M1A.9XX0] 01/28/2019 Yes     Chronic    Paroxysmal atrial fibrillation [I48.0] 11/27/2018 Yes     Chronic    Acute respiratory failure with hypoxia [J96.01] 09/01/2015 Yes    Hyperlipidemia [E78.5]  Yes     Chronic    Essential hypertension [I10]  Yes     Chronic      Problems Resolved During this Admission:    Diagnosis Date Noted Date Resolved POA    PRINCIPAL PROBLEM:  Acute on chronic diastolic heart failure [I50.33] 02/06/2019 12/27/2021 Yes    Acute decompensated heart failure [I50.9] 12/28/2021 12/29/2021 Yes    Elevated troponin [R77.8] 04/24/2021 12/27/2021 Yes    Hyponatremia [E87.1] 09/27/2020 12/27/2021 Yes       Discharged Condition: stable    Disposition: Home-Health Care [facility] [place]    Follow Up:   Follow-up Information     Anjel Shaikh NP. Go on 12/30/2021.    Specialty: Family Medicine  Why: Primary Care follow up, please arrive at 10:00 AM.   Contact information:  [place]  [id number]  [phone]             Amauri Lomas MD. Go on 1/19/2022.    Specialty: Cardiology  Why: Cardiology follow up, please arrive at 10:00 AM. Dr. Lomas' office may contact you with a sooner appointment date.   Contact information:  [address]  [address]  [place] [id number]  [phone]             Reji Sarkar MD In 2 weeks.    Specialties: Pulmonary Disease, Sleep Medicine  Contact information:  [address]  [address]  [place] [id number] [phone]                       Patient Instructions:      Diet Cardiac     Notify your health care provider if you experience any of the following:  temperature >100.4     Notify your health care provider if you experience any of the following:  persistent nausea and vomiting or diarrhea     Notify your health care provider if you experience any of the following:  severe uncontrolled pain     Notify your health care provider if you experience any of the following:  difficulty breathing or increased cough     Notify your health care provider if you experience any of the following:  persistent dizziness, light-headedness, or visual disturbances     Notify your health care provider if you experience any of the following:  increased confusion or weakness     Activity as tolerated           Pending Diagnostic Studies:     None         Medications:  Reconciled Home Medications:      Medication List      CHANGE how you take these medications    furosemide 20 MG tablet  Commonly known as: LASIX  Take 1 tablet (20 mg total) by mouth 2 (two) times daily.  What changed:   · when to take this  · reasons to take this        CONTINUE taking these medications    allopurinoL 100 MG tablet  Commonly known as: ZYLOPRIM  TAKE 1 TABLET BY MOUTH ONCE DAILY     amiodarone 200 MG Tab  Commonly known as: PACERONE  TAKE ONE-HALF TABLET BY MOUTH EVERY DAY     amLODIPine 10 MG tablet  Commonly known as: NORVASC  Take 1 tablet (10 mg total) by mouth once daily.     atorvastatin 40 MG  tablet  Commonly known as: LIPITOR  TAKE ONE TABLET BY MOUTH EVERY DAY     ELIQUIS 2.5 mg Tab  Generic drug: apixaban  TAKE ONE TABLET BY MOUTH TWICE DAILY     ergocalciferol 50,000 unit Cap  Commonly known as: ERGOCALCIFEROL  Take 50,000 Units by mouth every 30 days.     folic acid 1 MG tablet  Commonly known as: FOLVITE  Take 1 tablet (1,000 mcg total) by mouth once daily.     metoprolol succinate 25 MG 24 hr tablet  Commonly known as: TOPROL-XL  Take 0.5 tablets (12.5 mg total) by mouth once daily. Only take if HR > 60            Indwelling Lines/Drains at time of discharge:   Lines/Drains/Airways     None                 Time spent on the discharge of patient: >30 minutes         Dejan Herrera MD  Department of Hospital Medicine  [facility] - Telemetry

## 2021-12-29 NOTE — PLAN OF CARE
12/29/21 1120   Final Note   Assessment Type Final Discharge Note   Anticipated Discharge Disposition Home-[facility]   Hospital Resources/Appts/Education Provided Appointments scheduled and added to AVS;Provided education on problems/symptoms using teachback;Post-Acute resouces added to AVS   Post-Acute Status   Post-Acute Authorization Home Health;HME   HME Status Set-up Complete/Auth obtained   Home Health Status Set-up Complete/Auth obtained   Pts nurse Samira notified that the pt can d/c from CM standpoint once oxygen delivered to bedside

## 2021-12-29 NOTE — PLAN OF CARE
[facility] - Telemetry    HOME HEALTH ORDERS  FACE TO FACE ENCOUNTER    Patient Name: Barbara Rizo  YOB: 1940    PCP: Paras Oro MD   PCP Address: [address] [facility] / [address]  PCP Phone Number: [phone]  PCP Fax: [phone]       Encounter Date: 12/29/2021    Admit to Home Health    Diagnoses:  Active Hospital Problems    Diagnosis  POA    Aortic atherosclerosis [I70.0]  Yes    Chronic kidney disease, stage 4 (severe) [N18.4]  Yes    Obesity, Class II, BMI 35-39.9 [E66.9]  Yes     Chronic    Chronic gout [M1A.9XX0]  Yes     Chronic    Paroxysmal atrial fibrillation [I48.0]  Yes     Chronic    Acute respiratory failure with hypoxia [J96.01]  Yes    Hyperlipidemia [E78.5]  Yes     Chronic    Essential hypertension [I10]  Yes     Chronic      Resolved Hospital Problems    Diagnosis Date Resolved POA    *Acute on chronic diastolic heart failure [I50.33] 12/27/2021 Yes    Acute decompensated heart failure [I50.9] 12/29/2021 Yes    Elevated troponin [R77.8] 12/27/2021 Yes    Hyponatremia [E87.1] 12/27/2021 Yes       Future Appointments   Date Time Provider Department Center   12/30/2021 10:00 AM Anjel Shaikh NP [facility]   1/19/2022 10:00 AM Amauri Lomas MD [facility] CARDIO [facility]   4/6/2022  9:40 AM Paras Oro MD [facility]   6/17/2022  1:30 PM CHAIR 02 [facility] CHEMO [facility]   6/20/2022  8:40 AM [facility] MAMMO2 [facility] MAMMO [facility]   6/20/2022  1:00 PM Darlene Roca MD [facility]      Follow-up Information     Anjel Shaikh NP. Go on 12/30/2021.    Specialty: Family Medicine  Why: Primary Care follow up, please arrive at 10:00 AM.   Contact information:  [place] [address]  [phone]             Amauri Lomas MD. Go on 1/19/2022.    Specialty: Cardiology  Why: Cardiology follow up, please arrive at 10:00 AM. Dr. Lomas' office may contact you with a sooner appointment date.    Contact information:  [address]  [address]  [place] [id number] [phone]             Reji Sarkar MD In 2 weeks.    Specialties: Pulmonary Disease, Sleep Medicine  Contact information:  [address]  [address]  [place] [id number] [phone]                           I have seen and examined this patient face to face today. My clinical findings that support the need for the home health skilled services and home bound status are the following:  Medical restrictions requiring assistance of another human to leave home due to  Home oxygen requirement.    Allergies:Review of patient's allergies indicates:  No Known Allergies    Diet: cardiac diet    Activities: activity as tolerated    Nursing:   SN to complete comprehensive assessment including routine vital signs. Instruct on disease process and s/s of complications to report to MD. Review/verify medication list sent home with the patient at time of discharge  and instruct patient/caregiver as needed. Frequency may be adjusted depending on start of care date.    Notify MD if SBP > 160 or < 90; DBP > 90 or < 50; HR > 120 or < 50; Temp > 101    MISCELLANEOUS CARE:  Home Oxygen:  Oxygen at 3 L/min nasal canula to be used:  Continuously.      Medications: Review discharge medications with patient and family and provide education.      Current Discharge Medication List      CONTINUE these medications which have CHANGED    Details   furosemide (LASIX) 20 MG tablet Take 1 tablet (20 mg total) by mouth 2 (two) times daily.  Qty: 60 tablet, Refills: 5         CONTINUE these medications which have NOT CHANGED    Details   allopurinoL (ZYLOPRIM) 100 MG tablet TAKE 1 TABLET BY MOUTH ONCE DAILY      amiodarone (PACERONE) 200 MG Tab TAKE ONE-HALF TABLET BY MOUTH EVERY DAY  Qty: 45 tablet, Refills: 3      amLODIPine (NORVASC) 10 MG tablet Take 1 tablet (10 mg total) by mouth once daily.  Qty: 90 tablet, Refills: 3    Comments: .  Associated Diagnoses: Essential  hypertension      atorvastatin (LIPITOR) 40 MG tablet TAKE ONE TABLET BY MOUTH EVERY DAY  Qty: 90 tablet, Refills: 1    Associated Diagnoses: Type 2 diabetes mellitus with stage 3 chronic kidney disease, without long-term current use of insulin; Essential hypertension      ELIQUIS 2.5 mg Tab TAKE ONE TABLET BY MOUTH TWICE DAILY  Qty: 60 tablet, Refills: 11    Comments: This prescription was filled on 10/4/2021. Any refills authorized will be placed on file.      ergocalciferol (ERGOCALCIFEROL) 50,000 unit Cap Take 50,000 Units by mouth every 30 days.       folic acid (FOLVITE) 1 MG tablet Take 1 tablet (1,000 mcg total) by mouth once daily.  Qty: 90 tablet, Refills: 0    Associated Diagnoses: Hemochromatosis, unspecified hemochromatosis type      metoprolol succinate (TOPROL-XL) 25 MG 24 hr tablet Take 0.5 tablets (12.5 mg total) by mouth once daily. Only take if HR > 60  Qty: 15 tablet, Refills: 0    Comments: .             I certify that this patient is confined to her home and needs intermittent skilled nursing care.

## 2021-12-29 NOTE — PROGRESS NOTES
Patients oxygen delivered to her room.  Patient is discharged. Telemetry monitor removed. IV taken out. Tip intact.

## 2021-12-29 NOTE — PLAN OF CARE
Problem: Adult Inpatient Plan of Care  Goal: Plan of Care Review  Flowsheets (Taken 12/28/2021 1809)  Plan of Care Reviewed With: patient  Goal: Absence of Hospital-Acquired Illness or Injury  Intervention: Identify and Manage Fall Risk  Flowsheets (Taken 12/28/2021 1809)  Safety Promotion/Fall Prevention:   assistive device/personal item within reach   room near unit station   side rails raised x 2   instructed to call staff for mobility     Problem: Impaired Wound Healing  Goal: Optimal Wound Healing  Intervention: Promote Wound Healing  Flowsheets (Taken 12/28/2021 1809)  Oral Nutrition Promotion: rest periods promoted  Activity Management: Ambulated -L4  Pain Management Interventions:   pillow support provided   care clustered

## 2021-12-29 NOTE — PROGRESS NOTES
Call placed to Respiratory to advise of approval and request 1 E tank set up to be delivered to bedside in preparation for discharge.

## 2021-12-29 NOTE — DISCHARGE INSTRUCTIONS
Patient Education       Pulmonary Hypertension Discharge Instructions, Adult   About this topic   Pulmonary hypertension is also called PH. It is a condition where blood pressure in the lung's arteries becomes too high. The high blood pressure is caused by narrow, tight, or stiff arteries. Sometimes, it is cause by blood clots forming in the arteries. The blood has problems getting to the lungs and the heart has to work harder. When the blood has problems getting to the lungs, you also have problems getting enough oxygen to the rest of your body.  Doctors treat PH with drugs, oxygen, or surgery. If PH is caused by other illnesses, the doctor may need to treat this illness first. How well you do depends on how much pressure is in the lungs and how well the treatments work.     What care is needed at home?   · Ask your doctor what you need to do when you go home. Make sure you ask questions if you do not understand what the doctor says. This way you will know what you need to do.  · Take all the drugs you are taking as directed. Do not take other drugs without asking your doctor. Tell your doctor or pharmacist about any side effects that you have. Ask what to do if you miss a dose of any of the drugs.  · Get lots of rest. Use extra pillows to raise your head to help you breathe when sleeping.  · Talk to your doctor if you plan to travel. You may need to take extra care if going on an airplane. Going to a place with a high altitude may make it hard for you to breathe.  · Your doctor may give you oxygen to use at home. Follow your doctor's advice on using it.  ? Never change the amount of oxygen flowing without talking to your doctor.  ? Always have a back-up oxygen supply at home or when you go out.  ? No candles, matches, cigarettes, or open flame should ever come near your oxygen.  · Avoid getting pregnant. Do not use birth control pills because these drugs can cause blood clots. Your doctor will help you find a safe  method of birth control. Pregnancy may put you and your baby's life in danger.  What follow-up care is needed?   · Your doctor may ask you to make visits to the office to check on your progress. Be sure to keep these visits.  · Your doctor may have you go see a specialist. You may need to see a heart doctor called a cardiologist. You may also need to have rehab to get your strength back.  · Join a support group that can help you deal with your illness.  What drugs may be needed?   The doctor may order drugs to:  · Help lower blood pressure in the lungs  · Prevent blood clots  · Increase blood flow to the heart  · Get rid of extra fluid  · Increase the amount of oxygen in the blood  · Help control abnormal heartbeats  Will physical activity be limited?   · Get lots of rest. Sleep when you are feeling tired. Avoid doing tiring activities.  · Avoid straining. Do not lift heavy objects.  · You may have to limit your activity. Talk to your doctor about the right amount of activity for you.  · Ask your doctor about when you may go back to your normal activities like work or driving.  What changes to diet are needed?   · Eat foods that are easy to digest. These can prevent straining when you have a bowel movement.  · Talk to your doctor or dietitian about your personal diet plan.  What problems could happen?   · Heart failure  · Blood clots  · Irregular heartbeat  · Bleeding of the lungs  · Coughing up blood  When do I need to call the doctor?   · Sudden shortness of breath or sudden chest pain could be a sign that a blood clot has traveled to your lungs. Go to the ER right away.  · Signs of infection. These include a fever of 100.4°F (38°C) or higher, chills, cough, more sputum or change in color of sputum.  · Breathing is getting more difficult or you feel like you are getting less air  · You need to lean forward when sitting to breathe easier  · You are using your rib muscles to help you breathe. You see the skin  between your ribs going in as you breathe.  · Have headaches often  · Feel extra sleepy or confused  · Fingertips, fingernails, or lips are blue   · You are not feeling better in 2 to 3 days or you are feeling worse  Teach Back: Helping You Understand   The Teach Back Method helps you understand the information we are giving you. The idea is simple. After talking with the staff, tell them in your own words what you were just told. This helps to make sure the staff has covered each thing clearly. It also helps to explain things that may have been a bit confusing. Before going home, make sure you are able to do these:  · I can tell you about my condition.  · I can tell you what may help ease my breathing.  · I can tell you what I will do if I have sudden shortness of breath or sudden chest pain.  Where can I learn more?   American Academy of Family Physicians  https://familydoctor.org/condition/pulmonary-hypertension/   Better Health Channel  https://www.betterhealth.[place].gov.au/health/ConditionsAndTreatments/Pulmonary-hypertension   NHS Choices  https://www.nhs.uk/conditions/pulmonary-hypertension/   Last Reviewed Date   2018-10-05  Consumer Information Use and Disclaimer   This information is not specific medical advice and does not replace information you receive from your health care provider. This is only a brief summary of general information. It does NOT include all information about conditions, illnesses, injuries, tests, procedures, treatments, therapies, discharge instructions or life-style choices that may apply to you. You must talk with your health care provider for complete information about your health and treatment options. This information should not be used to decide whether or not to accept your health care providers advice, instructions or recommendations. Only your health care provider has the knowledge and training to provide advice that is right for you.  Copyright   Copyright © 2021 UpToDate,  Inc. and its affiliates and/or licensors. All rights reserved.

## 2021-12-29 NOTE — PROGRESS NOTES
Testing for Home O2    O2 Sats on RA at rest = 86%    O2 sats on RA with exercise  =85%    O2 sats on _3L O2 with exercise = 93%

## 2021-12-29 NOTE — PROGRESS NOTES
WRITTEN HEALTHCARE DISCHARGE INFORMATION      Things that YOU are RESPONSIBLE for to Manage Your Care At Home:     1. Getting your prescriptions filled.  2. Taking you medications as directed. DO NOT MISS ANY DOSES!  3. Going to your follow-up doctor appointments. This is important because it allows the doctor to monitor your progress and to determine if any changes need to be made to your treatment plan.     If you are unable to make your follow up appointments, please call the number listed and reschedule this appointment.      ____________HELP AT HOME____________________     Experiencing any SIGNS or SYMPTOMS: YOU CAN     Schedule a same day appopintment with your Primary Care Doctor or  you can call [facility] On Call Nurse Care Line for 24/7 assistance at [phone]     If you are experience any signs or symptoms that have become severe, Call 911 and come to your nearest Emergency Room.     Thank you for choosing [facility] and allowing us to care for you.   From your care management team:      You should receive a call from [facility] Discharge [facility] within 48-72 hours to help manage your care after discharge. Please try to make sure that you answer your phone for this important phone call.   Follow-up Information     Anjel Shaikh NP. Go on 12/30/2021.    Specialty: Family Medicine  Why: Primary Care follow up, please arrive at 10:00 AM.   Contact information:  [address] [facility] [address]  [phone]             Amauri Lomas MD. Go on 1/19/2022.    Specialty: Cardiology  Why: Cardiology follow up, please arrive at 10:00 AM. Dr. Lomas' office may contact you with a sooner appointment date.   Contact information:  [address]  [address]  [facility] [address]  [phone]             Reji Sarkar MD. Go on 1/14/2022.    Specialties: Pulmonary Disease, Sleep Medicine  Why: Pulmonology follow up, please arrive at 9:00 AM   Contact information:  [address] [facility] [address]  [address]  [facility]  [address]  [phone]             [facility].    Why: DME- call number provided for questions or concerns regarding home oxygen  Contact information:  [address] [place] [address] [id number] [phone]           [facility].    Why: Home Health- you will be contacted in the next 1-2 days to arrange a day and time for agency to come to home to bein services.  Contact information:   [address]  [facility] [address] [phone]

## 2021-12-29 NOTE — PROGRESS NOTES
Message sent via teams to Tamar with [facility] to advise that the pt has an order for home oxygen for review.  TN to follow for response.    HH orders printed and faxed to N for assignment of home health care agency

## 2021-12-30 ENCOUNTER — OFFICE VISIT (OUTPATIENT)
Dept: FAMILY MEDICINE | Facility: CLINIC | Age: 81
End: 2021-12-30
Payer: MEDICARE

## 2021-12-30 VITALS
WEIGHT: 196.88 LBS | RESPIRATION RATE: 17 BRPM | SYSTOLIC BLOOD PRESSURE: 138 MMHG | OXYGEN SATURATION: 89 % | TEMPERATURE: 99 F | HEIGHT: 62 IN | HEART RATE: 68 BPM | DIASTOLIC BLOOD PRESSURE: 62 MMHG | BODY MASS INDEX: 36.23 KG/M2

## 2021-12-30 DIAGNOSIS — E66.9 OBESITY (BMI 30-39.9): ICD-10-CM

## 2021-12-30 DIAGNOSIS — I48.0 PAROXYSMAL ATRIAL FIBRILLATION: Chronic | ICD-10-CM

## 2021-12-30 DIAGNOSIS — N18.4 CHRONIC KIDNEY DISEASE, STAGE 4 (SEVERE): ICD-10-CM

## 2021-12-30 DIAGNOSIS — J96.01 ACUTE RESPIRATORY FAILURE WITH HYPOXIA: Primary | ICD-10-CM

## 2021-12-30 DIAGNOSIS — I27.20 PULMONARY HYPERTENSION: ICD-10-CM

## 2021-12-30 DIAGNOSIS — M54.6 ACUTE BILATERAL THORACIC BACK PAIN: ICD-10-CM

## 2021-12-30 DIAGNOSIS — I10 ESSENTIAL HYPERTENSION: Chronic | ICD-10-CM

## 2021-12-30 PROCEDURE — 99499 UNLISTED E&M SERVICE: CPT | Mod: S$GLB,,, | Performed by: NURSE PRACTITIONER

## 2021-12-30 PROCEDURE — 99999 PR PBB SHADOW E&M-EST. PATIENT-LVL V: CPT | Mod: PBBFAC,,, | Performed by: NURSE PRACTITIONER

## 2021-12-30 PROCEDURE — 3078F DIAST BP <80 MM HG: CPT | Mod: CPTII,S$GLB,, | Performed by: NURSE PRACTITIONER

## 2021-12-30 PROCEDURE — 1101F PR PT FALLS ASSESS DOC 0-1 FALLS W/OUT INJ PAST YR: ICD-10-PCS | Mod: CPTII,S$GLB,, | Performed by: NURSE PRACTITIONER

## 2021-12-30 PROCEDURE — 1126F PR PAIN SEVERITY QUANTIFIED, NO PAIN PRESENT: ICD-10-PCS | Mod: CPTII,S$GLB,, | Performed by: NURSE PRACTITIONER

## 2021-12-30 PROCEDURE — 1111F DSCHRG MED/CURRENT MED MERGE: CPT | Mod: CPTII,S$GLB,, | Performed by: NURSE PRACTITIONER

## 2021-12-30 PROCEDURE — 3075F SYST BP GE 130 - 139MM HG: CPT | Mod: CPTII,S$GLB,, | Performed by: NURSE PRACTITIONER

## 2021-12-30 PROCEDURE — 1159F PR MEDICATION LIST DOCUMENTED IN MEDICAL RECORD: ICD-10-PCS | Mod: CPTII,S$GLB,, | Performed by: NURSE PRACTITIONER

## 2021-12-30 PROCEDURE — 3075F PR MOST RECENT SYSTOLIC BLOOD PRESS GE 130-139MM HG: ICD-10-PCS | Mod: CPTII,S$GLB,, | Performed by: NURSE PRACTITIONER

## 2021-12-30 PROCEDURE — 3288F FALL RISK ASSESSMENT DOCD: CPT | Mod: CPTII,S$GLB,, | Performed by: NURSE PRACTITIONER

## 2021-12-30 PROCEDURE — 1159F MED LIST DOCD IN RCRD: CPT | Mod: CPTII,S$GLB,, | Performed by: NURSE PRACTITIONER

## 2021-12-30 PROCEDURE — 1101F PT FALLS ASSESS-DOCD LE1/YR: CPT | Mod: CPTII,S$GLB,, | Performed by: NURSE PRACTITIONER

## 2021-12-30 PROCEDURE — 99214 OFFICE O/P EST MOD 30 MIN: CPT | Mod: S$GLB,,, | Performed by: NURSE PRACTITIONER

## 2021-12-30 PROCEDURE — 99499 RISK ADDL DX/OHS AUDIT: ICD-10-PCS | Mod: S$GLB,,, | Performed by: NURSE PRACTITIONER

## 2021-12-30 PROCEDURE — 1160F RVW MEDS BY RX/DR IN RCRD: CPT | Mod: CPTII,S$GLB,, | Performed by: NURSE PRACTITIONER

## 2021-12-30 PROCEDURE — 1126F AMNT PAIN NOTED NONE PRSNT: CPT | Mod: CPTII,S$GLB,, | Performed by: NURSE PRACTITIONER

## 2021-12-30 PROCEDURE — 1160F PR REVIEW ALL MEDS BY PRESCRIBER/CLIN PHARMACIST DOCUMENTED: ICD-10-PCS | Mod: CPTII,S$GLB,, | Performed by: NURSE PRACTITIONER

## 2021-12-30 PROCEDURE — 1111F PR DISCHARGE MEDS RECONCILED W/ CURRENT OUTPATIENT MED LIST: ICD-10-PCS | Mod: CPTII,S$GLB,, | Performed by: NURSE PRACTITIONER

## 2021-12-30 PROCEDURE — 99214 PR OFFICE/OUTPT VISIT, EST, LEVL IV, 30-39 MIN: ICD-10-PCS | Mod: S$GLB,,, | Performed by: NURSE PRACTITIONER

## 2021-12-30 PROCEDURE — 99999 PR PBB SHADOW E&M-EST. PATIENT-LVL V: ICD-10-PCS | Mod: PBBFAC,,, | Performed by: NURSE PRACTITIONER

## 2021-12-30 PROCEDURE — 3078F PR MOST RECENT DIASTOLIC BLOOD PRESSURE < 80 MM HG: ICD-10-PCS | Mod: CPTII,S$GLB,, | Performed by: NURSE PRACTITIONER

## 2021-12-30 PROCEDURE — 3288F PR FALLS RISK ASSESSMENT DOCUMENTED: ICD-10-PCS | Mod: CPTII,S$GLB,, | Performed by: NURSE PRACTITIONER

## 2021-12-31 PROCEDURE — G0180 MD CERTIFICATION HHA PATIENT: HCPCS | Mod: ,,, | Performed by: HOSPITALIST

## 2021-12-31 PROCEDURE — G0180 PR HOME HEALTH MD CERTIFICATION: ICD-10-PCS | Mod: ,,, | Performed by: HOSPITALIST

## 2022-01-01 ENCOUNTER — PATIENT MESSAGE (OUTPATIENT)
Dept: ADMINISTRATIVE | Facility: HOSPITAL | Age: 82
End: 2022-01-01
Payer: MEDICARE

## 2022-01-01 ENCOUNTER — PATIENT MESSAGE (OUTPATIENT)
Dept: FAMILY MEDICINE | Facility: CLINIC | Age: 82
End: 2022-01-01
Payer: MEDICARE

## 2022-01-01 ENCOUNTER — HOSPITAL ENCOUNTER (OUTPATIENT)
Dept: RADIOLOGY | Facility: CLINIC | Age: 82
Discharge: HOME | End: 2022-12-20
Attending: FAMILY MEDICINE
Payer: MEDICARE

## 2022-01-01 ENCOUNTER — TELEPHONE (OUTPATIENT)
Dept: FAMILY MEDICINE | Facility: CLINIC | Age: 82
End: 2022-01-01
Payer: MEDICARE

## 2022-01-01 ENCOUNTER — PATIENT OUTREACH (OUTPATIENT)
Dept: ADMINISTRATIVE | Facility: HOSPITAL | Age: 82
End: 2022-01-01
Payer: MEDICARE

## 2022-01-01 ENCOUNTER — INFUSION (OUTPATIENT)
Dept: INFUSION THERAPY | Facility: HOSPITAL | Age: 82
End: 2022-01-01
Attending: INTERNAL MEDICINE
Payer: MEDICARE

## 2022-01-01 DIAGNOSIS — M17.10 PRIMARY LOCALIZED OSTEOARTHROSIS, LOWER LEG: Primary | ICD-10-CM

## 2022-01-01 DIAGNOSIS — N95.8 OTHER SPECIFIED MENOPAUSAL AND PERIMENOPAUSAL DISORDERS: ICD-10-CM

## 2022-01-01 PROCEDURE — 77080 DEXA BONE DENSITY SPINE HIP: ICD-10-PCS | Mod: 26,,, | Performed by: INTERNAL MEDICINE

## 2022-01-01 PROCEDURE — 63600175 PHARM REV CODE 636 W HCPCS: Mod: JG | Performed by: INTERNAL MEDICINE

## 2022-01-01 PROCEDURE — 77080 DXA BONE DENSITY AXIAL: CPT | Mod: 26,,, | Performed by: INTERNAL MEDICINE

## 2022-01-01 PROCEDURE — 77080 DXA BONE DENSITY AXIAL: CPT | Mod: TC,PO

## 2022-01-01 PROCEDURE — 96372 THER/PROPH/DIAG INJ SC/IM: CPT

## 2022-01-01 RX ORDER — MUPIROCIN 20 MG/G
OINTMENT TOPICAL NIGHTLY
Status: ON HOLD | COMMUNITY
Start: 2022-10-10 | End: 2023-01-01

## 2022-01-01 RX ORDER — ALLOPURINOL 100 MG/1
1 TABLET ORAL DAILY
Status: ON HOLD | COMMUNITY
Start: 2022-10-21 | End: 2023-01-01 | Stop reason: HOSPADM

## 2022-01-01 RX ADMIN — DENOSUMAB 60 MG: 60 INJECTION SUBCUTANEOUS at 12:12

## 2022-01-04 ENCOUNTER — TELEPHONE (OUTPATIENT)
Dept: FAMILY MEDICINE | Facility: CLINIC | Age: 82
End: 2022-01-04
Payer: MEDICARE

## 2022-01-04 NOTE — TELEPHONE ENCOUNTER
----- Message from Jahaira Lazo sent at 1/4/2022 10:03 AM CST -----  Type: Patient Call Back    Who called: Jaziel BRAVO    What is the request in detail: patient's daughter is requesting an order for portable concentrator. Please send orders to [facility]    Can the clinic reply by [facility]? No     Would the patient rather a call back or a response via [facility]? Call     Best call back number:.[phone] Fax: [phone]

## 2022-01-06 ENCOUNTER — TELEPHONE (OUTPATIENT)
Dept: FAMILY MEDICINE | Facility: CLINIC | Age: 82
End: 2022-01-06
Payer: MEDICARE

## 2022-01-06 NOTE — TELEPHONE ENCOUNTER
----- Message from Jose Ahuja sent at 1/6/2022 11:41 AM CST -----   Name of Who is Calling:     What is the request in detail: patient request call back in reference to order for oxygen bag    Please contact to further discuss and advise      Can the clinic reply by [facility]:     What [facility]  [phone]

## 2022-01-11 DIAGNOSIS — N18.30 TYPE 2 DIABETES MELLITUS WITH STAGE 3 CHRONIC KIDNEY DISEASE, WITHOUT LONG-TERM CURRENT USE OF INSULIN: ICD-10-CM

## 2022-01-11 DIAGNOSIS — E11.22 TYPE 2 DIABETES MELLITUS WITH STAGE 3 CHRONIC KIDNEY DISEASE, WITHOUT LONG-TERM CURRENT USE OF INSULIN: ICD-10-CM

## 2022-01-11 DIAGNOSIS — I10 ESSENTIAL HYPERTENSION: ICD-10-CM

## 2022-01-11 NOTE — TELEPHONE ENCOUNTER
Care Due:                  Date            Visit Type   Department     Provider  --------------------------------------------------------------------------------                                             [facility]                                           MEDICINE/  Last Visit: 10-      None         INTERNAL MED   Paras A  Page                                           [facility]/  Next Visit: 04-      None         INTERNAL MED   Paras A  Page                                                            Last  Test          Frequency    Reason                     Performed    Due Date  --------------------------------------------------------------------------------    Lipid Panel.  12 months..  atorvastatin.............  Not Found    Overdue    Powered by [employer]. Reference number: [id number].   1/11/2022 2:35:44 PM CST

## 2022-01-12 ENCOUNTER — TELEPHONE (OUTPATIENT)
Dept: ADMINISTRATIVE | Facility: HOSPITAL | Age: 82
End: 2022-01-12
Payer: MEDICARE

## 2022-01-12 NOTE — TELEPHONE ENCOUNTER
Spoke with patients daughter order was received by [facility]. Stated at this time they are out of portable oxygen machines. verbalized understanding.

## 2022-01-14 ENCOUNTER — OFFICE VISIT (OUTPATIENT)
Dept: PULMONOLOGY | Facility: CLINIC | Age: 82
End: 2022-01-14
Payer: MEDICARE

## 2022-01-14 VITALS
BODY MASS INDEX: 35.66 KG/M2 | OXYGEN SATURATION: 92 % | SYSTOLIC BLOOD PRESSURE: 143 MMHG | DIASTOLIC BLOOD PRESSURE: 72 MMHG | TEMPERATURE: 98 F | HEIGHT: 62 IN | HEART RATE: 71 BPM | WEIGHT: 193.81 LBS

## 2022-01-14 DIAGNOSIS — G47.33 OSA (OBSTRUCTIVE SLEEP APNEA): ICD-10-CM

## 2022-01-14 DIAGNOSIS — I27.20 PULMONARY HYPERTENSION: ICD-10-CM

## 2022-01-14 PROCEDURE — 3078F DIAST BP <80 MM HG: CPT | Mod: CPTII,S$GLB,, | Performed by: INTERNAL MEDICINE

## 2022-01-14 PROCEDURE — 1101F PT FALLS ASSESS-DOCD LE1/YR: CPT | Mod: CPTII,S$GLB,, | Performed by: INTERNAL MEDICINE

## 2022-01-14 PROCEDURE — 99214 PR OFFICE/OUTPT VISIT, EST, LEVL IV, 30-39 MIN: ICD-10-PCS | Mod: S$GLB,,, | Performed by: INTERNAL MEDICINE

## 2022-01-14 PROCEDURE — 1159F PR MEDICATION LIST DOCUMENTED IN MEDICAL RECORD: ICD-10-PCS | Mod: CPTII,S$GLB,, | Performed by: INTERNAL MEDICINE

## 2022-01-14 PROCEDURE — 3077F SYST BP >= 140 MM HG: CPT | Mod: CPTII,S$GLB,, | Performed by: INTERNAL MEDICINE

## 2022-01-14 PROCEDURE — 1159F MED LIST DOCD IN RCRD: CPT | Mod: CPTII,S$GLB,, | Performed by: INTERNAL MEDICINE

## 2022-01-14 PROCEDURE — 99999 PR PBB SHADOW E&M-EST. PATIENT-LVL III: CPT | Mod: PBBFAC,,, | Performed by: INTERNAL MEDICINE

## 2022-01-14 PROCEDURE — 3288F PR FALLS RISK ASSESSMENT DOCUMENTED: ICD-10-PCS | Mod: CPTII,S$GLB,, | Performed by: INTERNAL MEDICINE

## 2022-01-14 PROCEDURE — 1125F AMNT PAIN NOTED PAIN PRSNT: CPT | Mod: CPTII,S$GLB,, | Performed by: INTERNAL MEDICINE

## 2022-01-14 PROCEDURE — 3288F FALL RISK ASSESSMENT DOCD: CPT | Mod: CPTII,S$GLB,, | Performed by: INTERNAL MEDICINE

## 2022-01-14 PROCEDURE — 3078F PR MOST RECENT DIASTOLIC BLOOD PRESSURE < 80 MM HG: ICD-10-PCS | Mod: CPTII,S$GLB,, | Performed by: INTERNAL MEDICINE

## 2022-01-14 PROCEDURE — 1101F PR PT FALLS ASSESS DOC 0-1 FALLS W/OUT INJ PAST YR: ICD-10-PCS | Mod: CPTII,S$GLB,, | Performed by: INTERNAL MEDICINE

## 2022-01-14 PROCEDURE — 1125F PR PAIN SEVERITY QUANTIFIED, PAIN PRESENT: ICD-10-PCS | Mod: CPTII,S$GLB,, | Performed by: INTERNAL MEDICINE

## 2022-01-14 PROCEDURE — 99999 PR PBB SHADOW E&M-EST. PATIENT-LVL III: ICD-10-PCS | Mod: PBBFAC,,, | Performed by: INTERNAL MEDICINE

## 2022-01-14 PROCEDURE — 3077F PR MOST RECENT SYSTOLIC BLOOD PRESSURE >= 140 MM HG: ICD-10-PCS | Mod: CPTII,S$GLB,, | Performed by: INTERNAL MEDICINE

## 2022-01-14 PROCEDURE — 99214 OFFICE O/P EST MOD 30 MIN: CPT | Mod: S$GLB,,, | Performed by: INTERNAL MEDICINE

## 2022-01-14 RX ORDER — CALCIUM CARBONATE 200(500)MG
2 TABLET,CHEWABLE ORAL
Status: ON HOLD | COMMUNITY
End: 2023-01-01

## 2022-01-14 RX ORDER — INFLUENZA A VIRUS A/VICTORIA/2570/2019 IVR-215 (H1N1) ANTIGEN (FORMALDEHYDE INACTIVATED), INFLUENZA A VIRUS A/TASMANIA/503/2020 IVR-221 (H3N2) ANTIGEN (FORMALDEHYDE INACTIVATED), INFLUENZA B VIRUS B/PHUKET/3073/2013 ANTIGEN (FORMALDEHYDE INACTIVATED), AND INFLUENZA B VIRUS B/WASHINGTON/02/2019 ANTIGEN (FORMALDEHYDE INACTIVATED) 60; 60; 60; 60 UG/.7ML; UG/.7ML; UG/.7ML; UG/.7ML
INJECTION, SUSPENSION INTRAMUSCULAR
COMMUNITY
Start: 2021-10-06 | End: 2023-01-01

## 2022-01-14 NOTE — PROGRESS NOTES
Barbara Rizo  was seen as a new patient for the evaluation of sob.    CHIEF COMPLAINT:  Shortness of Breath      HISTORY OF PRESENT ILLNESS: Barbara Rizo is a 81 y.o. female  has a past medical history of A-fib, CKD (chronic kidney disease), Diabetes mellitus type II, Fatty liver, GERD (gastroesophageal reflux disease), Hearing loss of both ears, Hemochromatosis, History of anemia due to CKD, History of pleural effusion, Hyperlipidemia, Hypertension, Macular degeneration, Osteoarthritis, Osteoporosis, Vaginal delivery, Vitamin D deficiency disease, and Wears partial dentures.  Patient was hospitalized 12/26/21-12/29/21 for sob.  Patient was treated with aggressive diuresis for DDx.  Dyspnea improve and patient was discharged on home oxygen.  Patient was seen by Dr. King during hospitalization.    Today, patient denied wheezing.  Intermittent cough (every other day).  No fever/chill.  No dyspnea with adl.  Patient lives alone.  Still cooks and clean.  No chest pain.  No orthopnea.  No pnd.  Used to smoke intermittently at age [age].      PAST MEDICAL HISTORY:    Active Ambulatory Problems     Diagnosis Date Noted    Hyperlipidemia     Essential hypertension     Hemochromatosis     DJD (degenerative joint disease) of knee 05/13/2013    Vitamin D deficiency 10/27/2013    Benign hypertensive heart disease without heart failure 10/27/2013    Other and unspecified hyperlipidemia 10/27/2013    Primary localized osteoarthrosis, lower leg 08/03/2015    Acute respiratory failure with hypoxia 09/01/2015    Anemia in chronic kidney disease 09/01/2015    Physical deconditioning 10/16/2015    Esophagitis 10/16/2015    Urinary retention 11/11/2015    Atonic neurogenic bladder 11/11/2015    Incomplete bladder emptying 11/11/2015    Constipation, slow transit 11/11/2015    OAB (overactive bladder) 09/22/2016    MGUS (monoclonal gammopathy of unknown significance) 10/18/2018    Paroxysmal atrial  fibrillation 11/27/2018    Obesity (BMI 30-39.9) 01/28/2019    Chronic gout 01/28/2019    Prediabetes 02/11/2019    Hyperkalemia 09/28/2020    Sinus bradycardia 09/28/2020    Pulmonary hypertension 09/28/2020    Open wound of left knee 09/29/2020    Atherosclerosis of native artery of left lower extremity     Type 2 diabetes mellitus with stage 4 chronic kidney disease, without long-term current use of insulin 01/25/2021    Secondary hyperparathyroidism of renal origin 01/25/2021    Chronic kidney disease, stage 4 (severe) 01/25/2021    Syncope 04/24/2021    ACP (advance care planning) 04/24/2021    Carcinoma of breast 08/02/2021    Malignant neoplasm of overlapping sites of left female breast 08/22/2021    Aortic atherosclerosis 12/27/2021    JOCELIN (obstructive sleep apnea) 01/14/2022     Resolved Ambulatory Problems     Diagnosis Date Noted    GERD (gastroesophageal reflux disease)     Altered mental status 08/21/2015    Septic shock due to Escherichia coli 08/23/2015    Acute renal failure 08/24/2015    Acute gangrenous cholecystitis 08/26/2015    Acute respiratory failure with hypercapnia 09/01/2015    Morbid obesity 09/01/2015    Bacteremia due to Escherichia coli 09/01/2015    Delirium due to multiple etiologies 09/24/2015    Atrial fibrillation with rapid ventricular response 01/28/2019    Acute renal failure superimposed on stage 3 chronic kidney disease 01/28/2019    Metabolic encephalopathy 01/29/2019    Atrial fibrillation with rapid ventricular response 01/31/2019    Acute bronchitis 01/31/2019    Atrial fibrillation with rapid ventricular response 02/03/2019    Stage 3 chronic kidney disease 02/04/2019    Acute on chronic diastolic heart failure 02/06/2019    H/O open leg wound 09/17/2020    Hyponatremia 09/27/2020    Atherosclerosis of native artery of extremity with ulceration 01/25/2021    Elevated troponin 04/24/2021    Acute decompensated heart failure  12/28/2021     Past Medical History:   Diagnosis Date    A-fib     CKD (chronic kidney disease)     Diabetes mellitus type II     Fatty liver     Hearing loss of both ears     History of anemia due to CKD     History of pleural effusion     Hypertension     Macular degeneration     Osteoarthritis     Osteoporosis     Vaginal delivery     Vitamin D deficiency disease     Wears partial dentures                 PAST SURGICAL HISTORY:    Past Surgical History:   Procedure Laterality Date    AXILLARY NODE DISSECTION Left 10/18/2021    Procedure: LYMPHADENECTOMY, AXILLARY;  Surgeon: Darlene Roca MD;  Location: [facility] OR;  Service: General;  Laterality: Left;    BREAST BIOPSY      BREAST SURGERY Bilateral     Reduction r/t h/o fibrocystic disease    CHOLECYSTECTOMY  08/2015    EYE SURGERY      Cat Ext  OU    HYSTERECTOMY      partial due to uterine fibroids    INCISION AND DRAINAGE OF KNEE Left 9/17/2020    Procedure: INCISION AND DRAINAGE, KNEE;  Surgeon: Rolando Wagoner MD;  Location: [facility] OR;  Service: Orthopedics;  Laterality: Left;    INJECTION FOR SENTINEL NODE IDENTIFICATION Left 10/18/2021    Procedure: INJECTION, FOR SENTINEL NODE IDENTIFICATION;  Surgeon: Darlene Roca MD;  Location: [facility] OR;  Service: General;  Laterality: Left;    JOINT REPLACEMENT Left 05/13/2013    TKR    JOINT REPLACEMENT Right 08/03/2015    TKR    MASTECTOMY, PARTIAL Left 10/18/2021    Procedure: MASTECTOMY, PARTIAL -- wire;  Surgeon: Darlene Roca MD;  Location: [facility] OR;  Service: General;  Laterality: Left;  RN PREOP 10/15/2021   VACCINATED-----NEED H/P AND ORDERS    SENTINEL LYMPH NODE BIOPSY Left 10/18/2021    Procedure: BIOPSY, LYMPH NODE, SENTINEL;  Surgeon: Darlene Roca MD;  Location: [facility] OR;  Service: General;  Laterality: Left;    THORACENTESIS      TOTAL KNEE ARTHROPLASTY Right 2015    left knee done 5/2013    WOUND DRESSING Left 9/17/2020    Procedure: WOUND VAC APPLICATION;  Surgeon: Rolando STUART  MD Rizwana;  Location: [facility] OR;  Service: Orthopedics;  Laterality: Left;         FAMILY HISTORY:                Family History   Problem Relation Age of Onset    Cancer Mother         stomach    Hypertension Mother     Aneurysm Father         abdominal       SOCIAL HISTORY:          Tobacco:   Social History     Tobacco Use   Smoking Status Former Smoker   Smokeless Tobacco Never Used     alcohol use:    Social History     Substance and Sexual Activity   Alcohol Use Not Currently    Alcohol/week: 0.0 standard drinks    Comment: occasional/ on a weekend               Occupation:   and clean houses.      ALLERGIES:  Review of patient's allergies indicates:  No Known Allergies    CURRENT MEDICATIONS:    Current Outpatient Medications   Medication Sig Dispense Refill    allopurinoL (ZYLOPRIM) 100 MG tablet TAKE 1 TABLET BY MOUTH ONCE DAILY      amiodarone (PACERONE) 200 MG Tab TAKE ONE-HALF TABLET BY MOUTH EVERY DAY 45 tablet 3    amLODIPine (NORVASC) 10 MG tablet Take 1 tablet (10 mg total) by mouth once daily. 90 tablet 3    atorvastatin (LIPITOR) 40 MG tablet TAKE ONE TABLET BY MOUTH EVERY DAY 90 tablet 1    calcium carbonate (TUMS) 200 mg calcium (500 mg) chewable tablet Take 2 tablets by mouth.      ELIQUIS 2.5 mg Tab TAKE ONE TABLET BY MOUTH TWICE DAILY 60 tablet 11    ergocalciferol (ERGOCALCIFEROL) 50,000 unit Cap Take 50,000 Units by mouth every 30 days.       FLUZONE HIGHDOSE QUAD 21-22  mcg/0.7 mL Syrg       folic acid (FOLVITE) 1 MG tablet Take 1 tablet (1,000 mcg total) by mouth once daily. 90 tablet 0    furosemide (LASIX) 20 MG tablet Take 1 tablet (20 mg total) by mouth 2 (two) times daily. 60 tablet 5    metoprolol succinate (TOPROL-XL) 25 MG 24 hr tablet Take 0.5 tablets (12.5 mg total) by mouth once daily. Only take if HR > 60 15 tablet 0     No current facility-administered medications for this visit.     Facility-Administered Medications Ordered in Other Visits  "  Medication Dose Route Frequency Provider Last Rate Last Admin    denosumab (PROLIA) injection 60 mg  60 mg Subcutaneous 1 time in Clinic/HOD Nargis Boyle MD                      REVIEW OF SYSTEMS:     Pulmonary related symptoms as per HPI.  Gen:  no weight loss, no fever, no night sweat  HEENT:  no visual changes, no sore throat, + hearing loss  CV:  Per hpi  GI:  no melena, no hematochezia, no diarhea, no constipation.  :  no dysuria, no hematuria, no hesistancy, no dribbling  Neuro:  no syncope, no vertigo, no tinitus  Psych:  No homocide or suicide ideation; no depression.  Endocrine:  No heat or cold intolerance.  Sleep:  +intermittent snoring; no witnessed apnea.  3 awakenings each night.  Feeling rested upon awake.    Otherwise, a balance of systems reviewed is negative.          PHYSICAL EXAM:  Vitals:    01/14/22 0914   BP: (!) 143/72   Pulse: 71   Temp: 98.3 °F (36.8 °C)   SpO2: (!) 92%   Weight: 87.9 kg (193 lb 12.6 oz)   Height: 5' 2" (1.575 m)   PainSc:   4   PainLoc: Back     Body mass index is 35.44 kg/m².     GENERAL:  well develop; no apparent distress  HEENT:  no nasal congestion; no discharge noted; class 4 modified mallampatti.   NECK:  supple; no palpable masses.  CARDIO: regular rate and rhythm  PULM:  clear to auscultation bilaterally; no intercostals retractions; no accessory muscle usage   ABDOMEN:  soft nontender/nondistended.  +bowel sound  EXTREMITIES no cc; +trace edema  NEURO:  CN II-XII intact.  5/5 motor in all extremities.  sensation grossly intact   to light touch.  PSYCH:  normal affect.  Alert and oriented x 4    LABS  Pulmonary Functions Testing Results(personally reviewed):  none  ABG (personally reviewed):  12/28/21 7.45/43/56/29 on room air  CXR (personally reviewed):  12/26/21 perihilar fullness.  No consolidation or effusion  CT CHEST(personally reviewed):  none    Echo 12/27/21  · The left ventricle is normal in size with moderate concentric hypertrophy and normal " systolic function.  · The estimated ejection fraction is 60%.  · Grade II left ventricular diastolic dysfunction.  · Mild right ventricular enlargement with normal right ventricular systolic function.  · There is moderate aortic valve stenosis.  · Aortic valve area is 1.29 cm2; peak velocity is 2.88 m/s; mean gradient is 22 mmHg.  · Mild mitral regurgitation.  · Moderate tricuspid regurgitation.  · The estimated PA systolic pressure is 66 mmHg.  · There is pulmonary hypertension.        ASSESSMENT/PLAN  Problem List Items Addressed This Visit     JOCELIN (obstructive sleep apnea)    Overview     The patient symptomatically has snoring, restless sleep with findings of htn, afib. This warrants further investigation for possible obstructive sleep apnea.  Patient will be contacted after sleep study is done.   Chronic respiratory failure requiring oxygen warrants in lab study.           Relevant Orders    Polysomnogram (CPAP will be added if patient meets diagnostic criteria.)    COVID-19 Routine Screening    Pulmonary hypertension    Overview     group 2 with ddx.  Will send for pft due to remote smoking history.  bp and fluid optimization.  Doing better since taking lasix routinely.                 Patient will No follow-ups on file. with md/np.

## 2022-01-18 ENCOUNTER — TELEPHONE (OUTPATIENT)
Dept: FAMILY MEDICINE | Facility: CLINIC | Age: 82
End: 2022-01-18
Payer: MEDICARE

## 2022-01-18 RX ORDER — ATORVASTATIN CALCIUM 40 MG/1
TABLET, FILM COATED ORAL
Qty: 90 TABLET | Refills: 0 | Status: SHIPPED | OUTPATIENT
Start: 2022-01-18 | End: 2022-04-13

## 2022-01-18 NOTE — PROGRESS NOTES
Patient, Barbara Rizo (MRN #[id number]), presented with a recorded BMI of 35.44 kg/m^2 and a documented comorbidity(s):  - Obstructive Sleep Apnea  to which the severe obesity is a contributing factor. This is consistent with the definition of severe obesity (BMI 35.0-39.9) with comorbidity (ICD-10 E66.01, Z68.35). The patient's severe obesity was monitored, evaluated, addressed and/or treated. This addendum to the medical record is made on 01/17/2022.

## 2022-01-18 NOTE — TELEPHONE ENCOUNTER
----- Message from Odell Lazo sent at 1/18/2022 11:51 AM CST -----  Regarding: Refill  Contact: [facility]  Type:  Pharmacy Calling to Clarify an RX    Name of Caller: Jm    Pharmacy Name: [facility]    Prescription Name: atorvastatin    What do they need to clarify? Refill    Can you be contacted via [facility]? No    Best Call Back Number: [phone]    Additional Information:      [facility] [place] - [address]  [address]  [place] [address]  Phone: [phone] Fax: [phone]

## 2022-01-18 NOTE — TELEPHONE ENCOUNTER
Refill Authorization Note   Barbara Rizo  is requesting a refill authorization.  Brief Assessment and Rationale for Refill:  Approve     Medication Therapy Plan:       Medication Reconciliation Completed: No   Comments:   --->Care Gap information included below if applicable.   Orders Placed This Encounter    atorvastatin (LIPITOR) 40 MG tablet      Requested Prescriptions   Signed Prescriptions Disp Refills    atorvastatin (LIPITOR) 40 MG tablet 90 tablet 0     Sig: TAKE ONE TABLET BY MOUTH EVERY DAY       Cardiovascular:  Antilipid - Statins Passed - 1/11/2022  2:34 PM        Passed - Patient is at least 18 years old        Passed - Valid encounter within last 15 months     Recent Visits  Date Type Provider Dept   10/06/21 Office Visit Paras Oro MD [facility] [place] [place] Family Medicine/ Internal Med   08/02/21 Office Visit Paras Oro MD [facility] [place] [place] Family Medicine/ Internal Med   05/03/21 Office Visit Paras Oro MD [facility] [place] [place] Family Medicine/ Internal Med   01/25/21 Office Visit Paras Oro MD [facility] [place] [place] Family Medicine/ Internal Med   10/05/20 Office Visit Paras Oro MD [facility] [place] [place] Family Medicine/ Internal Med   07/24/20 Office Visit Paras Oro MD [facility] [place] [place] Family Medicine/ Internal Med   Showing recent visits within past 720 days and meeting all other requirements  Future Appointments  No visits were found meeting these conditions.  Showing future appointments within next 150 days and meeting all other requirements      Future Appointments              Tomorrow Amauri Lomas MD [facility] - Cardiology, [facility]    In 2 months Paras Oro MD [facility] - Family Medicine, [facility]    In 5 months CHAIR 02 [facility] - Infusion, [facility]    In 5 months [facility] MAMMO2 [facility] - Imaging, [facility]    In 5 months Darlene Roca MD [facility] - Breast Surgery, [facility]                Passed - ALT is 131 or below and within 360 days     ALT   Date Value Ref Range Status   12/26/2021 22 10 - 44 U/L  Final   10/15/2021 19 10 - 44 U/L Final   04/24/2021 20 0 - 35 U/L Final              Passed - AST is 119 or below and within 360 days     AST   Date Value Ref Range Status   12/26/2021 23 10 - 40 U/L Final   10/15/2021 20 10 - 40 U/L Final   04/24/2021 24 14 - 36 U/L Final              Passed - Total Cholesterol within 360 days     Lab Results   Component Value Date    CHOL 127 01/19/2021    CHOL 116 (L) 07/24/2020    CHOL 134 07/02/2018              Passed - LDL within 360 days     LDL Cholesterol   Date Value Ref Range Status   01/19/2021 56.4 (L) 63.0 - 159.0 mg/dL Final     Comment:     The National Cholesterol Education Program (NCEP) has set the  following guidelines (reference values) for LDL Cholesterol:  Optimal.......................<130 mg/dL  Borderline High...............130-159 mg/dL  High..........................160-189 mg/dL  Very High.....................>190 mg/dL              Passed - HDL within 360 days     HDL   Date Value Ref Range Status   01/19/2021 51 40 - 75 mg/dL Final     Comment:     The National Cholesterol Education Program (NCEP) has set the  following guidelines (reference values) for HDL Cholesterol:  Low...............<40 mg/dL  Optimal...........>60 mg/dL              Passed - Triglycerides within 360 days     Lab Results   Component Value Date    TRIG 98 01/19/2021    TRIG 110 07/24/2020    TRIG 133 07/02/2018                  Appointments  past 12m or future 3m with PCP    Date Provider   Last Visit   10/6/2021 Paras Oro MD   Next Visit   1/14/2022 Paras Oro MD   ED visits in past 90 days: 0     Note composed:12:08 PM 01/18/2022

## 2022-01-19 ENCOUNTER — PATIENT OUTREACH (OUTPATIENT)
Dept: ADMINISTRATIVE | Facility: OTHER | Age: 82
End: 2022-01-19
Payer: MEDICARE

## 2022-01-19 ENCOUNTER — OFFICE VISIT (OUTPATIENT)
Dept: CARDIOLOGY | Facility: CLINIC | Age: 82
End: 2022-01-19
Payer: MEDICARE

## 2022-01-19 ENCOUNTER — EXTERNAL HOME HEALTH (OUTPATIENT)
Dept: HOME HEALTH SERVICES | Facility: HOSPITAL | Age: 82
End: 2022-01-19
Payer: MEDICARE

## 2022-01-19 VITALS
WEIGHT: 193 LBS | BODY MASS INDEX: 35.51 KG/M2 | SYSTOLIC BLOOD PRESSURE: 130 MMHG | DIASTOLIC BLOOD PRESSURE: 68 MMHG | OXYGEN SATURATION: 95 % | HEIGHT: 62 IN | HEART RATE: 73 BPM

## 2022-01-19 DIAGNOSIS — I48.0 PAROXYSMAL ATRIAL FIBRILLATION: Chronic | ICD-10-CM

## 2022-01-19 DIAGNOSIS — I10 ESSENTIAL HYPERTENSION: Chronic | ICD-10-CM

## 2022-01-19 DIAGNOSIS — E78.2 MIXED HYPERLIPIDEMIA: Chronic | ICD-10-CM

## 2022-01-19 DIAGNOSIS — I50.33 ACUTE ON CHRONIC DIASTOLIC CONGESTIVE HEART FAILURE: ICD-10-CM

## 2022-01-19 DIAGNOSIS — I27.20 PULMONARY HYPERTENSION: Primary | ICD-10-CM

## 2022-01-19 DIAGNOSIS — R55 SYNCOPE, UNSPECIFIED SYNCOPE TYPE: ICD-10-CM

## 2022-01-19 DIAGNOSIS — I11.9 BENIGN HYPERTENSIVE HEART DISEASE WITHOUT HEART FAILURE: ICD-10-CM

## 2022-01-19 PROCEDURE — 1159F PR MEDICATION LIST DOCUMENTED IN MEDICAL RECORD: ICD-10-PCS | Mod: CPTII,S$GLB,, | Performed by: INTERNAL MEDICINE

## 2022-01-19 PROCEDURE — 93000 EKG 12-LEAD: ICD-10-PCS | Mod: S$GLB,,, | Performed by: INTERNAL MEDICINE

## 2022-01-19 PROCEDURE — 1126F AMNT PAIN NOTED NONE PRSNT: CPT | Mod: CPTII,S$GLB,, | Performed by: INTERNAL MEDICINE

## 2022-01-19 PROCEDURE — 99499 UNLISTED E&M SERVICE: CPT | Mod: S$GLB,,, | Performed by: INTERNAL MEDICINE

## 2022-01-19 PROCEDURE — 3078F DIAST BP <80 MM HG: CPT | Mod: CPTII,S$GLB,, | Performed by: INTERNAL MEDICINE

## 2022-01-19 PROCEDURE — 93000 ELECTROCARDIOGRAM COMPLETE: CPT | Mod: S$GLB,,, | Performed by: INTERNAL MEDICINE

## 2022-01-19 PROCEDURE — 1126F PR PAIN SEVERITY QUANTIFIED, NO PAIN PRESENT: ICD-10-PCS | Mod: CPTII,S$GLB,, | Performed by: INTERNAL MEDICINE

## 2022-01-19 PROCEDURE — 1159F MED LIST DOCD IN RCRD: CPT | Mod: CPTII,S$GLB,, | Performed by: INTERNAL MEDICINE

## 2022-01-19 PROCEDURE — 3288F FALL RISK ASSESSMENT DOCD: CPT | Mod: CPTII,S$GLB,, | Performed by: INTERNAL MEDICINE

## 2022-01-19 PROCEDURE — 99999 PR PBB SHADOW E&M-EST. PATIENT-LVL III: CPT | Mod: PBBFAC,,, | Performed by: INTERNAL MEDICINE

## 2022-01-19 PROCEDURE — 99214 PR OFFICE/OUTPT VISIT, EST, LEVL IV, 30-39 MIN: ICD-10-PCS | Mod: S$GLB,,, | Performed by: INTERNAL MEDICINE

## 2022-01-19 PROCEDURE — 99999 PR PBB SHADOW E&M-EST. PATIENT-LVL III: ICD-10-PCS | Mod: PBBFAC,,, | Performed by: INTERNAL MEDICINE

## 2022-01-19 PROCEDURE — 3288F PR FALLS RISK ASSESSMENT DOCUMENTED: ICD-10-PCS | Mod: CPTII,S$GLB,, | Performed by: INTERNAL MEDICINE

## 2022-01-19 PROCEDURE — 1101F PR PT FALLS ASSESS DOC 0-1 FALLS W/OUT INJ PAST YR: ICD-10-PCS | Mod: CPTII,S$GLB,, | Performed by: INTERNAL MEDICINE

## 2022-01-19 PROCEDURE — 99214 OFFICE O/P EST MOD 30 MIN: CPT | Mod: S$GLB,,, | Performed by: INTERNAL MEDICINE

## 2022-01-19 PROCEDURE — 1101F PT FALLS ASSESS-DOCD LE1/YR: CPT | Mod: CPTII,S$GLB,, | Performed by: INTERNAL MEDICINE

## 2022-01-19 PROCEDURE — 99499 RISK ADDL DX/OHS AUDIT: ICD-10-PCS | Mod: S$GLB,,, | Performed by: INTERNAL MEDICINE

## 2022-01-19 PROCEDURE — 3075F PR MOST RECENT SYSTOLIC BLOOD PRESS GE 130-139MM HG: ICD-10-PCS | Mod: CPTII,S$GLB,, | Performed by: INTERNAL MEDICINE

## 2022-01-19 PROCEDURE — 3075F SYST BP GE 130 - 139MM HG: CPT | Mod: CPTII,S$GLB,, | Performed by: INTERNAL MEDICINE

## 2022-01-19 PROCEDURE — 3078F PR MOST RECENT DIASTOLIC BLOOD PRESSURE < 80 MM HG: ICD-10-PCS | Mod: CPTII,S$GLB,, | Performed by: INTERNAL MEDICINE

## 2022-01-19 NOTE — PROGRESS NOTES
Subjective:    Patient ID:  Barbara Rizo is a 81 y.o. female who presents for follow-up of Hospital Follow Up      HPI     PAF - TALHA/CV 9/21/15, 1/30/19, 2/2/19 -  on amiodarone and eliquis, Diastolic CHF,  HTN, DM, HLD, MGUS, CRI - Cr 1.8, AS/MR - mild to moderate, breast CA, COPD on home O2     Admitted 1/28/19  Ms. Barbara Rizo is a 78 y.o. female with essential hypertension, hyperlipidemia (LDL 62.4 Jul 2018), type 2 diabetes mellitus (HbA1c 5.8% Jul 2018), CKD Stage 3, paroxysmal atrial fibrillation ([id number]-NETx score 5) not on chronic anticoagulation, obesity (BMI 36.0), MGUS, and chronic gout who presents to [facility] ED with complaints of coughing for three days.  She started to have a non-productive cough, wheezing, and mild dyspnea three days ago which has steadily been worsening since onset.  She also complains of a subjective fever but otherwise denies any nausea, vomiting, chest pain, palpitations, pleurisy, nor any diaphoresis.  She denies any recent travel, hemoptysis, nor any lower extremity pain or swelling.  She does say that she's under a lot of stress recently with her ex- dying yesterday at [facility] due to complications from a heart valve replacement two weeks ago.  She does say that she may have had sick contacts while visiting her ex-.  Her appetite has been poor but she denies any weight loss.     While at her ENT's appointment today she decided to schedule an appointment with her PCP to evaluate her upper airway complaints.  She was unable to see her regular PCP, Dr. Paras Oro, but was able to be seen by another physician who became concerned when she was noted to be in AFib with RVR on EKG.  He recommended EMS transportation to the ED but she refused and decided to drive herself.  Her cardiologist is Dr. Amauri Lomas.     Pt admitted to ICU with afib rvr on amiodarone infusion. Pt with elevated HR  and after TALHA done patient became very  agitated and anxious. Ativan 1mg IV given and symptoms got worse. HR up into the 170s and O2 sats dropped to 77%. Placed on NRB. Improved O2 sats. 5mg IV haldol given as patient was trying to get out of bed and pulling oxygen mask off. Cardizem 10mg Iv given with improved HR to 120s-130s. xopenex scheduled Q 8 hours. Changed to zosyn with temp 102F.   1/30- successful cardioversion, post procedure confused and pulling oxygen off, desaturation, constant re-direction, monitored in ICU overnight. Changed to oral amiodarone. eliquis for anticoagulation. Holding parameters on BP meds. IV steroids, nebs and antibiotic for probable lower resp infection. IV lasix as Bp tolerates.   1/31- HR remain under control NSR 60-70s.On amiodarone, metoprolol and eliquis.  Resp status improved and weaned oxygen. Steroids changed to oral route. DC zosyn and switch to augmentin. Add acapella to nebs. Cr increased from baseline (1.5-1.7). Gentle IVF and monitor with repeat BMP later, was stable,. PT,OT consulted for dc planning. Weaning oxygen. PT/OT consulted and rec H/H without equipment. The patient was transferred to the floor on 1/31. Nephrology was consulted for worsening renal function.      2/2- pt transferred with afadis rvr. Successful cardioversion again . Continued oral amiodarone and BB.  still has aggressive cough and URI symptoms. On mucolytic and antibiotic.   2/3- HR 50-60s sinus. Cr sightly higher, sodium 128. BNP 1500. Lasix x one dose. Bringing up more mucus. Steroids weaned 20mg. Still faint wheezes on exam.  Patient had worsening ARF on CKD 3, keeped on george and monitor,nephrology was following.reconsulted PT,OT.fley was removed latera nd she had improvement in ARF.  Changed diet for low slat diet duo to hyponatremia with improvement.  She did well with PT,OT.  Her wheezing and respiratory status is improved,  Patient has been discharged home with HH and PO Abx and OAC and amiodarone and follow up with PCP,nephrology  and cardiology in next few days.      1-29:  Admitted with AFib with RVR  1-30:  Underwent TALHA/DC cardioversion successfully  2-1 Back in A-fib 120s. SOB  2-3 Still coughing and SOB. NSR 60      2/2/19 CV - 360J converted A-fib to sinus bradycardia 55. Change amiodarone to po. Ok for telemetry     Echo 12/27/21  · The left ventricle is normal in size with moderate concentric hypertrophy and normal systolic function.  · The estimated ejection fraction is 60%.  · Grade II left ventricular diastolic dysfunction.  · Mild right ventricular enlargement with normal right ventricular systolic function.  · There is moderate aortic valve stenosis.  · Aortic valve area is 1.29 cm2; peak velocity is 2.88 m/s; mean gradient is 22 mmHg.  · Mild mitral regurgitation.  · Moderate tricuspid regurgitation.  · The estimated PA systolic pressure is 66 mmHg.  · There is pulmonary hypertension.         2/13/19 Less SOB since discharge  EKG NSR with PACs 65   Decrease amiodarone 200 qd  OV 2 months - will discuss changing amiodarone to flecainide at that time     4/17/19 Denies CP or SOB  EKG sinus bradycardia 44  HR mostly runs 40-50 at home  Change metoprolol 100 qd to toprol XL 50 qd - may decrease further if bradycardia persists  Discussed alternative AAD - given her repeated episodes of PAF we are both in agreement to stay on amiodarone for now  OV 1 month with TSH, CMP, EKG     5/27/19 HR improved to the low 50s  Has held eliquis the last 2 days due to bleeding from a recent skin CA removal  Denies CP or SOB     9/11/19 Denies CP or SOB  Some LE edema  EKG sinus bradycardia 46 NSSTT changes  Stop norvasc with LE edema  Add cardura 4 mg qd  Decrease toprol 25 qd with bradycardia  OV 3 months with CMP, TSH     12/11/19 Denies CP or SOB  LE edema resolved after stopping norvasc  BP controlled by home readings  HR runs 45-50  EKG sinus bradycardia 47 1st degree AVB  Denies dizziness  Bradycardia well tolerated - will continue Rx  OV 6  months with echo      6/18/20 Denies CP or dizziness  Mild stable WILKINSON  EKG sinus bradycardia 44 otherwise ok  Bradycardia continues to be well tolerated  OV 6 months with CMP, TSH on chronic amiodarone     Admitted 9/27/20  Mrs. Rizo is a 79 yo F who presents with a CC of fatigue, dizziness and falls at home. She is s/p recent surgery with a discharge a week ago. She presents to the ED and is found to have a NA of 123. The patient's daughter states that the patient has been drinking a lot of water. No HCTZ. No new meds.  No ETOH use.  The patient is non-ill appearing. Lives currently with patient's daughter while recovering from surgery.      Admitted with hyponatremia, 123 on admit, down to 119 at the lowest. Started on IVF. Nephrology consulted. Started free water restriction. Suspect euvolemic hyponatremia due to excess free water intake + fluoxetine use. TSH and cortisol normal. Hyponatremia improved to 131 on day of discharge. Patient is not symptomatic. Discharged to home with home health. Follow up with Nephrology, PCP, and Ortho.      On discharge, metoprolol discontinued for bradycardia after discussing with Cardiology. Amiodarone continued. Losartan also discontinued on discharge due to intermittent hyperkalemia. Started bicarb supplementation for acidosis in CKD3. Fluoxetine discontinued after discussing with patient; she has no depressive symptoms. Discussed plan of care with patient and her daughter at bedside.      10/14/20 EKG  - suspect this is A-flutter 2:1 with aberrancy  Denies CP, SOB, or palpitations  Appears to be in A-flutter RVR - will send to the ER for admission. If issues with tachy-marlon continue will likely end up needing PPM     10/23/20 HR were better controlled when she went to ER. She was restarted on metoprolol and discharged. HR mostly 40-50s at home. Bradycardia well tolerated. Need clearance for skin graft surgery at  on left leg  EKG sinus bradycardia 43   Back in  NSR - bradycardia well tolerated. No indication for PPM at this point  Cleared for skin graft surgery at moderate cardiac risk - ok to hold eliquis 2 days before  OV 3 months  Continue Rx for PAF, HTN, CHF     12/1/20 Denies CP or SOB. Did well after her skin graft surgery  HR 45-50. BP elevated - controlled by outside readings  Continue Rx for PAF, HTN, HLD, CHF  OV 3 months     3/1/21 Denies CP or SOB  EKG NSR 60 IVCD  HR 55-60 by home readings as well      Continue Rx for PAF, HTN, HLD, diastolic CHF  OV 6 months with CMP, TSH on chronic amiodarone, Echo to look at MR      Admitted to [facility] 4/21/21  The patient is a pleasant 81 year old female with history of paroxysmal atrial fibrillation on amiodarone, metoprolol, eliquis , sinus bradycardia, hypertension, hyperlipidemia (followed by Dr. Lomas)  CKD stage 4, followed by Dr. Boyle, was brought to the ER for evaluation of syncopal episode. Patient reported to have loss of consciousness for a brief time, per daughter.  She was noted to be unconscious then sternal rub was done and then was able to regain consciousness. Per patient, she felt hot prior to LOC.  She denies palpitations, lightheadedness. She reported drinking two small flutes of champagne few minutes prior and 3 mixed drinks last night.  In the ER, she was noted to have sinus bradycardia with RBBB, troponin mildly elevated 0.04 and creatinine 2.3 (from 1.8 in January).  Per daughter, she was seen by Dr. Boyle on 4/20 and was restarted on Losartan 25 mg po daily due to uncontrolled BP.  Per patient, she too has not drank fluids as usual in the last couple of days.  Patient was ordered to give NS 500ml bolus. [facility] called for admission.      The patient is placed under observation in telemetry floor with consult to cardiology. Patient's home losartan was discontinued and given IVF. Renal functions improved. Patient had mildly elevated troponin. ECHO showed normal systolic function (EF70%) with grade 2  diastolic dysfunction , moderate AS.  Patient was also noted to be bradycardic and toprol xl was discontinued and Amiodarone decreased to 100 mg from 200 mg daily. Patient was instructed to check HR and if it is greater than 60, may take 0.5 tab (I.e. 12.5 mg) of toprol.  Patient to follow up with her primary cardiologist (Dr. Lomas).  More so, patient 's BP stable (off losartan). She needs to check her BP and if SBP greater than 160 , may get Norvasc 10 mg till seen by PCP or nephrologist. Repeat BMP as OP.  The patient will follow up with her nephrologist (Dr. Boyle). With no recurrence of symptoms and clearance from consultants, the patient was discharged home with stable vital signs.     5/6/21 Denies further dizziness or syncope  EKG NSR 69 1st degree AVB IVCD  On amiodarone 100 qd and off of metoprolol  TSH 2.4 LFT ok     suspect recent syncope was from dehydration and not bradycardia  Staying in NSR on amiodarone 100 qd and off of metoprolol  Continue Rx for PAF, HTN, HLD, diastolic CHF  OV 3 months     8/6/21 Denies CP, SOB, or dizziness  EKG NSR 1st degree AVB RBBB   Continue Rx for PAF, HTN, HLD, diastolic CHF  OV 6 months with CMP, TSH  Cleared for breast surgery at moderate cardiac risk - ok to hold eliquis 3 days before      Admitted 12/26/21  Patient admitted to the hospital for acute on chronic diastolic heart failure. She received IV lasix with resolution of her SOB. She only takes PRN lasix but now will go home on 20 po bid. However, patient dropped to 83% on RA with exercise on 12/27.  Despite continued lasix- she was 87% on RA on 12/28. Former smoker. Pulmonary consulted for eval.  Hypoxemic respiratory failure probably multifactorial. Primary contributor is acute decompensated heart failure. 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 Lomas. Also has chronic R hemidiaphragm elevation which is likely a complication of her pleural effusion in 2015 based on review  of imaging since then. Tobacco history is noncontributory as she smoked 1-2 cig/wk in her 20s.  Patient will require home oxygen upon discharge.  She will follow up with Pulmonary for PFT's.  Patient will also follow up with PCP and Cardiology.  She will be discharged home once home oxygen arranged.  BNP 1190    1/19/22 Less SOB since discharge. On Home O2. Scheduled for pulmonary evaluation  EKG NSR 1st degree AVB RBBB      Review of Systems   Constitutional: Negative for decreased appetite.   HENT: Negative for ear discharge.    Eyes: Negative for blurred vision.   Respiratory: Negative for hemoptysis.    Endocrine: Negative for polyphagia.   Hematologic/Lymphatic: Negative for adenopathy.   Skin: Negative for color change.   Musculoskeletal: Negative for joint swelling.   Genitourinary: Negative for bladder incontinence.   Neurological: Negative for brief paralysis.   Psychiatric/Behavioral: Negative for hallucinations.   Allergic/Immunologic: Negative for hives.        Objective:    Physical Exam  Constitutional:       Appearance: She is well-developed.   HENT:      Head: Normocephalic and atraumatic.   Eyes:      Conjunctiva/sclera: Conjunctivae normal.      Pupils: Pupils are equal, round, and reactive to light.   Cardiovascular:      Rate and Rhythm: Normal rate.      Pulses: Intact distal pulses.      Heart sounds: Normal heart sounds.   Pulmonary:      Effort: Pulmonary effort is normal.      Breath sounds: Normal breath sounds.   Abdominal:      General: Bowel sounds are normal.      Palpations: Abdomen is soft.   Musculoskeletal:         General: Normal range of motion.      Cervical back: Normal range of motion and neck supple.   Skin:     General: Skin is warm and dry.   Neurological:      Mental Status: She is alert and oriented to person, place, and time.           Assessment:       1. Pulmonary hypertension    2. Essential hypertension    3. Mixed hyperlipidemia    4. Paroxysmal atrial fibrillation     5. Benign hypertensive heart disease without heart failure    6. Syncope, unspecified syncope type    7. Acute on chronic diastolic congestive heart failure         Plan:       Continue Rx for PAF, HTN, HLD, diastolic CHF  Volume status appears stable  OV 3 months with BNP, BMP

## 2022-02-10 LAB
CREATININE RANDOM URINE: 84 MG/DL (ref 20–275)
CREATININE RANDOM URINE: 84 MG/DL (ref 20–275)
PROT/CREAT UR: 1214 MG/G{CREAT} (ref 21–161)
PROT/CREAT UR: 1214 MG/G{CREAT} (ref 21–161)
PROTEIN, TOTAL RANDOM URINE: 102 (ref 5–24)
PROTEIN, TOTAL RANDOM URINE: 102 (ref 5–24)

## 2022-02-11 ENCOUNTER — HOSPITAL ENCOUNTER (OUTPATIENT)
Dept: PREADMISSION TESTING | Facility: HOSPITAL | Age: 82
Discharge: HOME | End: 2022-02-11
Attending: SURGERY
Payer: MEDICARE

## 2022-02-11 DIAGNOSIS — G47.33 OSA (OBSTRUCTIVE SLEEP APNEA): ICD-10-CM

## 2022-02-11 LAB — SARS-COV-2 RDRP RESP QL NAA+PROBE: NEGATIVE

## 2022-02-11 PROCEDURE — U0002 COVID-19 LAB TEST NON-CDC: HCPCS | Performed by: INTERNAL MEDICINE

## 2022-02-14 ENCOUNTER — HOSPITAL ENCOUNTER (OUTPATIENT)
Dept: SLEEP MEDICINE | Facility: HOSPITAL | Age: 82
Discharge: HOME | End: 2022-02-14
Attending: INTERNAL MEDICINE
Payer: MEDICARE

## 2022-02-14 DIAGNOSIS — G47.33 OSA (OBSTRUCTIVE SLEEP APNEA): ICD-10-CM

## 2022-02-14 PROCEDURE — 95810 POLYSOM 6/> YRS 4/> PARAM: CPT

## 2022-02-14 PROCEDURE — 95810 PR POLYSOMNOGRAPHY, 4 OR MORE: ICD-10-PCS | Mod: 26,,, | Performed by: INTERNAL MEDICINE

## 2022-02-14 PROCEDURE — 95810 POLYSOM 6/> YRS 4/> PARAM: CPT | Mod: 26,,, | Performed by: INTERNAL MEDICINE

## 2022-02-15 NOTE — PROGRESS NOTES
A Diagnostic study was performed on Barbara Rizo.The entire procedure was explained, including Bio calibration procedure, patient was informed that there may be a need to enter the room during the night to fix lead or make adjustments to the equipment. Questions were answered prior to start of study. She was given instructions on how to call out for help including how to use the nurse call unit in the bathroom. Patient was given [employer] phone number to call if patient needed tech for anything, in case tech was out of tech room.  Patient education was performed. This includes the possible use of CPAP machine and different CPAP masks. She was given the after visit summary.   The lowest oxygen saturation observed during sleep was 87%   She did not meet criteria for a split night study   The EKG appeared to be a sinus rhythm, bundle branch block    Pt came to the lab on 2L of O2. No O2 was used at the start of the study. 1L of O2 was added during the study. Pt wore hearing aids during the study. Pts daughter stayed with her during the study and helped with biocals. Pt had trouble falling asleep.Headboard on bed came of wall during the study but did not harm or interfere with the patient.   Moderate snoring was observed

## 2022-02-16 ENCOUNTER — DOCUMENT SCAN (OUTPATIENT)
Dept: HOME HEALTH SERVICES | Facility: HOSPITAL | Age: 82
End: 2022-02-16
Payer: MEDICARE

## 2022-02-17 NOTE — PROCEDURES
"Dear Provider,     You have ordered sleep LAB services to perform the sleep study for Barbara Rizo.  The sleep study that you ordered is complete.      Please find Sleep Study result in "Chart Review" under the "Media tab."      As the ordering provider, you are responsible for reviewing the results and implementing a treatment plan with your patient.    If you need a Sleep Medicine provider to explain the sleep study findings and arrange treatment for the patient, please refer patient for consultation to our Sleep Clinic via [facility] with Ambulatory Consult Sleep.    To do that please place an order for an  "Ambulatory Consult Sleep" - it will go to our clinic work queue for our Medical Assistant to contact the patient for an appointment.     For any questions, please contact our clinic staff at [phone] to talk to clinical staff.   "

## 2022-02-21 ENCOUNTER — TELEPHONE (OUTPATIENT)
Dept: PULMONOLOGY | Facility: CLINIC | Age: 82
End: 2022-02-21
Payer: MEDICARE

## 2022-02-21 PROBLEM — Z17.0 MALIGNANT NEOPLASM OF OVERLAPPING SITES OF LEFT BREAST IN FEMALE, ESTROGEN RECEPTOR POSITIVE: Status: ACTIVE | Noted: 2021-08-22

## 2022-02-21 NOTE — TELEPHONE ENCOUNTER
Scheduled an appointment to see provider.    VANIA Blackburn                ----- Message from Reji Sarkar MD sent at 2/17/2022  3:51 PM CST -----  Please schedule sleep clinic appointmetnt with md/np in 1-2 weeks to discuss sleep study result.  Please notify me if we are not able to get patient schedule within 2 weeks.

## 2022-02-22 ENCOUNTER — TELEPHONE (OUTPATIENT)
Dept: SURGERY | Facility: CLINIC | Age: 82
End: 2022-02-22
Payer: MEDICARE

## 2022-03-07 ENCOUNTER — OFFICE VISIT (OUTPATIENT)
Dept: PULMONOLOGY | Facility: CLINIC | Age: 82
End: 2022-03-07
Payer: MEDICARE

## 2022-03-07 VITALS
HEART RATE: 75 BPM | OXYGEN SATURATION: 92 % | SYSTOLIC BLOOD PRESSURE: 136 MMHG | HEIGHT: 62 IN | BODY MASS INDEX: 33.25 KG/M2 | WEIGHT: 180.69 LBS | DIASTOLIC BLOOD PRESSURE: 68 MMHG

## 2022-03-07 DIAGNOSIS — I27.20 PULMONARY HYPERTENSION: ICD-10-CM

## 2022-03-07 DIAGNOSIS — G47.00 INSOMNIA, UNSPECIFIED TYPE: Primary | ICD-10-CM

## 2022-03-07 DIAGNOSIS — Z01.812 PRE-PROCEDURE LAB EXAM: ICD-10-CM

## 2022-03-07 DIAGNOSIS — G47.33 OSA (OBSTRUCTIVE SLEEP APNEA): ICD-10-CM

## 2022-03-07 PROBLEM — R65.20 PUERPERAL SEPSIS WITH ACUTE HYPOXIC RESPIRATORY FAILURE: Status: ACTIVE | Noted: 2022-03-07

## 2022-03-07 PROBLEM — J96.01 PUERPERAL SEPSIS WITH ACUTE HYPOXIC RESPIRATORY FAILURE: Status: ACTIVE | Noted: 2022-03-07

## 2022-03-07 PROCEDURE — 3078F DIAST BP <80 MM HG: CPT | Mod: CPTII,S$GLB,, | Performed by: INTERNAL MEDICINE

## 2022-03-07 PROCEDURE — 1159F PR MEDICATION LIST DOCUMENTED IN MEDICAL RECORD: ICD-10-PCS | Mod: CPTII,S$GLB,, | Performed by: INTERNAL MEDICINE

## 2022-03-07 PROCEDURE — 3288F PR FALLS RISK ASSESSMENT DOCUMENTED: ICD-10-PCS | Mod: CPTII,S$GLB,, | Performed by: INTERNAL MEDICINE

## 2022-03-07 PROCEDURE — 3075F SYST BP GE 130 - 139MM HG: CPT | Mod: CPTII,S$GLB,, | Performed by: INTERNAL MEDICINE

## 2022-03-07 PROCEDURE — 99214 OFFICE O/P EST MOD 30 MIN: CPT | Mod: S$GLB,,, | Performed by: INTERNAL MEDICINE

## 2022-03-07 PROCEDURE — 3075F PR MOST RECENT SYSTOLIC BLOOD PRESS GE 130-139MM HG: ICD-10-PCS | Mod: CPTII,S$GLB,, | Performed by: INTERNAL MEDICINE

## 2022-03-07 PROCEDURE — 1126F PR PAIN SEVERITY QUANTIFIED, NO PAIN PRESENT: ICD-10-PCS | Mod: CPTII,S$GLB,, | Performed by: INTERNAL MEDICINE

## 2022-03-07 PROCEDURE — 3288F FALL RISK ASSESSMENT DOCD: CPT | Mod: CPTII,S$GLB,, | Performed by: INTERNAL MEDICINE

## 2022-03-07 PROCEDURE — 1101F PR PT FALLS ASSESS DOC 0-1 FALLS W/OUT INJ PAST YR: ICD-10-PCS | Mod: CPTII,S$GLB,, | Performed by: INTERNAL MEDICINE

## 2022-03-07 PROCEDURE — 1101F PT FALLS ASSESS-DOCD LE1/YR: CPT | Mod: CPTII,S$GLB,, | Performed by: INTERNAL MEDICINE

## 2022-03-07 PROCEDURE — 99999 PR PBB SHADOW E&M-EST. PATIENT-LVL III: CPT | Mod: PBBFAC,,, | Performed by: INTERNAL MEDICINE

## 2022-03-07 PROCEDURE — 1159F MED LIST DOCD IN RCRD: CPT | Mod: CPTII,S$GLB,, | Performed by: INTERNAL MEDICINE

## 2022-03-07 PROCEDURE — 1126F AMNT PAIN NOTED NONE PRSNT: CPT | Mod: CPTII,S$GLB,, | Performed by: INTERNAL MEDICINE

## 2022-03-07 PROCEDURE — 99214 PR OFFICE/OUTPT VISIT, EST, LEVL IV, 30-39 MIN: ICD-10-PCS | Mod: S$GLB,,, | Performed by: INTERNAL MEDICINE

## 2022-03-07 PROCEDURE — 99999 PR PBB SHADOW E&M-EST. PATIENT-LVL III: ICD-10-PCS | Mod: PBBFAC,,, | Performed by: INTERNAL MEDICINE

## 2022-03-07 PROCEDURE — 3078F PR MOST RECENT DIASTOLIC BLOOD PRESSURE < 80 MM HG: ICD-10-PCS | Mod: CPTII,S$GLB,, | Performed by: INTERNAL MEDICINE

## 2022-03-07 RX ORDER — ZOLPIDEM TARTRATE 5 MG/1
5 TABLET ORAL NIGHTLY PRN
Qty: 2 TABLET | Refills: 0 | Status: SHIPPED | OUTPATIENT
Start: 2022-03-07 | End: 2022-04-06

## 2022-03-07 NOTE — PROGRESS NOTES
Barbara Rizo  was seen as a follow up.    CHIEF COMPLAINT:  Results      HISTORY OF PRESENT ILLNESS: Barbara Rizo is a 82 y.o. female  has a past medical history of A-fib, CKD (chronic kidney disease), Diabetes mellitus type II, Fatty liver, GERD (gastroesophageal reflux disease), Hearing loss of both ears, Hemochromatosis, History of anemia due to CKD, History of pleural effusion, Hyperlipidemia, Hypertension, Macular degeneration, Osteoarthritis, Osteoporosis, Vaginal delivery, Vitamin D deficiency disease, and Wears partial dentures.  Our first encounter was 1/14/22.  Patient was hospitalized 12/26/21-12/29/21 for sob.  Patient was treated with aggressive diuresis for DDx.  Dyspnea improve and patient was discharged on home oxygen.  Patient was seen by Dr. iKng during hospitalization and was referred to me for pulm/sleep follow up.  Used to smoke intermittently at age [age].      No new issue since last visit.  Patient denied wheezing.  Intermittent cough (every other day).  No fever/chill.  No dyspnea with adl.  Patient lives alone.  Still cooks and clean.  No chest pain.  No orthopnea.  No pnd.  S/p psg.    SLEEP ROUTINE:  Activity the hour prior to sleep: watch tv in living room     Bed partner:  alone  Time to bed:  9:30-10:30 pm   Lights off:  off  Sleep onset latency:  30 minutes        Disruptions or awakenings:    2 times per night for bathroom     Wakeup time:      7-8 am   Perceived sleep quality:  Tire on some day       Daytime naps:      none  Weekend sleep routine:      Same   Caffeine use: none  exercise habit:   none         PAST MEDICAL HISTORY:    Active Ambulatory Problems     Diagnosis Date Noted    Hyperlipidemia     Essential hypertension     Hemochromatosis     DJD (degenerative joint disease) of knee 05/13/2013    Vitamin D deficiency 10/27/2013    Benign hypertensive heart disease without heart failure 10/27/2013    Other and unspecified hyperlipidemia 10/27/2013     Primary localized osteoarthrosis, lower leg 08/03/2015    Acute respiratory failure with hypoxia 09/01/2015    Anemia in chronic kidney disease 09/01/2015    Physical deconditioning 10/16/2015    Esophagitis 10/16/2015    Urinary retention 11/11/2015    Atonic neurogenic bladder 11/11/2015    Incomplete bladder emptying 11/11/2015    Constipation, slow transit 11/11/2015    OAB (overactive bladder) 09/22/2016    MGUS (monoclonal gammopathy of unknown significance) 10/18/2018    Paroxysmal atrial fibrillation 11/27/2018    Obesity (BMI 30-39.9) 01/28/2019    Chronic gout 01/28/2019    Acute on chronic diastolic congestive heart failure 02/06/2019    Prediabetes 02/11/2019    Hyperkalemia 09/28/2020    Sinus bradycardia 09/28/2020    Pulmonary hypertension 09/28/2020    Open wound of left knee 09/29/2020    Atherosclerosis of native artery of left lower extremity     Type 2 diabetes mellitus with stage 4 chronic kidney disease, without long-term current use of insulin 01/25/2021    Secondary hyperparathyroidism of renal origin 01/25/2021    Chronic kidney disease, stage 4 (severe) 01/25/2021    Syncope 04/24/2021    ACP (advance care planning) 04/24/2021    Carcinoma of breast 08/02/2021    Malignant neoplasm of overlapping sites of left breast in female, estrogen receptor positive 08/22/2021    Aortic atherosclerosis 12/27/2021    JOCELIN (obstructive sleep apnea) 01/14/2022    Puerperal sepsis with acute hypoxic respiratory failure 03/07/2022     Resolved Ambulatory Problems     Diagnosis Date Noted    GERD (gastroesophageal reflux disease)     Altered mental status 08/21/2015    Septic shock due to Escherichia coli 08/23/2015    Acute renal failure 08/24/2015    Acute gangrenous cholecystitis 08/26/2015    Acute respiratory failure with hypercapnia 09/01/2015    Morbid obesity 09/01/2015    Bacteremia due to Escherichia coli 09/01/2015    Delirium due to multiple etiologies  09/24/2015    Atrial fibrillation with rapid ventricular response 01/28/2019    Acute renal failure superimposed on stage 3 chronic kidney disease 01/28/2019    Metabolic encephalopathy 01/29/2019    Atrial fibrillation with rapid ventricular response 01/31/2019    Acute bronchitis 01/31/2019    Atrial fibrillation with rapid ventricular response 02/03/2019    Stage 3 chronic kidney disease 02/04/2019    H/O open leg wound 09/17/2020    Hyponatremia 09/27/2020    Atherosclerosis of native artery of extremity with ulceration 01/25/2021    Elevated troponin 04/24/2021    Acute decompensated heart failure 12/28/2021     Past Medical History:   Diagnosis Date    A-fib     CKD (chronic kidney disease)     Diabetes mellitus type II     Fatty liver     Hearing loss of both ears     History of anemia due to CKD     History of pleural effusion     Hypertension     Macular degeneration     Osteoarthritis     Osteoporosis     Vaginal delivery     Vitamin D deficiency disease     Wears partial dentures                 PAST SURGICAL HISTORY:    Past Surgical History:   Procedure Laterality Date    AXILLARY NODE DISSECTION Left 10/18/2021    Procedure: LYMPHADENECTOMY, AXILLARY;  Surgeon: Darlene Roca MD;  Location: [facility] OR;  Service: General;  Laterality: Left;    BREAST BIOPSY      BREAST SURGERY Bilateral     Reduction r/t h/o fibrocystic disease    CHOLECYSTECTOMY  08/2015    EYE SURGERY      Cat Ext  OU    HYSTERECTOMY      partial due to uterine fibroids    INCISION AND DRAINAGE OF KNEE Left 9/17/2020    Procedure: INCISION AND DRAINAGE, KNEE;  Surgeon: Rolando Wagoner MD;  Location: [facility] OR;  Service: Orthopedics;  Laterality: Left;    INJECTION FOR SENTINEL NODE IDENTIFICATION Left 10/18/2021    Procedure: INJECTION, FOR SENTINEL NODE IDENTIFICATION;  Surgeon: Darlene Roca MD;  Location: [facility] OR;  Service: General;  Laterality: Left;    JOINT REPLACEMENT Left 05/13/2013    TKR     JOINT REPLACEMENT Right 08/03/2015    TKR    MASTECTOMY, PARTIAL Left 10/18/2021    Procedure: MASTECTOMY, PARTIAL -- wire;  Surgeon: Darlene Roca MD;  Location: [facility] OR;  Service: General;  Laterality: Left;  RN PREOP 10/15/2021   VACCINATED-----NEED H/P AND ORDERS    SENTINEL LYMPH NODE BIOPSY Left 10/18/2021    Procedure: BIOPSY, LYMPH NODE, SENTINEL;  Surgeon: Darlene Roca MD;  Location: [facility] OR;  Service: General;  Laterality: Left;    THORACENTESIS      TOTAL KNEE ARTHROPLASTY Right 2015    left knee done 5/2013    WOUND DRESSING Left 9/17/2020    Procedure: WOUND VAC APPLICATION;  Surgeon: Rolando Wagoner MD;  Location: 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      ALLERGIES:  Review of patient's allergies indicates:  No Known Allergies    CURRENT MEDICATIONS:    Current Outpatient Medications   Medication Sig Dispense Refill    allopurinoL (ZYLOPRIM) 100 MG tablet TAKE 1 TABLET BY MOUTH ONCE DAILY      amiodarone (PACERONE) 200 MG Tab TAKE ONE-HALF TABLET BY MOUTH EVERY DAY 45 tablet 3    amLODIPine (NORVASC) 10 MG tablet Take 1 tablet (10 mg total) by mouth once daily. 90 tablet 3    atorvastatin (LIPITOR) 40 MG tablet TAKE ONE TABLET BY MOUTH EVERY DAY 90 tablet 0    calcium carbonate (TUMS) 200 mg calcium (500 mg) chewable tablet Take 2 tablets by mouth.      ELIQUIS 2.5 mg Tab TAKE ONE TABLET BY MOUTH TWICE DAILY 60 tablet 11     "ergocalciferol (ERGOCALCIFEROL) 50,000 unit Cap Take 50,000 Units by mouth every 30 days.       FLUZONE HIGHDOSE QUAD 21-22  mcg/0.7 mL Syrg       folic acid (FOLVITE) 1 MG tablet TAKE ONE TABLET BY MOUTH EVERY DAY 90 tablet 0    furosemide (LASIX) 20 MG tablet Take 1 tablet (20 mg total) by mouth 2 (two) times daily. 60 tablet 5    metoprolol succinate (TOPROL-XL) 25 MG 24 hr tablet Take 0.5 tablets (12.5 mg total) by mouth once daily. Only take if HR > 60 15 tablet 0    zolpidem (AMBIEN) 5 MG Tab Take 1 tablet (5 mg total) by mouth nightly as needed. 2 tablet 0     No current facility-administered medications for this visit.     Facility-Administered Medications Ordered in Other Visits   Medication Dose Route Frequency Provider Last Rate Last Admin    denosumab (PROLIA) injection 60 mg  60 mg Subcutaneous 1 time in Clinic/HOD Nargis Boyle MD                      REVIEW OF SYSTEMS:     No acute changes from previous encounter dated 1/14/2022 with exceptions mentioned in HPI.            PHYSICAL EXAM:  Vitals:    03/07/22 0833   BP: 136/68   Pulse: 75   SpO2: (!) 92%   Weight: 82 kg (180 lb 11 oz)   Height: 5' 2" (1.575 m)   PainSc: 0-No pain     Body mass index is 33.05 kg/m².     GENERAL:  well develop; no apparent distress  HEENT:  no nasal congestion; no discharge noted; class 4 modified mallampatti.   NECK:  supple; no palpable masses.  CARDIO: regular rate and rhythm  PULM:  clear to auscultation bilaterally; no intercostals retractions; no accessory muscle usage   ABDOMEN:  soft nontender/nondistended.  +bowel sound  EXTREMITIES no cc; +trace edema  NEURO:  CN II-XII intact.  5/5 motor in all extremities.  sensation grossly intact   to light touch.  PSYCH:  normal affect.  Alert and oriented x 4    LABS  Pulmonary Functions Testing Results(personally reviewed):  none  ABG (personally reviewed):  12/28/21 7.45/43/56/29 on room air  CXR (personally reviewed):  12/26/21 perihilar fullness.  No " consolidation or effusion  CT CHEST(personally reviewed):  none    Psg 2/14/22 ahi of 8; rdi of 30    Echo 12/27/21  · The left ventricle is normal in size with moderate concentric hypertrophy and normal systolic function.  · The estimated ejection fraction is 60%.  · Grade II left ventricular diastolic dysfunction.  · Mild right ventricular enlargement with normal right ventricular systolic function.  · There is moderate aortic valve stenosis.  · Aortic valve area is 1.29 cm2; peak velocity is 2.88 m/s; mean gradient is 22 mmHg.  · Mild mitral regurgitation.  · Moderate tricuspid regurgitation.  · The estimated PA systolic pressure is 66 mmHg.  · There is pulmonary hypertension.        ASSESSMENT/PLAN  Problem List Items Addressed This Visit     JOCELIN (obstructive sleep apnea)    Overview     -ahi of 8.3; rdi of 30  -recommend treatment.  In lab titration due to respiratory failure requiring oxygen.   -difficulty with falling asleep during last sleep study.  Will prescribe ambien for the night of sleep study.             Relevant Orders    CPAP Titration (Must have dx of JOCELIN from previous sleep study.)    Pulmonary hypertension    Overview     group 2 with ddx.  Will send for pft due to remote smoking history.  bp and fluid optimization.  Doing better since taking lasix routinely.             Relevant Orders    Complete PFT with bronchodilator    Stress test, pulmonary      Other Visit Diagnoses     Insomnia, unspecified type    -  Primary    Relevant Medications    zolpidem (AMBIEN) 5 MG Tab          Patient will No follow-ups on file. with md/np.    25 minutes of total time spent on the encounter, which includes face to face time and non-face to face time preparing to see the patient (eg, review of tests), Obtaining and/or reviewing separately obtained history, documenting clinical information in the electronic or other health record, independently interpreting results (not separately reported) and communicating  results to the patient/family/caregiver, or Care coordination (not separately reported).

## 2022-03-18 ENCOUNTER — LAB VISIT (OUTPATIENT)
Dept: FAMILY MEDICINE | Facility: CLINIC | Age: 82
End: 2022-03-18
Payer: MEDICARE

## 2022-03-18 DIAGNOSIS — Z01.812 PRE-PROCEDURE LAB EXAM: ICD-10-CM

## 2022-03-18 LAB
SARS-COV-2 RNA RESP QL NAA+PROBE: NOT DETECTED
SARS-COV-2- CYCLE NUMBER: NORMAL

## 2022-03-18 PROCEDURE — U0005 INFEC AGEN DETEC AMPLI PROBE: HCPCS | Performed by: INTERNAL MEDICINE

## 2022-03-18 PROCEDURE — U0003 INFECTIOUS AGENT DETECTION BY NUCLEIC ACID (DNA OR RNA); SEVERE ACUTE RESPIRATORY SYNDROME CORONAVIRUS 2 (SARS-COV-2) (CORONAVIRUS DISEASE [COVID-19]), AMPLIFIED PROBE TECHNIQUE, MAKING USE OF HIGH THROUGHPUT TECHNOLOGIES AS DESCRIBED BY CMS-2020-01-R: HCPCS | Performed by: INTERNAL MEDICINE

## 2022-03-21 ENCOUNTER — TELEPHONE (OUTPATIENT)
Dept: SLEEP MEDICINE | Facility: HOSPITAL | Age: 82
End: 2022-03-21
Payer: MEDICARE

## 2022-03-21 ENCOUNTER — HOSPITAL ENCOUNTER (OUTPATIENT)
Dept: RESPIRATORY THERAPY | Facility: HOSPITAL | Age: 82
Discharge: HOME | End: 2022-03-21
Attending: INTERNAL MEDICINE
Payer: MEDICARE

## 2022-03-21 VITALS — HEART RATE: 76 BPM | OXYGEN SATURATION: 99 % | RESPIRATION RATE: 18 BRPM

## 2022-03-21 DIAGNOSIS — I27.20 PULMONARY HYPERTENSION: ICD-10-CM

## 2022-03-21 LAB
6MWT: NORMAL
BRPFT: NORMAL
DLCO ADJ PRE: 10.5 ML/(MIN*MMHG)
DLCO SINGLE BREATH LLN: 12.58
DLCO SINGLE BREATH PRE REF: 57.3 %
DLCO SINGLE BREATH REF: 18.31
DLCOC SBVA LLN: 2.48
DLCOC SBVA PRE REF: 80.7 %
DLCOC SBVA REF: 4.01
DLCOC SINGLE BREATH LLN: 12.58
DLCOC SINGLE BREATH PRE REF: 57.3 %
DLCOC SINGLE BREATH REF: 18.31
DLCOVA LLN: 2.48
DLCOVA PRE REF: 80.7 %
DLCOVA PRE: 3.23 ML/(MIN*MMHG*L)
DLCOVA REF: 4.01
DLVAADJ PRE: 3.23 ML/(MIN*MMHG*L)
ERVN2 LLN: -16449.54
ERVN2 PRE REF: 130.4 %
ERVN2 PRE: 0.6 L
ERVN2 REF: 0.46
FEF 25 75 CHG: 74 %
FEF 25 75 LLN: 0.6
FEF 25 75 POST REF: 78 %
FEF 25 75 PRE REF: 44.8 %
FEF 25 75 REF: 1.45
FET100 CHG: -24.2 %
FEV1 CHG: 2.5 %
FEV1 FVC CHG: 16.6 %
FEV1 FVC LLN: 62
FEV1 FVC POST REF: 97 %
FEV1 FVC PRE REF: 83.1 %
FEV1 FVC REF: 77
FEV1 LLN: 1.23
FEV1 POST REF: 88.2 %
FEV1 PRE REF: 86.1 %
FEV1 REF: 1.77
FRCN2 LLN: 1.78
FRCN2 PRE REF: 84.7 %
FRCN2 REF: 2.6
FVC CHG: -12.1 %
FVC LLN: 1.62
FVC POST REF: 89.9 %
FVC PRE REF: 102.3 %
FVC REF: 2.32
IVC PRE: 1.69 L
IVC SINGLE BREATH LLN: 1.62
IVC SINGLE BREATH PRE REF: 72.6 %
IVC SINGLE BREATH REF: 2.32
PEF CHG: -10.5 %
PEF LLN: 2.77
PEF POST REF: 73.1 %
PEF PRE REF: 81.7 %
PEF REF: 4.37
POST FEF 25 75: 1.13 L/S
POST FET 100: 8.31 SEC
POST FEV1 FVC: 74.83 %
POST FEV1: 1.56 L
POST FVC: 2.09 L
POST PEF: 3.19 L/S
PRE DLCO: 10.5 ML/(MIN*MMHG)
PRE FEF 25 75: 0.65 L/S
PRE FET 100: 10.97 SEC
PRE FEV1 FVC: 64.15 %
PRE FEV1: 1.52 L
PRE FRC N2: 2.2 L
PRE FVC: 2.37 L
PRE PEF: 3.57 L/S
RVN2 LLN: 1.56
RVN2 PRE REF: 74.8 %
RVN2 PRE: 1.6 L
RVN2 REF: 2.14
RVN2TLCN2 LLN: 36.91
RVN2TLCN2 PRE REF: 94.3 %
RVN2TLCN2 PRE: 43.84 %
RVN2TLCN2 REF: 46.5
TLCN2 LLN: 3.58
TLCN2 PRE REF: 79.8 %
TLCN2 PRE: 3.65 L
TLCN2 REF: 4.57
VA PRE: 3.25 L
VA SINGLE BREATH LLN: 4.42
VA SINGLE BREATH PRE REF: 73.5 %
VA SINGLE BREATH REF: 4.42
VCMAXN2 LLN: 1.62
VCMAXN2 PRE REF: 88.3 %
VCMAXN2 PRE: 2.05 L
VCMAXN2 REF: 2.32

## 2022-03-21 PROCEDURE — 94727 GAS DIL/WSHOT DETER LNG VOL: CPT | Mod: 26,,, | Performed by: INTERNAL MEDICINE

## 2022-03-21 PROCEDURE — 94618 PULMONARY STRESS TESTING: CPT | Mod: 26,,, | Performed by: INTERNAL MEDICINE

## 2022-03-21 PROCEDURE — 94060 PR EVAL OF BRONCHOSPASM: ICD-10-PCS | Mod: 26,59,, | Performed by: INTERNAL MEDICINE

## 2022-03-21 PROCEDURE — 94729 DIFFUSING CAPACITY: CPT

## 2022-03-21 PROCEDURE — 25000242 PHARM REV CODE 250 ALT 637 W/ HCPCS: Performed by: INTERNAL MEDICINE

## 2022-03-21 PROCEDURE — 94729 PR C02/MEMBANE DIFFUSE CAPACITY: ICD-10-PCS | Mod: 26,,, | Performed by: INTERNAL MEDICINE

## 2022-03-21 PROCEDURE — 94060 EVALUATION OF WHEEZING: CPT | Mod: 26,59,, | Performed by: INTERNAL MEDICINE

## 2022-03-21 PROCEDURE — 94618 PULMONARY STRESS TESTING: ICD-10-PCS | Mod: 26,,, | Performed by: INTERNAL MEDICINE

## 2022-03-21 PROCEDURE — 94729 DIFFUSING CAPACITY: CPT | Mod: 26,,, | Performed by: INTERNAL MEDICINE

## 2022-03-21 PROCEDURE — 94727 GAS DIL/WSHOT DETER LNG VOL: CPT

## 2022-03-21 PROCEDURE — 94618 PULMONARY STRESS TESTING: CPT

## 2022-03-21 PROCEDURE — 94010 BREATHING CAPACITY TEST: CPT

## 2022-03-21 PROCEDURE — 94727 PR PULM FUNCTION TEST BY GAS: ICD-10-PCS | Mod: 26,,, | Performed by: INTERNAL MEDICINE

## 2022-03-21 RX ORDER — ALBUTEROL SULFATE 2.5 MG/.5ML
2.5 SOLUTION RESPIRATORY (INHALATION) ONCE
Status: COMPLETED | OUTPATIENT
Start: 2022-03-21 | End: 2022-03-21

## 2022-03-21 RX ADMIN — ALBUTEROL SULFATE 2.5 MG: 2.5 SOLUTION RESPIRATORY (INHALATION) at 09:03

## 2022-03-22 LAB
LEFT EYE DM RETINOPATHY: NEGATIVE
RIGHT EYE DM RETINOPATHY: NEGATIVE

## 2022-03-23 ENCOUNTER — PATIENT OUTREACH (OUTPATIENT)
Dept: ADMINISTRATIVE | Facility: HOSPITAL | Age: 82
End: 2022-03-23
Payer: MEDICARE

## 2022-03-23 DIAGNOSIS — N95.8 OTHER SPECIFIED MENOPAUSAL AND PERIMENOPAUSAL DISORDERS: Primary | ICD-10-CM

## 2022-03-23 DIAGNOSIS — E78.2 MIXED HYPERLIPIDEMIA: ICD-10-CM

## 2022-04-04 ENCOUNTER — OFFICE VISIT (OUTPATIENT)
Dept: CARDIOLOGY | Facility: CLINIC | Age: 82
End: 2022-04-04
Payer: MEDICARE

## 2022-04-04 VITALS
WEIGHT: 179.69 LBS | DIASTOLIC BLOOD PRESSURE: 63 MMHG | HEART RATE: 71 BPM | OXYGEN SATURATION: 94 % | BODY MASS INDEX: 33.07 KG/M2 | SYSTOLIC BLOOD PRESSURE: 135 MMHG | RESPIRATION RATE: 16 BRPM | HEIGHT: 62 IN

## 2022-04-04 DIAGNOSIS — I70.202 ATHEROSCLEROSIS OF NATIVE ARTERY OF LEFT LOWER EXTREMITY, WITH UNSPECIFIED PRESENCE OF CLINICAL MANIFESTATION: ICD-10-CM

## 2022-04-04 DIAGNOSIS — I27.20 PULMONARY HYPERTENSION: Primary | ICD-10-CM

## 2022-04-04 DIAGNOSIS — I10 ESSENTIAL HYPERTENSION: Chronic | ICD-10-CM

## 2022-04-04 DIAGNOSIS — I48.0 PAROXYSMAL ATRIAL FIBRILLATION: Chronic | ICD-10-CM

## 2022-04-04 DIAGNOSIS — R55 SYNCOPE, UNSPECIFIED SYNCOPE TYPE: ICD-10-CM

## 2022-04-04 DIAGNOSIS — I11.9 BENIGN HYPERTENSIVE HEART DISEASE WITHOUT HEART FAILURE: ICD-10-CM

## 2022-04-04 DIAGNOSIS — I70.0 AORTIC ATHEROSCLEROSIS: ICD-10-CM

## 2022-04-04 DIAGNOSIS — E78.2 MIXED HYPERLIPIDEMIA: Chronic | ICD-10-CM

## 2022-04-04 DIAGNOSIS — I50.33 ACUTE ON CHRONIC DIASTOLIC CONGESTIVE HEART FAILURE: ICD-10-CM

## 2022-04-04 DIAGNOSIS — R00.1 SINUS BRADYCARDIA: ICD-10-CM

## 2022-04-04 PROCEDURE — 1126F AMNT PAIN NOTED NONE PRSNT: CPT | Mod: CPTII,S$GLB,, | Performed by: INTERNAL MEDICINE

## 2022-04-04 PROCEDURE — 1101F PR PT FALLS ASSESS DOC 0-1 FALLS W/OUT INJ PAST YR: ICD-10-PCS | Mod: CPTII,S$GLB,, | Performed by: INTERNAL MEDICINE

## 2022-04-04 PROCEDURE — 1101F PT FALLS ASSESS-DOCD LE1/YR: CPT | Mod: CPTII,S$GLB,, | Performed by: INTERNAL MEDICINE

## 2022-04-04 PROCEDURE — 1126F PR PAIN SEVERITY QUANTIFIED, NO PAIN PRESENT: ICD-10-PCS | Mod: CPTII,S$GLB,, | Performed by: INTERNAL MEDICINE

## 2022-04-04 PROCEDURE — 99999 PR PBB SHADOW E&M-EST. PATIENT-LVL III: ICD-10-PCS | Mod: PBBFAC,,, | Performed by: INTERNAL MEDICINE

## 2022-04-04 PROCEDURE — 99499 UNLISTED E&M SERVICE: CPT | Mod: S$GLB,,, | Performed by: INTERNAL MEDICINE

## 2022-04-04 PROCEDURE — 99214 OFFICE O/P EST MOD 30 MIN: CPT | Mod: S$GLB,,, | Performed by: INTERNAL MEDICINE

## 2022-04-04 PROCEDURE — 99499 RISK ADDL DX/OHS AUDIT: ICD-10-PCS | Mod: S$GLB,,, | Performed by: INTERNAL MEDICINE

## 2022-04-04 PROCEDURE — 1159F PR MEDICATION LIST DOCUMENTED IN MEDICAL RECORD: ICD-10-PCS | Mod: CPTII,S$GLB,, | Performed by: INTERNAL MEDICINE

## 2022-04-04 PROCEDURE — 99214 PR OFFICE/OUTPT VISIT, EST, LEVL IV, 30-39 MIN: ICD-10-PCS | Mod: S$GLB,,, | Performed by: INTERNAL MEDICINE

## 2022-04-04 PROCEDURE — 3288F FALL RISK ASSESSMENT DOCD: CPT | Mod: CPTII,S$GLB,, | Performed by: INTERNAL MEDICINE

## 2022-04-04 PROCEDURE — 3288F PR FALLS RISK ASSESSMENT DOCUMENTED: ICD-10-PCS | Mod: CPTII,S$GLB,, | Performed by: INTERNAL MEDICINE

## 2022-04-04 PROCEDURE — 3075F SYST BP GE 130 - 139MM HG: CPT | Mod: CPTII,S$GLB,, | Performed by: INTERNAL MEDICINE

## 2022-04-04 PROCEDURE — 1159F MED LIST DOCD IN RCRD: CPT | Mod: CPTII,S$GLB,, | Performed by: INTERNAL MEDICINE

## 2022-04-04 PROCEDURE — 99999 PR PBB SHADOW E&M-EST. PATIENT-LVL III: CPT | Mod: PBBFAC,,, | Performed by: INTERNAL MEDICINE

## 2022-04-04 PROCEDURE — 3078F DIAST BP <80 MM HG: CPT | Mod: CPTII,S$GLB,, | Performed by: INTERNAL MEDICINE

## 2022-04-04 PROCEDURE — 3078F PR MOST RECENT DIASTOLIC BLOOD PRESSURE < 80 MM HG: ICD-10-PCS | Mod: CPTII,S$GLB,, | Performed by: INTERNAL MEDICINE

## 2022-04-04 PROCEDURE — 3075F PR MOST RECENT SYSTOLIC BLOOD PRESS GE 130-139MM HG: ICD-10-PCS | Mod: CPTII,S$GLB,, | Performed by: INTERNAL MEDICINE

## 2022-04-04 NOTE — PROGRESS NOTES
Subjective:    Patient ID:  Barbara Rizo is a 82 y.o. female who presents for follow-up of No chief complaint on file.      HPI     PAF - TALHA/CV 9/21/15, 1/30/19, 2/2/19 -  on amiodarone and eliquis, Diastolic CHF,  HTN, DM, HLD, MGUS, CRI - Cr 1.8, AS/MR - mild to moderate, breast CA, COPD on home O2     Admitted 1/28/19  Ms. Barbara Rizo is a 78 y.o. female with essential hypertension, hyperlipidemia (LDL 62.4 Jul 2018), type 2 diabetes mellitus (HbA1c 5.8% Jul 2018), CKD Stage 3, paroxysmal atrial fibrillation (LTK4CF1-VUMi score 5) not on chronic anticoagulation, obesity (BMI 36.0), MGUS, and chronic gout who presents to [facility] ED with complaints of coughing for three days.  She started to have a non-productive cough, wheezing, and mild dyspnea three days ago which has steadily been worsening since onset.  She also complains of a subjective fever but otherwise denies any nausea, vomiting, chest pain, palpitations, pleurisy, nor any diaphoresis.  She denies any recent travel, hemoptysis, nor any lower extremity pain or swelling.  She does say that she's under a lot of stress recently with her ex- dying yesterday at [facility] due to complications from a heart valve replacement two weeks ago.  She does say that she may have had sick contacts while visiting her ex-.  Her appetite has been poor but she denies any weight loss.     While at her ENT's appointment today she decided to schedule an appointment with her PCP to evaluate her upper airway complaints.  She was unable to see her regular PCP, Dr. Paras Oro, but was able to be seen by another physician who became concerned when she was noted to be in AFib with RVR on EKG.  He recommended EMS transportation to the ED but she refused and decided to drive herself.  Her cardiologist is Dr. Amauri Lomas.     Pt admitted to ICU with afib rvr on amiodarone infusion. Pt with elevated HR  and after TALHA done patient became  very agitated and anxious. Ativan 1mg IV given and symptoms got worse. HR up into the 170s and O2 sats dropped to 77%. Placed on NRB. Improved O2 sats. 5mg IV haldol given as patient was trying to get out of bed and pulling oxygen mask off. Cardizem 10mg Iv given with improved HR to 120s-130s. xopenex scheduled Q 8 hours. Changed to zosyn with temp 102F.   1/30- successful cardioversion, post procedure confused and pulling oxygen off, desaturation, constant re-direction, monitored in ICU overnight. Changed to oral amiodarone. eliquis for anticoagulation. Holding parameters on BP meds. IV steroids, nebs and antibiotic for probable lower resp infection. IV lasix as Bp tolerates.   1/31- HR remain under control NSR 60-70s.On amiodarone, metoprolol and eliquis.  Resp status improved and weaned oxygen. Steroids changed to oral route. DC zosyn and switch to augmentin. Add acapella to nebs. Cr increased from baseline (1.5-1.7). Gentle IVF and monitor with repeat BMP later, was stable,. PT,OT consulted for dc planning. Weaning oxygen. PT/OT consulted and rec H/H without equipment. The patient was transferred to the floor on 1/31. Nephrology was consulted for worsening renal function.      2/2- pt transferred with afadis rvr. Successful cardioversion again . Continued oral amiodarone and BB.  still has aggressive cough and URI symptoms. On mucolytic and antibiotic.   2/3- HR 50-60s sinus. Cr sightly higher, sodium 128. BNP 1500. Lasix x one dose. Bringing up more mucus. Steroids weaned 20mg. Still faint wheezes on exam.  Patient had worsening ARF on CKD 3, keeped on george and monitor,nephrology was following.reconsulted PT,OT.fley was removed latera nd she had improvement in ARF.  Changed diet for low slat diet duo to hyponatremia with improvement.  She did well with PT,OT.  Her wheezing and respiratory status is improved,  Patient has been discharged home with HH and PO Abx and OAC and amiodarone and follow up with  PCP,nephrology and cardiology in next few days.      1-29:  Admitted with AFib with RVR  1-30:  Underwent TALHA/DC cardioversion successfully  2-1 Back in A-fib 120s. SOB  2-3 Still coughing and SOB. NSR 60      2/2/19 CV - 360J converted A-fib to sinus bradycardia 55. Change amiodarone to po. Ok for telemetry     Echo 12/27/21  · The left ventricle is normal in size with moderate concentric hypertrophy and normal systolic function.  · The estimated ejection fraction is 60%.  · Grade II left ventricular diastolic dysfunction.  · Mild right ventricular enlargement with normal right ventricular systolic function.  · There is moderate aortic valve stenosis.  · Aortic valve area is 1.29 cm2; peak velocity is 2.88 m/s; mean gradient is 22 mmHg.  · Mild mitral regurgitation.  · Moderate tricuspid regurgitation.  · The estimated PA systolic pressure is 66 mmHg.  · There is pulmonary hypertension.        2/13/19 Less SOB since discharge  EKG NSR with PACs 65   Decrease amiodarone 200 qd  OV 2 months - will discuss changing amiodarone to flecainide at that time     4/17/19 Denies CP or SOB  EKG sinus bradycardia 44  HR mostly runs 40-50 at home  Change metoprolol 100 qd to toprol XL 50 qd - may decrease further if bradycardia persists  Discussed alternative AAD - given her repeated episodes of PAF we are both in agreement to stay on amiodarone for now  OV 1 month with TSH, CMP, EKG     5/27/19 HR improved to the low 50s  Has held eliquis the last 2 days due to bleeding from a recent skin CA removal  Denies CP or SOB     9/11/19 Denies CP or SOB  Some LE edema  EKG sinus bradycardia 46 NSSTT changes  Stop norvasc with LE edema  Add cardura 4 mg qd  Decrease toprol 25 qd with bradycardia  OV 3 months with CMP, TSH     12/11/19 Denies CP or SOB  LE edema resolved after stopping norvasc  BP controlled by home readings  HR runs 45-50  EKG sinus bradycardia 47 1st degree AVB  Denies dizziness  Bradycardia well tolerated - will  continue Rx  OV 6 months with echo      6/18/20 Denies CP or dizziness  Mild stable WILKINSON  EKG sinus bradycardia 44 otherwise ok  Bradycardia continues to be well tolerated  OV 6 months with CMP, TSH on chronic amiodarone     Admitted 9/27/20  Mrs. Rizo is a 79 yo F who presents with a CC of fatigue, dizziness and falls at home. She is s/p recent surgery with a discharge a week ago. She presents to the ED and is found to have a NA of 123. The patient's daughter states that the patient has been drinking a lot of water. No HCTZ. No new meds.  No ETOH use.  The patient is non-ill appearing. Lives currently with patient's daughter while recovering from surgery.      Admitted with hyponatremia, 123 on admit, down to 119 at the lowest. Started on IVF. Nephrology consulted. Started free water restriction. Suspect euvolemic hyponatremia due to excess free water intake + fluoxetine use. TSH and cortisol normal. Hyponatremia improved to 131 on day of discharge. Patient is not symptomatic. Discharged to home with home health. Follow up with Nephrology, PCP, and Ortho.      On discharge, metoprolol discontinued for bradycardia after discussing with Cardiology. Amiodarone continued. Losartan also discontinued on discharge due to intermittent hyperkalemia. Started bicarb supplementation for acidosis in CKD3. Fluoxetine discontinued after discussing with patient; she has no depressive symptoms. Discussed plan of care with patient and her daughter at bedside.      10/14/20 EKG  - suspect this is A-flutter 2:1 with aberrancy  Denies CP, SOB, or palpitations  Appears to be in A-flutter RVR - will send to the ER for admission. If issues with tachy-marlon continue will likely end up needing PPM     10/23/20 HR were better controlled when she went to ER. She was restarted on metoprolol and discharged. HR mostly 40-50s at home. Bradycardia well tolerated. Need clearance for skin graft surgery at  on left leg  EKG sinus  bradycardia 43   Back in NSR - bradycardia well tolerated. No indication for PPM at this point  Cleared for skin graft surgery at moderate cardiac risk - ok to hold eliquis 2 days before  OV 3 months  Continue Rx for PAF, HTN, CHF     12/1/20 Denies CP or SOB. Did well after her skin graft surgery  HR 45-50. BP elevated - controlled by outside readings  Continue Rx for PAF, HTN, HLD, CHF  OV 3 months     3/1/21 Denies CP or SOB  EKG NSR 60 IVCD  HR 55-60 by home readings as well      Continue Rx for PAF, HTN, HLD, diastolic CHF  OV 6 months with CMP, TSH on chronic amiodarone, Echo to look at MR      Admitted to [facility] 4/21/21  The patient is a pleasant 81 year old female with history of paroxysmal atrial fibrillation on amiodarone, metoprolol, eliquis , sinus bradycardia, hypertension, hyperlipidemia (followed by Dr. Lomas)  CKD stage 4, followed by Dr. Boyle, was brought to the ER for evaluation of syncopal episode. Patient reported to have loss of consciousness for a brief time, per daughter.  She was noted to be unconscious then sternal rub was done and then was able to regain consciousness. Per patient, she felt hot prior to LOC.  She denies palpitations, lightheadedness. She reported drinking two small flutes of champagne few minutes prior and 3 mixed drinks last night.  In the ER, she was noted to have sinus bradycardia with RBBB, troponin mildly elevated 0.04 and creatinine 2.3 (from 1.8 in January).  Per daughter, she was seen by Dr. Boyle on 4/20 and was restarted on Losartan 25 mg po daily due to uncontrolled BP.  Per patient, she too has not drank fluids as usual in the last couple of days.  Patient was ordered to give NS 500ml bolus. [facility] called for admission.      The patient is placed under observation in telemetry floor with consult to cardiology. Patient's home losartan was discontinued and given IVF. Renal functions improved. Patient had mildly elevated troponin. ECHO showed normal systolic  function (EF70%) with grade 2 diastolic dysfunction , moderate AS.  Patient was also noted to be bradycardic and toprol xl was discontinued and Amiodarone decreased to 100 mg from 200 mg daily. Patient was instructed to check HR and if it is greater than 60, may take 0.5 tab (I.e. 12.5 mg) of toprol.  Patient to follow up with her primary cardiologist (Dr. Lomas).  More so, patient 's BP stable (off losartan). She needs to check her BP and if SBP greater than 160 , may get Norvasc 10 mg till seen by PCP or nephrologist. Repeat BMP as OP.  The patient will follow up with her nephrologist (Dr. Boyle). With no recurrence of symptoms and clearance from consultants, the patient was discharged home with stable vital signs.     5/6/21 Denies further dizziness or syncope  EKG NSR 69 1st degree AVB IVCD  On amiodarone 100 qd and off of metoprolol  TSH 2.4 LFT ok     suspect recent syncope was from dehydration and not bradycardia  Staying in NSR on amiodarone 100 qd and off of metoprolol  Continue Rx for PAF, HTN, HLD, diastolic CHF  OV 3 months     8/6/21 Denies CP, SOB, or dizziness  EKG NSR 1st degree AVB RBBB   Continue Rx for PAF, HTN, HLD, diastolic CHF  OV 6 months with CMP, TSH  Cleared for breast surgery at moderate cardiac risk - ok to hold eliquis 3 days before      Admitted 12/26/21  Patient admitted to the hospital for acute on chronic diastolic heart failure. She received IV lasix with resolution of her SOB. She only takes PRN lasix but now will go home on 20 po bid. However, patient dropped to 83% on RA with exercise on 12/27.  Despite continued lasix- she was 87% on RA on 12/28. Former smoker. Pulmonary consulted for eval.  Hypoxemic respiratory failure probably multifactorial. Primary contributor is acute decompensated heart failure. 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 Lomas. Also has chronic R hemidiaphragm elevation which is likely a complication of her pleural  effusion in 2015 based on review of imaging since then. Tobacco history is noncontributory as she smoked 1-2 cig/wk in her 20s.  Patient will require home oxygen upon discharge.  She will follow up with Pulmonary for PFT's.  Patient will also follow up with PCP and Cardiology.  She will be discharged home once home oxygen arranged.  BNP 1190     1/19/22 Less SOB since discharge. On Home O2. Scheduled for pulmonary evaluation  EKG NSR 1st degree AVB RBBB   Continue Rx for PAF, HTN, HLD, diastolic CHF  Volume status appears stable  OV 3 months with BNP, BMP    4/4/22 Denies CP, stable mild WILKINSON  BP controlled    Review of Systems   Constitutional: Negative for decreased appetite.   HENT: Negative for ear discharge.    Eyes: Negative for blurred vision.   Respiratory: Negative for hemoptysis.    Endocrine: Negative for polyphagia.   Hematologic/Lymphatic: Negative for adenopathy.   Skin: Negative for color change.   Musculoskeletal: Negative for joint swelling.   Genitourinary: Negative for bladder incontinence.   Neurological: Negative for brief paralysis.   Psychiatric/Behavioral: Negative for hallucinations.   Allergic/Immunologic: Negative for hives.        Objective:    Physical Exam  Constitutional:       Appearance: She is well-developed.   HENT:      Head: Normocephalic and atraumatic.   Eyes:      Conjunctiva/sclera: Conjunctivae normal.      Pupils: Pupils are equal, round, and reactive to light.   Cardiovascular:      Rate and Rhythm: Normal rate.      Pulses: Intact distal pulses.      Heart sounds: Normal heart sounds.   Pulmonary:      Effort: Pulmonary effort is normal.      Breath sounds: Normal breath sounds.   Abdominal:      General: Bowel sounds are normal.      Palpations: Abdomen is soft.   Musculoskeletal:         General: Normal range of motion.      Cervical back: Normal range of motion and neck supple.   Skin:     General: Skin is warm and dry.   Neurological:      Mental Status: She is alert  and oriented to person, place, and time.           Assessment:       1. Pulmonary hypertension    2. Acute on chronic diastolic congestive heart failure    3. Aortic atherosclerosis    4. Atherosclerosis of native artery of left lower extremity, with unspecified presence of clinical manifestation    5. Benign hypertensive heart disease without heart failure    6. Essential hypertension    7. Mixed hyperlipidemia    8. Paroxysmal atrial fibrillation    9. Sinus bradycardia    10. Syncope, unspecified syncope type         Plan:        Continue Rx for PAF, HTN, HLD, diastolic CHF  Volume status appears stable  OV 3 months with BNP, CMP, TSH

## 2022-04-05 ENCOUNTER — TELEPHONE (OUTPATIENT)
Dept: FAMILY MEDICINE | Facility: CLINIC | Age: 82
End: 2022-04-05
Payer: MEDICARE

## 2022-04-06 ENCOUNTER — OFFICE VISIT (OUTPATIENT)
Dept: FAMILY MEDICINE | Facility: CLINIC | Age: 82
End: 2022-04-06
Payer: MEDICARE

## 2022-04-06 VITALS
SYSTOLIC BLOOD PRESSURE: 130 MMHG | BODY MASS INDEX: 33.74 KG/M2 | TEMPERATURE: 98 F | HEIGHT: 61 IN | OXYGEN SATURATION: 95 % | RESPIRATION RATE: 18 BRPM | WEIGHT: 178.69 LBS | HEART RATE: 72 BPM | DIASTOLIC BLOOD PRESSURE: 60 MMHG

## 2022-04-06 DIAGNOSIS — D69.2 OTHER NONTHROMBOCYTOPENIC PURPURA: ICD-10-CM

## 2022-04-06 DIAGNOSIS — I48.0 PAROXYSMAL ATRIAL FIBRILLATION: Chronic | ICD-10-CM

## 2022-04-06 DIAGNOSIS — E78.2 MIXED HYPERLIPIDEMIA: Primary | Chronic | ICD-10-CM

## 2022-04-06 DIAGNOSIS — I70.0 AORTIC ATHEROSCLEROSIS: ICD-10-CM

## 2022-04-06 DIAGNOSIS — I10 ESSENTIAL HYPERTENSION: Chronic | ICD-10-CM

## 2022-04-06 DIAGNOSIS — D47.2 MGUS (MONOCLONAL GAMMOPATHY OF UNKNOWN SIGNIFICANCE): Chronic | ICD-10-CM

## 2022-04-06 DIAGNOSIS — N18.4 CHRONIC KIDNEY DISEASE, STAGE 4 (SEVERE): ICD-10-CM

## 2022-04-06 PROBLEM — I50.33 ACUTE ON CHRONIC DIASTOLIC CONGESTIVE HEART FAILURE: Status: RESOLVED | Noted: 2019-02-06 | Resolved: 2022-04-06

## 2022-04-06 PROCEDURE — 99214 OFFICE O/P EST MOD 30 MIN: CPT | Mod: S$GLB,,, | Performed by: FAMILY MEDICINE

## 2022-04-06 PROCEDURE — 99999 PR PBB SHADOW E&M-EST. PATIENT-LVL IV: ICD-10-PCS | Mod: PBBFAC,,, | Performed by: FAMILY MEDICINE

## 2022-04-06 PROCEDURE — 99214 PR OFFICE/OUTPT VISIT, EST, LEVL IV, 30-39 MIN: ICD-10-PCS | Mod: S$GLB,,, | Performed by: FAMILY MEDICINE

## 2022-04-06 PROCEDURE — 3075F PR MOST RECENT SYSTOLIC BLOOD PRESS GE 130-139MM HG: ICD-10-PCS | Mod: CPTII,S$GLB,, | Performed by: FAMILY MEDICINE

## 2022-04-06 PROCEDURE — 3288F FALL RISK ASSESSMENT DOCD: CPT | Mod: CPTII,S$GLB,, | Performed by: FAMILY MEDICINE

## 2022-04-06 PROCEDURE — 1126F PR PAIN SEVERITY QUANTIFIED, NO PAIN PRESENT: ICD-10-PCS | Mod: CPTII,S$GLB,, | Performed by: FAMILY MEDICINE

## 2022-04-06 PROCEDURE — 1126F AMNT PAIN NOTED NONE PRSNT: CPT | Mod: CPTII,S$GLB,, | Performed by: FAMILY MEDICINE

## 2022-04-06 PROCEDURE — 3078F PR MOST RECENT DIASTOLIC BLOOD PRESSURE < 80 MM HG: ICD-10-PCS | Mod: CPTII,S$GLB,, | Performed by: FAMILY MEDICINE

## 2022-04-06 PROCEDURE — 1159F MED LIST DOCD IN RCRD: CPT | Mod: CPTII,S$GLB,, | Performed by: FAMILY MEDICINE

## 2022-04-06 PROCEDURE — 1101F PR PT FALLS ASSESS DOC 0-1 FALLS W/OUT INJ PAST YR: ICD-10-PCS | Mod: CPTII,S$GLB,, | Performed by: FAMILY MEDICINE

## 2022-04-06 PROCEDURE — 3288F PR FALLS RISK ASSESSMENT DOCUMENTED: ICD-10-PCS | Mod: CPTII,S$GLB,, | Performed by: FAMILY MEDICINE

## 2022-04-06 PROCEDURE — 99499 RISK ADDL DX/OHS AUDIT: ICD-10-PCS | Mod: S$GLB,,, | Performed by: FAMILY MEDICINE

## 2022-04-06 PROCEDURE — 1160F RVW MEDS BY RX/DR IN RCRD: CPT | Mod: CPTII,S$GLB,, | Performed by: FAMILY MEDICINE

## 2022-04-06 PROCEDURE — 1101F PT FALLS ASSESS-DOCD LE1/YR: CPT | Mod: CPTII,S$GLB,, | Performed by: FAMILY MEDICINE

## 2022-04-06 PROCEDURE — 1160F PR REVIEW ALL MEDS BY PRESCRIBER/CLIN PHARMACIST DOCUMENTED: ICD-10-PCS | Mod: CPTII,S$GLB,, | Performed by: FAMILY MEDICINE

## 2022-04-06 PROCEDURE — 1159F PR MEDICATION LIST DOCUMENTED IN MEDICAL RECORD: ICD-10-PCS | Mod: CPTII,S$GLB,, | Performed by: FAMILY MEDICINE

## 2022-04-06 PROCEDURE — 99999 PR PBB SHADOW E&M-EST. PATIENT-LVL IV: CPT | Mod: PBBFAC,,, | Performed by: FAMILY MEDICINE

## 2022-04-06 PROCEDURE — 3075F SYST BP GE 130 - 139MM HG: CPT | Mod: CPTII,S$GLB,, | Performed by: FAMILY MEDICINE

## 2022-04-06 PROCEDURE — 99499 UNLISTED E&M SERVICE: CPT | Mod: S$GLB,,, | Performed by: FAMILY MEDICINE

## 2022-04-06 PROCEDURE — 3078F DIAST BP <80 MM HG: CPT | Mod: CPTII,S$GLB,, | Performed by: FAMILY MEDICINE

## 2022-04-06 NOTE — PROGRESS NOTES
Routine Office Visit    Patient Name: Barbara Rizo    : 1940  MRN: [id number]    Subjective:  Barbara is a 82 y.o. female who presents today for:    1. htn  Patient presenting today for follow up of HTN.  She states she was recently seen by Dr. Boyle for CKD and meds were adjusted due to decreased renal function.  She states diuretics were stressing her kidneys.  No recent illnesses.  She is scheduled for follow up with all specialists over the next 3 months including cardiology nephrology, and breast surgery.      Past Medical History  Past Medical History:   Diagnosis Date    A-fib     CKD (chronic kidney disease)     Diabetes mellitus type II     Fatty liver     GERD (gastroesophageal reflux disease)     Hearing loss of both ears     wears bilateral hearing aids    Hemochromatosis     History of anemia due to CKD     History of pleural effusion     with thoracentesis    Hyperlipidemia     Hypertension     Macular degeneration     Osteoarthritis     Left knee    Osteoporosis     Vaginal delivery     x2    Vitamin D deficiency disease     Wears partial dentures     upper removable bridge       Past Surgical History  Past Surgical History:   Procedure Laterality Date    AXILLARY NODE DISSECTION Left 10/18/2021    Procedure: LYMPHADENECTOMY, AXILLARY;  Surgeon: Darlene Roca MD;  Location: [facility];  Service: General;  Laterality: Left;    BREAST BIOPSY      BREAST SURGERY Bilateral     Reduction r/t h/o fibrocystic disease    CHOLECYSTECTOMY  2015    EYE SURGERY      Cat Ext  OU    HYSTERECTOMY      partial due to uterine fibroids    INCISION AND DRAINAGE OF KNEE Left 2020    Procedure: INCISION AND DRAINAGE, KNEE;  Surgeon: Rolando Wagoner MD;  Location: [facility] OR;  Service: Orthopedics;  Laterality: Left;    INJECTION FOR SENTINEL NODE IDENTIFICATION Left 10/18/2021    Procedure: INJECTION, FOR SENTINEL NODE IDENTIFICATION;  Surgeon: Darlene Roca MD;  Location: [facility] OR;   Service: General;  Laterality: Left;    JOINT REPLACEMENT Left 05/13/2013    TKR    JOINT REPLACEMENT Right 08/03/2015    TKR    MASTECTOMY, PARTIAL Left 10/18/2021    Procedure: MASTECTOMY, PARTIAL -- wire;  Surgeon: Darlene Roca MD;  Location: [facility];  Service: General;  Laterality: Left;  RN PREOP 10/15/2021   VACCINATED-----NEED H/P AND ORDERS    SENTINEL LYMPH NODE BIOPSY Left 10/18/2021    Procedure: BIOPSY, LYMPH NODE, SENTINEL;  Surgeon: Darlene Roca MD;  Location: [facility] OR;  Service: General;  Laterality: Left;    THORACENTESIS      TOTAL KNEE ARTHROPLASTY Right 2015    left knee done 5/2013    WOUND DRESSING Left 9/17/2020    Procedure: WOUND VAC APPLICATION;  Surgeon: Rolando Wagoner MD;  Location: [facility];  Service: Orthopedics;  Laterality: Left;       Family History  Family History   Problem Relation Age of Onset    Cancer Mother         stomach    Hypertension Mother     Aneurysm Father         abdominal       Social History  Social History     Socioeconomic History    Marital status:    Tobacco Use    Smoking status: Former Smoker    Smokeless tobacco: Never Used   Substance and Sexual Activity    Alcohol use: Not Currently     Alcohol/week: 0.0 standard drinks     Comment: occasional/ on a weekend    Drug use: No    Sexual activity: Not Currently       Current Medications  Current Outpatient Medications on File Prior to Visit   Medication Sig Dispense Refill    allopurinoL (ZYLOPRIM) 100 MG tablet TAKE 1 TABLET BY MOUTH ONCE DAILY      amiodarone (PACERONE) 200 MG Tab TAKE ONE-HALF TABLET BY MOUTH EVERY DAY 45 tablet 3    amLODIPine (NORVASC) 10 MG tablet Take 1 tablet (10 mg total) by mouth once daily. 90 tablet 3    atorvastatin (LIPITOR) 40 MG tablet TAKE ONE TABLET BY MOUTH EVERY DAY 90 tablet 0    calcium carbonate (TUMS) 200 mg calcium (500 mg) chewable tablet Take 2 tablets by mouth.      ELIQUIS 2.5 mg Tab TAKE ONE TABLET BY MOUTH TWICE DAILY 60 tablet 11  "   ergocalciferol (ERGOCALCIFEROL) 50,000 unit Cap Take 50,000 Units by mouth every 30 days.       furosemide (LASIX) 20 MG tablet Take 1 tablet (20 mg total) by mouth 2 (two) times daily. 60 tablet 5    FLUZONE HIGHDOSE QUAD 21-22  mcg/0.7 mL Syrg       folic acid (FOLVITE) 1 MG tablet TAKE ONE TABLET BY MOUTH EVERY DAY (Patient not taking: No sig reported) 90 tablet 0    metoprolol succinate (TOPROL-XL) 25 MG 24 hr tablet Take 0.5 tablets (12.5 mg total) by mouth once daily. Only take if HR > 60 15 tablet 0    [DISCONTINUED] zolpidem (AMBIEN) 5 MG Tab Take 1 tablet (5 mg total) by mouth nightly as needed. (Patient not taking: No sig reported) 2 tablet 0     Current Facility-Administered Medications on File Prior to Visit   Medication Dose Route Frequency Provider Last Rate Last Admin    denosumab (PROLIA) injection 60 mg  60 mg Subcutaneous 1 time in Clinic/HOD Nargis Boyle MD           Allergies   Review of patient's allergies indicates:  No Known Allergies    Review of Systems (Pertinent positives)  Review of Systems   Constitutional: Negative.    HENT: Negative.    Eyes: Negative.    Respiratory: Positive for shortness of breath.    Cardiovascular: Negative.    Gastrointestinal: Negative.    Genitourinary: Negative.    Skin: Negative.          /60   Pulse 72   Temp 98 °F (36.7 °C) (Oral)   Resp 18   Ht 5' 1" (1.549 m)   Wt 81.1 kg (178 lb 10.9 oz)   SpO2 95%   BMI 33.76 kg/m²     GENERAL APPEARANCE: in no apparent distress and well developed and well nourished  HEENT: PERRL, EOMI, Sclera clear, anicteric, Oropharynx clear, no lesions, Neck supple with midline trachea  NECK: normal, supple, no adenopathy, thyroid normal in size  RESPIRATORY: appears well, vitals normal, no respiratory distress, acyanotic, normal RR, chest clear, no wheezing, crepitations, rhonchi, normal symmetric air entry  HEART: regular rate and rhythm, S1, S2 normal, no murmur, click, rub or gallop.    ABDOMEN: " abdomen is soft without tenderness, no masses, no hernias, no organomegaly, no rebound, no guarding. Suprapubic tenderness absent. No CVA tenderness.  SKIN: no rashes, no wounds, no other lesions  PSYCH: Alert, oriented x 3, thought content appropriate, speech normal, pleasant and cooperative, good eye contact, well groomed    Assessment/Plan:  Barbara Rizo is a 82 y.o. female who presents today for :    Barbara was seen today for hypertension.    Diagnoses and all orders for this visit:    Mixed hyperlipidemia    Essential hypertension    Paroxysmal atrial fibrillation    Chronic kidney disease, stage 4 (severe)    MGUS (monoclonal gammopathy of unknown significance)    Aortic atherosclerosis    Other nonthrombocytopenic purpura        1.  Medications up to date  2.  Labs to be done in July  3.  Call with any concerns  4.  Follow up 6 months or sooner if needed      Paras Oro MD

## 2022-04-07 ENCOUNTER — TELEPHONE (OUTPATIENT)
Dept: FAMILY MEDICINE | Facility: CLINIC | Age: 82
End: 2022-04-07
Payer: MEDICARE

## 2022-04-07 NOTE — TELEPHONE ENCOUNTER
----- Message from Malinda Hester sent at 4/7/2022  9:20 AM CDT -----  Regarding: daughter  .Type:  Patient Returning Call    Who Called: Tamar - daughter     Who Left Message for Patient: [place]     Does the patient know what this is regarding?:    Would the patient rather a call back or a response via [facility]? Call     Best Call Back Number: .[phone]

## 2022-04-18 ENCOUNTER — TELEPHONE (OUTPATIENT)
Dept: ADMINISTRATIVE | Facility: CLINIC | Age: 82
End: 2022-04-18
Payer: MEDICARE

## 2022-04-20 ENCOUNTER — OFFICE VISIT (OUTPATIENT)
Dept: FAMILY MEDICINE | Facility: CLINIC | Age: 82
End: 2022-04-20
Payer: MEDICARE

## 2022-04-20 VITALS
WEIGHT: 173.38 LBS | OXYGEN SATURATION: 92 % | DIASTOLIC BLOOD PRESSURE: 70 MMHG | HEIGHT: 61 IN | HEART RATE: 74 BPM | TEMPERATURE: 99 F | SYSTOLIC BLOOD PRESSURE: 126 MMHG | BODY MASS INDEX: 32.73 KG/M2

## 2022-04-20 DIAGNOSIS — N25.81 SECONDARY HYPERPARATHYROIDISM OF RENAL ORIGIN: ICD-10-CM

## 2022-04-20 DIAGNOSIS — G47.33 OSA (OBSTRUCTIVE SLEEP APNEA): ICD-10-CM

## 2022-04-20 DIAGNOSIS — E11.22 TYPE 2 DIABETES MELLITUS WITH STAGE 4 CHRONIC KIDNEY DISEASE, WITHOUT LONG-TERM CURRENT USE OF INSULIN: ICD-10-CM

## 2022-04-20 DIAGNOSIS — I11.9 BENIGN HYPERTENSIVE HEART DISEASE WITHOUT HEART FAILURE: ICD-10-CM

## 2022-04-20 DIAGNOSIS — D63.1 ANEMIA IN STAGE 4 CHRONIC KIDNEY DISEASE: ICD-10-CM

## 2022-04-20 DIAGNOSIS — C50.812 MALIGNANT NEOPLASM OF OVERLAPPING SITES OF LEFT BREAST IN FEMALE, ESTROGEN RECEPTOR POSITIVE: ICD-10-CM

## 2022-04-20 DIAGNOSIS — Z00.00 ENCOUNTER FOR PREVENTIVE HEALTH EXAMINATION: Primary | ICD-10-CM

## 2022-04-20 DIAGNOSIS — I10 ESSENTIAL HYPERTENSION: Chronic | ICD-10-CM

## 2022-04-20 DIAGNOSIS — M1A.00X0 IDIOPATHIC CHRONIC GOUT WITHOUT TOPHUS, UNSPECIFIED SITE: Chronic | ICD-10-CM

## 2022-04-20 DIAGNOSIS — Z17.0 MALIGNANT NEOPLASM OF OVERLAPPING SITES OF LEFT BREAST IN FEMALE, ESTROGEN RECEPTOR POSITIVE: ICD-10-CM

## 2022-04-20 DIAGNOSIS — N18.4 ANEMIA IN STAGE 4 CHRONIC KIDNEY DISEASE: ICD-10-CM

## 2022-04-20 DIAGNOSIS — N18.4 CHRONIC KIDNEY DISEASE, STAGE 4 (SEVERE): ICD-10-CM

## 2022-04-20 DIAGNOSIS — D47.2 MGUS (MONOCLONAL GAMMOPATHY OF UNKNOWN SIGNIFICANCE): Chronic | ICD-10-CM

## 2022-04-20 DIAGNOSIS — I70.202 ATHEROSCLEROSIS OF NATIVE ARTERY OF LEFT LOWER EXTREMITY, WITH UNSPECIFIED PRESENCE OF CLINICAL MANIFESTATION: ICD-10-CM

## 2022-04-20 DIAGNOSIS — E78.5 HYPERLIPIDEMIA, UNSPECIFIED HYPERLIPIDEMIA TYPE: Chronic | ICD-10-CM

## 2022-04-20 DIAGNOSIS — E66.09 CLASS 1 OBESITY DUE TO EXCESS CALORIES WITH SERIOUS COMORBIDITY AND BODY MASS INDEX (BMI) OF 32.0 TO 32.9 IN ADULT: ICD-10-CM

## 2022-04-20 DIAGNOSIS — I48.0 PAROXYSMAL ATRIAL FIBRILLATION: Chronic | ICD-10-CM

## 2022-04-20 DIAGNOSIS — N32.81 OAB (OVERACTIVE BLADDER): ICD-10-CM

## 2022-04-20 DIAGNOSIS — I70.0 AORTIC ATHEROSCLEROSIS: ICD-10-CM

## 2022-04-20 DIAGNOSIS — D69.2 OTHER NONTHROMBOCYTOPENIC PURPURA: ICD-10-CM

## 2022-04-20 DIAGNOSIS — I27.20 PULMONARY HYPERTENSION: ICD-10-CM

## 2022-04-20 DIAGNOSIS — E55.9 VITAMIN D DEFICIENCY: ICD-10-CM

## 2022-04-20 DIAGNOSIS — N18.4 TYPE 2 DIABETES MELLITUS WITH STAGE 4 CHRONIC KIDNEY DISEASE, WITHOUT LONG-TERM CURRENT USE OF INSULIN: ICD-10-CM

## 2022-04-20 PROBLEM — E66.9 OBESITY (BMI 30-39.9): Status: RESOLVED | Noted: 2019-01-28 | Resolved: 2022-04-20

## 2022-04-20 PROBLEM — R73.03 PREDIABETES: Status: RESOLVED | Noted: 2019-02-11 | Resolved: 2022-04-20

## 2022-04-20 PROCEDURE — 1160F PR REVIEW ALL MEDS BY PRESCRIBER/CLIN PHARMACIST DOCUMENTED: ICD-10-PCS | Mod: CPTII,S$GLB,, | Performed by: NURSE PRACTITIONER

## 2022-04-20 PROCEDURE — G0439 PR MEDICARE ANNUAL WELLNESS SUBSEQUENT VISIT: ICD-10-PCS | Mod: S$GLB,,, | Performed by: NURSE PRACTITIONER

## 2022-04-20 PROCEDURE — 1159F PR MEDICATION LIST DOCUMENTED IN MEDICAL RECORD: ICD-10-PCS | Mod: CPTII,S$GLB,, | Performed by: NURSE PRACTITIONER

## 2022-04-20 PROCEDURE — 99499 UNLISTED E&M SERVICE: CPT | Mod: S$GLB,,, | Performed by: NURSE PRACTITIONER

## 2022-04-20 PROCEDURE — 1170F FXNL STATUS ASSESSED: CPT | Mod: CPTII,S$GLB,, | Performed by: NURSE PRACTITIONER

## 2022-04-20 PROCEDURE — 99999 PR PBB SHADOW E&M-EST. PATIENT-LVL IV: CPT | Mod: PBBFAC,,, | Performed by: NURSE PRACTITIONER

## 2022-04-20 PROCEDURE — 3074F PR MOST RECENT SYSTOLIC BLOOD PRESSURE < 130 MM HG: ICD-10-PCS | Mod: CPTII,S$GLB,, | Performed by: NURSE PRACTITIONER

## 2022-04-20 PROCEDURE — 3288F PR FALLS RISK ASSESSMENT DOCUMENTED: ICD-10-PCS | Mod: CPTII,S$GLB,, | Performed by: NURSE PRACTITIONER

## 2022-04-20 PROCEDURE — 99999 PR PBB SHADOW E&M-EST. PATIENT-LVL IV: ICD-10-PCS | Mod: PBBFAC,,, | Performed by: NURSE PRACTITIONER

## 2022-04-20 PROCEDURE — 1126F AMNT PAIN NOTED NONE PRSNT: CPT | Mod: CPTII,S$GLB,, | Performed by: NURSE PRACTITIONER

## 2022-04-20 PROCEDURE — 99499 RISK ADDL DX/OHS AUDIT: ICD-10-PCS | Mod: S$GLB,,, | Performed by: NURSE PRACTITIONER

## 2022-04-20 PROCEDURE — 3288F FALL RISK ASSESSMENT DOCD: CPT | Mod: CPTII,S$GLB,, | Performed by: NURSE PRACTITIONER

## 2022-04-20 PROCEDURE — 1101F PT FALLS ASSESS-DOCD LE1/YR: CPT | Mod: CPTII,S$GLB,, | Performed by: NURSE PRACTITIONER

## 2022-04-20 PROCEDURE — G0439 PPPS, SUBSEQ VISIT: HCPCS | Mod: S$GLB,,, | Performed by: NURSE PRACTITIONER

## 2022-04-20 PROCEDURE — 3078F PR MOST RECENT DIASTOLIC BLOOD PRESSURE < 80 MM HG: ICD-10-PCS | Mod: CPTII,S$GLB,, | Performed by: NURSE PRACTITIONER

## 2022-04-20 PROCEDURE — 1159F MED LIST DOCD IN RCRD: CPT | Mod: CPTII,S$GLB,, | Performed by: NURSE PRACTITIONER

## 2022-04-20 PROCEDURE — 3074F SYST BP LT 130 MM HG: CPT | Mod: CPTII,S$GLB,, | Performed by: NURSE PRACTITIONER

## 2022-04-20 PROCEDURE — 1160F RVW MEDS BY RX/DR IN RCRD: CPT | Mod: CPTII,S$GLB,, | Performed by: NURSE PRACTITIONER

## 2022-04-20 PROCEDURE — 3078F DIAST BP <80 MM HG: CPT | Mod: CPTII,S$GLB,, | Performed by: NURSE PRACTITIONER

## 2022-04-20 PROCEDURE — 1126F PR PAIN SEVERITY QUANTIFIED, NO PAIN PRESENT: ICD-10-PCS | Mod: CPTII,S$GLB,, | Performed by: NURSE PRACTITIONER

## 2022-04-20 PROCEDURE — 1101F PR PT FALLS ASSESS DOC 0-1 FALLS W/OUT INJ PAST YR: ICD-10-PCS | Mod: CPTII,S$GLB,, | Performed by: NURSE PRACTITIONER

## 2022-04-20 PROCEDURE — 1170F PR FUNCTIONAL STATUS ASSESSED: ICD-10-PCS | Mod: CPTII,S$GLB,, | Performed by: NURSE PRACTITIONER

## 2022-04-20 NOTE — PATIENT INSTRUCTIONS
Counseling and Referral of Other Preventative  (Italic type indicates deductible and co-insurance are waived)    Patient Name: Barbara Rizo  Today's Date: 4/20/2022    Health Maintenance       Date Due Completion Date    TETANUS VACCINE Never done ---    DEXA Scan 07/03/2021 7/3/2018    COVID-19 Vaccine (4 - Booster for Moderna series) 01/05/2022 10/5/2021    Lipid Panel 01/19/2022 1/19/2021    Hemoglobin A1c 06/26/2022 12/26/2021    Diabetes Urine Screening 02/10/2023 2/10/2022    Foot Exam 04/20/2023 4/20/2022 (Done)    Override on 4/20/2022: Done    Override on 8/20/2018: Done        No orders of the defined types were placed in this encounter.    The following information is provided to all patients.  This information is to help you find resources for any of the problems found today that may be affecting your health:                Living healthy guide: www.[facility].[place].[facility]      Understanding Diabetes: www.diabetes.org      Eating healthy: www.cdc.gov/healthyweight      CDC home safety checklist: www.cdc.gov/steadi/patient.html      Agency on Aging: www.goea.[place].gov      Alcoholics anonymous (AA): www.aa.org      Physical Activity: www.abdulaziz.nih.gov/rj2xily      Tobacco use: www.quitwithusla.org

## 2022-04-20 NOTE — PROGRESS NOTES
"  Barbara Rizo presented for a  Medicare AWV and comprehensive Health Risk Assessment today. The following components were reviewed and updated:    · Medical history  · Family History  · Social history  · Allergies and Current Medications  · Health Risk Assessment  · Health Maintenance  · Care Team       ** See Completed Assessments for Annual Wellness Visit within the encounter summary.**       The following assessments were completed:  · Living Situation  · CAGE  · Depression Screening  · Timed Get Up and Go  · Whisper Test  · Cognitive Function Screening  · Nutrition Screening  · ADL Screening  · PAQ Screening          Vitals:    04/20/22 0856   BP: 126/70   BP Location: Right arm   Patient Position: Sitting   BP Method: Medium (Manual)   Pulse: 74   Temp: 98.6 °F (37 °C)   TempSrc: Oral   SpO2: (!) 92%   Weight: 78.7 kg (173 lb 6.3 oz)   Height: 5' 1" (1.549 m)     Body mass index is 32.76 kg/m².  Physical Exam  Vitals and nursing note reviewed.   Constitutional:       Appearance: Normal appearance. She is obese.   Cardiovascular:      Rate and Rhythm: Normal rate.      Pulses: Normal pulses.      Heart sounds: Normal heart sounds.   Pulmonary:      Effort: Pulmonary effort is normal.      Breath sounds: Normal breath sounds.   Abdominal:      General: Bowel sounds are normal.      Palpations: Abdomen is soft.   Musculoskeletal:         General: Normal range of motion.   Neurological:      Mental Status: She is alert and oriented to person, place, and time.   Psychiatric:         Mood and Affect: Mood normal.         Behavior: Behavior normal.             Diagnoses and health risks identified today and associated recommendations/orders:    1. Encounter for preventive health examination  Pt was seen today for an Annual Wellness visit. Healthcare maintenance and screening recommendations were discussed and updated as indicated. Return in one year for AWV.    Review current opioid prescriptions: n/a  Screen for " potential Substance Use Disorders: n/a    2. Malignant neoplasm of overlapping sites of left breast in female, estrogen receptor positive  The current medical regimen is effective;  continue present plan and medications.    3. Chronic kidney disease, stage 4 (severe)  The current medical regimen is effective;  continue present plan and medications.    4. Type 2 diabetes mellitus with stage 4 chronic kidney disease, without long-term current use of insulin  Last noted Hgb A1C 5.5 on 12/26/21. The current medical regimen is effective;  continue present plan and medications.    5. Secondary hyperparathyroidism of renal origin  The current medical regimen is effective;  continue present plan and medications.    6. Other nonthrombocytopenic purpura  The current medical regimen is effective;  continue present plan and medications.    7. Pulmonary hypertension  The current medical regimen is effective;  continue present plan and medications.    8. Paroxysmal atrial fibrillation  The current medical regimen is effective;  continue present plan and medications.    9. Aortic atherosclerosis  Chronic. CXR 9/23/15. The current medical regimen is effective;  continue present plan and medications.    10. Atherosclerosis of native artery of left lower extremity, with unspecified presence of clinical manifestation  The current medical regimen is effective;  continue present plan and medications.    11. Class 1 obesity due to excess calories with serious comorbidity and body mass index (BMI) of 32.0 to 32.9 in adult  The patient is asked to make an attempt to improve diet and exercise patterns to aid in medical management of this problem.    12. Benign hypertensive heart disease without heart failure  The current medical regimen is effective;  continue present plan and medications.    13. Essential hypertension  The current medical regimen is effective;  continue present plan and medications.    14. Hyperlipidemia, unspecified  hyperlipidemia type  The current medical regimen is effective;  continue present plan and medications.    15. OAB (overactive bladder)  The current medical regimen is effective;  continue present plan and medications.    16. MGUS (monoclonal gammopathy of unknown significance)  The current medical regimen is effective;  continue present plan and medications.    17. Anemia in stage 4 chronic kidney disease  The current medical regimen is effective;  continue present plan and medications.    18. Vitamin D deficiency  The current medical regimen is effective;  continue present plan and medications.    19. Idiopathic chronic gout without tophus, unspecified site  The current medical regimen is effective;  continue present plan and medications.    20. JOCELIN (obstructive sleep apnea)  The current medical regimen is effective;  continue present plan and medications.        Provided Barbara with a 5-10 year written screening schedule and personal prevention plan. Recommendations were developed using the USPSTF age appropriate recommendations. Education, counseling, and referrals were provided as needed. After Visit Summary printed and given to patient which includes a list of additional screenings\tests needed.    Follow up in about 1 year (around 4/6/2023) with provider.    GAETANO Waite  I offered to discuss advanced care planning, including how to pick a person who would make decisions for you if you were unable to make them for yourself, called a health care power of , and what kind of decisions you might make such as use of life sustaining treatments such as ventilators and tube feeding when faced with a life limiting illness recorded on a living will that they will need to know. (How you want to be cared for as you near the end of your natural life)     X Patient is interested in learning more about how to make advanced directives.  I provided them paperwork and offered to discuss this with them.

## 2022-04-23 DIAGNOSIS — E83.119 HEMOCHROMATOSIS, UNSPECIFIED HEMOCHROMATOSIS TYPE: ICD-10-CM

## 2022-04-25 ENCOUNTER — TELEPHONE (OUTPATIENT)
Dept: SLEEP MEDICINE | Facility: HOSPITAL | Age: 82
End: 2022-04-25
Payer: MEDICARE

## 2022-04-25 ENCOUNTER — HOSPITAL ENCOUNTER (OUTPATIENT)
Dept: SLEEP MEDICINE | Facility: HOSPITAL | Age: 82
Discharge: HOME | End: 2022-04-25
Attending: INTERNAL MEDICINE
Payer: MEDICARE

## 2022-04-25 DIAGNOSIS — G47.33 OSA (OBSTRUCTIVE SLEEP APNEA): ICD-10-CM

## 2022-04-25 PROCEDURE — 95811 POLYSOM 6/>YRS CPAP 4/> PARM: CPT

## 2022-04-25 PROCEDURE — 95811 POLYSOM 6/>YRS CPAP 4/> PARM: CPT | Mod: 26,,, | Performed by: INTERNAL MEDICINE

## 2022-04-25 PROCEDURE — 95811 PR POLYSOMNOGRAPHY W/CPAP: ICD-10-PCS | Mod: 26,,, | Performed by: INTERNAL MEDICINE

## 2022-04-25 RX ORDER — FOLIC ACID 1 MG/1
TABLET ORAL
Qty: 90 TABLET | Refills: 0 | Status: SHIPPED | OUTPATIENT
Start: 2022-04-25 | End: 2022-07-26

## 2022-04-25 NOTE — TELEPHONE ENCOUNTER
Active respiratory infection: No  Fever: No  Head lice: No  Bedbugs: No   Needs wheelchair: No  Needs Oxygen: Yes, only at night

## 2022-04-26 NOTE — PROGRESS NOTES
A CPAP titration study was performed on Barbara Rizo. The entire procedure was explained, including Bio calibration procedure, patient was informed that there may be a need to enter the room during the night to fix lead or make adjustments to the equipment. Questions were answered prior to start of study. She was given instructions on how to call out for help including how to use the nurse call unit in the bathroom. Patient was given [employer] phone number to call if patient needed tech for anything, in case tech was out of tech room.  Patient education was performed. This includes the possible use of CPAP machine and different CPAP masks. She was given the after visit summary.   The lowest oxygen saturation observed during sleep was 82%   The EKG appeared to be a Sinus Rhythm.  Pt daughter stayed with the patient. The patient requested to wear her hearing aids during the study   Leg movements were observed. Study was started without O2 but 1L O2 was added  to raise sats, CPAP pressure was raised sooner to raise sats,Then 2L of O2 was added to raise sats.  The patient was titrated from pressure of  5 cm H2O to 15 cm H2O. Pressure was lowered for pt comfort to help pt go back when awakened to use the restroom. Airfit P10 was used with chin strap, humidity, and C-Flex. The mask was changed to Eson nasal for pt comfort, The pt did not like the Eson nasal and was switched back to Airfit P10 nasal pillows small.The pressure of is 13 cm H2O is believed to eliminate most of the events. Supine REM sleep was obtained during the study. Her  reaction to PAP was it was uncomfortable.

## 2022-05-04 ENCOUNTER — TELEPHONE (OUTPATIENT)
Dept: PULMONOLOGY | Facility: CLINIC | Age: 82
End: 2022-05-04
Payer: MEDICARE

## 2022-05-04 DIAGNOSIS — G47.33 OSA (OBSTRUCTIVE SLEEP APNEA): Primary | ICD-10-CM

## 2022-05-04 NOTE — PROCEDURES
"Dear Provider,     You have ordered sleep LAB services to perform the sleep study for Barbara Rizo.  The sleep study that you ordered is complete.      Please find Sleep Study result in "Chart Review" under the "Media tab."      As the ordering provider, you are responsible for reviewing the results and implementing a treatment plan with your patient.    If you need a Sleep Medicine provider to explain the sleep study findings and arrange treatment for the patient, please refer patient for consultation to our Sleep Clinic via [facility] with Ambulatory Consult Sleep.    To do that please place an order for an  "Ambulatory Consult Sleep" - it will go to our clinic work queue for our Medical Assistant to contact the patient for an appointment.     For any questions, please contact our clinic staff at [phone] to talk to clinical staff.   "

## 2022-05-04 NOTE — TELEPHONE ENCOUNTER
Sent message to patient in her portal.                ----- Message from Alexey Medina MA sent at 5/4/2022  9:41 AM CDT -----  Please let patient know that the titration went well and I would like to set patient up with cpap via dme of choice.  Due nationwide CPAP shortage, advise patient to expect a call from in 2 months.  Clinic follow up with md/np in approximately 8 weeks after cpap usage.

## 2022-05-11 ENCOUNTER — PATIENT MESSAGE (OUTPATIENT)
Dept: PULMONOLOGY | Facility: CLINIC | Age: 82
End: 2022-05-11
Payer: MEDICARE

## 2022-06-17 ENCOUNTER — INFUSION (OUTPATIENT)
Dept: INFUSION THERAPY | Facility: HOSPITAL | Age: 82
End: 2022-06-17
Attending: INTERNAL MEDICINE
Payer: MEDICARE

## 2022-06-17 VITALS
RESPIRATION RATE: 16 BRPM | SYSTOLIC BLOOD PRESSURE: 139 MMHG | OXYGEN SATURATION: 95 % | HEART RATE: 67 BPM | DIASTOLIC BLOOD PRESSURE: 59 MMHG | TEMPERATURE: 99 F

## 2022-06-17 DIAGNOSIS — M17.10 PRIMARY LOCALIZED OSTEOARTHROSIS, LOWER LEG: ICD-10-CM

## 2022-06-17 DIAGNOSIS — M19.90 OSTEOARTHRITIS: Primary | ICD-10-CM

## 2022-06-17 LAB
ALBUMIN SERPL BCP-MCNC: 3.9 G/DL (ref 3.5–5.2)
ALP SERPL-CCNC: 78 U/L (ref 55–135)
ALT SERPL W/O P-5'-P-CCNC: 8 U/L (ref 10–44)
ANION GAP SERPL CALC-SCNC: 11 MMOL/L (ref 8–16)
AST SERPL-CCNC: 13 U/L (ref 10–40)
BILIRUB SERPL-MCNC: 0.9 MG/DL (ref 0.1–1)
BUN SERPL-MCNC: 38 MG/DL (ref 8–23)
CALCIUM SERPL-MCNC: 9.7 MG/DL (ref 8.7–10.5)
CHLORIDE SERPL-SCNC: 98 MMOL/L (ref 95–110)
CO2 SERPL-SCNC: 24 MMOL/L (ref 23–29)
CREAT SERPL-MCNC: 2.2 MG/DL (ref 0.5–1.4)
EST. GFR  (AFRICAN AMERICAN): 24 ML/MIN/1.73 M^2
EST. GFR  (NON AFRICAN AMERICAN): 20 ML/MIN/1.73 M^2
GLUCOSE SERPL-MCNC: 106 MG/DL (ref 70–110)
POTASSIUM SERPL-SCNC: 4.6 MMOL/L (ref 3.5–5.1)
PROT SERPL-MCNC: 8.1 G/DL (ref 6–8.4)
SODIUM SERPL-SCNC: 133 MMOL/L (ref 136–145)

## 2022-06-17 PROCEDURE — 63600175 PHARM REV CODE 636 W HCPCS: Mod: JG | Performed by: INTERNAL MEDICINE

## 2022-06-17 PROCEDURE — 80053 COMPREHEN METABOLIC PANEL: CPT | Performed by: INTERNAL MEDICINE

## 2022-06-17 PROCEDURE — 36415 COLL VENOUS BLD VENIPUNCTURE: CPT

## 2022-06-17 PROCEDURE — 96372 THER/PROPH/DIAG INJ SC/IM: CPT

## 2022-06-17 RX ADMIN — DENOSUMAB 60 MG: 60 INJECTION SUBCUTANEOUS at 02:06

## 2022-07-06 ENCOUNTER — LAB VISIT (OUTPATIENT)
Dept: LAB | Facility: HOSPITAL | Age: 82
End: 2022-07-06
Attending: INTERNAL MEDICINE
Payer: MEDICARE

## 2022-07-06 DIAGNOSIS — I10 ESSENTIAL HYPERTENSION: Chronic | ICD-10-CM

## 2022-07-06 DIAGNOSIS — R55 SYNCOPE, UNSPECIFIED SYNCOPE TYPE: ICD-10-CM

## 2022-07-06 DIAGNOSIS — E78.2 MIXED HYPERLIPIDEMIA: Chronic | ICD-10-CM

## 2022-07-06 DIAGNOSIS — R00.1 SINUS BRADYCARDIA: ICD-10-CM

## 2022-07-06 DIAGNOSIS — I48.0 PAROXYSMAL ATRIAL FIBRILLATION: Chronic | ICD-10-CM

## 2022-07-06 DIAGNOSIS — I50.33 ACUTE ON CHRONIC DIASTOLIC CONGESTIVE HEART FAILURE: ICD-10-CM

## 2022-07-06 DIAGNOSIS — I70.0 AORTIC ATHEROSCLEROSIS: ICD-10-CM

## 2022-07-06 DIAGNOSIS — I11.9 BENIGN HYPERTENSIVE HEART DISEASE WITHOUT HEART FAILURE: ICD-10-CM

## 2022-07-06 DIAGNOSIS — I70.202 ATHEROSCLEROSIS OF NATIVE ARTERY OF LEFT LOWER EXTREMITY, WITH UNSPECIFIED PRESENCE OF CLINICAL MANIFESTATION: ICD-10-CM

## 2022-07-06 DIAGNOSIS — I27.20 PULMONARY HYPERTENSION: ICD-10-CM

## 2022-07-06 LAB
ALBUMIN SERPL BCP-MCNC: 3.9 G/DL (ref 3.5–5.2)
ALP SERPL-CCNC: 78 U/L (ref 55–135)
ALT SERPL W/O P-5'-P-CCNC: 14 U/L (ref 10–44)
ANION GAP SERPL CALC-SCNC: 9 MMOL/L (ref 8–16)
ANION GAP SERPL CALC-SCNC: 9 MMOL/L (ref 8–16)
AST SERPL-CCNC: 11 U/L (ref 10–40)
BILIRUB SERPL-MCNC: 0.8 MG/DL (ref 0.1–1)
BNP SERPL-MCNC: 410 PG/ML (ref 0–99)
BUN SERPL-MCNC: 20 MG/DL (ref 8–23)
BUN SERPL-MCNC: 20 MG/DL (ref 8–23)
CALCIUM SERPL-MCNC: 9.7 MG/DL (ref 8.7–10.5)
CALCIUM SERPL-MCNC: 9.7 MG/DL (ref 8.7–10.5)
CHLORIDE SERPL-SCNC: 103 MMOL/L (ref 95–110)
CHLORIDE SERPL-SCNC: 103 MMOL/L (ref 95–110)
CHOLEST SERPL-MCNC: 153 MG/DL (ref 120–199)
CHOLEST/HDLC SERPL: 2.5 {RATIO} (ref 2–5)
CO2 SERPL-SCNC: 26 MMOL/L (ref 23–29)
CO2 SERPL-SCNC: 26 MMOL/L (ref 23–29)
CREAT SERPL-MCNC: 1.8 MG/DL (ref 0.5–1.4)
CREAT SERPL-MCNC: 1.8 MG/DL (ref 0.5–1.4)
EST. GFR  (AFRICAN AMERICAN): 30 ML/MIN/1.73 M^2
EST. GFR  (AFRICAN AMERICAN): 30 ML/MIN/1.73 M^2
EST. GFR  (NON AFRICAN AMERICAN): 26 ML/MIN/1.73 M^2
EST. GFR  (NON AFRICAN AMERICAN): 26 ML/MIN/1.73 M^2
GLUCOSE SERPL-MCNC: 111 MG/DL (ref 70–110)
GLUCOSE SERPL-MCNC: 111 MG/DL (ref 70–110)
HDLC SERPL-MCNC: 62 MG/DL (ref 40–75)
HDLC SERPL: 40.5 % (ref 20–50)
LDLC SERPL CALC-MCNC: 70.4 MG/DL (ref 63–159)
NONHDLC SERPL-MCNC: 91 MG/DL
POTASSIUM SERPL-SCNC: 5.2 MMOL/L (ref 3.5–5.1)
POTASSIUM SERPL-SCNC: 5.2 MMOL/L (ref 3.5–5.1)
PROT SERPL-MCNC: 8.3 G/DL (ref 6–8.4)
SODIUM SERPL-SCNC: 138 MMOL/L (ref 136–145)
SODIUM SERPL-SCNC: 138 MMOL/L (ref 136–145)
TRIGL SERPL-MCNC: 103 MG/DL (ref 30–150)
TSH SERPL DL<=0.005 MIU/L-ACNC: 3.95 UIU/ML (ref 0.4–4)

## 2022-07-06 PROCEDURE — 84443 ASSAY THYROID STIM HORMONE: CPT | Performed by: INTERNAL MEDICINE

## 2022-07-06 PROCEDURE — 80061 LIPID PANEL: CPT | Performed by: FAMILY MEDICINE

## 2022-07-06 PROCEDURE — 80053 COMPREHEN METABOLIC PANEL: CPT | Performed by: INTERNAL MEDICINE

## 2022-07-06 PROCEDURE — 83880 ASSAY OF NATRIURETIC PEPTIDE: CPT | Performed by: INTERNAL MEDICINE

## 2022-07-06 PROCEDURE — 36415 COLL VENOUS BLD VENIPUNCTURE: CPT | Mod: PN | Performed by: INTERNAL MEDICINE

## 2022-07-08 ENCOUNTER — OFFICE VISIT (OUTPATIENT)
Dept: CARDIOLOGY | Facility: CLINIC | Age: 82
End: 2022-07-08
Payer: MEDICARE

## 2022-07-08 VITALS
RESPIRATION RATE: 19 BRPM | HEART RATE: 63 BPM | SYSTOLIC BLOOD PRESSURE: 141 MMHG | BODY MASS INDEX: 33.41 KG/M2 | DIASTOLIC BLOOD PRESSURE: 73 MMHG | HEIGHT: 61 IN | WEIGHT: 176.94 LBS | OXYGEN SATURATION: 92 %

## 2022-07-08 DIAGNOSIS — I27.20 PULMONARY HYPERTENSION: Primary | ICD-10-CM

## 2022-07-08 DIAGNOSIS — I48.0 PAROXYSMAL ATRIAL FIBRILLATION: Chronic | ICD-10-CM

## 2022-07-08 DIAGNOSIS — R00.1 SINUS BRADYCARDIA: ICD-10-CM

## 2022-07-08 DIAGNOSIS — R55 SYNCOPE, UNSPECIFIED SYNCOPE TYPE: ICD-10-CM

## 2022-07-08 DIAGNOSIS — E78.5 HYPERLIPIDEMIA, UNSPECIFIED HYPERLIPIDEMIA TYPE: Chronic | ICD-10-CM

## 2022-07-08 DIAGNOSIS — I10 ESSENTIAL HYPERTENSION: Chronic | ICD-10-CM

## 2022-07-08 PROCEDURE — 99499 RISK ADDL DX/OHS AUDIT: ICD-10-PCS | Mod: S$GLB,,, | Performed by: INTERNAL MEDICINE

## 2022-07-08 PROCEDURE — 99499 UNLISTED E&M SERVICE: CPT | Mod: S$GLB,,, | Performed by: INTERNAL MEDICINE

## 2022-07-08 PROCEDURE — 99214 OFFICE O/P EST MOD 30 MIN: CPT | Mod: S$GLB,,, | Performed by: INTERNAL MEDICINE

## 2022-07-08 PROCEDURE — 99999 PR PBB SHADOW E&M-EST. PATIENT-LVL III: CPT | Mod: PBBFAC,,, | Performed by: INTERNAL MEDICINE

## 2022-07-08 PROCEDURE — 3078F DIAST BP <80 MM HG: CPT | Mod: CPTII,S$GLB,, | Performed by: INTERNAL MEDICINE

## 2022-07-08 PROCEDURE — 3288F FALL RISK ASSESSMENT DOCD: CPT | Mod: CPTII,S$GLB,, | Performed by: INTERNAL MEDICINE

## 2022-07-08 PROCEDURE — 3288F PR FALLS RISK ASSESSMENT DOCUMENTED: ICD-10-PCS | Mod: CPTII,S$GLB,, | Performed by: INTERNAL MEDICINE

## 2022-07-08 PROCEDURE — 1101F PT FALLS ASSESS-DOCD LE1/YR: CPT | Mod: CPTII,S$GLB,, | Performed by: INTERNAL MEDICINE

## 2022-07-08 PROCEDURE — 1159F PR MEDICATION LIST DOCUMENTED IN MEDICAL RECORD: ICD-10-PCS | Mod: CPTII,S$GLB,, | Performed by: INTERNAL MEDICINE

## 2022-07-08 PROCEDURE — 1101F PR PT FALLS ASSESS DOC 0-1 FALLS W/OUT INJ PAST YR: ICD-10-PCS | Mod: CPTII,S$GLB,, | Performed by: INTERNAL MEDICINE

## 2022-07-08 PROCEDURE — 1126F PR PAIN SEVERITY QUANTIFIED, NO PAIN PRESENT: ICD-10-PCS | Mod: CPTII,S$GLB,, | Performed by: INTERNAL MEDICINE

## 2022-07-08 PROCEDURE — 99999 PR PBB SHADOW E&M-EST. PATIENT-LVL III: ICD-10-PCS | Mod: PBBFAC,,, | Performed by: INTERNAL MEDICINE

## 2022-07-08 PROCEDURE — 1126F AMNT PAIN NOTED NONE PRSNT: CPT | Mod: CPTII,S$GLB,, | Performed by: INTERNAL MEDICINE

## 2022-07-08 PROCEDURE — 93000 EKG 12-LEAD: ICD-10-PCS | Mod: S$GLB,,, | Performed by: INTERNAL MEDICINE

## 2022-07-08 PROCEDURE — 99214 PR OFFICE/OUTPT VISIT, EST, LEVL IV, 30-39 MIN: ICD-10-PCS | Mod: S$GLB,,, | Performed by: INTERNAL MEDICINE

## 2022-07-08 PROCEDURE — 3078F PR MOST RECENT DIASTOLIC BLOOD PRESSURE < 80 MM HG: ICD-10-PCS | Mod: CPTII,S$GLB,, | Performed by: INTERNAL MEDICINE

## 2022-07-08 PROCEDURE — 3077F SYST BP >= 140 MM HG: CPT | Mod: CPTII,S$GLB,, | Performed by: INTERNAL MEDICINE

## 2022-07-08 PROCEDURE — 3077F PR MOST RECENT SYSTOLIC BLOOD PRESSURE >= 140 MM HG: ICD-10-PCS | Mod: CPTII,S$GLB,, | Performed by: INTERNAL MEDICINE

## 2022-07-08 PROCEDURE — 1159F MED LIST DOCD IN RCRD: CPT | Mod: CPTII,S$GLB,, | Performed by: INTERNAL MEDICINE

## 2022-07-08 PROCEDURE — 93000 ELECTROCARDIOGRAM COMPLETE: CPT | Mod: S$GLB,,, | Performed by: INTERNAL MEDICINE

## 2022-07-08 RX ORDER — LOSARTAN POTASSIUM 25 MG/1
25 TABLET ORAL DAILY
COMMUNITY
End: 2023-01-01

## 2022-07-08 NOTE — PROGRESS NOTES
Subjective:    Patient ID:  Barbara Rizo is a 82 y.o. female who presents for follow-up of Results      HPI     PAF - TALHA/CV 9/21/15, 1/30/19, 2/2/19 -  on amiodarone and eliquis, Diastolic CHF,  HTN, DM, HLD, MGUS, CRI - Cr 1.8, AS/MR - mild to moderate, breast CA, COPD on home O2     Admitted 1/28/19  Ms. Barbara Rizo is a 78 y.o. female with essential hypertension, hyperlipidemia (LDL 62.4 Jul 2018), type 2 diabetes mellitus (HbA1c 5.8% Jul 2018), CKD Stage 3, paroxysmal atrial fibrillation ([id number] score 5) not on chronic anticoagulation, obesity (BMI 36.0), MGUS, and chronic gout who presents to [facility] ED with complaints of coughing for three days.  She started to have a non-productive cough, wheezing, and mild dyspnea three days ago which has steadily been worsening since onset.  She also complains of a subjective fever but otherwise denies any nausea, vomiting, chest pain, palpitations, pleurisy, nor any diaphoresis.  She denies any recent travel, hemoptysis, nor any lower extremity pain or swelling.  She does say that she's under a lot of stress recently with her ex- dying yesterday at [facility] due to complications from a heart valve replacement two weeks ago.  She does say that she may have had sick contacts while visiting her ex-.  Her appetite has been poor but she denies any weight loss.     While at her ENT's appointment today she decided to schedule an appointment with her PCP to evaluate her upper airway complaints.  She was unable to see her regular PCP, Dr. Paras Oro, but was able to be seen by another physician who became concerned when she was noted to be in AFib with RVR on EKG.  He recommended EMS transportation to the ED but she refused and decided to drive herself.  Her cardiologist is Dr. Amauri Lomas.     Pt admitted to ICU with afib rvr on amiodarone infusion. Pt with elevated HR  and after TALHA done patient became very agitated and  anxious. Ativan 1mg IV given and symptoms got worse. HR up into the 170s and O2 sats dropped to 77%. Placed on NRB. Improved O2 sats. 5mg IV haldol given as patient was trying to get out of bed and pulling oxygen mask off. Cardizem 10mg Iv given with improved HR to 120s-130s. xopenex scheduled Q 8 hours. Changed to zosyn with temp 102F.   1/30- successful cardioversion, post procedure confused and pulling oxygen off, desaturation, constant re-direction, monitored in ICU overnight. Changed to oral amiodarone. eliquis for anticoagulation. Holding parameters on BP meds. IV steroids, nebs and antibiotic for probable lower resp infection. IV lasix as Bp tolerates.   1/31- HR remain under control NSR 60-70s.On amiodarone, metoprolol and eliquis.  Resp status improved and weaned oxygen. Steroids changed to oral route. DC zosyn and switch to augmentin. Add acapella to nebs. Cr increased from baseline (1.5-1.7). Gentle IVF and monitor with repeat BMP later, was stable,. PT,OT consulted for dc planning. Weaning oxygen. PT/OT consulted and rec H/H without equipment. The patient was transferred to the floor on 1/31. Nephrology was consulted for worsening renal function.      2/2- pt transferred with blayne rvr. Successful cardioversion again . Continued oral amiodarone and BB.  still has aggressive cough and URI symptoms. On mucolytic and antibiotic.   2/3- HR 50-60s sinus. Cr sightly higher, sodium 128. BNP 1500. Lasix x one dose. Bringing up more mucus. Steroids weaned 20mg. Still faint wheezes on exam.  Patient had worsening ARF on CKD 3, keeped on george and monitor,nephrology was following.reconsulted PT,OT.fley was removed latera nd she had improvement in ARF.  Changed diet for low slat diet duo to hyponatremia with improvement.  She did well with PT,OT.  Her wheezing and respiratory status is improved,  Patient has been discharged home with HH and PO Abx and OAC and amiodarone and follow up with PCP,nephrology and  cardiology in next few days.      1-29:  Admitted with AFib with RVR  1-30:  Underwent TALHA/DC cardioversion successfully  2-1 Back in A-fib 120s. SOB  2-3 Still coughing and SOB. NSR 60      2/2/19 CV - 360J converted A-fib to sinus bradycardia 55. Change amiodarone to po. Ok for telemetry     Echo 12/27/21  · The left ventricle is normal in size with moderate concentric hypertrophy and normal systolic function.  · The estimated ejection fraction is 60%.  · Grade II left ventricular diastolic dysfunction.  · Mild right ventricular enlargement with normal right ventricular systolic function.  · There is moderate aortic valve stenosis.  · Aortic valve area is 1.29 cm2; peak velocity is 2.88 m/s; mean gradient is 22 mmHg.  · Mild mitral regurgitation.  · Moderate tricuspid regurgitation.  · The estimated PA systolic pressure is 66 mmHg.  · There is pulmonary hypertension.        2/13/19 Less SOB since discharge  EKG NSR with PACs 65   Decrease amiodarone 200 qd  OV 2 months - will discuss changing amiodarone to flecainide at that time     4/17/19 Denies CP or SOB  EKG sinus bradycardia 44  HR mostly runs 40-50 at home  Change metoprolol 100 qd to toprol XL 50 qd - may decrease further if bradycardia persists  Discussed alternative AAD - given her repeated episodes of PAF we are both in agreement to stay on amiodarone for now  OV 1 month with TSH, CMP, EKG     5/27/19 HR improved to the low 50s  Has held eliquis the last 2 days due to bleeding from a recent skin CA removal  Denies CP or SOB     9/11/19 Denies CP or SOB  Some LE edema  EKG sinus bradycardia 46 NSSTT changes  Stop norvasc with LE edema  Add cardura 4 mg qd  Decrease toprol 25 qd with bradycardia  OV 3 months with CMP, TSH     12/11/19 Denies CP or SOB  LE edema resolved after stopping norvasc  BP controlled by home readings  HR runs 45-50  EKG sinus bradycardia 47 1st degree AVB  Denies dizziness  Bradycardia well tolerated - will continue Rx  OV 6 months  with echo      6/18/20 Denies CP or dizziness  Mild stable WILKINSON  EKG sinus bradycardia 44 otherwise ok  Bradycardia continues to be well tolerated  OV 6 months with CMP, TSH on chronic amiodarone        10/14/20 EKG  - suspect this is A-flutter 2:1 with aberrancy  Denies CP, SOB, or palpitations  Appears to be in A-flutter RVR - will send to the ER for admission. If issues with tachy-marlon continue will likely end up needing PPM     10/23/20 HR were better controlled when she went to ER. She was restarted on metoprolol and discharged. HR mostly 40-50s at home. Bradycardia well tolerated. Need clearance for skin graft surgery at  on left leg  EKG sinus bradycardia 43   Back in NSR - bradycardia well tolerated. No indication for PPM at this point  Cleared for skin graft surgery at moderate cardiac risk - ok to hold eliquis 2 days before  OV 3 months  Continue Rx for PAF, HTN, CHF     12/1/20 Denies CP or SOB. Did well after her skin graft surgery  HR 45-50. BP elevated - controlled by outside readings  Continue Rx for PAF, HTN, HLD, CHF  OV 3 months     3/1/21 Denies CP or SOB  EKG NSR 60 IVCD  HR 55-60 by home readings as well      Continue Rx for PAF, HTN, HLD, diastolic CHF  OV 6 months with CMP, TSH on chronic amiodarone, Echo to look at MR           5/6/21 Denies further dizziness or syncope  EKG NSR 69 1st degree AVB IVCD  On amiodarone 100 qd and off of metoprolol  TSH 2.4 LFT ok     suspect recent syncope was from dehydration and not bradycardia  Staying in NSR on amiodarone 100 qd and off of metoprolol  Continue Rx for PAF, HTN, HLD, diastolic CHF  OV 3 months     8/6/21 Denies CP, SOB, or dizziness  EKG NSR 1st degree AVB RBBB   Continue Rx for PAF, HTN, HLD, diastolic CHF  OV 6 months with CMP, TSH  Cleared for breast surgery at moderate cardiac risk - ok to hold eliquis 3 days before         1/19/22 Less SOB since discharge. On Home O2. Scheduled for pulmonary evaluation  EKG NSR 1st degree AVB  RBBB   Continue Rx for PAF, HTN, HLD, diastolic CHF  Volume status appears stable  OV 3 months with BNP, BMP     4/4/22 Denies CP, stable mild WILKINSON  BP controlled   Continue Rx for PAF, HTN, HLD, diastolic CHF  Volume status appears stable  OV 3 months with BNP, CMP, TSH    Labs 7/6/22  K 5.2  Cr 1.8 down from 2.2   down from 1190  LFT ok  TSH 3.9    7/8/22 Denies CP, stable WILKINSON - only uses O2 at night  EKG NSR RBBB/LAFB    Review of Systems   Constitutional: Negative for decreased appetite.   HENT: Negative for ear discharge.    Eyes: Negative for blurred vision.   Respiratory: Negative for hemoptysis.    Endocrine: Negative for polyphagia.   Hematologic/Lymphatic: Negative for adenopathy.   Skin: Negative for color change.   Musculoskeletal: Negative for joint swelling.   Genitourinary: Negative for bladder incontinence.   Neurological: Negative for brief paralysis.   Psychiatric/Behavioral: Negative for hallucinations.   Allergic/Immunologic: Negative for hives.        Objective:    Physical Exam  Constitutional:       Appearance: She is well-developed.   HENT:      Head: Normocephalic and atraumatic.   Eyes:      Conjunctiva/sclera: Conjunctivae normal.      Pupils: Pupils are equal, round, and reactive to light.   Cardiovascular:      Rate and Rhythm: Normal rate.      Pulses: Intact distal pulses.      Heart sounds: Normal heart sounds.   Pulmonary:      Effort: Pulmonary effort is normal.      Breath sounds: Normal breath sounds.   Abdominal:      General: Bowel sounds are normal.      Palpations: Abdomen is soft.   Musculoskeletal:         General: Normal range of motion.      Cervical back: Normal range of motion and neck supple.   Skin:     General: Skin is warm and dry.   Neurological:      Mental Status: She is alert and oriented to person, place, and time.           Assessment:       1. Pulmonary hypertension    2. Essential hypertension    3. Hyperlipidemia, unspecified hyperlipidemia type    4.  Paroxysmal atrial fibrillation    5. Sinus bradycardia    6. Syncope, unspecified syncope type         Plan:        Continue Rx for PAF, HTN, HLD, diastolic CHF  Volume status appears stable. Discussed decreasing potassium in diet  OV 6 months with BNP, CMP, TSH

## 2022-07-13 DIAGNOSIS — E11.22 TYPE 2 DIABETES MELLITUS WITH STAGE 3 CHRONIC KIDNEY DISEASE, WITHOUT LONG-TERM CURRENT USE OF INSULIN: ICD-10-CM

## 2022-07-13 DIAGNOSIS — I10 ESSENTIAL HYPERTENSION: ICD-10-CM

## 2022-07-13 DIAGNOSIS — N18.30 TYPE 2 DIABETES MELLITUS WITH STAGE 3 CHRONIC KIDNEY DISEASE, WITHOUT LONG-TERM CURRENT USE OF INSULIN: ICD-10-CM

## 2022-07-13 RX ORDER — ATORVASTATIN CALCIUM 40 MG/1
40 TABLET, FILM COATED ORAL DAILY
Qty: 90 TABLET | Refills: 0 | Status: SHIPPED | OUTPATIENT
Start: 2022-07-13 | End: 2022-10-07

## 2022-07-13 NOTE — TELEPHONE ENCOUNTER
No new care gaps identified.  [facility] Embedded Care Gaps. Reference number: [id number]. 7/13/2022   11:40:48 AM ROBERTAT

## 2022-07-13 NOTE — TELEPHONE ENCOUNTER
No new care gaps identified.  [facility] Embedded Care Gaps. Reference number: [id number]. 7/13/2022   10:51:47 AM ROBERTAT

## 2022-07-14 RX ORDER — ATORVASTATIN CALCIUM 40 MG/1
TABLET, FILM COATED ORAL
Qty: 90 TABLET | Refills: 0 | OUTPATIENT
Start: 2022-07-14

## 2022-07-14 NOTE — TELEPHONE ENCOUNTER
Refill Decision Note   Barbara Rizo  is requesting a refill authorization.  Brief Assessment and Rationale for Refill:  Quick Discontinue     Medication Therapy Plan:       Medication Reconciliation Completed: No   Comments:     No Care Gaps recommended.     Note composed:1:51 PM 07/14/2022

## 2022-07-18 ENCOUNTER — OFFICE VISIT (OUTPATIENT)
Dept: SURGERY | Facility: CLINIC | Age: 82
End: 2022-07-18
Payer: MEDICARE

## 2022-07-18 ENCOUNTER — HOSPITAL ENCOUNTER (OUTPATIENT)
Dept: RADIOLOGY | Facility: HOSPITAL | Age: 82
Discharge: HOME | End: 2022-07-18
Attending: SURGERY
Payer: MEDICARE

## 2022-07-18 VITALS
HEART RATE: 70 BPM | DIASTOLIC BLOOD PRESSURE: 74 MMHG | WEIGHT: 176 LBS | HEIGHT: 61 IN | SYSTOLIC BLOOD PRESSURE: 157 MMHG | BODY MASS INDEX: 33.23 KG/M2

## 2022-07-18 DIAGNOSIS — Z17.0 MALIGNANT NEOPLASM OF OVERLAPPING SITES OF LEFT BREAST IN FEMALE, ESTROGEN RECEPTOR POSITIVE: ICD-10-CM

## 2022-07-18 DIAGNOSIS — R92.8 ABNORMAL MAMMOGRAM: Primary | ICD-10-CM

## 2022-07-18 DIAGNOSIS — C50.812 MALIGNANT NEOPLASM OF OVERLAPPING SITES OF LEFT BREAST IN FEMALE, ESTROGEN RECEPTOR POSITIVE: ICD-10-CM

## 2022-07-18 DIAGNOSIS — Z12.31 SCREENING MAMMOGRAM FOR HIGH-RISK PATIENT: ICD-10-CM

## 2022-07-18 PROCEDURE — 99999 PR PBB SHADOW E&M-EST. PATIENT-LVL III: CPT | Mod: PBBFAC,,, | Performed by: SURGERY

## 2022-07-18 PROCEDURE — 1159F MED LIST DOCD IN RCRD: CPT | Mod: CPTII,S$GLB,, | Performed by: SURGERY

## 2022-07-18 PROCEDURE — 99213 PR OFFICE/OUTPT VISIT, EST, LEVL III, 20-29 MIN: ICD-10-PCS | Mod: S$GLB,,, | Performed by: SURGERY

## 2022-07-18 PROCEDURE — 3288F FALL RISK ASSESSMENT DOCD: CPT | Mod: CPTII,S$GLB,, | Performed by: SURGERY

## 2022-07-18 PROCEDURE — 77067 SCR MAMMO BI INCL CAD: CPT | Mod: TC

## 2022-07-18 PROCEDURE — 77063 BREAST TOMOSYNTHESIS BI: CPT | Mod: 26,,, | Performed by: RADIOLOGY

## 2022-07-18 PROCEDURE — 3077F PR MOST RECENT SYSTOLIC BLOOD PRESSURE >= 140 MM HG: ICD-10-PCS | Mod: CPTII,S$GLB,, | Performed by: SURGERY

## 2022-07-18 PROCEDURE — 1126F PR PAIN SEVERITY QUANTIFIED, NO PAIN PRESENT: ICD-10-PCS | Mod: CPTII,S$GLB,, | Performed by: SURGERY

## 2022-07-18 PROCEDURE — 77063 BREAST TOMOSYNTHESIS BI: CPT | Mod: TC

## 2022-07-18 PROCEDURE — 1126F AMNT PAIN NOTED NONE PRSNT: CPT | Mod: CPTII,S$GLB,, | Performed by: SURGERY

## 2022-07-18 PROCEDURE — 1101F PT FALLS ASSESS-DOCD LE1/YR: CPT | Mod: CPTII,S$GLB,, | Performed by: SURGERY

## 2022-07-18 PROCEDURE — 3288F PR FALLS RISK ASSESSMENT DOCUMENTED: ICD-10-PCS | Mod: CPTII,S$GLB,, | Performed by: SURGERY

## 2022-07-18 PROCEDURE — 1159F PR MEDICATION LIST DOCUMENTED IN MEDICAL RECORD: ICD-10-PCS | Mod: CPTII,S$GLB,, | Performed by: SURGERY

## 2022-07-18 PROCEDURE — 1101F PR PT FALLS ASSESS DOC 0-1 FALLS W/OUT INJ PAST YR: ICD-10-PCS | Mod: CPTII,S$GLB,, | Performed by: SURGERY

## 2022-07-18 PROCEDURE — 77067 SCR MAMMO BI INCL CAD: CPT | Mod: 26,,, | Performed by: RADIOLOGY

## 2022-07-18 PROCEDURE — 3078F DIAST BP <80 MM HG: CPT | Mod: CPTII,S$GLB,, | Performed by: SURGERY

## 2022-07-18 PROCEDURE — 77067 MAMMO DIGITAL SCREENING BILAT WITH TOMO: ICD-10-PCS | Mod: 26,,, | Performed by: RADIOLOGY

## 2022-07-18 PROCEDURE — 77063 MAMMO DIGITAL SCREENING BILAT WITH TOMO: ICD-10-PCS | Mod: 26,,, | Performed by: RADIOLOGY

## 2022-07-18 PROCEDURE — 99213 OFFICE O/P EST LOW 20 MIN: CPT | Mod: S$GLB,,, | Performed by: SURGERY

## 2022-07-18 PROCEDURE — 99999 PR PBB SHADOW E&M-EST. PATIENT-LVL III: ICD-10-PCS | Mod: PBBFAC,,, | Performed by: SURGERY

## 2022-07-18 PROCEDURE — 3078F PR MOST RECENT DIASTOLIC BLOOD PRESSURE < 80 MM HG: ICD-10-PCS | Mod: CPTII,S$GLB,, | Performed by: SURGERY

## 2022-07-18 PROCEDURE — 3077F SYST BP >= 140 MM HG: CPT | Mod: CPTII,S$GLB,, | Performed by: SURGERY

## 2022-07-18 NOTE — PROGRESS NOTES
[facility]           Breast Surgery Surveillance    7/18/2022    DIAGNOSIS:   This is a 82 y.o. female with a history of stage pT1a (sn)N0 M0 grade 1 ER + ME + HER2 -, IDC with lobular features of the left breast.    TREATMENT:   1. Left partial mastectomy with sentinel node biopsy on 10/18/2021. Dr. Abelino M.D. Surgical Oncology     HISTORY OF PRESENT ILLNESS:   Barbara Rizo is a 82 y.o. female comes in for oncological follow up.     She was seen by radiation oncology; radiation was not recommended.  Patient opted out of antiestrogen therapy and is not currently taking any antiestrogen therapy.    She denies change in her breast self-exam specifically denying new masses, skin or nipple changes, or nipple discharge. Past medical and surgical history is updated without new changes. There have been no changes to family history. The patient denies constitutional symptoms of night sweats, weight loss, new headaches, visual changes, new back or bony pain, chest pain, or shortness of breath.      IMAGING:   Mammogram 7/18/22  Impression:  Left-Focal Asymmetry: Left breast focal asymmetry at the upper outer posterior position. Assessment: 0 - Incomplete. Diagnostic Mammogram and/or Ultrasound is recommended.   Right-There is no mammographic evidence of malignancy in the right breast.  BI-RADS Category: Overall: 0 - Incomplete: Needs Additional Imaging Evaluation     MEDICATIONS/ALLERGIES:     Current Outpatient Medications   Medication Instructions    allopurinoL (ZYLOPRIM) 100 MG tablet TAKE 1 TABLET BY MOUTH ONCE DAILY    amiodarone (PACERONE) 200 MG Tab TAKE ONE-HALF TABLET BY MOUTH EVERY DAY *DO NOT TAKE WITH GRAPEFRUIT JUICE*    amLODIPine (NORVASC) 10 mg, Oral, Daily    atorvastatin (LIPITOR) 40 mg, Oral, Daily    calcium carbonate (TUMS) 200 mg calcium (500 mg) chewable tablet 2 tablets, Oral    ELIQUIS 2.5 mg Tab TAKE ONE TABLET BY MOUTH TWICE DAILY    ergocalciferol (ERGOCALCIFEROL)  "50,000 Units, Oral, Every 30 days    FLUZONE HIGHDOSE QUAD 21-22  mcg/0.7 mL Syrg No dose, route, or frequency recorded.    folic acid (FOLVITE) 1 MG tablet TAKE ONE TABLET BY MOUTH EVERY DAY    furosemide (LASIX) 20 mg, Oral, 2 times daily    losartan (COZAAR) 25 mg, Oral, Daily    metoprolol succinate (TOPROL-XL) 12.5 mg, Oral, Daily, Only take if HR > 60     Review of patient's allergies indicates:  No Known Allergies    PHYSICAL EXAM:   BP (!) 157/74 (BP Location: Left arm, Patient Position: Sitting, BP Method: Small (Automatic))   Pulse 70   Ht 5' 1" (1.549 m)   Wt 79.8 kg (176 lb)   BMI 33.25 kg/m²   General: The patient appears well and is in no acute distress.   Neuro: Alert & oriented x3. HEENT: PERRLA, EOMI, sclerae nonicteric. Mucous membranes moist.   Cardiovascular: RRR, no g/r/m.  Breasts: The exam was done with the patient seated and supine. Left breast - within normal limits. No palpable masses and no abnormal skin or nipple findings. No supraclavicular or axillary lymphadenopathy on the left side.   Right breast - incision well healed, c/d/i. No palpable masses and no abnormal skin or nipple findings. No supraclavicular or axillary lymphadenopathy on the right side.  Pulmonary: clear to auscultation bilaterally   Abdomen: soft, nontender, nondistended. No masses.    Extremities: No clubbing or cyanosis. Bilateral full arm range of motion without  lymphedema.     ASSESSMENT:   This is a 82 y.o. female without evidence of recurrence by exam, history or imaging. Most recent mammogram shown BIRADS 0 and will need further imaging.      PLAN:   1. Schedule for left diagnostic mammogram and left breast ultrasound.  2. If diagnostic mammogram negative, schedule for bilateral mammogram in 1 year.  3. Continue to follow up with Dr. Roca, 1 year.  4. Continue monthly self breast exams and call the clinic with any changes or problems.  5. The patient is in agreement with the plan. Questions " were encouraged and answered to patient's satisfaction. Barbara will call our office with any questions or concerns.     25 minutes were spent on this encounter, 15 of which was face to face counseling and 10 minutes were spent on chart review and coordination of care.

## 2022-08-01 ENCOUNTER — HOSPITAL ENCOUNTER (OUTPATIENT)
Dept: RADIOLOGY | Facility: HOSPITAL | Age: 82
Discharge: HOME | End: 2022-08-01
Attending: FAMILY MEDICINE
Payer: MEDICARE

## 2022-08-01 ENCOUNTER — HOSPITAL ENCOUNTER (OUTPATIENT)
Dept: RADIOLOGY | Facility: HOSPITAL | Age: 82
Discharge: HOME | End: 2022-08-01
Attending: SURGERY
Payer: MEDICARE

## 2022-08-01 ENCOUNTER — TELEPHONE (OUTPATIENT)
Dept: CARDIOLOGY | Facility: CLINIC | Age: 82
End: 2022-08-01
Payer: MEDICARE

## 2022-08-01 DIAGNOSIS — R92.8 ABNORMAL MAMMOGRAM: ICD-10-CM

## 2022-08-01 DIAGNOSIS — R92.8 ABNORMAL MAMMOGRAM OF LEFT BREAST: ICD-10-CM

## 2022-08-01 PROCEDURE — 77061 MAMMO DIGITAL DIAGNOSTIC LEFT WITH TOMO: ICD-10-PCS | Mod: 26,LT,, | Performed by: RADIOLOGY

## 2022-08-01 PROCEDURE — 77065 DX MAMMO INCL CAD UNI: CPT | Mod: 26,LT,, | Performed by: RADIOLOGY

## 2022-08-01 PROCEDURE — 77065 MAMMO DIGITAL DIAGNOSTIC LEFT WITH TOMO: ICD-10-PCS | Mod: 26,LT,, | Performed by: RADIOLOGY

## 2022-08-01 PROCEDURE — 76642 ULTRASOUND BREAST LIMITED: CPT | Mod: 26,LT,, | Performed by: RADIOLOGY

## 2022-08-01 PROCEDURE — 76642 US BREAST LEFT LIMITED: ICD-10-PCS | Mod: 26,LT,, | Performed by: RADIOLOGY

## 2022-08-01 PROCEDURE — 77061 BREAST TOMOSYNTHESIS UNI: CPT | Mod: 26,LT,, | Performed by: RADIOLOGY

## 2022-08-01 PROCEDURE — 77065 DX MAMMO INCL CAD UNI: CPT | Mod: TC,LT

## 2022-08-01 PROCEDURE — 76642 ULTRASOUND BREAST LIMITED: CPT | Mod: TC,LT

## 2022-08-01 NOTE — TELEPHONE ENCOUNTER
----- Message from Evelyn Gandhi, RT sent at 8/1/2022 12:09 PM CDT -----  Regarding: breast biopsy  Hi Dr Lomas,    Mrs Rizo is scheduled for an ultrasound guided breast biopsy on 8-10-22. Can she stop taking Eliquis for 5 days prior to the biopsy? If so, when should she resume?    Thanks,  Ovidio

## 2022-08-03 ENCOUNTER — TELEPHONE (OUTPATIENT)
Dept: CARDIOLOGY | Facility: CLINIC | Age: 82
End: 2022-08-03
Payer: MEDICARE

## 2022-08-03 NOTE — TELEPHONE ENCOUNTER
----- Message from Evelyn Gandhi, RT sent at 8/3/2022  9:03 AM CDT -----  Regarding: stopping blood thinners  Hi Dr Lomas,    Sorry to bother you again. I am out for the rest of the week, so I needed to know if Mrs Jimenez can stop her Eliquis for 5 days? That would be on 8-5-22. Her biopsy is on 8-10-22.    Thanks,  Ovidio

## 2022-08-10 ENCOUNTER — HOSPITAL ENCOUNTER (OUTPATIENT)
Dept: RADIOLOGY | Facility: HOSPITAL | Age: 82
Discharge: HOME | End: 2022-08-10
Attending: SURGERY
Payer: MEDICARE

## 2022-08-10 VITALS — BODY MASS INDEX: 33.23 KG/M2 | HEIGHT: 61 IN | WEIGHT: 176 LBS

## 2022-08-10 DIAGNOSIS — R92.8 ABNORMAL MAMMOGRAM OF LEFT BREAST: ICD-10-CM

## 2022-08-10 PROCEDURE — 88342 CHG IMMUNOCYTOCHEMISTRY: ICD-10-PCS | Mod: 26,,, | Performed by: PATHOLOGY

## 2022-08-10 PROCEDURE — 25000003 PHARM REV CODE 250: Performed by: SURGERY

## 2022-08-10 PROCEDURE — 27200939 US BREAST BIOPSY WITH IMAGING 1ST SITE LEFT

## 2022-08-10 PROCEDURE — 88341 IMHCHEM/IMCYTCHM EA ADD ANTB: CPT | Mod: 59 | Performed by: PATHOLOGY

## 2022-08-10 PROCEDURE — 19083 BX BREAST 1ST LESION US IMAG: CPT | Mod: LT,,, | Performed by: RADIOLOGY

## 2022-08-10 PROCEDURE — 88305 TISSUE EXAM BY PATHOLOGIST: ICD-10-PCS | Mod: 26,,, | Performed by: PATHOLOGY

## 2022-08-10 PROCEDURE — A4648 IMPLANTABLE TISSUE MARKER: HCPCS

## 2022-08-10 PROCEDURE — 88342 IMHCHEM/IMCYTCHM 1ST ANTB: CPT | Performed by: PATHOLOGY

## 2022-08-10 PROCEDURE — 88341 PR IHC OR ICC EACH ADD'L SINGLE ANTIBODY  STAINPR: ICD-10-PCS | Mod: 26,,, | Performed by: PATHOLOGY

## 2022-08-10 PROCEDURE — 88342 IMHCHEM/IMCYTCHM 1ST ANTB: CPT | Mod: 26,,, | Performed by: PATHOLOGY

## 2022-08-10 PROCEDURE — 19083 US BREAST BIOPSY WITH IMAGING 1ST SITE LEFT: ICD-10-PCS | Mod: LT,,, | Performed by: RADIOLOGY

## 2022-08-10 PROCEDURE — 88305 TISSUE EXAM BY PATHOLOGIST: CPT | Mod: 26,,, | Performed by: PATHOLOGY

## 2022-08-10 PROCEDURE — 88341 IMHCHEM/IMCYTCHM EA ADD ANTB: CPT | Mod: 26,,, | Performed by: PATHOLOGY

## 2022-08-10 PROCEDURE — 88305 TISSUE EXAM BY PATHOLOGIST: CPT | Performed by: PATHOLOGY

## 2022-08-10 PROCEDURE — 77065 DX MAMMO INCL CAD UNI: CPT | Mod: TC,LT

## 2022-08-10 PROCEDURE — 77065 DX MAMMO INCL CAD UNI: CPT | Mod: 26,LT,, | Performed by: RADIOLOGY

## 2022-08-10 PROCEDURE — 77065 MAMMO DIGITAL DIAGNOSTIC LEFT: ICD-10-PCS | Mod: 26,LT,, | Performed by: RADIOLOGY

## 2022-08-10 RX ORDER — LIDOCAINE HYDROCHLORIDE 20 MG/ML
10 INJECTION, SOLUTION INFILTRATION; PERINEURAL ONCE
Status: COMPLETED | OUTPATIENT
Start: 2022-08-10 | End: 2022-08-10

## 2022-08-10 RX ORDER — LIDOCAINE HYDROCHLORIDE AND EPINEPHRINE 10; 10 MG/ML; UG/ML
10 INJECTION, SOLUTION INFILTRATION; PERINEURAL ONCE
Status: COMPLETED | OUTPATIENT
Start: 2022-08-10 | End: 2022-08-10

## 2022-08-10 RX ADMIN — LIDOCAINE HYDROCHLORIDE 4 ML: 20 INJECTION, SOLUTION INFILTRATION; PERINEURAL at 11:08

## 2022-08-10 RX ADMIN — LIDOCAINE HYDROCHLORIDE,EPINEPHRINE BITARTRATE 5 ML: 10; .01 INJECTION, SOLUTION INFILTRATION; PERINEURAL at 11:08

## 2022-08-18 LAB
FINAL PATHOLOGIC DIAGNOSIS: NORMAL
GROSS: NORMAL
MICROSCOPIC EXAM: NORMAL

## 2022-08-25 ENCOUNTER — PATIENT MESSAGE (OUTPATIENT)
Dept: FAMILY MEDICINE | Facility: CLINIC | Age: 82
End: 2022-08-25
Payer: MEDICARE

## 2022-10-03 ENCOUNTER — TELEPHONE (OUTPATIENT)
Dept: FAMILY MEDICINE | Facility: CLINIC | Age: 82
End: 2022-10-03
Payer: MEDICARE

## 2022-10-03 DIAGNOSIS — N18.4 TYPE 2 DIABETES MELLITUS WITH STAGE 4 CHRONIC KIDNEY DISEASE, WITHOUT LONG-TERM CURRENT USE OF INSULIN: Primary | ICD-10-CM

## 2022-10-03 DIAGNOSIS — E11.22 TYPE 2 DIABETES MELLITUS WITH STAGE 4 CHRONIC KIDNEY DISEASE, WITHOUT LONG-TERM CURRENT USE OF INSULIN: Primary | ICD-10-CM

## 2022-10-03 NOTE — TELEPHONE ENCOUNTER
----- Message from Jerry Ley sent at 10/3/2022  8:29 AM CDT -----  Regarding: Orders  Contact: FAISAL ORNELAS [[id number]]  Name of Who is Calling: FAISAL ORNELAS [[id number]]      What is the request in detail:  Would like to speak with staff in regards to knowing if any other labs are needed for upcoming appt. Patient is looking to have an A1c order scheduled. Please advise      Can the clinic reply by [facility]: no      What Number to Call Back if not in [facility]: [phone]

## 2022-10-04 NOTE — TELEPHONE ENCOUNTER
----- Message from Viraj Bravo DNP sent at 6/2/2020  8:04 AM CDT -----  Dr. Rawls & team-  The patient's COVID test came back negative, and she can therefore proceed with her appointments as scheduled unless otherwise contraindicated.  -Viraj    
Informed the patient of test results.  
N/A

## 2022-10-05 ENCOUNTER — LAB VISIT (OUTPATIENT)
Dept: LAB | Facility: HOSPITAL | Age: 82
End: 2022-10-05
Attending: FAMILY MEDICINE
Payer: MEDICARE

## 2022-10-05 DIAGNOSIS — N18.4 TYPE 2 DIABETES MELLITUS WITH STAGE 4 CHRONIC KIDNEY DISEASE, WITHOUT LONG-TERM CURRENT USE OF INSULIN: ICD-10-CM

## 2022-10-05 DIAGNOSIS — E11.22 TYPE 2 DIABETES MELLITUS WITH STAGE 4 CHRONIC KIDNEY DISEASE, WITHOUT LONG-TERM CURRENT USE OF INSULIN: ICD-10-CM

## 2022-10-05 LAB
ALBUMIN SERPL BCP-MCNC: 4.2 G/DL (ref 3.5–5.2)
ALP SERPL-CCNC: 77 U/L (ref 55–135)
ALT SERPL W/O P-5'-P-CCNC: 29 U/L (ref 10–44)
ANION GAP SERPL CALC-SCNC: 9 MMOL/L (ref 8–16)
AST SERPL-CCNC: 18 U/L (ref 10–40)
BILIRUB SERPL-MCNC: 0.8 MG/DL (ref 0.1–1)
BUN SERPL-MCNC: 26 MG/DL (ref 8–23)
CALCIUM SERPL-MCNC: 9.5 MG/DL (ref 8.7–10.5)
CHLORIDE SERPL-SCNC: 103 MMOL/L (ref 95–110)
CO2 SERPL-SCNC: 26 MMOL/L (ref 23–29)
CREAT SERPL-MCNC: 1.9 MG/DL (ref 0.5–1.4)
EST. GFR  (NO RACE VARIABLE): 26 ML/MIN/1.73 M^2
ESTIMATED AVG GLUCOSE: 111 MG/DL (ref 68–131)
GLUCOSE SERPL-MCNC: 113 MG/DL (ref 70–110)
HBA1C MFR BLD: 5.5 % (ref 4–5.6)
POTASSIUM SERPL-SCNC: 5.1 MMOL/L (ref 3.5–5.1)
PROT SERPL-MCNC: 8.2 G/DL (ref 6–8.4)
SODIUM SERPL-SCNC: 138 MMOL/L (ref 136–145)

## 2022-10-05 PROCEDURE — 36415 COLL VENOUS BLD VENIPUNCTURE: CPT | Mod: PN | Performed by: FAMILY MEDICINE

## 2022-10-05 PROCEDURE — 80053 COMPREHEN METABOLIC PANEL: CPT | Performed by: FAMILY MEDICINE

## 2022-10-05 PROCEDURE — 83036 HEMOGLOBIN GLYCOSYLATED A1C: CPT | Performed by: FAMILY MEDICINE

## 2022-10-07 DIAGNOSIS — E11.22 TYPE 2 DIABETES MELLITUS WITH STAGE 3 CHRONIC KIDNEY DISEASE, WITHOUT LONG-TERM CURRENT USE OF INSULIN: ICD-10-CM

## 2022-10-07 DIAGNOSIS — I10 ESSENTIAL HYPERTENSION: ICD-10-CM

## 2022-10-07 DIAGNOSIS — N18.30 TYPE 2 DIABETES MELLITUS WITH STAGE 3 CHRONIC KIDNEY DISEASE, WITHOUT LONG-TERM CURRENT USE OF INSULIN: ICD-10-CM

## 2022-10-07 RX ORDER — ATORVASTATIN CALCIUM 40 MG/1
TABLET, FILM COATED ORAL
Qty: 90 TABLET | Refills: 2 | Status: SHIPPED | OUTPATIENT
Start: 2022-10-07 | End: 2023-01-01

## 2022-10-07 NOTE — TELEPHONE ENCOUNTER
Refill Decision Note   Barbara Rizo  is requesting a refill authorization.  Brief Assessment and Rationale for Refill:  Approve     Medication Therapy Plan:       Medication Reconciliation Completed: No   Comments:     No Care Gaps recommended.     Note composed:10:15 AM 10/07/2022

## 2022-10-07 NOTE — TELEPHONE ENCOUNTER
No new care gaps identified.  [facility] Embedded Care Gaps. Reference number: [id number]. 10/07/2022   8:25:43 AM ROBERTAT

## 2022-10-10 ENCOUNTER — OFFICE VISIT (OUTPATIENT)
Dept: FAMILY MEDICINE | Facility: CLINIC | Age: 82
End: 2022-10-10
Payer: MEDICARE

## 2022-10-10 VITALS
HEIGHT: 65 IN | DIASTOLIC BLOOD PRESSURE: 76 MMHG | BODY MASS INDEX: 29.65 KG/M2 | SYSTOLIC BLOOD PRESSURE: 140 MMHG | RESPIRATION RATE: 18 BRPM | OXYGEN SATURATION: 99 % | WEIGHT: 177.94 LBS | TEMPERATURE: 98 F | HEART RATE: 72 BPM

## 2022-10-10 DIAGNOSIS — Z23 INFLUENZA VACCINE NEEDED: Primary | ICD-10-CM

## 2022-10-10 PROCEDURE — G0008 FLU VACCINE - QUADRIVALENT - ADJUVANTED: ICD-10-PCS | Mod: S$GLB,,, | Performed by: FAMILY MEDICINE

## 2022-10-10 PROCEDURE — 3077F PR MOST RECENT SYSTOLIC BLOOD PRESSURE >= 140 MM HG: ICD-10-PCS | Mod: CPTII,S$GLB,, | Performed by: FAMILY MEDICINE

## 2022-10-10 PROCEDURE — 3078F DIAST BP <80 MM HG: CPT | Mod: CPTII,S$GLB,, | Performed by: FAMILY MEDICINE

## 2022-10-10 PROCEDURE — 1126F PR PAIN SEVERITY QUANTIFIED, NO PAIN PRESENT: ICD-10-PCS | Mod: CPTII,S$GLB,, | Performed by: FAMILY MEDICINE

## 2022-10-10 PROCEDURE — 1126F AMNT PAIN NOTED NONE PRSNT: CPT | Mod: CPTII,S$GLB,, | Performed by: FAMILY MEDICINE

## 2022-10-10 PROCEDURE — 1101F PT FALLS ASSESS-DOCD LE1/YR: CPT | Mod: CPTII,S$GLB,, | Performed by: FAMILY MEDICINE

## 2022-10-10 PROCEDURE — 3077F SYST BP >= 140 MM HG: CPT | Mod: CPTII,S$GLB,, | Performed by: FAMILY MEDICINE

## 2022-10-10 PROCEDURE — 3288F FALL RISK ASSESSMENT DOCD: CPT | Mod: CPTII,S$GLB,, | Performed by: FAMILY MEDICINE

## 2022-10-10 PROCEDURE — 1101F PR PT FALLS ASSESS DOC 0-1 FALLS W/OUT INJ PAST YR: ICD-10-PCS | Mod: CPTII,S$GLB,, | Performed by: FAMILY MEDICINE

## 2022-10-10 PROCEDURE — 99397 PER PM REEVAL EST PAT 65+ YR: CPT | Mod: GZ,S$GLB,, | Performed by: FAMILY MEDICINE

## 2022-10-10 PROCEDURE — 99999 PR PBB SHADOW E&M-EST. PATIENT-LVL V: CPT | Mod: PBBFAC,,, | Performed by: FAMILY MEDICINE

## 2022-10-10 PROCEDURE — 1159F MED LIST DOCD IN RCRD: CPT | Mod: CPTII,S$GLB,, | Performed by: FAMILY MEDICINE

## 2022-10-10 PROCEDURE — 90694 VACC AIIV4 NO PRSRV 0.5ML IM: CPT | Mod: S$GLB,,, | Performed by: FAMILY MEDICINE

## 2022-10-10 PROCEDURE — 1160F PR REVIEW ALL MEDS BY PRESCRIBER/CLIN PHARMACIST DOCUMENTED: ICD-10-PCS | Mod: CPTII,S$GLB,, | Performed by: FAMILY MEDICINE

## 2022-10-10 PROCEDURE — 1160F RVW MEDS BY RX/DR IN RCRD: CPT | Mod: CPTII,S$GLB,, | Performed by: FAMILY MEDICINE

## 2022-10-10 PROCEDURE — 90694 FLU VACCINE - QUADRIVALENT - ADJUVANTED: ICD-10-PCS | Mod: S$GLB,,, | Performed by: FAMILY MEDICINE

## 2022-10-10 PROCEDURE — 3078F PR MOST RECENT DIASTOLIC BLOOD PRESSURE < 80 MM HG: ICD-10-PCS | Mod: CPTII,S$GLB,, | Performed by: FAMILY MEDICINE

## 2022-10-10 PROCEDURE — 3288F PR FALLS RISK ASSESSMENT DOCUMENTED: ICD-10-PCS | Mod: CPTII,S$GLB,, | Performed by: FAMILY MEDICINE

## 2022-10-10 PROCEDURE — 99397 PR PREVENTIVE VISIT,EST,65 & OVER: ICD-10-PCS | Mod: GZ,S$GLB,, | Performed by: FAMILY MEDICINE

## 2022-10-10 PROCEDURE — 1159F PR MEDICATION LIST DOCUMENTED IN MEDICAL RECORD: ICD-10-PCS | Mod: CPTII,S$GLB,, | Performed by: FAMILY MEDICINE

## 2022-10-10 PROCEDURE — G0008 ADMIN INFLUENZA VIRUS VAC: HCPCS | Mod: S$GLB,,, | Performed by: FAMILY MEDICINE

## 2022-10-10 PROCEDURE — 99999 PR PBB SHADOW E&M-EST. PATIENT-LVL V: ICD-10-PCS | Mod: PBBFAC,,, | Performed by: FAMILY MEDICINE

## 2022-10-10 NOTE — PROGRESS NOTES
"  Well Woman VISIT      CHIEF COMPLAINT  Chief Complaint   Patient presents with    Follow-up    Hypertension       HPI  Barbara Rizo is a 82 y.o. female who presents for physical.     Social Factors  Tobacco use: No  Ready to Quit: No  Alcohol: Yes on occasion  Intimate partner violence screening  "Do you feel safe in your current relationship?" single  "Have you ever been in a relationship in which your partner frightened you or hurt you?" No  Living Will/POA: No  Regular Exercise: No    Depression  Over the past two weeks, have you felt down, depressed, or hopeless? No  Over the past two weeks, have you felt little interest or pleasure in doing things? No      CHD, HTN, DM2  CHD Risk Factors: advanced age (older than 55 for men, 65 for women), dyslipidemia, hypertension and obesity (BMI >= 30 kg/m2)  Women 45 years and older should be screened for dyslipidemia if at increased risk of CHD  Women 20 to 45 years of age should be screened for dyslipidemia if at increased risk of CHD  Asymptomatic adults with sustained blood pressure greater than 135/80 mm Hg (treated or untreated) should be screened for type 2 diabetes mellitus    Estimated body mass index is 29.61 kg/m² as calculated from the following:    Height as of this encounter: 5' 5" (1.651 m).    Weight as of this encounter: 80.7 kg (177 lb 14.6 oz).      Screening  Mammogram needed: n/a  Colonoscopy needed: n/a  Osteoporosis screen needed: n/a     Women 50 to 74 years of age should be screened for breast cancer with mammography biennially.  Women should be screened for cervical cancer with Pap tests beginning at 21 years of age. Low-risk women should receive Pap testing every three years. Co-testing for human papillomavirus is an option beginning at 30 years of age, and can extend the screening interval to five years. Cervical cancer screening should be discontinued at 65 years of age or after total hysterectomy if the woman has a benign " "gynecologic history  Adults 50 to 75 years of age should be screened for colorectal cancer with an FOBT annually, sigmoidoscopy every five years with an FOBT every three years, or colonoscopy every 10 years.  Women 65 years and older should be screened for osteoporosis. Women younger than 65 years should be screened if the risk of fracture is greater than or equal to that of a 65-year-old white woman without additional risk factors.      Immunizations  delayed    ALLERGIES and MEDS were verified.   PMHx, PSHx, FHx, SOCIALHx were updated as pertinent.    REVIEW OF SYSTEMS  Review of Systems   Constitutional: Negative.    HENT: Negative.     Eyes: Negative.    Respiratory: Negative.     Cardiovascular: Negative.    Genitourinary: Negative.    Musculoskeletal:  Positive for joint pain.   Skin: Negative.    Neurological: Negative.    Psychiatric/Behavioral: Negative.         PHYSICAL EXAM  VITAL SIGNS: BP (!) 140/76   Pulse 72   Temp 97.6 °F (36.4 °C) (Oral)   Resp 18   Ht 5' 5" (1.651 m)   Wt 80.7 kg (177 lb 14.6 oz)   SpO2 99%   BMI 29.61 kg/m²   GEN: Well developed, Well nourished, No acute distress.  HENT: Normocephalic, Atraumatic, Bilateral external ears normal, Nose normal, Oropharynx moist, No oral exudates.   Eyes: PERRLA, EOMI, Conjunctiva normal, No discharge.   Neck: Supple, No tenderness.  Lymphatic: No cervical or supraclavicular lymphadenopathy noted.   Cardiovascular: Normal heart rate, Normal rhythm, No murmurs, No rubs, No gallops.   Thorax & Lungs: Normal breath sounds, No respiratory distress, No wheezing.  Abdomen: Soft, No tenderness, Bowel sounds normal.  Breast: deferred  Genital:deferred  Skin: Warm, Dry, No erythema, No rash.   Extremities: No edema, No tenderness.       ASSESSMENT/PLAN    Barbara was seen today for follow-up and hypertension.    Diagnoses and all orders for this visit:    Influenza vaccine needed  -     Influenza (FLUAD) - Quadrivalent (Adjuvanted) *Preferred* (65+) " (PF)         FOLLOW UP: 6 months or sooner if needed      Paras Oro MD

## 2022-10-13 ENCOUNTER — TELEPHONE (OUTPATIENT)
Dept: CARDIOLOGY | Facility: CLINIC | Age: 82
End: 2022-10-13
Payer: MEDICARE

## 2022-10-13 RX ORDER — FUROSEMIDE 20 MG/1
20 TABLET ORAL 2 TIMES DAILY
Qty: 60 TABLET | Refills: 11 | Status: ON HOLD | OUTPATIENT
Start: 2022-10-13 | End: 2023-01-01 | Stop reason: HOSPADM

## 2022-10-27 DIAGNOSIS — E83.119 HEMOCHROMATOSIS, UNSPECIFIED HEMOCHROMATOSIS TYPE: ICD-10-CM

## 2022-10-27 RX ORDER — FOLIC ACID 1 MG/1
1000 TABLET ORAL DAILY
Qty: 90 TABLET | Refills: 0 | Status: SHIPPED | OUTPATIENT
Start: 2022-10-27 | End: 2023-01-01

## 2022-12-05 NOTE — PLAN OF CARE
Patient arrived to unit for stat labs and Prolia injection. Pt reports continues Calcium and Vitamin D supplements. Denies any recent falls, dental procedures, or jaw pain. Plan of care reviewed, patient agreeable to plan.Calcium level 9.7 today. Patient tolerated injection well. VSS. Discharge instructions reviewed, patient instructed to return 12/16. Patient ambulated off unit unassisted by self. Patient in NAD at time of discharge.    Incidental Actinic Keratosis Histology Text: Incidental AK was noted at the periphery of the Mohs section destruction was performed, pt was was advised to monitor, and/or patient was advised to considered topical 5FU once fully healed.

## 2022-12-05 NOTE — PROGRESS NOTES
[facility] 1 BP Non-Compliant  Page 11.14.22 - Left message for patient to call our office. Updated blood pressure reading is needed.    Overdue Bone Mineral Density - Left message for patient to call our office. Please schedule.

## 2022-12-16 NOTE — PLAN OF CARE
Pt tolerated injection without difficulty.  No acute distress noted.  Patient appears to be in good spirits.  No acute distress noted.  Patient ambulating independently.

## 2022-12-22 NOTE — TELEPHONE ENCOUNTER
----- Message from Marley Valentin MD sent at 12/22/2022 10:53 AM CST -----  Dr. Oro is out of the office.   On review of your bone scan:    Your Bone Mineral Density test showed osteoporosis. This is severe bone loss and you have higher risk of fracture.     It is recommended that we start prescription medication. Would you discuss this in clinic with Dr. Oro or see an endocrinologist.   Medication should be taken with calcium 1200 mg  and vitamin D 1000 IU daily.   I also recommend weight bearing exercises

## 2023-01-01 ENCOUNTER — ANESTHESIA (OUTPATIENT)
Dept: CARDIOLOGY | Facility: HOSPITAL | Age: 83
DRG: 308 | End: 2023-01-01
Payer: MEDICARE

## 2023-01-01 ENCOUNTER — TELEPHONE (OUTPATIENT)
Dept: HEMATOLOGY/ONCOLOGY | Facility: CLINIC | Age: 83
End: 2023-01-01
Payer: MEDICARE

## 2023-01-01 ENCOUNTER — TELEPHONE (OUTPATIENT)
Dept: FAMILY MEDICINE | Facility: CLINIC | Age: 83
End: 2023-01-01

## 2023-01-01 ENCOUNTER — INFUSION (OUTPATIENT)
Dept: INFUSION THERAPY | Facility: HOSPITAL | Age: 83
End: 2023-01-01
Attending: STUDENT IN AN ORGANIZED HEALTH CARE EDUCATION/TRAINING PROGRAM
Payer: MEDICARE

## 2023-01-01 ENCOUNTER — HOSPITAL ENCOUNTER (INPATIENT)
Facility: HOSPITAL | Age: 83
LOS: 4 days | Discharge: HOME | DRG: 291 | End: 2023-10-30
Attending: EMERGENCY MEDICINE | Admitting: STUDENT IN AN ORGANIZED HEALTH CARE EDUCATION/TRAINING PROGRAM
Payer: MEDICARE

## 2023-01-01 ENCOUNTER — INFUSION (OUTPATIENT)
Dept: INFUSION THERAPY | Facility: HOSPITAL | Age: 83
End: 2023-01-01
Attending: INTERNAL MEDICINE
Payer: MEDICARE

## 2023-01-01 ENCOUNTER — HOSPITAL ENCOUNTER (OUTPATIENT)
Dept: RADIOLOGY | Facility: HOSPITAL | Age: 83
Discharge: HOME | End: 2023-03-29
Attending: SURGERY
Payer: MEDICARE

## 2023-01-01 ENCOUNTER — PES CALL (OUTPATIENT)
Dept: ADMINISTRATIVE | Facility: CLINIC | Age: 83
End: 2023-01-01
Payer: MEDICARE

## 2023-01-01 ENCOUNTER — TELEPHONE (OUTPATIENT)
Dept: FAMILY MEDICINE | Facility: CLINIC | Age: 83
End: 2023-01-01
Payer: MEDICARE

## 2023-01-01 ENCOUNTER — LAB VISIT (OUTPATIENT)
Dept: LAB | Facility: HOSPITAL | Age: 83
End: 2023-01-01
Attending: INTERNAL MEDICINE
Payer: MEDICARE

## 2023-01-01 ENCOUNTER — TELEPHONE (OUTPATIENT)
Dept: ENDOSCOPY | Facility: HOSPITAL | Age: 83
End: 2023-01-01
Payer: MEDICARE

## 2023-01-01 ENCOUNTER — OFFICE VISIT (OUTPATIENT)
Dept: UROLOGY | Facility: CLINIC | Age: 83
End: 2023-01-01
Payer: MEDICARE

## 2023-01-01 ENCOUNTER — LAB VISIT (OUTPATIENT)
Dept: LAB | Facility: HOSPITAL | Age: 83
End: 2023-01-01
Attending: STUDENT IN AN ORGANIZED HEALTH CARE EDUCATION/TRAINING PROGRAM
Payer: MEDICARE

## 2023-01-01 ENCOUNTER — OFFICE VISIT (OUTPATIENT)
Dept: PULMONOLOGY | Facility: CLINIC | Age: 83
End: 2023-01-01
Payer: MEDICARE

## 2023-01-01 ENCOUNTER — HOSPITAL ENCOUNTER (OUTPATIENT)
Dept: CARDIOLOGY | Facility: HOSPITAL | Age: 83
Discharge: HOME | End: 2023-03-07
Attending: INTERNAL MEDICINE
Payer: MEDICARE

## 2023-01-01 ENCOUNTER — OFFICE VISIT (OUTPATIENT)
Dept: FAMILY MEDICINE | Facility: CLINIC | Age: 83
End: 2023-01-01
Payer: MEDICARE

## 2023-01-01 ENCOUNTER — HOSPITAL ENCOUNTER (INPATIENT)
Facility: HOSPITAL | Age: 83
LOS: 1 days | DRG: 292 | End: 2023-11-02
Attending: STUDENT IN AN ORGANIZED HEALTH CARE EDUCATION/TRAINING PROGRAM | Admitting: STUDENT IN AN ORGANIZED HEALTH CARE EDUCATION/TRAINING PROGRAM
Payer: MEDICARE

## 2023-01-01 ENCOUNTER — PATIENT OUTREACH (OUTPATIENT)
Dept: ADMINISTRATIVE | Facility: CLINIC | Age: 83
End: 2023-01-01
Payer: MEDICARE

## 2023-01-01 ENCOUNTER — OFFICE VISIT (OUTPATIENT)
Dept: FAMILY MEDICINE | Facility: CLINIC | Age: 83
DRG: 308 | End: 2023-01-01
Payer: MEDICARE

## 2023-01-01 ENCOUNTER — OFFICE VISIT (OUTPATIENT)
Dept: CARDIOLOGY | Facility: CLINIC | Age: 83
End: 2023-01-01
Payer: MEDICARE

## 2023-01-01 ENCOUNTER — PATIENT MESSAGE (OUTPATIENT)
Dept: ADMINISTRATIVE | Facility: HOSPITAL | Age: 83
End: 2023-01-01
Payer: MEDICARE

## 2023-01-01 ENCOUNTER — DOCUMENT SCAN (OUTPATIENT)
Dept: HOME HEALTH SERVICES | Facility: HOSPITAL | Age: 83
End: 2023-01-01
Payer: MEDICARE

## 2023-01-01 ENCOUNTER — HOSPITAL ENCOUNTER (OUTPATIENT)
Dept: RADIOLOGY | Facility: HOSPITAL | Age: 83
Discharge: HOME | DRG: 291 | End: 2023-10-26
Attending: NURSE PRACTITIONER
Payer: MEDICARE

## 2023-01-01 ENCOUNTER — DOCUMENT SCAN (OUTPATIENT)
Dept: HOME HEALTH SERVICES | Facility: HOSPITAL | Age: 83
End: 2023-01-01

## 2023-01-01 ENCOUNTER — PATIENT MESSAGE (OUTPATIENT)
Dept: FAMILY MEDICINE | Facility: CLINIC | Age: 83
End: 2023-01-01

## 2023-01-01 ENCOUNTER — EXTERNAL HOME HEALTH (OUTPATIENT)
Dept: HOME HEALTH SERVICES | Facility: HOSPITAL | Age: 83
End: 2023-01-01
Payer: MEDICARE

## 2023-01-01 ENCOUNTER — ANESTHESIA EVENT (OUTPATIENT)
Dept: CARDIOLOGY | Facility: HOSPITAL | Age: 83
DRG: 308 | End: 2023-01-01
Payer: MEDICARE

## 2023-01-01 ENCOUNTER — HOSPITAL ENCOUNTER (INPATIENT)
Facility: HOSPITAL | Age: 83
LOS: 3 days | Discharge: HOME HEALTH | DRG: 308 | End: 2023-09-17
Attending: EMERGENCY MEDICINE | Admitting: EMERGENCY MEDICINE
Payer: MEDICARE

## 2023-01-01 ENCOUNTER — OFFICE VISIT (OUTPATIENT)
Dept: HEMATOLOGY/ONCOLOGY | Facility: CLINIC | Age: 83
End: 2023-01-01
Payer: MEDICARE

## 2023-01-01 ENCOUNTER — HOSPITAL ENCOUNTER (INPATIENT)
Facility: HOSPITAL | Age: 83
LOS: 10 days | Discharge: HOME HEALTH | DRG: 193 | End: 2023-09-06
Attending: EMERGENCY MEDICINE | Admitting: STUDENT IN AN ORGANIZED HEALTH CARE EDUCATION/TRAINING PROGRAM
Payer: MEDICARE

## 2023-01-01 ENCOUNTER — PATIENT MESSAGE (OUTPATIENT)
Dept: FAMILY MEDICINE | Facility: CLINIC | Age: 83
End: 2023-01-01
Payer: MEDICARE

## 2023-01-01 ENCOUNTER — TELEPHONE (OUTPATIENT)
Dept: UROLOGY | Facility: CLINIC | Age: 83
End: 2023-01-01
Payer: MEDICARE

## 2023-01-01 VITALS
TEMPERATURE: 98 F | HEART RATE: 64 BPM | HEIGHT: 62 IN | SYSTOLIC BLOOD PRESSURE: 118 MMHG | DIASTOLIC BLOOD PRESSURE: 80 MMHG | OXYGEN SATURATION: 93 % | RESPIRATION RATE: 18 BRPM | WEIGHT: 162.38 LBS | SYSTOLIC BLOOD PRESSURE: 126 MMHG | OXYGEN SATURATION: 93 % | RESPIRATION RATE: 16 BRPM | HEIGHT: 62 IN | DIASTOLIC BLOOD PRESSURE: 69 MMHG | HEART RATE: 117 BPM | SYSTOLIC BLOOD PRESSURE: 122 MMHG | HEART RATE: 101 BPM | BODY MASS INDEX: 29.88 KG/M2 | TEMPERATURE: 98 F | OXYGEN SATURATION: 94 % | WEIGHT: 158.94 LBS | TEMPERATURE: 98 F | HEIGHT: 62 IN | BODY MASS INDEX: 29.25 KG/M2 | DIASTOLIC BLOOD PRESSURE: 62 MMHG | WEIGHT: 184.06 LBS | BODY MASS INDEX: 33.87 KG/M2

## 2023-01-01 VITALS
SYSTOLIC BLOOD PRESSURE: 175 MMHG | RESPIRATION RATE: 18 BRPM | OXYGEN SATURATION: 87 % | HEART RATE: 80 BPM | DIASTOLIC BLOOD PRESSURE: 73 MMHG | BODY MASS INDEX: 30.58 KG/M2 | WEIGHT: 183.75 LBS

## 2023-01-01 VITALS
TEMPERATURE: 98 F | OXYGEN SATURATION: 97 % | OXYGEN SATURATION: 94 % | RESPIRATION RATE: 18 BRPM | RESPIRATION RATE: 17 BRPM | HEART RATE: 126 BPM | DIASTOLIC BLOOD PRESSURE: 68 MMHG | SYSTOLIC BLOOD PRESSURE: 120 MMHG | DIASTOLIC BLOOD PRESSURE: 60 MMHG | HEIGHT: 62 IN | SYSTOLIC BLOOD PRESSURE: 106 MMHG | TEMPERATURE: 98 F | BODY MASS INDEX: 26.37 KG/M2 | DIASTOLIC BLOOD PRESSURE: 60 MMHG | HEART RATE: 58 BPM | WEIGHT: 143.31 LBS | SYSTOLIC BLOOD PRESSURE: 120 MMHG | HEART RATE: 82 BPM | OXYGEN SATURATION: 95 % | TEMPERATURE: 98 F

## 2023-01-01 VITALS
TEMPERATURE: 99 F | RESPIRATION RATE: 18 BRPM | WEIGHT: 175.81 LBS | BODY MASS INDEX: 33.19 KG/M2 | DIASTOLIC BLOOD PRESSURE: 70 MMHG | HEIGHT: 61 IN | OXYGEN SATURATION: 91 % | SYSTOLIC BLOOD PRESSURE: 138 MMHG | HEART RATE: 66 BPM

## 2023-01-01 VITALS
HEIGHT: 65 IN | RESPIRATION RATE: 18 BRPM | WEIGHT: 167.56 LBS | OXYGEN SATURATION: 89 % | SYSTOLIC BLOOD PRESSURE: 140 MMHG | DIASTOLIC BLOOD PRESSURE: 68 MMHG | BODY MASS INDEX: 27.92 KG/M2 | HEART RATE: 62 BPM

## 2023-01-01 VITALS
DIASTOLIC BLOOD PRESSURE: 62 MMHG | HEIGHT: 62 IN | OXYGEN SATURATION: 94 % | BODY MASS INDEX: 29.94 KG/M2 | HEART RATE: 67 BPM | WEIGHT: 162.69 LBS | SYSTOLIC BLOOD PRESSURE: 118 MMHG

## 2023-01-01 VITALS
SYSTOLIC BLOOD PRESSURE: 120 MMHG | HEART RATE: 65 BPM | TEMPERATURE: 99 F | WEIGHT: 170.19 LBS | OXYGEN SATURATION: 92 % | RESPIRATION RATE: 20 BRPM | BODY MASS INDEX: 31.32 KG/M2 | DIASTOLIC BLOOD PRESSURE: 67 MMHG | HEIGHT: 62 IN

## 2023-01-01 VITALS
SYSTOLIC BLOOD PRESSURE: 112 MMHG | DIASTOLIC BLOOD PRESSURE: 84 MMHG | WEIGHT: 153.25 LBS | HEART RATE: 79 BPM | DIASTOLIC BLOOD PRESSURE: 77 MMHG | TEMPERATURE: 98 F | BODY MASS INDEX: 28.2 KG/M2 | OXYGEN SATURATION: 94 % | OXYGEN SATURATION: 97 % | TEMPERATURE: 98 F | SYSTOLIC BLOOD PRESSURE: 133 MMHG | HEIGHT: 62 IN | OXYGEN SATURATION: 98 % | HEART RATE: 70 BPM | RESPIRATION RATE: 18 BRPM | SYSTOLIC BLOOD PRESSURE: 127 MMHG | RESPIRATION RATE: 18 BRPM | HEART RATE: 117 BPM | DIASTOLIC BLOOD PRESSURE: 67 MMHG

## 2023-01-01 VITALS
OXYGEN SATURATION: 92 % | WEIGHT: 174.69 LBS | HEART RATE: 65 BPM | DIASTOLIC BLOOD PRESSURE: 59 MMHG | TEMPERATURE: 98 F | SYSTOLIC BLOOD PRESSURE: 125 MMHG | BODY MASS INDEX: 33.01 KG/M2 | RESPIRATION RATE: 18 BRPM

## 2023-01-01 VITALS
SYSTOLIC BLOOD PRESSURE: 87 MMHG | HEART RATE: 105 BPM | RESPIRATION RATE: 29 BRPM | OXYGEN SATURATION: 96 % | DIASTOLIC BLOOD PRESSURE: 52 MMHG | TEMPERATURE: 98 F | BODY MASS INDEX: 28.93 KG/M2 | WEIGHT: 153.25 LBS | HEIGHT: 61 IN

## 2023-01-01 VITALS
OXYGEN SATURATION: 94 % | SYSTOLIC BLOOD PRESSURE: 128 MMHG | WEIGHT: 168.13 LBS | RESPIRATION RATE: 17 BRPM | DIASTOLIC BLOOD PRESSURE: 68 MMHG | HEART RATE: 94 BPM | HEIGHT: 61 IN | BODY MASS INDEX: 31.74 KG/M2

## 2023-01-01 VITALS — WEIGHT: 174.63 LBS | BODY MASS INDEX: 29.06 KG/M2

## 2023-01-01 DIAGNOSIS — N18.30 TYPE 2 DIABETES MELLITUS WITH STAGE 3 CHRONIC KIDNEY DISEASE, WITHOUT LONG-TERM CURRENT USE OF INSULIN: ICD-10-CM

## 2023-01-01 DIAGNOSIS — M79.604 RIGHT LEG PAIN: ICD-10-CM

## 2023-01-01 DIAGNOSIS — E53.8 B12 DEFICIENCY: ICD-10-CM

## 2023-01-01 DIAGNOSIS — I27.20 PULMONARY HYPERTENSION: ICD-10-CM

## 2023-01-01 DIAGNOSIS — I11.9 BENIGN HYPERTENSIVE HEART DISEASE WITHOUT HEART FAILURE: ICD-10-CM

## 2023-01-01 DIAGNOSIS — E11.22 TYPE 2 DIABETES MELLITUS WITH STAGE 4 CHRONIC KIDNEY DISEASE, WITHOUT LONG-TERM CURRENT USE OF INSULIN: ICD-10-CM

## 2023-01-01 DIAGNOSIS — R53.81 PHYSICAL DECONDITIONING: ICD-10-CM

## 2023-01-01 DIAGNOSIS — K20.90 ESOPHAGITIS: ICD-10-CM

## 2023-01-01 DIAGNOSIS — C50.812 MALIGNANT NEOPLASM OF OVERLAPPING SITES OF LEFT BREAST IN FEMALE, ESTROGEN RECEPTOR POSITIVE: ICD-10-CM
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
## 2023-01-01 DIAGNOSIS — R32 URINARY INCONTINENCE, UNSPECIFIED TYPE: Primary | ICD-10-CM

## 2023-01-01 DIAGNOSIS — I48.0 PAROXYSMAL ATRIAL FIBRILLATION: Chronic | ICD-10-CM

## 2023-01-01 DIAGNOSIS — D47.2 MGUS (MONOCLONAL GAMMOPATHY OF UNKNOWN SIGNIFICANCE): ICD-10-CM

## 2023-01-01 DIAGNOSIS — N18.4 CKD (CHRONIC KIDNEY DISEASE), STAGE IV: ICD-10-CM

## 2023-01-01 DIAGNOSIS — I70.0 AORTIC ATHEROSCLEROSIS: ICD-10-CM

## 2023-01-01 DIAGNOSIS — E11.22 TYPE 2 DIABETES MELLITUS WITH STAGE 3 CHRONIC KIDNEY DISEASE, WITHOUT LONG-TERM CURRENT USE OF INSULIN: ICD-10-CM

## 2023-01-01 DIAGNOSIS — R57.9 SHOCK: ICD-10-CM

## 2023-01-01 DIAGNOSIS — E78.5 HYPERLIPIDEMIA, UNSPECIFIED HYPERLIPIDEMIA TYPE: Chronic | ICD-10-CM

## 2023-01-01 DIAGNOSIS — I10 ESSENTIAL HYPERTENSION: ICD-10-CM

## 2023-01-01 DIAGNOSIS — J96.11 CHRONIC RESPIRATORY FAILURE WITH HYPOXIA: ICD-10-CM

## 2023-01-01 DIAGNOSIS — I10 ESSENTIAL HYPERTENSION: Chronic | ICD-10-CM

## 2023-01-01 DIAGNOSIS — M1A.00X0 IDIOPATHIC CHRONIC GOUT WITHOUT TOPHUS, UNSPECIFIED SITE: Chronic | ICD-10-CM

## 2023-01-01 DIAGNOSIS — J96.01 ACUTE RESPIRATORY FAILURE WITH HYPOXIA: ICD-10-CM

## 2023-01-01 DIAGNOSIS — I48.91 ATRIAL FIBRILLATION WITH RVR: Primary | ICD-10-CM

## 2023-01-01 DIAGNOSIS — R33.9 URINARY RETENTION: ICD-10-CM

## 2023-01-01 DIAGNOSIS — I48.0 PAROXYSMAL ATRIAL FIBRILLATION: ICD-10-CM

## 2023-01-01 DIAGNOSIS — D47.2 MGUS (MONOCLONAL GAMMOPATHY OF UNKNOWN SIGNIFICANCE): Primary | ICD-10-CM

## 2023-01-01 DIAGNOSIS — N32.81 OAB (OVERACTIVE BLADDER): ICD-10-CM

## 2023-01-01 DIAGNOSIS — I50.33 ACUTE ON CHRONIC DIASTOLIC CHF (CONGESTIVE HEART FAILURE): Primary | ICD-10-CM

## 2023-01-01 DIAGNOSIS — I27.20 PULMONARY HYPERTENSION: Primary | ICD-10-CM

## 2023-01-01 DIAGNOSIS — N17.9 AKI (ACUTE KIDNEY INJURY): ICD-10-CM

## 2023-01-01 DIAGNOSIS — R07.9 CHEST PAIN: ICD-10-CM

## 2023-01-01 DIAGNOSIS — E87.1 HYPONATREMIA: ICD-10-CM

## 2023-01-01 DIAGNOSIS — R94.31 QT PROLONGATION: ICD-10-CM

## 2023-01-01 DIAGNOSIS — R33.9 INCOMPLETE BLADDER EMPTYING: ICD-10-CM

## 2023-01-01 DIAGNOSIS — I70.202 ATHEROSCLEROSIS OF NATIVE ARTERY OF LEFT LOWER EXTREMITY, WITH UNSPECIFIED PRESENCE OF CLINICAL MANIFESTATION: ICD-10-CM

## 2023-01-01 DIAGNOSIS — G47.33 OSA (OBSTRUCTIVE SLEEP APNEA): ICD-10-CM

## 2023-01-01 DIAGNOSIS — N18.4 CKD (CHRONIC KIDNEY DISEASE), STAGE IV: Primary | ICD-10-CM

## 2023-01-01 DIAGNOSIS — R13.10 DYSPHAGIA, UNSPECIFIED TYPE: ICD-10-CM

## 2023-01-01 DIAGNOSIS — M1A.00X0 IDIOPATHIC CHRONIC GOUT WITHOUT TOPHUS, UNSPECIFIED SITE: Primary | Chronic | ICD-10-CM

## 2023-01-01 DIAGNOSIS — D47.2 MGUS (MONOCLONAL GAMMOPATHY OF UNKNOWN SIGNIFICANCE): Chronic | ICD-10-CM

## 2023-01-01 DIAGNOSIS — J90 PLEURAL EFFUSION, LEFT: ICD-10-CM

## 2023-01-01 DIAGNOSIS — R13.10 DYSPHAGIA, UNSPECIFIED TYPE: Primary | ICD-10-CM

## 2023-01-01 DIAGNOSIS — Z71.89 ADVANCE CARE PLANNING: ICD-10-CM

## 2023-01-01 DIAGNOSIS — Z17.0 MALIGNANT NEOPLASM OF OVERLAPPING SITES OF LEFT BREAST IN FEMALE, ESTROGEN RECEPTOR POSITIVE: ICD-10-CM

## 2023-01-01 DIAGNOSIS — N18.4 TYPE 2 DIABETES MELLITUS WITH STAGE 4 CHRONIC KIDNEY DISEASE, WITHOUT LONG-TERM CURRENT USE OF INSULIN: ICD-10-CM

## 2023-01-01 DIAGNOSIS — N30.01 ACUTE CYSTITIS WITH HEMATURIA: ICD-10-CM

## 2023-01-01 DIAGNOSIS — R55 SYNCOPE, UNSPECIFIED SYNCOPE TYPE: ICD-10-CM

## 2023-01-01 DIAGNOSIS — E87.1 HYPONATREMIA: Primary | ICD-10-CM

## 2023-01-01 DIAGNOSIS — Z71.89 ACP (ADVANCE CARE PLANNING): ICD-10-CM

## 2023-01-01 DIAGNOSIS — N25.81 SECONDARY HYPERPARATHYROIDISM OF RENAL ORIGIN: ICD-10-CM

## 2023-01-01 DIAGNOSIS — R00.1 SINUS BRADYCARDIA: ICD-10-CM

## 2023-01-01 DIAGNOSIS — D64.9 ACUTE ANEMIA: ICD-10-CM

## 2023-01-01 DIAGNOSIS — N18.4 ANEMIA IN STAGE 4 CHRONIC KIDNEY DISEASE: ICD-10-CM

## 2023-01-01 DIAGNOSIS — E55.9 VITAMIN D DEFICIENCY: ICD-10-CM

## 2023-01-01 DIAGNOSIS — R07.1 CHEST PAIN ON BREATHING: ICD-10-CM

## 2023-01-01 DIAGNOSIS — I48.0 PAROXYSMAL ATRIAL FIBRILLATION: Primary | Chronic | ICD-10-CM

## 2023-01-01 DIAGNOSIS — D69.2 OTHER NONTHROMBOCYTOPENIC PURPURA: ICD-10-CM

## 2023-01-01 DIAGNOSIS — I50.33 ACUTE ON CHRONIC DIASTOLIC HEART FAILURE: Primary | ICD-10-CM

## 2023-01-01 DIAGNOSIS — K59.01 CONSTIPATION, SLOW TRANSIT: ICD-10-CM

## 2023-01-01 DIAGNOSIS — I31.39 PERICARDIAL EFFUSION: ICD-10-CM

## 2023-01-01 DIAGNOSIS — D64.9 NORMOCYTIC ANEMIA: ICD-10-CM

## 2023-01-01 DIAGNOSIS — M17.10 PRIMARY LOCALIZED OSTEOARTHROSIS, LOWER LEG: Primary | ICD-10-CM

## 2023-01-01 DIAGNOSIS — I48.91 ATRIAL FIBRILLATION WITH RVR: ICD-10-CM

## 2023-01-01 DIAGNOSIS — M17.0 PRIMARY OSTEOARTHRITIS OF BOTH KNEES: ICD-10-CM

## 2023-01-01 DIAGNOSIS — J90 PLEURAL EFFUSION ON LEFT: ICD-10-CM

## 2023-01-01 DIAGNOSIS — E53.8 B12 DEFICIENCY: Primary | ICD-10-CM

## 2023-01-01 DIAGNOSIS — E87.8 HYPOCHLOREMIA: ICD-10-CM

## 2023-01-01 DIAGNOSIS — R79.89 ELEVATED BRAIN NATRIURETIC PEPTIDE (BNP) LEVEL: ICD-10-CM

## 2023-01-01 DIAGNOSIS — R92.8 ABNORMAL MAMMOGRAM OF LEFT BREAST: ICD-10-CM

## 2023-01-01 DIAGNOSIS — Z76.89 ENCOUNTER TO ESTABLISH CARE: ICD-10-CM

## 2023-01-01 DIAGNOSIS — S80.11XA LEG HEMATOMA, RIGHT, INITIAL ENCOUNTER: ICD-10-CM

## 2023-01-01 DIAGNOSIS — C50.912 CARCINOMA OF LEFT FEMALE BREAST, UNSPECIFIED ESTROGEN RECEPTOR STATUS, UNSPECIFIED SITE OF BREAST: ICD-10-CM

## 2023-01-01 DIAGNOSIS — I50.33 ACUTE ON CHRONIC DIASTOLIC CHF (CONGESTIVE HEART FAILURE): ICD-10-CM

## 2023-01-01 DIAGNOSIS — M17.10 PRIMARY LOCALIZED OSTEOARTHROSIS OF LOWER LEG, UNSPECIFIED LATERALITY: ICD-10-CM

## 2023-01-01 DIAGNOSIS — E87.6 HYPOKALEMIA: ICD-10-CM

## 2023-01-01 DIAGNOSIS — R00.1 BRADYCARDIA: ICD-10-CM

## 2023-01-01 DIAGNOSIS — D72.829 LEUKOCYTOSIS, UNSPECIFIED TYPE: ICD-10-CM

## 2023-01-01 DIAGNOSIS — I95.9 HYPOTENSION: ICD-10-CM

## 2023-01-01 DIAGNOSIS — R00.0 TACHYCARDIA: ICD-10-CM

## 2023-01-01 DIAGNOSIS — R53.83 FATIGUE, UNSPECIFIED TYPE: Primary | ICD-10-CM

## 2023-01-01 DIAGNOSIS — R94.31 PROLONGED Q-T INTERVAL ON ECG: ICD-10-CM

## 2023-01-01 DIAGNOSIS — R06.09 DOE (DYSPNEA ON EXERTION): ICD-10-CM

## 2023-01-01 DIAGNOSIS — J90 PLEURAL EFFUSION: ICD-10-CM

## 2023-01-01 DIAGNOSIS — D64.9 ANEMIA, UNSPECIFIED TYPE: ICD-10-CM

## 2023-01-01 DIAGNOSIS — D63.1 ANEMIA IN STAGE 4 CHRONIC KIDNEY DISEASE: ICD-10-CM

## 2023-01-01 DIAGNOSIS — R06.02 SOB (SHORTNESS OF BREATH): ICD-10-CM

## 2023-01-01 DIAGNOSIS — E87.5 HYPERKALEMIA: ICD-10-CM

## 2023-01-01 DIAGNOSIS — R57.0 CARDIOGENIC SHOCK: ICD-10-CM

## 2023-01-01 DIAGNOSIS — Z23 NEEDS FLU SHOT: ICD-10-CM

## 2023-01-01 DIAGNOSIS — I10 ESSENTIAL HYPERTENSION: Primary | Chronic | ICD-10-CM

## 2023-01-01 DIAGNOSIS — E78.5 HYPERLIPIDEMIA, UNSPECIFIED HYPERLIPIDEMIA TYPE: Primary | Chronic | ICD-10-CM

## 2023-01-01 DIAGNOSIS — J18.9 COMMUNITY ACQUIRED PNEUMONIA OF LEFT LOWER LOBE OF LUNG: ICD-10-CM
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
## 2023-01-01 DIAGNOSIS — N39.44 NOCTURNAL ENURESIS: Primary | ICD-10-CM

## 2023-01-01 DIAGNOSIS — E66.01 MORBID (SEVERE) OBESITY DUE TO EXCESS CALORIES: ICD-10-CM

## 2023-01-01 DIAGNOSIS — J96.21 ACUTE ON CHRONIC RESPIRATORY FAILURE WITH HYPOXIA: ICD-10-CM

## 2023-01-01 DIAGNOSIS — E83.119 HEMOCHROMATOSIS, UNSPECIFIED HEMOCHROMATOSIS TYPE: ICD-10-CM

## 2023-01-01 DIAGNOSIS — J18.9 PNEUMONIA OF LEFT LOWER LOBE DUE TO INFECTIOUS ORGANISM: ICD-10-CM

## 2023-01-01 DIAGNOSIS — D63.8 ANEMIA OF CHRONIC DISEASE: ICD-10-CM

## 2023-01-01 DIAGNOSIS — R53.83 FATIGUE: ICD-10-CM

## 2023-01-01 DIAGNOSIS — N31.2 ATONIC NEUROGENIC BLADDER: ICD-10-CM

## 2023-01-01 DIAGNOSIS — N18.4 CHRONIC KIDNEY DISEASE, STAGE 4 (SEVERE): ICD-10-CM

## 2023-01-01 DIAGNOSIS — J18.9 COMMUNITY ACQUIRED PNEUMONIA, UNSPECIFIED LATERALITY: ICD-10-CM

## 2023-01-01 DIAGNOSIS — E78.5 HYPERLIPIDEMIA, UNSPECIFIED HYPERLIPIDEMIA TYPE: ICD-10-CM

## 2023-01-01 DIAGNOSIS — R53.83 FATIGUE, UNSPECIFIED TYPE: ICD-10-CM

## 2023-01-01 LAB
ABO + RH BLD: NORMAL
ABO + RH BLD: NORMAL
ALBUMIN FLD-MCNC: 1.7 G/DL
ALBUMIN SERPL BCP-MCNC: 2.6 G/DL (ref 3.5–5.2)
ALBUMIN SERPL BCP-MCNC: 2.7 G/DL (ref 3.5–5.2)
ALBUMIN SERPL BCP-MCNC: 2.8 G/DL (ref 3.5–5.2)
ALBUMIN SERPL BCP-MCNC: 2.8 G/DL (ref 3.5–5.2)
ALBUMIN SERPL BCP-MCNC: 2.9 G/DL (ref 3.5–5.2)
ALBUMIN SERPL BCP-MCNC: 3 G/DL (ref 3.5–5.2)
ALBUMIN SERPL BCP-MCNC: 3 G/DL (ref 3.5–5.2)
ALBUMIN SERPL BCP-MCNC: 3.3 G/DL (ref 3.5–5.2)
ALBUMIN SERPL BCP-MCNC: 3.4 G/DL (ref 3.5–5.2)
ALBUMIN SERPL BCP-MCNC: 3.8 G/DL (ref 3.5–5.2)
ALBUMIN SERPL BCP-MCNC: 4.2 G/DL (ref 3.5–5.2)
ALBUMIN SERPL ELPH-MCNC: 2.95 G/DL (ref 3.35–5.55)
ALBUMIN SERPL ELPH-MCNC: 3.18 G/DL (ref 3.35–5.55)
ALLENS TEST: ABNORMAL
ALP SERPL-CCNC: 110 U/L (ref 55–135)
ALP SERPL-CCNC: 123 U/L (ref 55–135)
ALP SERPL-CCNC: 130 U/L (ref 55–135)
ALP SERPL-CCNC: 162 U/L (ref 55–135)
ALP SERPL-CCNC: 183 U/L (ref 55–135)
ALP SERPL-CCNC: 198 U/L (ref 55–135)
ALP SERPL-CCNC: 224 U/L (ref 55–135)
ALP SERPL-CCNC: 56 U/L (ref 55–135)
ALP SERPL-CCNC: 57 U/L (ref 55–135)
ALP SERPL-CCNC: 64 U/L (ref 55–135)
ALP SERPL-CCNC: 67 U/L (ref 55–135)
ALP SERPL-CCNC: 72 U/L (ref 55–135)
ALP SERPL-CCNC: 85 U/L (ref 55–135)
ALPHA1 GLOB SERPL ELPH-MCNC: 0.45 G/DL (ref 0.17–0.41)
ALPHA1 GLOB SERPL ELPH-MCNC: 0.55 G/DL (ref 0.17–0.41)
ALPHA2 GLOB SERPL ELPH-MCNC: 0.73 G/DL (ref 0.43–0.99)
ALPHA2 GLOB SERPL ELPH-MCNC: 0.87 G/DL (ref 0.43–0.99)
ALT SERPL W/O P-5'-P-CCNC: 11 U/L (ref 10–44)
ALT SERPL W/O P-5'-P-CCNC: 119 U/L (ref 10–44)
ALT SERPL W/O P-5'-P-CCNC: 12 U/L (ref 10–44)
ALT SERPL W/O P-5'-P-CCNC: 12 U/L (ref 10–44)
ALT SERPL W/O P-5'-P-CCNC: 14 U/L (ref 10–44)
ALT SERPL W/O P-5'-P-CCNC: 15 U/L (ref 10–44)
ALT SERPL W/O P-5'-P-CCNC: 15 U/L (ref 10–44)
ALT SERPL W/O P-5'-P-CCNC: 176 U/L (ref 10–44)
ALT SERPL W/O P-5'-P-CCNC: 237 U/L (ref 10–44)
ALT SERPL W/O P-5'-P-CCNC: 263 U/L (ref 10–44)
ALT SERPL W/O P-5'-P-CCNC: 300 U/L (ref 10–44)
ALT SERPL W/O P-5'-P-CCNC: 53 U/L (ref 10–44)
ALT SERPL W/O P-5'-P-CCNC: 96 U/L (ref 10–44)
AMMONIA PLAS-SCNC: 39 UMOL/L (ref 10–50)
ANION GAP SERPL CALC-SCNC: 10 MMOL/L (ref 8–16)
ANION GAP SERPL CALC-SCNC: 11 MMOL/L (ref 8–16)
ANION GAP SERPL CALC-SCNC: 12 MMOL/L (ref 8–16)
ANION GAP SERPL CALC-SCNC: 13 MMOL/L (ref 8–16)
ANION GAP SERPL CALC-SCNC: 14 MMOL/L (ref 8–16)
ANION GAP SERPL CALC-SCNC: 16 MMOL/L (ref 8–16)
ANION GAP SERPL CALC-SCNC: 6 MMOL/L (ref 8–16)
ANION GAP SERPL CALC-SCNC: 7 MMOL/L (ref 8–16)
ANION GAP SERPL CALC-SCNC: 8 MMOL/L (ref 8–16)
ANION GAP SERPL CALC-SCNC: 9 MMOL/L (ref 8–16)
APPEARANCE FLD: NORMAL
APTT PPP: 30.8 SEC (ref 21–32)
ASCENDING AORTA: 3.11 CM
ASCENDING AORTA: 3.4 CM
ASCENDING AORTA: 3.54 CM
AST SERPL-CCNC: 107 U/L (ref 10–40)
AST SERPL-CCNC: 11 U/L (ref 10–40)
AST SERPL-CCNC: 11 U/L (ref 10–40)
AST SERPL-CCNC: 13 U/L (ref 10–40)
AST SERPL-CCNC: 14 U/L (ref 10–40)
AST SERPL-CCNC: 16 U/L (ref 10–40)
AST SERPL-CCNC: 19 U/L (ref 10–40)
AST SERPL-CCNC: 22 U/L (ref 10–40)
AST SERPL-CCNC: 40 U/L (ref 10–40)
AST SERPL-CCNC: 76 U/L (ref 10–40)
AST SERPL-CCNC: 93 U/L (ref 10–40)
AV INDEX (PROSTH): 0.4
AV INDEX (PROSTH): 0.41
AV INDEX (PROSTH): 0.41
AV MEAN GRADIENT: 12 MMHG
AV MEAN GRADIENT: 19 MMHG
AV MEAN GRADIENT: 7 MMHG
AV PEAK GRADIENT: 12 MMHG
AV PEAK GRADIENT: 18 MMHG
AV PEAK GRADIENT: 29 MMHG
AV VALVE AREA BY VELOCITY RATIO: 1.04 CM²
AV VALVE AREA BY VELOCITY RATIO: 1.14 CM²
AV VALVE AREA: 1.08 CM²
AV VALVE AREA: 1.2 CM2
AV VALVE AREA: 1.22 CM²
AV VELOCITY RATIO: 0.35
AV VELOCITY RATIO: 0.37
AV VELOCITY RATIO: 0.43
B-GLOBULIN SERPL ELPH-MCNC: 0.69 G/DL (ref 0.5–1.1)
B-GLOBULIN SERPL ELPH-MCNC: 1.01 G/DL (ref 0.5–1.1)
BACTERIA #/AREA URNS HPF: ABNORMAL /HPF
BACTERIA BLD CULT: NORMAL
BACTERIA BLD CULT: NORMAL
BACTERIA SPEC AEROBE CULT: NO GROWTH
BACTERIA SPEC ANAEROBE CULT: NORMAL
BACTERIA UR CULT: ABNORMAL
BASOPHILS # BLD AUTO: 0.01 K/UL (ref 0–0.2)
BASOPHILS # BLD AUTO: 0.02 K/UL (ref 0–0.2)
BASOPHILS # BLD AUTO: 0.03 K/UL (ref 0–0.2)
BASOPHILS NFR BLD: 0.1 % (ref 0–1.9)
BASOPHILS NFR BLD: 0.2 % (ref 0–1.9)
BASOPHILS NFR BLD: 0.3 % (ref 0–1.9)
BASOPHILS NFR BLD: 0.4 % (ref 0–1.9)
BASOPHILS NFR BLD: 0.5 % (ref 0–1.9)
BILIRUB SERPL-MCNC: 0.8 MG/DL (ref 0.1–1)
BILIRUB SERPL-MCNC: 0.9 MG/DL (ref 0.1–1)
BILIRUB SERPL-MCNC: 1 MG/DL (ref 0.1–1)
BILIRUB SERPL-MCNC: 1 MG/DL (ref 0.1–1)
BILIRUB SERPL-MCNC: 1.2 MG/DL (ref 0.1–1)
BILIRUB SERPL-MCNC: 1.3 MG/DL (ref 0.1–1)
BILIRUB SERPL-MCNC: 1.6 MG/DL (ref 0.1–1)
BILIRUB SERPL-MCNC: 1.6 MG/DL (ref 0.1–1)
BILIRUB UR QL STRIP: NEGATIVE
BLD GP AB SCN CELLS X3 SERPL QL: NORMAL
BLD GP AB SCN CELLS X3 SERPL QL: NORMAL
BNP SERPL-MCNC: 1317 PG/ML (ref 0–99)
BNP SERPL-MCNC: 1486 PG/ML (ref 0–99)
BNP SERPL-MCNC: 2825 PG/ML (ref 0–99)
BNP SERPL-MCNC: 2876 PG/ML (ref 0–99)
BNP SERPL-MCNC: 473 PG/ML (ref 0–99)
BNP SERPL-MCNC: 868 PG/ML (ref 0–99)
BNP SERPL-MCNC: 993 PG/ML (ref 0–99)
BODY FLD TYPE: NORMAL
BODY FLUID SOURCE, LDH: NORMAL
BSA FOR ECHO PROCEDURE: 1.75 M2
BSA FOR ECHO PROCEDURE: 1.84 M2
BUN SERPL-MCNC: 15 MG/DL (ref 8–23)
BUN SERPL-MCNC: 23 MG/DL (ref 8–23)
BUN SERPL-MCNC: 34 MG/DL (ref 8–23)
BUN SERPL-MCNC: 35 MG/DL (ref 8–23)
BUN SERPL-MCNC: 35 MG/DL (ref 8–23)
BUN SERPL-MCNC: 36 MG/DL (ref 8–23)
BUN SERPL-MCNC: 38 MG/DL (ref 8–23)
BUN SERPL-MCNC: 39 MG/DL (ref 8–23)
BUN SERPL-MCNC: 40 MG/DL (ref 8–23)
BUN SERPL-MCNC: 41 MG/DL (ref 8–23)
BUN SERPL-MCNC: 42 MG/DL (ref 8–23)
BUN SERPL-MCNC: 42 MG/DL (ref 8–23)
BUN SERPL-MCNC: 43 MG/DL (ref 8–23)
BUN SERPL-MCNC: 44 MG/DL (ref 8–23)
BUN SERPL-MCNC: 45 MG/DL (ref 8–23)
BUN SERPL-MCNC: 46 MG/DL (ref 8–23)
BUN SERPL-MCNC: 47 MG/DL (ref 8–23)
BUN SERPL-MCNC: 48 MG/DL (ref 8–23)
BUN SERPL-MCNC: 53 MG/DL (ref 8–23)
BUN SERPL-MCNC: 55 MG/DL (ref 6–30)
BUN SERPL-MCNC: 56 MG/DL (ref 8–23)
BUN SERPL-MCNC: 58 MG/DL (ref 8–23)
BUN SERPL-MCNC: 64 MG/DL (ref 6–30)
BUN SERPL-MCNC: 65 MG/DL (ref 8–23)
BUN SERPL-MCNC: 71 MG/DL (ref 8–23)
CALCIUM SERPL-MCNC: 7.9 MG/DL (ref 8.7–10.5)
CALCIUM SERPL-MCNC: 7.9 MG/DL (ref 8.7–10.5)
CALCIUM SERPL-MCNC: 8 MG/DL (ref 8.7–10.5)
CALCIUM SERPL-MCNC: 8.1 MG/DL (ref 8.7–10.5)
CALCIUM SERPL-MCNC: 8.2 MG/DL (ref 8.7–10.5)
CALCIUM SERPL-MCNC: 8.3 MG/DL (ref 8.7–10.5)
CALCIUM SERPL-MCNC: 8.4 MG/DL (ref 8.7–10.5)
CALCIUM SERPL-MCNC: 8.5 MG/DL (ref 8.7–10.5)
CALCIUM SERPL-MCNC: 8.6 MG/DL (ref 8.7–10.5)
CALCIUM SERPL-MCNC: 8.8 MG/DL (ref 8.7–10.5)
CALCIUM SERPL-MCNC: 8.9 MG/DL (ref 8.7–10.5)
CALCIUM SERPL-MCNC: 9 MG/DL (ref 8.7–10.5)
CALCIUM SERPL-MCNC: 9 MG/DL (ref 8.7–10.5)
CALCIUM SERPL-MCNC: 9.1 MG/DL (ref 8.7–10.5)
CALCIUM SERPL-MCNC: 9.4 MG/DL (ref 8.7–10.5)
CALCIUM SERPL-MCNC: 9.6 MG/DL (ref 8.7–10.5)
CALCIUM SERPL-MCNC: 9.6 MG/DL (ref 8.7–10.5)
CERULOPLASMIN SERPL-MCNC: 31 MG/DL (ref 15–45)
CHLORIDE SERPL-SCNC: 103 MMOL/L (ref 95–110)
CHLORIDE SERPL-SCNC: 83 MMOL/L (ref 95–110)
CHLORIDE SERPL-SCNC: 83 MMOL/L (ref 95–110)
CHLORIDE SERPL-SCNC: 85 MMOL/L (ref 95–110)
CHLORIDE SERPL-SCNC: 85 MMOL/L (ref 95–110)
CHLORIDE SERPL-SCNC: 86 MMOL/L (ref 95–110)
CHLORIDE SERPL-SCNC: 87 MMOL/L (ref 95–110)
CHLORIDE SERPL-SCNC: 88 MMOL/L (ref 95–110)
CHLORIDE SERPL-SCNC: 89 MMOL/L (ref 95–110)
CHLORIDE SERPL-SCNC: 90 MMOL/L (ref 95–110)
CHLORIDE SERPL-SCNC: 91 MMOL/L (ref 95–110)
CHLORIDE SERPL-SCNC: 92 MMOL/L (ref 95–110)
CHLORIDE SERPL-SCNC: 93 MMOL/L (ref 95–110)
CHLORIDE SERPL-SCNC: 94 MMOL/L (ref 95–110)
CHLORIDE SERPL-SCNC: 96 MMOL/L (ref 95–110)
CHLORIDE SERPL-SCNC: 96 MMOL/L (ref 95–110)
CHLORIDE UR-SCNC: 32 MMOL/L (ref 25–200)
CK SERPL-CCNC: 122 U/L (ref 20–180)
CLARITY UR: CLEAR
CO2 SERPL-SCNC: 19 MMOL/L (ref 23–29)
CO2 SERPL-SCNC: 19 MMOL/L (ref 23–29)
CO2 SERPL-SCNC: 20 MMOL/L (ref 23–29)
CO2 SERPL-SCNC: 20 MMOL/L (ref 23–29)
CO2 SERPL-SCNC: 21 MMOL/L (ref 23–29)
CO2 SERPL-SCNC: 22 MMOL/L (ref 23–29)
CO2 SERPL-SCNC: 23 MMOL/L (ref 23–29)
CO2 SERPL-SCNC: 24 MMOL/L (ref 23–29)
CO2 SERPL-SCNC: 25 MMOL/L (ref 23–29)
CO2 SERPL-SCNC: 26 MMOL/L (ref 23–29)
CO2 SERPL-SCNC: 27 MMOL/L (ref 23–29)
CO2 SERPL-SCNC: 27 MMOL/L (ref 23–29)
CO2 SERPL-SCNC: 28 MMOL/L (ref 23–29)
CO2 SERPL-SCNC: 29 MMOL/L (ref 23–29)
CO2 SERPL-SCNC: 29 MMOL/L (ref 23–29)
CO2 SERPL-SCNC: 31 MMOL/L (ref 23–29)
CO2 SERPL-SCNC: 32 MMOL/L (ref 23–29)
CO2 SERPL-SCNC: 34 MMOL/L (ref 23–29)
CO2 SERPL-SCNC: 35 MMOL/L (ref 23–29)
CO2 SERPL-SCNC: 36 MMOL/L (ref 23–29)
CO2 SERPL-SCNC: 37 MMOL/L (ref 23–29)
CO2 SERPL-SCNC: 38 MMOL/L (ref 23–29)
CO2 SERPL-SCNC: 40 MMOL/L (ref 23–29)
COLOR FLD: YELLOW
COLOR UR: YELLOW
COPPER SERPL-MCNC: 1252 UG/L (ref 810–1990)
CREAT SERPL-MCNC: 1.3 MG/DL (ref 0.5–1.4)
CREAT SERPL-MCNC: 1.4 MG/DL (ref 0.5–1.4)
CREAT SERPL-MCNC: 1.6 MG/DL (ref 0.5–1.4)
CREAT SERPL-MCNC: 1.7 MG/DL (ref 0.5–1.4)
CREAT SERPL-MCNC: 1.8 MG/DL (ref 0.5–1.4)
CREAT SERPL-MCNC: 1.9 MG/DL (ref 0.5–1.4)
CREAT SERPL-MCNC: 2 MG/DL (ref 0.5–1.4)
CREAT SERPL-MCNC: 2.1 MG/DL (ref 0.5–1.4)
CREAT SERPL-MCNC: 2.2 MG/DL (ref 0.5–1.4)
CREAT SERPL-MCNC: 2.2 MG/DL (ref 0.5–1.4)
CREAT SERPL-MCNC: 2.4 MG/DL (ref 0.5–1.4)
CREAT UR-MCNC: 49.9 MG/DL (ref 15–325)
CTP QC/QA: YES
CTP QC/QA: YES
CV ECHO LV RWT: 0.55 CM
CV ECHO LV RWT: 0.62 CM
CV ECHO LV RWT: 0.63 CM
DELSYS: ABNORMAL
DELSYS: ABNORMAL
DIFFERENTIAL METHOD: ABNORMAL
DOP CALC AO PEAK VEL: 1.7 M/S
DOP CALC AO PEAK VEL: 2.14 M/S
DOP CALC AO PEAK VEL: 2.7 M/S
DOP CALC AO VTI: 25.7 CM
DOP CALC AO VTI: 41.4 CM
DOP CALC AO VTI: 60.9 CM
DOP CALC LVOT AREA: 2.7 CM2
DOP CALC LVOT AREA: 3 CM2
DOP CALC LVOT AREA: 3 CM2
DOP CALC LVOT DIAMETER: 1.84 CM
DOP CALC LVOT DIAMETER: 1.95 CM
DOP CALC LVOT DIAMETER: 1.96 CM
DOP CALC LVOT PEAK VEL: 0.59 M/S
DOP CALC LVOT PEAK VEL: 0.92 M/S
DOP CALC LVOT PEAK VEL: 1 M/S
DOP CALC LVOT STROKE VOLUME: 31.34 CM3
DOP CALC LVOT STROKE VOLUME: 44.65 CM3
DOP CALC LVOT STROKE VOLUME: 72.98 CM3
DOP CALCLVOT PEAK VEL VTI: 10.5 CM
DOP CALCLVOT PEAK VEL VTI: 16.8 CM
DOP CALCLVOT PEAK VEL VTI: 24.2 CM
E WAVE DECELERATION TIME: 131.82 MSEC
E WAVE DECELERATION TIME: 338.76 MSEC
E/A RATIO: 0.89
E/A RATIO: 3.38
E/E' RATIO: 17.11 M/S
E/E' RATIO: 39.2 M/S
ECHO LV POSTERIOR WALL: 1.23 CM (ref 0.6–1.1)
ECHO LV POSTERIOR WALL: 1.26 CM (ref 0.6–1.1)
ECHO LV POSTERIOR WALL: 1.37 CM (ref 0.6–1.1)
EJECTION FRACTION: 65 %
EOSINOPHIL # BLD AUTO: 0 K/UL (ref 0–0.5)
EOSINOPHIL # BLD AUTO: 0.1 K/UL (ref 0–0.5)
EOSINOPHIL NFR BLD: 0 % (ref 0–8)
EOSINOPHIL NFR BLD: 0.1 % (ref 0–8)
EOSINOPHIL NFR BLD: 0.3 % (ref 0–8)
EOSINOPHIL NFR BLD: 0.4 % (ref 0–8)
EOSINOPHIL NFR BLD: 0.5 % (ref 0–8)
EOSINOPHIL NFR BLD: 0.5 % (ref 0–8)
EOSINOPHIL NFR BLD: 0.6 % (ref 0–8)
EOSINOPHIL NFR BLD: 0.6 % (ref 0–8)
EOSINOPHIL NFR BLD: 0.7 % (ref 0–8)
EOSINOPHIL NFR BLD: 1 % (ref 0–8)
EOSINOPHIL NFR BLD: 1 % (ref 0–8)
EOSINOPHIL NFR BLD: 1.1 % (ref 0–8)
EOSINOPHIL NFR BLD: 1.2 % (ref 0–8)
ERYTHROCYTE [DISTWIDTH] IN BLOOD BY AUTOMATED COUNT: 14.3 % (ref 11.5–14.5)
ERYTHROCYTE [DISTWIDTH] IN BLOOD BY AUTOMATED COUNT: 14.4 % (ref 11.5–14.5)
ERYTHROCYTE [DISTWIDTH] IN BLOOD BY AUTOMATED COUNT: 15.8 % (ref 11.5–14.5)
ERYTHROCYTE [DISTWIDTH] IN BLOOD BY AUTOMATED COUNT: 16 % (ref 11.5–14.5)
ERYTHROCYTE [DISTWIDTH] IN BLOOD BY AUTOMATED COUNT: 16.1 % (ref 11.5–14.5)
ERYTHROCYTE [DISTWIDTH] IN BLOOD BY AUTOMATED COUNT: 16.2 % (ref 11.5–14.5)
ERYTHROCYTE [DISTWIDTH] IN BLOOD BY AUTOMATED COUNT: 16.3 % (ref 11.5–14.5)
ERYTHROCYTE [DISTWIDTH] IN BLOOD BY AUTOMATED COUNT: 17.6 % (ref 11.5–14.5)
ERYTHROCYTE [DISTWIDTH] IN BLOOD BY AUTOMATED COUNT: 17.7 % (ref 11.5–14.5)
ERYTHROCYTE [DISTWIDTH] IN BLOOD BY AUTOMATED COUNT: 17.8 % (ref 11.5–14.5)
ERYTHROCYTE [DISTWIDTH] IN BLOOD BY AUTOMATED COUNT: 18 % (ref 11.5–14.5)
ERYTHROCYTE [DISTWIDTH] IN BLOOD BY AUTOMATED COUNT: 18 % (ref 11.5–14.5)
EST. GFR  (NO RACE VARIABLE): 22 ML/MIN/1.73 M^2
EST. GFR  (NO RACE VARIABLE): 22 ML/MIN/1.73 M^2
EST. GFR  (NO RACE VARIABLE): 23 ML/MIN/1.73 M^2
EST. GFR  (NO RACE VARIABLE): 24 ML/MIN/1.73 M^2
EST. GFR  (NO RACE VARIABLE): 26 ML/MIN/1.73 M^2
EST. GFR  (NO RACE VARIABLE): 28 ML/MIN/1.73 M^2
EST. GFR  (NO RACE VARIABLE): 30 ML/MIN/1.73 M^2
EST. GFR  (NO RACE VARIABLE): 32 ML/MIN/1.73 M^2
EST. GFR  (NO RACE VARIABLE): 37.6 ML/MIN/1.73 M^2
EST. GFR  (NO RACE VARIABLE): 41 ML/MIN/1.73 M^2
ESTIMATED AVG GLUCOSE: 94 MG/DL (ref 68–131)
FERRITIN SERPL-MCNC: 446 NG/ML (ref 20–300)
FERRITIN SERPL-MCNC: 611 NG/ML (ref 20–300)
FERRITIN SERPL-MCNC: 804 NG/ML (ref 20–300)
FINAL PATHOLOGIC DIAGNOSIS: ABNORMAL
FLOW: 3
FOLATE SERPL-MCNC: 19.4 NG/ML (ref 4–24)
FRACTIONAL SHORTENING: 16 % (ref 28–44)
FRACTIONAL SHORTENING: 35 % (ref 28–44)
FRACTIONAL SHORTENING: 9 % (ref 28–44)
GAMMA GLOB SERPL ELPH-MCNC: 1.05 G/DL (ref 0.67–1.58)
GAMMA GLOB SERPL ELPH-MCNC: 1.52 G/DL (ref 0.67–1.58)
GLUCOSE FLD-MCNC: 97 MG/DL
GLUCOSE SERPL-MCNC: 101 MG/DL (ref 70–110)
GLUCOSE SERPL-MCNC: 101 MG/DL (ref 70–110)
GLUCOSE SERPL-MCNC: 102 MG/DL (ref 70–110)
GLUCOSE SERPL-MCNC: 103 MG/DL (ref 70–110)
GLUCOSE SERPL-MCNC: 105 MG/DL (ref 70–110)
GLUCOSE SERPL-MCNC: 106 MG/DL (ref 70–110)
GLUCOSE SERPL-MCNC: 108 MG/DL (ref 70–110)
GLUCOSE SERPL-MCNC: 109 MG/DL (ref 70–110)
GLUCOSE SERPL-MCNC: 112 MG/DL (ref 70–110)
GLUCOSE SERPL-MCNC: 113 MG/DL (ref 70–110)
GLUCOSE SERPL-MCNC: 113 MG/DL (ref 70–110)
GLUCOSE SERPL-MCNC: 114 MG/DL (ref 70–110)
GLUCOSE SERPL-MCNC: 115 MG/DL (ref 70–110)
GLUCOSE SERPL-MCNC: 116 MG/DL (ref 70–110)
GLUCOSE SERPL-MCNC: 118 MG/DL (ref 70–110)
GLUCOSE SERPL-MCNC: 118 MG/DL (ref 70–110)
GLUCOSE SERPL-MCNC: 120 MG/DL (ref 70–110)
GLUCOSE SERPL-MCNC: 120 MG/DL (ref 70–110)
GLUCOSE SERPL-MCNC: 123 MG/DL (ref 70–110)
GLUCOSE SERPL-MCNC: 124 MG/DL (ref 70–110)
GLUCOSE SERPL-MCNC: 124 MG/DL (ref 70–110)
GLUCOSE SERPL-MCNC: 129 MG/DL (ref 70–110)
GLUCOSE SERPL-MCNC: 129 MG/DL (ref 70–110)
GLUCOSE SERPL-MCNC: 130 MG/DL (ref 70–110)
GLUCOSE SERPL-MCNC: 131 MG/DL (ref 70–110)
GLUCOSE SERPL-MCNC: 135 MG/DL (ref 70–110)
GLUCOSE SERPL-MCNC: 135 MG/DL (ref 70–110)
GLUCOSE SERPL-MCNC: 136 MG/DL (ref 70–110)
GLUCOSE SERPL-MCNC: 137 MG/DL (ref 70–110)
GLUCOSE SERPL-MCNC: 149 MG/DL (ref 70–110)
GLUCOSE SERPL-MCNC: 150 MG/DL (ref 70–110)
GLUCOSE SERPL-MCNC: 153 MG/DL (ref 70–110)
GLUCOSE SERPL-MCNC: 167 MG/DL (ref 70–110)
GLUCOSE SERPL-MCNC: 84 MG/DL (ref 70–110)
GLUCOSE SERPL-MCNC: 92 MG/DL (ref 70–110)
GLUCOSE SERPL-MCNC: 94 MG/DL (ref 70–110)
GLUCOSE SERPL-MCNC: 96 MG/DL (ref 70–110)
GLUCOSE SERPL-MCNC: 97 MG/DL (ref 70–110)
GLUCOSE SERPL-MCNC: 98 MG/DL (ref 70–110)
GLUCOSE UR QL STRIP: NEGATIVE
GRAM STN SPEC: NORMAL
HAV IGM SERPL QL IA: NORMAL
HBA1C MFR BLD: 4.9 % (ref 4–5.6)
HBV CORE IGM SERPL QL IA: NORMAL
HBV SURFACE AG SERPL QL IA: NORMAL
HCO3 UR-SCNC: 20.1 MMOL/L (ref 24–28)
HCO3 UR-SCNC: 24.4 MMOL/L (ref 24–28)
HCT VFR BLD AUTO: 28.1 % (ref 37–48.5)
HCT VFR BLD AUTO: 28.1 % (ref 37–48.5)
HCT VFR BLD AUTO: 28.9 % (ref 37–48.5)
HCT VFR BLD AUTO: 31.4 % (ref 37–48.5)
HCT VFR BLD AUTO: 31.7 % (ref 37–48.5)
HCT VFR BLD AUTO: 32.3 % (ref 37–48.5)
HCT VFR BLD AUTO: 35.2 % (ref 37–48.5)
HCT VFR BLD AUTO: 36.3 % (ref 37–48.5)
HCT VFR BLD AUTO: 39 % (ref 37–48.5)
HCT VFR BLD AUTO: 39.1 % (ref 37–48.5)
HCT VFR BLD AUTO: 39.3 % (ref 37–48.5)
HCT VFR BLD AUTO: 41 % (ref 37–48.5)
HCT VFR BLD AUTO: 41.1 % (ref 37–48.5)
HCT VFR BLD AUTO: 41.7 % (ref 37–48.5)
HCT VFR BLD AUTO: 41.9 % (ref 37–48.5)
HCT VFR BLD AUTO: 43.3 % (ref 37–48.5)
HCT VFR BLD CALC: 34 %PCV (ref 36–54)
HCT VFR BLD CALC: 47 %PCV (ref 36–54)
HCV AB SERPL QL IA: NORMAL
HGB BLD-MCNC: 10.3 G/DL (ref 12–16)
HGB BLD-MCNC: 10.6 G/DL (ref 12–16)
HGB BLD-MCNC: 11.7 G/DL (ref 12–16)
HGB BLD-MCNC: 11.8 G/DL (ref 12–16)
HGB BLD-MCNC: 12.1 G/DL (ref 12–16)
HGB BLD-MCNC: 12.6 G/DL (ref 12–16)
HGB BLD-MCNC: 13.1 G/DL (ref 12–16)
HGB BLD-MCNC: 13.3 G/DL (ref 12–16)
HGB BLD-MCNC: 13.5 G/DL (ref 12–16)
HGB BLD-MCNC: 13.7 G/DL (ref 12–16)
HGB BLD-MCNC: 14 G/DL (ref 12–16)
HGB BLD-MCNC: 14.2 G/DL (ref 12–16)
HGB BLD-MCNC: 9 G/DL (ref 12–16)
HGB BLD-MCNC: 9.1 G/DL (ref 12–16)
HGB BLD-MCNC: 9.3 G/DL (ref 12–16)
HGB BLD-MCNC: 9.9 G/DL (ref 12–16)
HGB UR QL STRIP: ABNORMAL
HGB UR QL STRIP: NEGATIVE
IGA SERPL-MCNC: 707 MG/DL (ref 40–350)
IGG SERPL-MCNC: 1479 MG/DL (ref 650–1600)
IGM SERPL-MCNC: 34 MG/DL (ref 50–300)
IMM GRANULOCYTES # BLD AUTO: 0.02 K/UL (ref 0–0.04)
IMM GRANULOCYTES # BLD AUTO: 0.03 K/UL (ref 0–0.04)
IMM GRANULOCYTES # BLD AUTO: 0.04 K/UL (ref 0–0.04)
IMM GRANULOCYTES # BLD AUTO: 0.06 K/UL (ref 0–0.04)
IMM GRANULOCYTES # BLD AUTO: 0.07 K/UL (ref 0–0.04)
IMM GRANULOCYTES # BLD AUTO: 0.08 K/UL (ref 0–0.04)
IMM GRANULOCYTES # BLD AUTO: 0.11 K/UL (ref 0–0.04)
IMM GRANULOCYTES NFR BLD AUTO: 0.3 % (ref 0–0.5)
IMM GRANULOCYTES NFR BLD AUTO: 0.4 % (ref 0–0.5)
IMM GRANULOCYTES NFR BLD AUTO: 0.5 % (ref 0–0.5)
IMM GRANULOCYTES NFR BLD AUTO: 0.5 % (ref 0–0.5)
IMM GRANULOCYTES NFR BLD AUTO: 0.6 % (ref 0–0.5)
IMM GRANULOCYTES NFR BLD AUTO: 0.6 % (ref 0–0.5)
IMM GRANULOCYTES NFR BLD AUTO: 0.7 % (ref 0–0.5)
IMM GRANULOCYTES NFR BLD AUTO: 0.7 % (ref 0–0.5)
IMM GRANULOCYTES NFR BLD AUTO: 0.8 % (ref 0–0.5)
INR PPP: 1.2 (ref 0.8–1.2)
INR PPP: 1.3 (ref 0.8–1.2)
INR PPP: 1.4 (ref 0.8–1.2)
INTERPRETATION SERPL IFE-IMP: NORMAL
INTERPRETATION SERPL IFE-IMP: NORMAL
INTERPRETATION UR IFE-IMP: NORMAL
INTERVENTRICULAR SEPTUM: 1.23 CM (ref 0.6–1.1)
INTERVENTRICULAR SEPTUM: 1.33 CM (ref 0.6–1.1)
INTERVENTRICULAR SEPTUM: 1.99 CM (ref 0.6–1.1)
IRON SERPL-MCNC: 25 UG/DL (ref 30–160)
IRON SERPL-MCNC: 25 UG/DL (ref 30–160)
IRON SERPL-MCNC: 61 UG/DL (ref 30–160)
IRON SERPL-MCNC: 78 UG/DL (ref 30–160)
IVC DIAMETER: 1.59 CM
IVC DIAMETER: 15 CM
IVC DIAMETER: 2.13 CM
IVRT: 110.37 MSEC
IVRT: 98.95 MSEC
KAPPA LC SER QL IA: 10.5 MG/DL (ref 0.33–1.94)
KAPPA LC/LAMBDA SER IA: 1.78 (ref 0.26–1.65)
KETONES UR QL STRIP: NEGATIVE
L PNEUMO AG UR QL IA: NEGATIVE
LA MAJOR: 6.82 CM
LA MAJOR: 7 CM
LA MINOR: 4.64 CM
LA MINOR: 4.84 CM
LA MINOR: 6.77 CM
LA WIDTH: 4.2 CM
LA WIDTH: 4.8 CM
LACTATE SERPL-SCNC: 1 MMOL/L (ref 0.5–2.2)
LACTATE SERPL-SCNC: 10.5 MMOL/L (ref 0.5–2.2)
LACTATE SERPL-SCNC: 3.5 MMOL/L (ref 0.5–2.2)
LAMBDA LC SER QL IA: 5.89 MG/DL (ref 0.57–2.63)
LDH FLD L TO P-CCNC: 92 U/L
LEFT ATRIUM SIZE: 5.64 CM
LEFT ATRIUM SIZE: 6.27 CM
LEFT ATRIUM SIZE: 6.31 CM
LEFT ATRIUM VOLUME INDEX: 88.2 ML/M2
LEFT ATRIUM VOLUME: 136.81 CM3
LEFT ATRIUM VOLUME: 146.4 CM3
LEFT INTERNAL DIMENSION IN SYSTOLE: 3.23 CM (ref 2.1–4)
LEFT INTERNAL DIMENSION IN SYSTOLE: 3.42 CM (ref 2.1–4)
LEFT INTERNAL DIMENSION IN SYSTOLE: 3.57 CM (ref 2.1–4)
LEFT VENTRICLE DIASTOLIC VOLUME INDEX: 39.92 ML/M2
LEFT VENTRICLE DIASTOLIC VOLUME INDEX: 40.4 ML/M2
LEFT VENTRICLE DIASTOLIC VOLUME: 118.38 ML
LEFT VENTRICLE DIASTOLIC VOLUME: 66.26 ML
LEFT VENTRICLE DIASTOLIC VOLUME: 71.92 ML
LEFT VENTRICLE MASS INDEX: 100 G/M2
LEFT VENTRICLE MASS INDEX: 106 G/M2
LEFT VENTRICLE SYSTOLIC VOLUME INDEX: 27 ML/M2
LEFT VENTRICLE SYSTOLIC VOLUME INDEX: 32 ML/M2
LEFT VENTRICLE SYSTOLIC VOLUME: 41.83 ML
LEFT VENTRICLE SYSTOLIC VOLUME: 48.07 ML
LEFT VENTRICLE SYSTOLIC VOLUME: 53.2 ML
LEFT VENTRICULAR INTERNAL DIMENSION IN DIASTOLE: 3.91 CM (ref 3.5–6)
LEFT VENTRICULAR INTERNAL DIMENSION IN DIASTOLE: 4.05 CM (ref 3.5–6)
LEFT VENTRICULAR INTERNAL DIMENSION IN DIASTOLE: 5 CM (ref 3.5–6)
LEFT VENTRICULAR MASS: 165.92 G
LEFT VENTRICULAR MASS: 188.9 G
LEFT VENTRICULAR MASS: 382.72 G
LEUKOCYTE ESTERASE UR QL STRIP: ABNORMAL
LEUKOCYTE ESTERASE UR QL STRIP: NEGATIVE
LV LATERAL E/E' RATIO: 12.83 M/S
LV LATERAL E/E' RATIO: 49 M/S
LV SEPTAL E/E' RATIO: 25.67 M/S
LV SEPTAL E/E' RATIO: 32.67 M/S
LVOT MG: 0.82 MMHG
LVOT MG: 2.06 MMHG
LVOT MG: 2.41 MMHG
LVOT MV: 0.43 CM/S
LVOT MV: 0.66 CM/S
LVOT MV: 0.74 CM/S
LYMPHOCYTES # BLD AUTO: 0.5 K/UL (ref 1–4.8)
LYMPHOCYTES # BLD AUTO: 0.5 K/UL (ref 1–4.8)
LYMPHOCYTES # BLD AUTO: 0.6 K/UL (ref 1–4.8)
LYMPHOCYTES # BLD AUTO: 0.8 K/UL (ref 1–4.8)
LYMPHOCYTES # BLD AUTO: 0.9 K/UL (ref 1–4.8)
LYMPHOCYTES # BLD AUTO: 1 K/UL (ref 1–4.8)
LYMPHOCYTES # BLD AUTO: 1 K/UL (ref 1–4.8)
LYMPHOCYTES # BLD AUTO: 1.1 K/UL (ref 1–4.8)
LYMPHOCYTES # BLD AUTO: 1.1 K/UL (ref 1–4.8)
LYMPHOCYTES # BLD AUTO: 1.2 K/UL (ref 1–4.8)
LYMPHOCYTES # BLD AUTO: 1.5 K/UL (ref 1–4.8)
LYMPHOCYTES # BLD AUTO: 1.7 K/UL (ref 1–4.8)
LYMPHOCYTES NFR BLD: 10.4 % (ref 18–48)
LYMPHOCYTES NFR BLD: 10.5 % (ref 18–48)
LYMPHOCYTES NFR BLD: 12.3 % (ref 18–48)
LYMPHOCYTES NFR BLD: 12.7 % (ref 18–48)
LYMPHOCYTES NFR BLD: 14.2 % (ref 18–48)
LYMPHOCYTES NFR BLD: 14.7 % (ref 18–48)
LYMPHOCYTES NFR BLD: 14.7 % (ref 18–48)
LYMPHOCYTES NFR BLD: 15.8 % (ref 18–48)
LYMPHOCYTES NFR BLD: 6.5 % (ref 18–48)
LYMPHOCYTES NFR BLD: 6.7 % (ref 18–48)
LYMPHOCYTES NFR BLD: 7.1 % (ref 18–48)
LYMPHOCYTES NFR BLD: 7.4 % (ref 18–48)
LYMPHOCYTES NFR BLD: 8.2 % (ref 18–48)
LYMPHOCYTES NFR BLD: 8.4 % (ref 18–48)
LYMPHOCYTES NFR BLD: 8.9 % (ref 18–48)
LYMPHOCYTES NFR BLD: 9.6 % (ref 18–48)
LYMPHOCYTES NFR FLD MANUAL: 33 %
Lab: ABNORMAL
MAGNESIUM SERPL-MCNC: 1.6 MG/DL (ref 1.6–2.6)
MAGNESIUM SERPL-MCNC: 1.7 MG/DL (ref 1.6–2.6)
MAGNESIUM SERPL-MCNC: 1.8 MG/DL (ref 1.6–2.6)
MAGNESIUM SERPL-MCNC: 1.8 MG/DL (ref 1.6–2.6)
MAGNESIUM SERPL-MCNC: 1.9 MG/DL (ref 1.6–2.6)
MCH RBC QN AUTO: 29.4 PG (ref 27–31)
MCH RBC QN AUTO: 29.9 PG (ref 27–31)
MCH RBC QN AUTO: 30 PG (ref 27–31)
MCH RBC QN AUTO: 30.2 PG (ref 27–31)
MCH RBC QN AUTO: 30.4 PG (ref 27–31)
MCH RBC QN AUTO: 30.4 PG (ref 27–31)
MCH RBC QN AUTO: 30.7 PG (ref 27–31)
MCH RBC QN AUTO: 30.7 PG (ref 27–31)
MCH RBC QN AUTO: 30.8 PG (ref 27–31)
MCH RBC QN AUTO: 31 PG (ref 27–31)
MCH RBC QN AUTO: 31.2 PG (ref 27–31)
MCH RBC QN AUTO: 31.3 PG (ref 27–31)
MCH RBC QN AUTO: 31.4 PG (ref 27–31)
MCH RBC QN AUTO: 31.5 PG (ref 27–31)
MCHC RBC AUTO-ENTMCNC: 31 G/DL (ref 32–36)
MCHC RBC AUTO-ENTMCNC: 31.1 G/DL (ref 32–36)
MCHC RBC AUTO-ENTMCNC: 31.4 G/DL (ref 32–36)
MCHC RBC AUTO-ENTMCNC: 31.5 G/DL (ref 32–36)
MCHC RBC AUTO-ENTMCNC: 32.2 G/DL (ref 32–36)
MCHC RBC AUTO-ENTMCNC: 32.2 G/DL (ref 32–36)
MCHC RBC AUTO-ENTMCNC: 32.3 G/DL (ref 32–36)
MCHC RBC AUTO-ENTMCNC: 32.4 G/DL (ref 32–36)
MCHC RBC AUTO-ENTMCNC: 32.4 G/DL (ref 32–36)
MCHC RBC AUTO-ENTMCNC: 32.5 G/DL (ref 32–36)
MCHC RBC AUTO-ENTMCNC: 32.8 G/DL (ref 32–36)
MCHC RBC AUTO-ENTMCNC: 32.8 G/DL (ref 32–36)
MCHC RBC AUTO-ENTMCNC: 33.1 G/DL (ref 32–36)
MCHC RBC AUTO-ENTMCNC: 33.5 G/DL (ref 32–36)
MCHC RBC AUTO-ENTMCNC: 33.9 G/DL (ref 32–36)
MCHC RBC AUTO-ENTMCNC: 34.9 G/DL (ref 32–36)
MCV RBC AUTO: 101 FL (ref 82–98)
MCV RBC AUTO: 101 FL (ref 82–98)
MCV RBC AUTO: 89 FL (ref 82–98)
MCV RBC AUTO: 89 FL (ref 82–98)
MCV RBC AUTO: 91 FL (ref 82–98)
MCV RBC AUTO: 93 FL (ref 82–98)
MCV RBC AUTO: 94 FL (ref 82–98)
MCV RBC AUTO: 95 FL (ref 82–98)
MCV RBC AUTO: 95 FL (ref 82–98)
MCV RBC AUTO: 97 FL (ref 82–98)
MCV RBC AUTO: 97 FL (ref 82–98)
MESOTHL CELL NFR FLD MANUAL: 9 %
METHYLMALONATE SERPL-SCNC: 0.68 UMOL/L
MICROSCOPIC COMMENT: ABNORMAL
MITOCHONDRIA AB TITR SER IF: NORMAL {TITER}
MODE: ABNORMAL
MONOCYTES # BLD AUTO: 0.4 K/UL (ref 0.3–1)
MONOCYTES # BLD AUTO: 0.5 K/UL (ref 0.3–1)
MONOCYTES # BLD AUTO: 0.6 K/UL (ref 0.3–1)
MONOCYTES # BLD AUTO: 0.7 K/UL (ref 0.3–1)
MONOCYTES # BLD AUTO: 0.8 K/UL (ref 0.3–1)
MONOCYTES NFR BLD: 11.1 % (ref 4–15)
MONOCYTES NFR BLD: 4.9 % (ref 4–15)
MONOCYTES NFR BLD: 4.9 % (ref 4–15)
MONOCYTES NFR BLD: 5.1 % (ref 4–15)
MONOCYTES NFR BLD: 5.7 % (ref 4–15)
MONOCYTES NFR BLD: 5.8 % (ref 4–15)
MONOCYTES NFR BLD: 6.2 % (ref 4–15)
MONOCYTES NFR BLD: 6.3 % (ref 4–15)
MONOCYTES NFR BLD: 6.4 % (ref 4–15)
MONOCYTES NFR BLD: 6.7 % (ref 4–15)
MONOCYTES NFR BLD: 6.8 % (ref 4–15)
MONOCYTES NFR BLD: 6.8 % (ref 4–15)
MONOCYTES NFR BLD: 7.8 % (ref 4–15)
MONOCYTES NFR BLD: 8.2 % (ref 4–15)
MONOCYTES NFR BLD: 8.6 % (ref 4–15)
MONOCYTES NFR BLD: 9 % (ref 4–15)
MONOS+MACROS NFR FLD MANUAL: 16 %
MV PEAK A VEL: 0.29 M/S
MV PEAK A VEL: 0.87 M/S
MV PEAK E VEL: 0.77 M/S
MV PEAK E VEL: 0.98 M/S
MV STENOSIS PRESSURE HALF TIME: 38.23 MS
MV STENOSIS PRESSURE HALF TIME: 92.98 MS
MV VALVE AREA P 1/2 METHOD: 2.37 CM2
MV VALVE AREA P 1/2 METHOD: 5.75 CM2
NEUTROPHILS # BLD AUTO: 10 K/UL (ref 1.8–7.7)
NEUTROPHILS # BLD AUTO: 13.7 K/UL (ref 1.8–7.7)
NEUTROPHILS # BLD AUTO: 4.5 K/UL (ref 1.8–7.7)
NEUTROPHILS # BLD AUTO: 4.7 K/UL (ref 1.8–7.7)
NEUTROPHILS # BLD AUTO: 4.9 K/UL (ref 1.8–7.7)
NEUTROPHILS # BLD AUTO: 5.4 K/UL (ref 1.8–7.7)
NEUTROPHILS # BLD AUTO: 5.4 K/UL (ref 1.8–7.7)
NEUTROPHILS # BLD AUTO: 5.6 K/UL (ref 1.8–7.7)
NEUTROPHILS # BLD AUTO: 6 K/UL (ref 1.8–7.7)
NEUTROPHILS # BLD AUTO: 6.1 K/UL (ref 1.8–7.7)
NEUTROPHILS # BLD AUTO: 6.3 K/UL (ref 1.8–7.7)
NEUTROPHILS # BLD AUTO: 6.7 K/UL (ref 1.8–7.7)
NEUTROPHILS # BLD AUTO: 9 K/UL (ref 1.8–7.7)
NEUTROPHILS # BLD AUTO: 9.8 K/UL (ref 1.8–7.7)
NEUTROPHILS NFR BLD: 75.1 % (ref 38–73)
NEUTROPHILS NFR BLD: 75.2 % (ref 38–73)
NEUTROPHILS NFR BLD: 75.5 % (ref 38–73)
NEUTROPHILS NFR BLD: 77.4 % (ref 38–73)
NEUTROPHILS NFR BLD: 80.2 % (ref 38–73)
NEUTROPHILS NFR BLD: 80.4 % (ref 38–73)
NEUTROPHILS NFR BLD: 81.7 % (ref 38–73)
NEUTROPHILS NFR BLD: 82 % (ref 38–73)
NEUTROPHILS NFR BLD: 82.2 % (ref 38–73)
NEUTROPHILS NFR BLD: 82.4 % (ref 38–73)
NEUTROPHILS NFR BLD: 83 % (ref 38–73)
NEUTROPHILS NFR BLD: 83.1 % (ref 38–73)
NEUTROPHILS NFR BLD: 84 % (ref 38–73)
NEUTROPHILS NFR BLD: 85 % (ref 38–73)
NEUTROPHILS NFR BLD: 86.4 % (ref 38–73)
NEUTROPHILS NFR BLD: 87.6 % (ref 38–73)
NEUTROPHILS NFR FLD MANUAL: 42 %
NITRITE UR QL STRIP: NEGATIVE
NITRITE UR QL STRIP: POSITIVE
NRBC BLD-RTO: 0 /100 WBC
OSMOLALITY SERPL: 262 MOSM/KG (ref 275–295)
OSMOLALITY SERPL: 288 MOSM/KG (ref 275–295)
OSMOLALITY UR: 191 MOSM/KG (ref 50–1200)
OSMOLALITY UR: 284 MOSM/KG (ref 50–1200)
PATH REV BLD -IMP: NORMAL
PATH REV BLD -IMP: NORMAL
PATHOLOGIST INTERPRETATION IFE: NORMAL
PATHOLOGIST INTERPRETATION IFE: NORMAL
PATHOLOGIST INTERPRETATION SPE: NORMAL
PATHOLOGIST INTERPRETATION SPE: NORMAL
PATHOLOGIST INTERPRETATION UIFE: NORMAL
PATHOLOGIST INTERPRETATION UPE: NORMAL
PCO2 BLDA: 44.3 MMHG (ref 35–45)
PCO2 BLDA: 45.4 MMHG (ref 35–45)
PH SMN: 7.25 [PH] (ref 7.35–7.45)
PH SMN: 7.35 [PH] (ref 7.35–7.45)
PH UR STRIP: 5 [PH] (ref 5–8)
PH UR STRIP: 6 [PH] (ref 5–8)
PH UR STRIP: 6 [PH] (ref 5–8)
PH UR STRIP: 7 [PH] (ref 5–8)
PHOSPHATE SERPL-MCNC: 2.8 MG/DL (ref 2.7–4.5)
PHOSPHATE SERPL-MCNC: 2.8 MG/DL (ref 2.7–4.5)
PHOSPHATE SERPL-MCNC: 2.9 MG/DL (ref 2.7–4.5)
PHOSPHATE SERPL-MCNC: 3.1 MG/DL (ref 2.7–4.5)
PHOSPHATE SERPL-MCNC: 3.3 MG/DL (ref 2.7–4.5)
PHOSPHATE SERPL-MCNC: 3.3 MG/DL (ref 2.7–4.5)
PISA MRMAX VEL: 4.98 M/S
PISA TR MAX VEL: 3.26 M/S
PISA TR MAX VEL: 3.4 M/S
PISA TR MAX VEL: 3.58 M/S
PLATELET # BLD AUTO: 127 K/UL (ref 150–450)
PLATELET # BLD AUTO: 148 K/UL (ref 150–450)
PLATELET # BLD AUTO: 149 K/UL (ref 150–450)
PLATELET # BLD AUTO: 149 K/UL (ref 150–450)
PLATELET # BLD AUTO: 158 K/UL (ref 150–450)
PLATELET # BLD AUTO: 163 K/UL (ref 150–450)
PLATELET # BLD AUTO: 167 K/UL (ref 150–450)
PLATELET # BLD AUTO: 169 K/UL (ref 150–450)
PLATELET # BLD AUTO: 184 K/UL (ref 150–450)
PLATELET # BLD AUTO: 188 K/UL (ref 150–450)
PLATELET # BLD AUTO: 198 K/UL (ref 150–450)
PLATELET # BLD AUTO: 201 K/UL (ref 150–450)
PLATELET # BLD AUTO: 220 K/UL (ref 150–450)
PLATELET # BLD AUTO: 239 K/UL (ref 150–450)
PLATELET # BLD AUTO: 239 K/UL (ref 150–450)
PLATELET # BLD AUTO: 255 K/UL (ref 150–450)
PLATELET BLD QL SMEAR: ABNORMAL
PMV BLD AUTO: 10.1 FL (ref 9.2–12.9)
PMV BLD AUTO: 10.4 FL (ref 9.2–12.9)
PMV BLD AUTO: 10.5 FL (ref 9.2–12.9)
PMV BLD AUTO: 10.7 FL (ref 9.2–12.9)
PMV BLD AUTO: 10.7 FL (ref 9.2–12.9)
PMV BLD AUTO: 10.8 FL (ref 9.2–12.9)
PMV BLD AUTO: 11 FL (ref 9.2–12.9)
PMV BLD AUTO: 11 FL (ref 9.2–12.9)
PMV BLD AUTO: 11.1 FL (ref 9.2–12.9)
PMV BLD AUTO: 11.2 FL (ref 9.2–12.9)
PMV BLD AUTO: 11.4 FL (ref 9.2–12.9)
PMV BLD AUTO: 11.7 FL (ref 9.2–12.9)
PMV BLD AUTO: 11.7 FL (ref 9.2–12.9)
PMV BLD AUTO: 12.1 FL (ref 9.2–12.9)
PMV BLD AUTO: 9.4 FL (ref 9.2–12.9)
PMV BLD AUTO: 9.7 FL (ref 9.2–12.9)
PO2 BLDA: 23 MMHG (ref 40–60)
PO2 BLDA: 59 MMHG (ref 80–100)
POC BE: -2 MMOL/L
POC BE: -7 MMOL/L
POC IONIZED CALCIUM: 1.11 MMOL/L (ref 1.06–1.42)
POC IONIZED CALCIUM: 1.13 MMOL/L (ref 1.06–1.42)
POC MOLECULAR INFLUENZA A AGN: NEGATIVE
POC MOLECULAR INFLUENZA B AGN: NEGATIVE
POC SATURATED O2: 32 % (ref 95–100)
POC SATURATED O2: 89 % (ref 95–100)
POC TCO2 (MEASURED): 32 MMOL/L (ref 23–29)
POC TCO2 (MEASURED): 36 MMOL/L (ref 23–29)
POC TCO2: 21 MMOL/L (ref 24–29)
POC TCO2: 26 MMOL/L (ref 23–27)
POCT GLUCOSE: 108 MG/DL (ref 70–110)
POCT GLUCOSE: 111 MG/DL (ref 70–110)
POCT GLUCOSE: 112 MG/DL (ref 70–110)
POCT GLUCOSE: 114 MG/DL (ref 70–110)
POCT GLUCOSE: 117 MG/DL (ref 70–110)
POCT GLUCOSE: 120 MG/DL (ref 70–110)
POCT GLUCOSE: 122 MG/DL (ref 70–110)
POCT GLUCOSE: 123 MG/DL (ref 70–110)
POCT GLUCOSE: 124 MG/DL (ref 70–110)
POCT GLUCOSE: 125 MG/DL (ref 70–110)
POCT GLUCOSE: 127 MG/DL (ref 70–110)
POCT GLUCOSE: 127 MG/DL (ref 70–110)
POCT GLUCOSE: 128 MG/DL (ref 70–110)
POCT GLUCOSE: 128 MG/DL (ref 70–110)
POCT GLUCOSE: 129 MG/DL (ref 70–110)
POCT GLUCOSE: 130 MG/DL (ref 70–110)
POCT GLUCOSE: 132 MG/DL (ref 70–110)
POCT GLUCOSE: 132 MG/DL (ref 70–110)
POCT GLUCOSE: 133 MG/DL (ref 70–110)
POCT GLUCOSE: 134 MG/DL (ref 70–110)
POCT GLUCOSE: 134 MG/DL (ref 70–110)
POCT GLUCOSE: 135 MG/DL (ref 70–110)
POCT GLUCOSE: 135 MG/DL (ref 70–110)
POCT GLUCOSE: 136 MG/DL (ref 70–110)
POCT GLUCOSE: 137 MG/DL (ref 70–110)
POCT GLUCOSE: 138 MG/DL (ref 70–110)
POCT GLUCOSE: 142 MG/DL (ref 70–110)
POCT GLUCOSE: 144 MG/DL (ref 70–110)
POCT GLUCOSE: 146 MG/DL (ref 70–110)
POCT GLUCOSE: 147 MG/DL (ref 70–110)
POCT GLUCOSE: 148 MG/DL (ref 70–110)
POCT GLUCOSE: 150 MG/DL (ref 70–110)
POCT GLUCOSE: 150 MG/DL (ref 70–110)
POCT GLUCOSE: 151 MG/DL (ref 70–110)
POCT GLUCOSE: 152 MG/DL (ref 70–110)
POCT GLUCOSE: 155 MG/DL (ref 70–110)
POCT GLUCOSE: 160 MG/DL (ref 70–110)
POCT GLUCOSE: 162 MG/DL (ref 70–110)
POCT GLUCOSE: 180 MG/DL (ref 70–110)
POCT GLUCOSE: 91 MG/DL (ref 70–110)
POTASSIUM BLD-SCNC: 4.9 MMOL/L (ref 3.5–5.1)
POTASSIUM BLD-SCNC: 5.1 MMOL/L (ref 3.5–5.1)
POTASSIUM SERPL-SCNC: 2.9 MMOL/L (ref 3.5–5.1)
POTASSIUM SERPL-SCNC: 3.3 MMOL/L (ref 3.5–5.1)
POTASSIUM SERPL-SCNC: 3.4 MMOL/L (ref 3.5–5.1)
POTASSIUM SERPL-SCNC: 3.5 MMOL/L (ref 3.5–5.1)
POTASSIUM SERPL-SCNC: 3.6 MMOL/L (ref 3.5–5.1)
POTASSIUM SERPL-SCNC: 3.6 MMOL/L (ref 3.5–5.1)
POTASSIUM SERPL-SCNC: 3.8 MMOL/L (ref 3.5–5.1)
POTASSIUM SERPL-SCNC: 3.8 MMOL/L (ref 3.5–5.1)
POTASSIUM SERPL-SCNC: 4 MMOL/L (ref 3.5–5.1)
POTASSIUM SERPL-SCNC: 4 MMOL/L (ref 3.5–5.1)
POTASSIUM SERPL-SCNC: 4.1 MMOL/L (ref 3.5–5.1)
POTASSIUM SERPL-SCNC: 4.2 MMOL/L (ref 3.5–5.1)
POTASSIUM SERPL-SCNC: 4.3 MMOL/L (ref 3.5–5.1)
POTASSIUM SERPL-SCNC: 4.4 MMOL/L (ref 3.5–5.1)
POTASSIUM SERPL-SCNC: 4.4 MMOL/L (ref 3.5–5.1)
POTASSIUM SERPL-SCNC: 4.5 MMOL/L (ref 3.5–5.1)
POTASSIUM SERPL-SCNC: 4.6 MMOL/L (ref 3.5–5.1)
POTASSIUM SERPL-SCNC: 4.7 MMOL/L (ref 3.5–5.1)
POTASSIUM SERPL-SCNC: 4.7 MMOL/L (ref 3.5–5.1)
POTASSIUM SERPL-SCNC: 4.8 MMOL/L (ref 3.5–5.1)
POTASSIUM SERPL-SCNC: 4.8 MMOL/L (ref 3.5–5.1)
POTASSIUM SERPL-SCNC: 4.9 MMOL/L (ref 3.5–5.1)
POTASSIUM SERPL-SCNC: 5.1 MMOL/L (ref 3.5–5.1)
POTASSIUM SERPL-SCNC: 5.1 MMOL/L (ref 3.5–5.1)
POTASSIUM SERPL-SCNC: 5.2 MMOL/L (ref 3.5–5.1)
POTASSIUM SERPL-SCNC: 5.3 MMOL/L (ref 3.5–5.1)
POTASSIUM UR-SCNC: 18 MMOL/L (ref 15–95)
PROCALCITONIN SERPL IA-MCNC: 0.1 NG/ML
PROT 24H UR-MRATE: 76 MG/SPEC (ref 0–100)
PROT FLD-MCNC: 3 G/DL
PROT PATTERN UR ELPH-IMP: NORMAL
PROT SERPL-MCNC: 5.7 G/DL (ref 6–8.4)
PROT SERPL-MCNC: 5.8 G/DL (ref 6–8.4)
PROT SERPL-MCNC: 6.1 G/DL (ref 6–8.4)
PROT SERPL-MCNC: 6.2 G/DL (ref 6–8.4)
PROT SERPL-MCNC: 6.6 G/DL (ref 6–8.4)
PROT SERPL-MCNC: 6.6 G/DL (ref 6–8.4)
PROT SERPL-MCNC: 6.9 G/DL (ref 6–8.4)
PROT SERPL-MCNC: 7.2 G/DL (ref 6–8.4)
PROT SERPL-MCNC: 7.3 G/DL (ref 6–8.4)
PROT SERPL-MCNC: 7.4 G/DL (ref 6–8.4)
PROT SERPL-MCNC: 7.5 G/DL (ref 6–8.4)
PROT SERPL-MCNC: 8.5 G/DL (ref 6–8.4)
PROT UR QL STRIP: ABNORMAL
PROT UR QL STRIP: ABNORMAL
PROT UR QL STRIP: NEGATIVE
PROT UR QL STRIP: NEGATIVE
PROT UR-MCNC: 18 MG/DL (ref 0–15)
PROTHROMBIN TIME: 12.8 SEC (ref 9–12.5)
PROTHROMBIN TIME: 13.9 SEC (ref 9–12.5)
PROTHROMBIN TIME: 14.8 SEC (ref 9–12.5)
PULM VEIN S/D RATIO: 1.62
PV PEAK D VEL: 0.29 M/S
PV PEAK GRADIENT: 3 MMHG
PV PEAK GRADIENT: 5 MMHG
PV PEAK S VEL: 0.47 M/S
PV PEAK VELOCITY: 0.83 M/S
PV PEAK VELOCITY: 1.08 M/S
PV PEAK VELOCITY: 1.5 CM/S
RA MAJOR: 4.96 CM
RA MAJOR: 5.53 CM
RA MAJOR: 5.86 CM
RA PRESSURE ESTIMATED: 3 MMHG
RA PRESSURE ESTIMATED: 8 MMHG
RA PRESSURE: 3 MMHG
RA WIDTH: 4.02 CM
RA WIDTH: 4.5 CM
RA WIDTH: 4.5 CM
RBC # BLD AUTO: 2.86 M/UL (ref 4–5.4)
RBC # BLD AUTO: 2.9 M/UL (ref 4–5.4)
RBC # BLD AUTO: 2.97 M/UL (ref 4–5.4)
RBC # BLD AUTO: 3.23 M/UL (ref 4–5.4)
RBC # BLD AUTO: 3.39 M/UL (ref 4–5.4)
RBC # BLD AUTO: 3.54 M/UL (ref 4–5.4)
RBC # BLD AUTO: 3.81 M/UL (ref 4–5.4)
RBC # BLD AUTO: 3.88 M/UL (ref 4–5.4)
RBC # BLD AUTO: 3.88 M/UL (ref 4–5.4)
RBC # BLD AUTO: 4.28 M/UL (ref 4–5.4)
RBC # BLD AUTO: 4.38 M/UL (ref 4–5.4)
RBC # BLD AUTO: 4.42 M/UL (ref 4–5.4)
RBC # BLD AUTO: 4.45 M/UL (ref 4–5.4)
RBC # BLD AUTO: 4.53 M/UL (ref 4–5.4)
RBC # BLD AUTO: 4.64 M/UL (ref 4–5.4)
RBC # BLD AUTO: 4.73 M/UL (ref 4–5.4)
RBC #/AREA URNS HPF: 3 /HPF (ref 0–4)
RIGHT VENTRICULAR END-DIASTOLIC DIMENSION: 3.58 CM
RIGHT VENTRICULAR END-DIASTOLIC DIMENSION: 3.69 CM
RIGHT VENTRICULAR END-DIASTOLIC DIMENSION: 4.54 CM
RV TB RVSP: 12 MMHG
RV TB RVSP: 6 MMHG
S PNEUM AG UR QL: NOT DETECTED
SAMPLE: ABNORMAL
SARS-COV-2 RDRP RESP QL NAA+PROBE: NEGATIVE
SATURATED IRON: 13 % (ref 20–50)
SATURATED IRON: 33 % (ref 20–50)
SATURATED IRON: 34 % (ref 20–50)
SINUS: 2.61 CM
SINUS: 2.65 CM
SINUS: 2.68 CM
SITE: ABNORMAL
SMOOTH MUSCLE AB TITR SER IF: NORMAL {TITER}
SODIUM BLD-SCNC: 121 MMOL/L (ref 136–145)
SODIUM BLD-SCNC: 122 MMOL/L (ref 136–145)
SODIUM SERPL-SCNC: 119 MMOL/L (ref 136–145)
SODIUM SERPL-SCNC: 119 MMOL/L (ref 136–145)
SODIUM SERPL-SCNC: 120 MMOL/L (ref 136–145)
SODIUM SERPL-SCNC: 121 MMOL/L (ref 136–145)
SODIUM SERPL-SCNC: 122 MMOL/L (ref 136–145)
SODIUM SERPL-SCNC: 123 MMOL/L (ref 136–145)
SODIUM SERPL-SCNC: 123 MMOL/L (ref 136–145)
SODIUM SERPL-SCNC: 124 MMOL/L (ref 136–145)
SODIUM SERPL-SCNC: 124 MMOL/L (ref 136–145)
SODIUM SERPL-SCNC: 126 MMOL/L (ref 136–145)
SODIUM SERPL-SCNC: 127 MMOL/L (ref 136–145)
SODIUM SERPL-SCNC: 127 MMOL/L (ref 136–145)
SODIUM SERPL-SCNC: 128 MMOL/L (ref 136–145)
SODIUM SERPL-SCNC: 128 MMOL/L (ref 136–145)
SODIUM SERPL-SCNC: 129 MMOL/L (ref 136–145)
SODIUM SERPL-SCNC: 130 MMOL/L (ref 136–145)
SODIUM SERPL-SCNC: 132 MMOL/L (ref 136–145)
SODIUM SERPL-SCNC: 133 MMOL/L (ref 136–145)
SODIUM SERPL-SCNC: 133 MMOL/L (ref 136–145)
SODIUM SERPL-SCNC: 134 MMOL/L (ref 136–145)
SODIUM SERPL-SCNC: 135 MMOL/L (ref 136–145)
SODIUM SERPL-SCNC: 135 MMOL/L (ref 136–145)
SODIUM SERPL-SCNC: 136 MMOL/L (ref 136–145)
SODIUM SERPL-SCNC: 137 MMOL/L (ref 136–145)
SODIUM SERPL-SCNC: 137 MMOL/L (ref 136–145)
SODIUM UR-SCNC: 27 MMOL/L (ref 20–250)
SODIUM UR-SCNC: 45 MMOL/L (ref 20–250)
SP GR UR STRIP: 1 (ref 1–1.03)
SP GR UR STRIP: 1.01 (ref 1–1.03)
SP02: 93
SPECIMEN OUTDATE: NORMAL
SPECIMEN OUTDATE: NORMAL
SPECIMEN SOURCE: NORMAL
STFR SERPL-MCNC: 4.7 MG/L (ref 1.8–4.6)
STJ: 2.06 CM
STJ: 2.13 CM
STJ: 2.43 CM
TB INDURATION 48 - 72 HR READ: 0 MM
TDI LATERAL: 0.02 M/S
TDI LATERAL: 0.03 M/S
TDI LATERAL: 0.06 M/S
TDI SEPTAL: 0.02 M/S
TDI SEPTAL: 0.03 M/S
TDI SEPTAL: 0.03 M/S
TDI: 0.03 M/S
TDI: 0.03 M/S
TDI: 0.05 M/S
TOTAL IRON BINDING CAPACITY: 184 UG/DL (ref 250–450)
TOTAL IRON BINDING CAPACITY: 191 UG/DL (ref 250–450)
TOTAL IRON BINDING CAPACITY: 229 UG/DL (ref 250–450)
TR MAX PG: 43 MMHG
TR MAX PG: 46 MMHG
TR MAX PG: 51 MMHG
TRANSFERRIN SERPL-MCNC: 124 MG/DL (ref 200–375)
TRANSFERRIN SERPL-MCNC: 129 MG/DL (ref 200–375)
TRANSFERRIN SERPL-MCNC: 155 MG/DL (ref 200–375)
TRICUSPID ANNULAR PLANE SYSTOLIC EXCURSION: 1.2 CM
TRICUSPID ANNULAR PLANE SYSTOLIC EXCURSION: 1.48 CM
TRICUSPID ANNULAR PLANE SYSTOLIC EXCURSION: 2.34 CM
TROPONIN I SERPL DL<=0.01 NG/ML-MCNC: 0.01 NG/ML (ref 0–0.03)
TROPONIN I SERPL DL<=0.01 NG/ML-MCNC: 0.04 NG/ML (ref 0–0.03)
TROPONIN I SERPL DL<=0.01 NG/ML-MCNC: 0.06 NG/ML (ref 0–0.03)
TSH SERPL DL<=0.005 MIU/L-ACNC: 2.48 UIU/ML (ref 0.4–4)
TSH SERPL DL<=0.005 MIU/L-ACNC: 2.68 UIU/ML (ref 0.4–4)
TSH SERPL DL<=0.005 MIU/L-ACNC: 3 UIU/ML (ref 0.4–4)
TV PEAK GRADIENT: 1 MMHG
TV PEAK GRADIENT: 2 MMHG
TV REST PULMONARY ARTERY PRESSURE: 46 MMHG
TV REST PULMONARY ARTERY PRESSURE: 49 MMHG
TV REST PULMONARY ARTERY PRESSURE: 59 MMHG
URINE COLLECTION DURATION: 24 HR
URINE VOLUME: 420 ML
URN SPEC COLLECT METH UR: ABNORMAL
URN SPEC COLLECT METH UR: NORMAL
UROBILINOGEN UR STRIP-ACNC: NEGATIVE EU/DL
UUN UR-MCNC: 248 MG/DL (ref 140–1050)
UUN UR-MCNC: 272 MG/DL (ref 140–1050)
VIT B12 SERPL-MCNC: 858 PG/ML (ref 210–950)
WBC # BLD AUTO: 10.62 K/UL (ref 3.9–12.7)
WBC # BLD AUTO: 12.2 K/UL (ref 3.9–12.7)
WBC # BLD AUTO: 12.27 K/UL (ref 3.9–12.7)
WBC # BLD AUTO: 15.62 K/UL (ref 3.9–12.7)
WBC # BLD AUTO: 5.71 K/UL (ref 3.9–12.7)
WBC # BLD AUTO: 5.84 K/UL (ref 3.9–12.7)
WBC # BLD AUTO: 6.51 K/UL (ref 3.9–12.7)
WBC # BLD AUTO: 6.52 K/UL (ref 3.9–12.7)
WBC # BLD AUTO: 6.84 K/UL (ref 3.9–12.7)
WBC # BLD AUTO: 7.13 K/UL (ref 3.9–12.7)
WBC # BLD AUTO: 7.17 K/UL (ref 3.9–12.7)
WBC # BLD AUTO: 7.34 K/UL (ref 3.9–12.7)
WBC # BLD AUTO: 7.62 K/UL (ref 3.9–12.7)
WBC # BLD AUTO: 7.85 K/UL (ref 3.9–12.7)
WBC # BLD AUTO: 8.07 K/UL (ref 3.9–12.7)
WBC # BLD AUTO: 8.1 K/UL (ref 3.9–12.7)
WBC # FLD: 362 /CU MM
WBC #/AREA URNS HPF: 34 /HPF (ref 0–5)
WBC CLUMPS URNS QL MICRO: ABNORMAL
Z-SCORE OF LEFT VENTRICULAR DIMENSION IN END DIASTOLE: -1.73
Z-SCORE OF LEFT VENTRICULAR DIMENSION IN END DIASTOLE: -1.95
Z-SCORE OF LEFT VENTRICULAR DIMENSION IN END SYSTOLE: 0.93
Z-SCORE OF LEFT VENTRICULAR DIMENSION IN END SYSTOLE: 1.69
ZINC SERPL-MCNC: 59 UG/DL (ref 60–130)

## 2023-01-01 PROCEDURE — 99999 PR PBB SHADOW E&M-EST. PATIENT-LVL V: ICD-10-PCS | Mod: PBBFAC,,, | Performed by: FAMILY MEDICINE

## 2023-01-01 PROCEDURE — 87186 SC STD MICRODIL/AGAR DIL: CPT | Performed by: PHYSICIAN ASSISTANT

## 2023-01-01 PROCEDURE — 99223 1ST HOSP IP/OBS HIGH 75: CPT | Mod: ,,, | Performed by: INTERNAL MEDICINE

## 2023-01-01 PROCEDURE — 63600175 PHARM REV CODE 636 W HCPCS: Performed by: INTERNAL MEDICINE

## 2023-01-01 PROCEDURE — 85025 COMPLETE CBC W/AUTO DIFF WBC: CPT | Performed by: STUDENT IN AN ORGANIZED HEALTH CARE EDUCATION/TRAINING PROGRAM

## 2023-01-01 PROCEDURE — 99214 OFFICE O/P EST MOD 30 MIN: CPT | Mod: S$GLB,,, | Performed by: FAMILY MEDICINE

## 2023-01-01 PROCEDURE — 1126F AMNT PAIN NOTED NONE PRSNT: CPT | Mod: CPTII,S$GLB,, | Performed by: NURSE PRACTITIONER

## 2023-01-01 PROCEDURE — 20000000 HC ICU ROOM

## 2023-01-01 PROCEDURE — 93306 ECHO (CUPID ONLY): ICD-10-PCS | Mod: 26,,, | Performed by: INTERNAL MEDICINE

## 2023-01-01 PROCEDURE — 93000 ELECTROCARDIOGRAM COMPLETE: CPT | Mod: S$GLB,,, | Performed by: INTERNAL MEDICINE

## 2023-01-01 PROCEDURE — 3078F PR MOST RECENT DIASTOLIC BLOOD PRESSURE < 80 MM HG: ICD-10-PCS | Mod: CPTII,S$GLB,, | Performed by: INTERNAL MEDICINE

## 2023-01-01 PROCEDURE — 93005 ELECTROCARDIOGRAM TRACING: CPT

## 2023-01-01 PROCEDURE — 97535 SELF CARE MNGMENT TRAINING: CPT

## 2023-01-01 PROCEDURE — 25000003 PHARM REV CODE 250: Performed by: HOSPITALIST

## 2023-01-01 PROCEDURE — 1126F AMNT PAIN NOTED NONE PRSNT: CPT | Mod: CPTII,S$GLB,, | Performed by: INTERNAL MEDICINE

## 2023-01-01 PROCEDURE — 36415 COLL VENOUS BLD VENIPUNCTURE: CPT | Performed by: STUDENT IN AN ORGANIZED HEALTH CARE EDUCATION/TRAINING PROGRAM

## 2023-01-01 PROCEDURE — 99999 PR PBB SHADOW E&M-EST. PATIENT-LVL IV: CPT | Mod: PBBFAC,,, | Performed by: STUDENT IN AN ORGANIZED HEALTH CARE EDUCATION/TRAINING PROGRAM

## 2023-01-01 PROCEDURE — 25000003 PHARM REV CODE 250: Performed by: STUDENT IN AN ORGANIZED HEALTH CARE EDUCATION/TRAINING PROGRAM

## 2023-01-01 PROCEDURE — 82784 ASSAY IGA/IGD/IGG/IGM EACH: CPT | Performed by: STUDENT IN AN ORGANIZED HEALTH CARE EDUCATION/TRAINING PROGRAM

## 2023-01-01 PROCEDURE — C1751 CATH, INF, PER/CENT/MIDLINE: HCPCS

## 2023-01-01 PROCEDURE — 83935 ASSAY OF URINE OSMOLALITY: CPT | Performed by: EMERGENCY MEDICINE

## 2023-01-01 PROCEDURE — 99214 PR OFFICE/OUTPT VISIT, EST, LEVL IV, 30-39 MIN: ICD-10-PCS | Mod: S$GLB,,, | Performed by: FAMILY MEDICINE

## 2023-01-01 PROCEDURE — 81000 URINALYSIS NONAUTO W/SCOPE: CPT | Performed by: PHYSICIAN ASSISTANT

## 2023-01-01 PROCEDURE — 25000003 PHARM REV CODE 250: Performed by: INTERNAL MEDICINE

## 2023-01-01 PROCEDURE — 97161 PT EVAL LOW COMPLEX 20 MIN: CPT

## 2023-01-01 PROCEDURE — 63600175 PHARM REV CODE 636 W HCPCS: Performed by: HOSPITALIST

## 2023-01-01 PROCEDURE — 99496 TRANSJ CARE MGMT HIGH F2F 7D: CPT | Mod: S$GLB,,, | Performed by: FAMILY MEDICINE

## 2023-01-01 PROCEDURE — 96375 TX/PRO/DX INJ NEW DRUG ADDON: CPT

## 2023-01-01 PROCEDURE — 99497 ADVNCD CARE PLAN 30 MIN: CPT | Mod: 25,,, | Performed by: REGISTERED NURSE

## 2023-01-01 PROCEDURE — 84238 ASSAY NONENDOCRINE RECEPTOR: CPT | Performed by: STUDENT IN AN ORGANIZED HEALTH CARE EDUCATION/TRAINING PROGRAM

## 2023-01-01 PROCEDURE — 36600 WITHDRAWAL OF ARTERIAL BLOOD: CPT

## 2023-01-01 PROCEDURE — 97116 GAIT TRAINING THERAPY: CPT | Mod: CQ

## 2023-01-01 PROCEDURE — 80048 BASIC METABOLIC PNL TOTAL CA: CPT | Performed by: HOSPITALIST

## 2023-01-01 PROCEDURE — 3078F DIAST BP <80 MM HG: CPT | Mod: CPTII,S$GLB,, | Performed by: FAMILY MEDICINE

## 2023-01-01 PROCEDURE — 3078F DIAST BP <80 MM HG: CPT | Mod: CPTII,S$GLB,, | Performed by: INTERNAL MEDICINE

## 2023-01-01 PROCEDURE — 3078F PR MOST RECENT DIASTOLIC BLOOD PRESSURE < 80 MM HG: ICD-10-PCS | Mod: CPTII,S$GLB,, | Performed by: FAMILY MEDICINE

## 2023-01-01 PROCEDURE — 3288F FALL RISK ASSESSMENT DOCD: CPT | Mod: CPTII,S$GLB,, | Performed by: UROLOGY

## 2023-01-01 PROCEDURE — 21400001 HC TELEMETRY ROOM

## 2023-01-01 PROCEDURE — 1126F PR PAIN SEVERITY QUANTIFIED, NO PAIN PRESENT: ICD-10-PCS | Mod: CPTII,S$GLB,, | Performed by: FAMILY MEDICINE

## 2023-01-01 PROCEDURE — 84165 PROTEIN E-PHORESIS SERUM: CPT | Mod: 26,,, | Performed by: PATHOLOGY

## 2023-01-01 PROCEDURE — 63600175 PHARM REV CODE 636 W HCPCS: Performed by: NURSE ANESTHETIST, CERTIFIED REGISTERED

## 2023-01-01 PROCEDURE — 25000003 PHARM REV CODE 250: Performed by: EMERGENCY MEDICINE

## 2023-01-01 PROCEDURE — 1101F PT FALLS ASSESS-DOCD LE1/YR: CPT | Mod: CPTII,S$GLB,, | Performed by: UROLOGY

## 2023-01-01 PROCEDURE — 80048 BASIC METABOLIC PNL TOTAL CA: CPT | Performed by: INTERNAL MEDICINE

## 2023-01-01 PROCEDURE — 27000207 HC ISOLATION

## 2023-01-01 PROCEDURE — 1101F PR PT FALLS ASSESS DOC 0-1 FALLS W/OUT INJ PAST YR: ICD-10-PCS | Mod: CPTII,S$GLB,, | Performed by: INTERNAL MEDICINE

## 2023-01-01 PROCEDURE — 63600175 PHARM REV CODE 636 W HCPCS: Performed by: STUDENT IN AN ORGANIZED HEALTH CARE EDUCATION/TRAINING PROGRAM

## 2023-01-01 PROCEDURE — 80053 COMPREHEN METABOLIC PANEL: CPT | Performed by: STUDENT IN AN ORGANIZED HEALTH CARE EDUCATION/TRAINING PROGRAM

## 2023-01-01 PROCEDURE — 1126F PR PAIN SEVERITY QUANTIFIED, NO PAIN PRESENT: ICD-10-PCS | Mod: CPTII,S$GLB,, | Performed by: INTERNAL MEDICINE

## 2023-01-01 PROCEDURE — 63600175 PHARM REV CODE 636 W HCPCS: Mod: JZ,EC,JG | Performed by: INTERNAL MEDICINE

## 2023-01-01 PROCEDURE — 1111F DSCHRG MED/CURRENT MED MERGE: CPT | Mod: CPTII,S$GLB,, | Performed by: FAMILY MEDICINE

## 2023-01-01 PROCEDURE — 99285 EMERGENCY DEPT VISIT HI MDM: CPT | Mod: 25

## 2023-01-01 PROCEDURE — 82746 ASSAY OF FOLIC ACID SERUM: CPT | Performed by: INTERNAL MEDICINE

## 2023-01-01 PROCEDURE — 99233 PR SUBSEQUENT HOSPITAL CARE,LEVL III: ICD-10-PCS | Mod: ,,, | Performed by: INTERNAL MEDICINE

## 2023-01-01 PROCEDURE — 99999 PR PBB SHADOW E&M-EST. PATIENT-LVL IV: CPT | Mod: PBBFAC,,, | Performed by: INTERNAL MEDICINE

## 2023-01-01 PROCEDURE — 1160F RVW MEDS BY RX/DR IN RCRD: CPT | Mod: CPTII,S$GLB,, | Performed by: FAMILY MEDICINE

## 2023-01-01 PROCEDURE — 83605 ASSAY OF LACTIC ACID: CPT | Performed by: STUDENT IN AN ORGANIZED HEALTH CARE EDUCATION/TRAINING PROGRAM

## 2023-01-01 PROCEDURE — 1126F PR PAIN SEVERITY QUANTIFIED, NO PAIN PRESENT: ICD-10-PCS | Mod: CPTII,S$GLB,, | Performed by: UROLOGY

## 2023-01-01 PROCEDURE — 99499 UNLISTED E&M SERVICE: CPT | Mod: S$GLB,,, | Performed by: INTERNAL MEDICINE

## 2023-01-01 PROCEDURE — 86335 PATHOLOGIST INTERPRETATION UIFE: ICD-10-PCS | Mod: 26,,, | Performed by: PATHOLOGY

## 2023-01-01 PROCEDURE — 80053 COMPREHEN METABOLIC PANEL: CPT | Performed by: EMERGENCY MEDICINE

## 2023-01-01 PROCEDURE — 93000 EKG 12-LEAD: ICD-10-PCS | Mod: S$GLB,,, | Performed by: INTERNAL MEDICINE

## 2023-01-01 PROCEDURE — 84443 ASSAY THYROID STIM HORMONE: CPT | Performed by: HOSPITALIST

## 2023-01-01 PROCEDURE — 87077 CULTURE AEROBIC IDENTIFY: CPT | Performed by: PHYSICIAN ASSISTANT

## 2023-01-01 PROCEDURE — 97530 THERAPEUTIC ACTIVITIES: CPT | Mod: CQ

## 2023-01-01 PROCEDURE — 83735 ASSAY OF MAGNESIUM: CPT | Performed by: HOSPITALIST

## 2023-01-01 PROCEDURE — 1160F PR REVIEW ALL MEDS BY PRESCRIBER/CLIN PHARMACIST DOCUMENTED: ICD-10-PCS | Mod: CPTII,S$GLB,, | Performed by: FAMILY MEDICINE

## 2023-01-01 PROCEDURE — 84165 PROTEIN E-PHORESIS SERUM: CPT | Performed by: INTERNAL MEDICINE

## 2023-01-01 PROCEDURE — 36415 COLL VENOUS BLD VENIPUNCTURE: CPT | Performed by: HOSPITALIST

## 2023-01-01 PROCEDURE — 82962 GLUCOSE BLOOD TEST: CPT

## 2023-01-01 PROCEDURE — 84466 ASSAY OF TRANSFERRIN: CPT | Performed by: INTERNAL MEDICINE

## 2023-01-01 PROCEDURE — 90694 VACC AIIV4 NO PRSRV 0.5ML IM: CPT | Mod: S$GLB,,, | Performed by: FAMILY MEDICINE

## 2023-01-01 PROCEDURE — 30200315 PPD INTRADERMAL TEST REV CODE 302: Performed by: HOSPITALIST

## 2023-01-01 PROCEDURE — 1159F PR MEDICATION LIST DOCUMENTED IN MEDICAL RECORD: ICD-10-PCS | Mod: CPTII,S$GLB,, | Performed by: INTERNAL MEDICINE

## 2023-01-01 PROCEDURE — 99999 PR PBB SHADOW E&M-EST. PATIENT-LVL IV: CPT | Mod: PBBFAC,,, | Performed by: FAMILY MEDICINE

## 2023-01-01 PROCEDURE — 92610 EVALUATE SWALLOWING FUNCTION: CPT

## 2023-01-01 PROCEDURE — 87070 CULTURE OTHR SPECIMN AEROBIC: CPT | Performed by: STUDENT IN AN ORGANIZED HEALTH CARE EDUCATION/TRAINING PROGRAM

## 2023-01-01 PROCEDURE — A4216 STERILE WATER/SALINE, 10 ML: HCPCS | Performed by: HOSPITALIST

## 2023-01-01 PROCEDURE — 99999 PR PBB SHADOW E&M-EST. PATIENT-LVL III: CPT | Mod: PBBFAC,,, | Performed by: UROLOGY

## 2023-01-01 PROCEDURE — 99496 TRANSITIONAL CARE MANAGE SERVICE 7 DAY DISCHARGE: ICD-10-PCS | Mod: S$GLB,,, | Performed by: FAMILY MEDICINE

## 2023-01-01 PROCEDURE — 99999 PR PBB SHADOW E&M-EST. PATIENT-LVL III: ICD-10-PCS | Mod: PBBFAC,,, | Performed by: UROLOGY

## 2023-01-01 PROCEDURE — 36415 COLL VENOUS BLD VENIPUNCTURE: CPT | Performed by: INTERNAL MEDICINE

## 2023-01-01 PROCEDURE — 94761 N-INVAS EAR/PLS OXIMETRY MLT: CPT

## 2023-01-01 PROCEDURE — 84295 ASSAY OF SERUM SODIUM: CPT

## 2023-01-01 PROCEDURE — 84165 PATHOLOGIST INTERPRETATION SPE: ICD-10-PCS | Mod: 26,,, | Performed by: PATHOLOGY

## 2023-01-01 PROCEDURE — 82330 ASSAY OF CALCIUM: CPT

## 2023-01-01 PROCEDURE — 96372 THER/PROPH/DIAG INJ SC/IM: CPT

## 2023-01-01 PROCEDURE — 71046 XR CHEST PA AND LATERAL: ICD-10-PCS | Mod: 26,,, | Performed by: RADIOLOGY

## 2023-01-01 PROCEDURE — 27000221 HC OXYGEN, UP TO 24 HOURS

## 2023-01-01 PROCEDURE — 99234 HOSP IP/OBS SM DT SF/LOW 45: CPT | Mod: ,,, | Performed by: STUDENT IN AN ORGANIZED HEALTH CARE EDUCATION/TRAINING PROGRAM

## 2023-01-01 PROCEDURE — 92960 CARDIOVERSION ELECTRIC EXT: CPT | Performed by: INTERNAL MEDICINE

## 2023-01-01 PROCEDURE — 86334 PATHOLOGIST INTERPRETATION IFE: ICD-10-PCS | Mod: 26,,, | Performed by: PATHOLOGY

## 2023-01-01 PROCEDURE — 1101F PT FALLS ASSESS-DOCD LE1/YR: CPT | Mod: CPTII,S$GLB,, | Performed by: FAMILY MEDICINE

## 2023-01-01 PROCEDURE — 99497 PR ADVNCD CARE PLAN 30 MIN: ICD-10-PCS | Mod: ,,, | Performed by: INTERNAL MEDICINE

## 2023-01-01 PROCEDURE — 3074F PR MOST RECENT SYSTOLIC BLOOD PRESSURE < 130 MM HG: ICD-10-PCS | Mod: CPTII,S$GLB,, | Performed by: INTERNAL MEDICINE

## 2023-01-01 PROCEDURE — 85025 COMPLETE CBC W/AUTO DIFF WBC: CPT

## 2023-01-01 PROCEDURE — 85060 BLOOD SMEAR INTERPRETATION: CPT | Mod: ,,, | Performed by: PATHOLOGY

## 2023-01-01 PROCEDURE — 80048 BASIC METABOLIC PNL TOTAL CA: CPT | Mod: 91 | Performed by: HOSPITALIST

## 2023-01-01 PROCEDURE — 3288F PR FALLS RISK ASSESSMENT DOCUMENTED: ICD-10-PCS | Mod: CPTII,S$GLB,, | Performed by: FAMILY MEDICINE

## 2023-01-01 PROCEDURE — 97110 THERAPEUTIC EXERCISES: CPT | Mod: CQ

## 2023-01-01 PROCEDURE — 97530 THERAPEUTIC ACTIVITIES: CPT

## 2023-01-01 PROCEDURE — 84295 ASSAY OF SERUM SODIUM: CPT | Mod: 91 | Performed by: INTERNAL MEDICINE

## 2023-01-01 PROCEDURE — 3288F PR FALLS RISK ASSESSMENT DOCUMENTED: ICD-10-PCS | Mod: CPTII,S$GLB,, | Performed by: INTERNAL MEDICINE

## 2023-01-01 PROCEDURE — 1159F MED LIST DOCD IN RCRD: CPT | Mod: CPTII,S$GLB,, | Performed by: INTERNAL MEDICINE

## 2023-01-01 PROCEDURE — 84145 PROCALCITONIN (PCT): CPT | Performed by: INTERNAL MEDICINE

## 2023-01-01 PROCEDURE — 3074F SYST BP LT 130 MM HG: CPT | Mod: CPTII,S$GLB,, | Performed by: FAMILY MEDICINE

## 2023-01-01 PROCEDURE — 99223 1ST HOSP IP/OBS HIGH 75: CPT | Mod: 25,,, | Performed by: INTERNAL MEDICINE

## 2023-01-01 PROCEDURE — 3288F FALL RISK ASSESSMENT DOCD: CPT | Mod: CPTII,S$GLB,, | Performed by: FAMILY MEDICINE

## 2023-01-01 PROCEDURE — 63600175 PHARM REV CODE 636 W HCPCS: Mod: JZ,JG | Performed by: INTERNAL MEDICINE

## 2023-01-01 PROCEDURE — 1126F AMNT PAIN NOTED NONE PRSNT: CPT | Mod: CPTII,S$GLB,, | Performed by: FAMILY MEDICINE

## 2023-01-01 PROCEDURE — 83880 ASSAY OF NATRIURETIC PEPTIDE: CPT | Performed by: STUDENT IN AN ORGANIZED HEALTH CARE EDUCATION/TRAINING PROGRAM

## 2023-01-01 PROCEDURE — 84466 ASSAY OF TRANSFERRIN: CPT | Performed by: STUDENT IN AN ORGANIZED HEALTH CARE EDUCATION/TRAINING PROGRAM

## 2023-01-01 PROCEDURE — 93306 TTE W/DOPPLER COMPLETE: CPT | Mod: 26,,, | Performed by: INTERNAL MEDICINE

## 2023-01-01 PROCEDURE — 99291 PR CRITICAL CARE, E/M 30-74 MINUTES: ICD-10-PCS | Mod: 25,,, | Performed by: INTERNAL MEDICINE

## 2023-01-01 PROCEDURE — 1159F MED LIST DOCD IN RCRD: CPT | Mod: CPTII,S$GLB,, | Performed by: STUDENT IN AN ORGANIZED HEALTH CARE EDUCATION/TRAINING PROGRAM

## 2023-01-01 PROCEDURE — 1159F PR MEDICATION LIST DOCUMENTED IN MEDICAL RECORD: ICD-10-PCS | Mod: CPTII,S$GLB,, | Performed by: UROLOGY

## 2023-01-01 PROCEDURE — 83880 ASSAY OF NATRIURETIC PEPTIDE: CPT

## 2023-01-01 PROCEDURE — 86334 IMMUNOFIX E-PHORESIS SERUM: CPT | Performed by: INTERNAL MEDICINE

## 2023-01-01 PROCEDURE — 77065 DX MAMMO INCL CAD UNI: CPT | Mod: 26,LT,, | Performed by: RADIOLOGY

## 2023-01-01 PROCEDURE — 85025 COMPLETE CBC W/AUTO DIFF WBC: CPT | Mod: 91 | Performed by: STUDENT IN AN ORGANIZED HEALTH CARE EDUCATION/TRAINING PROGRAM

## 2023-01-01 PROCEDURE — 96365 THER/PROPH/DIAG IV INF INIT: CPT

## 2023-01-01 PROCEDURE — 3078F DIAST BP <80 MM HG: CPT | Mod: CPTII,S$GLB,, | Performed by: STUDENT IN AN ORGANIZED HEALTH CARE EDUCATION/TRAINING PROGRAM

## 2023-01-01 PROCEDURE — 3074F SYST BP LT 130 MM HG: CPT | Mod: CPTII,S$GLB,, | Performed by: NURSE PRACTITIONER

## 2023-01-01 PROCEDURE — 51701 INSERT BLADDER CATHETER: CPT

## 2023-01-01 PROCEDURE — 80053 COMPREHEN METABOLIC PANEL: CPT | Performed by: INTERNAL MEDICINE

## 2023-01-01 PROCEDURE — 82042 OTHER SOURCE ALBUMIN QUAN EA: CPT | Performed by: STUDENT IN AN ORGANIZED HEALTH CARE EDUCATION/TRAINING PROGRAM

## 2023-01-01 PROCEDURE — 84100 ASSAY OF PHOSPHORUS: CPT | Performed by: STUDENT IN AN ORGANIZED HEALTH CARE EDUCATION/TRAINING PROGRAM

## 2023-01-01 PROCEDURE — 99900035 HC TECH TIME PER 15 MIN (STAT)

## 2023-01-01 PROCEDURE — 97165 OT EVAL LOW COMPLEX 30 MIN: CPT

## 2023-01-01 PROCEDURE — 82565 ASSAY OF CREATININE: CPT

## 2023-01-01 PROCEDURE — 71046 X-RAY EXAM CHEST 2 VIEWS: CPT | Mod: TC,FY,PO

## 2023-01-01 PROCEDURE — 1100F PR PT FALLS ASSESS DOC 2+ FALLS/FALL W/INJURY/YR: ICD-10-PCS | Mod: CPTII,S$GLB,, | Performed by: STUDENT IN AN ORGANIZED HEALTH CARE EDUCATION/TRAINING PROGRAM

## 2023-01-01 PROCEDURE — 3075F SYST BP GE 130 - 139MM HG: CPT | Mod: CPTII,S$GLB,, | Performed by: FAMILY MEDICINE

## 2023-01-01 PROCEDURE — 97116 GAIT TRAINING THERAPY: CPT

## 2023-01-01 PROCEDURE — 85014 HEMATOCRIT: CPT

## 2023-01-01 PROCEDURE — 92526 ORAL FUNCTION THERAPY: CPT

## 2023-01-01 PROCEDURE — 99204 OFFICE O/P NEW MOD 45 MIN: CPT | Mod: S$GLB,,, | Performed by: UROLOGY

## 2023-01-01 PROCEDURE — 81003 URINALYSIS AUTO W/O SCOPE: CPT

## 2023-01-01 PROCEDURE — 93010 EKG 12-LEAD: ICD-10-PCS | Mod: ,,, | Performed by: INTERNAL MEDICINE

## 2023-01-01 PROCEDURE — 99214 OFFICE O/P EST MOD 30 MIN: CPT | Mod: S$GLB,,, | Performed by: NURSE PRACTITIONER

## 2023-01-01 PROCEDURE — 99999 PR PBB SHADOW E&M-EST. PATIENT-LVL III: ICD-10-PCS | Mod: PBBFAC,,, | Performed by: INTERNAL MEDICINE

## 2023-01-01 PROCEDURE — 93010 ELECTROCARDIOGRAM REPORT: CPT | Mod: ,,, | Performed by: INTERNAL MEDICINE

## 2023-01-01 PROCEDURE — 1111F DSCHRG MED/CURRENT MED MERGE: CPT | Mod: CPTII,S$GLB,, | Performed by: INTERNAL MEDICINE

## 2023-01-01 PROCEDURE — 86335 IMMUNFIX E-PHORSIS/URINE/CSF: CPT | Performed by: STUDENT IN AN ORGANIZED HEALTH CARE EDUCATION/TRAINING PROGRAM

## 2023-01-01 PROCEDURE — 93010 ELECTROCARDIOGRAM REPORT: CPT | Mod: 76,,, | Performed by: INTERNAL MEDICINE

## 2023-01-01 PROCEDURE — 83880 ASSAY OF NATRIURETIC PEPTIDE: CPT | Performed by: PHYSICIAN ASSISTANT

## 2023-01-01 PROCEDURE — 85610 PROTHROMBIN TIME: CPT | Performed by: STUDENT IN AN ORGANIZED HEALTH CARE EDUCATION/TRAINING PROGRAM

## 2023-01-01 PROCEDURE — 84484 ASSAY OF TROPONIN QUANT: CPT | Performed by: STUDENT IN AN ORGANIZED HEALTH CARE EDUCATION/TRAINING PROGRAM

## 2023-01-01 PROCEDURE — 1111F PR DISCHARGE MEDS RECONCILED W/ CURRENT OUTPATIENT MED LIST: ICD-10-PCS | Mod: CPTII,S$GLB,, | Performed by: INTERNAL MEDICINE

## 2023-01-01 PROCEDURE — 99999 PR PBB SHADOW E&M-EST. PATIENT-LVL IV: ICD-10-PCS | Mod: PBBFAC,,, | Performed by: INTERNAL MEDICINE

## 2023-01-01 PROCEDURE — 82550 ASSAY OF CK (CPK): CPT | Performed by: STUDENT IN AN ORGANIZED HEALTH CARE EDUCATION/TRAINING PROGRAM

## 2023-01-01 PROCEDURE — 94799 UNLISTED PULMONARY SVC/PX: CPT

## 2023-01-01 PROCEDURE — 99291 CRITICAL CARE FIRST HOUR: CPT | Mod: ,,, | Performed by: INTERNAL MEDICINE

## 2023-01-01 PROCEDURE — 36569 INSJ PICC 5 YR+ W/O IMAGING: CPT

## 2023-01-01 PROCEDURE — 83605 ASSAY OF LACTIC ACID: CPT | Performed by: EMERGENCY MEDICINE

## 2023-01-01 PROCEDURE — 84295 ASSAY OF SERUM SODIUM: CPT | Performed by: INTERNAL MEDICINE

## 2023-01-01 PROCEDURE — 99233 SBSQ HOSP IP/OBS HIGH 50: CPT | Mod: ,,, | Performed by: INTERNAL MEDICINE

## 2023-01-01 PROCEDURE — 99214 OFFICE O/P EST MOD 30 MIN: CPT | Mod: S$GLB,,, | Performed by: INTERNAL MEDICINE

## 2023-01-01 PROCEDURE — 99214 OFFICE O/P EST MOD 30 MIN: CPT | Mod: S$GLB,,, | Performed by: UROLOGY

## 2023-01-01 PROCEDURE — 92960 CARDIOVERSION ELECTRIC EXT: CPT | Mod: ,,, | Performed by: INTERNAL MEDICINE

## 2023-01-01 PROCEDURE — 63600175 PHARM REV CODE 636 W HCPCS: Performed by: EMERGENCY MEDICINE

## 2023-01-01 PROCEDURE — 97110 THERAPEUTIC EXERCISES: CPT

## 2023-01-01 PROCEDURE — 99223 PR INITIAL HOSPITAL CARE,LEVL III: ICD-10-PCS | Mod: 25,,, | Performed by: INTERNAL MEDICINE

## 2023-01-01 PROCEDURE — 87449 NOS EACH ORGANISM AG IA: CPT | Performed by: HOSPITALIST

## 2023-01-01 PROCEDURE — 82607 VITAMIN B-12: CPT | Performed by: INTERNAL MEDICINE

## 2023-01-01 PROCEDURE — 1101F PR PT FALLS ASSESS DOC 0-1 FALLS W/OUT INJ PAST YR: ICD-10-PCS | Mod: CPTII,S$GLB,, | Performed by: FAMILY MEDICINE

## 2023-01-01 PROCEDURE — 99214 PR OFFICE/OUTPT VISIT, EST, LEVL IV, 30-39 MIN: ICD-10-PCS | Mod: S$GLB,,, | Performed by: INTERNAL MEDICINE

## 2023-01-01 PROCEDURE — 3077F PR MOST RECENT SYSTOLIC BLOOD PRESSURE >= 140 MM HG: ICD-10-PCS | Mod: CPTII,S$GLB,, | Performed by: INTERNAL MEDICINE

## 2023-01-01 PROCEDURE — 99223 PR INITIAL HOSPITAL CARE,LEVL III: ICD-10-PCS | Mod: ,,, | Performed by: REGISTERED NURSE

## 2023-01-01 PROCEDURE — 82803 BLOOD GASES ANY COMBINATION: CPT

## 2023-01-01 PROCEDURE — 3075F PR MOST RECENT SYSTOLIC BLOOD PRESS GE 130-139MM HG: ICD-10-PCS | Mod: CPTII,S$GLB,, | Performed by: FAMILY MEDICINE

## 2023-01-01 PROCEDURE — 81003 URINALYSIS AUTO W/O SCOPE: CPT | Performed by: STUDENT IN AN ORGANIZED HEALTH CARE EDUCATION/TRAINING PROGRAM

## 2023-01-01 PROCEDURE — 83880 ASSAY OF NATRIURETIC PEPTIDE: CPT | Performed by: INTERNAL MEDICINE

## 2023-01-01 PROCEDURE — 3288F PR FALLS RISK ASSESSMENT DOCUMENTED: ICD-10-PCS | Mod: CPTII,S$GLB,, | Performed by: UROLOGY

## 2023-01-01 PROCEDURE — 82103 ALPHA-1-ANTITRYPSIN TOTAL: CPT | Performed by: NURSE PRACTITIONER

## 2023-01-01 PROCEDURE — 99291 CRITICAL CARE FIRST HOUR: CPT | Mod: 25,,, | Performed by: INTERNAL MEDICINE

## 2023-01-01 PROCEDURE — 1111F PR DISCHARGE MEDS RECONCILED W/ CURRENT OUTPATIENT MED LIST: ICD-10-PCS | Mod: CPTII,S$GLB,, | Performed by: FAMILY MEDICINE

## 2023-01-01 PROCEDURE — 90694 FLU VACCINE - QUADRIVALENT - ADJUVANTED: ICD-10-PCS | Mod: S$GLB,,, | Performed by: FAMILY MEDICINE

## 2023-01-01 PROCEDURE — 77061 BREAST TOMOSYNTHESIS UNI: CPT | Mod: 26,LT,, | Performed by: RADIOLOGY

## 2023-01-01 PROCEDURE — 80053 COMPREHEN METABOLIC PANEL: CPT

## 2023-01-01 PROCEDURE — 82728 ASSAY OF FERRITIN: CPT | Performed by: EMERGENCY MEDICINE

## 2023-01-01 PROCEDURE — 85025 COMPLETE CBC W/AUTO DIFF WBC: CPT | Performed by: INTERNAL MEDICINE

## 2023-01-01 PROCEDURE — 99999 PR PBB SHADOW E&M-EST. PATIENT-LVL V: CPT | Mod: PBBFAC,,, | Performed by: FAMILY MEDICINE

## 2023-01-01 PROCEDURE — 96361 HYDRATE IV INFUSION ADD-ON: CPT

## 2023-01-01 PROCEDURE — 99497 PR ADVNCD CARE PLAN 30 MIN: ICD-10-PCS | Mod: ,,, | Performed by: REGISTERED NURSE

## 2023-01-01 PROCEDURE — 83735 ASSAY OF MAGNESIUM: CPT | Performed by: INTERNAL MEDICINE

## 2023-01-01 PROCEDURE — 86850 RBC ANTIBODY SCREEN: CPT | Performed by: STUDENT IN AN ORGANIZED HEALTH CARE EDUCATION/TRAINING PROGRAM

## 2023-01-01 PROCEDURE — 99999 PR PBB SHADOW E&M-EST. PATIENT-LVL III: ICD-10-PCS | Mod: PBBFAC,,, | Performed by: NURSE PRACTITIONER

## 2023-01-01 PROCEDURE — 3288F PR FALLS RISK ASSESSMENT DOCUMENTED: ICD-10-PCS | Mod: CPTII,S$GLB,, | Performed by: NURSE PRACTITIONER

## 2023-01-01 PROCEDURE — 88305 TISSUE EXAM BY PATHOLOGIST: CPT | Mod: 26,,, | Performed by: PATHOLOGY

## 2023-01-01 PROCEDURE — 83735 ASSAY OF MAGNESIUM: CPT | Performed by: STUDENT IN AN ORGANIZED HEALTH CARE EDUCATION/TRAINING PROGRAM

## 2023-01-01 PROCEDURE — 3074F PR MOST RECENT SYSTOLIC BLOOD PRESSURE < 130 MM HG: ICD-10-PCS | Mod: CPTII,S$GLB,, | Performed by: FAMILY MEDICINE

## 2023-01-01 PROCEDURE — 87040 BLOOD CULTURE FOR BACTERIA: CPT | Performed by: STUDENT IN AN ORGANIZED HEALTH CARE EDUCATION/TRAINING PROGRAM

## 2023-01-01 PROCEDURE — 97166 OT EVAL MOD COMPLEX 45 MIN: CPT

## 2023-01-01 PROCEDURE — 3288F FALL RISK ASSESSMENT DOCD: CPT | Mod: CPTII,S$GLB,, | Performed by: INTERNAL MEDICINE

## 2023-01-01 PROCEDURE — 99497 ADVNCD CARE PLAN 30 MIN: CPT | Mod: ,,, | Performed by: INTERNAL MEDICINE

## 2023-01-01 PROCEDURE — 1159F MED LIST DOCD IN RCRD: CPT | Mod: CPTII,S$GLB,, | Performed by: FAMILY MEDICINE

## 2023-01-01 PROCEDURE — 71046 X-RAY EXAM CHEST 2 VIEWS: CPT | Mod: 26,,, | Performed by: RADIOLOGY

## 2023-01-01 PROCEDURE — 1126F PR PAIN SEVERITY QUANTIFIED, NO PAIN PRESENT: ICD-10-PCS | Mod: CPTII,S$GLB,, | Performed by: NURSE PRACTITIONER

## 2023-01-01 PROCEDURE — 88305 TISSUE EXAM BY PATHOLOGIST: CPT | Performed by: PATHOLOGY

## 2023-01-01 PROCEDURE — 80048 BASIC METABOLIC PNL TOTAL CA: CPT | Performed by: EMERGENCY MEDICINE

## 2023-01-01 PROCEDURE — 87075 CULTR BACTERIA EXCEPT BLOOD: CPT | Performed by: STUDENT IN AN ORGANIZED HEALTH CARE EDUCATION/TRAINING PROGRAM

## 2023-01-01 PROCEDURE — 85025 COMPLETE CBC W/AUTO DIFF WBC: CPT | Performed by: PHYSICIAN ASSISTANT

## 2023-01-01 PROCEDURE — 84165 PROTEIN E-PHORESIS SERUM: CPT | Performed by: STUDENT IN AN ORGANIZED HEALTH CARE EDUCATION/TRAINING PROGRAM

## 2023-01-01 PROCEDURE — 83540 ASSAY OF IRON: CPT | Performed by: EMERGENCY MEDICINE

## 2023-01-01 PROCEDURE — 99497 PR ADVNCD CARE PLAN 30 MIN: ICD-10-PCS | Mod: 25,,, | Performed by: INTERNAL MEDICINE

## 2023-01-01 PROCEDURE — 99497 PR ADVNCD CARE PLAN 30 MIN: ICD-10-PCS | Mod: 25,,, | Performed by: REGISTERED NURSE

## 2023-01-01 PROCEDURE — 84300 ASSAY OF URINE SODIUM: CPT | Performed by: EMERGENCY MEDICINE

## 2023-01-01 PROCEDURE — 83880 ASSAY OF NATRIURETIC PEPTIDE: CPT | Performed by: HOSPITALIST

## 2023-01-01 PROCEDURE — 84300 ASSAY OF URINE SODIUM: CPT

## 2023-01-01 PROCEDURE — 36410 VNPNXR 3YR/> PHY/QHP DX/THER: CPT

## 2023-01-01 PROCEDURE — 83540 ASSAY OF IRON: CPT | Performed by: INTERNAL MEDICINE

## 2023-01-01 PROCEDURE — 99999 PR PBB SHADOW E&M-EST. PATIENT-LVL IV: ICD-10-PCS | Mod: PBBFAC,,, | Performed by: STUDENT IN AN ORGANIZED HEALTH CARE EDUCATION/TRAINING PROGRAM

## 2023-01-01 PROCEDURE — 86334 IMMUNOFIX E-PHORESIS SERUM: CPT | Performed by: STUDENT IN AN ORGANIZED HEALTH CARE EDUCATION/TRAINING PROGRAM

## 2023-01-01 PROCEDURE — 84540 ASSAY OF URINE/UREA-N: CPT | Performed by: EMERGENCY MEDICINE

## 2023-01-01 PROCEDURE — 3074F SYST BP LT 130 MM HG: CPT | Mod: CPTII,S$GLB,, | Performed by: INTERNAL MEDICINE

## 2023-01-01 PROCEDURE — 88112 CYTOPATH CELL ENHANCE TECH: CPT | Performed by: PATHOLOGY

## 2023-01-01 PROCEDURE — 83735 ASSAY OF MAGNESIUM: CPT | Performed by: SURGERY

## 2023-01-01 PROCEDURE — 87502 INFLUENZA DNA AMP PROBE: CPT

## 2023-01-01 PROCEDURE — 84166 PROTEIN E-PHORESIS/URINE/CSF: CPT | Performed by: STUDENT IN AN ORGANIZED HEALTH CARE EDUCATION/TRAINING PROGRAM

## 2023-01-01 PROCEDURE — 86334 IMMUNOFIX E-PHORESIS SERUM: CPT | Mod: 26,,, | Performed by: PATHOLOGY

## 2023-01-01 PROCEDURE — 11000001 HC ACUTE MED/SURG PRIVATE ROOM

## 2023-01-01 PROCEDURE — G0008 ADMIN INFLUENZA VIRUS VAC: HCPCS | Mod: S$GLB,,, | Performed by: FAMILY MEDICINE

## 2023-01-01 PROCEDURE — 99233 PR SUBSEQUENT HOSPITAL CARE,LEVL III: ICD-10-PCS | Mod: ,,, | Performed by: REGISTERED NURSE

## 2023-01-01 PROCEDURE — G0180 MD CERTIFICATION HHA PATIENT: HCPCS | Mod: ,,, | Performed by: HOSPITALIST

## 2023-01-01 PROCEDURE — 84156 ASSAY OF PROTEIN URINE: CPT | Performed by: STUDENT IN AN ORGANIZED HEALTH CARE EDUCATION/TRAINING PROGRAM

## 2023-01-01 PROCEDURE — 1126F AMNT PAIN NOTED NONE PRSNT: CPT | Mod: CPTII,S$GLB,, | Performed by: STUDENT IN AN ORGANIZED HEALTH CARE EDUCATION/TRAINING PROGRAM

## 2023-01-01 PROCEDURE — 3288F FALL RISK ASSESSMENT DOCD: CPT | Mod: CPTII,S$GLB,, | Performed by: NURSE PRACTITIONER

## 2023-01-01 PROCEDURE — 1101F PR PT FALLS ASSESS DOC 0-1 FALLS W/OUT INJ PAST YR: ICD-10-PCS | Mod: CPTII,S$GLB,, | Performed by: NURSE PRACTITIONER

## 2023-01-01 PROCEDURE — 83930 ASSAY OF BLOOD OSMOLALITY: CPT

## 2023-01-01 PROCEDURE — 82945 GLUCOSE OTHER FLUID: CPT | Performed by: STUDENT IN AN ORGANIZED HEALTH CARE EDUCATION/TRAINING PROGRAM

## 2023-01-01 PROCEDURE — 3074F PR MOST RECENT SYSTOLIC BLOOD PRESSURE < 130 MM HG: ICD-10-PCS | Mod: CPTII,S$GLB,, | Performed by: NURSE PRACTITIONER

## 2023-01-01 PROCEDURE — 88112 PR  CYTOPATH, CELL ENHANCE TECH: ICD-10-PCS | Mod: 26,,, | Performed by: PATHOLOGY

## 2023-01-01 PROCEDURE — 99232 PR SUBSEQUENT HOSPITAL CARE,LEVL II: ICD-10-PCS | Mod: ,,, | Performed by: INTERNAL MEDICINE

## 2023-01-01 PROCEDURE — 87086 URINE CULTURE/COLONY COUNT: CPT | Performed by: PHYSICIAN ASSISTANT

## 2023-01-01 PROCEDURE — 1126F AMNT PAIN NOTED NONE PRSNT: CPT | Mod: CPTII,S$GLB,, | Performed by: UROLOGY

## 2023-01-01 PROCEDURE — 84443 ASSAY THYROID STIM HORMONE: CPT

## 2023-01-01 PROCEDURE — 99214 PR OFFICE/OUTPT VISIT, EST, LEVL IV, 30-39 MIN: ICD-10-PCS | Mod: S$GLB,,, | Performed by: NURSE PRACTITIONER

## 2023-01-01 PROCEDURE — 99234 PR OBSERV/HOSP SAME DATE,LEVL III: ICD-10-PCS | Mod: ,,, | Performed by: STUDENT IN AN ORGANIZED HEALTH CARE EDUCATION/TRAINING PROGRAM

## 2023-01-01 PROCEDURE — 1101F PT FALLS ASSESS-DOCD LE1/YR: CPT | Mod: CPTII,S$GLB,, | Performed by: INTERNAL MEDICINE

## 2023-01-01 PROCEDURE — 83540 ASSAY OF IRON: CPT | Performed by: STUDENT IN AN ORGANIZED HEALTH CARE EDUCATION/TRAINING PROGRAM

## 2023-01-01 PROCEDURE — 27100171 HC OXYGEN HIGH FLOW UP TO 24 HOURS

## 2023-01-01 PROCEDURE — 80048 BASIC METABOLIC PNL TOTAL CA: CPT | Mod: 91 | Performed by: STUDENT IN AN ORGANIZED HEALTH CARE EDUCATION/TRAINING PROGRAM

## 2023-01-01 PROCEDURE — 82784 ASSAY IGA/IGD/IGG/IGM EACH: CPT | Mod: 59 | Performed by: STUDENT IN AN ORGANIZED HEALTH CARE EDUCATION/TRAINING PROGRAM

## 2023-01-01 PROCEDURE — 1160F PR REVIEW ALL MEDS BY PRESCRIBER/CLIN PHARMACIST DOCUMENTED: ICD-10-PCS | Mod: CPTII,S$GLB,, | Performed by: UROLOGY

## 2023-01-01 PROCEDURE — 83521 IG LIGHT CHAINS FREE EACH: CPT | Mod: 59 | Performed by: STUDENT IN AN ORGANIZED HEALTH CARE EDUCATION/TRAINING PROGRAM

## 2023-01-01 PROCEDURE — 99223 1ST HOSP IP/OBS HIGH 75: CPT | Mod: 25,,, | Performed by: REGISTERED NURSE

## 2023-01-01 PROCEDURE — 77061 MAMMO DIGITAL DIAGNOSTIC LEFT WITH TOMO: ICD-10-PCS | Mod: 26,LT,, | Performed by: RADIOLOGY

## 2023-01-01 PROCEDURE — 92960 PR CARDIOVERSION, ELECTIVE;EXTERN: ICD-10-PCS | Mod: ,,, | Performed by: INTERNAL MEDICINE

## 2023-01-01 PROCEDURE — 84132 ASSAY OF SERUM POTASSIUM: CPT

## 2023-01-01 PROCEDURE — 99232 SBSQ HOSP IP/OBS MODERATE 35: CPT | Mod: ,,, | Performed by: INTERNAL MEDICINE

## 2023-01-01 PROCEDURE — 84157 ASSAY OF PROTEIN OTHER: CPT | Performed by: STUDENT IN AN ORGANIZED HEALTH CARE EDUCATION/TRAINING PROGRAM

## 2023-01-01 PROCEDURE — 83615 LACTATE (LD) (LDH) ENZYME: CPT | Performed by: STUDENT IN AN ORGANIZED HEALTH CARE EDUCATION/TRAINING PROGRAM

## 2023-01-01 PROCEDURE — 84133 ASSAY OF URINE POTASSIUM: CPT

## 2023-01-01 PROCEDURE — 93306 TTE W/DOPPLER COMPLETE: CPT

## 2023-01-01 PROCEDURE — 1159F MED LIST DOCD IN RCRD: CPT | Mod: CPTII,S$GLB,, | Performed by: UROLOGY

## 2023-01-01 PROCEDURE — 86901 BLOOD TYPING SEROLOGIC RH(D): CPT

## 2023-01-01 PROCEDURE — 1126F PR PAIN SEVERITY QUANTIFIED, NO PAIN PRESENT: ICD-10-PCS | Mod: CPTII,S$GLB,, | Performed by: STUDENT IN AN ORGANIZED HEALTH CARE EDUCATION/TRAINING PROGRAM

## 2023-01-01 PROCEDURE — 99999 PR PBB SHADOW E&M-EST. PATIENT-LVL III: CPT | Mod: PBBFAC,,, | Performed by: NURSE PRACTITIONER

## 2023-01-01 PROCEDURE — 25000003 PHARM REV CODE 250: Performed by: NURSE ANESTHETIST, CERTIFIED REGISTERED

## 2023-01-01 PROCEDURE — 97162 PT EVAL MOD COMPLEX 30 MIN: CPT

## 2023-01-01 PROCEDURE — 80053 COMPREHEN METABOLIC PANEL: CPT | Mod: 91 | Performed by: EMERGENCY MEDICINE

## 2023-01-01 PROCEDURE — 37000009 HC ANESTHESIA EA ADD 15 MINS: Performed by: INTERNAL MEDICINE

## 2023-01-01 PROCEDURE — 82728 ASSAY OF FERRITIN: CPT | Performed by: INTERNAL MEDICINE

## 2023-01-01 PROCEDURE — 84484 ASSAY OF TROPONIN QUANT: CPT | Performed by: PHYSICIAN ASSISTANT

## 2023-01-01 PROCEDURE — 1159F PR MEDICATION LIST DOCUMENTED IN MEDICAL RECORD: ICD-10-PCS | Mod: CPTII,S$GLB,, | Performed by: FAMILY MEDICINE

## 2023-01-01 PROCEDURE — D9220A PRA ANESTHESIA: ICD-10-PCS | Mod: ANES,,, | Performed by: STUDENT IN AN ORGANIZED HEALTH CARE EDUCATION/TRAINING PROGRAM

## 2023-01-01 PROCEDURE — 84484 ASSAY OF TROPONIN QUANT: CPT

## 2023-01-01 PROCEDURE — 82570 ASSAY OF URINE CREATININE: CPT | Performed by: EMERGENCY MEDICINE

## 2023-01-01 PROCEDURE — 86015 ACTIN ANTIBODY EACH: CPT | Performed by: NURSE PRACTITIONER

## 2023-01-01 PROCEDURE — 85025 COMPLETE CBC W/AUTO DIFF WBC: CPT | Performed by: HOSPITALIST

## 2023-01-01 PROCEDURE — 77065 MAMMO DIGITAL DIAGNOSTIC LEFT WITH TOMO: ICD-10-PCS | Mod: 26,LT,, | Performed by: RADIOLOGY

## 2023-01-01 PROCEDURE — 3077F SYST BP >= 140 MM HG: CPT | Mod: CPTII,S$GLB,, | Performed by: INTERNAL MEDICINE

## 2023-01-01 PROCEDURE — 80074 ACUTE HEPATITIS PANEL: CPT | Performed by: STUDENT IN AN ORGANIZED HEALTH CARE EDUCATION/TRAINING PROGRAM

## 2023-01-01 PROCEDURE — 99223 1ST HOSP IP/OBS HIGH 75: CPT | Mod: ,,, | Performed by: REGISTERED NURSE

## 2023-01-01 PROCEDURE — 25000003 PHARM REV CODE 250: Performed by: RADIOLOGY

## 2023-01-01 PROCEDURE — 88112 CYTOPATH CELL ENHANCE TECH: CPT | Mod: 26,,, | Performed by: PATHOLOGY

## 2023-01-01 PROCEDURE — 84166 PROTEIN E-PHORESIS/URINE/CSF: CPT | Mod: 26,,, | Performed by: PATHOLOGY

## 2023-01-01 PROCEDURE — 99497 ADVNCD CARE PLAN 30 MIN: CPT | Mod: 25,,, | Performed by: INTERNAL MEDICINE

## 2023-01-01 PROCEDURE — D9220A PRA ANESTHESIA: ICD-10-PCS | Mod: CRNA,,, | Performed by: NURSE ANESTHETIST, CERTIFIED REGISTERED

## 2023-01-01 PROCEDURE — 84443 ASSAY THYROID STIM HORMONE: CPT | Performed by: INTERNAL MEDICINE

## 2023-01-01 PROCEDURE — 87088 URINE BACTERIA CULTURE: CPT | Performed by: PHYSICIAN ASSISTANT

## 2023-01-01 PROCEDURE — 3074F PR MOST RECENT SYSTOLIC BLOOD PRESSURE < 130 MM HG: ICD-10-PCS | Mod: CPTII,S$GLB,, | Performed by: STUDENT IN AN ORGANIZED HEALTH CARE EDUCATION/TRAINING PROGRAM

## 2023-01-01 PROCEDURE — 1101F PR PT FALLS ASSESS DOC 0-1 FALLS W/OUT INJ PAST YR: ICD-10-PCS | Mod: CPTII,S$GLB,, | Performed by: UROLOGY

## 2023-01-01 PROCEDURE — 99223 PR INITIAL HOSPITAL CARE,LEVL III: ICD-10-PCS | Mod: ,,, | Performed by: INTERNAL MEDICINE

## 2023-01-01 PROCEDURE — 82728 ASSAY OF FERRITIN: CPT | Performed by: STUDENT IN AN ORGANIZED HEALTH CARE EDUCATION/TRAINING PROGRAM

## 2023-01-01 PROCEDURE — 99497 ADVNCD CARE PLAN 30 MIN: CPT | Mod: ,,, | Performed by: REGISTERED NURSE

## 2023-01-01 PROCEDURE — 99214 PR OFFICE/OUTPT VISIT, EST, LEVL IV, 30-39 MIN: ICD-10-PCS | Mod: S$GLB,,, | Performed by: UROLOGY

## 2023-01-01 PROCEDURE — 99223 1ST HOSP IP/OBS HIGH 75: CPT | Mod: ,,, | Performed by: NURSE PRACTITIONER

## 2023-01-01 PROCEDURE — 3078F PR MOST RECENT DIASTOLIC BLOOD PRESSURE < 80 MM HG: ICD-10-PCS | Mod: CPTII,S$GLB,, | Performed by: STUDENT IN AN ORGANIZED HEALTH CARE EDUCATION/TRAINING PROGRAM

## 2023-01-01 PROCEDURE — 3078F PR MOST RECENT DIASTOLIC BLOOD PRESSURE < 80 MM HG: ICD-10-PCS | Mod: CPTII,S$GLB,, | Performed by: NURSE PRACTITIONER

## 2023-01-01 PROCEDURE — 99205 OFFICE O/P NEW HI 60 MIN: CPT | Mod: S$GLB,,, | Performed by: STUDENT IN AN ORGANIZED HEALTH CARE EDUCATION/TRAINING PROGRAM

## 2023-01-01 PROCEDURE — 25500020 PHARM REV CODE 255: Performed by: EMERGENCY MEDICINE

## 2023-01-01 PROCEDURE — 83935 ASSAY OF URINE OSMOLALITY: CPT

## 2023-01-01 PROCEDURE — G0180 PR HOME HEALTH MD CERTIFICATION: ICD-10-PCS | Mod: ,,, | Performed by: HOSPITALIST

## 2023-01-01 PROCEDURE — G0008 FLU VACCINE - QUADRIVALENT - ADJUVANTED: ICD-10-PCS | Mod: S$GLB,,, | Performed by: FAMILY MEDICINE

## 2023-01-01 PROCEDURE — 96374 THER/PROPH/DIAG INJ IV PUSH: CPT

## 2023-01-01 PROCEDURE — 85730 THROMBOPLASTIN TIME PARTIAL: CPT

## 2023-01-01 PROCEDURE — 25000003 PHARM REV CODE 250

## 2023-01-01 PROCEDURE — 99223 PR INITIAL HOSPITAL CARE,LEVL III: ICD-10-PCS | Mod: ,,, | Performed by: NURSE PRACTITIONER

## 2023-01-01 PROCEDURE — 87040 BLOOD CULTURE FOR BACTERIA: CPT | Performed by: EMERGENCY MEDICINE

## 2023-01-01 PROCEDURE — 1101F PT FALLS ASSESS-DOCD LE1/YR: CPT | Mod: CPTII,S$GLB,, | Performed by: NURSE PRACTITIONER

## 2023-01-01 PROCEDURE — 99223 PR INITIAL HOSPITAL CARE,LEVL III: ICD-10-PCS | Mod: 25,,, | Performed by: REGISTERED NURSE

## 2023-01-01 PROCEDURE — 86381 MITOCHONDRIAL ANTIBODY EACH: CPT | Performed by: NURSE PRACTITIONER

## 2023-01-01 PROCEDURE — 86580 TB INTRADERMAL TEST: CPT | Performed by: HOSPITALIST

## 2023-01-01 PROCEDURE — 87899 AGENT NOS ASSAY W/OPTIC: CPT | Performed by: HOSPITALIST

## 2023-01-01 PROCEDURE — 3288F PR FALLS RISK ASSESSMENT DOCUMENTED: ICD-10-PCS | Mod: CPTII,S$GLB,, | Performed by: STUDENT IN AN ORGANIZED HEALTH CARE EDUCATION/TRAINING PROGRAM

## 2023-01-01 PROCEDURE — 3078F DIAST BP <80 MM HG: CPT | Mod: CPTII,S$GLB,, | Performed by: NURSE PRACTITIONER

## 2023-01-01 PROCEDURE — 99291 PR CRITICAL CARE, E/M 30-74 MINUTES: ICD-10-PCS | Mod: ,,, | Performed by: INTERNAL MEDICINE

## 2023-01-01 PROCEDURE — 88305 TISSUE EXAM BY PATHOLOGIST: ICD-10-PCS | Mod: 26,,, | Performed by: PATHOLOGY

## 2023-01-01 PROCEDURE — 99223 1ST HOSP IP/OBS HIGH 75: CPT | Mod: ,,, | Performed by: RADIOLOGY

## 2023-01-01 PROCEDURE — 99205 PR OFFICE/OUTPT VISIT, NEW, LEVL V, 60-74 MIN: ICD-10-PCS | Mod: S$GLB,,, | Performed by: STUDENT IN AN ORGANIZED HEALTH CARE EDUCATION/TRAINING PROGRAM

## 2023-01-01 PROCEDURE — 99223 PR INITIAL HOSPITAL CARE,LEVL III: ICD-10-PCS | Mod: ,,, | Performed by: RADIOLOGY

## 2023-01-01 PROCEDURE — 84100 ASSAY OF PHOSPHORUS: CPT

## 2023-01-01 PROCEDURE — 37000008 HC ANESTHESIA 1ST 15 MINUTES: Performed by: INTERNAL MEDICINE

## 2023-01-01 PROCEDURE — 99499 RISK ADDL DX/OHS AUDIT: ICD-10-PCS | Mod: S$GLB,,, | Performed by: INTERNAL MEDICINE

## 2023-01-01 PROCEDURE — 1100F PTFALLS ASSESS-DOCD GE2>/YR: CPT | Mod: CPTII,S$GLB,, | Performed by: STUDENT IN AN ORGANIZED HEALTH CARE EDUCATION/TRAINING PROGRAM

## 2023-01-01 PROCEDURE — 84540 ASSAY OF URINE/UREA-N: CPT

## 2023-01-01 PROCEDURE — 81003 URINALYSIS AUTO W/O SCOPE: CPT | Performed by: HOSPITALIST

## 2023-01-01 PROCEDURE — 99204 PR OFFICE/OUTPT VISIT, NEW, LEVL IV, 45-59 MIN: ICD-10-PCS | Mod: S$GLB,,, | Performed by: UROLOGY

## 2023-01-01 PROCEDURE — 86335 IMMUNFIX E-PHORSIS/URINE/CSF: CPT | Mod: 26,,, | Performed by: PATHOLOGY

## 2023-01-01 PROCEDURE — 82390 ASSAY OF CERULOPLASMIN: CPT | Performed by: NURSE PRACTITIONER

## 2023-01-01 PROCEDURE — 83036 HEMOGLOBIN GLYCOSYLATED A1C: CPT | Performed by: STUDENT IN AN ORGANIZED HEALTH CARE EDUCATION/TRAINING PROGRAM

## 2023-01-01 PROCEDURE — 99999 PR PBB SHADOW E&M-EST. PATIENT-LVL IV: ICD-10-PCS | Mod: PBBFAC,,, | Performed by: FAMILY MEDICINE

## 2023-01-01 PROCEDURE — 85610 PROTHROMBIN TIME: CPT | Performed by: EMERGENCY MEDICINE

## 2023-01-01 PROCEDURE — 99291 CRITICAL CARE FIRST HOUR: CPT

## 2023-01-01 PROCEDURE — 1160F RVW MEDS BY RX/DR IN RCRD: CPT | Mod: CPTII,S$GLB,, | Performed by: UROLOGY

## 2023-01-01 PROCEDURE — 82525 ASSAY OF COPPER: CPT | Performed by: STUDENT IN AN ORGANIZED HEALTH CARE EDUCATION/TRAINING PROGRAM

## 2023-01-01 PROCEDURE — 89051 BODY FLUID CELL COUNT: CPT | Performed by: STUDENT IN AN ORGANIZED HEALTH CARE EDUCATION/TRAINING PROGRAM

## 2023-01-01 PROCEDURE — 3079F PR MOST RECENT DIASTOLIC BLOOD PRESSURE 80-89 MM HG: ICD-10-PCS | Mod: CPTII,S$GLB,, | Performed by: FAMILY MEDICINE

## 2023-01-01 PROCEDURE — 87205 SMEAR GRAM STAIN: CPT | Performed by: STUDENT IN AN ORGANIZED HEALTH CARE EDUCATION/TRAINING PROGRAM

## 2023-01-01 PROCEDURE — 99999 PR PBB SHADOW E&M-EST. PATIENT-LVL III: CPT | Mod: PBBFAC,,, | Performed by: INTERNAL MEDICINE

## 2023-01-01 PROCEDURE — 87635 SARS-COV-2 COVID-19 AMP PRB: CPT | Performed by: PHYSICIAN ASSISTANT

## 2023-01-01 PROCEDURE — D9220A PRA ANESTHESIA: Mod: ANES,,, | Performed by: STUDENT IN AN ORGANIZED HEALTH CARE EDUCATION/TRAINING PROGRAM

## 2023-01-01 PROCEDURE — 1159F PR MEDICATION LIST DOCUMENTED IN MEDICAL RECORD: ICD-10-PCS | Mod: CPTII,S$GLB,, | Performed by: STUDENT IN AN ORGANIZED HEALTH CARE EDUCATION/TRAINING PROGRAM

## 2023-01-01 PROCEDURE — 84466 ASSAY OF TRANSFERRIN: CPT | Performed by: EMERGENCY MEDICINE

## 2023-01-01 PROCEDURE — 83930 ASSAY OF BLOOD OSMOLALITY: CPT | Performed by: EMERGENCY MEDICINE

## 2023-01-01 PROCEDURE — 82140 ASSAY OF AMMONIA: CPT | Performed by: EMERGENCY MEDICINE

## 2023-01-01 PROCEDURE — 85060 PATHOLOGIST REVIEW: ICD-10-PCS | Mod: ,,, | Performed by: PATHOLOGY

## 2023-01-01 PROCEDURE — 99499 UNLISTED E&M SERVICE: CPT | Mod: S$GLB,,, | Performed by: FAMILY MEDICINE

## 2023-01-01 PROCEDURE — 84166 PATHOLOGIST INTERPRETATION UPE: ICD-10-PCS | Mod: 26,,, | Performed by: PATHOLOGY

## 2023-01-01 PROCEDURE — 82436 ASSAY OF URINE CHLORIDE: CPT

## 2023-01-01 PROCEDURE — D9220A PRA ANESTHESIA: Mod: CRNA,,, | Performed by: NURSE ANESTHETIST, CERTIFIED REGISTERED

## 2023-01-01 PROCEDURE — 3074F SYST BP LT 130 MM HG: CPT | Mod: CPTII,S$GLB,, | Performed by: STUDENT IN AN ORGANIZED HEALTH CARE EDUCATION/TRAINING PROGRAM

## 2023-01-01 PROCEDURE — 1111F DSCHRG MED/CURRENT MED MERGE: CPT | Mod: CPTII,S$GLB,, | Performed by: STUDENT IN AN ORGANIZED HEALTH CARE EDUCATION/TRAINING PROGRAM

## 2023-01-01 PROCEDURE — 99233 SBSQ HOSP IP/OBS HIGH 50: CPT | Mod: ,,, | Performed by: REGISTERED NURSE

## 2023-01-01 PROCEDURE — 84630 ASSAY OF ZINC: CPT | Performed by: STUDENT IN AN ORGANIZED HEALTH CARE EDUCATION/TRAINING PROGRAM

## 2023-01-01 PROCEDURE — 77065 DX MAMMO INCL CAD UNI: CPT | Mod: TC,LT

## 2023-01-01 PROCEDURE — 1111F PR DISCHARGE MEDS RECONCILED W/ CURRENT OUTPATIENT MED LIST: ICD-10-PCS | Mod: CPTII,S$GLB,, | Performed by: STUDENT IN AN ORGANIZED HEALTH CARE EDUCATION/TRAINING PROGRAM

## 2023-01-01 PROCEDURE — 80048 BASIC METABOLIC PNL TOTAL CA: CPT | Performed by: STUDENT IN AN ORGANIZED HEALTH CARE EDUCATION/TRAINING PROGRAM

## 2023-01-01 PROCEDURE — 83735 ASSAY OF MAGNESIUM: CPT

## 2023-01-01 PROCEDURE — 3288F FALL RISK ASSESSMENT DOCD: CPT | Mod: CPTII,S$GLB,, | Performed by: STUDENT IN AN ORGANIZED HEALTH CARE EDUCATION/TRAINING PROGRAM

## 2023-01-01 PROCEDURE — 85610 PROTHROMBIN TIME: CPT

## 2023-01-01 PROCEDURE — 83921 ORGANIC ACID SINGLE QUANT: CPT | Performed by: STUDENT IN AN ORGANIZED HEALTH CARE EDUCATION/TRAINING PROGRAM

## 2023-01-01 PROCEDURE — 3079F DIAST BP 80-89 MM HG: CPT | Mod: CPTII,S$GLB,, | Performed by: FAMILY MEDICINE

## 2023-01-01 RX ORDER — SODIUM CHLORIDE 9 MG/ML
INJECTION, SOLUTION INTRAVENOUS CONTINUOUS
Status: DISCONTINUED | OUTPATIENT
Start: 2023-01-01 | End: 2023-01-01

## 2023-01-01 RX ORDER — MUPIROCIN 20 MG/G
OINTMENT TOPICAL 2 TIMES DAILY
Status: DISPENSED | OUTPATIENT
Start: 2023-01-01 | End: 2023-01-01

## 2023-01-01 RX ORDER — PROPOFOL 10 MG/ML
VIAL (ML) INTRAVENOUS
Status: DISCONTINUED | OUTPATIENT
Start: 2023-01-01 | End: 2023-01-01

## 2023-01-01 RX ORDER — FENTANYL CITRATE 50 UG/ML
25 INJECTION, SOLUTION INTRAMUSCULAR; INTRAVENOUS
Status: COMPLETED | OUTPATIENT
Start: 2023-01-01 | End: 2023-01-01

## 2023-01-01 RX ORDER — IBUPROFEN 200 MG
24 TABLET ORAL
Status: DISCONTINUED | OUTPATIENT
Start: 2023-01-01 | End: 2023-01-01 | Stop reason: HOSPADM

## 2023-01-01 RX ORDER — SOLIFENACIN SUCCINATE 10 MG/1
10 TABLET, FILM COATED ORAL DAILY
Qty: 30 TABLET | Refills: 11 | Status: ON HOLD | OUTPATIENT
Start: 2023-01-01 | End: 2023-01-01 | Stop reason: HOSPADM

## 2023-01-01 RX ORDER — PROCHLORPERAZINE EDISYLATE 5 MG/ML
5 INJECTION INTRAMUSCULAR; INTRAVENOUS EVERY 6 HOURS PRN
Status: DISCONTINUED | OUTPATIENT
Start: 2023-01-01 | End: 2023-01-01 | Stop reason: HOSPADM

## 2023-01-01 RX ORDER — LANOLIN ALCOHOL/MO/W.PET/CERES
400 CREAM (GRAM) TOPICAL ONCE
Status: COMPLETED | OUTPATIENT
Start: 2023-01-01 | End: 2023-01-01

## 2023-01-01 RX ORDER — CALCIUM GLUCONATE 20 MG/ML
1 INJECTION, SOLUTION INTRAVENOUS
Status: DISCONTINUED | OUTPATIENT
Start: 2023-01-01 | End: 2023-01-01 | Stop reason: HOSPADM

## 2023-01-01 RX ORDER — LOPERAMIDE HYDROCHLORIDE 2 MG/1
2 CAPSULE ORAL 4 TIMES DAILY
Status: DISCONTINUED | OUTPATIENT
Start: 2023-01-01 | End: 2023-01-01 | Stop reason: HOSPADM

## 2023-01-01 RX ORDER — NOREPINEPHRINE BITARTRATE/D5W 4MG/250ML
0-3 PLASTIC BAG, INJECTION (ML) INTRAVENOUS CONTINUOUS
Status: DISCONTINUED | OUTPATIENT
Start: 2023-01-01 | End: 2023-01-01

## 2023-01-01 RX ORDER — CYANOCOBALAMIN 1000 UG/ML
1000 INJECTION, SOLUTION INTRAMUSCULAR; SUBCUTANEOUS
Status: COMPLETED | OUTPATIENT
Start: 2023-01-01 | End: 2023-01-01

## 2023-01-01 RX ORDER — TALC
6 POWDER (GRAM) TOPICAL NIGHTLY
Status: DISCONTINUED | OUTPATIENT
Start: 2023-01-01 | End: 2023-01-01 | Stop reason: HOSPADM

## 2023-01-01 RX ORDER — POTASSIUM CHLORIDE 20 MEQ/1
40 TABLET, EXTENDED RELEASE ORAL ONCE
Status: COMPLETED | OUTPATIENT
Start: 2023-01-01 | End: 2023-01-01

## 2023-01-01 RX ORDER — LANOLIN ALCOHOL/MO/W.PET/CERES
800 CREAM (GRAM) TOPICAL
Status: DISCONTINUED | OUTPATIENT
Start: 2023-01-01 | End: 2023-01-01

## 2023-01-01 RX ORDER — FUROSEMIDE 10 MG/ML
80 INJECTION INTRAMUSCULAR; INTRAVENOUS
Status: DISCONTINUED | OUTPATIENT
Start: 2023-01-01 | End: 2023-01-01

## 2023-01-01 RX ORDER — CYANOCOBALAMIN 1000 UG/ML
1000 INJECTION, SOLUTION INTRAMUSCULAR; SUBCUTANEOUS
Status: CANCELLED | OUTPATIENT
Start: 2023-01-01

## 2023-01-01 RX ORDER — TOLVAPTAN 15 MG/1
15 TABLET ORAL ONCE
Status: COMPLETED | OUTPATIENT
Start: 2023-01-01 | End: 2023-01-01

## 2023-01-01 RX ORDER — POTASSIUM CHLORIDE 7.45 MG/ML
40 INJECTION INTRAVENOUS
Status: DISCONTINUED | OUTPATIENT
Start: 2023-01-01 | End: 2023-01-01 | Stop reason: HOSPADM

## 2023-01-01 RX ORDER — METOCLOPRAMIDE HYDROCHLORIDE 5 MG/ML
10 INJECTION INTRAMUSCULAR; INTRAVENOUS
Status: COMPLETED | OUTPATIENT
Start: 2023-01-01 | End: 2023-01-01

## 2023-01-01 RX ORDER — ATORVASTATIN CALCIUM 40 MG/1
40 TABLET, FILM COATED ORAL DAILY
Status: DISCONTINUED | OUTPATIENT
Start: 2023-01-01 | End: 2023-01-01 | Stop reason: HOSPADM

## 2023-01-01 RX ORDER — FUROSEMIDE 10 MG/ML
40 INJECTION INTRAMUSCULAR; INTRAVENOUS
Status: DISCONTINUED | OUTPATIENT
Start: 2023-01-01 | End: 2023-01-01

## 2023-01-01 RX ORDER — INSULIN ASPART 100 [IU]/ML
0-5 INJECTION, SOLUTION INTRAVENOUS; SUBCUTANEOUS
Status: DISCONTINUED | OUTPATIENT
Start: 2023-01-01 | End: 2023-01-01 | Stop reason: HOSPADM

## 2023-01-01 RX ORDER — SODIUM CHLORIDE 0.9 % (FLUSH) 0.9 %
10 SYRINGE (ML) INJECTION EVERY 6 HOURS
Status: DISCONTINUED | OUTPATIENT
Start: 2023-01-01 | End: 2023-01-01 | Stop reason: HOSPADM

## 2023-01-01 RX ORDER — ACETAMINOPHEN 325 MG/1
650 TABLET ORAL EVERY 4 HOURS PRN
Status: DISCONTINUED | OUTPATIENT
Start: 2023-01-01 | End: 2023-01-01 | Stop reason: HOSPADM

## 2023-01-01 RX ORDER — LIDOCAINE HYDROCHLORIDE 20 MG/ML
INJECTION INTRAVENOUS
Status: DISCONTINUED | OUTPATIENT
Start: 2023-01-01 | End: 2023-01-01

## 2023-01-01 RX ORDER — SOLIFENACIN SUCCINATE 5 MG/1
5 TABLET, FILM COATED ORAL DAILY
Qty: 30 TABLET | Refills: 11 | Status: SHIPPED | OUTPATIENT
Start: 2023-01-01 | End: 2023-01-01

## 2023-01-01 RX ORDER — FUROSEMIDE 10 MG/ML
40 INJECTION INTRAMUSCULAR; INTRAVENOUS
Status: COMPLETED | OUTPATIENT
Start: 2023-01-01 | End: 2023-01-01

## 2023-01-01 RX ORDER — SODIUM CHLORIDE 0.9 % (FLUSH) 0.9 %
10 SYRINGE (ML) INJECTION
Status: DISCONTINUED | OUTPATIENT
Start: 2023-01-01 | End: 2023-01-01 | Stop reason: HOSPADM

## 2023-01-01 RX ORDER — LIDOCAINE HYDROCHLORIDE 10 MG/ML
INJECTION INFILTRATION; PERINEURAL
Status: COMPLETED | OUTPATIENT
Start: 2023-01-01 | End: 2023-01-01

## 2023-01-01 RX ORDER — POTASSIUM CHLORIDE 20 MEQ/1
40 TABLET, EXTENDED RELEASE ORAL 2 TIMES DAILY
Status: DISCONTINUED | OUTPATIENT
Start: 2023-01-01 | End: 2023-01-01

## 2023-01-01 RX ORDER — AMIODARONE HYDROCHLORIDE 100 MG/1
100 TABLET ORAL DAILY
Status: DISCONTINUED | OUTPATIENT
Start: 2023-01-01 | End: 2023-01-01 | Stop reason: HOSPADM

## 2023-01-01 RX ORDER — POTASSIUM CHLORIDE 7.45 MG/ML
10 INJECTION INTRAVENOUS
Status: COMPLETED | OUTPATIENT
Start: 2023-01-01 | End: 2023-01-01

## 2023-01-01 RX ORDER — GLUCAGON 1 MG
1 KIT INJECTION
Status: DISCONTINUED | OUTPATIENT
Start: 2023-01-01 | End: 2023-01-01 | Stop reason: HOSPADM

## 2023-01-01 RX ORDER — ONDANSETRON 2 MG/ML
4 INJECTION INTRAMUSCULAR; INTRAVENOUS EVERY 8 HOURS PRN
Status: DISCONTINUED | OUTPATIENT
Start: 2023-01-01 | End: 2023-01-01 | Stop reason: HOSPADM

## 2023-01-01 RX ORDER — CALCIUM GLUCONATE 20 MG/ML
2 INJECTION, SOLUTION INTRAVENOUS
Status: DISCONTINUED | OUTPATIENT
Start: 2023-01-01 | End: 2023-01-01 | Stop reason: HOSPADM

## 2023-01-01 RX ORDER — TALC
6 POWDER (GRAM) TOPICAL NIGHTLY PRN
Status: DISCONTINUED | OUTPATIENT
Start: 2023-01-01 | End: 2023-01-01

## 2023-01-01 RX ORDER — FAMOTIDINE 10 MG/ML
20 INJECTION INTRAVENOUS DAILY
Status: DISCONTINUED | OUTPATIENT
Start: 2023-01-01 | End: 2023-01-01 | Stop reason: HOSPADM

## 2023-01-01 RX ORDER — POTASSIUM CHLORIDE 7.45 MG/ML
80 INJECTION INTRAVENOUS
Status: DISCONTINUED | OUTPATIENT
Start: 2023-01-01 | End: 2023-01-01 | Stop reason: HOSPADM

## 2023-01-01 RX ORDER — POTASSIUM CHLORIDE 7.45 MG/ML
60 INJECTION INTRAVENOUS
Status: DISCONTINUED | OUTPATIENT
Start: 2023-01-01 | End: 2023-01-01 | Stop reason: HOSPADM

## 2023-01-01 RX ORDER — SODIUM,POTASSIUM PHOSPHATES 280-250MG
2 POWDER IN PACKET (EA) ORAL
Status: DISCONTINUED | OUTPATIENT
Start: 2023-01-01 | End: 2023-01-01

## 2023-01-01 RX ORDER — AMIODARONE HYDROCHLORIDE 100 MG/1
100 TABLET ORAL
Status: COMPLETED | OUTPATIENT
Start: 2023-01-01 | End: 2023-01-01

## 2023-01-01 RX ORDER — DIGOXIN 0.25 MG/ML
500 INJECTION INTRAMUSCULAR; INTRAVENOUS ONCE
Status: COMPLETED | OUTPATIENT
Start: 2023-01-01 | End: 2023-01-01

## 2023-01-01 RX ORDER — IBUPROFEN 200 MG
16 TABLET ORAL
Status: DISCONTINUED | OUTPATIENT
Start: 2023-01-01 | End: 2023-01-01 | Stop reason: HOSPADM

## 2023-01-01 RX ORDER — MAGNESIUM SULFATE HEPTAHYDRATE 40 MG/ML
4 INJECTION, SOLUTION INTRAVENOUS
Status: DISCONTINUED | OUTPATIENT
Start: 2023-01-01 | End: 2023-01-01 | Stop reason: HOSPADM

## 2023-01-01 RX ORDER — AMIODARONE HYDROCHLORIDE 150 MG/3ML
150 INJECTION, SOLUTION INTRAVENOUS
Status: DISCONTINUED | OUTPATIENT
Start: 2023-01-01 | End: 2023-01-01

## 2023-01-01 RX ORDER — FENTANYL CITRATE 50 UG/ML
25 INJECTION, SOLUTION INTRAMUSCULAR; INTRAVENOUS ONCE
Status: DISCONTINUED | OUTPATIENT
Start: 2023-01-01 | End: 2023-01-01

## 2023-01-01 RX ORDER — AMIODARONE HYDROCHLORIDE 200 MG/1
200 TABLET ORAL DAILY
Status: DISCONTINUED | OUTPATIENT
Start: 2023-01-01 | End: 2023-01-01 | Stop reason: HOSPADM

## 2023-01-01 RX ORDER — FUROSEMIDE 10 MG/ML
40 INJECTION INTRAMUSCULAR; INTRAVENOUS
Status: DISCONTINUED | OUTPATIENT
Start: 2023-01-01 | End: 2023-01-01 | Stop reason: HOSPADM

## 2023-01-01 RX ORDER — FOLIC ACID 1 MG/1
TABLET ORAL
Qty: 90 TABLET | Refills: 0 | Status: SHIPPED | OUTPATIENT
Start: 2023-01-01 | End: 2023-01-01

## 2023-01-01 RX ORDER — SODIUM CHLORIDE 9 MG/ML
1000 INJECTION, SOLUTION INTRAVENOUS
Status: DISCONTINUED | OUTPATIENT
Start: 2023-01-01 | End: 2023-01-01

## 2023-01-01 RX ORDER — AMLODIPINE BESYLATE 5 MG/1
10 TABLET ORAL DAILY
Status: DISCONTINUED | OUTPATIENT
Start: 2023-01-01 | End: 2023-01-01

## 2023-01-01 RX ORDER — SODIUM CHLORIDE 9 MG/ML
1000 INJECTION, SOLUTION INTRAVENOUS
Status: COMPLETED | OUTPATIENT
Start: 2023-01-01 | End: 2023-01-01

## 2023-01-01 RX ORDER — ACETAMINOPHEN 325 MG/1
650 TABLET ORAL EVERY 8 HOURS PRN
Status: DISCONTINUED | OUTPATIENT
Start: 2023-01-01 | End: 2023-01-01 | Stop reason: HOSPADM

## 2023-01-01 RX ORDER — FAMOTIDINE 10 MG/ML
20 INJECTION INTRAVENOUS DAILY
Status: DISCONTINUED | OUTPATIENT
Start: 2023-01-01 | End: 2023-01-01

## 2023-01-01 RX ORDER — FOLIC ACID 1 MG/1
1000 TABLET ORAL DAILY
Status: DISCONTINUED | OUTPATIENT
Start: 2023-01-01 | End: 2023-01-01 | Stop reason: HOSPADM

## 2023-01-01 RX ORDER — METOPROLOL TARTRATE 25 MG/1
25 TABLET, FILM COATED ORAL 2 TIMES DAILY
Status: DISCONTINUED | OUTPATIENT
Start: 2023-01-01 | End: 2023-01-01 | Stop reason: HOSPADM

## 2023-01-01 RX ORDER — DRONABINOL 2.5 MG/1
2.5 CAPSULE ORAL 2 TIMES DAILY
Qty: 60 CAPSULE | Refills: 0 | Status: ON HOLD | OUTPATIENT
Start: 2023-01-01 | End: 2023-01-01

## 2023-01-01 RX ORDER — FUROSEMIDE 10 MG/ML
40 INJECTION INTRAMUSCULAR; INTRAVENOUS ONCE
Status: COMPLETED | OUTPATIENT
Start: 2023-01-01 | End: 2023-01-01

## 2023-01-01 RX ORDER — TALC
6 POWDER (GRAM) TOPICAL NIGHTLY PRN
Status: DISCONTINUED | OUTPATIENT
Start: 2023-01-01 | End: 2023-01-01 | Stop reason: HOSPADM

## 2023-01-01 RX ORDER — BUMETANIDE 0.25 MG/ML
1 INJECTION INTRAMUSCULAR; INTRAVENOUS DAILY
Status: DISCONTINUED | OUTPATIENT
Start: 2023-01-01 | End: 2023-01-01

## 2023-01-01 RX ORDER — ACETAMINOPHEN 325 MG/1
650 TABLET ORAL EVERY 6 HOURS PRN
Status: DISCONTINUED | OUTPATIENT
Start: 2023-01-01 | End: 2023-01-01 | Stop reason: HOSPADM

## 2023-01-01 RX ORDER — AMIODARONE HYDROCHLORIDE 100 MG/1
100 TABLET ORAL DAILY
Qty: 90 TABLET | Refills: 1 | Status: ON HOLD | OUTPATIENT
Start: 2023-01-01 | End: 2023-01-01 | Stop reason: HOSPADM

## 2023-01-01 RX ORDER — POTASSIUM CHLORIDE 20 MEQ/1
20 TABLET, EXTENDED RELEASE ORAL ONCE
Status: COMPLETED | OUTPATIENT
Start: 2023-01-01 | End: 2023-01-01

## 2023-01-01 RX ORDER — DOXYCYCLINE HYCLATE 100 MG
100 TABLET ORAL EVERY 12 HOURS
Status: DISCONTINUED | OUTPATIENT
Start: 2023-01-01 | End: 2023-01-01 | Stop reason: HOSPADM

## 2023-01-01 RX ORDER — BUMETANIDE 0.25 MG/ML
1 INJECTION INTRAMUSCULAR; INTRAVENOUS DAILY
Status: DISCONTINUED | OUTPATIENT
Start: 2023-01-01 | End: 2023-01-01 | Stop reason: HOSPADM

## 2023-01-01 RX ORDER — NALOXONE HCL 0.4 MG/ML
0.02 VIAL (ML) INJECTION
Status: DISCONTINUED | OUTPATIENT
Start: 2023-01-01 | End: 2023-01-01 | Stop reason: HOSPADM

## 2023-01-01 RX ORDER — BUMETANIDE 0.5 MG/1
0.5 TABLET ORAL
Status: ON HOLD | COMMUNITY
End: 2023-01-01 | Stop reason: HOSPADM

## 2023-01-01 RX ORDER — ATORVASTATIN CALCIUM 40 MG/1
TABLET, FILM COATED ORAL
Qty: 90 TABLET | Refills: 2 | Status: ON HOLD | OUTPATIENT
Start: 2023-01-01 | End: 2023-01-01

## 2023-01-01 RX ORDER — POLYETHYLENE GLYCOL 3350 17 G/17G
17 POWDER, FOR SOLUTION ORAL 2 TIMES DAILY PRN
Status: DISCONTINUED | OUTPATIENT
Start: 2023-01-01 | End: 2023-01-01 | Stop reason: HOSPADM

## 2023-01-01 RX ORDER — FUROSEMIDE 10 MG/ML
40 INJECTION INTRAMUSCULAR; INTRAVENOUS DAILY
Status: DISCONTINUED | OUTPATIENT
Start: 2023-01-01 | End: 2023-01-01

## 2023-01-01 RX ORDER — METOPROLOL TARTRATE 25 MG/1
25 TABLET, FILM COATED ORAL 2 TIMES DAILY
Qty: 60 TABLET | Refills: 1 | Status: ON HOLD | OUTPATIENT
Start: 2023-01-01 | End: 2023-01-01

## 2023-01-01 RX ORDER — MUPIROCIN 20 MG/G
OINTMENT TOPICAL 2 TIMES DAILY
Status: DISCONTINUED | OUTPATIENT
Start: 2023-01-01 | End: 2023-01-01 | Stop reason: HOSPADM

## 2023-01-01 RX ORDER — LOSARTAN POTASSIUM 50 MG/1
50 TABLET ORAL
Status: ON HOLD | COMMUNITY
Start: 2023-01-01 | End: 2023-01-01 | Stop reason: HOSPADM

## 2023-01-01 RX ORDER — CALCIUM GLUCONATE 20 MG/ML
3 INJECTION, SOLUTION INTRAVENOUS
Status: DISCONTINUED | OUTPATIENT
Start: 2023-01-01 | End: 2023-01-01 | Stop reason: HOSPADM

## 2023-01-01 RX ORDER — DRONABINOL 2.5 MG/1
2.5 CAPSULE ORAL 2 TIMES DAILY
Status: DISCONTINUED | OUTPATIENT
Start: 2023-01-01 | End: 2023-01-01 | Stop reason: HOSPADM

## 2023-01-01 RX ORDER — MIRABEGRON 25 MG/1
25 TABLET, FILM COATED, EXTENDED RELEASE ORAL DAILY
Qty: 30 TABLET | Refills: 0 | Status: ON HOLD | OUTPATIENT
Start: 2023-01-01 | End: 2023-01-01

## 2023-01-01 RX ORDER — MAGNESIUM SULFATE HEPTAHYDRATE 40 MG/ML
2 INJECTION, SOLUTION INTRAVENOUS
Status: DISCONTINUED | OUTPATIENT
Start: 2023-01-01 | End: 2023-01-01 | Stop reason: HOSPADM

## 2023-01-01 RX ORDER — METOPROLOL TARTRATE 1 MG/ML
5 INJECTION, SOLUTION INTRAVENOUS
Status: COMPLETED | OUTPATIENT
Start: 2023-01-01 | End: 2023-01-01

## 2023-01-01 RX ORDER — ALLOPURINOL 100 MG/1
100 TABLET ORAL DAILY
Status: DISCONTINUED | OUTPATIENT
Start: 2023-01-01 | End: 2023-01-01 | Stop reason: HOSPADM

## 2023-01-01 RX ORDER — FUROSEMIDE 10 MG/ML
20 INJECTION INTRAMUSCULAR; INTRAVENOUS ONCE
Status: DISCONTINUED | OUTPATIENT
Start: 2023-01-01 | End: 2023-01-01

## 2023-01-01 RX ORDER — DIGOXIN 0.25 MG/ML
500 INJECTION INTRAMUSCULAR; INTRAVENOUS
Status: DISCONTINUED | OUTPATIENT
Start: 2023-01-01 | End: 2023-01-01

## 2023-01-01 RX ORDER — NOREPINEPHRINE BITARTRATE/D5W 4MG/250ML
0-3 PLASTIC BAG, INJECTION (ML) INTRAVENOUS CONTINUOUS
Status: DISCONTINUED | OUTPATIENT
Start: 2023-01-01 | End: 2023-01-01 | Stop reason: HOSPADM

## 2023-01-01 RX ORDER — BUMETANIDE 1 MG/1
1 TABLET ORAL DAILY
Qty: 30 TABLET | Refills: 11 | Status: ON HOLD | OUTPATIENT
Start: 2023-01-01 | End: 2023-01-01

## 2023-01-01 RX ORDER — CALCIUM CARBONATE 160(400)MG
1 TABLET,CHEWABLE ORAL 2 TIMES DAILY
Qty: 1 EACH | Refills: 0 | Status: CANCELLED | OUTPATIENT
Start: 2023-01-01

## 2023-01-01 RX ORDER — AMIODARONE HYDROCHLORIDE 200 MG/1
200 TABLET ORAL 2 TIMES DAILY
Status: DISCONTINUED | OUTPATIENT
Start: 2023-01-01 | End: 2023-01-01

## 2023-01-01 RX ORDER — FAMOTIDINE 20 MG/1
20 TABLET, FILM COATED ORAL DAILY
Status: DISCONTINUED | OUTPATIENT
Start: 2023-01-01 | End: 2023-01-01 | Stop reason: HOSPADM

## 2023-01-01 RX ORDER — ONDANSETRON 2 MG/ML
4 INJECTION INTRAMUSCULAR; INTRAVENOUS EVERY 4 HOURS PRN
Status: DISCONTINUED | OUTPATIENT
Start: 2023-01-01 | End: 2023-01-01 | Stop reason: HOSPADM

## 2023-01-01 RX ORDER — FUROSEMIDE 40 MG/1
40 TABLET ORAL 2 TIMES DAILY
Qty: 60 TABLET | Refills: 2 | Status: ON HOLD | OUTPATIENT
Start: 2023-01-01 | End: 2023-01-01 | Stop reason: HOSPADM

## 2023-01-01 RX ORDER — SODIUM CHLORIDE 1 G/1
1000 TABLET ORAL 2 TIMES DAILY
Status: DISCONTINUED | OUTPATIENT
Start: 2023-01-01 | End: 2023-01-01 | Stop reason: HOSPADM

## 2023-01-01 RX ORDER — FUROSEMIDE 10 MG/ML
40 INJECTION INTRAMUSCULAR; INTRAVENOUS EVERY 8 HOURS
Status: COMPLETED | OUTPATIENT
Start: 2023-01-01 | End: 2023-01-01

## 2023-01-01 RX ORDER — LOPERAMIDE HYDROCHLORIDE 2 MG/1
2 CAPSULE ORAL 4 TIMES DAILY PRN
Status: DISCONTINUED | OUTPATIENT
Start: 2023-01-01 | End: 2023-01-01 | Stop reason: HOSPADM

## 2023-01-01 RX ADMIN — APIXABAN 2.5 MG: 2.5 TABLET, FILM COATED ORAL at 08:10

## 2023-01-01 RX ADMIN — ALLOPURINOL 100 MG: 100 TABLET ORAL at 09:09

## 2023-01-01 RX ADMIN — MELATONIN TAB 3 MG 6 MG: 3 TAB at 08:09

## 2023-01-01 RX ADMIN — FAMOTIDINE 20 MG: 10 INJECTION INTRAVENOUS at 09:08

## 2023-01-01 RX ADMIN — Medication 10 ML: at 11:08

## 2023-01-01 RX ADMIN — MELATONIN TAB 3 MG 6 MG: 3 TAB at 09:09

## 2023-01-01 RX ADMIN — CEFTRIAXONE 2 G: 2 INJECTION, POWDER, FOR SOLUTION INTRAMUSCULAR; INTRAVENOUS at 06:08

## 2023-01-01 RX ADMIN — FUROSEMIDE 40 MG: 10 INJECTION, SOLUTION INTRAVENOUS at 09:09

## 2023-01-01 RX ADMIN — MEROPENEM 1 G: 1 INJECTION INTRAVENOUS at 10:08

## 2023-01-01 RX ADMIN — APIXABAN 2.5 MG: 2.5 TABLET, FILM COATED ORAL at 09:10

## 2023-01-01 RX ADMIN — METOPROLOL TARTRATE 25 MG: 25 TABLET, FILM COATED ORAL at 09:10

## 2023-01-01 RX ADMIN — Medication 10 ML: at 06:09

## 2023-01-01 RX ADMIN — AMIODARONE HYDROCHLORIDE 200 MG: 200 TABLET ORAL at 09:08

## 2023-01-01 RX ADMIN — LOPERAMIDE HYDROCHLORIDE 2 MG: 2 CAPSULE ORAL at 05:09

## 2023-01-01 RX ADMIN — Medication 10 ML: at 01:09

## 2023-01-01 RX ADMIN — BUMETANIDE 1 MG: 0.25 INJECTION INTRAMUSCULAR; INTRAVENOUS at 03:09

## 2023-01-01 RX ADMIN — LIDOCAINE HYDROCHLORIDE 20 MG: 20 INJECTION, SOLUTION INTRAVENOUS at 02:09

## 2023-01-01 RX ADMIN — MUPIROCIN: 20 OINTMENT TOPICAL at 08:09

## 2023-01-01 RX ADMIN — SODIUM CHLORIDE: 9 INJECTION, SOLUTION INTRAVENOUS at 03:08

## 2023-01-01 RX ADMIN — IOHEXOL 75 ML: 350 INJECTION, SOLUTION INTRAVENOUS at 07:11

## 2023-01-01 RX ADMIN — MEROPENEM 1 G: 1 INJECTION INTRAVENOUS at 09:09

## 2023-01-01 RX ADMIN — FAMOTIDINE 20 MG: 20 TABLET ORAL at 12:09

## 2023-01-01 RX ADMIN — Medication 10 ML: at 12:09

## 2023-01-01 RX ADMIN — LOPERAMIDE HYDROCHLORIDE 2 MG: 2 CAPSULE ORAL at 09:09

## 2023-01-01 RX ADMIN — CEFTRIAXONE 2 G: 2 INJECTION, POWDER, FOR SOLUTION INTRAMUSCULAR; INTRAVENOUS at 05:08

## 2023-01-01 RX ADMIN — MEROPENEM 1 G: 1 INJECTION INTRAVENOUS at 08:09

## 2023-01-01 RX ADMIN — BUMETANIDE 1 MG: 0.25 INJECTION INTRAMUSCULAR; INTRAVENOUS at 09:09

## 2023-01-01 RX ADMIN — MUPIROCIN: 20 OINTMENT TOPICAL at 09:09

## 2023-01-01 RX ADMIN — ATORVASTATIN CALCIUM 40 MG: 40 TABLET, FILM COATED ORAL at 10:09

## 2023-01-01 RX ADMIN — TOLVAPTAN 15 MG: 15 TABLET ORAL at 01:08

## 2023-01-01 RX ADMIN — Medication 1000 MG: at 10:08

## 2023-01-01 RX ADMIN — AMIODARONE HYDROCHLORIDE 150 MG: 1.5 INJECTION, SOLUTION INTRAVENOUS at 09:09

## 2023-01-01 RX ADMIN — MEROPENEM 1 G: 1 INJECTION INTRAVENOUS at 09:08

## 2023-01-01 RX ADMIN — FOLIC ACID 1000 MCG: 1 TABLET ORAL at 09:09

## 2023-01-01 RX ADMIN — METOPROLOL SUCCINATE 12.5 MG: 25 TABLET, EXTENDED RELEASE ORAL at 06:08

## 2023-01-01 RX ADMIN — DOXYCYCLINE HYCLATE 100 MG: 100 TABLET, COATED ORAL at 09:09

## 2023-01-01 RX ADMIN — FUROSEMIDE 40 MG: 10 INJECTION, SOLUTION INTRAMUSCULAR; INTRAVENOUS at 09:09

## 2023-01-01 RX ADMIN — Medication 6 MG: at 10:10

## 2023-01-01 RX ADMIN — LOPERAMIDE HYDROCHLORIDE 2 MG: 2 CAPSULE ORAL at 10:09

## 2023-01-01 RX ADMIN — SODIUM CHLORIDE: 9 INJECTION, SOLUTION INTRAVENOUS at 10:08

## 2023-01-01 RX ADMIN — Medication 10 ML: at 12:08

## 2023-01-01 RX ADMIN — DRONABINOL 2.5 MG: 2.5 CAPSULE ORAL at 09:10

## 2023-01-01 RX ADMIN — MEROPENEM 1 G: 1 INJECTION INTRAVENOUS at 11:09

## 2023-01-01 RX ADMIN — Medication 1000 MG: at 09:09

## 2023-01-01 RX ADMIN — FUROSEMIDE 40 MG: 10 INJECTION, SOLUTION INTRAMUSCULAR; INTRAVENOUS at 10:08

## 2023-01-01 RX ADMIN — FUROSEMIDE 40 MG: 10 INJECTION, SOLUTION INTRAVENOUS at 11:10

## 2023-01-01 RX ADMIN — DOXYCYCLINE 100 MG: 100 INJECTION, POWDER, LYOPHILIZED, FOR SOLUTION INTRAVENOUS at 05:08

## 2023-01-01 RX ADMIN — DOXYCYCLINE 100 MG: 100 INJECTION, POWDER, LYOPHILIZED, FOR SOLUTION INTRAVENOUS at 08:08

## 2023-01-01 RX ADMIN — METOPROLOL TARTRATE 25 MG: 25 TABLET, FILM COATED ORAL at 08:10

## 2023-01-01 RX ADMIN — MUPIROCIN: 20 OINTMENT TOPICAL at 09:08

## 2023-01-01 RX ADMIN — APIXABAN 5 MG: 5 TABLET, FILM COATED ORAL at 08:09

## 2023-01-01 RX ADMIN — TUBERCULIN PURIFIED PROTEIN DERIVATIVE 5 UNITS: 5 INJECTION, SOLUTION INTRADERMAL at 07:08

## 2023-01-01 RX ADMIN — Medication 1000 MG: at 08:09

## 2023-01-01 RX ADMIN — Medication 10 ML: at 06:08

## 2023-01-01 RX ADMIN — AMIODARONE HYDROCHLORIDE 0.5 MG/MIN: 1.8 INJECTION, SOLUTION INTRAVENOUS at 04:09

## 2023-01-01 RX ADMIN — DOXYCYCLINE HYCLATE 100 MG: 100 TABLET, COATED ORAL at 10:09

## 2023-01-01 RX ADMIN — FOLIC ACID 1000 MCG: 1 TABLET ORAL at 08:09

## 2023-01-01 RX ADMIN — ATORVASTATIN CALCIUM 40 MG: 40 TABLET, FILM COATED ORAL at 08:09

## 2023-01-01 RX ADMIN — FOLIC ACID 1000 MCG: 1 TABLET ORAL at 12:09

## 2023-01-01 RX ADMIN — FUROSEMIDE 80 MG: 10 INJECTION, SOLUTION INTRAVENOUS at 09:09

## 2023-01-01 RX ADMIN — MEROPENEM 1 G: 1 INJECTION INTRAVENOUS at 11:08

## 2023-01-01 RX ADMIN — APIXABAN 2.5 MG: 2.5 TABLET, FILM COATED ORAL at 08:09

## 2023-01-01 RX ADMIN — MUPIROCIN: 20 OINTMENT TOPICAL at 08:08

## 2023-01-01 RX ADMIN — FUROSEMIDE 40 MG: 10 INJECTION, SOLUTION INTRAVENOUS at 10:10

## 2023-01-01 RX ADMIN — Medication 1000 MG: at 10:09

## 2023-01-01 RX ADMIN — POTASSIUM CHLORIDE 10 MEQ: 7.46 INJECTION, SOLUTION INTRAVENOUS at 07:09

## 2023-01-01 RX ADMIN — POTASSIUM CHLORIDE 40 MEQ: 1500 TABLET, EXTENDED RELEASE ORAL at 08:10

## 2023-01-01 RX ADMIN — AMIODARONE HYDROCHLORIDE 200 MG: 200 TABLET ORAL at 08:08

## 2023-01-01 RX ADMIN — FAMOTIDINE 20 MG: 20 TABLET ORAL at 11:09

## 2023-01-01 RX ADMIN — POTASSIUM CHLORIDE 40 MEQ: 1500 TABLET, EXTENDED RELEASE ORAL at 09:09

## 2023-01-01 RX ADMIN — SODIUM ZIRCONIUM CYCLOSILICATE 5 G: 5 POWDER, FOR SUSPENSION ORAL at 04:08

## 2023-01-01 RX ADMIN — ATORVASTATIN CALCIUM 40 MG: 40 TABLET, FILM COATED ORAL at 09:08

## 2023-01-01 RX ADMIN — Medication 1000 MG: at 11:09

## 2023-01-01 RX ADMIN — ALLOPURINOL 100 MG: 100 TABLET ORAL at 08:09

## 2023-01-01 RX ADMIN — MELATONIN TAB 3 MG 6 MG: 3 TAB at 08:08

## 2023-01-01 RX ADMIN — APIXABAN 2.5 MG: 2.5 TABLET, FILM COATED ORAL at 09:09

## 2023-01-01 RX ADMIN — LOPERAMIDE HYDROCHLORIDE 2 MG: 2 CAPSULE ORAL at 12:09

## 2023-01-01 RX ADMIN — APIXABAN 2.5 MG: 2.5 TABLET, FILM COATED ORAL at 08:08

## 2023-01-01 RX ADMIN — Medication 1000 MG: at 09:08

## 2023-01-01 RX ADMIN — AMIODARONE HYDROCHLORIDE 200 MG: 200 TABLET ORAL at 08:09

## 2023-01-01 RX ADMIN — FUROSEMIDE 40 MG: 10 INJECTION, SOLUTION INTRAVENOUS at 12:09

## 2023-01-01 RX ADMIN — FUROSEMIDE 40 MG: 10 INJECTION, SOLUTION INTRAMUSCULAR; INTRAVENOUS at 08:09

## 2023-01-01 RX ADMIN — DOXYCYCLINE 100 MG: 100 INJECTION, POWDER, LYOPHILIZED, FOR SOLUTION INTRAVENOUS at 04:08

## 2023-01-01 RX ADMIN — CEFTRIAXONE 1 G: 1 INJECTION, POWDER, FOR SOLUTION INTRAMUSCULAR; INTRAVENOUS at 11:09

## 2023-01-01 RX ADMIN — SODIUM CHLORIDE 1000 ML: 9 INJECTION, SOLUTION INTRAVENOUS at 04:10

## 2023-01-01 RX ADMIN — POTASSIUM CHLORIDE 40 MEQ: 1500 TABLET, EXTENDED RELEASE ORAL at 03:09

## 2023-01-01 RX ADMIN — Medication 6 MG: at 09:10

## 2023-01-01 RX ADMIN — FAMOTIDINE 20 MG: 20 TABLET ORAL at 08:08

## 2023-01-01 RX ADMIN — SODIUM CHLORIDE 1000 ML: 9 INJECTION, SOLUTION INTRAVENOUS at 02:08

## 2023-01-01 RX ADMIN — FUROSEMIDE 40 MG: 10 INJECTION, SOLUTION INTRAVENOUS at 02:10

## 2023-01-01 RX ADMIN — FAMOTIDINE 20 MG: 10 INJECTION INTRAVENOUS at 08:08

## 2023-01-01 RX ADMIN — AMIODARONE HYDROCHLORIDE 200 MG: 200 TABLET ORAL at 10:09

## 2023-01-01 RX ADMIN — SODIUM CHLORIDE: 9 INJECTION, SOLUTION INTRAVENOUS at 05:08

## 2023-01-01 RX ADMIN — DOXYCYCLINE 100 MG: 100 INJECTION, POWDER, LYOPHILIZED, FOR SOLUTION INTRAVENOUS at 09:08

## 2023-01-01 RX ADMIN — FUROSEMIDE 40 MG: 10 INJECTION, SOLUTION INTRAVENOUS at 06:10

## 2023-01-01 RX ADMIN — FOLIC ACID 1000 MCG: 1 TABLET ORAL at 10:09

## 2023-01-01 RX ADMIN — PHENYLEPHRINE HYDROCHLORIDE 0.5 MCG/KG/MIN: 10 INJECTION INTRAVENOUS at 10:11

## 2023-01-01 RX ADMIN — AMIODARONE HYDROCHLORIDE 1 MG/MIN: 1.8 INJECTION, SOLUTION INTRAVENOUS at 10:09

## 2023-01-01 RX ADMIN — FOLIC ACID 1000 MCG: 1 TABLET ORAL at 08:08

## 2023-01-01 RX ADMIN — AMLODIPINE BESYLATE 10 MG: 5 TABLET ORAL at 08:08

## 2023-01-01 RX ADMIN — FOLIC ACID 1000 MCG: 1 TABLET ORAL at 09:08

## 2023-01-01 RX ADMIN — FUROSEMIDE 80 MG: 10 INJECTION, SOLUTION INTRAVENOUS at 08:09

## 2023-01-01 RX ADMIN — METOROPROLOL TARTRATE 5 MG: 5 INJECTION, SOLUTION INTRAVENOUS at 06:10

## 2023-01-01 RX ADMIN — Medication 400 MG: at 08:10

## 2023-01-01 RX ADMIN — VANCOMYCIN HYDROCHLORIDE 1000 MG: 1 INJECTION, POWDER, LYOPHILIZED, FOR SOLUTION INTRAVENOUS at 12:11

## 2023-01-01 RX ADMIN — NOREPINEPHRINE BITARTRATE 1 MCG/KG/MIN: 4 INJECTION, SOLUTION INTRAVENOUS at 10:11

## 2023-01-01 RX ADMIN — METOPROLOL SUCCINATE 12.5 MG: 25 TABLET, EXTENDED RELEASE ORAL at 08:08

## 2023-01-01 RX ADMIN — SODIUM CHLORIDE 1000 ML: 9 INJECTION, SOLUTION INTRAVENOUS at 10:08

## 2023-01-01 RX ADMIN — Medication 6 MG: at 08:09

## 2023-01-01 RX ADMIN — CYANOCOBALAMIN 1000 MCG: 1000 INJECTION, SOLUTION INTRAMUSCULAR at 09:10

## 2023-01-01 RX ADMIN — DOXYCYCLINE HYCLATE 100 MG: 100 TABLET, COATED ORAL at 08:09

## 2023-01-01 RX ADMIN — FUROSEMIDE 40 MG: 10 INJECTION, SOLUTION INTRAVENOUS at 08:08

## 2023-01-01 RX ADMIN — SODIUM CHLORIDE: 9 INJECTION, SOLUTION INTRAVENOUS at 06:08

## 2023-01-01 RX ADMIN — MELATONIN TAB 3 MG 6 MG: 3 TAB at 09:08

## 2023-01-01 RX ADMIN — Medication 10 ML: at 10:09

## 2023-01-01 RX ADMIN — Medication 10 ML: at 08:09

## 2023-01-01 RX ADMIN — FUROSEMIDE 40 MG: 10 INJECTION, SOLUTION INTRAMUSCULAR; INTRAVENOUS at 10:09

## 2023-01-01 RX ADMIN — POTASSIUM CHLORIDE 10 MEQ: 7.46 INJECTION, SOLUTION INTRAVENOUS at 10:09

## 2023-01-01 RX ADMIN — PHENYLEPHRINE HYDROCHLORIDE 4 MCG/KG/MIN: 10 INJECTION INTRAVENOUS at 12:11

## 2023-01-01 RX ADMIN — APIXABAN 2.5 MG: 2.5 TABLET, FILM COATED ORAL at 09:08

## 2023-01-01 RX ADMIN — ALLOPURINOL 100 MG: 100 TABLET ORAL at 08:08

## 2023-01-01 RX ADMIN — DOXYCYCLINE HYCLATE 100 MG: 100 TABLET, COATED ORAL at 11:09

## 2023-01-01 RX ADMIN — FAMOTIDINE 20 MG: 20 TABLET ORAL at 10:09

## 2023-01-01 RX ADMIN — MEROPENEM 1 G: 1 INJECTION INTRAVENOUS at 10:09

## 2023-01-01 RX ADMIN — CEFTRIAXONE 2 G: 2 INJECTION, POWDER, FOR SOLUTION INTRAMUSCULAR; INTRAVENOUS at 08:08

## 2023-01-01 RX ADMIN — METOPROLOL SUCCINATE 12.5 MG: 25 TABLET, EXTENDED RELEASE ORAL at 09:08

## 2023-01-01 RX ADMIN — NOREPINEPHRINE BITARTRATE 0.05 MCG/KG/MIN: 4 INJECTION, SOLUTION INTRAVENOUS at 08:11

## 2023-01-01 RX ADMIN — FAMOTIDINE 20 MG: 20 TABLET ORAL at 09:09

## 2023-01-01 RX ADMIN — AZITHROMYCIN MONOHYDRATE 500 MG: 500 INJECTION, POWDER, LYOPHILIZED, FOR SOLUTION INTRAVENOUS at 12:09

## 2023-01-01 RX ADMIN — CEFTRIAXONE 1 G: 1 INJECTION, POWDER, FOR SOLUTION INTRAMUSCULAR; INTRAVENOUS at 10:08

## 2023-01-01 RX ADMIN — DIGOXIN 500 MCG: 0.25 INJECTION INTRAMUSCULAR; INTRAVENOUS at 09:11

## 2023-01-01 RX ADMIN — AMIODARONE HYDROCHLORIDE 200 MG: 200 TABLET ORAL at 09:09

## 2023-01-01 RX ADMIN — Medication 10 ML: at 04:08

## 2023-01-01 RX ADMIN — ALLOPURINOL 100 MG: 100 TABLET ORAL at 11:09

## 2023-01-01 RX ADMIN — AMIODARONE HYDROCHLORIDE 100 MG: 100 TABLET ORAL at 08:09

## 2023-01-01 RX ADMIN — APIXABAN 5 MG: 5 TABLET, FILM COATED ORAL at 09:09

## 2023-01-01 RX ADMIN — Medication 1000 MG: at 08:08

## 2023-01-01 RX ADMIN — APIXABAN 2.5 MG: 2.5 TABLET, FILM COATED ORAL at 12:09

## 2023-01-01 RX ADMIN — POTASSIUM CHLORIDE 10 MEQ: 7.46 INJECTION, SOLUTION INTRAVENOUS at 06:09

## 2023-01-01 RX ADMIN — FUROSEMIDE 80 MG: 10 INJECTION, SOLUTION INTRAVENOUS at 11:09

## 2023-01-01 RX ADMIN — METOCLOPRAMIDE 10 MG: 5 INJECTION, SOLUTION INTRAMUSCULAR; INTRAVENOUS at 08:11

## 2023-01-01 RX ADMIN — DOXYCYCLINE HYCLATE 100 MG: 100 TABLET, COATED ORAL at 12:09

## 2023-01-01 RX ADMIN — APIXABAN 5 MG: 5 TABLET, FILM COATED ORAL at 10:09

## 2023-01-01 RX ADMIN — FENTANYL CITRATE 25 MCG: 50 INJECTION, SOLUTION INTRAMUSCULAR; INTRAVENOUS at 09:11

## 2023-01-01 RX ADMIN — POTASSIUM CHLORIDE 40 MEQ: 1500 TABLET, EXTENDED RELEASE ORAL at 12:09

## 2023-01-01 RX ADMIN — ATORVASTATIN CALCIUM 40 MG: 40 TABLET, FILM COATED ORAL at 08:08

## 2023-01-01 RX ADMIN — BUMETANIDE 1 MG: 0.25 INJECTION INTRAMUSCULAR; INTRAVENOUS at 08:09

## 2023-01-01 RX ADMIN — DOXYCYCLINE 100 MG: 100 INJECTION, POWDER, LYOPHILIZED, FOR SOLUTION INTRAVENOUS at 03:08

## 2023-01-01 RX ADMIN — SODIUM CHLORIDE, POTASSIUM CHLORIDE, SODIUM LACTATE AND CALCIUM CHLORIDE 1000 ML: 600; 310; 30; 20 INJECTION, SOLUTION INTRAVENOUS at 05:11

## 2023-01-01 RX ADMIN — ATORVASTATIN CALCIUM 40 MG: 40 TABLET, FILM COATED ORAL at 09:09

## 2023-01-01 RX ADMIN — AMIODARONE HYDROCHLORIDE 200 MG: 200 TABLET ORAL at 12:09

## 2023-01-01 RX ADMIN — ATORVASTATIN CALCIUM 40 MG: 40 TABLET, FILM COATED ORAL at 11:09

## 2023-01-01 RX ADMIN — AZITHROMYCIN MONOHYDRATE 500 MG: 500 INJECTION, POWDER, LYOPHILIZED, FOR SOLUTION INTRAVENOUS at 11:09

## 2023-01-01 RX ADMIN — POTASSIUM CHLORIDE 10 MEQ: 7.46 INJECTION, SOLUTION INTRAVENOUS at 08:09

## 2023-01-01 RX ADMIN — EPOETIN ALFA-EPBX 10000 UNITS: 10000 INJECTION, SOLUTION INTRAVENOUS; SUBCUTANEOUS at 08:09

## 2023-01-01 RX ADMIN — FAMOTIDINE 20 MG: 20 TABLET ORAL at 08:09

## 2023-01-01 RX ADMIN — SODIUM CHLORIDE: 9 INJECTION, SOLUTION INTRAVENOUS at 11:08

## 2023-01-01 RX ADMIN — ALLOPURINOL 100 MG: 100 TABLET ORAL at 10:09

## 2023-01-01 RX ADMIN — Medication 10 ML: at 05:09

## 2023-01-01 RX ADMIN — LIDOCAINE HYDROCHLORIDE 5 ML: 10 INJECTION, SOLUTION INFILTRATION; PERINEURAL at 10:10

## 2023-01-01 RX ADMIN — PROPOFOL 70 MG: 10 INJECTION, EMULSION INTRAVENOUS at 02:09

## 2023-01-01 RX ADMIN — POTASSIUM CHLORIDE 20 MEQ: 1500 TABLET, EXTENDED RELEASE ORAL at 04:10

## 2023-01-01 RX ADMIN — ALLOPURINOL 100 MG: 100 TABLET ORAL at 12:09

## 2023-01-01 RX ADMIN — CYANOCOBALAMIN 1000 MCG: 1000 INJECTION, SOLUTION INTRAMUSCULAR at 10:10

## 2023-01-01 RX ADMIN — DENOSUMAB 60 MG: 60 INJECTION SUBCUTANEOUS at 11:06

## 2023-01-01 RX ADMIN — Medication 800 MG: at 11:08

## 2023-01-01 RX ADMIN — FOLIC ACID 1000 MCG: 1 TABLET ORAL at 11:09

## 2023-01-01 RX ADMIN — AMIODARONE HYDROCHLORIDE 100 MG: 100 TABLET ORAL at 09:09

## 2023-01-01 RX ADMIN — ALLOPURINOL 100 MG: 100 TABLET ORAL at 09:08

## 2023-01-01 RX ADMIN — SODIUM CHLORIDE, SODIUM LACTATE, POTASSIUM CHLORIDE, AND CALCIUM CHLORIDE: .6; .31; .03; .02 INJECTION, SOLUTION INTRAVENOUS at 02:09

## 2023-01-01 RX ADMIN — AMIODARONE HYDROCHLORIDE 200 MG: 200 TABLET ORAL at 11:09

## 2023-01-01 RX ADMIN — APIXABAN 2.5 MG: 2.5 TABLET, FILM COATED ORAL at 11:09

## 2023-01-01 RX ADMIN — ATORVASTATIN CALCIUM 40 MG: 40 TABLET, FILM COATED ORAL at 12:09

## 2023-01-01 RX ADMIN — APIXABAN 2.5 MG: 2.5 TABLET, FILM COATED ORAL at 10:09

## 2023-01-01 RX ADMIN — APIXABAN 2.5 MG: 2.5 TABLET, FILM COATED ORAL at 12:10

## 2023-01-01 RX ADMIN — METOPROLOL TARTRATE 25 MG: 25 TABLET, FILM COATED ORAL at 11:10

## 2023-01-01 RX ADMIN — CEFTRIAXONE 1 G: 1 INJECTION, POWDER, FOR SOLUTION INTRAMUSCULAR; INTRAVENOUS at 12:09

## 2023-01-09 NOTE — PROGRESS NOTES
Subjective:    Patient ID:  Barbara Rizo is a 82 y.o. female who presents for follow-up of No chief complaint on file.      HPI    PAF - TALHA/CV 9/21/15, 1/30/19, 2/2/19 -  on amiodarone and eliquis, Diastolic CHF,  HTN, DM, HLD, MGUS, CRI - Cr 1.8, AS/MR - mild to moderate, breast CA, COPD on home O2     Admitted 1/28/19  Ms. Barbara Rizo is a 78 y.o. female with essential hypertension, hyperlipidemia (LDL 62.4 Jul 2018), type 2 diabetes mellitus (HbA1c 5.8% Jul 2018), CKD Stage 3, paroxysmal atrial fibrillation ([id number] score 5) not on chronic anticoagulation, obesity (BMI 36.0), MGUS, and chronic gout who presents to [facility] ED with complaints of coughing for three days.  She started to have a non-productive cough, wheezing, and mild dyspnea three days ago which has steadily been worsening since onset.  She also complains of a subjective fever but otherwise denies any nausea, vomiting, chest pain, palpitations, pleurisy, nor any diaphoresis.  She denies any recent travel, hemoptysis, nor any lower extremity pain or swelling.  She does say that she's under a lot of stress recently with her ex- dying yesterday at [facility] due to complications from a heart valve replacement two weeks ago.  She does say that she may have had sick contacts while visiting her ex-.  Her appetite has been poor but she denies any weight loss.     While at her ENT's appointment today she decided to schedule an appointment with her PCP to evaluate her upper airway complaints.  She was unable to see her regular PCP, Dr. Paras Oro, but was able to be seen by another physician who became concerned when she was noted to be in AFib with RVR on EKG.  He recommended EMS transportation to the ED but she refused and decided to drive herself.  Her cardiologist is Dr. Amauri Lomas.     Pt admitted to ICU with afib rvr on amiodarone infusion. Pt with elevated HR  and after TALHA done patient became very  agitated and anxious. Ativan 1mg IV given and symptoms got worse. HR up into the 170s and O2 sats dropped to 77%. Placed on NRB. Improved O2 sats. 5mg IV haldol given as patient was trying to get out of bed and pulling oxygen mask off. Cardizem 10mg Iv given with improved HR to 120s-130s. xopenex scheduled Q 8 hours. Changed to zosyn with temp 102F.   1/30- successful cardioversion, post procedure confused and pulling oxygen off, desaturation, constant re-direction, monitored in ICU overnight. Changed to oral amiodarone. eliquis for anticoagulation. Holding parameters on BP meds. IV steroids, nebs and antibiotic for probable lower resp infection. IV lasix as Bp tolerates.   1/31- HR remain under control NSR 60-70s.On amiodarone, metoprolol and eliquis.  Resp status improved and weaned oxygen. Steroids changed to oral route. DC zosyn and switch to augmentin. Add acapella to nebs. Cr increased from baseline (1.5-1.7). Gentle IVF and monitor with repeat BMP later, was stable,. PT,OT consulted for dc planning. Weaning oxygen. PT/OT consulted and rec H/H without equipment. The patient was transferred to the floor on 1/31. Nephrology was consulted for worsening renal function.      2/2- pt transferred with afadis rvr. Successful cardioversion again . Continued oral amiodarone and BB.  still has aggressive cough and URI symptoms. On mucolytic and antibiotic.   2/3- HR 50-60s sinus. Cr sightly higher, sodium 128. BNP 1500. Lasix x one dose. Bringing up more mucus. Steroids weaned 20mg. Still faint wheezes on exam.  Patient had worsening ARF on CKD 3, keeped on george and monitor,nephrology was following.reconsulted PT,OT.fley was removed latera nd she had improvement in ARF.  Changed diet for low slat diet duo to hyponatremia with improvement.  She did well with PT,OT.  Her wheezing and respiratory status is improved,  Patient has been discharged home with HH and PO Abx and OAC and amiodarone and follow up with PCP,nephrology  and cardiology in next few days.      1-29:  Admitted with AFib with RVR  1-30:  Underwent TALHA/DC cardioversion successfully  2-1 Back in A-fib 120s. SOB  2-3 Still coughing and SOB. NSR 60      2/2/19 CV - 360J converted A-fib to sinus bradycardia 55. Change amiodarone to po. Ok for telemetry     Echo 12/27/21  The left ventricle is normal in size with moderate concentric hypertrophy and normal systolic function.  The estimated ejection fraction is 60%.  Grade II left ventricular diastolic dysfunction.  Mild right ventricular enlargement with normal right ventricular systolic function.  There is moderate aortic valve stenosis.  Aortic valve area is 1.29 cm2; peak velocity is 2.88 m/s; mean gradient is 22 mmHg.  Mild mitral regurgitation.  Moderate tricuspid regurgitation.  The estimated PA systolic pressure is 66 mmHg.  There is pulmonary hypertension.        2/13/19 Less SOB since discharge  EKG NSR with PACs 65   Decrease amiodarone 200 qd  OV 2 months - will discuss changing amiodarone to flecainide at that time     4/17/19 Denies CP or SOB  EKG sinus bradycardia 44  HR mostly runs 40-50 at home  Change metoprolol 100 qd to toprol XL 50 qd - may decrease further if bradycardia persists  Discussed alternative AAD - given her repeated episodes of PAF we are both in agreement to stay on amiodarone for now  OV 1 month with TSH, CMP, EKG     5/27/19 HR improved to the low 50s  Has held eliquis the last 2 days due to bleeding from a recent skin CA removal  Denies CP or SOB     9/11/19 Denies CP or SOB  Some LE edema  EKG sinus bradycardia 46 NSSTT changes  Stop norvasc with LE edema  Add cardura 4 mg qd  Decrease toprol 25 qd with bradycardia  OV 3 months with CMP, TSH     12/11/19 Denies CP or SOB  LE edema resolved after stopping norvasc  BP controlled by home readings  HR runs 45-50  EKG sinus bradycardia 47 1st degree AVB  Denies dizziness  Bradycardia well tolerated - will continue Rx  OV 6 months with echo       6/18/20 Denies CP or dizziness  Mild stable WILKINSON  EKG sinus bradycardia 44 otherwise ok  Bradycardia continues to be well tolerated  OV 6 months with CMP, TSH on chronic amiodarone        10/14/20 EKG  - suspect this is A-flutter 2:1 with aberrancy  Denies CP, SOB, or palpitations  Appears to be in A-flutter RVR - will send to the ER for admission. If issues with tachy-marlon continue will likely end up needing PPM     10/23/20 HR were better controlled when she went to ER. She was restarted on metoprolol and discharged. HR mostly 40-50s at home. Bradycardia well tolerated. Need clearance for skin graft surgery at  on left leg  EKG sinus bradycardia 43   Back in NSR - bradycardia well tolerated. No indication for PPM at this point  Cleared for skin graft surgery at moderate cardiac risk - ok to hold eliquis 2 days before  OV 3 months  Continue Rx for PAF, HTN, CHF     12/1/20 Denies CP or SOB. Did well after her skin graft surgery  HR 45-50. BP elevated - controlled by outside readings  Continue Rx for PAF, HTN, HLD, CHF  OV 3 months     3/1/21 Denies CP or SOB  EKG NSR 60 IVCD  HR 55-60 by home readings as well      Continue Rx for PAF, HTN, HLD, diastolic CHF  OV 6 months with CMP, TSH on chronic amiodarone, Echo to look at MR           5/6/21 Denies further dizziness or syncope  EKG NSR 69 1st degree AVB IVCD  On amiodarone 100 qd and off of metoprolol  TSH 2.4 LFT ok     suspect recent syncope was from dehydration and not bradycardia  Staying in NSR on amiodarone 100 qd and off of metoprolol  Continue Rx for PAF, HTN, HLD, diastolic CHF  OV 3 months     8/6/21 Denies CP, SOB, or dizziness  EKG NSR 1st degree AVB RBBB   Continue Rx for PAF, HTN, HLD, diastolic CHF  OV 6 months with CMP, TSH  Cleared for breast surgery at moderate cardiac risk - ok to hold eliquis 3 days before         1/19/22 Less SOB since discharge. On Home O2. Scheduled for pulmonary evaluation  EKG NSR 1st degree AVB RBBB   Continue  Rx for PAF, HTN, HLD, diastolic CHF  Volume status appears stable  OV 3 months with BNP, BMP     4/4/22 Denies CP, stable mild WILKINSON  BP controlled   Continue Rx for PAF, HTN, HLD, diastolic CHF  Volume status appears stable  OV 3 months with BNP, CMP, TSH     Labs 7/6/22  K 5.2  Cr 1.8 down from 2.2   down from 1190  LFT ok  TSH 3.9     7/8/22 Denies CP, stable WILKINSON - only uses O2 at night  EKG NSR RBBB/LAFB    Continue Rx for PAF, HTN, HLD, diastolic CHF  Volume status appears stable. Discussed decreasing potassium in diet  OV 6 months with BNP, CMP, TSH    1/9/23 labs not done. Denies CP, mild stable WILKINSON  BP controlled by home readings  EKG NSR RBBB/LAFB    Review of Systems   Constitutional: Negative for decreased appetite.   HENT:  Negative for ear discharge.    Eyes:  Negative for blurred vision.   Respiratory:  Negative for hemoptysis.    Endocrine: Negative for polyphagia.   Hematologic/Lymphatic: Negative for adenopathy.   Skin:  Negative for color change.   Musculoskeletal:  Negative for joint swelling.   Genitourinary:  Negative for bladder incontinence.   Neurological:  Negative for brief paralysis.   Psychiatric/Behavioral:  Negative for hallucinations.    Allergic/Immunologic: Negative for hives.      Objective:    Physical Exam  Constitutional:       Appearance: She is well-developed.   HENT:      Head: Normocephalic and atraumatic.   Eyes:      Conjunctiva/sclera: Conjunctivae normal.      Pupils: Pupils are equal, round, and reactive to light.   Cardiovascular:      Rate and Rhythm: Normal rate.      Pulses: Intact distal pulses.      Heart sounds: Normal heart sounds.   Pulmonary:      Effort: Pulmonary effort is normal.      Breath sounds: Normal breath sounds.   Abdominal:      General: Bowel sounds are normal.      Palpations: Abdomen is soft.   Musculoskeletal:         General: Normal range of motion.      Cervical back: Normal range of motion and neck supple.   Skin:     General: Skin is  warm and dry.   Neurological:      Mental Status: She is alert and oriented to person, place, and time.         Assessment:       1. Hyperlipidemia, unspecified hyperlipidemia type    2. Aortic atherosclerosis    3. Sinus bradycardia    4. Benign hypertensive heart disease without heart failure    5. Paroxysmal atrial fibrillation    6. Essential hypertension    7. Pulmonary hypertension         Plan:         Continue Rx for PAF, HTN, HLD, diastolic CHF  OV 3 months with echo, CMP, TSH, BNP

## 2023-02-15 NOTE — TELEPHONE ENCOUNTER
----- Message from Yoan Quarles sent at 2/15/2023  8:38 AM CST -----  Regarding: pt-  Name of Caller:  pt    When is the first available appointment?  4/10    Symptoms:  f/u    Would the patient rather a call back or a response via [facility]?      Best Call Back Number     Additional Information: pt has appt on 4/10. Would like to be sooner appt prefer date 3/20/23 or 3/27/23. If nothing on thoses dates, 4/3/23 or 4/14/23. Daughter would be in town for visit

## 2023-03-03 NOTE — TELEPHONE ENCOUNTER
----- Message from Neris Lerma sent at 3/3/2023  2:40 PM CST -----  Type:  Patient Requesting Referral    Who Called:self    Referral to What Specialty:Urology    Reason for Referral:urinary incontinence at night    Does the patient want the referral with a specific physician?:    Is the specialist an [facility]    Would the patient rather a call back or a response via [facility]? Call    Best Call Back Number:[phone] (home)

## 2023-03-06 NOTE — TELEPHONE ENCOUNTER
Pt notified of appt with Dr. Camp 3/23/23 @1pm        ----- Message from Quentin Avelino sent at 3/6/2023  1:24 PM CST -----  Regarding: Self [phone]  Type:  Sooner Appointment Request    Patient is requesting a sooner appointment.     Name of Caller:  Self     When is the first available appointment?  05/01/2023    Symptoms:  No control for urination, Urinates on herself at night during sleep.     Would the patient rather a call back or a response via [facility]?  Call back     Best Call Back Number:  [phone]    Additional Information:

## 2023-03-10 NOTE — PROGRESS NOTES
Subjective:       Barbara Rizo is a 83 y.o. female who who is a new patient was seen for evaluation of frequency.      She was last seen by [facility] in 11/15. She is followed for urinary retention. She was in SNF initially, she is home now. She was admitted in 8/15 for multiple medical conditions. She had catheter placed for UR in 8/15. She was planned for voiding trial. This was removed in 11/15 elsewhere. She was given Urecholine, Colace, and Amitiza by [facility]. These have all been stopped.     She reports she continues to have urinary frequency, UUI. Nocturia is severe and wears Depends at night. She does take Lasix in the AM. She denies MACEY. She denies BRENNAN. Denies constipation. Denies UTIs.     PVR (bladder scan) last visit - 44cc     She was given trial of Myrbetriq but did not note any improvement. She was then given a trial of Vesicare, now recommended to take both. She continues to have leakage at night. No UI during day but with urgency. At night, she doesn't have urge and just wakes up wet. Nighttime symptoms started after having catheter for 4 months (out in 11/15).     Cysto/UDS 11/2016:  Findings of the Procedure: Stable filling. No IBC/DO but urgency with small capacity. Open bladder neck on voiding. Decreased flow but acceptable. Mild MACEY. No BRENNAN on direct visualization.  Recommendations: Continue Vesicare and Myrbetriq. She has been doing better prior to UDS. Will hold on Botox or InterStim but could consider in future.      UDS done in 2016 - she did not follow up after that. She is no longer on bladder meds. She reports continued UI and nocturnal enuresis. She states this started after taking a new medication from nephrologist (takes 30min before Lasix). Now just taking Lasix in AM (was taking it BID about 2mths ago). Denies daytime symptoms. Reducing PM fluids.      PVR (bladder scan) today - 0cc      The following portions of the patient's history were reviewed and updated as appropriate:  allergies, current medications, past family history, past medical history, past social history, past surgical history and problem list.    Review of Systems  Constitutional: no fever or chills  ENT: no nasal congestion or sore throat  Respiratory: no cough or shortness of breath  Cardiovascular: no chest pain or palpitations  Gastrointestinal: no nausea or vomiting, tolerating diet  Genitourinary: as per HPI  Hematologic/Lymphatic: no easy bruising or lymphadenopathy  Musculoskeletal: no arthralgias or myalgias  Skin: no rashes or lesions  Neurological: no seizures or tremors  Behavioral/Psych: no auditory or visual hallucinations       Objective:    Vitals:   Wt 79.2 kg (174 lb 9.7 oz)   BMI 29.06 kg/m²       Physical Exam   General: well developed, well nourished in no acute distress  Head: normocephalic, atraumatic  Neck: supple, trachea midline, no obvious enlargement of thyroid  HEENT: EOMI, mucus membranes moist, sclera anicteric, +hearing impairment  Lungs: symmetric expansion, non-labored breathing  Skin: no rashes or lesions  Neuro: alert and oriented x 3, no gross deficits  Psych: normal judgment and insight, normal mood/affect and non-anxious  Genitourinary:   patient declined exam      Lab Review   Urine analysis today in clinic shows - trace protein    Lab Results   Component Value Date    WBC 8.60 12/26/2021    HGB 13.5 12/26/2021    HCT 39.9 12/26/2021    MCV 92 12/26/2021     12/26/2021     Lab Results   Component Value Date    CREATININE 1.9 (H) 03/17/2023    BUN 34 (H) 03/17/2023         Imaging  Images and reports were personally reviewed by me and discussed with patient  CT abd/pel        Assessment/Plan:      1. OAB (overactive bladder)    - No evidence of further UR   - PVR acceptable   - Myrbetriq trial for OAB - not good response   - Vesicare with better response, OAB fide at night   - Previous voiding diary - night not recorded. Voiding 200-400cc each void. Drinking coffee, teas,  Anabell.    - Cysto/UDS - 10/21/16   - Repeat Vesicare 5mg trial     2. Nocturnal enuresis    - Vesicare trial   - Reduce PM fluids      Follow up in 2 months

## 2023-03-10 NOTE — PROGRESS NOTES
Subjective:    Patient ID:  Barbara Rizo is a 83 y.o. female who presents for follow-up of No chief complaint on file.      HPI       PAF - TALHA/CV 9/21/15, 1/30/19, 2/2/19 -  on amiodarone and eliquis, Diastolic CHF,  HTN, DM, HLD, MGUS, CRI - Cr 1.8, AS/MR - mild to moderate, breast CA, COPD on home O2     Admitted 1/28/19  Ms. Barbara Rizo is a 78 y.o. female with essential hypertension, hyperlipidemia (LDL 62.4 Jul 2018), type 2 diabetes mellitus (HbA1c 5.8% Jul 2018), CKD Stage 3, paroxysmal atrial fibrillation ([id number] score 5) not on chronic anticoagulation, obesity (BMI 36.0), MGUS, and chronic gout who presents to [facility] ED with complaints of coughing for three days.  She started to have a non-productive cough, wheezing, and mild dyspnea three days ago which has steadily been worsening since onset.  She also complains of a subjective fever but otherwise denies any nausea, vomiting, chest pain, palpitations, pleurisy, nor any diaphoresis.  She denies any recent travel, hemoptysis, nor any lower extremity pain or swelling.  She does say that she's under a lot of stress recently with her ex- dying yesterday at [facility] due to complications from a heart valve replacement two weeks ago.  She does say that she may have had sick contacts while visiting her ex-.  Her appetite has been poor but she denies any weight loss.     While at her ENT's appointment today she decided to schedule an appointment with her PCP to evaluate her upper airway complaints.  She was unable to see her regular PCP, Dr. Paras Oro, but was able to be seen by another physician who became concerned when she was noted to be in AFib with RVR on EKG.  He recommended EMS transportation to the ED but she refused and decided to drive herself.  Her cardiologist is Dr. Amauri Lomas.     Pt admitted to ICU with afib rvr on amiodarone infusion. Pt with elevated HR  and after TALHA done patient became  very agitated and anxious. Ativan 1mg IV given and symptoms got worse. HR up into the 170s and O2 sats dropped to 77%. Placed on NRB. Improved O2 sats. 5mg IV haldol given as patient was trying to get out of bed and pulling oxygen mask off. Cardizem 10mg Iv given with improved HR to 120s-130s. xopenex scheduled Q 8 hours. Changed to zosyn with temp 102F.   1/30- successful cardioversion, post procedure confused and pulling oxygen off, desaturation, constant re-direction, monitored in ICU overnight. Changed to oral amiodarone. eliquis for anticoagulation. Holding parameters on BP meds. IV steroids, nebs and antibiotic for probable lower resp infection. IV lasix as Bp tolerates.   1/31- HR remain under control NSR 60-70s.On amiodarone, metoprolol and eliquis.  Resp status improved and weaned oxygen. Steroids changed to oral route. DC zosyn and switch to augmentin. Add acapella to nebs. Cr increased from baseline (1.5-1.7). Gentle IVF and monitor with repeat BMP later, was stable,. PT,OT consulted for dc planning. Weaning oxygen. PT/OT consulted and rec H/H without equipment. The patient was transferred to the floor on 1/31. Nephrology was consulted for worsening renal function.      2/2- pt transferred with afadis rvr. Successful cardioversion again . Continued oral amiodarone and BB.  still has aggressive cough and URI symptoms. On mucolytic and antibiotic.   2/3- HR 50-60s sinus. Cr sightly higher, sodium 128. BNP 1500. Lasix x one dose. Bringing up more mucus. Steroids weaned 20mg. Still faint wheezes on exam.  Patient had worsening ARF on CKD 3, keeped on george and monitor,nephrology was following.reconsulted PT,OT.fley was removed latera nd she had improvement in ARF.  Changed diet for low slat diet duo to hyponatremia with improvement.  She did well with PT,OT.  Her wheezing and respiratory status is improved,  Patient has been discharged home with HH and PO Abx and OAC and amiodarone and follow up with  PCP,nephrology and cardiology in next few days.      1-29:  Admitted with AFib with RVR  1-30:  Underwent TALHA/DC cardioversion successfully  2-1 Back in A-fib 120s. SOB  2-3 Still coughing and SOB. NSR 60      2/2/19 CV - 360J converted A-fib to sinus bradycardia 55. Change amiodarone to po. Ok for telemetry     Echo 3/7/23  The left ventricle is normal in size with eccentric hypertrophy and normal systolic function.  The estimated ejection fraction is 65%.  Grade II left ventricular diastolic dysfunction.  Mild tricuspid regurgitation.  Small pericardial effusion.  Moderate right atrial enlargement.  Severe left atrial enlargement.  Normal right ventricular size with normal right ventricular systolic function.  There is moderate aortic valve stenosis.  Aortic valve area is 1.20 cm2; peak velocity is 2.70 m/s; mean gradient is 19 mmHg.  Mild mitral regurgitation.  Mild pulmonic regurgitation.  Normal central venous pressure (3 mmHg).  The estimated PA systolic pressure is 46 mmHg.  There is pulmonary hypertension.     2/13/19 Less SOB since discharge  EKG NSR with PACs 65   Decrease amiodarone 200 qd  OV 2 months - will discuss changing amiodarone to flecainide at that time     4/17/19 Denies CP or SOB  EKG sinus bradycardia 44  HR mostly runs 40-50 at home  Change metoprolol 100 qd to toprol XL 50 qd - may decrease further if bradycardia persists  Discussed alternative AAD - given her repeated episodes of PAF we are both in agreement to stay on amiodarone for now  OV 1 month with TSH, CMP, EKG     5/27/19 HR improved to the low 50s  Has held eliquis the last 2 days due to bleeding from a recent skin CA removal  Denies CP or SOB     9/11/19 Denies CP or SOB  Some LE edema  EKG sinus bradycardia 46 NSSTT changes  Stop norvasc with LE edema  Add cardura 4 mg qd  Decrease toprol 25 qd with bradycardia  OV 3 months with CMP, TSH     12/11/19 Denies CP or SOB  LE edema resolved after stopping norvasc  BP controlled by  home readings  HR runs 45-50  EKG sinus bradycardia 47 1st degree AVB  Denies dizziness  Bradycardia well tolerated - will continue Rx  OV 6 months with echo      6/18/20 Denies CP or dizziness  Mild stable WILKINSON  EKG sinus bradycardia 44 otherwise ok  Bradycardia continues to be well tolerated  OV 6 months with CMP, TSH on chronic amiodarone        10/14/20 EKG  - suspect this is A-flutter 2:1 with aberrancy  Denies CP, SOB, or palpitations  Appears to be in A-flutter RVR - will send to the ER for admission. If issues with tachy-marlon continue will likely end up needing PPM     10/23/20 HR were better controlled when she went to ER. She was restarted on metoprolol and discharged. HR mostly 40-50s at home. Bradycardia well tolerated. Need clearance for skin graft surgery at  on left leg  EKG sinus bradycardia 43   Back in NSR - bradycardia well tolerated. No indication for PPM at this point  Cleared for skin graft surgery at moderate cardiac risk - ok to hold eliquis 2 days before  OV 3 months  Continue Rx for PAF, HTN, CHF     12/1/20 Denies CP or SOB. Did well after her skin graft surgery  HR 45-50. BP elevated - controlled by outside readings  Continue Rx for PAF, HTN, HLD, CHF  OV 3 months     3/1/21 Denies CP or SOB  EKG NSR 60 IVCD  HR 55-60 by home readings as well      Continue Rx for PAF, HTN, HLD, diastolic CHF  OV 6 months with CMP, TSH on chronic amiodarone, Echo to look at MR           5/6/21 Denies further dizziness or syncope  EKG NSR 69 1st degree AVB IVCD  On amiodarone 100 qd and off of metoprolol  TSH 2.4 LFT ok     suspect recent syncope was from dehydration and not bradycardia  Staying in NSR on amiodarone 100 qd and off of metoprolol  Continue Rx for PAF, HTN, HLD, diastolic CHF  OV 3 months     8/6/21 Denies CP, SOB, or dizziness  EKG NSR 1st degree AVB RBBB   Continue Rx for PAF, HTN, HLD, diastolic CHF  OV 6 months with CMP, TSH  Cleared for breast surgery at moderate cardiac risk -  ok to hold eliquis 3 days before         1/19/22 Less SOB since discharge. On Home O2. Scheduled for pulmonary evaluation  EKG NSR 1st degree AVB RBBB   Continue Rx for PAF, HTN, HLD, diastolic CHF  Volume status appears stable  OV 3 months with BNP, BMP     4/4/22 Denies CP, stable mild WILKINSON  BP controlled   Continue Rx for PAF, HTN, HLD, diastolic CHF  Volume status appears stable  OV 3 months with BNP, CMP, TSH     Labs 7/6/22  K 5.2  Cr 1.8 down from 2.2   down from 1190  LFT ok  TSH 3.9     7/8/22 Denies CP, stable WILKINSON - only uses O2 at night  EKG NSR RBBB/LAFB    Continue Rx for PAF, HTN, HLD, diastolic CHF  Volume status appears stable. Discussed decreasing potassium in diet  OV 6 months with BNP, CMP, TSH     1/9/23 labs not done. Denies CP, mild stable WILKINSON  BP controlled by home readings  EKG NSR RBBB/LAFB    Continue Rx for PAF, HTN, HLD, diastolic CHF  OV 3 months with echo, CMP, TSH, BNP    3/10/23 Labs not done. Was placed on metolazone recently by Dr Boyle for volume overload which since has resolved    Review of Systems   Constitutional: Negative for decreased appetite.   HENT:  Negative for ear discharge.    Eyes:  Negative for blurred vision.   Respiratory:  Negative for hemoptysis.    Endocrine: Negative for polyphagia.   Hematologic/Lymphatic: Negative for adenopathy.   Skin:  Negative for color change.   Musculoskeletal:  Negative for joint swelling.   Genitourinary:  Negative for bladder incontinence.   Neurological:  Negative for brief paralysis.   Psychiatric/Behavioral:  Negative for hallucinations.    Allergic/Immunologic: Negative for hives.      Objective:    Physical Exam  Constitutional:       Appearance: She is well-developed.   HENT:      Head: Normocephalic and atraumatic.   Eyes:      Conjunctiva/sclera: Conjunctivae normal.      Pupils: Pupils are equal, round, and reactive to light.   Cardiovascular:      Rate and Rhythm: Normal rate.      Pulses: Intact distal pulses.       Heart sounds: Normal heart sounds.   Pulmonary:      Effort: Pulmonary effort is normal.      Breath sounds: Normal breath sounds.   Abdominal:      General: Bowel sounds are normal.      Palpations: Abdomen is soft.   Musculoskeletal:         General: Normal range of motion.      Cervical back: Normal range of motion and neck supple.   Skin:     General: Skin is warm and dry.   Neurological:      Mental Status: She is alert and oriented to person, place, and time.         Assessment:       1. Essential hypertension    2. Hyperlipidemia, unspecified hyperlipidemia type    3. Paroxysmal atrial fibrillation    4. Sinus bradycardia    5. 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 Aortic atherosclerosis    7. Benign hypertensive heart disease without heart failure         Plan:       CMP, TSH, BNP - next week - if stable then OV 3 months      Continue Rx for PAF, HTN, HLD, diastolic CHF

## 2023-04-10 PROBLEM — J96.01 PUERPERAL SEPSIS WITH ACUTE HYPOXIC RESPIRATORY FAILURE: Status: RESOLVED | Noted: 2022-03-07 | Resolved: 2023-01-01

## 2023-04-10 PROBLEM — R65.20 PUERPERAL SEPSIS WITH ACUTE HYPOXIC RESPIRATORY FAILURE: Status: RESOLVED | Noted: 2022-03-07 | Resolved: 2023-01-01

## 2023-04-10 NOTE — PROGRESS NOTES
Routine Office Visit    Patient Name: Barbara Rizo    : 1940  MRN: [id number]    Subjective:  Barbara is a 83 y.o. female who presents today for:    1. htn  Patient presenting today for follow up of HTN.  She states she was recently seen by Dr. Boyle for CKD and meds were adjusted due to decreased renal function.  She states Dr. Boyle had to add metalozone for fluid overload a couple months back, but she only took it for 5 days and was much better.  No recent illnesses.  She is scheduled for follow up with all specialists over the next 3 months including cardiology nephrology, and urology.  She has chosen not to undergo chemo for breast cancer stating she doesn't feel it be needed at this stage in life.      Past Medical History  Past Medical History:   Diagnosis Date    A-fib     Breast cancer     invasive ductal carcinoma    CKD (chronic kidney disease)     Diabetes mellitus type II     Fatty liver     GERD (gastroesophageal reflux disease)     Hearing loss of both ears     wears bilateral hearing aids    Hemochromatosis     History of anemia due to CKD     History of pleural effusion     with thoracentesis    Hyperlipidemia     Hypertension     Macular degeneration     Osteoarthritis     Left knee    Osteoporosis     Vaginal delivery     x2    Vitamin D deficiency disease     Wears partial dentures     upper removable bridge       Past Surgical History  Past Surgical History:   Procedure Laterality Date    AXILLARY NODE DISSECTION Left 10/18/2021    Procedure: LYMPHADENECTOMY, AXILLARY;  Surgeon: Darlene Roca MD;  Location: [facility];  Service: General;  Laterality: Left;    BREAST BIOPSY      BREAST LUMPECTOMY      invasive ductal carcinoma    BREAST SURGERY Bilateral     Reduction r/t h/o fibrocystic disease    CHOLECYSTECTOMY  2015    EYE SURGERY      Cat Ext  OU    HYSTERECTOMY      partial due to uterine fibroids    INCISION AND DRAINAGE OF KNEE Left 2020    Procedure: INCISION AND DRAINAGE,  KNEE;  Surgeon: Rolando Wagoner MD;  Location: [facility];  Service: Orthopedics;  Laterality: Left;    INJECTION FOR SENTINEL NODE IDENTIFICATION Left 10/18/2021    Procedure: INJECTION, FOR SENTINEL NODE IDENTIFICATION;  Surgeon: Darlene Roca MD;  Location: [facility];  Service: General;  Laterality: Left;    JOINT REPLACEMENT Left 05/13/2013    TKR    JOINT REPLACEMENT Right 08/03/2015    TKR    MASTECTOMY, PARTIAL Left 10/18/2021    Procedure: MASTECTOMY, PARTIAL -- wire;  Surgeon: Darlene Roca MD;  Location: [facility];  Service: General;  Laterality: Left;  RN PREOP 10/15/2021   VACCINATED-----NEED H/P AND ORDERS    SENTINEL LYMPH NODE BIOPSY Left 10/18/2021    Procedure: BIOPSY, LYMPH NODE, SENTINEL;  Surgeon: Darlene Roca MD;  Location: [facility];  Service: General;  Laterality: Left;    THORACENTESIS      TOTAL KNEE ARTHROPLASTY Right 2015    left knee done 5/2013    WOUND DRESSING Left 09/17/2020    Procedure: WOUND VAC APPLICATION;  Surgeon: Rolando Wagoner MD;  Location: [facility];  Service: Orthopedics;  Laterality: Left;       Family History  Family History   Problem Relation Age of Onset    Cancer Mother         stomach    Hypertension Mother     Aneurysm Father         abdominal    No Known Problems Sister     No Known Problems Sister     No Known Problems Brother     No Known Problems Daughter     No Known Problems Son        Social History  Social History     Socioeconomic History    Marital status:     Number of children: 2   Tobacco Use    Smoking status: Former     Passive exposure: Past    Smokeless tobacco: Never   Substance and Sexual Activity    Alcohol use: Not Currently    Drug use: No    Sexual activity: Not Currently       Current Medications  Current Outpatient Medications on File Prior to Visit   Medication Sig Dispense Refill    allopurinoL (ZYLOPRIM) 100 MG tablet Take 1 tablet by mouth once daily.      amiodarone (PACERONE) 200 MG Tab TAKE ONE-HALF TABLET BY MOUTH EVERY DAY *DO  "NOT TAKE WITH GRAPEFRUIT JUICE* 45 tablet 3    amLODIPine (NORVASC) 10 MG tablet TAKE ONE TABLET BY MOUTH ONCE DAILY 90 tablet 3    apixaban (ELIQUIS) 2.5 mg Tab TAKE ONE TABLET BY MOUTH TWICE DAILY 180 tablet 3    atorvastatin (LIPITOR) 40 MG tablet TAKE ONE TABLET BY MOUTH ONCE DAILY 90 tablet 2    calcium carbonate (TUMS) 200 mg calcium (500 mg) chewable tablet Take 2 tablets by mouth.      ergocalciferol (ERGOCALCIFEROL) 50,000 unit Cap Take 50,000 Units by mouth every 30 days.       FLUZONE HIGHDOSE QUAD 21-22  mcg/0.7 mL Syrg       folic acid (FOLVITE) 1 MG tablet Take 1 tablet (1,000 mcg total) by mouth once daily. 90 tablet 0    furosemide (LASIX) 20 MG tablet Take 1 tablet (20 mg total) by mouth 2 (two) times daily. 60 tablet 11    losartan (COZAAR) 25 MG tablet Take 25 mg by mouth once daily.      mupirocin (BACTROBAN) 2 % ointment Apply topically every evening.      solifenacin (VESICARE) 5 MG tablet Take 1 tablet (5 mg total) by mouth once daily. 30 tablet 11     Current Facility-Administered Medications on File Prior to Visit   Medication Dose Route Frequency Provider Last Rate Last Admin    denosumab (PROLIA) injection 60 mg  60 mg Subcutaneous 1 time in Clinic/HOD Nargis Boyle MD           Allergies   Review of patient's allergies indicates:  No Known Allergies    Review of Systems (Pertinent positives)  Review of Systems   Constitutional: Negative.    HENT: Negative.     Eyes: Negative.    Respiratory:  Positive for shortness of breath.    Cardiovascular: Negative.    Gastrointestinal: Negative.    Genitourinary: Negative.    Skin: Negative.        /70   Pulse 66   Temp 98.7 °F (37.1 °C) (Oral)   Resp 18   Ht 5' 1" (1.549 m)   Wt 79.8 kg (175 lb 13.1 oz)   SpO2 (!) 91%   BMI 33.22 kg/m²     GENERAL APPEARANCE: in no apparent distress and well developed and well nourished  HEENT: PERRL, EOMI, Sclera clear, anicteric, Oropharynx clear, no lesions, Neck supple with midline " trachea  NECK: normal, supple, no adenopathy, thyroid normal in size  RESPIRATORY: appears well, vitals normal, no respiratory distress, acyanotic, normal RR, chest clear, no wheezing, crepitations, rhonchi, normal symmetric air entry  HEART: regular rate and rhythm, S1, S2 normal, no murmur, click, rub or gallop.    ABDOMEN: abdomen is soft without tenderness, no masses, no hernias, no organomegaly, no rebound, no guarding. Suprapubic tenderness absent. No CVA tenderness.  SKIN: no rashes, no wounds, no other lesions  PSYCH: Alert, oriented x 3, thought content appropriate, speech normal, pleasant and cooperative, good eye contact, well groomed    Assessment/Plan:  Barbara Rizo is a 83 y.o. female who presents today for :    Barbara was seen today for follow-up.    Diagnoses and all orders for this visit:    Idiopathic chronic gout without tophus, unspecified site    Pulmonary hypertension    Essential hypertension    Paroxysmal atrial fibrillation    Hyperlipidemia, unspecified hyperlipidemia type    Chronic kidney disease, stage 4 (severe)    Carcinoma of left female breast, unspecified estrogen receptor status, unspecified site of breast    MGUS (monoclonal gammopathy of unknown significance)    Secondary hyperparathyroidism of renal origin    Type 2 diabetes mellitus with stage 4 chronic kidney disease, without long-term current use of insulin          1.  Medications up to date  2.  She is to follow up with all specialists as scheduled  3.  Call with any concerns  4.  Follow up 6 months or sooner if needed  5.  HCC's reviewed and stable      Paras Oro MD

## 2023-06-15 NOTE — PLAN OF CARE
Pt tolerated injection without difficulty.  No acute distress noted. Patient ambulated off unit in [facility].

## 2023-06-16 NOTE — PROGRESS NOTES
Subjective:   Patient ID:  Barbara Rizo is a 83 y.o. female who presents for follow-up of No chief complaint on file.      HPI       PAF - TALHA/CV 9/21/15, 1/30/19, 2/2/19 -  on amiodarone and eliquis, Diastolic CHF,  HTN, DM, HLD, MGUS, CRI - Cr 1.8, AS/MR - mild to moderate, breast CA, COPD on home O2     Admitted 1/28/19  Ms. Barbara Rizo is a 78 y.o. female with essential hypertension, hyperlipidemia (LDL 62.4 Jul 2018), type 2 diabetes mellitus (HbA1c 5.8% Jul 2018), CKD Stage 3, paroxysmal atrial fibrillation ([id number] score 5) not on chronic anticoagulation, obesity (BMI 36.0), MGUS, and chronic gout who presents to [facility] ED with complaints of coughing for three days.  She started to have a non-productive cough, wheezing, and mild dyspnea three days ago which has steadily been worsening since onset.  She also complains of a subjective fever but otherwise denies any nausea, vomiting, chest pain, palpitations, pleurisy, nor any diaphoresis.  She denies any recent travel, hemoptysis, nor any lower extremity pain or swelling.  She does say that she's under a lot of stress recently with her ex- dying yesterday at [facility] due to complications from a heart valve replacement two weeks ago.  She does say that she may have had sick contacts while visiting her ex-.  Her appetite has been poor but she denies any weight loss.     While at her ENT's appointment today she decided to schedule an appointment with her PCP to evaluate her upper airway complaints.  She was unable to see her regular PCP, Dr. Paras Oro, but was able to be seen by another physician who became concerned when she was noted to be in AFib with RVR on EKG.  He recommended EMS transportation to the ED but she refused and decided to drive herself.  Her cardiologist is Dr. Amauri Lomas.     Pt admitted to ICU with afib rvr on amiodarone infusion. Pt with elevated HR  and after TALHA done patient became  very agitated and anxious. Ativan 1mg IV given and symptoms got worse. HR up into the 170s and O2 sats dropped to 77%. Placed on NRB. Improved O2 sats. 5mg IV haldol given as patient was trying to get out of bed and pulling oxygen mask off. Cardizem 10mg Iv given with improved HR to 120s-130s. xopenex scheduled Q 8 hours. Changed to zosyn with temp 102F.   1/30- successful cardioversion, post procedure confused and pulling oxygen off, desaturation, constant re-direction, monitored in ICU overnight. Changed to oral amiodarone. eliquis for anticoagulation. Holding parameters on BP meds. IV steroids, nebs and antibiotic for probable lower resp infection. IV lasix as Bp tolerates.   1/31- HR remain under control NSR 60-70s.On amiodarone, metoprolol and eliquis.  Resp status improved and weaned oxygen. Steroids changed to oral route. DC zosyn and switch to augmentin. Add acapella to nebs. Cr increased from baseline (1.5-1.7). Gentle IVF and monitor with repeat BMP later, was stable,. PT,OT consulted for dc planning. Weaning oxygen. PT/OT consulted and rec H/H without equipment. The patient was transferred to the floor on 1/31. Nephrology was consulted for worsening renal function.      2/2- pt transferred with afadis rvr. Successful cardioversion again . Continued oral amiodarone and BB.  still has aggressive cough and URI symptoms. On mucolytic and antibiotic.   2/3- HR 50-60s sinus. Cr sightly higher, sodium 128. BNP 1500. Lasix x one dose. Bringing up more mucus. Steroids weaned 20mg. Still faint wheezes on exam.  Patient had worsening ARF on CKD 3, keeped on george and monitor,nephrology was following.reconsulted PT,OT.fley was removed latera nd she had improvement in ARF.  Changed diet for low slat diet duo to hyponatremia with improvement.  She did well with PT,OT.  Her wheezing and respiratory status is improved,  Patient has been discharged home with HH and PO Abx and OAC and amiodarone and follow up with  PCP,nephrology and cardiology in next few days.      1-29:  Admitted with AFib with RVR  1-30:  Underwent TALHA/DC cardioversion successfully  2-1 Back in A-fib 120s. SOB  2-3 Still coughing and SOB. NSR 60      2/2/19 CV - 360J converted A-fib to sinus bradycardia 55. Change amiodarone to po. Ok for telemetry     Echo 3/7/23  The left ventricle is normal in size with eccentric hypertrophy and normal systolic function.  The estimated ejection fraction is 65%.  Grade II left ventricular diastolic dysfunction.  Mild tricuspid regurgitation.  Small pericardial effusion.  Moderate right atrial enlargement.  Severe left atrial enlargement.  Normal right ventricular size with normal right ventricular systolic function.  There is moderate aortic valve stenosis.  Aortic valve area is 1.20 cm2; peak velocity is 2.70 m/s; mean gradient is 19 mmHg.  Mild mitral regurgitation.  Mild pulmonic regurgitation.  Normal central venous pressure (3 mmHg).  The estimated PA systolic pressure is 46 mmHg.  There is pulmonary hypertension.     2/13/19 Less SOB since discharge  EKG NSR with PACs 65   Decrease amiodarone 200 qd  OV 2 months - will discuss changing amiodarone to flecainide at that time     4/17/19 Denies CP or SOB  EKG sinus bradycardia 44  HR mostly runs 40-50 at home  Change metoprolol 100 qd to toprol XL 50 qd - may decrease further if bradycardia persists  Discussed alternative AAD - given her repeated episodes of PAF we are both in agreement to stay on amiodarone for now  OV 1 month with TSH, CMP, EKG     5/27/19 HR improved to the low 50s  Has held eliquis the last 2 days due to bleeding from a recent skin CA removal  Denies CP or SOB     9/11/19 Denies CP or SOB  Some LE edema  EKG sinus bradycardia 46 NSSTT changes  Stop norvasc with LE edema  Add cardura 4 mg qd  Decrease toprol 25 qd with bradycardia  OV 3 months with CMP, TSH     12/11/19 Denies CP or SOB  LE edema resolved after stopping norvasc  BP controlled by  home readings  HR runs 45-50  EKG sinus bradycardia 47 1st degree AVB  Denies dizziness  Bradycardia well tolerated - will continue Rx  OV 6 months with echo      6/18/20 Denies CP or dizziness  Mild stable WILKINSON  EKG sinus bradycardia 44 otherwise ok  Bradycardia continues to be well tolerated  OV 6 months with CMP, TSH on chronic amiodarone        10/14/20 EKG  - suspect this is A-flutter 2:1 with aberrancy  Denies CP, SOB, or palpitations  Appears to be in A-flutter RVR - will send to the ER for admission. If issues with tachy-marlon continue will likely end up needing PPM     10/23/20 HR were better controlled when she went to ER. She was restarted on metoprolol and discharged. HR mostly 40-50s at home. Bradycardia well tolerated. Need clearance for skin graft surgery at  on left leg  EKG sinus bradycardia 43   Back in NSR - bradycardia well tolerated. No indication for PPM at this point  Cleared for skin graft surgery at moderate cardiac risk - ok to hold eliquis 2 days before  OV 3 months  Continue Rx for PAF, HTN, CHF     12/1/20 Denies CP or SOB. Did well after her skin graft surgery  HR 45-50. BP elevated - controlled by outside readings  Continue Rx for PAF, HTN, HLD, CHF  OV 3 months     3/1/21 Denies CP or SOB  EKG NSR 60 IVCD  HR 55-60 by home readings as well      Continue Rx for PAF, HTN, HLD, diastolic CHF  OV 6 months with CMP, TSH on chronic amiodarone, Echo to look at MR           5/6/21 Denies further dizziness or syncope  EKG NSR 69 1st degree AVB IVCD  On amiodarone 100 qd and off of metoprolol  TSH 2.4 LFT ok     suspect recent syncope was from dehydration and not bradycardia  Staying in NSR on amiodarone 100 qd and off of metoprolol  Continue Rx for PAF, HTN, HLD, diastolic CHF  OV 3 months     8/6/21 Denies CP, SOB, or dizziness  EKG NSR 1st degree AVB RBBB   Continue Rx for PAF, HTN, HLD, diastolic CHF  OV 6 months with CMP, TSH  Cleared for breast surgery at moderate cardiac risk -  ok to hold eliquis 3 days before         1/19/22 Less SOB since discharge. On Home O2. Scheduled for pulmonary evaluation  EKG NSR 1st degree AVB RBBB   Continue Rx for PAF, HTN, HLD, diastolic CHF  Volume status appears stable  OV 3 months with BNP, BMP     4/4/22 Denies CP, stable mild WILKINSON  BP controlled   Continue Rx for PAF, HTN, HLD, diastolic CHF  Volume status appears stable  OV 3 months with BNP, CMP, TSH        7/8/22 Denies CP, stable WILKINSON - only uses O2 at night  EKG NSR RBBB/LAFB    Continue Rx for PAF, HTN, HLD, diastolic CHF  Volume status appears stable. Discussed decreasing potassium in diet  OV 6 months with BNP, CMP, TSH     1/9/23 labs not done. Denies CP, mild stable WILKINSON  BP controlled by home readings  EKG NSR RBBB/LAFB    Continue Rx for PAF, HTN, HLD, diastolic CHF  OV 3 months with echo, CMP, TSH, BNP     3/10/23 Labs not done. Was placed on metolazone recently by Dr Boyle for volume overload which since has resolved   CMP, TSH, BNP - next week - if stable then OV 3 months      Continue Rx for PAF, HTN, HLD, diastolic CHF    Labs 3/17/23  K 4.6  Cr 1.9  LFT ok    TSH 3.0    6/16/23 Denies CP or SOB  EKG NSR 1st degree AVB RBBB/LAFB  BP controlled    Review of Systems   Constitutional: Negative for decreased appetite.   HENT:  Negative for ear discharge.    Eyes:  Negative for blurred vision.   Respiratory:  Negative for hemoptysis.    Endocrine: Negative for polyphagia.   Hematologic/Lymphatic: Negative for adenopathy.   Skin:  Negative for color change.   Musculoskeletal:  Negative for joint swelling.   Genitourinary:  Negative for bladder incontinence.   Neurological:  Negative for brief paralysis.   Psychiatric/Behavioral:  Negative for hallucinations.    Allergic/Immunologic: Negative for hives.     Objective:   Physical Exam  Constitutional:       Appearance: She is well-developed.   HENT:      Head: Normocephalic and atraumatic.   Eyes:      Conjunctiva/sclera: Conjunctivae normal.       Pupils: Pupils are equal, round, and reactive to light.   Cardiovascular:      Rate and Rhythm: Normal rate.      Pulses: Intact distal pulses.      Heart sounds: Normal heart sounds.   Pulmonary:      Effort: Pulmonary effort is normal.      Breath sounds: Normal breath sounds.   Abdominal:      General: Bowel sounds are normal.      Palpations: Abdomen is soft.   Musculoskeletal:         General: Normal range of motion.      Cervical back: Normal range of motion and neck supple.   Skin:     General: Skin is warm and dry.   Neurological:      Mental Status: She is alert and oriented to person, place, and time.       Assessment:      1. Paroxysmal atrial fibrillation    2. Essential hypertension    3. Hyperlipidemia, unspecified hyperlipidemia type    4. Sinus bradycardia    5. 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 Aortic atherosclerosis    7. Morbid (severe) obesity due to excess calories        Plan:     OV 6 months with  CMP, TSH, BNP      Continue Rx for PAF, HTN, HLD, diastolic CHF

## 2023-06-26 NOTE — PROGRESS NOTES
Subjective:       Barbara Rizo is a 83 y.o. female who who is a new patient was seen for evaluation of frequency.      She was last seen by [facility] in 11/15. She is followed for urinary retention. She was in SNF initially, she is home now. She was admitted in 8/15 for multiple medical conditions. She had catheter placed for UR in 8/15. She was planned for voiding trial. This was removed in 11/15 elsewhere. She was given Urecholine, Colace, and Amitiza by [facility]. These have all been stopped.     She reports she continues to have urinary frequency, UUI. Nocturia is severe and wears Depends at night. She does take Lasix in the AM. She denies MACEY. She denies BRENNAN. Denies constipation. Denies UTIs.     PVR (bladder scan) last visit - 44cc     She was given trial of Myrbetriq but did not note any improvement. She was then given a trial of Vesicare, now recommended to take both. She continues to have leakage at night. No UI during day but with urgency. At night, she doesn't have urge and just wakes up wet. Nighttime symptoms started after having catheter for 4 months (out in 11/15).     Cysto/UDS 11/2016:  Findings of the Procedure: Stable filling. No IBC/DO but urgency with small capacity. Open bladder neck on voiding. Decreased flow but acceptable. Mild MACEY. No BRENNAN on direct visualization.  Recommendations: Continue Vesicare and Myrbetriq. She has been doing better prior to UDS. Will hold on Botox or InterStim but could consider in future.      UDS done in 2016 - she did not follow up after that. She is no longer on bladder meds. She reports continued UI and nocturnal enuresis. She states this started after taking a new medication from nephrologist (takes 30min before Lasix). Now just taking Lasix in AM (was taking it BID about 2mths ago). Denies daytime symptoms. Reducing PM fluids.   PVR (bladder scan) last visit - 0cc    Tried Vesicare 5mg - no improvement. Still with significant nocturnal enuresis. Not getting up to  void at night, waking up wet.       The following portions of the patient's history were reviewed and updated as appropriate: allergies, current medications, past family history, past medical history, past social history, past surgical history and problem list.    Review of Systems  Constitutional: no fever or chills  ENT: no nasal congestion or sore throat  Respiratory: no cough or shortness of breath  Cardiovascular: no chest pain or palpitations  Gastrointestinal: no nausea or vomiting, tolerating diet  Genitourinary: as per HPI  Hematologic/Lymphatic: no easy bruising or lymphadenopathy  Musculoskeletal: no arthralgias or myalgias  Skin: no rashes or lesions  Neurological: no seizures or tremors  Behavioral/Psych: no auditory or visual hallucinations       Objective:    Vitals:   Wt 79.2 kg (174 lb 11.4 oz)   BMI 33.01 kg/m²       Physical Exam   General: well developed, well nourished in no acute distress  Head: normocephalic, atraumatic  Neck: supple, trachea midline, no obvious enlargement of thyroid  HEENT: EOMI, mucus membranes moist, sclera anicteric, +hearing impairment  Lungs: symmetric expansion, non-labored breathing  Skin: no rashes or lesions  Neuro: alert and oriented x 3, no gross deficits  Psych: normal judgment and insight, normal mood/affect and non-anxious  Genitourinary:   patient declined exam      Lab Review   Urine analysis today in clinic shows - 30 protein    Lab Results   Component Value Date    WBC 8.60 12/26/2021    HGB 13.5 12/26/2021    HCT 39.9 12/26/2021    MCV 92 12/26/2021     12/26/2021     Lab Results   Component Value Date    CREATININE 1.9 (H) 03/17/2023    BUN 34 (H) 03/17/2023         Imaging  Images and reports were personally reviewed by me and discussed with patient  CT abd/pel        Assessment/Plan:      1. OAB (overactive bladder)    - No evidence of further UR   - PVR acceptable   - Myrbetriq trial for OAB - not good response   - Vesicare with better  response, OAB fide at night   - Previous voiding diary - night not recorded. Voiding 200-400cc each void. Drinking coffee, teas, Cokes.    - Cysto/UDS - 10/21/16   - Vesicare 10mg trial   - Consider repeat b-agonist trial   - ?Third line therapies such as SNS     2. Nocturnal enuresis    - Vesicare trial   - Reduce PM fluids   - Recommend to set alarm to wake to void about 3 hours after sleep       Follow up in 2 months

## 2023-07-05 NOTE — TELEPHONE ENCOUNTER
Refill Routing Note   Medication(s) are not appropriate for processing by [facility] for the following reason(s):      Medication outside of protocol  Required labs outdated    [facility] action(s):  Defer None identified          Appointments  past 12m or future 3m with PCP    Date Provider   Last Visit   4/10/2023 Paras Oro MD   Next Visit   10/26/2023 Paras Oro MD   ED visits in past 90 days: 0        Note composed:9:45 AM 07/05/2023

## 2023-07-05 NOTE — TELEPHONE ENCOUNTER
Care Due:                  Date            Visit Type   Department     Provider  --------------------------------------------------------------------------------                                EP -         [facility]                              PRIMARY      MED/ INTERNAL  Last Visit: 04-      CARE ([facility])   MED/ PEDS      Paras A  Page                              EP -         [facility]                              PRIMARY      MED/ INTERNAL  Next Visit: 10-      CARE ([facility])   MED/ PEDS      Paras A  Page                                                            Last  Test          Frequency    Reason                     Performed    Due Date  --------------------------------------------------------------------------------    Lipid Panel.  12 months..  atorvastatin.............  07- 07-    [facility] Embedded Care Due Messages. Reference number: [id number].   7/05/2023 8:04:16 AM CDT

## 2023-08-26 PROBLEM — J18.9 COMMUNITY ACQUIRED PNEUMONIA: Status: ACTIVE | Noted: 2023-01-01

## 2023-08-26 PROBLEM — J90 PLEURAL EFFUSION: Status: ACTIVE | Noted: 2023-01-01

## 2023-08-27 PROBLEM — R94.31 PROLONGED Q-T INTERVAL ON ECG: Status: ACTIVE | Noted: 2023-01-01

## 2023-08-27 NOTE — ASSESSMENT & PLAN NOTE
CT chest without contrast showed small left pleural effusion with near complete consolidation of the left lower lobe (atelectasis and/or consolidation), and cardiomegaly with small pericardial effusion  Continue ceftriaxone, doxycycline   Respiratory cultures, if able  Urine Legionella antigen

## 2023-08-27 NOTE — HPI
This is an 83-year-old female with a past medical history of HFpEF (EF: 65%, GIIDD), AFib (on Eliquis), hypertension, type 2 diabetes, hyperlipidemia, CKD 4, breast cancer, who presents with shortness of breath.    Patient presents with shortness of breath and generalized weakness that started 2 days prior to presentation.  She reports that she is been feeling progressively more fatigued and sustained a fall on her back with injury to her head a day prior to presentation.  Additionally, she reports decreased p.o. intake, nausea but denied vomiting or diarrhea.  She usually drinks 48 oz of water daily.     In the ED, the patient was tachypneic and hypoxic, requiring 5 L O2.  Labs were remarkable for hyponatremia (119 > 119), hyperkalemia (5.3 > 5.2), hypochloremia (89), elevated creatinine (2.2 from a baseline of 1.9), elevated BNP (868).  CT head showed no acute process.  CT chest without contrast showed small left pleural effusion with near complete consolidation of the left lower lobe (atelectasis and/or consolidation), and cardiomegaly with small pericardial effusion.  Patient was given Lasix 40 mg IV, ceftriaxone 1 g IV, and was later given 1 L of NS.  Of note, the patient has a history of hyponatremia requiring hospitalization (2020), and a prior history of a large right pleural effusion requiring a thoracentesis (2015).  Patient was admitted for further management.

## 2023-08-27 NOTE — CONSULTS
Date of Admit: 8/26/2023  Length of Stay: 0   Days  Consulting Staff:MD Shahab    Date of Consult: 8/27/2023    Reason for Consultation     hyponatremia    Subjective:      History of Present Illness:  Barbara Rizo is a 83 y.o. year old female with a past medical history significant for ckd4, creatinine 1.8 last mo. Pt sees me for her ckd. Pt noted multiple falls at home. Pt noted sob since Friday. Pt admitted for pna, uti and hyponatremia. Pt had a sdoium of 119. Pt was placed on  ivfs- 122. Creatinine was up to 2.2.       Past Medical History:  Past Medical History:   Diagnosis Date    A-fib     Breast cancer     invasive ductal carcinoma    CKD (chronic kidney disease)     Diabetes mellitus type II     Fatty liver     GERD (gastroesophageal reflux disease)     Hearing loss of both ears     wears bilateral hearing aids    Hemochromatosis     History of anemia due to CKD     History of pleural effusion     with thoracentesis    Hyperlipidemia     Hypertension     Macular degeneration     Osteoarthritis     Left knee    Osteoporosis     Vaginal delivery     x2    Vitamin D deficiency disease     Wears partial dentures     upper removable bridge       Past Surgical History:  Past Surgical History:   Procedure Laterality Date    AXILLARY NODE DISSECTION Left 10/18/2021    Procedure: LYMPHADENECTOMY, AXILLARY;  Surgeon: Darlene Roca MD;  Location: [facility] OR;  Service: General;  Laterality: Left;    BREAST BIOPSY      BREAST LUMPECTOMY      invasive ductal carcinoma    BREAST SURGERY Bilateral     Reduction r/t h/o fibrocystic disease    CHOLECYSTECTOMY  08/2015    EYE SURGERY      Cat Ext  OU    HYSTERECTOMY      partial due to uterine fibroids    INCISION AND DRAINAGE OF KNEE Left 09/17/2020    Procedure: INCISION AND DRAINAGE, KNEE;  Surgeon: Rolando Wagoner MD;  Location: [facility] OR;  Service: Orthopedics;  Laterality: Left;    INJECTION FOR SENTINEL NODE IDENTIFICATION Left 10/18/2021    Procedure: INJECTION,  FOR SENTINEL NODE IDENTIFICATION;  Surgeon: Darlene Roca MD;  Location: [facility] OR;  Service: General;  Laterality: Left;    JOINT REPLACEMENT Left 05/13/2013    TKR    JOINT REPLACEMENT Right 08/03/2015    TKR    MASTECTOMY, PARTIAL Left 10/18/2021    Procedure: MASTECTOMY, PARTIAL -- wire;  Surgeon: Darlene Roca MD;  Location: [facility] OR;  Service: General;  Laterality: Left;  RN PREOP 10/15/2021   VACCINATED-----NEED H/P AND ORDERS    SENTINEL LYMPH NODE BIOPSY Left 10/18/2021    Procedure: BIOPSY, LYMPH NODE, SENTINEL;  Surgeon: Darlene Roca MD;  Location: [facility] OR;  Service: General;  Laterality: Left;    THORACENTESIS      TOTAL KNEE ARTHROPLASTY Right 2015    left knee done 5/2013    WOUND DRESSING Left 09/17/2020    Procedure: WOUND VAC APPLICATION;  Surgeon: Rolando Wagoner MD;  Location: [facility] OR;  Service: Orthopedics;  Laterality: Left;       Allergies:  Review of patient's allergies indicates:  No Known Allergies    Home Medications:    Current Facility-Administered Medications on File Prior to Encounter   Medication Dose Route Frequency Provider Last Rate Last Admin    denosumab (PROLIA) injection 60 mg  60 mg Subcutaneous 1 time in Clinic/HOD Nargis Boyle MD         Current Outpatient Medications on File Prior to Encounter   Medication Sig Dispense Refill    allopurinoL (ZYLOPRIM) 100 MG tablet Take 1 tablet by mouth once daily.      amiodarone (PACERONE) 200 MG Tab TAKE ONE-HALF TABLET BY MOUTH EVERY DAY *DO NOT TAKE WITH GRAPEFRUIT JUICE* 45 tablet 3    amLODIPine (NORVASC) 10 MG tablet TAKE ONE TABLET BY MOUTH ONCE DAILY 90 tablet 3    apixaban (ELIQUIS) 2.5 mg Tab TAKE ONE TABLET BY MOUTH TWICE DAILY 180 tablet 3    atorvastatin (LIPITOR) 40 MG tablet TAKE ONE TABLET BY MOUTH ONCE DAILY 90 tablet 2    calcium carbonate (TUMS) 200 mg calcium (500 mg) chewable tablet Take 2 tablets by mouth.      ergocalciferol (ERGOCALCIFEROL) 50,000 unit Cap Take 50,000 Units by mouth every 30 days.       FLUZONE  HIGHDOSE QUAD 21-22  mcg/0.7 mL Syrg       folic acid (FOLVITE) 1 MG tablet TAKE 1 TABLET BY MOUTH EVERY DAY 90 tablet 0    furosemide (LASIX) 20 MG tablet Take 1 tablet (20 mg total) by mouth 2 (two) times daily. 60 tablet 11    losartan (COZAAR) 25 MG tablet Take 25 mg by mouth once daily.      mupirocin (BACTROBAN) 2 % ointment Apply topically every evening.      solifenacin (VESICARE) 10 MG tablet Take 1 tablet (10 mg total) by mouth once daily. 30 tablet 11       Family History:  Family History   Problem Relation Age of Onset    Cancer Mother         stomach    Hypertension Mother     Aneurysm Father         abdominal    No Known Problems Sister     No Known Problems Sister     No Known Problems Brother     No Known Problems Daughter     No Known Problems Son        Social History:  Social History     Tobacco Use    Smoking status: Former     Passive exposure: Past    Smokeless tobacco: Never   Substance Use Topics    Alcohol use: Not Currently    Drug use: No       Review of Systems:10pt ros-fall, sob,        Objective:     Scheduled Meds:   allopurinoL  100 mg Oral Daily    amiodarone  200 mg Oral Daily    apixaban  2.5 mg Oral BID    atorvastatin  40 mg Oral Daily    cefTRIAXone (ROCEPHIN) IVPB  2 g Intravenous Q24H    doxycycline (VIBRAMYCIN) IVPB  100 mg Intravenous Q12H    famotidine (PF)  20 mg Intravenous Daily    folic acid  1,000 mcg Oral Daily    mupirocin   Nasal BID    tolvaptan  15 mg Oral Once     Continuous Infusions:  PRN Meds:.acetaminophen, dextrose 10%, dextrose 10%, glucagon (human recombinant), glucose, glucose, insulin aspart U-100, magnesium oxide, magnesium oxide, melatonin, ondansetron, potassium bicarbonate, potassium bicarbonate, potassium bicarbonate, potassium, sodium phosphates, potassium, sodium phosphates, potassium, sodium phosphates, prochlorperazine, sodium chloride 0.9%      Physical Examination:    Vitals: BP 93/66   Pulse 91   Temp 98.2 °F (36.8 °C)   Resp (!) 34   " Ht 5' 2" (1.575 m)   Wt 83 kg (182 lb 15.7 oz)   SpO2 (!) 88%   Breastfeeding No   BMI 33.47 kg/m²    I/O last 3 completed shifts:  In: 1100 [I.V.:1000; IV Piggyback:100]  Out: 1350 [Urine:1350]  I/O this shift:  In: 240 [P.O.:240]  Out: 900 [Urine:900]      General: wf female in nad  HEENT:ncat, eomi,mm  CVS:fabian  2/6  PULM:ctab anteriorly  ABD:bs,soft  EXT:no leg edema  NEURO:awake ,alert  Laboratory:  Recent Results (from the past 24 hour(s))   ISTAT PROCEDURE    Collection Time: 08/26/23  7:53 PM   Result Value Ref Range    POC PH 7.348 (L) 7.35 - 7.45    POC PCO2 44.3 35 - 45 mmHg    POC PO2 59 (LL) 80 - 100 mmHg    POC HCO3 24.4 24 - 28 mmol/L    POC BE -2 -2 to 2 mmol/L    POC SATURATED O2 89 (L) 95 - 100 %    POC TCO2 26 23 - 27 mmol/L    Sample ARTERIAL     Site RR     Allens Test Pass     DelSys Nasal Can     Mode SPONT     Flow 3     Sp02 93    CBC auto differential    Collection Time: 08/26/23  8:23 PM   Result Value Ref Range    WBC 6.84 3.90 - 12.70 K/uL    RBC 4.42 4.00 - 5.40 M/uL    Hemoglobin 13.7 12.0 - 16.0 g/dL    Hematocrit 39.3 37.0 - 48.5 %    MCV 89 82 - 98 fL    MCH 31.0 27.0 - 31.0 pg    MCHC 34.9 32.0 - 36.0 g/dL    RDW 14.3 11.5 - 14.5 %    Platelets 201 150 - 450 K/uL    MPV 9.7 9.2 - 12.9 fL    Immature Granulocytes 0.3 0.0 - 0.5 %    Gran # (ANC) 5.6 1.8 - 7.7 K/uL    Immature Grans (Abs) 0.02 0.00 - 0.04 K/uL    Lymph # 0.6 (L) 1.0 - 4.8 K/uL    Mono # 0.6 0.3 - 1.0 K/uL    Eos # 0.0 0.0 - 0.5 K/uL    Baso # 0.03 0.00 - 0.20 K/uL    nRBC 0 0 /100 WBC    Gran % 82.4 (H) 38.0 - 73.0 %    Lymph % 8.2 (L) 18.0 - 48.0 %    Mono % 8.6 4.0 - 15.0 %    Eosinophil % 0.1 0.0 - 8.0 %    Basophil % 0.4 0.0 - 1.9 %    Differential Method Automated    Troponin I #1    Collection Time: 08/26/23  8:23 PM   Result Value Ref Range    Troponin I 0.015 0.000 - 0.026 ng/mL   B-Type natriuretic peptide (BNP)    Collection Time: 08/26/23  8:23 PM   Result Value Ref Range     (H) 0 - 99 pg/mL "   POCT COVID-19 Rapid Screening    Collection Time: 08/26/23  8:39 PM   Result Value Ref Range    POC Rapid COVID Negative Negative     Acceptable Yes    POCT Influenza A/B Molecular    Collection Time: 08/26/23  8:39 PM   Result Value Ref Range    POC Molecular Influenza A Ag Negative Negative, Not Reported    POC Molecular Influenza B Ag Negative Negative, Not Reported     Acceptable Yes    Blood Culture #1 **CANNOT BE ORDERED STAT**    Collection Time: 08/26/23  9:10 PM    Specimen: Peripheral, Antecubital, Right; Blood   Result Value Ref Range    Blood Culture, Routine No Growth to date    Comprehensive metabolic panel    Collection Time: 08/26/23  9:19 PM   Result Value Ref Range    Sodium 119 (LL) 136 - 145 mmol/L    Potassium 5.3 (H) 3.5 - 5.1 mmol/L    Chloride 89 (L) 95 - 110 mmol/L    CO2 20 (L) 23 - 29 mmol/L    Glucose 102 70 - 110 mg/dL    BUN 45 (H) 8 - 23 mg/dL    Creatinine 2.2 (H) 0.5 - 1.4 mg/dL    Calcium 8.8 8.7 - 10.5 mg/dL    Total Protein 7.5 6.0 - 8.4 g/dL    Albumin 3.8 3.5 - 5.2 g/dL    Total Bilirubin 0.9 0.1 - 1.0 mg/dL    Alkaline Phosphatase 85 55 - 135 U/L    AST 13 10 - 40 U/L    ALT 15 10 - 44 U/L    eGFR 22 (A) >60 mL/min/1.73 m^2    Anion Gap 10 8 - 16 mmol/L   Protime-INR    Collection Time: 08/26/23  9:19 PM   Result Value Ref Range    Prothrombin Time 12.8 (H) 9.0 - 12.5 sec    INR 1.2 0.8 - 1.2   Blood Culture #2 **CANNOT BE ORDERED STAT**    Collection Time: 08/26/23  9:23 PM    Specimen: Peripheral, Upper Arm, Right; Blood   Result Value Ref Range    Blood Culture, Routine No Growth to date    Urinalysis, Reflex to Urine Culture Urine, Clean Catch    Collection Time: 08/26/23 10:48 PM    Specimen: Urine   Result Value Ref Range    Specimen UA Urine, Clean Catch     Color, UA Yellow Yellow, Straw, Natasha    Appearance, UA Clear Clear    pH, UA 5.0 5.0 - 8.0    Specific Gravity, UA 1.010 1.005 - 1.030    Protein, UA Negative Negative    Glucose, UA  Negative Negative    Ketones, UA Negative Negative    Bilirubin (UA) Negative Negative    Occult Blood UA Trace (A) Negative    Nitrite, UA Positive (A) Negative    Urobilinogen, UA Negative <2.0 EU/dL    Leukocytes, UA 3+ (A) Negative   Sodium, urine, random    Collection Time: 08/26/23 10:48 PM   Result Value Ref Range    Sodium, Urine 45 20 - 250 mmol/L   Creatinine, urine, random    Collection Time: 08/26/23 10:48 PM   Result Value Ref Range    Creatinine, Urine 49.9 15.0 - 325.0 mg/dL   Urinalysis Microscopic    Collection Time: 08/26/23 10:48 PM   Result Value Ref Range    RBC, UA 3 0 - 4 /hpf    WBC, UA 34 (H) 0 - 5 /hpf    WBC Clumps, UA Occasional (A) None-Rare    Bacteria Many (A) None-Occ /hpf    Microscopic Comment SEE COMMENT    Basic metabolic panel    Collection Time: 08/27/23  1:27 AM   Result Value Ref Range    Sodium 119 (LL) 136 - 145 mmol/L    Potassium 5.2 (H) 3.5 - 5.1 mmol/L    Chloride 91 (L) 95 - 110 mmol/L    CO2 19 (L) 23 - 29 mmol/L    Glucose 97 70 - 110 mg/dL    BUN 45 (H) 8 - 23 mg/dL    Creatinine 2.1 (H) 0.5 - 1.4 mg/dL    Calcium 8.2 (L) 8.7 - 10.5 mg/dL    Anion Gap 9 8 - 16 mmol/L    eGFR 23 (A) >60 mL/min/1.73 m^2   Phosphorus    Collection Time: 08/27/23  5:09 AM   Result Value Ref Range    Phosphorus 2.8 2.7 - 4.5 mg/dL   Magnesium    Collection Time: 08/27/23  5:09 AM   Result Value Ref Range    Magnesium 1.9 1.6 - 2.6 mg/dL   CBC Auto Differential    Collection Time: 08/27/23  5:09 AM   Result Value Ref Range    WBC 5.84 3.90 - 12.70 K/uL    RBC 4.38 4.00 - 5.40 M/uL    Hemoglobin 13.5 12.0 - 16.0 g/dL    Hematocrit 41.1 37.0 - 48.5 %    MCV 94 82 - 98 fL    MCH 30.8 27.0 - 31.0 pg    MCHC 32.8 32.0 - 36.0 g/dL    RDW 14.4 11.5 - 14.5 %    Platelets 198 150 - 450 K/uL    MPV 9.4 9.2 - 12.9 fL    Immature Granulocytes 0.3 0.0 - 0.5 %    Gran # (ANC) 4.5 1.8 - 7.7 K/uL    Immature Grans (Abs) 0.02 0.00 - 0.04 K/uL    Lymph # 0.6 (L) 1.0 - 4.8 K/uL    Mono # 0.7 0.3 - 1.0 K/uL     Eos # 0.0 0.0 - 0.5 K/uL    Baso # 0.03 0.00 - 0.20 K/uL    nRBC 0 0 /100 WBC    Gran % 77.4 (H) 38.0 - 73.0 %    Lymph % 10.4 (L) 18.0 - 48.0 %    Mono % 11.1 4.0 - 15.0 %    Eosinophil % 0.3 0.0 - 8.0 %    Basophil % 0.5 0.0 - 1.9 %    Differential Method Automated    Basic metabolic panel    Collection Time: 08/27/23  5:09 AM   Result Value Ref Range    Sodium 121 (L) 136 - 145 mmol/L    Potassium 4.8 3.5 - 5.1 mmol/L    Chloride 91 (L) 95 - 110 mmol/L    CO2 21 (L) 23 - 29 mmol/L    Glucose 94 70 - 110 mg/dL    BUN 43 (H) 8 - 23 mg/dL    Creatinine 2.1 (H) 0.5 - 1.4 mg/dL    Calcium 8.4 (L) 8.7 - 10.5 mg/dL    Anion Gap 9 8 - 16 mmol/L    eGFR 23 (A) >60 mL/min/1.73 m^2   Basic metabolic panel    Collection Time: 08/27/23 10:08 AM   Result Value Ref Range    Sodium 122 (L) 136 - 145 mmol/L    Potassium 4.6 3.5 - 5.1 mmol/L    Chloride 91 (L) 95 - 110 mmol/L    CO2 22 (L) 23 - 29 mmol/L    Glucose 105 70 - 110 mg/dL    BUN 42 (H) 8 - 23 mg/dL    Creatinine 2.0 (H) 0.5 - 1.4 mg/dL    Calcium 8.3 (L) 8.7 - 10.5 mg/dL    Anion Gap 9 8 - 16 mmol/L    eGFR 24 (A) >60 mL/min/1.73 m^2   POCT glucose    Collection Time: 08/27/23 11:38 AM   Result Value Ref Range    POCT Glucose 108 70 - 110 mg/dL               Diagnostic Tests:     Procedure Component Value Units Date/Time   CT Chest Without Contrast [[id number]] Resulted: 08/26/23 2125   Order Status: Completed Updated: 08/26/23 2127   Narrative:     EXAMINATION:   CT CHEST WITHOUT CONTRAST     CLINICAL HISTORY:   Dyspnea, chronic, unclear etiology;     TECHNIQUE:   Low dose axial images, sagittal and coronal reformations were obtained from the thoracic inlet to the lung bases. Contrast was not administered.     COMPARISON:   CT chest from September 2015.     FINDINGS:   Structures at the base of the neck are unremarkable.  Aorta is non-aneurysmal.  The heart is enlarged with small volume pericardial fluid.  Aortic atherosclerosis and aortic valve calcification are  seen.  Multiple normal sized mediastinal lymph nodes and suspected hilar lymph nodes are seen.  The esophagus is unremarkable along its course.     Major airways are patent.  There is a small left-sided pleural effusion with near complete consolidation seen of the left lower lobe.  Mild atelectatic changes are seen in the lingula and right lower lobe.  No right-sided pleural effusion.     The visualized abdominal structures show no acute abnormalities.  There is a 1.6 cm peripheral calcified structure seen near the right hemidiaphragm along the anterior superior aspect of the liver.  Gallbladder has been removed.     Advanced degenerative changes are seen in the spine.  There is vertebral body fusion of the T8 and T9 vertebral bodies.  Age indeterminate moderate compression fracture is visualized of the T5 vertebral body.  Extrathoracic soft tissues are unremarkable.    Impression:       1. Small left pleural effusion with near complete consolidation of the left lower lobe.  This may relate to atelectasis and/or consolidation such as pneumonia.   2. Cardiomegaly with small pericardial effusion.   3. Age indeterminate moderate T5 vertebral body compression fracture.  This may be remote, however is new when compared to remote CT from 2015.  No definite acute fracture planes are seen.   4. Additional findings as detailed above.       Electronically signed by: Sabrina Wagoner MD   Date: 08/26/2023   Time: 21:25          Assessment/Plan:     Barbara Rizo is a 83 y.o. female admitted with:  1.ckd4. close to baseline. Following.  2.hyponatremia. stop ivfs. Samsca. She goes into heart failure easily. Serial sodiums.  3.pna. cont tx. Need doxy and rocephin.  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 Ck phos.  5.proteinuria. no ace/arb today.  6.t5 fx. S/p fall? Mgus history. Genesis spep/if.  7.uti. tx per primary.           Nargis Boyle

## 2023-08-27 NOTE — CLINICAL REVIEW
IP Sepsis Screen (most recent)       Sepsis Screen (IP) - 08/27/23 6666       Is the patient's history or complaint suggestive of a possible infection? Yes  -JM    Are there at least two of the following signs and symptoms present? Yes  -JM    Sepsis signs/symptoms - Tachycardia Tachycardia     >90  -JM    Sepsis signs/symptoms - Tachypnea Tachypnea     >20  -JM    Are any of the following organ dysfunction criteria present and not considered to be due to a chronic condition? Yes  -JM    Initiate Sepsis Protocol No  -JM    Reason sepsis not considered Pt. receiving appropriate management  -CHAYITO              User Key  (r) = Recorded By, (t) = Taken By, (c) = Cosigned By      Initials Name    Fadia Meredith RN

## 2023-08-27 NOTE — ASSESSMENT & PLAN NOTE
Patient with Hypoxic Respiratory failure which is Acute.  she is not on home oxygen. Supplemental oxygen was provided and noted-    Likely in the setting of pneumonia. Pulmonary edema is a consideration.

## 2023-08-27 NOTE — PLAN OF CARE
Case Management Assessment     PCP: Paras Oro MD; prefers AM appointments  Pharmacy: [facility]  Patient Arrived From: Home  Existing Help at Home: Alone; daughter lives near, assists  Barriers to Discharge: None    Discharge Plan:    A. Home; follow-ups   B. TBD    Independent at home alone; daughter lives near and assists if needed; no assist needed; no current medical services; has RW; cane; BSC; O2; no needs anticipated.     08/27/23 1035   Discharge Assessment   Assessment Type Discharge Planning Assessment   Confirmed/corrected address, phone number and insurance Yes   Confirmed Demographics Correct on Facesheet   Source of Information patient;health record   Communicated SALLY with patient/caregiver Date not available/Unable to determine   Reason For Admission Hypochloremia   People in Home alone   Facility Arrived From: Home   Do you expect to return to your current living situation? Yes   Do you have help at home or someone to help you manage your care at home? Yes   Who are your caregiver(s) and their phone number(s)? Tamar-daughter: [phone]   Prior to hospitilization cognitive status: Alert/Oriented   Current cognitive status: Alert/Oriented   Walking or Climbing Stairs ambulation difficulty, requires equipment   Mobility Management RW; cane; O2   Dressing/Bathing bathing difficulty, requires equipment   Dressing/Bathing Management BSC   Home Accessibility wheelchair accessible   Home Layout Able to live on 1st floor   Equipment Currently Used at Home walker, rolling;cane, straight;bedside commode;oxygen   Readmission within 30 days? No   Patient currently being followed by outpatient case management? No   Do you currently have service(s) that help you manage your care at home? No   Do you take prescription medications? Yes   Do you have prescription coverage? Yes   Coverage PHN   Do you have any problems affording any of your prescribed medications? No   Is the patient taking  medications as prescribed? yes   Who is going to help you get home at discharge? Tamar-daughter   How do you get to doctors appointments? car, drives self   Are you on dialysis? No   Do you take coumadin? No   DME Needed Upon Discharge  other (see comments)  (TBD)   Discharge Plan discussed with: Patient   Transition of Care Barriers None   Discharge Plan A Home  (Follow-ups)   Discharge Plan B Other  (TBD)     SW Role explained to patient; two patient identifiers recognized; SW contact information placed on Communication board. Discussed patient managing health care at home and discussed discharge plans A and B; determined who would be helping patient at home with recovery: Tamar, daughter, will help with recovery at home.

## 2023-08-27 NOTE — EICU
New Patient Evaluation    HPI:  83 F history of HFpEF, moderate AS, DM (A1C 5.5), hypertension, dyslipidemia, CKD, paroxysmal afib on apixaban, breast CA, MGUS, presented with shortness of breath and hypoxia with episode of near fall. Daughter reports of decreased PO intake. CXR with LLL opacity, Na 119, creatinine 2.2    Chest CT  Small left pleural effusion with near complete consolidation of the left lower lobe.  This may relate to atelectasis and/or consolidation such as pneumonia.   Cardiomegaly with small pericardial effusion.  Age indeterminate moderate T5 vertebral body compression fracture. This may be remote, however is new when compared to remote CT from 2015.  No definite acute fracture planes are seen.    Camera Assessment:  BP 98/56  HR 86  O2 95%    Data:  WBC 5.84, H/H 13.5/41.1, platelets 198  Na 121, K 4.8, creatinine 2.1, CO2 21  , Trop I 0.015  UA WBC 34, nitrite positive    Assessment and Plans:   Hypoxemic respiratory failure secondary to pneumonia on ceftriaxone and doxycycline. Should cover the possible UTI as well.  Hyponatremia likely hypovolemic, FELICIA both improving with fluids  HFpEF, moderate AS, cautious hydration  DM not on DM medications at home. Will place on SSI and hypoglycemia regimen

## 2023-08-27 NOTE — NURSING
Notified Dr. Dalton that patient with increase HR to 150's, but not sustaining. She denies pain or any discomforts @ this time. EKG obtained, new orders noted. Will continue to monitor.

## 2023-08-27 NOTE — NURSING
[facility]  Nurses Note -- 4 Eyes      8/27/2023       Skin assessed on: Q Shift      [x] No Pressure Injuries Present    [x]Prevention Measures Documented    [] Yes LDA  for Pressure Injury Previously documented     [] Yes New Pressure Injury Discovered   [] LDA for New Pressure Injury Added      Attending RN:  Michael Conklin RN     Second RN:  Cheyanne

## 2023-08-27 NOTE — ED PROVIDER NOTES
"Encounter Date: 8/26/2023    SCRIBE #1 NOTE: I, Crescenciofabio Leyva, am scribing for, and in the presence of,  Tank Ley MD. I have scribed the following portions of the note - Other sections scribed: HPI, ROS, PE.   SCRIBE #2 NOTE: IMarshall, am scribing for, and in the presence of,  Tank Ley MD. I have scribed the remaining portions of the note not scribed by Scribe #1.     History     Chief Complaint   Patient presents with    Shortness of Breath     Patient come sin with increased SOB for several days, normally wears oxygen at nighty, states turned round and fell today, did not hit her head, is on blood thinners H/O blood thinners.      83 year old female patient with multiple medical problems including DM type 2, HTN, HLD, CKD stage IV, A-Fib, breast cancer, pleural effusion s/p thoracentesis and others, present to the ED with shortness of breath beginning yesterday.History provided by independent historian, patient's daughter, who reports patient has been experiencing increased dyspnea since yesterday. She states dyspnea has been ongoing for the past 3-4 months, but worsened yesterday. She endorses compliance with home O2 only at night for the past year, but states she has been needing it during the day since yesterday after her SpO2 was of 85% RA, which increased to 89% 2L O2 NC. Daughter further endorses patient had a fall today after she "stood up too fast and turned around, with her feet getting tangled with another" at around 10:45 am which resulted in a traumatic abrasion to her left elbow. Patient denies head trauma. Daughter also reported patient has been experiencing weakness, trouble swallowing solid foods, and a decreased appetite.     She reports compliance with metolazone 10 mg x1 per day (only 2 dosages since yesterday), eliquis 2.5 mg BID, allopurinol 100 mg x1 per day, amiodarone 100 mg x1 per day, amlodipine 10 mg x1 per day, atorvastatin 40 mg x1 per day, folic acid 1 mg x1 per " day, and lasix 40 mg TID. She additionally reports she has been complaining of urinary frequency for 2 months, and is currently being evaluated by Dr Camp in urology, who prescribed her vesicare 10 mg x1 per day. Denies syncope, congestion, back pain, neck pain, or cough. Patient reports last EtOH usage was 3 weeks ago. Denies regular caffeine or tea ingestion.     The history is provided by the patient and a relative. No  was used.     Review of patient's allergies indicates:  No Known Allergies  Past Medical History:   Diagnosis Date    A-fib     Breast cancer     invasive ductal carcinoma    CKD (chronic kidney disease)     Diabetes mellitus type II     Fatty liver     GERD (gastroesophageal reflux disease)     Hearing loss of both ears     wears bilateral hearing aids    Hemochromatosis     History of anemia due to CKD     History of pleural effusion     with thoracentesis    Hyperlipidemia     Hypertension     Macular degeneration     Osteoarthritis     Left knee    Osteoporosis     Vaginal delivery     x2    Vitamin D deficiency disease     Wears partial dentures     upper removable bridge     Past Surgical History:   Procedure Laterality Date    AXILLARY NODE DISSECTION Left 10/18/2021    Procedure: LYMPHADENECTOMY, AXILLARY;  Surgeon: Darlene Roca MD;  Location: [facility] OR;  Service: General;  Laterality: Left;    BREAST BIOPSY      BREAST LUMPECTOMY      invasive ductal carcinoma    BREAST SURGERY Bilateral     Reduction r/t h/o fibrocystic disease    CHOLECYSTECTOMY  08/2015    EYE SURGERY      Cat Ext  OU    HYSTERECTOMY      partial due to uterine fibroids    INCISION AND DRAINAGE OF KNEE Left 09/17/2020    Procedure: INCISION AND DRAINAGE, KNEE;  Surgeon: Rolando Wagoner MD;  Location: [facility] OR;  Service: Orthopedics;  Laterality: Left;    INJECTION FOR SENTINEL NODE IDENTIFICATION Left 10/18/2021    Procedure: INJECTION, FOR SENTINEL NODE IDENTIFICATION;  Surgeon: Darlene Roca  MD;  Location: [facility] OR;  Service: General;  Laterality: Left;    JOINT REPLACEMENT Left 05/13/2013    TKR    JOINT REPLACEMENT Right 08/03/2015    TKR    MASTECTOMY, PARTIAL Left 10/18/2021    Procedure: MASTECTOMY, PARTIAL -- wire;  Surgeon: Darlene Roca MD;  Location: [facility] OR;  Service: General;  Laterality: Left;  RN PREOP 10/15/2021   VACCINATED-----NEED H/P AND ORDERS    SENTINEL LYMPH NODE BIOPSY Left 10/18/2021    Procedure: BIOPSY, LYMPH NODE, SENTINEL;  Surgeon: Darlene Roca MD;  Location: [facility] OR;  Service: General;  Laterality: Left;    THORACENTESIS      TOTAL KNEE ARTHROPLASTY Right 2015    left knee done 5/2013    WOUND DRESSING Left 09/17/2020    Procedure: WOUND VAC APPLICATION;  Surgeon: Rolando Wagoner MD;  Location: [facility];  Service: Orthopedics;  Laterality: Left;     Family History   Problem Relation Age of Onset    Cancer Mother         stomach    Hypertension Mother     Aneurysm Father         abdominal    No Known Problems Sister     No Known Problems Sister     No Known Problems Brother     No Known Problems Daughter     No Known Problems Son      Social History     Tobacco Use    Smoking status: Former     Passive exposure: Past    Smokeless tobacco: Never   Substance Use Topics    Alcohol use: Not Currently    Drug use: No     Review of Systems   HENT:  Positive for trouble swallowing. Negative for congestion.    Respiratory:  Positive for shortness of breath. Negative for cough.    Cardiovascular:  Negative for chest pain, palpitations and leg swelling.   Musculoskeletal:  Negative for back pain and neck pain.   Skin:  Positive for wound.   Neurological:  Negative for syncope.   All other systems reviewed and are negative.      Physical Exam     Initial Vitals   BP Pulse Resp Temp SpO2   08/26/23 1617 08/26/23 1617 08/26/23 1617 08/26/23 1617 08/26/23 1618   110/63 83 20 98.7 °F (37.1 °C) (!) 82 %      MAP       --                Physical Exam    Nursing note and vitals  reviewed.  Constitutional: She appears well-developed and well-nourished.   HENT:   Head: Normocephalic and atraumatic.   Right Ear: External ear normal.   Left Ear: External ear normal.   Mouth/Throat: Mucous membranes are dry.   Eyes: Conjunctivae are normal.   Neck: Phonation normal. Neck supple.   Normal range of motion.  Cardiovascular:  Normal rate, regular rhythm and intact distal pulses.           Pulses:       Radial pulses are 2+ on the right side and 2+ on the left side.   Pulmonary/Chest: Effort normal. No accessory muscle usage or stridor. Tachypnea noted. No respiratory distress. She has decreased breath sounds (bilaterally). She has rales.   Abdominal: Abdomen is soft. There is no abdominal tenderness.   Musculoskeletal:         General: No tenderness. Normal range of motion.      Left elbow: No deformity. Normal range of motion. No tenderness.        Arms:       Cervical back: Normal range of motion and neck supple. No bony tenderness.      Thoracic back: No bony tenderness.      Lumbar back: No bony tenderness.      Right upper leg: No bony tenderness.      Right lower leg: Edema (non-pitting) present.      Left lower leg: Edema (non-pitting) present.        Legs:       Comments: No bony tenderness to the left elbow.      Neurological: She is alert and oriented to person, place, and time.   Skin: Skin is warm and dry. No rash noted.   Psychiatric: She has a normal mood and affect. Her behavior is normal.         ED Course   Critical Care    Date/Time: 8/27/2023 2:09 AM    Performed by: Tank Ley MD  Authorized by: Tank Ley MD  Direct patient critical care time: 10 minutes  Ordering / reviewing critical care time: 10 minutes  Documentation critical care time: 10 minutes  Consulting other physicians critical care time: 10 minutes  Total critical care time (exclusive of procedural time) : 40 minutes  Critical care was necessary to treat or prevent imminent or life-threatening deterioration  of the following conditions: dehydration, renal failure and respiratory failure.  Critical care was time spent personally by me on the following activities: discussions with consultants, development of treatment plan with patient or surrogate, examination of patient, ordering and performing treatments and interventions, re-evaluation of patient's condition, ordering and review of laboratory studies, obtaining history from patient or surrogate, evaluation of patient's response to treatment, review of old charts, ordering and review of radiographic studies and pulse oximetry.        Labs Reviewed   CBC W/ AUTO DIFFERENTIAL - Abnormal; Notable for the following components:       Result Value    Lymph # 0.6 (*)     Gran % 82.4 (*)     Lymph % 8.2 (*)     All other components within normal limits   B-TYPE NATRIURETIC PEPTIDE - Abnormal; Notable for the following components:     (*)     All other components within normal limits   URINALYSIS, REFLEX TO URINE CULTURE - Abnormal; Notable for the following components:    Occult Blood UA Trace (*)     Nitrite, UA Positive (*)     Leukocytes, UA 3+ (*)     All other components within normal limits    Narrative:     Specimen Source->Urine   COMPREHENSIVE METABOLIC PANEL - Abnormal; Notable for the following components:    Sodium 119 (*)     Potassium 5.3 (*)     Chloride 89 (*)     CO2 20 (*)     BUN 45 (*)     Creatinine 2.2 (*)     eGFR 22 (*)     All other components within normal limits    Narrative:     Sodium critical result(s) called and verbal readback obtained from TOSHIA Barron by LN2 08/26/2023 22:03   PROTIME-INR - Abnormal; Notable for the following components:    Prothrombin Time 12.8 (*)     All other components within normal limits   URINALYSIS MICROSCOPIC - Abnormal; Notable for the following components:    WBC, UA 34 (*)     WBC Clumps, UA Occasional (*)     Bacteria Many (*)     All other components within normal limits    Narrative:     Specimen  Source->Urine   BASIC METABOLIC PANEL - Abnormal; Notable for the following components:    Sodium 119 (*)     Potassium 5.2 (*)     Chloride 91 (*)     CO2 19 (*)     BUN 45 (*)     Creatinine 2.1 (*)     Calcium 8.2 (*)     eGFR 23 (*)     All other components within normal limits    Narrative:     Sodium critical result(s) called and verbal readback obtained from   RNRadha Melyssayuly by LN2 08/27/2023 02:01   ISTAT PROCEDURE - Abnormal; Notable for the following components:    POC PH 7.348 (*)     POC PO2 59 (*)     POC SATURATED O2 89 (*)     All other components within normal limits   CULTURE, BLOOD   CULTURE, BLOOD   CULTURE, URINE   TROPONIN I   SODIUM, URINE, RANDOM    Narrative:     Specimen Source->Urine   CREATININE, URINE, RANDOM    Narrative:     Specimen Source->Urine   UREA NITROGEN, URINE, RANDOM   OSMOLALITY, SERUM   OSMOLALITY, URINE RANDOM   POCT INFLUENZA A/B MOLECULAR   SARS-COV-2 RDRP GENE     EKG Readings: (Independently Interpreted)   Initial Reading: No STEMI. Rhythm: Atrial Fibrillation. Heart Rate: 81. Ectopy: No Ectopy. Conduction: RBBB. ST Segments: Normal ST Segments. T Waves: Normal.       Imaging Results              CT Chest Without Contrast (Final result)  Result time 08/26/23 21:25:34      Final result by Sabrina Wagoner MD (08/26/23 21:25:34)                   Impression:      1. Small left pleural effusion with near complete consolidation of the left lower lobe.  This may relate to atelectasis and/or consolidation such as pneumonia.  2. Cardiomegaly with small pericardial effusion.  3. Age indeterminate moderate T5 vertebral body compression fracture.  This may be remote, however is new when compared to remote CT from 2015.  No definite acute fracture planes are seen.  4. Additional findings as detailed above.      Electronically signed by: Sabrina Wagoner MD  Date:    08/26/2023  Time:    21:25               Narrative:    EXAMINATION:  CT CHEST WITHOUT CONTRAST    CLINICAL  HISTORY:  Dyspnea, chronic, unclear etiology;    TECHNIQUE:  Low dose axial images, sagittal and coronal reformations were obtained from the thoracic inlet to the lung bases. Contrast was not administered.    COMPARISON:  CT chest from September 2015.    FINDINGS:  Structures at the base of the neck are unremarkable.  Aorta is non-aneurysmal.  The heart is enlarged with small volume pericardial fluid.  Aortic atherosclerosis and aortic valve calcification are seen.  Multiple normal sized mediastinal lymph nodes and suspected hilar lymph nodes are seen.  The esophagus is unremarkable along its course.    Major airways are patent.  There is a small left-sided pleural effusion with near complete consolidation seen of the left lower lobe.  Mild atelectatic changes are seen in the lingula and right lower lobe.  No right-sided pleural effusion.    The visualized abdominal structures show no acute abnormalities.  There is a 1.6 cm peripheral calcified structure seen near the right hemidiaphragm along the anterior superior aspect of the liver.  Gallbladder has been removed.    Advanced degenerative changes are seen in the spine.  There is vertebral body fusion of the T8 and T9 vertebral bodies.  Age indeterminate moderate compression fracture is visualized of the T5 vertebral body.  Extrathoracic soft tissues are unremarkable.                                       CT Head Without Contrast (Final result)  Result time 08/26/23 21:16:12      Final result by Sabrina Wagoner MD (08/26/23 21:16:12)                   Impression:      No acute intracranial abnormalities identified.      Electronically signed by: Sabrina Wagoner MD  Date:    08/26/2023  Time:    21:16               Narrative:    EXAMINATION:  CT HEAD WITHOUT CONTRAST    CLINICAL HISTORY:  Head trauma, minor (Age >= 65y);    TECHNIQUE:  Low dose axial images were obtained through the head.  Coronal and sagittal reformations were also performed. Contrast was not  administered.    COMPARISON:  CT head from September 2015.    FINDINGS:  There is chronic microvascular ischemic disease.  No evidence of acute/recent major vascular distribution cerebral infarction, intraparenchymal hemorrhage, or intra-axial space occupying lesion. The ventricular system is normal in size and configuration with no evidence of hydrocephalus. No effacement of the skull-base cisterns. No abnormal extra-axial fluid collections or blood products. Visualized paranasal sinuses and mastoid air cells are clear. The calvarium shows no significant abnormality.                                       X-Ray Chest AP Portable (Final result)  Result time 08/26/23 17:25:46      Final result by Sabrina Wagoner MD (08/26/23 17:25:46)                   Impression:      Left basilar retrocardiac opacification/consolidation which could reflect small left pleural effusion and atelectasis or consolidation such as developing pneumonia in the right clinical setting.      Electronically signed by: Sabrina Wagoner MD  Date:    08/26/2023  Time:    17:25               Narrative:    EXAMINATION:  XR CHEST AP PORTABLE    CLINICAL HISTORY:  sob;    TECHNIQUE:  Single frontal view of the chest was performed.    COMPARISON:  December 2021.    FINDINGS:  Cardiac silhouette is stable in size.  Lungs are hypoinflated.  There is unchanged bilateral hilar prominence.  Left basilar retrocardiac opacity/consolidation is seen with possible small left pleural effusion.  No significant right-sided pleural effusion seen.  No pneumothorax.  No acute osseous abnormality identified.                                       Medications   0.9%  NaCl infusion (has no administration in time range)   furosemide injection 40 mg (40 mg Intravenous Given 8/26/23 2201)   cefTRIAXone (ROCEPHIN) 1 g in dextrose 5 % in water (D5W) 100 mL IVPB (MB+) (0 g Intravenous Stopped 8/26/23 2238)   0.9%  NaCl infusion (0 mLs Intravenous Stopped 8/27/23 0001)      Medical Decision Making  Amount and/or Complexity of Data Reviewed  Independent Historian:      Details: Additional history provided by independent historian, patient's daughter.  External Data Reviewed: ECG.  Labs: ordered.  Radiology: ordered and independent interpretation performed.  ECG/medicine tests: ordered and independent interpretation performed.    Risk  Prescription drug management.  Decision regarding hospitalization.    Critical Care  Total time providing critical care: 50 minutes      Labs Reviewed  Pertinent labs and imaging findings:   , tn negative, pH 7.34 on vbg  UA nitrite and Leuk positive 3+, many bacteria on micro. Urine culture and blood cutures pending at time of ED disposition.   Ct chest with small left pleural effusion, LLL consolidation, CT head negative for acute abnormality.    Admission on 08/26/2023   Component Date Value Ref Range Status    WBC 08/26/2023 6.84  3.90 - 12.70 K/uL Final    RBC 08/26/2023 4.42  4.00 - 5.40 M/uL Final    Hemoglobin 08/26/2023 13.7  12.0 - 16.0 g/dL Final    Hematocrit 08/26/2023 39.3  37.0 - 48.5 % Final    MCV 08/26/2023 89  82 - 98 fL Final    MCH 08/26/2023 31.0  27.0 - 31.0 pg Final    MCHC 08/26/2023 34.9  32.0 - 36.0 g/dL Final    RDW 08/26/2023 14.3  11.5 - 14.5 % Final    Platelets 08/26/2023 201  150 - 450 K/uL Final    MPV 08/26/2023 9.7  9.2 - 12.9 fL Final    Immature Granulocytes 08/26/2023 0.3  0.0 - 0.5 % Final    Gran # (ANC) 08/26/2023 5.6  1.8 - 7.7 K/uL Final    Immature Grans (Abs) 08/26/2023 0.02  0.00 - 0.04 K/uL Final    Comment: Mild elevation in immature granulocytes is non specific and   can be seen in a variety of conditions including stress response,   acute inflammation, trauma and pregnancy. Correlation with other   laboratory and clinical findings is essential.      Lymph # 08/26/2023 0.6 (L)  1.0 - 4.8 K/uL Final    Mono # 08/26/2023 0.6  0.3 - 1.0 K/uL Final    Eos # 08/26/2023 0.0  0.0 - 0.5 K/uL Final     Baso # 08/26/2023 0.03  0.00 - 0.20 K/uL Final    nRBC 08/26/2023 0  0 /100 WBC Final    Gran % 08/26/2023 82.4 (H)  38.0 - 73.0 % Final    Lymph % 08/26/2023 8.2 (L)  18.0 - 48.0 % Final    Mono % 08/26/2023 8.6  4.0 - 15.0 % Final    Eosinophil % 08/26/2023 0.1  0.0 - 8.0 % Final    Basophil % 08/26/2023 0.4  0.0 - 1.9 % Final    Differential Method 08/26/2023 Automated   Final    Troponin I 08/26/2023 0.015  0.000 - 0.026 ng/mL Final    Comment: The reference interval for Troponin I represents the 99th percentile   cutoff   for our facility and is consistent with 3rd generation assay   performance.      BNP 08/26/2023 868 (H)  0 - 99 pg/mL Final    Values of less than 100 pg/ml are consistent with non-CHF populations.    Specimen UA 08/26/2023 Urine, Clean Catch   Final    Color, UA 08/26/2023 Yellow  Yellow, Straw, Natasha Final    Appearance, UA 08/26/2023 Clear  Clear Final    pH, UA 08/26/2023 5.0  5.0 - 8.0 Final    Specific Gravity, UA 08/26/2023 1.010  1.005 - 1.030 Final    Protein, UA 08/26/2023 Negative  Negative Final    Comment: Recommend a 24 hour urine protein or a urine   protein/creatinine ratio if globulin induced proteinuria is  clinically suspected.      Glucose, UA 08/26/2023 Negative  Negative Final    Ketones, UA 08/26/2023 Negative  Negative Final    Bilirubin (UA) 08/26/2023 Negative  Negative Final    Occult Blood UA 08/26/2023 Trace (A)  Negative Final    Nitrite, UA 08/26/2023 Positive (A)  Negative Final    Urobilinogen, UA 08/26/2023 Negative  <2.0 EU/dL Final    Leukocytes, UA 08/26/2023 3+ (A)  Negative Final    POC Molecular Influenza A Ag 08/26/2023 Negative  Negative, Not Reported Final    POC Molecular Influenza B Ag 08/26/2023 Negative  Negative, Not Reported Final     Acceptable 08/26/2023 Yes   Final    POC Rapid COVID 08/26/2023 Negative  Negative Final     Acceptable 08/26/2023 Yes   Final    POC PH 08/26/2023 7.348 (L)  7.35 - 7.45 Final    POC  PCO2 08/26/2023 44.3  35 - 45 mmHg Final    POC PO2 08/26/2023 59 (LL)  80 - 100 mmHg Final    POC HCO3 08/26/2023 24.4  24 - 28 mmol/L Final    POC BE 08/26/2023 -2  -2 to 2 mmol/L Final    POC SATURATED O2 08/26/2023 89 (L)  95 - 100 % Final    POC TCO2 08/26/2023 26  23 - 27 mmol/L Final    Sample 08/26/2023 ARTERIAL   Final    Site 08/26/2023 RR   Final    Allens Test 08/26/2023 Pass   Final    DelSys 08/26/2023 Nasal Can   Final    Mode 08/26/2023 SPONT   Final    Flow 08/26/2023 3   Final    Sp02 08/26/2023 93   Final    Sodium 08/26/2023 119 (LL)  136 - 145 mmol/L Final    Comment: Sodium critical result(s) called and verbal readback obtained from TOSHIA Barron by LN2 08/26/2023 22:03      Potassium 08/26/2023 5.3 (H)  3.5 - 5.1 mmol/L Final    Chloride 08/26/2023 89 (L)  95 - 110 mmol/L Final    CO2 08/26/2023 20 (L)  23 - 29 mmol/L Final    Glucose 08/26/2023 102  70 - 110 mg/dL Final    BUN 08/26/2023 45 (H)  8 - 23 mg/dL Final    Creatinine 08/26/2023 2.2 (H)  0.5 - 1.4 mg/dL Final    Calcium 08/26/2023 8.8  8.7 - 10.5 mg/dL Final    Total Protein 08/26/2023 7.5  6.0 - 8.4 g/dL Final    Albumin 08/26/2023 3.8  3.5 - 5.2 g/dL Final    Total Bilirubin 08/26/2023 0.9  0.1 - 1.0 mg/dL Final    Comment: For infants and newborns, interpretation of results should be based  on gestational age, weight and in agreement with clinical  observations.    Premature Infant recommended reference ranges:  Up to 24 hours.............<8.0 mg/dL  Up to 48 hours............<12.0 mg/dL  3-5 days..................<15.0 mg/dL  6-29 days.................<15.0 mg/dL      Alkaline Phosphatase 08/26/2023 85  55 - 135 U/L Final    AST 08/26/2023 13  10 - 40 U/L Final    ALT 08/26/2023 15  10 - 44 U/L Final    eGFR 08/26/2023 22 (A)  >60 mL/min/1.73 m^2 Final    Anion Gap 08/26/2023 10  8 - 16 mmol/L Final    Prothrombin Time 08/26/2023 12.8 (H)  9.0 - 12.5 sec Final    INR 08/26/2023 1.2  0.8 - 1.2 Final    Comment: Coumadin  Therapy:  2.0 - 3.0 for INR for all indicators except mechanical heart valves  and antiphospholipid syndromes which should use 2.5 - 3.5.      Sodium, Urine 08/26/2023 45  20 - 250 mmol/L Final    Comment: The random urine reference ranges provided were established   for 24 hour urine collections.  No reference ranges exist for  random urine specimens.  Correlate clinically.      Creatinine, Urine 08/26/2023 49.9  15.0 - 325.0 mg/dL Final    RBC, UA 08/26/2023 3  0 - 4 /hpf Final    WBC, UA 08/26/2023 34 (H)  0 - 5 /hpf Final    WBC Clumps, UA 08/26/2023 Occasional (A)  None-Rare Final    Bacteria 08/26/2023 Many (A)  None-Occ /hpf Final    Microscopic Comment 08/26/2023 SEE COMMENT   Final    Comment: Other formed elements not mentioned in the report are not   present in the microscopic examination.       Sodium 08/27/2023 119 (LL)  136 - 145 mmol/L Final    Comment: Sodium critical result(s) called and verbal readback obtained from   Radha POPE by LN2 08/27/2023 02:01      Potassium 08/27/2023 5.2 (H)  3.5 - 5.1 mmol/L Final    Chloride 08/27/2023 91 (L)  95 - 110 mmol/L Final    CO2 08/27/2023 19 (L)  23 - 29 mmol/L Final    Glucose 08/27/2023 97  70 - 110 mg/dL Final    BUN 08/27/2023 45 (H)  8 - 23 mg/dL Final    Creatinine 08/27/2023 2.1 (H)  0.5 - 1.4 mg/dL Final    Calcium 08/27/2023 8.2 (L)  8.7 - 10.5 mg/dL Final    Anion Gap 08/27/2023 9  8 - 16 mmol/L Final    eGFR 08/27/2023 23 (A)  >60 mL/min/1.73 m^2 Final        Imaging Reviewed    Imaging Results              CT Chest Without Contrast (Final result)  Result time 08/26/23 21:25:34      Final result by Sabrina Wagoner MD (08/26/23 21:25:34)                   Impression:      1. Small left pleural effusion with near complete consolidation of the left lower lobe.  This may relate to atelectasis and/or consolidation such as pneumonia.  2. Cardiomegaly with small pericardial effusion.  3. Age indeterminate moderate T5 vertebral body compression  fracture.  This may be remote, however is new when compared to remote CT from 2015.  No definite acute fracture planes are seen.  4. Additional findings as detailed above.      Electronically signed by: Sabrina Wagoner MD  Date:    08/26/2023  Time:    21:25               Narrative:    EXAMINATION:  CT CHEST WITHOUT CONTRAST    CLINICAL HISTORY:  Dyspnea, chronic, unclear etiology;    TECHNIQUE:  Low dose axial images, sagittal and coronal reformations were obtained from the thoracic inlet to the lung bases. Contrast was not administered.    COMPARISON:  CT chest from September 2015.    FINDINGS:  Structures at the base of the neck are unremarkable.  Aorta is non-aneurysmal.  The heart is enlarged with small volume pericardial fluid.  Aortic atherosclerosis and aortic valve calcification are seen.  Multiple normal sized mediastinal lymph nodes and suspected hilar lymph nodes are seen.  The esophagus is unremarkable along its course.    Major airways are patent.  There is a small left-sided pleural effusion with near complete consolidation seen of the left lower lobe.  Mild atelectatic changes are seen in the lingula and right lower lobe.  No right-sided pleural effusion.    The visualized abdominal structures show no acute abnormalities.  There is a 1.6 cm peripheral calcified structure seen near the right hemidiaphragm along the anterior superior aspect of the liver.  Gallbladder has been removed.    Advanced degenerative changes are seen in the spine.  There is vertebral body fusion of the T8 and T9 vertebral bodies.  Age indeterminate moderate compression fracture is visualized of the T5 vertebral body.  Extrathoracic soft tissues are unremarkable.                                       CT Head Without Contrast (Final result)  Result time 08/26/23 21:16:12      Final result by Sabrina Wagoner MD (08/26/23 21:16:12)                   Impression:      No acute intracranial abnormalities  identified.      Electronically signed by: Sabrina Wagoner MD  Date:    08/26/2023  Time:    21:16               Narrative:    EXAMINATION:  CT HEAD WITHOUT CONTRAST    CLINICAL HISTORY:  Head trauma, minor (Age >= 65y);    TECHNIQUE:  Low dose axial images were obtained through the head.  Coronal and sagittal reformations were also performed. Contrast was not administered.    COMPARISON:  CT head from September 2015.    FINDINGS:  There is chronic microvascular ischemic disease.  No evidence of acute/recent major vascular distribution cerebral infarction, intraparenchymal hemorrhage, or intra-axial space occupying lesion. The ventricular system is normal in size and configuration with no evidence of hydrocephalus. No effacement of the skull-base cisterns. No abnormal extra-axial fluid collections or blood products. Visualized paranasal sinuses and mastoid air cells are clear. The calvarium shows no significant abnormality.                                       X-Ray Chest AP Portable (Final result)  Result time 08/26/23 17:25:46      Final result by Sabrina Wagoner MD (08/26/23 17:25:46)                   Impression:      Left basilar retrocardiac opacification/consolidation which could reflect small left pleural effusion and atelectasis or consolidation such as developing pneumonia in the right clinical setting.      Electronically signed by: Sabrina Wagoner MD  Date:    08/26/2023  Time:    17:25               Narrative:    EXAMINATION:  XR CHEST AP PORTABLE    CLINICAL HISTORY:  sob;    TECHNIQUE:  Single frontal view of the chest was performed.    COMPARISON:  December 2021.    FINDINGS:  Cardiac silhouette is stable in size.  Lungs are hypoinflated.  There is unchanged bilateral hilar prominence.  Left basilar retrocardiac opacity/consolidation is seen with possible small left pleural effusion.  No significant right-sided pleural effusion seen.  No pneumothorax.  No acute osseous abnormality identified.                                       Medications given in ED    Medications   0.9%  NaCl infusion (has no administration in time range)   furosemide injection 40 mg (40 mg Intravenous Given 8/26/23 2201)   cefTRIAXone (ROCEPHIN) 1 g in dextrose 5 % in water (D5W) 100 mL IVPB (MB+) (0 g Intravenous Stopped 8/26/23 2238)   0.9%  NaCl infusion (0 mLs Intravenous Stopped 8/27/23 0001)         Note was created using voice recognition software. Note may have occasional typographical errors that may not have been identified and edited despite good melisa initial review prior to signing.    I, Tank Ley MD, personally performed the services described in this documentation. All medical record entries made by the scribe were at my direction and in my presence.  I have reviewed the chart and agree that the record reflects my personal performance and is accurate and complete.             Scribe Attestation:   Scribe #1: I performed the above scribed service and the documentation accurately describes the services I performed. I attest to the accuracy of the note.  Scribe #2: I performed the above scribed service and the documentation accurately describes the services I performed. I attest to the accuracy of the note.        ED Course as of 08/27/23 0210   Sat Aug 26, 2023   2211 Hyponatremia sodium 119 hypokalemia potassium 5.3 hypochloremia chloride 89 CKD with AKS [DL]      ED Course User Index  [DL] Tank Ley MD               Medical Decision Making:   ED Management:  Patient admitted to  service, accepted by Dr Taveras for Dr Dalton for management of acute hypoxic respiratory failure , LLL pneumonia, UTI, FELICIA, pleural effusion and electrolyte abnormalities.      Clinical Impression:   Final diagnoses:  [R06.02] SOB (shortness of breath)  [J90] Pleural effusion, left  [J18.9] Pneumonia of left lower lobe due to infectious organism (Primary)  [J96.21] Acute on chronic respiratory failure with hypoxia  [I31.39] Pericardial  effusion - small  [E87.1] Hyponatremia  [E87.8] Hypochloremia  [N17.9] FELICIA (acute kidney injury)  [N30.01] Acute cystitis with hematuria        ED Disposition Condition    Admit Stable                Tank Ley MD  09/28/23 0611

## 2023-08-27 NOTE — SUBJECTIVE & OBJECTIVE
Past Medical History:   Diagnosis Date    A-fib     Breast cancer     invasive ductal carcinoma    CKD (chronic kidney disease)     Diabetes mellitus type II     Fatty liver     GERD (gastroesophageal reflux disease)     Hearing loss of both ears     wears bilateral hearing aids    Hemochromatosis     History of anemia due to CKD     History of pleural effusion     with thoracentesis    Hyperlipidemia     Hypertension     Macular degeneration     Osteoarthritis     Left knee    Osteoporosis     Vaginal delivery     x2    Vitamin D deficiency disease     Wears partial dentures     upper removable bridge       Past Surgical History:   Procedure Laterality Date    AXILLARY NODE DISSECTION Left 10/18/2021    Procedure: LYMPHADENECTOMY, AXILLARY;  Surgeon: Darlene Roca MD;  Location: [facility];  Service: General;  Laterality: Left;    BREAST BIOPSY      BREAST LUMPECTOMY      invasive ductal carcinoma    BREAST SURGERY Bilateral     Reduction r/t h/o fibrocystic disease    CHOLECYSTECTOMY  08/2015    EYE SURGERY      Cat Ext  OU    HYSTERECTOMY      partial due to uterine fibroids    INCISION AND DRAINAGE OF KNEE Left 09/17/2020    Procedure: INCISION AND DRAINAGE, KNEE;  Surgeon: Rolando Wagoner MD;  Location: [facility];  Service: Orthopedics;  Laterality: Left;    INJECTION FOR SENTINEL NODE IDENTIFICATION Left 10/18/2021    Procedure: INJECTION, FOR SENTINEL NODE IDENTIFICATION;  Surgeon: Darlene Roca MD;  Location: [facility];  Service: General;  Laterality: Left;    JOINT REPLACEMENT Left 05/13/2013    TKR    JOINT REPLACEMENT Right 08/03/2015    TKR    MASTECTOMY, PARTIAL Left 10/18/2021    Procedure: MASTECTOMY, PARTIAL -- wire;  Surgeon: Darlene Roca MD;  Location: [facility];  Service: General;  Laterality: Left;  RN PREOP 10/15/2021   VACCINATED-----NEED H/P AND ORDERS    SENTINEL LYMPH NODE BIOPSY Left 10/18/2021    Procedure: BIOPSY, LYMPH NODE, SENTINEL;  Surgeon: Darlene Roca MD;  Location: [facility];   Service: General;  Laterality: Left;    THORACENTESIS      TOTAL KNEE ARTHROPLASTY Right 2015    left knee done 5/2013    WOUND DRESSING Left 09/17/2020    Procedure: WOUND VAC APPLICATION;  Surgeon: Rolando Wagoner MD;  Location: [facility];  Service: Orthopedics;  Laterality: Left;       Review of patient's allergies indicates:  No Known Allergies    Current Facility-Administered Medications on File Prior to Encounter   Medication    denosumab (PROLIA) injection 60 mg     Current Outpatient Medications on File Prior to Encounter   Medication Sig    allopurinoL (ZYLOPRIM) 100 MG tablet Take 1 tablet by mouth once daily.    amiodarone (PACERONE) 200 MG Tab TAKE ONE-HALF TABLET BY MOUTH EVERY DAY *DO NOT TAKE WITH GRAPEFRUIT JUICE*    amLODIPine (NORVASC) 10 MG tablet TAKE ONE TABLET BY MOUTH ONCE DAILY    apixaban (ELIQUIS) 2.5 mg Tab TAKE ONE TABLET BY MOUTH TWICE DAILY    atorvastatin (LIPITOR) 40 MG tablet TAKE ONE TABLET BY MOUTH ONCE DAILY    calcium carbonate (TUMS) 200 mg calcium (500 mg) chewable tablet Take 2 tablets by mouth.    ergocalciferol (ERGOCALCIFEROL) 50,000 unit Cap Take 50,000 Units by mouth every 30 days.     FLUZONE HIGHDOSE QUAD 21-22  mcg/0.7 mL Syrg     folic acid (FOLVITE) 1 MG tablet TAKE 1 TABLET BY MOUTH EVERY DAY    furosemide (LASIX) 20 MG tablet Take 1 tablet (20 mg total) by mouth 2 (two) times daily.    losartan (COZAAR) 25 MG tablet Take 25 mg by mouth once daily.    mupirocin (BACTROBAN) 2 % ointment Apply topically every evening.    solifenacin (VESICARE) 10 MG tablet Take 1 tablet (10 mg total) by mouth once daily.     Family History       Problem Relation (Age of Onset)    Aneurysm Father    Cancer Mother    Hypertension Mother    No Known Problems Sister, Sister, Brother, Daughter, Son          Tobacco Use    Smoking status: Former     Passive exposure: Past    Smokeless tobacco: Never   Substance and Sexual Activity    Alcohol use: Not Currently    Drug use: No    Sexual  activity: Not Currently     Review of Systems   Constitutional:  Positive for appetite change and fatigue.   HENT: Negative.     Eyes: Negative.    Respiratory: Negative.     Cardiovascular: Negative.    Gastrointestinal:  Positive for nausea.   Endocrine: Negative.    Genitourinary: Negative.    Musculoskeletal: Negative.    Skin: Negative.    Allergic/Immunologic: Negative.    Neurological: Negative.    Psychiatric/Behavioral: Negative.       Objective:     Vital Signs (Most Recent):  Temp: 98.7 °F (37.1 °C) (08/26/23 2145)  Pulse: 83 (08/27/23 0200)  Resp: (!) 25 (08/26/23 2145)  BP: 110/66 (08/27/23 0200)  SpO2: 95 % (08/27/23 0200) Vital Signs (24h Range):  Temp:  [98.7 °F (37.1 °C)] 98.7 °F (37.1 °C)  Pulse:  [82-85] 83  Resp:  [20-27] 25  SpO2:  [82 %-100 %] 95 %  BP: (108-137)/(63-83) 110/66     Weight: 79.8 kg (176 lb)  Body mass index is 32.19 kg/m².     Physical Exam  Vitals and nursing note reviewed.   Constitutional:       General: She is not in acute distress.     Appearance: Normal appearance. She is not ill-appearing.   HENT:      Head: Normocephalic and atraumatic.      Nose: Nose normal.      Mouth/Throat:      Mouth: Mucous membranes are moist.   Eyes:      Extraocular Movements: Extraocular movements intact.   Cardiovascular:      Rate and Rhythm: Normal rate.      Pulses: Normal pulses.      Heart sounds: No murmur heard.  Pulmonary:      Effort: Pulmonary effort is normal. No respiratory distress.      Breath sounds: Decreased air movement present.   Abdominal:      General: Abdomen is flat.      Palpations: Abdomen is soft.      Tenderness: There is no abdominal tenderness.   Musculoskeletal:      Right lower leg: Edema (trace) present.      Left lower leg: Edema (trace) present.   Skin:     General: Skin is warm.      Capillary Refill: Capillary refill takes less than 2 seconds.   Neurological:      General: No focal deficit present.      Mental Status: She is alert.   Psychiatric:          Mood and Affect: Mood normal.                Significant Labs: All pertinent labs within the past 24 hours have been reviewed.    Significant Imaging: I have reviewed all pertinent imaging results/findings within the past 24 hours.

## 2023-08-27 NOTE — ASSESSMENT & PLAN NOTE
Patient's FSGs are controlled on current medication regimen.  Last A1c reviewed-   Lab Results   Component Value Date    HGBA1C 5.5 10/05/2022     Monitor BMPs

## 2023-08-27 NOTE — RESPIRATORY THERAPY
Latest Reference Range & Units 08/26/23 19:53   POC PH 7.35 - 7.45  7.348 (L)   POC PCO2 35 - 45 mmHg 44.3   POC PO2 80 - 100 mmHg 59 (LL)   POC HCO3 24 - 28 mmol/L 24.4   POC SATURATED O2 95 - 100 % 89 (L)   Sample  ARTERIAL   POC TCO2 23 - 27 mmol/L 26   POC BE -2 to 2 mmol/L -2   Flow  3   DelSys  Nasal Can   Site  RR   Mode  SPONT   (LL): Data is critically low  (L): Data is abnormally low

## 2023-08-27 NOTE — ASSESSMENT & PLAN NOTE
Patient has hyponatremia which is uncontrolled,We will aim to correct the sodium by 4-6mEq in 24 hours. We will monitor sodium Every 4 hours. The hyponatremia is due to Dehydration/hypovolemia and/or SIADH secondary to pneumonia. We will obtain the following studies: Urine sodium, urine osmolality, serum osmolality. We will treat the hyponatremia with IV fluids as follows: 100 ml/hr, fluid restriction.The patient's sodium results have been reviewed and are listed below.  Consult nephrology  Recent Labs   Lab 08/27/23  0127   *
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
## 2023-08-28 PROBLEM — Z71.89 ADVANCE CARE PLANNING: Status: ACTIVE | Noted: 2023-01-01

## 2023-08-28 NOTE — ASSESSMENT & PLAN NOTE
- Management per primary team; being treated for possible pneumonia, though also has history of pulmonary hypertension, elevated hemidiaphragm  - Will need pulmonology follow up after hospital

## 2023-08-28 NOTE — PROGRESS NOTES
[facility] Medicine  Progress Note    Patient Name: Barbara Rizo  MRN: [id number]  Patient Class: IP- Inpatient   Admission Date: 8/26/2023  Length of Stay: 1 days  Attending Physician: Bony Dalton MD  Primary Care Provider: Paras Oro MD        Subjective:     Principal Problem:Hyponatremia        HPI:  This is an 83-year-old female with a past medical history of HFpEF (EF: 65%, GIIDD), AFib (on Eliquis), hypertension, type 2 diabetes, hyperlipidemia, CKD 4, breast cancer, who presents with shortness of breath.    Patient presents with shortness of breath and generalized weakness that started 2 days prior to presentation.  She reports that she is been feeling progressively more fatigued and sustained a fall on her back with injury to her head a day prior to presentation.  Additionally, she reports decreased p.o. intake, nausea but denied vomiting or diarrhea.  She usually drinks 48 oz of water daily.     In the ED, the patient was tachypneic and hypoxic, requiring 5 L O2.  Labs were remarkable for hyponatremia (119 > 119), hyperkalemia (5.3 > 5.2), hypochloremia (89), elevated creatinine (2.2 from a baseline of 1.9), elevated BNP (868).  CT head showed no acute process.  CT chest without contrast showed small left pleural effusion with near complete consolidation of the left lower lobe (atelectasis and/or consolidation), and cardiomegaly with small pericardial effusion.  Patient was given Lasix 40 mg IV, ceftriaxone 1 g IV, and was later given 1 L of NS.  Of note, the patient has a history of hyponatremia requiring hospitalization (2020), and a prior history of a large right pleural effusion requiring a thoracentesis (2015).  Patient was admitted for further management.      Overview/Hospital Course:  Patient admitted with hyponatremia. Nitrite+ UTI and PNA, treating with antibiotics. Potentially SIADH from PNA. Thought initially to be hypervolemic hyponatremia with ,  urine Na was not low, and higher O2 requirement. Nephrology consulted. Tolvaptan initiated. Sodium improving to 123. Can step down for tomorrow 8/29.      Interval History:  NAEON.  No new issues.   Denies complaints.  All questions answered and updates on care given.       ROS:  General: Negative for fevers or chills.  Cardiac: Negative for chest pain or orthopnea   Pulmonary: Negative for wheezing.  GI: Negative for abdominal distention or pain     Vitals:    08/28/23 0700 08/28/23 0800 08/28/23 0900 08/28/23 1000   BP: 102/66 108/65 (!) 100/59 100/60   BP Location: Left arm Left arm Left arm    Patient Position: Lying Lying Lying    Pulse: 85 87 87 85   Resp: 13 (!) 23 (!) 39 (!) 24   Temp:  97.9 °F (36.6 °C)     TempSrc:  Oral     SpO2: (!) 94% (!) 94% (!) 93% 96%   Weight:       Height:              Body mass index is 33.47 kg/m².      PHYSICAL EXAM:  GENERAL APPEARANCE: alert and cooperative, and appears to be in no acute distress.  HEENT:     HEAD: NC/AT     EYES: PERRL, EOMI.  Vision is grossly intact.  NECK: Neck supple, non-tender without LAD, masses or thyromegaly.  CARDIAC: There is no peripheral edema, cyanosis or pallor.   LUNGS: Clear to auscultation and percussion without rales or wheezing  ABDOMEN: Non-distended. No guarding.  MSK: No joint erythema or tenderness.   EXTREMITIES: No significant deformity or joint abnormality. No edema.   NEUROLOGICAL: CN II-XII grossly intact.   SKIN: Skin normal color, texture and turgor with no lesions or eruptions.  PSYCHIATRIC: Oriented. No tangential speech. No Hyperactive features.        Recent Results (from the past 24 hour(s))   POCT glucose    Collection Time: 08/27/23 11:38 AM   Result Value Ref Range    POCT Glucose 108 70 - 110 mg/dL   Sodium (Serum)    Collection Time: 08/27/23  2:00 PM   Result Value Ref Range    Sodium 122 (L) 136 - 145 mmol/L   Basic metabolic panel    Collection Time: 08/27/23  3:08 PM   Result Value Ref Range    Sodium 121 (L) 136  - 145 mmol/L    Potassium 4.9 3.5 - 5.1 mmol/L    Chloride 91 (L) 95 - 110 mmol/L    CO2 21 (L) 23 - 29 mmol/L    Glucose 136 (H) 70 - 110 mg/dL    BUN 41 (H) 8 - 23 mg/dL    Creatinine 2.0 (H) 0.5 - 1.4 mg/dL    Calcium 8.1 (L) 8.7 - 10.5 mg/dL    Anion Gap 9 8 - 16 mmol/L    eGFR 24 (A) >60 mL/min/1.73 m^2   Basic metabolic panel    Collection Time: 08/27/23  6:28 PM   Result Value Ref Range    Sodium 120 (L) 136 - 145 mmol/L    Potassium 4.3 3.5 - 5.1 mmol/L    Chloride 90 (L) 95 - 110 mmol/L    CO2 22 (L) 23 - 29 mmol/L    Glucose 150 (H) 70 - 110 mg/dL    BUN 41 (H) 8 - 23 mg/dL    Creatinine 2.0 (H) 0.5 - 1.4 mg/dL    Calcium 7.9 (L) 8.7 - 10.5 mg/dL    Anion Gap 8 8 - 16 mmol/L    eGFR 24 (A) >60 mL/min/1.73 m^2   Sodium (Serum)    Collection Time: 08/27/23  6:28 PM   Result Value Ref Range    Sodium 121 (L) 136 - 145 mmol/L   POCT glucose    Collection Time: 08/27/23  6:28 PM   Result Value Ref Range    POCT Glucose 142 (H) 70 - 110 mg/dL   POCT glucose    Collection Time: 08/27/23  9:15 PM   Result Value Ref Range    POCT Glucose 137 (H) 70 - 110 mg/dL   Basic metabolic panel    Collection Time: 08/27/23  9:58 PM   Result Value Ref Range    Sodium 121 (L) 136 - 145 mmol/L    Potassium 4.4 3.5 - 5.1 mmol/L    Chloride 90 (L) 95 - 110 mmol/L    CO2 23 23 - 29 mmol/L    Glucose 130 (H) 70 - 110 mg/dL    BUN 41 (H) 8 - 23 mg/dL    Creatinine 1.9 (H) 0.5 - 1.4 mg/dL    Calcium 8.1 (L) 8.7 - 10.5 mg/dL    Anion Gap 8 8 - 16 mmol/L    eGFR 26 (A) >60 mL/min/1.73 m^2   Sodium (Serum)    Collection Time: 08/28/23 12:50 AM   Result Value Ref Range    Sodium 122 (L) 136 - 145 mmol/L   Basic metabolic panel    Collection Time: 08/28/23  2:09 AM   Result Value Ref Range    Sodium 120 (L) 136 - 145 mmol/L    Potassium 4.5 3.5 - 5.1 mmol/L    Chloride 92 (L) 95 - 110 mmol/L    CO2 21 (L) 23 - 29 mmol/L    Glucose 101 70 - 110 mg/dL    BUN 39 (H) 8 - 23 mg/dL    Creatinine 1.9 (H) 0.5 - 1.4 mg/dL    Calcium 8.1 (L) 8.7 -  10.5 mg/dL    Anion Gap 7 (L) 8 - 16 mmol/L    eGFR 26 (A) >60 mL/min/1.73 m^2   Basic metabolic panel    Collection Time: 08/28/23  6:16 AM   Result Value Ref Range    Sodium 121 (L) 136 - 145 mmol/L    Potassium 4.8 3.5 - 5.1 mmol/L    Chloride 90 (L) 95 - 110 mmol/L    CO2 21 (L) 23 - 29 mmol/L    Glucose 108 70 - 110 mg/dL    BUN 39 (H) 8 - 23 mg/dL    Creatinine 1.9 (H) 0.5 - 1.4 mg/dL    Calcium 8.0 (L) 8.7 - 10.5 mg/dL    Anion Gap 10 8 - 16 mmol/L    eGFR 26 (A) >60 mL/min/1.73 m^2   Sodium (Serum)    Collection Time: 08/28/23  6:16 AM   Result Value Ref Range    Sodium 123 (L) 136 - 145 mmol/L   Magnesium    Collection Time: 08/28/23  6:16 AM   Result Value Ref Range    Magnesium 1.7 1.6 - 2.6 mg/dL   POCT glucose    Collection Time: 08/28/23  7:46 AM   Result Value Ref Range    POCT Glucose 120 (H) 70 - 110 mg/dL   Basic metabolic panel    Collection Time: 08/28/23 10:00 AM   Result Value Ref Range    Sodium 121 (L) 136 - 145 mmol/L    Potassium 4.3 3.5 - 5.1 mmol/L    Chloride 90 (L) 95 - 110 mmol/L    CO2 24 23 - 29 mmol/L    Glucose 131 (H) 70 - 110 mg/dL    BUN 40 (H) 8 - 23 mg/dL    Creatinine 1.9 (H) 0.5 - 1.4 mg/dL    Calcium 7.9 (L) 8.7 - 10.5 mg/dL    Anion Gap 7 (L) 8 - 16 mmol/L    eGFR 26 (A) >60 mL/min/1.73 m^2       Microbiology Results (last 7 days)     Procedure Component Value Units Date/Time    Blood Culture #1 **CANNOT BE ORDERED STAT** [[id number]] Collected: 08/26/23 2110    Order Status: Completed Specimen: Blood from Peripheral, Antecubital, Right Updated: 08/28/23 0303     Blood Culture, Routine No Growth to date      No Growth to date    Blood Culture #2 **CANNOT BE ORDERED STAT** [[id number]] Collected: 08/26/23 2123    Order Status: Completed Specimen: Blood from Peripheral, Upper Arm, Right Updated: 08/28/23 0303     Blood Culture, Routine No Growth to date      No Growth to date    Culture, Respiratory with Gram Stain [[id number]]     Order Status: No result Specimen: Respiratory  from Sputum     Urine culture [[id number]] Collected: 08/26/23 2248    Order Status: No result Specimen: Urine Updated: 08/26/23 4596           Imaging Results          CT Chest Without Contrast (Final result)  Result time 08/26/23 21:25:34    Final result by Sabrina Wagoner MD (08/26/23 21:25:34)                 Impression:      1. Small left pleural effusion with near complete consolidation of the left lower lobe.  This may relate to atelectasis and/or consolidation such as pneumonia.  2. Cardiomegaly with small pericardial effusion.  3. Age indeterminate moderate T5 vertebral body compression fracture.  This may be remote, however is new when compared to remote CT from 2015.  No definite acute fracture planes are seen.  4. Additional findings as detailed above.      Electronically signed by: Sabrina Wagoner MD  Date:    08/26/2023  Time:    21:25             Narrative:    EXAMINATION:  CT CHEST WITHOUT CONTRAST    CLINICAL HISTORY:  Dyspnea, chronic, unclear etiology;    TECHNIQUE:  Low dose axial images, sagittal and coronal reformations were obtained from the thoracic inlet to the lung bases. Contrast was not administered.    COMPARISON:  CT chest from September 2015.    FINDINGS:  Structures at the base of the neck are unremarkable.  Aorta is non-aneurysmal.  The heart is enlarged with small volume pericardial fluid.  Aortic atherosclerosis and aortic valve calcification are seen.  Multiple normal sized mediastinal lymph nodes and suspected hilar lymph nodes are seen.  The esophagus is unremarkable along its course.    Major airways are patent.  There is a small left-sided pleural effusion with near complete consolidation seen of the left lower lobe.  Mild atelectatic changes are seen in the lingula and right lower lobe.  No right-sided pleural effusion.    The visualized abdominal structures show no acute abnormalities.  There is a 1.6 cm peripheral calcified structure seen near the right hemidiaphragm along  the anterior superior aspect of the liver.  Gallbladder has been removed.    Advanced degenerative changes are seen in the spine.  There is vertebral body fusion of the T8 and T9 vertebral bodies.  Age indeterminate moderate compression fracture is visualized of the T5 vertebral body.  Extrathoracic soft tissues are unremarkable.                               CT Head Without Contrast (Final result)  Result time 08/26/23 21:16:12    Final result by Sabrina Wagoner MD (08/26/23 21:16:12)                 Impression:      No acute intracranial abnormalities identified.      Electronically signed by: Sabrina Wagoner MD  Date:    08/26/2023  Time:    21:16             Narrative:    EXAMINATION:  CT HEAD WITHOUT CONTRAST    CLINICAL HISTORY:  Head trauma, minor (Age >= 65y);    TECHNIQUE:  Low dose axial images were obtained through the head.  Coronal and sagittal reformations were also performed. Contrast was not administered.    COMPARISON:  CT head from September 2015.    FINDINGS:  There is chronic microvascular ischemic disease.  No evidence of acute/recent major vascular distribution cerebral infarction, intraparenchymal hemorrhage, or intra-axial space occupying lesion. The ventricular system is normal in size and configuration with no evidence of hydrocephalus. No effacement of the skull-base cisterns. No abnormal extra-axial fluid collections or blood products. Visualized paranasal sinuses and mastoid air cells are clear. The calvarium shows no significant abnormality.                               X-Ray Chest AP Portable (Final result)  Result time 08/26/23 17:25:46    Final result by Sabrina Wagoner MD (08/26/23 17:25:46)                 Impression:      Left basilar retrocardiac opacification/consolidation which could reflect small left pleural effusion and atelectasis or consolidation such as developing pneumonia in the right clinical setting.      Electronically signed by: Sabrina Wagoner  MD  Date:    08/26/2023  Time:    17:25             Narrative:    EXAMINATION:  XR CHEST AP PORTABLE    CLINICAL HISTORY:  sob;    TECHNIQUE:  Single frontal view of the chest was performed.    COMPARISON:  December 2021.    FINDINGS:  Cardiac silhouette is stable in size.  Lungs are hypoinflated.  There is unchanged bilateral hilar prominence.  Left basilar retrocardiac opacity/consolidation is seen with possible small left pleural effusion.  No significant right-sided pleural effusion seen.  No pneumothorax.  No acute osseous abnormality identified.                                       Assessment/Plan:      * Hyponatremia  Patient has hyponatremia which is uncontrolled,We will aim to correct the sodium by 4-6mEq in 24 hours. We will monitor sodium Every 4 hours. The hyponatremia is due to Dehydration/hypovolemia and/or SIADH secondary to pneumonia. We will obtain the following studies: Urine sodium, urine osmolality, serum osmolality. We will treat the hyponatremia with IV fluids as follows: 100 ml/hr, fluid restriction.The patient's sodium results have been reviewed and are listed below.  Consult nephrology    Recent Labs   Lab 08/28/23  1000   *     Potentially SIADH from PNA. Thought initially to be hypervolemic hyponatremia with , urine Na was not low, and higher O2 requirement. Nephrology consulted. Tolvaptan initiated. Sodium improving to 123.     Prolonged Q-T interval on ECG  Noted. Avoid QT prolonging agents.       Pleural effusion  Continue antibiotics  Needs outpatient follow up/imaging to ensure resolution      Community acquired pneumonia  CT chest without contrast showed small left pleural effusion with near complete consolidation of the left lower lobe (atelectasis and/or consolidation), and cardiomegaly with small pericardial effusion  Continue ceftriaxone, doxycycline   Respiratory cultures, if able  Urine Legionella antigen    Chronic kidney disease, stage 4 (severe)  Slightly above  baseline.  Continue IV fluids   Continue to monitor  Nephrology consult      Type 2 diabetes mellitus with stage 4 chronic kidney disease, without long-term current use of insulin  Patient's FSGs are controlled on current medication regimen.  Last A1c reviewed-   Lab Results   Component Value Date    HGBA1C 5.5 10/05/2022     Monitor BMPs    Hyperkalemia  Medical management.      Chronic gout  Resume home medication      Acute respiratory failure with hypoxia  Patient with Hypoxic Respiratory failure which is Acute.  she is not on home oxygen.   Supplemental oxygen was provided and noted-    Likely in the setting of pneumonia and/or Pulmonary edema     Essential hypertension  Resume home medications.      Hyperlipidemia  Resume statin    VTE Risk Mitigation (From admission, onward)         Ordered     apixaban tablet 2.5 mg  2 times daily         08/27/23 0246     IP VTE HIGH RISK PATIENT  Once         08/27/23 0246     Place sequential compression device  Until discontinued         08/27/23 0246                Discharge Planning   SALLY:      Code Status: Full Code   Is the patient medically ready for discharge?:     Reason for patient still in hospital (select all that apply): Patient trending condition and Treatment  Discharge Plan A: Home (Follow-ups)            Critical care time spent on the evaluation and treatment of severe organ dysfunction, review of pertinent labs and imaging studies, discussions with consulting providers and discussions with patient/family: 35 minutes.      Bony Dalton MD  Department of Hospital Medicine   [facility] [place] - Intensive Care

## 2023-08-28 NOTE — ASSESSMENT & PLAN NOTE
"- Consult for introduction to palliative medicine and advance care planning in pt with underlying CKD IV, chronic respiratory failure (on 0-2 L NC oxygen at home), CHF (preserved EF, pulmonary hypertension, moderate aortic stenois) who presented to hospital for increased shortness of breath and falls; she was admitted to ICU for acute respiratory failure and hyponatremia. Chart reviewed in depth; discussed pt during MDT rounds.   - Met with pt at bedside; during entirety of visit, she was alert, in no distress, and readily able to have a coherent and insightful conversation. Present at bedside was her very supportive son Selwyn (legally-authorized surrogate medical decision maker, along with her other child Tamar). Introduced role of palliative medicine, and learned more about pt outside of hospital. She is  for the last 4.5 years; her   of renal failure. She worked as a  for 33 years, and now enjoys an active social life - she enjoys going out to lunch with friends and relatives, as well as going to listen to live music. Despite her chronic illnesses, she lives by herself, and is able to complete all ADLs without assistance. However, her daughter Tamar lives nearby.   - Thorough medical update provided, as well as some hypothetic medical situations (thoughts on HD, discussion of code status). Given that pt is the best person to make her medical decisions, encouraged her to further discuss her care preferences with her children. In discussing HD if needed in the future, she did not seem inclined to pursue this. Additionally, she stated that she "has had a good life" and does not think that she would want to undergo CPR. Told her I appreciate her insight, and reminded her that no decisions need to be made at this time.   - She did ask if I thought she was dying; I told her she's doing quite well for someone who is 83 years old and has some chronic conditions, and we are hopeful that she " gets to leave the hospital sometime in the next few days and go back to living her normal life.   - Validated that this can be a lot to think about; emotional support provided   - Let her know that I will check in with her (likely tomorrow); no changes in overall level of care at this time   - Updated primary team in person following visit

## 2023-08-28 NOTE — CARE UPDATE
[facility]  Nurses Note -- 4 Eyes      8/28/2023       Skin assessed on: Q Shift      [x] No Pressure Injuries Present    [x]Prevention Measures Documented    [] Yes LDA  for Pressure Injury Previously documented     [] Yes New Pressure Injury Discovered   [] LDA for New Pressure Injury Added      Attending RN:  Rachael Montiel RN     Second RN:  Cheyanne

## 2023-08-28 NOTE — SUBJECTIVE & OBJECTIVE
Past Medical History:   Diagnosis Date    A-fib     Breast cancer     invasive ductal carcinoma    CKD (chronic kidney disease)     Diabetes mellitus type II     Fatty liver     GERD (gastroesophageal reflux disease)     Hearing loss of both ears     wears bilateral hearing aids    Hemochromatosis     History of anemia due to CKD     History of pleural effusion     with thoracentesis    Hyperlipidemia     Hypertension     Macular degeneration     Osteoarthritis     Left knee    Osteoporosis     Vaginal delivery     x2    Vitamin D deficiency disease     Wears partial dentures     upper removable bridge       Past Surgical History:   Procedure Laterality Date    AXILLARY NODE DISSECTION Left 10/18/2021    Procedure: LYMPHADENECTOMY, AXILLARY;  Surgeon: Darlene Roca MD;  Location: [facility] [address];  Service: General;  Laterality: Left;    BREAST BIOPSY      BREAST LUMPECTOMY      invasive ductal carcinoma    BREAST SURGERY Bilateral     Reduction r/t h/o fibrocystic disease    CHOLECYSTECTOMY  08/2015    EYE SURGERY      Cat Ext  OU    HYSTERECTOMY      partial due to uterine fibroids    INCISION AND DRAINAGE OF KNEE Left 09/17/2020    Procedure: INCISION AND DRAINAGE, KNEE;  Surgeon: Rolando Wagoner MD;  Location: [facility] [address];  Service: Orthopedics;  Laterality: Left;    INJECTION FOR SENTINEL NODE IDENTIFICATION Left 10/18/2021    Procedure: INJECTION, FOR SENTINEL NODE IDENTIFICATION;  Surgeon: Darlene Roca MD;  Location: [facility] [address];  Service: General;  Laterality: Left;    JOINT REPLACEMENT Left 05/13/2013    TKR    JOINT REPLACEMENT Right 08/03/2015    TKR    MASTECTOMY, PARTIAL Left 10/18/2021    Procedure: MASTECTOMY, PARTIAL -- wire;  Surgeon: Darlene Roca MD;  Location: [facility] [address];  Service: General;  Laterality: Left;  RN PREOP 10/15/2021   VACCINATED-----NEED H/P AND ORDERS    SENTINEL LYMPH NODE BIOPSY Left 10/18/2021    Procedure: BIOPSY, LYMPH NODE, SENTINEL;  Surgeon: Darlene Roca MD;  Location: [facility] [address];   Service: General;  Laterality: Left;    THORACENTESIS      TOTAL KNEE ARTHROPLASTY Right 2015    left knee done 5/2013    WOUND DRESSING Left 09/17/2020    Procedure: WOUND VAC APPLICATION;  Surgeon: Rolando Wagoner MD;  Location: [facility];  Service: Orthopedics;  Laterality: Left;       Review of patient's allergies indicates:  No Known Allergies    Medications:  Continuous Infusions:  Scheduled Meds:   allopurinoL  100 mg Oral Daily    amiodarone  200 mg Oral Daily    apixaban  2.5 mg Oral BID    atorvastatin  40 mg Oral Daily    cefTRIAXone (ROCEPHIN) IVPB  2 g Intravenous Q24H    doxycycline (VIBRAMYCIN) IVPB  100 mg Intravenous Q12H    famotidine (PF)  20 mg Intravenous Daily    folic acid  1,000 mcg Oral Daily    metoprolol succinate  12.5 mg Oral Daily    mupirocin   Nasal BID     PRN Meds:acetaminophen, dextrose 10%, dextrose 10%, glucagon (human recombinant), glucose, glucose, insulin aspart U-100, magnesium oxide, magnesium oxide, melatonin, ondansetron, potassium bicarbonate, potassium bicarbonate, potassium bicarbonate, potassium, sodium phosphates, potassium, sodium phosphates, potassium, sodium phosphates, prochlorperazine, sodium chloride 0.9%    Family History       Problem Relation (Age of Onset)    Aneurysm Father    Cancer Mother    Hypertension Mother    No Known Problems Sister, Sister, Brother, Daughter, Son          Tobacco Use    Smoking status: Former     Passive exposure: Past    Smokeless tobacco: Never   Substance and Sexual Activity    Alcohol use: Not Currently    Drug use: No    Sexual activity: Not Currently       Review of Systems   Respiratory:  Positive for shortness of breath.         (Improved)    Neurological:         Fall at home     Objective:     Vital Signs (Most Recent):  Temp: 97.9 °F (36.6 °C) (08/28/23 0800)  Pulse: 85 (08/28/23 1000)  Resp: (!) 24 (08/28/23 1000)  BP: 100/60 (08/28/23 1000)  SpO2: 96 % (08/28/23 1000) Vital Signs (24h Range):  Temp:  [97.9 °F (36.6  °C)-98.5 °F (36.9 °C)] 97.9 °F (36.6 °C)  Pulse:  [] 85  Resp:  [12-39] 24  SpO2:  [88 %-97 %] 96 %  BP: ()/(47-76) 100/60     Weight: 83 kg (182 lb 15.7 oz)  Body mass index is 33.47 kg/m².       Physical Exam  Constitutional:       Appearance: She is not toxic-appearing.      Comments: Sitting up in bed, polite and conversational, in no distress   HENT:      Head: Normocephalic.      Nose: Nose normal.      Mouth/Throat:      Mouth: Mucous membranes are moist.   Eyes:      Extraocular Movements: Extraocular movements intact.   Cardiovascular:      Rate and Rhythm: Normal rate.   Pulmonary:      Comments: NC oxygen, no increased work of breathing   Neurological:      General: No focal deficit present.      Mental Status: She is alert and oriented to person, place, and time.   Psychiatric:         Mood and Affect: Mood normal.         Behavior: Behavior normal.       Significant Labs: All pertinent labs within the past 24 hours have been reviewed.  CBC:   Recent Labs   Lab 08/27/23  0509   WBC 5.84   HGB 13.5   HCT 41.1   MCV 94        BMP:  Recent Labs   Lab 08/28/23  0616 08/28/23  1000    131*   *  123* 121*   K 4.8 4.3   CL 90* 90*   CO2 21* 24   BUN 39* 40*   CREATININE 1.9* 1.9*   CALCIUM 8.0* 7.9*   MG 1.7  --      LFT:  Lab Results   Component Value Date    AST 13 08/26/2023    ALKPHOS 85 08/26/2023    BILITOT 0.9 08/26/2023     Albumin:   Albumin   Date Value Ref Range Status   08/26/2023 3.8 3.5 - 5.2 g/dL Final     Protein:   Total Protein   Date Value Ref Range Status   08/26/2023 7.5 6.0 - 8.4 g/dL Final     Lactic acid:   Lab Results   Component Value Date    LACTATE 1.4 01/28/2019    LACTATE 1.1 09/25/2015       Significant Imaging: I have reviewed all pertinent imaging results/findings within the past 24 hours.

## 2023-08-28 NOTE — NURSING
Pt neuro assessment currently at baseline. AAOx4. Vital signs remain stable. Pt resting comfortably. Pt afebrile. No complaints of pain or discomfort at this time. Pt reposition for comfort and according to protocol.

## 2023-08-28 NOTE — HOSPITAL COURSE
Patient admitted with hyponatremia. Nitrite+ UTI and PNA, treating with antibiotics. Potentially SIADH from PNA. Thought initially to be hypervolemic hyponatremia with , urine Na was not low, and higher O2 requirement. Nephrology consulted. Tolvaptan initiated. Sodium improving to 123. Can step down on 8/29.  Hyponatremia is slowly improved,  PT,OT recommending  SNF,but patient wish going home with HH.  Has ESBL Ecoli,started on meropenem.finished the course.  Has sign of fluid overload,with Hx of diastolic CHF,DC NS,started on IV lasix.  Replaced potassium.  Had warmongering respiratory failure duo to fluid overload,Still was on 6 liter oxygen,gradually improved,at DC time was back to baseline,hyponatremia is resolved.  Patient was discharged home with HH and higher dose of po lasix and follow up with PCP as out patient.

## 2023-08-28 NOTE — PT/OT/SLP EVAL
"Physical Therapy Evaluation and Treatment    Patient Name: Barbara Rizo   MRN: [id number]  Recent Surgery: * No surgery found *      Recommendations:     Discharge Recommendations: nursing facility, skilled   Discharge Equipment Recommendations: none       Assessment:     Barbara Rizo is a 83 y.o. female admitted with a medical diagnosis of Hyponatremia. She presents with the following impairments/functional limitations: weakness, impaired endurance, impaired functional mobility, gait instability, impaired balance.     Rehab Prognosis: Good; patient would benefit from acute PT services to address these deficits and reach maximum level of function.    Plan:     During this hospitalization, patient to be seen 5 x/week to address the above listed problems via gait training, therapeutic activities, therapeutic exercises    Plan of Care Expires: 09/04/23    Subjective     Chief Complaint: "I'm weak..."  Patient Comments/Goals: Pt agreeable to PT evaluation and treatment.  Pain/Comfort:  Pain Rating 1: 0/10    Social History:  Living Environment: Patient lives alone in a first floor apartment with number of outside stair(s): none  Prior Level of Function: Prior to admission, patient was modified independent  Equipment Used at Home: walker, rolling, bedside commode, shower chair  DME owned (not currently used): none  Assistance Upon Discharge:  daughter    Objective:     Communicated with nurseRachael prior to session. Patient found supine with blood pressure cuff, oxygen, peripheral IV, pulse ox (continuous), telemetry upon PT entry to room.    General Precautions: Standard, fall   Orthopedic Precautions: N/A   Braces: N/A    Respiratory Status: Nasal cannula, flow 5 L/min    Exams:  Cognition: Patient is oriented to Person, Place, Time, Situation  RLE ROM: WFL  RLE Strength:  grossly 3+/5  LLE ROM: WFL  LLE Strength:  grossly 3+/5  Sensation:    -       Intact  light/touch BLEs    Functional Mobility:  Gait " belt applied - Yes  Bed Mobility  Supine to Sit: minimum assistance for trunk management  Sit to Supine: minimum assistance for LE management  Transfers  NT  Gait  NT  Balance  Sitting: stand by assistance  Standing: NT      Therapeutic Activities and Exercises:   Patient educated on role of acute care PT and PT POC  Patient performed 1 set(s) of 10 repetitions of the following seated exercises: long arc quads for bilateral LE. Patient required skilled PT for instruction of exercises and appropriate cues to perform exercises safely and appropriately.  Pt instructed on/return demonstration of supine exs including APs, QS, and GS; instructed on 1x10 reps while in bed.  Patient educated about pursed lip breathing technique and cued for use with mobility.  Pt sat EOB vitals as follows: hr 80, bp 101/57, O2 sats 92% /a tx; hr 83, bp 110/70, O2 sats 86% seated EOB; hr 82, bp 116/66, O2 sats 89% upon return to supine.       AM-PAC 6 CLICK MOBILITY  Total Score:13    Patient left with bed in chair position with all lines intact, call button in reach, RN notified, and daughter present.    GOALS:   Multidisciplinary Problems       Physical Therapy Goals          Problem: Physical Therapy    Goal Priority Disciplines Outcome Goal Variances Interventions   Physical Therapy Goal     PT, PT/OT Ongoing, Progressing     Description: Goals to be met by: 23     Patient will increase functional independence with mobility by performin. Pt to be mod I with bed mobility.  2. Pt to transfer with min A.  3. Pt  to ambulate 50' w/RW CGA.  4. Pt to be (I) with written HEP.                         History:     Past Medical History:   Diagnosis Date    A-fib     Breast cancer     invasive ductal carcinoma    CKD (chronic kidney disease)     Diabetes mellitus type II     Fatty liver     GERD (gastroesophageal reflux disease)     Hearing loss of both ears     wears bilateral hearing aids    Hemochromatosis     History of anemia due to  CKD     History of pleural effusion     with thoracentesis    Hyperlipidemia     Hypertension     Macular degeneration     Osteoarthritis     Left knee    Osteoporosis     Vaginal delivery     x2    Vitamin D deficiency disease     Wears partial dentures     upper removable bridge       Past Surgical History:   Procedure Laterality Date    AXILLARY NODE DISSECTION Left 10/18/2021    Procedure: LYMPHADENECTOMY, AXILLARY;  Surgeon: Darlene Roca MD;  Location: [facility] OR;  Service: General;  Laterality: Left;    BREAST BIOPSY      BREAST LUMPECTOMY      invasive ductal carcinoma    BREAST SURGERY Bilateral     Reduction r/t h/o fibrocystic disease    CHOLECYSTECTOMY  08/2015    EYE SURGERY      Cat Ext  OU    HYSTERECTOMY      partial due to uterine fibroids    INCISION AND DRAINAGE OF KNEE Left 09/17/2020    Procedure: INCISION AND DRAINAGE, KNEE;  Surgeon: Rolando Wagoner MD;  Location: [facility] OR;  Service: Orthopedics;  Laterality: Left;    INJECTION FOR SENTINEL NODE IDENTIFICATION Left 10/18/2021    Procedure: INJECTION, FOR SENTINEL NODE IDENTIFICATION;  Surgeon: Darlene Roca MD;  Location: [facility] OR;  Service: General;  Laterality: Left;    JOINT REPLACEMENT Left 05/13/2013    TKR    JOINT REPLACEMENT Right 08/03/2015    TKR    MASTECTOMY, PARTIAL Left 10/18/2021    Procedure: MASTECTOMY, PARTIAL -- wire;  Surgeon: Darlene Roca MD;  Location: [facility] OR;  Service: General;  Laterality: Left;  RN PREOP 10/15/2021   VACCINATED-----NEED H/P AND ORDERS    SENTINEL LYMPH NODE BIOPSY Left 10/18/2021    Procedure: BIOPSY, LYMPH NODE, SENTINEL;  Surgeon: Darlene Roca MD;  Location: [facility] OR;  Service: General;  Laterality: Left;    THORACENTESIS      TOTAL KNEE ARTHROPLASTY Right 2015    left knee done 5/2013    WOUND DRESSING Left 09/17/2020    Procedure: WOUND VAC APPLICATION;  Surgeon: Rolando Wagoner MD;  Location: [facility] OR;  Service: Orthopedics;  Laterality: Left;       Time Tracking:     PT Received On:  08/28/23  PT Start Time: 1421  PT Stop Time: 1444  PT Total Time (min): 23 min     Billable Minutes: Evaluation 15 and Therapeutic Exercise 8    8/28/2023

## 2023-08-28 NOTE — ASSESSMENT & PLAN NOTE
- Noted; needs nephrology follow up. Illness trajectory education provided; encouraged her to think about whether she would want to undergo HD if this was needed in the future. She had multiple family members who were on this (or were in renal failure), so she is familiar with this intervention and understands that it is a form of life support

## 2023-08-28 NOTE — EICU
eICU Physician Virtual/Remote Brief Evaluation Note      Telephone call bedside RN   Critical value sodium 122  Also noticed mild mental status change-patient had been oriented x3 and now is not oriented to place but is oriented to person.  No localizing findings  Started on tolvaptan by Nephrology and not receiving IV fluids due to history of CHF   Chart reviewed, discussed with RN  /62, P 97, RR 20, O2 sat 93  Calm, speaking, responding appropriately on nasal cannula  Potassium 4.4, magnesium 1.9  Continue neuro checks, q.4 hours BMPs   Continue tolvaptan and management of hyponatremia per Nephrology  Magnesium added to a.m. labs      LINSEY. Diego Lares MD  eICU Attending  [phone]    This report has been created through the use of M-Modal dictation software. Typographical and content errors may occur with this process. While efforts are made to detect and correct such errors, in some cases errors will persist. For this reason, wording in this document should be considered in the proper context and not strictly verbatim

## 2023-08-28 NOTE — HPI
"(From H&P) "This is an 83-year-old female with a past medical history of HFpEF (EF: 65%, GIIDD), AFib (on Eliquis), hypertension, type 2 diabetes, hyperlipidemia, CKD 4, breast cancer, who presents with shortness of breath.     Patient presents with shortness of breath and generalized weakness that started 2 days prior to presentation.  She reports that she is been feeling progressively more fatigued and sustained a fall on her back with injury to her head a day prior to presentation.  Additionally, she reports decreased p.o. intake, nausea but denied vomiting or diarrhea.  She usually drinks 48 oz of water daily.      In the ED, the patient was tachypneic and hypoxic, requiring 5 L O2.  Labs were remarkable for hyponatremia (119 > 119), hyperkalemia (5.3 > 5.2), hypochloremia (89), elevated creatinine (2.2 from a baseline of 1.9), elevated BNP (868).  CT head showed no acute process.  CT chest without contrast showed small left pleural effusion with near complete consolidation of the left lower lobe (atelectasis and/or consolidation), and cardiomegaly with small pericardial effusion.  Patient was given Lasix 40 mg IV, ceftriaxone 1 g IV, and was later given 1 L of NS.  Of note, the patient has a history of hyponatremia requiring hospitalization (2020), and a prior history of a large right pleural effusion requiring a thoracentesis (2015).  Patient was admitted for further management."    Palliative medicine is consulted for introduction of services and advance care planning; for details of visit with pt, see ACP section of plan.      "

## 2023-08-28 NOTE — PLAN OF CARE
Problem: Physical Therapy  Goal: Physical Therapy Goal  Description: Goals to be met by: 23     Patient will increase functional independence with mobility by performin. Pt to be mod I with bed mobility.  2. Pt to transfer with min A.  3. Pt  to ambulate 50' w/RW CGA.  4. Pt to be (I) with written HEP.    Outcome: Ongoing, Progressing   Initial eval completed, see in chart for details.

## 2023-08-28 NOTE — CONSULTS
[facility] - Intensive Care  Palliative Medicine  Consult Note    Patient Name: Barbara Rioz  MRN: [id number]  Admission Date: 2023  Hospital Length of Stay: 1 days  Code Status: Full Code   Attending Provider: Bony Dalton MD  Consulting Provider: Teodora Ibarra MD  Primary Care Physician: Paras Oro MD  Principal Problem:Hyponatremia    Patient information was obtained from patient, past medical records, ER records and primary team.      Inpatient consult to Palliative Care  Consult performed by: Teodora Ibarra MD  Consult ordered by: Bony Dalton MD  Reason for consult: Advance Care Planning         Assessment/Plan:     Advance Care Planning       23  - Consult for introduction to palliative medicine and advance care planning in pt with underlying CKD IV, chronic respiratory failure (on 0-2 L NC oxygen at home), CHF (preserved EF, pulmonary hypertension, moderate aortic stenois) who presented to hospital for increased shortness of breath and falls; she was admitted to ICU for acute respiratory failure and hyponatremia. Chart reviewed in depth; discussed pt during MDT rounds.   - Met with pt at bedside; during entirety of visit, she was alert, in no distress, and readily able to have a coherent and insightful conversation. Present at bedside was her very supportive son Selwyn (legally-authorized surrogate medical decision maker, along with her other child Tamar). Introduced role of palliative medicine, and learned more about pt outside of hospital. She is  for the last 4.5 years; her   of renal failure. She worked as a  for 33 years, and now enjoys an active social life - she enjoys going out to lunch with friends and relatives, as well as going to listen to live music. Despite her chronic illnesses, she lives by herself, and is able to complete all ADLs without assistance. However, her daughter Tamar lives nearby.   - Thorough medical update provided, as  "well as some hypothetic medical situations (thoughts on HD, discussion of code status). Given that pt is the best person to make her medical decisions, encouraged her to further discuss her care preferences with her children. In discussing HD if needed in the future, she did not seem inclined to pursue this. Additionally, she stated that she "has had a good life" and does not think that she would want to undergo CPR. Told her I appreciate her insight, and reminded her that no decisions need to be made at this time.   - She did ask if I thought she was dying; I told her she's doing quite well for someone who is 83 years old and has some chronic conditions, and we are hopeful that she gets to leave the hospital sometime in the next few days and go back to living her normal life.   - Validated that this can be a lot to think about; emotional support provided   - Let her know that I will check in with her (likely tomorrow); no changes in overall level of care at this time   - Updated primary team in person following visit     Pulmonary  Community acquired pneumonia  - Abx per primary team     Acute respiratory failure with hypoxia  - Management per primary team; being treated for possible pneumonia, though also has history of pulmonary hypertension, elevated hemidiaphragm  - Will need pulmonology follow up after hospital       Renal/  Hyponatremia  - Improving s/p tolvaptan; nephrology consulted and managing. Has had history of this in 2020.     Chronic kidney disease, stage 4 (severe)  - Noted; needs nephrology follow up. Illness trajectory education provided; encouraged her to think about whether she would want to undergo HD if this was needed in the future. She had multiple family members who were on this (or were in renal failure), so she is familiar with this intervention and understands that it is a form of life support      Endocrine  Type 2 diabetes mellitus with stage 4 chronic kidney disease, without " "long-term current use of insulin  - Management per primary team     Orthopedic  Fall  - Occurred at home; PT/OT consulted    Thank you for your consult. I will follow-up with patient. Please contact us if you have any additional questions.     LESLIE Ding  Palliative Medicine Staff   [phone]    Total visit time: 86 minutes    > 50% of 70 min visit spent in chart review, face to face discussion of symptom assessment, coordination of care with other specialists, and discharge planning.    16 min ACP time spent: goals of care, emotional support, formulating and communicating prognosis, exploring burden/ benefit of various approaches of treatment.     Subjective:     HPI:   (From H&P) "This is an 83-year-old female with a past medical history of HFpEF (EF: 65%, GIIDD), AFib (on Eliquis), hypertension, type 2 diabetes, hyperlipidemia, CKD 4, breast cancer, who presents with shortness of breath.     Patient presents with shortness of breath and generalized weakness that started 2 days prior to presentation.  She reports that she is been feeling progressively more fatigued and sustained a fall on her back with injury to her head a day prior to presentation.  Additionally, she reports decreased p.o. intake, nausea but denied vomiting or diarrhea.  She usually drinks 48 oz of water daily.      In the ED, the patient was tachypneic and hypoxic, requiring 5 L O2.  Labs were remarkable for hyponatremia (119 > 119), hyperkalemia (5.3 > 5.2), hypochloremia (89), elevated creatinine (2.2 from a baseline of 1.9), elevated BNP (868).  CT head showed no acute process.  CT chest without contrast showed small left pleural effusion with near complete consolidation of the left lower lobe (atelectasis and/or consolidation), and cardiomegaly with small pericardial effusion.  Patient was given Lasix 40 mg IV, ceftriaxone 1 g IV, and was later given 1 L of NS.  Of note, the patient has a history of hyponatremia requiring " "hospitalization (2020), and a prior history of a large right pleural effusion requiring a thoracentesis (2015).  Patient was admitted for further management."    Palliative medicine is consulted for introduction of services and advance care planning; for details of visit with pt, see ACP section of plan.          Past Medical History:   Diagnosis Date    A-fib     Breast cancer     invasive ductal carcinoma    CKD (chronic kidney disease)     Diabetes mellitus type II     Fatty liver     GERD (gastroesophageal reflux disease)     Hearing loss of both ears     wears bilateral hearing aids    Hemochromatosis     History of anemia due to CKD     History of pleural effusion     with thoracentesis    Hyperlipidemia     Hypertension     Macular degeneration     Osteoarthritis     Left knee    Osteoporosis     Vaginal delivery     x2    Vitamin D deficiency disease     Wears partial dentures     upper removable bridge       Past Surgical History:   Procedure Laterality Date    AXILLARY NODE DISSECTION Left 10/18/2021    Procedure: LYMPHADENECTOMY, AXILLARY;  Surgeon: Darlene Roca MD;  Location: [facility] OR;  Service: General;  Laterality: Left;    BREAST BIOPSY      BREAST LUMPECTOMY      invasive ductal carcinoma    BREAST SURGERY Bilateral     Reduction r/t h/o fibrocystic disease    CHOLECYSTECTOMY  08/2015    EYE SURGERY      Cat Ext  OU    HYSTERECTOMY      partial due to uterine fibroids    INCISION AND DRAINAGE OF KNEE Left 09/17/2020    Procedure: INCISION AND DRAINAGE, KNEE;  Surgeon: Rolando Wagoner MD;  Location: [facility] OR;  Service: Orthopedics;  Laterality: Left;    INJECTION FOR SENTINEL NODE IDENTIFICATION Left 10/18/2021    Procedure: INJECTION, FOR SENTINEL NODE IDENTIFICATION;  Surgeon: Darlene Roca MD;  Location: [facility] OR;  Service: General;  Laterality: Left;    JOINT REPLACEMENT Left 05/13/2013    TKR    JOINT REPLACEMENT Right 08/03/2015    TKR    MASTECTOMY, PARTIAL Left " 10/18/2021    Procedure: MASTECTOMY, PARTIAL -- wire;  Surgeon: Darlene Roca MD;  Location: [facility] OR;  Service: General;  Laterality: Left;  RN PREOP 10/15/2021   VACCINATED-----NEED H/P AND ORDERS    SENTINEL LYMPH NODE BIOPSY Left 10/18/2021    Procedure: BIOPSY, LYMPH NODE, SENTINEL;  Surgeon: Darlene Roca MD;  Location: [facility] OR;  Service: General;  Laterality: Left;    THORACENTESIS      TOTAL KNEE ARTHROPLASTY Right 2015    left knee done 5/2013    WOUND DRESSING Left 09/17/2020    Procedure: WOUND VAC APPLICATION;  Surgeon: Rolando Wagoner MD;  Location: [facility] OR;  Service: Orthopedics;  Laterality: Left;       Review of patient's allergies indicates:  No Known Allergies    Medications:  Continuous Infusions:  Scheduled Meds:   allopurinoL  100 mg Oral Daily    amiodarone  200 mg Oral Daily    apixaban  2.5 mg Oral BID    atorvastatin  40 mg Oral Daily    cefTRIAXone (ROCEPHIN) IVPB  2 g Intravenous Q24H    doxycycline (VIBRAMYCIN) IVPB  100 mg Intravenous Q12H    famotidine (PF)  20 mg Intravenous Daily    folic acid  1,000 mcg Oral Daily    metoprolol succinate  12.5 mg Oral Daily    mupirocin   Nasal BID     PRN Meds:acetaminophen, dextrose 10%, dextrose 10%, glucagon (human recombinant), glucose, glucose, insulin aspart U-100, magnesium oxide, magnesium oxide, melatonin, ondansetron, potassium bicarbonate, potassium bicarbonate, potassium bicarbonate, potassium, sodium phosphates, potassium, sodium phosphates, potassium, sodium phosphates, prochlorperazine, sodium chloride 0.9%    Family History       Problem Relation (Age of Onset)    Aneurysm Father    Cancer Mother    Hypertension Mother    No Known Problems Sister, Sister, Brother, Daughter, Son          Tobacco Use    Smoking status: Former     Passive exposure: Past    Smokeless tobacco: Never   Substance and Sexual Activity    Alcohol use: Not Currently    Drug use: No    Sexual activity: Not Currently       Review of Systems    Respiratory:  Positive for shortness of breath.         (Improved)    Neurological:         Fall at home     Objective:     Vital Signs (Most Recent):  Temp: 97.9 °F (36.6 °C) (08/28/23 0800)  Pulse: 85 (08/28/23 1000)  Resp: (!) 24 (08/28/23 1000)  BP: 100/60 (08/28/23 1000)  SpO2: 96 % (08/28/23 1000) Vital Signs (24h Range):  Temp:  [97.9 °F (36.6 °C)-98.5 °F (36.9 °C)] 97.9 °F (36.6 °C)  Pulse:  [] 85  Resp:  [12-39] 24  SpO2:  [88 %-97 %] 96 %  BP: ()/(47-76) 100/60     Weight: 83 kg (182 lb 15.7 oz)  Body mass index is 33.47 kg/m².       Physical Exam  Constitutional:       Appearance: She is not toxic-appearing.      Comments: Sitting up in bed, polite and conversational, in no distress   HENT:      Head: Normocephalic.      Nose: Nose normal.      Mouth/Throat:      Mouth: Mucous membranes are moist.   Eyes:      Extraocular Movements: Extraocular movements intact.   Cardiovascular:      Rate and Rhythm: Normal rate.   Pulmonary:      Comments: NC oxygen, no increased work of breathing   Neurological:      General: No focal deficit present.      Mental Status: She is alert and oriented to person, place, and time.   Psychiatric:         Mood and Affect: Mood normal.         Behavior: Behavior normal.       Significant Labs: All pertinent labs within the past 24 hours have been reviewed.  CBC:   Recent Labs   Lab 08/27/23  0509   WBC 5.84   HGB 13.5   HCT 41.1   MCV 94        BMP:  Recent Labs   Lab 08/28/23  0616 08/28/23  1000    131*   *  123* 121*   K 4.8 4.3   CL 90* 90*   CO2 21* 24   BUN 39* 40*   CREATININE 1.9* 1.9*   CALCIUM 8.0* 7.9*   MG 1.7  --      LFT:  Lab Results   Component Value Date    AST 13 08/26/2023    ALKPHOS 85 08/26/2023    BILITOT 0.9 08/26/2023     Albumin:   Albumin   Date Value Ref Range Status   08/26/2023 3.8 3.5 - 5.2 g/dL Final     Protein:   Total Protein   Date Value Ref Range Status   08/26/2023 7.5 6.0 - 8.4 g/dL Final     Lactic acid:    Lab Results   Component Value Date    LACTATE 1.4 01/28/2019    LACTATE 1.1 09/25/2015       Significant Imaging: I have reviewed all pertinent imaging results/findings within the past 24 hours.

## 2023-08-28 NOTE — NURSING
"Pt seems mildly agitated and states she does not feel well. Pt remains oriented to self but confused to place. Pt. Stated "she is at my house, her house, the backyard or driveway.I don't know." Pt had to be reoriented multiple times. All vital signs remain WDL.  Lab at bedside. Awaiting sodium results  Sodium 122.  Dr. Lares contacted for pt status update.  Will continue with current orders at this time.  Close continuous monitoring ongoing.   "

## 2023-08-28 NOTE — PROGRESS NOTES
Barbara Rizo is a 83 y.o. female patient.    Follow for hyponatremia, CKD stage 4    C/o dry mouth, otherwise comfortable on NC    Scheduled Meds:   allopurinoL  100 mg Oral Daily    amiodarone  200 mg Oral Daily    apixaban  2.5 mg Oral BID    atorvastatin  40 mg Oral Daily    cefTRIAXone (ROCEPHIN) IVPB  2 g Intravenous Q24H    doxycycline (VIBRAMYCIN) IVPB  100 mg Intravenous Q12H    famotidine (PF)  20 mg Intravenous Daily    folic acid  1,000 mcg Oral Daily    metoprolol succinate  12.5 mg Oral Daily    mupirocin   Nasal BID       Review of patient's allergies indicates:  No Known Allergies      Vital Signs Range (Last 24H):  Temp:  [97.9 °F (36.6 °C)-98.5 °F (36.9 °C)]   Pulse:  []   Resp:  [12-39]   BP: ()/(47-76)   SpO2:  [88 %-97 %]     I & O (Last 24H):  Intake/Output Summary (Last 24 hours) at 8/28/2023 1037  Last data filed at 8/28/2023 0745  Gross per 24 hour   Intake 280 ml   Output 1925 ml   Net -1645 ml           Physical Exam:  General appearance: well developed, cachectic, no distress  Lungs:  clear to auscultation bilaterally and normal respiratory effort  Heart: regular rate and rhythm  Abdomen:  soft, normal bowel sounds  Extremities: edema negative    Laboratory:  I have reviewed all pertinent lab results within the past 24 hours.  CBC:   Recent Labs   Lab 08/27/23  0509   WBC 5.84   RBC 4.38   HGB 13.5   HCT 41.1      MCV 94   MCH 30.8   MCHC 32.8     CMP:   Recent Labs   Lab 08/26/23  2119 08/27/23  0127 08/28/23  0616      < > 108   CALCIUM 8.8   < > 8.0*   ALBUMIN 3.8  --   --    PROT 7.5  --   --    *   < > 121*  123*   K 5.3*   < > 4.8   CO2 20*   < > 21*   CL 89*   < > 90*   BUN 45*   < > 39*   CREATININE 2.2*   < > 1.9*   ALKPHOS 85  --   --    ALT 15  --   --    AST 13  --   --    BILITOT 0.9  --   --     < > = values in this interval not displayed.       Assessment & Plan    Hyponatremia  CKD stage 4   Pneumonia  UTI    IVF with NS  Watch  Na closely              Trac T Le  8/28/2023

## 2023-08-28 NOTE — PLAN OF CARE
Problem: Occupational Therapy  Goal: Occupational Therapy Goal  Description: Goals to be met by: 09/11/23     Patient will increase functional independence with ADLs by performing:    UE Dressing with [place].  LE Dressing with [place].  Grooming while standing at sink with Modified [place].  Toileting from toilet with Modified [place] for hygiene and clothing management.   Sitting at edge of bed x30  minutes with Modified [place].  Toilet transfer to toilet with Modified [place].  Increased functional strength to 4/5 for B upper extremity .  Upper extremity exercise program x10  reps per handout, with supervision.    Outcome: Established    Patient seen by occupational therapy for initial therapy evaluation. Patient lives alone and has oxygen at home on 2L, but is currently at 5L oxygen. She may need SNF care over HHOT. DME TBD depending on d/c disposition.

## 2023-08-28 NOTE — ASSESSMENT & PLAN NOTE
Patient has hyponatremia which is uncontrolled,We will aim to correct the sodium by 4-6mEq in 24 hours. We will monitor sodium Every 4 hours. The hyponatremia is due to Dehydration/hypovolemia and/or SIADH secondary to pneumonia. We will obtain the following studies: Urine sodium, urine osmolality, serum osmolality. We will treat the hyponatremia with IV fluids as follows: 100 ml/hr, fluid restriction.The patient's sodium results have been reviewed and are listed below.  Consult nephrology    Recent Labs   Lab 08/28/23  1000   *     Potentially SIADH from PNA. Thought initially to be hypervolemic hyponatremia with , urine Na was not low, and higher O2 requirement. Nephrology consulted. Tolvaptan initiated. Sodium improving to 123.

## 2023-08-28 NOTE — PLAN OF CARE
Problem: Adult Inpatient Plan of Care  Goal: Plan of Care Review  Outcome: Ongoing, Progressing  Goal: Patient-Specific Goal (Individualized)  Outcome: Ongoing, Progressing  Goal: Absence of Hospital-Acquired Illness or Injury  Outcome: Ongoing, Progressing  Goal: Optimal Comfort and Wellbeing  Outcome: Ongoing, Progressing  Goal: Readiness for Transition of Care  Outcome: Ongoing, Progressing     Problem: Diabetes Comorbidity  Goal: Blood Glucose Level Within Targeted Range  Outcome: Ongoing, Progressing     Problem: Infection (Pneumonia)  Goal: Resolution of Infection Signs and Symptoms  Outcome: Ongoing, Progressing     Problem: Respiratory Compromise (Pneumonia)  Goal: Effective Oxygenation and Ventilation  Outcome: Ongoing, Progressing     Problem: Impaired Wound Healing  Goal: Optimal Wound Healing  Outcome: Ongoing, Progressing     Problem: Fall Injury Risk  Goal: Absence of Fall and Fall-Related Injury  Outcome: Ongoing, Progressing     Problem: Oral Intake Inadequate  Goal: Improved Oral Intake  Outcome: Ongoing, Progressing

## 2023-08-28 NOTE — ASSESSMENT & PLAN NOTE
Patient with Hypoxic Respiratory failure which is Acute.  she is not on home oxygen.   Supplemental oxygen was provided and noted-    Likely in the setting of pneumonia and/or Pulmonary edema

## 2023-08-28 NOTE — CONSULTS
[facility] - Intensive Care  Wound Care    Patient Name:  Barbara Rizo   MRN:  [id number]  Date: 8/28/2023  Diagnosis: Hyponatremia    History:     Past Medical History:   Diagnosis Date    A-fib     Breast cancer     invasive ductal carcinoma    CKD (chronic kidney disease)     Diabetes mellitus type II     Fatty liver     GERD (gastroesophageal reflux disease)     Hearing loss of both ears     wears bilateral hearing aids    Hemochromatosis     History of anemia due to CKD     History of pleural effusion     with thoracentesis    Hyperlipidemia     Hypertension     Macular degeneration     Osteoarthritis     Left knee    Osteoporosis     Vaginal delivery     x2    Vitamin D deficiency disease     Wears partial dentures     upper removable bridge       Social History     Socioeconomic History    Marital status:     Number of children: 2   Tobacco Use    Smoking status: Former     Passive exposure: Past    Smokeless tobacco: Never   Substance and Sexual Activity    Alcohol use: Not Currently    Drug use: No    Sexual activity: Not Currently       Precautions:     Allergies as of 08/26/2023    (No Known Allergies)       WOC Assessment Details/Treatment   Consulted per WOGs for altered skin integrity buttocks, right hip, and left elbow  An 83 year old female admitted 8/26/23 from home with hyponatremia; prolonged Q-T interval on ECG; pleural effusion; community acquired pneumonia; CkD Stage 4; DM II with CKD Stage 4; hyperkalemia; chronic gout; acute respiratory failure with hypoxia; essential hypertension; HLD  8/27 WBC 5.84 Hgb 13.5 Hct 41.1  8/26 Alb 3.8 Weight 182 lbs  8/27 A1C 4.9   On Isolibrium mattress; Nima score 15   8/27 9:51 am 4 Eyes Skin Assessment- No Pressure Injuries Present   On Eliquis; sustained a fall on her back with injury to head a day prior to presentation  Assessment:  Ecchymosis on legs resolving with light purple/blue color. No surrounding erythema.  Skin tear left elbow-  dressed with silicone foam dressing without drainage visible on dressing and no surrounding erythema. Did not remove to allow healing. Ecchymosis on left forearm from Eliquis - present prior to injury.   Nursing reports MASD buttocks and treatment with Remedy cleanser and Zinc product.   Treatment:  Continue treatment of skin tear left arm with silicone foam dressing and change weekly.  Continue treatment of MASD with Remedy products.  Allow areas of ecchymosis to resolve.  Treatment plan discussed with patient and nursing.   Recommendations made to primary team. Orders placed.     08/28/2023

## 2023-08-28 NOTE — NURSING
[facility]  Nurses Note -- 4 Eyes      8/28/2023       Skin assessed on: Q Shift      [x] No Pressure Injuries Present    [x]Prevention Measures Documented    [] Yes LDA  for Pressure Injury Previously documented     [] Yes New Pressure Injury Discovered   [] LDA for New Pressure Injury Added      Attending RN: YO Savage RN      Second RN:  MARIANA Conklin RN

## 2023-08-28 NOTE — PT/OT/SLP EVAL
"Occupational Therapy Evaluation     Name: Barbara Rizo  MRN: [id number]  Admitting Diagnosis: Hyponatremia  Recent Surgery: * No surgery found *      Recommendations:     Discharge Recommendations: nursing facility, skilled  Level of Assistance Recommended: 24 hours light assistance  Discharge Equipment Recommendations: other (see comments) (TBD depending on d/c disposition)  Barriers to discharge: None    Assessment:     Barbara Rizo is a 83 y.o. female with a medical diagnosis of Hyponatremia. She presents with performance deficits affecting function including weakness, impaired endurance, impaired self care skills, impaired functional mobility, gait instability, impaired balance, decreased coordination, decreased lower extremity function, decreased safety awareness, impaired cardiopulmonary response to activity. Patient with HOB elevated and no pain noted. Her BP at start supine was 107/57, but decreased to 94/69 after sitting EOB and taking steps to HOB with no dizziness noted.     Rehab Prognosis: Good; patient would benefit from acute OT services to address these deficits and reach maximum level of function.    Plan:     Patient to be seen 5 x/week to address the above listed problems via self-care/home management, therapeutic activities, therapeutic exercises  Plan of Care Expires: 09/11/23  Plan of Care Reviewed with: patient    Subjective     Chief Complaint: weakness   Patient Comments/Goals: "I have had Home Health and been to [facility] in the past."   Pain/Comfort:  Pain Rating 1: 0/10    Patients cultural, spiritual, Mandaeism conflicts given the current situation: no    Social History:  Living Environment: Patient lives alone in a single story home with number of outside stair(s): 0  Prior Level of Function: Prior to admission, patient was independent  Roles and Routines: Patient was driving and retired prior to admission.  Equipment Used at Home: walker, rolling, bedside commode, shower " chair  DME owned (not currently used): none  Assistance Upon Discharge: family    Objective:     Communicated with RN, Rachael, prior to session. Patient found HOB elevated with bed alarm, blood pressure cuff, peripheral IV, oxygen, pulse ox (continuous), PureWick upon OT entry to room.    General Precautions: Standard, fall   Orthopedic Precautions: N/A   Braces: N/A    Respiratory Status: Nasal cannula, flow 5 L/min with respirations 20 and 95% oxygen sat while supine; 95% on 5L while seated EOB and taking steps to HOB     Occupational Performance: patient lives at home alone, but has a daughter that lives close to her per her report     Gait belt applied - Yes    Bed Mobility:   Rolling/Turning to Left with contact guard assistance  Scooting to HOB in supine: contact guard assistance  Scooting anteriorly to EOB to have both feet planted on floor: contact guard assistance  Supine to sit from left side of bed with contact guard assistance  Sit to Supine with contact guard assistance on Left side of bed    Functional Mobility/Transfers:  Sit <> Stand Transfer with contact guard assistance with rolling walker  Functional Mobility: Patient took 5 steps to Left side with RW with CGA from mid bed to HOB with no drop in oxygen saturation noted     Activities of Daily Living:  Grooming: minimum assistance to brush hair while seated; occupational therapy offered her a shower cap to wash hair, but she declined   Upper Body Dressing: minimum assistance to don outer gown     Cognitive/Visual Perceptual:  Cognitive/Psychosocial Skills:    -     Oriented to: Person, Place, Time, Situation  -     Follows Commands/attention: Follows multistep  commands  -     Communication: clear/fluent  -     Memory: No Deficits noted  -     Safety awareness/insight to disability: impaired  -     Mood/Affect/Coping skills/emotional control: Appropriate to situation  Visual/Perceptual:    -     Intact  Hearing: [place], hearing aid left ear      Physical Exam:  Balance:    -     Sitting: contact guard assistance  -     Standing: contact guard assistance  Postural examination/scapula alignment:    -       Rounded shoulders  -       Forward head  Skin integrity: Bruising of B upper extremity and hands   Edema:  None noted  Sensation:    -       Intact  Motor Planning: Intact  Dominant hand: Right  Upper Extremity Range of Motion:     -       Right Upper Extremity: WFL  -       Left Upper Extremity: WFL  Upper Extremity Strength:    -       Right Upper Extremity: WFL  -       Left Upper Extremity: WFL   Strength:    -       Right Upper Extremity: WFL  -       Left Upper Extremity: WFL  Fine Motor Coordination:    -       Intact  Gross motor coordination:   WFL    AMPAC 6 Click ADL:  AMPAC Total Score: 18    Treatment & Education:  Therapist provided facilitation and instruction of proper body mechanics, energy conservation, and fall prevention strategies during tasks listed above  Patient educated on role of OT, POC, and goals for therapy  Patient educated on importance of OOB activities with staff member assistance and sitting OOB majority of the day  Occupational therapy educated her re: drop in BP while seated EOB   Patient said she has had home health and rehab facility placement at [facility] in the past     Patient clear to stand pivot transfer with RN/PCT, assist x1  .    Patient left HOB elevated with all lines intact, call button in reach, RN notified, and bed alarm on.    GOALS:   Multidisciplinary Problems       Occupational Therapy Goals          Problem: Occupational Therapy    Goal Priority Disciplines Outcome Interventions   Occupational Therapy Goal     OT, PT/OT Ongoing, Progressing    Description: Goals to be met by: 09/11/23     Patient will increase functional independence with ADLs by performing:    UE Dressing with [place].  LE Dressing with [place].  Grooming while standing at sink with Modified  [place].  Toileting from toilet with Modified [place] for hygiene and clothing management.   Sitting at edge of bed x30  minutes with Modified [place].  Toilet transfer to toilet with Modified [place].  Increased functional strength to 4/5 for B upper extremity .  Upper extremity exercise program x10  reps per handout, with supervision.                         History:     Past Medical History:   Diagnosis Date    A-fib     Breast cancer     invasive ductal carcinoma    CKD (chronic kidney disease)     Diabetes mellitus type II     Fatty liver     GERD (gastroesophageal reflux disease)     Hearing loss of both ears     wears bilateral hearing aids    Hemochromatosis     History of anemia due to CKD     History of pleural effusion     with thoracentesis    Hyperlipidemia     Hypertension     Macular degeneration     Osteoarthritis     Left knee    Osteoporosis     Vaginal delivery     x2    Vitamin D deficiency disease     Wears partial dentures     upper removable bridge         Past Surgical History:   Procedure Laterality Date    AXILLARY NODE DISSECTION Left 10/18/2021    Procedure: LYMPHADENECTOMY, AXILLARY;  Surgeon: Darlene Roca MD;  Location: [facility] OR;  Service: General;  Laterality: Left;    BREAST BIOPSY      BREAST LUMPECTOMY      invasive ductal carcinoma    BREAST SURGERY Bilateral     Reduction r/t h/o fibrocystic disease    CHOLECYSTECTOMY  08/2015    EYE SURGERY      Cat Ext  OU    HYSTERECTOMY      partial due to uterine fibroids    INCISION AND DRAINAGE OF KNEE Left 09/17/2020    Procedure: INCISION AND DRAINAGE, KNEE;  Surgeon: Rolando Wagoner MD;  Location: [facility] OR;  Service: Orthopedics;  Laterality: Left;    INJECTION FOR SENTINEL NODE IDENTIFICATION Left 10/18/2021    Procedure: INJECTION, FOR SENTINEL NODE IDENTIFICATION;  Surgeon: Darlene Roca MD;  Location: [facility] OR;  Service: General;  Laterality: Left;    JOINT REPLACEMENT Left 05/13/2013    TKR    JOINT REPLACEMENT  Right 08/03/2015    TKR    MASTECTOMY, PARTIAL Left 10/18/2021    Procedure: MASTECTOMY, PARTIAL -- wire;  Surgeon: Darlene Roca MD;  Location: [facility];  Service: General;  Laterality: Left;  RN PREOP 10/15/2021   VACCINATED-----NEED H/P AND ORDERS    SENTINEL LYMPH NODE BIOPSY Left 10/18/2021    Procedure: BIOPSY, LYMPH NODE, SENTINEL;  Surgeon: Darlene Roca MD;  Location: [facility];  Service: General;  Laterality: Left;    THORACENTESIS      TOTAL KNEE ARTHROPLASTY Right 2015    left knee done 5/2013    WOUND DRESSING Left 09/17/2020    Procedure: WOUND VAC APPLICATION;  Surgeon: Rolando Wagoner MD;  Location: [facility];  Service: Orthopedics;  Laterality: Left;       Time Tracking:     OT Date of Treatment: 08/28/23  OT Start Time: 1510  OT Stop Time: 1552  OT Total Time (min): 42 min    Billable Minutes: Evaluation 15 , Self Care/Home Management 15 , and Therapeutic Activity 12     8/28/2023

## 2023-08-29 NOTE — NURSING
[facility]  Nurses Note -- 4 Eyes      8/29/2023       Skin assessed on: Admit      [x] No Pressure Injuries Present    [x]Prevention Measures Documented    [] Yes LDA  for Pressure Injury Previously documented     [] Yes New Pressure Injury Discovered   [] LDA for New Pressure Injury Added      Attending RN:  Candis Delacruz LPN     Second RN:  Tommie Diop RN

## 2023-08-29 NOTE — PT/OT/SLP PROGRESS
Occupational Therapy   Treatment    Name: Barbara Rizo  MRN: [id number]  Admitting Diagnosis:  Hyponatremia       Recommendations:     Discharge Recommendations: nursing facility, skilled  Discharge Equipment Recommendations:  other (see comments) (TBD)  Barriers to discharge:  Other (Comment) (patient still not at PLOF, high risk for fall and for hospital readmission)    Assessment:     Barbara Rizo is a 83 y.o. female with a medical diagnosis of Hyponatremia.  She presents with HOB elevated on 5L of oxygen at 94% before getting OOB to walk to bathroom. Performance deficits affecting function are weakness, impaired endurance, impaired self care skills, impaired functional mobility, gait instability, impaired balance, decreased lower extremity function, decreased safety awareness, pain, impaired skin, impaired cardiopulmonary response to activity.     Rehab Prognosis:  Good; patient would benefit from acute skilled OT services to address these deficits and reach maximum level of function.       Plan:     Patient to be seen 5 x/week to address the above listed problems via self-care/home management, therapeutic activities, therapeutic exercises  Plan of Care Expires: 09/11/23  Plan of Care Reviewed with: patient    Subjective     Chief Complaint: weakness   Patient/Family Comments/goals: she does not want to go to SNF, wants HH instead   Pain/Comfort:   0/10     Objective:     Communicated with: TOSHIA Rodriguez,  prior to session.  Patient found HOB elevated with bed alarm, peripheral IV, PureWick, oxygen upon OT entry to room.    General Precautions: Standard, fall, respiratory    Orthopedic Precautions:N/A  Braces: N/A  Respiratory Status: Nasal cannula, flow 5 L/min     Occupational Performance: her daughter was present today during treatment     Bed Mobility:    Patient completed Rolling/Turning to Left with  stand by assistance  Patient completed Rolling/Turning to Right with stand by assistance  Patient  completed Scooting/Bridging with stand by assistance  Patient completed Supine to Sit with stand by assistance  Patient completed Sit to Supine with stand by assistance     Functional Mobility/Transfers:  Patient completed Sit <> Stand Transfer with stand by assistance  with  rolling walker   Patient completed Toilet Transfer walking technique with stand by assistance with  rolling walker  Functional Mobility: Patient walked from bed to bathroom x ~10' with RW with SBA to CGA to guide extended oxygen cord. She returned to bed with RW with SBA from bathroom.     Activities of Daily Living:  Upper Body Dressing: minimum assistance to don/doff outer gown   Toileting: patient needed max assistance with brief and purewick management and perineal hygiene when she returned to bed. She did some perineal hygiene seated on toilet prior to returning to bed.       [facility] 6 Click ADL: 19    Treatment & Education:  Patient needed verbal cues to watch out for extended oxygen cord while ambulating. She verbalized and demonstrated understanding.     Patient left HOB elevated with all lines intact, call button in reach, and RN  notified and daughter present     GOALS:   Multidisciplinary Problems       Occupational Therapy Goals          Problem: Occupational Therapy    Goal Priority Disciplines Outcome Interventions   Occupational Therapy Goal     OT, PT/OT Ongoing, Progressing    Description: Goals to be met by: 09/11/23     Patient will increase functional independence with ADLs by performing:    UE Dressing with [place].  LE Dressing with [place].  Grooming while standing at sink with Modified [place].  Toileting from toilet with Modified [place] for hygiene and clothing management.   Sitting at edge of bed x30  minutes with Modified [place].  Toilet transfer to toilet with Modified [place].  Increased functional strength to 4/5 for B upper extremity .  Upper extremity exercise program x10  reps per  handout, with supervision.                         Time Tracking:     OT Date of Treatment: 08/29/23  OT Start Time: 1546  OT Stop Time: 1616  OT Total Time (min): 30 min    Billable Minutes:Self Care/Home Management 30     OT/NIKO: OT          8/29/2023

## 2023-08-29 NOTE — TELEPHONE ENCOUNTER
Returned call to facility who states pt is being discharged from hosp and was referred to their agency for palative care which means that a NP would go out and eval pt periodically and help manage symptoms. Agency states they need a verbal OK from PCP to admit pt to care.

## 2023-08-29 NOTE — PLAN OF CARE
Problem: Physical Therapy  Goal: Physical Therapy Goal  Description: Goals to be met by: 23     Patient will increase functional independence with mobility by performin. Pt to be mod I with bed mobility.  2. Pt to transfer with min A.  3. Pt  to ambulate 50' w/RW CGA.  4. Pt to be (I) with written HEP.    2023 1516 by Bianca Gore, PT  Outcome: Ongoing, Progressing

## 2023-08-29 NOTE — PLAN OF CARE
Problem: Occupational Therapy  Goal: Occupational Therapy Goal  Description: Goals to be met by: 09/11/23     Patient will increase functional independence with ADLs by performing:    UE Dressing with [place].  LE Dressing with [place].  Grooming while standing at sink with Modified [place].  Toileting from toilet with Modified [place] for hygiene and clothing management.   Sitting at edge of bed x30  minutes with Modified [place].  Toilet transfer to toilet with Modified [place].  Increased functional strength to 4/5 for B upper extremity .  Upper extremity exercise program x10  reps per handout, with supervision.    Outcome: Ongoing, Progressing    Patient walked from bed to bathroom with CGA to SBA and reminders to watch out for extended oxygen cord while ambulating.

## 2023-08-29 NOTE — PT/OT/SLP PROGRESS
Physical Therapy      Patient Name:  Barbara Rizo   MRN:  [id number]    Patient not seen today secondary to  (Nursing care 11:50). Will follow-up .     Reviewed CT results; new solid component >1 cm in size in area of GGO in RLL concerning for malignancy. I have contacted the pt with results and am recommending bronchoscopy with biopsy as soon as can be scheduled. Pt is agreeable to this. She is heading to [place] to be with family until 2/11/23. We will plan on biopsy as soon as possible. sHe is willing to cut her trip short if necessary.

## 2023-08-29 NOTE — TELEPHONE ENCOUNTER
If she is ok with receiving palliative care, I am ok with this.  Please let the necessary parties know it is ok to proceed with care    Thanks  Dr. Oro

## 2023-08-29 NOTE — SUBJECTIVE & OBJECTIVE
Past Medical History:   Diagnosis Date    A-fib     Breast cancer     invasive ductal carcinoma    CKD (chronic kidney disease)     Diabetes mellitus type II     Fatty liver     GERD (gastroesophageal reflux disease)     Hearing loss of both ears     wears bilateral hearing aids    Hemochromatosis     History of anemia due to CKD     History of pleural effusion     with thoracentesis    Hyperlipidemia     Hypertension     Macular degeneration     Osteoarthritis     Left knee    Osteoporosis     Vaginal delivery     x2    Vitamin D deficiency disease     Wears partial dentures     upper removable bridge       Past Surgical History:   Procedure Laterality Date    AXILLARY NODE DISSECTION Left 10/18/2021    Procedure: LYMPHADENECTOMY, AXILLARY;  Surgeon: Darlene Roca MD;  Location: [facility];  Service: General;  Laterality: Left;    BREAST BIOPSY      BREAST LUMPECTOMY      invasive ductal carcinoma    BREAST SURGERY Bilateral     Reduction r/t h/o fibrocystic disease    CHOLECYSTECTOMY  08/2015    EYE SURGERY      Cat Ext  OU    HYSTERECTOMY      partial due to uterine fibroids    INCISION AND DRAINAGE OF KNEE Left 09/17/2020    Procedure: INCISION AND DRAINAGE, KNEE;  Surgeon: Rolando Wagoner MD;  Location: [facility];  Service: Orthopedics;  Laterality: Left;    INJECTION FOR SENTINEL NODE IDENTIFICATION Left 10/18/2021    Procedure: INJECTION, FOR SENTINEL NODE IDENTIFICATION;  Surgeon: Darlene Roca MD;  Location: [facility];  Service: General;  Laterality: Left;    JOINT REPLACEMENT Left 05/13/2013    TKR    JOINT REPLACEMENT Right 08/03/2015    TKR    MASTECTOMY, PARTIAL Left 10/18/2021    Procedure: MASTECTOMY, PARTIAL -- wire;  Surgeon: Darlene Roca MD;  Location: [facility];  Service: General;  Laterality: Left;  RN PREOP 10/15/2021   VACCINATED-----NEED H/P AND ORDERS    SENTINEL LYMPH NODE BIOPSY Left 10/18/2021    Procedure: BIOPSY, LYMPH NODE, SENTINEL;  Surgeon: Darlene Roca MD;  Location: [facility];   Service: General;  Laterality: Left;    THORACENTESIS      TOTAL KNEE ARTHROPLASTY Right 2015    left knee done 5/2013    WOUND DRESSING Left 09/17/2020    Procedure: WOUND VAC APPLICATION;  Surgeon: Rolando Wagoner MD;  Location: [facility];  Service: Orthopedics;  Laterality: Left;       Review of patient's allergies indicates:  No Known Allergies    Current Facility-Administered Medications on File Prior to Encounter   Medication    denosumab (PROLIA) injection 60 mg     Current Outpatient Medications on File Prior to Encounter   Medication Sig    allopurinoL (ZYLOPRIM) 100 MG tablet Take 1 tablet by mouth once daily.    amiodarone (PACERONE) 200 MG Tab TAKE ONE-HALF TABLET BY MOUTH EVERY DAY *DO NOT TAKE WITH GRAPEFRUIT JUICE*    amLODIPine (NORVASC) 10 MG tablet TAKE ONE TABLET BY MOUTH ONCE DAILY    apixaban (ELIQUIS) 2.5 mg Tab TAKE ONE TABLET BY MOUTH TWICE DAILY    atorvastatin (LIPITOR) 40 MG tablet TAKE ONE TABLET BY MOUTH ONCE DAILY    calcium carbonate (TUMS) 200 mg calcium (500 mg) chewable tablet Take 2 tablets by mouth.    ergocalciferol (ERGOCALCIFEROL) 50,000 unit Cap Take 50,000 Units by mouth every 30 days.     FLUZONE HIGHDOSE QUAD 21-22  mcg/0.7 mL Syrg     folic acid (FOLVITE) 1 MG tablet TAKE 1 TABLET BY MOUTH EVERY DAY    furosemide (LASIX) 20 MG tablet Take 1 tablet (20 mg total) by mouth 2 (two) times daily.    losartan (COZAAR) 25 MG tablet Take 25 mg by mouth once daily.    mupirocin (BACTROBAN) 2 % ointment Apply topically every evening.    solifenacin (VESICARE) 10 MG tablet Take 1 tablet (10 mg total) by mouth once daily.     Family History       Problem Relation (Age of Onset)    Aneurysm Father    Cancer Mother    Hypertension Mother    No Known Problems Sister, Sister, Brother, Daughter, Son          Tobacco Use    Smoking status: Former     Passive exposure: Past    Smokeless tobacco: Never   Substance and Sexual Activity    Alcohol use: Not Currently    Drug use: No    Sexual  activity: Not Currently     Review of Systems   Constitutional:  Positive for appetite change and fatigue.   HENT: Negative.     Eyes: Negative.    Respiratory: Negative.     Cardiovascular: Negative.    Gastrointestinal:  Positive for nausea.   Endocrine: Negative.    Genitourinary: Negative.    Musculoskeletal: Negative.    Skin: Negative.    Allergic/Immunologic: Negative.    Neurological: Negative.    Psychiatric/Behavioral: Negative.       Objective:     Vital Signs (Most Recent):  Temp: 97.9 °F (36.6 °C) (08/29/23 0739)  Pulse: 89 (08/29/23 0739)  Resp: 18 (08/29/23 0739)  BP: 113/65 (08/29/23 0739)  SpO2: (!) 86 % (08/29/23 0739) Vital Signs (24h Range):  Temp:  [97.6 °F (36.4 °C)-98.2 °F (36.8 °C)] 97.9 °F (36.6 °C)  Pulse:  [66-98] 89  Resp:  [18-52] 18  SpO2:  [86 %-96 %] 86 %  BP: (100-123)/(57-77) 113/65     Weight: 83.9 kg (184 lb 15.5 oz)  Body mass index is 33.83 kg/m².     Physical Exam  Vitals and nursing note reviewed.   Constitutional:       General: She is not in acute distress.     Appearance: Normal appearance. She is not ill-appearing.   HENT:      Head: Normocephalic and atraumatic.      Nose: Nose normal.      Mouth/Throat:      Mouth: Mucous membranes are moist.   Eyes:      Extraocular Movements: Extraocular movements intact.   Cardiovascular:      Rate and Rhythm: Normal rate.      Pulses: Normal pulses.      Heart sounds: No murmur heard.  Pulmonary:      Effort: Pulmonary effort is normal. No respiratory distress.      Breath sounds: Decreased air movement present.   Abdominal:      General: Abdomen is flat.      Palpations: Abdomen is soft.      Tenderness: There is no abdominal tenderness.   Musculoskeletal:      Right lower leg: Edema (trace) present.      Left lower leg: Edema (trace) present.   Skin:     General: Skin is warm.      Capillary Refill: Capillary refill takes less than 2 seconds.   Neurological:      General: No focal deficit present.      Mental Status: She is alert.    Psychiatric:         Mood and Affect: Mood normal.                Significant Labs: All pertinent labs within the past 24 hours have been reviewed.    Significant Imaging: I have reviewed all pertinent imaging results/findings within the past 24 hours.

## 2023-08-29 NOTE — TELEPHONE ENCOUNTER
Lisa at [facility] at [facility] given MD's instructions in message below.

## 2023-08-29 NOTE — PT/OT/SLP PROGRESS
Physical Therapy Treatment    Patient Name:  Barbara Rizo   MRN:  [id number]    Recommendations:     Discharge Recommendations: nursing facility, skilled  Discharge Equipment Recommendations:  (pending pt progress)  Barriers to discharge:  Increased O2 demand    Assessment:     Barbara Rizo is a 83 y.o. female admitted with a medical diagnosis of Hyponatremia.  She presents with the following impairments/functional limitations: impaired endurance, decreased coordination, impaired coordination, impaired self care skills, impaired functional mobility, gait instability, decreased safety awareness, impaired balance .    Rehab Prognosis: Good; patient would benefit from acute skilled PT services to address these deficits and reach maximum level of function.    Recent Surgery: * No surgery found *      Plan:     During this hospitalization, patient to be seen 5 x/week to address the identified rehab impairments via gait training, therapeutic activities, therapeutic exercises and progress toward the following goals:    Plan of Care Expires:  09/04/23    Subjective     Chief Complaint: weakness, SOB  Patient/Family Comments/goals: Pt agreeable to treatment session  Pain/Comfort:  Pain Rating 1: 0/10      Objective:     Communicated with nsg prior to session.  Patient found HOB elevated with bed alarm, peripheral IV, PureWick, oxygen upon PT entry to room.     General Precautions: Standard, fall  Orthopedic Precautions: N/A  Braces: N/A  Respiratory Status: Nasal cannula, flow 5 L/min     Functional Mobility:  Bed Mobility:     Scooting: stand by assistance  Supine to Sit: stand by assistance  Transfers:     Sit to Stand:  contact guard assistance and with Vc/Tc for hand placement forward weight shift over BEATRICE with rolling walker  Gait: Gait training with RW and SBA approx 25'  Balance: Fair+/Fair      AM-PAC 6 CLICK MOBILITY  Turning over in bed (including adjusting bedclothes, sheets and blankets)?: 3  Sitting  "down on and standing up from a chair with arms (e.g., wheelchair, bedside commode, etc.): 3  Moving from lying on back to sitting on the side of the bed?: 3  Moving to and from a bed to a chair (including a wheelchair)?: 3  Need to walk in hospital room?: 3  Climbing 3-5 steps with a railing?: 3  Basic Mobility Total Score: 18       Treatment & Education:  SPO2 on 5L O2 at rest 100%, on 5L of O2 with activity 89-90*, on RA with activity 84%  Educated pt on Pursed lip breathing, PT POC, and "to call before you fall".  Educate pt to perform B AP's, GS, and TKE's while in bed and up in chair.  Pt verbalized/demonstrated understanding    Patient left up in chair with all lines intact, call button in reach, nsg notified, and daughter present..    GOALS:   Multidisciplinary Problems       Physical Therapy Goals          Problem: Physical Therapy    Goal Priority Disciplines Outcome Goal Variances Interventions   Physical Therapy Goal     PT, PT/OT Ongoing, Progressing     Description: Goals to be met by: 23     Patient will increase functional independence with mobility by performin. Pt to be mod I with bed mobility.  2. Pt to transfer with min A.  3. Pt  to ambulate 50' w/RW CGA.  4. Pt to be (I) with written HEP.                         Time Tracking:     PT Received On: 23  PT Start Time: 1315     PT Stop Time: 1341  PT Total Time (min): 26 min     Billable Minutes: Therapeutic Exercise 26    Treatment Type: Treatment  PT/PTA: PT     Number of PTA visits since last PT visit: 0     2023  "

## 2023-08-29 NOTE — PLAN OF CARE
Problem: Adult Inpatient Plan of Care  Goal: Plan of Care Review  Outcome: Ongoing, Progressing  Goal: Patient-Specific Goal (Individualized)  Outcome: Ongoing, Progressing  Goal: Absence of Hospital-Acquired Illness or Injury  Outcome: Ongoing, Progressing  Goal: Optimal Comfort and Wellbeing  Outcome: Ongoing, Progressing  Goal: Readiness for Transition of Care  Outcome: Ongoing, Progressing     Problem: Diabetes Comorbidity  Goal: Blood Glucose Level Within Targeted Range  Outcome: Ongoing, Progressing     Problem: Fluid Imbalance (Pneumonia)  Goal: Fluid Balance  Outcome: Ongoing, Progressing     Problem: Infection (Pneumonia)  Goal: Resolution of Infection Signs and Symptoms  Outcome: Ongoing, Progressing     Problem: Respiratory Compromise (Pneumonia)  Goal: Effective Oxygenation and Ventilation  Outcome: Ongoing, Progressing     Problem: Impaired Wound Healing  Goal: Optimal Wound Healing  Outcome: Ongoing, Progressing     Problem: Skin Injury Risk Increased  Goal: Skin Health and Integrity  Outcome: Ongoing, Progressing     Problem: Fall Injury Risk  Goal: Absence of Fall and Fall-Related Injury  Outcome: Ongoing, Progressing     Problem: Oral Intake Inadequate  Goal: Improved Oral Intake  Outcome: Ongoing, Progressing     Problem: Coping Ineffective  Goal: Effective Coping  Outcome: Ongoing, Progressing

## 2023-08-29 NOTE — NURSING
[facility]  Nurses Note -- 4 Eyes      8/28/2023       Skin assessed on: Q Shift      [x] No Pressure Injuries Present    [x]Prevention Measures Documented    [] Yes LDA  for Pressure Injury Previously documented     [] Yes New Pressure Injury Discovered   [] LDA for New Pressure Injury Added      Attending RN:  Ioana Kim RN     Second RN:  Rachael Montiel RN

## 2023-08-29 NOTE — ASSESSMENT & PLAN NOTE
Patient has hyponatremia which is uncontrolled,We will aim to correct the sodium by 4-6mEq in 24 hours. We will monitor sodium Every 4 hours. The hyponatremia is due to Dehydration/hypovolemia and/or SIADH secondary to pneumonia. We will obtain the following studies: Urine sodium, urine osmolality, serum osmolality. We will treat the hyponatremia with IV fluids as follows: 100 ml/hr, fluid restriction.The patient's sodium results have been reviewed and are listed below.  Consult nephrology    Recent Labs   Lab 08/29/23  0004   *     Potentially SIADH from PNA. Thought initially to be hypervolemic hyponatremia with , urine Na was not low, and higher O2 requirement. Nephrology consulted. Tolvaptan initiated.   Sodium improving to 123.   PT,OT recommending  SNF,but patient wish going home with HH.

## 2023-08-29 NOTE — ASSESSMENT & PLAN NOTE
"8/29/23  - Chart and interval history reviewed; discussed pt in person with SW/CM. PT/OT recommended SNF, though pt would prefer to go home. Sodium improving. Plan for likely discharge in next day or two.   - Visited with pt at bedside; her daughter Tamar was in room. During entirety of visit, pt was alert, sitting up in bed, and readily conversational. She said her breathing is feeling better today - told her she looks better than she did in the ICU.   - Brought up some of the topics we discussed yesterday; Tamar said they have all talked about this. Patient was clear that she does not want to undergo CPR or intubation in an emergency, and her preference would be to go peacefully. She stated, "I've lived a good life, and I don't want to live one if I am sick." Based on our discussion, she is DNR/DNI/selective treatment; completed electronic [place] documenting these preferences. Family is very supportive of this decision.   - We did talk about plans for after hospital; pt confirmed she doesn't want SNF, and would prefer to go home with [facility] (has had them 3 times previously, and daughter agreed they were a good agency).   - Overall, pt's goal is to regain or maintain as much of her functional status and independence as possible, as this is important component of her current quality of life  - In a separate conversation with Tamar in [facility] as she was leaving, brought up option of home-based palliative care - she was agreeable to this. Discussed that if pt's functional status or symptom burden worsens - or if patient decides she no longer wants to return to the hospital - the palliative team would help with transition to hospice care. She voiced understanding of this, and told me stories of pt's ex-husbands death. He was briefly on HD, though stopped after two sessions and decided he wanted to go peacefully; she said he had a very comfortable death, and family appreciated that he made his own decisions - agreed " "with her on the importance of this.   - Overall, family with good insight into pt's chronic conditions, and is doing a wonderful job supporting her   - Will continue to check in on patient during this and any future hospital stays; updated primary team and SW/CM after visit     23  - Consult for introduction to palliative medicine and advance care planning in pt with underlying CKD IV, chronic respiratory failure (on 0-2 L NC oxygen at home), CHF (preserved EF, pulmonary hypertension, moderate aortic stenois) who presented to hospital for increased shortness of breath and falls; she was admitted to ICU for acute respiratory failure and hyponatremia. Chart reviewed in depth; discussed pt during MDT rounds.   - Met with pt at bedside; during entirety of visit, she was alert, in no distress, and readily able to have a coherent and insightful conversation. Present at bedside was her very supportive son Selwyn (legally-authorized surrogate medical decision maker, along with her other child Tamar). Introduced role of palliative medicine, and learned more about pt outside of hospital. She is  for the last 4.5 years; her   of renal failure. She worked as a  for 33 years, and now enjoys an active social life - she enjoys going out to lunch with friends and relatives, as well as going to listen to live music. Despite her chronic illnesses, she lives by herself, and is able to complete all ADLs without assistance. However, her daughter Tamar lives nearby.   - Thorough medical update provided, as well as some hypothetic medical situations (thoughts on HD, discussion of code status). Given that pt is the best person to make her medical decisions, encouraged her to further discuss her care preferences with her children. In discussing HD if needed in the future, she did not seem inclined to pursue this. Additionally, she stated that she "has had a good life" and does not think that she would want " to undergo CPR. Told her I appreciate her insight, and reminded her that no decisions need to be made at this time.   - She did ask if I thought she was dying; I told her she's doing quite well for someone who is 83 years old and has some chronic conditions, and we are hopeful that she gets to leave the hospital sometime in the next few days and go back to living her normal life.   - Validated that this can be a lot to think about; emotional support provided   - Let her know that I will check in with her (likely tomorrow); no changes in overall level of care at this time   - Updated primary team in person following visit

## 2023-08-29 NOTE — PROGRESS NOTES
Barbara Rizo is a 83 y.o. female patient.    Follow for CKD stage 4, hyponatremia    No new c/o, feeling better, comfortable    Scheduled Meds:   allopurinoL  100 mg Oral Daily    amiodarone  200 mg Oral Daily    apixaban  2.5 mg Oral BID    atorvastatin  40 mg Oral Daily    cefTRIAXone (ROCEPHIN) IVPB  2 g Intravenous Q24H    doxycycline (VIBRAMYCIN) IVPB  100 mg Intravenous Q12H    famotidine (PF)  20 mg Intravenous Daily    folic acid  1,000 mcg Oral Daily    melatonin  6 mg Oral Nightly    metoprolol succinate  12.5 mg Oral Daily    mupirocin   Nasal BID    sodium chloride  1,000 mg Oral BID       Review of patient's allergies indicates:  No Known Allergies      Vital Signs Range (Last 24H):  Temp:  [97.6 °F (36.4 °C)-98.2 °F (36.8 °C)]   Pulse:  [66-98]   Resp:  [18-52]   BP: (100-123)/(57-77)   SpO2:  [86 %-94 %]     I & O (Last 24H):  Intake/Output Summary (Last 24 hours) at 8/29/2023 1012  Last data filed at 8/28/2023 2100  Gross per 24 hour   Intake 1385.22 ml   Output 250 ml   Net 1135.22 ml           Physical Exam:  General appearance: well developed, no distress  Lungs:  clear to auscultation bilaterally and normal respiratory effort  Heart: regular rate and rhythm  Abdomen:  soft, normal bowel sounds  Extremities: no cyanosis or edema, or clubbing    Laboratory:  I have reviewed all pertinent lab results within the past 24 hours.  CBC:   Recent Labs   Lab 08/27/23  0509   WBC 5.84   RBC 4.38   HGB 13.5   HCT 41.1      MCV 94   MCH 30.8   MCHC 32.8     CMP:   Recent Labs   Lab 08/26/23  2119 08/27/23  0127 08/29/23  0004      < > 109   CALCIUM 8.8   < > 8.2*   ALBUMIN 3.8  --   --    PROT 7.5  --   --    *   < > 122*   K 5.3*   < > 4.4   CO2 20*   < > 22*   CL 89*   < > 90*   BUN 45*   < > 39*   CREATININE 2.2*   < > 1.8*   ALKPHOS 85  --   --    ALT 15  --   --    AST 13  --   --    BILITOT 0.9  --   --     < > = values in this interval not displayed.       Assessment &  Plan    Hyponatremia - stable  CKD stage 4  Pneumonia  UTI    Continue  IVF w/ NS  Liberate salt intake  Watch sodium        Trac T Le  8/29/2023

## 2023-08-29 NOTE — PROGRESS NOTES
"[facility]  Palliative Medicine  Progress Note    Patient Name: Barbara Rizo  MRN: [id number]  Admission Date: 8/26/2023  Hospital Length of Stay: 2 days  Code Status: DNR   Attending Provider: Salazar Kirby, *  Consulting Provider: Teodora Ibarra MD  Primary Care Physician: Paras Oro MD  Principal Problem:Hyponatremia    Assessment/Plan:     Advance Care Planning       8/29/23  - Chart and interval history reviewed; discussed pt in person with SW/CM. PT/OT recommended SNF, though pt would prefer to go home. Sodium improving. Plan for likely discharge in next day or two.   - Visited with pt at bedside; her daughter Tamar was in room. During entirety of visit, pt was alert, sitting up in bed, and readily conversational. She said her breathing is feeling better today - told her she looks better than she did in the ICU.   - Brought up some of the topics we discussed yesterday; Tamar said they have all talked about this. Patient was clear that she does not want to undergo CPR or intubation in an emergency, and her preference would be to go peacefully. She stated, "I've lived a good life, and I don't want to live one if I am sick." Based on our discussion, she is DNR/DNI/selective treatment; completed electronic [place]POST documenting these preferences. Family is very supportive of this decision.   - We did talk about plans for after hospital; pt confirmed she doesn't want SNF, and would prefer to go home with [facility] (has had them 3 times previously, and daughter agreed they were a good agency).   - Overall, pt's goal is to regain or maintain as much of her functional status and independence as possible, as this is important component of her current quality of life  - In a separate conversation with Tamar in [facility] as she was leaving, brought up option of home-based palliative care - she was agreeable to this. Discussed that if pt's functional status or symptom burden worsens - or if " patient decides she no longer wants to return to the hospital - the palliative team would help with transition to hospice care. She voiced understanding of this, and told me stories of pt's ex-husbands death. He was briefly on HD, though stopped after two sessions and decided he wanted to go peacefully; she said he had a very comfortable death, and family appreciated that he made his own decisions - agreed with her on the importance of this.   - Overall, family with good insight into pt's chronic conditions, and is doing a wonderful job supporting her   - Will continue to check in on patient during this and any future hospital stays; updated primary team and SW/CM after visit     23  - Consult for introduction to palliative medicine and advance care planning in pt with underlying CKD IV, chronic respiratory failure (on 0-2 L NC oxygen at home), CHF (preserved EF, pulmonary hypertension, moderate aortic stenois) who presented to hospital for increased shortness of breath and falls; she was admitted to ICU for acute respiratory failure and hyponatremia. Chart reviewed in depth; discussed pt during MDT rounds.   - Met with pt at bedside; during entirety of visit, she was alert, in no distress, and readily able to have a coherent and insightful conversation. Present at bedside was her very supportive son Selwyn (legally-authorized surrogate medical decision maker, along with her other child Tamar). Introduced role of palliative medicine, and learned more about pt outside of hospital. She is  for the last 4.5 years; her   of renal failure. She worked as a  for 33 years, and now enjoys an active social life - she enjoys going out to lunch with friends and relatives, as well as going to listen to live music. Despite her chronic illnesses, she lives by herself, and is able to complete all ADLs without assistance. However, her daughter Tamar lives nearby.   - Thorough medical update  "provided, as well as some hypothetic medical situations (thoughts on HD, discussion of code status). Given that pt is the best person to make her medical decisions, encouraged her to further discuss her care preferences with her children. In discussing HD if needed in the future, she did not seem inclined to pursue this. Additionally, she stated that she "has had a good life" and does not think that she would want to undergo CPR. Told her I appreciate her insight, and reminded her that no decisions need to be made at this time.   - She did ask if I thought she was dying; I told her she's doing quite well for someone who is 83 years old and has some chronic conditions, and we are hopeful that she gets to leave the hospital sometime in the next few days and go back to living her normal life.   - Validated that this can be a lot to think about; emotional support provided   - Let her know that I will check in with her (likely tomorrow); no changes in overall level of care at this time   - Updated primary team in person following visit     Pulmonary  Community acquired pneumonia  - Abx per primary team     Acute respiratory failure with hypoxia  - Management per primary team; being treated for possible pneumonia, though also has history of pulmonary hypertension, elevated hemidiaphragm  - Will need pulmonology follow up after hospital       Renal/  * Hyponatremia  - Improving s/p tolvaptan; nephrology consulted and managing. Has had history of this in 2020.     Chronic kidney disease, stage 4 (severe)  - Noted; needs nephrology follow up. Illness trajectory education provided; encouraged her to think about whether she would want to undergo HD if this was needed in the future. She had multiple family members who were on this (or were in renal failure), so she is familiar with this intervention and understands that it is a form of life support      Oncology  MGUS (monoclonal gammopathy of unknown significance)  - Noted " history; follows with hematology     Orthopedic  Fall  - Occurred at home; PT/OT consulted. Pt's preference is to go home with home health, rather than SNF.     I will follow-up with patient. Please contact us if you have any additional questions.     LESLIE Ding  Palliative Medicine Staff   [phone]    Total visit time: 51 minutes    > 50% of 35 min visit spent in chart review, face to face discussion of symptom assessment, coordination of care with other specialists, and discharge planning.    16 min ACP time spent: goals of care, emotional support, formulating and communicating prognosis, exploring burden/ benefit of various approaches of treatment.     Subjective:     Interval History: Moved out of the ICU; worked with PT/OT.     Medications:  Continuous Infusions:   sodium chloride 0.9% 50 mL/hr at 08/28/23 2219     Scheduled Meds:   allopurinoL  100 mg Oral Daily    amiodarone  200 mg Oral Daily    apixaban  2.5 mg Oral BID    atorvastatin  40 mg Oral Daily    cefTRIAXone (ROCEPHIN) IVPB  2 g Intravenous Q24H    doxycycline (VIBRAMYCIN) IVPB  100 mg Intravenous Q12H    famotidine (PF)  20 mg Intravenous Daily    folic acid  1,000 mcg Oral Daily    melatonin  6 mg Oral Nightly    metoprolol succinate  12.5 mg Oral Daily    mupirocin   Nasal BID    sodium chloride  1,000 mg Oral BID     PRN Meds:acetaminophen, dextrose 10%, dextrose 10%, glucagon (human recombinant), glucose, glucose, insulin aspart U-100, ondansetron, prochlorperazine, sodium chloride 0.9%    Objective:     Vital Signs (Most Recent):  Temp: 97.8 °F (36.6 °C) (08/29/23 1113)  Pulse: 90 (08/29/23 1113)  Resp: 18 (08/29/23 1113)  BP: 118/73 (08/29/23 1113)  SpO2: (!) 89 % (08/29/23 1113) Vital Signs (24h Range):  Temp:  [97.6 °F (36.4 °C)-98.2 °F (36.8 °C)] 97.8 °F (36.6 °C)  Pulse:  [66-98] 90  Resp:  [18-52] 18  SpO2:  [86 %-94 %] 89 %  BP: (100-118)/(57-73) 118/73     Weight: 83.9 kg (184 lb 15.5 oz)  Body mass index is 33.83  kg/m².       Physical Exam  Constitutional:       Comments: Sitting up in bed, smiling, appears comfortable    HENT:      Head: Normocephalic.      Nose: Nose normal.      Mouth/Throat:      Mouth: Mucous membranes are moist.   Cardiovascular:      Rate and Rhythm: Normal rate.   Pulmonary:      Effort: Pulmonary effort is normal.      Comments: NC oxygen, no conversational dyspnea   Skin:     General: Skin is warm.   Neurological:      General: No focal deficit present.      Mental Status: She is oriented to person, place, and time.   Psychiatric:         Mood and Affect: Mood normal.         Behavior: Behavior normal.       Significant Labs: All pertinent labs within the past 24 hours have been reviewed.  CBC:   Recent Labs   Lab 08/27/23  0509   WBC 5.84   HGB 13.5   HCT 41.1   MCV 94        BMP:  Recent Labs   Lab 08/29/23  0004      *   K 4.4   CL 90*   CO2 22*   BUN 39*   CREATININE 1.8*   CALCIUM 8.2*     LFT:  Lab Results   Component Value Date    AST 13 08/26/2023    ALKPHOS 85 08/26/2023    BILITOT 0.9 08/26/2023     Albumin:   Albumin   Date Value Ref Range Status   08/26/2023 3.8 3.5 - 5.2 g/dL Final     Protein:   Total Protein   Date Value Ref Range Status   08/26/2023 7.5 6.0 - 8.4 g/dL Final     Lactic acid:   Lab Results   Component Value Date    LACTATE 1.4 01/28/2019    LACTATE 1.1 09/25/2015       Significant Imaging: I have reviewed all pertinent imaging results/findings within the past 24 hours.

## 2023-08-29 NOTE — PROGRESS NOTES
[facility] Medicine  Progress Note    Patient Name: Barbara Rizo  MRN: [id number]  Patient Class: IP- Inpatient   Admission Date: 8/26/2023  Length of Stay: 2 days  Attending Physician: Salazar Kirby, *  Primary Care Provider: Paras Oro MD        Subjective:     Principal Problem:Hyponatremia        HPI:  This is an 83-year-old female with a past medical history of HFpEF (EF: 65%, GIIDD), AFib (on Eliquis), hypertension, type 2 diabetes, hyperlipidemia, CKD 4, breast cancer, who presents with shortness of breath.    Patient presents with shortness of breath and generalized weakness that started 2 days prior to presentation.  She reports that she is been feeling progressively more fatigued and sustained a fall on her back with injury to her head a day prior to presentation.  Additionally, she reports decreased p.o. intake, nausea but denied vomiting or diarrhea.  She usually drinks 48 oz of water daily.     In the ED, the patient was tachypneic and hypoxic, requiring 5 L O2.  Labs were remarkable for hyponatremia (119 > 119), hyperkalemia (5.3 > 5.2), hypochloremia (89), elevated creatinine (2.2 from a baseline of 1.9), elevated BNP (868).  CT head showed no acute process.  CT chest without contrast showed small left pleural effusion with near complete consolidation of the left lower lobe (atelectasis and/or consolidation), and cardiomegaly with small pericardial effusion.  Patient was given Lasix 40 mg IV, ceftriaxone 1 g IV, and was later given 1 L of NS.  Of note, the patient has a history of hyponatremia requiring hospitalization (2020), and a prior history of a large right pleural effusion requiring a thoracentesis (2015).  Patient was admitted for further management.      Overview/Hospital Course:  Patient admitted with hyponatremia. Nitrite+ UTI and PNA, treating with antibiotics. Potentially SIADH from PNA. Thought initially to be hypervolemic hyponatremia with BNP  900, urine Na was not low, and higher O2 requirement. Nephrology consulted. Tolvaptan initiated. Sodium improving to 123. Can step down for tomorrow 8/29.  Hyponatremia is improving,  PT,OT recommending  SNF,but patient wish going home with HH.      Past Medical History:   Diagnosis Date    A-fib     Breast cancer     invasive ductal carcinoma    CKD (chronic kidney disease)     Diabetes mellitus type II     Fatty liver     GERD (gastroesophageal reflux disease)     Hearing loss of both ears     wears bilateral hearing aids    Hemochromatosis     History of anemia due to CKD     History of pleural effusion     with thoracentesis    Hyperlipidemia     Hypertension     Macular degeneration     Osteoarthritis     Left knee    Osteoporosis     Vaginal delivery     x2    Vitamin D deficiency disease     Wears partial dentures     upper removable bridge       Past Surgical History:   Procedure Laterality Date    AXILLARY NODE DISSECTION Left 10/18/2021    Procedure: LYMPHADENECTOMY, AXILLARY;  Surgeon: Darlene Roca MD;  Location: [facility] OR;  Service: General;  Laterality: Left;    BREAST BIOPSY      BREAST LUMPECTOMY      invasive ductal carcinoma    BREAST SURGERY Bilateral     Reduction r/t h/o fibrocystic disease    CHOLECYSTECTOMY  08/2015    EYE SURGERY      Cat Ext  OU    HYSTERECTOMY      partial due to uterine fibroids    INCISION AND DRAINAGE OF KNEE Left 09/17/2020    Procedure: INCISION AND DRAINAGE, KNEE;  Surgeon: Rolando Wagoner MD;  Location: [facility] OR;  Service: Orthopedics;  Laterality: Left;    INJECTION FOR SENTINEL NODE IDENTIFICATION Left 10/18/2021    Procedure: INJECTION, FOR SENTINEL NODE IDENTIFICATION;  Surgeon: Darlene Roca MD;  Location: [facility] OR;  Service: General;  Laterality: Left;    JOINT REPLACEMENT Left 05/13/2013    TKR    JOINT REPLACEMENT Right 08/03/2015    TKR    MASTECTOMY, PARTIAL Left 10/18/2021    Procedure: MASTECTOMY, PARTIAL -- wire;  Surgeon:  Darlene Roca MD;  Location: [facility] OR;  Service: General;  Laterality: Left;  RN PREOP 10/15/2021   VACCINATED-----NEED H/P AND ORDERS    SENTINEL LYMPH NODE BIOPSY Left 10/18/2021    Procedure: BIOPSY, LYMPH NODE, SENTINEL;  Surgeon: Darlene Roca MD;  Location: [facility] OR;  Service: General;  Laterality: Left;    THORACENTESIS      TOTAL KNEE ARTHROPLASTY Right 2015    left knee done 5/2013    WOUND DRESSING Left 09/17/2020    Procedure: WOUND VAC APPLICATION;  Surgeon: Rolando Wagoner MD;  Location: [facility] OR;  Service: Orthopedics;  Laterality: Left;       Review of patient's allergies indicates:  No Known Allergies    Current Facility-Administered Medications on File Prior to Encounter   Medication    denosumab (PROLIA) injection 60 mg     Current Outpatient Medications on File Prior to Encounter   Medication Sig    allopurinoL (ZYLOPRIM) 100 MG tablet Take 1 tablet by mouth once daily.    amiodarone (PACERONE) 200 MG Tab TAKE ONE-HALF TABLET BY MOUTH EVERY DAY *DO NOT TAKE WITH GRAPEFRUIT JUICE*    amLODIPine (NORVASC) 10 MG tablet TAKE ONE TABLET BY MOUTH ONCE DAILY    apixaban (ELIQUIS) 2.5 mg Tab TAKE ONE TABLET BY MOUTH TWICE DAILY    atorvastatin (LIPITOR) 40 MG tablet TAKE ONE TABLET BY MOUTH ONCE DAILY    calcium carbonate (TUMS) 200 mg calcium (500 mg) chewable tablet Take 2 tablets by mouth.    ergocalciferol (ERGOCALCIFEROL) 50,000 unit Cap Take 50,000 Units by mouth every 30 days.     FLUZONE HIGHDOSE QUAD 21-22  mcg/0.7 mL Syrg     folic acid (FOLVITE) 1 MG tablet TAKE 1 TABLET BY MOUTH EVERY DAY    furosemide (LASIX) 20 MG tablet Take 1 tablet (20 mg total) by mouth 2 (two) times daily.    losartan (COZAAR) 25 MG tablet Take 25 mg by mouth once daily.    mupirocin (BACTROBAN) 2 % ointment Apply topically every evening.    solifenacin (VESICARE) 10 MG tablet Take 1 tablet (10 mg total) by mouth once daily.     Family History       Problem Relation (Age of Onset)    Aneurysm  Father    Cancer Mother    Hypertension Mother    No Known Problems Sister, Sister, Brother, Daughter, Son          Tobacco Use    Smoking status: Former     Passive exposure: Past    Smokeless tobacco: Never   Substance and Sexual Activity    Alcohol use: Not Currently    Drug use: No    Sexual activity: Not Currently     Review of Systems   Constitutional:  Positive for appetite change and fatigue.   HENT: Negative.     Eyes: Negative.    Respiratory: Negative.     Cardiovascular: Negative.    Gastrointestinal:  Positive for nausea.   Endocrine: Negative.    Genitourinary: Negative.    Musculoskeletal: Negative.    Skin: Negative.    Allergic/Immunologic: Negative.    Neurological: Negative.    Psychiatric/Behavioral: Negative.       Objective:     Vital Signs (Most Recent):  Temp: 97.9 °F (36.6 °C) (08/29/23 0739)  Pulse: 89 (08/29/23 0739)  Resp: 18 (08/29/23 0739)  BP: 113/65 (08/29/23 0739)  SpO2: (!) 86 % (08/29/23 0739) Vital Signs (24h Range):  Temp:  [97.6 °F (36.4 °C)-98.2 °F (36.8 °C)] 97.9 °F (36.6 °C)  Pulse:  [66-98] 89  Resp:  [18-52] 18  SpO2:  [86 %-96 %] 86 %  BP: (100-123)/(57-77) 113/65     Weight: 83.9 kg (184 lb 15.5 oz)  Body mass index is 33.83 kg/m².     Physical Exam  Vitals and nursing note reviewed.   Constitutional:       General: She is not in acute distress.     Appearance: Normal appearance. She is not ill-appearing.   HENT:      Head: Normocephalic and atraumatic.      Nose: Nose normal.      Mouth/Throat:      Mouth: Mucous membranes are moist.   Eyes:      Extraocular Movements: Extraocular movements intact.   Cardiovascular:      Rate and Rhythm: Normal rate.      Pulses: Normal pulses.      Heart sounds: No murmur heard.  Pulmonary:      Effort: Pulmonary effort is normal. No respiratory distress.      Breath sounds: Decreased air movement present.   Abdominal:      General: Abdomen is flat.      Palpations: Abdomen is soft.      Tenderness: There is no abdominal  tenderness.   Musculoskeletal:      Right lower leg: Edema (trace) present.      Left lower leg: Edema (trace) present.   Skin:     General: Skin is warm.      Capillary Refill: Capillary refill takes less than 2 seconds.   Neurological:      General: No focal deficit present.      Mental Status: She is alert.   Psychiatric:         Mood and Affect: Mood normal.                Significant Labs: All pertinent labs within the past 24 hours have been reviewed.    Significant Imaging: I have reviewed all pertinent imaging results/findings within the past 24 hours.      Assessment/Plan:      * Hyponatremia  Patient has hyponatremia which is uncontrolled,We will aim to correct the sodium by 4-6mEq in 24 hours. We will monitor sodium Every 4 hours. The hyponatremia is due to Dehydration/hypovolemia and/or SIADH secondary to pneumonia. We will obtain the following studies: Urine sodium, urine osmolality, serum osmolality. We will treat the hyponatremia with IV fluids as follows: 100 ml/hr, fluid restriction.The patient's sodium results have been reviewed and are listed below.  Consult nephrology    Recent Labs   Lab 08/29/23  0004   *     Potentially SIADH from PNA. Thought initially to be hypervolemic hyponatremia with , urine Na was not low, and higher O2 requirement. Nephrology consulted. Tolvaptan initiated.   Sodium improving to 123.   PT,OT recommending  SNF,but patient wish going home with .    Prolonged Q-T interval on ECG  Noted. Avoid QT prolonging agents.       Pleural effusion  Continue antibiotics  Needs outpatient follow up/imaging to ensure resolution      Community acquired pneumonia  CT chest without contrast showed small left pleural effusion with near complete consolidation of the left lower lobe (atelectasis and/or consolidation), and cardiomegaly with small pericardial effusion  Continue ceftriaxone, doxycycline   Respiratory cultures, if able  Urine Legionella antigen    Aortic  atherosclerosis  On medical treatment.      Malignant neoplasm of overlapping sites of left breast in female, estrogen receptor positive  Need out patient oncology follow up.      Chronic kidney disease, stage 4 (severe)  Slightly above baseline.  Continue IV fluids   Continue to monitor  Nephrology consult      Type 2 diabetes mellitus with stage 4 chronic kidney disease, without long-term current use of insulin  Patient's FSGs are controlled on current medication regimen.  Last A1c reviewed-   Lab Results   Component Value Date    HGBA1C 5.5 10/05/2022     Monitor BMPs    Hyperkalemia  Medical management.      Chronic gout  Resume home medication      MGUS (monoclonal gammopathy of unknown significance)  Need out patient oncology follow up.      Acute respiratory failure with hypoxia  Patient with Hypoxic Respiratory failure which is Acute.  she is not on home oxygen.   Supplemental oxygen was provided and noted-    Likely in the setting of pneumonia and/or Pulmonary edema     Essential hypertension  Resume home medications.      Hyperlipidemia  Resume statin      VTE Risk Mitigation (From admission, onward)         Ordered     apixaban tablet 2.5 mg  2 times daily         08/27/23 0246     IP VTE HIGH RISK PATIENT  Once         08/27/23 0246     Place sequential compression device  Until discontinued         08/27/23 0246                Discharge Planning   SALLY:      Code Status: Full Code   Is the patient medically ready for discharge?:     Reason for patient still in hospital (select all that apply): Patient trending condition  Discharge Plan A: Home (Follow-ups)                  Salazar Kirby MD  Department of Hospital Medicine   [facility] [place] - Critical access hospital

## 2023-08-29 NOTE — ASSESSMENT & PLAN NOTE
- Occurred at home; PT/OT consulted. Pt's preference is to go home with home health, rather than SNF.

## 2023-08-29 NOTE — NURSING
[facility] [place]  Nurses Note -- 4 Eyes      8/29/2023       Skin assessed on: Discharge      [x] No Pressure Injuries Present    [x]Prevention Measures Documented    [] Yes LDA  for Pressure Injury Previously documented     [] Yes New Pressure Injury Discovered   [] LDA for New Pressure Injury Added      Attending RN:  Jennifer Ledezma, RN     Second RN:  Candis Delacruz LPN

## 2023-08-29 NOTE — TELEPHONE ENCOUNTER
----- Message from Quentin You sent at 8/29/2023  1:43 PM CDT -----  Regarding: [facility] at [facility] [phone]  Type: Patient Call Back    Who called:  [facility]     What is the request in detail: called stating they need a verbal okay that the pt is good with having palative care services. Would like a call back.     Can the clinic reply by [facility]? No     Would the patient rather a call back or a response via [facility]? Call back     Best call back number: [phone]    Additional Information:    Thank you.

## 2023-08-29 NOTE — SUBJECTIVE & OBJECTIVE
Interval History: Moved out of the ICU; worked with PT/OT.     Medications:  Continuous Infusions:   sodium chloride 0.9% 50 mL/hr at 08/28/23 2215     Scheduled Meds:   allopurinoL  100 mg Oral Daily    amiodarone  200 mg Oral Daily    apixaban  2.5 mg Oral BID    atorvastatin  40 mg Oral Daily    cefTRIAXone (ROCEPHIN) IVPB  2 g Intravenous Q24H    doxycycline (VIBRAMYCIN) IVPB  100 mg Intravenous Q12H    famotidine (PF)  20 mg Intravenous Daily    folic acid  1,000 mcg Oral Daily    melatonin  6 mg Oral Nightly    metoprolol succinate  12.5 mg Oral Daily    mupirocin   Nasal BID    sodium chloride  1,000 mg Oral BID     PRN Meds:acetaminophen, dextrose 10%, dextrose 10%, glucagon (human recombinant), glucose, glucose, insulin aspart U-100, ondansetron, prochlorperazine, sodium chloride 0.9%    Objective:     Vital Signs (Most Recent):  Temp: 97.8 °F (36.6 °C) (08/29/23 1113)  Pulse: 90 (08/29/23 1113)  Resp: 18 (08/29/23 1113)  BP: 118/73 (08/29/23 1113)  SpO2: (!) 89 % (08/29/23 1113) Vital Signs (24h Range):  Temp:  [97.6 °F (36.4 °C)-98.2 °F (36.8 °C)] 97.8 °F (36.6 °C)  Pulse:  [66-98] 90  Resp:  [18-52] 18  SpO2:  [86 %-94 %] 89 %  BP: (100-118)/(57-73) 118/73     Weight: 83.9 kg (184 lb 15.5 oz)  Body mass index is 33.83 kg/m².       Physical Exam  Constitutional:       Comments: Sitting up in bed, smiling, appears comfortable    HENT:      Head: Normocephalic.      Nose: Nose normal.      Mouth/Throat:      Mouth: Mucous membranes are moist.   Cardiovascular:      Rate and Rhythm: Normal rate.   Pulmonary:      Effort: Pulmonary effort is normal.      Comments: NC oxygen, no conversational dyspnea   Skin:     General: Skin is warm.   Neurological:      General: No focal deficit present.      Mental Status: She is oriented to person, place, and time.   Psychiatric:         Mood and Affect: Mood normal.         Behavior: Behavior normal.       Significant Labs: All pertinent labs within the past 24 hours  have been reviewed.  CBC:   Recent Labs   Lab 08/27/23  0509   WBC 5.84   HGB 13.5   HCT 41.1   MCV 94        BMP:  Recent Labs   Lab 08/29/23  0004      *   K 4.4   CL 90*   CO2 22*   BUN 39*   CREATININE 1.8*   CALCIUM 8.2*     LFT:  Lab Results   Component Value Date    AST 13 08/26/2023    ALKPHOS 85 08/26/2023    BILITOT 0.9 08/26/2023     Albumin:   Albumin   Date Value Ref Range Status   08/26/2023 3.8 3.5 - 5.2 g/dL Final     Protein:   Total Protein   Date Value Ref Range Status   08/26/2023 7.5 6.0 - 8.4 g/dL Final     Lactic acid:   Lab Results   Component Value Date    LACTATE 1.4 01/28/2019    LACTATE 1.1 09/25/2015       Significant Imaging: I have reviewed all pertinent imaging results/findings within the past 24 hours.

## 2023-08-30 PROBLEM — N39.0 URINARY TRACT INFECTION WITHOUT HEMATURIA: Status: ACTIVE | Noted: 2023-01-01

## 2023-08-30 NOTE — PT/OT/SLP PROGRESS
Physical Therapy Treatment    Patient Name:  Barbara Rizo   MRN:  [id number]    Recommendations:     Discharge Recommendations: pt declined nursing facility, skilled, prefers HHPT   Discharge Equipment Recommendations:  wheelchair (Pt will benefit from a wheelchair 2* to poor endurance(poor respiratory functions, desat to low 80's despite on 5L O2NC ) / strength due to current medical conditions , pt with multifactorial gait impairment and is not able to ambulate safety at functional distance . )     Barriers to discharge: None    Assessment:     Barbara Rizo is a 83 y.o. female admitted with a medical diagnosis of Hyponatremia.  She presents with the following impairments/functional limitations: weakness, impaired endurance, impaired functional mobility, gait instability, impaired balance, decreased lower extremity function, decreased upper extremity function, decreased ROM, decreased safety awareness, decreased coordination, impaired skin, impaired cardiopulmonary response to activity .    Rehab Prognosis: Fair; patient would benefit from acute skilled PT services to address these deficits and reach maximum level of function.    Recent Surgery: * No surgery found *      Plan:     During this hospitalization, patient to be seen 5 x/week to address the identified rehab impairments via gait training, therapeutic activities, therapeutic exercises and progress toward the following goals:    Plan of Care Expires:  09/04/23    Subjective     Chief Complaint: weakness/fatigue and SOB with minimal exertion    Patient/Family Comments/goals: pt is agreeable to therapy with encouragement,   Pain/Comfort:  Pain Rating 1: 0/10  Pain Rating Post-Intervention 1: 0/10      Objective:     Communicated with nurse Blake prior to session.  Patient found HOB elevated with telemetry, oxygen, peripheral IV, bed alarm, PureWick upon PT entry to room.     General Precautions: Standard, fall, respiratory, contact  Orthopedic  Precautions: N/A  Braces: N/A  Respiratory Status: Nasal cannula, flow 5 L/min   SpO2 at rest 90% , decreased to low 85% with minimal exertions despite on 5 L , required extended rest breaks and pursed lip breathing technique to recover, nurse notified.   Functional Mobility:  Bed Mobility:     Rolling Right: stand by assistance  Scooting: stand by assistance and contact guard assistance  Supine to Sit: contact guard assistance  Sit to Supine: contact guard assistance  Transfers:     Sit to Stand: from bed stand by assistance with rolling walker  Gait: pt ambulated 5 side steps with HOB with RW, SBA. Limited with further gait training 2* to low SpO2.    Balance:  good in sitting, fair + in static standing       AM-PAC 6 CLICK MOBILITY  Turning over in bed (including adjusting bedclothes, sheets and blankets)?: 4  Sitting down on and standing up from a chair with arms (e.g., wheelchair, bedside commode, etc.): 3  Moving from lying on back to sitting on the side of the bed?: 3  Moving to and from a bed to a chair (including a wheelchair)?: 3  Need to walk in hospital room?: 3  Climbing 3-5 steps with a railing?: 3  Basic Mobility Total Score: 19       Treatment & Education:  Lower Extremity Exercises.    Patient educated on: Purpose and Benefits of Therapeutic Exercise(s).    Patient verbalized acceptance/understanding of instruction(s), expectation(s), and limitation(s) (for safety).  Patient performed:10 reps  (each) of B LE Ther Ex: AP, LAQ, Hip abd/add, Hip flexion while sitting up on EOB.       Patient required rest breaks, pursed lip breathing technique verbal cues/tactile cues to ensure correct sequence, to maintain proper form, and to allow for self-correction.  Patient left with bed in chair position with heels offloading  all lines intact, call button in reach, bed alarm on, and nurse Lou notified..    GOALS:   Multidisciplinary Problems       Physical Therapy Goals          Problem: Physical Therapy     Goal Priority Disciplines Outcome Goal Variances Interventions   Physical Therapy Goal     PT, PT/OT Ongoing, Progressing     Description: Goals to be met by: 23     Patient will increase functional independence with mobility by performin. Pt to be mod I with bed mobility.  2. Pt to transfer with min A.  3. Pt  to ambulate 50' w/RW CGA.  4. Pt to be (I) with written HEP.                         Time Tracking:     PT Received On: 23  PT Start Time: 1610     PT Stop Time: 1640  PT Total Time (min): 30 min     Billable Minutes: Therapeutic Activity 18 and Therapeutic Exercise 12    Treatment Type: Treatment  PT/PTA: PTA     Number of PTA visits since last PT visit: 2023

## 2023-08-30 NOTE — NURSING
[facility], [facility]  Nurses Note -- 4 Eyes      8/30/2023       Skin assessed on: Q Shift      [x] No Pressure Injuries Present    [x]Prevention Measures Documented    [] Yes LDA  for Pressure Injury Previously documented     [] Yes New Pressure Injury Discovered   [] LDA for New Pressure Injury Added      Attending RN:  Lou Gonzales RN     Second RN: TOSHIA Whyte

## 2023-08-30 NOTE — SUBJECTIVE & OBJECTIVE
Past Medical History:   Diagnosis Date    A-fib     Breast cancer     invasive ductal carcinoma    CKD (chronic kidney disease)     Diabetes mellitus type II     Fatty liver     GERD (gastroesophageal reflux disease)     Hearing loss of both ears     wears bilateral hearing aids    Hemochromatosis     History of anemia due to CKD     History of pleural effusion     with thoracentesis    Hyperlipidemia     Hypertension     Macular degeneration     Osteoarthritis     Left knee    Osteoporosis     Vaginal delivery     x2    Vitamin D deficiency disease     Wears partial dentures     upper removable bridge       Past Surgical History:   Procedure Laterality Date    AXILLARY NODE DISSECTION Left 10/18/2021    Procedure: LYMPHADENECTOMY, AXILLARY;  Surgeon: Darlene Roca MD;  Location: [facility];  Service: General;  Laterality: Left;    BREAST BIOPSY      BREAST LUMPECTOMY      invasive ductal carcinoma    BREAST SURGERY Bilateral     Reduction r/t h/o fibrocystic disease    CHOLECYSTECTOMY  08/2015    EYE SURGERY      Cat Ext  OU    HYSTERECTOMY      partial due to uterine fibroids    INCISION AND DRAINAGE OF KNEE Left 09/17/2020    Procedure: INCISION AND DRAINAGE, KNEE;  Surgeon: Rolando Wagoner MD;  Location: [facility];  Service: Orthopedics;  Laterality: Left;    INJECTION FOR SENTINEL NODE IDENTIFICATION Left 10/18/2021    Procedure: INJECTION, FOR SENTINEL NODE IDENTIFICATION;  Surgeon: Darlene Roca MD;  Location: [facility];  Service: General;  Laterality: Left;    JOINT REPLACEMENT Left 05/13/2013    TKR    JOINT REPLACEMENT Right 08/03/2015    TKR    MASTECTOMY, PARTIAL Left 10/18/2021    Procedure: MASTECTOMY, PARTIAL -- wire;  Surgeon: Darlene Roca MD;  Location: [facility];  Service: General;  Laterality: Left;  RN PREOP 10/15/2021   VACCINATED-----NEED H/P AND ORDERS    SENTINEL LYMPH NODE BIOPSY Left 10/18/2021    Procedure: BIOPSY, LYMPH NODE, SENTINEL;  Surgeon: Darlene Roca MD;  Location: [facility];   Service: General;  Laterality: Left;    THORACENTESIS      TOTAL KNEE ARTHROPLASTY Right 2015    left knee done 5/2013    WOUND DRESSING Left 09/17/2020    Procedure: WOUND VAC APPLICATION;  Surgeon: Rolando Wagoner MD;  Location: [facility];  Service: Orthopedics;  Laterality: Left;       Review of patient's allergies indicates:  No Known Allergies    Current Facility-Administered Medications on File Prior to Encounter   Medication    denosumab (PROLIA) injection 60 mg     Current Outpatient Medications on File Prior to Encounter   Medication Sig    allopurinoL (ZYLOPRIM) 100 MG tablet Take 1 tablet by mouth once daily.    amiodarone (PACERONE) 200 MG Tab TAKE ONE-HALF TABLET BY MOUTH EVERY DAY *DO NOT TAKE WITH GRAPEFRUIT JUICE*    amLODIPine (NORVASC) 10 MG tablet TAKE ONE TABLET BY MOUTH ONCE DAILY    apixaban (ELIQUIS) 2.5 mg Tab TAKE ONE TABLET BY MOUTH TWICE DAILY    atorvastatin (LIPITOR) 40 MG tablet TAKE ONE TABLET BY MOUTH ONCE DAILY    calcium carbonate (TUMS) 200 mg calcium (500 mg) chewable tablet Take 2 tablets by mouth.    ergocalciferol (ERGOCALCIFEROL) 50,000 unit Cap Take 50,000 Units by mouth every 30 days.     FLUZONE HIGHDOSE QUAD 21-22  mcg/0.7 mL Syrg     folic acid (FOLVITE) 1 MG tablet TAKE 1 TABLET BY MOUTH EVERY DAY    furosemide (LASIX) 20 MG tablet Take 1 tablet (20 mg total) by mouth 2 (two) times daily.    losartan (COZAAR) 25 MG tablet Take 25 mg by mouth once daily.    mupirocin (BACTROBAN) 2 % ointment Apply topically every evening.    solifenacin (VESICARE) 10 MG tablet Take 1 tablet (10 mg total) by mouth once daily.     Family History       Problem Relation (Age of Onset)    Aneurysm Father    Cancer Mother    Hypertension Mother    No Known Problems Sister, Sister, Brother, Daughter, Son          Tobacco Use    Smoking status: Former     Passive exposure: Past    Smokeless tobacco: Never   Substance and Sexual Activity    Alcohol use: Not Currently    Drug use: No    Sexual  activity: Not Currently     Review of Systems   Constitutional:  Positive for appetite change and fatigue.   HENT: Negative.     Eyes: Negative.    Respiratory: Negative.     Cardiovascular: Negative.    Gastrointestinal:  Positive for nausea.   Endocrine: Negative.    Genitourinary: Negative.    Musculoskeletal: Negative.    Skin: Negative.    Allergic/Immunologic: Negative.    Neurological: Negative.    Psychiatric/Behavioral: Negative.       Objective:     Vital Signs (Most Recent):  Temp: 98 °F (36.7 °C) (08/30/23 0728)  Pulse: 85 (08/30/23 0728)  Resp: 20 (08/30/23 0728)  BP: 111/64 (08/30/23 0728)  SpO2: (!) 91 % (08/30/23 0728) Vital Signs (24h Range):  Temp:  [97.8 °F (36.6 °C)-98.7 °F (37.1 °C)] 98 °F (36.7 °C)  Pulse:  [85-92] 85  Resp:  [18-20] 20  SpO2:  [89 %-94 %] 91 %  BP: (103-133)/(56-73) 111/64     Weight: 83.9 kg (184 lb 15.5 oz)  Body mass index is 33.83 kg/m².     Physical Exam  Vitals and nursing note reviewed.   Constitutional:       General: She is not in acute distress.     Appearance: Normal appearance. She is not ill-appearing.   HENT:      Head: Normocephalic and atraumatic.      Nose: Nose normal.      Mouth/Throat:      Mouth: Mucous membranes are moist.   Eyes:      Extraocular Movements: Extraocular movements intact.   Cardiovascular:      Rate and Rhythm: Normal rate.      Pulses: Normal pulses.      Heart sounds: No murmur heard.  Pulmonary:      Effort: Pulmonary effort is normal. No respiratory distress.      Breath sounds: Decreased air movement present.   Abdominal:      General: Abdomen is flat.      Palpations: Abdomen is soft.      Tenderness: There is no abdominal tenderness.   Musculoskeletal:      Right lower leg: Edema (trace) present.      Left lower leg: Edema (trace) present.   Skin:     General: Skin is warm.      Capillary Refill: Capillary refill takes less than 2 seconds.   Neurological:      General: No focal deficit present.      Mental Status: She is alert.    Psychiatric:         Mood and Affect: Mood normal.                Significant Labs: All pertinent labs within the past 24 hours have been reviewed.    Significant Imaging: I have reviewed all pertinent imaging results/findings within the past 24 hours.

## 2023-08-30 NOTE — PROGRESS NOTES
Barbara Rizo is a 83 y.o. female patient.    Follow for CKD stage 4, hyponatremia    No new c/o, comfortable    Scheduled Meds:   allopurinoL  100 mg Oral Daily    amiodarone  200 mg Oral Daily    apixaban  2.5 mg Oral BID    atorvastatin  40 mg Oral Daily    doxycycline (VIBRAMYCIN) IVPB  100 mg Intravenous Q12H    famotidine (PF)  20 mg Intravenous Daily    folic acid  1,000 mcg Oral Daily    melatonin  6 mg Oral Nightly    meropenem (MERREM) IVPB  1 g Intravenous Q12H    metoprolol succinate  12.5 mg Oral Daily    mupirocin   Nasal BID    sodium chloride 0.9%  10 mL Intravenous Q6H    sodium chloride  1,000 mg Oral BID       Review of patient's allergies indicates:  No Known Allergies      Vital Signs Range (Last 24H):  Temp:  [97.8 °F (36.6 °C)-98.7 °F (37.1 °C)]   Pulse:  [85-92]   Resp:  [18-20]   BP: (103-133)/(56-73)   SpO2:  [89 %-94 %]     I & O (Last 24H):  Intake/Output Summary (Last 24 hours) at 8/30/2023 0959  Last data filed at 8/29/2023 1805  Gross per 24 hour   Intake 360 ml   Output --   Net 360 ml           Physical Exam:  General appearance: well developed, no distress  Lungs:  clear to auscultation bilaterally and normal respiratory effort  Heart: regular rate and rhythm  Abdomen:  soft, normal bowel sounds  Extremities: edema negative    Laboratory:  I have reviewed all pertinent lab results within the past 24 hours.  CBC:   Recent Labs   Lab 08/27/23  0509   WBC 5.84   RBC 4.38   HGB 13.5   HCT 41.1      MCV 94   MCH 30.8   MCHC 32.8     CMP:   Recent Labs   Lab 08/26/23  2119 08/27/23  0127 08/30/23  0656      < > 114*   CALCIUM 8.8   < > 8.2*   ALBUMIN 3.8  --   --    PROT 7.5  --   --    *   < > 123*   K 5.3*   < > 4.3   CO2 20*   < > 21*   CL 89*   < > 90*   BUN 45*   < > 36*   CREATININE 2.2*   < > 1.7*   ALKPHOS 85  --   --    ALT 15  --   --    AST 13  --   --    BILITOT 0.9  --   --     < > = values in this interval not displayed.       Assessment &  Plan    Hyponatremia - slowly improving  CKD stage 4  UTI - ESBL  Pneumonia    Continue present Rx  Watch sodium          Trac T Le  8/30/2023

## 2023-08-30 NOTE — NURSING
[facility] [place]  Nurses Note -- 4 Eyes      8/29/2023       Skin assessed on: Admit      [x] No Pressure Injuries Present    [x]Prevention Measures Documented    [] Yes LDA  for Pressure Injury Previously documented     [] Yes New Pressure Injury Discovered   [] LDA for New Pressure Injury Added      Attending RN:  Isabell Burt LPN     Second RN:  TOSHIA PA

## 2023-08-30 NOTE — PLAN OF CARE
Problem: Occupational Therapy  Goal: Occupational Therapy Goal  Description: Goals to be met by: 09/11/23     Patient will increase functional independence with ADLs by performing:    UE Dressing with [place].  LE Dressing with [place].  Grooming while standing at sink with Modified [place].  Toileting from toilet with Modified [place] for hygiene and clothing management.   Sitting at edge of bed x30  minutes with Modified [place].  Toilet transfer to toilet with Modified [place].  Increased functional strength to 4/5 for B upper extremity .  Upper extremity exercise program x10  reps per handout, with supervision.    Outcome: Ongoing, Progressing    Patient walked from bed to sink to brush teeth and wash face standing at sink with supervision with IV pole and oxygen cord attached. Pt. Having decreased safety awareness today with both cords, so needed supervision to avoid tangling cords. She walked to chair with supervision and occupational therapy donned ashlie.

## 2023-08-30 NOTE — PT/OT/SLP PROGRESS
Occupational Therapy   Treatment    Name: Barbara Rizo  MRN: [id number]  Admitting Diagnosis:  Hyponatremia       Recommendations:     Discharge Recommendations: nursing facility, skilled (patient may decline b/c wants to return home with HH)  Discharge Equipment Recommendations:  none  Barriers to discharge:  Other (Comment) (patient still not at PLOF, high risk for fall and for hospital readmission)    Assessment:     Barbara Rizo is a 83 y.o. female with a medical diagnosis of Hyponatremia.  She presents with HOB elevated. Performance deficits affecting function are weakness, impaired endurance, impaired self care skills, impaired functional mobility, gait instability, impaired balance, impaired cognition, decreased coordination, decreased upper extremity function, decreased lower extremity function, decreased safety awareness, pain, decreased ROM, impaired cardiopulmonary response to activity, edema, impaired skin. Patient has new onset of infection, so on contact precautions.     Rehab Prognosis:  Good; patient would benefit from acute skilled OT services to address these deficits and reach maximum level of function.       Plan:     Patient to be seen 5 x/week to address the above listed problems via self-care/home management, therapeutic exercises, therapeutic activities  Plan of Care Expires: 09/11/23  Plan of Care Reviewed with: patient, daughter    Subjective     Chief Complaint: Patient has no pain today   Patient/Family Comments/goals: she wants to return home   Pain/Comfort:  Pain Rating 1: 0/10    Objective:     Communicated with: RNLou,  prior to session.  Patient found HOB elevated with bed alarm, peripheral IV, oxygen upon OT entry to room.    General Precautions: Standard, fall, respiratory    Orthopedic Precautions:N/A  Braces: N/A  Respiratory Status: Nasal cannula, flow 5  L/min     Occupational Performance:     Bed Mobility:    Patient completed Rolling/Turning to Right with stand  by assistance, patient needed cues to complete bed mobility for scooting to EOB   Patient completed Scooting/Bridging with stand by assistance  Patient completed Supine to Sit with stand by assistance     Functional Mobility/Transfers:  Patient completed Sit <> Stand Transfer with supervision  with  rolling walker   Patient completed Bed <> Chair Transfer using walking technique with supervision with rolling walker  Functional Mobility: Patient walked from bed to sink to brush teeth and wash face standing at sink with supervision with RW with occupational therapy holding IV pole and oxygen cord attached. Pt. Having decreased safety awareness today with both cords, so needed supervision to avoid tangling cords. She walked to chair with supervision and occupational therapy donned purewick.        Activities of Daily Living:  Grooming: supervision standing at sink   Upper Body Dressing: minimum assistance with outer gown due to IV   Lower Body Dressing: stand by assistance to don left sock       [facility] 6 Click ADL: 20    Treatment & Education:  Occupational therapy educated her to do fist pumps and triceps/biceps with left arm while seated in chair to reduce edema  Occupational therapy educated her about positioning her left upper extremity above her heart to also reduce edema     Patient left up in chair with all lines intact, call button in reach, RN notified, and daughter  present    GOALS:   Multidisciplinary Problems       Occupational Therapy Goals          Problem: Occupational Therapy    Goal Priority Disciplines Outcome Interventions   Occupational Therapy Goal     OT, PT/OT Ongoing, Progressing    Description: Goals to be met by: 09/11/23     Patient will increase functional independence with ADLs by performing:    UE Dressing with [place].  LE Dressing with [place].  Grooming while standing at sink with Modified [place].  Toileting from toilet with Modified [place] for hygiene and clothing  management.   Sitting at edge of bed x30  minutes with Modified [place].  Toilet transfer to toilet with Modified [place].  Increased functional strength to 4/5 for B upper extremity .  Upper extremity exercise program x10  reps per handout, with supervision.                         Time Tracking:     OT Date of Treatment: 08/30/23  OT Start Time: 1028  OT Stop Time: 1059  OT Total Time (min): 31 min    Billable Minutes:Self Care/Home Management 31    OT/NIKO: OT          8/30/2023

## 2023-08-30 NOTE — PLAN OF CARE
Patient does not agree with SNF placement and will return home with Home Health with preference for [facility].      08/30/23 1208   Discharge Reassessment   Assessment Type Discharge Planning Reassessment   Communicated SALLY with patient/caregiver Date not available/Unable to determine   Discharge Plan A Home with family;Home Health   Discharge Plan B Home with family;Home Health   DME Needed Upon Discharge  other (see comments)  (TBD)   Transition of Care Barriers None   Why the patient remains in the hospital Requires continued medical care   Post-Acute Status   Coverage PHN   Discharge Delays None known at this time

## 2023-08-30 NOTE — PROGRESS NOTES
Pharmacist Renal Dose Adjustment Note    Barbara Rizo is a 83 y.o. female being treated with the medication meropenem    Patient Data:    Vital Signs (Most Recent):  Temp: 98 °F (36.7 °C) (08/30/23 0728)  Pulse: 85 (08/30/23 0728)  Resp: 20 (08/30/23 0728)  BP: 111/64 (08/30/23 0728)  SpO2: (!) 91 % (08/30/23 0728) Vital Signs (72h Range):  Temp:  [97.6 °F (36.4 °C)-98.7 °F (37.1 °C)]   Pulse:  []   Resp:  [12-52]   BP: ()/(47-77)   SpO2:  [86 %-97 %]      Recent Labs   Lab 08/29/23  0004 08/29/23  1704 08/30/23 0656   CREATININE 1.8* 1.8* 1.7*     Serum creatinine: 1.7 mg/dL (H) 08/30/23 0656  Estimated creatinine clearance: 25.6 mL/min (A)    Medication:meropenem dose: 1 gm frequency q8h will be changed to medication:meropenem dose:1 gm frequency:q12h    Pharmacist's Name: Bryce Butt  Pharmacist's Extension: [phone]

## 2023-08-30 NOTE — PLAN OF CARE
Problem: Adult Inpatient Plan of Care  Goal: Plan of Care Review  Outcome: Ongoing, Progressing  Goal: Patient-Specific Goal (Individualized)  Outcome: Ongoing, Progressing  Goal: Absence of Hospital-Acquired Illness or Injury  Outcome: Ongoing, Progressing  Goal: Optimal Comfort and Wellbeing  Outcome: Ongoing, Progressing  Goal: Readiness for Transition of Care  Outcome: Ongoing, Progressing     Problem: Diabetes Comorbidity  Goal: Blood Glucose Level Within Targeted Range  Outcome: Ongoing, Progressing     Problem: Fluid Imbalance (Pneumonia)  Goal: Fluid Balance  Outcome: Ongoing, Progressing     Problem: Infection (Pneumonia)  Goal: Resolution of Infection Signs and Symptoms  Outcome: Ongoing, Progressing     Problem: Respiratory Compromise (Pneumonia)  Goal: Effective Oxygenation and Ventilation  Outcome: Ongoing, Progressing     Problem: Impaired Wound Healing  Goal: Optimal Wound Healing  Outcome: Ongoing, Progressing     Problem: Skin Injury Risk Increased  Goal: Skin Health and Integrity  Outcome: Ongoing, Progressing     Problem: Fall Injury Risk  Goal: Absence of Fall and Fall-Related Injury  Outcome: Ongoing, Progressing     Problem: Oral Intake Inadequate  Goal: Improved Oral Intake  Outcome: Ongoing, Progressing     Problem: Coping Ineffective  Goal: Effective Coping  Outcome: Ongoing, Progressing     Problem: Infection  Goal: Absence of Infection Signs and Symptoms  Outcome: Ongoing, Progressing

## 2023-08-30 NOTE — PROGRESS NOTES
[facility] Medicine  Progress Note    Patient Name: Barbara Rizo  MRN: [id number]  Patient Class: IP- Inpatient   Admission Date: 8/26/2023  Length of Stay: 3 days  Attending Physician: Salazar Kirby, *  Primary Care Provider: Paras Oro MD        Subjective:     Principal Problem:Hyponatremia        HPI:  This is an 83-year-old female with a past medical history of HFpEF (EF: 65%, GIIDD), AFib (on Eliquis), hypertension, type 2 diabetes, hyperlipidemia, CKD 4, breast cancer, who presents with shortness of breath.    Patient presents with shortness of breath and generalized weakness that started 2 days prior to presentation.  She reports that she is been feeling progressively more fatigued and sustained a fall on her back with injury to her head a day prior to presentation.  Additionally, she reports decreased p.o. intake, nausea but denied vomiting or diarrhea.  She usually drinks 48 oz of water daily.     In the ED, the patient was tachypneic and hypoxic, requiring 5 L O2.  Labs were remarkable for hyponatremia (119 > 119), hyperkalemia (5.3 > 5.2), hypochloremia (89), elevated creatinine (2.2 from a baseline of 1.9), elevated BNP (868).  CT head showed no acute process.  CT chest without contrast showed small left pleural effusion with near complete consolidation of the left lower lobe (atelectasis and/or consolidation), and cardiomegaly with small pericardial effusion.  Patient was given Lasix 40 mg IV, ceftriaxone 1 g IV, and was later given 1 L of NS.  Of note, the patient has a history of hyponatremia requiring hospitalization (2020), and a prior history of a large right pleural effusion requiring a thoracentesis (2015).  Patient was admitted for further management.      Overview/Hospital Course:  Patient admitted with hyponatremia. Nitrite+ UTI and PNA, treating with antibiotics. Potentially SIADH from PNA. Thought initially to be hypervolemic hyponatremia with BNP  900, urine Na was not low, and higher O2 requirement. Nephrology consulted. Tolvaptan initiated. Sodium improving to 123. Can step down for tomorrow 8/29.  Hyponatremia is improving,  PT,OT recommending  SNF,but patient wish going home with HH.  Has ESBL Ecoli,started on meropenem.      Past Medical History:   Diagnosis Date    A-fib     Breast cancer     invasive ductal carcinoma    CKD (chronic kidney disease)     Diabetes mellitus type II     Fatty liver     GERD (gastroesophageal reflux disease)     Hearing loss of both ears     wears bilateral hearing aids    Hemochromatosis     History of anemia due to CKD     History of pleural effusion     with thoracentesis    Hyperlipidemia     Hypertension     Macular degeneration     Osteoarthritis     Left knee    Osteoporosis     Vaginal delivery     x2    Vitamin D deficiency disease     Wears partial dentures     upper removable bridge       Past Surgical History:   Procedure Laterality Date    AXILLARY NODE DISSECTION Left 10/18/2021    Procedure: LYMPHADENECTOMY, AXILLARY;  Surgeon: Darlene Roca MD;  Location: [facility] OR;  Service: General;  Laterality: Left;    BREAST BIOPSY      BREAST LUMPECTOMY      invasive ductal carcinoma    BREAST SURGERY Bilateral     Reduction r/t h/o fibrocystic disease    CHOLECYSTECTOMY  08/2015    EYE SURGERY      Cat Ext  OU    HYSTERECTOMY      partial due to uterine fibroids    INCISION AND DRAINAGE OF KNEE Left 09/17/2020    Procedure: INCISION AND DRAINAGE, KNEE;  Surgeon: Rolando Wagoner MD;  Location: [facility] OR;  Service: Orthopedics;  Laterality: Left;    INJECTION FOR SENTINEL NODE IDENTIFICATION Left 10/18/2021    Procedure: INJECTION, FOR SENTINEL NODE IDENTIFICATION;  Surgeon: Darlene Roca MD;  Location: [facility] OR;  Service: General;  Laterality: Left;    JOINT REPLACEMENT Left 05/13/2013    TKR    JOINT REPLACEMENT Right 08/03/2015    TKR    MASTECTOMY, PARTIAL Left 10/18/2021    Procedure:  MASTECTOMY, PARTIAL -- wire;  Surgeon: Darlene Roca MD;  Location: [facility] OR;  Service: General;  Laterality: Left;  RN PREOP 10/15/2021   VACCINATED-----NEED H/P AND ORDERS    SENTINEL LYMPH NODE BIOPSY Left 10/18/2021    Procedure: BIOPSY, LYMPH NODE, SENTINEL;  Surgeon: Darlene Roca MD;  Location: [facility] OR;  Service: General;  Laterality: Left;    THORACENTESIS      TOTAL KNEE ARTHROPLASTY Right 2015    left knee done 5/2013    WOUND DRESSING Left 09/17/2020    Procedure: WOUND VAC APPLICATION;  Surgeon: Rolando Wagoner MD;  Location: [facility] OR;  Service: Orthopedics;  Laterality: Left;       Review of patient's allergies indicates:  No Known Allergies    Current Facility-Administered Medications on File Prior to Encounter   Medication    denosumab (PROLIA) injection 60 mg     Current Outpatient Medications on File Prior to Encounter   Medication Sig    allopurinoL (ZYLOPRIM) 100 MG tablet Take 1 tablet by mouth once daily.    amiodarone (PACERONE) 200 MG Tab TAKE ONE-HALF TABLET BY MOUTH EVERY DAY *DO NOT TAKE WITH GRAPEFRUIT JUICE*    amLODIPine (NORVASC) 10 MG tablet TAKE ONE TABLET BY MOUTH ONCE DAILY    apixaban (ELIQUIS) 2.5 mg Tab TAKE ONE TABLET BY MOUTH TWICE DAILY    atorvastatin (LIPITOR) 40 MG tablet TAKE ONE TABLET BY MOUTH ONCE DAILY    calcium carbonate (TUMS) 200 mg calcium (500 mg) chewable tablet Take 2 tablets by mouth.    ergocalciferol (ERGOCALCIFEROL) 50,000 unit Cap Take 50,000 Units by mouth every 30 days.     FLUZONE HIGHDOSE QUAD 21-22  mcg/0.7 mL Syrg     folic acid (FOLVITE) 1 MG tablet TAKE 1 TABLET BY MOUTH EVERY DAY    furosemide (LASIX) 20 MG tablet Take 1 tablet (20 mg total) by mouth 2 (two) times daily.    losartan (COZAAR) 25 MG tablet Take 25 mg by mouth once daily.    mupirocin (BACTROBAN) 2 % ointment Apply topically every evening.    solifenacin (VESICARE) 10 MG tablet Take 1 tablet (10 mg total) by mouth once daily.     Family History       Problem  Relation (Age of Onset)    Aneurysm Father    Cancer Mother    Hypertension Mother    No Known Problems Sister, Sister, Brother, Daughter, Son          Tobacco Use    Smoking status: Former     Passive exposure: Past    Smokeless tobacco: Never   Substance and Sexual Activity    Alcohol use: Not Currently    Drug use: No    Sexual activity: Not Currently     Review of Systems   Constitutional:  Positive for appetite change and fatigue.   HENT: Negative.     Eyes: Negative.    Respiratory: Negative.     Cardiovascular: Negative.    Gastrointestinal:  Positive for nausea.   Endocrine: Negative.    Genitourinary: Negative.    Musculoskeletal: Negative.    Skin: Negative.    Allergic/Immunologic: Negative.    Neurological: Negative.    Psychiatric/Behavioral: Negative.       Objective:     Vital Signs (Most Recent):  Temp: 98 °F (36.7 °C) (08/30/23 0728)  Pulse: 85 (08/30/23 0728)  Resp: 20 (08/30/23 0728)  BP: 111/64 (08/30/23 0728)  SpO2: (!) 91 % (08/30/23 0728) Vital Signs (24h Range):  Temp:  [97.8 °F (36.6 °C)-98.7 °F (37.1 °C)] 98 °F (36.7 °C)  Pulse:  [85-92] 85  Resp:  [18-20] 20  SpO2:  [89 %-94 %] 91 %  BP: (103-133)/(56-73) 111/64     Weight: 83.9 kg (184 lb 15.5 oz)  Body mass index is 33.83 kg/m².     Physical Exam  Vitals and nursing note reviewed.   Constitutional:       General: She is not in acute distress.     Appearance: Normal appearance. She is not ill-appearing.   HENT:      Head: Normocephalic and atraumatic.      Nose: Nose normal.      Mouth/Throat:      Mouth: Mucous membranes are moist.   Eyes:      Extraocular Movements: Extraocular movements intact.   Cardiovascular:      Rate and Rhythm: Normal rate.      Pulses: Normal pulses.      Heart sounds: No murmur heard.  Pulmonary:      Effort: Pulmonary effort is normal. No respiratory distress.      Breath sounds: Decreased air movement present.   Abdominal:      General: Abdomen is flat.      Palpations: Abdomen is soft.      Tenderness:  There is no abdominal tenderness.   Musculoskeletal:      Right lower leg: Edema (trace) present.      Left lower leg: Edema (trace) present.   Skin:     General: Skin is warm.      Capillary Refill: Capillary refill takes less than 2 seconds.   Neurological:      General: No focal deficit present.      Mental Status: She is alert.   Psychiatric:         Mood and Affect: Mood normal.                Significant Labs: All pertinent labs within the past 24 hours have been reviewed.    Significant Imaging: I have reviewed all pertinent imaging results/findings within the past 24 hours.      Assessment/Plan:      * Hyponatremia  Patient has hyponatremia which is uncontrolled,We will aim to correct the sodium by 4-6mEq in 24 hours. We will monitor sodium Every 4 hours. The hyponatremia is due to Dehydration/hypovolemia and/or SIADH secondary to pneumonia. We will obtain the following studies: Urine sodium, urine osmolality, serum osmolality. We will treat the hyponatremia with IV fluids as follows: 100 ml/hr, fluid restriction.The patient's sodium results have been reviewed and are listed below.  Consult nephrology    Recent Labs   Lab 08/29/23  0004   *     Potentially SIADH from PNA. Thought initially to be hypervolemic hyponatremia with , urine Na was not low, and higher O2 requirement. Nephrology consulted. Tolvaptan initiated.   Sodium improving to 123.   PT,OT recommending  SNF,but patient wish going home with .    Urinary tract infection without hematuria  Has ESBL Ecoli,started on meropenem.      Prolonged Q-T interval on ECG  Noted. Avoid QT prolonging agents.       Pleural effusion  Continue antibiotics  Needs outpatient follow up/imaging to ensure resolution      Community acquired pneumonia  CT chest without contrast showed small left pleural effusion with near complete consolidation of the left lower lobe (atelectasis and/or consolidation), and cardiomegaly with small pericardial  effusion  Continue ceftriaxone, doxycycline   Respiratory cultures, if able  Urine Legionella antigen    Aortic atherosclerosis  On medical treatment.      Malignant neoplasm of overlapping sites of left breast in female, estrogen receptor positive  Need out patient oncology follow up.      Chronic kidney disease, stage 4 (severe)  Slightly above baseline.  Continue IV fluids   Continue to monitor  Nephrology consult      Type 2 diabetes mellitus with stage 4 chronic kidney disease, without long-term current use of insulin  Patient's FSGs are controlled on current medication regimen.  Last A1c reviewed-   Lab Results   Component Value Date    HGBA1C 5.5 10/05/2022     Monitor BMPs    Hyperkalemia  Medical management.      Chronic gout  Resume home medication      MGUS (monoclonal gammopathy of unknown significance)  Need out patient oncology follow up.      Acute respiratory failure with hypoxia  Patient with Hypoxic Respiratory failure which is Acute.  she is not on home oxygen.   Supplemental oxygen was provided and noted-    Likely in the setting of pneumonia and/or Pulmonary edema     Essential hypertension  Resume home medications.      Hyperlipidemia  Resume statin      VTE Risk Mitigation (From admission, onward)         Ordered     apixaban tablet 2.5 mg  2 times daily         08/27/23 0246     IP VTE HIGH RISK PATIENT  Once         08/27/23 0246     Place sequential compression device  Until discontinued         08/27/23 0246                Discharge Planning   SALLY:      Code Status: DNR   Is the patient medically ready for discharge?:     Reason for patient still in hospital (select all that apply): Patient trending condition  Discharge Plan A: Home (Follow-ups)                  Salazar Kirby MD  Department of Hospital Medicine   [facility]

## 2023-08-31 PROBLEM — I50.33 ACUTE ON CHRONIC DIASTOLIC CHF (CONGESTIVE HEART FAILURE): Status: ACTIVE | Noted: 2023-01-01

## 2023-08-31 NOTE — PROGRESS NOTES
[facility] Medicine  Progress Note    Patient Name: Barbara Rizo  MRN: [id number]  Patient Class: IP- Inpatient   Admission Date: 8/26/2023  Length of Stay: 4 days  Attending Physician: Salazar Kirby, *  Primary Care Provider: Paras Oro MD        Subjective:     Principal Problem:Hyponatremia        HPI:  This is an 83-year-old female with a past medical history of HFpEF (EF: 65%, GIIDD), AFib (on Eliquis), hypertension, type 2 diabetes, hyperlipidemia, CKD 4, breast cancer, who presents with shortness of breath.    Patient presents with shortness of breath and generalized weakness that started 2 days prior to presentation.  She reports that she is been feeling progressively more fatigued and sustained a fall on her back with injury to her head a day prior to presentation.  Additionally, she reports decreased p.o. intake, nausea but denied vomiting or diarrhea.  She usually drinks 48 oz of water daily.     In the ED, the patient was tachypneic and hypoxic, requiring 5 L O2.  Labs were remarkable for hyponatremia (119 > 119), hyperkalemia (5.3 > 5.2), hypochloremia (89), elevated creatinine (2.2 from a baseline of 1.9), elevated BNP (868).  CT head showed no acute process.  CT chest without contrast showed small left pleural effusion with near complete consolidation of the left lower lobe (atelectasis and/or consolidation), and cardiomegaly with small pericardial effusion.  Patient was given Lasix 40 mg IV, ceftriaxone 1 g IV, and was later given 1 L of NS.  Of note, the patient has a history of hyponatremia requiring hospitalization (2020), and a prior history of a large right pleural effusion requiring a thoracentesis (2015).  Patient was admitted for further management.      Overview/Hospital Course:  Patient admitted with hyponatremia. Nitrite+ UTI and PNA, treating with antibiotics. Potentially SIADH from PNA. Thought initially to be hypervolemic hyponatremia with BNP  900, urine Na was not low, and higher O2 requirement. Nephrology consulted. Tolvaptan initiated. Sodium improving to 123. Can step down for tomorrow 8/29.  Hyponatremia is improving,  PT,OT recommending  SNF,but patient wish going home with HH.  Has ESBL Ecoli,started on meropenem.  Has sign of fluid overload,with Hx of diastolic CHF,DC NS,started on IV lasix.      Past Medical History:   Diagnosis Date    A-fib     Breast cancer     invasive ductal carcinoma    CKD (chronic kidney disease)     Diabetes mellitus type II     Fatty liver     GERD (gastroesophageal reflux disease)     Hearing loss of both ears     wears bilateral hearing aids    Hemochromatosis     History of anemia due to CKD     History of pleural effusion     with thoracentesis    Hyperlipidemia     Hypertension     Macular degeneration     Osteoarthritis     Left knee    Osteoporosis     Vaginal delivery     x2    Vitamin D deficiency disease     Wears partial dentures     upper removable bridge       Past Surgical History:   Procedure Laterality Date    AXILLARY NODE DISSECTION Left 10/18/2021    Procedure: LYMPHADENECTOMY, AXILLARY;  Surgeon: Darlene Roca MD;  Location: [facility] OR;  Service: General;  Laterality: Left;    BREAST BIOPSY      BREAST LUMPECTOMY      invasive ductal carcinoma    BREAST SURGERY Bilateral     Reduction r/t h/o fibrocystic disease    CHOLECYSTECTOMY  08/2015    EYE SURGERY      Cat Ext  OU    HYSTERECTOMY      partial due to uterine fibroids    INCISION AND DRAINAGE OF KNEE Left 09/17/2020    Procedure: INCISION AND DRAINAGE, KNEE;  Surgeon: Rolando Wagoner MD;  Location: [facility] OR;  Service: Orthopedics;  Laterality: Left;    INJECTION FOR SENTINEL NODE IDENTIFICATION Left 10/18/2021    Procedure: INJECTION, FOR SENTINEL NODE IDENTIFICATION;  Surgeon: Darlene Roca MD;  Location: [facility] OR;  Service: General;  Laterality: Left;    JOINT REPLACEMENT Left 05/13/2013    TKR    JOINT REPLACEMENT  Right 08/03/2015    TKR    MASTECTOMY, PARTIAL Left 10/18/2021    Procedure: MASTECTOMY, PARTIAL -- wire;  Surgeon: Darlene Roca MD;  Location: [facility] OR;  Service: General;  Laterality: Left;  RN PREOP 10/15/2021   VACCINATED-----NEED H/P AND ORDERS    SENTINEL LYMPH NODE BIOPSY Left 10/18/2021    Procedure: BIOPSY, LYMPH NODE, SENTINEL;  Surgeon: Darlene Roca MD;  Location: [facility] OR;  Service: General;  Laterality: Left;    THORACENTESIS      TOTAL KNEE ARTHROPLASTY Right 2015    left knee done 5/2013    WOUND DRESSING Left 09/17/2020    Procedure: WOUND VAC APPLICATION;  Surgeon: Rolando Wagoner MD;  Location: [facility] OR;  Service: Orthopedics;  Laterality: Left;       Review of patient's allergies indicates:  No Known Allergies    Current Facility-Administered Medications on File Prior to Encounter   Medication    denosumab (PROLIA) injection 60 mg     Current Outpatient Medications on File Prior to Encounter   Medication Sig    allopurinoL (ZYLOPRIM) 100 MG tablet Take 1 tablet by mouth once daily.    amiodarone (PACERONE) 200 MG Tab TAKE ONE-HALF TABLET BY MOUTH EVERY DAY *DO NOT TAKE WITH GRAPEFRUIT JUICE*    amLODIPine (NORVASC) 10 MG tablet TAKE ONE TABLET BY MOUTH ONCE DAILY    apixaban (ELIQUIS) 2.5 mg Tab TAKE ONE TABLET BY MOUTH TWICE DAILY    atorvastatin (LIPITOR) 40 MG tablet TAKE ONE TABLET BY MOUTH ONCE DAILY    calcium carbonate (TUMS) 200 mg calcium (500 mg) chewable tablet Take 2 tablets by mouth.    ergocalciferol (ERGOCALCIFEROL) 50,000 unit Cap Take 50,000 Units by mouth every 30 days.     FLUZONE HIGHDOSE QUAD 21-22  mcg/0.7 mL Syrg     folic acid (FOLVITE) 1 MG tablet TAKE 1 TABLET BY MOUTH EVERY DAY    furosemide (LASIX) 20 MG tablet Take 1 tablet (20 mg total) by mouth 2 (two) times daily.    losartan (COZAAR) 25 MG tablet Take 25 mg by mouth once daily.    mupirocin (BACTROBAN) 2 % ointment Apply topically every evening.    solifenacin (VESICARE) 10 MG tablet Take  1 tablet (10 mg total) by mouth once daily.     Family History       Problem Relation (Age of Onset)    Aneurysm Father    Cancer Mother    Hypertension Mother    No Known Problems Sister, Sister, Brother, Daughter, Son          Tobacco Use    Smoking status: Former     Passive exposure: Past    Smokeless tobacco: Never   Substance and Sexual Activity    Alcohol use: Not Currently    Drug use: No    Sexual activity: Not Currently     Review of Systems   Constitutional:  Positive for appetite change and fatigue.   HENT: Negative.     Eyes: Negative.    Respiratory: Negative.     Cardiovascular: Negative.    Gastrointestinal:  Positive for nausea.   Endocrine: Negative.    Genitourinary: Negative.    Musculoskeletal: Negative.    Skin: Negative.    Allergic/Immunologic: Negative.    Neurological: Negative.    Psychiatric/Behavioral: Negative.       Objective:     Vital Signs (Most Recent):  Temp: 97.7 °F (36.5 °C) (08/31/23 0729)  Pulse: 89 (08/31/23 0840)  Resp: 18 (08/31/23 0729)  BP: 125/63 (08/31/23 0729)  SpO2: 95 % (08/31/23 0840) Vital Signs (24h Range):  Temp:  [97.4 °F (36.3 °C)-97.9 °F (36.6 °C)] 97.7 °F (36.5 °C)  Pulse:  [76-91] 89  Resp:  [16-20] 18  SpO2:  [89 %-95 %] 95 %  BP: (105-136)/(61-78) 125/63     Weight: 83.9 kg (184 lb 15.5 oz)  Body mass index is 33.83 kg/m².     Physical Exam  Vitals and nursing note reviewed.   Constitutional:       General: She is not in acute distress.     Appearance: Normal appearance. She is not ill-appearing.   HENT:      Head: Normocephalic and atraumatic.      Nose: Nose normal.      Mouth/Throat:      Mouth: Mucous membranes are moist.   Eyes:      Extraocular Movements: Extraocular movements intact.   Cardiovascular:      Rate and Rhythm: Normal rate.      Pulses: Normal pulses.      Heart sounds: No murmur heard.  Pulmonary:      Effort: Pulmonary effort is normal. No respiratory distress.      Breath sounds: Decreased air movement present.   Abdominal:       General: Abdomen is flat.      Palpations: Abdomen is soft.      Tenderness: There is no abdominal tenderness.   Musculoskeletal:      Right lower leg: Edema (trace) present.      Left lower leg: Edema (trace) present.   Skin:     General: Skin is warm.      Capillary Refill: Capillary refill takes less than 2 seconds.   Neurological:      General: No focal deficit present.      Mental Status: She is alert.   Psychiatric:         Mood and Affect: Mood normal.                Significant Labs: All pertinent labs within the past 24 hours have been reviewed.    Significant Imaging: I have reviewed all pertinent imaging results/findings within the past 24 hours.      Assessment/Plan:      * Hyponatremia  Patient has hyponatremia which is uncontrolled,We will aim to correct the sodium by 4-6mEq in 24 hours. We will monitor sodium Every 4 hours. The hyponatremia is due to Dehydration/hypovolemia and/or SIADH secondary to pneumonia. We will obtain the following studies: Urine sodium, urine osmolality, serum osmolality. We will treat the hyponatremia with IV fluids as follows: 100 ml/hr, fluid restriction.The patient's sodium results have been reviewed and are listed below.  Consult nephrology    Recent Labs   Lab 08/31/23  0501   *     Potentially SIADH from PNA. Thought initially to be hypervolemic hyponatremia with , urine Na was not low, and higher O2 requirement. Nephrology consulted. Tolvaptan initiated.   Sodium improving to 123.   PT,OT recommending  SNF,but patient wish going home with .DC NS,has fluid overload.    Acute on chronic diastolic CHF (congestive heart failure)  Patient is identified as having Diastolic (HFpEF) heart failure that is Acute on chronic. CHF is currently uncontrolled due to Dyspnea not returned to baseline after IV lasix doses of IV diuretic. Latest ECHO performed and demonstrates- Results for orders placed during the hospital encounter of 03/07/23    Echo    Interpretation  Summary  · The left ventricle is normal in size with eccentric hypertrophy and normal systolic function.  · The estimated ejection fraction is 65%.  · Grade II left ventricular diastolic dysfunction.  · Mild tricuspid regurgitation.  · Small pericardial effusion.  · Moderate right atrial enlargement.  · Severe left atrial enlargement.  · Normal right ventricular size with normal right ventricular systolic function.  · There is moderate aortic valve stenosis.  · Aortic valve area is 1.20 cm2; peak velocity is 2.70 m/s; mean gradient is 19 mmHg.  · Mild mitral regurgitation.  · Mild pulmonic regurgitation.  · Normal central venous pressure (3 mmHg).  · The estimated PA systolic pressure is 46 mmHg.  · There is pulmonary hypertension.  . Continue Furosemide and monitor clinical status closely. Monitor on telemetry. Patient is off CHF pathway.  Monitor strict Is&Os and daily weights.  Place on fluid restriction of 1.5 L. Cardiology has not been any consulted. Continue to stress to patient importance of self efficacy and  on diet for CHF. Last BNP reviewed- and noted below   Recent Labs   Lab 08/31/23  0501   BNP 1,486*   .    Urinary tract infection without hematuria  Has ESBL Ecoli,started on meropenem.      Prolonged Q-T interval on ECG  Noted. Avoid QT prolonging agents.       Pleural effusion  Continue antibiotics  Needs outpatient follow up/imaging to ensure resolution      Community acquired pneumonia  CT chest without contrast showed small left pleural effusion with near complete consolidation of the left lower lobe (atelectasis and/or consolidation), and cardiomegaly with small pericardial effusion  Continue ceftriaxone, doxycycline   Respiratory cultures, if able  Urine Legionella antigen    Aortic atherosclerosis  On medical treatment.      Malignant neoplasm of overlapping sites of left breast in female, estrogen receptor positive  Need out patient oncology follow up.      Chronic kidney disease, stage  4 (severe)  Slightly above baseline.  Continue IV fluids   Continue to monitor  Nephrology consult      Type 2 diabetes mellitus with stage 4 chronic kidney disease, without long-term current use of insulin  Patient's FSGs are controlled on current medication regimen.  Last A1c reviewed-   Lab Results   Component Value Date    HGBA1C 5.5 10/05/2022     Monitor BMPs    Pulmonary hypertension  On IV lasix.      Hyperkalemia  Medical management.      Chronic gout  Resume home medication      MGUS (monoclonal gammopathy of unknown significance)  Need out patient oncology follow up.      Acute respiratory failure with hypoxia  Patient with Hypoxic Respiratory failure which is Acute.  she is not on home oxygen.   Supplemental oxygen was provided and noted-    Likely in the setting of pneumonia and/or Pulmonary edema     Essential hypertension  Resume home medications.      Hyperlipidemia  Resume statin      VTE Risk Mitigation (From admission, onward)         Ordered     apixaban tablet 2.5 mg  2 times daily         08/27/23 0246     IP VTE HIGH RISK PATIENT  Once         08/27/23 0246     Place sequential compression device  Until discontinued         08/27/23 0246                Discharge Planning   SALLY:      Code Status: DNR   Is the patient medically ready for discharge?:     Reason for patient still in hospital (select all that apply): Patient trending condition  Discharge Plan A: Home with family, Home Health   Discharge Delays: None known at this time              Salazar Kirby MD  Department of Hospital Medicine   [facility]

## 2023-08-31 NOTE — PLAN OF CARE
Pt is AAOx4. 8L [facility]. Tele maintained.  No falls or new injuries reported during shift, safety precautions maintained.     Problem: Adult Inpatient Plan of Care  Goal: Plan of Care Review  Outcome: Ongoing, Progressing     Problem: Adult Inpatient Plan of Care  Goal: Optimal Comfort and Wellbeing  Outcome: Ongoing, Progressing

## 2023-08-31 NOTE — PROGRESS NOTES
Barbara Rizo is a 83 y.o. female patient.    Follow for hyponatremia, CKD    C/o SOB this am  Comfortable    Scheduled Meds:   allopurinoL  100 mg Oral Daily    amiodarone  200 mg Oral Daily    apixaban  2.5 mg Oral BID    atorvastatin  40 mg Oral Daily    doxycycline (VIBRAMYCIN) IVPB  100 mg Intravenous Q12H    famotidine  20 mg Oral Daily    folic acid  1,000 mcg Oral Daily    furosemide (LASIX) injection  40 mg Intravenous Q12H    melatonin  6 mg Oral Nightly    meropenem (MERREM) IVPB  1 g Intravenous Q12H    metoprolol succinate  12.5 mg Oral Daily    mupirocin   Nasal BID    sodium chloride 0.9%  10 mL Intravenous Q6H    sodium chloride  1,000 mg Oral BID       Review of patient's allergies indicates:  No Known Allergies      Vital Signs Range (Last 24H):  Temp:  [97.4 °F (36.3 °C)-97.9 °F (36.6 °C)]   Pulse:  [76-91]   Resp:  [16-20]   BP: (105-136)/(61-78)   SpO2:  [89 %-95 %]     I & O (Last 24H):  Intake/Output Summary (Last 24 hours) at 8/31/2023 0909  Last data filed at 8/31/2023 0729  Gross per 24 hour   Intake 120 ml   Output 1300 ml   Net -1180 ml           Physical Exam:  General appearance: well developed, no distress  Lungs:  clear to auscultation bilaterally and normal respiratory effort  Heart: regular rate and rhythm  Abdomen:  soft, normal bowel sounds  Extremities: edema trace    Laboratory:  I have reviewed all pertinent lab results within the past 24 hours.  CBC:   Recent Labs   Lab 08/27/23  0509   WBC 5.84   RBC 4.38   HGB 13.5   HCT 41.1      MCV 94   MCH 30.8   MCHC 32.8     CMP:   Recent Labs   Lab 08/26/23  2119 08/27/23  0127 08/31/23  0501      < > 115*   CALCIUM 8.8   < > 8.5*   ALBUMIN 3.8  --   --    PROT 7.5  --   --    *   < > 124*   K 5.3*   < > 4.5   CO2 20*   < > 21*   CL 89*   < > 92*   BUN 45*   < > 40*   CREATININE 2.2*   < > 1.8*   ALKPHOS 85  --   --    ALT 15  --   --    AST 13  --   --    BILITOT 0.9  --   --     < > = values in this  interval not displayed.       Assessment & Plan    Hyponatremia - stable  CKD stage 4  UTI - ESBL  Pneumonia    D/c IVF  Watch sodium  Continue present RX          Trac T Le  8/31/2023

## 2023-08-31 NOTE — ASSESSMENT & PLAN NOTE
- Worsening oxygen requirements, likely secondary to volume overload. Now getting diuresis, in addition to her treatment for pneumonia. Encouraged incentive spirometry, as she likely has component of atelectasis.

## 2023-08-31 NOTE — PROGRESS NOTES
"[facility]  Palliative Medicine  Progress Note    Patient Name: Barbara Rizo  MRN: [id number]  Admission Date: 8/26/2023  Hospital Length of Stay: 4 days  Code Status: DNR   Attending Provider: Salazar Kirby, *  Consulting Provider: Teodora Ibarra MD  Primary Care Physician: Paras Oro MD  Principal Problem:Hyponatremia    Assessment/Plan:     Advance Care Planning       8/31/23  - Chart and interval history reviewed; discussed pt in person with primary team (Dr Sommer). Pt with increased oxygen requirements, likely due to volume overload. Plan is for IV diuresis.   - Visited with pt at bedside; she was sitting up, though looked mildly short of breath at times (was on 8L NC oxygen). She did admit to feeling more short of breath today.   - She is aware of plan for diuresis, which I told her I agree with. As she was worried about the effect lasix will have on her kidneys, told her we will keep a close eye on them while we try to find a good fluid balance   - Emotional support provided; encouraged her to take things one step at a time while we do our best to get her feeling better. Let her know I will continue to check in on her throughout the hospital stay.    8/29/23  - Chart and interval history reviewed; discussed pt in person with SW/CM. PT/OT recommended SNF, though pt would prefer to go home. Sodium improving. Plan for likely discharge in next day or two.   - Visited with pt at bedside; her daughter Tamar was in room. During entirety of visit, pt was alert, sitting up in bed, and readily conversational. She said her breathing is feeling better today - told her she looks better than she did in the ICU.   - Brought up some of the topics we discussed yesterday; Tamar said they have all talked about this. Patient was clear that she does not want to undergo CPR or intubation in an emergency, and her preference would be to go peacefully. She stated, "I've lived a good life, and I don't want " "to live one if I am sick." Based on our discussion, she is DNR/DNI/selective treatment; completed electronic LaPOST documenting these preferences. Family is very supportive of this decision.   - We did talk about plans for after hospital; pt confirmed she doesn't want SNF, and would prefer to go home with [facility] (has had them 3 times previously, and daughter agreed they were a good agency).   - Overall, pt's goal is to regain or maintain as much of her functional status and independence as possible, as this is important component of her current quality of life  - In a separate conversation with Tamar in [facility] as she was leaving, brought up option of home-based palliative care - she was agreeable to this. Discussed that if pt's functional status or symptom burden worsens - or if patient decides she no longer wants to return to the hospital - the palliative team would help with transition to hospice care. She voiced understanding of this, and told me stories of pt's ex-husbands death. He was briefly on HD, though stopped after two sessions and decided he wanted to go peacefully; she said he had a very comfortable death, and family appreciated that he made his own decisions - agreed with her on the importance of this.   - Overall, family with good insight into pt's chronic conditions, and is doing a wonderful job supporting her   - Will continue to check in on patient during this and any future hospital stays; updated primary team and SW/CM after visit     8/28/23  - Consult for introduction to palliative medicine and advance care planning in pt with underlying CKD IV, chronic respiratory failure (on 0-2 L NC oxygen at home), CHF (preserved EF, pulmonary hypertension, moderate aortic stenois) who presented to hospital for increased shortness of breath and falls; she was admitted to ICU for acute respiratory failure and hyponatremia. Chart reviewed in depth; discussed pt during MDT rounds.   - Met with pt at " "bedside; during entirety of visit, she was alert, in no distress, and readily able to have a coherent and insightful conversation. Present at bedside was her very supportive son Selwyn (legally-authorized surrogate medical decision maker, along with her other child Tamar). Introduced role of palliative medicine, and learned more about pt outside of hospital. She is  for the last 4.5 years; her   of renal failure. She worked as a  for 33 years, and now enjoys an active social life - she enjoys going out to lunch with friends and relatives, as well as going to listen to live music. Despite her chronic illnesses, she lives by herself, and is able to complete all ADLs without assistance. However, her daughter Tamar lives nearby.   - Thorough medical update provided, as well as some hypothetic medical situations (thoughts on HD, discussion of code status). Given that pt is the best person to make her medical decisions, encouraged her to further discuss her care preferences with her children. In discussing HD if needed in the future, she did not seem inclined to pursue this. Additionally, she stated that she "has had a good life" and does not think that she would want to undergo CPR. Told her I appreciate her insight, and reminded her that no decisions need to be made at this time.   - She did ask if I thought she was dying; I told her she's doing quite well for someone who is 83 years old and has some chronic conditions, and we are hopeful that she gets to leave the hospital sometime in the next few days and go back to living her normal life.   - Validated that this can be a lot to think about; emotional support provided   - Let her know that I will check in with her (likely tomorrow); no changes in overall level of care at this time   - Updated primary team in person following visit     Pulmonary  Community acquired pneumonia  - Abx per primary team     Acute respiratory failure with " hypoxia  - Worsening oxygen requirements, likely secondary to volume overload. Now getting diuresis, in addition to her treatment for pneumonia. Encouraged incentive spirometry, as she likely has component of atelectasis.     Cardiac/Vascular  Acute on chronic diastolic CHF (congestive heart failure)  - Diuresis per primary team    Renal/  Hyponatremia  - Improving s/p tolvaptan; nephrology consulted and managing. Has had history of this in 2020.     Chronic kidney disease, stage 4 (severe)  - Noted; needs nephrology follow up. Illness trajectory education provided; encouraged her to think about whether she would want to undergo HD if this was needed in the future. She had multiple family members who were on this (or were in renal failure), so she is familiar with this intervention and understands that it is a form of life support      Orthopedic  Fall  - Occurred at home; PT/OT consulted. Pt's preference is to go home with home health, rather than SNF.     I will follow-up with patient. Please contact us if you have any additional questions.     LESLIE Ding  Palliative Medicine Staff   [phone]    > 50% of 35 min visit spent in chart review, face to face discussion of symptom assessment, coordination of care with other specialists, and goals of care, emotional support, formulating and communicating prognosis, exploring burden/ benefit of various approaches of treatment.     Subjective:     Interval History: On more oxygen.     Medications:  Continuous Infusions:  Scheduled Meds:   allopurinoL  100 mg Oral Daily    amiodarone  200 mg Oral Daily    apixaban  2.5 mg Oral BID    atorvastatin  40 mg Oral Daily    famotidine  20 mg Oral Daily    folic acid  1,000 mcg Oral Daily    [START ON 9/1/2023] furosemide (LASIX) injection  40 mg Intravenous Daily    melatonin  6 mg Oral Nightly    meropenem (MERREM) IVPB  1 g Intravenous Q12H    metoprolol succinate  12.5 mg Oral Daily    mupirocin    Nasal BID    sodium chloride 0.9%  10 mL Intravenous Q6H    sodium chloride  1,000 mg Oral BID     PRN Meds:acetaminophen, dextrose 10%, dextrose 10%, glucagon (human recombinant), glucose, glucose, insulin aspart U-100, ondansetron, prochlorperazine, sodium chloride 0.9%, Flushing PICC Protocol **AND** sodium chloride 0.9% **AND** sodium chloride 0.9%    Objective:     Vital Signs (Most Recent):  Temp: 97.7 °F (36.5 °C) (08/31/23 0729)  Pulse: 89 (08/31/23 0840)  Resp: 18 (08/31/23 0729)  BP: 125/63 (08/31/23 0729)  SpO2: 95 % (08/31/23 0840) Vital Signs (24h Range):  Temp:  [97.4 °F (36.3 °C)-97.9 °F (36.6 °C)] 97.7 °F (36.5 °C)  Pulse:  [76-91] 89  Resp:  [16-20] 18  SpO2:  [89 %-95 %] 95 %  BP: (105-136)/(61-78) 125/63     Weight: 83.9 kg (184 lb 15.5 oz)  Body mass index is 33.83 kg/m².       Physical Exam  Constitutional:       Comments: Sitting up in bed, readily conversational, in no distress   HENT:      Head: Normocephalic.      Nose: Nose normal.      Mouth/Throat:      Mouth: Mucous membranes are moist.   Eyes:      Extraocular Movements: Extraocular movements intact.   Pulmonary:      Comments: On 8L NC oxygen, appears mildly short of breath at times   Neurological:      General: No focal deficit present.      Mental Status: She is alert and oriented to person, place, and time.   Psychiatric:         Mood and Affect: Mood normal.         Behavior: Behavior normal.       Significant Labs: All pertinent labs within the past 24 hours have been reviewed.  CBC:   Recent Labs   Lab 08/27/23  0509   WBC 5.84   HGB 13.5   HCT 41.1   MCV 94        BMP:  Recent Labs   Lab 08/31/23  0501   *   *   K 4.5   CL 92*   CO2 21*   BUN 40*   CREATININE 1.8*   CALCIUM 8.5*     LFT:  Lab Results   Component Value Date    AST 13 08/26/2023    ALKPHOS 85 08/26/2023    BILITOT 0.9 08/26/2023     Albumin:   Albumin   Date Value Ref Range Status   08/26/2023 3.8 3.5 - 5.2 g/dL Final     Protein:   Total  Protein   Date Value Ref Range Status   08/26/2023 7.5 6.0 - 8.4 g/dL Final     Lactic acid:   Lab Results   Component Value Date    LACTATE 1.4 01/28/2019    LACTATE 1.1 09/25/2015       Significant Imaging: I have reviewed all pertinent imaging results/findings within the past 24 hours.

## 2023-08-31 NOTE — NURSING
SpO2 reading 87% on 5 L NC. Pt bumped up to 10 L NC, SpO2 in the 90's, MD notified, RT notified.    Pt placed on 10 L HF NC per RT, and MD made aware.  Pt asymptomatic.  Per MD keep SpO2 >90%.  RRN bedside as well during event.    STAT CXR ordered per MD.

## 2023-08-31 NOTE — NURSING
[facility] - [place]  Nurses Note -- 4 Eyes      8/31/2023       Skin assessed on: Q Shift      [x] No Pressure Injuries Present    []Prevention Measures Documented    [] Yes LDA  for Pressure Injury Previously documented     [] Yes New Pressure Injury Discovered   [] LDA for New Pressure Injury Added      Attending RN:  Teresa Olivares LPN     Second RN:  Betty ParksRN

## 2023-08-31 NOTE — NURSING
[facility], [facility] - [place]  Nurses Note -- 4 Eyes      8/30/2023       Skin assessed on: Q Shift      [x] No Pressure Injuries Present    [x]Prevention Measures Documented    [] Yes LDA  for Pressure Injury Previously documented     [] Yes New Pressure Injury Discovered   [] LDA for New Pressure Injury Added      Attending RN:  NOEMÍ KELLY RN     Second RN:  Lou POPE

## 2023-08-31 NOTE — PLAN OF CARE
Problem: Adult Inpatient Plan of Care  Goal: Plan of Care Review  Outcome: Ongoing, Progressing     Problem: Diabetes Comorbidity  Goal: Blood Glucose Level Within Targeted Range  Outcome: Ongoing, Progressing     Problem: Fluid Imbalance (Pneumonia)  Goal: Fluid Balance  Outcome: Ongoing, Progressing     Problem: Impaired Wound Healing  Goal: Optimal Wound Healing  Outcome: Ongoing, Progressing     Problem: Fall Injury Risk  Goal: Absence of Fall and Fall-Related Injury  Outcome: Ongoing, Progressing     Problem: Coping Ineffective  Goal: Effective Coping  Outcome: Ongoing, Progressing

## 2023-08-31 NOTE — ASSESSMENT & PLAN NOTE
"8/31/23  - Chart and interval history reviewed; discussed pt in person with primary team (Dr Sommer). Pt with increased oxygen requirements, likely due to volume overload. Plan is for IV diuresis.   - Visited with pt at bedside; she was sitting up, though looked mildly short of breath at times (was on 8L NC oxygen). She did admit to feeling more short of breath today.   - She is aware of plan for diuresis, which I told her I agree with. As she was worried about the effect lasix will have on her kidneys, told her we will keep a close eye on them while we try to find a good fluid balance   - Emotional support provided; encouraged her to take things one step at a time while we do our best to get her feeling better. Let her know I will continue to check in on her throughout the hospital stay.    8/29/23  - Chart and interval history reviewed; discussed pt in person with SW/GRETA. PT/OT recommended SNF, though pt would prefer to go home. Sodium improving. Plan for likely discharge in next day or two.   - Visited with pt at bedside; her daughter Tamar was in room. During entirety of visit, pt was alert, sitting up in bed, and readily conversational. She said her breathing is feeling better today - told her she looks better than she did in the ICU.   - Brought up some of the topics we discussed yesterday; Tamar said they have all talked about this. Patient was clear that she does not want to undergo CPR or intubation in an emergency, and her preference would be to go peacefully. She stated, "I've lived a good life, and I don't want to live one if I am sick." Based on our discussion, she is DNR/DNI/selective treatment; completed electronic LaPOST documenting these preferences. Family is very supportive of this decision.   - We did talk about plans for after hospital; pt confirmed she doesn't want SNF, and would prefer to go home with [facility] (has had them 3 times previously, and daughter agreed they were a good agency).   - " Overall, pt's goal is to regain or maintain as much of her functional status and independence as possible, as this is important component of her current quality of life  - In a separate conversation with Tamar in [facility] as she was leaving, brought up option of home-based palliative care - she was agreeable to this. Discussed that if pt's functional status or symptom burden worsens - or if patient decides she no longer wants to return to the hospital - the palliative team would help with transition to hospice care. She voiced understanding of this, and told me stories of pt's ex-husbands death. He was briefly on HD, though stopped after two sessions and decided he wanted to go peacefully; she said he had a very comfortable death, and family appreciated that he made his own decisions - agreed with her on the importance of this.   - Overall, family with good insight into pt's chronic conditions, and is doing a wonderful job supporting her   - Will continue to check in on patient during this and any future hospital stays; updated primary team and SW/CM after visit     8/28/23  - Consult for introduction to palliative medicine and advance care planning in pt with underlying CKD IV, chronic respiratory failure (on 0-2 L NC oxygen at home), CHF (preserved EF, pulmonary hypertension, moderate aortic stenois) who presented to hospital for increased shortness of breath and falls; she was admitted to ICU for acute respiratory failure and hyponatremia. Chart reviewed in depth; discussed pt during MDT rounds.   - Met with pt at bedside; during entirety of visit, she was alert, in no distress, and readily able to have a coherent and insightful conversation. Present at bedside was her very supportive son Selwyn (legally-authorized surrogate medical decision maker, along with her other child Tamar). Introduced role of palliative medicine, and learned more about pt outside of hospital. She is  for the last 4.5 years; her   " of renal failure. She worked as a  for 33 years, and now enjoys an active social life - she enjoys going out to lunch with friends and relatives, as well as going to listen to live music. Despite her chronic illnesses, she lives by herself, and is able to complete all ADLs without assistance. However, her daughter Tamar lives nearby.   - Thorough medical update provided, as well as some hypothetic medical situations (thoughts on HD, discussion of code status). Given that pt is the best person to make her medical decisions, encouraged her to further discuss her care preferences with her children. In discussing HD if needed in the future, she did not seem inclined to pursue this. Additionally, she stated that she "has had a good life" and does not think that she would want to undergo CPR. Told her I appreciate her insight, and reminded her that no decisions need to be made at this time.   - She did ask if I thought she was dying; I told her she's doing quite well for someone who is 83 years old and has some chronic conditions, and we are hopeful that she gets to leave the hospital sometime in the next few days and go back to living her normal life.   - Validated that this can be a lot to think about; emotional support provided   - Let her know that I will check in with her (likely tomorrow); no changes in overall level of care at this time   - Updated primary team in person following visit   "

## 2023-08-31 NOTE — PLAN OF CARE
Problem: Occupational Therapy  Goal: Occupational Therapy Goal  Description: Goals to be met by: 09/11/23     Patient will increase functional independence with ADLs by performing:    UE Dressing with [place].  LE Dressing with [place].  Grooming while standing at sink with Modified [place].  Toileting from toilet with Modified [place] for hygiene and clothing management.   Sitting at edge of bed x30  minutes with Modified [place].  Toilet transfer to toilet with Modified [place].  Increased functional strength to 4/5 for B upper extremity .  Upper extremity exercise program x10  reps per handout, with supervision.    Outcome: Ongoing, Progressing   Patient ate seated in chair independently.

## 2023-08-31 NOTE — PT/OT/SLP PROGRESS
Physical Therapy      Patient Name:  Barbara Rizo   MRN:  [id number]    Patient not seen today secondary to Other (pt just returned to bed from chair, c/o fatigue/SOB , SpO2 : 90% on 8L HF at rest  . Pt requested to come back later  ). Second attempt, pt receiving nursing care. Will follow-up as able later hour/day .

## 2023-08-31 NOTE — ASSESSMENT & PLAN NOTE
Patient is identified as having Diastolic (HFpEF) heart failure that is Acute on chronic. CHF is currently uncontrolled due to Dyspnea not returned to baseline after IV lasix doses of IV diuretic. Latest ECHO performed and demonstrates- Results for orders placed during the hospital encounter of 03/07/23    Echo    Interpretation Summary  · The left ventricle is normal in size with eccentric hypertrophy and normal systolic function.  · The estimated ejection fraction is 65%.  · Grade II left ventricular diastolic dysfunction.  · Mild tricuspid regurgitation.  · Small pericardial effusion.  · Moderate right atrial enlargement.  · Severe left atrial enlargement.  · Normal right ventricular size with normal right ventricular systolic function.  · There is moderate aortic valve stenosis.  · Aortic valve area is 1.20 cm2; peak velocity is 2.70 m/s; mean gradient is 19 mmHg.  · Mild mitral regurgitation.  · Mild pulmonic regurgitation.  · Normal central venous pressure (3 mmHg).  · The estimated PA systolic pressure is 46 mmHg.  · There is pulmonary hypertension.  . Continue Furosemide and monitor clinical status closely. Monitor on telemetry. Patient is off CHF pathway.  Monitor strict Is&Os and daily weights.  Place on fluid restriction of 1.5 L. Cardiology has not been any consulted. Continue to stress to patient importance of self efficacy and  on diet for CHF. Last BNP reviewed- and noted below   Recent Labs   Lab 08/31/23  0501   BNP 1,486*   .

## 2023-08-31 NOTE — ASSESSMENT & PLAN NOTE
Patient has hyponatremia which is uncontrolled,We will aim to correct the sodium by 4-6mEq in 24 hours. We will monitor sodium Every 4 hours. The hyponatremia is due to Dehydration/hypovolemia and/or SIADH secondary to pneumonia. We will obtain the following studies: Urine sodium, urine osmolality, serum osmolality. We will treat the hyponatremia with IV fluids as follows: 100 ml/hr, fluid restriction.The patient's sodium results have been reviewed and are listed below.  Consult nephrology    Recent Labs   Lab 08/31/23  0501   *     Potentially SIADH from PNA. Thought initially to be hypervolemic hyponatremia with , urine Na was not low, and higher O2 requirement. Nephrology consulted. Tolvaptan initiated.   Sodium improving to 123.   PT,OT recommending  SNF,but patient wish going home with HH.DC NS,has fluid overload.

## 2023-08-31 NOTE — SUBJECTIVE & OBJECTIVE
Past Medical History:   Diagnosis Date    A-fib     Breast cancer     invasive ductal carcinoma    CKD (chronic kidney disease)     Diabetes mellitus type II     Fatty liver     GERD (gastroesophageal reflux disease)     Hearing loss of both ears     wears bilateral hearing aids    Hemochromatosis     History of anemia due to CKD     History of pleural effusion     with thoracentesis    Hyperlipidemia     Hypertension     Macular degeneration     Osteoarthritis     Left knee    Osteoporosis     Vaginal delivery     x2    Vitamin D deficiency disease     Wears partial dentures     upper removable bridge       Past Surgical History:   Procedure Laterality Date    AXILLARY NODE DISSECTION Left 10/18/2021    Procedure: LYMPHADENECTOMY, AXILLARY;  Surgeon: Darlene Roca MD;  Location: [facility];  Service: General;  Laterality: Left;    BREAST BIOPSY      BREAST LUMPECTOMY      invasive ductal carcinoma    BREAST SURGERY Bilateral     Reduction r/t h/o fibrocystic disease    CHOLECYSTECTOMY  08/2015    EYE SURGERY      Cat Ext  OU    HYSTERECTOMY      partial due to uterine fibroids    INCISION AND DRAINAGE OF KNEE Left 09/17/2020    Procedure: INCISION AND DRAINAGE, KNEE;  Surgeon: Rolando Wagoner MD;  Location: [facility];  Service: Orthopedics;  Laterality: Left;    INJECTION FOR SENTINEL NODE IDENTIFICATION Left 10/18/2021    Procedure: INJECTION, FOR SENTINEL NODE IDENTIFICATION;  Surgeon: Darlene Roca MD;  Location: [facility];  Service: General;  Laterality: Left;    JOINT REPLACEMENT Left 05/13/2013    TKR    JOINT REPLACEMENT Right 08/03/2015    TKR    MASTECTOMY, PARTIAL Left 10/18/2021    Procedure: MASTECTOMY, PARTIAL -- wire;  Surgeon: Darlene Roca MD;  Location: [facility];  Service: General;  Laterality: Left;  RN PREOP 10/15/2021   VACCINATED-----NEED H/P AND ORDERS    SENTINEL LYMPH NODE BIOPSY Left 10/18/2021    Procedure: BIOPSY, LYMPH NODE, SENTINEL;  Surgeon: Darlene Roca MD;  Location: [facility];   Service: General;  Laterality: Left;    THORACENTESIS      TOTAL KNEE ARTHROPLASTY Right 2015    left knee done 5/2013    WOUND DRESSING Left 09/17/2020    Procedure: WOUND VAC APPLICATION;  Surgeon: Rolando Wagoner MD;  Location: [facility];  Service: Orthopedics;  Laterality: Left;       Review of patient's allergies indicates:  No Known Allergies    Current Facility-Administered Medications on File Prior to Encounter   Medication    denosumab (PROLIA) injection 60 mg     Current Outpatient Medications on File Prior to Encounter   Medication Sig    allopurinoL (ZYLOPRIM) 100 MG tablet Take 1 tablet by mouth once daily.    amiodarone (PACERONE) 200 MG Tab TAKE ONE-HALF TABLET BY MOUTH EVERY DAY *DO NOT TAKE WITH GRAPEFRUIT JUICE*    amLODIPine (NORVASC) 10 MG tablet TAKE ONE TABLET BY MOUTH ONCE DAILY    apixaban (ELIQUIS) 2.5 mg Tab TAKE ONE TABLET BY MOUTH TWICE DAILY    atorvastatin (LIPITOR) 40 MG tablet TAKE ONE TABLET BY MOUTH ONCE DAILY    calcium carbonate (TUMS) 200 mg calcium (500 mg) chewable tablet Take 2 tablets by mouth.    ergocalciferol (ERGOCALCIFEROL) 50,000 unit Cap Take 50,000 Units by mouth every 30 days.     FLUZONE HIGHDOSE QUAD 21-22  mcg/0.7 mL Syrg     folic acid (FOLVITE) 1 MG tablet TAKE 1 TABLET BY MOUTH EVERY DAY    furosemide (LASIX) 20 MG tablet Take 1 tablet (20 mg total) by mouth 2 (two) times daily.    losartan (COZAAR) 25 MG tablet Take 25 mg by mouth once daily.    mupirocin (BACTROBAN) 2 % ointment Apply topically every evening.    solifenacin (VESICARE) 10 MG tablet Take 1 tablet (10 mg total) by mouth once daily.     Family History       Problem Relation (Age of Onset)    Aneurysm Father    Cancer Mother    Hypertension Mother    No Known Problems Sister, Sister, Brother, Daughter, Son          Tobacco Use    Smoking status: Former     Passive exposure: Past    Smokeless tobacco: Never   Substance and Sexual Activity    Alcohol use: Not Currently    Drug use: No    Sexual  activity: Not Currently     Review of Systems   Constitutional:  Positive for appetite change and fatigue.   HENT: Negative.     Eyes: Negative.    Respiratory: Negative.     Cardiovascular: Negative.    Gastrointestinal:  Positive for nausea.   Endocrine: Negative.    Genitourinary: Negative.    Musculoskeletal: Negative.    Skin: Negative.    Allergic/Immunologic: Negative.    Neurological: Negative.    Psychiatric/Behavioral: Negative.       Objective:     Vital Signs (Most Recent):  Temp: 97.7 °F (36.5 °C) (08/31/23 0729)  Pulse: 89 (08/31/23 0840)  Resp: 18 (08/31/23 0729)  BP: 125/63 (08/31/23 0729)  SpO2: 95 % (08/31/23 0840) Vital Signs (24h Range):  Temp:  [97.4 °F (36.3 °C)-97.9 °F (36.6 °C)] 97.7 °F (36.5 °C)  Pulse:  [76-91] 89  Resp:  [16-20] 18  SpO2:  [89 %-95 %] 95 %  BP: (105-136)/(61-78) 125/63     Weight: 83.9 kg (184 lb 15.5 oz)  Body mass index is 33.83 kg/m².     Physical Exam  Vitals and nursing note reviewed.   Constitutional:       General: She is not in acute distress.     Appearance: Normal appearance. She is not ill-appearing.   HENT:      Head: Normocephalic and atraumatic.      Nose: Nose normal.      Mouth/Throat:      Mouth: Mucous membranes are moist.   Eyes:      Extraocular Movements: Extraocular movements intact.   Cardiovascular:      Rate and Rhythm: Normal rate.      Pulses: Normal pulses.      Heart sounds: No murmur heard.  Pulmonary:      Effort: Pulmonary effort is normal. No respiratory distress.      Breath sounds: Decreased air movement present.   Abdominal:      General: Abdomen is flat.      Palpations: Abdomen is soft.      Tenderness: There is no abdominal tenderness.   Musculoskeletal:      Right lower leg: Edema (trace) present.      Left lower leg: Edema (trace) present.   Skin:     General: Skin is warm.      Capillary Refill: Capillary refill takes less than 2 seconds.   Neurological:      General: No focal deficit present.      Mental Status: She is alert.    Psychiatric:         Mood and Affect: Mood normal.                Significant Labs: All pertinent labs within the past 24 hours have been reviewed.    Significant Imaging: I have reviewed all pertinent imaging results/findings within the past 24 hours.

## 2023-08-31 NOTE — NURSING
RAPID RESPONSE NURSE PROACTIVE ROUNDING NOTE       Time of Visit:     Admit Date: 2023  LOS: 3  Code Status: DNR   Date of Visit: 2023  : 1940  Age: 83 y.o.  Sex: female  Race: White  Bed: W302/W302 A:   MRN: [id number]  Was the patient discharged from an ICU this admission? Yes   Was the patient discharged from a PACU within last 24 hours? No   Did the patient receive conscious sedation/general anesthesia in last 24 hours? No   Was the patient in the ED within the past 24 hours? No   Was the patient on NIPPV within the past 24 hours? No   Attending Physician: Salazar Kirby, Erik  Primary Service: Hospitalist   Time spent at the bedside: 30 - 45 min    SITUATION    Notified by charge RN via phone call  Reason for alert: increasing O2 needs    Diagnosis: Hyponatremia   has a past medical history of A-fib, Breast cancer, CKD (chronic kidney disease), Diabetes mellitus type II, Fatty liver, GERD (gastroesophageal reflux disease), Hearing loss of both ears, Hemochromatosis, History of anemia due to CKD, History of pleural effusion, Hyperlipidemia, Hypertension, Macular degeneration, Osteoarthritis, Osteoporosis, Vaginal delivery, Vitamin D deficiency disease, and Wears partial dentures.    Last Vitals:  Temp: 97.4 °F (36.3 °C) (1959)  Pulse: 79 (1959)  Resp: 16 (1959)  BP: 105/67 (1959)  SpO2: 93 % (2145)    24 Hour Vitals Range:  Temp:  [97.4 °F (36.3 °C)-98.7 °F (37.1 °C)]   Pulse:  [76-89]   Resp:  [16-20]   BP: (103-136)/(60-78)   SpO2:  [89 %-94 %]     Clinical Issues: Respiratory    ASSESSMENT/INTERVENTIONS    Pt is lying in bed on 10L HFNC. She denies shortness of breath at this time. CXR pending.      Discussed plan of care with  charge RN, bedside RN    PROVIDER ESCALATION    Physician escalation: Yes    Orders received and case discussed with Dr. Taveras .    Disposition:Remain in room 302    FOLLOW UP    Call back the Rapid Response Nurse,  at [phone]  for additional questions or concerns.

## 2023-08-31 NOTE — SUBJECTIVE & OBJECTIVE
Interval History: On more oxygen.     Medications:  Continuous Infusions:  Scheduled Meds:   allopurinoL  100 mg Oral Daily    amiodarone  200 mg Oral Daily    apixaban  2.5 mg Oral BID    atorvastatin  40 mg Oral Daily    famotidine  20 mg Oral Daily    folic acid  1,000 mcg Oral Daily    [START ON 9/1/2023] furosemide (LASIX) injection  40 mg Intravenous Daily    melatonin  6 mg Oral Nightly    meropenem (MERREM) IVPB  1 g Intravenous Q12H    metoprolol succinate  12.5 mg Oral Daily    mupirocin   Nasal BID    sodium chloride 0.9%  10 mL Intravenous Q6H    sodium chloride  1,000 mg Oral BID     PRN Meds:acetaminophen, dextrose 10%, dextrose 10%, glucagon (human recombinant), glucose, glucose, insulin aspart U-100, ondansetron, prochlorperazine, sodium chloride 0.9%, Flushing PICC Protocol **AND** sodium chloride 0.9% **AND** sodium chloride 0.9%    Objective:     Vital Signs (Most Recent):  Temp: 97.7 °F (36.5 °C) (08/31/23 0729)  Pulse: 89 (08/31/23 0840)  Resp: 18 (08/31/23 0729)  BP: 125/63 (08/31/23 0729)  SpO2: 95 % (08/31/23 0840) Vital Signs (24h Range):  Temp:  [97.4 °F (36.3 °C)-97.9 °F (36.6 °C)] 97.7 °F (36.5 °C)  Pulse:  [76-91] 89  Resp:  [16-20] 18  SpO2:  [89 %-95 %] 95 %  BP: (105-136)/(61-78) 125/63     Weight: 83.9 kg (184 lb 15.5 oz)  Body mass index is 33.83 kg/m².       Physical Exam  Constitutional:       Comments: Sitting up in bed, readily conversational, in no distress   HENT:      Head: Normocephalic.      Nose: Nose normal.      Mouth/Throat:      Mouth: Mucous membranes are moist.   Eyes:      Extraocular Movements: Extraocular movements intact.   Pulmonary:      Comments: On 8L NC oxygen, appears mildly short of breath at times   Neurological:      General: No focal deficit present.      Mental Status: She is alert and oriented to person, place, and time.   Psychiatric:         Mood and Affect: Mood normal.         Behavior: Behavior normal.       Significant Labs: All pertinent labs  within the past 24 hours have been reviewed.  CBC:   Recent Labs   Lab 08/27/23  0509   WBC 5.84   HGB 13.5   HCT 41.1   MCV 94        BMP:  Recent Labs   Lab 08/31/23  0501   *   *   K 4.5   CL 92*   CO2 21*   BUN 40*   CREATININE 1.8*   CALCIUM 8.5*     LFT:  Lab Results   Component Value Date    AST 13 08/26/2023    ALKPHOS 85 08/26/2023    BILITOT 0.9 08/26/2023     Albumin:   Albumin   Date Value Ref Range Status   08/26/2023 3.8 3.5 - 5.2 g/dL Final     Protein:   Total Protein   Date Value Ref Range Status   08/26/2023 7.5 6.0 - 8.4 g/dL Final     Lactic acid:   Lab Results   Component Value Date    LACTATE 1.4 01/28/2019    LACTATE 1.1 09/25/2015       Significant Imaging: I have reviewed all pertinent imaging results/findings within the past 24 hours.

## 2023-08-31 NOTE — PT/OT/SLP PROGRESS
Occupational Therapy   Treatment    Name: Barbara Rizo  MRN: [id number]  Admitting Diagnosis:  Hyponatremia       Recommendations:     Discharge Recommendations: nursing facility, skilled (patient is on high flow oxygen and is desatting with ambulation, so still recommending SNF)  Discharge Equipment Recommendations:  oxygen  Barriers to discharge:   (patient still not at PLOF, high risk for fall and for hospital readmission)    Assessment:     Barbara Rizo is a 83 y.o. female with a medical diagnosis of Hyponatremia.  She presents with HOB elevated. Performance deficits affecting function are weakness, impaired endurance, impaired sensation, impaired self care skills, impaired functional mobility, gait instability, impaired balance, decreased lower extremity function, decreased safety awareness, impaired skin, impaired cardiopulmonary response to activity.     Rehab Prognosis:  Good; patient would benefit from acute skilled OT services to address these deficits and reach maximum level of function.       Plan:     Patient to be seen 5 x/week to address the above listed problems via self-care/home management, therapeutic activities, therapeutic exercises  Plan of Care Expires: 09/11/23  Plan of Care Reviewed with: patient    Subjective     Chief Complaint: overall weakness   Patient/Family Comments/goals: patient is unsure if she can return home with a higher level of oxygen  Pain/Comfort:  Pain Rating 1: 0/10    Objective:     Communicated with: RNTeresa,  prior to session.  Patient found HOB elevated with telemetry, oxygen, bed alarm, peripheral IV, PureWick upon OT entry to room.    General Precautions: Standard, fall, contact, respiratory, hearing impaired ([place] with L hearing aid)    Orthopedic Precautions:N/A  Braces: N/A  Respiratory Status: High flow, flow 8 L/min, concentration  %     Occupational Performance:     Bed Mobility:    Patient completed Rolling/Turning to Right with supervision  Patient  completed Scooting/Bridging with supervision  Patient completed Supine to Sit with supervision     Functional Mobility/Transfers:  Patient completed Sit <> Stand Transfer with supervision  with  rolling walker   Patient completed Bed <> Chair Transfer using Step Transfer technique with supervision with rolling walker  Functional Mobility: Patient walked from bed to chair x 10' approx. With RW and supervision with occupational therapy carrying oxygen cord and purewick line     Activities of Daily Living:  Feeding:  independence seated in chair   Upper Body Dressing: stand by assistance to don outer gown  EOB       [facility] 6 Click ADL: 20    Treatment & Education:  Patient needed safety cues to walk with oxygen cord and purewick line to reach chair.   Occupational therapy educated her re: energy conservation with activities due to need for high flow oxygen and patient needs reinforcement of tips.     Patient left up in chair with all lines intact, call button in reach, and RN  notified    GOALS:   Multidisciplinary Problems       Occupational Therapy Goals          Problem: Occupational Therapy    Goal Priority Disciplines Outcome Interventions   Occupational Therapy Goal     OT, PT/OT Ongoing, Progressing    Description: Goals to be met by: 09/11/23     Patient will increase functional independence with ADLs by performing:    UE Dressing with [place].  LE Dressing with [place].  Grooming while standing at sink with Modified [place].  Toileting from toilet with Modified [place] for hygiene and clothing management.   Sitting at edge of bed x30  minutes with Modified [place].  Toilet transfer to toilet with Modified [place].  Increased functional strength to 4/5 for B upper extremity .  Upper extremity exercise program x10  reps per handout, with supervision.                         Time Tracking:     OT Date of Treatment: 08/31/23  OT Start Time: 1150  OT Stop Time: 1213  OT Total Time  (min): 23 min    Billable Minutes:Self Care/Home Management 15  Therapeutic Activity 8    OT/NIKO: OT          8/31/2023

## 2023-09-01 NOTE — NURSING
Bedside Report given to night nurse PAUL Chirinos. Walking rounds completed. Visualized and assessed patient NAD noted. Safety precautions maintained and call light within reach.     Chart check completed.

## 2023-09-01 NOTE — NURSING
[facility]  Nurses Note -- 4 Eyes      8/31/2023       Skin assessed on: Q Shift      [x] No Pressure Injuries Present    [x]Prevention Measures Documented    [] Yes LDA  for Pressure Injury Previously documented     [] Yes New Pressure Injury Discovered   [] LDA for New Pressure Injury Added      Attending RN:  Candis Delacruz LPN     Second RN:  Teresa Olivares LPN         Report received from day LPN. Patient resting quietly, continued SOB noted at this time. Wheels locked in lowest position, call light within reach, Telemetry monitoring in place.

## 2023-09-01 NOTE — PT/OT/SLP PROGRESS
Physical Therapy Treatment    Patient Name:  Barbara Rizo   MRN:  [id number]    Recommendations:     Discharge Recommendations: nursing facility, skilled  Discharge Equipment Recommendations:   wheelchair: Pt will benefit from a wheelchair 2* to poor endurance (poor respiratory functions, desat to low 80's despite on HF 10 L/min Oxygen), strength due to current medical conditions , pt with multifactorial gait impairment and is not able to ambulate safety at functional distance.    Barriers to discharge:  none    Assessment:     Barbara Rizo is a 83 y.o. female admitted with a medical diagnosis of Hyponatremia.  She presents with the following impairments/functional limitations: weakness, impaired endurance, impaired functional mobility, gait instability, impaired balance, decreased coordination, decreased upper extremity function, decreased lower extremity function, decreased safety awareness, pain, impaired cardiopulmonary response to activity, edema.    Rehab Prognosis: Good and Fair; patient would benefit from acute skilled PT services to address these deficits and reach maximum level of function.    Recent Surgery: * No surgery found *      Plan:     During this hospitalization, patient to be seen 5 x/week to address the identified rehab impairments via gait training, therapeutic activities, therapeutic exercises and progress toward the following goals:    Plan of Care Expires:  09/04/23    Subjective     Chief Complaint: feeling week, tired, and pain to the Left side of her hip  Patient/Family Comments/goals: Pt agreed to participate.  Pain/Comfort:  Pain Rating 1:  (pt did not rate)  Location - Side 1: Left  Location 1: hip  Pain Addressed 1: Reposition, Distraction, Cessation of Activity, Nurse notified  Pain Rating Post-Intervention 1:  (pt did not rate)      Objective:     Communicated with nurse Rodriguez prior to session.  Patient found HOB elevated with oxygen, telemetry, PICC line, PureWick upon PT  entry to room.     General Precautions: Standard, fall, contact, respiratory  Orthopedic Precautions: N/A  Braces: N/A  Respiratory Status: High flow, flow 10 L/min SpO2 85-94% with activity, frequent  PLB tech and rest break required to recover SpO2 to 90s     Functional Mobility:  Bed Mobility:     Rolling Left/Right:  contact guard assistance and minimum assistance using BR  Scooting: anterior scoot to EOB with contact guard assistance  Bridging: minimum assistance toward HOB, pt was able to help pushing with her BLE  Supine to Sit: contact guard assistance for Trunk support  Sit to Supine: minimum assistance and moderate assistance for Trunk and BLE management  Transfers: gait belt donned prior standing    Sit to Stand: from EOB with stand by assistance with rolling walker  Gait: Pt took ~5 sidesteps along bedside toward HOB (R side) with CGA using RW. Pt with flexed posture, decreased jocelyn/step length/weight shifting, v/t cues for AD management, increase step length, , PLB tech  Balance: Fair+ Sitting; Fair Standing      AM-PAC 6 CLICK MOBILITY  Turning over in bed (including adjusting bedclothes, sheets and blankets)?: 3  Sitting down on and standing up from a chair with arms (e.g., wheelchair, bedside commode, etc.): 3  Moving from lying on back to sitting on the side of the bed?: 3  Moving to and from a bed to a chair (including a wheelchair)?: 3  Need to walk in hospital room?: 3  Climbing 3-5 steps with a railing?: 3  Basic Mobility Total Score: 18       Treatment & Education:  Educated pt on benefit of performing BUE/BLE ex throughout the day, pt verbalized understanding.    BLE ex in seated 10 reps AP, LAQ, PS, rest breaks in between, having pt counting number out loud    Pt found soiled with urine upon sitting pt up at EOB and went again after pt got changed in supine, PTA helped pt with blank hygiene and new pads + brief + PureWick changed. Pt completed R/L rolling in supine with CGA/Min A      Patient left HOB elevated with BUE/BLE offloaded/elevated by pillows, tray table at bedside, all lines intact, call button in reach, bed alarm on, and nurse notified.    GOALS:   Multidisciplinary Problems       Physical Therapy Goals          Problem: Physical Therapy    Goal Priority Disciplines Outcome Goal Variances Interventions   Physical Therapy Goal     PT, PT/OT Ongoing, Progressing     Description: Goals to be met by: 23     Patient will increase functional independence with mobility by performin. Pt to be mod I with bed mobility.  2. Pt to transfer with min A.  3. Pt  to ambulate 50' w/RW CGA.  4. Pt to be (I) with written HEP.                         Time Tracking:     PT Received On: 23  PT Start Time: 1509     PT Stop Time: 1558  PT Total Time (min): 49 min     Billable Minutes: Gait Training 13 min, Therapeutic Activity 20 min, and Therapeutic Exercise 16 min    Treatment Type: Treatment  PT/PTA: PTA     Number of PTA visits since last PT visit: 2     2023

## 2023-09-01 NOTE — PLAN OF CARE
Problem: Occupational Therapy  Goal: Occupational Therapy Goal  Description: Goals to be met by: 09/11/23     Patient will increase functional independence with ADLs by performing:    UE Dressing with [place].  LE Dressing with [place].  Grooming while standing at sink with Modified [place].  Toileting from toilet with Modified [place] for hygiene and clothing management.   Sitting at edge of bed x30  minutes with Modified [place].  Toilet transfer to toilet with Modified [place].  Increased functional strength to 4/5 for B upper extremity .  Upper extremity exercise program x10  reps per handout, with supervision.    Outcome: Ongoing, Progressing

## 2023-09-01 NOTE — NURSING
[facility]  Nurses Note -- 4 Eyes      9/1/2023       Skin assessed on: Q Shift      [x] No Pressure Injuries Present    [x]Prevention Measures Documented    [] Yes LDA  for Pressure Injury Previously documented     [] Yes New Pressure Injury Discovered   [] LDA for New Pressure Injury Added      Attending RN:  Jennifer Ledezma, RN     Second RN:  Candis Delacruz LPN

## 2023-09-01 NOTE — PT/OT/SLP PROGRESS
Occupational Therapy   Treatment    Name: Barbara Rizo  MRN: [id number]  Admitting Diagnosis:  Hyponatremia       Recommendations:     Discharge Recommendations: nursing facility, skilled  Discharge Equipment Recommendations:  other (see comments) (TBD)  Barriers to discharge:  Other (Comment)    Assessment:     Barbara Rizo is a 83 y.o. female with a medical diagnosis of Hyponatremia.  She presents with HOB elevated with increased pitting edema to B upper extremity, left > right upper extremity. Performance deficits affecting function are weakness, impaired endurance, impaired self care skills, impaired functional mobility, gait instability, impaired balance, decreased upper extremity function, decreased lower extremity function, decreased safety awareness, pain, impaired coordination, impaired cardiopulmonary response to activity, edema, impaired skin, impaired cognition.     Rehab Prognosis:  Good; patient would benefit from acute skilled OT services to address these deficits and reach maximum level of function.       Plan:     Patient to be seen 5 x/week to address the above listed problems via self-care/home management, therapeutic activities, therapeutic exercises  Plan of Care Expires: 10/11/23  Plan of Care Reviewed with: patient    Subjective     Chief Complaint: left arm pain due to edema   Patient/Family Comments/goals: she wants to go home, but realizes she cannot currently   Pain/Comfort:  Pain Rating 1: 0/10 (L arm when moving to shoulder level)  Location - Side 1: Left  Location - Orientation 1: upper  Location 1: arm  Pain Addressed 1: Reposition, Distraction, Cessation of Activity    Objective:     Communicated with: RN,  prior to session.  Patient found HOB elevated with telemetry, oxygen, bed alarm, peripheral IV, PureWick upon OT entry to room.    General Precautions: Standard, fall, contact, respiratory    Orthopedic Precautions:N/A  Braces: N/A  Respiratory Status: High flow, flow 10  L/min, concentration  %     Occupational Performance:     Bed Mobility:    Patient completed Scooting/Bridging with modified independence from mid bed to HOB with HOB elevated to reposition herself     Functional Mobility/Transfers:  She initially agreed to sit in chair for lunch, but felt too ill to get OOB, so she stayed with HOB elevated after scooting herself to HOB     Activities of Daily Living:  Feeding:  independence seated in bed       [facility] 6 Click ADL: 19    Treatment & Education:  Occupational therapy educated her re: positioning both upper extremity above heart to reduce edema in B upper extremity  Occupational therapy also educated her re: retrograde massage to B upper extremity to reduce edema, but patient will need further instruction due to having some disorientation today  Occupational therapy also educated her and she demonstrated UB therex including:   Triceps/biceps  Fist pumps  Shoulder circles   Occupational therapy had to assist her with shoulder movement due to heaviness and pain with moving left arm.     Patient left HOB elevated with all lines intact, call button in reach, bed alarm on, and RN  notified    GOALS:   Multidisciplinary Problems       Occupational Therapy Goals          Problem: Occupational Therapy    Goal Priority Disciplines Outcome Interventions   Occupational Therapy Goal     OT, PT/OT Ongoing, Progressing    Description: Goals to be met by: 09/11/23     Patient will increase functional independence with ADLs by performing:    UE Dressing with [place].  LE Dressing with [place].  Grooming while standing at sink with Modified [place].  Toileting from toilet with Modified [place] for hygiene and clothing management.   Sitting at edge of bed x30  minutes with Modified [place].  Toilet transfer to toilet with Modified [place].  Increased functional strength to 4/5 for B upper extremity .  Upper extremity exercise program x10  reps per handout,  with supervision.                         Time Tracking:     OT Date of Treatment: 09/01/23  OT Start Time: 1115  OT Stop Time: 1145  OT Total Time (min): 30 min    Billable Minutes:Therapeutic Activity 15   Therapeutic Exercise 15     OT/NIKO: OT          9/1/2023

## 2023-09-01 NOTE — PROGRESS NOTES
[facility] Medicine  Progress Note    Patient Name: Barbara Rizo  MRN: [id number]  Patient Class: IP- Inpatient   Admission Date: 8/26/2023  Length of Stay: 5 days  Attending Physician: Salazar Kirby, *  Primary Care Provider: Paras Oro MD        Subjective:     Principal Problem:Hyponatremia        HPI:  This is an 83-year-old female with a past medical history of HFpEF (EF: 65%, GIIDD), AFib (on Eliquis), hypertension, type 2 diabetes, hyperlipidemia, CKD 4, breast cancer, who presents with shortness of breath.    Patient presents with shortness of breath and generalized weakness that started 2 days prior to presentation.  She reports that she is been feeling progressively more fatigued and sustained a fall on her back with injury to her head a day prior to presentation.  Additionally, she reports decreased p.o. intake, nausea but denied vomiting or diarrhea.  She usually drinks 48 oz of water daily.     In the ED, the patient was tachypneic and hypoxic, requiring 5 L O2.  Labs were remarkable for hyponatremia (119 > 119), hyperkalemia (5.3 > 5.2), hypochloremia (89), elevated creatinine (2.2 from a baseline of 1.9), elevated BNP (868).  CT head showed no acute process.  CT chest without contrast showed small left pleural effusion with near complete consolidation of the left lower lobe (atelectasis and/or consolidation), and cardiomegaly with small pericardial effusion.  Patient was given Lasix 40 mg IV, ceftriaxone 1 g IV, and was later given 1 L of NS.  Of note, the patient has a history of hyponatremia requiring hospitalization (2020), and a prior history of a large right pleural effusion requiring a thoracentesis (2015).  Patient was admitted for further management.      Overview/Hospital Course:  Patient admitted with hyponatremia. Nitrite+ UTI and PNA, treating with antibiotics. Potentially SIADH from PNA. Thought initially to be hypervolemic hyponatremia with BNP  900, urine Na was not low, and higher O2 requirement. Nephrology consulted. Tolvaptan initiated. Sodium improving to 123. Can step down for tomorrow 8/29.  Hyponatremia is improving,  PT,OT recommending  SNF,but patient wish going home with HH.  Has ESBL Ecoli,started on meropenem.will finish tomorrow.  Has sign of fluid overload,with Hx of diastolic CHF,DC NS,started on IV lasix.      Past Medical History:   Diagnosis Date    A-fib     Breast cancer     invasive ductal carcinoma    CKD (chronic kidney disease)     Diabetes mellitus type II     Fatty liver     GERD (gastroesophageal reflux disease)     Hearing loss of both ears     wears bilateral hearing aids    Hemochromatosis     History of anemia due to CKD     History of pleural effusion     with thoracentesis    Hyperlipidemia     Hypertension     Macular degeneration     Osteoarthritis     Left knee    Osteoporosis     Vaginal delivery     x2    Vitamin D deficiency disease     Wears partial dentures     upper removable bridge       Past Surgical History:   Procedure Laterality Date    AXILLARY NODE DISSECTION Left 10/18/2021    Procedure: LYMPHADENECTOMY, AXILLARY;  Surgeon: Darlene Roca MD;  Location: Health system OR;  Service: General;  Laterality: Left;    BREAST BIOPSY      BREAST LUMPECTOMY      invasive ductal carcinoma    BREAST SURGERY Bilateral     Reduction r/t h/o fibrocystic disease    CHOLECYSTECTOMY  08/2015    EYE SURGERY      Cat Ext  OU    HYSTERECTOMY      partial due to uterine fibroids    INCISION AND DRAINAGE OF KNEE Left 09/17/2020    Procedure: INCISION AND DRAINAGE, KNEE;  Surgeon: Rolando Wagoner MD;  Location: Health system OR;  Service: Orthopedics;  Laterality: Left;    INJECTION FOR SENTINEL NODE IDENTIFICATION Left 10/18/2021    Procedure: INJECTION, FOR SENTINEL NODE IDENTIFICATION;  Surgeon: Darlene Roca MD;  Location: Health system OR;  Service: General;  Laterality: Left;    JOINT REPLACEMENT Left 05/13/2013    TKR     JOINT REPLACEMENT Right 08/03/2015    TKR    MASTECTOMY, PARTIAL Left 10/18/2021    Procedure: MASTECTOMY, PARTIAL -- wire;  Surgeon: Darlene Roca MD;  Location: [facility] OR;  Service: General;  Laterality: Left;  RN PREOP 10/15/2021   VACCINATED-----NEED H/P AND ORDERS    SENTINEL LYMPH NODE BIOPSY Left 10/18/2021    Procedure: BIOPSY, LYMPH NODE, SENTINEL;  Surgeon: Darlene Roca MD;  Location: [facility] OR;  Service: General;  Laterality: Left;    THORACENTESIS      TOTAL KNEE ARTHROPLASTY Right 2015    left knee done 5/2013    WOUND DRESSING Left 09/17/2020    Procedure: WOUND VAC APPLICATION;  Surgeon: Rolando Wagoner MD;  Location: [facility] OR;  Service: Orthopedics;  Laterality: Left;       Review of patient's allergies indicates:  No Known Allergies    Current Facility-Administered Medications on File Prior to Encounter   Medication    denosumab (PROLIA) injection 60 mg     Current Outpatient Medications on File Prior to Encounter   Medication Sig    allopurinoL (ZYLOPRIM) 100 MG tablet Take 1 tablet by mouth once daily.    amiodarone (PACERONE) 200 MG Tab TAKE ONE-HALF TABLET BY MOUTH EVERY DAY *DO NOT TAKE WITH GRAPEFRUIT JUICE*    amLODIPine (NORVASC) 10 MG tablet TAKE ONE TABLET BY MOUTH ONCE DAILY    apixaban (ELIQUIS) 2.5 mg Tab TAKE ONE TABLET BY MOUTH TWICE DAILY    atorvastatin (LIPITOR) 40 MG tablet TAKE ONE TABLET BY MOUTH ONCE DAILY    calcium carbonate (TUMS) 200 mg calcium (500 mg) chewable tablet Take 2 tablets by mouth.    ergocalciferol (ERGOCALCIFEROL) 50,000 unit Cap Take 50,000 Units by mouth every 30 days.     FLUZONE HIGHDOSE QUAD 21-22  mcg/0.7 mL Syrg     folic acid (FOLVITE) 1 MG tablet TAKE 1 TABLET BY MOUTH EVERY DAY    furosemide (LASIX) 20 MG tablet Take 1 tablet (20 mg total) by mouth 2 (two) times daily.    losartan (COZAAR) 25 MG tablet Take 25 mg by mouth once daily.    mupirocin (BACTROBAN) 2 % ointment Apply topically every evening.    solifenacin  (VESICARE) 10 MG tablet Take 1 tablet (10 mg total) by mouth once daily.     Family History       Problem Relation (Age of Onset)    Aneurysm Father    Cancer Mother    Hypertension Mother    No Known Problems Sister, Sister, Brother, Daughter, Son          Tobacco Use    Smoking status: Former     Passive exposure: Past    Smokeless tobacco: Never   Substance and Sexual Activity    Alcohol use: Not Currently    Drug use: No    Sexual activity: Not Currently     Review of Systems   Constitutional:  Positive for appetite change and fatigue.   HENT: Negative.     Eyes: Negative.    Respiratory: Negative.     Cardiovascular: Negative.    Gastrointestinal:  Positive for nausea.   Endocrine: Negative.    Genitourinary: Negative.    Musculoskeletal: Negative.    Skin: Negative.    Allergic/Immunologic: Negative.    Neurological: Negative.    Psychiatric/Behavioral: Negative.       Objective:     Vital Signs (Most Recent):  Temp: 98 °F (36.7 °C) (09/01/23 0757)  Pulse: 95 (09/01/23 0757)  Resp: 18 (09/01/23 0757)  BP: 121/76 (09/01/23 0757)  SpO2: (!) 90 % (09/01/23 0757) Vital Signs (24h Range):  Temp:  [97.5 °F (36.4 °C)-98 °F (36.7 °C)] 98 °F (36.7 °C)  Pulse:  [89-97] 95  Resp:  [17-20] 18  SpO2:  [90 %-95 %] 90 %  BP: (103-134)/(69-83) 121/76     Weight: 83.7 kg (184 lb 8.4 oz)  Body mass index is 33.75 kg/m².     Physical Exam  Vitals and nursing note reviewed.   Constitutional:       General: She is not in acute distress.     Appearance: Normal appearance. She is not ill-appearing.   HENT:      Head: Normocephalic and atraumatic.      Nose: Nose normal.      Mouth/Throat:      Mouth: Mucous membranes are moist.   Eyes:      Extraocular Movements: Extraocular movements intact.   Cardiovascular:      Rate and Rhythm: Normal rate.      Pulses: Normal pulses.      Heart sounds: No murmur heard.  Pulmonary:      Effort: Pulmonary effort is normal. No respiratory distress.      Breath sounds: Decreased air movement  present.   Abdominal:      General: Abdomen is flat.      Palpations: Abdomen is soft.      Tenderness: There is no abdominal tenderness.   Musculoskeletal:      Right lower leg: Edema (trace) present.      Left lower leg: Edema (trace) present.   Skin:     General: Skin is warm.      Capillary Refill: Capillary refill takes less than 2 seconds.   Neurological:      General: No focal deficit present.      Mental Status: She is alert.   Psychiatric:         Mood and Affect: Mood normal.                Significant Labs: All pertinent labs within the past 24 hours have been reviewed.    Significant Imaging: I have reviewed all pertinent imaging results/findings within the past 24 hours.      Assessment/Plan:      * Hyponatremia  Patient has hyponatremia which is uncontrolled,We will aim to correct the sodium by 4-6mEq in 24 hours. We will monitor sodium Every 4 hours. The hyponatremia is due to Dehydration/hypovolemia and/or SIADH secondary to pneumonia. We will obtain the following studies: Urine sodium, urine osmolality, serum osmolality. We will treat the hyponatremia with IV fluids as follows: 100 ml/hr, fluid restriction.The patient's sodium results have been reviewed and are listed below.  Consult nephrology    Recent Labs   Lab 09/01/23  0542   *     Potentially SIADH from PNA. Thought initially to be hypervolemic hyponatremia with , urine Na was not low, and higher O2 requirement. Nephrology consulted. Tolvaptan initiated.   Sodium improving with IV lasix,   PT,OT recommending  SNF,but patient wish going home with .DC NS,has fluid overload.    Acute on chronic diastolic CHF (congestive heart failure)  Patient is identified as having Diastolic (HFpEF) heart failure that is Acute on chronic. CHF is currently uncontrolled due to Dyspnea not returned to baseline after IV lasix doses of IV diuretic. Latest ECHO performed and demonstrates- Results for orders placed during the hospital encounter of  03/07/23    Echo    Interpretation Summary  · The left ventricle is normal in size with eccentric hypertrophy and normal systolic function.  · The estimated ejection fraction is 65%.  · Grade II left ventricular diastolic dysfunction.  · Mild tricuspid regurgitation.  · Small pericardial effusion.  · Moderate right atrial enlargement.  · Severe left atrial enlargement.  · Normal right ventricular size with normal right ventricular systolic function.  · There is moderate aortic valve stenosis.  · Aortic valve area is 1.20 cm2; peak velocity is 2.70 m/s; mean gradient is 19 mmHg.  · Mild mitral regurgitation.  · Mild pulmonic regurgitation.  · Normal central venous pressure (3 mmHg).  · The estimated PA systolic pressure is 46 mmHg.  · There is pulmonary hypertension.  . Continue Furosemide and monitor clinical status closely. Monitor on telemetry. Patient is off CHF pathway.  Monitor strict Is&Os and daily weights.  Place on fluid restriction of 1.5 L. Cardiology has not been any consulted. Continue to stress to patient importance of self efficacy and  on diet for CHF. Last BNP reviewed- and noted below   Recent Labs   Lab 08/31/23  0501   BNP 1,486*   .    Urinary tract infection without hematuria  Has ESBL Ecoli,started on meropenem.will finish tomorrow.      Prolonged Q-T interval on ECG  Noted. Avoid QT prolonging agents.       Pleural effusion  Continue antibiotics  Needs outpatient follow up/imaging to ensure resolution      Community acquired pneumonia  CT chest without contrast showed small left pleural effusion with near complete consolidation of the left lower lobe (atelectasis and/or consolidation), and cardiomegaly with small pericardial effusion  Continue ceftriaxone, doxycycline   Respiratory cultures, if able  Urine Legionella antigen    Aortic atherosclerosis  On medical treatment.      Malignant neoplasm of overlapping sites of left breast in female, estrogen receptor positive  Need out  patient oncology follow up.      Chronic kidney disease, stage 4 (severe)  Slightly above baseline.  Continue IV fluids   Continue to monitor  Nephrology consult      Type 2 diabetes mellitus with stage 4 chronic kidney disease, without long-term current use of insulin  Patient's FSGs are controlled on current medication regimen.  Last A1c reviewed-   Lab Results   Component Value Date    HGBA1C 5.5 10/05/2022     Monitor BMPs    Pulmonary hypertension  On IV lasix.      Hyperkalemia  Medical management.      Chronic gout  Resume home medication      MGUS (monoclonal gammopathy of unknown significance)  Need out patient oncology follow up.      Acute respiratory failure with hypoxia  Patient with Hypoxic Respiratory failure which is Acute.  she is not on home oxygen.   Supplemental oxygen was provided and noted-    Likely in the setting of pneumonia and/or Pulmonary edema     Essential hypertension  Resume home medications.      Hyperlipidemia  Resume statin      VTE Risk Mitigation (From admission, onward)         Ordered     apixaban tablet 2.5 mg  2 times daily         08/27/23 0246     IP VTE HIGH RISK PATIENT  Once         08/27/23 0246     Place sequential compression device  Until discontinued         08/27/23 0246                Discharge Planning   SALLY:      Code Status: DNR   Is the patient medically ready for discharge?:     Reason for patient still in hospital (select all that apply): Patient trending condition  Discharge Plan A: Home with family, Home Health   Discharge Delays: None known at this time              Salazar Kirby MD  Department of Hospital Medicine   [facility] [place] [facility]

## 2023-09-01 NOTE — PT/OT/SLP PROGRESS
Occupational Therapy   Treatment    Name: Barbara Rizo  MRN: [id number]  Admitting Diagnosis:  Hyponatremia       Recommendations:     Discharge Recommendations: nursing facility, skilled  Discharge Equipment Recommendations:  to be determined by next level of care  Barriers to discharge:   (Pt is at high risk for falls, readmission and morbidity if d/c home at this time; pt desats to 78% on 8L HF NC w/ minimal exertion.)    Assessment:     Barbara Rizo is a 83 y.o. female with a medical diagnosis of Hyponatremia.  Performance deficits affecting function are weakness, impaired endurance, impaired self care skills, impaired functional mobility, gait instability, impaired balance, decreased upper extremity function, decreased lower extremity function, decreased coordination, pain, decreased ROM, edema, impaired cardiopulmonary response to activity.    Pt pleasant and willing to participate in tx session this date despite complaints of increased weakness as compared to previous session. Pt was able to complete seated ADLs at EOB w/ set-up assist as well as standing therapeutic activities for increasing overall strength and endurance for safe performance w/ ADLs and functional mobility. Pt w/ spO2 78% with minimal exertion but recovered to low 90s w/ rest breaks and PLB. Pt w/ LUE edema and bloody fingertips (at site of needle pricks); OT applied pressure and coban and ensured that LUE was elevated (nurse made aware). Pt will continue to benefit from skilled acute OT services to maximize functional capacity for safe performance w/ ADLs and functional mobility.     Rehab Prognosis:  Good; patient would benefit from acute skilled OT services to address these deficits and reach maximum level of function.       Plan:     Patient to be seen 5 x/week to address the above listed problems via self-care/home management, therapeutic activities, therapeutic exercises  Plan of Care Expires: 09/11/23  Plan of Care Reviewed  "with: patient    Subjective     Chief Complaint: "I do not feel too well; I was on 2L yesterday now I am on 8."   Patient/Family Comments/goals: Agreeable to participate   Pain/Comfort:  Pain Rating 1: 0/10  Pain Rating Post-Intervention 1: 0/10    Objective:     Communicated with: Nurse prior to session.  Patient found HOB elevated with telemetry, oxygen, bed alarm, peripheral IV, PureWick upon OT entry to room.    General Precautions: Standard, fall, contact, respiratory, hearing impaired    Orthopedic Precautions:N/A  Braces: N/A  Respiratory Status: High flow, flow 8 L/min     Occupational Performance:     Bed Mobility:    Patient completed Scooting/Bridging with stand by assistance  Patient completed Supine to Sit with stand by assistance  Patient completed Sit to Supine with minimum assistance for managing BLEs     Functional Mobility/Transfers:  Patient completed Sit <> Stand Transfer x 2 trials from EOB with contact guard assistance  with  rolling walker   Functional Mobility: Pt was able to laterally step along bed w/ CGA-SBA and use of RW: no LOB occurred.     Activities of Daily Living:  Grooming: set-up assist for brushing teeth and combing hair while sitting at EOB, unsupported     Upper Body Dressing: minimum assistance for donning gown over back     [facility] 6 Click ADL: 20    Treatment & Education:  -Pt performed ADLs and functional mobility as noted above.   -Pt educated on role of OT and POC.   -Pt educated on importance of PLB and energy conservation strategies for safe performance w/ ADLs and functional mobility.   -Pt was able to perform standing marches for 2 sets x 15 reps, followed by functional reaching in all planes for 3 sets x 10 reps while standing w/ unilateral support on RW.   -Pt educated on importance of keeping LUE elevated on pillow ro manage edema and promote circulation.   -Questions and concerns addressed within scope.     Patient left HOB elevated with all lines intact, call " button in reach, and bed alarm on    GOALS:   Multidisciplinary Problems       Occupational Therapy Goals          Problem: Occupational Therapy    Goal Priority Disciplines Outcome Interventions   Occupational Therapy Goal     OT, PT/OT Ongoing, Progressing    Description: Goals to be met by: 09/11/23     Patient will increase functional independence with ADLs by performing:    UE Dressing with [place].  LE Dressing with [place].  Grooming while standing at sink with Modified [place].  Toileting from toilet with Modified [place] for hygiene and clothing management.   Sitting at edge of bed x30  minutes with Modified [place].  Toilet transfer to toilet with Modified [place].  Increased functional strength to 4/5 for B upper extremity .  Upper extremity exercise program x10  reps per handout, with supervision.                         Time Tracking:     OT Date of Treatment: 09/01/23  OT Start Time: 1103  OT Stop Time: 1132  OT Total Time (min): 29 min    Billable Minutes:Self Care/Home Management 15  Therapeutic Activity 14  Total Time 29    OT/NIKO: OT          9/1/2023

## 2023-09-01 NOTE — PLAN OF CARE
Problem: Physical Therapy  Goal: Physical Therapy Goal  Description: Goals to be met by: 23     Patient will increase functional independence with mobility by performin. Pt to be mod I with bed mobility.  2. Pt to transfer with min A.  3. Pt  to ambulate 50' w/RW CGA.  4. Pt to be (I) with written HEP.    Outcome: Ongoing, Progressing

## 2023-09-01 NOTE — ASSESSMENT & PLAN NOTE
Patient has hyponatremia which is uncontrolled,We will aim to correct the sodium by 4-6mEq in 24 hours. We will monitor sodium Every 4 hours. The hyponatremia is due to Dehydration/hypovolemia and/or SIADH secondary to pneumonia. We will obtain the following studies: Urine sodium, urine osmolality, serum osmolality. We will treat the hyponatremia with IV fluids as follows: 100 ml/hr, fluid restriction.The patient's sodium results have been reviewed and are listed below.  Consult nephrology    Recent Labs   Lab 09/01/23  0542   *     Potentially SIADH from PNA. Thought initially to be hypervolemic hyponatremia with , urine Na was not low, and higher O2 requirement. Nephrology consulted. Tolvaptan initiated.   Sodium improving with IV lasix,   PT,OT recommending  SNF,but patient wish going home with HH.DC NS,has fluid overload.

## 2023-09-01 NOTE — SUBJECTIVE & OBJECTIVE
Past Medical History:   Diagnosis Date    A-fib     Breast cancer     invasive ductal carcinoma    CKD (chronic kidney disease)     Diabetes mellitus type II     Fatty liver     GERD (gastroesophageal reflux disease)     Hearing loss of both ears     wears bilateral hearing aids    Hemochromatosis     History of anemia due to CKD     History of pleural effusion     with thoracentesis    Hyperlipidemia     Hypertension     Macular degeneration     Osteoarthritis     Left knee    Osteoporosis     Vaginal delivery     x2    Vitamin D deficiency disease     Wears partial dentures     upper removable bridge       Past Surgical History:   Procedure Laterality Date    AXILLARY NODE DISSECTION Left 10/18/2021    Procedure: LYMPHADENECTOMY, AXILLARY;  Surgeon: Darlene Roca MD;  Location: [facility];  Service: General;  Laterality: Left;    BREAST BIOPSY      BREAST LUMPECTOMY      invasive ductal carcinoma    BREAST SURGERY Bilateral     Reduction r/t h/o fibrocystic disease    CHOLECYSTECTOMY  08/2015    EYE SURGERY      Cat Ext  OU    HYSTERECTOMY      partial due to uterine fibroids    INCISION AND DRAINAGE OF KNEE Left 09/17/2020    Procedure: INCISION AND DRAINAGE, KNEE;  Surgeon: Rolando Wagoner MD;  Location: [facility];  Service: Orthopedics;  Laterality: Left;    INJECTION FOR SENTINEL NODE IDENTIFICATION Left 10/18/2021    Procedure: INJECTION, FOR SENTINEL NODE IDENTIFICATION;  Surgeon: Darlene Roca MD;  Location: [facility];  Service: General;  Laterality: Left;    JOINT REPLACEMENT Left 05/13/2013    TKR    JOINT REPLACEMENT Right 08/03/2015    TKR    MASTECTOMY, PARTIAL Left 10/18/2021    Procedure: MASTECTOMY, PARTIAL -- wire;  Surgeon: Darlene Roca MD;  Location: [facility];  Service: General;  Laterality: Left;  RN PREOP 10/15/2021   VACCINATED-----NEED H/P AND ORDERS    SENTINEL LYMPH NODE BIOPSY Left 10/18/2021    Procedure: BIOPSY, LYMPH NODE, SENTINEL;  Surgeon: Darlene Roca MD;  Location: [facility];   Service: General;  Laterality: Left;    THORACENTESIS      TOTAL KNEE ARTHROPLASTY Right 2015    left knee done 5/2013    WOUND DRESSING Left 09/17/2020    Procedure: WOUND VAC APPLICATION;  Surgeon: Rolando Wagoner MD;  Location: [facility];  Service: Orthopedics;  Laterality: Left;       Review of patient's allergies indicates:  No Known Allergies    Current Facility-Administered Medications on File Prior to Encounter   Medication    denosumab (PROLIA) injection 60 mg     Current Outpatient Medications on File Prior to Encounter   Medication Sig    allopurinoL (ZYLOPRIM) 100 MG tablet Take 1 tablet by mouth once daily.    amiodarone (PACERONE) 200 MG Tab TAKE ONE-HALF TABLET BY MOUTH EVERY DAY *DO NOT TAKE WITH GRAPEFRUIT JUICE*    amLODIPine (NORVASC) 10 MG tablet TAKE ONE TABLET BY MOUTH ONCE DAILY    apixaban (ELIQUIS) 2.5 mg Tab TAKE ONE TABLET BY MOUTH TWICE DAILY    atorvastatin (LIPITOR) 40 MG tablet TAKE ONE TABLET BY MOUTH ONCE DAILY    calcium carbonate (TUMS) 200 mg calcium (500 mg) chewable tablet Take 2 tablets by mouth.    ergocalciferol (ERGOCALCIFEROL) 50,000 unit Cap Take 50,000 Units by mouth every 30 days.     FLUZONE HIGHDOSE QUAD 21-22  mcg/0.7 mL Syrg     folic acid (FOLVITE) 1 MG tablet TAKE 1 TABLET BY MOUTH EVERY DAY    furosemide (LASIX) 20 MG tablet Take 1 tablet (20 mg total) by mouth 2 (two) times daily.    losartan (COZAAR) 25 MG tablet Take 25 mg by mouth once daily.    mupirocin (BACTROBAN) 2 % ointment Apply topically every evening.    solifenacin (VESICARE) 10 MG tablet Take 1 tablet (10 mg total) by mouth once daily.     Family History       Problem Relation (Age of Onset)    Aneurysm Father    Cancer Mother    Hypertension Mother    No Known Problems Sister, Sister, Brother, Daughter, Son          Tobacco Use    Smoking status: Former     Passive exposure: Past    Smokeless tobacco: Never   Substance and Sexual Activity    Alcohol use: Not Currently    Drug use: No    Sexual  activity: Not Currently     Review of Systems   Constitutional:  Positive for appetite change and fatigue.   HENT: Negative.     Eyes: Negative.    Respiratory: Negative.     Cardiovascular: Negative.    Gastrointestinal:  Positive for nausea.   Endocrine: Negative.    Genitourinary: Negative.    Musculoskeletal: Negative.    Skin: Negative.    Allergic/Immunologic: Negative.    Neurological: Negative.    Psychiatric/Behavioral: Negative.       Objective:     Vital Signs (Most Recent):  Temp: 98 °F (36.7 °C) (09/01/23 0757)  Pulse: 95 (09/01/23 0757)  Resp: 18 (09/01/23 0757)  BP: 121/76 (09/01/23 0757)  SpO2: (!) 90 % (09/01/23 0757) Vital Signs (24h Range):  Temp:  [97.5 °F (36.4 °C)-98 °F (36.7 °C)] 98 °F (36.7 °C)  Pulse:  [89-97] 95  Resp:  [17-20] 18  SpO2:  [90 %-95 %] 90 %  BP: (103-134)/(69-83) 121/76     Weight: 83.7 kg (184 lb 8.4 oz)  Body mass index is 33.75 kg/m².     Physical Exam  Vitals and nursing note reviewed.   Constitutional:       General: She is not in acute distress.     Appearance: Normal appearance. She is not ill-appearing.   HENT:      Head: Normocephalic and atraumatic.      Nose: Nose normal.      Mouth/Throat:      Mouth: Mucous membranes are moist.   Eyes:      Extraocular Movements: Extraocular movements intact.   Cardiovascular:      Rate and Rhythm: Normal rate.      Pulses: Normal pulses.      Heart sounds: No murmur heard.  Pulmonary:      Effort: Pulmonary effort is normal. No respiratory distress.      Breath sounds: Decreased air movement present.   Abdominal:      General: Abdomen is flat.      Palpations: Abdomen is soft.      Tenderness: There is no abdominal tenderness.   Musculoskeletal:      Right lower leg: Edema (trace) present.      Left lower leg: Edema (trace) present.   Skin:     General: Skin is warm.      Capillary Refill: Capillary refill takes less than 2 seconds.   Neurological:      General: No focal deficit present.      Mental Status: She is alert.    Psychiatric:         Mood and Affect: Mood normal.                Significant Labs: All pertinent labs within the past 24 hours have been reviewed.    Significant Imaging: I have reviewed all pertinent imaging results/findings within the past 24 hours.

## 2023-09-01 NOTE — NURSING
[facility] MEWS TRIGGER FOLLOW UP       MEWS Monitoring, Score is: 1  Indication for review: O2 Device, 8L NC    Bedside Nurse, Candis contacted, no concerns verbalized at this time, instructed to call [phone] for further concerns or assistance.    Pt awake and alert. Stated feeling well. 8L NC. Wears 2 L NC at home. Care ongoing.

## 2023-09-02 NOTE — NURSING
[facility]  Nurses Note -- 4 Eyes      9/2/2023       Skin assessed on: Q Shift      [x] No Pressure Injuries Present    [x]Prevention Measures Documented    [] Yes LDA  for Pressure Injury Previously documented     [] Yes New Pressure Injury Discovered   [] LDA for New Pressure Injury Added      Attending RN:  Krista Woodard RN     Second RN:  TOSHIA Perez

## 2023-09-02 NOTE — PROGRESS NOTES
[facility] Medicine  Progress Note    Patient Name: Barbara Rizo  MRN: [id number]  Patient Class: IP- Inpatient   Admission Date: 8/26/2023  Length of Stay: 6 days  Attending Physician: Az Stapleton MD  Primary Care Provider: Paras Oro MD        Subjective:     Principal Problem:Hyponatremia        HPI:  This is an 83-year-old female with a past medical history of HFpEF (EF: 65%, GIIDD), AFib (on Eliquis), hypertension, type 2 diabetes, hyperlipidemia, CKD 4, breast cancer, who presents with shortness of breath.    Patient presents with shortness of breath and generalized weakness that started 2 days prior to presentation.  She reports that she is been feeling progressively more fatigued and sustained a fall on her back with injury to her head a day prior to presentation.  Additionally, she reports decreased p.o. intake, nausea but denied vomiting or diarrhea.  She usually drinks 48 oz of water daily.     In the ED, the patient was tachypneic and hypoxic, requiring 5 L O2.  Labs were remarkable for hyponatremia (119 > 119), hyperkalemia (5.3 > 5.2), hypochloremia (89), elevated creatinine (2.2 from a baseline of 1.9), elevated BNP (868).  CT head showed no acute process.  CT chest without contrast showed small left pleural effusion with near complete consolidation of the left lower lobe (atelectasis and/or consolidation), and cardiomegaly with small pericardial effusion.  Patient was given Lasix 40 mg IV, ceftriaxone 1 g IV, and was later given 1 L of NS.  Of note, the patient has a history of hyponatremia requiring hospitalization (2020), and a prior history of a large right pleural effusion requiring a thoracentesis (2015).  Patient was admitted for further management.      Overview/Hospital Course:  Patient admitted with hyponatremia. Nitrite+ UTI and PNA, treating with antibiotics. Potentially SIADH from PNA. Thought initially to be hypervolemic hyponatremia with ,  "urine Na was not low, and higher O2 requirement. Nephrology consulted. Tolvaptan initiated. Sodium improving to 123. Can step down for tomorrow 8/29.  Hyponatremia is improving,  PT,OT recommending  SNF,but patient wish going home with HH.  Has ESBL Ecoli,started on meropenem.will finish tomorrow.  Has sign of fluid overload,with Hx of diastolic CHF,DC NS,started on IV lasix.      Interval History: No new issues     Review of Systems   Constitutional:  Positive for activity change.   HENT:  Negative for congestion.    Respiratory:  Negative for apnea.    Genitourinary:  Negative for difficulty urinating.   Neurological:  Positive for dizziness.     Objective:     Vital Signs (Most Recent):  Temp: 97.9 °F (36.6 °C) (09/02/23 0728)  Pulse: 101 (09/02/23 0728)  Resp: 20 (09/02/23 0728)  BP: 126/80 (09/02/23 0728)  SpO2: (!) 92 % (09/02/23 0728) Vital Signs (24h Range):  Temp:  [97.6 °F (36.4 °C)-98.2 °F (36.8 °C)] 97.9 °F (36.6 °C)  Pulse:  [] 101  Resp:  [16-20] 20  SpO2:  [90 %-95 %] 92 %  BP: (109-126)/(56-80) 126/80     Weight: 83.5 kg (184 lb 1.4 oz)  Body mass index is 33.67 kg/m².    Intake/Output Summary (Last 24 hours) at 9/2/2023 1037  Last data filed at 9/2/2023 0530  Gross per 24 hour   Intake 360 ml   Output 550 ml   Net -190 ml         Physical Exam  Nursing note reviewed.   Constitutional:       Appearance: Normal appearance. She is obese. She is not ill-appearing.   Cardiovascular:      Rate and Rhythm: Normal rate.   Pulmonary:      Effort: Pulmonary effort is normal. No respiratory distress.   Skin:     Coloration: Skin is not jaundiced.   Neurological:      Mental Status: She is alert.             Significant Labs: All pertinent labs within the past 24 hours have been reviewed.  BMP:   Recent Labs   Lab 09/02/23  0532   *   *   K 3.8   CL 92*   CO2 27   BUN 43*   CREATININE 1.7*   CALCIUM 9.0     CBC: No results for input(s): "WBC", "HGB", "HCT", "PLT" in the last 48 " hours.    Significant Imaging: I have reviewed all pertinent imaging results/findings within the past 24 hours.      Assessment/Plan:      * Hyponatremia  Patient has hyponatremia which is uncontrolled,We will aim to correct the sodium by 4-6mEq in 24 hours. We will monitor sodium Every 4 hours. The hyponatremia is due to Dehydration/hypovolemia and/or SIADH secondary to pneumonia. We will obtain the following studies: Urine sodium, urine osmolality, serum osmolality. We will treat the hyponatremia with IV fluids as follows: 100 ml/hr, fluid restriction.The patient's sodium results have been reviewed and are listed below.  Consult nephrology    Recent Labs   Lab 09/02/23  0532   *     Potentially SIADH from PNA. Thought initially to be hypervolemic hyponatremia with , urine Na was not low, and higher O2 requirement. Nephrology consulted. Tolvaptan initiated.   Sodium improving with IV lasix,   PT,OT rec    mmending  SNF,but patient wish going home with HH.DC NS,has fluid overload.    Now at 130    Acute on chronic diastolic CHF (congestive heart failure)  Patient is identified as having Diastolic (HFpEF) heart failure that is Acute on chronic. CHF is currently uncontrolled due to Dyspnea not returned to baseline after IV lasix doses of IV diuretic. Latest ECHO performed and demonstrates- Results for orders placed during the hospital encounter of 03/07/23    Echo    Interpretation Summary  · The left ventricle is normal in size with eccentric hypertrophy and normal systolic function.  · The estimated ejection fraction is 65%.  · Grade II left ventricular diastolic dysfunction.  · Mild tricuspid regurgitation.  · Small pericardial effusion.  · Moderate right atrial enlargement.  · Severe left atrial enlargement.  · Normal right ventricular size with normal right ventricular systolic function.  · There is moderate aortic valve stenosis.  · Aortic valve area is 1.20 cm2; peak velocity is 2.70 m/s; mean  gradient is 19 mmHg.  · Mild mitral regurgitation.  · Mild pulmonic regurgitation.  · Normal central venous pressure (3 mmHg).  · The estimated PA systolic pressure is 46 mmHg.  · There is pulmonary hypertension.  . Continue Furosemide and monitor clinical status closely. Monitor on telemetry. Patient is off CHF pathway.  Monitor strict Is&Os and daily weights.  Place on fluid restriction of 1.5 L. Cardiology has not been any consulted. Continue to stress to patient importance of self efficacy and  on diet for CHF. Last BNP reviewed- and noted below   Recent Labs   Lab 08/31/23  0501   BNP 1,486*   .    Urinary tract infection without hematuria  Has ESBL Ecoli,started on meropenem.will finish tomorrow.      Prolonged Q-T interval on ECG  Noted. Avoid QT prolonging agents.       Pleural effusion  Continue antibiotics  Needs outpatient follow up/imaging to ensure resolution      Community acquired pneumonia  CT chest without contrast showed small left pleural effusion with near complete consolidation of the left lower lobe (atelectasis and/or consolidation), and cardiomegaly with small pericardial effusion  Continue ceftriaxone, doxycycline   Respiratory cultures, if able  Urine Legionella antigen    Aortic atherosclerosis  On medical treatment.      Malignant neoplasm of overlapping sites of left breast in female, estrogen receptor positive  Need out patient oncology follow up.      Chronic kidney disease, stage 4 (severe)  Slightly above baseline.  Continue IV fluids   Continue to monitor  Nephrology consult      Type 2 diabetes mellitus with stage 4 chronic kidney disease, without long-term current use of insulin  Patient's FSGs are controlled on current medication regimen.  Last A1c reviewed-   Lab Results   Component Value Date    HGBA1C 5.5 10/05/2022     Monitor BMPs    Pulmonary hypertension  On IV lasix.      Hyperkalemia  Medical management.      Chronic gout  Resume home medication      MGUS  (monoclonal gammopathy of unknown significance)  Need out patient oncology follow up.      Physical deconditioning  Wishes H/H on discharge      Acute respiratory failure with hypoxia  Patient with Hypoxic Respiratory failure which is Acute.  she is not on home oxygen.   Supplemental oxygen was provided and noted-    Likely in the setting of pneumonia and/or Pulmonary edema     Essential hypertension  Resume home medications.      Hyperlipidemia  Resume statin      VTE Risk Mitigation (From admission, onward)         Ordered     apixaban tablet 2.5 mg  2 times daily         08/27/23 0246     IP VTE HIGH RISK PATIENT  Once         08/27/23 0246     Place sequential compression device  Until discontinued         08/27/23 0246                Discharge Planning   SALLY:      Code Status: DNR   Is the patient medically ready for discharge?:     Reason for patient still in hospital (select all that apply): Patient unstable  Discharge Plan A: Home with family, Home Health   Discharge Delays: None known at this time              Az Hansen MD  Department of Hospital Medicine   [facility] [place] [facility]

## 2023-09-02 NOTE — NURSING
PER handoff received from TOSHIA Perez     Pt resting in bed quietly. NAD noted. No c/o pain.  Fall and safety precautions maintained. Bed alarm activated and audible.. Bed locked in lowest position, with side rails up x2. Call bell and personal items within reach

## 2023-09-02 NOTE — PLAN OF CARE
Problem: Adult Inpatient Plan of Care  Goal: Plan of Care Review  9/1/2023 1910 by Jennifer Ledezma RN  Outcome: Ongoing, Progressing  9/1/2023 1643 by Jennifer Ledezma RN  Outcome: Ongoing, Progressing  Goal: Patient-Specific Goal (Individualized)  9/1/2023 1910 by Jennifer Ledezma RN  Outcome: Ongoing, Progressing  9/1/2023 1643 by Jennifer Ledezma RN  Outcome: Ongoing, Progressing  Goal: Absence of Hospital-Acquired Illness or Injury  9/1/2023 1910 by Jennifer Ledezma RN  Outcome: Ongoing, Progressing  9/1/2023 1643 by Jennifer Ledezma RN  Outcome: Ongoing, Progressing  Goal: Optimal Comfort and Wellbeing  9/1/2023 1910 by Jennifer Ledezma RN  Outcome: Ongoing, Progressing  9/1/2023 1643 by Jennifer Ledezma RN  Outcome: Ongoing, Progressing  Goal: Readiness for Transition of Care  9/1/2023 1910 by Jennifer Ledezma RN  Outcome: Ongoing, Progressing  9/1/2023 1643 by Jennifer Ledezma RN  Outcome: Ongoing, Progressing     Problem: Diabetes Comorbidity  Goal: Blood Glucose Level Within Targeted Range  9/1/2023 1910 by Jennifer Ledezma RN  Outcome: Ongoing, Progressing  9/1/2023 1643 by Jennifer Ledezma RN  Outcome: Ongoing, Progressing     Problem: Fluid Imbalance (Pneumonia)  Goal: Fluid Balance  9/1/2023 1910 by Jennifer Ledezma RN  Outcome: Ongoing, Progressing  9/1/2023 1643 by Jennifer Ledezma RN  Outcome: Ongoing, Progressing     Problem: Infection (Pneumonia)  Goal: Resolution of Infection Signs and Symptoms  9/1/2023 1910 by Jennifer Ledezma RN  Outcome: Ongoing, Progressing  9/1/2023 1643 by Jennifer Ledezma RN  Outcome: Ongoing, Progressing     Problem: Respiratory Compromise (Pneumonia)  Goal: Effective Oxygenation and Ventilation  9/1/2023 1910 by Jennifer Ledezma RN  Outcome: Ongoing, Progressing  9/1/2023 1643 by Jennifer Ledezma RN  Outcome: Ongoing, Progressing     Problem: Impaired Wound Healing  Goal: Optimal Wound Healing  9/1/2023 1910 by Jennifer Ledezma,  RN  Outcome: Ongoing, Progressing  9/1/2023 1643 by Jennifer Ledezma RN  Outcome: Ongoing, Progressing     Problem: Skin Injury Risk Increased  Goal: Skin Health and Integrity  9/1/2023 1910 by Jennifer Ledezma RN  Outcome: Ongoing, Progressing  9/1/2023 1643 by Jennifer Ledezma RN  Outcome: Ongoing, Progressing     Problem: Fall Injury Risk  Goal: Absence of Fall and Fall-Related Injury  Outcome: Ongoing, Progressing     Problem: Oral Intake Inadequate  Goal: Improved Oral Intake  Outcome: Ongoing, Progressing     Problem: Coping Ineffective  Goal: Effective Coping  Outcome: Ongoing, Progressing     Problem: Infection  Goal: Absence of Infection Signs and Symptoms  Outcome: Ongoing, Progressing

## 2023-09-02 NOTE — PROGRESS NOTES
Barbara Rizo is a 83 y.o. female patient.    Follow for hyponatremia    No new c/o, feeling OK, comfortable    Scheduled Meds:   allopurinoL  100 mg Oral Daily    amiodarone  200 mg Oral Daily    apixaban  2.5 mg Oral BID    atorvastatin  40 mg Oral Daily    doxycycline  100 mg Oral Q12H    famotidine  20 mg Oral Daily    folic acid  1,000 mcg Oral Daily    furosemide (LASIX) injection  80 mg Intravenous Q12H    melatonin  6 mg Oral Nightly    meropenem (MERREM) IVPB  1 g Intravenous Q12H    sodium chloride 0.9%  10 mL Intravenous Q6H    sodium chloride  1,000 mg Oral BID    tuberculin  5 Units Intradermal Once       Review of patient's allergies indicates:  No Known Allergies      Vital Signs Range (Last 24H):  Temp:  [97.8 °F (36.6 °C)-98.2 °F (36.8 °C)]   Pulse:  [100-106]   Resp:  [16-20]   BP: (109-129)/(56-80)   SpO2:  [90 %-95 %]     I & O (Last 24H):  Intake/Output Summary (Last 24 hours) at 9/2/2023 1326  Last data filed at 9/2/2023 1241  Gross per 24 hour   Intake 1074 ml   Output 550 ml   Net 524 ml           Physical Exam:  General appearance: well developed, no distress  Lungs:  clear to auscultation bilaterally and normal respiratory effort  Heart: regular rate and rhythm  Abdomen:  soft, normal bowel sounds  Extremities: edema trace    Laboratory:  I have reviewed all pertinent lab results within the past 24 hours.  CBC:   Recent Labs   Lab 08/27/23  0509   WBC 5.84   RBC 4.38   HGB 13.5   HCT 41.1      MCV 94   MCH 30.8   MCHC 32.8     CMP:   Recent Labs   Lab 08/26/23  2119 08/27/23  0127 09/02/23  0532      < > 116*   CALCIUM 8.8   < > 9.0   ALBUMIN 3.8  --   --    PROT 7.5  --   --    *   < > 130*   K 5.3*   < > 3.8   CO2 20*   < > 27   CL 89*   < > 92*   BUN 45*   < > 43*   CREATININE 2.2*   < > 1.7*   ALKPHOS 85  --   --    ALT 15  --   --    AST 13  --   --    BILITOT 0.9  --   --     < > = values in this interval not displayed.       Assessment &  Plan    Hyponatremia - improving  CKD stage 4 - stable  Pneumonia  UTI    Continue present RX  Renal status stable  We'll follow prn  Thanks      Miguelito Wright  9/2/2023

## 2023-09-02 NOTE — SUBJECTIVE & OBJECTIVE
"Interval History: No new issues     Review of Systems   Constitutional:  Positive for activity change.   HENT:  Negative for congestion.    Respiratory:  Negative for apnea.    Genitourinary:  Negative for difficulty urinating.   Neurological:  Positive for dizziness.     Objective:     Vital Signs (Most Recent):  Temp: 97.9 °F (36.6 °C) (09/02/23 0728)  Pulse: 101 (09/02/23 0728)  Resp: 20 (09/02/23 0728)  BP: 126/80 (09/02/23 0728)  SpO2: (!) 92 % (09/02/23 0728) Vital Signs (24h Range):  Temp:  [97.6 °F (36.4 °C)-98.2 °F (36.8 °C)] 97.9 °F (36.6 °C)  Pulse:  [] 101  Resp:  [16-20] 20  SpO2:  [90 %-95 %] 92 %  BP: (109-126)/(56-80) 126/80     Weight: 83.5 kg (184 lb 1.4 oz)  Body mass index is 33.67 kg/m².    Intake/Output Summary (Last 24 hours) at 9/2/2023 1037  Last data filed at 9/2/2023 0530  Gross per 24 hour   Intake 360 ml   Output 550 ml   Net -190 ml         Physical Exam  Nursing note reviewed.   Constitutional:       Appearance: Normal appearance. She is obese. She is not ill-appearing.   Cardiovascular:      Rate and Rhythm: Normal rate.   Pulmonary:      Effort: Pulmonary effort is normal. No respiratory distress.   Skin:     Coloration: Skin is not jaundiced.   Neurological:      Mental Status: She is alert.             Significant Labs: All pertinent labs within the past 24 hours have been reviewed.  BMP:   Recent Labs   Lab 09/02/23  0532   *   *   K 3.8   CL 92*   CO2 27   BUN 43*   CREATININE 1.7*   CALCIUM 9.0     CBC: No results for input(s): "WBC", "HGB", "HCT", "PLT" in the last 48 hours.    Significant Imaging: I have reviewed all pertinent imaging results/findings within the past 24 hours.  "

## 2023-09-02 NOTE — PLAN OF CARE
Pt is AAOx3. 10 L HF NC. Tele maintained.   No falls or new injuries reported during shift, safety precautions maintained.     Problem: Adult Inpatient Plan of Care  Goal: Plan of Care Review  Outcome: Ongoing, Progressing     Problem: Adult Inpatient Plan of Care  Goal: Patient-Specific Goal (Individualized)  Outcome: Ongoing, Progressing

## 2023-09-02 NOTE — ASSESSMENT & PLAN NOTE
Patient has hyponatremia which is uncontrolled,We will aim to correct the sodium by 4-6mEq in 24 hours. We will monitor sodium Every 4 hours. The hyponatremia is due to Dehydration/hypovolemia and/or SIADH secondary to pneumonia. We will obtain the following studies: Urine sodium, urine osmolality, serum osmolality. We will treat the hyponatremia with IV fluids as follows: 100 ml/hr, fluid restriction.The patient's sodium results have been reviewed and are listed below.  Consult nephrology    Recent Labs   Lab 09/02/23  0532   *     Potentially SIADH from PNA. Thought initially to be hypervolemic hyponatremia with , urine Na was not low, and higher O2 requirement. Nephrology consulted. Tolvaptan initiated.   Sodium improving with IV lasix,   PT,OT rec    mmending  SNF,but patient wish going home with HH.DC NS,has fluid overload.    Now at 130

## 2023-09-02 NOTE — NURSING
[facility] [place]  Nurses Note -- 4 Eyes      9/1/2023       Skin assessed on: Q Shift      [x] No Pressure Injuries Present    [x]Prevention Measures Documented    [] Yes LDA  for Pressure Injury Previously documented     [] Yes New Pressure Injury Discovered   [] LDA for New Pressure Injury Added      Attending RN:  NOEMÍ KELLY RN     Second RN:  Jennifer POPE

## 2023-09-02 NOTE — NURSING
[facility] MEWS TRIGGER FOLLOW UP       MEWS Monitoring, Score is: 1  Indication for review: O2 Device 10 L, f./u previous PM shift rounds    Bedside NurseChris contacted, no concerns verbalized at this time, instructed to call [phone] for further concerns or assistance..

## 2023-09-03 NOTE — NURSING
[facility], [facility]  Nurses Note -- 4 Eyes      9/2/2023       Skin assessed on: Q Shift      [x] No Pressure Injuries Present    [x]Prevention Measures Documented    [] Yes LDA  for Pressure Injury Previously documented     [] Yes New Pressure Injury Discovered   [] LDA for New Pressure Injury Added      Attending RN:  Shruti Calrk RN     Second RN:  TOSHIA Velasco

## 2023-09-03 NOTE — PT/OT/SLP PROGRESS
Physical Therapy Treatment    Patient Name:  Barbara Rizo   MRN:  [id number]    Recommendations:     Discharge Recommendations: nursing facility, skilled  Discharge Equipment Recommendations: other (see comments) (TBD)  Barriers to discharge: None    Assessment:     Barbara Rizo is a 83 y.o. female admitted with a medical diagnosis of Hyponatremia.  She presents with the following impairments/functional limitations: weakness, impaired endurance, decreased coordination, decreased lower extremity function, impaired functional mobility, gait instability, impaired cardiopulmonary response to activity, impaired balance.    Rehab Prognosis: Good; patient would benefit from acute skilled PT services to address these deficits and reach maximum level of function.    Recent Surgery: * No surgery found *      Plan:     During this hospitalization, patient to be seen 5 x/week to address the identified rehab impairments via gait training, therapeutic activities, therapeutic exercises and progress toward the following goals:    Plan of Care Expires:  09/04/23    Subjective     Chief Complaint: Pt with no complaints or concerns at this time.   Patient/Family Comments/goals: Pt agreeable to PT treatment.   Pain/Comfort:  Pain Rating 1: 0/10      Objective:     Patient found HOB elevated with oxygen, telemetry, PICC line, PureWick upon PT entry to room.     General Precautions: Standard, fall, contact, respiratory  Orthopedic Precautions: N/A  Braces: N/A     Functional Mobility:  Bed Mobility:     Supine to Sit: contact guard assistance  Sit to Supine: minimum assistance  Transfers:     Sit to Stand:  stand by assistance with rolling walker  Gait: Pt ambulated 50-60 ft within room due to being on contact isolation with RW ,CGA requiring verbal cueing on proper AD usage when performing turns to prevent LOB and reduce fall risk.   Balance: Pt with good sitting and fair standing balance.       AM-PAC 6 CLICK MOBILITY  Turning  over in bed (including adjusting bedclothes, sheets and blankets)?: 3  Sitting down on and standing up from a chair with arms (e.g., wheelchair, bedside commode, etc.): 3  Moving from lying on back to sitting on the side of the bed?: 3  Moving to and from a bed to a chair (including a wheelchair)?: 3  Need to walk in hospital room?: 3  Climbing 3-5 steps with a railing?: 3  Basic Mobility Total Score: 18       Treatment & Education:      Patient left HOB elevated with all lines intact, call button in reach, bed alarm on, and family member present..    GOALS:   Multidisciplinary Problems       Physical Therapy Goals          Problem: Physical Therapy    Goal Priority Disciplines Outcome Goal Variances Interventions   Physical Therapy Goal     PT, PT/OT Ongoing, Progressing     Description: Goals to be met by: 23     Patient will increase functional independence with mobility by performin. Pt to be mod I with bed mobility.  2. Pt to transfer with min A.  3. Pt  to ambulate 50' w/RW CGA.  4. Pt to be (I) with written HEP.                         Time Tracking:     PT Received On: 23  PT Start Time: 1340     PT Stop Time: 1407  PT Total Time (min): 27 min     Billable Minutes: Gait Training 15 and Therapeutic Activity 12    Treatment Type: Treatment  PT/PTA: PTA     Number of PTA visits since last PT visit: 3     2023

## 2023-09-03 NOTE — PLAN OF CARE
Problem: Adult Inpatient Plan of Care  Goal: Plan of Care Review  Outcome: Ongoing, Progressing  Goal: Patient-Specific Goal (Individualized)  Outcome: Ongoing, Progressing  Goal: Absence of Hospital-Acquired Illness or Injury  Outcome: Ongoing, Progressing  Goal: Optimal Comfort and Wellbeing  Outcome: Ongoing, Progressing  Goal: Readiness for Transition of Care  Outcome: Ongoing, Progressing     Problem: Diabetes Comorbidity  Goal: Blood Glucose Level Within Targeted Range  Outcome: Ongoing, Progressing     Problem: Fluid Imbalance (Pneumonia)  Goal: Fluid Balance  Outcome: Ongoing, Progressing     Problem: Respiratory Compromise (Pneumonia)  Goal: Effective Oxygenation and Ventilation  Outcome: Ongoing, Progressing     Problem: Impaired Wound Healing  Goal: Optimal Wound Healing  Outcome: Ongoing, Progressing     Problem: Skin Injury Risk Increased  Goal: Skin Health and Integrity  Outcome: Ongoing, Progressing     Problem: Fall Injury Risk  Goal: Absence of Fall and Fall-Related Injury  Outcome: Ongoing, Progressing     Problem: Oral Intake Inadequate  Goal: Improved Oral Intake  Outcome: Ongoing, Progressing

## 2023-09-03 NOTE — PROGRESS NOTES
[facility]  Progress Note    Patient Name: Barbara Rizo  MRN: [id number]  Patient Class: IP- Inpatient   Admission Date: 8/26/2023  Length of Stay: 7 days  Attending Physician: Az Stapleton MD  Primary Care Provider: Paras Oro MD        Subjective:     Principal Problem:Hyponatremia        HPI:  This is an 83-year-old female with a past medical history of HFpEF (EF: 65%, GIIDD), AFib (on Eliquis), hypertension, type 2 diabetes, hyperlipidemia, CKD 4, breast cancer, who presents with shortness of breath.    Patient presents with shortness of breath and generalized weakness that started 2 days prior to presentation.  She reports that she is been feeling progressively more fatigued and sustained a fall on her back with injury to her head a day prior to presentation.  Additionally, she reports decreased p.o. intake, nausea but denied vomiting or diarrhea.  She usually drinks 48 oz of water daily.     In the ED, the patient was tachypneic and hypoxic, requiring 5 L O2.  Labs were remarkable for hyponatremia (119 > 119), hyperkalemia (5.3 > 5.2), hypochloremia (89), elevated creatinine (2.2 from a baseline of 1.9), elevated BNP (868).  CT head showed no acute process.  CT chest without contrast showed small left pleural effusion with near complete consolidation of the left lower lobe (atelectasis and/or consolidation), and cardiomegaly with small pericardial effusion.  Patient was given Lasix 40 mg IV, ceftriaxone 1 g IV, and was later given 1 L of NS.  Of note, the patient has a history of hyponatremia requiring hospitalization (2020), and a prior history of a large right pleural effusion requiring a thoracentesis (2015).  Patient was admitted for further management.      Overview/Hospital Course:  Patient admitted with hyponatremia. Nitrite+ UTI and PNA, treating with antibiotics. Potentially SIADH from PNA. Thought initially to be hypervolemic hyponatremia with ,  "urine Na was not low, and higher O2 requirement. Nephrology consulted. Tolvaptan initiated. Sodium improving to 123. Can step down for tomorrow 8/29.  Hyponatremia is improving,  PT,OT recommending  SNF,but patient wish going home with HH.  Has ESBL Ecoli,started on meropenem.will finish tomorrow.  Has sign of fluid overload,with Hx of diastolic CHF,DC NS,started on IV lasix.      Interval History: No new issues     Review of Systems   Constitutional:  Positive for activity change.   HENT:  Negative for congestion.    Respiratory:  Negative for apnea.    Genitourinary:  Negative for difficulty urinating.   Neurological:  Positive for dizziness.     Objective:     Vital Signs (Most Recent):  Temp: 97.8 °F (36.6 °C) (09/03/23 0745)  Pulse: 105 (09/03/23 0745)  Resp: 18 (09/03/23 0745)  BP: 133/71 (09/03/23 0745)  SpO2: (!) 92 % (09/03/23 0745) Vital Signs (24h Range):  Temp:  [97.8 °F (36.6 °C)-98.6 °F (37 °C)] 97.8 °F (36.6 °C)  Pulse:  [103-106] 105  Resp:  [18-20] 18  SpO2:  [91 %-95 %] 92 %  BP: (115-156)/(69-79) 133/71     Weight: 83.5 kg (184 lb 1.4 oz)  Body mass index is 33.67 kg/m².    Intake/Output Summary (Last 24 hours) at 9/3/2023 0838  Last data filed at 9/3/2023 0745  Gross per 24 hour   Intake 1561 ml   Output --   Net 1561 ml         Physical Exam  Nursing note reviewed.   Constitutional:       Appearance: Normal appearance. She is obese. She is not ill-appearing.   Cardiovascular:      Rate and Rhythm: Normal rate.   Pulmonary:      Effort: Pulmonary effort is normal. No respiratory distress.   Skin:     Coloration: Skin is not jaundiced.   Neurological:      Mental Status: She is alert.             Significant Labs: All pertinent labs within the past 24 hours have been reviewed.  CBC: No results for input(s): "WBC", "HGB", "HCT", "PLT" in the last 48 hours.  CMP:   Recent Labs   Lab 09/01/23  1726 09/02/23  0532   * 130*   K 4.6 3.8   CL 96 92*   CO2 22* 27   * 116*   BUN 44* 43* "   CREATININE 1.8* 1.7*   CALCIUM 8.8 9.0   ANIONGAP 11 11       Significant Imaging: I have reviewed all pertinent imaging results/findings within the past 24 hours.      Assessment/Plan:      * Hyponatremia  Patient has hyponatremia which is uncontrolled,We will aim to correct the sodium by 4-6mEq in 24 hours. We will monitor sodium Every 4 hours. The hyponatremia is due to Dehydration/hypovolemia and/or SIADH secondary to pneumonia. We will obtain the following studies: Urine sodium, urine osmolality, serum osmolality. We will treat the hyponatremia with IV fluids as follows: 100 ml/hr, fluid restriction.The patient's sodium results have been reviewed and are listed below.  Consult nephrology    Recent Labs   Lab 09/02/23  0532   *     Potentially SIADH from PNA. Thought initially to be hypervolemic hyponatremia with , urine Na was not low, and higher O2 requirement. Nephrology consulted. Tolvaptan initiated.   Sodium improving with IV lasix,   PT,OT rec    mmending  SNF,but patient wish going home with .DC NS,has fluid overload.    Now at 130    Acute on chronic diastolic CHF (congestive heart failure)  Patient is identified as having Diastolic (HFpEF) heart failure that is Acute on chronic. CHF is currently uncontrolled due to Dyspnea not returned to baseline after IV lasix doses of IV diuretic. Latest ECHO performed and demonstrates- Results for orders placed during the hospital encounter of 03/07/23    Echo    Interpretation Summary  · The left ventricle is normal in size with eccentric hypertrophy and normal systolic function.  · The estimated ejection fraction is 65%.  · Grade II left ventricular diastolic dysfunction.  · Mild tricuspid regurgitation.  · Small pericardial effusion.  · Moderate right atrial enlargement.  · Severe left atrial enlargement.  · Normal right ventricular size with normal right ventricular systolic function.  · There is moderate aortic valve stenosis.  · Aortic valve  area is 1.20 cm2; peak velocity is 2.70 m/s; mean gradient is 19 mmHg.  · Mild mitral regurgitation.  · Mild pulmonic regurgitation.  · Normal central venous pressure (3 mmHg).  · The estimated PA systolic pressure is 46 mmHg.  · There is pulmonary hypertension.  . Continue Furosemide and monitor clinical status closely. Monitor on telemetry. Patient is off CHF pathway.  Monitor strict Is&Os and daily weights.  Place on fluid restriction of 1.5 L. Cardiology has not been any consulted. Continue to stress to patient importance of self efficacy and  on diet for CHF. Last BNP reviewed- and noted below   Recent Labs   Lab 08/31/23  0501   BNP 1,486*   .    Urinary tract infection without hematuria  Has ESBL Ecoli,started on meropenem.will finish tomorrow.      Prolonged Q-T interval on ECG  Noted. Avoid QT prolonging agents.       Pleural effusion  Continue antibiotics  Needs outpatient follow up/imaging to ensure resolution      Community acquired pneumonia  CT chest without contrast showed small left pleural effusion with near complete consolidation of the left lower lobe (atelectasis and/or consolidation), and cardiomegaly with small pericardial effusion  Continue ceftriaxone, doxycycline   Respiratory cultures, if able  Urine Legionella antigen    Aortic atherosclerosis  On medical treatment.      Malignant neoplasm of overlapping sites of left breast in female, estrogen receptor positive  Need out patient oncology follow up.      Chronic kidney disease, stage 4 (severe)  Slightly above baseline.  Continue IV fluids   Continue to monitor  Nephrology consult      Type 2 diabetes mellitus with stage 4 chronic kidney disease, without long-term current use of insulin  Patient's FSGs are controlled on current medication regimen.  Last A1c reviewed-   Lab Results   Component Value Date    HGBA1C 5.5 10/05/2022     Monitor BMPs    Pulmonary hypertension  On IV lasix.      Hyperkalemia  Medical  management.      Chronic gout  Resume home medication      MGUS (monoclonal gammopathy of unknown significance)  Need out patient oncology follow up.      Physical deconditioning  Wishes H/H on discharge      Acute respiratory failure with hypoxia  Patient with Hypoxic Respiratory failure which is Acute.  she is not on home oxygen.   Supplemental oxygen was provided and noted-    Likely in the setting of pneumonia and/or Pulmonary edema     Essential hypertension  Resume home medications.      Hyperlipidemia  Resume statin      VTE Risk Mitigation (From admission, onward)         Ordered     apixaban tablet 2.5 mg  2 times daily         08/27/23 0246     IP VTE HIGH RISK PATIENT  Once         08/27/23 0246     Place sequential compression device  Until discontinued         08/27/23 0246                Discharge Planning   SALLY:      Code Status: DNR   Is the patient medically ready for discharge?:     Reason for patient still in hospital (select all that apply): Patient unstable  Discharge Plan A: Home with family, Home Health   Discharge Delays: None known at this time              Az Hansen MD  Department of Hospital Medicine   [facility] - Telemetry

## 2023-09-03 NOTE — SUBJECTIVE & OBJECTIVE
"Interval History: No new issues     Review of Systems   Constitutional:  Positive for activity change.   HENT:  Negative for congestion.    Respiratory:  Negative for apnea.    Genitourinary:  Negative for difficulty urinating.   Neurological:  Positive for dizziness.     Objective:     Vital Signs (Most Recent):  Temp: 97.8 °F (36.6 °C) (09/03/23 0745)  Pulse: 105 (09/03/23 0745)  Resp: 18 (09/03/23 0745)  BP: 133/71 (09/03/23 0745)  SpO2: (!) 92 % (09/03/23 0745) Vital Signs (24h Range):  Temp:  [97.8 °F (36.6 °C)-98.6 °F (37 °C)] 97.8 °F (36.6 °C)  Pulse:  [103-106] 105  Resp:  [18-20] 18  SpO2:  [91 %-95 %] 92 %  BP: (115-156)/(69-79) 133/71     Weight: 83.5 kg (184 lb 1.4 oz)  Body mass index is 33.67 kg/m².    Intake/Output Summary (Last 24 hours) at 9/3/2023 0838  Last data filed at 9/3/2023 0745  Gross per 24 hour   Intake 1561 ml   Output --   Net 1561 ml         Physical Exam  Nursing note reviewed.   Constitutional:       Appearance: Normal appearance. She is obese. She is not ill-appearing.   Cardiovascular:      Rate and Rhythm: Normal rate.   Pulmonary:      Effort: Pulmonary effort is normal. No respiratory distress.   Skin:     Coloration: Skin is not jaundiced.   Neurological:      Mental Status: She is alert.             Significant Labs: All pertinent labs within the past 24 hours have been reviewed.  CBC: No results for input(s): "WBC", "HGB", "HCT", "PLT" in the last 48 hours.  CMP:   Recent Labs   Lab 09/01/23  1726 09/02/23  0532   * 130*   K 4.6 3.8   CL 96 92*   CO2 22* 27   * 116*   BUN 44* 43*   CREATININE 1.8* 1.7*   CALCIUM 8.8 9.0   ANIONGAP 11 11       Significant Imaging: I have reviewed all pertinent imaging results/findings within the past 24 hours.  "

## 2023-09-03 NOTE — NURSING
[facility] [place] -- 4 [place]      9/3/2023       Skin assessed on: Q Shift      [x] No Pressure Injuries Present    [x]Prevention Measures Documented    [] Yes LDA  for Pressure Injury Previously documented     [] Yes New Pressure Injury Discovered   [] LDA for New Pressure Injury Added      Attending RN:  Krista Woodard RN     Second RN:  TOSHIA Larkin

## 2023-09-03 NOTE — NURSING
PER handoff received from TOSHIA Mohr     Pt resting in bed quietly. NAD noted. No c/o pain.  Fall and safety precautions maintained. Bed alarm activated and audible.. Bed locked in lowest position, with side rails up x2. Call bell and personal items within reach

## 2023-09-04 PROBLEM — E87.6 HYPOKALEMIA: Status: ACTIVE | Noted: 2023-01-01

## 2023-09-04 NOTE — NURSING
[facility] [place]  Nurses Note -- 4 Eyes      9/3/2023       Skin assessed on: Q Shift      [x] No Pressure Injuries Present    [x]Prevention Measures Documented    [] Yes LDA  for Pressure Injury Previously documented     [] Yes New Pressure Injury Discovered   [] LDA for New Pressure Injury Added      Attending RN:  Shruti Clark RN     Second RN:  TOSHIA Velasco

## 2023-09-04 NOTE — NURSING
[facility], [facility]  Nurses Note -- 4 Eyes      9/4/2023       Skin assessed on: Q Shift      [x] No Pressure Injuries Present    [x]Prevention Measures Documented    [] Yes LDA  for Pressure Injury Previously documented     [] Yes New Pressure Injury Discovered   [] LDA for New Pressure Injury Added      Attending RN:  Krista Woodard RN     Second RN:  TOSHIA Bahena

## 2023-09-04 NOTE — NURSING
PER handoff received from TOSHIA Bahena     Pt resting in bed quietly. NAD noted. No c/o pain.  Fall and safety precautions maintained. Bed alarm activated and audible.. Bed locked in lowest position, with side rails up x2. Call bell and personal items within reach

## 2023-09-04 NOTE — SUBJECTIVE & OBJECTIVE
"Interval History: No new issues     Review of Systems   Constitutional:  Positive for activity change.   HENT:  Negative for congestion.    Respiratory:  Negative for apnea.    Genitourinary:  Negative for difficulty urinating.   Neurological:  Positive for dizziness.     Objective:     Vital Signs (Most Recent):  Temp: 97.8 °F (36.6 °C) (09/04/23 0708)  Pulse: 99 (09/04/23 0708)  Resp: 18 (09/04/23 0708)  BP: 124/86 (09/04/23 0708)  SpO2: (!) 93 % (09/04/23 0832) Vital Signs (24h Range):  Temp:  [97.8 °F (36.6 °C)-98.4 °F (36.9 °C)] 97.8 °F (36.6 °C)  Pulse:  [] 99  Resp:  [18-20] 18  SpO2:  [91 %-94 %] 93 %  BP: (109-133)/(68-86) 124/86     Weight: 83.5 kg (184 lb 1.4 oz)  Body mass index is 33.67 kg/m².    Intake/Output Summary (Last 24 hours) at 9/4/2023 1005  Last data filed at 9/4/2023 0446  Gross per 24 hour   Intake 474 ml   Output 1800 ml   Net -1326 ml           Physical Exam  Nursing note reviewed.   Constitutional:       Appearance: Normal appearance. She is obese. She is not ill-appearing.   Cardiovascular:      Rate and Rhythm: Normal rate.   Pulmonary:      Effort: Pulmonary effort is normal. No respiratory distress.   Skin:     Coloration: Skin is not jaundiced.   Neurological:      Mental Status: She is alert.             Significant Labs: All pertinent labs within the past 24 hours have been reviewed.  CBC: No results for input(s): "WBC", "HGB", "HCT", "PLT" in the last 48 hours.  CMP:   Recent Labs   Lab 09/03/23  0924 09/04/23  0854   * 135*   K 3.8 2.9*   CL 91* 87*   CO2 32* 38*   * 137*   BUN 48* 53*   CREATININE 1.6* 1.6*   CALCIUM 8.8 9.1   ANIONGAP 10 10         Significant Imaging: I have reviewed all pertinent imaging results/findings within the past 24 hours.  "

## 2023-09-04 NOTE — PROGRESS NOTES
[facility] Medicine  Progress Note    Patient Name: Barbara Rizo  MRN: [id number]  Patient Class: IP- Inpatient   Admission Date: 8/26/2023  Length of Stay: 8 days  Attending Physician: Salazar Kirby, *  Primary Care Provider: Paras Oro MD        Subjective:     Principal Problem:Hyponatremia        HPI:  This is an 83-year-old female with a past medical history of HFpEF (EF: 65%, GIIDD), AFib (on Eliquis), hypertension, type 2 diabetes, hyperlipidemia, CKD 4, breast cancer, who presents with shortness of breath.    Patient presents with shortness of breath and generalized weakness that started 2 days prior to presentation.  She reports that she is been feeling progressively more fatigued and sustained a fall on her back with injury to her head a day prior to presentation.  Additionally, she reports decreased p.o. intake, nausea but denied vomiting or diarrhea.  She usually drinks 48 oz of water daily.     In the ED, the patient was tachypneic and hypoxic, requiring 5 L O2.  Labs were remarkable for hyponatremia (119 > 119), hyperkalemia (5.3 > 5.2), hypochloremia (89), elevated creatinine (2.2 from a baseline of 1.9), elevated BNP (868).  CT head showed no acute process.  CT chest without contrast showed small left pleural effusion with near complete consolidation of the left lower lobe (atelectasis and/or consolidation), and cardiomegaly with small pericardial effusion.  Patient was given Lasix 40 mg IV, ceftriaxone 1 g IV, and was later given 1 L of NS.  Of note, the patient has a history of hyponatremia requiring hospitalization (2020), and a prior history of a large right pleural effusion requiring a thoracentesis (2015).  Patient was admitted for further management.      Overview/Hospital Course:  Patient admitted with hyponatremia. Nitrite+ UTI and PNA, treating with antibiotics. Potentially SIADH from PNA. Thought initially to be hypervolemic hyponatremia with BNP  "900, urine Na was not low, and higher O2 requirement. Nephrology consulted. Tolvaptan initiated. Sodium improving to 123. Can step down for tomorrow 8/29.  Hyponatremia is improving,  PT,OT recommending  SNF,but patient wish going home with HH.  Has ESBL Ecoli,started on meropenem.fih-nished.  Has sign of fluid overload,with Hx of diastolic CHF,DC NS,started on IV lasix.  Replaced potassium.      Interval History: No new issues     Review of Systems   Constitutional:  Positive for activity change.   HENT:  Negative for congestion.    Respiratory:  Negative for apnea.    Genitourinary:  Negative for difficulty urinating.   Neurological:  Positive for dizziness.     Objective:     Vital Signs (Most Recent):  Temp: 97.8 °F (36.6 °C) (09/04/23 0708)  Pulse: 99 (09/04/23 0708)  Resp: 18 (09/04/23 0708)  BP: 124/86 (09/04/23 0708)  SpO2: (!) 93 % (09/04/23 0832) Vital Signs (24h Range):  Temp:  [97.8 °F (36.6 °C)-98.4 °F (36.9 °C)] 97.8 °F (36.6 °C)  Pulse:  [] 99  Resp:  [18-20] 18  SpO2:  [91 %-94 %] 93 %  BP: (109-133)/(68-86) 124/86     Weight: 83.5 kg (184 lb 1.4 oz)  Body mass index is 33.67 kg/m².    Intake/Output Summary (Last 24 hours) at 9/4/2023 1005  Last data filed at 9/4/2023 0446  Gross per 24 hour   Intake 474 ml   Output 1800 ml   Net -1326 ml           Physical Exam  Nursing note reviewed.   Constitutional:       Appearance: Normal appearance. She is obese. She is not ill-appearing.   Cardiovascular:      Rate and Rhythm: Normal rate.   Pulmonary:      Effort: Pulmonary effort is normal. No respiratory distress.   Skin:     Coloration: Skin is not jaundiced.   Neurological:      Mental Status: She is alert.             Significant Labs: All pertinent labs within the past 24 hours have been reviewed.  CBC: No results for input(s): "WBC", "HGB", "HCT", "PLT" in the last 48 hours.  CMP:   Recent Labs   Lab 09/03/23 0924 09/04/23  0854   * 135*   K 3.8 2.9*   CL 91* 87*   CO2 32* 38*   * " 137*   BUN 48* 53*   CREATININE 1.6* 1.6*   CALCIUM 8.8 9.1   ANIONGAP 10 10         Significant Imaging: I have reviewed all pertinent imaging results/findings within the past 24 hours.      Assessment/Plan:      * Hyponatremia  Patient has hyponatremia which is uncontrolled,We will aim to correct the sodium by 4-6mEq in 24 hours. We will monitor sodium Every 4 hours. The hyponatremia is due to Dehydration/hypovolemia and/or SIADH secondary to pneumonia. We will obtain the following studies: Urine sodium, urine osmolality, serum osmolality. We will treat the hyponatremia with IV fluids as follows: 100 ml/hr, fluid restriction.The patient's sodium results have been reviewed and are listed below.  Consult nephrology    Recent Labs   Lab 09/02/23  0532   *     Potentially SIADH from PNA. Thought initially to be hypervolemic hyponatremia with , urine Na was not low, and higher O2 requirement. Nephrology consulted. Tolvaptan initiated.   Sodium improving with IV lasix,   PT,OT rec    mmending  SNF,but patient wish going home with HH.DC NS,has fluid overload.    Now at 130    Hypokalemia  Resolved.      Acute on chronic diastolic CHF (congestive heart failure)  Patient is identified as having Diastolic (HFpEF) heart failure that is Acute on chronic. CHF is currently uncontrolled due to Dyspnea not returned to baseline after IV lasix doses of IV diuretic. Latest ECHO performed and demonstrates- Results for orders placed during the hospital encounter of 03/07/23    Echo    Interpretation Summary  · The left ventricle is normal in size with eccentric hypertrophy and normal systolic function.  · The estimated ejection fraction is 65%.  · Grade II left ventricular diastolic dysfunction.  · Mild tricuspid regurgitation.  · Small pericardial effusion.  · Moderate right atrial enlargement.  · Severe left atrial enlargement.  · Normal right ventricular size with normal right ventricular systolic function.  · There is  moderate aortic valve stenosis.  · Aortic valve area is 1.20 cm2; peak velocity is 2.70 m/s; mean gradient is 19 mmHg.  · Mild mitral regurgitation.  · Mild pulmonic regurgitation.  · Normal central venous pressure (3 mmHg).  · The estimated PA systolic pressure is 46 mmHg.  · There is pulmonary hypertension.  . Continue Furosemide and monitor clinical status closely. Monitor on telemetry. Patient is off CHF pathway.  Monitor strict Is&Os and daily weights.  Place on fluid restriction of 1.5 L. Cardiology has not been any consulted. Continue to stress to patient importance of self efficacy and  on diet for CHF. Last BNP reviewed- and noted below   Recent Labs   Lab 08/31/23  0501   BNP 1,486*   .    Urinary tract infection without hematuria  Has ESBL Ecoli,started on meropenem.will finish tomorrow.      Prolonged Q-T interval on ECG  Noted. Avoid QT prolonging agents.       Pleural effusion  Continue antibiotics  Needs outpatient follow up/imaging to ensure resolution      Community acquired pneumonia  CT chest without contrast showed small left pleural effusion with near complete consolidation of the left lower lobe (atelectasis and/or consolidation), and cardiomegaly with small pericardial effusion  Continue ceftriaxone, doxycycline   Respiratory cultures, if able  Urine Legionella antigen    Aortic atherosclerosis  On medical treatment.      Malignant neoplasm of overlapping sites of left breast in female, estrogen receptor positive  Need out patient oncology follow up.      Chronic kidney disease, stage 4 (severe)  Slightly above baseline.  Continue IV fluids   Continue to monitor  Nephrology consult      Type 2 diabetes mellitus with stage 4 chronic kidney disease, without long-term current use of insulin  Patient's FSGs are controlled on current medication regimen.  Last A1c reviewed-   Lab Results   Component Value Date    HGBA1C 5.5 10/05/2022     Monitor BMPs    Pulmonary hypertension  On IV  lasix.      Hyperkalemia  Medical management.  resolved.      Chronic gout  Resume home medication      MGUS (monoclonal gammopathy of unknown significance)  Need out patient oncology follow up.      Physical deconditioning  Wishes H/H on discharge      Acute respiratory failure with hypoxia  Patient with Hypoxic Respiratory failure which is Acute.  she is not on home oxygen.   Supplemental oxygen was provided and noted-    Likely in the setting of pneumonia and/or Pulmonary edema     Essential hypertension  Resume home medications.      Hyperlipidemia  Resume statin      VTE Risk Mitigation (From admission, onward)         Ordered     apixaban tablet 2.5 mg  2 times daily         08/27/23 0246     IP VTE HIGH RISK PATIENT  Once         08/27/23 0246     Place sequential compression device  Until discontinued         08/27/23 0246                Discharge Planning   SALLY:      Code Status: DNR   Is the patient medically ready for discharge?:     Reason for patient still in hospital (select all that apply): Patient trending condition  Discharge Plan A: Home with family, Home Health   Discharge Delays: None known at this time              Salazar Kirby MD  Department of Hospital Medicine   [facility] - Telemetry

## 2023-09-04 NOTE — PLAN OF CARE
Pt has consult for SNF placement, referral not sent due to pt requiring 7L High Flow oxygen at this time.  This exceeds limit for SNF level of care

## 2023-09-04 NOTE — PT/OT/SLP PROGRESS
"Physical Therapy Treatment    Patient Name:  Barbara Rizo   MRN:  [id number]    Recommendations:     Discharge Recommendations: nursing facility, skilled  Discharge Equipment Recommendations: other (see comments) (TBD)  Barriers to discharge:  pt currently on HF 7 L/min Oxygen, SpO2 dropped with activity     Assessment:     Barbara Rizo is a 83 y.o. female admitted with a medical diagnosis of Hyponatremia.  She presents with the following impairments/functional limitations: weakness, impaired endurance, impaired functional mobility, gait instability, impaired balance, impaired self care skills, decreased coordination, decreased upper extremity function, decreased lower extremity function, decreased safety awareness, impaired cardiopulmonary response to activity, edema.    Rehab Prognosis: Good; patient would benefit from acute skilled PT services to address these deficits and reach maximum level of function.    Recent Surgery: * No surgery found *      Plan:     During this hospitalization, patient to be seen 5 x/week to address the identified rehab impairments via gait training, therapeutic activities, therapeutic exercises and progress toward the following goals:    Plan of Care Expires:  09/04/23    Subjective     Chief Complaint: "I think I'm wet." "I need to be changed."  Patient/Family Comments/goals: Pt agreed to participate.  Pain/Comfort:  Pain Rating 1: 0/10  Pain Rating Post-Intervention 1: 0/10      Objective:     Communicated with nurse Velasco prior to session.  Patient found HOB elevated with oxygen, telemetry, PICC line, PureWick, bed alarm upon PT entry to room.     General Precautions: Standard, fall, respiratory, contact (MDRO, ESBL)  Orthopedic Precautions: N/A  Braces: N/A  Respiratory Status: High flow, flow 7 L/min     Functional Mobility:  Bed Mobility:     Rolling Left/Right:  SBA using BR  Scooting: anterior scoot to EOB with contact guard assistance  Supine to Sit: minimum assistance "  for Trunk support  Sit to Supine: moderate assistance for Trunk and BLE management  Transfers: gait belt donned prior standing    Sit to Stand: from EOB with stand by assistance with rolling walker  Gait: Pt ambulated 10 ft along bedside with CGA using RW. Pt with decreased jocelyn/step length/weight shifting, v/t cues for AD management, increase step length, , PLB tech  Balance: Fair+ Sitting; Fair Standing      AM-PAC 6 CLICK MOBILITY  Turning over in bed (including adjusting bedclothes, sheets and blankets)?: 3  Sitting down on and standing up from a chair with arms (e.g., wheelchair, bedside commode, etc.): 3  Moving from lying on back to sitting on the side of the bed?: 3  Moving to and from a bed to a chair (including a wheelchair)?: 3  Need to walk in hospital room?: 3  Climbing 3-5 steps with a railing?: 3  Basic Mobility Total Score: 18       Treatment & Education:  Educated pt on importance of Bed Mobility, performing BLE ex throughout the day, pt verbalized understanding.    BLE ex in seated 2 sets x 10 reps AP, LAQ, Marching, PS (having pt counting number out loud) SpO2 88-94% on High flow, flow 7 L/min    Pt had loose BM. Pt completed R/L rolling in supine for Janet Hygiene, new Brief + PureWick, Pads change prior sitting up.    Pt completed oral + face hygiene while sitting at EOB with set up assistance on tray table     Patient left HOB elevated (bed in chair position) with BLE offloaded by pillow, tray table at bedside, all lines intact, call button in reach, bed alarm on, nurse notified, and nurse/Daughter present.    GOALS:   Multidisciplinary Problems       Physical Therapy Goals          Problem: Physical Therapy    Goal Priority Disciplines Outcome Goal Variances Interventions   Physical Therapy Goal     PT, PT/OT Ongoing, Progressing     Description: Goals to be met by: 23     Patient will increase functional independence with mobility by performin. Pt to be mod I with bed  mobility.  2. Pt to transfer with min A.  3. Pt  to ambulate 50' w/RW CGA.  4. Pt to be (I) with written HEP.                         Time Tracking:     PT Received On: 09/04/23  PT Start Time: 1109     PT Stop Time: 1202  PT Total Time (min): 53 min     Billable Minutes: Gait Training 12 min, Therapeutic Activity 26 min, and Therapeutic Exercise 15 min    Treatment Type: Treatment  PT/PTA: PTA     Number of PTA visits since last PT visit: 4     09/04/2023

## 2023-09-05 NOTE — ASSESSMENT & PLAN NOTE
Patient has hyponatremia which is uncontrolled,We will aim to correct the sodium by 4-6mEq in 24 hours. We will monitor sodium Every 4 hours. The hyponatremia is due to Dehydration/hypovolemia and/or SIADH secondary to pneumonia. We will obtain the following studies: Urine sodium, urine osmolality, serum osmolality. We will treat the hyponatremia with IV fluids as follows: 100 ml/hr, fluid restriction.The patient's sodium results have been reviewed and are listed below.  Consult nephrology    Recent Labs   Lab 09/05/23  0345        Potentially SIADH from PNA. Thought initially to be hypervolemic hyponatremia with , urine Na was not low, and higher O2 requirement. Nephrology consulted. Tolvaptan initiated.   Sodium improving with IV lasix,   PT,OT rec    mmending  SNF,but patient wish going home with HH.DC NS,has fluid overload.  Resolved.

## 2023-09-05 NOTE — PLAN OF CARE
Nutrition Plan of Care:    Recommendations  1. Encourage po intake as tolerated   2. Recommend to change commercial beverages/Oral nutrition supplements form boost to boost glucose control due to DM medical diagnosis   3. Monitor labs   4. Collaboration with medical providers     Goals: Patient to consume >50% of EEN prior to RD follow up.  Nutrition Goal Status: new  Communication of RD Recs: other (comment) (poc)     Assessment and Plan     Nutrition Problem  Inadequate nutrition     Related to (etiology):   Decreased appetite     Signs and Symptoms (as evidenced by):   Decreased po intake      Interventions(treatment strategy):  Commercial beverages  Collaboration with medical providers        Nutrition Diagnosis Status:   Avinash Baumann, MS, RDN, LDN

## 2023-09-05 NOTE — NURSING
PER handoff received from TOSHIA Gong     Pt resting in bed quietly. NAD noted. No c/o pain.  Fall and safety precautions maintained. Bed alarm activated and audible.. Bed locked in lowest position, with side rails up x2. Call bell and personal items within reach

## 2023-09-05 NOTE — PROGRESS NOTES
[facility] [place] - Telemetry  Adult Nutrition  Progress Note    SUMMARY       Recommendations  1. Encourage po intake as tolerated   2. Recommend to change commercial beverages/Oral nutrition supplements form boost to boost glucose control due to DM medical diagnosis   3. Monitor labs   4. Collaboration with medical providers    Goals: Patient to consume >50% of EEN prior to RD follow up.  Nutrition Goal Status: new  Communication of RD Recs: other (comment) (poc)    Assessment and Plan    Nutrition Problem  Inadequate nutrition    Related to (etiology):   Decreased appetite    Signs and Symptoms (as evidenced by):   Decreased po intake     Interventions(treatment strategy):  Commercial beverages  Collaboration with medical providers      Nutrition Diagnosis Status:   New      Malnutrition Assessment         Micronutrient Evaluation Summary: suspected deficiency         RD to continue to monitor for malnutrition risk at follow up visits.  Patient does not meet positive criteria at this time.                       Reason for Assessment    Reason For Assessment: length of stay    Diagnosis: cardiac disease, diabetes diagnosis/complications, renal disease (Hyponatremia)  Patient Active Problem List   Diagnosis    Hyperlipidemia    Essential hypertension    Hemochromatosis    DJD (degenerative joint disease) of knee    Vitamin D deficiency    Benign hypertensive heart disease without heart failure    Primary localized osteoarthrosis, lower leg    Acute respiratory failure with hypoxia    Anemia in chronic kidney disease    Morbid (severe) obesity due to excess calories    Physical deconditioning    Esophagitis    Urinary retention    Atonic neurogenic bladder    Incomplete bladder emptying    Constipation, slow transit    OAB (overactive bladder)    MGUS (monoclonal gammopathy of unknown significance)    Paroxysmal atrial fibrillation    Chronic gout    Hyponatremia    Hyperkalemia    Sinus bradycardia    Pulmonary  hypertension    Open wound of left knee    Atherosclerosis of native artery of left lower extremity    Type 2 diabetes mellitus with stage 4 chronic kidney disease, without long-term current use of insulin    Secondary hyperparathyroidism of renal origin    Chronic kidney disease, stage 4 (severe)    Syncope    ACP (advance care planning)    Carcinoma of breast    Malignant neoplasm of overlapping sites of left breast in female, estrogen receptor positive    Aortic atherosclerosis    JOCELIN (obstructive sleep apnea)    Other nonthrombocytopenic purpura    Community acquired pneumonia    Pleural effusion    Prolonged Q-T interval on ECG    Advance care planning    Fall    Urinary tract infection without hematuria    Acute on chronic diastolic CHF (congestive heart failure)    Hypokalemia         Relevant Medical History:   Patient Active Problem List   Diagnosis    Hyperlipidemia    Essential hypertension    Hemochromatosis    DJD (degenerative joint disease) of knee    Vitamin D deficiency    Benign hypertensive heart disease without heart failure    Primary localized osteoarthrosis, lower leg    Acute respiratory failure with hypoxia    Anemia in chronic kidney disease    Morbid (severe) obesity due to excess calories    Physical deconditioning    Esophagitis    Urinary retention    Atonic neurogenic bladder    Incomplete bladder emptying    Constipation, slow transit    OAB (overactive bladder)    MGUS (monoclonal gammopathy of unknown significance)    Paroxysmal atrial fibrillation    Chronic gout    Hyponatremia    Hyperkalemia    Sinus bradycardia    Pulmonary hypertension    Open wound of left knee    Atherosclerosis of native artery of left lower extremity    Type 2 diabetes mellitus with stage 4 chronic kidney disease, without long-term current use of insulin    Secondary hyperparathyroidism of renal origin    Chronic kidney disease, stage 4 (severe)    Syncope    ACP (advance care planning)    Carcinoma of  "breast    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  Meal consumption recorded between 0-75%.  Oral nutrition supplements previously ordered by another provider.  Patient is DNR status.  Labs reviewed.  NKFA.  LBM: 9/5/2023.  Medical chart weight history reviewed to show no significant weight loss within the past year.  Patient admitted with fluid overload and is currently on lasix.  RD to monitor po intake, tolerance, labs and weighs.  Will continue to follow.    Nutrition Discharge Planning: Patient to continue on recommended renal diet post discharge    Nutrition Risk Screen    Nutrition Risk Screen: reduced oral intake over the last month    Nutrition/Diet History    Spiritual, Cultural Beliefs, Rastafarian Practices, Values that Affect Care: no  Food Allergies: NKFA  Factors Affecting Nutritional Intake: decreased appetite    Anthropometrics    Temp: 98 °F (36.7 °C)  Height Method: Stated  Height: 5' 2" (157.5 cm)  Height (inches): 62 in  Weight Method: Bed Scale  Weight: 83.5 kg (184 lb 1.4 oz)  Weight (lb): 184.09 lb  Ideal Body Weight (IBW), Female: 110 lb  % Ideal Body Weight, Female (lb): 167.35 %  BMI (Calculated): 33.7  BMI Grade: 30 - 34.9- obesity - grade I       Lab/Procedures/Meds    Pertinent Labs Reviewed: reviewed  BMP  Lab Results   Component Value Date     09/05/2023    K 4.2 09/05/2023    CL 87 (L) 09/05/2023    CO2 38 (H) 09/05/2023 "    BUN 65 (H) 09/05/2023    CREATININE 1.8 (H) 09/05/2023    CALCIUM 9.4 09/05/2023    ANIONGAP 11 09/05/2023    EGFRNORACEVR 28 (A) 09/05/2023     Lab Results   Component Value Date    HGBA1C 4.9 08/27/2023     Lab Results   Component Value Date    CALCIUM 9.4 09/05/2023    PHOS 2.8 08/27/2023     Glucose   Date Value Ref Range Status   09/05/2023 112 (H) 70 - 110 mg/dL Final       Pertinent Medications Reviewed: reviewed  Scheduled Meds:   allopurinoL  100 mg Oral Daily    amiodarone  200 mg Oral Daily    apixaban  2.5 mg Oral BID    atorvastatin  40 mg Oral Daily    doxycycline  100 mg Oral Q12H    famotidine  20 mg Oral Daily    folic acid  1,000 mcg Oral Daily    furosemide (LASIX) injection  40 mg Intravenous Q12H    loperamide  2 mg Oral QID    melatonin  6 mg Oral Nightly    sodium chloride 0.9%  10 mL Intravenous Q6H    sodium chloride  1,000 mg Oral BID    tuberculin  5 Units Intradermal Once     Continuous Infusions:  PRN Meds:.acetaminophen, dextrose 10%, dextrose 10%, glucagon (human recombinant), glucose, glucose, insulin aspart U-100, ondansetron, prochlorperazine, sodium chloride 0.9%, Flushing PICC Protocol **AND** sodium chloride 0.9% **AND** sodium chloride 0.9%    Physical Findings/Assessment         Estimated/Assessed Needs    Weight Used For Calorie Calculations: 83.5 kg (184 lb 1.4 oz)  Energy Calorie Requirements (kcal): 25kcals/kg (2088kcals/day)  Energy Need Method: Kcal/kg  Protein Requirements: 0.8g/kg (67g/day)  Weight Used For Protein Calculations: 83.5 kg (184 lb 1.4 oz)  Fluid Requirements (mL): 1000mL fluid restriction  Estimated Fluid Requirement Method: other (see comments) (CKD)  RDA Method (mL): 25  CHO Requirement: 250      Nutrition Prescription Ordered    Current Diet Order: Renal with 1000mL fluid restriction  Oral Nutrition Supplement: Boost    Evaluation of Received Nutrient/Fluid Intake    % Kcal Needs: 0-75%  % Protein Needs: 0-75%  I/O: -3738mL since admit  Energy  Calories Required: not meeting needs  Protein Required: not meeting needs  Fluid Required: not meeting needs  Comments: LBM:9/5/2023  Tolerance: other (see comments) (po intake varies)  % Intake of Estimated Energy Needs: 25 - 50 %  % Meal Intake: 25 - 50 %    Nutrition Risk    Level of Risk/Frequency of Follow-up: moderate (follow up:1-2x per week)     Monitor and Evaluation    Food and Nutrient Intake: energy intake, food and beverage intake  Food and Nutrient Adminstration: diet order  Physical Activity and Function: nutrition-related ADLs and IADLs, factors affecting access to physical activity  Anthropometric Measurements: height/length, weight, weight change, body mass index  Biochemical Data, Medical Tests and Procedures: electrolyte and renal panel, gastrointestinal profile, glucose/endocrine profile, inflammatory profile, lipid profile     Nutrition Follow-Up    RD Follow-up?: Yes  Amisha Baumann, MS, RDN, LDN

## 2023-09-05 NOTE — PLAN OF CARE
09/05/23 0901   Post-Acute Status   Post-Acute Authorization Placement   Post-Acute Placement Status Pending payor review/awaiting authorization (if required)   [facility]   Patient choice form signed by patient/caregiver List from System Post-Acute Care   Discharge Delays (!) Post-Acute Set-up   Discharge Plan   Discharge Plan A Skilled Nursing Facility   Discharge Plan B Home with family

## 2023-09-05 NOTE — PT/OT/SLP PROGRESS
Physical Therapy      Patient Name:  Barbara Rizo   MRN:  [id number]    Patient not seen today secondary to Patient fatigue, Patient unwilling to participate. Pt requested to have therapy tomorrow. Will follow-up tomorrow.

## 2023-09-05 NOTE — PT/OT/SLP PROGRESS
Occupational Therapy   Treatment    Name: Barbara Rizo  MRN: [id number]  Admitting Diagnosis:  Hyponatremia       Recommendations:     Discharge Recommendations: nursing facility, skilled  Discharge Equipment Recommendations:  other (see comments) (TBD)  Barriers to discharge:  None    Assessment:     Barbara Rizo is a 83 y.o. female with a medical diagnosis of Hyponatremia.  She presents with up in bed with HOB elevated. Performance deficits affecting function are weakness, impaired endurance, impaired self care skills, impaired functional mobility, gait instability, impaired balance, decreased lower extremity function, decreased safety awareness, impaired cardiopulmonary response to activity.     Rehab Prognosis:  Good; patient would benefit from acute skilled OT services to address these deficits and reach maximum level of function.       Plan:     Patient to be seen 5 x/week to address the above listed problems via self-care/home management, therapeutic activities, therapeutic exercises  Plan of Care Expires: 10/11/23  Plan of Care Reviewed with: patient, daughter    Subjective     Chief Complaint: wanting to go home   Patient/Family Comments/goals: wants to go home  Pain/Comfort:  Pain Rating 1: 0/10    Objective:     Communicated with: RNKrista,  prior to session.  Patient found HOB elevated with oxygen, telemetry, PICC line, PureWick, bed alarm upon OT entry to room.    General Precautions: Standard, fall, contact, respiratory    Orthopedic Precautions:N/A  Braces: N/A  Respiratory Status: High flow, flow 7  L/min, concentration  %     Occupational Performance:     Bed Mobility:    Patient completed Rolling/Turning to Right with supervision  Patient completed Scooting/Bridging with supervision  Patient completed Supine to Sit with supervision     Functional Mobility/Transfers:  Patient completed Sit <> Stand Transfer with supervision  with  rolling walker   Patient completed Bed <> Chair Transfer  using Step Transfer technique with supervision with rolling walker  Functional Mobility: Patient still having desaturation with     Activities of Daily Living:  Grooming: SBA sitting EOB   Upper Body Dressing: SBA to done gown around shoulders       [facility] 6 Click ADL: 19    Treatment & Education:  Patient sat EOB x 10 min. To groom and don gown   Patient still having desaturation with moving short distances OOB to chair, so RN called respiratory to bring a hi flow extension line so she can move more around room with supervision.     Patient left up in chair with all lines intact, call button in reach, and RN  notified    GOALS:   Multidisciplinary Problems       Occupational Therapy Goals          Problem: Occupational Therapy    Goal Priority Disciplines Outcome Interventions   Occupational Therapy Goal     OT, PT/OT Ongoing, Progressing    Description: Goals to be met by: 09/11/23     Patient will increase functional independence with ADLs by performing:    UE Dressing with [place].  LE Dressing with [place].  Grooming while standing at sink with Modified [place].  Toileting from toilet with Modified [place] for hygiene and clothing management.   Sitting at edge of bed x30  minutes with Modified [place].  Toilet transfer to toilet with Modified [place].  Increased functional strength to 4/5 for B upper extremity .  Upper extremity exercise program x10  reps per handout, with supervision.                         Time Tracking:     OT Date of Treatment: 09/05/23  OT Start Time: 1130  OT Stop Time: 1200  OT Total Time (min): 30 min    Billable Minutes:Self Care/Home Management 15   Therapeutic Activity 15     OT/NIKO: OT          9/5/2023

## 2023-09-05 NOTE — NURSING
[facility]  Nurses Note -- 4 Eyes      9/4/2023       Skin assessed on: Q Shift      [x] No Pressure Injuries Present    [x]Prevention Measures Documented    [] Yes LDA  for Pressure Injury Previously documented     [] Yes New Pressure Injury Discovered   [] LDA for New Pressure Injury Added      Attending RN:  Farideh Jimenez RN     Second RN:  Krista ROGERS RN

## 2023-09-05 NOTE — PLAN OF CARE
Problem: Adult Inpatient Plan of Care  Goal: Plan of Care Review  Outcome: Ongoing, Progressing  Goal: Patient-Specific Goal (Individualized)  Outcome: Ongoing, Progressing  Goal: Absence of Hospital-Acquired Illness or Injury  Outcome: Ongoing, Progressing  Intervention: Identify and Manage Fall Risk  Flowsheets (Taken 9/5/2023 0309)  Safety Promotion/Fall Prevention:   assistive device/personal item within reach   side rails raised x 2  Intervention: Prevent Skin Injury  Flowsheets (Taken 9/5/2023 0309)  Body Position:   position changed independently   position maintained  Skin Protection: adhesive use limited  Intervention: Prevent and Manage VTE (Venous Thromboembolism) Risk  Flowsheets (Taken 9/5/2023 0309)  Activity Management: Arm raise - L1  VTE Prevention/Management:   bleeding precations maintained   bleeding risk assessed  Range of Motion: active ROM (range of motion) encouraged  Intervention: Prevent Infection  Flowsheets (Taken 9/5/2023 0309)  Infection Prevention: single patient room provided  Goal: Optimal Comfort and Wellbeing  Outcome: Ongoing, Progressing  Intervention: Monitor Pain and Promote Comfort  Flowsheets (Taken 9/5/2023 0309)  Pain Management Interventions:   relaxation techniques promoted   quiet environment facilitated  Intervention: Provide Person-Centered Care  Flowsheets (Taken 9/5/2023 0309)  Trust Relationship/Rapport:   care explained   choices provided   emotional support provided   empathic listening provided  Goal: Readiness for Transition of Care  Outcome: Ongoing, Progressing     Problem: Diabetes Comorbidity  Goal: Blood Glucose Level Within Targeted Range  Outcome: Ongoing, Progressing  Intervention: Monitor and Manage Glycemia  Flowsheets (Taken 9/5/2023 0309)  Glycemic Management: blood glucose monitored     Problem: Infection (Pneumonia)  Goal: Resolution of Infection Signs and Symptoms  Outcome: Ongoing, Progressing  Intervention: Prevent Infection  Progression  Flowsheets (Taken 9/5/2023 0309)  Infection Management: aseptic technique maintained  Isolation Precautions: precautions maintained     Problem: Respiratory Compromise (Pneumonia)  Goal: Effective Oxygenation and Ventilation  Outcome: Ongoing, Progressing  Intervention: Promote Airway Secretion Clearance  Flowsheets (Taken 9/5/2023 0309)  Cough And Deep Breathing: done independently per patient  Intervention: Optimize Oxygenation and Ventilation  Flowsheets (Taken 9/5/2023 0309)  Head of Bed (HOB) Positioning: HOB elevated     Problem: Skin Injury Risk Increased  Goal: Skin Health and Integrity  Outcome: Ongoing, Progressing  Intervention: Optimize Skin Protection  Flowsheets (Taken 9/5/2023 0309)  Pressure Reduction Techniques: frequent weight shift encouraged  Pressure Reduction Devices: pressure-redistributing mattress utilized  Skin Protection: adhesive use limited  Head of Bed (HOB) Positioning: HOB elevated     Problem: Fall Injury Risk  Goal: Absence of Fall and Fall-Related Injury  Outcome: Ongoing, Progressing  Intervention: Identify and Manage Contributors  Flowsheets (Taken 9/5/2023 0309)  Self-Care Promotion:   independence encouraged   BADL personal objects within reach   BADL personal routines maintained  Medication Review/Management: medications reviewed  Intervention: Promote Injury-Free Environment  Flowsheets (Taken 9/5/2023 0309)  Safety Promotion/Fall Prevention:   assistive device/personal item within reach   side rails raised x 2     Problem: Infection  Goal: Absence of Infection Signs and Symptoms  Outcome: Ongoing, Progressing  Intervention: Prevent or Manage Infection  Flowsheets (Taken 9/5/2023 0309)  Infection Management: aseptic technique maintained  Isolation Precautions: precautions maintained

## 2023-09-05 NOTE — ASSESSMENT & PLAN NOTE
Patient with Hypoxic Respiratory failure which is Acute.  she is not on home oxygen.   Supplemental oxygen was provided and noted-    Likely in the setting of pneumonia and/or Pulmonary edema ,  Still on 6 liter oxygen,checking chest X ray,

## 2023-09-05 NOTE — PROGRESS NOTES
[facility] Medicine  Progress Note    Patient Name: Barbara Rizo  MRN: [id number]  Patient Class: IP- Inpatient   Admission Date: 8/26/2023  Length of Stay: 9 days  Attending Physician: Salazar Kirby, *  Primary Care Provider: Paras Oro MD        Subjective:     Principal Problem:Hyponatremia        HPI:  This is an 83-year-old female with a past medical history of HFpEF (EF: 65%, GIIDD), AFib (on Eliquis), hypertension, type 2 diabetes, hyperlipidemia, CKD 4, breast cancer, who presents with shortness of breath.    Patient presents with shortness of breath and generalized weakness that started 2 days prior to presentation.  She reports that she is been feeling progressively more fatigued and sustained a fall on her back with injury to her head a day prior to presentation.  Additionally, she reports decreased p.o. intake, nausea but denied vomiting or diarrhea.  She usually drinks 48 oz of water daily.     In the ED, the patient was tachypneic and hypoxic, requiring 5 L O2.  Labs were remarkable for hyponatremia (119 > 119), hyperkalemia (5.3 > 5.2), hypochloremia (89), elevated creatinine (2.2 from a baseline of 1.9), elevated BNP (868).  CT head showed no acute process.  CT chest without contrast showed small left pleural effusion with near complete consolidation of the left lower lobe (atelectasis and/or consolidation), and cardiomegaly with small pericardial effusion.  Patient was given Lasix 40 mg IV, ceftriaxone 1 g IV, and was later given 1 L of NS.  Of note, the patient has a history of hyponatremia requiring hospitalization (2020), and a prior history of a large right pleural effusion requiring a thoracentesis (2015).  Patient was admitted for further management.      Overview/Hospital Course:  Patient admitted with hyponatremia. Nitrite+ UTI and PNA, treating with antibiotics. Potentially SIADH from PNA. Thought initially to be hypervolemic hyponatremia with BNP  "900, urine Na was not low, and higher O2 requirement. Nephrology consulted. Tolvaptan initiated. Sodium improving to 123. Can step down for tomorrow 8/29.  Hyponatremia is improving,  PT,OT recommending  SNF,but patient wish going home with HH.  Has ESBL Ecoli,started on meropenem.fih-nished.  Has sign of fluid overload,with Hx of diastolic CHF,DC NS,started on IV lasix.  Replaced potassium.  Still on 6 liter oxygen,checking chest X ray,      Interval History: No new issues     Review of Systems   Constitutional:  Positive for activity change.   HENT:  Negative for congestion.    Respiratory:  Negative for apnea.    Genitourinary:  Negative for difficulty urinating.   Neurological:  Positive for dizziness.     Objective:     Vital Signs (Most Recent):  Temp: 97.8 °F (36.6 °C) (09/05/23 0716)  Pulse: 99 (09/05/23 0716)  Resp: 18 (09/05/23 0716)  BP: 101/70 (09/05/23 0716)  SpO2: (!) 94 % (09/05/23 0716) Vital Signs (24h Range):  Temp:  [97.6 °F (36.4 °C)-98.2 °F (36.8 °C)] 97.8 °F (36.6 °C)  Pulse:  [] 99  Resp:  [16-18] 18  SpO2:  [89 %-95 %] 94 %  BP: (101-119)/(58-82) 101/70     Weight: 83.5 kg (184 lb 1.4 oz)  Body mass index is 33.67 kg/m².    Intake/Output Summary (Last 24 hours) at 9/5/2023 0954  Last data filed at 9/5/2023 0800  Gross per 24 hour   Intake 680 ml   Output 1600 ml   Net -920 ml           Physical Exam  Nursing note reviewed.   Constitutional:       Appearance: Normal appearance. She is obese. She is not ill-appearing.   Cardiovascular:      Rate and Rhythm: Normal rate.   Pulmonary:      Effort: Pulmonary effort is normal. No respiratory distress.   Skin:     Coloration: Skin is not jaundiced.   Neurological:      Mental Status: She is alert.             Significant Labs: All pertinent labs within the past 24 hours have been reviewed.  CBC: No results for input(s): "WBC", "HGB", "HCT", "PLT" in the last 48 hours.  CMP:   Recent Labs   Lab 09/04/23  0854 09/05/23  0345   * 136   K " 2.9* 4.2   CL 87* 87*   CO2 38* 38*   * 112*   BUN 53* 65*   CREATININE 1.6* 1.8*   CALCIUM 9.1 9.4   ANIONGAP 10 11         Significant Imaging: I have reviewed all pertinent imaging results/findings within the past 24 hours.      Assessment/Plan:      * Hyponatremia  Patient has hyponatremia which is uncontrolled,We will aim to correct the sodium by 4-6mEq in 24 hours. We will monitor sodium Every 4 hours. The hyponatremia is due to Dehydration/hypovolemia and/or SIADH secondary to pneumonia. We will obtain the following studies: Urine sodium, urine osmolality, serum osmolality. We will treat the hyponatremia with IV fluids as follows: 100 ml/hr, fluid restriction.The patient's sodium results have been reviewed and are listed below.  Consult nephrology    Recent Labs   Lab 09/05/23  0345        Potentially SIADH from PNA. Thought initially to be hypervolemic hyponatremia with , urine Na was not low, and higher O2 requirement. Nephrology consulted. Tolvaptan initiated.   Sodium improving with IV lasix,   PT,OT rec    mmending  SNF,but patient wish going home with .DC NS,has fluid overload.  Resolved.    Hypokalemia  Resolved.      Acute on chronic diastolic CHF (congestive heart failure)  Patient is identified as having Diastolic (HFpEF) heart failure that is Acute on chronic. CHF is currently uncontrolled due to Dyspnea not returned to baseline after IV lasix doses of IV diuretic. Latest ECHO performed and demonstrates- Results for orders placed during the hospital encounter of 03/07/23    Echo    Interpretation Summary  · The left ventricle is normal in size with eccentric hypertrophy and normal systolic function.  · The estimated ejection fraction is 65%.  · Grade II left ventricular diastolic dysfunction.  · Mild tricuspid regurgitation.  · Small pericardial effusion.  · Moderate right atrial enlargement.  · Severe left atrial enlargement.  · Normal right ventricular size with normal right  ventricular systolic function.  · There is moderate aortic valve stenosis.  · Aortic valve area is 1.20 cm2; peak velocity is 2.70 m/s; mean gradient is 19 mmHg.  · Mild mitral regurgitation.  · Mild pulmonic regurgitation.  · Normal central venous pressure (3 mmHg).  · The estimated PA systolic pressure is 46 mmHg.  · There is pulmonary hypertension.  . Continue Furosemide and monitor clinical status closely. Monitor on telemetry. Patient is off CHF pathway.  Monitor strict Is&Os and daily weights.  Place on fluid restriction of 1.5 L. Cardiology has not been any consulted. Continue to stress to patient importance of self efficacy and  on diet for CHF. Last BNP reviewed- and noted below   Recent Labs   Lab 08/31/23  0501   BNP 1,486*   .    Urinary tract infection without hematuria  Has ESBL Ecoli,started on meropenem.will finish tomorrow.      Prolonged Q-T interval on ECG  Noted. Avoid QT prolonging agents.       Pleural effusion  Continue antibiotics  Needs outpatient follow up/imaging to ensure resolution      Community acquired pneumonia  CT chest without contrast showed small left pleural effusion with near complete consolidation of the left lower lobe (atelectasis and/or consolidation), and cardiomegaly with small pericardial effusion  Continue ceftriaxone, doxycycline   Respiratory cultures, if able  Urine Legionella antigen    Aortic atherosclerosis  On medical treatment.      Malignant neoplasm of overlapping sites of left breast in female, estrogen receptor positive  Need out patient oncology follow up.      Chronic kidney disease, stage 4 (severe)  Slightly above baseline.  Continue IV fluids   Continue to monitor  Nephrology consult      Type 2 diabetes mellitus with stage 4 chronic kidney disease, without long-term current use of insulin  Patient's FSGs are controlled on current medication regimen.  Last A1c reviewed-   Lab Results   Component Value Date    HGBA1C 5.5 10/05/2022     Monitor  BMPs    Pulmonary hypertension  On IV lasix.      Hyperkalemia  Medical management.  resolved.      Chronic gout  Resume home medication      MGUS (monoclonal gammopathy of unknown significance)  Need out patient oncology follow up.      Physical deconditioning  Wishes H/H on discharge      Acute respiratory failure with hypoxia  Patient with Hypoxic Respiratory failure which is Acute.  she is not on home oxygen.   Supplemental oxygen was provided and noted-    Likely in the setting of pneumonia and/or Pulmonary edema ,  Still on 6 liter oxygen,checking chest X ray,    Essential hypertension  Resume home medications.      Hyperlipidemia  Resume statin      VTE Risk Mitigation (From admission, onward)         Ordered     apixaban tablet 2.5 mg  2 times daily         08/27/23 0246     IP VTE HIGH RISK PATIENT  Once         08/27/23 0246     Place sequential compression device  Until discontinued         08/27/23 0246                Discharge Planning   SALLY:      Code Status: DNR   Is the patient medically ready for discharge?:     Reason for patient still in hospital (select all that apply): Patient trending condition  Discharge Plan A: Skilled Nursing Facility   Discharge Delays: (!) Post-Acute Set-up              Salazar Kirby MD  Department of Hospital Medicine   [facility]

## 2023-09-05 NOTE — PROGRESS NOTES
[facility] MEWS TRIGGER FOLLOW UP       MEWS Monitoring, Score is:1  Indication for review: O2 Device    Bedside Nurse, Krista contacted, no concerns verbalized at this time, instructed to call [phone] for further concerns or assistance..

## 2023-09-05 NOTE — NURSING
[facility] [place]  Nurses Note -- 4 Eyes      9/5/2023       Skin assessed on: Q Shift      [x] No Pressure Injuries Present    [x]Prevention Measures Documented    [] Yes LDA  for Pressure Injury Previously documented     [] Yes New Pressure Injury Discovered   [] LDA for New Pressure Injury Added      Attending RN:  Krista Woodard RN     Second RN:  TOSHIA Gong

## 2023-09-05 NOTE — SUBJECTIVE & OBJECTIVE
"Interval History: No new issues     Review of Systems   Constitutional:  Positive for activity change.   HENT:  Negative for congestion.    Respiratory:  Negative for apnea.    Genitourinary:  Negative for difficulty urinating.   Neurological:  Positive for dizziness.     Objective:     Vital Signs (Most Recent):  Temp: 97.8 °F (36.6 °C) (09/05/23 0716)  Pulse: 99 (09/05/23 0716)  Resp: 18 (09/05/23 0716)  BP: 101/70 (09/05/23 0716)  SpO2: (!) 94 % (09/05/23 0716) Vital Signs (24h Range):  Temp:  [97.6 °F (36.4 °C)-98.2 °F (36.8 °C)] 97.8 °F (36.6 °C)  Pulse:  [] 99  Resp:  [16-18] 18  SpO2:  [89 %-95 %] 94 %  BP: (101-119)/(58-82) 101/70     Weight: 83.5 kg (184 lb 1.4 oz)  Body mass index is 33.67 kg/m².    Intake/Output Summary (Last 24 hours) at 9/5/2023 0954  Last data filed at 9/5/2023 0800  Gross per 24 hour   Intake 680 ml   Output 1600 ml   Net -920 ml           Physical Exam  Nursing note reviewed.   Constitutional:       Appearance: Normal appearance. She is obese. She is not ill-appearing.   Cardiovascular:      Rate and Rhythm: Normal rate.   Pulmonary:      Effort: Pulmonary effort is normal. No respiratory distress.   Skin:     Coloration: Skin is not jaundiced.   Neurological:      Mental Status: She is alert.             Significant Labs: All pertinent labs within the past 24 hours have been reviewed.  CBC: No results for input(s): "WBC", "HGB", "HCT", "PLT" in the last 48 hours.  CMP:   Recent Labs   Lab 09/04/23  0854 09/05/23  0345   * 136   K 2.9* 4.2   CL 87* 87*   CO2 38* 38*   * 112*   BUN 53* 65*   CREATININE 1.6* 1.8*   CALCIUM 9.1 9.4   ANIONGAP 10 11         Significant Imaging: I have reviewed all pertinent imaging results/findings within the past 24 hours.  "

## 2023-09-05 NOTE — PLAN OF CARE
Per MD, pt is refusing a skilled nursing facility. Pt will discharge with home health.  No new needs identified. CM will assist as needed.     09/05/23 1051   Discharge Reassessment   Assessment Type Discharge Planning Reassessment   Did the patient's condition or plan change since previous assessment? No   Discharge Plan discussed with:   (MD)   Discharge Plan A Home Health   Discharge Plan B Home with family   DME Needed Upon Discharge  other (see comments)   Transition of Care Barriers None   Why the patient remains in the hospital Requires continued medical care   Post-Acute Status   Coverage phn   Discharge Delays None known at this time

## 2023-09-05 NOTE — PLAN OF CARE
Problem: Occupational Therapy  Goal: Occupational Therapy Goal  Description: Goals to be met by: 09/11/23     Patient will increase functional independence with ADLs by performing:    UE Dressing with Long.  LE Dressing with Long.  Grooming while standing at sink with Modified Long.  Toileting from toilet with Modified Long for hygiene and clothing management.   Sitting at edge of bed x30  minutes with Modified Long.  Toilet transfer to toilet with Modified Long.  Increased functional strength to 4/5 for B upper extremity .  Upper extremity exercise program x10  reps per handout, with supervision.    Outcome: Ongoing, Progressing

## 2023-09-06 NOTE — DISCHARGE SUMMARY
[facility] Medicine  Discharge Summary      Patient Name: Barbara Rizo  MRN: [id number]  [facility]: [id number]  Patient Class: IP- Inpatient  Admission Date: 8/26/2023  Hospital Length of Stay: 10 days  Discharge Date and Time:  09/06/2023 11:02 AM  Attending Physician: Salazar Kirby, *   Discharging Provider: Salazar Kirby MD  Primary Care Provider: Paras Oro MD    Primary Care Team: Networked reference to record PCT     HPI:   This is an 83-year-old female with a past medical history of HFpEF (EF: 65%, GIIDD), AFib (on Eliquis), hypertension, type 2 diabetes, hyperlipidemia, CKD 4, breast cancer, who presents with shortness of breath.    Patient presents with shortness of breath and generalized weakness that started 2 days prior to presentation.  She reports that she is been feeling progressively more fatigued and sustained a fall on her back with injury to her head a day prior to presentation.  Additionally, she reports decreased p.o. intake, nausea but denied vomiting or diarrhea.  She usually drinks 48 oz of water daily.     In the ED, the patient was tachypneic and hypoxic, requiring 5 L O2.  Labs were remarkable for hyponatremia (119 > 119), hyperkalemia (5.3 > 5.2), hypochloremia (89), elevated creatinine (2.2 from a baseline of 1.9), elevated BNP (868).  CT head showed no acute process.  CT chest without contrast showed small left pleural effusion with near complete consolidation of the left lower lobe (atelectasis and/or consolidation), and cardiomegaly with small pericardial effusion.  Patient was given Lasix 40 mg IV, ceftriaxone 1 g IV, and was later given 1 L of NS.  Of note, the patient has a history of hyponatremia requiring hospitalization (2020), and a prior history of a large right pleural effusion requiring a thoracentesis (2015).  Patient was admitted for further management.      * No surgery found *      Hospital Course:   Patient admitted with  hyponatremia. Nitrite+ UTI and PNA, treating with antibiotics. Potentially SIADH from PNA. Thought initially to be hypervolemic hyponatremia with , urine Na was not low, and higher O2 requirement. Nephrology consulted. Tolvaptan initiated. Sodium improving to 123. Can step down on 8/29.  Hyponatremia is slowly improved,  PT,OT recommending  SNF,but patient wish going home with HH.  Has ESBL Ecoli,started on meropenem.finished the course.  Has sign of fluid overload,with Hx of diastolic CHF,DC NS,started on IV lasix.  Replaced potassium.  Had warmongering respiratory failure duo to fluid overload,Still was on 6 liter oxygen,gradually improved,at DC time was back to baseline,hyponatremia is resolved.  Patient was discharged home with HH and higher dose of po lasix and follow up with PCP as out patient.       Goals of Care Treatment Preferences:  Code Status: DNR       LaPOST: Yes           Consults:   Consults (From admission, onward)        Status Ordering Provider     Inpatient consult to Social Work  Once        Provider:  (Not yet assigned)    Completed BETTY SETH     Inpatient consult to Midline team  Once        Provider:  (Not yet assigned)    Acknowledged JONI SUTHERLAND     Inpatient consult to Social Work  Once        Provider:  (Not yet assigned)    Completed JONI SUTHERLAND     Inpatient consult to Palliative Care  Once        Provider:  Teodora Ibarra MD    Completed CASSI LEON     Inpatient consult to Nephrology  Once        Provider:  Nargis Boyle MD    Completed SAMIRA CHOU          No new Assessment & Plan notes have been filed under this hospital service since the last note was generated.  Service: Hospital Medicine    Final Active Diagnoses:    Diagnosis Date Noted POA    PRINCIPAL PROBLEM:  Hyponatremia [E87.1] 09/27/2020 Yes    Hypokalemia [E87.6] 09/04/2023 No    Acute on chronic diastolic CHF (congestive heart failure) [I50.33] 08/31/2023 Yes    Urinary  "tract infection without hematuria [N39.0] 08/30/2023 Yes    Advance care planning [Z71.89] 08/28/2023 Not Applicable    Fall [W19.XXXA] 08/28/2023 Yes    Prolonged Q-T interval on ECG [R94.31] 08/27/2023 Yes    Community acquired pneumonia [J18.9] 08/26/2023 Yes    Pleural effusion [J90] 08/26/2023 Yes    Aortic atherosclerosis [I70.0] 12/27/2021 Yes    Malignant neoplasm of overlapping sites of left breast in female, estrogen receptor positive [C50.812, Z17.0] 08/22/2021 Not Applicable    Type 2 diabetes mellitus with stage 4 chronic kidney disease, without long-term current use of insulin [E11.22, N18.4] 01/25/2021 Yes    Chronic kidney disease, stage 4 (severe) [N18.4] 01/25/2021 Yes    Hyperkalemia [E87.5] 09/28/2020 Yes    Pulmonary hypertension [I27.20] 09/28/2020 Yes    Chronic gout [M1A.9XX0] 01/28/2019 Yes     Chronic    MGUS (monoclonal gammopathy of unknown significance) [D47.2] 10/18/2018 Yes     Chronic    Physical deconditioning [R53.81] 10/16/2015 Yes    Acute respiratory failure with hypoxia [J96.01] 09/01/2015 Yes    Essential hypertension [I10]  Yes     Chronic    Hyperlipidemia [E78.5]  Yes     Chronic      Problems Resolved During this Admission:       Discharged Condition: stable    [place] [facility] [place]:   Follow-up Information     Paras Oro MD Follow up.    Specialties: Family Medicine, Wound Care  Why: [facility]Patient [facility] Follow-Up  Contact information:  [address] [address] [phone]             Paras Oro MD Follow up in 1 week(s).    Specialties: Family Medicine, Wound Care  Contact information:  [address] [address] [phone]                       Patient Instructions:      WHEELCHAIR FOR HOME USE     Order Specific Question Answer Comments   Hours in W/C per day: 8    Type of Wheelchair: Standard    Size(Width): 18"(STD adult)    Leg Support: STD footrests    Lap Belt: Velcro  " "  Accessories: Anti-tippers    Cushion: Basic    Reclining Back Yes    Headrest: Yes    Height: 5' 2" (1.575 m)    Weight: 83.5 kg (184 lb 1.4 oz)    Does patient have medical equipment at home? bedside commode    Does patient have medical equipment at home? shower chair    Does patient have medical equipment at home? walker, rolling    Does patient have medical equipment at home? oxygen    Length of need (1-99 months): 99    Please check all that apply: Caregiver is capable and willing to operate wheelchair safely.    Please check all that apply: Patient's upper body strength is sufficient for propulsion.      Activity as tolerated       Significant Diagnostic Studies: Labs:   BMP:   Recent Labs   Lab 09/05/23 0345 09/06/23  0453   * 113*    136   K 4.2 3.5   CL 87* 87*   CO2 38* 40*   BUN 65* 71*   CREATININE 1.8* 1.7*   CALCIUM 9.4 8.9   , CMP   Recent Labs   Lab 09/05/23 0345 09/06/23  0453    136   K 4.2 3.5   CL 87* 87*   CO2 38* 40*   * 113*   BUN 65* 71*   CREATININE 1.8* 1.7*   CALCIUM 9.4 8.9   ANIONGAP 11 9    and CBC No results for input(s): "WBC", "HGB", "HCT", "PLT" in the last 48 hours.  Radiology: X-Ray: CXR: X-Ray Chest 1 View (CXR):   Results for orders placed or performed during the hospital encounter of 08/26/23   X-Ray Chest 1 View    Narrative    EXAMINATION:  XR CHEST 1 VIEW    CLINICAL HISTORY:  hypoxia,pleural effusion;    TECHNIQUE:  Single frontal view of the chest was performed.    COMPARISON:  08/30/2023    FINDINGS:  The cardiomediastinal silhouette is prominent, similar to the previous exam noting magnification by technique.  There is calcification of the aorta..  There is obscuration of the left costophrenic angle suggesting effusion.  The trachea is midline.  The lungs are symmetrically expanded bilaterally with patchy increased interstitial and parenchymal attenuation bilaterally suggesting edema.  Developing left lower lung zone consolidation not " excluded..  There is no pneumothorax.  The osseous structures are remarkable for degenerative changes and osteopenia..      Impression    As above      Electronically signed by: Matthew Alex MD  Date:    09/05/2023  Time:    11:07    and X-Ray Chest PA and Lateral (CXR): No results found for this visit on 08/26/23.  Cardiac Graphics: Echocardiogram:   2D echo with color flow doppler:   Results for orders placed or performed during the hospital encounter of 08/21/15   2D echo with color flow doppler   Result Value Ref Range    EF + QEF 75 55 - 65    Mitral Valve Regurgitation MILD     Est. PA Systolic Pressure 40.21 (A)     Tricuspid Valve Regurgitation MODERATE (A)     Narrative    TEST DESCRIPTION       Aorta: The aortic root is normal in size, measuring 2.1 cm at sinotubular junction and 2.8 cm at Sinuses of Valsalva. The proximal ascending aorta is normal in size, measuring 2.6 cm across.     Left Atrium: The left atrial volume index is moderately enlarged, measuring 45.37 cc/m2.     Left Ventricle: The left ventricle is normal in size, with an end-diastolic diameter of 4.9 cm, and an end-systolic diameter of 3.5 cm. LV wall thickness is normal, with the septum measuring 1.0 cm and the posterior wall measuring 0.9 cm across. Relative   wall thickness was normal at 0.37, and the LV mass index was 93.2 g/m2 consistent with normal left ventricular mass. Global left ventricular systolic function appears hyperdynamic. Visually estimated ejection fraction is >70%. The LV Doppler derived   stroke volume equals 62.0 ccs.       Right Atrium: The right atrium is enlarged, measuring 5.3 cm in length and 3.6 cm in width in the apical view.     Right Ventricle: The right ventricle is normal in size measuring 2.9 cm at the base in the apical right ventricle-focused view. Global right ventricular systolic function appears normal. Tricuspid annular plane systolic excursion (TAPSE) is 1.9 cm. The   estimated PA systolic  pressure is 40 mmHg.     Aortic Valve:  The aortic valve is moderately sclerotic.     Mitral Valve:  There is mild mitral regurgitation.     Tricuspid Valve:  There is moderate tricuspid regurgitation.     Pulmonary Valve:  There is mild pulmonic regurgitation.     IVC: IVC is normal in size and collapses > 50% with a sniff, suggesting normal right atrial pressure of 3 mmHg.     Intracavitary: There is no evidence of pericardial effusion, intracavity mass, thrombi, or vegetation.         CONCLUSIONS     1 - Hyperdynamic left ventricular systolic function (EF >70%).     2 - Biatrial enlargement.     3 - The estimated PA systolic pressure is 40 mmHg.     4 - Mild mitral regurgitation.     5 - Moderate tricuspid regurgitation.     6 - Mild pulmonic regurgitation.         This document has been electronically    SIGNED BY: Amauri Lomas MD On: 09/16/2015 13:16    and Transthoracic echo (TTE) complete (Cupid Only):   Results for orders placed or performed during the hospital encounter of 03/07/23   Echo   Result Value Ref Range    TDI SEPTAL 0.03 m/s    LV LATERAL E/E' RATIO 12.83 m/s    LV SEPTAL E/E' RATIO 25.67 m/s    LA WIDTH 4.20 cm    IVC diameter 15 cm    Left Ventricular Outflow Tract Mean Velocity 0.74 cm/s    Left Ventricular Outflow Tract Mean Gradient 2.41 mmHg    TDI LATERAL 0.06 m/s    PV PEAK VELOCITY 1.50 cm/s    LVIDd 5.00 3.5 - 6.0 cm    IVS 1.99 (A) 0.6 - 1.1 cm    Posterior Wall 1.37 (A) 0.6 - 1.1 cm    LVIDs 3.23 2.1 - 4.0 cm    FS 35 28 - 44 %    LA volume 136.81 cm3    Sinus 2.68 cm    STJ 2.06 cm    Ascending aorta 3.11 cm    LV mass 382.72 g    LA size 5.64 cm    RVDD 3.69 cm    TAPSE 2.34 cm    Left Ventricle Relative Wall Thickness 0.55 cm    AV mean gradient 19 mmHg    AV valve area 1.20 cm2    AV Velocity Ratio 0.37     AV index (prosthetic) 0.40     MV valve area p 1/2 method 2.37 cm2    E/A ratio 0.89     Mean e' 0.05 m/s    E wave deceleration time 338.76 msec    Pulm vein S/D ratio 1.62      LVOT diameter 1.96 cm    LVOT area 3.0 cm2    LVOT peak wes 1.00 m/s    LVOT peak VTI 24.20 cm    Ao peak wes 2.70 m/s    Ao VTI 60.9 cm    Mr max wes 4.98 m/s    LVOT stroke volume 72.98 cm3    AV peak gradient 29 mmHg    E/E' ratio 17.11 m/s    MV Peak E Wes 0.77 m/s    TR Max Wes 3.26 m/s    MV stenosis pressure 1/2 time 92.98 ms    MV Peak A Wes 0.87 m/s    PV Peak S Wes 0.47 m/s    PV Peak D Wes 0.29 m/s    LV Systolic Volume 41.83 mL    LV Diastolic Volume 118.38 mL    RA Major Axis 4.96 cm    Left Atrium Minor Axis 6.77 cm    Left Atrium Major Axis 6.82 cm    Triscuspid Valve Regurgitation Peak Gradient 43 mmHg    RA Width 4.50 cm    Right Atrial Pressure (from IVC) 3 mmHg    EF 65 %    TV resting pulmonary artery pressure 46 mmHg    Narrative    · The left ventricle is normal in size with eccentric hypertrophy and   normal systolic function.  · The estimated ejection fraction is 65%.  · Grade II left ventricular diastolic dysfunction.  · Mild tricuspid regurgitation.  · Small pericardial effusion.  · Moderate right atrial enlargement.  · Severe left atrial enlargement.  · Normal right ventricular size with normal right ventricular systolic   function.  · There is moderate aortic valve stenosis.  · Aortic valve area is 1.20 cm2; peak velocity is 2.70 m/s; mean gradient   is 19 mmHg.  · Mild mitral regurgitation.  · Mild pulmonic regurgitation.  · Normal central venous pressure (3 mmHg).  · The estimated PA systolic pressure is 46 mmHg.  · There is pulmonary hypertension.          Pending Diagnostic Studies:     None         Medications:  Reconciled Home Medications:      Medication List      CHANGE how you take these medications    furosemide 40 MG tablet  Commonly known as: LASIX  Take 1 tablet (40 mg total) by mouth 2 (two) times a day.  What changed:   · medication strength  · how much to take  · when to take this        CONTINUE taking these medications    allopurinoL 100 MG tablet  Commonly known as:  ZYLOPRIM  Take 1 tablet by mouth once daily.     amiodarone 200 MG Tab  Commonly known as: PACERONE  TAKE ONE-HALF TABLET BY MOUTH EVERY DAY *DO NOT TAKE WITH GRAPEFRUIT JUICE*     atorvastatin 40 MG tablet  Commonly known as: LIPITOR  TAKE ONE TABLET BY MOUTH ONCE DAILY     calcium carbonate 200 mg calcium (500 mg) chewable tablet  Commonly known as: TUMS  Take 2 tablets by mouth.     ELIQUIS 2.5 mg Tab  Generic drug: apixaban  TAKE ONE TABLET BY MOUTH TWICE DAILY     ergocalciferol 50,000 unit Cap  Commonly known as: ERGOCALCIFEROL  Take 50,000 Units by mouth every 30 days.     FLUZONE HIGHDOSE QUAD 21-22  mcg/0.7 mL Syrg  Generic drug: flu vacc [id number](65yr up)-PF     folic acid 1 MG tablet  Commonly known as: FOLVITE  TAKE 1 TABLET BY MOUTH EVERY DAY     losartan 25 MG tablet  Commonly known as: COZAAR  Take 25 mg by mouth once daily.     mupirocin 2 % ointment  Commonly known as: BACTROBAN  Apply topically every evening.     solifenacin 10 MG tablet  Commonly known as: VESICARE  Take 1 tablet (10 mg total) by mouth once daily.        STOP taking these medications    amLODIPine 10 MG tablet  Commonly known as: NORVASC            Indwelling Lines/Drains at time of discharge:   Lines/Drains/Airways     Drain  Duration           Female External Urinary Catheter 08/27/23 0400 10 days                Time spent on the discharge of patient:  Over 30  minutes         Salazar Kirby MD  Department of Hospital Medicine  [facility]

## 2023-09-06 NOTE — PLAN OF CARE
JEANNE spoke with Ashley (N) who stated she will be on the look out for patient HH POC and arrange with [facility].     JEANNE received call from Ashley confirming [facility] accepted patient.     Pending PT progress not for [facility] to review for approval of wheelchair.

## 2023-09-06 NOTE — PT/OT/SLP PROGRESS
Physical Therapy Treatment/Discharge summary    Patient Name:  Barbara Rizo   MRN:  [id number]    Recommendations:     Discharge Recommendations: home health OT, home health PT, home with home health (Aide)  Discharge Equipment Recommendations: wheelchair Pt declined Hospital bed  Barriers to discharge:  Increased O2 demand, pt unable to ambulate    Assessment:     Barbara Rizo is a 83 y.o. female admitted with a medical diagnosis of Hyponatremia.  She presents with the following impairments/functional limitations: weakness, gait instability, impaired cardiopulmonary response to activity, impaired endurance, impaired balance, impaired coordination, decreased safety awareness, impaired self care skills, impaired functional mobility, decreased coordination .    Rehab Prognosis: Fair; patient would benefit from acute skilled PT services to address these deficits and reach maximum level of function.    Recent Surgery: * No surgery found *      Plan:     During this hospitalization, patient to be seen  (N/A, pt to be D/C'd home) to address the identified rehab impairments via  (N/A, pt to be D/C'd home) and progress toward the following goals:    Plan of Care Expires:  09/06/23    Subjective     Chief Complaint: wants to go home  Patient/Family Comments/goals: to go home, Pt declined need for hospital bed, stated she will just sleep in the recliner  Pain/Comfort:  Pain Rating 1: 0/10      Objective:     Communicated with nsg/Physician prior to session.  Patient found HOB elevated with telemetry, oxygen, PureWick upon PT entry to room.     General Precautions: Standard, fall, contact  Orthopedic Precautions: N/A  Braces: N/A  Respiratory Status: Nasal cannula, flow 4 L/min     Functional Mobility:  Bed Mobility:     Scooting: modified independence  Supine to Sit: modified independence with HOB elevated  Sit to sup: SBA with HOB elevated  Transfers:     Sit to Stand:  minimum assistance with no AD  Gait: 4 small  sidesteps at EOB with Min A and no AD  Balance: FAir+ sit, Fair stand      AM-PAC 6 CLICK MOBILITY  Turning over in bed (including adjusting bedclothes, sheets and blankets)?: 4  Sitting down on and standing up from a chair with arms (e.g., wheelchair, bedside commode, etc.): 3  Moving from lying on back to sitting on the side of the bed?: 4  Moving to and from a bed to a chair (including a wheelchair)?: 3  Need to walk in hospital room?: 1  Climbing 3-5 steps with a railing?: 1  Basic Mobility Total Score: 16       Treatment & Education:  SPO2 on RA sitting at EOB 87%.  Handouts provided to and reviewed with pt for: B LE TE, Energy conservation, and Pursed lip breathing  Patient left HOB elevated with all lines intact, call button in reach, and nsg notified..    GOALS:   Multidisciplinary Problems       Physical Therapy Goals          Problem: Physical Therapy    Goal Priority Disciplines Outcome Goal Variances Interventions   Physical Therapy Goal     PT, PT/OT Adequate for Care Transition     Description: Goals to be met by: 23     Patient will increase functional independence with mobility by performin. Pt to be mod I with bed mobility.  2. Pt to transfer with min A.  3. Pt  to ambulate 50' w/RW CGA.  4. Pt to be (I) with written HEP.                         Time Tracking:     PT Received On: 23  PT Start Time: 948     PT Stop Time: 1011  PT Total Time (min): 23 min     Billable Minutes: Therapeutic Activity 15 and Therapeutic Exercise 8    Treatment Type: Treatment  PT/PTA: PT     Number of PTA visits since last PT visit: 0     2023

## 2023-09-06 NOTE — PLAN OF CARE
Problem: Physical Therapy  Goal: Physical Therapy Goal  Description: Goals to be met by: 23     Patient will increase functional independence with mobility by performin. Pt to be mod I with bed mobility.  2. Pt to transfer with min A.  3. Pt  to ambulate 50' w/RW CGA.  4. Pt to be (I) with written HEP.    Outcome: Adequate for Care Transition   Goals Met, OK for D/C home with close supervision/assist PRN.  HHPT/OT/Aide and W/C recommended.

## 2023-09-06 NOTE — PLAN OF CARE
Problem: Occupational Therapy  Goal: Occupational Therapy Goal  Description: Goals to be met by: 09/11/23     Patient will increase functional independence with ADLs by performing:    UE Dressing with [place].  LE Dressing with [place].  Grooming while standing at sink with Modified [place].  Toileting from toilet with Modified [place] for hygiene and clothing management.   Sitting at edge of bed x30  minutes with Modified [place].  Toilet transfer to toilet with Modified [place].  Increased functional strength to 4/5 for B upper extremity .  Upper extremity exercise program x10  reps per handout, with supervision.    Outcome: Ongoing, Progressing   Patient dressed UB with min assist with pull over shirt.   Patient dressed LB with pants with mod assist seated EOB.     Occupational therapy recommends w/c and HHOT Aide and PT for home use.

## 2023-09-06 NOTE — PT/OT/SLP PROGRESS
Occupational Therapy   Treatment    Name: Barbara Rizo  MRN: [id number]  Admitting Diagnosis:  Hyponatremia       Recommendations:     Discharge Recommendations: home health OT  Discharge Equipment Recommendations:  wheelchair  Barriers to discharge:  None    Assessment:     Barbara Rizo is a 83 y.o. female with a medical diagnosis of Hyponatremia.  She presents with HOB elevated with 3L oxygen donned. Performance deficits affecting function are weakness, impaired endurance, impaired self care skills, impaired functional mobility, gait instability, impaired balance, impaired cognition, decreased lower extremity function, decreased safety awareness, impaired cardiopulmonary response to activity, decreased ROM, impaired skin.     Rehab Prognosis:  Good; patient would benefit from acute skilled OT services to address these deficits and reach maximum level of function.       Plan:     Patient to be seen 5 x/week to address the above listed problems via self-care/home management, therapeutic activities, therapeutic exercises  Plan of Care Expires: 10/11/23  Plan of Care Reviewed with: patient, daughter    Subjective     Chief Complaint: having to use oxygen   Patient/Family Comments/goals: she denied SNF care, wants home health; will stay with daughter   Pain/Comfort:  Pain Rating 1: 0/10    Objective:     Communicated with: RNJennifer,  prior to session.  Patient found HOB elevated with telemetry, oxygen, PureWick upon OT entry to room.    General Precautions: Standard, fall, contact, respiratory    Orthopedic Precautions:N/A  Braces: N/A  Respiratory Status: High flow, flow 3  L/min, concentration  %     Occupational Performance: mother      Bed Mobility:    Patient completed Rolling/Turning to Right with modified independence and supervision  Patient completed Scooting/Bridging with modified independence  Patient completed Supine to Sit with modified independence and supervision  Patient completed Sit to  Supine with independence with bed flat and not using bed rail to lie supine    Functional Mobility/Transfers:  Patient completed Sit <> Stand Transfer with supervision  with  rolling walker ; she stood 3x to don pull over shirt and to pull up pants   Functional Mobility: She did not walk in room because she is still on hi flow meter and does not have a long oxygen cord.     Activities of Daily Living:  Upper Body Dressing: minimum assistance with pull over shirt  Lower Body Dressing: moderate assistance with pull on pants       AMPAC 6 Click ADL: 20    Treatment & Education:  Occupational therapy educated her and daughter re: different types of wheelchairs including manual and transport w/c (lightweight meaning and Long distance use)   Occupational therapy educated her about higher level/oxygen output portable machines   Daughter said that they will return to her home and decide if she needs w/c for long distances or not, but she would consider purchasing one     Patient left HOB elevated with all lines intact, call button in reach, RN notified, and daughter present    GOALS:   Multidisciplinary Problems       Occupational Therapy Goals          Problem: Occupational Therapy    Goal Priority Disciplines Outcome Interventions   Occupational Therapy Goal     OT, PT/OT Ongoing, Progressing    Description: Goals to be met by: 09/11/23     Patient will increase functional independence with ADLs by performing:    UE Dressing with Levy.  LE Dressing with Levy.  Grooming while standing at sink with Modified Levy.  Toileting from toilet with Modified Levy for hygiene and clothing management.   Sitting at edge of bed x30  minutes with Modified Levy.  Toilet transfer to toilet with Modified Levy.  Increased functional strength to 4/5 for B upper extremity .  Upper extremity exercise program x10  reps per handout, with supervision.                         Time Tracking:     OT  Date of Treatment: 09/06/23  OT Start Time: 1105  OT Stop Time: 1146  OT Total Time (min): 41 min    Billable Minutes:Self Care/Home Management 30  Therapeutic Activity 11     OT/NIKO: OT          9/6/2023

## 2023-09-06 NOTE — PLAN OF CARE
Problem: Adult Inpatient Plan of Care  Goal: Plan of Care Review  Outcome: Met  Goal: Patient-Specific Goal (Individualized)  Outcome: Met  Goal: Absence of Hospital-Acquired Illness or Injury  Outcome: Met  Goal: Optimal Comfort and Wellbeing  Outcome: Met  Goal: Readiness for Transition of Care  Outcome: Met     Problem: Diabetes Comorbidity  Goal: Blood Glucose Level Within Targeted Range  Outcome: Met     Problem: Fluid Imbalance (Pneumonia)  Goal: Fluid Balance  Outcome: Met     Problem: Infection (Pneumonia)  Goal: Resolution of Infection Signs and Symptoms  Outcome: Met     Problem: Respiratory Compromise (Pneumonia)  Goal: Effective Oxygenation and Ventilation  Outcome: Met     Problem: Impaired Wound Healing  Goal: Optimal Wound Healing  Outcome: Met     Problem: Skin Injury Risk Increased  Goal: Skin Health and Integrity  Outcome: Met     Problem: Fall Injury Risk  Goal: Absence of Fall and Fall-Related Injury  Outcome: Met     Problem: Oral Intake Inadequate  Goal: Improved Oral Intake  Outcome: Met     Problem: Coping Ineffective  Goal: Effective Coping  Outcome: Met     Problem: Infection  Goal: Absence of Infection Signs and Symptoms  Outcome: Met

## 2023-09-06 NOTE — PLAN OF CARE
09/06/23 1101   Medicare Message   Important Message from Medicare regarding Discharge Appeal Rights Given to patient/caregiver;Explained to patient/caregiver;Signed/date by patient/caregiver   Date IMM was signed 09/06/23   Time IMM was signed 105

## 2023-09-06 NOTE — PLAN OF CARE
[facility] - Telemetry  Discharge Final Note    Primary Care Provider: Paras Oro MD    Expected Discharge Date: 9/6/2023    Final Discharge Note (most recent)       Final Note - 09/06/23 1227          Final Note    Assessment Type Final Discharge Note     Anticipated Discharge Disposition Home-Health Care [facility]     What phone number can be called within the next 1-3 days to see how you are doing after discharge? [phone]     Hospital Resources/Appts/Education Provided Appointments scheduled and added to AVS        Post-Acute Status    Post-Acute Authorization Home Health;HME     Post-Acute Placement Status Patient declined/refused     HME Status Pending post-acute provider review/more information requested   Secure chat sent to PT for a note recommending a wc.    Home Health Status Set-up Complete/Auth obtained     Coverage PHN     Discharge Delays --   Secure chat sent to PT for a note recommending a wc.                    Important Message from Medicare  Important Message from Medicare regarding Discharge Appeal Rights: Given to patient/caregiver, Explained to patient/caregiver, Signed/date by patient/caregiver     Date IMM was signed: 09/06/23  Time IMM was signed: 1056    Contact Info       Paras Oro MD   Specialty: Family Medicine, Wound Care   Relationship: PCP - General    [address] [place] [address]   Phone: [phone]       Next Steps: Go in 1 week(s)    Instructions: Appointment at 10:00 am    [facility] [address]  [place] [address]   Phone: [phone]       Next Steps: Follow up    Instructions: Home Health    [facility]   Specialty: DME Provider    [address] [place] [id number]   Phone: [phone]       Next Steps: Follow up    Instructions: DME: please call with any question/concern with wheelchair.          Patient is pending PT/OT progress note for [facility] to review and insurance to approval wc.      provide nurse Rodriguez with  update and patient cleared pending wc.     SW informed by [facility] representative that patient's wc will be delivered to patient home once payment received.     SW provided updated to patient nurse Jennifer that patient cleared from case management standpoint.

## 2023-09-06 NOTE — NURSING
[facility]  Nurses Note -- 4 Eyes      9/5/2023       Skin assessed on: Q Shift      [x] No Pressure Injuries Present    [x]Prevention Measures Documented    [] Yes LDA  for Pressure Injury Previously documented     [] Yes New Pressure Injury Discovered   [] LDA for New Pressure Injury Added      Attending RN:  Farideh Jimenez RN     Second RN:  Krista ROGERS RN

## 2023-09-06 NOTE — PLAN OF CARE
[facility] Telemetry      HOME HEALTH ORDERS  FACE TO FACE ENCOUNTER    Patient Name: Barbara Rizo  YOB: 1940    PCP: Paras Oro MD   PCP Address: [address] / [place] [address]  PCP Phone Number: [phone]  PCP Fax: [phone]    Encounter Date: 8/26/23    Admit to Home Health    Diagnoses:  Active Hospital Problems    Diagnosis  POA    *Hyponatremia [E87.1]  Yes    Hypokalemia [E87.6]  No    Acute on chronic diastolic CHF (congestive heart failure) [I50.33]  Yes    Urinary tract infection without hematuria [N39.0]  Yes    Advance care planning [Z71.89]  Not Applicable    Fall [W19.XXXA]  Yes    Prolonged Q-T interval on ECG [R94.31]  Yes    Community acquired pneumonia [J18.9]  Yes    Pleural effusion [J90]  Yes    Aortic atherosclerosis [I70.0]  Yes    Malignant neoplasm of overlapping sites of left breast in female, estrogen receptor positive [C50.812, Z17.0]  Not Applicable    Type 2 diabetes mellitus with stage 4 chronic kidney disease, without long-term current use of insulin [E11.22, N18.4]  Yes    Chronic kidney disease, stage 4 (severe) [N18.4]  Yes    Hyperkalemia [E87.5]  Yes    Pulmonary hypertension [I27.20]  Yes     group 2 with ddx.  Will send for pft due to remote smoking history.  bp and fluid optimization.  Doing better since taking lasix routinely.        Chronic gout [M1A.9XX0]  Yes     Chronic    MGUS (monoclonal gammopathy of unknown significance) [D47.2]  Yes     Chronic    Physical deconditioning [R53.81]  Yes    Acute respiratory failure with hypoxia [J96.01]  Yes    Essential hypertension [I10]  Yes     Chronic    Hyperlipidemia [E78.5]  Yes     Chronic      Resolved Hospital Problems   No resolved problems to display.       Follow Up Appointments:  Future Appointments   Date Time Provider Department Center   9/15/2023 11:00 AM Venus Izaguirre NP [facility]   10/26/2023  9:40 AM Paras Oro MD [facility]   12/14/2023 12:30  PM INJECTION, WB [facility]       Allergies:Review of patient's allergies indicates:  No Known Allergies    Medications: Review discharge medications with patient and family and provide education.    Current Facility-Administered Medications   Medication Dose Route Frequency Provider Last Rate Last Admin    acetaminophen tablet 650 mg  650 mg Oral Q4H PRN Shahab Taveras MD        allopurinoL tablet 100 mg  100 mg Oral Daily Shahab Taveras MD   100 mg at 09/05/23 0959    amiodarone tablet 200 mg  200 mg Oral Daily Shahab Taveras MD   200 mg at 09/05/23 0959    apixaban tablet 2.5 mg  2.5 mg Oral BID Shahab Taveras MD   2.5 mg at 09/05/23 2139    atorvastatin tablet 40 mg  40 mg Oral Daily Shahab Taveras MD   40 mg at 09/05/23 0959    dextrose 10% bolus 125 mL 125 mL  12.5 g Intravenous PRN Latasha Cardenas MD        dextrose 10% bolus 250 mL 250 mL  25 g Intravenous PRN Latasha Cardenas MD        doxycycline tablet 100 mg  100 mg Oral Q12H Salazar Kirby MD   100 mg at 09/05/23 2139    famotidine tablet 20 mg  20 mg Oral Daily Salazar Kirby MD   20 mg at 09/05/23 0959    folic acid tablet 1,000 mcg  1,000 mcg Oral Daily Shahab Taveras MD   1,000 mcg at 09/05/23 0959    furosemide injection 40 mg  40 mg Intravenous Q12H Salazar Kirby MD   40 mg at 09/05/23 2139    glucagon (human recombinant) injection 1 mg  1 mg Intramuscular PRN Latasha Cardenas MD        glucose chewable tablet 16 g  16 g Oral PRN Latasha Cardenas MD        glucose chewable tablet 24 g  24 g Oral PRN Latasha Cardenas MD        insulin aspart U-100 pen 0-5 Units  0-5 Units Subcutaneous QID (AC + HS) PRLatasha Dwyer MD        loperamide capsule 2 mg  2 mg Oral QID Salazar Kirby MD   2 mg at 09/05/23 2139    melatonin tablet 6 mg  6 mg Oral Nightly Bony Dalton MD   6 mg at 09/05/23 2139    ondansetron injection 4 mg  4 mg Intravenous Q8H PRN Shahab Taveras MD        prochlorperazine  injection Soln 5 mg  5 mg Intravenous Q6H PRN Shahab Taveras MD        sodium chloride 0.9% flush 10 mL  10 mL Intravenous PRN Shahab Taveras MD        sodium chloride 0.9% flush 10 mL  10 mL Intravenous Q6H Salazar Kibry MD   10 mL at 09/06/23 0600    And    sodium chloride 0.9% flush 10 mL  10 mL Intravenous PRN Salazar Kirby MD        sodium chloride oral tablet 1,000 mg  1,000 mg Oral BID Salazar Kirby MD   1,000 mg at 09/05/23 2139    tuberculin injection 5 Units  5 Units Intradermal Once Az Stapleton MD         Facility-Administered Medications Ordered in Other Encounters   Medication Dose Route Frequency Provider Last Rate Last Admin    denosumab (PROLIA) injection 60 mg  60 mg Subcutaneous 1 time in Clinic/HOD Nargis Boyle MD         Current Discharge Medication List        CONTINUE these medications which have CHANGED    Details   furosemide (LASIX) 40 MG tablet Take 1 tablet (40 mg total) by mouth 2 (two) times a day.  Qty: 60 tablet, Refills: 2           CONTINUE these medications which have NOT CHANGED    Details   allopurinoL (ZYLOPRIM) 100 MG tablet Take 1 tablet by mouth once daily.      amiodarone (PACERONE) 200 MG Tab TAKE ONE-HALF TABLET BY MOUTH EVERY DAY *DO NOT TAKE WITH GRAPEFRUIT JUICE*  Qty: 45 tablet, Refills: 3    Comments: This prescription was filled on 4/11/2023. Any refills authorized will be placed on file.      apixaban (ELIQUIS) 2.5 mg Tab TAKE ONE TABLET BY MOUTH TWICE DAILY  Qty: 180 tablet, Refills: 3    Associated Diagnoses: Paroxysmal atrial fibrillation      atorvastatin (LIPITOR) 40 MG tablet TAKE ONE TABLET BY MOUTH ONCE DAILY  Qty: 90 tablet, Refills: 2    Comments: This prescription was filled on 4/11/2023. Any refills authorized will be placed on file.  Associated Diagnoses: Type 2 diabetes mellitus with stage 3 chronic kidney disease, without long-term current use of insulin; Essential hypertension      calcium carbonate (TUMS) 200 mg  calcium (500 mg) chewable tablet Take 2 tablets by mouth.      ergocalciferol (ERGOCALCIFEROL) 50,000 unit Cap Take 50,000 Units by mouth every 30 days.       FLUZONE HIGHDOSE QUAD 21-22  mcg/0.7 mL Syrg       folic acid (FOLVITE) 1 MG tablet TAKE 1 TABLET BY MOUTH EVERY DAY  Qty: 90 tablet, Refills: 0    Associated Diagnoses: Hemochromatosis, unspecified hemochromatosis type      losartan (COZAAR) 25 MG tablet Take 25 mg by mouth once daily.      mupirocin (BACTROBAN) 2 % ointment Apply topically every evening.      solifenacin (VESICARE) 10 MG tablet Take 1 tablet (10 mg total) by mouth once daily.  Qty: 30 tablet, Refills: 11           STOP taking these medications       amLODIPine (NORVASC) 10 MG tablet Comments:   Reason for Stopping:                 I have seen and examined this patient within the last 30 days. My clinical findings that support the need for the home health skilled services and home bound status are the following:no   Weakness/numbness causing balance and gait disturbance due to Weakness/Debility making it taxing to leave home.     Diet:   2 gram sodium diet        Referrals/ Consults  Physical Therapy to evaluate and treat. Evaluate for home safety and equipment needs; Establish/upgrade home exercise program. Perform / instruct on therapeutic exercises, gait training, transfer training, and Range of Motion.  Occupational Therapy to evaluate and treat. Evaluate home environment for safety and equipment needs. Perform/Instruct on transfers, ADL training, ROM, and therapeutic exercises.    Activities:   activity as tolerated    Nursing:   Agency to admit patient within 24 hours of hospital discharge unless specified on physician order or at patient request    SN to complete comprehensive assessment including routine vital signs. Instruct on disease process and s/s of complications to report to MD. Review/verify medication list sent home with the patient at time of discharge  and instruct  patient/caregiver as needed. Frequency may be adjusted depending on start of care date.     Skilled nurse to perform up to 3 visits PRN for symptoms related to diagnosis    Notify MD if SBP > 160 or < 90; DBP > 90 or < 50; HR > 120 or < 50; Temp > 101; O2 < 88%; Other:       Ok to schedule additional visits based on staff availability and patient request on consecutive days within the home health episode.    When multiple disciplines ordered:    Start of Care occurs on Sunday - Wednesday schedule remaining discipline evaluations as ordered on separate consecutive days following the start of care.    Thursday SOC -schedule subsequent evaluations Friday and Monday the following week.     Friday - Saturday SOC - schedule subsequent discipline evaluations on consecutive days starting Monday of the following week.    For all post-discharge communication and subsequent orders please contact patient's primary care physician. If unable to reach primary care physician or do not receive response within 30 minutes, please contact  [facility]  for clinical staff order clarification    Miscellaneous   Routine Skin for Bedridden Patients: Instruct patient/caregiver to apply moisture barrier cream to all skin folds and wet areas in perineal area daily and after baths and all bowel movements.    Home Health Aide:  Nursing Twice weekly, Physical Therapy Twice weekly, and Occupational Therapy Twice weekly    Wound Care Orders  no    I certify that this patient is confined to her home and needs intermittent skilled nursing care, physical therapy, and occupational therapy.

## 2023-09-06 NOTE — CONSULTS
Valdo 83 y o f with ckd sees DR Boyle last visit on 7/11/23 eGRF at that time in the ckd4 range.Pt is a cancer pt and is not interested on HD or PD (this is well documented)    Admitted with increased dyspnea and renal consult was requested to help with care.    Pt denies cp palpitations fever or not taking her meds    Awake alert oriented NAD    Denies CNS ENT CP GI  RHEUM OR DERM SX  Past Medical History:   Diagnosis Date    A-fib     Breast cancer     invasive ductal carcinoma    CKD (chronic kidney disease)     Diabetes mellitus type II     Fatty liver     GERD (gastroesophageal reflux disease)     Hearing loss of both ears     wears bilateral hearing aids    Hemochromatosis     History of anemia due to CKD     History of pleural effusion     with thoracentesis    Hyperlipidemia     Hypertension     Macular degeneration     Osteoarthritis     Left knee    Osteoporosis     Vaginal delivery     x2    Vitamin D deficiency disease     Wears partial dentures     upper removable bridge     Past Surgical History:   Procedure Laterality Date    AXILLARY NODE DISSECTION Left 10/18/2021    Procedure: LYMPHADENECTOMY, AXILLARY;  Surgeon: Darlene Roca MD;  Location: [facility] OR;  Service: General;  Laterality: Left;    BREAST BIOPSY      BREAST LUMPECTOMY      invasive ductal carcinoma    BREAST SURGERY Bilateral     Reduction r/t h/o fibrocystic disease    CHOLECYSTECTOMY  08/2015    EYE SURGERY      Cat Ext  OU    HYSTERECTOMY      partial due to uterine fibroids    INCISION AND DRAINAGE OF KNEE Left 09/17/2020    Procedure: INCISION AND DRAINAGE, KNEE;  Surgeon: Rolando Wagoner MD;  Location: [facility] OR;  Service: Orthopedics;  Laterality: Left;    INJECTION FOR SENTINEL NODE IDENTIFICATION Left 10/18/2021    Procedure: INJECTION, FOR SENTINEL NODE IDENTIFICATION;  Surgeon: Darlene Roca MD;  Location: [facility] OR;  Service: General;  Laterality: Left;    JOINT REPLACEMENT Left 05/13/2013    TKR    JOINT REPLACEMENT Right  08/03/2015    TKR    MASTECTOMY, PARTIAL Left 10/18/2021    Procedure: MASTECTOMY, PARTIAL -- wire;  Surgeon: Darlene Roca MD;  Location: [facility] OR;  Service: General;  Laterality: Left;  RN PREOP 10/15/2021   VACCINATED-----NEED H/P AND ORDERS    SENTINEL LYMPH NODE BIOPSY Left 10/18/2021    Procedure: BIOPSY, LYMPH NODE, SENTINEL;  Surgeon: Darlene Roca MD;  Location: [facility] OR;  Service: General;  Laterality: Left;    THORACENTESIS      TOTAL KNEE ARTHROPLASTY Right 2015    left knee done 5/2013    WOUND DRESSING Left 09/17/2020    Procedure: WOUND VAC APPLICATION;  Surgeon: Rolando Wagoner MD;  Location: [facility] OR;  Service: Orthopedics;  Laterality: Left;     Review of patient's allergies indicates:  No Known Allergies    Current Facility-Administered Medications   Medication    acetaminophen tablet 650 mg    allopurinoL tablet 100 mg    amiodarone tablet 200 mg    apixaban tablet 2.5 mg    atorvastatin tablet 40 mg    dextrose 10% bolus 125 mL 125 mL    dextrose 10% bolus 250 mL 250 mL    doxycycline tablet 100 mg    famotidine tablet 20 mg    folic acid tablet 1,000 mcg    furosemide injection 40 mg    glucagon (human recombinant) injection 1 mg    glucose chewable tablet 16 g    glucose chewable tablet 24 g    insulin aspart U-100 pen 0-5 Units    loperamide capsule 2 mg    melatonin tablet 6 mg    ondansetron injection 4 mg    prochlorperazine injection Soln 5 mg    sodium chloride 0.9% flush 10 mL    sodium chloride 0.9% flush 10 mL    And    sodium chloride 0.9% flush 10 mL    sodium chloride oral tablet 1,000 mg    tuberculin injection 5 Units     Facility-Administered Medications Ordered in Other Encounters   Medication    denosumab (PROLIA) injection 60 mg     Family History   Problem Relation Age of Onset    Cancer Mother         stomach    Hypertension Mother     Aneurysm Father         abdominal    No Known Problems Sister     No Known Problems Sister     No Known Problems Brother     No Known  Problems Daughter     No Known Problems Son      Social History     Socioeconomic History    Marital status:     Number of children: 2   Tobacco Use    Smoking status: Former     Passive exposure: Past    Smokeless tobacco: Never   Substance and Sexual Activity    Alcohol use: Not Currently    Drug use: No    Sexual activity: Not Currently         LABS    Recent Results (from the past 24 hour(s))   POCT glucose    Collection Time: 09/05/23 11:14 AM   Result Value Ref Range    POCT Glucose 133 (H) 70 - 110 mg/dL   POCT glucose    Collection Time: 09/05/23  4:27 PM   Result Value Ref Range    POCT Glucose 132 (H) 70 - 110 mg/dL   POCT glucose    Collection Time: 09/05/23  7:38 PM   Result Value Ref Range    POCT Glucose 134 (H) 70 - 110 mg/dL   Basic metabolic panel    Collection Time: 09/06/23  4:53 AM   Result Value Ref Range    Sodium 136 136 - 145 mmol/L    Potassium 3.5 3.5 - 5.1 mmol/L    Chloride 87 (L) 95 - 110 mmol/L    CO2 40 (H) 23 - 29 mmol/L    Glucose 113 (H) 70 - 110 mg/dL    BUN 71 (H) 8 - 23 mg/dL    Creatinine 1.7 (H) 0.5 - 1.4 mg/dL    Calcium 8.9 8.7 - 10.5 mg/dL    Anion Gap 9 8 - 16 mmol/L    eGFR 30 (A) >60 mL/min/1.73 m^2   POCT glucose    Collection Time: 09/06/23  7:22 AM   Result Value Ref Range    POCT Glucose 117 (H) 70 - 110 mg/dL   ]    I/O last 3 completed shifts:  In: 720 [P.O.:720]  Out: 1300 [Urine:1300]    Vitals:    09/05/23 2304 09/05/23 2306 09/06/23 0440 09/06/23 0723   BP: 109/69  104/61 114/63   Pulse: (!) 111 104 92 109   Resp: 18  16 16   Temp: 98.1 °F (36.7 °C)  97.9 °F (36.6 °C) 97.8 °F (36.6 °C)   TempSrc: Oral  Oral Oral   SpO2: 95%  97% (!) 90%   Weight:       Height:           No Jvd, Thyromegaly or Lymphadenopathy  Lungs: Fairly clear anteriorly and laterally  Cor: RRR no G or rubs  Abd: Soft benign good bowel sounds non tender  Ext: No E C C    A)    CKD3-4  DM  Proteinuria  2nd hyperpth   HTN  Gout  CHF/pulm htn (46)  MGUS  Hx of ca of breast   Pleural  effusion     P)    Renal Diet  Home meds  HD not indicated   EPO prn   Binders prn   Adjust all meds to the degree of renal fx  Close follow up I/O and weights  Maintain Hydration      Use diuretics as needed

## 2023-09-06 NOTE — NURSING
Bedside report given to day nurse. Pt on 4L NC, has no sign of distress. Safety precautions are maintained with bed alarm set, bed in lowest position, bed wheels locked, and call light in reach. Chart check completed.

## 2023-09-06 NOTE — PLAN OF CARE
Problem: Adult Inpatient Plan of Care  Goal: Plan of Care Review  Outcome: Ongoing, Progressing  Goal: Patient-Specific Goal (Individualized)  Outcome: Ongoing, Progressing  Goal: Absence of Hospital-Acquired Illness or Injury  Outcome: Ongoing, Progressing  Intervention: Identify and Manage Fall Risk  Flowsheets (Taken 9/6/2023 0233)  Safety Promotion/Fall Prevention:   assistive device/personal item within reach   side rails raised x 2  Intervention: Prevent Skin Injury  Flowsheets (Taken 9/6/2023 0233)  Body Position:   position changed independently   position maintained  Skin Protection:   adhesive use limited   incontinence pads utilized  Intervention: Prevent and Manage VTE (Venous Thromboembolism) Risk  Flowsheets (Taken 9/6/2023 0233)  Activity Management: Rolling - L1  VTE Prevention/Management:   bleeding precations maintained   bleeding risk assessed  Range of Motion: active ROM (range of motion) encouraged  Intervention: Prevent Infection  Flowsheets (Taken 9/6/2023 0233)  Infection Prevention: single patient room provided  Goal: Optimal Comfort and Wellbeing  Outcome: Ongoing, Progressing  Intervention: Monitor Pain and Promote Comfort  Flowsheets (Taken 9/6/2023 0233)  Pain Management Interventions:   relaxation techniques promoted   quiet environment facilitated  Intervention: Provide Person-Centered Care  Flowsheets (Taken 9/6/2023 0233)  Trust Relationship/Rapport:   care explained   choices provided   emotional support provided   empathic listening provided  Goal: Readiness for Transition of Care  Outcome: Ongoing, Progressing     Problem: Diabetes Comorbidity  Goal: Blood Glucose Level Within Targeted Range  Outcome: Ongoing, Progressing  Intervention: Monitor and Manage Glycemia  Flowsheets (Taken 9/6/2023 0233)  Glycemic Management: blood glucose monitored     Problem: Infection (Pneumonia)  Goal: Resolution of Infection Signs and Symptoms  Outcome: Ongoing, Progressing  Intervention: Prevent  Infection Progression  Flowsheets (Taken 9/6/2023 0233)  Infection Management: aseptic technique maintained  Isolation Precautions:   precautions initiated   contact     Problem: Respiratory Compromise (Pneumonia)  Goal: Effective Oxygenation and Ventilation  Outcome: Ongoing, Progressing  Intervention: Promote Airway Secretion Clearance  Flowsheets (Taken 9/6/2023 0233)  Cough And Deep Breathing: done independently per patient  Intervention: Optimize Oxygenation and Ventilation  Flowsheets (Taken 9/6/2023 0233)  Head of Bed (HOB) Positioning: HOB elevated     Problem: Impaired Wound Healing  Goal: Optimal Wound Healing  Outcome: Ongoing, Progressing  Intervention: Promote Wound Healing  Flowsheets (Taken 9/6/2023 0243)  Sleep/Rest Enhancement: relaxation techniques promoted  Activity Management: Rolling - L1  Pain Management Interventions:   relaxation techniques promoted   quiet environment facilitated   care clustered     Problem: Skin Injury Risk Increased  Goal: Skin Health and Integrity  Outcome: Ongoing, Progressing  Intervention: Optimize Skin Protection  Flowsheets (Taken 9/6/2023 0233)  Pressure Reduction Techniques: frequent weight shift encouraged  Pressure Reduction Devices: positioning supports utilized  Skin Protection:   adhesive use limited   incontinence pads utilized  Head of Bed (HOB) Positioning: HOB elevated     Problem: Fall Injury Risk  Goal: Absence of Fall and Fall-Related Injury  Outcome: Ongoing, Progressing  Intervention: Promote Injury-Free Environment  Flowsheets (Taken 9/6/2023 0233)  Safety Promotion/Fall Prevention:   assistive device/personal item within reach   side rails raised x 2     Problem: Infection  Goal: Absence of Infection Signs and Symptoms  Outcome: Ongoing, Progressing  Intervention: Prevent or Manage Infection  Flowsheets (Taken 9/6/2023 0233)  Infection Management: aseptic technique maintained  Isolation Precautions:   precautions initiated   contact

## 2023-09-07 NOTE — PROGRESS NOTES
C3 nurse attempted to contact Barbara JOHNATHAN Rizo for a TCC post hospital discharge follow up call. The patient is unable to conduct the call @ this time. The patient requested a callback.    The patient has a scheduled HOSFU appointment with Paras Oro MD on 09/13/23 @ 1000.

## 2023-09-07 NOTE — TELEPHONE ENCOUNTER
----- Message from Julio Mendoza sent at 9/6/2023  3:39 PM CDT -----  Regarding: Daughter Tamar [phone]  Type: Patient Call Back     What is the request in detail: Pts daughter is requesting a call back with information on whether or not her mom should be still taking ( medication listed below ) Pt was in the hospital from 8/26/23 until 9/6/23 and pts discharge papers stated she was taken off the medication . Daughter would like to speak to Dr Oro and or the nurse to get some insight as to why her mom was taken off this medication.     Can the clinic reply by [facility]? No     Would the patient rather a call back or a response via [facility]? Call back    Best call back number: .[phone]      Additional Information: amLODIPine (NORVASC) 10 MG tablet     Thank you.

## 2023-09-07 NOTE — PROGRESS NOTES
C3 nurse spoke with Barbara Rizo's daughter Tamar for a TCC post hospital discharge follow up call. The patient has a scheduled HOSFU appointment with Paras Oro MD on 09/13/23 @ 1000.

## 2023-09-07 NOTE — PROGRESS NOTES
2nd attempt to make TCC Call. Left voicemail. Please call [phone] leave your first and last name and date of birth for Krishna. I will return your call.

## 2023-09-14 PROBLEM — J18.9 LOWER LOBE PNEUMONIA: Status: ACTIVE | Noted: 2023-01-01

## 2023-09-14 PROBLEM — I48.91 ATRIAL FIBRILLATION WITH RVR: Status: ACTIVE | Noted: 2023-01-01

## 2023-09-14 PROBLEM — D64.9 NORMOCYTIC ANEMIA: Status: ACTIVE | Noted: 2023-01-01

## 2023-09-14 PROBLEM — J90 PLEURAL EFFUSION, LEFT: Status: ACTIVE | Noted: 2023-01-01

## 2023-09-14 PROBLEM — J96.11 CHRONIC RESPIRATORY FAILURE WITH HYPOXIA: Status: ACTIVE | Noted: 2023-01-01

## 2023-09-14 NOTE — TELEPHONE ENCOUNTER
----- Message from Paras Oro MD sent at 9/14/2023  4:35 PM CDT -----  Please let patient know that she is getting more anemic and needs to go back to the ER for evaluation of bleeding.      Thanks  Dr. Oro

## 2023-09-14 NOTE — Clinical Note
Diagnosis: Atrial fibrillation with RVR [[id number]]   Admitting Provider:: KENDALL PARKER [[id number]]   Future Attending Provider: AMANDA MAS [[id number]]   Reason for IP Medical Treatment  (Clinical interventions that can only be accomplished in the IP setting? ) :: IV amiodarone, diuresis, monitor H/H   I certify that Inpatient services for greater than or equal to 2 midnights are medically necessary:: Yes   Plans for Post-Acute care--if anticipated (pick the single best option):: A. No post acute care anticipated at this time

## 2023-09-14 NOTE — TELEPHONE ENCOUNTER
Attempted to contact pt, pt's daughter took the call. Daughter notified of doctor's instructions below. Daughter verbalized understanding.

## 2023-09-14 NOTE — FIRST PROVIDER EVALUATION
Medical screening examination initiated.  I have conducted a focused provider triage encounter, findings are as follows:    Brief history of present illness:  83 year old female with PMHx of HTN, HPLD, GERD, CKD, DM, A-fib, OA, anemia, and fatty liver presents to ED for abnormal labs. PCP sent here for concerns of dropping H&H. 09/12 - 10.2&32.6. 09/13 - 9.9 &30.6. Denies any symptoms.     There were no vitals filed for this visit.    Pertinent physical exam:  Aox4. No acute distress. In wheel chair with nasal canula in place 5 Ls.     Brief workup plan:  labs     Preliminary workup initiated; this workup will be continued and followed by the physician or advanced practice provider that is assigned to the patient when roomed.

## 2023-09-15 NOTE — ASSESSMENT & PLAN NOTE
-Baseline Cr 1.6-1.7  -On admit Cr 1.8  -Avoid nephrotoxic agents and renally dose meds  -Follows with Dr. Boyle who was consulted.  Input appreciated

## 2023-09-15 NOTE — PROGRESS NOTES
Date of Admission:9/14/2023    SUBJECTIVE:notes breathing ok  Children at bedside    Current Facility-Administered Medications   Medication    acetaminophen tablet 650 mg    amiodarone 360 mg/200 mL (1.8 mg/mL) infusion    apixaban tablet 5 mg    atorvastatin tablet 40 mg    azithromycin (ZITHROMAX) 500 mg in dextrose 5 % (D5W) 250 mL IVPB (Vial-Mate)    cefTRIAXone (ROCEPHIN) 1 g in dextrose 5 % in water (D5W) 100 mL IVPB (MB+)    [START ON 9/16/2023] epoetin christi-epbx injection 10,000 Units    folic acid tablet 1,000 mcg    loperamide capsule 2 mg    melatonin tablet 6 mg    mupirocin 2 % ointment    ondansetron injection 4 mg    sodium chloride 0.9% flush 10 mL     Facility-Administered Medications Ordered in Other Encounters   Medication    denosumab (PROLIA) injection 60 mg       Wt Readings from Last 3 Encounters:   09/14/23 77.2 kg (170 lb 3.1 oz)   09/13/23 73.6 kg (162 lb 5.9 oz)   09/05/23 83.5 kg (184 lb 1.4 oz)     Temp Readings from Last 3 Encounters:   09/15/23 96.8 °F (36 °C) (Skin)   09/13/23 98 °F (36.7 °C) (Oral)   09/06/23 98.3 °F (36.8 °C) (Oral)     BP Readings from Last 3 Encounters:   09/15/23 122/64   09/13/23 122/80   09/06/23 118/69     Pulse Readings from Last 3 Encounters:   09/15/23 (!) 51   09/13/23 (!) 117   09/06/23 101       Intake/Output Summary (Last 24 hours) at 9/15/2023 1517  Last data filed at 9/15/2023 1503  Gross per 24 hour   Intake 1194.41 ml   Output 550 ml   Net 644.41 ml       PE:  GEN:wd female in nad  HEENT:ncat,eomi,mm  CVS: marlon  PULM:ctab  ABD:bs,soft  EXT:no leg edema  NEURO:awake    Recent Labs   Lab 09/15/23  0301   *      K 3.3*   CL 87*   CO2 34*   BUN 56*   CREATININE 1.8*   CALCIUM 8.5*   MG 1.7       Lab Results   Component Value Date    CALCIUM 8.5 (L) 09/15/2023    CAION 0.95 (L) 02/03/2019    PHOS 3.0 09/12/2023    PHOS 3.0 09/12/2023       Recent Labs   Lab 09/15/23  0301   WBC 7.62   RBC 2.86*   HGB 9.0*   HCT 28.9*      *    MCH 31.5*   MCHC 31.1*           A/P:  1.ckd4. creatinine about same. Following.  2.anemia with ckd. Add epo. Sats ok.  3.afib rvr. Tx per cards. Cardioversion.  4.dm2. following sugars.  5.htn. add bumex. Hold arb with low bp.  6.hypokalemia. replete.  Discussed with pulm.updated by rn  32mins critcal care time.

## 2023-09-15 NOTE — PROGRESS NOTES
[facility] Medicine  Progress Note    Patient Name: Barbara Rizo  MRN: [id number]  Patient Class: IP- Inpatient   Admission Date: 9/14/2023  Length of Stay: 1 days  Attending Physician: Selwyn Randle MD  Primary Care Provider: Paras Oro MD        Subjective:     Principal Problem:Atrial fibrillation with RVR        HPI:  83 years old female with PMH significant for hypertension, diabetes mellitus, CKD, atrial fibrillation on Eliquis, GERD was sent to ER from her PCP because of dropping blood counts. Patient was recently hospitalized for fluid overload and hyponatremia. Patient reported she had blood workup done and got call from her PCP to go to ER. Review of the chart revealed that hemoglobin dropped from 13.5 on 08/27 to 9.3 today on 09/14. Patient denied any bleeding from any site or black stools. Patient reported she is doing fine otherwise and denied any symptoms at this time including chest pain shortness of breath abdominal pain fever chills nausea vomiting diarrhea or dizziness. Patient had rectal exam done by ER physician which was negative for melena or hematochezia. Chest x-ray showed moderate left pleural effusion with associated compressive atelectasis and/or lower lobe consolidation. Other lab work showed WBC 10.62, creatinine 1.9, potassium 3.6 and BNP 1317. Patient was found to be in atrial fibrillation with RVR for which has been started on amiodarone infusion. Patient is on 4-5 L O2 NC at baseline. Patient received IV amiodarone and IV Lasix in ER.          Overview/Hospital Course:  No notes on file    Interval History: No acute events overnight.  In good spirits and denies cp and sob.  Underwent successful dccv this afternoon.  All questions answered and patient had no further complaints.    Objective:     Vital Signs (Most Recent):  Temp: 97 °F (36.1 °C) (09/15/23 1600)  Pulse: (!) 53 (09/15/23 1605)  Resp: (!) 22 (09/15/23 1605)  BP: (!) 108/54 (09/15/23  1605)  SpO2: 95 % (09/15/23 1605) Vital Signs (24h Range):  Temp:  [96.8 °F (36 °C)-98.2 °F (36.8 °C)] 97 °F (36.1 °C)  Pulse:  [] 53  Resp:  [15-28] 22  SpO2:  [87 %-99 %] 95 %  BP: ()/(54-83) 108/54     Weight: 77.2 kg (170 lb 3.1 oz)  Body mass index is 31.13 kg/m².    Intake/Output Summary (Last 24 hours) at 9/15/2023 1700  Last data filed at 9/15/2023 1503  Gross per 24 hour   Intake 1194.41 ml   Output 550 ml   Net 644.41 ml         Physical Exam  Vitals and nursing note reviewed.   Constitutional:       Comments: Elderly   HENT:      Head: Normocephalic.      Nose: Nose normal.      Mouth/Throat:      Mouth: Mucous membranes are moist.   Eyes:      Extraocular Movements: Extraocular movements intact.   Cardiovascular:      Rate and Rhythm: Tachycardia present. Rhythm irregular.   Pulmonary:      Effort: No respiratory distress.      Breath sounds: No wheezing.   Abdominal:      Tenderness: There is no abdominal tenderness.   Musculoskeletal:         General: Swelling present.      Cervical back: Normal range of motion.   Skin:     General: Skin is warm.      Capillary Refill: Capillary refill takes less than 2 seconds.   Neurological:      Mental Status: She is alert and oriented to person, place, and time.   Psychiatric:         Mood and Affect: Mood normal.         Behavior: Behavior normal.             Significant Labs: All pertinent labs within the past 24 hours have been reviewed.    Significant Imaging: I have reviewed all pertinent imaging results/findings within the past 24 hours.      Assessment/Plan:      * Atrial fibrillation with RVR  -Admitted to inpatient status  -Diagnosed with afib rvr and treated with amiodarone drip in ICU  -Cardiology consulted and input appreciated  -Taken for DCCV this afternoon which was successful - but bradycardic  -Continue eliquis  -Plan to resume amiodarone 200mg bid for now  -Continue to monitor in ICU for now    Lower lobe pneumonia  -Imaging noted  pleural effusion on left  -Not coughing or septic  -No leukocytosis on admit and procalcitonin is negative  -Doubt actual pneumonia - most likely due to fluid overload  -Pulmonology consulted and input appreciated  -Continue CAP antibiotics for now.    Pleural effusion, left  -Pulmonology consulted and input appreciated  -Suspect due to fluid overload  -No plans for thoracentesis at this time  -Diurese with bumex 1mg iv daily  -Continue to monitor    Normocytic anemia  -Hb down from baseline, but in a safe range  -No evidence of bleeding  -She is not iron, folate or b12 deficient  -If Hb less than 7 would transfuse, but safe for monitoring now  -Resume home eliquis as risk of stroke with afib is greater.  -Repeat cbc in AM    CKD (chronic kidney disease), stage IV  -Baseline Cr 1.6-1.7  -On admit Cr 1.8  -Avoid nephrotoxic agents and renally dose meds  -Follows with Dr. Boyle who was consulted.  Input appreciated    Hypokalemia  -Replace potassium today.  -Check BMP and Mag in AM.    Chronic respiratory failure with hypoxia  -Patient with Hypoxic Respiratory failure which is Chronic.  she is on home oxygen at 4-5 LPM.   -Signs/symptoms of respiratory failure include- respiratory distress.   -Labs and images were reviewed. Patient Has not had a recent ABG.   -Contributing diagnoses include tachymyopathy due to afib with rvr and fluid overload as well as possible pneumonia  -Will treat underlying causes and adjust management of respiratory failure as follows-  -Treat afib and pneumonia as above  -Diurese with bumex 1mg iv daily  -Pulmonology consulted and input appreciated.  -Stable on home level of O2    ACP (advance care planning)  -Consulted palliative care - input appreciated  -Advance Care Planning Date: 09/15/2023  Discussed with daughter and patient meaning of DNR as well as pros and cons of aggressive ACLS treatment.  They have opted for DNR status as she would wish for a peaceful death.  I spent 20 minutes in  ACP today, 9/15.    Pulmonary hypertension  -Treatment as above    Essential hypertension  -BP is low normal today  -Hold home antihypertensives for now due to soft BP  -Monitoring in ICU    Hyperlipidemia  -Continue statin      VTE Risk Mitigation (From admission, onward)         Ordered     apixaban tablet 5 mg  2 times daily         09/15/23 1005     IP VTE HIGH RISK PATIENT  Once         09/14/23 2234     Place sequential compression device  Until discontinued         09/14/23 2234                Discharge Planning   SALLY:      Code Status: DNR   Is the patient medically ready for discharge?:     Reason for patient still in hospital (select all that apply): Treatment  Discharge Plan A: Home Health            Critical care time spent on the evaluation and treatment of severe organ dysfunction, review of pertinent labs and imaging studies, discussions with consulting providers and discussions with patient/family: 40 minutes.      Selwyn Randle MD  Department of Hospital Medicine   [facility] [place] [facility]

## 2023-09-15 NOTE — PLAN OF CARE
Patient remains in ICU on 5L NC. AAO x4. Patient has external catheter puts 450ml of urine. Inj amiodarone on continuous infusion at 0.5 mg/min. Potassium 3.1 and Magnessium 1.7 informed to EICU Dr Hoffmann, ordered for potassium replacement, already started on infusion.

## 2023-09-15 NOTE — PROGRESS NOTES
Routine Office Visit    Patient Name: Barbara Rizo    : 1940  MRN: [id number]    Subjective:  Barbara is a 83 y.o. female who presents today for:    1. Follow up  Patient following up after acute exacerbation of of CHF.  She has been on intermittent pulse oxygen since being discharged as her O2 level dropped to 80 with activity without it at 4 L and rebounded slowly to 92 when reconnected.  She had a bladder infection that was treated, but states that she didn't feel bad until a couple days before getting admitted.  She states that she feels fatigued today, but otherwise ok.  She still does not have a good appetite.  No chest pain or dizziness.  She is having issues with swallowing certain liquids and foods, but her daughter states she feels it is more mental as she can swallow some solids, but not mashed potatoes.  She can swallow most liquids, but occasionally there are some she can't get down.  No n/v/d.  She is taking all medications as prescribed.  She had labs done yesterday that were ordered by nephrology and shows acute anemia.  No palpitations or shortness of breath (that has worsened since discharge0    Past Medical History  Past Medical History:   Diagnosis Date    A-fib     Breast cancer     invasive ductal carcinoma    CKD (chronic kidney disease)     Diabetes mellitus type II     Fatty liver     GERD (gastroesophageal reflux disease)     Hearing loss of both ears     wears bilateral hearing aids    Hemochromatosis     History of anemia due to CKD     History of pleural effusion     with thoracentesis    Hyperlipidemia     Hypertension     Macular degeneration     Osteoarthritis     Left knee    Osteoporosis     Vaginal delivery     x2    Vitamin D deficiency disease     Wears partial dentures     upper removable bridge       Past Surgical History  Past Surgical History:   Procedure Laterality Date    AXILLARY NODE DISSECTION Left 10/18/2021    Procedure: LYMPHADENECTOMY, AXILLARY;  Surgeon:  Darlene Roca MD;  Location: [facility] OR;  Service: General;  Laterality: Left;    BREAST BIOPSY      BREAST LUMPECTOMY      invasive ductal carcinoma    BREAST SURGERY Bilateral     Reduction r/t h/o fibrocystic disease    CHOLECYSTECTOMY  08/2015    EYE SURGERY      Cat Ext  OU    HYSTERECTOMY      partial due to uterine fibroids    INCISION AND DRAINAGE OF KNEE Left 09/17/2020    Procedure: INCISION AND DRAINAGE, KNEE;  Surgeon: Rolando Wagoner MD;  Location: [facility] OR;  Service: Orthopedics;  Laterality: Left;    INJECTION FOR SENTINEL NODE IDENTIFICATION Left 10/18/2021    Procedure: INJECTION, FOR SENTINEL NODE IDENTIFICATION;  Surgeon: Darlene Roca MD;  Location: [facility] OR;  Service: General;  Laterality: Left;    JOINT REPLACEMENT Left 05/13/2013    TKR    JOINT REPLACEMENT Right 08/03/2015    TKR    MASTECTOMY, PARTIAL Left 10/18/2021    Procedure: MASTECTOMY, PARTIAL -- wire;  Surgeon: Darlene Roca MD;  Location: [facility] OR;  Service: General;  Laterality: Left;  RN PREOP 10/15/2021   VACCINATED-----NEED H/P AND ORDERS    SENTINEL LYMPH NODE BIOPSY Left 10/18/2021    Procedure: BIOPSY, LYMPH NODE, SENTINEL;  Surgeon: Darlene Roca MD;  Location: [facility] OR;  Service: General;  Laterality: Left;    THORACENTESIS      TOTAL KNEE ARTHROPLASTY Right 2015    left knee done 5/2013    WOUND DRESSING Left 09/17/2020    Procedure: WOUND VAC APPLICATION;  Surgeon: Rolando Wagoner MD;  Location: [facility];  Service: Orthopedics;  Laterality: Left;       Family History  Family History   Problem Relation Age of Onset    Cancer Mother         stomach    Hypertension Mother     Aneurysm Father         abdominal    No Known Problems Sister     No Known Problems Sister     No Known Problems Brother     No Known Problems Daughter     No Known Problems Son        Social History  Social History     Socioeconomic History    Marital status:     Number of children: 2   Tobacco Use    Smoking status: Former     Passive exposure:  Past    Smokeless tobacco: Never   Substance and Sexual Activity    Alcohol use: Not Currently    Drug use: No    Sexual activity: Not Currently       Current Medications  Current Facility-Administered Medications on File Prior to Visit   Medication Dose Route Frequency Provider Last Rate Last Admin    denosumab (PROLIA) injection 60 mg  60 mg Subcutaneous 1 time in Clinic/HOD Nargis Boyle MD         Current Outpatient Medications on File Prior to Visit   Medication Sig Dispense Refill    allopurinoL (ZYLOPRIM) 100 MG tablet Take 1 tablet by mouth once daily.      amiodarone (PACERONE) 200 MG Tab TAKE ONE-HALF TABLET BY MOUTH EVERY DAY *DO NOT TAKE WITH GRAPEFRUIT JUICE* 45 tablet 3    apixaban (ELIQUIS) 2.5 mg Tab TAKE ONE TABLET BY MOUTH TWICE DAILY 180 tablet 3    atorvastatin (LIPITOR) 40 MG tablet TAKE ONE TABLET BY MOUTH ONCE DAILY 90 tablet 2    calcium carbonate (TUMS) 200 mg calcium (500 mg) chewable tablet Take 2 tablets by mouth.      ergocalciferol (ERGOCALCIFEROL) 50,000 unit Cap Take 50,000 Units by mouth every 30 days.       FLUZONE HIGHDOSE QUAD 21-22  mcg/0.7 mL Syrg       folic acid (FOLVITE) 1 MG tablet TAKE 1 TABLET BY MOUTH EVERY DAY 90 tablet 0    furosemide (LASIX) 40 MG tablet Take 1 tablet (40 mg total) by mouth 2 (two) times a day. 60 tablet 2    losartan (COZAAR) 50 MG tablet Take 50 mg by mouth.      solifenacin (VESICARE) 10 MG tablet Take 1 tablet (10 mg total) by mouth once daily. 30 tablet 11    mupirocin (BACTROBAN) 2 % ointment Apply topically every evening.         Allergies   Review of patient's allergies indicates:  No Known Allergies    Review of Systems (Pertinent positives)  Review of Systems   Constitutional:  Positive for malaise/fatigue.   HENT: Negative.     Eyes: Negative.    Respiratory:  Positive for shortness of breath. Negative for cough, hemoptysis and sputum production.    Cardiovascular: Negative.    Gastrointestinal: Negative.    Genitourinary: Negative.   "  Musculoskeletal: Negative.    Skin: Negative.    Neurological: Negative.          /80   Pulse (!) 117   Temp 98 °F (36.7 °C) (Oral)   Resp 18   Ht 5' 2" (1.575 m)   Wt 73.6 kg (162 lb 5.9 oz)   SpO2 (!) 93%   BMI 29.70 kg/m²     GENERAL APPEARANCE: in no apparent distress and well developed and well nourished  HEENT: PERRL, EOMI, Sclera clear, anicteric, Oropharynx clear, no lesions, Neck supple with midline trachea  NECK: normal, supple, no adenopathy, thyroid normal in size  RESPIRATORY: appears well, vitals normal, no respiratory distress, acyanotic, normal RR, chest clear, no wheezing, crepitations, rhonchi, normal symmetric air entry  HEART: regular rate and rhythm, S1, S2 normal, no murmur, click, rub or gallop.    ABDOMEN: abdomen is soft without tenderness, no masses, no hernias, no organomegaly, no rebound, no guarding. Suprapubic tenderness absent..  SKIN: no rashes, no wounds, no other lesions  PSYCH: Alert, oriented x 3, thought content appropriate, speech normal, pleasant and cooperative, good eye contact, well groomed,    Assessment/Plan:  Barbara Rizo is a 83 y.o. female who presents today for :    Barbara was seen today for hospital follow up.    Diagnoses and all orders for this visit:    Acute on chronic diastolic CHF (congestive heart failure)  -     Cancel: OXYGEN FOR HOME USE  -     OXYGEN FOR HOME USE  - Continuous oxygen ordered for home use as she is not able to do PT at home due to getting short of breath with minimal exertion on her current settings    Acute anemia  -     CBC Auto Differential; Future  -     CULTURE, BLOOD; Future  -     Iron and TIBC; Future  - Repeat labs due to significant drop in H&H despite patient not having any acute worsening in symptoms    Acute respiratory failure with hypoxia  -     Cancel: OXYGEN FOR HOME USE  -     OXYGEN FOR HOME USE    Leukocytosis, unspecified type  -     CBC Auto Differential; Future  -     CULTURE, BLOOD; Future  -     " CULTURE, BLOOD; Future  -     LACTIC ACID, PLASMA; Future  - White blood cells increased from time of discharge, so concerns for sepsis/SIRS.    - Should hemoglobin worsen, will need to go to the ED to figure out source of anemia        Paras Oro MD

## 2023-09-15 NOTE — ASSESSMENT & PLAN NOTE
-will get procalcitonin  -will get sputum culture, urine strep pneumoniae and Legionella antigen testing  -will start IV ceftriaxone and azithromycin

## 2023-09-15 NOTE — CONSULTS
"[facility] [place] - Intensive Care  Palliative Medicine  Consult Note    Patient Name: Barbara Rizo  MRN: [id number]  Admission Date: 9/14/2023  Hospital Length of Stay: 1 days  Code Status: DNR   Attending Provider: Selwyn Randle MD  Consulting Provider: Danyell Neil NP  Primary Care Physician: Paras Oro MD  Principal Problem:Normocytic anemia    Patient information was obtained from patient, relative(s), past medical records, ER records and primary team.      Inpatient consult to Palliative Care  Consult performed by: Danyell Neil NP  Consult ordered by: Lisa Rodriguez MD  Reason for consult: goals of care and advanced care planning         Assessment/Plan:   Advance Care Planning     Palliative Care  ACP (advance care planning)  9/15/2023 - Consult   - consult received; interval chart reviewed in detail; discussed pt with MDT during ICU rounds   - pt known to palliative medicine team during recent admissions 8/26-9/6, which also involved ICU level care (see notes from Dr. Teodora Ibarra from that admission for prior GOC/ACP discussions)   - met with patient and her two children Erwin at bedside; re-introduction to palliative medicine team and role in current care and admission; reviewed prior GOC and ACP discussions from prior admissions with Dr. Ibarra   - Pt and family shared confusion regarding code status since discharge; they have discussed DNR/DNI status with friends/family that provided differing views/opinions on what those mean; also have met for home palliative admit visit with [facility] (ARISTIDES Del Castillo)  - offering of cardioversion ("shocking heart") and further discussion of reversal of DNR order for this procedure has lead pt and family to wish to further discuss and clarify wishes   - pt does confirm continued wishes for her two children Erwin to share in decision making if she were to lack capacity in the future   - in depth discussion of " pt's GOC for cardioversion, separately from code status; pt wished to proceed with cardioversion; family and myself supported this decision; also explained that interventions/escilation of care such as cardioversion and ICU admission are always an option with her previously expressed wishes for DNR/DNI with selective treatment; reviewed requirement for full code status by facility for procedures involving anesthesia, which pt and family full understand and are ok with   - in depth discussion of GOC regarding code status; discussed interventions associated with full code status, risks, and possible outcomes; discussed DNR/DNI; discussed difference between DNR/DNI with comfort focused care and DNR/DNI with selective treatment; also discussed other forms of life support and pt's GOC   - thorough conversation with pt, daughter, and son; encouraged everyone's participation in discussion and expressed importance of her children understanding her wishes as well; pt and children verbalized improved understanding and clarification of above discussion; they all feel comfortable with continuing with GOC as outlined in current LAP[facility] for DNR/DNI and selective treatment; at this time they wish for escalation of care and treatment of reversible conditions as long as pt continues to have a good quality of life; She does not wish for CPR, ACLS, or intubation/ventilation; she would not wish to undergo any form of long term life support, HD, or aggressive oncologic treatment if indicated in the future   - recommendation for continued home palliative involvement as patient as multiple chronic conditions and likely need for ongoing GOC discussions in the future   - emotional support provided   - Allowed time for questions/concerns; all addressed; expressed availability of myself/palliative team for additional questions/concerns   - updated hospital primary, Dr. Randle and MDT     Pulmonary  Chronic respiratory failure with hypoxia  -  "contributing factors of HFpEF and pulm HTN   - pt denies SOB at time of visit   - continued management per PCCM and primary team     Lower lobe pneumonia  - no overt PNA symptoms currently; noted on imaging on admit   - PCCM following   - continued management per PCCM and primary teams     Pleural effusion, left  - pt with prior pleural effusions but has not required thora in many years per daughter (2015?)   - suspected 2/2 CHF  - PCCM consulted/following   - continued management per hospital primary     Cardiac/Vascular  Atrial fibrillation with RVR  - wishes to proceed with offered cardioversion, and agrees to reverse DNR only for procedure and then resume (see ACP)   - cardiology consulted/following   - continued management per primary teams     Pulmonary hypertension  - noted; continued management per PCCM and primary team     Renal/  CKD (chronic kidney disease), stage IV  - chronic history noted; follows with Dr. Tamar ortega who is also consulted this admission   - pt brought up on her own multiple times in ACP discussion that HD did not align with her [facility]     Oncology  * Normocytic anemia  - no overt source of bleeding on admit work up; not iron deficient   - was reason for initially being directed to seek ED care following OP labs   - continued management per primary teams     Thank you for your consult. I will follow-up with patient. Please contact us if you have any additional questions.     Total visit time: 90 minutes    > 50% of  70  min visit spent in chart review, face to face discussion of symptom assessment, coordination of care with other specialists, documentation, and discharge planning.    20 min ACP time spent: goals of care, emotional support, formulating and communicating prognosis, exploring burden/ benefit of various approaches of treatment.     Danyell Neil NP  Palliative Medicine  [facility]      Subjective:     HPI:   From H&P: "83 years old female " "with PMH significant for hypertension, diabetes mellitus, CKD, atrial fibrillation on Eliquis, GERD was sent to ER from her PCP because of dropping blood counts. Patient was recently hospitalized for fluid overload and hyponatremia. Patient reported she had blood workup done and got call from her PCP to go to ER. Review of the chart revealed that hemoglobin dropped from 13.5 on 08/27 to 9.3 today on 09/14. Patient denied any bleeding from any site or black stools. Patient reported she is doing fine otherwise and denied any symptoms at this time including chest pain shortness of breath abdominal pain fever chills nausea vomiting diarrhea or dizziness. Patient had rectal exam done by ER physician which was negative for melena or hematochezia. Chest x-ray showed moderate left pleural effusion with associated compressive atelectasis and/or lower lobe consolidation. Other lab work showed WBC 10.62, creatinine 1.9, potassium 3.6 and BNP 1317. Patient was found to be in atrial fibrillation with RVR for which has been started on amiodarone infusion. Patient is on 4-5 L O2 NC at baseline. Patient received IV amiodarone and IV Lasix in ER"     Palliative medicine consulted for goals of care discussion and advance care planning; for details of visit, see advance care planning section of plan.       Hospital Course:  No notes on file    Past Medical History:   Diagnosis Date    A-fib     Breast cancer     invasive ductal carcinoma    CKD (chronic kidney disease)     Diabetes mellitus type II     Fatty liver     GERD (gastroesophageal reflux disease)     Hearing loss of both ears     wears bilateral hearing aids    Hemochromatosis     History of anemia due to CKD     History of pleural effusion     with thoracentesis    Hyperlipidemia     Hypertension     Macular degeneration     Osteoarthritis     Left knee    Osteoporosis     Vaginal delivery     x2    Vitamin D deficiency disease     Wears partial dentures  "    upper removable bridge       Past Surgical History:   Procedure Laterality Date    AXILLARY NODE DISSECTION Left 10/18/2021    Procedure: LYMPHADENECTOMY, AXILLARY;  Surgeon: Darlene Roca MD;  Location: [facility] OR;  Service: General;  Laterality: Left;    BREAST BIOPSY      BREAST LUMPECTOMY      invasive ductal carcinoma    BREAST SURGERY Bilateral     Reduction r/t h/o fibrocystic disease    CHOLECYSTECTOMY  08/2015    EYE SURGERY      Cat Ext  OU    HYSTERECTOMY      partial due to uterine fibroids    INCISION AND DRAINAGE OF KNEE Left 09/17/2020    Procedure: INCISION AND DRAINAGE, KNEE;  Surgeon: Rolando Wagoner MD;  Location: [facility] OR;  Service: Orthopedics;  Laterality: Left;    INJECTION FOR SENTINEL NODE IDENTIFICATION Left 10/18/2021    Procedure: INJECTION, FOR SENTINEL NODE IDENTIFICATION;  Surgeon: Darlene Roca MD;  Location: [facility] OR;  Service: General;  Laterality: Left;    JOINT REPLACEMENT Left 05/13/2013    TKR    JOINT REPLACEMENT Right 08/03/2015    TKR    MASTECTOMY, PARTIAL Left 10/18/2021    Procedure: MASTECTOMY, PARTIAL -- wire;  Surgeon: Darlene Roca MD;  Location: [facility] OR;  Service: General;  Laterality: Left;  RN PREOP 10/15/2021   VACCINATED-----NEED H/P AND ORDERS    SENTINEL LYMPH NODE BIOPSY Left 10/18/2021    Procedure: BIOPSY, LYMPH NODE, SENTINEL;  Surgeon: Darlene Roca MD;  Location: [facility];  Service: General;  Laterality: Left;    THORACENTESIS      TOTAL KNEE ARTHROPLASTY Right 2015    left knee done 5/2013    WOUND DRESSING Left 09/17/2020    Procedure: WOUND VAC APPLICATION;  Surgeon: Rolando Wagoner MD;  Location: [facility];  Service: Orthopedics;  Laterality: Left;       Review of patient's allergies indicates:  No Known Allergies    Medications:  Continuous Infusions:   amiodarone in dextrose 5% 0.5 mg/min (09/15/23 1400)     Scheduled Meds:   apixaban  5 mg Oral BID    atorvastatin  40 mg Oral Daily    azithromycin  500 mg Intravenous Q24H     bumetanide  1 mg Intravenous Daily    cefTRIAXone (ROCEPHIN) IVPB  1 g Intravenous Q24H    [START ON 9/16/2023] epoetin hcristi-epbx  10,000 Units Subcutaneous Q7 Days    folic acid  1,000 mcg Oral Daily    mupirocin   Nasal BID     PRN Meds:acetaminophen, loperamide, melatonin, ondansetron, sodium chloride 0.9%    Family History       Problem Relation (Age of Onset)    Aneurysm Father    Cancer Mother    Hypertension Mother    No Known Problems Sister, Sister, Brother, Daughter, Son          Tobacco Use    Smoking status: Former     Passive exposure: Past    Smokeless tobacco: Never   Substance and Sexual Activity    Alcohol use: Not Currently    Drug use: No    Sexual activity: Not Currently       Review of Systems   Constitutional:  Positive for fatigue. Negative for activity change and appetite change.   Respiratory:  Negative for cough and shortness of breath.    Cardiovascular:  Positive for leg swelling. Negative for chest pain and palpitations.   Gastrointestinal:  Negative for abdominal pain, nausea and vomiting.   Genitourinary:  Negative for decreased urine volume and difficulty urinating.   Musculoskeletal:  Negative for gait problem.   Neurological:  Negative for dizziness and headaches.     Objective:     Vital Signs (Most Recent):  Temp: 97 °F (36.1 °C) (09/15/23 1600)  Pulse: (!) 53 (09/15/23 1605)  Resp: (!) 22 (09/15/23 1605)  BP: (!) 108/54 (09/15/23 1605)  SpO2: 95 % (09/15/23 1605) Vital Signs (24h Range):  Temp:  [96.8 °F (36 °C)-98.2 °F (36.8 °C)] 97 °F (36.1 °C)  Pulse:  [] 53  Resp:  [15-28] 22  SpO2:  [87 %-99 %] 95 %  BP: ()/(54-83) 108/54     Weight: 77.2 kg (170 lb 3.1 oz)  Body mass index is 31.13 kg/m².       Physical Exam  Vitals and nursing note reviewed.   Constitutional:       General: She is not in acute distress.     Appearance: She is ill-appearing (acute and chronic).   HENT:      Head: Normocephalic and atraumatic.      Nose: Nose normal.   Pulmonary:       Effort: Pulmonary effort is normal. No respiratory distress.   Musculoskeletal:      Right lower leg: Edema present.      Left lower leg: Edema present.   Neurological:      Mental Status: She is alert and oriented to person, place, and time. Mental status is at baseline.   Psychiatric:         Mood and Affect: Mood normal.         Thought Content: Thought content normal.         Judgment: Judgment normal.       Advance Care Planning  Advance Directives:   [place]: Yes    Do Not Resuscitate Status: Yes (will resume DNR/DNI, selective treatment following cardioversion)    Medical Power of : No (children Tamar and Selwyn are legal surrogate decision makers which aligns with pt wishes)      Decision Making:  Patient answered questions  Goals of Care: The patient endorses that what is most important right now is to focus on spending time at home, avoiding the hospital, remaining as independent as possible, symptom/pain control, and quality of life, even if it means sacrificing a little time    Accordingly, we have decided that the best plan to meet the patient's goals includes continuing with treatment       Significant Labs: All pertinent labs within the past 24 hours have been reviewed.  CBC:   Recent Labs   Lab 09/15/23  0301   WBC 7.62   HGB 9.0*   HCT 28.9*   *        BMP:  Recent Labs   Lab 09/15/23  0301   *      K 3.3*   CL 87*   CO2 34*   BUN 56*   CREATININE 1.8*   CALCIUM 8.5*   MG 1.7     LFT:  Lab Results   Component Value Date    AST 16 09/15/2023    ALKPHOS 57 09/15/2023    BILITOT 1.2 (H) 09/15/2023     Albumin:   Albumin   Date Value Ref Range Status   09/15/2023 2.8 (L) 3.5 - 5.2 g/dL Final     Protein:   Total Protein   Date Value Ref Range Status   09/15/2023 6.2 6.0 - 8.4 g/dL Final     Lactic acid:   Lab Results   Component Value Date    LACTATE 2.4 (H) 09/13/2023    LACTATE 1.4 01/28/2019       Significant Imaging: I have reviewed all pertinent imaging  results/findings within the past 24 hours.      I spent a total of 90 minutes on the day of the visit. This includes face to face time in discussion of goals of care, symptom assessment, coordination of care and emotional support.  This also includes non-face to face time preparing to see the patient (eg, review of tests/imaging), obtaining and/or reviewing separately obtained history, documenting clinical information in the electronic or other health record, independently interpreting results and communicating results to the patient/family/caregiver, or care coordinator.    Danyell Neil NP  Palliative Medicine  [facility] [place] - Intensive Care

## 2023-09-15 NOTE — ASSESSMENT & PLAN NOTE
-Imaging noted pleural effusion on left  -Not coughing or septic  -No leukocytosis on admit and procalcitonin is negative  -Doubt actual pneumonia - most likely due to fluid overload  -Pulmonology consulted and input appreciated  -Continue CAP antibiotics for now.

## 2023-09-15 NOTE — ASSESSMENT & PLAN NOTE
-Hb down from baseline, but in a safe range  -No evidence of bleeding  -She is not iron, folate or b12 deficient  -If Hb less than 7 would transfuse, but safe for monitoring now  -Resume home eliquis as risk of stroke with afib is greater.  -Repeat cbc in AM

## 2023-09-15 NOTE — NURSING
[facility] [place]  Nurses Note -- 4 Eyes      9/15/2023       Skin assessed on: Q Shift      [] No Pressure Injuries Present    [x]Prevention Measures Documented    [x] Yes LDA  for Pressure Injury Previously documented   ( Redness, unblanchable)  [] Yes New Pressure Injury Discovered   [] LDA for New Pressure Injury Added      Attending RN:  Stella Hou RN     Second RN:  TOSHIA Dunne

## 2023-09-15 NOTE — SUBJECTIVE & OBJECTIVE
Past Medical History:   Diagnosis Date    A-fib     Breast cancer     invasive ductal carcinoma    CKD (chronic kidney disease)     Diabetes mellitus type II     Fatty liver     GERD (gastroesophageal reflux disease)     Hearing loss of both ears     wears bilateral hearing aids    Hemochromatosis     History of anemia due to CKD     History of pleural effusion     with thoracentesis    Hyperlipidemia     Hypertension     Macular degeneration     Osteoarthritis     Left knee    Osteoporosis     Vaginal delivery     x2    Vitamin D deficiency disease     Wears partial dentures     upper removable bridge       Past Surgical History:   Procedure Laterality Date    AXILLARY NODE DISSECTION Left 10/18/2021    Procedure: LYMPHADENECTOMY, AXILLARY;  Surgeon: Darlene Roca MD;  Location: [facility];  Service: General;  Laterality: Left;    BREAST BIOPSY      BREAST LUMPECTOMY      invasive ductal carcinoma    BREAST SURGERY Bilateral     Reduction r/t h/o fibrocystic disease    CHOLECYSTECTOMY  08/2015    EYE SURGERY      Cat Ext  OU    HYSTERECTOMY      partial due to uterine fibroids    INCISION AND DRAINAGE OF KNEE Left 09/17/2020    Procedure: INCISION AND DRAINAGE, KNEE;  Surgeon: Rolando Wagoner MD;  Location: [facility];  Service: Orthopedics;  Laterality: Left;    INJECTION FOR SENTINEL NODE IDENTIFICATION Left 10/18/2021    Procedure: INJECTION, FOR SENTINEL NODE IDENTIFICATION;  Surgeon: Darlene Roca MD;  Location: [facility];  Service: General;  Laterality: Left;    JOINT REPLACEMENT Left 05/13/2013    TKR    JOINT REPLACEMENT Right 08/03/2015    TKR    MASTECTOMY, PARTIAL Left 10/18/2021    Procedure: MASTECTOMY, PARTIAL -- wire;  Surgeon: Darlene Roca MD;  Location: [facility];  Service: General;  Laterality: Left;  RN PREOP 10/15/2021   VACCINATED-----NEED H/P AND ORDERS    SENTINEL LYMPH NODE BIOPSY Left 10/18/2021    Procedure: BIOPSY, LYMPH NODE, SENTINEL;  Surgeon: Darlene Roca MD;  Location: [facility];   Service: General;  Laterality: Left;    THORACENTESIS      TOTAL KNEE ARTHROPLASTY Right 2015    left knee done 5/2013    WOUND DRESSING Left 09/17/2020    Procedure: WOUND VAC APPLICATION;  Surgeon: Rolando Wagoner MD;  Location: [facility];  Service: Orthopedics;  Laterality: Left;       Review of patient's allergies indicates:  No Known Allergies    Current Facility-Administered Medications on File Prior to Encounter   Medication    denosumab (PROLIA) injection 60 mg     Current Outpatient Medications on File Prior to Encounter   Medication Sig    amiodarone (PACERONE) 200 MG Tab TAKE ONE-HALF TABLET BY MOUTH EVERY DAY *DO NOT TAKE WITH GRAPEFRUIT JUICE*    apixaban (ELIQUIS) 2.5 mg Tab TAKE ONE TABLET BY MOUTH TWICE DAILY    atorvastatin (LIPITOR) 40 MG tablet TAKE ONE TABLET BY MOUTH ONCE DAILY    furosemide (LASIX) 40 MG tablet Take 1 tablet (40 mg total) by mouth 2 (two) times a day.    losartan (COZAAR) 50 MG tablet Take 50 mg by mouth.    allopurinoL (ZYLOPRIM) 100 MG tablet Take 1 tablet by mouth once daily.    calcium carbonate (TUMS) 200 mg calcium (500 mg) chewable tablet Take 2 tablets by mouth.    ergocalciferol (ERGOCALCIFEROL) 50,000 unit Cap Take 50,000 Units by mouth every 30 days.     FLUZONE HIGHDOSE QUAD 21-22  mcg/0.7 mL Syrg     folic acid (FOLVITE) 1 MG tablet TAKE 1 TABLET BY MOUTH EVERY DAY    mupirocin (BACTROBAN) 2 % ointment Apply topically every evening.    solifenacin (VESICARE) 10 MG tablet Take 1 tablet (10 mg total) by mouth once daily.     Family History       Problem Relation (Age of Onset)    Aneurysm Father    Cancer Mother    Hypertension Mother    No Known Problems Sister, Sister, Brother, Daughter, Son          Tobacco Use    Smoking status: Former     Passive exposure: Past    Smokeless tobacco: Never   Substance and Sexual Activity    Alcohol use: Not Currently    Drug use: No    Sexual activity: Not Currently     Review of Systems   Constitutional: Negative.   HENT:  Negative.     Eyes: Negative.    Endocrine: Negative.    Hematologic/Lymphatic: Negative.    Skin: Negative.    Musculoskeletal: Negative.    Gastrointestinal: Negative.    Genitourinary: Negative.    Neurological: Negative.    Psychiatric/Behavioral: Negative.     Allergic/Immunologic: Negative.      Objective:     Vital Signs (Most Recent):  Temp: 97.8 °F (36.6 °C) (09/15/23 1101)  Pulse: 105 (09/15/23 1200)  Resp: (!) 21 (09/15/23 1200)  BP: 109/74 (09/15/23 1200)  SpO2: (!) 89 % (09/15/23 1200) Vital Signs (24h Range):  Temp:  [97.6 °F (36.4 °C)-98.2 °F (36.8 °C)] 97.8 °F (36.6 °C)  Pulse:  [] 105  Resp:  [15-28] 21  SpO2:  [87 %-99 %] 89 %  BP: (100-133)/(58-83) 109/74     Weight: 77.2 kg (170 lb 3.1 oz)  Body mass index is 31.13 kg/m².    SpO2: (!) 89 %         Intake/Output Summary (Last 24 hours) at 9/15/2023 1250  Last data filed at 9/15/2023 1000  Gross per 24 hour   Intake 923.38 ml   Output 550 ml   Net 373.38 ml       Lines/Drains/Airways       Drain  Duration             Female External Urinary Catheter 09/14/23 2300 <1 day              Peripheral Intravenous Line  Duration                  Peripheral IV - Single Lumen 09/14/23 1954 20 G Left Forearm <1 day         Peripheral IV - Single Lumen 09/14/23 2340 22 G Left Forearm <1 day                     Physical Exam  Constitutional:       Appearance: Normal appearance. She is well-developed.   HENT:      Head: Normocephalic.   Eyes:      Pupils: Pupils are equal, round, and reactive to light.   Cardiovascular:      Rate and Rhythm: Tachycardia present. Rhythm irregular.   Pulmonary:      Effort: Pulmonary effort is normal.      Breath sounds: Normal breath sounds.   Abdominal:      General: Bowel sounds are normal.      Palpations: Abdomen is soft.      Tenderness: There is no abdominal tenderness.   Musculoskeletal:         General: Normal range of motion.      Cervical back: Normal range of motion and neck supple.   Skin:     General: Skin is  "warm.   Neurological:      Mental Status: She is alert and oriented to person, place, and time.          Significant Labs: BMP:   Recent Labs   Lab 09/14/23  1954 09/15/23  0301   * 135*    137   K 3.6 3.3*   CL 86* 87*   CO2 37* 34*   BUN 58* 56*   CREATININE 1.9* 1.8*   CALCIUM 8.9 8.5*   MG  --  1.7   , CMP   Recent Labs   Lab 09/14/23  1954 09/15/23  0301    137   K 3.6 3.3*   CL 86* 87*   CO2 37* 34*   * 135*   BUN 58* 56*   CREATININE 1.9* 1.8*   CALCIUM 8.9 8.5*   PROT 6.6 6.2   ALBUMIN 3.0* 2.8*   BILITOT 1.6* 1.2*   ALKPHOS 64 57   AST 16 16   ALT 15 14   ANIONGAP 13 16   , CBC   Recent Labs   Lab 09/13/23  1345 09/14/23  1954 09/15/23  0301   WBC 12.12 10.62 7.62   HGB 9.9* 9.3* 9.0*   HCT 30.6* 28.1* 28.9*    255 188   , INR   Recent Labs   Lab 09/14/23 1954   INR 1.3*   , Lipid Panel No results for input(s): "CHOL", "HDL", "LDLCALC", "TRIG", "CHOLHDL" in the last 48 hours., Troponin No results for input(s): "TROPONINI" in the last 48 hours., and All pertinent lab results from the last 24 hours have been reviewed.    Significant Imaging: Echocardiogram: Transthoracic echo (TTE) complete (Cupid Only):   Results for orders placed or performed during the hospital encounter of 09/14/23   Echo   Result Value Ref Range    BSA 1.84 m2    LVOT stroke volume 44.65 cm3    LVIDd 4.05 3.5 - 6.0 cm    LV Systolic Volume 48.07 mL    LV Systolic Volume Index 27.0 mL/m2    LVIDs 3.42 2.1 - 4.0 cm    LV Diastolic Volume 71.92 mL    LV Diastolic Volume Index 40.40 mL/m2    IVS 1.33 (A) 0.6 - 1.1 cm    LVOT diameter 1.84 cm    LVOT area 2.7 cm2    FS 16 (A) 28 - 44 %    Left Ventricle Relative Wall Thickness 0.62 cm    Posterior Wall 1.26 (A) 0.6 - 1.1 cm    LV mass 188.90 g    LV Mass Index 106 g/m2    TDI LATERAL 0.03 m/s    TDI SEPTAL 0.02 m/s    TR Max Wes 3.58 m/s    IVRT 98.95 msec    LVOT peak wes 0.92 m/s    Left Ventricular Outflow Tract Mean Velocity 0.66 cm/s    Left Ventricular " Outflow Tract Mean Gradient 2.06 mmHg    LA size 6.31 cm    Left Atrium Minor Axis 4.64 cm    RVDD 4.54 cm    TAPSE 1.48 cm    RA Major Axis 5.53 cm    RA Width 4.02 cm    AV mean gradient 12 mmHg    AV peak gradient 18 mmHg    Ao peak devan 2.14 m/s    Ao VTI 41.40 cm    LVOT peak VTI 16.80 cm    AV valve area 1.08 cm²    AV Velocity Ratio 0.43     AV index (prosthetic) 0.41     EDUARDO by Velocity Ratio 1.14 cm²    TV peak gradient 2 mmHg    Triscuspid Valve Regurgitation Peak Gradient 51 mmHg    PV PEAK VELOCITY 1.08 m/s    PV peak gradient 5 mmHg    Sinus 2.65 cm    STJ 2.13 cm    Ascending aorta 3.40 cm    IVC diameter 2.13 cm    Mean e' 0.03 m/s    ZLVIDS 0.93     ZLVIDD -1.95

## 2023-09-15 NOTE — ASSESSMENT & PLAN NOTE
"9/15/2023 - Consult   - consult received; interval chart reviewed in detail; discussed pt with MDT during ICU rounds   - pt known to palliative medicine team during recent admissions 8/26-9/6, which also involved ICU level care (see notes from Dr. Teodora Ibarra from that admission for prior GOC/ACP discussions)   - met with patient and her two children Tamar and Selwyn at bedside; re-introduction to palliative medicine team and role in current care and admission; reviewed prior GOC and ACP discussions from prior admissions with Dr. Ibarra   - Pt and family shared confusion regarding code status since discharge; they have discussed DNR/DNI status with friends/family that provided differing views/opinions on what those mean; also have met for home palliative admit visit with [facility] (ARISTID[facility] Del Castillo)  - offering of cardioversion ("shocking heart") and further discussion of reversal of DNR order for this procedure has lead pt and family to wish to further discuss and clarify wishes   - pt does confirm continued wishes for her two children Tamar and Selwyn to share in decision making if she were to lack capacity in the future   - in depth discussion of pt's GOC for cardioversion, separately from code status; pt wished to proceed with cardioversion; family and myself supported this decision; also explained that interventions/escilation of care such as cardioversion and ICU admission are always an option with her previously expressed wishes for DNR/DNI with selective treatment; reviewed requirement for full code status by facility for procedures involving anesthesia, which pt and family full understand and are ok with   - in depth discussion of GOC regarding code status; discussed interventions associated with full code status, risks, and possible outcomes; discussed DNR/DNI; discussed difference between DNR/DNI with comfort focused care and DNR/DNI with selective treatment; also discussed other forms of " life support and pt's GOC   - thorough conversation with pt, daughter, and son; encouraged everyone's participation in discussion and expressed importance of her children understanding her wishes as well; pt and children verbalized improved understanding and clarification of above discussion; they all feel comfortable with continuing with GOC as outlined in current [facility] for DNR/DNI and selective treatment; at this time they wish for escalation of care and treatment of reversible conditions as long as pt continues to have a good quality of life; She does not wish for CPR, ACLS, or intubation/ventilation; she would not wish to undergo any form of long term life support, HD, or aggressive oncologic treatment if indicated in the future   - recommendation for continued home palliative involvement as patient as multiple chronic conditions and likely need for ongoing GOC discussions in the future   - emotional support provided   - Allowed time for questions/concerns; all addressed; expressed availability of myself/palliative team for additional questions/concerns   - updated hospital primary, Dr. Randle and MDT

## 2023-09-15 NOTE — SUBJECTIVE & OBJECTIVE
Past Medical History:   Diagnosis Date    A-fib     Breast cancer     invasive ductal carcinoma    CKD (chronic kidney disease)     Diabetes mellitus type II     Fatty liver     GERD (gastroesophageal reflux disease)     Hearing loss of both ears     wears bilateral hearing aids    Hemochromatosis     History of anemia due to CKD     History of pleural effusion     with thoracentesis    Hyperlipidemia     Hypertension     Macular degeneration     Osteoarthritis     Left knee    Osteoporosis     Vaginal delivery     x2    Vitamin D deficiency disease     Wears partial dentures     upper removable bridge       Past Surgical History:   Procedure Laterality Date    AXILLARY NODE DISSECTION Left 10/18/2021    Procedure: LYMPHADENECTOMY, AXILLARY;  Surgeon: Darlene Roca MD;  Location: [facility];  Service: General;  Laterality: Left;    BREAST BIOPSY      BREAST LUMPECTOMY      invasive ductal carcinoma    BREAST SURGERY Bilateral     Reduction r/t h/o fibrocystic disease    CHOLECYSTECTOMY  08/2015    EYE SURGERY      Cat Ext  OU    HYSTERECTOMY      partial due to uterine fibroids    INCISION AND DRAINAGE OF KNEE Left 09/17/2020    Procedure: INCISION AND DRAINAGE, KNEE;  Surgeon: Rolando Wagoner MD;  Location: [facility];  Service: Orthopedics;  Laterality: Left;    INJECTION FOR SENTINEL NODE IDENTIFICATION Left 10/18/2021    Procedure: INJECTION, FOR SENTINEL NODE IDENTIFICATION;  Surgeon: Darlene Roca MD;  Location: [facility];  Service: General;  Laterality: Left;    JOINT REPLACEMENT Left 05/13/2013    TKR    JOINT REPLACEMENT Right 08/03/2015    TKR    MASTECTOMY, PARTIAL Left 10/18/2021    Procedure: MASTECTOMY, PARTIAL -- wire;  Surgeon: Darlene Roca MD;  Location: [facility];  Service: General;  Laterality: Left;  RN PREOP 10/15/2021   VACCINATED-----NEED H/P AND ORDERS    SENTINEL LYMPH NODE BIOPSY Left 10/18/2021    Procedure: BIOPSY, LYMPH NODE, SENTINEL;  Surgeon: Darlene Roca MD;  Location: [facility];   Service: General;  Laterality: Left;    THORACENTESIS      TOTAL KNEE ARTHROPLASTY Right 2015    left knee done 5/2013    WOUND DRESSING Left 09/17/2020    Procedure: WOUND VAC APPLICATION;  Surgeon: Rolando Wagoner MD;  Location: [facility];  Service: Orthopedics;  Laterality: Left;       Review of patient's allergies indicates:  No Known Allergies    Current Facility-Administered Medications on File Prior to Encounter   Medication    denosumab (PROLIA) injection 60 mg     Current Outpatient Medications on File Prior to Encounter   Medication Sig    amiodarone (PACERONE) 200 MG Tab TAKE ONE-HALF TABLET BY MOUTH EVERY DAY *DO NOT TAKE WITH GRAPEFRUIT JUICE*    apixaban (ELIQUIS) 2.5 mg Tab TAKE ONE TABLET BY MOUTH TWICE DAILY    atorvastatin (LIPITOR) 40 MG tablet TAKE ONE TABLET BY MOUTH ONCE DAILY    furosemide (LASIX) 40 MG tablet Take 1 tablet (40 mg total) by mouth 2 (two) times a day.    losartan (COZAAR) 50 MG tablet Take 50 mg by mouth.    allopurinoL (ZYLOPRIM) 100 MG tablet Take 1 tablet by mouth once daily.    calcium carbonate (TUMS) 200 mg calcium (500 mg) chewable tablet Take 2 tablets by mouth.    ergocalciferol (ERGOCALCIFEROL) 50,000 unit Cap Take 50,000 Units by mouth every 30 days.     FLUZONE HIGHDOSE QUAD 21-22  mcg/0.7 mL Syrg     folic acid (FOLVITE) 1 MG tablet TAKE 1 TABLET BY MOUTH EVERY DAY    mupirocin (BACTROBAN) 2 % ointment Apply topically every evening.    solifenacin (VESICARE) 10 MG tablet Take 1 tablet (10 mg total) by mouth once daily.     Family History       Problem Relation (Age of Onset)    Aneurysm Father    Cancer Mother    Hypertension Mother    No Known Problems Sister, Sister, Brother, Daughter, Son          Tobacco Use    Smoking status: Former     Passive exposure: Past    Smokeless tobacco: Never   Substance and Sexual Activity    Alcohol use: Not Currently    Drug use: No    Sexual activity: Not Currently     Review of Systems   Constitutional:  Positive for  fatigue. Negative for fever.   HENT:  Negative for congestion.    Eyes:  Negative for visual disturbance.   Respiratory:  Negative for cough and shortness of breath.    Cardiovascular:  Positive for leg swelling. Negative for chest pain.   Gastrointestinal:  Negative for blood in stool, diarrhea, nausea and vomiting.   Endocrine: Negative for polyuria.   Genitourinary:  Negative for dysuria.   Musculoskeletal:  Negative for back pain.   Neurological:  Negative for syncope.   Psychiatric/Behavioral:  Negative for agitation.      Objective:     Vital Signs (Most Recent):  Temp: 97.8 °F (36.6 °C) (09/14/23 1831)  Pulse: 110 (09/14/23 2232)  Resp: 18 (09/14/23 2033)  BP: 104/83 (09/14/23 2232)  SpO2: (!) 91 % (09/14/23 2218) Vital Signs (24h Range):  Temp:  [97.8 °F (36.6 °C)] 97.8 °F (36.6 °C)  Pulse:  [] 110  Resp:  [18-20] 18  SpO2:  [91 %-98 %] 91 %  BP: (100-128)/(58-83) 104/83     Weight: 72.6 kg (160 lb)  Body mass index is 29.26 kg/m².     Physical Exam  Constitutional:       Comments: Elderly   HENT:      Head: Normocephalic.      Nose: Nose normal.      Mouth/Throat:      Mouth: Mucous membranes are moist.   Eyes:      Extraocular Movements: Extraocular movements intact.   Cardiovascular:      Rate and Rhythm: Tachycardia present.   Pulmonary:      Effort: No respiratory distress.      Breath sounds: No wheezing.   Abdominal:      Tenderness: There is no abdominal tenderness.   Musculoskeletal:         General: Swelling present.      Cervical back: Normal range of motion.   Skin:     General: Skin is warm.      Capillary Refill: Capillary refill takes less than 2 seconds.   Neurological:      Mental Status: She is alert and oriented to person, place, and time.   Psychiatric:         Mood and Affect: Mood normal.         Behavior: Behavior normal.                Significant Labs: All pertinent labs within the past 24 hours have been reviewed.    Significant Imaging: I have reviewed all pertinent imaging  results/findings within the past 24 hours.

## 2023-09-15 NOTE — ASSESSMENT & PLAN NOTE
Effusion likely secondary to fluid overload. On evaluation simple fluid collection without Hct sign that would be concerning of spontaneous bleed. Will hold off on thoracentesis for now. Would hold eliquis for 48 hrs and switch to full dose lovenox vs hold anticoagulation until ensure lack of necessity of procedure. Present on recent CT chest while being treated for PNA. No current signs of symptoms of PNA. Would discontinue CAP coverage.

## 2023-09-15 NOTE — PROGRESS NOTES
Transitional Care Note  Subjective:       Patient ID: Barbara Rizo is a 83 y.o. female.  Chief Complaint: Hospital Follow Up    Family and/or Caretaker present at visit?  Yes.  Diagnostic tests reviewed/disposition: No diagnosic tests pending after this hospitalization.  Disease/illness education: CHF and anemia  Home health/community services discussion/referrals: Patient has home health established at unknown service .   Establishment or re-establishment of referral orders for community resources: No other necessary community resources.   Discussion with other health care providers: No discussion with other health care providers necessary.   HPI  Review of Systems    Objective:      Physical Exam    Assessment:       1. Acute on chronic diastolic CHF (congestive heart failure)    2. Acute anemia    3. Acute respiratory failure with hypoxia    4. Leukocytosis, unspecified type        Plan:       See note below

## 2023-09-15 NOTE — ASSESSMENT & PLAN NOTE
Patient with Hypoxic Respiratory failure which is Chronic.  she is on home oxygen at 4-5 LPM. Supplemental oxygen was provided and noted-      .   Signs/symptoms of respiratory failure include- respiratory distress. Labs and images were reviewed. Patient Has not had a recent ABG. Will treat underlying causes and adjust management of respiratory failure as follows-    -supplemental oxygen, wean as able  -IV antibiotics for pneumonia  -IV Lasix for pleural effusion  -consult pulmonology

## 2023-09-15 NOTE — CONSULTS
[place] - Intensive Care  Cardiology  Consult Note    Patient Name: Barbara Rioz  MRN: [id number]  Admission Date: 9/14/2023  Hospital Length of Stay: 1 days  Code Status: Full Code   Attending Provider: Selwyn Randle MD   Consulting Provider: Constantine Chambers MD  Primary Care Physician: Paras Oro MD  Principal Problem:Normocytic anemia    Patient information was obtained from patient and ER records.     Inpatient consult to Cardiology  Consult performed by: Constantine Chambers MD  Consult ordered by: Lisa Rodriguez MD        Subjective:     Chief Complaint:  afib rvr     HPI:   Patient is a pleasant 83-year-old lady with a past medical history significant hypertension, diabetes, chronic kidney disease, atrial fibrillation on Eliquis, has been compliant with the Eliquis and has not missed even a single dose over the past multiple months.  Follows with Dr. Lomas in Cardiology Clinic.  Came in with decrease in hemoglobin to 9.  Was sent to ER for further evaluation.  In ER negative for melena or hematochezia.  Discussed case with primary care Dr. Randle and there is no concern for active bleeding.  She was also started on amiodarone drip because of AFib with RVR.  Currently in ICU with AFib with RVR on amiodarone drip.        HPI: 83 years old female with PMH significant for hypertension, diabetes mellitus, CKD, atrial fibrillation on Eliquis, GERD was sent to ER from her PCP because of dropping blood counts. Patient was recently hospitalized for fluid overload and hyponatremia. Patient reported she had blood workup done and got call from her PCP to go to ER. Review of the chart revealed that hemoglobin dropped from 13.5 on 08/27 to 9.3 today on 09/14. Patient denied any bleeding from any site or black stools. Patient reported she is doing fine otherwise and denied any symptoms at this time including chest pain shortness of breath abdominal pain fever chills nausea vomiting diarrhea or dizziness.  Patient had rectal exam done by ER physician which was negative for melena or hematochezia. Chest x-ray showed moderate left pleural effusion with associated compressive atelectasis and/or lower lobe consolidation. Other lab work showed WBC 10.62, creatinine 1.9, potassium 3.6 and BNP 1317. Patient was found to be in atrial fibrillation with RVR for which has been started on amiodarone infusion. Patient is on 4-5 L O2 NC at baseline. Patient received IV amiodarone and IV Lasix in ER.       PAF - TALHA/CV 9/21/15, 1/30/19, 2/2/19 -  on amiodarone and eliquis, Diastolic CHF,  HTN, DM, HLD, MGUS, CRI - Cr 1.8, AS/MR - mild to moderate, breast CA, COPD on home O2     Admitted 1/28/19  Ms. Barbara Rizo is a 78 y.o. female with essential hypertension, hyperlipidemia (LDL 62.4 Jul 2018), type 2 diabetes mellitus (HbA1c 5.8% Jul 2018), CKD Stage 3, paroxysmal atrial fibrillation (ERE2AC9-KKMx score 5) not on chronic anticoagulation, obesity (BMI 36.0), MGUS, and chronic gout who presents to [facility] ED with complaints of coughing for three days.  She started to have a non-productive cough, wheezing, and mild dyspnea three days ago which has steadily been worsening since onset.  She also complains of a subjective fever but otherwise denies any nausea, vomiting, chest pain, palpitations, pleurisy, nor any diaphoresis.  She denies any recent travel, hemoptysis, nor any lower extremity pain or swelling.  She does say that she's under a lot of stress recently with her ex- dying yesterday at [facility] due to complications from a heart valve replacement two weeks ago.  She does say that she may have had sick contacts while visiting her ex-.  Her appetite has been poor but she denies any weight loss.     While at her ENT's appointment today she decided to schedule an appointment with her PCP to evaluate her upper airway complaints.  She was unable to see her regular PCP, Dr. Paras Oro, but was  able to be seen by another physician who became concerned when she was noted to be in AFib with RVR on EKG.  He recommended EMS transportation to the ED but she refused and decided to drive herself.  Her cardiologist is Dr. Amauri Lomas.     Pt admitted to ICU with afib rvr on amiodarone infusion. Pt with elevated HR  and after TALHA done patient became very agitated and anxious. Ativan 1mg IV given and symptoms got worse. HR up into the 170s and O2 sats dropped to 77%. Placed on NRB. Improved O2 sats. 5mg IV haldol given as patient was trying to get out of bed and pulling oxygen mask off. Cardizem 10mg Iv given with improved HR to 120s-130s. xopenex scheduled Q 8 hours. Changed to zosyn with temp 102F.   1/30- successful cardioversion, post procedure confused and pulling oxygen off, desaturation, constant re-direction, monitored in ICU overnight. Changed to oral amiodarone. eliquis for anticoagulation. Holding parameters on BP meds. IV steroids, nebs and antibiotic for probable lower resp infection. IV lasix as Bp tolerates.   1/31- HR remain under control NSR 60-70s.On amiodarone, metoprolol and eliquis.  Resp status improved and weaned oxygen. Steroids changed to oral route. DC zosyn and switch to augmentin. Add acapella to nebs. Cr increased from baseline (1.5-1.7). Gentle IVF and monitor with repeat BMP later, was stable,. PT,OT consulted for dc planning. Weaning oxygen. PT/OT consulted and rec H/H without equipment. The patient was transferred to the floor on 1/31. Nephrology was consulted for worsening renal function.      2/2- pt transferred with afib rvr. Successful cardioversion again . Continued oral amiodarone and BB.  still has aggressive cough and URI symptoms. On mucolytic and antibiotic.   2/3- HR 50-60s sinus. Cr sightly higher, sodium 128. BNP 1500. Lasix x one dose. Bringing up more mucus. Steroids weaned 20mg. Still faint wheezes on exam.  Patient had worsening ARF on CKD 3, keeped on george and  monitor,nephrology was following.reconsulted PT,OT.fley was removed latera nd she had improvement in ARF.  Changed diet for low slat diet duo to hyponatremia with improvement.  She did well with PT,OT.  Her wheezing and respiratory status is improved,  Patient has been discharged home with HH and PO Abx and OAC and amiodarone and follow up with PCP,nephrology and cardiology in next few days.      1-29:  Admitted with AFib with RVR  1-30:  Underwent TALHA/DC cardioversion successfully  2-1 Back in A-fib 120s. SOB  2-3 Still coughing and SOB. NSR 60      2/2/19 CV - 360J converted A-fib to sinus bradycardia 55. Change amiodarone to po. Ok for telemetry     Echo 3/7/23   The left ventricle is normal in size with eccentric hypertrophy and normal systolic function.   The estimated ejection fraction is 65%.   Grade II left ventricular diastolic dysfunction.   Mild tricuspid regurgitation.   Small pericardial effusion.   Moderate right atrial enlargement.   Severe left atrial enlargement.   Normal right ventricular size with normal right ventricular systolic function.   There is moderate aortic valve stenosis.   Aortic valve area is 1.20 cm2; peak velocity is 2.70 m/s; mean gradient is 19 mmHg.   Mild mitral regurgitation.   Mild pulmonic regurgitation.   Normal central venous pressure (3 mmHg).   The estimated PA systolic pressure is 46 mmHg.   There is pulmonary hypertension.     2/13/19 Less SOB since discharge  EKG NSR with PACs 65   Decrease amiodarone 200 qd  OV 2 months - will discuss changing amiodarone to flecainide at that time     4/17/19 Denies CP or SOB  EKG sinus bradycardia 44  HR mostly runs 40-50 at home  Change metoprolol 100 qd to toprol XL 50 qd - may decrease further if bradycardia persists  Discussed alternative AAD - given her repeated episodes of PAF we are both in agreement to stay on amiodarone for now  OV 1 month with TSH, CMP, EKG     5/27/19 HR improved to the low 50s  Has held  eliquis the last 2 days due to bleeding from a recent skin CA removal  Denies CP or SOB     9/11/19 Denies CP or SOB  Some LE edema  EKG sinus bradycardia 46 NSSTT changes  Stop norvasc with LE edema  Add cardura 4 mg qd  Decrease toprol 25 qd with bradycardia  OV 3 months with CMP, TSH     12/11/19 Denies CP or SOB  LE edema resolved after stopping norvasc  BP controlled by home readings  HR runs 45-50  EKG sinus bradycardia 47 1st degree AVB  Denies dizziness  Bradycardia well tolerated - will continue Rx  OV 6 months with echo      6/18/20 Denies CP or dizziness  Mild stable WILKINSON  EKG sinus bradycardia 44 otherwise ok  Bradycardia continues to be well tolerated  OV 6 months with CMP, TSH on chronic amiodarone        10/14/20 EKG  - suspect this is A-flutter 2:1 with aberrancy  Denies CP, SOB, or palpitations  Appears to be in A-flutter RVR - will send to the ER for admission. If issues with tachy-marlon continue will likely end up needing PPM     10/23/20 HR were better controlled when she went to ER. She was restarted on metoprolol and discharged. HR mostly 40-50s at home. Bradycardia well tolerated. Need clearance for skin graft surgery at  on left leg  EKG sinus bradycardia 43   Back in NSR - bradycardia well tolerated. No indication for PPM at this point  Cleared for skin graft surgery at moderate cardiac risk - ok to hold eliquis 2 days before  OV 3 months  Continue Rx for PAF, HTN, CHF     12/1/20 Denies CP or SOB. Did well after her skin graft surgery  HR 45-50. BP elevated - controlled by outside readings  Continue Rx for PAF, HTN, HLD, CHF  OV 3 months     3/1/21 Denies CP or SOB  EKG NSR 60 IVCD  HR 55-60 by home readings as well      Continue Rx for PAF, HTN, HLD, diastolic CHF  OV 6 months with CMP, TSH on chronic amiodarone, Echo to look at MR           5/6/21 Denies further dizziness or syncope  EKG NSR 69 1st degree AVB IVCD  On amiodarone 100 qd and off of metoprolol  TSH 2.4 LFT ok      suspect recent syncope was from dehydration and not bradycardia  Staying in NSR on amiodarone 100 qd and off of metoprolol  Continue Rx for PAF, HTN, HLD, diastolic CHF  OV 3 months     8/6/21 Denies CP, SOB, or dizziness  EKG NSR 1st degree AVB RBBB   Continue Rx for PAF, HTN, HLD, diastolic CHF  OV 6 months with CMP, TSH  Cleared for breast surgery at moderate cardiac risk - ok to hold eliquis 3 days before         1/19/22 Less SOB since discharge. On Home O2. Scheduled for pulmonary evaluation  EKG NSR 1st degree AVB RBBB   Continue Rx for PAF, HTN, HLD, diastolic CHF  Volume status appears stable  OV 3 months with BNP, BMP     4/4/22 Denies CP, stable mild WILKINSON  BP controlled   Continue Rx for PAF, HTN, HLD, diastolic CHF  Volume status appears stable  OV 3 months with BNP, CMP, TSH        7/8/22 Denies CP, stable WILKINSON - only uses O2 at night  EKG NSR RBBB/LAFB    Continue Rx for PAF, HTN, HLD, diastolic CHF  Volume status appears stable. Discussed decreasing potassium in diet  OV 6 months with BNP, CMP, TSH     1/9/23 labs not done. Denies CP, mild stable WILKINSON  BP controlled by home readings  EKG NSR RBBB/LAFB    Continue Rx for PAF, HTN, HLD, diastolic CHF  OV 3 months with echo, CMP, TSH, BNP     3/10/23 Labs not done. Was placed on metolazone recently by Dr Boyle for volume overload which since has resolved   CMP, TSH, BNP - next week - if stable then OV 3 months      Continue Rx for PAF, HTN, HLD, diastolic CHF     Labs 3/17/23  K 4.6  Cr 1.9  LFT ok    TSH 3.0     6/16/23 Denies CP or SOB  EKG NSR 1st degree AVB RBBB/LAFB  BP controlled      Past Medical History:   Diagnosis Date    A-fib     Breast cancer     invasive ductal carcinoma    CKD (chronic kidney disease)     Diabetes mellitus type II     Fatty liver     GERD (gastroesophageal reflux disease)     Hearing loss of both ears     wears bilateral hearing aids    Hemochromatosis     History of anemia due to CKD     History of pleural  effusion     with thoracentesis    Hyperlipidemia     Hypertension     Macular degeneration     Osteoarthritis     Left knee    Osteoporosis     Vaginal delivery     x2    Vitamin D deficiency disease     Wears partial dentures     upper removable bridge       Past Surgical History:   Procedure Laterality Date    AXILLARY NODE DISSECTION Left 10/18/2021    Procedure: LYMPHADENECTOMY, AXILLARY;  Surgeon: Darlene Roca MD;  Location: [facility];  Service: General;  Laterality: Left;    BREAST BIOPSY      BREAST LUMPECTOMY      invasive ductal carcinoma    BREAST SURGERY Bilateral     Reduction r/t h/o fibrocystic disease    CHOLECYSTECTOMY  08/2015    EYE SURGERY      Cat Ext  OU    HYSTERECTOMY      partial due to uterine fibroids    INCISION AND DRAINAGE OF KNEE Left 09/17/2020    Procedure: INCISION AND DRAINAGE, KNEE;  Surgeon: Rolando Wagoner MD;  Location: [facility] OR;  Service: Orthopedics;  Laterality: Left;    INJECTION FOR SENTINEL NODE IDENTIFICATION Left 10/18/2021    Procedure: INJECTION, FOR SENTINEL NODE IDENTIFICATION;  Surgeon: Darlene Roca MD;  Location: [facility];  Service: General;  Laterality: Left;    JOINT REPLACEMENT Left 05/13/2013    TKR    JOINT REPLACEMENT Right 08/03/2015    TKR    MASTECTOMY, PARTIAL Left 10/18/2021    Procedure: MASTECTOMY, PARTIAL -- wire;  Surgeon: Darlene Roca MD;  Location: [facility];  Service: General;  Laterality: Left;  RN PREOP 10/15/2021   VACCINATED-----NEED H/P AND ORDERS    SENTINEL LYMPH NODE BIOPSY Left 10/18/2021    Procedure: BIOPSY, LYMPH NODE, SENTINEL;  Surgeon: Darlene Roca MD;  Location: [facility];  Service: General;  Laterality: Left;    THORACENTESIS      TOTAL KNEE ARTHROPLASTY Right 2015    left knee done 5/2013    WOUND DRESSING Left 09/17/2020    Procedure: WOUND VAC APPLICATION;  Surgeon: Rolando Wagoner MD;  Location: [facility];  Service: Orthopedics;  Laterality: Left;       Review of patient's allergies indicates:  No  Known Allergies    Current Facility-Administered Medications on File Prior to Encounter   Medication    denosumab (PROLIA) injection 60 mg     Current Outpatient Medications on File Prior to Encounter   Medication Sig    amiodarone (PACERONE) 200 MG Tab TAKE ONE-HALF TABLET BY MOUTH EVERY DAY *DO NOT TAKE WITH GRAPEFRUIT JUICE*    apixaban (ELIQUIS) 2.5 mg Tab TAKE ONE TABLET BY MOUTH TWICE DAILY    atorvastatin (LIPITOR) 40 MG tablet TAKE ONE TABLET BY MOUTH ONCE DAILY    furosemide (LASIX) 40 MG tablet Take 1 tablet (40 mg total) by mouth 2 (two) times a day.    losartan (COZAAR) 50 MG tablet Take 50 mg by mouth.    allopurinoL (ZYLOPRIM) 100 MG tablet Take 1 tablet by mouth once daily.    calcium carbonate (TUMS) 200 mg calcium (500 mg) chewable tablet Take 2 tablets by mouth.    ergocalciferol (ERGOCALCIFEROL) 50,000 unit Cap Take 50,000 Units by mouth every 30 days.     FLUZONE HIGHDOSE QUAD 21-22  mcg/0.7 mL Syrg     folic acid (FOLVITE) 1 MG tablet TAKE 1 TABLET BY MOUTH EVERY DAY    mupirocin (BACTROBAN) 2 % ointment Apply topically every evening.    solifenacin (VESICARE) 10 MG tablet Take 1 tablet (10 mg total) by mouth once daily.     Family History       Problem Relation (Age of Onset)    Aneurysm Father    Cancer Mother    Hypertension Mother    No Known Problems Sister, Sister, Brother, Daughter, Son          Tobacco Use    Smoking status: Former     Passive exposure: Past    Smokeless tobacco: Never   Substance and Sexual Activity    Alcohol use: Not Currently    Drug use: No    Sexual activity: Not Currently     Review of Systems   Constitutional: Negative.   HENT: Negative.     Eyes: Negative.    Endocrine: Negative.    Hematologic/Lymphatic: Negative.    Skin: Negative.    Musculoskeletal: Negative.    Gastrointestinal: Negative.    Genitourinary: Negative.    Neurological: Negative.    Psychiatric/Behavioral: Negative.     Allergic/Immunologic: Negative.      Objective:      Vital Signs (Most Recent):  Temp: 97.8 °F (36.6 °C) (09/15/23 1101)  Pulse: 105 (09/15/23 1200)  Resp: (!) 21 (09/15/23 1200)  BP: 109/74 (09/15/23 1200)  SpO2: (!) 89 % (09/15/23 1200) Vital Signs (24h Range):  Temp:  [97.6 °F (36.4 °C)-98.2 °F (36.8 °C)] 97.8 °F (36.6 °C)  Pulse:  [] 105  Resp:  [15-28] 21  SpO2:  [87 %-99 %] 89 %  BP: (100-133)/(58-83) 109/74     Weight: 77.2 kg (170 lb 3.1 oz)  Body mass index is 31.13 kg/m².    SpO2: (!) 89 %         Intake/Output Summary (Last 24 hours) at 9/15/2023 1250  Last data filed at 9/15/2023 1000  Gross per 24 hour   Intake 923.38 ml   Output 550 ml   Net 373.38 ml       Lines/Drains/Airways       Drain  Duration             Female External Urinary Catheter 09/14/23 2300 <1 day              Peripheral Intravenous Line  Duration                  Peripheral IV - Single Lumen 09/14/23 1954 20 G Left Forearm <1 day         Peripheral IV - Single Lumen 09/14/23 2340 22 G Left Forearm <1 day                     Physical Exam  Constitutional:       Appearance: Normal appearance. She is well-developed.   HENT:      Head: Normocephalic.   Eyes:      Pupils: Pupils are equal, round, and reactive to light.   Cardiovascular:      Rate and Rhythm: Tachycardia present. Rhythm irregular.   Pulmonary:      Effort: Pulmonary effort is normal.      Breath sounds: Normal breath sounds.   Abdominal:      General: Bowel sounds are normal.      Palpations: Abdomen is soft.      Tenderness: There is no abdominal tenderness.   Musculoskeletal:         General: Normal range of motion.      Cervical back: Normal range of motion and neck supple.   Skin:     General: Skin is warm.   Neurological:      Mental Status: She is alert and oriented to person, place, and time.          Significant Labs: BMP:   Recent Labs   Lab 09/14/23  1954 09/15/23  0301   * 135*    137   K 3.6 3.3*   CL 86* 87*   CO2 37* 34*   BUN 58* 56*   CREATININE 1.9* 1.8*   CALCIUM 8.9 8.5*   MG  --  1.7  "  , CMP   Recent Labs   Lab 09/14/23  1954 09/15/23  0301    137   K 3.6 3.3*   CL 86* 87*   CO2 37* 34*   * 135*   BUN 58* 56*   CREATININE 1.9* 1.8*   CALCIUM 8.9 8.5*   PROT 6.6 6.2   ALBUMIN 3.0* 2.8*   BILITOT 1.6* 1.2*   ALKPHOS 64 57   AST 16 16   ALT 15 14   ANIONGAP 13 16   , CBC   Recent Labs   Lab 09/13/23  1345 09/14/23  1954 09/15/23  0301   WBC 12.12 10.62 7.62   HGB 9.9* 9.3* 9.0*   HCT 30.6* 28.1* 28.9*    255 188   , INR   Recent Labs   Lab 09/14/23 1954   INR 1.3*   , Lipid Panel No results for input(s): "CHOL", "HDL", "LDLCALC", "TRIG", "CHOLHDL" in the last 48 hours., Troponin No results for input(s): "TROPONINI" in the last 48 hours., and All pertinent lab results from the last 24 hours have been reviewed.    Significant Imaging: Echocardiogram: Transthoracic echo (TTE) complete (Cupid Only):   Results for orders placed or performed during the hospital encounter of 09/14/23   Echo   Result Value Ref Range    BSA 1.84 m2    LVOT stroke volume 44.65 cm3    LVIDd 4.05 3.5 - 6.0 cm    LV Systolic Volume 48.07 mL    LV Systolic Volume Index 27.0 mL/m2    LVIDs 3.42 2.1 - 4.0 cm    LV Diastolic Volume 71.92 mL    LV Diastolic Volume Index 40.40 mL/m2    IVS 1.33 (A) 0.6 - 1.1 cm    LVOT diameter 1.84 cm    LVOT area 2.7 cm2    FS 16 (A) 28 - 44 %    Left Ventricle Relative Wall Thickness 0.62 cm    Posterior Wall 1.26 (A) 0.6 - 1.1 cm    LV mass 188.90 g    LV Mass Index 106 g/m2    TDI LATERAL 0.03 m/s    TDI SEPTAL 0.02 m/s    TR Max Wes 3.58 m/s    IVRT 98.95 msec    LVOT peak wes 0.92 m/s    Left Ventricular Outflow Tract Mean Velocity 0.66 cm/s    Left Ventricular Outflow Tract Mean Gradient 2.06 mmHg    LA size 6.31 cm    Left Atrium Minor Axis 4.64 cm    RVDD 4.54 cm    TAPSE 1.48 cm    RA Major Axis 5.53 cm    RA Width 4.02 cm    AV mean gradient 12 mmHg    AV peak gradient 18 mmHg    Ao peak wes 2.14 m/s    Ao VTI 41.40 cm    LVOT peak VTI 16.80 cm    AV valve area 1.08 " cm²    AV Velocity Ratio 0.43     AV index (prosthetic) 0.41     EDUARDO by Velocity Ratio 1.14 cm²    TV peak gradient 2 mmHg    Triscuspid Valve Regurgitation Peak Gradient 51 mmHg    PV PEAK VELOCITY 1.08 m/s    PV peak gradient 5 mmHg    Sinus 2.65 cm    STJ 2.13 cm    Ascending aorta 3.40 cm    IVC diameter 2.13 cm    Mean e' 0.03 m/s    ZLVIDS 0.93     ZLVIDD -1.95      Assessment and Plan:     * Normocytic anemia  Management and evaluation per primary team    Lower lobe pneumonia        Atrial fibrillation with RVR  Paroxysmal AFib with RVR.  On amiodarone drip.  Continues to be tachycardic.  Will docardioversion today.  Patient states she has been compliant with her Eliquis and has not missed even a single dose for many months.  No need for TALHA    Risks, benefits and alternatives of the Cardioversion procedure were discussed with the patient including throat irritation, aspiration, anesthetic complications, esophageal irritation/perforation, skin irritation, arrhythmia, etc.  Patient understands and agrees to proceed.  Consent was placed on the chart.      CKD (chronic kidney disease), stage IV        Essential hypertension        Hyperlipidemia            VTE Risk Mitigation (From admission, onward)         Ordered     apixaban tablet 5 mg  2 times daily         09/15/23 1005     IP VTE HIGH RISK PATIENT  Once         09/14/23 2234     Place sequential compression device  Until discontinued         09/14/23 2234                Thank you for your consult. I will follow-up with patient. Please contact us if you have any additional questions.    Constantine Chambers MD  Cardiology   [facility] - Intensive Care    Critical Care Time:  45 minutes     Critical care was time spent personally by me on the following activities: development of treatment plan with patient or surrogate and bedside caregivers, discussions with consultants, evaluation of patient's response to treatment, examination of patient, ordering and  performing treatments and interventions, ordering and review of laboratory studies, ordering and review of radiographic studies, pulse oximetry, re-evaluation of patient's condition. This critical care time did not overlap with that of any other provider or involve time for any procedures.

## 2023-09-15 NOTE — EICU
83 year old female admitted with atrial fibrillation with RVR initiated on amiodarone infusion after bolus,acute anemia with no active bleeding identified.  Admitted with sodium 119 2 weeks ago and treated for fluid overload.  Past history of HTN,DM,CKD,proteinuria,atrial fibrillation on apixiban,diastolic heart failure with PHT ,aortic stenosis,GERD,breast cancer,Monoclonal gammapathy of unknown significance,chronic respiratory failure on home oxygen,HLD,obesity    On camera assessment patient alert and awake and communicates   on amiodarone infusion,/73,SPO2 97% NC 5L per minute,RR22    Data  HB 9.3<-10.3<-13.5 (two weeks ago)  Serum bicarbonate 37  BUN/CR 58/1.9(41/2 two weeks ago)  Albumin 3  T.Bilirubin 1.6  Protime 13.9  BNP 1317  Lactate 2.4  EKG atrial fibrillation VR 99/minute,bifascicular block.Qtc 551msec  ECHO 1/2023The left ventricle is normal in size with eccentric hypertrophy and normal systolic function.  The estimated ejection fraction is 65%.,Grade II left ventricular diastolic dysfunction,Mild tricuspid regurgitation.,Small pericardial effusion.,Moderate right atrial enlargement.,Severe left atrial enlargement.,Normal right ventricular size with normal right ventricular systolic function,There is moderate aortic valve stenosis.,Aortic valve area is 1.20 cm2; peak velocity is 2.70 m/s; mean gradient is 19 mmHg.,Mild mitral regurgitation.,Mild pulmonic regurgitation.,Normal central venous pressure (3 mmHg).,The estimated PA systolic pressure is 46 mmHg.  There is pulmonary hypertension.  Chest xray with left pleural effusion noted since 3 months now      Assessment and plan:  1.Atrial fib with RVR-may be related to lung disease-on amiodarone infusion-apixiban can be restarted when active bleeding excluded.  2.Acute anemia-anticoagulation held,await endoscopy,may need hematology evaluation  3.left pleural effusion-?CHF-thoracentesis with USG guidance planned.  4.CKD-with proteinuria-avoid  nephrotoxins,strict I/Os.  5.CHF-avoid diuresis now  6.Chronic respiratory failure-due to CHF -wean NC oxygen to keep spo2 greater than 95%,continue antibiotics with recephin and azithromycin and deescalate with cultures and unlikely she has a pneumonia.  7.Prolonged QT interval-maybe related to amiodarone,monitor closely,    DVT prophylaxsis-SCD.

## 2023-09-15 NOTE — ASSESSMENT & PLAN NOTE
- no overt source of bleeding on admit work up; not iron deficient   - was reason for initially being directed to seek ED care following OP labs   - continued management per primary teams

## 2023-09-15 NOTE — SUBJECTIVE & OBJECTIVE
Interval History: No acute events overnight.  In good spirits and denies cp and sob.  Underwent successful dccv this afternoon.  All questions answered and patient had no further complaints.    Objective:     Vital Signs (Most Recent):  Temp: 97 °F (36.1 °C) (09/15/23 1600)  Pulse: (!) 53 (09/15/23 1605)  Resp: (!) 22 (09/15/23 1605)  BP: (!) 108/54 (09/15/23 1605)  SpO2: 95 % (09/15/23 1605) Vital Signs (24h Range):  Temp:  [96.8 °F (36 °C)-98.2 °F (36.8 °C)] 97 °F (36.1 °C)  Pulse:  [] 53  Resp:  [15-28] 22  SpO2:  [87 %-99 %] 95 %  BP: ()/(54-83) 108/54     Weight: 77.2 kg (170 lb 3.1 oz)  Body mass index is 31.13 kg/m².    Intake/Output Summary (Last 24 hours) at 9/15/2023 1700  Last data filed at 9/15/2023 1503  Gross per 24 hour   Intake 1194.41 ml   Output 550 ml   Net 644.41 ml         Physical Exam  Vitals and nursing note reviewed.   Constitutional:       Comments: Elderly   HENT:      Head: Normocephalic.      Nose: Nose normal.      Mouth/Throat:      Mouth: Mucous membranes are moist.   Eyes:      Extraocular Movements: Extraocular movements intact.   Cardiovascular:      Rate and Rhythm: Tachycardia present. Rhythm irregular.   Pulmonary:      Effort: No respiratory distress.      Breath sounds: No wheezing.   Abdominal:      Tenderness: There is no abdominal tenderness.   Musculoskeletal:         General: Swelling present.      Cervical back: Normal range of motion.   Skin:     General: Skin is warm.      Capillary Refill: Capillary refill takes less than 2 seconds.   Neurological:      Mental Status: She is alert and oriented to person, place, and time.   Psychiatric:         Mood and Affect: Mood normal.         Behavior: Behavior normal.             Significant Labs: All pertinent labs within the past 24 hours have been reviewed.    Significant Imaging: I have reviewed all pertinent imaging results/findings within the past 24 hours.

## 2023-09-15 NOTE — ASSESSMENT & PLAN NOTE
Paroxysmal AFib with RVR.  On amiodarone drip.  Continues to be tachycardic.  Will docardioversion today.  Patient states she has been compliant with her Eliquis and has not missed even a single dose for many months.  No need for TALHA    Risks, benefits and alternatives of the Cardioversion procedure were discussed with the patient including throat irritation, aspiration, anesthetic complications, esophageal irritation/perforation, skin irritation, arrhythmia, etc.  Patient understands and agrees to proceed.  Consent was placed on the chart.

## 2023-09-15 NOTE — ASSESSMENT & PLAN NOTE
Patient with Hypoxic Respiratory failure which is Chronic.  she is on home oxygen at 4 LPM. Supplemental oxygen was provided and noted-      .   Signs/symptoms of respiratory failure include- tachypnea and increased work of breathing. Contributing diagnoses includes - CHF and pulmonary hypertension Labs and images were reviewed. Patient Has recent ABG, which has been reviewed. Will treat underlying causes and adjust management of respiratory failure as follows-     - secondary to HFpEF and pulmonary hypertension. Does not have COPD based on most recent PFTs in 3/2022. Had isolated DLCO.

## 2023-09-15 NOTE — ASSESSMENT & PLAN NOTE
- chronic history noted; follows with Dr. Boyle outpatient who is also consulted this admission   - pt brought up on her own multiple times in ACP discussion that HD did not align with her GOC

## 2023-09-15 NOTE — EICU
Potassium 3.3  Magnesium 1.7  Creatinine 1.8    Urine output 450 ml     Plan:  Potassium chloride 10 meq IVPB over 1 hour x 4 doses.

## 2023-09-15 NOTE — ED PROVIDER NOTES
"Encounter Date: 9/14/2023    SCRIBE #1 NOTE: I, Kayla Giles, am scribing for, and in the presence of,  Lisa Rodriguez MD. I have scribed the following portions of the note - Other sections scribed: HPI, ROS, PE.       History     Chief Complaint   Patient presents with    Abnormal Lab     PT sent to ED for eval of low blood counts. Pt denies any new symptoms. Pt on 5L NC home O2.      82 y/o female with PMHx of A-fib (on 100mg Amiodarone and Eliquis), CKD, DM, GERD, HTN (has not been taking Losartan due to low BPs, Furosemide taken intermittently), anemia, who presents to the ED after being advised by Dr. Oro to be evaluated for low hemoglobin levels. Pt reports she feels the same, denying any somatic pain. She does admit to occasional dyspnea on exertion and "wooziness" after ambulation that resolves with rest, but this is not new for her. Pt's daughter, at bedside, does note she noticed odorous urine yesterday. Pt compliant with 100 mg amiodarone PTA. Pt's daughter denies known Hx of pt receiving a blood transfusion. She also states pt started palliative care today and requested pt DNR be reversed- her daughter states that they felt this status would me nothing would be done to save her mother. Pt and her daughter both denies melena, hematochezia, hematuria.       Information provided by an independent historian: Pt's daughter    The patient was admitted from August 26 through September 6th.  She presented with shortness of breath and generalized weakness.  She was noted to need 5 L of oxygen, found to be hyponatremic, hyperkalemia, hypochloremia, elevated creatinine of 2.2 from a baseline of 1.9, elevated BNP.  A CT head was performed that showed no acute process.  A CT chest without contrast showed a small left pleural effusion.  There is cardiomegaly with a small pericardial effusion.  She was treated with Lasix, Rocephin.  Patient is noted to have a code status of DNR, however, she reports meeting with " palliative care team today and would like to resend this..  Her most recent H&H was 9.9-30.6 on September 13th.    The history is provided by the patient, a relative and medical records. No  was used.     Review of patient's allergies indicates:  No Known Allergies  Past Medical History:   Diagnosis Date    A-fib     Breast cancer     invasive ductal carcinoma    CKD (chronic kidney disease)     Diabetes mellitus type II     Fatty liver     GERD (gastroesophageal reflux disease)     Hearing loss of both ears     wears bilateral hearing aids    Hemochromatosis     History of anemia due to CKD     History of pleural effusion     with thoracentesis    Hyperlipidemia     Hypertension     Macular degeneration     Osteoarthritis     Left knee    Osteoporosis     Vaginal delivery     x2    Vitamin D deficiency disease     Wears partial dentures     upper removable bridge     Past Surgical History:   Procedure Laterality Date    AXILLARY NODE DISSECTION Left 10/18/2021    Procedure: LYMPHADENECTOMY, AXILLARY;  Surgeon: Darlene Roca MD;  Location: [facility] OR;  Service: General;  Laterality: Left;    BREAST BIOPSY      BREAST LUMPECTOMY      invasive ductal carcinoma    BREAST SURGERY Bilateral     Reduction r/t h/o fibrocystic disease    CHOLECYSTECTOMY  08/2015    EYE SURGERY      Cat Ext  OU    HYSTERECTOMY      partial due to uterine fibroids    INCISION AND DRAINAGE OF KNEE Left 09/17/2020    Procedure: INCISION AND DRAINAGE, KNEE;  Surgeon: Rolando Wagoner MD;  Location: [facility] OR;  Service: Orthopedics;  Laterality: Left;    INJECTION FOR SENTINEL NODE IDENTIFICATION Left 10/18/2021    Procedure: INJECTION, FOR SENTINEL NODE IDENTIFICATION;  Surgeon: Darlene Roca MD;  Location: [facility] OR;  Service: General;  Laterality: Left;    JOINT REPLACEMENT Left 05/13/2013    TKR    JOINT REPLACEMENT Right 08/03/2015    TKR    MASTECTOMY, PARTIAL Left 10/18/2021    Procedure: MASTECTOMY, PARTIAL -- wire;   Surgeon: Darlene Roca MD;  Location: [facility] OR;  Service: General;  Laterality: Left;  RN PREOP 10/15/2021   VACCINATED-----NEED H/P AND ORDERS    SENTINEL LYMPH NODE BIOPSY Left 10/18/2021    Procedure: BIOPSY, LYMPH NODE, SENTINEL;  Surgeon: Darlene Roca MD;  Location: [facility] OR;  Service: General;  Laterality: Left;    THORACENTESIS      TOTAL KNEE ARTHROPLASTY Right 2015    left knee done 5/2013    WOUND DRESSING Left 09/17/2020    Procedure: WOUND VAC APPLICATION;  Surgeon: Rolando Wagoner MD;  Location: [facility] OR;  Service: Orthopedics;  Laterality: Left;     Family History   Problem Relation Age of Onset    Cancer Mother         stomach    Hypertension Mother     Aneurysm Father         abdominal    No Known Problems Sister     No Known Problems Sister     No Known Problems Brother     No Known Problems Daughter     No Known Problems Son      Social History     Tobacco Use    Smoking status: Former     Passive exposure: Past    Smokeless tobacco: Never   Substance Use Topics    Alcohol use: Not Currently    Drug use: No     Review of Systems   Constitutional:  Negative for chills and fever.   HENT:  Negative for congestion and sore throat.    Eyes:  Negative for visual disturbance.   Respiratory:  Positive for shortness of breath. Negative for cough.         (+) dyspnea on exertion   Cardiovascular:  Negative for chest pain.   Gastrointestinal:  Negative for abdominal pain, blood in stool, nausea and vomiting.        (-) melena   Genitourinary:  Negative for dysuria and hematuria.        (+) odorous urine   Skin:  Negative for rash.   Neurological:  Negative for headaches.   Psychiatric/Behavioral:  Negative for decreased concentration.        Physical Exam     Initial Vitals [09/14/23 1831]   BP Pulse Resp Temp SpO2   119/78 (!) 112 20 97.8 °F (36.6 °C) 98 %      MAP       --         Physical Exam    Nursing note and vitals reviewed.  Constitutional: She appears well-developed and well-nourished. She is not  diaphoretic. No distress.   Pt is hard of hearing.    HENT:   Head: Normocephalic and atraumatic.   Eyes: Pupils are equal, round, and reactive to light.   Neck: Neck supple.   Cardiovascular:  An irregularly irregular rhythm present.   Tachycardia present.         Rate in 110s   Pulmonary/Chest: Breath sounds normal.   Patient on 5L NC O2   Abdominal: Abdomen is soft. There is no abdominal tenderness.   Genitourinary: Rectum:      Guaiac result negative.      External hemorrhoid present.      No abnormal anal tone.   Guaiac negative stool.    Genitourinary Comments: Multiple external hemorrhoids, no active bleeding.  There is no melena or hematochezia on exam.  RN present as chaperone for exam.     Musculoskeletal:         General: No edema.      Cervical back: Neck supple.     Neurological: She is alert. GCS score is 15. GCS eye subscore is 4. GCS verbal subscore is 5. GCS motor subscore is 6.   Skin: Skin is warm and dry.   Psychiatric: She has a normal mood and affect.         ED Course   Critical Care    Date/Time: 9/15/2023 2:15 AM    Performed by: Lisa Rodriguez MD  Authorized by: Selwyn Randle MD  Total critical care time (exclusive of procedural time) : 0 minutes  Comments: Please put in 45 minutes of critical care due to patient having a high risk of circulatory failure.   Separate from teaching and exclusive of procedure and ekg time  Includes:  Time at bedside  Time reviewing test results  Time discussing case with staff  Time documenting the medical record  Time spent with family members  Time spent with consults  Management            Labs Reviewed   CBC W/ AUTO DIFFERENTIAL - Abnormal; Notable for the following components:       Result Value    RBC 2.97 (*)     Hemoglobin 9.3 (*)     Hematocrit 28.1 (*)     MCH 31.3 (*)     RDW 17.6 (*)     Immature Granulocytes 0.8 (*)     Gran # (ANC) 9.0 (*)     Immature Grans (Abs) 0.08 (*)     Lymph # 0.8 (*)     Gran % 85.0 (*)     Lymph % 7.1 (*)      All other components within normal limits   COMPREHENSIVE METABOLIC PANEL - Abnormal; Notable for the following components:    Chloride 86 (*)     CO2 37 (*)     Glucose 124 (*)     BUN 58 (*)     Creatinine 1.9 (*)     Albumin 3.0 (*)     Total Bilirubin 1.6 (*)     eGFR 26 (*)     All other components within normal limits   PROTIME-INR - Abnormal; Notable for the following components:    Prothrombin Time 13.9 (*)     INR 1.3 (*)     All other components within normal limits   B-TYPE NATRIURETIC PEPTIDE - Abnormal; Notable for the following components:    BNP 1,317 (*)     All other components within normal limits   POCT GLUCOSE - Abnormal; Notable for the following components:    POCT Glucose 135 (*)     All other components within normal limits   APTT   URINALYSIS, REFLEX TO URINE CULTURE   TYPE & SCREEN   POCT GLUCOSE MONITORING CONTINUOUS        ECG Results              EKG 12-lead (Preliminary result)  Result time 09/14/23 22:07:12      Wet Read by Lisa Rodriguez MD (09/14/23 22:07:12, [facility], Emergency Medicine)    Atrial fibrillation, rate 99 beats per minute,  milliseconds.  Bifascicular block present which is not new compared to prior EKG.  No STEMI.                                  Imaging Results              X-Ray Chest 1 View (Final result)  Result time 09/14/23 19:13:57      Final result by Zaina Thomas MD (09/14/23 19:13:57)                   Impression:      Moderate left pleural effusion with associated compressive atelectasis and/or lower lobe consolidation.      Electronically signed by: Zaina Thomas  Date:    09/14/2023  Time:    19:13               Narrative:    EXAMINATION:  CHEST ONE VIEW    CLINICAL HISTORY:  Tachycardia, unspecified    TECHNIQUE:  One view of the chest.    COMPARISON:  09/10/2023    FINDINGS:  The cardiac silhouette is stable.  Vascular calcification is seen at the aortic knob.  There is opacification of the left lung base with a  meniscus sign.  There is no pneumothorax.  Degenerative changes of the shoulder and spine are seen.                                       Medications   sodium chloride 0.9% flush 10 mL (has no administration in time range)   cefTRIAXone (ROCEPHIN) 1 g in dextrose 5 % in water (D5W) 100 mL IVPB (MB+) (0 g Intravenous Stopped 9/15/23 0036)   azithromycin (ZITHROMAX) 500 mg in dextrose 5 % (D5W) 250 mL IVPB (Vial-Mate) (0 mg Intravenous Stopped 9/15/23 0139)   amiodarone 360 mg/200 mL (1.8 mg/mL) infusion (has no administration in time range)   atorvastatin tablet 40 mg (has no administration in time range)   folic acid tablet 1,000 mcg (has no administration in time range)   amiodarone tablet 100 mg (100 mg Oral Given 9/14/23 2003)   amiodarone in dextrose 150 mg/100 mL (1.5 mg/mL) loading dose 150 mg (0 mg Intravenous Stopped 9/14/23 2210)   amiodarone 360 mg/200 mL (1.8 mg/mL) infusion (1 mg/min Intravenous Verify Only 9/15/23 0200)   furosemide injection 40 mg (40 mg Intravenous Given 9/14/23 2152)     Medical Decision Making  83-year-old female withHFpEF, AFib, on Eliquis, hypertension, type 2 diabetes, hyperlipidemia, CKD, presents to the emergency department for evaluation of anemia.  The patient was being followed by her primary care physician.  There has been a drop in her H&H over the past several weeks.  Hemoglobin was 9.9 today.  Patient denies any physical complaints at this time.  She reports she does feel some shortness of breath with exertion but this is not new and is unchanged.  Patient does take amiodarone for her AFib.  She is on Eliquis.  Daughter presents with medication list, she is not been taking her furosemide or her losartan consistently due to low blood pressures.  The patient denies any melena or hematochezia.  On exam, patient is in AFib, rate in the low 110s.  Abdomen is soft and nontender.  She does not appear pale.  She is alert and talkative.  She is hard of hearing.  Will recheck  blood counts.  Will check rectal exam.  Will check urinalysis.    We did discuss the patient's code status.  I did explain DNR has different levels including comfort care only, limited measures such as antibiotics, IV fluids etc..  At this time, they wish to reverse the DNR, I have ordered full code.  I will place a palliative care consult to further discuss clarify goals of care.    Amount and/or Complexity of Data Reviewed  Independent Historian: caregiver  External Data Reviewed: notes.     Details: The patient was admitted from August 26 through September 6th.  She presented with shortness of breath and generalized weakness.  She was noted to need 5 L of oxygen, found to be hyponatremic, hyperkalemia, hypochloremia, elevated creatinine of 2.2 from a baseline of 1.9, elevated BNP.  A CT head was performed that showed no acute process.  A CT chest without contrast showed a small left pleural effusion.  There is cardiomegaly with a small pericardial effusion.  She was treated with Lasix, Rocephin.  Patient is noted to have a code status of DNR, however, she reports meeting with palliative care team today and would like to resend this..  Her most recent H&H was 9.9-30.6 on September 13th.  Labs: ordered.  Radiology: ordered.  ECG/medicine tests: ordered.    Risk  Prescription drug management.  Decision regarding hospitalization.            Scribe Attestation:   Scribe #1: I performed the above scribed service and the documentation accurately describes the services I performed. I attest to the accuracy of the note.      Scribe attestation: I, Lisa Rodriguez MD, personally performed the services described in this documentation. All medical record entries made by the scribe were at my direction and in my presence.  I have reviewed the chart and agree that the record reflects my personal performance and is accurate and complete.   ED Course as of 09/15/23 0212   u Sep 14, 2023   2131 -134, patient already  received p.o. amiodarone.  Will start on amiodarone drip and admit to ICU.  Will give IV Lasix. [LH]   2142 Patient's BNP elevated, 1317.  Sodium level 136.  Chloride 86.  Creatinine is 1.9, appears to be consistent with prior baseline.  Hemoglobin 9.3, hematocrit 28.1.  The patient's chest x-ray shows a moderate-sized left-sided pleural effusion.   [LH]   2145 Case reviewed with Dr. Henry Garay for admission to ICU.  [LH]      ED Course User Index  [LH] Lisa Rodriguez MD          This dictation has been generated using M-Modal Fluency Direct dictation; some phonetic errors may occur.             Clinical Impression:   Final diagnoses:  [R00.0] Tachycardia  [I48.91] Atrial fibrillation with RVR (Primary)  [J90] Pleural effusion  [D64.9] Anemia, unspecified type        ED Disposition Condition    Admit Stable                Lisa Rodriguez MD  09/15/23 5568

## 2023-09-15 NOTE — ED TRIAGE NOTES
"Pt presents to ED w daughter co of "low hemoglobin" after having blood work done today, and MD told pt to come to ED for re evaluation. Pt was recently in hospital for pneumonia, DC last week. Pt in AFIB, rate 120s upon arrival to room, pt does have HX of AFIB. Pt denies any current CP, SOB, N/V/D/C, dizziness, headache, or lightheadedness. Pt on 5 L NC at home usually. Pt is also on blood thinners, daughter says she took all her daily meds today.  "

## 2023-09-15 NOTE — TRANSFER OF CARE
"Anesthesia Transfer of Care Note    Patient: Barbara Rizo    Procedure(s) Performed: Procedure(s) (LRB):  Cardioversion or Defibrillation (N/A)  Cardioversion (N/A)    Patient location: Other:    Anesthesia Type: MAC    Transport from OR: Transported from OR on 6-10 L/min O2 by face mask with adequate spontaneous ventilation    Post pain: adequate analgesia    Post assessment: no apparent anesthetic complications    Post vital signs: stable    Level of consciousness: responds to stimulation and sedated    Nausea/Vomiting: no nausea/vomiting    Complications: none    Transfer of care protocol was followed      Last vitals:   Visit Vitals  /64 (BP Location: Right arm, Patient Position: Sitting)   Pulse (!) 51   Temp 36 °C (96.8 °F) (Skin)   Resp (!) 22   Ht 5' 2" (1.575 m)   Wt 77.2 kg (170 lb 3.1 oz)   SpO2 (!) 90%   Breastfeeding No   BMI 31.13 kg/m²     "

## 2023-09-15 NOTE — ASSESSMENT & PLAN NOTE
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 Repeat echo pending. On US of lung looked like possible pericardial effusion. Will monitor for read. Pt would benefit from diuresis.

## 2023-09-15 NOTE — ASSESSMENT & PLAN NOTE
- wishes to proceed with offered cardioversion, and agrees to reverse DNR only for procedure and then resume (see ACP)   - cardiology consulted/following   - continued management per primary teams

## 2023-09-15 NOTE — ASSESSMENT & PLAN NOTE
-will get anemia workup including iron studies, vitamin B12/folic acid level, stool for occult blood   -hold home med Eliquis for tonight   -monitor H&H and prn transfuse

## 2023-09-15 NOTE — SUBJECTIVE & OBJECTIVE
Past Medical History:   Diagnosis Date    A-fib     Breast cancer     invasive ductal carcinoma    CKD (chronic kidney disease)     Diabetes mellitus type II     Fatty liver     GERD (gastroesophageal reflux disease)     Hearing loss of both ears     wears bilateral hearing aids    Hemochromatosis     History of anemia due to CKD     History of pleural effusion     with thoracentesis    Hyperlipidemia     Hypertension     Macular degeneration     Osteoarthritis     Left knee    Osteoporosis     Vaginal delivery     x2    Vitamin D deficiency disease     Wears partial dentures     upper removable bridge       Past Surgical History:   Procedure Laterality Date    AXILLARY NODE DISSECTION Left 10/18/2021    Procedure: LYMPHADENECTOMY, AXILLARY;  Surgeon: Darlene Roca MD;  Location: [facility];  Service: General;  Laterality: Left;    BREAST BIOPSY      BREAST LUMPECTOMY      invasive ductal carcinoma    BREAST SURGERY Bilateral     Reduction r/t h/o fibrocystic disease    CHOLECYSTECTOMY  08/2015    EYE SURGERY      Cat Ext  OU    HYSTERECTOMY      partial due to uterine fibroids    INCISION AND DRAINAGE OF KNEE Left 09/17/2020    Procedure: INCISION AND DRAINAGE, KNEE;  Surgeon: Rolando Wagoner MD;  Location: [facility];  Service: Orthopedics;  Laterality: Left;    INJECTION FOR SENTINEL NODE IDENTIFICATION Left 10/18/2021    Procedure: INJECTION, FOR SENTINEL NODE IDENTIFICATION;  Surgeon: Darlene Roca MD;  Location: [facility];  Service: General;  Laterality: Left;    JOINT REPLACEMENT Left 05/13/2013    TKR    JOINT REPLACEMENT Right 08/03/2015    TKR    MASTECTOMY, PARTIAL Left 10/18/2021    Procedure: MASTECTOMY, PARTIAL -- wire;  Surgeon: Darlene Roca MD;  Location: [facility];  Service: General;  Laterality: Left;  RN PREOP 10/15/2021   VACCINATED-----NEED H/P AND ORDERS    SENTINEL LYMPH NODE BIOPSY Left 10/18/2021    Procedure: BIOPSY, LYMPH NODE, SENTINEL;  Surgeon: Darlene Roca MD;  Location: [facility];   Service: General;  Laterality: Left;    THORACENTESIS      TOTAL KNEE ARTHROPLASTY Right 2015    left knee done 5/2013    WOUND DRESSING Left 09/17/2020    Procedure: WOUND VAC APPLICATION;  Surgeon: Rolando Wagoner MD;  Location: [facility];  Service: Orthopedics;  Laterality: Left;       Review of patient's allergies indicates:  No Known Allergies    Medications:  Continuous Infusions:   amiodarone in dextrose 5% 0.5 mg/min (09/15/23 1400)     Scheduled Meds:   apixaban  5 mg Oral BID    atorvastatin  40 mg Oral Daily    azithromycin  500 mg Intravenous Q24H    bumetanide  1 mg Intravenous Daily    cefTRIAXone (ROCEPHIN) IVPB  1 g Intravenous Q24H    [START ON 9/16/2023] epoetin christi-epbx  10,000 Units Subcutaneous Q7 Days    folic acid  1,000 mcg Oral Daily    mupirocin   Nasal BID     PRN Meds:acetaminophen, loperamide, melatonin, ondansetron, sodium chloride 0.9%    Family History       Problem Relation (Age of Onset)    Aneurysm Father    Cancer Mother    Hypertension Mother    No Known Problems Sister, Sister, Brother, Daughter, Son          Tobacco Use    Smoking status: Former     Passive exposure: Past    Smokeless tobacco: Never   Substance and Sexual Activity    Alcohol use: Not Currently    Drug use: No    Sexual activity: Not Currently       Review of Systems   Constitutional:  Positive for fatigue. Negative for activity change and appetite change.   Respiratory:  Negative for cough and shortness of breath.    Cardiovascular:  Positive for leg swelling. Negative for chest pain and palpitations.   Gastrointestinal:  Negative for abdominal pain, nausea and vomiting.   Genitourinary:  Negative for decreased urine volume and difficulty urinating.   Musculoskeletal:  Negative for gait problem.   Neurological:  Negative for dizziness and headaches.     Objective:     Vital Signs (Most Recent):  Temp: 97 °F (36.1 °C) (09/15/23 1600)  Pulse: (!) 53 (09/15/23 1605)  Resp: (!) 22 (09/15/23 1605)  BP: (!) 108/54  (09/15/23 1605)  SpO2: 95 % (09/15/23 1605) Vital Signs (24h Range):  Temp:  [96.8 °F (36 °C)-98.2 °F (36.8 °C)] 97 °F (36.1 °C)  Pulse:  [] 53  Resp:  [15-28] 22  SpO2:  [87 %-99 %] 95 %  BP: ()/(54-83) 108/54     Weight: 77.2 kg (170 lb 3.1 oz)  Body mass index is 31.13 kg/m².       Physical Exam  Vitals and nursing note reviewed.   Constitutional:       General: She is not in acute distress.     Appearance: She is ill-appearing (acute and chronic).   HENT:      Head: Normocephalic and atraumatic.      Nose: Nose normal.   Pulmonary:      Effort: Pulmonary effort is normal. No respiratory distress.   Musculoskeletal:      Right lower leg: Edema present.      Left lower leg: Edema present.   Neurological:      Mental Status: She is alert and oriented to person, place, and time. Mental status is at baseline.   Psychiatric:         Mood and Affect: Mood normal.         Thought Content: Thought content normal.         Judgment: Judgment normal.       Advance Care Planning   Advance Directives:   [place]: Yes    Do Not Resuscitate Status: Yes (will resume DNR/DNI, selective treatment following cardioversion)    Medical Power of : No (children Tamar and Selwyn are legal surrogate decision makers which aligns with pt wishes)      Decision Making:  Patient answered questions  Goals of Care: The patient endorses that what is most important right now is to focus on spending time at home, avoiding the hospital, remaining as independent as possible, symptom/pain control, and quality of life, even if it means sacrificing a little time    Accordingly, we have decided that the best plan to meet the patient's goals includes continuing with treatment       Significant Labs: All pertinent labs within the past 24 hours have been reviewed.  CBC:   Recent Labs   Lab 09/15/23  0301   WBC 7.62   HGB 9.0*   HCT 28.9*   *        BMP:  Recent Labs   Lab 09/15/23  0301   *      K 3.3*   CL 87*   CO2  34*   BUN 56*   CREATININE 1.8*   CALCIUM 8.5*   MG 1.7     LFT:  Lab Results   Component Value Date    AST 16 09/15/2023    ALKPHOS 57 09/15/2023    BILITOT 1.2 (H) 09/15/2023     Albumin:   Albumin   Date Value Ref Range Status   09/15/2023 2.8 (L) 3.5 - 5.2 g/dL Final     Protein:   Total Protein   Date Value Ref Range Status   09/15/2023 6.2 6.0 - 8.4 g/dL Final     Lactic acid:   Lab Results   Component Value Date    LACTATE 2.4 (H) 09/13/2023    LACTATE 1.4 01/28/2019       Significant Imaging: I have reviewed all pertinent imaging results/findings within the past 24 hours.

## 2023-09-15 NOTE — ASSESSMENT & PLAN NOTE
-Consulted palliative care - input appreciated  -Advance Care Planning Date: 09/15/2023   Discussed with daughter and patient meaning of DNR as well as pros and cons of aggressive ACLS treatment.  They have opted for DNR status as she would wish for a peaceful death.  I spent 20 minutes in ACP today, 9/15.

## 2023-09-15 NOTE — ASSESSMENT & PLAN NOTE
- pt with prior pleural effusions but has not required thora in many years per daughter (2015?)   - suspected 2/2 CHF  - PCCM consulted/following   - continued management per hospital primary

## 2023-09-15 NOTE — PLAN OF CARE
Case Management Assessment     PCP: Paras Oro  Pharmacy: [facility]      Patient Arrived From: daughter's house  Existing Help at Home: Tamar proctor    Barriers to Discharge: none    Discharge Plan:    A. Home with HH   B. SNF         This patient has been screened for Case Management needs.  Treatment is ongoing in the ICU at this time.     Patient stated that she does not have a living will or advance directive.  Patient's choice of someone to speak on her behalf if unable:  Garcia Melchor and Tamar Jansen.  Advance directive information packet left at bedside for patient and family to view and discuss.  CM instructed patient to call if help is needed to complete Advance Directive.    CM provided daughter and son with contact info and encouraged them to call for any discharge needs.  CM will continue to follow while in the ICU and assist with DC planning as needed.        09/15/23 8657   Discharge Assessment   Assessment Type Discharge Planning Assessment   Confirmed/corrected address, phone number and insurance Yes   Confirmed Demographics Correct on Facesheet   Source of Information patient;family  (Tamar proctor and sonGarcia at bedside)   Communicated SALLY with patient/caregiver Date not available/Unable to determine   People in Home alone   Do you expect to return to your current living situation? No   Do you have help at home or someone to help you manage your care at home? Yes   Who are your caregiver(s) and their phone number(s)? Tamar proctor   Prior to hospitilization cognitive status: Alert/Oriented   Current cognitive status: Alert/Oriented   Walking or Climbing Stairs ambulation difficulty, requires equipment   Dressing/Bathing bathing difficulty, assistance 1 person   Do you have any problems with: Errands/Grocery;Needs other help   Specify other help daughter assist by grocery shoping and picking up meds at the pharmacy   Equipment Currently Used at Home bath bench;cane, straight;walker,  rolling;wheelchair;bedside commode;oxygen   Readmission within 30 days? No   Patient currently being followed by outpatient case management? No   Do you currently have service(s) that help you manage your care at home? Yes   Name and Contact number of agency [facility]   Is the pt/caregiver preference to resume services with current agency Yes   Do you take prescription medications? Yes   Do you have prescription coverage? Yes   Coverage PHN   Do you have any problems affording any of your prescribed medications? No   Is the patient taking medications as prescribed? yes   Who is going to help you get home at discharge? daughterTamar   How do you get to doctors appointments? family or friend will provide   Are you on dialysis? No   Do you take coumadin? No   DME Needed Upon Discharge  none   Discharge Plan discussed with: Adult children;Patient   Transition of Care Barriers None   Discharge Plan A Home Health   Discharge Plan B Skilled Nursing Facility

## 2023-09-15 NOTE — HPI
Pt is an 82 yo CW pmh HFpEF, Afib on eliquis, CKD, GERD, HTN, DM2, hx of stage 1 breast cancer s/p excision, MGUS who presented for evaluation due to acute anemia. Pulmonary consulted for evaluation of pleural effusion found on CXR. Pt with history of right pleural effusion in 2015 s/p thoracentesis with known HFpEF found to be in afib RVR with notable lower extremity edema. Pt was placed on amiodarone and planned for DCCV by cardiology this AM.

## 2023-09-15 NOTE — HPI
Patient is a pleasant 83-year-old lady with a past medical history significant hypertension, diabetes, chronic kidney disease, atrial fibrillation on Eliquis, has been compliant with the Eliquis and has not missed even a single dose over the past multiple months.  Follows with Dr. Lomas in Cardiology Clinic.  Came in with decrease in hemoglobin to 9.  Was sent to ER for further evaluation.  In ER negative for melena or hematochezia.  Discussed case with primary care Dr. Randle and there is no concern for active bleeding.  She was also started on amiodarone drip because of AFib with RVR.  Currently in ICU with AFib with RVR on amiodarone drip.        HPI: 83 years old female with PMH significant for hypertension, diabetes mellitus, CKD, atrial fibrillation on Eliquis, GERD was sent to ER from her PCP because of dropping blood counts. Patient was recently hospitalized for fluid overload and hyponatremia. Patient reported she had blood workup done and got call from her PCP to go to ER. Review of the chart revealed that hemoglobin dropped from 13.5 on 08/27 to 9.3 today on 09/14. Patient denied any bleeding from any site or black stools. Patient reported she is doing fine otherwise and denied any symptoms at this time including chest pain shortness of breath abdominal pain fever chills nausea vomiting diarrhea or dizziness. Patient had rectal exam done by ER physician which was negative for melena or hematochezia. Chest x-ray showed moderate left pleural effusion with associated compressive atelectasis and/or lower lobe consolidation. Other lab work showed WBC 10.62, creatinine 1.9, potassium 3.6 and BNP 1317. Patient was found to be in atrial fibrillation with RVR for which has been started on amiodarone infusion. Patient is on 4-5 L O2 NC at baseline. Patient received IV amiodarone and IV Lasix in ER.       PAF - TALHA/CV 9/21/15, 1/30/19, 2/2/19 -  on amiodarone and eliquis, Diastolic CHF,  HTN, DM, HLD, MGUS, CRI -  Cr 1.8, AS/MR - mild to moderate, breast CA, COPD on home O2     Admitted 1/28/19  Ms. Barbara Rizo is a 78 y.o. female with essential hypertension, hyperlipidemia (LDL 62.4 Jul 2018), type 2 diabetes mellitus (HbA1c 5.8% Jul 2018), CKD Stage 3, paroxysmal atrial fibrillation ([id number] score 5) not on chronic anticoagulation, obesity (BMI 36.0), MGUS, and chronic gout who presents to [facility] ED with complaints of coughing for three days.  She started to have a non-productive cough, wheezing, and mild dyspnea three days ago which has steadily been worsening since onset.  She also complains of a subjective fever but otherwise denies any nausea, vomiting, chest pain, palpitations, pleurisy, nor any diaphoresis.  She denies any recent travel, hemoptysis, nor any lower extremity pain or swelling.  She does say that she's under a lot of stress recently with her ex- dying yesterday at [facility] due to complications from a heart valve replacement two weeks ago.  She does say that she may have had sick contacts while visiting her ex-.  Her appetite has been poor but she denies any weight loss.     While at her ENT's appointment today she decided to schedule an appointment with her PCP to evaluate her upper airway complaints.  She was unable to see her regular PCP, Dr. Paras Oro, but was able to be seen by another physician who became concerned when she was noted to be in AFib with RVR on EKG.  He recommended EMS transportation to the ED but she refused and decided to drive herself.  Her cardiologist is Dr. Amauri Lomas.     Pt admitted to ICU with afib rvr on amiodarone infusion. Pt with elevated HR  and after TALHA done patient became very agitated and anxious. Ativan 1mg IV given and symptoms got worse. HR up into the 170s and O2 sats dropped to 77%. Placed on NRB. Improved O2 sats. 5mg IV haldol given as patient was trying to get out of bed and pulling oxygen mask off. Cardizem  10mg Iv given with improved HR to 120s-130s. xopenex scheduled Q 8 hours. Changed to zosyn with temp 102F.   1/30- successful cardioversion, post procedure confused and pulling oxygen off, desaturation, constant re-direction, monitored in ICU overnight. Changed to oral amiodarone. eliquis for anticoagulation. Holding parameters on BP meds. IV steroids, nebs and antibiotic for probable lower resp infection. IV lasix as Bp tolerates.   1/31- HR remain under control NSR 60-70s.On amiodarone, metoprolol and eliquis.  Resp status improved and weaned oxygen. Steroids changed to oral route. DC zosyn and switch to augmentin. Add acapella to nebs. Cr increased from baseline (1.5-1.7). Gentle IVF and monitor with repeat BMP later, was stable,. PT,OT consulted for dc planning. Weaning oxygen. PT/OT consulted and rec H/H without equipment. The patient was transferred to the floor on 1/31. Nephrology was consulted for worsening renal function.      2/2- pt transferred with afib rvr. Successful cardioversion again . Continued oral amiodarone and BB.  still has aggressive cough and URI symptoms. On mucolytic and antibiotic.   2/3- HR 50-60s sinus. Cr sightly higher, sodium 128. BNP 1500. Lasix x one dose. Bringing up more mucus. Steroids weaned 20mg. Still faint wheezes on exam.  Patient had worsening ARF on CKD 3, keeped on george and monitor,nephrology was following.reconsulted PT,OT.fley was removed latera nd she had improvement in ARF.  Changed diet for low slat diet duo to hyponatremia with improvement.  She did well with PT,OT.  Her wheezing and respiratory status is improved,  Patient has been discharged home with HH and PO Abx and OAC and amiodarone and follow up with PCP,nephrology and cardiology in next few days.      1-29:  Admitted with AFib with RVR  1-30:  Underwent TALHA/DC cardioversion successfully  2-1 Back in A-fib 120s. SOB  2-3 Still coughing and SOB. NSR 60      2/2/19 CV - 360J converted A-fib to sinus  bradycardia 55. Change amiodarone to po. Ok for telemetry     Echo 3/7/23  The left ventricle is normal in size with eccentric hypertrophy and normal systolic function.  The estimated ejection fraction is 65%.  Grade II left ventricular diastolic dysfunction.  Mild tricuspid regurgitation.  Small pericardial effusion.  Moderate right atrial enlargement.  Severe left atrial enlargement.  Normal right ventricular size with normal right ventricular systolic function.  There is moderate aortic valve stenosis.  Aortic valve area is 1.20 cm2; peak velocity is 2.70 m/s; mean gradient is 19 mmHg.  Mild mitral regurgitation.  Mild pulmonic regurgitation.  Normal central venous pressure (3 mmHg).  The estimated PA systolic pressure is 46 mmHg.  There is pulmonary hypertension.     2/13/19 Less SOB since discharge  EKG NSR with PACs 65   Decrease amiodarone 200 qd  OV 2 months - will discuss changing amiodarone to flecainide at that time     4/17/19 Denies CP or SOB  EKG sinus bradycardia 44  HR mostly runs 40-50 at home  Change metoprolol 100 qd to toprol XL 50 qd - may decrease further if bradycardia persists  Discussed alternative AAD - given her repeated episodes of PAF we are both in agreement to stay on amiodarone for now  OV 1 month with TSH, CMP, EKG     5/27/19 HR improved to the low 50s  Has held eliquis the last 2 days due to bleeding from a recent skin CA removal  Denies CP or SOB     9/11/19 Denies CP or SOB  Some LE edema  EKG sinus bradycardia 46 NSSTT changes  Stop norvasc with LE edema  Add cardura 4 mg qd  Decrease toprol 25 qd with bradycardia  OV 3 months with CMP, TSH     12/11/19 Denies CP or SOB  LE edema resolved after stopping norvasc  BP controlled by home readings  HR runs 45-50  EKG sinus bradycardia 47 1st degree AVB  Denies dizziness  Bradycardia well tolerated - will continue Rx  OV 6 months with echo      6/18/20 Denies CP or dizziness  Mild stable WILKINSON  EKG sinus bradycardia 44 otherwise  ok  Bradycardia continues to be well tolerated  OV 6 months with CMP, TSH on chronic amiodarone        10/14/20 EKG  - suspect this is A-flutter 2:1 with aberrancy  Denies CP, SOB, or palpitations  Appears to be in A-flutter RVR - will send to the ER for admission. If issues with tachy-marlon continue will likely end up needing PPM     10/23/20 HR were better controlled when she went to ER. She was restarted on metoprolol and discharged. HR mostly 40-50s at home. Bradycardia well tolerated. Need clearance for skin graft surgery at  on left leg  EKG sinus bradycardia 43   Back in NSR - bradycardia well tolerated. No indication for PPM at this point  Cleared for skin graft surgery at moderate cardiac risk - ok to hold eliquis 2 days before  OV 3 months  Continue Rx for PAF, HTN, CHF     12/1/20 Denies CP or SOB. Did well after her skin graft surgery  HR 45-50. BP elevated - controlled by outside readings  Continue Rx for PAF, HTN, HLD, CHF  OV 3 months     3/1/21 Denies CP or SOB  EKG NSR 60 IVCD  HR 55-60 by home readings as well      Continue Rx for PAF, HTN, HLD, diastolic CHF  OV 6 months with CMP, TSH on chronic amiodarone, Echo to look at MR           5/6/21 Denies further dizziness or syncope  EKG NSR 69 1st degree AVB IVCD  On amiodarone 100 qd and off of metoprolol  TSH 2.4 LFT ok     suspect recent syncope was from dehydration and not bradycardia  Staying in NSR on amiodarone 100 qd and off of metoprolol  Continue Rx for PAF, HTN, HLD, diastolic CHF  OV 3 months     8/6/21 Denies CP, SOB, or dizziness  EKG NSR 1st degree AVB RBBB   Continue Rx for PAF, HTN, HLD, diastolic CHF  OV 6 months with CMP, TSH  Cleared for breast surgery at moderate cardiac risk - ok to hold eliquis 3 days before         1/19/22 Less SOB since discharge. On Home O2. Scheduled for pulmonary evaluation  EKG NSR 1st degree AVB RBBB   Continue Rx for PAF, HTN, HLD, diastolic CHF  Volume status appears stable  OV 3 months with  BNP, BMP     4/4/22 Denies CP, stable mild WILKINSON  BP controlled   Continue Rx for PAF, HTN, HLD, diastolic CHF  Volume status appears stable  OV 3 months with BNP, CMP, TSH        7/8/22 Denies CP, stable WILKINSON - only uses O2 at night  EKG NSR RBBB/LAFB    Continue Rx for PAF, HTN, HLD, diastolic CHF  Volume status appears stable. Discussed decreasing potassium in diet  OV 6 months with BNP, CMP, TSH     1/9/23 labs not done. Denies CP, mild stable WILKINSON  BP controlled by home readings  EKG NSR RBBB/LAFB    Continue Rx for PAF, HTN, HLD, diastolic CHF  OV 3 months with echo, CMP, TSH, BNP     3/10/23 Labs not done. Was placed on metolazone recently by Dr Boyle for volume overload which since has resolved   CMP, TSH, BNP - next week - if stable then OV 3 months      Continue Rx for PAF, HTN, HLD, diastolic CHF     Labs 3/17/23  K 4.6  Cr 1.9  LFT ok    TSH 3.0     6/16/23 Denies CP or SOB  EKG NSR 1st degree AVB RBBB/LAFB  BP controlled

## 2023-09-15 NOTE — NURSING
To cath lab via stretcher with cath lab staff, on cardiac monitor and oxygen.   Family accompanies to waiting room

## 2023-09-15 NOTE — PLAN OF CARE
Patient to cath lab today for cardioversion, with resulting sinus bradycardia--rate 50-60/min     Tolerating clear llquid diet.    Denies pain all shift    Family visits.

## 2023-09-15 NOTE — HPI
"From H&P: "83 years old female with PMH significant for hypertension, diabetes mellitus, CKD, atrial fibrillation on Eliquis, GERD was sent to ER from her PCP because of dropping blood counts. Patient was recently hospitalized for fluid overload and hyponatremia. Patient reported she had blood workup done and got call from her PCP to go to ER. Review of the chart revealed that hemoglobin dropped from 13.5 on 08/27 to 9.3 today on 09/14. Patient denied any bleeding from any site or black stools. Patient reported she is doing fine otherwise and denied any symptoms at this time including chest pain shortness of breath abdominal pain fever chills nausea vomiting diarrhea or dizziness. Patient had rectal exam done by ER physician which was negative for melena or hematochezia. Chest x-ray showed moderate left pleural effusion with associated compressive atelectasis and/or lower lobe consolidation. Other lab work showed WBC 10.62, creatinine 1.9, potassium 3.6 and BNP 1317. Patient was found to be in atrial fibrillation with RVR for which has been started on amiodarone infusion. Patient is on 4-5 L O2 NC at baseline. Patient received IV amiodarone and IV Lasix in ER"     Palliative medicine consulted for goals of care discussion and advance care planning; for details of visit, see advance care planning section of plan.   "

## 2023-09-15 NOTE — HPI
83 years old female with PMH significant for hypertension, diabetes mellitus, CKD, atrial fibrillation on Eliquis, GERD was sent to ER from her PCP because of dropping blood counts. Patient was recently hospitalized for fluid overload and hyponatremia. Patient reported she had blood workup done and got call from her PCP to go to ER. Review of the chart revealed that hemoglobin dropped from 13.5 on 08/27 to 9.3 today on 09/14. Patient denied any bleeding from any site or black stools. Patient reported she is doing fine otherwise and denied any symptoms at this time including chest pain shortness of breath abdominal pain fever chills nausea vomiting diarrhea or dizziness. Patient had rectal exam done by ER physician which was negative for melena or hematochezia. Chest x-ray showed moderate left pleural effusion with associated compressive atelectasis and/or lower lobe consolidation. Other lab work showed WBC 10.62, creatinine 1.9, potassium 3.6 and BNP 1317. Patient was found to be in atrial fibrillation with RVR for which has been started on amiodarone infusion. Patient is on 4-5 L O2 NC at baseline. Patient received IV amiodarone and IV Lasix in ER.

## 2023-09-15 NOTE — CONSULTS
[facility] - Intensive Care  Pulmonology  Consult Note    Patient Name: Barbara Rizo  MRN: [id number]  Admission Date: 9/14/2023  Hospital Length of Stay: 1 days  Code Status: Full Code  Attending Physician: Selwyn Randle MD  Primary Care Provider: Paras Oro MD   Principal Problem: Normocytic anemia    [unfilled]  Subjective:     HPI:  Pt is an 82 yo CW pmh HFpEF, Afib on eliquis, CKD, GERD, HTN, DM2, hx of stage 1 breast cancer s/p excision, MGUS who presented for evaluation due to acute anemia. Pulmonary consulted for evaluation of pleural effusion found on CXR. Pt with history of right pleural effusion in 2015 s/p thoracentesis with known HFpEF found to be in afib RVR with notable lower extremity edema. Pt was placed on amiodarone and planned for DCCV by cardiology this AM.       Past Medical History:   Diagnosis Date    A-fib     Breast cancer     invasive ductal carcinoma    CKD (chronic kidney disease)     Diabetes mellitus type II     Fatty liver     GERD (gastroesophageal reflux disease)     Hearing loss of both ears     wears bilateral hearing aids    Hemochromatosis     History of anemia due to CKD     History of pleural effusion     with thoracentesis    Hyperlipidemia     Hypertension     Macular degeneration     Osteoarthritis     Left knee    Osteoporosis     Vaginal delivery     x2    Vitamin D deficiency disease     Wears partial dentures     upper removable bridge       Past Surgical History:   Procedure Laterality Date    AXILLARY NODE DISSECTION Left 10/18/2021    Procedure: LYMPHADENECTOMY, AXILLARY;  Surgeon: Darlene Roca MD;  Location: [facility];  Service: General;  Laterality: Left;    BREAST BIOPSY      BREAST LUMPECTOMY      invasive ductal carcinoma    BREAST SURGERY Bilateral     Reduction r/t h/o fibrocystic disease    CHOLECYSTECTOMY  08/2015    EYE SURGERY      Cat Ext  OU    HYSTERECTOMY      partial due to uterine fibroids    INCISION AND  DRAINAGE OF KNEE Left 09/17/2020    Procedure: INCISION AND DRAINAGE, KNEE;  Surgeon: Rolando Wagoner MD;  Location: [facility] OR;  Service: Orthopedics;  Laterality: Left;    INJECTION FOR SENTINEL NODE IDENTIFICATION Left 10/18/2021    Procedure: INJECTION, FOR SENTINEL NODE IDENTIFICATION;  Surgeon: Darlene Roca MD;  Location: [facility] OR;  Service: General;  Laterality: Left;    JOINT REPLACEMENT Left 05/13/2013    TKR    JOINT REPLACEMENT Right 08/03/2015    TKR    MASTECTOMY, PARTIAL Left 10/18/2021    Procedure: MASTECTOMY, PARTIAL -- wire;  Surgeon: Darlene Roca MD;  Location: [facility] OR;  Service: General;  Laterality: Left;  RN PREOP 10/15/2021   VACCINATED-----NEED H/P AND ORDERS    SENTINEL LYMPH NODE BIOPSY Left 10/18/2021    Procedure: BIOPSY, LYMPH NODE, SENTINEL;  Surgeon: Darlene Roca MD;  Location: [facility] OR;  Service: General;  Laterality: Left;    THORACENTESIS      TOTAL KNEE ARTHROPLASTY Right 2015    left knee done 5/2013    WOUND DRESSING Left 09/17/2020    Procedure: WOUND VAC APPLICATION;  Surgeon: Rolando Wagoner MD;  Location: [facility] OR;  Service: Orthopedics;  Laterality: Left;       Review of patient's allergies indicates:  No Known Allergies    Family History       Problem Relation (Age of Onset)    Aneurysm Father    Cancer Mother    Hypertension Mother    No Known Problems Sister, Sister, Brother, Daughter, Son          Tobacco Use    Smoking status: Former     Passive exposure: Past    Smokeless tobacco: Never   Substance and Sexual Activity    Alcohol use: Not Currently    Drug use: No    Sexual activity: Not Currently         Review of Systems   Constitutional:  Positive for fatigue. Negative for activity change.   Respiratory:  Negative for cough, shortness of breath and wheezing.    Cardiovascular:  Positive for palpitations and leg swelling. Negative for chest pain.   Genitourinary:         Urinary frequency with lasix     Neurological:  Negative for speech difficulty and  light-headedness.   Psychiatric/Behavioral:  Negative for agitation, behavioral problems, confusion and decreased concentration.      Objective:     Vital Signs (Most Recent):  Temp: 97.8 °F (36.6 °C) (09/15/23 1101)  Pulse: 105 (09/15/23 1200)  Resp: (!) 21 (09/15/23 1200)  BP: 109/74 (09/15/23 1200)  SpO2: (!) 89 % (09/15/23 1200) Vital Signs (24h Range):  Temp:  [97.6 °F (36.4 °C)-98.2 °F (36.8 °C)] 97.8 °F (36.6 °C)  Pulse:  [] 105  Resp:  [15-28] 21  SpO2:  [87 %-99 %] 89 %  BP: (100-133)/(58-83) 109/74     Weight: 77.2 kg (170 lb 3.1 oz)  Body mass index is 31.13 kg/m².      Intake/Output Summary (Last 24 hours) at 9/15/2023 1310  Last data filed at 9/15/2023 1000  Gross per 24 hour   Intake 923.38 ml   Output 550 ml   Net 373.38 ml        Physical Exam  Vitals and nursing note reviewed.   Constitutional:       Appearance: Normal appearance. She is ill-appearing. She is not toxic-appearing.   HENT:      Head: Normocephalic and atraumatic.      Mouth/Throat:      Pharynx: No oropharyngeal exudate.   Eyes:      General: No scleral icterus.     Conjunctiva/sclera: Conjunctivae normal.      Pupils: Pupils are equal, round, and reactive to light.   Cardiovascular:      Rate and Rhythm: Tachycardia present. Rhythm irregular.      Heart sounds: No murmur heard.     No friction rub. No gallop.   Pulmonary:      Effort: Pulmonary effort is normal. No respiratory distress.      Breath sounds: Normal breath sounds. No wheezing or rhonchi.   Musculoskeletal:      Right lower leg: Edema present.      Left lower leg: Edema present.   Skin:     Capillary Refill: Capillary refill takes less than 2 seconds.   Neurological:      General: No focal deficit present.      Mental Status: She is alert and oriented to person, place, and time.   Psychiatric:         Mood and Affect: Mood normal.         Behavior: Behavior normal.         Thought Content: Thought content normal.         Judgment: Judgment normal.          Vents:    "    Lines/Drains/Airways       Drain  Duration             Female External Urinary Catheter 09/14/23 2300 <1 day              Peripheral Intravenous Line  Duration                  Peripheral IV - Single Lumen 09/14/23 1954 20 G Left Forearm <1 day         Peripheral IV - Single Lumen 09/14/23 2340 22 G Left Forearm <1 day                    Significant Labs:    CBC/Anemia Profile:  Recent Labs   Lab 09/13/23  1345 09/14/23  1954 09/15/23  0259 09/15/23  0301   WBC 12.12 10.62  --  7.62   HGB 9.9* 9.3*  --  9.0*   HCT 30.6* 28.1*  --  28.9*    255  --  188   MCV 97 95  --  101*   RDW 17.4* 17.6*  --  17.8*   IRON 64  --  61  --    FERRITIN  --   --  446*  --         Chemistries:  Recent Labs   Lab 09/14/23  1954 09/15/23  0301    137   K 3.6 3.3*   CL 86* 87*   CO2 37* 34*   BUN 58* 56*   CREATININE 1.9* 1.8*   CALCIUM 8.9 8.5*   ALBUMIN 3.0* 2.8*   PROT 6.6 6.2   BILITOT 1.6* 1.2*   ALKPHOS 64 57   ALT 15 14   AST 16 16   MG  --  1.7       All pertinent labs within the past 24 hours have been reviewed.    Significant Imaging:   I have reviewed all pertinent imaging results/findings within the past 24 hours.      ABG  No results for input(s): "PH", "PO2", "PCO2", "HCO3", "BE" in the last 168 hours.  Assessment/Plan:     Pulmonary  Chronic respiratory failure with hypoxia  Patient with Hypoxic Respiratory failure which is Chronic.  she is on home oxygen at 4 LPM. Supplemental oxygen was provided and noted-      .   Signs/symptoms of respiratory failure include- tachypnea and increased work of breathing. Contributing diagnoses includes - CHF and pulmonary hypertension Labs and images were reviewed. Patient Has recent ABG, which has been reviewed. Will treat underlying causes and adjust management of respiratory failure as follows-     - secondary to HFpEF and pulmonary hypertension. Does not have COPD based on most recent PFTs in 3/2022. Had isolated DLCO.     Pleural effusion, left  Effusion likely " secondary to fluid overload. On evaluation simple fluid collection without Hct sign that would be concerning of spontaneous bleed. Will hold off on thoracentesis for now. Would hold eliquis for 48 hrs and switch to full dose lovenox vs hold anticoagulation until ensure lack of necessity of procedure. Present on recent CT chest while being treated for PNA. No current signs of symptoms of PNA. Would discontinue CAP coverage.     Cardiac/Vascular  Pulmonary hypertension  WHO group II with most recent Echo showing PASP of 46 3/2023. Repeat echo pending. On US of lung looked like possible pericardial effusion. Will monitor for read. Pt would benefit from diuresis.       DVT ppx: would stop eliquis and start ppx lovenox in case procedure needs to be performed.   GI ppx: N/A      Thank you for your consult. I will follow-up with patient. Please contact us if you have any additional questions.     Devin Rangel MD  Pulmonology  [facility] - Intensive Care

## 2023-09-15 NOTE — ASSESSMENT & PLAN NOTE
-Patient with Hypoxic Respiratory failure which is Chronic.  she is on home oxygen at 4-5 LPM.   -Signs/symptoms of respiratory failure include- respiratory distress.   -Labs and images were reviewed. Patient Has not had a recent ABG.   -Contributing diagnoses include tachymyopathy due to afib with rvr and fluid overload as well as possible pneumonia  -Will treat underlying causes and adjust management of respiratory failure as follows-  -Treat afib and pneumonia as above  -Diurese with bumex 1mg iv daily  -Pulmonology consulted and input appreciated.  -Stable on home level of O2

## 2023-09-15 NOTE — ASSESSMENT & PLAN NOTE
- contributing factors of HFpEF and pulm HTN   - pt denies SOB at time of visit   - continued management per PCCM and primary team

## 2023-09-15 NOTE — ASSESSMENT & PLAN NOTE
- no overt PNA symptoms currently; noted on imaging on admit   - PCCM following   - continued management per PCCM and primary teams

## 2023-09-15 NOTE — NURSING
[facility] [place]  Nurses Note -- 4 Eyes      9/15/2023       Skin assessed on: Q Shift      [] No Pressure Injuries Present    [x]Prevention Measures Documented    [x] Yes LDA  for Pressure Injury Previously documented     [] Yes New Pressure Injury Discovered   [] LDA for New Pressure Injury Added      Attending RN:  Herminia Wood, RN     Second RN:  Stella Hou RN

## 2023-09-15 NOTE — H&P
[facility] Medicine  History & Physical    Patient Name: Barbara Rizo  MRN: [id number]  Patient Class: IP- Inpatient  Admission Date: 9/14/2023  Attending Physician: Selwyn Randle MD   Primary Care Provider: Paras Oro MD         Patient information was obtained from patient and ER records.     Subjective:     Principal Problem:Normocytic anemia    Chief Complaint:   Chief Complaint   Patient presents with    Abnormal Lab     PT sent to ED for eval of low blood counts. Pt denies any new symptoms. Pt on 5L NC home O2.         HPI: 83 years old female with PMH significant for hypertension, diabetes mellitus, CKD, atrial fibrillation on Eliquis, GERD was sent to ER from her PCP because of dropping blood counts. Patient was recently hospitalized for fluid overload and hyponatremia. Patient reported she had blood workup done and got call from her PCP to go to ER. Review of the chart revealed that hemoglobin dropped from 13.5 on 08/27 to 9.3 today on 09/14. Patient denied any bleeding from any site or black stools. Patient reported she is doing fine otherwise and denied any symptoms at this time including chest pain shortness of breath abdominal pain fever chills nausea vomiting diarrhea or dizziness. Patient had rectal exam done by ER physician which was negative for melena or hematochezia. Chest x-ray showed moderate left pleural effusion with associated compressive atelectasis and/or lower lobe consolidation. Other lab work showed WBC 10.62, creatinine 1.9, potassium 3.6 and BNP 1317. Patient was found to be in atrial fibrillation with RVR for which has been started on amiodarone infusion. Patient is on 4-5 L O2 NC at baseline. Patient received IV amiodarone and IV Lasix in ER.        Past Medical History:   Diagnosis Date    A-fib     Breast cancer     invasive ductal carcinoma    CKD (chronic kidney disease)     Diabetes mellitus type II     Fatty liver     GERD  (gastroesophageal reflux disease)     Hearing loss of both ears     wears bilateral hearing aids    Hemochromatosis     History of anemia due to CKD     History of pleural effusion     with thoracentesis    Hyperlipidemia     Hypertension     Macular degeneration     Osteoarthritis     Left knee    Osteoporosis     Vaginal delivery     x2    Vitamin D deficiency disease     Wears partial dentures     upper removable bridge       Past Surgical History:   Procedure Laterality Date    AXILLARY NODE DISSECTION Left 10/18/2021    Procedure: LYMPHADENECTOMY, AXILLARY;  Surgeon: Darlene Roca MD;  Location: [facility] OR;  Service: General;  Laterality: Left;    BREAST BIOPSY      BREAST LUMPECTOMY      invasive ductal carcinoma    BREAST SURGERY Bilateral     Reduction r/t h/o fibrocystic disease    CHOLECYSTECTOMY  08/2015    EYE SURGERY      Cat Ext  OU    HYSTERECTOMY      partial due to uterine fibroids    INCISION AND DRAINAGE OF KNEE Left 09/17/2020    Procedure: INCISION AND DRAINAGE, KNEE;  Surgeon: Rolando Wagoner MD;  Location: [facility] OR;  Service: Orthopedics;  Laterality: Left;    INJECTION FOR SENTINEL NODE IDENTIFICATION Left 10/18/2021    Procedure: INJECTION, FOR SENTINEL NODE IDENTIFICATION;  Surgeon: Darlene Roca MD;  Location: [facility] OR;  Service: General;  Laterality: Left;    JOINT REPLACEMENT Left 05/13/2013    TKR    JOINT REPLACEMENT Right 08/03/2015    TKR    MASTECTOMY, PARTIAL Left 10/18/2021    Procedure: MASTECTOMY, PARTIAL -- wire;  Surgeon: Darlene Roca MD;  Location: [facility] OR;  Service: General;  Laterality: Left;  RN PREOP 10/15/2021   VACCINATED-----NEED H/P AND ORDERS    SENTINEL LYMPH NODE BIOPSY Left 10/18/2021    Procedure: BIOPSY, LYMPH NODE, SENTINEL;  Surgeon: Darlene Roca MD;  Location[facility];  Service: General;  Laterality: Left;    THORACENTESIS      TOTAL KNEE ARTHROPLASTY Right 2015    left knee done 5/2013    WOUND DRESSING Left 09/17/2020     Procedure: WOUND VAC APPLICATION;  Surgeon: Rolando Wagoner MD;  Location: [facility];  Service: Orthopedics;  Laterality: Left;       Review of patient's allergies indicates:  No Known Allergies    Current Facility-Administered Medications on File Prior to Encounter   Medication    denosumab (PROLIA) injection 60 mg     Current Outpatient Medications on File Prior to Encounter   Medication Sig    amiodarone (PACERONE) 200 MG Tab TAKE ONE-HALF TABLET BY MOUTH EVERY DAY *DO NOT TAKE WITH GRAPEFRUIT JUICE*    apixaban (ELIQUIS) 2.5 mg Tab TAKE ONE TABLET BY MOUTH TWICE DAILY    atorvastatin (LIPITOR) 40 MG tablet TAKE ONE TABLET BY MOUTH ONCE DAILY    furosemide (LASIX) 40 MG tablet Take 1 tablet (40 mg total) by mouth 2 (two) times a day.    losartan (COZAAR) 50 MG tablet Take 50 mg by mouth.    allopurinoL (ZYLOPRIM) 100 MG tablet Take 1 tablet by mouth once daily.    calcium carbonate (TUMS) 200 mg calcium (500 mg) chewable tablet Take 2 tablets by mouth.    ergocalciferol (ERGOCALCIFEROL) 50,000 unit Cap Take 50,000 Units by mouth every 30 days.     FLUZONE HIGHDOSE QUAD 21-22  mcg/0.7 mL Syrg     folic acid (FOLVITE) 1 MG tablet TAKE 1 TABLET BY MOUTH EVERY DAY    mupirocin (BACTROBAN) 2 % ointment Apply topically every evening.    solifenacin (VESICARE) 10 MG tablet Take 1 tablet (10 mg total) by mouth once daily.     Family History       Problem Relation (Age of Onset)    Aneurysm Father    Cancer Mother    Hypertension Mother    No Known Problems Sister, Sister, Brother, Daughter, Son          Tobacco Use    Smoking status: Former     Passive exposure: Past    Smokeless tobacco: Never   Substance and Sexual Activity    Alcohol use: Not Currently    Drug use: No    Sexual activity: Not Currently     Review of Systems   Constitutional:  Positive for fatigue. Negative for fever.   HENT:  Negative for congestion.    Eyes:  Negative for visual disturbance.   Respiratory:  Negative for cough  and shortness of breath.    Cardiovascular:  Positive for leg swelling. Negative for chest pain.   Gastrointestinal:  Negative for blood in stool, diarrhea, nausea and vomiting.   Endocrine: Negative for polyuria.   Genitourinary:  Negative for dysuria.   Musculoskeletal:  Negative for back pain.   Neurological:  Negative for syncope.   Psychiatric/Behavioral:  Negative for agitation.      Objective:     Vital Signs (Most Recent):  Temp: 97.8 °F (36.6 °C) (09/14/23 1831)  Pulse: 110 (09/14/23 2232)  Resp: 18 (09/14/23 2033)  BP: 104/83 (09/14/23 2232)  SpO2: (!) 91 % (09/14/23 2218) Vital Signs (24h Range):  Temp:  [97.8 °F (36.6 °C)] 97.8 °F (36.6 °C)  Pulse:  [] 110  Resp:  [18-20] 18  SpO2:  [91 %-98 %] 91 %  BP: (100-128)/(58-83) 104/83     Weight: 72.6 kg (160 lb)  Body mass index is 29.26 kg/m².     Physical Exam  Constitutional:       Comments: Elderly   HENT:      Head: Normocephalic.      Nose: Nose normal.      Mouth/Throat:      Mouth: Mucous membranes are moist.   Eyes:      Extraocular Movements: Extraocular movements intact.   Cardiovascular:      Rate and Rhythm: Tachycardia present.   Pulmonary:      Effort: No respiratory distress.      Breath sounds: No wheezing.   Abdominal:      Tenderness: There is no abdominal tenderness.   Musculoskeletal:         General: Swelling present.      Cervical back: Normal range of motion.   Skin:     General: Skin is warm.      Capillary Refill: Capillary refill takes less than 2 seconds.   Neurological:      Mental Status: She is alert and oriented to person, place, and time.   Psychiatric:         Mood and Affect: Mood normal.         Behavior: Behavior normal.                Significant Labs: All pertinent labs within the past 24 hours have been reviewed.    Significant Imaging: I have reviewed all pertinent imaging results/findings within the past 24 hours.    Assessment/Plan:     * Normocytic anemia  -will get anemia workup including iron studies,  vitamin B12/folic acid level, stool for occult blood   -hold home med Eliquis for tonight   -monitor H&H and prn transfuse      Chronic respiratory failure with hypoxia  Patient with Hypoxic Respiratory failure which is Chronic.  she is on home oxygen at 4-5 LPM. Supplemental oxygen was provided and noted-      .   Signs/symptoms of respiratory failure include- respiratory distress. Labs and images were reviewed. Patient Has not had a recent ABG. Will treat underlying causes and adjust management of respiratory failure as follows-    -supplemental oxygen, wean as able  -IV antibiotics for pneumonia  -IV Lasix for pleural effusion  -consult pulmonology    Lower lobe pneumonia  -will get procalcitonin  -will get sputum culture, urine strep pneumoniae and Legionella antigen testing  -will start IV ceftriaxone and azithromycin    Pleural effusion, left  -received IV Lasix 40 mg in ER  -pulmonology has been consulted to assess for thoracentesis    Atrial fibrillation with RVR  Patient with Persistent (7 days or more) atrial fibrillation which is uncontrolled currently with Amiodarone. Patient is currently in atrial fibrillation.[id number] Score: 5. Anticoagulation indicated. Anticoagulation done with eliquis.     -continue amiodarone infusion which was started in ER   -hold home med Eliquis for now due to dropping H&H   -cardiac monitoring  -consult to Cardiology    CKD (chronic kidney disease), stage IV  -Avoid nephrotoxic medications  -Daily BMP    Essential hypertension  -hold home antihypertensives for now due to soft BP  -BP monitoring      Hyperlipidemia  -will resume home med atorvastatin once med rec is done        VTE Risk Mitigation (From admission, onward)         Ordered     IP VTE HIGH RISK PATIENT  Once         09/14/23 2234     Place sequential compression device  Until discontinued         09/14/23 2234              Critical care time spent on the evaluation and treatment of severe organ dysfunction,  review of pertinent labs and imaging studies, discussions with consulting providers and discussions with patient/family: 35 minutes.             Jarrod Figueroa MD  Department of Hospital Medicine  [facility] [place] - Intensive Care

## 2023-09-15 NOTE — ASSESSMENT & PLAN NOTE
Patient with Persistent (7 days or more) atrial fibrillation which is uncontrolled currently with Amiodarone. Patient is currently in atrial fibrillation.[id number] Score: 5. Anticoagulation indicated. Anticoagulation done with eliquis.     -continue amiodarone infusion which was started in ER   -hold home med Eliquis for now due to dropping H&H   -cardiac monitoring  -consult to Cardiology

## 2023-09-15 NOTE — ASSESSMENT & PLAN NOTE
-Pulmonology consulted and input appreciated  -Suspect due to fluid overload  -No plans for thoracentesis at this time  -Diurese with bumex 1mg iv daily  -Continue to monitor

## 2023-09-15 NOTE — ASSESSMENT & PLAN NOTE
-Admitted to inpatient status  -Diagnosed with afib rvr and treated with amiodarone drip in ICU  -Cardiology consulted and input appreciated  -Taken for DCCV this afternoon which was successful - but bradycardic  -Continue eliquis  -Plan to resume amiodarone 200mg bid for now  -Continue to monitor in ICU for now

## 2023-09-15 NOTE — SUBJECTIVE & OBJECTIVE
Past Medical History:   Diagnosis Date    A-fib     Breast cancer     invasive ductal carcinoma    CKD (chronic kidney disease)     Diabetes mellitus type II     Fatty liver     GERD (gastroesophageal reflux disease)     Hearing loss of both ears     wears bilateral hearing aids    Hemochromatosis     History of anemia due to CKD     History of pleural effusion     with thoracentesis    Hyperlipidemia     Hypertension     Macular degeneration     Osteoarthritis     Left knee    Osteoporosis     Vaginal delivery     x2    Vitamin D deficiency disease     Wears partial dentures     upper removable bridge       Past Surgical History:   Procedure Laterality Date    AXILLARY NODE DISSECTION Left 10/18/2021    Procedure: LYMPHADENECTOMY, AXILLARY;  Surgeon: Darlene Roca MD;  Location: [facility];  Service: General;  Laterality: Left;    BREAST BIOPSY      BREAST LUMPECTOMY      invasive ductal carcinoma    BREAST SURGERY Bilateral     Reduction r/t h/o fibrocystic disease    CHOLECYSTECTOMY  08/2015    EYE SURGERY      Cat Ext  OU    HYSTERECTOMY      partial due to uterine fibroids    INCISION AND DRAINAGE OF KNEE Left 09/17/2020    Procedure: INCISION AND DRAINAGE, KNEE;  Surgeon: Rolando Wagoner MD;  Location: [facility];  Service: Orthopedics;  Laterality: Left;    INJECTION FOR SENTINEL NODE IDENTIFICATION Left 10/18/2021    Procedure: INJECTION, FOR SENTINEL NODE IDENTIFICATION;  Surgeon: Darlene Roca MD;  Location: [facility];  Service: General;  Laterality: Left;    JOINT REPLACEMENT Left 05/13/2013    TKR    JOINT REPLACEMENT Right 08/03/2015    TKR    MASTECTOMY, PARTIAL Left 10/18/2021    Procedure: MASTECTOMY, PARTIAL -- wire;  Surgeon: Darlene Roca MD;  Location: [facility];  Service: General;  Laterality: Left;  RN PREOP 10/15/2021   VACCINATED-----NEED H/P AND ORDERS    SENTINEL LYMPH NODE BIOPSY Left 10/18/2021    Procedure: BIOPSY, LYMPH NODE, SENTINEL;  Surgeon: Darlene Roca MD;  Location: [facility];   Service: General;  Laterality: Left;    THORACENTESIS      TOTAL KNEE ARTHROPLASTY Right 2015    left knee done 5/2013    WOUND DRESSING Left 09/17/2020    Procedure: WOUND VAC APPLICATION;  Surgeon: Rolando Wagoner MD;  Location: [facility];  Service: Orthopedics;  Laterality: Left;       Review of patient's allergies indicates:  No Known Allergies    Family History       Problem Relation (Age of Onset)    Aneurysm Father    Cancer Mother    Hypertension Mother    No Known Problems Sister, Sister, Brother, Daughter, Son          Tobacco Use    Smoking status: Former     Passive exposure: Past    Smokeless tobacco: Never   Substance and Sexual Activity    Alcohol use: Not Currently    Drug use: No    Sexual activity: Not Currently         Review of Systems   Constitutional:  Positive for fatigue. Negative for activity change.   Respiratory:  Negative for cough, shortness of breath and wheezing.    Cardiovascular:  Positive for palpitations and leg swelling. Negative for chest pain.   Genitourinary:         Urinary frequency with lasix     Neurological:  Negative for speech difficulty and light-headedness.   Psychiatric/Behavioral:  Negative for agitation, behavioral problems, confusion and decreased concentration.      Objective:     Vital Signs (Most Recent):  Temp: 97.8 °F (36.6 °C) (09/15/23 1101)  Pulse: 105 (09/15/23 1200)  Resp: (!) 21 (09/15/23 1200)  BP: 109/74 (09/15/23 1200)  SpO2: (!) 89 % (09/15/23 1200) Vital Signs (24h Range):  Temp:  [97.6 °F (36.4 °C)-98.2 °F (36.8 °C)] 97.8 °F (36.6 °C)  Pulse:  [] 105  Resp:  [15-28] 21  SpO2:  [87 %-99 %] 89 %  BP: (100-133)/(58-83) 109/74     Weight: 77.2 kg (170 lb 3.1 oz)  Body mass index is 31.13 kg/m².      Intake/Output Summary (Last 24 hours) at 9/15/2023 1310  Last data filed at 9/15/2023 1000  Gross per 24 hour   Intake 923.38 ml   Output 550 ml   Net 373.38 ml        Physical Exam  Vitals and nursing note reviewed.   Constitutional:       Appearance:  Normal appearance. She is ill-appearing. She is not toxic-appearing.   HENT:      Head: Normocephalic and atraumatic.      Mouth/Throat:      Pharynx: No oropharyngeal exudate.   Eyes:      General: No scleral icterus.     Conjunctiva/sclera: Conjunctivae normal.      Pupils: Pupils are equal, round, and reactive to light.   Cardiovascular:      Rate and Rhythm: Tachycardia present. Rhythm irregular.      Heart sounds: No murmur heard.     No friction rub. No gallop.   Pulmonary:      Effort: Pulmonary effort is normal. No respiratory distress.      Breath sounds: Normal breath sounds. No wheezing or rhonchi.   Musculoskeletal:      Right lower leg: Edema present.      Left lower leg: Edema present.   Skin:     Capillary Refill: Capillary refill takes less than 2 seconds.   Neurological:      General: No focal deficit present.      Mental Status: She is alert and oriented to person, place, and time.   Psychiatric:         Mood and Affect: Mood normal.         Behavior: Behavior normal.         Thought Content: Thought content normal.         Judgment: Judgment normal.          Vents:       Lines/Drains/Airways       Drain  Duration             Female External Urinary Catheter 09/14/23 2300 <1 day              Peripheral Intravenous Line  Duration                  Peripheral IV - Single Lumen 09/14/23 1954 20 G Left Forearm <1 day         Peripheral IV - Single Lumen 09/14/23 2340 22 G Left Forearm <1 day                    Significant Labs:    CBC/Anemia Profile:  Recent Labs   Lab 09/13/23  1345 09/14/23  1954 09/15/23  0259 09/15/23  0301   WBC 12.12 10.62  --  7.62   HGB 9.9* 9.3*  --  9.0*   HCT 30.6* 28.1*  --  28.9*    255  --  188   MCV 97 95  --  101*   RDW 17.4* 17.6*  --  17.8*   IRON 64  --  61  --    FERRITIN  --   --  446*  --         Chemistries:  Recent Labs   Lab 09/14/23  1954 09/15/23  0301    137   K 3.6 3.3*   CL 86* 87*   CO2 37* 34*   BUN 58* 56*   CREATININE 1.9* 1.8*   CALCIUM  8.9 8.5*   ALBUMIN 3.0* 2.8*   PROT 6.6 6.2   BILITOT 1.6* 1.2*   ALKPHOS 64 57   ALT 15 14   AST 16 16   MG  --  1.7       All pertinent labs within the past 24 hours have been reviewed.    Significant Imaging:   I have reviewed all pertinent imaging results/findings within the past 24 hours.

## 2023-09-15 NOTE — ANESTHESIA PREPROCEDURE EVALUATION
09/15/2023  [facility]  Anesthesia Pre-Operative Evaluation         Patient Name: Barbara Rizo  YOB: 1940  MRN: [id number]    SUBJECTIVE:     Pre-operative evaluation for Procedure(s) (LRB):  Cardioversion or Defibrillation (N/A)  Cardioversion (N/A)     09/15/2023    Barbara Rizo is a 83 y.o. female  Patient now presents for the above procedure(s).    TTE:    The left ventricle is normal in size with eccentric hypertrophy and normal systolic function.   The estimated ejection fraction is 65%.   Grade II left ventricular diastolic dysfunction.   Mild tricuspid regurgitation.   Small pericardial effusion.   Moderate right atrial enlargement.   Severe left atrial enlargement.   Normal right ventricular size with normal right ventricular systolic function.   There is moderate aortic valve stenosis.   Aortic valve area is 1.20 cm2; peak velocity is 2.70 m/s; mean gradient is 19 mmHg.   Mild mitral regurgitation.    LDA:        Peripheral IV - Single Lumen 09/14/23 1954 20 G Left Forearm (Active)   Site Assessment Clean;Dry;Intact 09/15/23 0800   Extremity Assessment Distal to IV No abnormal discoloration 09/15/23 0800   Line Status Flushed;Infusing 09/15/23 0400   Dressing Status Clean;Dry;Intact 09/15/23 0800   Dressing Intervention Integrity maintained 09/15/23 0800   Dressing Change Due 09/18/23 09/15/23 0800   Site Change Due 09/18/23 09/15/23 0800   Reason Not Rotated Not due 09/15/23 0800   Number of days: 0            Peripheral IV - Single Lumen 09/14/23 2340 22 G Left Forearm (Active)   Site Assessment Clean;Dry;Intact 09/15/23 0800   Extremity Assessment Distal to IV No abnormal discoloration 09/15/23 0800   Line Status Infusing 09/15/23 0400   Dressing Status Clean;Dry;Intact 09/15/23 0800   Dressing Intervention Integrity maintained 09/15/23 0800    Dressing Change Due 09/18/23 09/15/23 0800   Site Change Due 09/18/23 09/15/23 0800   Reason Not Rotated Not due 09/15/23 0800   Number of days: 0       Female External Urinary Catheter 09/14/23 2300 (Active)   Skin no redness;no breakdown 09/15/23 0800   Tolerance no signs/symptoms of discomfort 09/15/23 0800   Suction Continuous suction at 60 mmHg 09/15/23 0800   Date of last wick change 09/15/23 09/15/23 0900   Time of last wick change 0900 09/15/23 0900   Output (mL) 100 mL 09/15/23 0636   Number of days: 0       Patient Active Problem List   Diagnosis    Hyperlipidemia    Essential hypertension    Hemochromatosis    DJD (degenerative joint disease) of knee    Vitamin D deficiency    Benign hypertensive heart disease without heart failure    Primary localized osteoarthrosis, lower leg    Acute respiratory failure with hypoxia    Anemia in chronic kidney disease    Morbid (severe) obesity due to excess calories    Physical deconditioning    Esophagitis    Urinary retention    Atonic neurogenic bladder    Incomplete bladder emptying    Constipation, slow transit    OAB (overactive bladder)    MGUS (monoclonal gammopathy of unknown significance)    Paroxysmal atrial fibrillation    Chronic gout    Hyponatremia    Hyperkalemia    Sinus bradycardia    Pulmonary hypertension    Open wound of left knee    Atherosclerosis of native artery of left lower extremity    Type 2 diabetes mellitus with stage 4 chronic kidney disease, without long-term current use of insulin    Secondary hyperparathyroidism of renal origin    CKD (chronic kidney disease), stage IV    Syncope    ACP (advance care planning)    Carcinoma of breast    Malignant neoplasm of overlapping sites of left breast in female, estrogen receptor positive    Aortic atherosclerosis    JOCELIN (obstructive sleep apnea)    Other nonthrombocytopenic purpura    Community acquired pneumonia    Pleural effusion    Prolonged Q-T  interval on ECG    Advance care planning    Fall    Urinary tract infection without hematuria    Acute on chronic diastolic CHF (congestive heart failure)    Hypokalemia    Normocytic anemia    Atrial fibrillation with RVR    Pleural effusion, left    Lower lobe pneumonia    Chronic respiratory failure with hypoxia       Review of patient's allergies indicates:  No Known Allergies    Current Inpatient Medications:   apixaban  5 mg Oral BID    atorvastatin  40 mg Oral Daily    azithromycin  500 mg Intravenous Q24H    cefTRIAXone (ROCEPHIN) IVPB  1 g Intravenous Q24H    folic acid  1,000 mcg Oral Daily    mupirocin   Nasal BID       Current Facility-Administered Medications on File Prior to Encounter   Medication Dose Route Frequency Provider Last Rate Last Admin    denosumab (PROLIA) injection 60 mg  60 mg Subcutaneous 1 time in Clinic/HOD Nargis Boyle MD         Current Outpatient Medications on File Prior to Encounter   Medication Sig Dispense Refill    amiodarone (PACERONE) 200 MG Tab TAKE ONE-HALF TABLET BY MOUTH EVERY DAY *DO NOT TAKE WITH GRAPEFRUIT JUICE* 45 tablet 3    apixaban (ELIQUIS) 2.5 mg Tab TAKE ONE TABLET BY MOUTH TWICE DAILY 180 tablet 3    atorvastatin (LIPITOR) 40 MG tablet TAKE ONE TABLET BY MOUTH ONCE DAILY 90 tablet 2    furosemide (LASIX) 40 MG tablet Take 1 tablet (40 mg total) by mouth 2 (two) times a day. 60 tablet 2    losartan (COZAAR) 50 MG tablet Take 50 mg by mouth.      allopurinoL (ZYLOPRIM) 100 MG tablet Take 1 tablet by mouth once daily.      calcium carbonate (TUMS) 200 mg calcium (500 mg) chewable tablet Take 2 tablets by mouth.      ergocalciferol (ERGOCALCIFEROL) 50,000 unit Cap Take 50,000 Units by mouth every 30 days.       FLUZONE HIGHDOSE QUAD 21-22  mcg/0.7 mL Syrg       folic acid (FOLVITE) 1 MG tablet TAKE 1 TABLET BY MOUTH EVERY DAY 90 tablet 0    mupirocin (BACTROBAN) 2 % ointment Apply topically every evening.      solifenacin  (VESICARE) 10 MG tablet Take 1 tablet (10 mg total) by mouth once daily. 30 tablet 11       Past Surgical History:   Procedure Laterality Date    AXILLARY NODE DISSECTION Left 10/18/2021    Procedure: LYMPHADENECTOMY, AXILLARY;  Surgeon: Darlene Roca MD;  Location: [facility];  Service: General;  Laterality: Left;    BREAST BIOPSY      BREAST LUMPECTOMY      invasive ductal carcinoma    BREAST SURGERY Bilateral     Reduction r/t h/o fibrocystic disease    CHOLECYSTECTOMY  08/2015    EYE SURGERY      Cat Ext  OU    HYSTERECTOMY      partial due to uterine fibroids    INCISION AND DRAINAGE OF KNEE Left 09/17/2020    Procedure: INCISION AND DRAINAGE, KNEE;  Surgeon: Rolando Wagoner MD;  Location: [facility];  Service: Orthopedics;  Laterality: Left;    INJECTION FOR SENTINEL NODE IDENTIFICATION Left 10/18/2021    Procedure: INJECTION, FOR SENTINEL NODE IDENTIFICATION;  Surgeon: Darlene Roca MD;  Location: [facility];  Service: General;  Laterality: Left;    JOINT REPLACEMENT Left 05/13/2013    TKR    JOINT REPLACEMENT Right 08/03/2015    TKR    MASTECTOMY, PARTIAL Left 10/18/2021    Procedure: MASTECTOMY, PARTIAL -- wire;  Surgeon: Darlene Roca MD;  Location: [facility];  Service: General;  Laterality: Left;  RN PREOP 10/15/2021   VACCINATED-----NEED H/P AND ORDERS    SENTINEL LYMPH NODE BIOPSY Left 10/18/2021    Procedure: BIOPSY, LYMPH NODE, SENTINEL;  Surgeon: Darlene Roca MD;  Location: [facility];  Service: General;  Laterality: Left;    THORACENTESIS      TOTAL KNEE ARTHROPLASTY Right 2015    left knee done 5/2013    WOUND DRESSING Left 09/17/2020    Procedure: WOUND VAC APPLICATION;  Surgeon: Rolando Wagoner MD;  Location: [facility];  Service: Orthopedics;  Laterality: Left;       OBJECTIVE:     Vital Signs Range (Last 24H):  Temp:  [36.4 °C (97.6 °F)-36.8 °C (98.2 °F)]   Pulse:  []   Resp:  [15-28]   BP: (100-133)/(58-83)   SpO2:  [87 %-99 %]       Significant Labs:  Lab Results   Component Value  Date    WBC 7.62 09/15/2023    HGB 9.0 (L) 09/15/2023    HCT 28.9 (L) 09/15/2023     09/15/2023    CHOL 153 07/06/2022    TRIG 103 07/06/2022    HDL 62 07/06/2022    ALT 14 09/15/2023    AST 16 09/15/2023     09/15/2023    K 3.3 (L) 09/15/2023    CL 87 (L) 09/15/2023    CREATININE 1.8 (H) 09/15/2023    BUN 56 (H) 09/15/2023    CO2 34 (H) 09/15/2023    TSH 3.001 03/17/2023    INR 1.3 (H) 09/14/2023    HGBA1C 4.9 08/27/2023       Diagnostic Studies: No relevant studies.    EKG:   Results for orders placed or performed during the hospital encounter of 08/26/23   EKG 12-lead    Collection Time: 08/27/23  3:42 AM    Narrative    Test Reason : R06.02,    Vent. Rate : 086 BPM     Atrial Rate : 085 BPM     P-R Int : 000 ms          QRS Dur : 186 ms      QT Int : 436 ms       P-R-T Axes : 000 260 070 degrees     QTc Int : 521 ms    Atrial fibrillation  Right bundle branch block  Abnormal ECG  When compared with ECG of 27-AUG-2023 02:44,  No significant change was found  Confirmed by Fish Vera MD ([id number]) on 8/27/2023 3:32:08 PM    Referred By: [email or web address]   SELF           Confirmed By:Fish Vera MD       ASSESSMENT/PLAN:           Pre-op Assessment    I have reviewed the Patient Summary Reports.     I have reviewed the Nursing Notes. I have reviewed the NPO Status.   I have reviewed the Medications.     Review of Systems  Anesthesia Hx:  No problems with previous Anesthesia  History of prior surgery of interest to airway management or planning: Denies Family Hx of Anesthesia complications.   Denies Personal Hx of Anesthesia complications.   Social:  Former Smoker    Hematology/Oncology:  Hematology Normal      Hematology Comments: hemochromatosis Current/Recent Cancer. Breast left   EENT/Dental:EENT/Dental Normal   Cardiovascular:   Hypertension Dysrhythmias atrial fibrillation CHF hyperlipidemia    Pulmonary:   Sleep Apnea    Renal/:   Chronic Renal Disease, CRI    Hepatic/GI:   GERD, well controlled  Liver Disease, (fatty liver)    Musculoskeletal:   Arthritis     Neurological:  Neurology Normal    Endocrine:   Diabetes, type 2 Denies Hypothyroidism. Denies Hyperthyroidism.    Dermatological:  Skin Normal    Psych:  Psychiatric Normal           Physical Exam  General: Cooperative, Alert and Oriented    Airway:  Mallampati: II   Mouth Opening: Normal  TM Distance: Normal  Tongue: Normal  Neck ROM: Normal ROM    Dental:  Intact        Anesthesia Plan  Type of Anesthesia, risks & benefits discussed:    Anesthesia Type: Gen Natural Airway  Intra-op Monitoring Plan: Standard ASA Monitors  Post Op Pain Control Plan: multimodal analgesia and IV/PO Opioids PRN  Induction:  IV  Informed Consent: Informed consent signed with the Patient and all parties understand the risks and agree with anesthesia plan.  All questions answered.   ASA Score: 4  Day of Surgery Review of History & Physical: H&P Update referred to the surgeon/provider.    Ready For Surgery From Anesthesia Perspective.     .

## 2023-09-16 PROBLEM — R53.81 DEBILITY: Status: ACTIVE | Noted: 2023-01-01

## 2023-09-16 PROBLEM — R13.10 DYSPHAGIA: Status: ACTIVE | Noted: 2023-01-01

## 2023-09-16 PROBLEM — J18.1 LEFT LOWER LOBE CONSOLIDATION: Status: ACTIVE | Noted: 2023-01-01

## 2023-09-16 NOTE — ASSESSMENT & PLAN NOTE
-Patient with Hypoxic Respiratory failure which is Chronic.  she is on home oxygen at 4-5 LPM.   -Signs/symptoms of respiratory failure include- respiratory distress.   -Labs and images were reviewed. Patient Has not had a recent ABG.   -Contributing diagnoses include tachymyopathy due to afib with rvr and fluid overload as well as possible pneumonia  -Will treat underlying causes and adjust management of respiratory failure as follows-  -Treat afib and pleural effusion as above  -Doubt pneumonia - stopping antibiotics  -Diurese with bumex 1mg iv daily  -Pulmonology consulted and input appreciated.  -Stable on home level of O2 4-5 L

## 2023-09-16 NOTE — ASSESSMENT & PLAN NOTE
-Admitted to inpatient status  -Diagnosed with afib rvr and treated with amiodarone drip in ICU  -Cardiology consulted and input appreciated  -Taken for DCCV 9/15 which was successful and transitioned from amiodarone drip to po amio 200mg bid.  -Clinically looks very good, but has remained in mild bradycardia in upper 50s.  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  -Discussed plan of care with Dr. Chambers.  Will continue eliquis and decrease amiodarone to home dose of 100mg daily and transfer out of ICU today

## 2023-09-16 NOTE — CARE UPDATE
[facility]  Nurses Note -- 4 Eyes      9/15/2023       Skin assessed on: Q Shift      [] No Pressure Injuries Present    [x]Prevention Measures Documented    [x] Yes LDA  for Pressure Injury Previously documented     [] Yes New Pressure Injury Discovered   [] LDA for New Pressure Injury Added      Attending RN:  Jeffery Martínez Jr. RN     Second RN:  NARCISO Wood RN

## 2023-09-16 NOTE — ASSESSMENT & PLAN NOTE
-BP is low normal to moderately elevated   -Hold home antihypertensives (losartan 40mg bid) for now

## 2023-09-16 NOTE — ASSESSMENT & PLAN NOTE
-Pulmonology consulted and input appreciated  -Suspect due to fluid overload  -Discussed with Dr. Rangel, pulmonology, today 9/16.  Doubt she has had any intrathoracic/pulmonary bleeding.  H/H stable, no chest pain and Hb stable.  No plans for thoracentesis.  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  -Continue to monitor

## 2023-09-16 NOTE — PROGRESS NOTES
[facility]  Cardiology  Progress Note    Patient Name: Barbara Rizo  MRN: [id number]  Admission Date: 9/14/2023  Hospital Length of Stay: 2 days  Code Status: DNR   Attending Physician: Selwyn Randle MD   Primary Care Physician: Paras Oro MD  Expected Discharge Date:   Principal Problem:Atrial fibrillation with RVR    Subjective:       Interval History: in nsr. Feeling better.      Past Medical History:   Diagnosis Date    A-fib     Breast cancer     invasive ductal carcinoma    CKD (chronic kidney disease)     Diabetes mellitus type II     Fatty liver     GERD (gastroesophageal reflux disease)     Hearing loss of both ears     wears bilateral hearing aids    Hemochromatosis     History of anemia due to CKD     History of pleural effusion     with thoracentesis    Hyperlipidemia     Hypertension     Macular degeneration     Osteoarthritis     Left knee    Osteoporosis     Vaginal delivery     x2    Vitamin D deficiency disease     Wears partial dentures     upper removable bridge       Past Surgical History:   Procedure Laterality Date    AXILLARY NODE DISSECTION Left 10/18/2021    Procedure: LYMPHADENECTOMY, AXILLARY;  Surgeon: Darlene Roca MD;  Location: [facility] OR;  Service: General;  Laterality: Left;    BREAST BIOPSY      BREAST LUMPECTOMY      invasive ductal carcinoma    BREAST SURGERY Bilateral     Reduction r/t h/o fibrocystic disease    CHOLECYSTECTOMY  08/2015    EYE SURGERY      Cat Ext  OU    HYSTERECTOMY      partial due to uterine fibroids    INCISION AND DRAINAGE OF KNEE Left 09/17/2020    Procedure: INCISION AND DRAINAGE, KNEE;  Surgeon: Rolando Wagoner MD;  Location: [facility] OR;  Service: Orthopedics;  Laterality: Left;    INJECTION FOR SENTINEL NODE IDENTIFICATION Left 10/18/2021    Procedure: INJECTION, FOR SENTINEL NODE IDENTIFICATION;  Surgeon: Darlene Roca MD;  Location: [facility] OR;  Service: General;  Laterality: Left;    JOINT REPLACEMENT  Left 05/13/2013    TKR    JOINT REPLACEMENT Right 08/03/2015    TKR    MASTECTOMY, PARTIAL Left 10/18/2021    Procedure: MASTECTOMY, PARTIAL -- wire;  Surgeon: Darlene Roca MD;  Location: [facility] OR;  Service: General;  Laterality: Left;  RN PREOP 10/15/2021   VACCINATED-----NEED H/P AND ORDERS    SENTINEL LYMPH NODE BIOPSY Left 10/18/2021    Procedure: BIOPSY, LYMPH NODE, SENTINEL;  Surgeon: Darlene Roca MD;  Location: [facility] OR;  Service: General;  Laterality: Left;    THORACENTESIS      TOTAL KNEE ARTHROPLASTY Right 2015    left knee done 5/2013    WOUND DRESSING Left 09/17/2020    Procedure: WOUND VAC APPLICATION;  Surgeon: Rolando Wagoner MD;  Location: [facility] OR;  Service: Orthopedics;  Laterality: Left;       Review of patient's allergies indicates:  No Known Allergies    Current Facility-Administered Medications on File Prior to Encounter   Medication    denosumab (PROLIA) injection 60 mg     Current Outpatient Medications on File Prior to Encounter   Medication Sig    amiodarone (PACERONE) 200 MG Tab TAKE ONE-HALF TABLET BY MOUTH EVERY DAY *DO NOT TAKE WITH GRAPEFRUIT JUICE*    apixaban (ELIQUIS) 2.5 mg Tab TAKE ONE TABLET BY MOUTH TWICE DAILY    atorvastatin (LIPITOR) 40 MG tablet TAKE ONE TABLET BY MOUTH ONCE DAILY    furosemide (LASIX) 40 MG tablet Take 1 tablet (40 mg total) by mouth 2 (two) times a day.    losartan (COZAAR) 50 MG tablet Take 50 mg by mouth.    allopurinoL (ZYLOPRIM) 100 MG tablet Take 1 tablet by mouth once daily.    calcium carbonate (TUMS) 200 mg calcium (500 mg) chewable tablet Take 2 tablets by mouth.    ergocalciferol (ERGOCALCIFEROL) 50,000 unit Cap Take 50,000 Units by mouth every 30 days.     FLUZONE HIGHDOSE QUAD 21-22  mcg/0.7 mL Syrg     folic acid (FOLVITE) 1 MG tablet TAKE 1 TABLET BY MOUTH EVERY DAY    mupirocin (BACTROBAN) 2 % ointment Apply topically every evening.    solifenacin (VESICARE) 10 MG tablet Take 1 tablet (10 mg total) by mouth once  daily.     Family History       Problem Relation (Age of Onset)    Aneurysm Father    Cancer Mother    Hypertension Mother    No Known Problems Sister, Sister, Brother, Daughter, Son          Tobacco Use    Smoking status: Former     Passive exposure: Past    Smokeless tobacco: Never   Substance and Sexual Activity    Alcohol use: Not Currently    Drug use: No    Sexual activity: Not Currently     Review of Systems   Constitutional: Negative.   HENT: Negative.     Eyes: Negative.    Endocrine: Negative.    Hematologic/Lymphatic: Negative.    Skin: Negative.    Musculoskeletal: Negative.    Gastrointestinal: Negative.    Genitourinary: Negative.    Neurological: Negative.    Psychiatric/Behavioral: Negative.     Allergic/Immunologic: Negative.      Objective:     Vital Signs (Most Recent):  Temp: 97.7 °F (36.5 °C) (09/16/23 1101)  Pulse: (!) 57 (09/16/23 1101)  Resp: (!) 28 (09/16/23 1101)  BP: (!) 117/56 (09/16/23 1101)  SpO2: (!) 91 % (09/16/23 1101) Vital Signs (24h Range):  Temp:  [96.8 °F (36 °C)-98.5 °F (36.9 °C)] 97.7 °F (36.5 °C)  Pulse:  [] 57  Resp:  [14-35] 28  SpO2:  [81 %-100 %] 91 %  BP: ()/(51-74) 117/56     Weight: 77.2 kg (170 lb 3.1 oz)  Body mass index is 31.13 kg/m².    SpO2: (!) 91 %         Intake/Output Summary (Last 24 hours) at 9/16/2023 1151  Last data filed at 9/16/2023 0835  Gross per 24 hour   Intake 1374.27 ml   Output 1000 ml   Net 374.27 ml         Lines/Drains/Airways       Drain  Duration             Female External Urinary Catheter 09/14/23 2300 1 day              Peripheral Intravenous Line  Duration                  Peripheral IV - Single Lumen 09/14/23 1954 20 G Left Forearm 1 day         Peripheral IV - Single Lumen 09/14/23 2340 22 G Left Forearm 1 day                     Physical Exam  Constitutional:       Appearance: Normal appearance. She is well-developed.   HENT:      Head: Normocephalic.   Eyes:      Pupils: Pupils are equal, round, and reactive to  "light.   Cardiovascular:      Rate and Rhythm: Regular rhythm.   Pulmonary:      Effort: Pulmonary effort is normal.      Breath sounds: Normal breath sounds.   Abdominal:      General: Bowel sounds are normal.      Palpations: Abdomen is soft.      Tenderness: There is no abdominal tenderness.   Musculoskeletal:         General: Normal range of motion.      Cervical back: Normal range of motion and neck supple.   Skin:     General: Skin is warm.   Neurological:      Mental Status: She is alert and oriented to person, place, and time.          Significant Labs: BMP:   Recent Labs   Lab 09/14/23  1954 09/15/23  0301 09/16/23  0418   * 135* 102    137 135*   K 3.6 3.3* 4.7   CL 86* 87* 88*   CO2 37* 34* 36*   BUN 58* 56* 44*   CREATININE 1.9* 1.8* 1.6*   CALCIUM 8.9 8.5* 8.1*   MG  --  1.7 1.9     , CMP   Recent Labs   Lab 09/14/23  1954 09/15/23  0301 09/16/23  0418    137 135*   K 3.6 3.3* 4.7   CL 86* 87* 88*   CO2 37* 34* 36*   * 135* 102   BUN 58* 56* 44*   CREATININE 1.9* 1.8* 1.6*   CALCIUM 8.9 8.5* 8.1*   PROT 6.6 6.2  --    ALBUMIN 3.0* 2.8*  --    BILITOT 1.6* 1.2*  --    ALKPHOS 64 57  --    AST 16 16  --    ALT 15 14  --    ANIONGAP 13 16 11     , CBC   Recent Labs   Lab 09/14/23  1954 09/15/23  0301 09/16/23  0418   WBC 10.62 7.62 7.17   HGB 9.3* 9.0* 9.1*   HCT 28.1* 28.9* 28.1*    188 239     , INR   Recent Labs   Lab 09/14/23 1954   INR 1.3*     , Lipid Panel No results for input(s): "CHOL", "HDL", "LDLCALC", "TRIG", "CHOLHDL" in the last 48 hours., Troponin No results for input(s): "TROPONINI" in the last 48 hours., and All pertinent lab results from the last 24 hours have been reviewed.    Significant Imaging: Echocardiogram: Transthoracic echo (TTE) complete (Cupid Only):   Results for orders placed or performed during the hospital encounter of 09/14/23   Echo   Result Value Ref Range    BSA 1.84 m2    LVOT stroke volume 44.65 cm3    LVIDd 4.05 3.5 - 6.0 cm    LV " Systolic Volume 48.07 mL    LV Systolic Volume Index 27.0 mL/m2    LVIDs 3.42 2.1 - 4.0 cm    LV Diastolic Volume 71.92 mL    LV Diastolic Volume Index 40.40 mL/m2    IVS 1.33 (A) 0.6 - 1.1 cm    LVOT diameter 1.84 cm    LVOT area 2.7 cm2    FS 16 (A) 28 - 44 %    Left Ventricle Relative Wall Thickness 0.62 cm    Posterior Wall 1.26 (A) 0.6 - 1.1 cm    LV mass 188.90 g    LV Mass Index 106 g/m2    TDI LATERAL 0.03 m/s    TDI SEPTAL 0.02 m/s    TR Max Wes 3.58 m/s    IVRT 98.95 msec    LVOT peak wes 0.92 m/s    Left Ventricular Outflow Tract Mean Velocity 0.66 cm/s    Left Ventricular Outflow Tract Mean Gradient 2.06 mmHg    LA size 6.31 cm    Left Atrium Minor Axis 4.64 cm    RVDD 4.54 cm    TAPSE 1.48 cm    RA Major Axis 5.53 cm    RA Width 4.02 cm    AV mean gradient 12 mmHg    AV peak gradient 18 mmHg    Ao peak wes 2.14 m/s    Ao VTI 41.40 cm    LVOT peak VTI 16.80 cm    AV valve area 1.08 cm²    AV Velocity Ratio 0.43     AV index (prosthetic) 0.41     EDUARDO by Velocity Ratio 1.14 cm²    TV peak gradient 2 mmHg    Triscuspid Valve Regurgitation Peak Gradient 51 mmHg    PV PEAK VELOCITY 1.08 m/s    PV peak gradient 5 mmHg    Sinus 2.65 cm    STJ 2.13 cm    Ascending aorta 3.40 cm    IVC diameter 2.13 cm    Mean e' 0.03 m/s    ZLVIDS 0.93     ZLVIDD -1.95     TV resting pulmonary artery pressure 59 mmHg    RV TB RVSP 12 mmHg    Est. RA pres 8 mmHg    Narrative      Left Ventricle: Normal wall motion. There is normal systolic function   with a visually estimated ejection fraction of 55 - 60%. Diastolic   function : Unable to assess due to atrial fibrillation.    Left Atrium: Left atrium is severely dilated.    Right Ventricle: Moderate right ventricular enlargement.    Right Atrium: Right atrium is severely dilated.    Aortic Valve: There is mild stenosis. Aortic valve area by VTI is 1.08   cm². Aortic valve peak velocity is 2.14 m/s. Mean gradient is 12 mmHg. The   dimensionless index is 0.41.    Mitral  Valve: There is mild to moderate regurgitation.    Tricuspid Valve: There is moderate regurgitation.    Pulmonary Artery: There is pulmonary hypertension. The estimated   pulmonary artery systolic pressure is 59 mmHg.    IVC/SVC: Intermediate venous pressure at 8 mmHg.    Pericardium: There is a moderate effusion. Left pleural effusion.       Assessment and Plan:     Brief HPI:     * Atrial fibrillation with RVR  Paroxysmal AFib with RVR.  On amiodarone drip.  Continues to be tachycardic.  Will docardioversion today.  Patient states she has been compliant with her Eliquis and has not missed even a single dose for many months.  No need for TALHA    Risks, benefits and alternatives of the Cardioversion procedure were discussed with the patient including throat irritation, aspiration, anesthetic complications, esophageal irritation/perforation, skin irritation, arrhythmia, etc.  Patient understands and agrees to proceed.  Consent was placed on the chart.    9/16: s/p cv yesterday. Doing better. In nsr. contimue amiodarone. And oac.      Left lower lobe consolidation        Normocytic anemia  Management and evaluation per primary team    CKD (chronic kidney disease), stage IV        Essential hypertension        Hyperlipidemia            VTE Risk Mitigation (From admission, onward)         Ordered     apixaban tablet 5 mg  2 times daily         09/15/23 1005     IP VTE HIGH RISK PATIENT  Once         09/14/23 2234     Place sequential compression device  Until discontinued         09/14/23 2234                Constantine Chambers MD  Cardiology  [facility] [place] - Intensive Care      Critical Care Time:  35 minutes     Critical care was time spent personally by me on the following activities: development of treatment plan with patient or surrogate and bedside caregivers, discussions with consultants, evaluation of patient's response to treatment, examination of patient, ordering and performing treatments and interventions,  ordering and review of laboratory studies, ordering and review of radiographic studies, pulse oximetry, re-evaluation of patient's condition. This critical care time did not overlap with that of any other provider or involve time for any procedures.

## 2023-09-16 NOTE — CARE UPDATE
83 year old woman with HTN, DM, CKD, Afib, GERD and chronic hypoxic respiratory failure on 4-5L O2 at home who was sent in by PCP due to anemia (Hb 9.3 down from 13).  She was admitted to inpatient status and diagnosed with atrial fibrillation with RVR, fluid overload due to CKD and atrial fibrillation and normocytic anemia.  Cardiology, pulmonology and nephrology were consulted.  She was treated in ICU with amiodarone drip and IV bumex.  She was taken for DCCV on 9/15 which was successful and is doing well and on home level of supplemental O2.  Has amild sinus bradycardia with 1st degree AVB.  Today changing to home dose of po amiodarone 100mg qd.  Has a notable alkalosis and discussed ongoing bumex with nephrology who recommend to continue iv bumex for now.  Etiology of her anemia is unclear as she is not deficient of iron, folate or b12.  Hb remains stable in mid 9 range and she has no evidence of bleeding anywhere.  Will need heme/onc referral at discharge.  She is debilitated and PT/OT consulted to determine if HH vs SNF are appropriate at discharge.  She has intermittent dysphagia with solids so have placed consult to SLP for evaluation.  Will stepdown from ICU today.

## 2023-09-16 NOTE — SUBJECTIVE & OBJECTIVE
Interval History: in nsr. Feeling better.      Past Medical History:   Diagnosis Date    A-fib     Breast cancer     invasive ductal carcinoma    CKD (chronic kidney disease)     Diabetes mellitus type II     Fatty liver     GERD (gastroesophageal reflux disease)     Hearing loss of both ears     wears bilateral hearing aids    Hemochromatosis     History of anemia due to CKD     History of pleural effusion     with thoracentesis    Hyperlipidemia     Hypertension     Macular degeneration     Osteoarthritis     Left knee    Osteoporosis     Vaginal delivery     x2    Vitamin D deficiency disease     Wears partial dentures     upper removable bridge       Past Surgical History:   Procedure Laterality Date    AXILLARY NODE DISSECTION Left 10/18/2021    Procedure: LYMPHADENECTOMY, AXILLARY;  Surgeon: Darlene Roca MD;  Location: [facility] OR;  Service: General;  Laterality: Left;    BREAST BIOPSY      BREAST LUMPECTOMY      invasive ductal carcinoma    BREAST SURGERY Bilateral     Reduction r/t h/o fibrocystic disease    CHOLECYSTECTOMY  08/2015    EYE SURGERY      Cat Ext  OU    HYSTERECTOMY      partial due to uterine fibroids    INCISION AND DRAINAGE OF KNEE Left 09/17/2020    Procedure: INCISION AND DRAINAGE, KNEE;  Surgeon: Rolando Wagoner MD;  Location: [facility] OR;  Service: Orthopedics;  Laterality: Left;    INJECTION FOR SENTINEL NODE IDENTIFICATION Left 10/18/2021    Procedure: INJECTION, FOR SENTINEL NODE IDENTIFICATION;  Surgeon: Darlene Roca MD;  Location: [facility] OR;  Service: General;  Laterality: Left;    JOINT REPLACEMENT Left 05/13/2013    TKR    JOINT REPLACEMENT Right 08/03/2015    TKR    MASTECTOMY, PARTIAL Left 10/18/2021    Procedure: MASTECTOMY, PARTIAL -- wire;  Surgeon: Darlene Roca MD;  Location: [facility] OR;  Service: General;  Laterality: Left;  RN PREOP 10/15/2021   VACCINATED-----NEED H/P AND ORDERS    SENTINEL LYMPH NODE BIOPSY Left 10/18/2021    Procedure: BIOPSY, LYMPH NODE, SENTINEL;  Surgeon:  Darlene Roca MD;  Location: [facility] OR;  Service: General;  Laterality: Left;    THORACENTESIS      TOTAL KNEE ARTHROPLASTY Right 2015    left knee done 5/2013    WOUND DRESSING Left 09/17/2020    Procedure: WOUND VAC APPLICATION;  Surgeon: Rolando Wagoner MD;  Location: [facility] OR;  Service: Orthopedics;  Laterality: Left;       Review of patient's allergies indicates:  No Known Allergies    Current Facility-Administered Medications on File Prior to Encounter   Medication    denosumab (PROLIA) injection 60 mg     Current Outpatient Medications on File Prior to Encounter   Medication Sig    amiodarone (PACERONE) 200 MG Tab TAKE ONE-HALF TABLET BY MOUTH EVERY DAY *DO NOT TAKE WITH GRAPEFRUIT JUICE*    apixaban (ELIQUIS) 2.5 mg Tab TAKE ONE TABLET BY MOUTH TWICE DAILY    atorvastatin (LIPITOR) 40 MG tablet TAKE ONE TABLET BY MOUTH ONCE DAILY    furosemide (LASIX) 40 MG tablet Take 1 tablet (40 mg total) by mouth 2 (two) times a day.    losartan (COZAAR) 50 MG tablet Take 50 mg by mouth.    allopurinoL (ZYLOPRIM) 100 MG tablet Take 1 tablet by mouth once daily.    calcium carbonate (TUMS) 200 mg calcium (500 mg) chewable tablet Take 2 tablets by mouth.    ergocalciferol (ERGOCALCIFEROL) 50,000 unit Cap Take 50,000 Units by mouth every 30 days.     FLUZONE HIGHDOSE QUAD 21-22  mcg/0.7 mL Syrg     folic acid (FOLVITE) 1 MG tablet TAKE 1 TABLET BY MOUTH EVERY DAY    mupirocin (BACTROBAN) 2 % ointment Apply topically every evening.    solifenacin (VESICARE) 10 MG tablet Take 1 tablet (10 mg total) by mouth once daily.     Family History       Problem Relation (Age of Onset)    Aneurysm Father    Cancer Mother    Hypertension Mother    No Known Problems Sister, Sister, Brother, Daughter, Son          Tobacco Use    Smoking status: Former     Passive exposure: Past    Smokeless tobacco: Never   Substance and Sexual Activity    Alcohol use: Not Currently    Drug use: No    Sexual activity: Not Currently     Review of  Systems   Constitutional: Negative.   HENT: Negative.     Eyes: Negative.    Endocrine: Negative.    Hematologic/Lymphatic: Negative.    Skin: Negative.    Musculoskeletal: Negative.    Gastrointestinal: Negative.    Genitourinary: Negative.    Neurological: Negative.    Psychiatric/Behavioral: Negative.     Allergic/Immunologic: Negative.      Objective:     Vital Signs (Most Recent):  Temp: 97.7 °F (36.5 °C) (09/16/23 1101)  Pulse: (!) 57 (09/16/23 1101)  Resp: (!) 28 (09/16/23 1101)  BP: (!) 117/56 (09/16/23 1101)  SpO2: (!) 91 % (09/16/23 1101) Vital Signs (24h Range):  Temp:  [96.8 °F (36 °C)-98.5 °F (36.9 °C)] 97.7 °F (36.5 °C)  Pulse:  [] 57  Resp:  [14-35] 28  SpO2:  [81 %-100 %] 91 %  BP: ()/(51-74) 117/56     Weight: 77.2 kg (170 lb 3.1 oz)  Body mass index is 31.13 kg/m².    SpO2: (!) 91 %         Intake/Output Summary (Last 24 hours) at 9/16/2023 1151  Last data filed at 9/16/2023 0835  Gross per 24 hour   Intake 1374.27 ml   Output 1000 ml   Net 374.27 ml         Lines/Drains/Airways       Drain  Duration             Female External Urinary Catheter 09/14/23 2300 1 day              Peripheral Intravenous Line  Duration                  Peripheral IV - Single Lumen 09/14/23 1954 20 G Left Forearm 1 day         Peripheral IV - Single Lumen 09/14/23 2340 22 G Left Forearm 1 day                     Physical Exam  Constitutional:       Appearance: Normal appearance. She is well-developed.   HENT:      Head: Normocephalic.   Eyes:      Pupils: Pupils are equal, round, and reactive to light.   Cardiovascular:      Rate and Rhythm: Regular rhythm.   Pulmonary:      Effort: Pulmonary effort is normal.      Breath sounds: Normal breath sounds.   Abdominal:      General: Bowel sounds are normal.      Palpations: Abdomen is soft.      Tenderness: There is no abdominal tenderness.   Musculoskeletal:         General: Normal range of motion.      Cervical back: Normal range of motion and neck supple.  "  Skin:     General: Skin is warm.   Neurological:      Mental Status: She is alert and oriented to person, place, and time.          Significant Labs: BMP:   Recent Labs   Lab 09/14/23  1954 09/15/23  0301 09/16/23  0418   * 135* 102    137 135*   K 3.6 3.3* 4.7   CL 86* 87* 88*   CO2 37* 34* 36*   BUN 58* 56* 44*   CREATININE 1.9* 1.8* 1.6*   CALCIUM 8.9 8.5* 8.1*   MG  --  1.7 1.9     , CMP   Recent Labs   Lab 09/14/23  1954 09/15/23  0301 09/16/23  0418    137 135*   K 3.6 3.3* 4.7   CL 86* 87* 88*   CO2 37* 34* 36*   * 135* 102   BUN 58* 56* 44*   CREATININE 1.9* 1.8* 1.6*   CALCIUM 8.9 8.5* 8.1*   PROT 6.6 6.2  --    ALBUMIN 3.0* 2.8*  --    BILITOT 1.6* 1.2*  --    ALKPHOS 64 57  --    AST 16 16  --    ALT 15 14  --    ANIONGAP 13 16 11     , CBC   Recent Labs   Lab 09/14/23  1954 09/15/23  0301 09/16/23  0418   WBC 10.62 7.62 7.17   HGB 9.3* 9.0* 9.1*   HCT 28.1* 28.9* 28.1*    188 239     , INR   Recent Labs   Lab 09/14/23 1954   INR 1.3*     , Lipid Panel No results for input(s): "CHOL", "HDL", "LDLCALC", "TRIG", "CHOLHDL" in the last 48 hours., Troponin No results for input(s): "TROPONINI" in the last 48 hours., and All pertinent lab results from the last 24 hours have been reviewed.    Significant Imaging: Echocardiogram: Transthoracic echo (TTE) complete (Cupid Only):   Results for orders placed or performed during the hospital encounter of 09/14/23   Echo   Result Value Ref Range    BSA 1.84 m2    LVOT stroke volume 44.65 cm3    LVIDd 4.05 3.5 - 6.0 cm    LV Systolic Volume 48.07 mL    LV Systolic Volume Index 27.0 mL/m2    LVIDs 3.42 2.1 - 4.0 cm    LV Diastolic Volume 71.92 mL    LV Diastolic Volume Index 40.40 mL/m2    IVS 1.33 (A) 0.6 - 1.1 cm    LVOT diameter 1.84 cm    LVOT area 2.7 cm2    FS 16 (A) 28 - 44 %    Left Ventricle Relative Wall Thickness 0.62 cm    Posterior Wall 1.26 (A) 0.6 - 1.1 cm    LV mass 188.90 g    LV Mass Index 106 g/m2    TDI LATERAL 0.03 " m/s    TDI SEPTAL 0.02 m/s    TR Max Wes 3.58 m/s    IVRT 98.95 msec    LVOT peak wes 0.92 m/s    Left Ventricular Outflow Tract Mean Velocity 0.66 cm/s    Left Ventricular Outflow Tract Mean Gradient 2.06 mmHg    LA size 6.31 cm    Left Atrium Minor Axis 4.64 cm    RVDD 4.54 cm    TAPSE 1.48 cm    RA Major Axis 5.53 cm    RA Width 4.02 cm    AV mean gradient 12 mmHg    AV peak gradient 18 mmHg    Ao peak wes 2.14 m/s    Ao VTI 41.40 cm    LVOT peak VTI 16.80 cm    AV valve area 1.08 cm²    AV Velocity Ratio 0.43     AV index (prosthetic) 0.41     EDUARDO by Velocity Ratio 1.14 cm²    TV peak gradient 2 mmHg    Triscuspid Valve Regurgitation Peak Gradient 51 mmHg    PV PEAK VELOCITY 1.08 m/s    PV peak gradient 5 mmHg    Sinus 2.65 cm    STJ 2.13 cm    Ascending aorta 3.40 cm    IVC diameter 2.13 cm    Mean e' 0.03 m/s    ZLVIDS 0.93     ZLVIDD -1.95     TV resting pulmonary artery pressure 59 mmHg    RV TB RVSP 12 mmHg    Est. RA pres 8 mmHg    Narrative      Left Ventricle: Normal wall motion. There is normal systolic function   with a visually estimated ejection fraction of 55 - 60%. Diastolic   function : Unable to assess due to atrial fibrillation.    Left Atrium: Left atrium is severely dilated.    Right Ventricle: Moderate right ventricular enlargement.    Right Atrium: Right atrium is severely dilated.    Aortic Valve: There is mild stenosis. Aortic valve area by VTI is 1.08   cm². Aortic valve peak velocity is 2.14 m/s. Mean gradient is 12 mmHg. The   dimensionless index is 0.41.    Mitral Valve: There is mild to moderate regurgitation.    Tricuspid Valve: There is moderate regurgitation.    Pulmonary Artery: There is pulmonary hypertension. The estimated   pulmonary artery systolic pressure is 59 mmHg.    IVC/SVC: Intermediate venous pressure at 8 mmHg.    Pericardium: There is a moderate effusion. Left pleural effusion.

## 2023-09-16 NOTE — PT/OT/SLP EVAL
"Occupational Therapy Evaluation and Treatment    Name: Barbara Rizo  MRN: [id number]  Admitting Diagnosis: Atrial fibrillation with RVR  Recent Surgery: Procedure(s) (LRB):  Cardioversion or Defibrillation (N/A)  Cardioversion (N/A) 1 Day Post-Op    Recommendations:     Discharge Recommendations: home health OT (with care giver assist)  Level of Assistance Recommended: 24 hours light assistance  Discharge Equipment Recommendations: none  Barriers to discharge:  (decreased endurance with recent hospital D/C. The patient will require assist with self care and amb upon D/C)    Assessment:     Barbara Rizo is a 83 y.o. female with a medical diagnosis of Atrial fibrillation with RVR. She presents with performance deficits affecting function including weakness, impaired endurance, impaired self care skills, impaired functional mobility, gait instability, decreased upper extremity function, decreased coordination, impaired balance, decreased safety awareness, impaired cardiopulmonary response to activity.   The patient participated in OT eval with SBA/CGA for bed mobility and transfer from bed to chair. The patient required VC and demo to perform pursed lipped breathing with SOB with activity. The patient's VS were stable but the patient SpO2 was difficult to read 2* nail polish on nails. The patient will benefit from  OT and family assistance upon D/C.    Rehab Prognosis: Good and Fair; patient would benefit from acute OT services to address these deficits and reach maximum level of function.    Plan:     Patient to be seen 5 x/week to address the above listed problems via self-care/home management, therapeutic activities, therapeutic exercises  Plan of Care Expires: 09/30/23  Plan of Care Reviewed with: patient (son left at start of OT)    Subjective     Chief Complaint: "I can't have an extender on my oxygen"  Patient Comments/Goals: agreeable to sit in the chair  Pain/Comfort:  Pain Rating 1: " 0/10    Patients cultural, spiritual, Confucianist conflicts given the current situation: no    Social History:  Living Environment: Patient lives alone in a SSH with a walk in shower but is staying with her daughter in a SSH since her D/C from the hospital <a week ago.  Prior Level of Function: Prior to admission, patient  was receiving assistance with self care and amb from her daughter.   Roles and Routines: Patient was  receiving HH PT/OT  prior to admission.  Equipment Used at Home: bedside commode, bath bench, cane, straight, wheelchair, walker, rolling, oxygen  DME owned (not currently used): none  Assistance Upon Discharge:  daughter and son    Objective:     Communicated with nurseHerminia prior to session. Patient found HOB elevated with blood pressure cuff, oxygen, PureWick, peripheral IV, pulse ox (continuous), telemetry upon OT entry to room.    General Precautions: Standard, fall, respiratory   Orthopedic Precautions: N/A   Braces: N/A    Respiratory Status: Nasal cannula, flow 4.5 L/min    Occupational Performance    Gait belt applied - Yes    Bed Mobility:   Scooting anteriorly to EOB to have both feet planted on floor: stand by assistance  Supine to sit from left side of bed with stand by assistance and HOB elevated    Functional Mobility/Transfers:  Sit <> Stand Transfer with contact guard assistance with hand-held assist  Bed <> Chair Transfer using Step Transfer technique with contact guard assistance with hand-held assist  Functional Mobility: bed<>chair with CGA and VC for hand placement on transfer surface    Activities of Daily Living:  Feeding: modified independence  Upper Body Dressing: minimum assistance and limited by ICU lines  Lower Body Dressing: dependence  Toileting: PureWick in use with patient c/o urinary frequency    Cognitive/Visual Perceptual:  Cognitive/Psychosocial Skills:    -     Oriented to: Person, Place, Time, Situation  -     Follows Commands/attention: Follows two-step  commands (some difficulty 2* [place])  -     Communication: clear/fluent  -     Memory: No Deficits noted  -     Safety awareness/insight to disability: impaired  -     Mood/Affect/Coping skills/emotional control: Appropriate to situation  Visual/Perceptual:    -     Intact    Physical Exam:  Balance:    -     Sitting: stand by assistance  -     Standing: contact guard assistance  Postural examination/scapula alignment:    -       Rounded shoulders  -       Forward head  Skin integrity: Visible skin intact  Edema:  None noted  Sensation:    -       Intact  Dominant hand: Right  Upper Extremity Range of Motion:     -       Right Upper Extremity: WFL  -       Left Upper Extremity: WFL  Upper Extremity Strength:    -       Right Upper Extremity: WFL  -       Left Upper Extremity: WFL    AMPAC 6 Click ADL:  AMPAC Total Score: 20    Treatment & Education:  Patient educated on role of OT, POC, and goals for therapy  Patient educated on importance of OOB activities with staff member assistance and sitting OOB majority of the day  Educated the patient re: Pursed lipped breathing verbally and via demo and handout. The patient was educated re: need to perform PLB after activity and when SOB.  Educated the patient re: benefits of sitting in the chair for ~2 hours and requesting nursing assist to return to bed    Patient clear to stand pivot transfer with RN/PCT, assist x1 .    Patient left up in chair with all lines intact, call button in reach, and RN notified.    GOALS:   Multidisciplinary Problems       Occupational Therapy Goals          Problem: Occupational Therapy    Goal Priority Disciplines Outcome Interventions   Occupational Therapy Goal     OT, PT/OT Ongoing, Progressing    Description: Goals to be met by: 9/30/23     Patient will increase functional independence with ADLs by performing:    UE Dressing with Modified [place].  LE Dressing with Modified [place].  Grooming while standing at sink with Modified  [place], Supervision, and Assistive Devices as needed.  Toileting from toilet with Set-up Assistance and Supervision for hygiene and clothing management.   Supine to sit with Modified [place].  Step transfer with Modified [place] and Supervision  Toilet transfer to toilet with Supervision.  Upper extremity exercise program x15 reps per handout, with independence.  The patient will perform PLB with SOB                       History:     Past Medical History:   Diagnosis Date    A-fib     Breast cancer     invasive ductal carcinoma    CKD (chronic kidney disease)     Diabetes mellitus type II     Fatty liver     GERD (gastroesophageal reflux disease)     Hearing loss of both ears     wears bilateral hearing aids    Hemochromatosis     History of anemia due to CKD     History of pleural effusion     with thoracentesis    Hyperlipidemia     Hypertension     Macular degeneration     Osteoarthritis     Left knee    Osteoporosis     Vaginal delivery     x2    Vitamin D deficiency disease     Wears partial dentures     upper removable bridge         Past Surgical History:   Procedure Laterality Date    AXILLARY NODE DISSECTION Left 10/18/2021    Procedure: LYMPHADENECTOMY, AXILLARY;  Surgeon: Darlene Roca MD;  Location: [facility] OR;  Service: General;  Laterality: Left;    BREAST BIOPSY      BREAST LUMPECTOMY      invasive ductal carcinoma    BREAST SURGERY Bilateral     Reduction r/t h/o fibrocystic disease    CHOLECYSTECTOMY  08/2015    EYE SURGERY      Cat Ext  OU    HYSTERECTOMY      partial due to uterine fibroids    INCISION AND DRAINAGE OF KNEE Left 09/17/2020    Procedure: INCISION AND DRAINAGE, KNEE;  Surgeon: Rolando Wagoner MD;  Location: [facility] OR;  Service: Orthopedics;  Laterality: Left;    INJECTION FOR SENTINEL NODE IDENTIFICATION Left 10/18/2021    Procedure: INJECTION, FOR SENTINEL NODE IDENTIFICATION;  Surgeon: Darlene Roca MD;  Location: [facility] OR;  Service: General;  Laterality: Left;     JOINT REPLACEMENT Left 05/13/2013    TKR    JOINT REPLACEMENT Right 08/03/2015    TKR    MASTECTOMY, PARTIAL Left 10/18/2021    Procedure: MASTECTOMY, PARTIAL -- wire;  Surgeon: Darlene Roca MD;  Location: [facility];  Service: General;  Laterality: Left;  RN PREOP 10/15/2021   VACCINATED-----NEED H/P AND ORDERS    SENTINEL LYMPH NODE BIOPSY Left 10/18/2021    Procedure: BIOPSY, LYMPH NODE, SENTINEL;  Surgeon: Darlene Roca MD;  Location: [facility] OR;  Service: General;  Laterality: Left;    THORACENTESIS      TOTAL KNEE ARTHROPLASTY Right 2015    left knee done 5/2013    WOUND DRESSING Left 09/17/2020    Procedure: WOUND VAC APPLICATION;  Surgeon: Rolando Wagoner MD;  Location: [facility];  Service: Orthopedics;  Laterality: Left;       Time Tracking:     OT Date of Treatment: 09/16/23  OT Start Time: 1134  OT Stop Time: 1157  OT Total Time (min): 23 min    Billable Minutes: Evaluation 15 (with PT) and Self Care/Home Management 8    9/16/2023

## 2023-09-16 NOTE — NURSING
[facility] [place]  Nurses Note -- 4 Eyes      9/16/2023       Skin assessed on: Q Shift      [] No Pressure Injuries Present    [x]Prevention Measures Documented    [x] Yes LDA  for Pressure Injury Previously documented     [] Yes New Pressure Injury Discovered   [] LDA for New Pressure Injury Added      Attending RN:  Herminia Wood, RN     Second RN:  Jeffery Martínez RN

## 2023-09-16 NOTE — PT/OT/SLP EVAL
Speech Language Pathology Evaluation  Bedside Swallow    Patient Name:  Barbara Rizo   MRN:  [id number]  Admitting Diagnosis: Atrial fibrillation with RVR    Recommendations:                 General Recommendations:  Dysphagia therapy  Diet recommendations:  Chopped meat, Thin   Aspiration Precautions: 1 bite/sip at a time   General Precautions: Standard,    Communication strategies:  none    Assessment:     Barbara Rizo is a 83 year old woman with HTN, DM, CKD, Afib, GERD and chronic hypoxic respiratory failure on 4-5L O2 at home who was sent in by PCP due to anemia (Hb 9.3 down from 13).  She was admitted to inpatient status and diagnosed with atrial fibrillation with RVR, fluid overload due to CKD and atrial fibrillation and normocytic anemia.     History:     Past Medical History:   Diagnosis Date    A-fib     Breast cancer     invasive ductal carcinoma    CKD (chronic kidney disease)     Diabetes mellitus type II     Fatty liver     GERD (gastroesophageal reflux disease)     Hearing loss of both ears     wears bilateral hearing aids    Hemochromatosis     History of anemia due to CKD     History of pleural effusion     with thoracentesis    Hyperlipidemia     Hypertension     Macular degeneration     Osteoarthritis     Left knee    Osteoporosis     Vaginal delivery     x2    Vitamin D deficiency disease     Wears partial dentures     upper removable bridge       Past Surgical History:   Procedure Laterality Date    AXILLARY NODE DISSECTION Left 10/18/2021    Procedure: LYMPHADENECTOMY, AXILLARY;  Surgeon: Darlene Roca MD;  Location: [facility] OR;  Service: General;  Laterality: Left;    BREAST BIOPSY      BREAST LUMPECTOMY      invasive ductal carcinoma    BREAST SURGERY Bilateral     Reduction r/t h/o fibrocystic disease    CHOLECYSTECTOMY  08/2015    EYE SURGERY      Cat Ext  OU    HYSTERECTOMY      partial due to uterine fibroids    INCISION AND DRAINAGE OF KNEE Left 09/17/2020    Procedure: INCISION  AND DRAINAGE, KNEE;  Surgeon: Rolando Wagoner MD;  Location: [facility];  Service: Orthopedics;  Laterality: Left;    INJECTION FOR SENTINEL NODE IDENTIFICATION Left 10/18/2021    Procedure: INJECTION, FOR SENTINEL NODE IDENTIFICATION;  Surgeon: Darlene Roca MD;  Location: [facility];  Service: General;  Laterality: Left;    JOINT REPLACEMENT Left 05/13/2013    TKR    JOINT REPLACEMENT Right 08/03/2015    TKR    MASTECTOMY, PARTIAL Left 10/18/2021    Procedure: MASTECTOMY, PARTIAL -- wire;  Surgeon: Darlene Roca MD;  Location: [facility];  Service: General;  Laterality: Left;  RN PREOP 10/15/2021   VACCINATED-----NEED H/P AND ORDERS    SENTINEL LYMPH NODE BIOPSY Left 10/18/2021    Procedure: BIOPSY, LYMPH NODE, SENTINEL;  Surgeon: Darlene Roca MD;  Location: [facility];  Service: General;  Laterality: Left;    THORACENTESIS      TOTAL KNEE ARTHROPLASTY Right 2015    left knee done 5/2013    WOUND DRESSING Left 09/17/2020    Procedure: WOUND VAC APPLICATION;  Surgeon: Rolando Wagoner MD;  Location: [facility];  Service: Orthopedics;  Laterality: Left;       Social History: Patient lives with family at home.    Prior Intubation HX:  n/a    Modified Barium Swallow: n/a     Chest X-Rays: Moderate left pleural effusion with associated compressive atelectasis and/or lower lobe consolidation.    Prior diet: regular/thin.    Occupation/hobbies/homemaking: retired.    Subjective     Patient seen at bedside with son present. Cooperative.  Patient goals: to eat and drink safely.     Pain/Comfort:  Pain Rating 1: 0/10  Pain Rating Post-Intervention 1: 0/10  Pain Rating 2: 0/10  Pain Rating Post-Intervention 2: 0/10    Respiratory Status: Nasal cannula, flow 2 L/min    Objective:     Oral Musculature Evaluation  Oral Musculature: general weakness  Dentition: present and adequate  Secretion Management: adequate  Mucosal Quality: good  Mandibular Strength and Mobility: WFL  Oral Labial Strength and Mobility: WFL  Velar Elevation:  "WFL  Buccal Strength and Mobility: WFL  Volitional Cough: WNL  Volitional Swallow: WNL  Voice Prior to PO Intake: WNL  Oral Musculature Comments: minimal OMW.    Bedside Swallow Eval:   Consistencies Assessed:  Thin liquids WNL      Oral Phase:   WFL  Lingual residue    Pharyngeal Phase:   decreased hyolaryngeal excursion to palpation  delayed swallow initation    Compensatory Strategies  None    Patient tolerated regular/thin trials WNL however, Patient reporting issues with prolonged mastication and feeling like "she cannot swallow sometimes". ST noted prolonged mastication with in cohesive bolus formation and mild buccal/lingual residue of regular x2. Patient able to clear residue with liquid wash. No overt s/s of aspiration noted during BSS. Nursing notified of recommendations for chopped/thin. 1 bite/sip at a time.      Treatment: Dysphagia tx for hyolaryngeal elevation exercises and diet tolerance follow up recommended. ST RECS: chopped/thin.    Goals:   Multidisciplinary Problems       SLP Goals       SLP GOALS:     Patient will tolerate chopped/thin no overt s/s of aspiration or pocketing x 3 sessions.  Patient will complete hyolaryngeal elevation exercises x 20.  Patient will complete BOT strengthening exercises x 20.  Sruthi Stinson CCC-SLP                    Plan:     Patient to be seen:  3 x/week   Plan of Care expires:  09/23/23  Plan of Care reviewed with:  patient, caregiver   SLP Follow-Up:  Yes       Discharge recommendations:      Barriers to Discharge:  None    Time Tracking:     SLP Treatment Date:      Speech Start Time:  1110  Speech Stop Time:  1133     Speech Total Time (min):  23 min    Billable Minutes: Eval 23 09/16/2023         "

## 2023-09-16 NOTE — ASSESSMENT & PLAN NOTE
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 Repeat echo pending. On US of lung looked like possible pericardial effusion. Pt would benefit from continued diuresis.

## 2023-09-16 NOTE — PLAN OF CARE
Problem: Coping Ineffective  Goal: Effective Coping  Outcome: Ongoing, Progressing     Problem: Diabetes Comorbidity  Goal: Blood Glucose Level Within Targeted Range  Outcome: Ongoing, Progressing     Problem: Fluid Imbalance (Pneumonia)  Goal: Fluid Balance  Outcome: Ongoing, Progressing     Problem: Infection (Pneumonia)  Goal: Resolution of Infection Signs and Symptoms  Outcome: Ongoing, Progressing     Problem: Respiratory Compromise (Pneumonia)  Goal: Effective Oxygenation and Ventilation  Outcome: Ongoing, Progressing

## 2023-09-16 NOTE — SUBJECTIVE & OBJECTIVE
Interval History: Cardioverted yesterday. Bedside US showing similar sized vs mildly improved left pleural effusion.      Objective:     Vital Signs (Most Recent):  Temp: 97.7 °F (36.5 °C) (09/16/23 1101)  Pulse: (!) 57 (09/16/23 1101)  Resp: (!) 28 (09/16/23 1101)  BP: (!) 117/56 (09/16/23 1101)  SpO2: (!) 91 % (09/16/23 1101) Vital Signs (24h Range):  Temp:  [96.8 °F (36 °C)-98.5 °F (36.9 °C)] 97.7 °F (36.5 °C)  Pulse:  [] 57  Resp:  [14-35] 28  SpO2:  [81 %-100 %] 91 %  BP: ()/(51-74) 117/56     Weight: 77.2 kg (170 lb 3.1 oz)  Body mass index is 31.13 kg/m².      Intake/Output Summary (Last 24 hours) at 9/16/2023 1151  Last data filed at 9/16/2023 0835  Gross per 24 hour   Intake 1374.27 ml   Output 1000 ml   Net 374.27 ml        Physical Exam  Vitals and nursing note reviewed.   Constitutional:       Appearance: Normal appearance. She is ill-appearing. She is not toxic-appearing.   HENT:      Head: Normocephalic and atraumatic.      Mouth/Throat:      Pharynx: No oropharyngeal exudate.   Eyes:      General: No scleral icterus.     Conjunctiva/sclera: Conjunctivae normal.      Pupils: Pupils are equal, round, and reactive to light.   Cardiovascular:      Rate and Rhythm: Tachycardia present. Rhythm irregular.      Heart sounds: No murmur heard.     No friction rub. No gallop.   Pulmonary:      Effort: Pulmonary effort is normal. No respiratory distress.      Breath sounds: Normal breath sounds. No wheezing or rhonchi.   Musculoskeletal:      Right lower leg: Edema present.      Left lower leg: Edema present.   Skin:     Capillary Refill: Capillary refill takes less than 2 seconds.   Neurological:      General: No focal deficit present.      Mental Status: She is alert and oriented to person, place, and time.   Psychiatric:         Mood and Affect: Mood normal.         Behavior: Behavior normal.         Thought Content: Thought content normal.         Judgment: Judgment normal.           Review of  Systems    Vents:       Lines/Drains/Airways       Drain  Duration             Female External Urinary Catheter 09/14/23 2300 1 day              Peripheral Intravenous Line  Duration                  Peripheral IV - Single Lumen 09/14/23 1954 20 G Left Forearm 1 day         Peripheral IV - Single Lumen 09/14/23 2340 22 G Left Forearm 1 day                    Significant Labs:    CBC/Anemia Profile:  Recent Labs   Lab 09/14/23  1954 09/15/23  0259 09/15/23  0301 09/16/23  0418   WBC 10.62  --  7.62 7.17   HGB 9.3*  --  9.0* 9.1*   HCT 28.1*  --  28.9* 28.1*     --  188 239   MCV 95  --  101* 97   RDW 17.6*  --  17.8* 17.7*   IRON  --  61  --   --    FERRITIN  --  446*  --   --    FOLATE  --  19.4  --   --    [id number]  --  858  --   --         Chemistries:  Recent Labs   Lab 09/14/23  1954 09/15/23  0301 09/16/23  0418    137 135*   K 3.6 3.3* 4.7   CL 86* 87* 88*   CO2 37* 34* 36*   BUN 58* 56* 44*   CREATININE 1.9* 1.8* 1.6*   CALCIUM 8.9 8.5* 8.1*   ALBUMIN 3.0* 2.8*  --    PROT 6.6 6.2  --    BILITOT 1.6* 1.2*  --    ALKPHOS 64 57  --    ALT 15 14  --    AST 16 16  --    MG  --  1.7 1.9       All pertinent labs within the past 24 hours have been reviewed.    Significant Imaging:  I have reviewed all pertinent imaging results/findings within the past 24 hours.

## 2023-09-16 NOTE — PLAN OF CARE
Problem: Occupational Therapy  Goal: Occupational Therapy Goal  Description: Goals to be met by: 9/30/23     Patient will increase functional independence with ADLs by performing:    UE Dressing with Modified [place].  LE Dressing with Modified [place].  Grooming while standing at sink with Modified [place], Supervision, and Assistive Devices as needed.  Toileting from toilet with Set-up Assistance and Supervision for hygiene and clothing management.   Supine to sit with Modified [place].  Step transfer with Modified [place] and Supervision  Toilet transfer to toilet with Supervision.  Upper extremity exercise program x15 reps per handout, with independence.  The patient will perform PLB with SOB  Outcome: Ongoing, Progressing   The patient will benefit from HH OT. No DME needs.   negative

## 2023-09-16 NOTE — PLAN OF CARE
Problem: Physical Therapy  Goal: Physical Therapy Goal  Description: Goals to be met by: 23     Patient will increase functional independence with mobility by performin. Pt to be mod I with bed mobility.  2. Pt to transfer with supervision.  3. Pt to ambulate 50' w/RW SBA, O2.  4. Pt to be (I) with written HEP.    Outcome: Ongoing, Progressing   Initial eval completed, see in chart for details.

## 2023-09-16 NOTE — SUBJECTIVE & OBJECTIVE
Interval History: No acute events overnight.  In good spirits and denies cp and sob.  Underwent successful dccv yesterday afternoon.  Has remained in sinus marlon and feels well.  Notes intermittent difficulty swallowing solids - no difficulty with liquids.  Son at bedside.  All questions answered and patient had no further complaints.    Objective:     Vital Signs (Most Recent):  Temp: 97.6 °F (36.4 °C) (09/16/23 0701)  Pulse: (!) 56 (09/16/23 0900)  Resp: (!) 24 (09/16/23 0900)  BP: 112/61 (09/16/23 0800)  SpO2: (!) 91 % (09/16/23 0900) Vital Signs (24h Range):  Temp:  [96.8 °F (36 °C)-98.5 °F (36.9 °C)] 97.6 °F (36.4 °C)  Pulse:  [] 56  Resp:  [14-35] 24  SpO2:  [81 %-100 %] 91 %  BP: ()/(51-74) 112/61     Weight: 77.2 kg (170 lb 3.1 oz)  Body mass index is 31.13 kg/m².    Intake/Output Summary (Last 24 hours) at 9/16/2023 1046  Last data filed at 9/16/2023 0835  Gross per 24 hour   Intake 1459.02 ml   Output 1150 ml   Net 309.02 ml           Physical Exam  Vitals and nursing note reviewed.   Constitutional:       Comments: Elderly   HENT:      Head: Normocephalic.      Nose: Nose normal.      Mouth/Throat:      Mouth: Mucous membranes are moist.   Eyes:      Extraocular Movements: Extraocular movements intact.   Cardiovascular:      Rate and Rhythm: Regular rhythm. Bradycardia present.   Pulmonary:      Effort: No respiratory distress.      Breath sounds: No wheezing.   Abdominal:      Tenderness: There is no abdominal tenderness.   Musculoskeletal:         General: Swelling present.      Cervical back: Normal range of motion.   Skin:     General: Skin is warm.      Capillary Refill: Capillary refill takes less than 2 seconds.   Neurological:      Mental Status: She is alert and oriented to person, place, and time.   Psychiatric:         Mood and Affect: Mood normal.         Behavior: Behavior normal.             Significant Labs: All pertinent labs within the past 24 hours have been  reviewed.    Significant Imaging: I have reviewed all pertinent imaging results/findings within the past 24 hours.

## 2023-09-16 NOTE — NURSING
ICU transfer to telemetry, Room 320.  Patient is awake, alert and fully oriented with son at bedside.   Patient is Hard of Hearing.   Denies pain/discomfort at this time.   On 5L NC with SPO2@96%.  Patient is also on home O2@5L, NC.   V/S stable.   Patient requesting continuation of external catheter.     Reviewed orders with family.   External catheter continued.   Patient DM2.   Dr. Randle gave order that scheduled BGL monitoring not necessary at this time.   All safety measures activated.     Will continue to follow.

## 2023-09-16 NOTE — ASSESSMENT & PLAN NOTE
Paroxysmal AFib with RVR.  On amiodarone drip.  Continues to be tachycardic.  Will docardioversion today.  Patient states she has been compliant with her Eliquis and has not missed even a single dose for many months.  No need for TALHA    Risks, benefits and alternatives of the Cardioversion procedure were discussed with the patient including throat irritation, aspiration, anesthetic complications, esophageal irritation/perforation, skin irritation, arrhythmia, etc.  Patient understands and agrees to proceed.  Consent was placed on the chart.    9/16: s/p cv yesterday. Doing better. In nsr. contimue amiodarone. And oac.

## 2023-09-16 NOTE — ASSESSMENT & PLAN NOTE
-Baseline Cr 1.6-1.7  -On admit Cr 1.8  -Avoid nephrotoxic agents and renally dose meds  -Follows with Dr. Boyle who was consulted.  Input appreciated  -Noted alkalosis ongoing and Cr down to 1.6.  Discussed with Dr. Boyle who recommended continuing bumex.  May start acetazolamide?  -Repeat labs in AM

## 2023-09-16 NOTE — PT/OT/SLP EVAL
Physical Therapy Evaluation     Patient Name: Barbara Rizo   MRN: [id number]  Recent Surgery: Procedure(s) (LRB):  Cardioversion or Defibrillation (N/A)  Cardioversion (N/A) 1 Day Post-Op    Recommendations:     Discharge Recommendations: home health PT   Discharge Equipment Recommendations: none       Assessment:     Barbara Rioz is a 83 y.o. female admitted with a medical diagnosis of Atrial fibrillation with RVR. She presents with the following impairments/functional limitations: impaired endurance, impaired functional mobility, gait instability.     Rehab Prognosis: Good; patient would benefit from acute PT services to address these deficits and reach maximum level of function.    Plan:     During this hospitalization, patient to be seen 5 x/week to address the above listed problems via gait training, therapeutic activities, therapeutic exercises    Plan of Care Expires: 09/22/23      Subjective     Chief Complaint: None  Patient Comments/Goals: Pt agreeable to therapy evaluations.  Pain/Comfort:  Pain Rating 1: 0/10    Social History:  Living Environment: Patient  was living with her daughter /p discharge from last hospital stay  in a single story home with number of outside stair(s): 0  Prior Level of Function: Prior to admission, patient was modified independent  Equipment Used at Home: shower chair, TTB, RW, wheelchair, BSC, and O2.   DME owned (not currently used): single point cane  Assistance Upon Discharge: family    Objective:     Communicated with Herminia gomez prior to session. Patient found supine with blood pressure cuff, oxygen, PureWick, pulse ox (continuous), telemetry upon PT entry to room.    General Precautions: Standard,     Orthopedic Precautions: N/A   Braces: N/A    Respiratory Status: Nasal cannula, flow 4.5 L/min    Exams:  Cognition: Patient is oriented to Person, Place, Time, Situation  RLE ROM: WFL  RLE Strength: WFL  LLE ROM: WFL  LLE Strength: WFL  Sensation:    -        Intact  light/touch BLEs    Functional Mobility:  Gait belt applied - Yes  Bed Mobility  Supine to Sit: stand by assistance for all mobility with increased effort.  Transfers  Sit to Stand: contact guard assistance with rolling walker  Bed to Chair: stand by assistance and contact guard assistance with rolling walker using Stand Pivot  Gait  NT  Balance  Sitting: stand by assistance  Standing: contact guard assistance      Therapeutic Activities and Exercises:   Patient educated on role of acute care PT and PT POC and call light usage  Patient educated about importance of OOB mobility and remaining up in chair most of day  Patient performed 1 set(s) of 10 repetitions of the following seated exercises: ankle pumps and long arc quads for bilateral LE. Patient required skilled PT for instruction of exercises and appropriate cues to perform exercises safely and appropriately.  Exercises terminated due to O2 sats decreased and unable to establish true reading.    AM-PAC 6 CLICK MOBILITY  Total Score:20    Patient left up in chair with all lines intact, call button in reach, and all needs in reach .    GOALS:   Multidisciplinary Problems       Physical Therapy Goals          Problem: Physical Therapy    Goal Priority Disciplines Outcome Goal Variances Interventions   Physical Therapy Goal     PT, PT/OT Ongoing, Progressing     Description: Goals to be met by: 23     Patient will increase functional independence with mobility by performin. Pt to be mod I with bed mobility.  2. Pt to transfer with supervision.  3. Pt to ambulate 50' w/RW SBA, O2.  4. Pt to be (I) with written HEP.                         History:     Past Medical History:   Diagnosis Date    A-fib     Breast cancer     invasive ductal carcinoma    CKD (chronic kidney disease)     Diabetes mellitus type II     Fatty liver     GERD (gastroesophageal reflux disease)     Hearing loss of both ears     wears bilateral hearing aids    Hemochromatosis      History of anemia due to CKD     History of pleural effusion     with thoracentesis    Hyperlipidemia     Hypertension     Macular degeneration     Osteoarthritis     Left knee    Osteoporosis     Vaginal delivery     x2    Vitamin D deficiency disease     Wears partial dentures     upper removable bridge       Past Surgical History:   Procedure Laterality Date    AXILLARY NODE DISSECTION Left 10/18/2021    Procedure: LYMPHADENECTOMY, AXILLARY;  Surgeon: Darlene Roca MD;  Location: [facility] OR;  Service: General;  Laterality: Left;    BREAST BIOPSY      BREAST LUMPECTOMY      invasive ductal carcinoma    BREAST SURGERY Bilateral     Reduction r/t h/o fibrocystic disease    CHOLECYSTECTOMY  08/2015    EYE SURGERY      Cat Ext  OU    HYSTERECTOMY      partial due to uterine fibroids    INCISION AND DRAINAGE OF KNEE Left 09/17/2020    Procedure: INCISION AND DRAINAGE, KNEE;  Surgeon: Rolando Wagoner MD;  Location: [facility] OR;  Service: Orthopedics;  Laterality: Left;    INJECTION FOR SENTINEL NODE IDENTIFICATION Left 10/18/2021    Procedure: INJECTION, FOR SENTINEL NODE IDENTIFICATION;  Surgeon: Darlene Roca MD;  Location: [facility] OR;  Service: General;  Laterality: Left;    JOINT REPLACEMENT Left 05/13/2013    TKR    JOINT REPLACEMENT Right 08/03/2015    TKR    MASTECTOMY, PARTIAL Left 10/18/2021    Procedure: MASTECTOMY, PARTIAL -- wire;  Surgeon: Darlene Roca MD;  Location: [facility] OR;  Service: General;  Laterality: Left;  RN PREOP 10/15/2021   VACCINATED-----NEED H/P AND ORDERS    SENTINEL LYMPH NODE BIOPSY Left 10/18/2021    Procedure: BIOPSY, LYMPH NODE, SENTINEL;  Surgeon: Darlene Roca MD;  Location: [facility] OR;  Service: General;  Laterality: Left;    THORACENTESIS      TOTAL KNEE ARTHROPLASTY Right 2015    left knee done 5/2013    WOUND DRESSING Left 09/17/2020    Procedure: WOUND VAC APPLICATION;  Surgeon: Rolando Wagoner MD;  Location: [facility] OR;  Service: Orthopedics;  Laterality: Left;       Time Tracking:      PT Received On: 09/16/23  PT Start Time: 1139  PT Stop Time: 1155  PT Total Time (min): 16 min     Billable Minutes: Evaluation 16 (Co-eval with GERARDO Hernandez)    9/16/2023

## 2023-09-16 NOTE — PLAN OF CARE
Problem: Adult Inpatient Plan of Care  Goal: Plan of Care Review  Outcome: Ongoing, Progressing    Patient is awake alert oriented. Able to move independently onbed with moderate assistance. With external urinary collection device in place and able to urinate in adequate amount during the shift. Sinus Ren on the monitor all night with the lowest HR of 47 noted. Blood pressure within normal limit and stable during the shift. Denies any discomfort during the night. Possible transfer to the floor this morning.

## 2023-09-16 NOTE — PLAN OF CARE
Patient transferred to telemetry and has been assessed by PT/OT       09/16/23 0239   Discharge Reassessment   Assessment Type Discharge Planning Reassessment   Did the patient's condition or plan change since previous assessment? Yes   Discharge Plan discussed with: Patient   Communicated SALLY with patient/caregiver Yes   Discharge Plan A Home Health   Discharge Plan B Home with family   DME Needed Upon Discharge  none   Transition of Care Barriers None   Why the patient remains in the hospital Requires continued medical care

## 2023-09-16 NOTE — NURSING
To rm 320 via w/c, accompanied by ICU RN and transporter--son with patient, all belongings with patient.  Transported on oxygen and cardiac monitor

## 2023-09-16 NOTE — ASSESSMENT & PLAN NOTE
-A1c 4.9 8/27/23  -Diet controlled at home  -No significant elevations noted thus far  -Monitor sugars daily with BMP - if needed can add SSI

## 2023-09-16 NOTE — PROGRESS NOTES
[facility] [place] Intensive Care  Pulmonology  Progress Note    Patient Name: Barbara Rizo  MRN: [id number]  Admission Date: 9/14/2023  Hospital Length of Stay: 2 days  Code Status: DNR  Attending Provider: Selwyn Randle MD  Primary Care Provider: Paras Oro MD   Principal Problem: Atrial fibrillation with RVR    Subjective:     Interval History: Cardioverted yesterday. Bedside US showing similar sized vs mildly improved left pleural effusion.      Objective:     Vital Signs (Most Recent):  Temp: 97.7 °F (36.5 °C) (09/16/23 1101)  Pulse: (!) 57 (09/16/23 1101)  Resp: (!) 28 (09/16/23 1101)  BP: (!) 117/56 (09/16/23 1101)  SpO2: (!) 91 % (09/16/23 1101) Vital Signs (24h Range):  Temp:  [96.8 °F (36 °C)-98.5 °F (36.9 °C)] 97.7 °F (36.5 °C)  Pulse:  [] 57  Resp:  [14-35] 28  SpO2:  [81 %-100 %] 91 %  BP: ()/(51-74) 117/56     Weight: 77.2 kg (170 lb 3.1 oz)  Body mass index is 31.13 kg/m².      Intake/Output Summary (Last 24 hours) at 9/16/2023 1151  Last data filed at 9/16/2023 0835  Gross per 24 hour   Intake 1374.27 ml   Output 1000 ml   Net 374.27 ml        Physical Exam  Vitals and nursing note reviewed.   Constitutional:       Appearance: Normal appearance. She is ill-appearing. She is not toxic-appearing.   HENT:      Head: Normocephalic and atraumatic.      Mouth/Throat:      Pharynx: No oropharyngeal exudate.   Eyes:      General: No scleral icterus.     Conjunctiva/sclera: Conjunctivae normal.      Pupils: Pupils are equal, round, and reactive to light.   Cardiovascular:      Rate and Rhythm: Tachycardia present. Rhythm irregular.      Heart sounds: No murmur heard.     No friction rub. No gallop.   Pulmonary:      Effort: Pulmonary effort is normal. No respiratory distress.      Breath sounds: Normal breath sounds. No wheezing or rhonchi.   Musculoskeletal:      Right lower leg: Edema present.      Left lower leg: Edema present.   Skin:     Capillary Refill: Capillary refill takes less  "than 2 seconds.   Neurological:      General: No focal deficit present.      Mental Status: She is alert and oriented to person, place, and time.   Psychiatric:         Mood and Affect: Mood normal.         Behavior: Behavior normal.         Thought Content: Thought content normal.         Judgment: Judgment normal.           Review of Systems    Vents:       Lines/Drains/Airways       Drain  Duration             Female External Urinary Catheter 09/14/23 2300 1 day              Peripheral Intravenous Line  Duration                  Peripheral IV - Single Lumen 09/14/23 1954 20 G Left Forearm 1 day         Peripheral IV - Single Lumen 09/14/23 2340 22 G Left Forearm 1 day                    Significant Labs:    CBC/Anemia Profile:  Recent Labs   Lab 09/14/23  1954 09/15/23  0259 09/15/23  0301 09/16/23  0418   WBC 10.62  --  7.62 7.17   HGB 9.3*  --  9.0* 9.1*   HCT 28.1*  --  28.9* 28.1*     --  188 239   MCV 95  --  101* 97   RDW 17.6*  --  17.8* 17.7*   IRON  --  61  --   --    FERRITIN  --  446*  --   --    FOLATE  --  19.4  --   --    [id number]  --  858  --   --         Chemistries:  Recent Labs   Lab 09/14/23  1954 09/15/23  0301 09/16/23  0418    137 135*   K 3.6 3.3* 4.7   CL 86* 87* 88*   CO2 37* 34* 36*   BUN 58* 56* 44*   CREATININE 1.9* 1.8* 1.6*   CALCIUM 8.9 8.5* 8.1*   ALBUMIN 3.0* 2.8*  --    PROT 6.6 6.2  --    BILITOT 1.6* 1.2*  --    ALKPHOS 64 57  --    ALT 15 14  --    AST 16 16  --    MG  --  1.7 1.9       All pertinent labs within the past 24 hours have been reviewed.    Significant Imaging:  I have reviewed all pertinent imaging results/findings within the past 24 hours.      ABG  No results for input(s): "PH", "PO2", "PCO2", "HCO3", "BE" in the last 168 hours.  Assessment/Plan:     Pulmonary  Chronic respiratory failure with hypoxia  Patient with Hypoxic Respiratory failure which is Chronic.  she is on home oxygen at 4 LPM. Supplemental oxygen was provided and noted-      . "   Signs/symptoms of respiratory failure include- tachypnea and increased work of breathing. Contributing diagnoses includes - CHF and pulmonary hypertension Labs and images were reviewed. Patient Has recent ABG, which has been reviewed. Will treat underlying causes and adjust management of respiratory failure as follows-     - secondary to HFpEF and pulmonary hypertension. Does not have COPD based on most recent PFTs in 3/2022. Had isolated DLCO.     Pleural effusion, left  Effusion likely secondary to fluid overload. On evaluation simple fluid collection without Hct sign that would be concerning of spontaneous bleed. Will hold off on thoracentesis for now. Present on recent CT chest while being treated for PNA. No current signs of symptoms of PNA. Would discontinue CAP coverage.     Cardiac/Vascular  Pulmonary hypertension  WHO group II with most recent Echo showing PASP of 46 3/2023. Repeat echo pending. On US of lung looked like possible pericardial effusion. Pt would benefit from continued diuresis.       DVT ppx: eliquis  GI ppx: no indication     Devin Rangel MD  Pulmonology  [facility] - Intensive Care

## 2023-09-16 NOTE — PLAN OF CARE
SLP GOALS:    Patient will tolerate chopped/thin no overt s/s of aspiration or pocketing x 3 sessions.  Patient will complete hyolaryngeal elevation exercises x 20.  Patient will complete BOT strengthening exercises x 20.  Sruthi Stinson CCC-SLP

## 2023-09-16 NOTE — ASSESSMENT & PLAN NOTE
-Imaging noted pleural effusion on left  -Not coughing and not septic  -No leukocytosis on admit and procalcitonin is negative  -Doubt actual pneumonia - most likely due to fluid overload  -Pulmonology consulted and input appreciated  -Initially treated with CAP antibiotics but will discontinue now.

## 2023-09-16 NOTE — PROGRESS NOTES
[facility] Medicine  Progress Note    Patient Name: Barbara Rizo  MRN: [id number]  Patient Class: IP- Inpatient   Admission Date: 9/14/2023  Length of Stay: 2 days  Attending Physician: Selwyn Randle MD  Primary Care Provider: Paras Oro MD        Subjective:     Principal Problem:Atrial fibrillation with RVR        HPI:  83 years old female with PMH significant for hypertension, diabetes mellitus, CKD, atrial fibrillation on Eliquis, GERD was sent to ER from her PCP because of dropping blood counts. Patient was recently hospitalized for fluid overload and hyponatremia. Patient reported she had blood workup done and got call from her PCP to go to ER. Review of the chart revealed that hemoglobin dropped from 13.5 on 08/27 to 9.3 today on 09/14. Patient denied any bleeding from any site or black stools. Patient reported she is doing fine otherwise and denied any symptoms at this time including chest pain shortness of breath abdominal pain fever chills nausea vomiting diarrhea or dizziness. Patient had rectal exam done by ER physician which was negative for melena or hematochezia. Chest x-ray showed moderate left pleural effusion with associated compressive atelectasis and/or lower lobe consolidation. Other lab work showed WBC 10.62, creatinine 1.9, potassium 3.6 and BNP 1317. Patient was found to be in atrial fibrillation with RVR for which has been started on amiodarone infusion. Patient is on 4-5 L O2 NC at baseline. Patient received IV amiodarone and IV Lasix in ER.          Overview/Hospital Course:  No notes on file    Interval History: No acute events overnight.  In good spirits and denies cp and sob.  Underwent successful dccv yesterday afternoon.  Has remained in sinus marlon and feels well.  Notes intermittent difficulty swallowing solids - no difficulty with liquids.  Son at bedside.  All questions answered and patient had no further complaints.    Objective:     Vital  Signs (Most Recent):  Temp: 97.6 °F (36.4 °C) (09/16/23 0701)  Pulse: (!) 56 (09/16/23 0900)  Resp: (!) 24 (09/16/23 0900)  BP: 112/61 (09/16/23 0800)  SpO2: (!) 91 % (09/16/23 0900) Vital Signs (24h Range):  Temp:  [96.8 °F (36 °C)-98.5 °F (36.9 °C)] 97.6 °F (36.4 °C)  Pulse:  [] 56  Resp:  [14-35] 24  SpO2:  [81 %-100 %] 91 %  BP: ()/(51-74) 112/61     Weight: 77.2 kg (170 lb 3.1 oz)  Body mass index is 31.13 kg/m².    Intake/Output Summary (Last 24 hours) at 9/16/2023 1046  Last data filed at 9/16/2023 0835  Gross per 24 hour   Intake 1459.02 ml   Output 1150 ml   Net 309.02 ml           Physical Exam  Vitals and nursing note reviewed.   Constitutional:       Comments: Elderly   HENT:      Head: Normocephalic.      Nose: Nose normal.      Mouth/Throat:      Mouth: Mucous membranes are moist.   Eyes:      Extraocular Movements: Extraocular movements intact.   Cardiovascular:      Rate and Rhythm: Regular rhythm. Bradycardia present.   Pulmonary:      Effort: No respiratory distress.      Breath sounds: No wheezing.   Abdominal:      Tenderness: There is no abdominal tenderness.   Musculoskeletal:         General: Swelling present.      Cervical back: Normal range of motion.   Skin:     General: Skin is warm.      Capillary Refill: Capillary refill takes less than 2 seconds.   Neurological:      Mental Status: She is alert and oriented to person, place, and time.   Psychiatric:         Mood and Affect: Mood normal.         Behavior: Behavior normal.             Significant Labs: All pertinent labs within the past 24 hours have been reviewed.    Significant Imaging: I have reviewed all pertinent imaging results/findings within the past 24 hours.        Assessment/Plan:      * Atrial fibrillation with RVR  -Admitted to inpatient status  -Diagnosed with afib rvr and treated with amiodarone drip in ICU  -Cardiology consulted and input appreciated  -Taken for DCCV 9/15 which was successful and transitioned  from amiodarone drip to po amio 200mg bid.  -Clinically looks very good, but has remained in mild bradycardia in upper 50s.  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  -Discussed plan of care with Dr. Chambers.  Will continue eliquis and decrease amiodarone to home dose of 100mg daily and transfer out of ICU today    Left lower lobe consolidation  -Imaging noted pleural effusion on left  -Not coughing and not septic  -No leukocytosis on admit and procalcitonin is negative  -Doubt actual pneumonia - most likely due to fluid overload  -Pulmonology consulted and input appreciated  -Initially treated with CAP antibiotics but will discontinue now.    Pleural effusion, left  -Pulmonology consulted and input appreciated  -Suspect due to fluid overload  -Discussed with Dr. Rangel, pulmonology, today 9/16.  Doubt she has had any intrathoracic/pulmonary bleeding.  H/H stable, no chest pain and Hb stable.  No plans for thoracentesis.  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  -Continue to monitor    Normocytic anemia  -Hb down from baseline, but in a safe range and stable x 3 days now - Hb 9.1  -No evidence of bleeding  -She is not iron, folate or b12 deficient  -If Hb less than 7 would transfuse, but safe for monitoring now  -Continue home eliquis as risk of stroke with afib is greater.  -Repeat cbc in AM    CKD (chronic kidney disease), stage IV  -Baseline Cr 1.6-1.7  -On admit Cr 1.8  -Avoid nephrotoxic agents and renally dose meds  -Follows with Dr. Boyle who was consulted.  Input appreciated  -Noted alkalosis ongoing and Cr down to 1.6.  Discussed with Dr. Boyle who recommended continuing bumex.  May start acetazolamide?  -Repeat labs in AM    Hypokalemia  -Potassium normal today.  -Check BMP and Mag in AM.    Debility  -Consult PT/OT - ?HH vs SNF at discharge?    Dysphagia  -Notes intermittent  difficulty swallowing solids  -Consult SLP for evaluation    Chronic respiratory failure with hypoxia  -Patient with Hypoxic Respiratory failure which is Chronic.  she is on home oxygen at 4-5 LPM.   -Signs/symptoms of respiratory failure include- respiratory distress.   -Labs and images were reviewed. Patient Has not had a recent ABG.   -Contributing diagnoses include tachymyopathy due to afib with rvr and fluid overload as well as possible pneumonia  -Will treat underlying causes and adjust management of respiratory failure as follows-  -Treat afib and pleural effusion as above  -Doubt pneumonia - stopping antibiotics  -Diurese with bumex 1mg iv daily  -Pulmonology consulted and input appreciated.  -Stable on home level of O2 4-5 L    ACP (advance care planning)  -Consulted palliative care - input appreciated  -Advance Care Planning Date: 09/15/2023  Discussed with daughter and patient meaning of DNR as well as pros and cons of aggressive ACLS treatment.  They have opted for DNR status as she would wish for a peaceful death.  I spent 20 minutes in ACP today, 9/15.    Pulmonary hypertension  -Treatment as above    Essential hypertension  -BP is low normal to moderately elevated   -Hold home antihypertensives (losartan 40mg bid) for now     Hyperlipidemia  -Continue statin      VTE Risk Mitigation (From admission, onward)         Ordered     apixaban tablet 5 mg  2 times daily         09/15/23 1005     IP VTE HIGH RISK PATIENT  Once         09/14/23 2234     Place sequential compression device  Until discontinued         09/14/23 2234                Discharge Planning   SALLY:      Code Status: DNR   Is the patient medically ready for discharge?:     Reason for patient still in hospital (select all that apply): Treatment  Discharge Plan A: Home Health            Critical care time spent on the evaluation and treatment of severe organ dysfunction, review of pertinent labs and imaging studies, discussions with  consulting providers and discussions with patient/family: 35 minutes.      Selwyn Randle MD  Department of Hospital Medicine   [facility] - Intensive Care

## 2023-09-16 NOTE — ASSESSMENT & PLAN NOTE
Effusion likely secondary to fluid overload. On evaluation simple fluid collection without Hct sign that would be concerning of spontaneous bleed. Will hold off on thoracentesis for now. Present on recent CT chest while being treated for PNA. No current signs of symptoms of PNA. Would discontinue CAP coverage.

## 2023-09-16 NOTE — PROGRESS NOTES
Date of Admission:9/14/2023    SUBJECTIVE:notes  doing ok  Sp cardioversion    Current Facility-Administered Medications   Medication    acetaminophen tablet 650 mg    amiodarone tablet 200 mg    apixaban tablet 5 mg    atorvastatin tablet 40 mg    azithromycin (ZITHROMAX) 500 mg in dextrose 5 % (D5W) 250 mL IVPB (Vial-Mate)    bumetanide injection 1 mg    cefTRIAXone (ROCEPHIN) 1 g in dextrose 5 % in water (D5W) 100 mL IVPB (MB+)    epoetin christi-epbx injection 10,000 Units    folic acid tablet 1,000 mcg    loperamide capsule 2 mg    melatonin tablet 6 mg    mupirocin 2 % ointment    ondansetron injection 4 mg    sodium chloride 0.9% flush 10 mL     Facility-Administered Medications Ordered in Other Encounters   Medication    denosumab (PROLIA) injection 60 mg       Wt Readings from Last 3 Encounters:   09/14/23 77.2 kg (170 lb 3.1 oz)   09/13/23 73.6 kg (162 lb 5.9 oz)   09/05/23 83.5 kg (184 lb 1.4 oz)     Temp Readings from Last 3 Encounters:   09/16/23 97.6 °F (36.4 °C) (Oral)   09/13/23 98 °F (36.7 °C) (Oral)   09/06/23 98.3 °F (36.8 °C) (Oral)     BP Readings from Last 3 Encounters:   09/16/23 112/61   09/13/23 122/80   09/06/23 118/69     Pulse Readings from Last 3 Encounters:   09/16/23 (!) 56   09/13/23 (!) 117   09/06/23 101       Intake/Output Summary (Last 24 hours) at 9/16/2023 0941  Last data filed at 9/16/2023 0835  Gross per 24 hour   Intake 1555.58 ml   Output 1150 ml   Net 405.58 ml         PE:  GEN:wd female in nad  HEENT:ncat,eomi,mm  CVS: marlon  PULM:ctab  ABD:bs,soft  EXT:no leg edema  NEURO:awake.alert    Recent Labs   Lab 09/16/23  0418      *   K 4.7   CL 88*   CO2 36*   BUN 44*   CREATININE 1.6*   CALCIUM 8.1*   MG 1.9         Lab Results   Component Value Date    CALCIUM 8.1 (L) 09/16/2023    CAION 0.95 (L) 02/03/2019    PHOS 3.0 09/12/2023    PHOS 3.0 09/12/2023       Recent Labs   Lab 09/16/23  0418   WBC 7.17   RBC 2.90*   HGB 9.1*   HCT 28.1*      MCV 97   [facility] 31.4*    [facility]             A/P:  1.ckd4. creatinine about same. Following.  2.anemia with ckd. Cont epo.  3.afib rvr. Tx per cards. Cardioversion. Hr ok.  4.dm2. following sugars.  5.htn. cont bumex.  6.hypokalemia. repleted. better  Discussed with pulm.feeling better.

## 2023-09-16 NOTE — NURSING
[facility], [facility]  Nurses Note -- 4 Eyes      9/16/2023       Skin assessed on: Q Shift      [] No Pressure Injuries Present    [x]Prevention Measures Documented    [x] Yes LDA  for Pressure Injury Previously documented     [] Yes New Pressure Injury Discovered   [] LDA for New Pressure Injury Added      Attending RN:  Olive Loomis RN     Second RN:  DEVAN Simmons

## 2023-09-16 NOTE — ASSESSMENT & PLAN NOTE
-Hb down from baseline, but in a safe range and stable x 3 days now - Hb 9.1  -No evidence of bleeding  -She is not iron, folate or b12 deficient  -If Hb less than 7 would transfuse, but safe for monitoring now  -Continue home eliquis as risk of stroke with afib is greater.  -Repeat cbc in AM

## 2023-09-17 PROBLEM — J18.1 LEFT LOWER LOBE CONSOLIDATION: Status: RESOLVED | Noted: 2023-01-01 | Resolved: 2023-01-01

## 2023-09-17 PROBLEM — E87.6 HYPOKALEMIA: Status: RESOLVED | Noted: 2023-01-01 | Resolved: 2023-01-01

## 2023-09-17 NOTE — PLAN OF CARE
Problem: Adult Inpatient Plan of Care  Goal: Plan of Care Review  Outcome: Met  Goal: Patient-Specific Goal (Individualized)  Outcome: Met  Goal: Absence of Hospital-Acquired Illness or Injury  Outcome: Met  Goal: Optimal Comfort and Wellbeing  Outcome: Met  Goal: Readiness for Transition of Care  Outcome: Met     Problem: Coping Ineffective  Goal: Effective Coping  Outcome: Met     Problem: Diabetes Comorbidity  Goal: Blood Glucose Level Within Targeted Range  Outcome: Met     Problem: Fluid Imbalance (Pneumonia)  Goal: Fluid Balance  Outcome: Met     Problem: Infection (Pneumonia)  Goal: Resolution of Infection Signs and Symptoms  Outcome: Met     Problem: Respiratory Compromise (Pneumonia)  Goal: Effective Oxygenation and Ventilation  Outcome: Met     Problem: Impaired Wound Healing  Goal: Optimal Wound Healing  Outcome: Met     Problem: Skin Injury Risk Increased  Goal: Skin Health and Integrity  Outcome: Met     Problem: Physical Therapy  Goal: Physical Therapy Goal  Description: Goals to be met by: 23     Patient will increase functional independence with mobility by performin. Pt to be mod I with bed mobility.  2. Pt to transfer with supervision.  3. Pt to ambulate 50' w/RW SBA, O2.  4. Pt to be (I) with written HEP.    Outcome: Met     Problem: Occupational Therapy  Goal: Occupational Therapy Goal  Description: Goals to be met by: 23     Patient will increase functional independence with ADLs by performing:    UE Dressing with Modified [place].  LE Dressing with Modified [place].  Grooming while standing at sink with Modified [place], Supervision, and Assistive Devices as needed.  Toileting from toilet with Set-up Assistance and Supervision for hygiene and clothing management.   Supine to sit with Modified [place].  Step transfer with Modified [place] and Supervision  Toilet transfer to toilet with Supervision.  Upper extremity exercise program x15 reps per  handout, with independence.  The patient will perform PLB with SOB  Outcome: Met

## 2023-09-17 NOTE — SUBJECTIVE & OBJECTIVE
Interval History: NAEON. Planned discharge today. Bedside US of pleural effusion stable.       Objective:     Vital Signs (Most Recent):  Temp: 98.5 °F (36.9 °C) (09/17/23 1227)  Pulse: 65 (09/17/23 1227)  Resp: 20 (09/17/23 1227)  BP: 120/67 (09/17/23 1227)  SpO2: (!) 92 % (09/17/23 0808) Vital Signs (24h Range):  Temp:  [97.6 °F (36.4 °C)-98.5 °F (36.9 °C)] 98.5 °F (36.9 °C)  Pulse:  [53-65] 65  Resp:  [16-20] 20  SpO2:  [92 %-98 %] 92 %  BP: (112-135)/(58-67) 120/67     Weight: 77.2 kg (170 lb 3.1 oz)  Body mass index is 31.13 kg/m².      Intake/Output Summary (Last 24 hours) at 9/17/2023 1428  Last data filed at 9/17/2023 0808  Gross per 24 hour   Intake 420 ml   Output 1000 ml   Net -580 ml        Physical Exam  Vitals and nursing note reviewed.   Constitutional:       Appearance: Normal appearance. She is ill-appearing. She is not toxic-appearing.   HENT:      Head: Normocephalic and atraumatic.      Mouth/Throat:      Pharynx: No oropharyngeal exudate.   Eyes:      General: No scleral icterus.     Conjunctiva/sclera: Conjunctivae normal.      Pupils: Pupils are equal, round, and reactive to light.   Cardiovascular:      Rate and Rhythm: Tachycardia present. Rhythm irregular.      Heart sounds: No murmur heard.     No friction rub. No gallop.   Pulmonary:      Effort: Pulmonary effort is normal. No respiratory distress.      Breath sounds: Normal breath sounds. No wheezing or rhonchi.   Musculoskeletal:      Right lower leg: Edema present.      Left lower leg: Edema present.   Skin:     Capillary Refill: Capillary refill takes less than 2 seconds.   Neurological:      General: No focal deficit present.      Mental Status: She is alert and oriented to person, place, and time.   Psychiatric:         Mood and Affect: Mood normal.         Behavior: Behavior normal.         Thought Content: Thought content normal.         Judgment: Judgment normal.           Review of Systems    Vents:       Lines/Drains/Airways        Drain  Duration             Female External Urinary Catheter 09/14/23 2300 2 days              Peripheral Intravenous Line  Duration                  Peripheral IV - Single Lumen 09/14/23 1954 20 G Left Forearm 2 days         Peripheral IV - Single Lumen 09/14/23 2340 22 G Left Forearm 2 days                    Significant Labs:    CBC/Anemia Profile:  Recent Labs   Lab 09/16/23  0418 09/17/23  0403   WBC 7.17 6.51   HGB 9.1* 9.9*   HCT 28.1* 31.4*    220   MCV 97 97   RDW 17.7* 18.0*        Chemistries:  Recent Labs   Lab 09/16/23  0418 09/17/23  0403   * 134*   K 4.7 4.2   CL 88* 88*   CO2 36* 35*   BUN 44* 35*   CREATININE 1.6* 1.6*   CALCIUM 8.1* 8.1*   MG 1.9 1.8       All pertinent labs within the past 24 hours have been reviewed.    Significant Imaging:  I have reviewed all pertinent imaging results/findings within the past 24 hours.

## 2023-09-17 NOTE — ANESTHESIA POSTPROCEDURE EVALUATION
Anesthesia Post Evaluation    Patient: Barbara Rizo    Procedure(s) Performed: Procedure(s) (LRB):  Cardioversion or Defibrillation (N/A)  Cardioversion (N/A)    Final Anesthesia Type: general      Patient location during evaluation: PACU  Patient participation: Yes- Able to Participate  Level of consciousness: awake and alert, oriented and awake  Post-procedure vital signs: reviewed and stable  Pain management: adequate  Airway patency: patent  JOCELIN mitigation strategies: Multimodal analgesia  PONV status at discharge: No PONV  Anesthetic complications: no      Cardiovascular status: blood pressure returned to baseline and hemodynamically stable  Respiratory status: unassisted and spontaneous ventilation  Hydration status: euvolemic  Follow-up not needed.          Vitals Value Taken Time   /58 09/17/23 0808   Temp 36.5 °C (97.7 °F) 09/17/23 0808   Pulse 58 09/17/23 0808   Resp 20 09/17/23 0808   SpO2 92 % 09/17/23 0808         No case tracking events are documented in the log.      Pain/Yessy Score: No data recorded

## 2023-09-17 NOTE — ASSESSMENT & PLAN NOTE
Patient with Hypoxic Respiratory failure which is Chronic.  she is on home oxygen at 4 LPM. Supplemental oxygen was provided and noted-      .   Signs/symptoms of respiratory failure include- tachypnea and increased work of breathing. Contributing diagnoses includes - CHF and pulmonary hypertension Labs and images were reviewed. Patient Has recent ABG, which has been reviewed. Will treat underlying causes and adjust management of respiratory failure as follows-     - secondary to HFpEF and pulmonary hypertension. Does not have COPD based on most recent PFTs in 3/2022. Had isolated DLCO. Follow up with pulmonology outpatient with PFTs and 6 minute walk test starting on room air.

## 2023-09-17 NOTE — HOSPITAL COURSE
83 year old woman with HTN, DM, CKD, Afib, GERD and chronic hypoxic respiratory failure on 4-5L O2 at home who was sent in by PCP due to anemia (Hb 9.3 down from 13).  She was admitted to inpatient status and diagnosed with atrial fibrillation with RVR, fluid overload due to CKD and atrial fibrillation and normocytic anemia.  Cardiology, pulmonology and nephrology were consulted.  She was treated in ICU with amiodarone drip and IV bumex.  She was taken for DCCV on 9/15 which was successful and is doing well and on home level of supplemental O2.  Has a mild sinus bradycardia with 1st degree AVB. Now back on home dose of po amiodarone 100mg qd.  Etiology of her anemia is probably CKD.  Hb remains stable in mid 9 range and she has no evidence of bleeding anywhere.  Heme/onc referral at discharge.  She is debilitated and PT/OT consulted.  She has intermittent dysphagia with solids and on minced meat diet. Patient has continued to improve during hospital stay.  She has remained afebrile and hemodynamically stable.  Transitioning to PO diuretics.  She will be discharged home with HH to follow up with PCP, Nephrology, Cardiology and Hematology.

## 2023-09-17 NOTE — PLAN OF CARE
Problem: Adult Inpatient Plan of Care  Goal: Absence of Hospital-Acquired Illness or Injury  Intervention: Prevent Skin Injury  Flowsheets (Taken 9/17/2023 0454)  Body Position: position changed independently  Intervention: Prevent and Manage VTE (Venous Thromboembolism) Risk  Flowsheets (Taken 9/17/2023 0454)  Activity Management: Rolling - L1  Intervention: Prevent Infection  Flowsheets (Taken 9/17/2023 0454)  Infection Prevention: hand hygiene promoted  Goal: Optimal Comfort and Wellbeing  Intervention: Monitor Pain and Promote Comfort  Flowsheets (Taken 9/17/2023 0454)  Pain Management Interventions: care clustered  Intervention: Provide Person-Centered Care  Flowsheets (Taken 9/17/2023 0454)  Trust Relationship/Rapport: care explained     Problem: Coping Ineffective  Goal: Effective Coping  Intervention: Support and Enhance Coping Strategies  Flowsheets (Taken 9/17/2023 0454)  Environmental Support: calm environment promoted     Problem: Diabetes Comorbidity  Goal: Blood Glucose Level Within Targeted Range  Intervention: Monitor and Manage Glycemia  Flowsheets (Taken 9/17/2023 0454)  Glycemic Management: blood glucose monitored     Problem: Infection (Pneumonia)  Goal: Resolution of Infection Signs and Symptoms  Intervention: Prevent Infection Progression  Flowsheets (Taken 9/17/2023 0454)  Infection Management: aseptic technique maintained     Problem: Respiratory Compromise (Pneumonia)  Goal: Effective Oxygenation and Ventilation  Intervention: Promote Airway Secretion Clearance  Flowsheets (Taken 9/17/2023 0454)  Cough And Deep Breathing: done independently per patient     Problem: Impaired Wound Healing  Goal: Optimal Wound Healing  Intervention: Promote Wound Healing  Flowsheets (Taken 9/17/2023 0454)  Activity Management: Rolling - L1  Pain Management Interventions: care clustered

## 2023-09-17 NOTE — PROGRESS NOTES
[facility]  Cardiology  Progress Note    Patient Name: Barbara Rizo  MRN: [id number]  Admission Date: 9/14/2023  Hospital Length of Stay: 3 days  Code Status: DNR   Attending Physician: Dejan Herrera MD   Primary Care Physician: Paras Oro MD  Expected Discharge Date: 9/17/2023  Principal Problem:Atrial fibrillation with RVR    Subjective:       Interval History:  Has stayed in sinus rhythm.  No chest pains, orthopnea, PND        Past Medical History:   Diagnosis Date    A-fib     Breast cancer     invasive ductal carcinoma    CKD (chronic kidney disease)     Diabetes mellitus type II     Fatty liver     GERD (gastroesophageal reflux disease)     Hearing loss of both ears     wears bilateral hearing aids    Hemochromatosis     History of anemia due to CKD     History of pleural effusion     with thoracentesis    Hyperlipidemia     Hypertension     Macular degeneration     Osteoarthritis     Left knee    Osteoporosis     Vaginal delivery     x2    Vitamin D deficiency disease     Wears partial dentures     upper removable bridge       Past Surgical History:   Procedure Laterality Date    AXILLARY NODE DISSECTION Left 10/18/2021    Procedure: LYMPHADENECTOMY, AXILLARY;  Surgeon: Darlene Roca MD;  Location: [facility] OR;  Service: General;  Laterality: Left;    BREAST BIOPSY      BREAST LUMPECTOMY      invasive ductal carcinoma    BREAST SURGERY Bilateral     Reduction r/t h/o fibrocystic disease    CHOLECYSTECTOMY  08/2015    EYE SURGERY      Cat Ext  OU    HYSTERECTOMY      partial due to uterine fibroids    INCISION AND DRAINAGE OF KNEE Left 09/17/2020    Procedure: INCISION AND DRAINAGE, KNEE;  Surgeon: Rolando Wagoner MD;  Location: [facility] OR;  Service: Orthopedics;  Laterality: Left;    INJECTION FOR SENTINEL NODE IDENTIFICATION Left 10/18/2021    Procedure: INJECTION, FOR SENTINEL NODE IDENTIFICATION;  Surgeon: Darlene Roca MD;  Location: [facility] OR;  Service:  General;  Laterality: Left;    JOINT REPLACEMENT Left 05/13/2013    TKR    JOINT REPLACEMENT Right 08/03/2015    TKR    MASTECTOMY, PARTIAL Left 10/18/2021    Procedure: MASTECTOMY, PARTIAL -- wire;  Surgeon: Darlene Roca MD;  Location: [facility] OR;  Service: General;  Laterality: Left;  RN PREOP 10/15/2021   VACCINATED-----NEED H/P AND ORDERS    SENTINEL LYMPH NODE BIOPSY Left 10/18/2021    Procedure: BIOPSY, LYMPH NODE, SENTINEL;  Surgeon: Darlene Roca MD;  Location: [facility] OR;  Service: General;  Laterality: Left;    THORACENTESIS      TOTAL KNEE ARTHROPLASTY Right 2015    left knee done 5/2013    WOUND DRESSING Left 09/17/2020    Procedure: WOUND VAC APPLICATION;  Surgeon: Rolando Wagoner MD;  Location: [facility] OR;  Service: Orthopedics;  Laterality: Left;       Review of patient's allergies indicates:  No Known Allergies    Current Facility-Administered Medications on File Prior to Encounter   Medication    denosumab (PROLIA) injection 60 mg     Current Outpatient Medications on File Prior to Encounter   Medication Sig    amiodarone (PACERONE) 200 MG Tab TAKE ONE-HALF TABLET BY MOUTH EVERY DAY *DO NOT TAKE WITH GRAPEFRUIT JUICE*    apixaban (ELIQUIS) 2.5 mg Tab TAKE ONE TABLET BY MOUTH TWICE DAILY    atorvastatin (LIPITOR) 40 MG tablet TAKE ONE TABLET BY MOUTH ONCE DAILY    [DISCONTINUED] furosemide (LASIX) 40 MG tablet Take 1 tablet (40 mg total) by mouth 2 (two) times a day.    [DISCONTINUED] losartan (COZAAR) 50 MG tablet Take 50 mg by mouth.    allopurinoL (ZYLOPRIM) 100 MG tablet Take 1 tablet by mouth once daily.    calcium carbonate (TUMS) 200 mg calcium (500 mg) chewable tablet Take 2 tablets by mouth.    ergocalciferol (ERGOCALCIFEROL) 50,000 unit Cap Take 50,000 Units by mouth every 30 days.     FLUZONE HIGHDOSE QUAD 21-22  mcg/0.7 mL Syrg     folic acid (FOLVITE) 1 MG tablet TAKE 1 TABLET BY MOUTH EVERY DAY    mupirocin (BACTROBAN) 2 % ointment Apply topically every evening.     solifenacin (VESICARE) 10 MG tablet Take 1 tablet (10 mg total) by mouth once daily.     Family History       Problem Relation (Age of Onset)    Aneurysm Father    Cancer Mother    Hypertension Mother    No Known Problems Sister, Sister, Brother, Daughter, Son          Tobacco Use    Smoking status: Former     Passive exposure: Past    Smokeless tobacco: Never   Substance and Sexual Activity    Alcohol use: Not Currently    Drug use: No    Sexual activity: Not Currently     Review of Systems   Constitutional: Negative.   HENT: Negative.     Eyes: Negative.    Endocrine: Negative.    Hematologic/Lymphatic: Negative.    Skin: Negative.    Musculoskeletal: Negative.    Gastrointestinal: Negative.    Genitourinary: Negative.    Neurological: Negative.    Psychiatric/Behavioral: Negative.     Allergic/Immunologic: Negative.      Objective:     Vital Signs (Most Recent):  Temp: 98.5 °F (36.9 °C) (09/17/23 1227)  Pulse: 65 (09/17/23 1227)  Resp: 20 (09/17/23 1227)  BP: 120/67 (09/17/23 1227)  SpO2: (!) 92 % (09/17/23 0808) Vital Signs (24h Range):  Temp:  [97.6 °F (36.4 °C)-98.5 °F (36.9 °C)] 98.5 °F (36.9 °C)  Pulse:  [53-65] 65  Resp:  [16-20] 20  SpO2:  [92 %-98 %] 92 %  BP: (112-135)/(58-67) 120/67     Weight: 77.2 kg (170 lb 3.1 oz)  Body mass index is 31.13 kg/m².    SpO2: (!) 92 %         Intake/Output Summary (Last 24 hours) at 9/17/2023 1316  Last data filed at 9/17/2023 0808  Gross per 24 hour   Intake 420 ml   Output 1200 ml   Net -780 ml         Lines/Drains/Airways       Drain  Duration             Female External Urinary Catheter 09/14/23 2300 2 days              Peripheral Intravenous Line  Duration                  Peripheral IV - Single Lumen 09/14/23 1954 20 G Left Forearm 2 days         Peripheral IV - Single Lumen 09/14/23 2340 22 G Left Forearm 2 days                     Physical Exam  Constitutional:       Appearance: Normal appearance. She is well-developed.   HENT:      Head: Normocephalic.  "  Eyes:      Pupils: Pupils are equal, round, and reactive to light.   Cardiovascular:      Rate and Rhythm: Regular rhythm.   Pulmonary:      Effort: Pulmonary effort is normal.      Breath sounds: Normal breath sounds.   Abdominal:      General: Bowel sounds are normal.      Palpations: Abdomen is soft.      Tenderness: There is no abdominal tenderness.   Musculoskeletal:         General: Normal range of motion.      Cervical back: Normal range of motion and neck supple.   Skin:     General: Skin is warm.   Neurological:      Mental Status: She is alert and oriented to person, place, and time.          Significant Labs: BMP:   Recent Labs   Lab 09/16/23 0418 09/17/23  0403    96   * 134*   K 4.7 4.2   CL 88* 88*   CO2 36* 35*   BUN 44* 35*   CREATININE 1.6* 1.6*   CALCIUM 8.1* 8.1*   MG 1.9 1.8     , CMP   Recent Labs   Lab 09/16/23 0418 09/17/23  0403   * 134*   K 4.7 4.2   CL 88* 88*   CO2 36* 35*    96   BUN 44* 35*   CREATININE 1.6* 1.6*   CALCIUM 8.1* 8.1*   ANIONGAP 11 11     , CBC   Recent Labs   Lab 09/16/23 0418 09/17/23  0403   WBC 7.17 6.51   HGB 9.1* 9.9*   HCT 28.1* 31.4*    220     , INR   No results for input(s): "INR", "PROTIME" in the last 48 hours., Lipid Panel No results for input(s): "CHOL", "HDL", "LDLCALC", "TRIG", "CHOLHDL" in the last 48 hours., Troponin No results for input(s): "TROPONINI" in the last 48 hours., and All pertinent lab results from the last 24 hours have been reviewed.    Significant Imaging: Echocardiogram: Transthoracic echo (TTE) complete (Cupid Only):   Results for orders placed or performed during the hospital encounter of 09/14/23   Echo   Result Value Ref Range    BSA 1.84 m2    LVOT stroke volume 44.65 cm3    LVIDd 4.05 3.5 - 6.0 cm    LV Systolic Volume 48.07 mL    LV Systolic Volume Index 27.0 mL/m2    LVIDs 3.42 2.1 - 4.0 cm    LV Diastolic Volume 71.92 mL    LV Diastolic Volume Index 40.40 mL/m2    IVS 1.33 (A) 0.6 - 1.1 cm    " LVOT diameter 1.84 cm    LVOT area 2.7 cm2    FS 16 (A) 28 - 44 %    Left Ventricle Relative Wall Thickness 0.62 cm    Posterior Wall 1.26 (A) 0.6 - 1.1 cm    LV mass 188.90 g    LV Mass Index 106 g/m2    TDI LATERAL 0.03 m/s    TDI SEPTAL 0.02 m/s    TR Max Wes 3.58 m/s    IVRT 98.95 msec    LVOT peak wes 0.92 m/s    Left Ventricular Outflow Tract Mean Velocity 0.66 cm/s    Left Ventricular Outflow Tract Mean Gradient 2.06 mmHg    LA size 6.31 cm    Left Atrium Minor Axis 4.64 cm    RVDD 4.54 cm    TAPSE 1.48 cm    RA Major Axis 5.53 cm    RA Width 4.02 cm    AV mean gradient 12 mmHg    AV peak gradient 18 mmHg    Ao peak wes 2.14 m/s    Ao VTI 41.40 cm    LVOT peak VTI 16.80 cm    AV valve area 1.08 cm²    AV Velocity Ratio 0.43     AV index (prosthetic) 0.41     EDUARDO by Velocity Ratio 1.14 cm²    TV peak gradient 2 mmHg    Triscuspid Valve Regurgitation Peak Gradient 51 mmHg    PV PEAK VELOCITY 1.08 m/s    PV peak gradient 5 mmHg    Sinus 2.65 cm    STJ 2.13 cm    Ascending aorta 3.40 cm    IVC diameter 2.13 cm    Mean e' 0.03 m/s    ZLVIDS 0.93     ZLVIDD -1.95     TV resting pulmonary artery pressure 59 mmHg    RV TB RVSP 12 mmHg    Est. RA pres 8 mmHg    Narrative      Left Ventricle: Normal wall motion. There is normal systolic function   with a visually estimated ejection fraction of 55 - 60%. Diastolic   function : Unable to assess due to atrial fibrillation.    Left Atrium: Left atrium is severely dilated.    Right Ventricle: Moderate right ventricular enlargement.    Right Atrium: Right atrium is severely dilated.    Aortic Valve: There is mild stenosis. Aortic valve area by VTI is 1.08   cm². Aortic valve peak velocity is 2.14 m/s. Mean gradient is 12 mmHg. The   dimensionless index is 0.41.    Mitral Valve: There is mild to moderate regurgitation.    Tricuspid Valve: There is moderate regurgitation.    Pulmonary Artery: There is pulmonary hypertension. The estimated   pulmonary artery systolic  pressure is 59 mmHg.    IVC/SVC: Intermediate venous pressure at 8 mmHg.    Pericardium: There is a moderate effusion. Left pleural effusion.       Assessment and Plan:     Brief HPI:     * Atrial fibrillation with RVR  Paroxysmal AFib with RVR.  On amiodarone drip.  Continues to be tachycardic.  Will docardioversion today.  Patient states she has been compliant with her Eliquis and has not missed even a single dose for many months.  No need for TALHA    Risks, benefits and alternatives of the Cardioversion procedure were discussed with the patient including throat irritation, aspiration, anesthetic complications, esophageal irritation/perforation, skin irritation, arrhythmia, etc.  Patient understands and agrees to proceed.  Consent was placed on the chart.    9/16: s/p cv yesterday. Doing better. In nsr. contimue amiodarone. And oac.    9/17:  Stayed in sinus rhythm.  Will sign off.  Follow-up in Cardiology Clinic.  Continue amiodarone and oral anticoagulation.  Beta-blockers as needed and tolerated      Normocytic anemia  Management and evaluation per primary team    CKD (chronic kidney disease), stage IV        Essential hypertension        Hyperlipidemia            VTE Risk Mitigation (From admission, onward)         Ordered     apixaban tablet 5 mg  2 times daily         09/15/23 1005     IP VTE HIGH RISK PATIENT  Once         09/14/23 2234     Place sequential compression device  Until discontinued         09/14/23 2234                Constantine Chambers MD  Cardiology  [facility] [facility]

## 2023-09-17 NOTE — SUBJECTIVE & OBJECTIVE
Interval History:  Has stayed in sinus rhythm.  No chest pains, orthopnea, PND        Past Medical History:   Diagnosis Date    A-fib     Breast cancer     invasive ductal carcinoma    CKD (chronic kidney disease)     Diabetes mellitus type II     Fatty liver     GERD (gastroesophageal reflux disease)     Hearing loss of both ears     wears bilateral hearing aids    Hemochromatosis     History of anemia due to CKD     History of pleural effusion     with thoracentesis    Hyperlipidemia     Hypertension     Macular degeneration     Osteoarthritis     Left knee    Osteoporosis     Vaginal delivery     x2    Vitamin D deficiency disease     Wears partial dentures     upper removable bridge       Past Surgical History:   Procedure Laterality Date    AXILLARY NODE DISSECTION Left 10/18/2021    Procedure: LYMPHADENECTOMY, AXILLARY;  Surgeon: Darlene Roca MD;  Location: [facility] OR;  Service: General;  Laterality: Left;    BREAST BIOPSY      BREAST LUMPECTOMY      invasive ductal carcinoma    BREAST SURGERY Bilateral     Reduction r/t h/o fibrocystic disease    CHOLECYSTECTOMY  08/2015    EYE SURGERY      Cat Ext  OU    HYSTERECTOMY      partial due to uterine fibroids    INCISION AND DRAINAGE OF KNEE Left 09/17/2020    Procedure: INCISION AND DRAINAGE, KNEE;  Surgeon: Rolando Wagoner MD;  Location: [facility] OR;  Service: Orthopedics;  Laterality: Left;    INJECTION FOR SENTINEL NODE IDENTIFICATION Left 10/18/2021    Procedure: INJECTION, FOR SENTINEL NODE IDENTIFICATION;  Surgeon: Darlene Roca MD;  Location: [facility] OR;  Service: General;  Laterality: Left;    JOINT REPLACEMENT Left 05/13/2013    TKR    JOINT REPLACEMENT Right 08/03/2015    TKR    MASTECTOMY, PARTIAL Left 10/18/2021    Procedure: MASTECTOMY, PARTIAL -- wire;  Surgeon: Darlene Roca MD;  Location: [facility] OR;  Service: General;  Laterality: Left;  RN PREOP 10/15/2021   VACCINATED-----NEED H/P AND ORDERS    SENTINEL LYMPH NODE BIOPSY Left 10/18/2021    Procedure:  BIOPSY, LYMPH NODE, SENTINEL;  Surgeon: Darlene Roca MD;  Location: [facility] OR;  Service: General;  Laterality: Left;    THORACENTESIS      TOTAL KNEE ARTHROPLASTY Right 2015    left knee done 5/2013    WOUND DRESSING Left 09/17/2020    Procedure: WOUND VAC APPLICATION;  Surgeon: Rolando Wagoner MD;  Location: [facility] OR;  Service: Orthopedics;  Laterality: Left;       Review of patient's allergies indicates:  No Known Allergies    Current Facility-Administered Medications on File Prior to Encounter   Medication    denosumab (PROLIA) injection 60 mg     Current Outpatient Medications on File Prior to Encounter   Medication Sig    amiodarone (PACERONE) 200 MG Tab TAKE ONE-HALF TABLET BY MOUTH EVERY DAY *DO NOT TAKE WITH GRAPEFRUIT JUICE*    apixaban (ELIQUIS) 2.5 mg Tab TAKE ONE TABLET BY MOUTH TWICE DAILY    atorvastatin (LIPITOR) 40 MG tablet TAKE ONE TABLET BY MOUTH ONCE DAILY    [DISCONTINUED] furosemide (LASIX) 40 MG tablet Take 1 tablet (40 mg total) by mouth 2 (two) times a day.    [DISCONTINUED] losartan (COZAAR) 50 MG tablet Take 50 mg by mouth.    allopurinoL (ZYLOPRIM) 100 MG tablet Take 1 tablet by mouth once daily.    calcium carbonate (TUMS) 200 mg calcium (500 mg) chewable tablet Take 2 tablets by mouth.    ergocalciferol (ERGOCALCIFEROL) 50,000 unit Cap Take 50,000 Units by mouth every 30 days.     FLUZONE HIGHDOSE QUAD 21-22  mcg/0.7 mL Syrg     folic acid (FOLVITE) 1 MG tablet TAKE 1 TABLET BY MOUTH EVERY DAY    mupirocin (BACTROBAN) 2 % ointment Apply topically every evening.    solifenacin (VESICARE) 10 MG tablet Take 1 tablet (10 mg total) by mouth once daily.     Family History       Problem Relation (Age of Onset)    Aneurysm Father    Cancer Mother    Hypertension Mother    No Known Problems Sister, Sister, Brother, Daughter, Son          Tobacco Use    Smoking status: Former     Passive exposure: Past    Smokeless tobacco: Never   Substance and Sexual Activity    Alcohol use: Not Currently     Drug use: No    Sexual activity: Not Currently     Review of Systems   Constitutional: Negative.   HENT: Negative.     Eyes: Negative.    Endocrine: Negative.    Hematologic/Lymphatic: Negative.    Skin: Negative.    Musculoskeletal: Negative.    Gastrointestinal: Negative.    Genitourinary: Negative.    Neurological: Negative.    Psychiatric/Behavioral: Negative.     Allergic/Immunologic: Negative.      Objective:     Vital Signs (Most Recent):  Temp: 98.5 °F (36.9 °C) (09/17/23 1227)  Pulse: 65 (09/17/23 1227)  Resp: 20 (09/17/23 1227)  BP: 120/67 (09/17/23 1227)  SpO2: (!) 92 % (09/17/23 0808) Vital Signs (24h Range):  Temp:  [97.6 °F (36.4 °C)-98.5 °F (36.9 °C)] 98.5 °F (36.9 °C)  Pulse:  [53-65] 65  Resp:  [16-20] 20  SpO2:  [92 %-98 %] 92 %  BP: (112-135)/(58-67) 120/67     Weight: 77.2 kg (170 lb 3.1 oz)  Body mass index is 31.13 kg/m².    SpO2: (!) 92 %         Intake/Output Summary (Last 24 hours) at 9/17/2023 1316  Last data filed at 9/17/2023 0808  Gross per 24 hour   Intake 420 ml   Output 1200 ml   Net -780 ml         Lines/Drains/Airways       Drain  Duration             Female External Urinary Catheter 09/14/23 2300 2 days              Peripheral Intravenous Line  Duration                  Peripheral IV - Single Lumen 09/14/23 1954 20 G Left Forearm 2 days         Peripheral IV - Single Lumen 09/14/23 2340 22 G Left Forearm 2 days                     Physical Exam  Constitutional:       Appearance: Normal appearance. She is well-developed.   HENT:      Head: Normocephalic.   Eyes:      Pupils: Pupils are equal, round, and reactive to light.   Cardiovascular:      Rate and Rhythm: Regular rhythm.   Pulmonary:      Effort: Pulmonary effort is normal.      Breath sounds: Normal breath sounds.   Abdominal:      General: Bowel sounds are normal.      Palpations: Abdomen is soft.      Tenderness: There is no abdominal tenderness.   Musculoskeletal:         General: Normal range of motion.      Cervical  "back: Normal range of motion and neck supple.   Skin:     General: Skin is warm.   Neurological:      Mental Status: She is alert and oriented to person, place, and time.          Significant Labs: BMP:   Recent Labs   Lab 09/16/23  0418 09/17/23  0403    96   * 134*   K 4.7 4.2   CL 88* 88*   CO2 36* 35*   BUN 44* 35*   CREATININE 1.6* 1.6*   CALCIUM 8.1* 8.1*   MG 1.9 1.8     , CMP   Recent Labs   Lab 09/16/23  0418 09/17/23  0403   * 134*   K 4.7 4.2   CL 88* 88*   CO2 36* 35*    96   BUN 44* 35*   CREATININE 1.6* 1.6*   CALCIUM 8.1* 8.1*   ANIONGAP 11 11     , CBC   Recent Labs   Lab 09/16/23 0418 09/17/23  0403   WBC 7.17 6.51   HGB 9.1* 9.9*   HCT 28.1* 31.4*    220     , INR   No results for input(s): "INR", "PROTIME" in the last 48 hours., Lipid Panel No results for input(s): "CHOL", "HDL", "LDLCALC", "TRIG", "CHOLHDL" in the last 48 hours., Troponin No results for input(s): "TROPONINI" in the last 48 hours., and All pertinent lab results from the last 24 hours have been reviewed.    Significant Imaging: Echocardiogram: Transthoracic echo (TTE) complete (Cupid Only):   Results for orders placed or performed during the hospital encounter of 09/14/23   Echo   Result Value Ref Range    BSA 1.84 m2    LVOT stroke volume 44.65 cm3    LVIDd 4.05 3.5 - 6.0 cm    LV Systolic Volume 48.07 mL    LV Systolic Volume Index 27.0 mL/m2    LVIDs 3.42 2.1 - 4.0 cm    LV Diastolic Volume 71.92 mL    LV Diastolic Volume Index 40.40 mL/m2    IVS 1.33 (A) 0.6 - 1.1 cm    LVOT diameter 1.84 cm    LVOT area 2.7 cm2    FS 16 (A) 28 - 44 %    Left Ventricle Relative Wall Thickness 0.62 cm    Posterior Wall 1.26 (A) 0.6 - 1.1 cm    LV mass 188.90 g    LV Mass Index 106 g/m2    TDI LATERAL 0.03 m/s    TDI SEPTAL 0.02 m/s    TR Max Wes 3.58 m/s    IVRT 98.95 msec    LVOT peak wes 0.92 m/s    Left Ventricular Outflow Tract Mean Velocity 0.66 cm/s    Left Ventricular Outflow Tract Mean Gradient 2.06 mmHg "    LA size 6.31 cm    Left Atrium Minor Axis 4.64 cm    RVDD 4.54 cm    TAPSE 1.48 cm    RA Major Axis 5.53 cm    RA Width 4.02 cm    AV mean gradient 12 mmHg    AV peak gradient 18 mmHg    Ao peak devan 2.14 m/s    Ao VTI 41.40 cm    LVOT peak VTI 16.80 cm    AV valve area 1.08 cm²    AV Velocity Ratio 0.43     AV index (prosthetic) 0.41     EDUARDO by Velocity Ratio 1.14 cm²    TV peak gradient 2 mmHg    Triscuspid Valve Regurgitation Peak Gradient 51 mmHg    PV PEAK VELOCITY 1.08 m/s    PV peak gradient 5 mmHg    Sinus 2.65 cm    STJ 2.13 cm    Ascending aorta 3.40 cm    IVC diameter 2.13 cm    Mean e' 0.03 m/s    ZLVIDS 0.93     ZLVIDD -1.95     TV resting pulmonary artery pressure 59 mmHg    RV TB RVSP 12 mmHg    Est. RA pres 8 mmHg    Narrative      Left Ventricle: Normal wall motion. There is normal systolic function   with a visually estimated ejection fraction of 55 - 60%. Diastolic   function : Unable to assess due to atrial fibrillation.    Left Atrium: Left atrium is severely dilated.    Right Ventricle: Moderate right ventricular enlargement.    Right Atrium: Right atrium is severely dilated.    Aortic Valve: There is mild stenosis. Aortic valve area by VTI is 1.08   cm². Aortic valve peak velocity is 2.14 m/s. Mean gradient is 12 mmHg. The   dimensionless index is 0.41.    Mitral Valve: There is mild to moderate regurgitation.    Tricuspid Valve: There is moderate regurgitation.    Pulmonary Artery: There is pulmonary hypertension. The estimated   pulmonary artery systolic pressure is 59 mmHg.    IVC/SVC: Intermediate venous pressure at 8 mmHg.    Pericardium: There is a moderate effusion. Left pleural effusion.

## 2023-09-17 NOTE — NURSING
[facility] [place]  Nurses Note -- 4 Eyes      9/17/2023       Skin assessed on: Q Shift      [] No Pressure Injuries Present    []Prevention Measures Documented    [x] Yes LDA  for Pressure Injury Previously documented     [] Yes New Pressure Injury Discovered   [] LDA for New Pressure Injury Added      Attending RN:  Nelly Osorio, RN     Second RN:  Karen Mario

## 2023-09-17 NOTE — NURSING
Nurses Note -- 4 Eyes      9/16/2023   10:10 PM      Skin assessed during: Q Shift Change      [] No Altered Skin Integrity Present    []Prevention Measures Documented      [x] Yes- Altered Skin Integrity present   [] LDA Added if Not in Epic (Describe Wound)   [x]Altered Skin Integrity Documented in LDA   [] Wound Image Taken    Wound Care Consulted? No    Attending Nurse:  Karen Bernard RN/Staff Member:   Olive      Patient awake and alert. Resp even non labored. No complaints or distress.

## 2023-09-17 NOTE — PROGRESS NOTES
[facility]  [facility]    Patient Name: Barbara Rizo  MRN: [id number]  Admission Date: 9/14/2023  Hospital Length of Stay: 3 days  Code Status: DNR  Attending Provider: Dejan Herrera MD  Primary Care Provider: Paras Oro MD   Principal Problem: Atrial fibrillation with RVR    Subjective:     Interval History: NAEON. Planned discharge today. Bedside US of pleural effusion stable.       Objective:     Vital Signs (Most Recent):  Temp: 98.5 °F (36.9 °C) (09/17/23 1227)  Pulse: 65 (09/17/23 1227)  Resp: 20 (09/17/23 1227)  BP: 120/67 (09/17/23 1227)  SpO2: (!) 92 % (09/17/23 0808) Vital Signs (24h Range):  Temp:  [97.6 °F (36.4 °C)-98.5 °F (36.9 °C)] 98.5 °F (36.9 °C)  Pulse:  [53-65] 65  Resp:  [16-20] 20  SpO2:  [92 %-98 %] 92 %  BP: (112-135)/(58-67) 120/67     Weight: 77.2 kg (170 lb 3.1 oz)  Body mass index is 31.13 kg/m².      Intake/Output Summary (Last 24 hours) at 9/17/2023 1428  Last data filed at 9/17/2023 0808  Gross per 24 hour   Intake 420 ml   Output 1000 ml   Net -580 ml        Physical Exam  Vitals and nursing note reviewed.   Constitutional:       Appearance: Normal appearance. She is ill-appearing. She is not toxic-appearing.   HENT:      Head: Normocephalic and atraumatic.      Mouth/Throat:      Pharynx: No oropharyngeal exudate.   Eyes:      General: No scleral icterus.     Conjunctiva/sclera: Conjunctivae normal.      Pupils: Pupils are equal, round, and reactive to light.   Cardiovascular:      Rate and Rhythm: Tachycardia present. Rhythm irregular.      Heart sounds: No murmur heard.     No friction rub. No gallop.   Pulmonary:      Effort: Pulmonary effort is normal. No respiratory distress.      Breath sounds: Normal breath sounds. No wheezing or rhonchi.   Musculoskeletal:      Right lower leg: Edema present.      Left lower leg: Edema present.   Skin:     Capillary Refill: Capillary refill takes less than 2 seconds.   Neurological:      General: No  "focal deficit present.      Mental Status: She is alert and oriented to person, place, and time.   Psychiatric:         Mood and Affect: Mood normal.         Behavior: Behavior normal.         Thought Content: Thought content normal.         Judgment: Judgment normal.           Review of Systems    Vents:       Lines/Drains/Airways       Drain  Duration             Female External Urinary Catheter 09/14/23 2300 2 days              Peripheral Intravenous Line  Duration                  Peripheral IV - Single Lumen 09/14/23 1954 20 G Left Forearm 2 days         Peripheral IV - Single Lumen 09/14/23 2340 22 G Left Forearm 2 days                    Significant Labs:    CBC/Anemia Profile:  Recent Labs   Lab 09/16/23  0418 09/17/23  0403   WBC 7.17 6.51   HGB 9.1* 9.9*   HCT 28.1* 31.4*    220   MCV 97 97   RDW 17.7* 18.0*        Chemistries:  Recent Labs   Lab 09/16/23  0418 09/17/23  0403   * 134*   K 4.7 4.2   CL 88* 88*   CO2 36* 35*   BUN 44* 35*   CREATININE 1.6* 1.6*   CALCIUM 8.1* 8.1*   MG 1.9 1.8       All pertinent labs within the past 24 hours have been reviewed.    Significant Imaging:  I have reviewed all pertinent imaging results/findings within the past 24 hours.      ABG  No results for input(s): "PH", "PO2", "PCO2", "HCO3", "BE" in the last 168 hours.  Assessment/Plan:     Pulmonary  Chronic respiratory failure with hypoxia  Patient with Hypoxic Respiratory failure which is Chronic.  she is on home oxygen at 4 LPM. Supplemental oxygen was provided and noted-      .   Signs/symptoms of respiratory failure include- tachypnea and increased work of breathing. Contributing diagnoses includes - CHF and pulmonary hypertension Labs and images were reviewed. Patient Has recent ABG, which has been reviewed. Will treat underlying causes and adjust management of respiratory failure as follows-     - secondary to HFpEF and pulmonary hypertension. Does not have COPD based on most recent PFTs in 3/2022. " Had isolated DLCO. Follow up with pulmonology outpatient with PFTs and 6 minute walk test starting on room air.     Pleural effusion, left  Effusion likely secondary to fluid overload. On evaluation simple fluid collection without Hct sign that would be concerning of spontaneous bleed. Will hold off on thoracentesis for now. Present on recent CT chest while being treated for PNA. No current signs of symptoms of PNA. Follow up outpatient with pulmonology.     Cardiac/Vascular  Pulmonary hypertension  WHO group II with most recent Echo showing PASP of 46 3/2023. Repeat echo pending. On US of lung looked like possible pericardial effusion. Pt would benefit from continued diuresis.       Will sign off. Please contact with questions or concerns.      Devin Rangel MD  Pulmonology  [facility]

## 2023-09-17 NOTE — PLAN OF CARE
[facility] [place] - Telemetry    HOME HEALTH ORDERS  FACE TO FACE ENCOUNTER    Patient Name: Barbara Rizo  YOB: 1940    PCP: Paras Oro MD   PCP Address: [address] / [place] [address]  PCP Phone Number: [phone]  PCP Fax: [phone]       Encounter Date: 09/17/2023    Admit to Home Health    Diagnoses:  Active Hospital Problems    Diagnosis  POA    *Atrial fibrillation with RVR [I48.91]  Yes    Dysphagia [R13.10]  Yes    Debility [R53.81]  Yes    Normocytic anemia [D64.9]  Yes    Pleural effusion, left [J90]  Yes    Chronic respiratory failure with hypoxia [J96.11]  Yes    ACP (advance care planning) [Z71.89]  Not Applicable    CKD (chronic kidney disease), stage IV [N18.4]  Yes    Type 2 diabetes mellitus with stage 4 chronic kidney disease, without long-term current use of insulin [E11.22, N18.4]  Yes    Pulmonary hypertension [I27.20]  Yes     group 2 with ddx.  Will send for pft due to remote smoking history.  bp and fluid optimization.  Doing better since taking lasix routinely.        Hyperlipidemia [E78.5]  Yes     Chronic    Essential hypertension [I10]  Yes     Chronic      Resolved Hospital Problems    Diagnosis Date Resolved POA    Left lower lobe consolidation [J18.1] 09/17/2023 Yes    Hypokalemia [E87.6] 09/17/2023 Yes       Future Appointments   Date Time Provider Department Center   10/26/2023  9:40 AM Paras Oro MD [facility] MED [place]   11/14/2023  9:40 AM Reina Camp MD [facility] URO [place] [facility]   11/14/2023  2:15 PM Amauri Lomas MD [facility] CARDIO [facility]   12/14/2023 12:30 PM INJECTION, WB INFUSION MH CHEMO [facility] [place]      Follow-up Information       Paras Oro MD Follow up in 1 week(s).    Specialties: Family Medicine, Wound Care  Contact information:  [address] [place] [address]  [phone]               Amauri Lomas MD Follow up in 2 week(s).    Specialty: Cardiology  Contact information:  [address]  [facility]  [address]  [facility] [id number] [phone]               Nargis Boyle MD Follow up in 2 week(s).    Specialty: Nephrology  Contact information:  [address] [place], [place] [address]  [place] [id number] [phone]               Reji Sarkar MD Follow up in 2 week(s).    Specialties: Pulmonary Disease, Sleep Medicine  Contact information:  [address]  [address]  [facility] [id number] [phone]                               I have seen and examined this patient face to face today. My clinical findings that support the need for the home health skilled services and home bound status are the following:  Weakness/numbness causing balance and gait disturbance due to Weakness/Debility making it taxing to leave home.    Allergies:Review of patient's allergies indicates:  No Known Allergies    Diet: renal diet and minced meats with thin liquids    Activities: activity as tolerated    Nursing:   SN to complete comprehensive assessment including routine vital signs. Instruct on disease process and s/s of complications to report to MD. Review/verify medication list sent home with the patient at time of discharge  and instruct patient/caregiver as needed. Frequency may be adjusted depending on start of care date.    Notify MD if SBP > 160 or < 90; DBP > 90 or < 50; HR > 120 or < 50; Temp > 101      CONSULTS:    Physical Therapy to evaluate and treat. Evaluate for home safety and equipment needs; Establish/upgrade home exercise program. Perform / instruct on therapeutic exercises, gait training, transfer training, and Range of Motion.  Occupational Therapy to evaluate and treat. Evaluate home environment for safety and equipment needs. Perform/Instruct on transfers, ADL training, ROM, and therapeutic exercises.   to evaluate for community resources/long-range planning.  Aide to provide assistance with personal care, ADLs, and vital signs.    MISCELLANEOUS CARE:  Home Oxygen:  Oxygen at 4 L/min nasal canula to  be used:  Continuously.    Medications: Review discharge medications with patient and family and provide education.      Current Discharge Medication List        START taking these medications    Details   bumetanide (BUMEX) 1 MG tablet Take 1 tablet (1 mg total) by mouth once daily.  Qty: 30 tablet, Refills: 11           CONTINUE these medications which have NOT CHANGED    Details   amiodarone (PACERONE) 200 MG Tab TAKE ONE-HALF TABLET BY MOUTH EVERY DAY *DO NOT TAKE WITH GRAPEFRUIT JUICE*  Qty: 45 tablet, Refills: 3    Comments: This prescription was filled on 4/11/2023. Any refills authorized will be placed on file.      apixaban (ELIQUIS) 2.5 mg Tab TAKE ONE TABLET BY MOUTH TWICE DAILY  Qty: 180 tablet, Refills: 3    Associated Diagnoses: Paroxysmal atrial fibrillation      atorvastatin (LIPITOR) 40 MG tablet TAKE ONE TABLET BY MOUTH ONCE DAILY  Qty: 90 tablet, Refills: 2    Comments: This prescription was filled on 4/11/2023. Any refills authorized will be placed on file.  Associated Diagnoses: Type 2 diabetes mellitus with stage 3 chronic kidney disease, without long-term current use of insulin; Essential hypertension      allopurinoL (ZYLOPRIM) 100 MG tablet Take 1 tablet by mouth once daily.      calcium carbonate (TUMS) 200 mg calcium (500 mg) chewable tablet Take 2 tablets by mouth.      ergocalciferol (ERGOCALCIFEROL) 50,000 unit Cap Take 50,000 Units by mouth every 30 days.       FLUZONE HIGHDOSE QUAD 21-22  mcg/0.7 mL Syrg       folic acid (FOLVITE) 1 MG tablet TAKE 1 TABLET BY MOUTH EVERY DAY  Qty: 90 tablet, Refills: 0    Associated Diagnoses: Hemochromatosis, unspecified hemochromatosis type      mupirocin (BACTROBAN) 2 % ointment Apply topically every evening.      solifenacin (VESICARE) 10 MG tablet Take 1 tablet (10 mg total) by mouth once daily.  Qty: 30 tablet, Refills: 11           STOP taking these medications       furosemide (LASIX) 40 MG tablet Comments:   Reason for Stopping:          losartan (COZAAR) 50 MG tablet Comments:   Reason for Stopping:               I certify that this patient is confined to her home and needs intermittent skilled nursing care, physical therapy, and occupational therapy.

## 2023-09-17 NOTE — ASSESSMENT & PLAN NOTE
Effusion likely secondary to fluid overload. On evaluation simple fluid collection without Hct sign that would be concerning of spontaneous bleed. Will hold off on thoracentesis for now. Present on recent CT chest while being treated for PNA. No current signs of symptoms of PNA. Follow up outpatient with pulmonology.

## 2023-09-17 NOTE — NURSING
Received report from TOSHIA Jones. Patient lying in bed resting, NAD noted, Safety Precautions maintained, Will Monitor.

## 2023-09-17 NOTE — PLAN OF CARE
09/17/23 1227   Medicare Message   Important Message from Medicare regarding Discharge Appeal Rights Explained to patient/caregiver  (CM explained IMM. Pt's daughter, Tamar verbally expressed understanding. GRETA emailed a copy to [email or web address].)   Date IMM was signed 09/17/23   Time IMM was signed 1200

## 2023-09-17 NOTE — PLAN OF CARE
[facility] will choose home health provider. CM faxed referral to [facility]. CM notified nurse, Nelly that pt is ready for discharge from CM standpoint. All CM needs met.     09/17/23 1255   Final Note   Assessment Type Final Discharge Note   Anticipated Discharge Disposition Home-Health   What phone number can be called within the next 1-3 days to see how you are doing after discharge? [phone]   Hospital Resources/Appts/Education Provided Appointments scheduled and added to AVS   Post-Acute Status   Coverage [facility]   Patient choice form signed by patient/caregiver List from System Post-Acute Care   Discharge Delays None known at this time

## 2023-09-17 NOTE — PT/OT/SLP PROGRESS
Physical Therapy Treatment    Patient Name:  Barbara Rizo   MRN:  [id number]    Recommendations:     Discharge Recommendations: home health PT  Discharge Equipment Recommendations: none      Assessment:     Barbara Rizo is a 83 y.o. female admitted with a medical diagnosis of Atrial fibrillation with RVR.  She presents with the following impairments/functional limitations: weakness, impaired endurance, decreased coordination, decreased lower extremity function, impaired functional mobility, gait instability, impaired cardiopulmonary response to activity.    Rehab Prognosis: Good; patient would benefit from acute skilled PT services to address these deficits and reach maximum level of function.    Recent Surgery: Procedure(s) (LRB):  Cardioversion or Defibrillation (N/A)  Cardioversion (N/A) 2 Days Post-Op    Plan:     During this hospitalization, patient to be seen 5 x/week to address the identified rehab impairments via gait training, therapeutic activities, therapeutic exercises and progress toward the following goals:    Plan of Care Expires:  09/22/23    Subjective     Chief Complaint: Pt with no complaints or concerns at this time.   Patient/Family Comments/goals: Pt agreeable to PT treatment.   Pain/Comfort:  Pain Rating 1: 0/10      Objective:       Patient found HOB elevated with telemetry, pulse ox (continuous), peripheral IV, PureWick, oxygen upon PT entry to room.     General Precautions: Standard, fall, respiratory  Orthopedic Precautions: N/A  Braces: N/A  Respiratory Status: Nasal cannula, flow 4 L/min     Functional Mobility:  Bed Mobility:     Supine to Sit: minimum assistance  Sit to Supine: contact guard assistance  Transfers:     Sit to Stand:  contact guard assistance with rolling walker  Gait: Pt ambulated 60-70ft with RW, CGA requiring verbal cueing and visual demonstration on proper pursed lip breathing techniques to help with shortness of breathe when feeling fatigued.   Balance: Pt  with good sitting and fair standing balance.       AM-PAC 6 CLICK MOBILITY  Turning over in bed (including adjusting bedclothes, sheets and blankets)?: 4  Sitting down on and standing up from a chair with arms (e.g., wheelchair, bedside commode, etc.): 3  Moving from lying on back to sitting on the side of the bed?: 4  Moving to and from a bed to a chair (including a wheelchair)?: 3  Need to walk in hospital room?: 3  Climbing 3-5 steps with a railing?: 3  Basic Mobility Total Score: 20       Treatment & Education:      Patient left HOB elevated with all lines intact, call button in reach, and bed alarm on..    GOALS:   Multidisciplinary Problems       Physical Therapy Goals          Problem: Physical Therapy    Goal Priority Disciplines Outcome Goal Variances Interventions   Physical Therapy Goal     PT, PT/OT Ongoing, Progressing     Description: Goals to be met by: 23     Patient will increase functional independence with mobility by performin. Pt to be mod I with bed mobility.  2. Pt to transfer with supervision.  3. Pt to ambulate 50' w/RW SBA, O2.  4. Pt to be (I) with written HEP.                         Time Tracking:     PT Received On: 23  PT Start Time: 1012     PT Stop Time: 1030  PT Total Time (min): 18 min     Billable Minutes: Therapeutic Activity 18    Treatment Type: Treatment  PT/PTA: PTA     Number of PTA visits since last PT visit: 2023

## 2023-09-17 NOTE — ASSESSMENT & PLAN NOTE
Paroxysmal AFib with RVR.  On amiodarone drip.  Continues to be tachycardic.  Will docardioversion today.  Patient states she has been compliant with her Eliquis and has not missed even a single dose for many months.  No need for TALHA    Risks, benefits and alternatives of the Cardioversion procedure were discussed with the patient including throat irritation, aspiration, anesthetic complications, esophageal irritation/perforation, skin irritation, arrhythmia, etc.  Patient understands and agrees to proceed.  Consent was placed on the chart.    9/16: s/p cv yesterday. Doing better. In nsr. contimue amiodarone. And oac.    9/17:  Stayed in sinus rhythm.  Will sign off.  Follow-up in Cardiology Clinic.  Continue amiodarone and oral anticoagulation.  Beta-blockers as needed and tolerated

## 2023-09-17 NOTE — DISCHARGE SUMMARY
[facility]  Discharge Summary      Patient Name: Barbara Rizo  MRN: [id number]  [facility]: [id number]  Patient Class: IP- Inpatient  Admission Date: 9/14/2023  Hospital Length of Stay: 3 days  Discharge Date and Time:  09/17/2023 10:10 AM  Attending Physician: Dejan Herrera MD   Discharging Provider: Dejan Herrera MD  Primary Care Provider: Paras Oro MD    Primary Care Team: Networked reference to record PCT     HPI:   83 years old female with PMH significant for hypertension, diabetes mellitus, CKD, atrial fibrillation on Eliquis, GERD was sent to ER from her PCP because of dropping blood counts. Patient was recently hospitalized for fluid overload and hyponatremia. Patient reported she had blood workup done and got call from her PCP to go to ER. Review of the chart revealed that hemoglobin dropped from 13.5 on 08/27 to 9.3 today on 09/14. Patient denied any bleeding from any site or black stools. Patient reported she is doing fine otherwise and denied any symptoms at this time including chest pain shortness of breath abdominal pain fever chills nausea vomiting diarrhea or dizziness. Patient had rectal exam done by ER physician which was negative for melena or hematochezia. Chest x-ray showed moderate left pleural effusion with associated compressive atelectasis and/or lower lobe consolidation. Other lab work showed WBC 10.62, creatinine 1.9, potassium 3.6 and BNP 1317. Patient was found to be in atrial fibrillation with RVR for which has been started on amiodarone infusion. Patient is on 4-5 L O2 NC at baseline. Patient received IV amiodarone and IV Lasix in ER.          Procedure(s) (LRB):  Cardioversion or Defibrillation (N/A)  Cardioversion (N/A)      Hospital Course:   83 year old woman with HTN, DM, CKD, Afib, GERD and chronic hypoxic respiratory failure on 4-5L O2 at home who was sent in by PCP due to anemia (Hb 9.3 down from 13).  She was admitted to inpatient  status and diagnosed with atrial fibrillation with RVR, fluid overload due to CKD and atrial fibrillation and normocytic anemia.  Cardiology, pulmonology and nephrology were consulted.  She was treated in ICU with amiodarone drip and IV bumex.  She was taken for DCCV on 9/15 which was successful and is doing well and on home level of supplemental O2.  Has a mild sinus bradycardia with 1st degree AVB. Now back on home dose of po amiodarone 100mg qd.  Etiology of her anemia is probably CKD.  Hb remains stable in mid 9 range and she has no evidence of bleeding anywhere.  Heme/onc referral at discharge.  She is debilitated and PT/OT consulted.  She has intermittent dysphagia with solids and on minced meat diet. Patient has continued to improve during hospital stay.  She has remained afebrile and hemodynamically stable.  Transitioning to PO diuretics.  She will be discharged home with HH to follow up with PCP, Nephrology, Cardiology and Hematology.           Goals of Care Treatment Preferences:  Code Status: DNR       LaPOST: Yes  What is most important right now is to focus on spending time at home, avoiding the hospital, remaining as independent as possible, symptom/pain control, quality of life, even if it means sacrificing a little time.  Accordingly, we have decided that the best plan to meet the patient's goals includes continuing with treatment.      Consults:   Consults (From admission, onward)        Status Ordering Provider     Inpatient consult to Nephrology  Once        Provider:  Nargis Boyle MD    Acknowledged AMANDA MAS     Inpatient consult to Palliative Care  Once        Provider:  Danyell Neil NP    Completed KENDALL PARKER     Inpatient consult to Pulmonology  Once        Provider:  Devin Rangel MD    Completed KENDALL PARKER     Inpatient consult to Cardiology  Once        Provider:  Fish Vera MD    Completed KENDALL PARKER          Endocrine  Type 2 diabetes  mellitus with stage 4 chronic kidney disease, without long-term current use of insulin  -A1c 4.9 8/27/23  -Diet controlled at home  -No significant elevations noted thus far  -Monitor sugars daily with BMP - if needed can add SSI      Final Active Diagnoses:    Diagnosis Date Noted POA    PRINCIPAL PROBLEM:  Atrial fibrillation with RVR [I48.91] 09/14/2023 Yes    Dysphagia [R13.10] 09/16/2023 Yes    Debility [R53.81] 09/16/2023 Yes    Normocytic anemia [D64.9] 09/14/2023 Yes    Pleural effusion, left [J90] 09/14/2023 Yes    Chronic respiratory failure with hypoxia [J96.11] 09/14/2023 Yes    ACP (advance care planning) [Z71.89] 04/24/2021 Not Applicable    CKD (chronic kidney disease), stage IV [N18.4] 01/25/2021 Yes    Type 2 diabetes mellitus with stage 4 chronic kidney disease, without long-term current use of insulin [E11.22, N18.4] 01/25/2021 Yes    Pulmonary hypertension [I27.20] 09/28/2020 Yes    Hyperlipidemia [E78.5]  Yes     Chronic    Essential hypertension [I10]  Yes     Chronic      Problems Resolved During this Admission:    Diagnosis Date Noted Date Resolved POA    Left lower lobe consolidation [J18.1] 09/14/2023 09/17/2023 Yes    Hypokalemia [E87.6] 09/04/2023 09/17/2023 Yes       Discharged Condition: stable    [place] [facility] [place]:   Follow-up Information     Paras Oro MD Follow up in 1 week(s).    Specialties: Family Medicine, Wound Care  Contact information:  [address] [facility] [place] [id number] [phone]             Amauri Lomas MD Follow up in 2 week(s).    Specialty: Cardiology  Contact information:  [address]  [address]  [place] [id number] [phone]             Nargis Boyle MD Follow up in 2 week(s).    Specialty: Nephrology  Contact information:  [address] [place] [address] [address]  [place] [id number] [phone]             Reji Sarkar MD Follow up in 2 week(s).    Specialties: Pulmonary Disease, Sleep Medicine  Contact  information:  [address]  [address]  [place] [address]  [phone]                       Patient Instructions:      Ambulatory referral/consult to Hematology / Oncology   Standing Status: Future   Referral Priority: Routine Referral Type: Consultation   Referral Reason: Specialty Services Required   Requested Specialty: Hematology and Oncology   Number of Visits Requested: 1     Diet renal     Notify your health care provider if you experience any of the following:  temperature >100.4     Notify your health care provider if you experience any of the following:  persistent nausea and vomiting or diarrhea     Notify your health care provider if you experience any of the following:  severe uncontrolled pain     Notify your health care provider if you experience any of the following:  difficulty breathing or increased cough     Notify your health care provider if you experience any of the following:  persistent dizziness, light-headedness, or visual disturbances     Notify your health care provider if you experience any of the following:  increased confusion or weakness     Activity as tolerated           Pending Diagnostic Studies:     Procedure Component Value Units Date/Time    EKG 12-lead [[id number]]     Order Status: Sent Lab Status: No result     Legionella antigen, urine [[id number]] Collected: 09/15/23 0945    Order Status: Sent Lab Status: In process Updated: 09/15/23 0952    Specimen: Urine, Catheterized     Strep Pneumo AG Urine [[id number]] Collected: 09/15/23 0945    Order Status: Sent Lab Status: In process Updated: 09/15/23 0952    Specimen: Urine, Catheterized          Medications:  Reconciled Home Medications:      Medication List      START taking these medications    bumetanide 1 MG tablet  Commonly known as: BUMEX  Take 1 tablet (1 mg total) by mouth once daily.        CONTINUE taking these medications    allopurinoL 100 MG tablet  Commonly known as: ZYLOPRIM  Take 1 tablet by mouth once  daily.     amiodarone 200 MG Tab  Commonly known as: PACERONE  TAKE ONE-HALF TABLET BY MOUTH EVERY DAY *DO NOT TAKE WITH GRAPEFRUIT JUICE*     atorvastatin 40 MG tablet  Commonly known as: LIPITOR  TAKE ONE TABLET BY MOUTH ONCE DAILY     calcium carbonate 200 mg calcium (500 mg) chewable tablet  Commonly known as: TUMS  Take 2 tablets by mouth.     ELIQUIS 2.5 mg Tab  Generic drug: apixaban  TAKE ONE TABLET BY MOUTH TWICE DAILY     ergocalciferol 50,000 unit Cap  Commonly known as: ERGOCALCIFEROL  Take 50,000 Units by mouth every 30 days.     FLUZONE HIGHDOSE QUAD 21-22  mcg/0.7 mL Syrg  Generic drug: flu vacc [id number](65yr up)-PF     folic acid 1 MG tablet  Commonly known as: FOLVITE  TAKE 1 TABLET BY MOUTH EVERY DAY     mupirocin 2 % ointment  Commonly known as: BACTROBAN  Apply topically every evening.     solifenacin 10 MG tablet  Commonly known as: VESICARE  Take 1 tablet (10 mg total) by mouth once daily.        STOP taking these medications    furosemide 40 MG tablet  Commonly known as: LASIX     losartan 50 MG tablet  Commonly known as: COZAAR            Indwelling Lines/Drains at time of discharge:   Lines/Drains/Airways     Drain  Duration                            Time spent on the discharge of patient: >30 minutes         Dejan Herrera MD  Department of Hospital Medicine  [facility]

## 2023-09-18 NOTE — TELEPHONE ENCOUNTER
Patient daughter called to schedule a hospital follow up appointment . Patient has seen dr Rawls about 3 years ago. She has a new referral in. Daughter would like to be contacted for the appointment.   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

## 2023-09-19 PROBLEM — J18.9 COMMUNITY ACQUIRED PNEUMONIA: Status: RESOLVED | Noted: 2023-01-01 | Resolved: 2023-01-01

## 2023-09-19 PROBLEM — J90 PLEURAL EFFUSION: Status: RESOLVED | Noted: 2023-01-01 | Resolved: 2023-01-01

## 2023-09-19 NOTE — ASSESSMENT & PLAN NOTE
WHO group II with most recent Echo :  Results for orders placed during the hospital encounter of 09/14/23    Echo    Interpretation Summary    Left Ventricle: Normal wall motion. There is normal systolic function with a visually estimated ejection fraction of 55 - 60%. Diastolic function : Unable to assess due to atrial fibrillation.    Left Atrium: Left atrium is severely dilated.    Right Ventricle: Moderate right ventricular enlargement.    Right Atrium: Right atrium is severely dilated.    Aortic Valve: There is mild stenosis. Aortic valve area by VTI is 1.08 cm². Aortic valve peak velocity is 2.14 m/s. Mean gradient is 12 mmHg. The dimensionless index is 0.41.    Mitral Valve: There is mild to moderate regurgitation.    Tricuspid Valve: There is moderate regurgitation.    Pulmonary Artery: There is pulmonary hypertension. The estimated pulmonary artery systolic pressure is 59 mmHg.    IVC/SVC: Intermediate venous pressure at 8 mmHg.    Pericardium: There is a moderate effusion. Left pleural effusion.    - repeat BNP today and attempt to assess fluid status

## 2023-09-19 NOTE — ASSESSMENT & PLAN NOTE
Baseline on 4 L supplemental oxygen   - secondary to HFpEF and pulmonary hypertension. Does not have COPD based on most recent PFTs in 3/2022. Had isolated DLCO. Follow in one month with PFTs and 6 minute walk test starting on room air.   - encouraged monitored exercise with home health PT/OT

## 2023-09-19 NOTE — PROGRESS NOTES
"Subjective:     Reason for visit: hospital follow up    Patient ID:  Barbara Rizo is a 83 y.o. female    Interval History:  Ms. Rizo was an 83-year-old with congestive heart failure, pulmonary hypertension, atrial fibrillation, diabetes, hemochromatosis, and JOCELIN that presents for follow-up from her recent hospitalization.  She has been previously seen by Dr. Sarkar in clinic for her sleep apnea and was recently seen in the hospital by Dr. Rangel.  During her recent hospitalization she was known to have a significant pleural effusion as well as a pericardial effusion with significant fluid overload.  Aggressive diuresis was pursued and she was able to be weaned back to her home oxygen which is 4 L. there was previous concern for possible COPD because she had a remote smoking history however her most recent PFTs do not support that diagnosis.  She has a lot of questions about what to do at home going forward and all of her questions were answered.  She is seeing PT OT home health.  She was previously diagnosed with sleep apnea but never received a CPAP machine and does not really want to.  But is willing to see someone new.      Additional Pulmonary History:  Childhood Illnesses:  NA   Occupational/Environmental:  NA  Tobacco/Smoking:  remote smoking history     Objective:     Vitals:    09/19/23 0915   BP: 118/62   BP Location: Right arm   Patient Position: Sitting   BP Method: Medium (Manual)   Pulse: 67   SpO2: (!) 94%   Weight: 73.8 kg (162 lb 11.2 oz)   Height: 5' 2" (1.575 m)         Physical Exam  Vitals reviewed.   Constitutional:       General: She is not in acute distress.     Appearance: She is not diaphoretic.      Comments: Elderly patient in her wheelchair    HENT:      Head: Normocephalic and atraumatic.      Right Ear: External ear normal.      Left Ear: External ear normal.   Eyes:      Conjunctiva/sclera: Conjunctivae normal.      Pupils: Pupils are equal, round, and reactive to light. "   Neck:      Trachea: No tracheal deviation.   Cardiovascular:      Rate and Rhythm: Normal rate and regular rhythm.      Heart sounds: Normal heart sounds. No murmur heard.  Pulmonary:      Effort: Pulmonary effort is normal. No respiratory distress.      Breath sounds: Normal breath sounds. No stridor. No wheezing or rales.   Abdominal:      General: Bowel sounds are normal. There is no distension.      Palpations: Abdomen is soft.      Tenderness: There is no abdominal tenderness.   Musculoskeletal:         General: Normal range of motion.      Cervical back: Normal range of motion and neck supple.   Skin:     General: Skin is warm and dry.      Findings: No erythema.   Neurological:      Mental Status: She is alert and oriented to person, place, and time.      Gait: Gait is intact.   Psychiatric:         Mood and Affect: Mood and affect normal.         Cognition and Memory: Memory normal.         Judgment: Judgment normal.          Personal Diagnostic Review and Interpretation  Imaging during recent hospitalization reviewed, effusion noted      Pertinent Studies Reviewed & Interpreted:     Pulmonary Function Tests:   Will need repeat prior to her visit with DR. Sarkar in 2 weeks    Echocardiograms:   Results for orders placed during the hospital encounter of 09/14/23    Echo    Interpretation Summary    Left Ventricle: Normal wall motion. There is normal systolic function with a visually estimated ejection fraction of 55 - 60%. Diastolic function : Unable to assess due to atrial fibrillation.    Left Atrium: Left atrium is severely dilated.    Right Ventricle: Moderate right ventricular enlargement.    Right Atrium: Right atrium is severely dilated.    Aortic Valve: There is mild stenosis. Aortic valve area by VTI is 1.08 cm². Aortic valve peak velocity is 2.14 m/s. Mean gradient is 12 mmHg. The dimensionless index is 0.41.    Mitral Valve: There is mild to moderate regurgitation.    Tricuspid Valve: There is  moderate regurgitation.    Pulmonary Artery: There is pulmonary hypertension. The estimated pulmonary artery systolic pressure is 59 mmHg.    IVC/SVC: Intermediate venous pressure at 8 mmHg.    Pericardium: There is a moderate effusion. Left pleural effusion.        Assessment & Plan:       Problem List Items Addressed This Visit          Pulmonary    Pleural effusion, left    Current Assessment & Plan     Seen during recent hospitalization along with pericardial effusion, both related to significant fluid overload   Repeat labs today and may need more aggressive diuresis.          Chronic respiratory failure with hypoxia    Current Assessment & Plan     Baseline on 4 L supplemental oxygen   - secondary to HFpEF and pulmonary hypertension. Does not have COPD based on most recent PFTs in 3/2022. Had isolated DLCO. Follow in one month with PFTs and 6 minute walk test starting on room air.   - encouraged monitored exercise with home health PT/OT         Relevant Orders    B-TYPE NATRIURETIC PEPTIDE    Comprehensive Metabolic Panel    CBC auto differential    Spirometry with/without bronchodilator    Lung Volumes    DLCO-Carbon Monoxide Diffusing Capacity    Stress test, pulmonary    RESOLVED: Community acquired pneumonia    Current Assessment & Plan     Completed antibiotics for presumed CAP             Cardiac/Vascular    Pulmonary hypertension    Overview     group 2 with ddx.  Will send for pft due to remote smoking history.  bp and fluid optimization.  Doing better since taking lasix routinely.           Current Assessment & Plan     WHO group II with most recent Echo :  Results for orders placed during the hospital encounter of 09/14/23    Echo    Interpretation Summary    Left Ventricle: Normal wall motion. There is normal systolic function with a visually estimated ejection fraction of 55 - 60%. Diastolic function : Unable to assess due to atrial fibrillation.    Left Atrium: Left atrium is severely dilated.     Right Ventricle: Moderate right ventricular enlargement.    Right Atrium: Right atrium is severely dilated.    Aortic Valve: There is mild stenosis. Aortic valve area by VTI is 1.08 cm². Aortic valve peak velocity is 2.14 m/s. Mean gradient is 12 mmHg. The dimensionless index is 0.41.    Mitral Valve: There is mild to moderate regurgitation.    Tricuspid Valve: There is moderate regurgitation.    Pulmonary Artery: There is pulmonary hypertension. The estimated pulmonary artery systolic pressure is 59 mmHg.    IVC/SVC: Intermediate venous pressure at 8 mmHg.    Pericardium: There is a moderate effusion. Left pleural effusion.    - repeat BNP today and attempt to assess fluid status           Relevant Orders    B-TYPE NATRIURETIC PEPTIDE    Comprehensive Metabolic Panel    CBC auto differential    Spirometry with/without bronchodilator    Lung Volumes    DLCO-Carbon Monoxide Diffusing Capacity    Stress test, pulmonary        Portions of the record may have been created with voice-recognition software. Occasional wrong-word or sound-a-like substitutions may have occurred due to the inherent limitations of voice-recognition software. Read the chart carefully and recognize, using context, where substitutions have occurred.    Bettie Negrete M.D.  Pulmonary/Critical Care

## 2023-09-19 NOTE — ASSESSMENT & PLAN NOTE
Seen during recent hospitalization along with pericardial effusion, both related to significant fluid overload   Repeat labs today and may need more aggressive diuresis.

## 2023-09-25 PROBLEM — S81.002A OPEN WOUND OF LEFT KNEE: Status: RESOLVED | Noted: 2020-09-29 | Resolved: 2023-01-01

## 2023-09-25 PROBLEM — Z71.89 ACP (ADVANCE CARE PLANNING): Status: RESOLVED | Noted: 2021-04-24 | Resolved: 2023-01-01

## 2023-09-25 PROBLEM — Z71.89 ADVANCE CARE PLANNING: Status: RESOLVED | Noted: 2023-01-01 | Resolved: 2023-01-01

## 2023-09-25 PROBLEM — I50.33 ACUTE ON CHRONIC DIASTOLIC CHF (CONGESTIVE HEART FAILURE): Status: RESOLVED | Noted: 2023-01-01 | Resolved: 2023-01-01

## 2023-09-25 PROBLEM — R55 SYNCOPE: Status: RESOLVED | Noted: 2021-04-24 | Resolved: 2023-01-01
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
## 2023-09-25 NOTE — PROGRESS NOTES
Routine Office Visit    Patient Name: Barbara Rizo    : 1940  MRN: [id number]    Subjective:  Barbara is a 83 y.o. female who presents today for:    1. Hospital follow up  Patient presenting today for the second hospital follow up this month.  The first is after she had sepsis secondary to UTI and the second (most recent) for afib with RVR. She states that she is feeling much better.  She has lost 5 pounds since she was last here, but states she was placed on Bumex to excess fluid. She states she is eating better.  She is doing better with her daily routine of walking around the house and is continuing with home PT.  She continues to need chronic oxygen due to dropping into the 70's with ambulation.  No chest pain, new weakness, numbness, or slurred speech.  Her daughter is also helping her at home.     Past Medical History  Past Medical History:   Diagnosis Date    A-fib     Acute respiratory failure with hypoxia 2015    Breast cancer     invasive ductal carcinoma    CKD (chronic kidney disease)     Community acquired pneumonia 2023    Diabetes mellitus type II     Fatty liver     GERD (gastroesophageal reflux disease)     Hearing loss of both ears     wears bilateral hearing aids    Hemochromatosis     History of anemia due to CKD     History of pleural effusion     with thoracentesis    Hyperlipidemia     Hypertension     Macular degeneration     Osteoarthritis     Left knee    Osteoporosis     Vaginal delivery     x2    Vitamin D deficiency disease     Wears partial dentures     upper removable bridge       Past Surgical History  Past Surgical History:   Procedure Laterality Date    AXILLARY NODE DISSECTION Left 10/18/2021    Procedure: LYMPHADENECTOMY, AXILLARY;  Surgeon: Darlene Roca MD;  Location: [facility];  Service: General;  Laterality: Left;    BREAST BIOPSY      BREAST LUMPECTOMY      invasive ductal carcinoma    BREAST SURGERY Bilateral     Reduction r/t h/o fibrocystic disease     CARDIOVERSION N/A 9/15/2023    Procedure: Cardioversion;  Surgeon: Constantine Chambers MD;  Location: [facility] CATH LAB;  Service: Cardiology;  Laterality: N/A;    CHOLECYSTECTOMY  08/2015    EYE SURGERY      Cat Ext  OU    HYSTERECTOMY      partial due to uterine fibroids    INCISION AND DRAINAGE OF KNEE Left 09/17/2020    Procedure: INCISION AND DRAINAGE, KNEE;  Surgeon: Rolando Wagoner MD;  Location: [facility] OR;  Service: Orthopedics;  Laterality: Left;    INJECTION FOR SENTINEL NODE IDENTIFICATION Left 10/18/2021    Procedure: INJECTION, FOR SENTINEL NODE IDENTIFICATION;  Surgeon: Darlene Roca MD;  Location: [facility] OR;  Service: General;  Laterality: Left;    JOINT REPLACEMENT Left 05/13/2013    TKR    JOINT REPLACEMENT Right 08/03/2015    TKR    MASTECTOMY, PARTIAL Left 10/18/2021    Procedure: MASTECTOMY, PARTIAL -- wire;  Surgeon: Darlene Roca MD;  Location: [facility] OR;  Service: General;  Laterality: Left;  RN PREOP 10/15/2021   VACCINATED-----NEED H/P AND ORDERS    SENTINEL LYMPH NODE BIOPSY Left 10/18/2021    Procedure: BIOPSY, LYMPH NODE, SENTINEL;  Surgeon: Darlene Roca MD;  Location: [facility] OR;  Service: General;  Laterality: Left;    THORACENTESIS      TOTAL KNEE ARTHROPLASTY Right 2015    left knee done 5/2013    TREATMENT OF CARDIAC ARRHYTHMIA N/A 9/15/2023    Procedure: Cardioversion or Defibrillation;  Surgeon: Constantine Chambers MD;  Location: [facility] CATH LAB;  Service: Cardiology;  Laterality: N/A;    WOUND DRESSING Left 09/17/2020    Procedure: WOUND VAC APPLICATION;  Surgeon: Rolando Wagoner MD;  Location: [facility];  Service: Orthopedics;  Laterality: Left;       Family History  Family History   Problem Relation Age of Onset    Cancer Mother         stomach    Hypertension Mother     Aneurysm Father         abdominal    No Known Problems Sister     No Known Problems Sister     No Known Problems Brother     No Known Problems Daughter     No Known Problems Son        Social History  Social History      Socioeconomic History    Marital status:     Number of children: 2   Tobacco Use    Smoking status: Former     Passive exposure: Past    Smokeless tobacco: Never   Substance and Sexual Activity    Alcohol use: Not Currently    Drug use: No    Sexual activity: Not Currently       Current Medications  Current Outpatient Medications on File Prior to Visit   Medication Sig Dispense Refill    allopurinoL (ZYLOPRIM) 100 MG tablet Take 1 tablet by mouth once daily.      apixaban (ELIQUIS) 2.5 mg Tab TAKE ONE TABLET BY MOUTH TWICE DAILY 180 tablet 3    atorvastatin (LIPITOR) 40 MG tablet TAKE ONE TABLET BY MOUTH ONCE DAILY 90 tablet 2    bumetanide (BUMEX) 1 MG tablet Take 1 tablet (1 mg total) by mouth once daily. 30 tablet 11    calcium carbonate (TUMS) 200 mg calcium (500 mg) chewable tablet Take 2 tablets by mouth.      ergocalciferol (ERGOCALCIFEROL) 50,000 unit Cap Take 50,000 Units by mouth every 30 days.       folic acid (FOLVITE) 1 MG tablet TAKE 1 TABLET BY MOUTH EVERY DAY 90 tablet 0    solifenacin (VESICARE) 10 MG tablet Take 1 tablet (10 mg total) by mouth once daily. 30 tablet 11    [DISCONTINUED] amiodarone (PACERONE) 200 MG Tab TAKE ONE-HALF TABLET BY MOUTH EVERY DAY *DO NOT TAKE WITH GRAPEFRUIT JUICE* 45 tablet 3    mupirocin (BACTROBAN) 2 % ointment Apply topically every evening.      [DISCONTINUED] FLUZONE HIGHDOSE QUAD 21-22  mcg/0.7 mL Syrg        Current Facility-Administered Medications on File Prior to Visit   Medication Dose Route Frequency Provider Last Rate Last Admin    denosumab (PROLIA) injection 60 mg  60 mg Subcutaneous 1 time in Clinic/HOD Nargis Boyle MD           Allergies   Review of patient's allergies indicates:  No Known Allergies    Review of Systems (Pertinent positives)  Review of Systems   Constitutional: Negative.    HENT: Negative.     Eyes: Negative.    Respiratory: Negative.     Cardiovascular:  Positive for leg swelling.   Musculoskeletal: Negative.    Skin:  "Negative.    Neurological: Negative.          /62   Pulse 64   Temp 98.1 °F (36.7 °C) (Oral)   Ht 5' 2" (1.575 m)   Wt 72.1 kg (158 lb 15.2 oz)   SpO2 (!) 93%   BMI 29.07 kg/m²     GENERAL APPEARANCE: in no apparent distress and well developed and well nourished  HEENT: PERRL, EOMI, Sclera clear, anicteric, Oropharynx clear, no lesions, Neck supple with midline trachea  NECK: normal, supple, no adenopathy, thyroid normal in size  RESPIRATORY: appears well, vitals normal, no respiratory distress, acyanotic, normal RR, chest clear, no wheezing, crepitations, rhonchi, normal symmetric air entry  HEART: regular rate and rhythm, S1, S2 normal, no murmur, click, rub or gallop.    ABDOMEN: abdomen is soft without tenderness, no masses, no hernias, no organomegaly, no rebound, no guarding. Suprapubic tenderness absent. No CVA tenderness.  NEUROLOGIC: normal without focal findings, CN II-XII are intact.    Extremities: warm/well perfused.  No abnormal hair patterns.  No clubbing, cyanosis; +1 BLE without ulcers  SKIN: no rashes, no wounds, no other lesions  PSYCH: Alert, oriented x 3, thought content appropriate, speech normal, pleasant and cooperative, good eye contact, well groomed    Assessment/Plan:  Barbara Rizo is a 83 y.o. female who presents today for :    Barbara was seen today for hospital follow up.    Diagnoses and all orders for this visit:    Atrial fibrillation with RVR  -     amiodarone (PACERONE) 100 MG Tab; Take 1 tablet (100 mg total) by mouth once daily.  - Underwent cardioversion while in the hospital  - Will decrease dose of amiodarone as she has not been cutting 200mg tablets correctly and likely not taking the right amount  -  Follow up with cardiology as scheduled    Needs flu shot  -     Influenza - Quadrivalent (Adjuvanted)  - Flu shot given    Essential hypertension  - Stable on current regimen    Paroxysmal atrial fibrillation  - As above    MGUS (monoclonal gammopathy of unknown " significance)  - Scheduled to follow up with Dr. Cervantes    Malignant neoplasm of overlapping sites of left breast in female, estrogen receptor positive  - Scheduled for follow up mammogram    JOCELIN (obstructive sleep apnea)  - Stable on current regimen  - No new issues

## 2023-09-25 NOTE — PHYSICIAN QUERY
PT Name: Barbara Rizo  MR #: [id number]     DOCUMENTATION CLARIFICATION     CDS/: Arlene Austin RN             Contact information:[email or web address]  This form is a permanent document in the medical record.     Query Date: September 25, 2023    By submitting this query, we are merely seeking further clarification of documentation.  Please utilize your independent clinical judgment when addressing the question(s) below.    The Medical Record contains the following   Indicators Supporting Clinical Findings Location in Medical Record   x Heart Failure documented pmh HFpEF Pulmonology note 9-15   x BNP SEX=2563 Lab 9-14    EF/Echo     x Radiology findings Chest x-ray showed moderate left pleural effusion with associated compressive atelectasis and/or lower lobe consolidation HM note 9-16   x Subjective/Objective Respiratory Conditions SOB H&P    Recent/Current MI      Heart Transplant, LVAD      Edema, JVD      Ascites     x Diuretics/Meds IV Furosemide  IV Bumex Mar 9-14 ED 1 time  Mar 9-15 to 9-17    Other Treatment     x Other Pleural effusion, left  -Pulmonology consulted and input appreciated  -Suspect due to fluid overload HM note 9-16     Heart failure is a clinical diagnosis which includes symptomatic fluid retention, elevated intracardiac pressures, and/or the inability of the heart to deliver adequate blood flow.    Heart Failure with reduced Ejection Fraction (HFrEF) or Systolic Heart Failure (loses ability to contract normally, EF is <40%)    Heart Failure with preserved Ejection Fraction (HFpEF) or Diastolic Heart Failure (stiff ventricles, does not relax properly, EF is >50%)     Heart Failure with Combined Systolic and Diastolic Failure (stiff ventricles, does not relax properly and EF is <50%)    Mid-range or mildly reduced ejection fraction (HFmrEF) is classified as systolic heart failure.  Congestive heart failure with a recovered EF is classified as Diastolic Heart  Failure.  Common clues to acute exacerbation:  Rapidly progressive symptoms (w/in 2 weeks of presentation), using IV diuretics, using supplemental O2, pulmonary edema on Xray, new or worsening pleural effusion, +JVD or other signs of volume overload, MI w/in 4 weeks, and/or BNP >500  The clinical guidelines noted are only system guidelines, and do not replace the providers clinical judgment.    Provider, please further specify the chf diagnosis associated with the above clinical findings.    [  x ]  Acute on Chronic Diastolic Heart Failure (HFpEF) - worsening of CHF signs/symptoms in preexisting CHF   [   ]  Other (please specify): ___________________________________     Please document in your progress notes daily for the duration of treatment until resolved and include in your discharge summary.    References:  American Heart Association editorial staff. (2017, May). Ejection Fraction Heart Failure Measurement. American Heart Association. https://www.heart.org/en/health-topics/heart-failure/diagnosing-heart-failure/ejection-fraction-heart-failure-measurement#:~:text=Ejection%20fraction%20(EF)%20is%20a,pushed%20out%20with%20each%20heartbeat  JACKELYN Carreon (2020, December 15). Heart failure with preserved ejection fraction: Clinical manifestations and diagnosis. [employer]. https://www.A[employer].com/contents/heart-failure-with-preserved-ejection-fraction-clinical-manifestations-and-diagnosis.  ICD-10-CM/PCS Coding Clinic Third Quarter ICD-10, Effective with discharges: September 8, 2020 [facility] § Heart failure with mid-range or mildly reduced ejection fraction (2020).  ICD-10-CM/PCS Coding Clinic Third Quarter ICD-10, Effective with discharges: September 8, 2020 [facility] § Heart failure with recovered ejection fraction (2020).  Form No. 86405

## 2023-10-03 NOTE — TELEPHONE ENCOUNTER
----- Message from Олег Alston sent at 10/3/2023 12:05 PM CDT -----  Regarding: Valerie with [facility]  Type: Callback     Who called: Valerie     What is the request in detail: Stated that patient is requesting rolator walker. They will need orders and clinical notes. Please call Valerie as soon as possible.     Can the clinic reply by [facility]? No     Would the patient rather a call back or a response via [facility]? Callback     Best call back number: [phone]    Additional Information:

## 2023-10-04 NOTE — PROGRESS NOTES
Hematology- Oncology Clinic Note :     10/4/2023    Chief Complaint   Patient presents with    Establish Care    Anemia    Chronic Kidney Disease    mgus         HPI    Pt is a 83-year-old with medical history of CKD, hypertension, osteopenia, vitamin-D deficiency, type 2 diabetes mellitus seen today for Anemia/MGUS.  She is followed by Dr. Boyle for CKD.  Appetite and weight stable.  No shortness of breath, chest pain. No bony pain but has mildly worsening anemia.  Patient reported on previous hematology visit history of hemochromatosis for which she has undergone phlebotomy in  remote past.  No melena, hematochezia or change in bowel habits.  Previous MGUS labs revealed abnormal SPEP reveals elevation of beta 2 globulins. Serum immunofixation revealed a faint IgG lambda monoclonal immunoglobulin.  Has not had full repeat MGUS labs in years but recent SPEP in August 2023 did not demonstrate any monoclonal peaks.  Pt agreeable to full workup and vitamin labs next week and will return to clinic to review results.        Active Problem List with Overview Notes    Diagnosis Date Noted    Dysphagia 09/16/2023    Debility 09/16/2023    Normocytic anemia 09/14/2023    Atrial fibrillation with RVR 09/14/2023    Pleural effusion, left 09/14/2023    Chronic respiratory failure with hypoxia 09/14/2023    Urinary tract infection without hematuria 08/30/2023    Prolonged Q-T interval on ECG 08/27/2023    Other nonthrombocytopenic purpura 04/06/2022    JOCELIN (obstructive sleep apnea) 01/14/2022     -ahi of 8.3; rdi of 30  -recommend treatment.  In lab titration due to respiratory failure requiring oxygen.   -difficulty with falling asleep during last sleep study.  Will prescribe ambien for the night of sleep study.        Aortic atherosclerosis 12/27/2021    Malignant neoplasm of overlapping sites of left breast in female, estrogen receptor positive 08/22/2021    Carcinoma of breast 08/02/2021    Type 2 diabetes mellitus with  stage 4 chronic kidney disease, without long-term current use of insulin 01/25/2021    Secondary hyperparathyroidism of renal origin 01/25/2021    CKD (chronic kidney disease), stage IV 01/25/2021    Atherosclerosis of native artery of left lower extremity     Hyperkalemia 09/28/2020    Sinus bradycardia 09/28/2020    Pulmonary hypertension 09/28/2020     group 2 with ddx.  Will send for pft due to remote smoking history.  bp and fluid optimization.  Doing better since taking lasix routinely.        Hyponatremia 09/27/2020    Chronic gout 01/28/2019    Paroxysmal atrial fibrillation 11/27/2018    MGUS (monoclonal gammopathy of unknown significance) 10/18/2018    OAB (overactive bladder) 09/22/2016    Urinary retention 11/11/2015    Atonic neurogenic bladder 11/11/2015    Incomplete bladder emptying 11/11/2015    Constipation, slow transit 11/11/2015    Physical deconditioning 10/16/2015    Esophagitis 10/16/2015    Anemia in chronic kidney disease 09/01/2015    Morbid (severe) obesity due to excess calories     Primary localized osteoarthrosis, lower leg 08/03/2015    Vitamin D deficiency 10/27/2013    Benign hypertensive heart disease without heart failure 10/27/2013    DJD (degenerative joint disease) of knee 05/13/2013    Hemochromatosis     Hyperlipidemia     Essential hypertension        Patient Active Problem List    Diagnosis Date Noted    Dysphagia 09/16/2023    Debility 09/16/2023    Normocytic anemia 09/14/2023    Atrial fibrillation with RVR 09/14/2023    Pleural effusion, left 09/14/2023    Chronic respiratory failure with hypoxia 09/14/2023    Urinary tract infection without hematuria 08/30/2023    Prolonged Q-T interval on ECG 08/27/2023    Other nonthrombocytopenic purpura 04/06/2022    JOCELIN (obstructive sleep apnea) 01/14/2022    Aortic atherosclerosis 12/27/2021    Malignant neoplasm of overlapping sites of left breast in female, estrogen receptor positive 08/22/2021    Carcinoma of breast 08/02/2021     Type 2 diabetes mellitus with stage 4 chronic kidney disease, without long-term current use of insulin 01/25/2021    Secondary hyperparathyroidism of renal origin 01/25/2021    CKD (chronic kidney disease), stage IV 01/25/2021    Atherosclerosis of native artery of left lower extremity     Hyperkalemia 09/28/2020    Sinus bradycardia 09/28/2020    Pulmonary hypertension 09/28/2020    Hyponatremia 09/27/2020    Chronic gout 01/28/2019    Paroxysmal atrial fibrillation 11/27/2018    MGUS (monoclonal gammopathy of unknown significance) 10/18/2018    OAB (overactive bladder) 09/22/2016    Urinary retention 11/11/2015    Atonic neurogenic bladder 11/11/2015    Incomplete bladder emptying 11/11/2015    Constipation, slow transit 11/11/2015    Physical deconditioning 10/16/2015    Esophagitis 10/16/2015    Anemia in chronic kidney disease 09/01/2015    Morbid (severe) obesity due to excess calories     Primary localized osteoarthrosis, lower leg 08/03/2015    Vitamin D deficiency 10/27/2013    Benign hypertensive heart disease without heart failure 10/27/2013    DJD (degenerative joint disease) of knee 05/13/2013    Hemochromatosis     Hyperlipidemia     Essential hypertension      Past Medical History:   Diagnosis Date    A-fib     Acute respiratory failure with hypoxia 9/1/2015    Breast cancer     invasive ductal carcinoma    CKD (chronic kidney disease)     Community acquired pneumonia 8/26/2023    Diabetes mellitus type II     Fatty liver     GERD (gastroesophageal reflux disease)     Hearing loss of both ears     wears bilateral hearing aids    Hemochromatosis     History of anemia due to CKD     History of pleural effusion     with thoracentesis    Hyperlipidemia     Hypertension     Macular degeneration     Osteoarthritis     Left knee    Osteoporosis     Vaginal delivery     x2    Vitamin D deficiency disease     Wears partial dentures     upper removable bridge      Past Surgical History:   Procedure  Laterality Date    AXILLARY NODE DISSECTION Left 10/18/2021    Procedure: LYMPHADENECTOMY, AXILLARY;  Surgeon: Darlene Roca MD;  Location: [facility] OR;  Service: General;  Laterality: Left;    BREAST BIOPSY      BREAST LUMPECTOMY      invasive ductal carcinoma    BREAST SURGERY Bilateral     Reduction r/t h/o fibrocystic disease    CARDIOVERSION N/A 9/15/2023    Procedure: Cardioversion;  Surgeon: Constantine Chambers MD;  Location: [facility] CATH LAB;  Service: Cardiology;  Laterality: N/A;    CHOLECYSTECTOMY  08/2015    EYE SURGERY      Cat Ext  OU    HYSTERECTOMY      partial due to uterine fibroids    INCISION AND DRAINAGE OF KNEE Left 09/17/2020    Procedure: INCISION AND DRAINAGE, KNEE;  Surgeon: Rolando Wagoner MD;  Location: [facility] OR;  Service: Orthopedics;  Laterality: Left;    INJECTION FOR SENTINEL NODE IDENTIFICATION Left 10/18/2021    Procedure: INJECTION, FOR SENTINEL NODE IDENTIFICATION;  Surgeon: Darlene Roca MD;  Location: [facility] OR;  Service: General;  Laterality: Left;    JOINT REPLACEMENT Left 05/13/2013    TKR    JOINT REPLACEMENT Right 08/03/2015    TKR    MASTECTOMY, PARTIAL Left 10/18/2021    Procedure: MASTECTOMY, PARTIAL -- wire;  Surgeon: Darlene Roca MD;  Location: [facility] OR;  Service: General;  Laterality: Left;  RN PREOP 10/15/2021   VACCINATED-----NEED H/P AND ORDERS    SENTINEL LYMPH NODE BIOPSY Left 10/18/2021    Procedure: BIOPSY, LYMPH NODE, SENTINEL;  Surgeon: Darlene Roca MD;  Location: [facility] OR;  Service: General;  Laterality: Left;    THORACENTESIS      TOTAL KNEE ARTHROPLASTY Right 2015    left knee done 5/2013    TREATMENT OF CARDIAC ARRHYTHMIA N/A 9/15/2023    Procedure: Cardioversion or Defibrillation;  Surgeon: Constantine Chambers MD;  Location: [facility] CATH LAB;  Service: Cardiology;  Laterality: N/A;    WOUND DRESSING Left 09/17/2020    Procedure: WOUND VAC APPLICATION;  Surgeon: Rolando Wagoner MD;  Location: [facility] OR;  Service: Orthopedics;  Laterality: Left;      (Not in a hospital  admission)    Review of patient's allergies indicates:  No Known Allergies   Social History     Tobacco Use    Smoking status: Former     Passive exposure: Past    Smokeless tobacco: Never   Substance Use Topics    Alcohol use: Not Currently      Family History   Problem Relation Age of Onset    Cancer Mother         stomach    Hypertension Mother     Aneurysm Father         abdominal    No Known Problems Sister     No Known Problems Sister     No Known Problems Brother     No Known Problems Daughter     No Known Problems Son         Review of Systems :  ROS    Physical Exam :  Wt Readings from Last 3 Encounters:   10/04/23 69.5 kg (153 lb 3.5 oz)   09/25/23 72.1 kg (158 lb 15.2 oz)   09/19/23 73.8 kg (162 lb 11.2 oz)     Temp Readings from Last 3 Encounters:   09/25/23 98.1 °F (36.7 °C) (Oral)   09/17/23 98.5 °F (36.9 °C)   09/13/23 98 °F (36.7 °C) (Oral)     BP Readings from Last 3 Encounters:   10/04/23 127/77   09/25/23 126/62   09/19/23 118/62     Pulse Readings from Last 3 Encounters:   10/04/23 70   09/25/23 64   09/19/23 67     Body mass index is 28.02 kg/m².    Physical Exam      Pertinent Diagnostic studies:    No results found for this or any previous visit (from the past 24 hour(s)).    Assessment/Plan :       1. MGUS (monoclonal gammopathy of unknown significance)    2. Stage 4 chronic kidney disease    3. Anemia of chronic disease       Plan:       Pt has become more anemic since labs, most likely from co morbidities and chronic disease but will order more extensive workup including UPEP and FLC with vitamin labs, B12 pending  SPEP 10/2018 - A paraprotein band is present in gamma = 0.20 but none seen in August 2023  Follow-up in  6 weeks for results after labs next week      Time spent on case: 60 minutes      Summary of orders placed this encounter:  Orders Placed This Encounter   Procedures    COPPER, SERUM    ZINC    PROTEIN ELECTROPHORESIS, SERUM    Protein electrophoresis, timed urine     IMMUNOFIXATION ELECTROPHORESIS, SERUM    Immunofixation, 24 hour Urine    IGA    IGM    IgG    IMMUNOGLOBULIN FREE LT CHAINS BLOOD       Future Appointments   Date Time Provider Department Center   10/10/2023 11:20 AM LAB,  HOSPITAL [facility] LAB [facility]   10/26/2023  9:40 AM Paras Oro MD [facility]   10/30/2023  9:40 AM SIX, MINUTE WALK [facility] PUL WLK [facility]   10/30/2023 10:00 AM PULMONARY FUNCTION [facility] PULMLAB [facility]   10/30/2023 10:15 AM PULMONARY FUNCTION [facility] PULMLAB [facility]   10/30/2023 10:30 AM PULMONARY FUNCTION [facility] PULMLAB [facility]   10/30/2023 11:00 AM Devin Rangel MD [facility] PULMSVC [facility]   11/14/2023  9:40 AM Reina Camp MD [facility] URO [facility]   11/14/2023  2:15 PM Amauri Lomas MD [facility] CARDIO [facility]   11/15/2023  9:00 AM Mukesh Madison MD [facility] HEM ONC [facility]   11/17/2023 11:00 AM Venus Izaguirre NP [address]   12/14/2023 12:30 PM INJECTION, [facility] INFUSION [facility] CHEMO [facility]           Mukesh Madison MD   Hematology/oncology, [facility]

## 2023-10-04 NOTE — Clinical Note
-please let her know B12 is low and we can set up B12 shots  -Weekly for 4 weeks then monthly, could help with anemia  -If agreeable then set up with infusion center

## 2023-10-05 PROBLEM — E53.8 B12 DEFICIENCY: Status: ACTIVE | Noted: 2023-01-01

## 2023-10-05 NOTE — TELEPHONE ENCOUNTER
Returned call to  who states the order for portable O2 concentrator dated 10/4/23 needs to be cancelled as pt already has the smallest portable O2 concentrator available through DME company contracted with .

## 2023-10-05 NOTE — TELEPHONE ENCOUNTER
----- Message from Negin Chew sent at 10/5/2023 10:04 AM CDT -----  Regarding: [facility] [phone]  .Type: Patient Call Back    Who called: [facility]    What is the request in detail: Courtney is requesting a call back in regards to a smaller portable oxygen concentrator. Courtney stated patient currently has the smallest concentrator and requesting a cancellation for order.    Can the clinic reply by [facility]? no    Would the patient rather a call back or a response via [facility]? Call back    Best call back number [phone]

## 2023-10-05 NOTE — TELEPHONE ENCOUNTER
Receive request for light weight wheelchair order  for the pt sent progress notes sleep study notes cpap setting  as requested by [facility]  fax all doc to [phone] vs

## 2023-10-05 NOTE — TELEPHONE ENCOUNTER
Attempted to contact  pt, pt's daughter took the call. Below information given to daughter concerning O2 concentrator. Daughter informed that a printed copy of the order has been placed at the  for  as pt may decide to pay out of pocket from another DME for the device.

## 2023-10-17 NOTE — PLAN OF CARE
Pt arrived to unit via hospital provided wheelchair, accompanied by family for B12 injection. Pt alert and oriented with no new or worsening complaints upon arrival.     Tolerated B12 injection with no problems. Next appointments confirmed and pt discharged home upon completion in [facility].

## 2023-10-23 NOTE — TELEPHONE ENCOUNTER
----- Message from Jose Ahuja sent at 4/7/2022 10:24 AM CDT -----   Name of Who is Calling:     What is the request in detail: patient request call back in reference to enhanced wellness appointment /   scheduling was unable to schedule due to no availability   Please contact to further discuss and advise      Can the clinic reply by [facility]:     What Number to Call Back if not in [facility]:  [phone]     Attending and PA/NP shared services statement (NON-critical care):

## 2023-10-23 NOTE — TELEPHONE ENCOUNTER
phone pt to iliana appt per  pt has been recsh from 10/26/23 to 11/16/23 @ 8:00 am  will send a my chart and mail out new appt letter vs

## 2023-10-23 NOTE — TELEPHONE ENCOUNTER
Pt daughter is requesting to have her mother to see ENT because mother is having problem swallowing she spoke with  about this is her mother last visit vs

## 2023-10-24 NOTE — TELEPHONE ENCOUNTER
Daughter returned call to clinic. Daughter informed that a covering provider has referred pt to GI for dysphagia.

## 2023-10-24 NOTE — NURSING
Pt seen in [facility] accompanied by family member. Ambulates via wheelchair. Pt B12 injection administered to left deltoid. Tolerated well.  Safety maintained throughout visit. .

## 2023-10-26 PROBLEM — I50.32 CHRONIC HEART FAILURE WITH PRESERVED EJECTION FRACTION: Status: ACTIVE | Noted: 2023-01-01

## 2023-10-26 PROBLEM — I50.33 ACUTE ON CHRONIC DIASTOLIC HEART FAILURE: Status: ACTIVE | Noted: 2023-01-01

## 2023-10-26 PROBLEM — R41.0 INTERMITTENT CONFUSION: Status: ACTIVE | Noted: 2023-01-01

## 2023-10-26 PROBLEM — I45.81 CONGENITAL Q-T PROLONGATION ON ECG: Status: ACTIVE | Noted: 2023-01-01

## 2023-10-26 NOTE — PATIENT INSTRUCTIONS
Medical Fitness--[phone]  Imaging, Xray, CT, MRI, Ultrasound---[phone]  Bariatrics---[phone]  Breast Surgery---[phone]  Case Management---[phone]  Colonoscopy---[phone]  DME---[phone]  Infectious Disease---[phone]  Interventional Radiology---[phone]  Medical Records---[phone]  [facility]---[phone]  Optometry/Ophthalmology---[phone]  O Bar---[phone]  Physical Therapy---[phone]  Psychiatry---[phone] or [phone]  Plastic Surgery---[phone]  Recovery--[phone] option 2, or [phone].  Sleep Study---[phone]  Smoking Cessation---[phone]  Wound Care---[phone]  Referral Desk---[phone]

## 2023-10-26 NOTE — TELEPHONE ENCOUNTER
Called pt and discussed lab results, all questions answered.     Pt's daughter and patient advised of the severity of her abnormal lab results and potential complications if left untreated. Strongly advised to enter ED immediately. Pt's daughter will bring pt to ED now.

## 2023-10-26 NOTE — PROGRESS NOTES
HPI     Chief Complaint:  Chief Complaint   Patient presents with    Fatigue    Extremity Weakness       Barbara Rizo is a 83 y.o. female with multiple medical diagnoses as listed in the medical history and problem list that presents for fatigue.  Pt is new to me but is known to this clinic with her last appointment being 9/25/2023.      Fatigue  This is a chronic problem. The current episode started more than 1 month ago. The problem occurs daily. The problem has been gradually worsening. Associated symptoms include fatigue. Pertinent negatives include no abdominal pain, change in bowel habit, chest pain, chills, coughing, diaphoresis, fever, headaches, numbness, rash, vertigo, visual change, vomiting or weakness. The symptoms are aggravated by exertion. She has tried rest for the symptoms. The treatment provided mild relief.     Fatigue has gradually worsened over the past week. Pt is experiencing difficulty swallowing which has contributed to decreased food and fluid intake. She is followed by Brandon Nguyễn who has ordered a scope. Currently awaiting cardiology approval for procedure.     Decreased concentration:  Pt's daughter reports pt has exhibited signs of decreased concentration over the past month.     She is scheduled to have labs drawn later today. Labs have been ordered by [facility] home health.      Assessment & Plan     Problem List Items Addressed This Visit          Cardiac/Vascular    Hyperlipidemia (Chronic)    discussed ways to lower triglycerides such as cutting simple sugars out of diet (white breads, candies, cookies, cakes, etc.) and reducing/eliminating intake of highly processed trans fatty acids.   Exercise 30 minutes a day for 4-5 days a week.   Eat more fiber.      Essential hypertension (Chronic)    BP Readings from Last 3 Encounters:   10/26/23 120/60   10/24/23 120/60   10/17/23 133/84       -continue current medication regimen  -DASH diet, regular cardiovascular exercises,  portion control  - ?weight loss  -f/u with BP logs in 2 weeks       Paroxysmal atrial fibrillation (Chronic)    The current medical regimen is effective;  continue present plan      Pulmonary hypertension    BP Readings from Last 3 Encounters:   10/26/23 120/60   10/24/23 120/60   10/17/23 133/84       -continue current medication regimen  -DASH diet, regular cardiovascular exercises, portion control  - ?weight loss  -f/u with BP logs in 2 weeks       Overview     group 2 with ddx.  Will send for pft due to remote smoking history.  bp and fluid optimization.  Doing better since taking lasix routinely.           Aortic atherosclerosis    -assessed and addressed all modifiable risk factors.  Continue with appropriate management to prevent complications with regards to lipid and BP monitoring.         Renal/    CKD (chronic kidney disease), stage IV    Continue sodium restriction, and avoidance of nephrotoxic agents.         Oncology    Anemia in chronic kidney disease    The current medical regimen is effective;  continue present plan         GI    Dysphagia    Follow up with GI       Other    JOCELIN (obstructive sleep apnea)    CPAP compliance: no    We discussed the potential ramifications of untreated sleep apnea, which could include daytime sleepiness, hypertension, heart disease including CHF, sudden death while sleeping and/or stroke. The patient was advised to abstain from driving should they feel sleepy or drowsy.  We discussed potential treatment options, which could include weight loss, body positioning, continuous positive airway pressure (CPAP), or referral for surgical consideration.       Overview     -ahi of 8.3; rdi of 30  -recommend treatment.  In lab titration due to respiratory failure requiring oxygen.   -difficulty with falling asleep during last sleep study.  Will prescribe ambien for the night of sleep study.            Other Visit Diagnoses       Fatigue, unspecified type    -  Primary    AAO x  4  On 4L NC Spo2 is 94%. Per daughter Spo2 and oxygen use are at baseline.   Hx of JOCELIN but pt refuses to use CPAP. Pt has never ordered a machine. Advised to follow up with pulmonary regarding CPAP use and orders. She is scheduled to see pulmonology next week.  Wheelchair.  Several chronic health conditions which could be contributing to current symptoms.     Obtain cxray to rule out aspiration pneumonia  Obtain urinalysis to rule out UTI.  Pt declined labs as [facility] HH RN will obtain labs later today. Results of which will go to pt's [facility] nephrologist.  Aspiration precautions given    Discussed DDx, condition, and treatment.   Education sent to patient portal/included in after visit summary.  ED precautions given.   Notify provider if symptoms do not resolve or increase in severity.   Patient verbalizes understanding and agrees with plan of care.    Relevant Orders    Urinalysis, Reflex to Urine Culture Urine, Clean Catch    X-Ray Chest PA And Lateral            --------------------------------------------      Health Maintenance:  Health Maintenance         Date Due Completion Date    Lipid Panel 07/06/2023 7/6/2022    COVID-19 Vaccine (4 - 2023-24 season) 09/01/2023 10/5/2021    Diabetes Urine Screening 09/25/2024 (Originally 2/10/2023) 2/10/2022    Hemoglobin A1c 02/27/2024 8/27/2023    DEXA Scan 12/20/2024 12/20/2022    TETANUS VACCINE 04/06/2032 4/6/2022            Advised patient on the importance of completing overdue health maintenance items    Follow Up:  Follow up in about 2 weeks (around 11/9/2023), or if symptoms worsen or fail to improve.    Exam     Review of Systems:  (as noted above)  Review of Systems   Constitutional:  Positive for fatigue. Negative for chills, diaphoresis and fever.   HENT:  Positive for trouble swallowing.    Eyes:  Negative for visual disturbance.   Respiratory:  Positive for shortness of breath (chronic, denies changes, on oxygen, demand remains unchanged). Negative for cough  and chest tightness.    Cardiovascular:  Negative for chest pain.   Gastrointestinal:  Negative for abdominal pain, blood in stool, change in bowel habit and vomiting.   Musculoskeletal:  Positive for gait problem (wheelchair).   Skin:  Negative for rash.   Neurological:  Negative for vertigo, weakness, numbness and headaches.       Physical Exam:   Physical Exam  Constitutional:       General: She is not in acute distress.     Appearance: She is not ill-appearing or diaphoretic.   HENT:      Head: Normocephalic and atraumatic.   Cardiovascular:      Rate and Rhythm: Normal rate and regular rhythm.      Heart sounds: No murmur heard.     No friction rub. No gallop.   Pulmonary:      Effort: No respiratory distress.      Comments: On 4L NC    Chest:      Chest wall: No tenderness.   Musculoskeletal:      Cervical back: No rigidity.   Skin:     Capillary Refill: Capillary refill takes 2 to 3 seconds.   Neurological:      General: No focal deficit present.      Mental Status: She is alert and oriented to person, place, and time.      Gait: Gait abnormal.       Vitals:    10/26/23 1041   BP: 120/60   BP Location: Right arm   Patient Position: Sitting   BP Method: Medium (Manual)   Pulse: 82   Temp: 97.5 °F (36.4 °C)   TempSrc: Oral   SpO2: (!) 94%      There is no height or weight on file to calculate BMI.        History     Past Medical History:  Past Medical History:   Diagnosis Date    A-fib     Acute respiratory failure with hypoxia 9/1/2015    Breast cancer     invasive ductal carcinoma    CKD (chronic kidney disease)     Community acquired pneumonia 8/26/2023    Diabetes mellitus type II     Fatty liver     GERD (gastroesophageal reflux disease)     Hearing loss of both ears     wears bilateral hearing aids    Hemochromatosis     History of anemia due to CKD     History of pleural effusion     with thoracentesis    Hyperlipidemia     Hypertension     Macular degeneration     Osteoarthritis     Left knee     Osteoporosis     Vaginal delivery     x2    Vitamin D deficiency disease     Wears partial dentures     upper removable bridge       Past Surgical History:  Past Surgical History:   Procedure Laterality Date    AXILLARY NODE DISSECTION Left 10/18/2021    Procedure: LYMPHADENECTOMY, AXILLARY;  Surgeon: Darlene Roca MD;  Location: [facility] OR;  Service: General;  Laterality: Left;    BREAST BIOPSY      BREAST LUMPECTOMY      invasive ductal carcinoma    BREAST SURGERY Bilateral     Reduction r/t h/o fibrocystic disease    CARDIOVERSION N/A 9/15/2023    Procedure: Cardioversion;  Surgeon: Constantine Chambers MD;  Location: [facility] CATH LAB;  Service: Cardiology;  Laterality: N/A;    CHOLECYSTECTOMY  08/2015    EYE SURGERY      Cat Ext  OU    HYSTERECTOMY      partial due to uterine fibroids    INCISION AND DRAINAGE OF KNEE Left 09/17/2020    Procedure: INCISION AND DRAINAGE, KNEE;  Surgeon: Rolando Wagoner MD;  Location: [facility];  Service: Orthopedics;  Laterality: Left;    INJECTION FOR SENTINEL NODE IDENTIFICATION Left 10/18/2021    Procedure: INJECTION, FOR SENTINEL NODE IDENTIFICATION;  Surgeon: Darlene Roca MD;  Location: [facility] OR;  Service: General;  Laterality: Left;    JOINT REPLACEMENT Left 05/13/2013    TKR    JOINT REPLACEMENT Right 08/03/2015    TKR    MASTECTOMY, PARTIAL Left 10/18/2021    Procedure: MASTECTOMY, PARTIAL -- wire;  Surgeon: Darlene Roca MD;  Location: [facility];  Service: General;  Laterality: Left;  RN PREOP 10/15/2021   VACCINATED-----NEED H/P AND ORDERS    SENTINEL LYMPH NODE BIOPSY Left 10/18/2021    Procedure: BIOPSY, LYMPH NODE, SENTINEL;  Surgeon: Darlene Roca MD;  Location: [facility];  Service: General;  Laterality: Left;    THORACENTESIS      TOTAL KNEE ARTHROPLASTY Right 2015    left knee done 5/2013    TREATMENT OF CARDIAC ARRHYTHMIA N/A 9/15/2023    Procedure: Cardioversion or Defibrillation;  Surgeon: Constantine Chambers MD;  Location: [facility] CATH LAB;  Service: Cardiology;  Laterality:  N/A;    WOUND DRESSING Left 09/17/2020    Procedure: WOUND VAC APPLICATION;  Surgeon: Rolando Wagoner MD;  Location: [facility] OR;  Service: Orthopedics;  Laterality: Left;       Social History:  Social History     Socioeconomic History    Marital status:     Number of children: 2   Tobacco Use    Smoking status: Former     Passive exposure: Past    Smokeless tobacco: Never   Substance and Sexual Activity    Alcohol use: Not Currently    Drug use: No    Sexual activity: Not Currently       Family History:  Family History   Problem Relation Age of Onset    Cancer Mother         stomach    Hypertension Mother     Aneurysm Father         abdominal    No Known Problems Sister     No Known Problems Sister     No Known Problems Brother     No Known Problems Daughter     No Known Problems Son        Allergies and Medications: (updated and reviewed)  Review of patient's allergies indicates:  No Known Allergies  Current Outpatient Medications   Medication Sig Dispense Refill    allopurinoL (ZYLOPRIM) 100 MG tablet Take 1 tablet by mouth once daily.      amiodarone (PACERONE) 100 MG Tab Take 1 tablet (100 mg total) by mouth once daily. 90 tablet 1    apixaban (ELIQUIS) 2.5 mg Tab TAKE ONE TABLET BY MOUTH TWICE DAILY 180 tablet 3    atorvastatin (LIPITOR) 40 MG tablet TAKE ONE TABLET BY MOUTH ONCE DAILY 90 tablet 2    bumetanide (BUMEX) 0.5 MG Tab Take 0.5 mg by mouth.      calcium carbonate (TUMS) 200 mg calcium (500 mg) chewable tablet Take 2 tablets by mouth.      ergocalciferol (ERGOCALCIFEROL) 50,000 unit Cap Take 50,000 Units by mouth every 30 days.       folic acid (FOLVITE) 1 MG tablet TAKE 1 TABLET BY MOUTH EVERY DAY 90 tablet 3    mupirocin (BACTROBAN) 2 % ointment Apply topically every evening.      solifenacin (VESICARE) 10 MG tablet Take 1 tablet (10 mg total) by mouth once daily. 30 tablet 11    bumetanide (BUMEX) 1 MG tablet Take 1 tablet (1 mg total) by mouth once daily. (Patient not taking: Reported on  10/4/2023) 30 tablet 11     No current facility-administered medications for this visit.     Facility-Administered Medications Ordered in Other Visits   Medication Dose Route Frequency Provider Last Rate Last Admin    denosumab (PROLIA) injection 60 mg  60 mg Subcutaneous 1 time in Clinic/HOD Nargis Boyle MD           Patient Care Team:  Paras Oro MD as PCP - General (Family Medicine)  Gabriel Minor MD as Consulting Physician (Orthopedic Surgery)  Mis Nguyễn MD as Consulting Physician (Gastroenterology)  Bridger Diaz OD as Consulting Physician (Optometry)  Consuelo Enamorado LPN as Care Coordinator         - The patient is given an After Visit Summary that lists all medications with directions, allergies, education, orders placed during this encounter and follow-up instructions.      - I have reviewed the patient's medical information including past medical, family, and social history sections including the medications and allergies.      - We discussed the patient's current medications.     This note was created by combination of typed  and MModal dictation.  Transcription errors may be present.  If there are any questions, please contact me.       Familia Lazo NP

## 2023-10-26 NOTE — Clinical Note
Diagnosis: Hyponatremia [198519]   Admitting Provider:: NADIA GARCIA III [[id number]]   Future Attending Provider: NADIA GARCIA III [[id number]]   Reason for IP Medical Treatment  (Clinical interventions that can only be accomplished in the IP setting? ) :: Hyponatremia, Hypokalemia   I certify that Inpatient services for greater than or equal to 2 midnights are medically necessary:: Yes   Plans for Post-Acute care--if anticipated (pick the single best option):: A. No post acute care anticipated at this time

## 2023-10-26 NOTE — ED PROVIDER NOTES
Encounter Date: 10/26/2023    SCRIBE #1 NOTE: I, Candis Thomas, am scribing for, and in the presence of,  Sherman Valadez MD. I have scribed the following portions of the note - Other sections scribed: HPI, ROS.       History     Chief Complaint   Patient presents with    Abnormal Lab     Pt sent to ED by PCP for abnormal labs. Wants Kidney and liver functions checked. Sodium also low. Pt went for routine visit. Pt reports fatigue      Barbara Rizo is a 83 y.o. female, with a PMHx of HLD, essential HTN, paroxysmal A-Fib, anemia in CKD, DM (type 2) with CKD stage 4 and without long-term current use of insulin,atherosclerosis of native artery of left lower extremity, and carcinoma of breast and a PSHx of treatment of cardiac arrhthymia, cardioversion, and left partial mastectomy who presents to the ED with abnormal blood work today. Patient reports being referred to the ED by her PCP for further evaluation of kidney and liver functions. Patient endorses worsening confusion, fatigue, and significant weight loss. Patient is taking eliquis and reports taking her daily medication today. Patient is taking zinc supplements and started 1 day ago. Patient uses 4 L NC O2 at home regularly but denies any history of COPD. Denies abdominal pain, vomiting or other associated symptoms.   Per external medical records, patient visited her PCP on 10/26/23 for fatigue and decreased concentration. X-ray chest performed with stable left pleural effusion noted. Patient had a sodium of 127, chloride of 84, CO2 of 32, BUN of 47, creatinine of 2.2, and albumin of 3.3.  Patient was admitted to [facility] on 9/27/20 for fatigue, dizziness, and falls. Patient was discharged on 10/1/20. Per discharge summary: Admitted with hyponatremia, 123 on admit, down to 119 at the lowest. Started on IVF. Nephrology consulted. Started free water restriction. Suspect euvolemic hyponatremia due to excess free water intake +  fluoxetine use. TSH and cortisol normal. Hyponatremia improved to 131 on day of discharge. Patient is not symptomatic.      The history is provided by the patient. No  was used.     Review of patient's allergies indicates:  No Known Allergies  Past Medical History:   Diagnosis Date    A-fib     Acute respiratory failure with hypoxia 09/01/2015    Breast cancer     invasive ductal carcinoma    Chronic heart failure with preserved ejection fraction 10/26/2023    GII dd    CKD (chronic kidney disease)     Community acquired pneumonia 08/26/2023    Diabetes mellitus type II     Fatty liver     GERD (gastroesophageal reflux disease)     Hearing loss of both ears     wears bilateral hearing aids    Hemochromatosis     History of anemia due to CKD     History of pleural effusion     with thoracentesis    Hyperlipidemia     Hypertension     Macular degeneration     On home oxygen therapy     Osteoarthritis     Left knee    Osteoporosis     Vaginal delivery     x2    Vitamin D deficiency disease     Wears partial dentures     upper removable bridge     Past Surgical History:   Procedure Laterality Date    AXILLARY NODE DISSECTION Left 10/18/2021    Procedure: LYMPHADENECTOMY, AXILLARY;  Surgeon: Darlene Roca MD;  Location: [facility] OR;  Service: General;  Laterality: Left;    BREAST BIOPSY      BREAST LUMPECTOMY      invasive ductal carcinoma    BREAST SURGERY Bilateral     Reduction r/t h/o fibrocystic disease    CARDIOVERSION N/A 9/15/2023    Procedure: Cardioversion;  Surgeon: Constantine Chambers MD;  Location: [facility] CATH LAB;  Service: Cardiology;  Laterality: N/A;    CHOLECYSTECTOMY  08/2015    EYE SURGERY      Cat Ext  OU    HYSTERECTOMY      partial due to uterine fibroids    INCISION AND DRAINAGE OF KNEE Left 09/17/2020    Procedure: INCISION AND DRAINAGE, KNEE;  Surgeon: Rloando Wagoner MD;  Location: [facility] OR;  Service: Orthopedics;  Laterality: Left;    INJECTION FOR SENTINEL NODE IDENTIFICATION Left  10/18/2021    Procedure: INJECTION, FOR SENTINEL NODE IDENTIFICATION;  Surgeon: Darlene Roca MD;  Location: [facility] OR;  Service: General;  Laterality: Left;    JOINT REPLACEMENT Left 05/13/2013    TKR    JOINT REPLACEMENT Right 08/03/2015    TKR    MASTECTOMY, PARTIAL Left 10/18/2021    Procedure: MASTECTOMY, PARTIAL -- wire;  Surgeon: Darlene Roca MD;  Location: [facility] OR;  Service: General;  Laterality: Left;  RN PREOP 10/15/2021   VACCINATED-----NEED H/P AND ORDERS    SENTINEL LYMPH NODE BIOPSY Left 10/18/2021    Procedure: BIOPSY, LYMPH NODE, SENTINEL;  Surgeon: Darlene Roca MD;  Location: [facility] OR;  Service: General;  Laterality: Left;    THORACENTESIS      TOTAL KNEE ARTHROPLASTY Right 2015    left knee done 5/2013    TREATMENT OF CARDIAC ARRHYTHMIA N/A 9/15/2023    Procedure: Cardioversion or Defibrillation;  Surgeon: Constantine Chambers MD;  Location: [facility] CATH LAB;  Service: Cardiology;  Laterality: N/A;    WOUND DRESSING Left 09/17/2020    Procedure: WOUND VAC APPLICATION;  Surgeon: Rolando Wagoner MD;  Location: [facility] OR;  Service: Orthopedics;  Laterality: Left;     Family History   Problem Relation Age of Onset    Cancer Mother         stomach    Hypertension Mother     Aneurysm Father         abdominal    No Known Problems Sister     No Known Problems Sister     No Known Problems Brother     No Known Problems Daughter     No Known Problems Son      Social History     Tobacco Use    Smoking status: Former     Passive exposure: Past    Smokeless tobacco: Never   Substance Use Topics    Alcohol use: Not Currently    Drug use: No     Review of Systems   Constitutional:  Positive for fatigue and unexpected weight change.   HENT: Negative.     Eyes: Negative.    Respiratory: Negative.     Cardiovascular: Negative.    Gastrointestinal: Negative.    Genitourinary: Negative.    Musculoskeletal: Negative.    Skin: Negative.    Neurological: Negative.    Psychiatric/Behavioral:  Positive for confusion.         Physical Exam     Initial Vitals   BP Pulse Resp Temp SpO2   10/26/23 1613 10/26/23 1531 10/26/23 1531 10/26/23 1531 10/26/23 1531   115/85 105 18 97.4 °F (36.3 °C) 95 %      MAP       --                Physical Exam    Nursing note and vitals reviewed.  Constitutional: She is not diaphoretic. She appears distressed (ill-appearing, elderly female).   HENT:   Head: Normocephalic and atraumatic.   Eyes: Pupils are equal, round, and reactive to light. Right eye exhibits no discharge. Left eye exhibits no discharge.   Neck: No tracheal deviation present.   Normal range of motion.  Cardiovascular:            Tachycardic, irregularly irregular rate and rhythm   Pulmonary/Chest: No stridor. No respiratory distress.   Left sided mastectomy   Abdominal: Abdomen is soft. Bowel sounds are normal. She exhibits no distension. There is no abdominal tenderness.   Musculoskeletal:         General: No tenderness or edema. Normal range of motion.      Cervical back: Normal range of motion.     Neurological: She is alert.   Skin: Skin is warm and dry.         ED Course   Procedures  Labs Reviewed   CBC W/ AUTO DIFFERENTIAL - Abnormal; Notable for the following components:       Result Value    RDW 16.0 (*)     Gran % 80.4 (*)     Lymph % 12.7 (*)     All other components within normal limits   COMPREHENSIVE METABOLIC PANEL - Abnormal; Notable for the following components:    Sodium 127 (*)     Chloride 85 (*)     Glucose 118 (*)     BUN 47 (*)     Creatinine 2.1 (*)     Albumin 3.4 (*)     Alkaline Phosphatase 224 (*)      (*)      (*)     eGFR 23 (*)     All other components within normal limits   TROPONIN I - Abnormal; Notable for the following components:    Troponin I 0.059 (*)     All other components within normal limits   B-TYPE NATRIURETIC PEPTIDE - Abnormal; Notable for the following components:    BNP 2,876 (*)     All other components within normal limits   COMPREHENSIVE METABOLIC PANEL - Abnormal;  Notable for the following components:    Sodium 129 (*)     Chloride 89 (*)     BUN 46 (*)     Creatinine 2.0 (*)     Albumin 3.0 (*)     Alkaline Phosphatase 198 (*)     AST 93 (*)      (*)     eGFR 24 (*)     All other components within normal limits   FERRITIN - Abnormal; Notable for the following components:    Ferritin 804 (*)     All other components within normal limits   IRON AND TIBC - Abnormal; Notable for the following components:    Transferrin 155 (*)     TIBC 229 (*)     All other components within normal limits   PROTIME-INR - Abnormal; Notable for the following components:    Prothrombin Time 14.8 (*)     INR 1.4 (*)     All other components within normal limits   POCT GLUCOSE - Abnormal; Notable for the following components:    POCT Glucose 127 (*)     All other components within normal limits   ISTAT PROCEDURE - Abnormal; Notable for the following components:    POC Glucose 123 (*)     POC BUN 64 (*)     POC Creatinine 2.1 (*)     POC Sodium 121 (*)     POC Chloride 83 (*)     POC TCO2 (MEASURED) 36 (*)     All other components within normal limits   URINALYSIS, REFLEX TO URINE CULTURE    Narrative:     Specimen Source->Urine   MAGNESIUM   PHOSPHORUS   TSH   AMMONIA   LACTIC ACID, PLASMA   CHLORIDE, URINE, RANDOM   POTASSIUM, URINE, RANDOM   UREA NITROGEN, URINE, RANDOM   SODIUM, URINE, RANDOM   OSMOLALITY, URINE RANDOM   OSMOLALITY, SERUM   PROTIME-INR   CHLORIDE, URINE, RANDOM    Narrative:     Specimen Source->Urine   POTASSIUM, URINE, RANDOM    Narrative:     Specimen Source->Urine   SODIUM, URINE, RANDOM    Narrative:     Specimen Source->Urine   FERRITIN   IRON AND TIBC   HEPATITIS PANEL, ACUTE        ECG Results              EKG 12-lead (Final result)  Result time 10/27/23 15:00:23      Final result by Interface, Lab In [facility] (10/27/23 15:00:23)                   Narrative:    Test Reason : R53.83,    Vent. Rate : 123 BPM     Atrial Rate : 000 BPM     P-R Int : 000 ms          QRS  Dur : 194 ms      QT Int : 414 ms       P-R-T Axes : 000 -85 094 degrees     QTc Int : 592 ms    Atrial fibrillation with rapid ventricular response  Right bundle branch block  Left anterior fascicular block   Bifascicular block   T wave abnormality, consider lateral ischemia  Abnormal ECG  When compared with ECG of 16-SEP-2023 10:39,  Significant changes have occurred  Confirmed by Amauri Lomas MD ([id number]) on 10/27/2023 3:00:08 PM    Referred By: [email or web address]   SELF           Confirmed By:Amauri Lomas MD                                     EKG 12-LEAD (Final result)  Result time 10/27/23 14:54:08      Final result by Unknown User (10/27/23 14:54:08)                                      Imaging Results              US Liver with Doppler (xpd) (Final result)  Result time 10/26/23 21:44:00      Final result by Zaina Thomas MD (10/26/23 21:44:00)                   Impression:      As above described.      Electronically signed by: Zaina Thomas  Date:    10/26/2023  Time:    21:44               Narrative:    EXAMINATION:  US LIVER WITH DOPPLER    CLINICAL HISTORY:  transaminitis with hemochromatosis;    TECHNIQUE:  Limited abdominal ultrasound of the transplant liver with Doppler evaluation.  Color and spectral Doppler were performed.    COMPARISON:  None.    FINDINGS:  Examination is limited by patient movement.  The liver demonstrates homogeneous echotexture.  The liver measures 12.4 cm.  No focal hepatic lesions are seen.  No fluid collections.  No recannulized umbilical vein is detected.    The common duct is not dilated, measuring 4.7 mm.  No dilated intrahepatic radicles are seen.    The gallbladder is surgically absent.    The spleen is normal in size measuring 9.1 x 3.2 cm.    There is no ascites.    Hepatopetal flow is recorded in the SMA the, main portal vein, left portal vein, and right portal vein.  The main portal vein velocity is 13.7 cm/sec.  Typically, the peak systolic velocities range from 20-40  cm/sec.  The main portal vein measures 0.89 cm in diameter.  A low flow velocity and a caliber increase in the main portal vein may be diagnostic features of portal hypertension.  However, this is not the case.  The caliber here is normal.    Color flow and spectral waveform analysis was performed.  The main portal vein, right portal vein, left portal vein, middle hepatic vein, right hepatic vein, left hepatic vein, and IVC are patent.    The velocity of the main hepatic artery is 40 cm/sec.  The velocity of the left hepatic artery is 51 cm/sec.  The velocity of the right hepatic artery is 60.8 cm/sec.                                       X-Ray Chest 1 View (Final result)  Result time 10/26/23 17:33:36      Final result by Ced Durham MD (10/26/23 17:33:36)                   Impression:      Stable examination.      Electronically signed by: Ced Durham MD  Date:    10/26/2023  Time:    17:33               Narrative:    EXAMINATION:  XR CHEST 1 VIEW    CLINICAL HISTORY:  Other fatigue    TECHNIQUE:  Single frontal view of the chest was performed.    COMPARISON:  10/26/2023.    FINDINGS:  Monitoring EKG leads are present.  The trachea is unremarkable.  There are calcifications of the aortic knob.  The cardiomediastinal silhouette is enlarged.  There is unchanged obscuration of the left aspect of the cardiac silhouette.  There is no evidence of free air beneath the hemidiaphragms.  There is unchanged appearance of a loculated left-sided pleural effusion.  There is no evidence of a pneumothorax.  The overall aeration of the lung fields are unchanged.  There are degenerative changes in the osseous structures.                                       Medications   sodium chloride 0.9% bolus 1,000 mL 1,000 mL (0 mLs Intravenous Stopped 10/26/23 1859)   metoprolol injection 5 mg (5 mg Intravenous Given 10/26/23 1807)   furosemide injection 40 mg (40 mg Intravenous Given 10/27/23 1444)   LIDOcaine HCL 10 mg/ml (1%)  injection (5 mLs Other Given 10/27/23 1028)   potassium chloride SA CR tablet 40 mEq (40 mEq Oral Given 10/28/23 0827)   magnesium oxide tablet 400 mg (400 mg Oral Given 10/28/23 0827)   furosemide injection 40 mg (40 mg Intravenous Given 10/28/23 1137)   potassium chloride SA CR tablet 20 mEq (20 mEq Oral Given 10/28/23 1633)     Medical Decision Making  Amount and/or Complexity of Data Reviewed  Labs: ordered.    Risk  Prescription drug management.  Decision regarding hospitalization.      MDM:    83-year-old female with past medical history as noted above presenting with concerns for abnormal lab, hypokalemia, elevated liver enzymes, fatigue.  Physical exam as noted above.  ED workup notable for INR 1.4, ferritin 804, urinalysis is unremarkable, ammonia 39, lactate 1.0, CBC within normal limits, CMP with sodium 127, BUN 47, creatinine 2.1, AST//300, troponin 0.059, BNP 2876, magnesium 1.8, chest x-ray shows chronic findings, EKG shows AFib with  beats per minute, right bundle-branch block present, left anterior fascicular block present, nonspecific ST and T-wave changes noted,  MS, no STEMI.  Patient presentation concerning for hyponatremia in the setting of generalized fatigue and AFib with RVR.  Patient has a mild FELICIA, transaminitis, does not appear to be volume overloaded small IV fluid bolus given with repeat lab work pending.  Discussed further with Hospital Medicine team who agrees with ongoing plan, evaluation with repeat lab work. Discussed diagnosis and further treatment with patient and family at bedside. All questions answered, patient transferred to floor improved and stable.      Note was created using voice recognition software. Note may have occasional typographical or grammatical errors, garbled syntax, and other bizarre constructions that may not have been identified and edited despite good melisa initial review prior to signing.               Scribe Attestation:   Scribe #1:  I performed the above scribed service and the documentation accurately describes the services I performed. I attest to the accuracy of the note.                      I, Sherman Valadez M.D., personally performed the services described in this documentation. All medical record entries made by the scribe were at my direction and in my presence. I have reviewed the chart and agree that the record reflects my personal performance and is accurate and complete.    Clinical Impression:   Final diagnoses:  [R53.83] Fatigue  [E87.1] Hyponatremia  [I50.33] Acute on chronic diastolic heart failure (Primary)  [R94.31] QT prolongation  [I10] Essential hypertension (Chronic)  [I48.0] Paroxysmal atrial fibrillation (Chronic)  [R53.81] Physical deconditioning        ED Disposition Condition    Admit Stable                Sherman Valadez MD  11/03/23 2927

## 2023-10-26 NOTE — ED TRIAGE NOTES
Pt arrived to the ED after PCP called with abnormal blood results (low sodium and elevated kidney function) that were drawn this morning. Daughter states patient has been lethargic and confused over the past couple days. Pt currently answering questions appropriately at this time.

## 2023-10-26 NOTE — Clinical Note
Left: Back.   Scrubbed with Chlorhexidine/Alcohol.    Hair: N/A.  Skin prep dry before draping.  Prepped by: Julian Rahman MD 10/27/2023 10:26 AM.

## 2023-10-27 PROBLEM — R07.1 CHEST PAIN ON BREATHING: Status: RESOLVED | Noted: 2023-01-01 | Resolved: 2023-01-01

## 2023-10-27 PROBLEM — R06.09 DOE (DYSPNEA ON EXERTION): Status: ACTIVE | Noted: 2023-01-01

## 2023-10-27 PROBLEM — R06.09 DOE (DYSPNEA ON EXERTION): Status: RESOLVED | Noted: 2023-01-01 | Resolved: 2023-01-01

## 2023-10-27 PROBLEM — R07.1 CHEST PAIN ON BREATHING: Status: ACTIVE | Noted: 2023-01-01

## 2023-10-27 NOTE — ASSESSMENT & PLAN NOTE
· Longstanding, permanent AFib  · Patient has been cardioverted about 4 times in the past, per daughter   · Heart rate 120, IV metoprolol given, heart rate now - controlled  · Given hemochromatosis and , amiodarone likely needs to be changed-cardiology consult for tomorrow

## 2023-10-27 NOTE — PLAN OF CARE
Problem: Occupational Therapy  Goal: Occupational Therapy Goal  Description: Goals to be met by: 11/10/23     Patient will increase functional independence with ADLs by performing:    LE Dressing with Modified [place].  Grooming while standing with Modified [place].  Toileting from toilet with Modified [place] for hygiene and clothing management.   Step transfer with Modified [place]  Toilet transfer to toilet with Modified [place].  Upper extremity exercise program x15 reps per handout, with independence.    Outcome: Ongoing, Progressing

## 2023-10-27 NOTE — PLAN OF CARE
Problem: Adult Inpatient Plan of Care  Goal: Plan of Care Review  Outcome: Ongoing, Progressing  Goal: Patient-Specific Goal (Individualized)  Outcome: Ongoing, Progressing  Goal: Absence of Hospital-Acquired Illness or Injury  Outcome: Ongoing, Progressing  Goal: Optimal Comfort and Wellbeing  Outcome: Ongoing, Progressing  Goal: Readiness for Transition of Care  Outcome: Ongoing, Progressing     Problem: Skin Injury Risk Increased  Goal: Skin Health and Integrity  Outcome: Ongoing, Progressing     Problem: Diabetes Comorbidity  Goal: Blood Glucose Level Within Targeted Range  Outcome: Ongoing, Progressing     Problem: Diabetes Comorbidity  Goal: Blood Glucose Level Within Targeted Range  Outcome: Ongoing, Progressing     Problem: Impaired Wound Healing  Goal: Optimal Wound Healing  Outcome: Ongoing, Progressing     Problem: Coping Ineffective  Goal: Effective Coping  Outcome: Ongoing, Progressing     Problem: Impaired Wound Healing  Goal: Optimal Wound Healing  Outcome: Ongoing, Progressing     Problem: Fall Injury Risk  Goal: Absence of Fall and Fall-Related Injury  Outcome: Ongoing, Progressing

## 2023-10-27 NOTE — ASSESSMENT & PLAN NOTE
· BNP 3000 with elevated JVD and hypervolemic hyponatremia  · Right-sided heart failure, which is why her oxygen has not changed much, but why her liver is congested, I suspect  · IV diuresis, reassess tomorrow for continuance  · Echo ordered  · See HF note

## 2023-10-27 NOTE — ASSESSMENT & PLAN NOTE
· A1c 4.9 8/27/23  · A1cs likely improved with worsening renal function, CKD 4  · Diet controlled at home  · No significant elevations noted thus far  · Monitor sugars daily with BMP -no need for frequent glucose checks

## 2023-10-27 NOTE — NURSING
[facility], [facility]  Nurses Note -- 4 Eyes      10/26/2023      Skin assessed on: Admit      [] No Pressure Injuries Present    []Prevention Measures Documented    [x] Yes LDA  for Pressure Injury Previously documented     [] Yes New Pressure Injury Discovered   [] LDA for New Pressure Injury Added      Attending RN:  Aaron Mendoza RN     Second RN:  TOSHIA Lacy

## 2023-10-27 NOTE — NURSING
Nurses Note -- 4 Eyes      10/27/2023   4:51 PM      Skin assessed during: Daily Assessment      [] No Altered Skin Integrity Present    []Prevention Measures Documented      [] Yes- Altered Skin Integrity Present or Discovered   [] LDA Added if Not in Epic (Describe Wound)   [] New Altered Skin Integrity was Present on Admit and Documented in LDA   [] Wound Image Taken    Wound Care Consulted? Yes    Attending Nurse:  TOSHIA Tobar     Second RN/Staff Member:   DEVAN Deluna       Wounds previously documented. No new wounds

## 2023-10-27 NOTE — CONSULTS
[facility] Consultation Note    Patient Complaint: trouble swallowing    PCP:   Paras Oro       LOS: 1        Initial History of Present Illness (HPI):  This is a 83 y.o. female consulted to GI service for elevated LFTs. PMH A-fib, Acute respiratory failure with hypoxia, Breast cancer, CKD, Community acquired pneumonia, Diabetes mellitus type II, Fatty liver, GERD, Hearing loss of both ears, Hemochromatosis, History of anemia due to CKD, History of pleural effusion, Hyperlipidemia, Hypertension, Macular degeneration, On home oxygen therapy, Osteoarthritis, Osteoporosis, Vaginal delivery, Vitamin D deficiency disease, and Wears partial dentures. Patient complaint of trouble swallowing with decreased intake that re-surfaced a week ago. Daughter reports patient has experienced intermittent swallowing issues when eating different food consistencies at home. She reports patient was recently unable to initiate a swallow when eating mashed potatoes and had to sit them out. Denies throat pain, n/v, choking, abdominal pain. Reports a hx of fatty liver. Denies a hx of heavy drinking. Daughter reports a prolonged hospitalization in 2015 where she was intubated and believes that is when issues occurred. Patient has record of EGD in 2015 w/o mention of narrowing, stricture etc, report below. She reports patient is followed by cardiology and hematology for chronic illness and plans were made for an outpatient EGD that required cardiac clearance. Patient is on eliquis.    Admit labs ferritin 804, INR 1.4, alk phos 230, lfts 116/311            Medical History:  has a past medical history of A-fib, Acute respiratory failure with hypoxia (09/01/2015), Breast cancer, Chronic heart failure with preserved ejection fraction (10/26/2023), CKD (chronic kidney disease), Community acquired pneumonia (08/26/2023), Diabetes mellitus type II, Fatty liver, GERD (gastroesophageal reflux disease), Hearing loss of both  ears, Hemochromatosis, History of anemia due to CKD, History of pleural effusion, Hyperlipidemia, Hypertension, Macular degeneration, On home oxygen therapy, Osteoarthritis, Osteoporosis, Vaginal delivery, Vitamin D deficiency disease, and Wears partial dentures.    Surgical History:  has a past surgical history that includes Hysterectomy; Total knee arthroplasty (Right, 2015); Eye surgery; Cholecystectomy (08/2015); Breast biopsy; Joint replacement (Left, 05/13/2013); Joint replacement (Right, 08/03/2015); Thoracentesis; Breast surgery (Bilateral); Incision and drainage of knee (Left, 09/17/2020); Wound dressing (Left, 09/17/2020); Mastectomy, partial (Left, 10/18/2021); Injection for sentinel node identification (Left, 10/18/2021); [place] lymph node biopsy (Left, 10/18/2021); Axillary node dissection (Left, 10/18/2021); Breast lumpectomy; Treatment of cardiac arrhythmia (N/A, 9/15/2023); and Cardioversion (N/A, 9/15/2023).      Objective Findings:    Vital Signs:  Temp:  [97.4 °F (36.3 °C)-98.5 °F (36.9 °C)]   Pulse:  []   Resp:  [16-28]   BP: (109-152)/(58-99)   SpO2:  [91 %-99 %]   Body mass index is 26.21 kg/m².      Physical Exam  Vitals and nursing note reviewed.   Constitutional:       Appearance: Normal appearance.   HENT:      Head: Normocephalic.   Pulmonary:      Effort: Pulmonary effort is normal.   Abdominal:      General: Bowel sounds are normal.      Palpations: Abdomen is soft.   Skin:     General: Skin is warm and dry.   Neurological:      Mental Status: She is alert and oriented to person, place, and time.   Psychiatric:         Mood and Affect: Mood normal.         Behavior: Behavior normal.         Thought Content: Thought content normal.         Judgment: Judgment normal.               Labs:  Lab Results   Component Value Date    WBC 6.52 10/27/2023    HGB 12.6 10/27/2023    HCT 39.1 10/27/2023     10/27/2023    CHOL 153 07/06/2022    TRIG 103 07/06/2022    HDL 62 07/06/2022      (H) 10/27/2023    AST 76 (H) 10/27/2023     (L) 10/27/2023    K 3.6 10/27/2023    CL 90 (L) 10/27/2023    CREATININE 1.9 (H) 10/27/2023    BUN 42 (H) 10/27/2023    CO2 27 10/27/2023    TSH 2.684 10/26/2023    INR 1.4 (H) 10/26/2023    HGBA1C 4.9 08/27/2023             Imaging: 10/26 US liver with doppler-Hepatopetal flow is recorded in the SMA the, main portal vein, left portal vein, and right portal vein.  The main portal vein velocity is 13.7 cm/sec.  Typically, the peak systolic velocities range from 20-40 cm/sec.  The main portal vein measures 0.89 cm in diameter.    Color flow and spectral waveform analysis was performed.  The main portal vein, right portal vein, left portal vein, middle hepatic vein, right hepatic vein, left hepatic vein, and IVC are patent.     The velocity of the main hepatic artery is 40 cm/sec.  The velocity of the left hepatic artery is 51 cm/sec.  The velocity of the right hepatic artery is 60.8 cm/sec.    Endoscopy: 10/2015 EGD- Normal esophagus.                        - Non-erosive gastritis. Biopsied.                        - Normal examined duodenum.     I have independently reviewed and interpreted the imaging above           Dysphagia. Elevated lfts.  Plan/ Recommendations:  1.  Alk phos and lfts trending down. Liver doppler without acute findings. Suspect origin of lft elevation is cardiac. Continue to trend lfts. Hep panel pending. Hep serologies ordered. F/u in hep clinic.  2.  Rec SLP consult for bedside swallow study to eval dysphagia. Rec f/u outpt for EGD, msg sent to .    Will sign off  Thank you so much for allowing us to participate in the care of Barbara Rizo . Please contact us if you have any additional questions.    Idalia Negrete NP  Gastroenterology  [facility] Med Surg

## 2023-10-27 NOTE — ASSESSMENT & PLAN NOTE
· Intermittent confusion although oriented x3  · Ammonia is within normal limits  · However it is possible she has CNS manifestation of hemochromatosis with ferritin 800 and new transaminitis, INR elevated at 1.4, does not quite meet liver failure criteria  · verses right-sided heart failure with hepatic congestion  · Verses uremic encephalopathy, however BUN not too high  · Echo of heart ordered- EF of 35-40%, mild aortic valve stenosis, +pulmonary hypertension  · Ultrasound of liver with Doppler ordered: no acute findings  · Hematology consulted  · Will diurese in setting of likely right-sided heart failure with hepatic congestion

## 2023-10-27 NOTE — ASSESSMENT & PLAN NOTE
· PASP 46 mmHg previously  · Keep euvolemic  · Repeat echo: with EF 35-40%. There is mild stenosis of aortic valve. The estimated pulmonary artery systolic pressure is 49 mmHg.

## 2023-10-27 NOTE — HPI
"From H&P: "Barbara Rizo is a 83 y.o. female who has a past medical history of A-fib, Acute respiratory failure with hypoxia, Breast cancer, CKD, Community acquired pneumonia, Diabetes mellitus type II, Fatty liver, GERD, Hearing loss of both ears, Hemochromatosis, History of anemia due to CKD, History of pleural effusion, Hyperlipidemia, Hypertension, Macular degeneration, On home oxygen therapy, Osteoarthritis, Osteoporosis, Vaginal delivery, Vitamin D deficiency disease, and Wears partial dentures, presented to the ED with CC of Confusion.     Patient was sent in by PCP/home health labs hyponatremia and elevated creatinine of 2.2.  Unclear if point of care hyponatremia was correct at BNP taken at same time (noon) was 127 and repeat BNP showed sodium again at 127 four hours later-(POC electrolytes are often wrong).  Daughter states that patient has been intermittently confused for the past week or so, has not necessarily gotten worse but has been persistent.  She is oriented, A/O x3, however will often argue with daughter about things that are apparently wrong.  Denies hallucinations.  Has had a couple falls recently without head injury, has a bruise on her right thigh, without hip pain.  In the ED her liver enzymes were elevated,  and -which were normal on the last set of labs, and for many years normal.  Patient does have hemochromatosis and fair and ferritin is elevated at 800, ultrasound of liver with Doppler ordered.  Also patient has elevated BNP at 2876 and elevated JVD, echo ordered.  She has a fairly large left pleural effusion as well, however it is stable from previous.  She is on 4 L of home oxygen.  Denies chest pain and denies shortness of breath beyond her normal dyspnea."    Palliative medicine consulted for goals of care discussion and advance care planning; for details of visit, see advance care planning section of plan.   "

## 2023-10-27 NOTE — ASSESSMENT & PLAN NOTE
· Intermittent confusion although oriented x3  · Ammonia is within normal limits  · However it is possible she has CNS manifestation of hemochromatosis with ferritin 800 and new transaminitis, INR elevated at 1.4, does not quite meet liver failure criteria  · verses right-sided heart failure with hepatic congestion  · Verses uremic encephalopathy, however BUN not too high  · Echo of heart ordered  · Ultrasound of liver with Doppler ordered  · Will consult hematology in the a.m. to see if chelation or blood letting is appropriate  · Will diurese in setting of likely right-sided heart failure with hepatic congestion

## 2023-10-27 NOTE — ASSESSMENT & PLAN NOTE
"10/27/2023  - consult received; interval chart reviewed in detail; discussed pt with Dr. Boyle (nephrology)   - pt known to myself and palliative medicine team from previous admission (3rd admission in 3 months)   - met with patient and daughter Tamar at bedside; introduction to palliative medicine team and role in current care and admission   - when asked pt how she has been doing since prior admit she responded "I'm dying"; explored her feelings about that statement; validated concerns but also reassured that I feel she does have some life left to live and my role in her having as much QOL in this time as possible  - pt/daugther provided update on pt progress since prior recent admission in September; she typically lives independently in her own home and was previously able to perform ADLs; since Sept. Discharge has been living with daughter, Tamar, with hopes to regain strength to return to her own home independently; pt and daughter acknowledge worsening debility with each admission over the past few months   - daughter shared physical and time stress of caring for mom at home and caregiver burnout  - GOC/ACP discussion; both pt and daughter with GOC for pt to regain strength and abilities to return to living independently at home; they are hopefull for inpatient rehab upon discharge   - did gently bring up the importance of developing a "plan B" if rehab does not go well or if in the future pt requires alternative living arrangement; pt and daughter with good insight into trajectory of pt's multiple life-limiting conditions; briefly encouraged starting to think about/exploring options of assisted living facilities while pt has an opportunity to thrive in that level of care as apposed to continuing to decline through multiple admissions to the point of being only NH appropriate if living independently or with daughter isn't an option in the future   - also revisited option of hospice when aligns with GOC as an extra " form of support and care either at home or at an assisted living facility if pt gets to the point of continuing to come to hospital not aligning with her goals  - emotional support provided to pt and daughter; daughter tearful during some points of discussion and shared feeling of guilt related to her inability to fully care for pt at home   - allowed time for questions/concerns   - discussed pt with hospital primary, Dr. Alamo, CM/SW, and therapy team

## 2023-10-27 NOTE — CONSULTS
"[facility] [place] [facility] Surg  Palliative Medicine  Consult Note    Patient Name: Barbara Rizo  MRN: [id number]  Admission Date: 10/26/2023  Hospital Length of Stay: 1 days  Code Status: DNR   Attending Provider: Grayson Alamo DO  Consulting Provider: Danyell Neil NP  Primary Care Physician: Paras Oro MD  Principal Problem:Acute on chronic diastolic heart failure    Patient information was obtained from patient, relative(s), past medical records, ER records and primary team.      Inpatient consult to Palliative Care  Consult performed by: Danyell Neil NP  Consult ordered by: Nargis Boyle MD  Reason for consult: goals of care and advanced care planning         Assessment/Plan:   Advance Care Planning     Palliative Care  ACP (advance care planning)  10/27/2023  - consult received; interval chart reviewed in detail; discussed pt with Dr. Boyle (nephrology)   - pt known to myself and palliative medicine team from previous admission (3rd admission in 3 months)   - met with patient and daughter Tamar at bedside; introduction to palliative medicine team and role in current care and admission   - when asked pt how she has been doing since prior admit she responded "I'm dying"; explored her feelings about that statement; validated concerns but also reassured that I feel she does have some life left to live and my role in her having as much QOL in this time as possible  - pt/daugther provided update on pt progress since prior recent admission in September; she typically lives independently in her own home and was previously able to perform ADLs; since Sept. discharge has been living with daughter, Tamar, with hopes to regain strength to return to her own home independently; pt and daughter acknowledge worsening debility with each admission over the past few months   - daughter shared physical and time stress of caring for mom at home and caregiver burnout  - GOC/ACP discussion; both pt and daughter with GOC " "for pt to regain strength and abilities to return to living independently at home; they are hopefull for inpatient rehab upon discharge   - gently brought up the importance of developing a "plan B" if rehab does not go well or if in the future pt requires alternative living arrangement; pt and daughter with good insight into trajectory of pt's multiple life-limiting conditions; briefly encouraged starting to think about/exploring options of assisted living facilities while pt has an opportunity to thrive in that level of care as apposed to continuing to decline through multiple admissions to the point of being only NH appropriate if living independently or with daughter isn't an option in the future   - also revisited option of hospice when aligns with GOC as an extra form of support and care either at home or at an assisted living facility if pt gets to the point of continuing to come to hospital not aligning with her goals  - emotional support provided to pt and daughter; daughter tearful during some points of discussion and shared feeling of guilt related to her inability to fully care for pt at home   - utilizing home palliative with [facility]; recommend continued follow up with palliative following discharge from facility and or rehab  - allowed time for questions/concerns   - discussed pt with hospital primary, Dr. Alamo, CM/SW, and therapy team    Neuro  Intermittent confusion  - pt oriented x4, able to participate in complex medical/GOC discussion at time of visit   - daughter agrees pt is currently at mental status baseline     Cardiac/Vascular  * Acute on chronic diastolic heart failure  - chronic condition and acute symptoms noted (see WILKINSON)   - pt with increasing debility in the past month   - cardiology following   - continued management per hospital primary and cardiology     WILKINSON (dyspnea on exertion)  - increasing severity since prior admission   - now with intermittent dyspnea at rest in setting of " diastolic HF; contributes to appropriateness for hospice when aligns with GOC     Paroxysmal atrial fibrillation  - chronic condition noted   - has required cardioversion in previous admissions   - continued management per hospital primary and cardiology     Renal/  CKD (chronic kidney disease), stage IV  - chronic condition noted  - pt is closely followed by Dr. Boyle, Nephrology as OP with clear GOC for not wishing for HD in the future   - pt's  passed from CKD; good insight into trajectory of disease process   - contributes to future appropriateness for hospice, along with symptomatic diastolic HF, when aligns with GOC     Hyponatremia  - pt sought ER care following recommendation of PCP for abnormal labs for hyponatremia and kidney function   - pt and daughter somewhat confused/frustrated about need for admission as these have all been worse in the past and pt is closely followed by Dr. Boyle, Nephrology as OP with clear GOC for not wishing for HD in the future     Oncology  Carcinoma of breast  - chronic history noted      Other  Physical deconditioning  - pt previously has lived independently at home and able to perform all ADLs independently, with daughter Tamar close by for PRN assistance   - since 9/2023 admission pt has been temporarily living with daughter and as had gradual declines in abilities since (now requires assistance getting dressed etc)   - pt and family with GOC for inpatient rehab for physical reconditioned to be able to return to living independently   - PT/OT order in place per primary for eval   - in addition to pt's increasing debility; pts daughter exhibited and confirmed caregiver burnout; opened discussion of alterative plans to living with daughter if not able to return to indecently living in her own home after discharge (see ACP)     Thank you for your consult. I will follow-up with patient. Please contact us if you have any additional questions.     Total visit time: 90  "minutes    70  min visit time including: face to face time in discussion of symptom assessment, and exploring options and burdens of offered treatments.  This also includes non-face to face time preparing to see the patient including chart review, obtaining and/or reviewing separately obtained history, documenting clinical information in the electronic or other health record, independently interpreting results and communicating results to the patient/family/caregiver, family discussions by phone if not able to be present, coordination of care with other specialists, and discharge planning.     20 min ACP time spent: goals of care, advanced care planning, emotional support, formulating and communicating prognosis, exploring burden/ benefit of various approaches of treatment.     Danyell Neil NP  Palliative Medicine  [facility]        Subjective:     HPI:   From H&P: "Barbara Rizo is a 83 y.o. female who has a past medical history of A-fib, Acute respiratory failure with hypoxia, Breast cancer, CKD, Community acquired pneumonia, Diabetes mellitus type II, Fatty liver, GERD, Hearing loss of both ears, Hemochromatosis, History of anemia due to CKD, History of pleural effusion, Hyperlipidemia, Hypertension, Macular degeneration, On home oxygen therapy, Osteoarthritis, Osteoporosis, Vaginal delivery, Vitamin D deficiency disease, and Wears partial dentures, presented to the ED with CC of Confusion.     Patient was sent in by PCP/home health labs hyponatremia and elevated creatinine of 2.2.  Unclear if point of care hyponatremia was correct at BNP taken at same time (noon) was 127 and repeat BNP showed sodium again at 127 four hours later-(POC electrolytes are often wrong).  Daughter states that patient has been intermittently confused for the past week or so, has not necessarily gotten worse but has been persistent.  She is oriented, A/O x3, however will often argue with daughter about " "things that are apparently wrong.  Denies hallucinations.  Has had a couple falls recently without head injury, has a bruise on her right thigh, without hip pain.  In the ED her liver enzymes were elevated,  and -which were normal on the last set of labs, and for many years normal.  Patient does have hemochromatosis and fair and ferritin is elevated at 800, ultrasound of liver with Doppler ordered.  Also patient has elevated BNP at 2876 and elevated JVD, echo ordered.  She has a fairly large left pleural effusion as well, however it is stable from previous.  She is on 4 L of home oxygen.  Denies chest pain and denies shortness of breath beyond her normal dyspnea."    Palliative medicine consulted for goals of care discussion and advance care planning; for details of visit, see advance care planning section of plan.       Hospital Course:  No notes on file    Past Medical History:   Diagnosis Date    A-fib     Acute respiratory failure with hypoxia 09/01/2015    Breast cancer     invasive ductal carcinoma    Chronic heart failure with preserved ejection fraction 10/26/2023    GII dd    CKD (chronic kidney disease)     Community acquired pneumonia 08/26/2023    Diabetes mellitus type II     Fatty liver     GERD (gastroesophageal reflux disease)     Hearing loss of both ears     wears bilateral hearing aids    Hemochromatosis     History of anemia due to CKD     History of pleural effusion     with thoracentesis    Hyperlipidemia     Hypertension     Macular degeneration     On home oxygen therapy     Osteoarthritis     Left knee    Osteoporosis     Vaginal delivery     x2    Vitamin D deficiency disease     Wears partial dentures     upper removable bridge     Past Surgical History:   Procedure Laterality Date    AXILLARY NODE DISSECTION Left 10/18/2021    Procedure: LYMPHADENECTOMY, AXILLARY;  Surgeon: Darlene Roca MD;  [facility];  Service: General;  Laterality: Left; "    BREAST BIOPSY      BREAST LUMPECTOMY      invasive ductal carcinoma    BREAST SURGERY Bilateral     Reduction r/t h/o fibrocystic disease    CARDIOVERSION N/A 9/15/2023    Procedure: Cardioversion;  Surgeon: Constantine Chambers MD;  Location: [facility] CATH LAB;  Service: Cardiology;  Laterality: N/A;    CHOLECYSTECTOMY  08/2015    EYE SURGERY      Cat Ext  OU    HYSTERECTOMY      partial due to uterine fibroids    INCISION AND DRAINAGE OF KNEE Left 09/17/2020    Procedure: INCISION AND DRAINAGE, KNEE;  Surgeon: Rolando Wagoner MD;  Location: [facility] OR;  Service: Orthopedics;  Laterality: Left;    INJECTION FOR SENTINEL NODE IDENTIFICATION Left 10/18/2021    Procedure: INJECTION, FOR SENTINEL NODE IDENTIFICATION;  Surgeon: Darlene Roca MD;  Location: [facility] OR;  Service: General;  Laterality: Left;    JOINT REPLACEMENT Left 05/13/2013    TKR    JOINT REPLACEMENT Right 08/03/2015    TKR    MASTECTOMY, PARTIAL Left 10/18/2021    Procedure: MASTECTOMY, PARTIAL -- wire;  Surgeon: Darlene Roca MD;  Location: [facility];  Service: General;  Laterality: Left;  RN PREOP 10/15/2021   VACCINATED-----NEED H/P AND ORDERS    SENTINEL LYMPH NODE BIOPSY Left 10/18/2021    Procedure: BIOPSY, LYMPH NODE, SENTINEL;  Surgeon: Darlene Roca MD;  Location: [facility];  Service: General;  Laterality: Left;    THORACENTESIS      TOTAL KNEE ARTHROPLASTY Right 2015    left knee done 5/2013    TREATMENT OF CARDIAC ARRHYTHMIA N/A 9/15/2023    Procedure: Cardioversion or Defibrillation;  Surgeon: Constantine Chambers MD;  Location: [facility] CATH LAB;  Service: Cardiology;  Laterality: N/A;    WOUND DRESSING Left 09/17/2020    Procedure: WOUND VAC APPLICATION;  Surgeon: Rolando Wagoner MD;  Location: [facility] OR;  Service: Orthopedics;  Laterality: Left;     Review of patient's allergies indicates:  No Known Allergies    Medications:  Continuous Infusions:  Scheduled Meds:   apixaban  2.5 mg Oral BID    furosemide (LASIX) injection  40 mg  Intravenous Q8H    melatonin  6 mg Oral Nightly    metoprolol tartrate  25 mg Oral BID     PRN Meds:acetaminophen, glucagon (human recombinant), glucose, glucose, naloxone, polyethylene glycol, sodium chloride 0.9%    Family History       Problem Relation (Age of Onset)    Aneurysm Father    Cancer Mother    Hypertension Mother    No Known Problems Sister, Sister, Brother, Daughter, Son          Tobacco Use    Smoking status: Former     Passive exposure: Past    Smokeless tobacco: Never   Substance and Sexual Activity    Alcohol use: Not Currently    Drug use: No    Sexual activity: Not Currently     Review of Systems   Constitutional:  Positive for activity change and fatigue.   Respiratory:  Positive for shortness of breath (with exertion; intermittently at rest).    Cardiovascular:  Positive for palpitations.   Musculoskeletal:  Positive for arthralgias and gait problem.   Neurological:  Positive for weakness.     Objective:     Vital Signs (Most Recent):  Temp: 97.6 °F (36.4 °C) (10/27/23 1111)  Pulse: 104 (10/27/23 1111)  Resp: 19 (10/27/23 1111)  BP: 109/76 (10/27/23 1111)  SpO2: 96 % (10/27/23 1111) Vital Signs (24h Range):  Temp:  [97.4 °F (36.3 °C)-98.5 °F (36.9 °C)] 97.6 °F (36.4 °C)  Pulse:  [] 104  Resp:  [16-28] 19  SpO2:  [91 %-99 %] 96 %  BP: (109-152)/(58-99) 109/76     Weight: 65 kg (143 lb 4.8 oz)  Body mass index is 26.21 kg/m².     Physical Exam  Vitals and nursing note reviewed.   Constitutional:       General: She is awake.   HENT:      Head: Normocephalic and atraumatic.   Pulmonary:      Effort: Pulmonary effort is normal. No respiratory distress.      Comments: Some dyspnea with prolonged talking (at rest)   Musculoskeletal:      Right lower leg: Edema present.      Left lower leg: Edema present.   Neurological:      Mental Status: She is alert and oriented to person, place, and time.      Comments: At baseline mental status    Psychiatric:         Mood and Affect: Mood normal.          Behavior: Behavior normal. Behavior is cooperative.         Thought Content: Thought content normal.         Judgment: Judgment normal.        Advance Care Planning  Advance Directives:   [place]: Yes    Goals of Care: What is most important right now is to focus on spending time at home, avoiding the hospital, remaining as independent as possible, symptom/pain control, quality of life, even if it means sacrificing a little time. Accordingly, we have decided that the best plan to meet the patient's goals includes continuing with treatment.     Significant Labs: All pertinent labs within the past 24 hours have been reviewed.  CBC:   Recent Labs   Lab 10/27/23  0347   WBC 6.52   HGB 12.6   HCT 39.1   MCV 91        BMP:  Recent Labs   Lab 10/27/23  0347   GLU 84   *   K 3.6   CL 90*   CO2 27   BUN 42*   CREATININE 1.9*   CALCIUM 8.6*   MG 1.6     LFT:  Lab Results   Component Value Date    AST 76 (H) 10/27/2023    ALKPHOS 183 (H) 10/27/2023    BILITOT 0.8 10/27/2023     Albumin:   Albumin   Date Value Ref Range Status   10/27/2023 2.9 (L) 3.5 - 5.2 g/dL Final     Protein:   Total Protein   Date Value Ref Range Status   10/27/2023 6.1 6.0 - 8.4 g/dL Final     Lactic acid:   Lab Results   Component Value Date    LACTATE 1.0 10/26/2023    LACTATE 2.4 (H) 09/13/2023       Significant Imaging: I have reviewed all pertinent imaging results/findings within the past 24 hours.      I spent a total of 90 minutes on the day of the visit. This includes face to face time in discussion of goals of care, symptom assessment, coordination of care and emotional support.  This also includes non-face to face time preparing to see the patient (eg, review of tests/imaging), obtaining and/or reviewing separately obtained history, documenting clinical information in the electronic or other health record, independently interpreting results and communicating results to the patient/family/caregiver, or care  coordinator.    Danyell Neil NP  Palliative Medicine  [facility] Surg

## 2023-10-27 NOTE — TELEPHONE ENCOUNTER
Consult    Room #: 418    Admitting drYusuf Alamo    Dx: Hx of Hemochromatosis with some confusion and elevated liver enzymes.

## 2023-10-27 NOTE — CONSULTS
[facility]  Cardiology  Consult Note    Patient Name: Barbara Rizo  MRN: [id number]  Admission Date: 10/26/2023  Hospital Length of Stay: 1 days  Code Status: DNR   Attending Provider: Grayson Alamo DO   Consulting Provider: Amauri Lomas MD  Primary Care Physician: Paras Oro MD  Principal Problem:Acute on chronic diastolic heart failure    Patient information was obtained from patient and ER records.     Inpatient consult to Cardiology  Consult performed by: Amauri Lomas MD  Consult ordered by: Grayson Alamo DO        Subjective:     Chief Complaint:  CHF, A-flutter     HPI:   Barbara Rizo is a 83 y.o. female who has a past medical history of A-fib, Acute respiratory failure with hypoxia, Breast cancer, CKD, Community acquired pneumonia, Diabetes mellitus type II, Fatty liver, GERD, Hearing loss of both ears, Hemochromatosis, History of anemia due to CKD, History of pleural effusion, Hyperlipidemia, Hypertension, Macular degeneration, On home oxygen therapy, Osteoarthritis, Osteoporosis, Vaginal delivery, Vitamin D deficiency disease, and Wears partial dentures, presented to the ED with CC of Confusion.     Patient was sent in by PCP/home health labs hyponatremia and elevated creatinine of 2.2.  Unclear if point of care hyponatremia was correct at BNP taken at same time (noon) was 127 and repeat BNP showed sodium again at 127 four hours later-(POC electrolytes are often wrong).  Daughter states that patient has been intermittently confused for the past week or so, has not necessarily gotten worse but has been persistent.  She is oriented, A/O x3, however will often argue with daughter about things that are apparently wrong.  Denies hallucinations.  Has had a couple falls recently without head injury, has a bruise on her right thigh, without hip pain.  In the ED her liver enzymes were elevated,  and -which were normal on the last set of labs, and for many  years normal.  Patient does have hemochromatosis and fair and ferritin is elevated at 800, ultrasound of liver with Doppler ordered.  Also patient has elevated BNP at 2876 and elevated JVD, echo ordered.  She has a fairly large left pleural effusion as well, however it is stable from previous.  She is on 4 L of home oxygen.  Denies chest pain and denies shortness of breath beyond her normal dyspnea.     Getting thoracentesis  BNP 2876  Troponin 0.05  Cr 1.9  EKG A-flutter 123 RBBB/LAFB QTc 592  DNR    Echo 9/15/23     PAF - TALHA/CV 9/21/15, 1/30/19, 2/2/19 -  on amiodarone and eliquis, Diastolic CHF,  HTN, DM, HLD, MGUS, CRI - Cr 1.8, AS/MR - mild to moderate, breast CA, COPD on home O2     Admitted 1/28/19  Ms. Barbara Rizo is a 78 y.o. female with essential hypertension, hyperlipidemia (LDL 62.4 Jul 2018), type 2 diabetes mellitus (HbA1c 5.8% Jul 2018), CKD Stage 3, paroxysmal atrial fibrillation (APL5CT0-UMWo score 5) not on chronic anticoagulation, obesity (BMI 36.0), MGUS, and chronic gout who presents to [facility] ED with complaints of coughing for three days.  She started to have a non-productive cough, wheezing, and mild dyspnea three days ago which has steadily been worsening since onset.  She also complains of a subjective fever but otherwise denies any nausea, vomiting, chest pain, palpitations, pleurisy, nor any diaphoresis.  She denies any recent travel, hemoptysis, nor any lower extremity pain or swelling.  She does say that she's under a lot of stress recently with her ex- dying yesterday at [facility] due to complications from a heart valve replacement two weeks ago.  She does say that she may have had sick contacts while visiting her ex-.  Her appetite has been poor but she denies any weight loss.     While at her ENT's appointment today she decided to schedule an appointment with her PCP to evaluate her upper airway complaints.  She was unable to see her regular  PCP, Dr. Paras Oro, but was able to be seen by another physician who became concerned when she was noted to be in AFib with RVR on EKG.  He recommended EMS transportation to the ED but she refused and decided to drive herself.  Her cardiologist is Dr. Amauri Lomas.     Pt admitted to ICU with afib rvr on amiodarone infusion. Pt with elevated HR  and after TALHA done patient became very agitated and anxious. Ativan 1mg IV given and symptoms got worse. HR up into the 170s and O2 sats dropped to 77%. Placed on NRB. Improved O2 sats. 5mg IV haldol given as patient was trying to get out of bed and pulling oxygen mask off. Cardizem 10mg Iv given with improved HR to 120s-130s. xopenex scheduled Q 8 hours. Changed to zosyn with temp 102F.   1/30- successful cardioversion, post procedure confused and pulling oxygen off, desaturation, constant re-direction, monitored in ICU overnight. Changed to oral amiodarone. eliquis for anticoagulation. Holding parameters on BP meds. IV steroids, nebs and antibiotic for probable lower resp infection. IV lasix as Bp tolerates.   1/31- HR remain under control NSR 60-70s.On amiodarone, metoprolol and eliquis.  Resp status improved and weaned oxygen. Steroids changed to oral route. DC zosyn and switch to augmentin. Add acapella to nebs. Cr increased from baseline (1.5-1.7). Gentle IVF and monitor with repeat BMP later, was stable,. PT,OT consulted for dc planning. Weaning oxygen. PT/OT consulted and rec H/H without equipment. The patient was transferred to the floor on 1/31. Nephrology was consulted for worsening renal function.      2/2- pt transferred with afib rvr. Successful cardioversion again . Continued oral amiodarone and BB.  still has aggressive cough and URI symptoms. On mucolytic and antibiotic.   2/3- HR 50-60s sinus. Cr sightly higher, sodium 128. BNP 1500. Lasix x one dose. Bringing up more mucus. Steroids weaned 20mg. Still faint wheezes on exam.  Patient had worsening ARF  on CKD 3, keeped on george and monitor,nephrology was following.reconsulted PT,OT.fley was removed latera nd she had improvement in ARF.  Changed diet for low slat diet duo to hyponatremia with improvement.  She did well with PT,OT.  Her wheezing and respiratory status is improved,  Patient has been discharged home with HH and PO Abx and OAC and amiodarone and follow up with PCP,nephrology and cardiology in next few days.      1-29:  Admitted with AFib with RVR  1-30:  Underwent TALHA/DC cardioversion successfully  2-1 Back in A-fib 120s. SOB  2-3 Still coughing and SOB. NSR 60      2/2/19 CV - 360J converted A-fib to sinus bradycardia 55. Change amiodarone to po. Ok for telemetry     Echo 3/7/23   The left ventricle is normal in size with eccentric hypertrophy and normal systolic function.   The estimated ejection fraction is 65%.   Grade II left ventricular diastolic dysfunction.   Mild tricuspid regurgitation.   Small pericardial effusion.   Moderate right atrial enlargement.   Severe left atrial enlargement.   Normal right ventricular size with normal right ventricular systolic function.   There is moderate aortic valve stenosis.   Aortic valve area is 1.20 cm2; peak velocity is 2.70 m/s; mean gradient is 19 mmHg.   Mild mitral regurgitation.   Mild pulmonic regurgitation.   Normal central venous pressure (3 mmHg).   The estimated PA systolic pressure is 46 mmHg.   There is pulmonary hypertension.     2/13/19 Less SOB since discharge  EKG NSR with PACs 65   Decrease amiodarone 200 qd  OV 2 months - will discuss changing amiodarone to flecainide at that time     4/17/19 Denies CP or SOB  EKG sinus bradycardia 44  HR mostly runs 40-50 at home  Change metoprolol 100 qd to toprol XL 50 qd - may decrease further if bradycardia persists  Discussed alternative AAD - given her repeated episodes of PAF we are both in agreement to stay on amiodarone for now  OV 1 month with TSH, CMP, EKG     5/27/19 HR  improved to the low 50s  Has held eliquis the last 2 days due to bleeding from a recent skin CA removal  Denies CP or SOB     9/11/19 Denies CP or SOB  Some LE edema  EKG sinus bradycardia 46 NSSTT changes  Stop norvasc with LE edema  Add cardura 4 mg qd  Decrease toprol 25 qd with bradycardia  OV 3 months with CMP, TSH     12/11/19 Denies CP or SOB  LE edema resolved after stopping norvasc  BP controlled by home readings  HR runs 45-50  EKG sinus bradycardia 47 1st degree AVB  Denies dizziness  Bradycardia well tolerated - will continue Rx  OV 6 months with echo      6/18/20 Denies CP or dizziness  Mild stable WILKINSON  EKG sinus bradycardia 44 otherwise ok  Bradycardia continues to be well tolerated  OV 6 months with CMP, TSH on chronic amiodarone        10/14/20 EKG  - suspect this is A-flutter 2:1 with aberrancy  Denies CP, SOB, or palpitations  Appears to be in A-flutter RVR - will send to the ER for admission. If issues with tachy-marlon continue will likely end up needing PPM     10/23/20 HR were better controlled when she went to ER. She was restarted on metoprolol and discharged. HR mostly 40-50s at home. Bradycardia well tolerated. Need clearance for skin graft surgery at  on left leg  EKG sinus bradycardia 43   Back in NSR - bradycardia well tolerated. No indication for PPM at this point  Cleared for skin graft surgery at moderate cardiac risk - ok to hold eliquis 2 days before  OV 3 months  Continue Rx for PAF, HTN, CHF     12/1/20 Denies CP or SOB. Did well after her skin graft surgery  HR 45-50. BP elevated - controlled by outside readings  Continue Rx for PAF, HTN, HLD, CHF  OV 3 months     3/1/21 Denies CP or SOB  EKG NSR 60 IVCD  HR 55-60 by home readings as well      Continue Rx for PAF, HTN, HLD, diastolic CHF  OV 6 months with CMP, TSH on chronic amiodarone, Echo to look at MR           5/6/21 Denies further dizziness or syncope  EKG NSR 69 1st degree AVB IVCD  On amiodarone 100 qd and off of  metoprolol  TSH 2.4 LFT ok     suspect recent syncope was from dehydration and not bradycardia  Staying in NSR on amiodarone 100 qd and off of metoprolol  Continue Rx for PAF, HTN, HLD, diastolic CHF  OV 3 months     8/6/21 Denies CP, SOB, or dizziness  EKG NSR 1st degree AVB RBBB   Continue Rx for PAF, HTN, HLD, diastolic CHF  OV 6 months with CMP, TSH  Cleared for breast surgery at moderate cardiac risk - ok to hold eliquis 3 days before         1/19/22 Less SOB since discharge. On Home O2. Scheduled for pulmonary evaluation  EKG NSR 1st degree AVB RBBB   Continue Rx for PAF, HTN, HLD, diastolic CHF  Volume status appears stable  OV 3 months with BNP, BMP     4/4/22 Denies CP, stable mild WILKINSON  BP controlled   Continue Rx for PAF, HTN, HLD, diastolic CHF  Volume status appears stable  OV 3 months with BNP, CMP, TSH        7/8/22 Denies CP, stable WILKINSON - only uses O2 at night  EKG NSR RBBB/LAFB    Continue Rx for PAF, HTN, HLD, diastolic CHF  Volume status appears stable. Discussed decreasing potassium in diet  OV 6 months with BNP, CMP, TSH     1/9/23 labs not done. Denies CP, mild stable WILKINSON  BP controlled by home readings  EKG NSR RBBB/LAFB    Continue Rx for PAF, HTN, HLD, diastolic CHF  OV 3 months with echo, CMP, TSH, BNP     3/10/23 Labs not done. Was placed on metolazone recently by Dr Boyle for volume overload which since has resolved   CMP, TSH, BNP - next week - if stable then OV 3 months      Continue Rx for PAF, HTN, HLD, diastolic CHF     Labs 3/17/23  K 4.6  Cr 1.9  LFT ok    TSH 3.0     6/16/23 Denies CP or SOB  EKG NSR 1st degree AVB RBBB/LAFB  BP controlled       Left Ventricle: Normal wall motion. There is normal systolic function with a visually estimated ejection fraction of 55 - 60%. Diastolic function : Unable to assess due to atrial fibrillation.    Left Atrium: Left atrium is severely dilated.    Right Ventricle: Moderate right ventricular enlargement.    Right Atrium: Right atrium  is severely dilated.    Aortic Valve: There is mild stenosis. Aortic valve area by VTI is 1.08 cm². Aortic valve peak velocity is 2.14 m/s. Mean gradient is 12 mmHg. The dimensionless index is 0.41.    Mitral Valve: There is mild to moderate regurgitation.    Tricuspid Valve: There is moderate regurgitation.    Pulmonary Artery: There is pulmonary hypertension. The estimated pulmonary artery systolic pressure is 59 mmHg.    IVC/SVC: Intermediate venous pressure at 8 mmHg.    Pericardium: There is a moderate effusion. Left pleural effusion.    Known to me        Past Medical History:   Diagnosis Date    A-fib     Acute respiratory failure with hypoxia 09/01/2015    Breast cancer     invasive ductal carcinoma    Chronic heart failure with preserved ejection fraction 10/26/2023    GII dd    CKD (chronic kidney disease)     Community acquired pneumonia 08/26/2023    Diabetes mellitus type II     Fatty liver     GERD (gastroesophageal reflux disease)     Hearing loss of both ears     wears bilateral hearing aids    Hemochromatosis     History of anemia due to CKD     History of pleural effusion     with thoracentesis    Hyperlipidemia     Hypertension     Macular degeneration     On home oxygen therapy     Osteoarthritis     Left knee    Osteoporosis     Vaginal delivery     x2    Vitamin D deficiency disease     Wears partial dentures     upper removable bridge       Past Surgical History:   Procedure Laterality Date    AXILLARY NODE DISSECTION Left 10/18/2021    Procedure: LYMPHADENECTOMY, AXILLARY;  Surgeon: Darlene Roca MD;  Location: [facility] OR;  Service: General;  Laterality: Left;    BREAST BIOPSY      BREAST LUMPECTOMY      invasive ductal carcinoma    BREAST SURGERY Bilateral     Reduction r/t h/o fibrocystic disease    CARDIOVERSION N/A 9/15/2023    Procedure: Cardioversion;  Surgeon: Constantine Chambers MD;  Location: [facility] CATH LAB;  Service: Cardiology;  Laterality: N/A;     CHOLECYSTECTOMY  08/2015    EYE SURGERY      Cat Ext  OU    HYSTERECTOMY      partial due to uterine fibroids    INCISION AND DRAINAGE OF KNEE Left 09/17/2020    Procedure: INCISION AND DRAINAGE, KNEE;  Surgeon: Rolando Wagoner MD;  Location: [facility] OR;  Service: Orthopedics;  Laterality: Left;    INJECTION FOR SENTINEL NODE IDENTIFICATION Left 10/18/2021    Procedure: INJECTION, FOR SENTINEL NODE IDENTIFICATION;  Surgeon: Darlene Roca MD;  Location: [facility] OR;  Service: General;  Laterality: Left;    JOINT REPLACEMENT Left 05/13/2013    TKR    JOINT REPLACEMENT Right 08/03/2015    TKR    MASTECTOMY, PARTIAL Left 10/18/2021    Procedure: MASTECTOMY, PARTIAL -- wire;  Surgeon: Darlene Roca MD;  Location: [facility] OR;  Service: General;  Laterality: Left;  RN PREOP 10/15/2021   VACCINATED-----NEED H/P AND ORDERS    SENTINEL LYMPH NODE BIOPSY Left 10/18/2021    Procedure: BIOPSY, LYMPH NODE, SENTINEL;  Surgeon: Darlene Roca MD;  Location: [facility] OR;  Service: General;  Laterality: Left;    THORACENTESIS      TOTAL KNEE ARTHROPLASTY Right 2015    left knee done 5/2013    TREATMENT OF CARDIAC ARRHYTHMIA N/A 9/15/2023    Procedure: Cardioversion or Defibrillation;  Surgeon: Constantine Chambers MD;  Location: [facility] CATH LAB;  Service: Cardiology;  Laterality: N/A;    WOUND DRESSING Left 09/17/2020    Procedure: WOUND VAC APPLICATION;  Surgeon: Rolando Wagoner MD;  Location: [facility] OR;  Service: Orthopedics;  Laterality: Left;       Review of patient's allergies indicates:  No Known Allergies    Current Facility-Administered Medications on File Prior to Encounter   Medication    denosumab (PROLIA) injection 60 mg     Current Outpatient Medications on File Prior to Encounter   Medication Sig    allopurinoL (ZYLOPRIM) 100 MG tablet Take 1 tablet by mouth once daily.    amiodarone (PACERONE) 100 MG Tab Take 1 tablet (100 mg total) by mouth once daily.    apixaban (ELIQUIS) 2.5 mg Tab TAKE ONE TABLET BY MOUTH TWICE  DAILY    atorvastatin (LIPITOR) 40 MG tablet TAKE ONE TABLET BY MOUTH ONCE DAILY    bumetanide (BUMEX) 0.5 MG Tab Take 0.5 mg by mouth.    bumetanide (BUMEX) 1 MG tablet Take 1 tablet (1 mg total) by mouth once daily. (Patient not taking: Reported on 10/4/2023)    calcium carbonate (TUMS) 200 mg calcium (500 mg) chewable tablet Take 2 tablets by mouth.    ergocalciferol (ERGOCALCIFEROL) 50,000 unit Cap Take 50,000 Units by mouth every 30 days.     folic acid (FOLVITE) 1 MG tablet TAKE 1 TABLET BY MOUTH EVERY DAY    mupirocin (BACTROBAN) 2 % ointment Apply topically every evening.    solifenacin (VESICARE) 10 MG tablet Take 1 tablet (10 mg total) by mouth once daily.     Family History       Problem Relation (Age of Onset)    Aneurysm Father    Cancer Mother    Hypertension Mother    No Known Problems Sister, Sister, Brother, Daughter, Son          Tobacco Use    Smoking status: Former     Passive exposure: Past    Smokeless tobacco: Never   Substance and Sexual Activity    Alcohol use: Not Currently    Drug use: No    Sexual activity: Not Currently     Review of Systems   Unable to perform ROS: Acuity of condition     Objective:     Vital Signs (Most Recent):  Temp: 98.5 °F (36.9 °C) (10/27/23 0722)  Pulse: (!) 112 (10/27/23 1030)  Resp: 18 (10/27/23 1030)  BP: 115/84 (10/27/23 1030)  SpO2: 96 % (10/27/23 1030) Vital Signs (24h Range):  Temp:  [97.4 °F (36.3 °C)-98.5 °F (36.9 °C)] 98.5 °F (36.9 °C)  Pulse:  [] 112  Resp:  [17-28] 18  SpO2:  [91 %-99 %] 96 %  BP: (111-152)/(58-99) 115/84     Weight: 65 kg (143 lb 4.8 oz)  Body mass index is 26.21 kg/m².    SpO2: 96 %         Intake/Output Summary (Last 24 hours) at 10/27/2023 1031  Last data filed at 10/27/2023 0900  Gross per 24 hour   Intake 29796 ml   Output 1220 ml   Net 8780 ml       Lines/Drains/Airways       Peripheral Intravenous Line  Duration                  Peripheral IV - Single Lumen 10/26/23 1616 20 G Anterior;Right Forearm <1 day                      Physical Exam  Constitutional:       Appearance: She is well-developed.   HENT:      Head: Normocephalic and atraumatic.   Eyes:      Conjunctiva/sclera: Conjunctivae normal.      Pupils: Pupils are equal, round, and reactive to light.   Cardiovascular:      Rate and Rhythm: Tachycardia present. Rhythm irregular.      Pulses: Intact distal pulses.      Heart sounds: Normal heart sounds.   Pulmonary:      Effort: Pulmonary effort is normal.      Breath sounds: Decreased air movement present. Examination of the left-middle field reveals decreased breath sounds. Examination of the left-lower field reveals decreased breath sounds. Decreased breath sounds present.   Abdominal:      General: Bowel sounds are normal.      Palpations: Abdomen is soft.   Musculoskeletal:         General: Normal range of motion.      Cervical back: Normal range of motion and neck supple.   Skin:     General: Skin is warm and dry.   Neurological:      Mental Status: She is alert and oriented to person, place, and time.          Significant Labs: All pertinent lab results from the last 24 hours have been reviewed.    Significant Imaging: Echocardiogram: 2D echo with color flow doppler:   Results for orders placed or performed during the hospital encounter of 08/21/15   2D echo with color flow doppler   Result Value Ref Range    EF + QEF 75 55 - 65    Mitral Valve Regurgitation MILD     Est. PA Systolic Pressure 40.21 (A)     Tricuspid Valve Regurgitation MODERATE (A)     Narrative    TEST DESCRIPTION       Aorta: The aortic root is normal in size, measuring 2.1 cm at sinotubular junction and 2.8 cm at Sinuses of Valsalva. The proximal ascending aorta is normal in size, measuring 2.6 cm across.     Left Atrium: The left atrial volume index is moderately enlarged, measuring 45.37 cc/m2.     Left Ventricle: The left ventricle is normal in size, with an end-diastolic diameter of 4.9 cm, and an end-systolic diameter of 3.5 cm. LV  wall thickness is normal, with the septum measuring 1.0 cm and the posterior wall measuring 0.9 cm across. Relative   wall thickness was normal at 0.37, and the LV mass index was 93.2 g/m2 consistent with normal left ventricular mass. Global left ventricular systolic function appears hyperdynamic. Visually estimated ejection fraction is >70%. The LV Doppler derived   stroke volume equals 62.0 ccs.       Right Atrium: The right atrium is enlarged, measuring 5.3 cm in length and 3.6 cm in width in the apical view.     Right Ventricle: The right ventricle is normal in size measuring 2.9 cm at the base in the apical right ventricle-focused view. Global right ventricular systolic function appears normal. Tricuspid annular plane systolic excursion (TAPSE) is 1.9 cm. The   estimated PA systolic pressure is 40 mmHg.     Aortic Valve:  The aortic valve is moderately sclerotic.     Mitral Valve:  There is mild mitral regurgitation.     Tricuspid Valve:  There is moderate tricuspid regurgitation.     Pulmonary Valve:  There is mild pulmonic regurgitation.     IVC: IVC is normal in size and collapses > 50% with a sniff, suggesting normal right atrial pressure of 3 mmHg.     Intracavitary: There is no evidence of pericardial effusion, intracavity mass, thrombi, or vegetation.         CONCLUSIONS     1 - Hyperdynamic left ventricular systolic function (EF >70%).     2 - Biatrial enlargement.     3 - The estimated PA systolic pressure is 40 mmHg.     4 - Mild mitral regurgitation.     5 - Moderate tricuspid regurgitation.     6 - Mild pulmonic regurgitation.         This document has been electronically    SIGNED BY: Amauri Lomas MD On: 09/16/2015 13:16     Assessment and Plan:     * Acute on chronic diastolic heart failure  Diuresis and afterload reduction as tolerated. Likely worsened by recurrent A-flutter. Repeat echo    Pleural effusion on left  Agree with plan for thoracentesis    CKD (chronic kidney disease), stage  IV  Diuretics per renal    Type 2 diabetes mellitus with stage 4 chronic kidney disease, without long-term current use of insulin  Per primary    Paroxysmal atrial fibrillation  Currently with A-flutter mild RVR. Stop amiodarone with prolonged QT. Will attempt rate control with metoprolol as tolerated. Resume eliquis after thoracentesis. Consider repeat CV if A-flutter not controlled    MGUS (monoclonal gammopathy of unknown significance)  Per primary        VTE Risk Mitigation (From admission, onward)         Ordered     IP VTE HIGH RISK PATIENT  Once         10/27/23 0652     Place sequential compression device  Until discontinued         10/27/23 0652     Place sequential compression device  Until discontinued         10/26/23 1919                Thank you for your consult. I will follow-up with patient. Please contact us if you have any additional questions.    Amauri Lomas MD  Cardiology   [facility]

## 2023-10-27 NOTE — ASSESSMENT & PLAN NOTE
· BNP 3000 with elevated JVD and hypervolemic hyponatremia on admission  · Right-sided heart failure, which is why her oxygen has not changed much, but why her liver is congested, I suspect  · Likely worsened by recurrent A-flutter  · Continue IV diuresis  · Echo ordered  · Cardiology consulted  · See HF note

## 2023-10-27 NOTE — SUBJECTIVE & OBJECTIVE
Interval History:  No acute overnight events.  Patient afebrile.  Daughter at bedside.  Patient states she feels fatigued.  Denies any chest pain, shortness for breath, abdominal pain, nausea, vomiting.  Overall states she feels weak.  Appears very anxious.    Review of Systems   Constitutional:  Positive for activity change and fatigue. Negative for chills and fever.   Respiratory:  Positive for shortness of breath (intermittently, worse with exertion). Negative for cough and wheezing.    Cardiovascular:  Negative for chest pain, palpitations and leg swelling.   Gastrointestinal:  Negative for abdominal distention, abdominal pain, diarrhea, nausea and vomiting.   Genitourinary:  Negative for difficulty urinating and dysuria.   Neurological:  Positive for weakness. Negative for dizziness and headaches.   Psychiatric/Behavioral:  Positive for confusion (intermittent confusion per daughter) and decreased concentration. The patient is nervous/anxious.      Objective:     Vital Signs (Most Recent):  Temp: 97.6 °F (36.4 °C) (10/27/23 1111)  Pulse: 104 (10/27/23 1111)  Resp: 19 (10/27/23 1111)  BP: 109/76 (10/27/23 1111)  SpO2: 96 % (10/27/23 1111) Vital Signs (24h Range):  Temp:  [97.4 °F (36.3 °C)-98.5 °F (36.9 °C)] 97.6 °F (36.4 °C)  Pulse:  [] 104  Resp:  [16-28] 19  SpO2:  [91 %-99 %] 96 %  BP: (109-152)/(58-99) 109/76     Weight: 65 kg (143 lb 4.8 oz)  Body mass index is 26.21 kg/m².    Intake/Output Summary (Last 24 hours) at 10/27/2023 1452  Last data filed at 10/27/2023 1040  Gross per 24 hour   Intake 31193 ml   Output 2320 ml   Net 7680 ml         Physical Exam  Vitals and nursing note reviewed.   Constitutional:       General: She is not in acute distress.     Appearance: She is ill-appearing.   HENT:      Mouth/Throat:      Mouth: Mucous membranes are dry.   Eyes:      Extraocular Movements: Extraocular movements intact.   Cardiovascular:      Rate and Rhythm: Normal rate and regular rhythm.      Heart  sounds: No murmur heard.     No gallop.   Pulmonary:      Effort: Pulmonary effort is normal. No respiratory distress.      Breath sounds: Examination of the left-middle field reveals decreased breath sounds. Examination of the left-lower field reveals decreased breath sounds. Decreased breath sounds and rales present. No wheezing.      Comments: On 2L NC, decreased breath sounds in left lower lobe  Abdominal:      General: There is no distension.      Palpations: Abdomen is soft.      Tenderness: There is no abdominal tenderness.   Musculoskeletal:         General: No swelling or tenderness.      Right lower leg: Edema present.      Left lower leg: Edema present.      Comments: Bilateral lower extremity with trace edema   Skin:     General: Skin is warm and dry.   Neurological:      General: No focal deficit present.      Mental Status: She is alert and oriented to person, place, and time.             Significant Labs: All pertinent labs within the past 24 hours have been reviewed.    Significant Imaging: I have reviewed all pertinent imaging results/findings within the past 24 hours.

## 2023-10-27 NOTE — SUBJECTIVE & OBJECTIVE
Past Medical History:   Diagnosis Date    A-fib     Acute respiratory failure with hypoxia 09/01/2015    Breast cancer     invasive ductal carcinoma    Chronic heart failure with preserved ejection fraction 10/26/2023    GII dd    CKD (chronic kidney disease)     Community acquired pneumonia 08/26/2023    Diabetes mellitus type II     Fatty liver     GERD (gastroesophageal reflux disease)     Hearing loss of both ears     wears bilateral hearing aids    Hemochromatosis     History of anemia due to CKD     History of pleural effusion     with thoracentesis    Hyperlipidemia     Hypertension     Macular degeneration     On home oxygen therapy     Osteoarthritis     Left knee    Osteoporosis     Vaginal delivery     x2    Vitamin D deficiency disease     Wears partial dentures     upper removable bridge       Past Surgical History:   Procedure Laterality Date    AXILLARY NODE DISSECTION Left 10/18/2021    Procedure: LYMPHADENECTOMY, AXILLARY;  Surgeon: Darlene Roca MD;  Location: [facility] OR;  Service: General;  Laterality: Left;    BREAST BIOPSY      BREAST LUMPECTOMY      invasive ductal carcinoma    BREAST SURGERY Bilateral     Reduction r/t h/o fibrocystic disease    CARDIOVERSION N/A 9/15/2023    Procedure: Cardioversion;  Surgeon: Constantine Chambers MD;  Location: [facility] CATH LAB;  Service: Cardiology;  Laterality: N/A;    CHOLECYSTECTOMY  08/2015    EYE SURGERY      Cat Ext  OU    HYSTERECTOMY      partial due to uterine fibroids    INCISION AND DRAINAGE OF KNEE Left 09/17/2020    Procedure: INCISION AND DRAINAGE, KNEE;  Surgeon: Rolando Wagoner MD;  Location: [facility] OR;  Service: Orthopedics;  Laterality: Left;    INJECTION FOR SENTINEL NODE IDENTIFICATION Left 10/18/2021    Procedure: INJECTION, FOR SENTINEL NODE IDENTIFICATION;  Surgeon: Darlene Roca MD;  Location: [facility] OR;  Service: General;  Laterality: Left;    JOINT REPLACEMENT Left 05/13/2013    TKR    JOINT REPLACEMENT Right 08/03/2015    TKR    MASTECTOMY,  PARTIAL Left 10/18/2021    Procedure: MASTECTOMY, PARTIAL -- wire;  Surgeon: Darlene Roca MD;  Location: [facility] OR;  Service: General;  Laterality: Left;  RN PREOP 10/15/2021   VACCINATED-----NEED H/P AND ORDERS    SENTINEL LYMPH NODE BIOPSY Left 10/18/2021    Procedure: BIOPSY, LYMPH NODE, SENTINEL;  Surgeon: Darlene Roca MD;  Location: [facility] OR;  Service: General;  Laterality: Left;    THORACENTESIS      TOTAL KNEE ARTHROPLASTY Right 2015    left knee done 5/2013    TREATMENT OF CARDIAC ARRHYTHMIA N/A 9/15/2023    Procedure: Cardioversion or Defibrillation;  Surgeon: Constantine Chambers MD;  Location: [facility] CATH LAB;  Service: Cardiology;  Laterality: N/A;    WOUND DRESSING Left 09/17/2020    Procedure: WOUND VAC APPLICATION;  Surgeon: Rolando Wagoner MD;  Location: [facility] OR;  Service: Orthopedics;  Laterality: Left;       Review of patient's allergies indicates:  No Known Allergies    Current Facility-Administered Medications on File Prior to Encounter   Medication    denosumab (PROLIA) injection 60 mg     Current Outpatient Medications on File Prior to Encounter   Medication Sig    allopurinoL (ZYLOPRIM) 100 MG tablet Take 1 tablet by mouth once daily.    amiodarone (PACERONE) 100 MG Tab Take 1 tablet (100 mg total) by mouth once daily.    apixaban (ELIQUIS) 2.5 mg Tab TAKE ONE TABLET BY MOUTH TWICE DAILY    atorvastatin (LIPITOR) 40 MG tablet TAKE ONE TABLET BY MOUTH ONCE DAILY    bumetanide (BUMEX) 0.5 MG Tab Take 0.5 mg by mouth.    bumetanide (BUMEX) 1 MG tablet Take 1 tablet (1 mg total) by mouth once daily. (Patient not taking: Reported on 10/4/2023)    calcium carbonate (TUMS) 200 mg calcium (500 mg) chewable tablet Take 2 tablets by mouth.    ergocalciferol (ERGOCALCIFEROL) 50,000 unit Cap Take 50,000 Units by mouth every 30 days.     folic acid (FOLVITE) 1 MG tablet TAKE 1 TABLET BY MOUTH EVERY DAY    mupirocin (BACTROBAN) 2 % ointment Apply topically every evening.    solifenacin (VESICARE) 10 MG tablet  Take 1 tablet (10 mg total) by mouth once daily.     Family History       Problem Relation (Age of Onset)    Aneurysm Father    Cancer Mother    Hypertension Mother    No Known Problems Sister, Sister, Brother, Daughter, Son          Tobacco Use    Smoking status: Former     Passive exposure: Past    Smokeless tobacco: Never   Substance and Sexual Activity    Alcohol use: Not Currently    Drug use: No    Sexual activity: Not Currently     Review of Systems   Unable to perform ROS: Acuity of condition     Objective:     Vital Signs (Most Recent):  Temp: 98.5 °F (36.9 °C) (10/27/23 0722)  Pulse: (!) 112 (10/27/23 1030)  Resp: 18 (10/27/23 1030)  BP: 115/84 (10/27/23 1030)  SpO2: 96 % (10/27/23 1030) Vital Signs (24h Range):  Temp:  [97.4 °F (36.3 °C)-98.5 °F (36.9 °C)] 98.5 °F (36.9 °C)  Pulse:  [] 112  Resp:  [17-28] 18  SpO2:  [91 %-99 %] 96 %  BP: (111-152)/(58-99) 115/84     Weight: 65 kg (143 lb 4.8 oz)  Body mass index is 26.21 kg/m².    SpO2: 96 %         Intake/Output Summary (Last 24 hours) at 10/27/2023 1031  Last data filed at 10/27/2023 0900  Gross per 24 hour   Intake 95924 ml   Output 1220 ml   Net 8780 ml       Lines/Drains/Airways       Peripheral Intravenous Line  Duration                  Peripheral IV - Single Lumen 10/26/23 1616 20 G Anterior;Right Forearm <1 day                     Physical Exam  Constitutional:       Appearance: She is well-developed.   HENT:      Head: Normocephalic and atraumatic.   Eyes:      Conjunctiva/sclera: Conjunctivae normal.      Pupils: Pupils are equal, round, and reactive to light.   Cardiovascular:      Rate and Rhythm: Tachycardia present. Rhythm irregular.      Pulses: Intact distal pulses.      Heart sounds: Normal heart sounds.   Pulmonary:      Effort: Pulmonary effort is normal.      Breath sounds: Decreased air movement present. Examination of the left-middle field reveals decreased breath sounds. Examination of the left-lower field reveals decreased  breath sounds. Decreased breath sounds present.   Abdominal:      General: Bowel sounds are normal.      Palpations: Abdomen is soft.   Musculoskeletal:         General: Normal range of motion.      Cervical back: Normal range of motion and neck supple.   Skin:     General: Skin is warm and dry.   Neurological:      Mental Status: She is alert and oriented to person, place, and time.          Significant Labs: All pertinent lab results from the last 24 hours have been reviewed.    Significant Imaging: Echocardiogram: 2D echo with color flow doppler:   Results for orders placed or performed during the hospital encounter of 08/21/15   2D echo with color flow doppler   Result Value Ref Range    EF + QEF 75 55 - 65    Mitral Valve Regurgitation MILD     Est. PA Systolic Pressure 40.21 (A)     Tricuspid Valve Regurgitation MODERATE (A)     Narrative    TEST DESCRIPTION       Aorta: The aortic root is normal in size, measuring 2.1 cm at sinotubular junction and 2.8 cm at Sinuses of Valsalva. The proximal ascending aorta is normal in size, measuring 2.6 cm across.     Left Atrium: The left atrial volume index is moderately enlarged, measuring 45.37 cc/m2.     Left Ventricle: The left ventricle is normal in size, with an end-diastolic diameter of 4.9 cm, and an end-systolic diameter of 3.5 cm. LV wall thickness is normal, with the septum measuring 1.0 cm and the posterior wall measuring 0.9 cm across. Relative   wall thickness was normal at 0.37, and the LV mass index was 93.2 g/m2 consistent with normal left ventricular mass. Global left ventricular systolic function appears hyperdynamic. Visually estimated ejection fraction is >70%. The LV Doppler derived   stroke volume equals 62.0 ccs.       Right Atrium: The right atrium is enlarged, measuring 5.3 cm in length and 3.6 cm in width in the apical view.     Right Ventricle: The right ventricle is normal in size measuring 2.9 cm at the base in the apical right  ventricle-focused view. Global right ventricular systolic function appears normal. Tricuspid annular plane systolic excursion (TAPSE) is 1.9 cm. The   estimated PA systolic pressure is 40 mmHg.     Aortic Valve:  The aortic valve is moderately sclerotic.     Mitral Valve:  There is mild mitral regurgitation.     Tricuspid Valve:  There is moderate tricuspid regurgitation.     Pulmonary Valve:  There is mild pulmonic regurgitation.     IVC: IVC is normal in size and collapses > 50% with a sniff, suggesting normal right atrial pressure of 3 mmHg.     Intracavitary: There is no evidence of pericardial effusion, intracavity mass, thrombi, or vegetation.         CONCLUSIONS     1 - Hyperdynamic left ventricular systolic function (EF >70%).     2 - Biatrial enlargement.     3 - The estimated PA systolic pressure is 40 mmHg.     4 - Mild mitral regurgitation.     5 - Moderate tricuspid regurgitation.     6 - Mild pulmonic regurgitation.         This document has been electronically    SIGNED BY: Amauri Lomas MD On: 09/16/2015 13:16

## 2023-10-27 NOTE — ASSESSMENT & PLAN NOTE
- chronic condition noted   - has required cardioversion in previous admissions   - continued management per hospital primary and cardiology

## 2023-10-27 NOTE — ASSESSMENT & PLAN NOTE
· Longstanding, permanent AFib  · Patient has been cardioverted about 4 times in the past, per daughter   · Heart rate 120 in the ED, IV metoprolol given, heart rate improved to - controlled  · Given hemochromatosis and , amiodarone on hold  · cardiology consulted: Stop amiodarone with prolonged QT. Will attempt rate control with metoprolol as tolerated. Consider repeat CV if A-flutter not controlled  · Resume eliquis after thoracentesis.

## 2023-10-27 NOTE — H&P
[facility] Medicine  History & Physical    Patient Name: Barbara Rizo  MRN: [id number]  Patient Class: IP- Inpatient  Admission Date: 10/26/2023  Attending Physician: Roderick Delgado III, MD   Primary Care Provider: Paras Oro MD         Patient information was obtained from patient, relative(s), past medical records and ER records.     Subjective:     Principal Problem:Acute on chronic diastolic heart failure    Chief Complaint:   Chief Complaint   Patient presents with    Abnormal Lab     Pt sent to ED by PCP for abnormal labs. Wants Kidney and liver functions checked. Sodium also low. Pt went for routine visit. Pt reports fatigue         HPI:   Barbara Rizo is a 83 y.o. female who has a past medical history of A-fib, Acute respiratory failure with hypoxia, Breast cancer, CKD, Community acquired pneumonia, Diabetes mellitus type II, Fatty liver, GERD, Hearing loss of both ears, Hemochromatosis, History of anemia due to CKD, History of pleural effusion, Hyperlipidemia, Hypertension, Macular degeneration, On home oxygen therapy, Osteoarthritis, Osteoporosis, Vaginal delivery, Vitamin D deficiency disease, and Wears partial dentures, presented to the ED with CC of Confusion.    Patient was sent in by PCP/home health labs hyponatremia and elevated creatinine of 2.2.  Unclear if point of care hyponatremia was correct at BNP taken at same time (noon) was 127 and repeat BNP showed sodium again at 127 four hours later-(POC electrolytes are often wrong).  Daughter states that patient has been intermittently confused for the past week or so, has not necessarily gotten worse but has been persistent.  She is oriented, A/O x3, however will often argue with daughter about things that are apparently wrong.  Denies hallucinations.  Has had a couple falls recently without head injury, has a bruise on her right thigh, without hip pain.  In the ED her liver enzymes were elevated,  and ALT  311-which were normal on the last set of labs, and for many years normal.  Patient does have hemochromatosis and fair and ferritin is elevated at 800, ultrasound of liver with Doppler ordered.  Also patient has elevated BNP at 2876 and elevated JVD, echo ordered.  She has a fairly large left pleural effusion as well, however it is stable from previous.  She is on 4 L of home oxygen.  Denies chest pain and denies shortness of breath beyond her normal dyspnea.        Past Medical History:   Diagnosis Date    A-fib     Acute respiratory failure with hypoxia 09/01/2015    Breast cancer     invasive ductal carcinoma    CKD (chronic kidney disease)     Community acquired pneumonia 08/26/2023    Diabetes mellitus type II     Fatty liver     GERD (gastroesophageal reflux disease)     Hearing loss of both ears     wears bilateral hearing aids    Hemochromatosis     History of anemia due to CKD     History of pleural effusion     with thoracentesis    Hyperlipidemia     Hypertension     Macular degeneration     On home oxygen therapy     Osteoarthritis     Left knee    Osteoporosis     Vaginal delivery     x2    Vitamin D deficiency disease     Wears partial dentures     upper removable bridge       Past Surgical History:   Procedure Laterality Date    AXILLARY NODE DISSECTION Left 10/18/2021    Procedure: LYMPHADENECTOMY, AXILLARY;  Surgeon: Darlene Roca MD;  Location: [facility] OR;  Service: General;  Laterality: Left;    BREAST BIOPSY      BREAST LUMPECTOMY      invasive ductal carcinoma    BREAST SURGERY Bilateral     Reduction r/t h/o fibrocystic disease    CARDIOVERSION N/A 9/15/2023    Procedure: Cardioversion;  Surgeon: Constantine Chambers MD;  Location: [facility] CATH LAB;  Service: Cardiology;  Laterality: N/A;    CHOLECYSTECTOMY  08/2015    EYE SURGERY      Cat Ext  OU    HYSTERECTOMY      partial due to uterine fibroids    INCISION AND DRAINAGE OF KNEE Left 09/17/2020    Procedure: INCISION AND DRAINAGE, KNEE;  Surgeon:  Rolando Wagoner MD;  Location: [facility] OR;  Service: Orthopedics;  Laterality: Left;    INJECTION FOR SENTINEL NODE IDENTIFICATION Left 10/18/2021    Procedure: INJECTION, FOR SENTINEL NODE IDENTIFICATION;  Surgeon: Darlene Roca MD;  Location: [facility] OR;  Service: General;  Laterality: Left;    JOINT REPLACEMENT Left 05/13/2013    TKR    JOINT REPLACEMENT Right 08/03/2015    TKR    MASTECTOMY, PARTIAL Left 10/18/2021    Procedure: MASTECTOMY, PARTIAL -- wire;  Surgeon: Darlene Roca MD;  Location: [facility] OR;  Service: General;  Laterality: Left;  RN PREOP 10/15/2021   VACCINATED-----NEED H/P AND ORDERS    SENTINEL LYMPH NODE BIOPSY Left 10/18/2021    Procedure: BIOPSY, LYMPH NODE, SENTINEL;  Surgeon: Darlene Roca MD;  Location: [facility] OR;  Service: General;  Laterality: Left;    THORACENTESIS      TOTAL KNEE ARTHROPLASTY Right 2015    left knee done 5/2013    TREATMENT OF CARDIAC ARRHYTHMIA N/A 9/15/2023    Procedure: Cardioversion or Defibrillation;  Surgeon: Constantine Chambers MD;  Location: [facility] CATH LAB;  Service: Cardiology;  Laterality: N/A;    WOUND DRESSING Left 09/17/2020    Procedure: WOUND VAC APPLICATION;  Surgeon: Rolando Wagoner MD;  Location: [facility] OR;  Service: Orthopedics;  Laterality: Left;       Review of patient's allergies indicates:  No Known Allergies    Current Facility-Administered Medications on File Prior to Encounter   Medication    denosumab (PROLIA) injection 60 mg     Current Outpatient Medications on File Prior to Encounter   Medication Sig    allopurinoL (ZYLOPRIM) 100 MG tablet Take 1 tablet by mouth once daily.    amiodarone (PACERONE) 100 MG Tab Take 1 tablet (100 mg total) by mouth once daily.    apixaban (ELIQUIS) 2.5 mg Tab TAKE ONE TABLET BY MOUTH TWICE DAILY    atorvastatin (LIPITOR) 40 MG tablet TAKE ONE TABLET BY MOUTH ONCE DAILY    bumetanide (BUMEX) 0.5 MG Tab Take 0.5 mg by mouth.    bumetanide (BUMEX) 1 MG tablet Take 1 tablet (1 mg total) by mouth once daily. (Patient not  taking: Reported on 10/4/2023)    calcium carbonate (TUMS) 200 mg calcium (500 mg) chewable tablet Take 2 tablets by mouth.    ergocalciferol (ERGOCALCIFEROL) 50,000 unit Cap Take 50,000 Units by mouth every 30 days.     folic acid (FOLVITE) 1 MG tablet TAKE 1 TABLET BY MOUTH EVERY DAY    mupirocin (BACTROBAN) 2 % ointment Apply topically every evening.    solifenacin (VESICARE) 10 MG tablet Take 1 tablet (10 mg total) by mouth once daily.     Family History       Problem Relation (Age of Onset)    Aneurysm Father    Cancer Mother    Hypertension Mother    No Known Problems Sister, Sister, Brother, Daughter, Son          Tobacco Use    Smoking status: Former     Passive exposure: Past    Smokeless tobacco: Never   Substance and Sexual Activity    Alcohol use: Not Currently    Drug use: No    Sexual activity: Not Currently     Review of Systems   Constitutional:  Positive for activity change and fatigue. Negative for fever.   HENT:  Negative for sore throat and trouble swallowing.    Eyes:  Negative for photophobia and visual disturbance.   Respiratory:  Negative for chest tightness and shortness of breath.    Cardiovascular:  Negative for chest pain, palpitations and leg swelling.   Gastrointestinal:  Negative for abdominal distention, abdominal pain, blood in stool, constipation, diarrhea, nausea and vomiting.   Endocrine: Negative for polydipsia and polyuria.   Genitourinary:  Negative for difficulty urinating, dysuria and hematuria.   Musculoskeletal:  Negative for joint swelling, neck pain and neck stiffness.   Skin:  Positive for color change. Negative for wound.   Allergic/Immunologic: Negative for immunocompromised state.   Neurological:  Negative for dizziness, seizures, syncope and facial asymmetry.   Psychiatric/Behavioral:  Positive for confusion and decreased concentration. Negative for agitation, behavioral problems and hallucinations.      Objective:     Vital Signs (Most Recent):  Temp: 97.6 °F (36.4  °C) (10/26/23 1902)  Pulse: 102 (10/26/23 2032)  Resp: (!) 22 (10/26/23 2032)  BP: 112/76 (10/26/23 2032)  SpO2: (!) 91 % (10/26/23 2032) Vital Signs (24h Range):  Temp:  [97.4 °F (36.3 °C)-97.6 °F (36.4 °C)] 97.6 °F (36.4 °C)  Pulse:  [] 102  Resp:  [18-28] 22  SpO2:  [91 %-99 %] 91 %  BP: (112-152)/(58-99) 112/76     Weight: 69.4 kg (153 lb)  Body mass index is 27.98 kg/m².     Physical Exam  Vitals and nursing note reviewed.   Constitutional:       General: She is not in acute distress.     Appearance: She is well-developed. She is not ill-appearing, toxic-appearing or diaphoretic.   HENT:      Head: Normocephalic and atraumatic.   Eyes:      General: No scleral icterus.     Pupils: Pupils are equal, round, and reactive to light.   Neck:      Thyroid: No thyromegaly.   Elevated JVD to jaw line  Cardiovascular:      Rate and Rhythm: Normal rate and regular rhythm.      Heart sounds: No murmur heard.  Pulmonary:      Effort: Pulmonary effort is normal.      Breath sounds: No stridor. Rales present. No wheezing.      Comments: Decreased breath sounds on left side  Abdominal:      General: There is no distension.      Palpations: Abdomen is soft. There is no mass.      Tenderness: There is no guarding.   Musculoskeletal:         General: No swelling or deformity. Normal range of motion.      Cervical back: Normal range of motion and neck supple. No rigidity.      Right lower leg: No edema.      Left lower leg: No edema.   Lymphadenopathy:      Cervical: No cervical adenopathy.   Skin:     General: Skin is warm and dry.      Capillary Refill: Capillary refill takes less than 2 seconds.      Coloration: Skin is not jaundiced.      Findings: Bruising (Coagulated hematoma on right lateral thigh) present.   Neurological:      Mental Status: She is alert and oriented to person, place, and time.      Cranial Nerves: No cranial nerve deficit.      Motor: No weakness.      Comments: Mild confusion, but generally  oriented and answering appropriately   Psychiatric:         Mood and Affect: Mood normal.         Behavior: Behavior normal.              CRANIAL NERVES     CN III, IV, VI   Pupils are equal, round, and reactive to light.           Recent Results (from the past 24 hour(s))   Urinalysis, Reflex to Urine Culture Urine, Clean Catch    Collection Time: 10/26/23 12:09 PM    Specimen: Urine   Result Value Ref Range    Specimen UA Urine, Clean Catch     Color, UA Yellow Yellow, Straw, Natasha    Appearance, UA Clear Clear    pH, UA 5.0 5.0 - 8.0    Specific Gravity, UA 1.010 1.005 - 1.030    Protein, UA Negative Negative    Glucose, UA Negative Negative    Ketones, UA Negative Negative    Bilirubin (UA) Negative Negative    Occult Blood UA Negative Negative    Nitrite, UA Negative Negative    Leukocytes, UA Negative Negative   COMPREHENSIVE METABOLIC PANEL    Collection Time: 10/26/23 12:30 PM   Result Value Ref Range    Sodium 127 (L) 136 - 145 mmol/L    Potassium 4.1 3.5 - 5.1 mmol/L    Chloride 84 (L) 95 - 110 mmol/L    CO2 32 (H) 23 - 29 mmol/L    Glucose 106 70 - 110 mg/dL    BUN 47 (H) 8 - 23 mg/dL    Creatinine 2.2 (H) 0.5 - 1.4 mg/dL    Calcium 9.0 8.7 - 10.5 mg/dL    Total Protein 6.6 6.0 - 8.4 g/dL    Albumin 3.3 (L) 3.5 - 5.2 g/dL    Total Bilirubin 1.0 0.1 - 1.0 mg/dL    Alkaline Phosphatase 230 (H) 55 - 135 U/L     (H) 10 - 40 U/L     (H) 10 - 44 U/L    eGFR 22 (A) >60 mL/min/1.73 m^2    Anion Gap 11 8 - 16 mmol/L   CBC W/ AUTO DIFFERENTIAL    Collection Time: 10/26/23 12:30 PM   Result Value Ref Range    WBC 8.14 3.90 - 12.70 K/uL    RBC 4.54 4.00 - 5.40 M/uL    Hemoglobin 13.7 12.0 - 16.0 g/dL    Hematocrit 41.5 37.0 - 48.5 %    MCV 91 82 - 98 fL    MCH 30.2 27.0 - 31.0 pg    MCHC 33.0 32.0 - 36.0 g/dL    RDW 16.2 (H) 11.5 - 14.5 %    Platelets 194 150 - 450 K/uL    MPV 11.4 9.2 - 12.9 fL    Immature Granulocytes 0.4 0.0 - 0.5 %    Gran # (ANC) 6.7 1.8 - 7.7 K/uL    Immature Grans (Abs) 0.03 0.00  - 0.04 K/uL    Lymph # 0.9 (L) 1.0 - 4.8 K/uL    Mono # 0.5 0.3 - 1.0 K/uL    Eos # 0.0 0.0 - 0.5 K/uL    Baso # 0.02 0.00 - 0.20 K/uL    nRBC 0 0 /100 WBC    Gran % 81.7 (H) 38.0 - 73.0 %    Lymph % 11.2 (L) 18.0 - 48.0 %    Mono % 6.5 4.0 - 15.0 %    Eosinophil % 0.0 0.0 - 8.0 %    Basophil % 0.2 0.0 - 1.9 %    Differential Method Automated    Magnesium    Collection Time: 10/26/23 12:30 PM   Result Value Ref Range    Magnesium 1.8 1.6 - 2.6 mg/dL   PHOSPHORUS    Collection Time: 10/26/23 12:30 PM   Result Value Ref Range    Phosphorus 2.8 2.7 - 4.5 mg/dL   POCT glucose    Collection Time: 10/26/23  4:11 PM   Result Value Ref Range    POCT Glucose 127 (H) 70 - 110 mg/dL   CBC W/ AUTO DIFFERENTIAL    Collection Time: 10/26/23  4:12 PM   Result Value Ref Range    WBC 7.85 3.90 - 12.70 K/uL    RBC 4.73 4.00 - 5.40 M/uL    Hemoglobin 14.2 12.0 - 16.0 g/dL    Hematocrit 41.9 37.0 - 48.5 %    MCV 89 82 - 98 fL    MCH 30.0 27.0 - 31.0 pg    MCHC 33.9 32.0 - 36.0 g/dL    RDW 16.0 (H) 11.5 - 14.5 %    Platelets 169 150 - 450 K/uL    MPV 10.1 9.2 - 12.9 fL    Immature Granulocytes 0.3 0.0 - 0.5 %    Gran # (ANC) 6.3 1.8 - 7.7 K/uL    Immature Grans (Abs) 0.02 0.00 - 0.04 K/uL    Lymph # 1.0 1.0 - 4.8 K/uL    Mono # 0.5 0.3 - 1.0 K/uL    Eos # 0.0 0.0 - 0.5 K/uL    Baso # 0.02 0.00 - 0.20 K/uL    nRBC 0 0 /100 WBC    Gran % 80.4 (H) 38.0 - 73.0 %    Lymph % 12.7 (L) 18.0 - 48.0 %    Mono % 6.2 4.0 - 15.0 %    Eosinophil % 0.1 0.0 - 8.0 %    Basophil % 0.3 0.0 - 1.9 %    Differential Method Automated    Comp. Metabolic Panel    Collection Time: 10/26/23  4:12 PM   Result Value Ref Range    Sodium 127 (L) 136 - 145 mmol/L    Potassium 4.6 3.5 - 5.1 mmol/L    Chloride 85 (L) 95 - 110 mmol/L    CO2 29 23 - 29 mmol/L    Glucose 118 (H) 70 - 110 mg/dL    BUN 47 (H) 8 - 23 mg/dL    Creatinine 2.1 (H) 0.5 - 1.4 mg/dL    Calcium 9.6 8.7 - 10.5 mg/dL    Total Protein 7.3 6.0 - 8.4 g/dL    Albumin 3.4 (L) 3.5 - 5.2 g/dL    Total  Bilirubin 1.0 0.1 - 1.0 mg/dL    Alkaline Phosphatase 224 (H) 55 - 135 U/L     (H) 10 - 40 U/L     (H) 10 - 44 U/L    eGFR 23 (A) >60 mL/min/1.73 m^2    Anion Gap 13 8 - 16 mmol/L   Troponin I    Collection Time: 10/26/23  4:12 PM   Result Value Ref Range    Troponin I 0.059 (H) 0.000 - 0.026 ng/mL   Brain natriuretic peptide    Collection Time: 10/26/23  4:12 PM   Result Value Ref Range    BNP 2,876 (H) 0 - 99 pg/mL   Magnesium    Collection Time: 10/26/23  4:12 PM   Result Value Ref Range    Magnesium 1.8 1.6 - 2.6 mg/dL   Phosphorus    Collection Time: 10/26/23  4:12 PM   Result Value Ref Range    Phosphorus 2.9 2.7 - 4.5 mg/dL   TSH    Collection Time: 10/26/23  4:12 PM   Result Value Ref Range    TSH 2.684 0.400 - 4.000 uIU/mL   ISTAT PROCEDURE    Collection Time: 10/26/23  4:13 PM   Result Value Ref Range    POC Glucose 123 (H) 70 - 110 mg/dL    POC BUN 64 (H) 6 - 30 mg/dL    POC Creatinine 2.1 (H) 0.5 - 1.4 mg/dL    POC Sodium 121 (L) 136 - 145 mmol/L    POC Potassium 5.1 3.5 - 5.1 mmol/L    POC Chloride 83 (L) 95 - 110 mmol/L    POC TCO2 (MEASURED) 36 (H) 23 - 29 mmol/L    POC Anion Gap 8 8 - 16 mmol/L    POC Ionized Calcium 1.13 1.06 - 1.42 mmol/L    POC Hematocrit 47 36 - 54 %PCV    Sample VENOUS     Site Other     Allens Test N/A    Ammonia    Collection Time: 10/26/23  6:02 PM   Result Value Ref Range    Ammonia 39 10 - 50 umol/L   Lactic acid, plasma    Collection Time: 10/26/23  6:02 PM   Result Value Ref Range    Lactate (Lactic Acid) 1.0 0.5 - 2.2 mmol/L   Urinalysis, Reflex to Urine Culture Urine, Clean Catch    Collection Time: 10/26/23  6:13 PM    Specimen: Urine   Result Value Ref Range    Specimen UA Urine, Clean Catch     Color, UA Yellow Yellow, Straw, Natasha    Appearance, UA Clear Clear    pH, UA 6.0 5.0 - 8.0    Specific Gravity, UA 1.005 1.005 - 1.030    Protein, UA Negative Negative    Glucose, UA Negative Negative    Ketones, UA Negative Negative    Bilirubin (UA) Negative  Negative    Occult Blood UA Negative Negative    Nitrite, UA Negative Negative    Urobilinogen, UA Negative <2.0 EU/dL    Leukocytes, UA Negative Negative   Chloride, Random Urine    Collection Time: 10/26/23  6:13 PM   Result Value Ref Range    Chloride, Urine 32 25 - 200 mmol/L   Potassium, Random Urine    Collection Time: 10/26/23  6:13 PM   Result Value Ref Range    Potassium, Urine 18 15 - 95 mmol/L   Sodium, Random Urine    Collection Time: 10/26/23  6:13 PM   Result Value Ref Range    Sodium, Urine 27 20 - 250 mmol/L   Comprehensive metabolic panel    Collection Time: 10/26/23  7:20 PM   Result Value Ref Range    Sodium 129 (L) 136 - 145 mmol/L    Potassium 4.1 3.5 - 5.1 mmol/L    Chloride 89 (L) 95 - 110 mmol/L    CO2 28 23 - 29 mmol/L    Glucose 103 70 - 110 mg/dL    BUN 46 (H) 8 - 23 mg/dL    Creatinine 2.0 (H) 0.5 - 1.4 mg/dL    Calcium 9.0 8.7 - 10.5 mg/dL    Total Protein 6.6 6.0 - 8.4 g/dL    Albumin 3.0 (L) 3.5 - 5.2 g/dL    Total Bilirubin 0.9 0.1 - 1.0 mg/dL    Alkaline Phosphatase 198 (H) 55 - 135 U/L    AST 93 (H) 10 - 40 U/L     (H) 10 - 44 U/L    eGFR 24 (A) >60 mL/min/1.73 m^2    Anion Gap 12 8 - 16 mmol/L   Iron and TIBC    Collection Time: 10/26/23  7:20 PM   Result Value Ref Range    Iron 78 30 - 160 ug/dL    Transferrin 155 (L) 200 - 375 mg/dL    TIBC 229 (L) 250 - 450 ug/dL    Saturated Iron 34 20 - 50 %   Ferritin    Collection Time: 10/26/23  7:31 PM   Result Value Ref Range    Ferritin 804 (H) 20.0 - 300.0 ng/mL   Protime-INR    Collection Time: 10/26/23  7:45 PM   Result Value Ref Range    Prothrombin Time 14.8 (H) 9.0 - 12.5 sec    INR 1.4 (H) 0.8 - 1.2       Microbiology Results (last 7 days)       ** No results found for the last 168 hours. **             Imaging Results              US Liver with Doppler (xpd) (In process)  Result time 10/26/23 20:57:36                     X-Ray Chest 1 View (Final result)  Result time 10/26/23 17:33:36      Final result by Ced Durham MD  (10/26/23 17:33:36)                   Impression:      Stable examination.      Electronically signed by: Ced Durham MD  Date:    10/26/2023  Time:    17:33               Narrative:    EXAMINATION:  XR CHEST 1 VIEW    CLINICAL HISTORY:  Other fatigue    TECHNIQUE:  Single frontal view of the chest was performed.    COMPARISON:  10/26/2023.    FINDINGS:  Monitoring EKG leads are present.  The trachea is unremarkable.  There are calcifications of the aortic knob.  The cardiomediastinal silhouette is enlarged.  There is unchanged obscuration of the left aspect of the cardiac silhouette.  There is no evidence of free air beneath the hemidiaphragms.  There is unchanged appearance of a loculated left-sided pleural effusion.  There is no evidence of a pneumothorax.  The overall aeration of the lung fields are unchanged.  There are degenerative changes in the osseous structures.                                          Assessment/Plan:     * Acute on chronic diastolic heart failure  BNP 3000 with elevated JVD and hypervolemic hyponatremia  Right-sided heart failure, which is why her oxygen has not changed much, but why her liver is congested, I suspect  IV diuresis, reassess tomorrow for continuance  Echo ordered  See HF note      Hyponatremia  Patient has hyponatremia which is uncontrolled,We will aim to correct the sodium by 4-6mEq in 24 hours. We will monitor sodium Every 12 hours. The hyponatremia is due to heart failure/right-sided congestion versus hemochromatosis/liver dysfunction. We will obtain the following studies: Urine sodium, urine osmolality, serum osmolality or TSH, T4. We will treat the hyponatremia with Fluid restriction of:  1.5 liter and diuresis per day. The patient's sodium results have been reviewed and are listed below.    Suspect the POC sodium of 121 was inaccurate, as POC electrolytes often are  Sodium 127 on admission, and repeat 4 hours later was also 127, then 129  BNP approaching 3000 and  elevated JVD on exam, large left pleural effusion  Suspect hypervolemic hyponatremia-will continue diuresis  IV Lasix 40 Q 8, ordered 3 doses, re-evaluate and reorder if sodium improving/necessary    Intermittent confusion  Intermittent confusion although oriented x3  Ammonia is within normal limits  However it is possible she has CNS manifestation of hemochromatosis with ferritin 800 and new transaminitis, INR elevated at 1.4, does not quite meet liver failure criteria  verses right-sided heart failure with hepatic congestion  Verses uremic encephalopathy, however BUN not too high  Echo of heart ordered  Ultrasound of liver with Doppler ordered  Will consult hematology in the a.m. to see if chelation or blood letting is appropriate  Will diurese in setting of likely right-sided heart failure with hepatic congestion      QT prolongation   on admission  I am concerned that with hemochromatosis and QTC prolongation, that amiodarone will not be the drug of choice moving forward- cardiology consult in a.m.  Hold amiodarone  Hold solifenacin  Remove antinausea meds from p.r.n. last          Atrial fibrillation with RVR  Longstanding, permanent AFib  Patient has been cardioverted about 4 times in the past, per daughter   Heart rate 120, IV metoprolol given, heart rate now - controlled  Given hemochromatosis and , amiodarone likely needs to be changed-cardiology consult for tomorrow    Chronic heart failure with preserved ejection fraction  Do not see heart failure on her history  Will add chronic HFpEF as this was noted previously on ECHO (not new this admission)  BNP approaching 3000 with elevated JVD on exam  Patient was recently switch to Bumex, perhaps mildly underdosed leading to increasing volume and hypervolemic hyponatremia over the last couple weeks.    She takes 1 mg Emwgonn-Pmgouanl-Rflfncaw-Sunday; and takes 0.5 mg MWF.  May need to increase to 1 mg daily on discharge, please assess at that  point    Pleural effusion, left  Persistent large left pleural effusion  History of breast cancer  Will ask IR to drain tomorrow, and will order cultures and cytology      Carcinoma of breast  Noted history      Type 2 diabetes mellitus with stage 4 chronic kidney disease, without long-term current use of insulin  A1c 4.9 8/27/23  A1cs likely improved with worsening renal function, CKD 4  Diet controlled at home  No significant elevations noted thus far  Monitor sugars daily with BMP -no need for frequent glucose checks    Pulmonary hypertension  PASP 46 mmHg previously  Keep euvolemic      Chronic gout  Decreased creatinine clearance and elevated liver enzymes, will hold allopurinol for now      MGUS (monoclonal gammopathy of unknown significance)  See hemochromatosis note      OAB (overactive bladder)  Holding solifenacin given   Would discontinue at discharge, consider discussing with Urology other treatment    Physical deconditioning  Consider PT OT tomorrow      Hemochromatosis  Did require blood letting in early 2000's  Ferritin elevated at 800, unclear if patient could have CNS manifestation/confusion, not necessarily due to iron itself, but poor clearance of toxins etc   , INR 1.4- usually all WNL  Will discuss with hematology in am if any intervention is necessary, however, potentially due to right sided heart failure with hepatic congestion.  Patient also has MGUS.     VTE Risk Mitigation (From admission, onward)           Ordered     IP VTE HIGH RISK PATIENT  Once         10/26/23 1919     Place sequential compression device  Until discontinued         10/26/23 1919                                   Bony Dalton MD  Department of Hospital Medicine  [facility]    COMPLETED  Family history non-contributory to current problem   Pertinent information:

## 2023-10-27 NOTE — HPI
Barbara Rizo is a 83 y.o. female who has a past medical history of A-fib, Acute respiratory failure with hypoxia, Breast cancer, CKD, Community acquired pneumonia, Diabetes mellitus type II, Fatty liver, GERD, Hearing loss of both ears, Hemochromatosis, History of anemia due to CKD, History of pleural effusion, Hyperlipidemia, Hypertension, Macular degeneration, On home oxygen therapy, Osteoarthritis, Osteoporosis, Vaginal delivery, Vitamin D deficiency disease, and Wears partial dentures, presented to the ED with CC of Confusion.     Patient was sent in by PCP/home health labs hyponatremia and elevated creatinine of 2.2.  Unclear if point of care hyponatremia was correct at BNP taken at same time (noon) was 127 and repeat BNP showed sodium again at 127 four hours later-(POC electrolytes are often wrong).  Daughter states that patient has been intermittently confused for the past week or so, has not necessarily gotten worse but has been persistent.  She is oriented, A/O x3, however will often argue with daughter about things that are apparently wrong.  Denies hallucinations.  Has had a couple falls recently without head injury, has a bruise on her right thigh, without hip pain.  In the ED her liver enzymes were elevated,  and -which were normal on the last set of labs, and for many years normal.  Patient does have hemochromatosis and fair and ferritin is elevated at 800, ultrasound of liver with Doppler ordered.  Also patient has elevated BNP at 2876 and elevated JVD, echo ordered.  She has a fairly large left pleural effusion as well, however it is stable from previous.  She is on 4 L of home oxygen.  Denies chest pain and denies shortness of breath beyond her normal dyspnea.     Getting thoracentesis  BNP 2876  Troponin 0.05  Cr 1.9  EKG A-flutter 123 RBBB/LAFB QTc 592  DNR         Known to me  PAF - TALHA/CV 9/21/15, 1/30/19, 2/2/19 -  on amiodarone and eliquis, Diastolic CHF,  HTN, DM, HLD,  MGUS, CRI - Cr 1.8, AS/MR - mild to moderate, breast CA, COPD on home O2     Admitted 1/28/19  Ms. Barbara Rzio is a 78 y.o. female with essential hypertension, hyperlipidemia (LDL 62.4 Jul 2018), type 2 diabetes mellitus (HbA1c 5.8% Jul 2018), CKD Stage 3, paroxysmal atrial fibrillation ([id number] score 5) not on chronic anticoagulation, obesity (BMI 36.0), MGUS, and chronic gout who presents to [facility] ED with complaints of coughing for three days.  She started to have a non-productive cough, wheezing, and mild dyspnea three days ago which has steadily been worsening since onset.  She also complains of a subjective fever but otherwise denies any nausea, vomiting, chest pain, palpitations, pleurisy, nor any diaphoresis.  She denies any recent travel, hemoptysis, nor any lower extremity pain or swelling.  She does say that she's under a lot of stress recently with her ex- dying yesterday at [facility] due to complications from a heart valve replacement two weeks ago.  She does say that she may have had sick contacts while visiting her ex-.  Her appetite has been poor but she denies any weight loss.     While at her ENT's appointment today she decided to schedule an appointment with her PCP to evaluate her upper airway complaints.  She was unable to see her regular PCP, Dr. Paras Oro, but was able to be seen by another physician who became concerned when she was noted to be in AFib with RVR on EKG.  He recommended EMS transportation to the ED but she refused and decided to drive herself.  Her cardiologist is Dr. Amauri Lomas.     Pt admitted to ICU with afib rvr on amiodarone infusion. Pt with elevated HR  and after TALHA done patient became very agitated and anxious. Ativan 1mg IV given and symptoms got worse. HR up into the 170s and O2 sats dropped to 77%. Placed on NRB. Improved O2 sats. 5mg IV haldol given as patient was trying to get out of bed and pulling oxygen mask  off. Cardizem 10mg Iv given with improved HR to 120s-130s. xopenex scheduled Q 8 hours. Changed to zosyn with temp 102F.   1/30- successful cardioversion, post procedure confused and pulling oxygen off, desaturation, constant re-direction, monitored in ICU overnight. Changed to oral amiodarone. eliquis for anticoagulation. Holding parameters on BP meds. IV steroids, nebs and antibiotic for probable lower resp infection. IV lasix as Bp tolerates.   1/31- HR remain under control NSR 60-70s.On amiodarone, metoprolol and eliquis.  Resp status improved and weaned oxygen. Steroids changed to oral route. DC zosyn and switch to augmentin. Add acapella to nebs. Cr increased from baseline (1.5-1.7). Gentle IVF and monitor with repeat BMP later, was stable,. PT,OT consulted for dc planning. Weaning oxygen. PT/OT consulted and rec H/H without equipment. The patient was transferred to the floor on 1/31. Nephrology was consulted for worsening renal function.      2/2- pt transferred with afib rvr. Successful cardioversion again . Continued oral amiodarone and BB.  still has aggressive cough and URI symptoms. On mucolytic and antibiotic.   2/3- HR 50-60s sinus. Cr sightly higher, sodium 128. BNP 1500. Lasix x one dose. Bringing up more mucus. Steroids weaned 20mg. Still faint wheezes on exam.  Patient had worsening ARF on CKD 3, keeped on george and monitor,nephrology was following.reconsulted PT,OT.fley was removed latera nd she had improvement in ARF.  Changed diet for low slat diet duo to hyponatremia with improvement.  She did well with PT,OT.  Her wheezing and respiratory status is improved,  Patient has been discharged home with HH and PO Abx and OAC and amiodarone and follow up with PCP,nephrology and cardiology in next few days.      1-29:  Admitted with AFib with RVR  1-30:  Underwent TALHA/DC cardioversion successfully  2-1 Back in A-fib 120s. SOB  2-3 Still coughing and SOB. NSR 60      2/2/19 CV - 360J converted A-fib to  sinus bradycardia 55. Change amiodarone to po. Ok for telemetry     Echo 3/7/23  The left ventricle is normal in size with eccentric hypertrophy and normal systolic function.  The estimated ejection fraction is 65%.  Grade II left ventricular diastolic dysfunction.  Mild tricuspid regurgitation.  Small pericardial effusion.  Moderate right atrial enlargement.  Severe left atrial enlargement.  Normal right ventricular size with normal right ventricular systolic function.  There is moderate aortic valve stenosis.  Aortic valve area is 1.20 cm2; peak velocity is 2.70 m/s; mean gradient is 19 mmHg.  Mild mitral regurgitation.  Mild pulmonic regurgitation.  Normal central venous pressure (3 mmHg).  The estimated PA systolic pressure is 46 mmHg.  There is pulmonary hypertension.     2/13/19 Less SOB since discharge  EKG NSR with PACs 65   Decrease amiodarone 200 qd  OV 2 months - will discuss changing amiodarone to flecainide at that time     4/17/19 Denies CP or SOB  EKG sinus bradycardia 44  HR mostly runs 40-50 at home  Change metoprolol 100 qd to toprol XL 50 qd - may decrease further if bradycardia persists  Discussed alternative AAD - given her repeated episodes of PAF we are both in agreement to stay on amiodarone for now  OV 1 month with TSH, CMP, EKG     5/27/19 HR improved to the low 50s  Has held eliquis the last 2 days due to bleeding from a recent skin CA removal  Denies CP or SOB     9/11/19 Denies CP or SOB  Some LE edema  EKG sinus bradycardia 46 NSSTT changes  Stop norvasc with LE edema  Add cardura 4 mg qd  Decrease toprol 25 qd with bradycardia  OV 3 months with CMP, TSH     12/11/19 Denies CP or SOB  LE edema resolved after stopping norvasc  BP controlled by home readings  HR runs 45-50  EKG sinus bradycardia 47 1st degree AVB  Denies dizziness  Bradycardia well tolerated - will continue Rx  OV 6 months with echo      6/18/20 Denies CP or dizziness  Mild stable WILKINSON  EKG sinus bradycardia 44 otherwise  ok  Bradycardia continues to be well tolerated  OV 6 months with CMP, TSH on chronic amiodarone        10/14/20 EKG  - suspect this is A-flutter 2:1 with aberrancy  Denies CP, SOB, or palpitations  Appears to be in A-flutter RVR - will send to the ER for admission. If issues with tachy-marlon continue will likely end up needing PPM     10/23/20 HR were better controlled when she went to ER. She was restarted on metoprolol and discharged. HR mostly 40-50s at home. Bradycardia well tolerated. Need clearance for skin graft surgery at  on left leg  EKG sinus bradycardia 43   Back in NSR - bradycardia well tolerated. No indication for PPM at this point  Cleared for skin graft surgery at moderate cardiac risk - ok to hold eliquis 2 days before  OV 3 months  Continue Rx for PAF, HTN, CHF     12/1/20 Denies CP or SOB. Did well after her skin graft surgery  HR 45-50. BP elevated - controlled by outside readings  Continue Rx for PAF, HTN, HLD, CHF  OV 3 months     3/1/21 Denies CP or SOB  EKG NSR 60 IVCD  HR 55-60 by home readings as well      Continue Rx for PAF, HTN, HLD, diastolic CHF  OV 6 months with CMP, TSH on chronic amiodarone, Echo to look at MR           5/6/21 Denies further dizziness or syncope  EKG NSR 69 1st degree AVB IVCD  On amiodarone 100 qd and off of metoprolol  TSH 2.4 LFT ok     suspect recent syncope was from dehydration and not bradycardia  Staying in NSR on amiodarone 100 qd and off of metoprolol  Continue Rx for PAF, HTN, HLD, diastolic CHF  OV 3 months     8/6/21 Denies CP, SOB, or dizziness  EKG NSR 1st degree AVB RBBB   Continue Rx for PAF, HTN, HLD, diastolic CHF  OV 6 months with CMP, TSH  Cleared for breast surgery at moderate cardiac risk - ok to hold eliquis 3 days before         1/19/22 Less SOB since discharge. On Home O2. Scheduled for pulmonary evaluation  EKG NSR 1st degree AVB RBBB   Continue Rx for PAF, HTN, HLD, diastolic CHF  Volume status appears stable  OV 3 months with  BNP, BMP     4/4/22 Denies CP, stable mild WILKINSON  BP controlled   Continue Rx for PAF, HTN, HLD, diastolic CHF  Volume status appears stable  OV 3 months with BNP, CMP, TSH        7/8/22 Denies CP, stable WILKINSON - only uses O2 at night  EKG NSR RBBB/LAFB    Continue Rx for PAF, HTN, HLD, diastolic CHF  Volume status appears stable. Discussed decreasing potassium in diet  OV 6 months with BNP, CMP, TSH     1/9/23 labs not done. Denies CP, mild stable WILKINSON  BP controlled by home readings  EKG NSR RBBB/LAFB    Continue Rx for PAF, HTN, HLD, diastolic CHF  OV 3 months with echo, CMP, TSH, BNP     3/10/23 Labs not done. Was placed on metolazone recently by Dr Boyle for volume overload which since has resolved   CMP, TSH, BNP - next week - if stable then OV 3 months      Continue Rx for PAF, HTN, HLD, diastolic CHF     Labs 3/17/23  K 4.6  Cr 1.9  LFT ok    TSH 3.0     6/16/23 Denies CP or SOB  EKG NSR 1st degree AVB RBBB/LAFB  BP controlled     Echo 9/15/23    Left Ventricle: Normal wall motion. There is normal systolic function with a visually estimated ejection fraction of 55 - 60%. Diastolic function : Unable to assess due to atrial fibrillation.    Left Atrium: Left atrium is severely dilated.    Right Ventricle: Moderate right ventricular enlargement.    Right Atrium: Right atrium is severely dilated.    Aortic Valve: There is mild stenosis. Aortic valve area by VTI is 1.08 cm². Aortic valve peak velocity is 2.14 m/s. Mean gradient is 12 mmHg. The dimensionless index is 0.41.    Mitral Valve: There is mild to moderate regurgitation.    Tricuspid Valve: There is moderate regurgitation.    Pulmonary Artery: There is pulmonary hypertension. The estimated pulmonary artery systolic pressure is 59 mmHg.    IVC/SVC: Intermediate venous pressure at 8 mmHg.    Pericardium: There is a moderate effusion. Left pleural effusion.

## 2023-10-27 NOTE — CONSULTS
Hematology- Oncology Consult Note :     10/27/2023    Reason for consult: hemochromatosis   HPI    The patient location is: hospital  The chief complaint leading to consultation is: hemochromatosis   Visit type: Virtual visit with synchronous audio and video  Total time spent with patient: 15 minutes  Each patient to whom he or she provides medical services by telemedicine is:  (1) informed of the relationship between the physician and patient and the respective role of any other health care provider with respect to management of the patient; and (2) notified that he or she may decline to receive medical services by telemedicine and may withdraw from such care at any time.        Pt is a 83 y.o. female with extensive co morbidities (see below) who presents to [facility] ED for AMS and hyponatremia.  Patient was sent in by PCP after home health labs showed hyponatremia and elevated creatinine of 2.2.  Family also reported intermittent confusion.  In the ED her liver enzymes were elevated,  and  which were wnl on the last set of labs.  Patient does have hemochromatosis but has required few phlebotomies and ferritin is elevated at over 800 up form 400 on recent labs.  Of note BNP elevated and pleural effusion noted.  Hematology service consulted due to concern of hemochromatosis and if phlebotomy would be required.        Active Problem List with Overview Notes    Diagnosis Date Noted    Chest pain on breathing 10/27/2023    WILKINSON (dyspnea on exertion) 10/27/2023    Intermittent confusion 10/26/2023    Chronic heart failure with preserved ejection fraction 10/26/2023     GII dd      Acute on chronic diastolic heart failure 10/26/2023    B12 deficiency 10/05/2023    Dysphagia 09/16/2023    Debility 09/16/2023    Normocytic anemia 09/14/2023    Atrial fibrillation with RVR 09/14/2023    Pleural effusion on left 09/14/2023    Chronic respiratory failure with hypoxia 09/14/2023    Urinary tract infection without  hematuria 08/30/2023    QT prolongation 08/27/2023    Other nonthrombocytopenic purpura 04/06/2022    JOCELIN (obstructive sleep apnea) 01/14/2022     -ahi of 8.3; rdi of 30  -recommend treatment.  In lab titration due to respiratory failure requiring oxygen.   -difficulty with falling asleep during last sleep study.  Will prescribe ambien for the night of sleep study.        Aortic atherosclerosis 12/27/2021    Malignant neoplasm of overlapping sites of left breast in female, estrogen receptor positive 08/22/2021    Carcinoma of breast 08/02/2021    Type 2 diabetes mellitus with stage 4 chronic kidney disease, without long-term current use of insulin 01/25/2021    Secondary hyperparathyroidism of renal origin 01/25/2021    CKD (chronic kidney disease), stage IV 01/25/2021    Atherosclerosis of native artery of left lower extremity     Hyperkalemia 09/28/2020    Sinus bradycardia 09/28/2020    Pulmonary hypertension 09/28/2020     group 2 with ddx.  Will send for pft due to remote smoking history.  bp and fluid optimization.  Doing better since taking lasix routinely.        Hyponatremia 09/27/2020    Chronic gout 01/28/2019    Paroxysmal atrial fibrillation 11/27/2018    MGUS (monoclonal gammopathy of unknown significance) 10/18/2018    OAB (overactive bladder) 09/22/2016    Urinary retention 11/11/2015    Atonic neurogenic bladder 11/11/2015    Incomplete bladder emptying 11/11/2015    Constipation, slow transit 11/11/2015    Physical deconditioning 10/16/2015    Esophagitis 10/16/2015    Anemia in chronic kidney disease 09/01/2015    Morbid (severe) obesity due to excess calories     Primary localized osteoarthrosis, lower leg 08/03/2015    Vitamin D deficiency 10/27/2013    DJD (degenerative joint disease) of knee 05/13/2013    Hemochromatosis     Hyperlipidemia     Essential hypertension        Patient Active Problem List    Diagnosis Date Noted    Chest pain on breathing 10/27/2023    WILKINSON (dyspnea on exertion)  10/27/2023    Intermittent confusion 10/26/2023    Chronic heart failure with preserved ejection fraction 10/26/2023    Acute on chronic diastolic heart failure 10/26/2023    B12 deficiency 10/05/2023    Dysphagia 09/16/2023    Debility 09/16/2023    Normocytic anemia 09/14/2023    Atrial fibrillation with RVR 09/14/2023    Pleural effusion on left 09/14/2023    Chronic respiratory failure with hypoxia 09/14/2023    Urinary tract infection without hematuria 08/30/2023    QT prolongation 08/27/2023    Other nonthrombocytopenic purpura 04/06/2022    JOCELIN (obstructive sleep apnea) 01/14/2022    Aortic atherosclerosis 12/27/2021    Malignant neoplasm of overlapping sites of left breast in female, estrogen receptor positive 08/22/2021    Carcinoma of breast 08/02/2021    Type 2 diabetes mellitus with stage 4 chronic kidney disease, without long-term current use of insulin 01/25/2021    Secondary hyperparathyroidism of renal origin 01/25/2021    CKD (chronic kidney disease), stage IV 01/25/2021    Atherosclerosis of native artery of left lower extremity     Hyperkalemia 09/28/2020    Sinus bradycardia 09/28/2020    Pulmonary hypertension 09/28/2020    Hyponatremia 09/27/2020    Chronic gout 01/28/2019    Paroxysmal atrial fibrillation 11/27/2018    MGUS (monoclonal gammopathy of unknown significance) 10/18/2018    OAB (overactive bladder) 09/22/2016    Urinary retention 11/11/2015    Atonic neurogenic bladder 11/11/2015    Incomplete bladder emptying 11/11/2015    Constipation, slow transit 11/11/2015    Physical deconditioning 10/16/2015    Esophagitis 10/16/2015    Anemia in chronic kidney disease 09/01/2015    Morbid (severe) obesity due to excess calories     Primary localized osteoarthrosis, lower leg 08/03/2015    Vitamin D deficiency 10/27/2013    DJD (degenerative joint disease) of knee 05/13/2013    Hemochromatosis     Hyperlipidemia     Essential hypertension      Past Medical History:   Diagnosis Date    A-fib      Acute respiratory failure with hypoxia 09/01/2015    Breast cancer     invasive ductal carcinoma    Chronic heart failure with preserved ejection fraction 10/26/2023    GII dd    CKD (chronic kidney disease)     Community acquired pneumonia 08/26/2023    Diabetes mellitus type II     Fatty liver     GERD (gastroesophageal reflux disease)     Hearing loss of both ears     wears bilateral hearing aids    Hemochromatosis     History of anemia due to CKD     History of pleural effusion     with thoracentesis    Hyperlipidemia     Hypertension     Macular degeneration     On home oxygen therapy     Osteoarthritis     Left knee    Osteoporosis     Vaginal delivery     x2    Vitamin D deficiency disease     Wears partial dentures     upper removable bridge      Past Surgical History:   Procedure Laterality Date    AXILLARY NODE DISSECTION Left 10/18/2021    Procedure: LYMPHADENECTOMY, AXILLARY;  Surgeon: Darlene Roca MD;  Location: [facility] OR;  Service: General;  Laterality: Left;    BREAST BIOPSY      BREAST LUMPECTOMY      invasive ductal carcinoma    BREAST SURGERY Bilateral     Reduction r/t h/o fibrocystic disease    CARDIOVERSION N/A 9/15/2023    Procedure: Cardioversion;  Surgeon: Constantine Chambers MD;  Location: [facility] CATH LAB;  Service: Cardiology;  Laterality: N/A;    CHOLECYSTECTOMY  08/2015    EYE SURGERY      Cat Ext  OU    HYSTERECTOMY      partial due to uterine fibroids    INCISION AND DRAINAGE OF KNEE Left 09/17/2020    Procedure: INCISION AND DRAINAGE, KNEE;  Surgeon: Rolando Wagoner MD;  Location: [facility] OR;  Service: Orthopedics;  Laterality: Left;    INJECTION FOR SENTINEL NODE IDENTIFICATION Left 10/18/2021    Procedure: INJECTION, FOR SENTINEL NODE IDENTIFICATION;  Surgeon: Darlene Roca MD;  Location: [facility] OR;  Service: General;  Laterality: Left;    JOINT REPLACEMENT Left 05/13/2013    TKR    JOINT REPLACEMENT Right 08/03/2015    TKR    MASTECTOMY, PARTIAL Left 10/18/2021    Procedure: MASTECTOMY,  PARTIAL -- wire;  Surgeon: Darlene Roca MD;  Location: [facility] OR;  Service: General;  Laterality: Left;  RN PREOP 10/15/2021   VACCINATED-----NEED H/P AND ORDERS    SENTINEL LYMPH NODE BIOPSY Left 10/18/2021    Procedure: BIOPSY, LYMPH NODE, SENTINEL;  Surgeon: Darlene Roca MD;  Location: [facility] OR;  Service: General;  Laterality: Left;    THORACENTESIS      TOTAL KNEE ARTHROPLASTY Right 2015    left knee done 5/2013    TREATMENT OF CARDIAC ARRHYTHMIA N/A 9/15/2023    Procedure: Cardioversion or Defibrillation;  Surgeon: Constantine Chambers MD;  Location: [facility] CATH LAB;  Service: Cardiology;  Laterality: N/A;    WOUND DRESSING Left 09/17/2020    Procedure: WOUND VAC APPLICATION;  Surgeon: Rolando Wagoner MD;  Location: [facility] OR;  Service: Orthopedics;  Laterality: Left;      Medications Prior to Admission   Medication Sig Dispense Refill Last Dose    allopurinoL (ZYLOPRIM) 100 MG tablet Take 1 tablet by mouth once daily.       amiodarone (PACERONE) 100 MG Tab Take 1 tablet (100 mg total) by mouth once daily. 90 tablet 1     apixaban (ELIQUIS) 2.5 mg Tab TAKE ONE TABLET BY MOUTH TWICE DAILY 180 tablet 3     atorvastatin (LIPITOR) 40 MG tablet TAKE ONE TABLET BY MOUTH ONCE DAILY 90 tablet 2     bumetanide (BUMEX) 0.5 MG Tab Take 0.5 mg by mouth.       bumetanide (BUMEX) 1 MG tablet Take 1 tablet (1 mg total) by mouth once daily. (Patient not taking: Reported on 10/4/2023) 30 tablet 11     calcium carbonate (TUMS) 200 mg calcium (500 mg) chewable tablet Take 2 tablets by mouth.       ergocalciferol (ERGOCALCIFEROL) 50,000 unit Cap Take 50,000 Units by mouth every 30 days.        folic acid (FOLVITE) 1 MG tablet TAKE 1 TABLET BY MOUTH EVERY DAY 90 tablet 3     mupirocin (BACTROBAN) 2 % ointment Apply topically every evening.       solifenacin (VESICARE) 10 MG tablet Take 1 tablet (10 mg total) by mouth once daily. 30 tablet 11      Review of patient's allergies indicates:  No Known Allergies   Social History     Tobacco  Use    Smoking status: Former     Passive exposure: Past    Smokeless tobacco: Never   Substance Use Topics    Alcohol use: Not Currently      Family History   Problem Relation Age of Onset    Cancer Mother         stomach    Hypertension Mother     Aneurysm Father         abdominal    No Known Problems Sister     No Known Problems Sister     No Known Problems Brother     No Known Problems Daughter     No Known Problems Son         Review of Systems :  Review of Systems   Constitutional:  Positive for malaise/fatigue. Negative for fever.   HENT:  Negative for hearing loss.    Eyes:  Negative for blurred vision.   Respiratory:  Negative for cough, sputum production and shortness of breath.    Cardiovascular:  Negative for chest pain.   Gastrointestinal:  Negative for blood in stool and heartburn.   Genitourinary:  Negative for dysuria and hematuria.   Musculoskeletal:  Negative for joint pain and myalgias.   Skin:  Negative for itching and rash.   Neurological:  Negative for dizziness.   Endo/Heme/Allergies:  Does not bruise/bleed easily.   Psychiatric/Behavioral:  Negative for depression. The patient is not nervous/anxious.        Physical Exam :  Wt Readings from Last 3 Encounters:   10/27/23 65 kg (143 lb 4.8 oz)   10/04/23 69.5 kg (153 lb 3.5 oz)   09/25/23 72.1 kg (158 lb 15.2 oz)     Temp Readings from Last 3 Encounters:   10/27/23 98.5 °F (36.9 °C)   10/26/23 97.5 °F (36.4 °C) (Oral)   10/24/23 97.9 °F (36.6 °C)     BP Readings from Last 3 Encounters:   10/27/23 114/64   10/26/23 120/60   10/24/23 120/60     Pulse Readings from Last 3 Encounters:   10/27/23 109   10/26/23 82   10/24/23 (!) 126     Body mass index is 26.21 kg/m².    Physical Exam      Virtual visit      Pertinent Diagnostic studies:    Recent Results (from the past 24 hour(s))   Urinalysis, Reflex to Urine Culture Urine, Clean Catch    Collection Time: 10/26/23 12:09 PM    Specimen: Urine   Result Value Ref Range    Specimen UA Urine, Clean  Catch     Color, UA Yellow Yellow, Straw, Natasha    Appearance, UA Clear Clear    pH, UA 5.0 5.0 - 8.0    Specific Gravity, UA 1.010 1.005 - 1.030    Protein, UA Negative Negative    Glucose, UA Negative Negative    Ketones, UA Negative Negative    Bilirubin (UA) Negative Negative    Occult Blood UA Negative Negative    Nitrite, UA Negative Negative    Leukocytes, UA Negative Negative   COMPREHENSIVE METABOLIC PANEL    Collection Time: 10/26/23 12:30 PM   Result Value Ref Range    Sodium 127 (L) 136 - 145 mmol/L    Potassium 4.1 3.5 - 5.1 mmol/L    Chloride 84 (L) 95 - 110 mmol/L    CO2 32 (H) 23 - 29 mmol/L    Glucose 106 70 - 110 mg/dL    BUN 47 (H) 8 - 23 mg/dL    Creatinine 2.2 (H) 0.5 - 1.4 mg/dL    Calcium 9.0 8.7 - 10.5 mg/dL    Total Protein 6.6 6.0 - 8.4 g/dL    Albumin 3.3 (L) 3.5 - 5.2 g/dL    Total Bilirubin 1.0 0.1 - 1.0 mg/dL    Alkaline Phosphatase 230 (H) 55 - 135 U/L     (H) 10 - 40 U/L     (H) 10 - 44 U/L    eGFR 22 (A) >60 mL/min/1.73 m^2    Anion Gap 11 8 - 16 mmol/L   CBC W/ AUTO DIFFERENTIAL    Collection Time: 10/26/23 12:30 PM   Result Value Ref Range    WBC 8.14 3.90 - 12.70 K/uL    RBC 4.54 4.00 - 5.40 M/uL    Hemoglobin 13.7 12.0 - 16.0 g/dL    Hematocrit 41.5 37.0 - 48.5 %    MCV 91 82 - 98 fL    MCH 30.2 27.0 - 31.0 pg    MCHC 33.0 32.0 - 36.0 g/dL    RDW 16.2 (H) 11.5 - 14.5 %    Platelets 194 150 - 450 K/uL    MPV 11.4 9.2 - 12.9 fL    Immature Granulocytes 0.4 0.0 - 0.5 %    Gran # (ANC) 6.7 1.8 - 7.7 K/uL    Immature Grans (Abs) 0.03 0.00 - 0.04 K/uL    Lymph # 0.9 (L) 1.0 - 4.8 K/uL    Mono # 0.5 0.3 - 1.0 K/uL    Eos # 0.0 0.0 - 0.5 K/uL    Baso # 0.02 0.00 - 0.20 K/uL    nRBC 0 0 /100 WBC    Gran % 81.7 (H) 38.0 - 73.0 %    Lymph % 11.2 (L) 18.0 - 48.0 %    Mono % 6.5 4.0 - 15.0 %    Eosinophil % 0.0 0.0 - 8.0 %    Basophil % 0.2 0.0 - 1.9 %    Differential Method Automated    Magnesium    Collection Time: 10/26/23 12:30 PM   Result Value Ref Range    Magnesium 1.8  1.6 - 2.6 mg/dL   PHOSPHORUS    Collection Time: 10/26/23 12:30 PM   Result Value Ref Range    Phosphorus 2.8 2.7 - 4.5 mg/dL   POCT glucose    Collection Time: 10/26/23  4:11 PM   Result Value Ref Range    POCT Glucose 127 (H) 70 - 110 mg/dL   CBC W/ AUTO DIFFERENTIAL    Collection Time: 10/26/23  4:12 PM   Result Value Ref Range    WBC 7.85 3.90 - 12.70 K/uL    RBC 4.73 4.00 - 5.40 M/uL    Hemoglobin 14.2 12.0 - 16.0 g/dL    Hematocrit 41.9 37.0 - 48.5 %    MCV 89 82 - 98 fL    MCH 30.0 27.0 - 31.0 pg    MCHC 33.9 32.0 - 36.0 g/dL    RDW 16.0 (H) 11.5 - 14.5 %    Platelets 169 150 - 450 K/uL    MPV 10.1 9.2 - 12.9 fL    Immature Granulocytes 0.3 0.0 - 0.5 %    Gran # (ANC) 6.3 1.8 - 7.7 K/uL    Immature Grans (Abs) 0.02 0.00 - 0.04 K/uL    Lymph # 1.0 1.0 - 4.8 K/uL    Mono # 0.5 0.3 - 1.0 K/uL    Eos # 0.0 0.0 - 0.5 K/uL    Baso # 0.02 0.00 - 0.20 K/uL    nRBC 0 0 /100 WBC    Gran % 80.4 (H) 38.0 - 73.0 %    Lymph % 12.7 (L) 18.0 - 48.0 %    Mono % 6.2 4.0 - 15.0 %    Eosinophil % 0.1 0.0 - 8.0 %    Basophil % 0.3 0.0 - 1.9 %    Differential Method Automated    Comp. Metabolic Panel    Collection Time: 10/26/23  4:12 PM   Result Value Ref Range    Sodium 127 (L) 136 - 145 mmol/L    Potassium 4.6 3.5 - 5.1 mmol/L    Chloride 85 (L) 95 - 110 mmol/L    CO2 29 23 - 29 mmol/L    Glucose 118 (H) 70 - 110 mg/dL    BUN 47 (H) 8 - 23 mg/dL    Creatinine 2.1 (H) 0.5 - 1.4 mg/dL    Calcium 9.6 8.7 - 10.5 mg/dL    Total Protein 7.3 6.0 - 8.4 g/dL    Albumin 3.4 (L) 3.5 - 5.2 g/dL    Total Bilirubin 1.0 0.1 - 1.0 mg/dL    Alkaline Phosphatase 224 (H) 55 - 135 U/L     (H) 10 - 40 U/L     (H) 10 - 44 U/L    eGFR 23 (A) >60 mL/min/1.73 m^2    Anion Gap 13 8 - 16 mmol/L   Troponin I    Collection Time: 10/26/23  4:12 PM   Result Value Ref Range    Troponin I 0.059 (H) 0.000 - 0.026 ng/mL   Brain natriuretic peptide    Collection Time: 10/26/23  4:12 PM   Result Value Ref Range    BNP 2,876 (H) 0 - 99 pg/mL    Magnesium    Collection Time: 10/26/23  4:12 PM   Result Value Ref Range    Magnesium 1.8 1.6 - 2.6 mg/dL   Phosphorus    Collection Time: 10/26/23  4:12 PM   Result Value Ref Range    Phosphorus 2.9 2.7 - 4.5 mg/dL   TSH    Collection Time: 10/26/23  4:12 PM   Result Value Ref Range    TSH 2.684 0.400 - 4.000 uIU/mL   ISTAT PROCEDURE    Collection Time: 10/26/23  4:13 PM   Result Value Ref Range    POC Glucose 123 (H) 70 - 110 mg/dL    POC BUN 64 (H) 6 - 30 mg/dL    POC Creatinine 2.1 (H) 0.5 - 1.4 mg/dL    POC Sodium 121 (L) 136 - 145 mmol/L    POC Potassium 5.1 3.5 - 5.1 mmol/L    POC Chloride 83 (L) 95 - 110 mmol/L    POC TCO2 (MEASURED) 36 (H) 23 - 29 mmol/L    POC Anion Gap 8 8 - 16 mmol/L    POC Ionized Calcium 1.13 1.06 - 1.42 mmol/L    POC Hematocrit 47 36 - 54 %PCV    Sample VENOUS     Site Other     Allens Test N/A    Ammonia    Collection Time: 10/26/23  6:02 PM   Result Value Ref Range    Ammonia 39 10 - 50 umol/L   Lactic acid, plasma    Collection Time: 10/26/23  6:02 PM   Result Value Ref Range    Lactate (Lactic Acid) 1.0 0.5 - 2.2 mmol/L   Urinalysis, Reflex to Urine Culture Urine, Clean Catch    Collection Time: 10/26/23  6:13 PM    Specimen: Urine   Result Value Ref Range    Specimen UA Urine, Clean Catch     Color, UA Yellow Yellow, Straw, Natasha    Appearance, UA Clear Clear    pH, UA 6.0 5.0 - 8.0    Specific Gravity, UA 1.005 1.005 - 1.030    Protein, UA Negative Negative    Glucose, UA Negative Negative    Ketones, UA Negative Negative    Bilirubin (UA) Negative Negative    Occult Blood UA Negative Negative    Nitrite, UA Negative Negative    Urobilinogen, UA Negative <2.0 EU/dL    Leukocytes, UA Negative Negative   Chloride, Random Urine    Collection Time: 10/26/23  6:13 PM   Result Value Ref Range    Chloride, Urine 32 25 - 200 mmol/L   Potassium, Random Urine    Collection Time: 10/26/23  6:13 PM   Result Value Ref Range    Potassium, Urine 18 15 - 95 mmol/L   Sodium, Random Urine     Collection Time: 10/26/23  6:13 PM   Result Value Ref Range    Sodium, Urine 27 20 - 250 mmol/L   Comprehensive metabolic panel    Collection Time: 10/26/23  7:20 PM   Result Value Ref Range    Sodium 129 (L) 136 - 145 mmol/L    Potassium 4.1 3.5 - 5.1 mmol/L    Chloride 89 (L) 95 - 110 mmol/L    CO2 28 23 - 29 mmol/L    Glucose 103 70 - 110 mg/dL    BUN 46 (H) 8 - 23 mg/dL    Creatinine 2.0 (H) 0.5 - 1.4 mg/dL    Calcium 9.0 8.7 - 10.5 mg/dL    Total Protein 6.6 6.0 - 8.4 g/dL    Albumin 3.0 (L) 3.5 - 5.2 g/dL    Total Bilirubin 0.9 0.1 - 1.0 mg/dL    Alkaline Phosphatase 198 (H) 55 - 135 U/L    AST 93 (H) 10 - 40 U/L     (H) 10 - 44 U/L    eGFR 24 (A) >60 mL/min/1.73 m^2    Anion Gap 12 8 - 16 mmol/L   Iron and TIBC    Collection Time: 10/26/23  7:20 PM   Result Value Ref Range    Iron 78 30 - 160 ug/dL    Transferrin 155 (L) 200 - 375 mg/dL    TIBC 229 (L) 250 - 450 ug/dL    Saturated Iron 34 20 - 50 %   Ferritin    Collection Time: 10/26/23  7:31 PM   Result Value Ref Range    Ferritin 804 (H) 20.0 - 300.0 ng/mL   Protime-INR    Collection Time: 10/26/23  7:45 PM   Result Value Ref Range    Prothrombin Time 14.8 (H) 9.0 - 12.5 sec    INR 1.4 (H) 0.8 - 1.2   Phosphorus    Collection Time: 10/27/23  3:47 AM   Result Value Ref Range    Phosphorus 3.3 2.7 - 4.5 mg/dL   Magnesium    Collection Time: 10/27/23  3:47 AM   Result Value Ref Range    Magnesium 1.6 1.6 - 2.6 mg/dL   Comprehensive Metabolic Panel    Collection Time: 10/27/23  3:47 AM   Result Value Ref Range    Sodium 129 (L) 136 - 145 mmol/L    Potassium 3.6 3.5 - 5.1 mmol/L    Chloride 90 (L) 95 - 110 mmol/L    CO2 27 23 - 29 mmol/L    Glucose 84 70 - 110 mg/dL    BUN 42 (H) 8 - 23 mg/dL    Creatinine 1.9 (H) 0.5 - 1.4 mg/dL    Calcium 8.6 (L) 8.7 - 10.5 mg/dL    Total Protein 6.1 6.0 - 8.4 g/dL    Albumin 2.9 (L) 3.5 - 5.2 g/dL    Total Bilirubin 0.8 0.1 - 1.0 mg/dL    Alkaline Phosphatase 183 (H) 55 - 135 U/L    AST 76 (H) 10 - 40 U/L      (H) 10 - 44 U/L    eGFR 26 (A) >60 mL/min/1.73 m^2    Anion Gap 12 8 - 16 mmol/L   CBC Auto Differential    Collection Time: 10/27/23  3:47 AM   Result Value Ref Range    WBC 6.52 3.90 - 12.70 K/uL    RBC 4.28 4.00 - 5.40 M/uL    Hemoglobin 12.6 12.0 - 16.0 g/dL    Hematocrit 39.1 37.0 - 48.5 %    MCV 91 82 - 98 fL    MCH 29.4 27.0 - 31.0 pg    MCHC 32.2 32.0 - 36.0 g/dL    RDW 16.1 (H) 11.5 - 14.5 %    Platelets 167 150 - 450 K/uL    MPV 11.1 9.2 - 12.9 fL    Immature Granulocytes 0.3 0.0 - 0.5 %    Gran # (ANC) 4.9 1.8 - 7.7 K/uL    Immature Grans (Abs) 0.02 0.00 - 0.04 K/uL    Lymph # 1.0 1.0 - 4.8 K/uL    Mono # 0.5 0.3 - 1.0 K/uL    Eos # 0.0 0.0 - 0.5 K/uL    Baso # 0.02 0.00 - 0.20 K/uL    nRBC 0 0 /100 WBC    Gran % 75.2 (H) 38.0 - 73.0 %    Lymph % 15.8 (L) 18.0 - 48.0 %    Mono % 7.8 4.0 - 15.0 %    Eosinophil % 0.6 0.0 - 8.0 %    Basophil % 0.3 0.0 - 1.9 %    Differential Method Automated        Assessment/Recs :     Hx of hemochromatosis   -Presenting with worsening LFTs and elevated ferritin/BNP  -Ferritin 800 up from 400 on recent clinic visit   -Although she has hx of hemochromatosis she has required very few phlebotomies in the past   -Rapid elevation in ferritin highly unusual in hemochromatosis as are rapidly rising LFTs  -Rapid rise in ferritin most likely due to being an acute phase reactant   -Would rule out other issue or causes such as worsening CHF, infection or other causes of inflammation before ordering a phlebotomy   -Pt will need to continue follow up in heme clinic on discharge  -esr and path rev cbc ordered      Hyponatremia/Elevated LFTs/elevated BNP/Cr  -Could be from congestive hepatopathy and cardiorenal secondary to CHF  -Acute findings unlikely top be related to hemochromatosis or ferritin at current levels      Pleural effusion on left  -Agree with plan for thoracentesis, follow up fluid studies         CKD (chronic kidney disease), stage IV  -per nephrology       Type 2  diabetes mellitus with stage 4 chronic kidney disease, without long-term current use of insulin  -stable, per primary       Paroxysmal atrial fibrillation  -Currently with A-flutter mild RVR.   -Per cardiology       MGUS (monoclonal gammopathy of unknown significance)  -stable  -Following outpt in hematology clinic  -Unlikely contributing to her current clinical picture      Time spent on case: 30 minutes       Thank you for this consult, please contact us for any questions or concerns.      Mukesh Madison MD   Hematology/oncology, [facility] [place]

## 2023-10-27 NOTE — ASSESSMENT & PLAN NOTE
· Holding solifenacin given   · Would discontinue at discharge, consider discussing with Urology other treatment

## 2023-10-27 NOTE — ASSESSMENT & PLAN NOTE
· Did require blood letting in early 2000's  · Ferritin elevated at 800, unclear if patient could have CNS manifestation/confusion, not necessarily due to iron itself, but poor clearance of toxins etc  ·  , INR 1.4- usually all WNL  · Will discuss with hematology in am if any intervention is necessary, however, potentially due to right sided heart failure with hepatic congestion.  Patient also has MGUS.

## 2023-10-27 NOTE — SUBJECTIVE & OBJECTIVE
Past Medical History:   Diagnosis Date    A-fib     Acute respiratory failure with hypoxia 09/01/2015    Breast cancer     invasive ductal carcinoma    CKD (chronic kidney disease)     Community acquired pneumonia 08/26/2023    Diabetes mellitus type II     Fatty liver     GERD (gastroesophageal reflux disease)     Hearing loss of both ears     wears bilateral hearing aids    Hemochromatosis     History of anemia due to CKD     History of pleural effusion     with thoracentesis    Hyperlipidemia     Hypertension     Macular degeneration     On home oxygen therapy     Osteoarthritis     Left knee    Osteoporosis     Vaginal delivery     x2    Vitamin D deficiency disease     Wears partial dentures     upper removable bridge       Past Surgical History:   Procedure Laterality Date    AXILLARY NODE DISSECTION Left 10/18/2021    Procedure: LYMPHADENECTOMY, AXILLARY;  Surgeon: Darlene Roca MD;  Location: [facility] OR;  Service: General;  Laterality: Left;    BREAST BIOPSY      BREAST LUMPECTOMY      invasive ductal carcinoma    BREAST SURGERY Bilateral     Reduction r/t h/o fibrocystic disease    CARDIOVERSION N/A 9/15/2023    Procedure: Cardioversion;  Surgeon: Constantine Chambers MD;  Location: [facility] CATH LAB;  Service: Cardiology;  Laterality: N/A;    CHOLECYSTECTOMY  08/2015    EYE SURGERY      Cat Ext  OU    HYSTERECTOMY      partial due to uterine fibroids    INCISION AND DRAINAGE OF KNEE Left 09/17/2020    Procedure: INCISION AND DRAINAGE, KNEE;  Surgeon: Rolando Wagoner MD;  Location: [facility] OR;  Service: Orthopedics;  Laterality: Left;    INJECTION FOR SENTINEL NODE IDENTIFICATION Left 10/18/2021    Procedure: INJECTION, FOR SENTINEL NODE IDENTIFICATION;  Surgeon: Darlene Roca MD;  Location: [facility] OR;  Service: General;  Laterality: Left;    JOINT REPLACEMENT Left 05/13/2013    TKR    JOINT REPLACEMENT Right 08/03/2015    TKR    MASTECTOMY, PARTIAL Left 10/18/2021    Procedure: MASTECTOMY, PARTIAL -- wire;  Surgeon:  Darlene Roca MD;  Location: [facility] OR;  Service: General;  Laterality: Left;  RN PREOP 10/15/2021   VACCINATED-----NEED H/P AND ORDERS    SENTINEL LYMPH NODE BIOPSY Left 10/18/2021    Procedure: BIOPSY, LYMPH NODE, SENTINEL;  Surgeon: Darlene Roca MD;  Location: [facility] OR;  Service: General;  Laterality: Left;    THORACENTESIS      TOTAL KNEE ARTHROPLASTY Right 2015    left knee done 5/2013    TREATMENT OF CARDIAC ARRHYTHMIA N/A 9/15/2023    Procedure: Cardioversion or Defibrillation;  Surgeon: Constantine Chambers MD;  Location: [facility] CATH LAB;  Service: Cardiology;  Laterality: N/A;    WOUND DRESSING Left 09/17/2020    Procedure: WOUND VAC APPLICATION;  Surgeon: Rolando Wagoner MD;  Location: [facility] OR;  Service: Orthopedics;  Laterality: Left;       Review of patient's allergies indicates:  No Known Allergies    Current Facility-Administered Medications on File Prior to Encounter   Medication    denosumab (PROLIA) injection 60 mg     Current Outpatient Medications on File Prior to Encounter   Medication Sig    allopurinoL (ZYLOPRIM) 100 MG tablet Take 1 tablet by mouth once daily.    amiodarone (PACERONE) 100 MG Tab Take 1 tablet (100 mg total) by mouth once daily.    apixaban (ELIQUIS) 2.5 mg Tab TAKE ONE TABLET BY MOUTH TWICE DAILY    atorvastatin (LIPITOR) 40 MG tablet TAKE ONE TABLET BY MOUTH ONCE DAILY    bumetanide (BUMEX) 0.5 MG Tab Take 0.5 mg by mouth.    bumetanide (BUMEX) 1 MG tablet Take 1 tablet (1 mg total) by mouth once daily. (Patient not taking: Reported on 10/4/2023)    calcium carbonate (TUMS) 200 mg calcium (500 mg) chewable tablet Take 2 tablets by mouth.    ergocalciferol (ERGOCALCIFEROL) 50,000 unit Cap Take 50,000 Units by mouth every 30 days.     folic acid (FOLVITE) 1 MG tablet TAKE 1 TABLET BY MOUTH EVERY DAY    mupirocin (BACTROBAN) 2 % ointment Apply topically every evening.    solifenacin (VESICARE) 10 MG tablet Take 1 tablet (10 mg total) by mouth once daily.     Family History        Problem Relation (Age of Onset)    Aneurysm Father    Cancer Mother    Hypertension Mother    No Known Problems Sister, Sister, Brother, Daughter, Son          Tobacco Use    Smoking status: Former     Passive exposure: Past    Smokeless tobacco: Never   Substance and Sexual Activity    Alcohol use: Not Currently    Drug use: No    Sexual activity: Not Currently     Review of Systems   Constitutional:  Positive for activity change and fatigue. Negative for fever.   HENT:  Negative for sore throat and trouble swallowing.    Eyes:  Negative for photophobia and visual disturbance.   Respiratory:  Negative for chest tightness and shortness of breath.    Cardiovascular:  Negative for chest pain, palpitations and leg swelling.   Gastrointestinal:  Negative for abdominal distention, abdominal pain, blood in stool, constipation, diarrhea, nausea and vomiting.   Endocrine: Negative for polydipsia and polyuria.   Genitourinary:  Negative for difficulty urinating, dysuria and hematuria.   Musculoskeletal:  Negative for joint swelling, neck pain and neck stiffness.   Skin:  Positive for color change. Negative for wound.   Allergic/Immunologic: Negative for immunocompromised state.   Neurological:  Negative for dizziness, seizures, syncope and facial asymmetry.   Psychiatric/Behavioral:  Positive for confusion and decreased concentration. Negative for agitation, behavioral problems and hallucinations.      Objective:     Vital Signs (Most Recent):  Temp: 97.6 °F (36.4 °C) (10/26/23 1902)  Pulse: 102 (10/26/23 2032)  Resp: (!) 22 (10/26/23 2032)  BP: 112/76 (10/26/23 2032)  SpO2: (!) 91 % (10/26/23 2032) Vital Signs (24h Range):  Temp:  [97.4 °F (36.3 °C)-97.6 °F (36.4 °C)] 97.6 °F (36.4 °C)  Pulse:  [] 102  Resp:  [18-28] 22  SpO2:  [91 %-99 %] 91 %  BP: (112-152)/(58-99) 112/76     Weight: 69.4 kg (153 lb)  Body mass index is 27.98 kg/m².     Physical Exam  Vitals and nursing note reviewed.   Constitutional:        General: She is not in acute distress.     Appearance: She is well-developed. She is not ill-appearing, toxic-appearing or diaphoretic.   HENT:      Head: Normocephalic and atraumatic.   Eyes:      General: No scleral icterus.     Pupils: Pupils are equal, round, and reactive to light.   Neck:      Thyroid: No thyromegaly.   Cardiovascular:      Rate and Rhythm: Normal rate and regular rhythm.      Heart sounds: No murmur heard.  Pulmonary:      Effort: Pulmonary effort is normal.      Breath sounds: No stridor. Rales present. No wheezing.      Comments: Decreased breath sounds on left side  Abdominal:      General: There is no distension.      Palpations: Abdomen is soft. There is no mass.      Tenderness: There is no guarding.   Musculoskeletal:         General: No swelling or deformity. Normal range of motion.      Cervical back: Normal range of motion and neck supple. No rigidity.      Right lower leg: No edema.      Left lower leg: No edema.   Lymphadenopathy:      Cervical: No cervical adenopathy.   Skin:     General: Skin is warm and dry.      Capillary Refill: Capillary refill takes less than 2 seconds.      Coloration: Skin is not jaundiced.      Findings: Bruising (Coagulated hematoma on right lateral thigh) present.   Neurological:      Mental Status: She is alert and oriented to person, place, and time.      Cranial Nerves: No cranial nerve deficit.      Motor: No weakness.      Comments: Mild confusion, but generally oriented and answering appropriately   Psychiatric:         Mood and Affect: Mood normal.         Behavior: Behavior normal.              CRANIAL NERVES     CN III, IV, VI   Pupils are equal, round, and reactive to light.           Recent Results (from the past 24 hour(s))   Urinalysis, Reflex to Urine Culture Urine, Clean Catch    Collection Time: 10/26/23 12:09 PM    Specimen: Urine   Result Value Ref Range    Specimen UA Urine, Clean Catch     Color, UA Yellow Yellow, Straw, Natasha     Appearance, UA Clear Clear    pH, UA 5.0 5.0 - 8.0    Specific Gravity, UA 1.010 1.005 - 1.030    Protein, UA Negative Negative    Glucose, UA Negative Negative    Ketones, UA Negative Negative    Bilirubin (UA) Negative Negative    Occult Blood UA Negative Negative    Nitrite, UA Negative Negative    Leukocytes, UA Negative Negative   COMPREHENSIVE METABOLIC PANEL    Collection Time: 10/26/23 12:30 PM   Result Value Ref Range    Sodium 127 (L) 136 - 145 mmol/L    Potassium 4.1 3.5 - 5.1 mmol/L    Chloride 84 (L) 95 - 110 mmol/L    CO2 32 (H) 23 - 29 mmol/L    Glucose 106 70 - 110 mg/dL    BUN 47 (H) 8 - 23 mg/dL    Creatinine 2.2 (H) 0.5 - 1.4 mg/dL    Calcium 9.0 8.7 - 10.5 mg/dL    Total Protein 6.6 6.0 - 8.4 g/dL    Albumin 3.3 (L) 3.5 - 5.2 g/dL    Total Bilirubin 1.0 0.1 - 1.0 mg/dL    Alkaline Phosphatase 230 (H) 55 - 135 U/L     (H) 10 - 40 U/L     (H) 10 - 44 U/L    eGFR 22 (A) >60 mL/min/1.73 m^2    Anion Gap 11 8 - 16 mmol/L   CBC W/ AUTO DIFFERENTIAL    Collection Time: 10/26/23 12:30 PM   Result Value Ref Range    WBC 8.14 3.90 - 12.70 K/uL    RBC 4.54 4.00 - 5.40 M/uL    Hemoglobin 13.7 12.0 - 16.0 g/dL    Hematocrit 41.5 37.0 - 48.5 %    MCV 91 82 - 98 fL    MCH 30.2 27.0 - 31.0 pg    MCHC 33.0 32.0 - 36.0 g/dL    RDW 16.2 (H) 11.5 - 14.5 %    Platelets 194 150 - 450 K/uL    MPV 11.4 9.2 - 12.9 fL    Immature Granulocytes 0.4 0.0 - 0.5 %    Gran # (ANC) 6.7 1.8 - 7.7 K/uL    Immature Grans (Abs) 0.03 0.00 - 0.04 K/uL    Lymph # 0.9 (L) 1.0 - 4.8 K/uL    Mono # 0.5 0.3 - 1.0 K/uL    Eos # 0.0 0.0 - 0.5 K/uL    Baso # 0.02 0.00 - 0.20 K/uL    nRBC 0 0 /100 WBC    Gran % 81.7 (H) 38.0 - 73.0 %    Lymph % 11.2 (L) 18.0 - 48.0 %    Mono % 6.5 4.0 - 15.0 %    Eosinophil % 0.0 0.0 - 8.0 %    Basophil % 0.2 0.0 - 1.9 %    Differential Method Automated    Magnesium    Collection Time: 10/26/23 12:30 PM   Result Value Ref Range    Magnesium 1.8 1.6 - 2.6 mg/dL   PHOSPHORUS    Collection Time:  10/26/23 12:30 PM   Result Value Ref Range    Phosphorus 2.8 2.7 - 4.5 mg/dL   POCT glucose    Collection Time: 10/26/23  4:11 PM   Result Value Ref Range    POCT Glucose 127 (H) 70 - 110 mg/dL   CBC W/ AUTO DIFFERENTIAL    Collection Time: 10/26/23  4:12 PM   Result Value Ref Range    WBC 7.85 3.90 - 12.70 K/uL    RBC 4.73 4.00 - 5.40 M/uL    Hemoglobin 14.2 12.0 - 16.0 g/dL    Hematocrit 41.9 37.0 - 48.5 %    MCV 89 82 - 98 fL    MCH 30.0 27.0 - 31.0 pg    MCHC 33.9 32.0 - 36.0 g/dL    RDW 16.0 (H) 11.5 - 14.5 %    Platelets 169 150 - 450 K/uL    MPV 10.1 9.2 - 12.9 fL    Immature Granulocytes 0.3 0.0 - 0.5 %    Gran # (ANC) 6.3 1.8 - 7.7 K/uL    Immature Grans (Abs) 0.02 0.00 - 0.04 K/uL    Lymph # 1.0 1.0 - 4.8 K/uL    Mono # 0.5 0.3 - 1.0 K/uL    Eos # 0.0 0.0 - 0.5 K/uL    Baso # 0.02 0.00 - 0.20 K/uL    nRBC 0 0 /100 WBC    Gran % 80.4 (H) 38.0 - 73.0 %    Lymph % 12.7 (L) 18.0 - 48.0 %    Mono % 6.2 4.0 - 15.0 %    Eosinophil % 0.1 0.0 - 8.0 %    Basophil % 0.3 0.0 - 1.9 %    Differential Method Automated    Comp. Metabolic Panel    Collection Time: 10/26/23  4:12 PM   Result Value Ref Range    Sodium 127 (L) 136 - 145 mmol/L    Potassium 4.6 3.5 - 5.1 mmol/L    Chloride 85 (L) 95 - 110 mmol/L    CO2 29 23 - 29 mmol/L    Glucose 118 (H) 70 - 110 mg/dL    BUN 47 (H) 8 - 23 mg/dL    Creatinine 2.1 (H) 0.5 - 1.4 mg/dL    Calcium 9.6 8.7 - 10.5 mg/dL    Total Protein 7.3 6.0 - 8.4 g/dL    Albumin 3.4 (L) 3.5 - 5.2 g/dL    Total Bilirubin 1.0 0.1 - 1.0 mg/dL    Alkaline Phosphatase 224 (H) 55 - 135 U/L     (H) 10 - 40 U/L     (H) 10 - 44 U/L    eGFR 23 (A) >60 mL/min/1.73 m^2    Anion Gap 13 8 - 16 mmol/L   Troponin I    Collection Time: 10/26/23  4:12 PM   Result Value Ref Range    Troponin I 0.059 (H) 0.000 - 0.026 ng/mL   Brain natriuretic peptide    Collection Time: 10/26/23  4:12 PM   Result Value Ref Range    BNP 2,876 (H) 0 - 99 pg/mL   Magnesium    Collection Time: 10/26/23  4:12 PM   Result  Value Ref Range    Magnesium 1.8 1.6 - 2.6 mg/dL   Phosphorus    Collection Time: 10/26/23  4:12 PM   Result Value Ref Range    Phosphorus 2.9 2.7 - 4.5 mg/dL   TSH    Collection Time: 10/26/23  4:12 PM   Result Value Ref Range    TSH 2.684 0.400 - 4.000 uIU/mL   ISTAT PROCEDURE    Collection Time: 10/26/23  4:13 PM   Result Value Ref Range    POC Glucose 123 (H) 70 - 110 mg/dL    POC BUN 64 (H) 6 - 30 mg/dL    POC Creatinine 2.1 (H) 0.5 - 1.4 mg/dL    POC Sodium 121 (L) 136 - 145 mmol/L    POC Potassium 5.1 3.5 - 5.1 mmol/L    POC Chloride 83 (L) 95 - 110 mmol/L    POC TCO2 (MEASURED) 36 (H) 23 - 29 mmol/L    POC Anion Gap 8 8 - 16 mmol/L    POC Ionized Calcium 1.13 1.06 - 1.42 mmol/L    POC Hematocrit 47 36 - 54 %PCV    Sample VENOUS     Site Other     Allens Test N/A    Ammonia    Collection Time: 10/26/23  6:02 PM   Result Value Ref Range    Ammonia 39 10 - 50 umol/L   Lactic acid, plasma    Collection Time: 10/26/23  6:02 PM   Result Value Ref Range    Lactate (Lactic Acid) 1.0 0.5 - 2.2 mmol/L   Urinalysis, Reflex to Urine Culture Urine, Clean Catch    Collection Time: 10/26/23  6:13 PM    Specimen: Urine   Result Value Ref Range    Specimen UA Urine, Clean Catch     Color, UA Yellow Yellow, Straw, Natasha    Appearance, UA Clear Clear    pH, UA 6.0 5.0 - 8.0    Specific Gravity, UA 1.005 1.005 - 1.030    Protein, UA Negative Negative    Glucose, UA Negative Negative    Ketones, UA Negative Negative    Bilirubin (UA) Negative Negative    Occult Blood UA Negative Negative    Nitrite, UA Negative Negative    Urobilinogen, UA Negative <2.0 EU/dL    Leukocytes, UA Negative Negative   Chloride, Random Urine    Collection Time: 10/26/23  6:13 PM   Result Value Ref Range    Chloride, Urine 32 25 - 200 mmol/L   Potassium, Random Urine    Collection Time: 10/26/23  6:13 PM   Result Value Ref Range    Potassium, Urine 18 15 - 95 mmol/L   Sodium, Random Urine    Collection Time: 10/26/23  6:13 PM   Result Value Ref Range     Sodium, Urine 27 20 - 250 mmol/L   Comprehensive metabolic panel    Collection Time: 10/26/23  7:20 PM   Result Value Ref Range    Sodium 129 (L) 136 - 145 mmol/L    Potassium 4.1 3.5 - 5.1 mmol/L    Chloride 89 (L) 95 - 110 mmol/L    CO2 28 23 - 29 mmol/L    Glucose 103 70 - 110 mg/dL    BUN 46 (H) 8 - 23 mg/dL    Creatinine 2.0 (H) 0.5 - 1.4 mg/dL    Calcium 9.0 8.7 - 10.5 mg/dL    Total Protein 6.6 6.0 - 8.4 g/dL    Albumin 3.0 (L) 3.5 - 5.2 g/dL    Total Bilirubin 0.9 0.1 - 1.0 mg/dL    Alkaline Phosphatase 198 (H) 55 - 135 U/L    AST 93 (H) 10 - 40 U/L     (H) 10 - 44 U/L    eGFR 24 (A) >60 mL/min/1.73 m^2    Anion Gap 12 8 - 16 mmol/L   Iron and TIBC    Collection Time: 10/26/23  7:20 PM   Result Value Ref Range    Iron 78 30 - 160 ug/dL    Transferrin 155 (L) 200 - 375 mg/dL    TIBC 229 (L) 250 - 450 ug/dL    Saturated Iron 34 20 - 50 %   Ferritin    Collection Time: 10/26/23  7:31 PM   Result Value Ref Range    Ferritin 804 (H) 20.0 - 300.0 ng/mL   Protime-INR    Collection Time: 10/26/23  7:45 PM   Result Value Ref Range    Prothrombin Time 14.8 (H) 9.0 - 12.5 sec    INR 1.4 (H) 0.8 - 1.2       Microbiology Results (last 7 days)       ** No results found for the last 168 hours. **             Imaging Results              US Liver with Doppler (xpd) (In process)  Result time 10/26/23 20:57:36                     X-Ray Chest 1 View (Final result)  Result time 10/26/23 17:33:36      Final result by Ced Durham MD (10/26/23 17:33:36)                   Impression:      Stable examination.      Electronically signed by: Ced Durham MD  Date:    10/26/2023  Time:    17:33               Narrative:    EXAMINATION:  XR CHEST 1 VIEW    CLINICAL HISTORY:  Other fatigue    TECHNIQUE:  Single frontal view of the chest was performed.    COMPARISON:  10/26/2023.    FINDINGS:  Monitoring EKG leads are present.  The trachea is unremarkable.  There are calcifications of the aortic knob.  The cardiomediastinal  silhouette is enlarged.  There is unchanged obscuration of the left aspect of the cardiac silhouette.  There is no evidence of free air beneath the hemidiaphragms.  There is unchanged appearance of a loculated left-sided pleural effusion.  There is no evidence of a pneumothorax.  The overall aeration of the lung fields are unchanged.  There are degenerative changes in the osseous structures.

## 2023-10-27 NOTE — ASSESSMENT & PLAN NOTE
· Persistent large left pleural effusion  · History of breast cancer  · IR consulted: s/p  thoracentesis with removal of 1.1L, cultures and cytology sent.

## 2023-10-27 NOTE — PLAN OF CARE
Pt arrived to IR for paracentesis, no acute distress noted. Orders and labs reviewed on chart. Consent obtained.

## 2023-10-27 NOTE — ASSESSMENT & PLAN NOTE
Diuresis and afterload reduction as tolerated. Likely worsened by recurrent A-flutter. Repeat echo

## 2023-10-27 NOTE — PT/OT/SLP EVAL
Physical Therapy Evaluation    Patient Name:  Barbara Rizo   MRN:  [id number]    Recommendations:     Discharge Recommendations: Low Intensity Therapy (with caregiver supervision/assistance)   Discharge Equipment Recommendations: bedside commode   Barriers to discharge:  Per dtr, pt with 3 hospital admissions since August 2023.  Pt with decreased safety awareness and increased fall risks.    Assessment:     Barbara Rizo is a 83 y.o. female admitted with a medical diagnosis of Acute on chronic diastolic heart failure.  She presents with the following impairments/functional limitations: weakness, impaired endurance, impaired self care skills, impaired functional mobility, gait instability, impaired balance, decreased coordination, decreased upper extremity function, decreased lower extremity function, decreased safety awareness, impaired cardiopulmonary response to activity.    Rehab Prognosis: Good; patient would benefit from acute skilled PT services to address these deficits and reach maximum level of function.    Recent Surgery: * No surgery found *      Plan:     During this hospitalization, patient to be seen daily to address the identified rehab impairments via gait training, therapeutic activities, therapeutic exercises and progress toward the following goals:    Plan of Care Expires:  11/10/23    Subjective     Chief Complaint: Pt stated her legs were tired during ambulation.  Patient/Family Comments/goals: Pt would like to be more independent and be able to drive.   Pain/Comfort:  Pain Rating 1: 0/10      Living Environment:  Pt currently lives with dtr in a [facility] with no concerns at entry.  Pt was living alone on the 1st floor apartment with no concerns at entry prior to hospitalization back in August 2023.   Prior to admission, patients level of function was mod I with ambulation using RW.  Pt was driving prior to August 2023.  Equipment at home: cane, straight, walker, rolling, bath bench, oxygen  (transport w/c).  Upon discharge, patient will have assistance from dtr and son.    Objective:     Patient found HOB elevated with oxygen, PureWick, peripheral IV, telemetry  upon PT entry to room.    General Precautions: Standard, fall, diabetic, respiratory, hearing impaired (pt has B hearing aids)  Orthopedic Precautions:N/A   Braces: N/A  Respiratory Status: Nasal cannula, flow 4 L/min    Exams:  Cognitive Exam:  Patient is oriented to Person and Place.  Pt able to follow simple commands.   Gross Motor Coordination:  WFL  Postural Exam:  Patient presented with the following abnormalities:    -       No postural abnormalities identified  Sensation:    -       Intact  light/touch BLE  Skin Integrity/Edema:      -       Skin integrity: Visible skin intact  -       Edema: None noted BLE  BLE ROM: WFL  BLE Strength: WFL    Functional Mobility:  Bed Mobility:     Scooting: contact guard assistance  Supine to Sit: minimum assistance with HOB elevated   Transfers:     Sit to Stand:  contact guard assistance with rolling walker  Bed to Chair: contact guard assistance with  rolling walker  using  Step Transfer; Pt with sudden onset of BLE shakiness at end of gait with LOB, required mod A to recover and assisted to bedside chair.  Gait: Pt ambulated ~100 ft with CGA using RW, 4L O2 NC, and chair to follow closely 2* onset of BLE shakiness.  Pt with decreased step length and jocelyn.    Balance: Pt with fair dynamic standing.      AM-PAC 6 CLICK MOBILITY  Total Score:16       Treatment & Education:  BUE seated therex x10 reps: forearm pronation/supination, shoulder flex, butterfly, forward punches    BLE seated therex x10 reps: AP, LAQ, hip abd/add, and hip flex    Pt/dtr provided/educated on HEP for seated/supine LE therex and HEP for AROM of UE.  Pt encouraged to perform UE/LE 2x/day ~10 reps.  Pt/dtr educated on acute skilled PT services and goals.  Pt to call for nursing assistance with OOB activity while in the  hospital.  Pt/dtr verbalized good understanding.     Patient left up in chair with all lines intact, call button in reach, and dtr present.  Purewick reattached.  Tray table close by.     GOALS:   Multidisciplinary Problems       Physical Therapy Goals          Problem: Physical Therapy    Goal Priority Disciplines Outcome Goal Variances Interventions   Physical Therapy Goal     PT, PT/OT Ongoing, Progressing     Description: Goals to be met by: 11/10/23     Patient will increase functional independence with mobility by performin. Supine to sit with Supervision  2. Rolling to Left and Right with Supervision  3. Sit to stand transfer with Stand-by Assistance using RW  4. Bed to chair transfer with Stand-by Assistance using Rolling Walker  5. Gait x250 feet with Stand-by Assistance using Rolling Walker and O2   6. Lower extremity exercise program 2 sets x15 reps per handout, with supervision                         History:     Past Medical History:   Diagnosis Date    A-fib     Acute respiratory failure with hypoxia 2015    Breast cancer     invasive ductal carcinoma    Chronic heart failure with preserved ejection fraction 10/26/2023    GII dd    CKD (chronic kidney disease)     Community acquired pneumonia 2023    Diabetes mellitus type II     Fatty liver     GERD (gastroesophageal reflux disease)     Hearing loss of both ears     wears bilateral hearing aids    Hemochromatosis     History of anemia due to CKD     History of pleural effusion     with thoracentesis    Hyperlipidemia     Hypertension     Macular degeneration     On home oxygen therapy     Osteoarthritis     Left knee    Osteoporosis     Vaginal delivery     x2    Vitamin D deficiency disease     Wears partial dentures     upper removable bridge       Past Surgical History:   Procedure Laterality Date    AXILLARY NODE DISSECTION Left 10/18/2021    Procedure: LYMPHADENECTOMY, AXILLARY;  Surgeon: Darlene Roca MD;  Location: [facility] OR;   Service: General;  Laterality: Left;    BREAST BIOPSY      BREAST LUMPECTOMY      invasive ductal carcinoma    BREAST SURGERY Bilateral     Reduction r/t h/o fibrocystic disease    CARDIOVERSION N/A 9/15/2023    Procedure: Cardioversion;  Surgeon: Constantine Chambers MD;  Location: [facility] CATH LAB;  Service: Cardiology;  Laterality: N/A;    CHOLECYSTECTOMY  08/2015    EYE SURGERY      Cat Ext  OU    HYSTERECTOMY      partial due to uterine fibroids    INCISION AND DRAINAGE OF KNEE Left 09/17/2020    Procedure: INCISION AND DRAINAGE, KNEE;  Surgeon: Rolando Wagoner MD;  Location: [facility];  Service: Orthopedics;  Laterality: Left;    INJECTION FOR SENTINEL NODE IDENTIFICATION Left 10/18/2021    Procedure: INJECTION, FOR SENTINEL NODE IDENTIFICATION;  Surgeon: Darlene Roca MD;  Location: [facility];  Service: General;  Laterality: Left;    JOINT REPLACEMENT Left 05/13/2013    TKR    JOINT REPLACEMENT Right 08/03/2015    TKR    MASTECTOMY, PARTIAL Left 10/18/2021    Procedure: MASTECTOMY, PARTIAL -- wire;  Surgeon: Darlene Roca MD;  Location: [facility];  Service: General;  Laterality: Left;  RN PREOP 10/15/2021   VACCINATED-----NEED H/P AND ORDERS    SENTINEL LYMPH NODE BIOPSY Left 10/18/2021    Procedure: BIOPSY, LYMPH NODE, SENTINEL;  Surgeon: Darlene Roca MD;  Location: [facility];  Service: General;  Laterality: Left;    THORACENTESIS      TOTAL KNEE ARTHROPLASTY Right 2015    left knee done 5/2013    TREATMENT OF CARDIAC ARRHYTHMIA N/A 9/15/2023    Procedure: Cardioversion or Defibrillation;  Surgeon: Constantine Chambers MD;  Location: [facility] CATH LAB;  Service: Cardiology;  Laterality: N/A;    WOUND DRESSING Left 09/17/2020    Procedure: WOUND VAC APPLICATION;  Surgeon: Rolando Wagoner MD;  Location: [facility] OR;  Service: Orthopedics;  Laterality: Left;       Time Tracking:     PT Received On: 10/27/23  PT Start Time: 1358     PT Stop Time: 1424  PT Total Time (min): 26 min     Billable Minutes: Evaluation 13 min co-eval with  OT and Therapeutic Exercise 13 min      10/27/2023

## 2023-10-27 NOTE — PLAN OF CARE
Procedure completed, pt tolerated well. No apparent distress noted. 1.1 Liters removed from L pleural, dressing applied CDI. Labs collected and sent. Patient to be transferred back to room. Report called to TOSHIA Tobar.

## 2023-10-27 NOTE — ASSESSMENT & PLAN NOTE
- chronic condition noted  - pt is closely followed by Dr. Boyle, Nephrology as OP with clear GOC for not wishing for HD in the future   - pt's  passed from CKD; good insight into trajectory of disease process   - contributes to future appropriateness for hospice, along with symptomatic diastolic HF, when aligns with GOC

## 2023-10-27 NOTE — CONSULTS
Radiology History & Physical      SUBJECTIVE:     Chief Complaint: Confusion and fatigue  Principal Problem: Symptomatic moderate Lt-sided pleural effusion of uncertain etiology    History of Present Illness:  Barbara Rizo is a 83 y.o. female with pertinent PMHx of gout, DMII, hepatic steatosis, MGUS, hemochromatosis, PHTN, breast CA, chronic hypoxemic respiratory failure on home O2, CKI, Afib and CHF on Eliquis who is admitted with fatigue, confusion and SOB/WILKINSON with new imaging revealing moderate Lt-sided layering pleural effusion of uncertain etiology requiring image-guided decompression and fluid sampling for source control, symptom relief, diagnosis and treatment planning.    A new inpatient IR consult received for US-guided percutaneous Lt posterolateral-approach diagnostic and therapeutic thoracentesis.    Past Medical History:   Diagnosis Date    A-fib     Acute respiratory failure with hypoxia 09/01/2015    Breast cancer     invasive ductal carcinoma    Chronic heart failure with preserved ejection fraction 10/26/2023    GII dd    CKD (chronic kidney disease)     Community acquired pneumonia 08/26/2023    Diabetes mellitus type II     Fatty liver     GERD (gastroesophageal reflux disease)     Hearing loss of both ears     wears bilateral hearing aids    Hemochromatosis     History of anemia due to CKD     History of pleural effusion     with thoracentesis    Hyperlipidemia     Hypertension     Macular degeneration     On home oxygen therapy     Osteoarthritis     Left knee    Osteoporosis     Vaginal delivery     x2    Vitamin D deficiency disease     Wears partial dentures     upper removable bridge     Past Surgical History:   Procedure Laterality Date    AXILLARY NODE DISSECTION Left 10/18/2021    Procedure: LYMPHADENECTOMY, AXILLARY;  Surgeon: Darlene Roca MD;  Location: [facility] OR;  Service: General;  Laterality: Left;    BREAST BIOPSY      BREAST LUMPECTOMY      invasive ductal carcinoma     BREAST SURGERY Bilateral     Reduction r/t h/o fibrocystic disease    CARDIOVERSION N/A 9/15/2023    Procedure: Cardioversion;  Surgeon: Constantine Chambers MD;  Location: [facility] CATH LAB;  Service: Cardiology;  Laterality: N/A;    CHOLECYSTECTOMY  08/2015    EYE SURGERY      Cat Ext  OU    HYSTERECTOMY      partial due to uterine fibroids    INCISION AND DRAINAGE OF KNEE Left 09/17/2020    Procedure: INCISION AND DRAINAGE, KNEE;  Surgeon: Rolando Wagoner MD;  Location: [facility] OR;  Service: Orthopedics;  Laterality: Left;    INJECTION FOR SENTINEL NODE IDENTIFICATION Left 10/18/2021    Procedure: INJECTION, FOR SENTINEL NODE IDENTIFICATION;  Surgeon: Darlene Roca MD;  Location: [facility] OR;  Service: General;  Laterality: Left;    JOINT REPLACEMENT Left 05/13/2013    TKR    JOINT REPLACEMENT Right 08/03/2015    TKR    MASTECTOMY, PARTIAL Left 10/18/2021    Procedure: MASTECTOMY, PARTIAL -- wire;  Surgeon: Darlene Roca MD;  Location: [facility] OR;  Service: General;  Laterality: Left;  RN PREOP 10/15/2021   VACCINATED-----NEED H/P AND ORDERS    SENTINEL LYMPH NODE BIOPSY Left 10/18/2021    Procedure: BIOPSY, LYMPH NODE, SENTINEL;  Surgeon: Darlene Roca MD;  Location: [facility] OR;  Service: General;  Laterality: Left;    THORACENTESIS      TOTAL KNEE ARTHROPLASTY Right 2015    left knee done 5/2013    TREATMENT OF CARDIAC ARRHYTHMIA N/A 9/15/2023    Procedure: Cardioversion or Defibrillation;  Surgeon: Constantine Chambers MD;  Location: [facility] CATH LAB;  Service: Cardiology;  Laterality: N/A;    WOUND DRESSING Left 09/17/2020    Procedure: WOUND VAC APPLICATION;  Surgeon: Rolando Wagoner MD;  Location: [facility] OR;  Service: Orthopedics;  Laterality: Left;     Home Meds:   Prior to Admission medications    Medication Sig Start Date End Date Taking? Authorizing Provider   allopurinoL (ZYLOPRIM) 100 MG tablet Take 1 tablet by mouth once daily. 10/21/22   Provider, Historical   amiodarone (PACERONE) 100 MG Tab Take 1 tablet (100 mg total) by  mouth once daily. 9/25/23 9/24/24  Paras Oro MD   apixaban (ELIQUIS) 2.5 mg Tab TAKE ONE TABLET BY MOUTH TWICE DAILY 11/9/22   Amauri Lomas MD   atorvastatin (LIPITOR) 40 MG tablet TAKE ONE TABLET BY MOUTH ONCE DAILY 7/5/23   Paras Oro MD   bumetanide (BUMEX) 0.5 MG Tab Take 0.5 mg by mouth.    Provider, Historical   bumetanide (BUMEX) 1 MG tablet Take 1 tablet (1 mg total) by mouth once daily.  Patient not taking: Reported on 10/4/2023 9/17/23 9/16/24  Dejan Herrera MD   calcium carbonate (TUMS) 200 mg calcium (500 mg) chewable tablet Take 2 tablets by mouth.    Provider, Historical   ergocalciferol (ERGOCALCIFEROL) 50,000 unit Cap Take 50,000 Units by mouth every 30 days.     Provider, Historical   folic acid (FOLVITE) 1 MG tablet TAKE 1 TABLET BY MOUTH EVERY DAY 10/18/23   Paras Oro MD   mupirocin (BACTROBAN) 2 % ointment Apply topically every evening. 10/10/22   Provider, Historical   solifenacin (VESICARE) 10 MG tablet Take 1 tablet (10 mg total) by mouth once daily. 6/26/23   Reina Camp MD     Anticoagulants/Antiplatelets:  Apixaban    Allergies: Review of patient's allergies indicates:  No Known Allergies    Sedation History:  no adverse reactions    Review of Systems:   Hematological: positive for - bleeding problems and bruising  Respiratory: positive for - orthopnea, pleuritic pain, and shortness of breath  Cardiovascular: positive for - chest pain, dyspnea on exertion, orthopnea, and shortness of breath  Gastrointestinal: no abdominal pain, change in bowel habits, or black or bloody stools  Genito-Urinary: no dysuria, trouble voiding, or hematuria  Musculoskeletal: negative  Neurological: +confusion       OBJECTIVE:     Vital Signs (Most Recent)  Temp: 98.5 °F (36.9 °C) (10/27/23 0722)  Pulse: (!) 119 (10/27/23 0722)  Resp: 18 (10/27/23 0722)  BP: (!) 128/90 (10/27/23 0722)  SpO2: 97 % (10/27/23 0722)    Physical Exam:  General: no acute distress  Mental  Status: alert and oriented to person, place and time  HEENT: normocephalic, atraumatic  Chest: +minimally labored breathing  Heart: regular rhythm. Tachy.  Abdomen: nondistended  Extremity: moves all extremities    Laboratory  Lab Results   Component Value Date    INR 1.4 (H) 10/26/2023       Lab Results   Component Value Date    WBC 6.52 10/27/2023    HGB 12.6 10/27/2023    HCT 39.1 10/27/2023    MCV 91 10/27/2023     10/27/2023      Lab Results   Component Value Date    GLU 84 10/27/2023     (L) 10/27/2023    K 3.6 10/27/2023    CL 90 (L) 10/27/2023    CO2 27 10/27/2023    BUN 42 (H) 10/27/2023    CREATININE 1.9 (H) 10/27/2023    CALCIUM 8.6 (L) 10/27/2023    MG 1.6 10/27/2023     (H) 10/27/2023    AST 76 (H) 10/27/2023    ALBUMIN 2.9 (L) 10/27/2023    BILITOT 0.8 10/27/2023     ASSESSMENT/PLAN:     83 y.o. female with pertinent PMHx of gout, DMII, hepatic steatosis, MGUS, hemochromatosis, PHTN, breast CA, chronic hypoxemic respiratory failure on home O2, CKI, Afib and CHF on Eliquis who is admitted with fatigue, confusion and SOB/WILKINSON with new imaging revealing moderate Lt-sided layering pleural effusion of uncertain etiology requiring image-guided decompression and fluid sampling for source control, symptom relief, diagnosis and treatment planning.    Symptomatic moderate Lt-sided pleural effusion of uncertain etiology - Will attempt US-guided percutaneous Lt posterolateral-approach diagnostic and therapeutic thoracentesis with local anesthetic only.    Please place all pertinent fluid studies in epic prior to the procedure to ensure that the studies the ordering provider/team deem appropriate are performed.    Risks (including, but not limited to, pain, bleeding, infection, damage to nearby structures, failure to obtain sufficient material for a diagnosis, the need for additional procedures, and death), benefits, and alternatives were discussed with the patient. All questions were answered to  the best of my abilities. The patient wishes to proceed with the procedure. Written informed consent was obtained.    Thank you for considering IR for the care of your patient.     Julian Rahman MD  Interventional Radiology

## 2023-10-27 NOTE — ASSESSMENT & PLAN NOTE
Patient with Persistent (7 days or more) atrial fibrillation which is uncontrolled currently with Amiodarone. Patient is currently in atrial fibrillation.[id number] Score: 5.  Anticoagulation indicated. Anticoagulation done with eliquis.    -history of atrial fibrillation. On amiodarone outpatient  -Pt now with elevated LFTs and prolonged   -Cardiology consulted: Stop amiodarone with prolonged QT. Attempt rate control with metoprolol as tolerated. Resume eliquis

## 2023-10-27 NOTE — ASSESSMENT & PLAN NOTE
·  on admission  · I am concerned that with hemochromatosis and QTC prolongation, that amiodarone will not be the drug of choice moving forward- cardiology consult in a.m.  · Hold amiodarone  · Hold solifenacin  · Remove antinausea meds from p.r.n. last

## 2023-10-27 NOTE — ASSESSMENT & PLAN NOTE
· Persistent large left pleural effusion  · History of breast cancer  · Will ask IR to drain tomorrow, and will order cultures and cytology

## 2023-10-27 NOTE — ASSESSMENT & PLAN NOTE
- pt oriented x4, able to participate in complex medical/GOC discussion at time of visit   - daughter agrees pt is currently at mental status baseline

## 2023-10-27 NOTE — PROGRESS NOTES
[facility] Medicine  Progress Note    Patient Name: Barbara Rizo  MRN: [id number]  Patient Class: IP- Inpatient   Admission Date: 10/26/2023  Length of Stay: 1 days  Attending Physician: Grayson Alamo DO  Primary Care Provider: Paras Oro MD        Subjective:     Principal Problem:Acute on chronic diastolic heart failure        HPI:    Barbara Rizo is a 83 y.o. female who has a past medical history of A-fib, Acute respiratory failure with hypoxia, Breast cancer, CKD, Community acquired pneumonia, Diabetes mellitus type II, Fatty liver, GERD, Hearing loss of both ears, Hemochromatosis, History of anemia due to CKD, History of pleural effusion, Hyperlipidemia, Hypertension, Macular degeneration, On home oxygen therapy, Osteoarthritis, Osteoporosis, Vaginal delivery, Vitamin D deficiency disease, and Wears partial dentures, presented to the ED with CC of Confusion.    Patient was sent in by PCP/home health labs hyponatremia and elevated creatinine of 2.2.  Unclear if point of care hyponatremia was correct at BNP taken at same time (noon) was 127 and repeat BNP showed sodium again at 127 four hours later-(POC electrolytes are often wrong).  Daughter states that patient has been intermittently confused for the past week or so, has not necessarily gotten worse but has been persistent.  She is oriented, A/O x3, however will often argue with daughter about things that are apparently wrong.  Denies hallucinations.  Has had a couple falls recently without head injury, has a bruise on her right thigh, without hip pain.  In the ED her liver enzymes were elevated,  and -which were normal on the last set of labs, and for many years normal.  Patient does have hemochromatosis and fair and ferritin is elevated at 800, ultrasound of liver with Doppler ordered.  Also patient has elevated BNP at 2876 and elevated JVD, echo ordered.  She has a fairly large left pleural effusion as  well, however it is stable from previous.  She is on 4 L of home oxygen.  Denies chest pain and denies shortness of breath beyond her normal dyspnea.        Overview/Hospital Course:  83 y.o. female with extensive co morbidities admitted on 10/26/23 for abnormal lab findings, sent to ED by PCP. Daughter noted that patient has been having intermittent confusion. Outpatient labs showed hyponatremia, transaminitis, and elevated creatinine of 2.2.  Patient also found to have elevated BNP and troponin in the ED. Started on CHF pathway. ferritin elevated at 804,previously in the 400s. CXR with unchanged appearance of a loculated left-sided pleural effusion. Pt with afib RVR and required  IV metoprolol with improvement. QTc prolonged on EKG, home amiodarone held. Cardiology consulted-recommends to stop amiodarone and attempt rate control with metoprolol. Resume eliquis after thoracentesis. Consider repeat CV if A-flutter not controlled. IR consulted- pt s/p thoracentesis with removal of 1.1L, cultures sent. Given history of hemochromatosis, hematology consulted. Nephrology, palliative and GI consulted as well      Interval History:  No acute overnight events.  Patient afebrile.  Daughter at bedside.  Patient states she feels fatigued.  Denies any chest pain, shortness for breath, abdominal pain, nausea, vomiting.  Overall states she feels weak.  Appears very anxious.    Review of Systems   Constitutional:  Positive for activity change and fatigue. Negative for chills and fever.   Respiratory:  Positive for shortness of breath (intermittently, worse with exertion). Negative for cough and wheezing.    Cardiovascular:  Negative for chest pain, palpitations and leg swelling.   Gastrointestinal:  Negative for abdominal distention, abdominal pain, diarrhea, nausea and vomiting.   Genitourinary:  Negative for difficulty urinating and dysuria.   Neurological:  Positive for weakness. Negative for dizziness and headaches.    Psychiatric/Behavioral:  Positive for confusion (intermittent confusion per daughter) and decreased concentration. The patient is nervous/anxious.      Objective:     Vital Signs (Most Recent):  Temp: 97.6 °F (36.4 °C) (10/27/23 1111)  Pulse: 104 (10/27/23 1111)  Resp: 19 (10/27/23 1111)  BP: 109/76 (10/27/23 1111)  SpO2: 96 % (10/27/23 1111) Vital Signs (24h Range):  Temp:  [97.4 °F (36.3 °C)-98.5 °F (36.9 °C)] 97.6 °F (36.4 °C)  Pulse:  [] 104  Resp:  [16-28] 19  SpO2:  [91 %-99 %] 96 %  BP: (109-152)/(58-99) 109/76     Weight: 65 kg (143 lb 4.8 oz)  Body mass index is 26.21 kg/m².    Intake/Output Summary (Last 24 hours) at 10/27/2023 1452  Last data filed at 10/27/2023 1040  Gross per 24 hour   Intake 93384 ml   Output 2320 ml   Net 7680 ml         Physical Exam  Vitals and nursing note reviewed.   Constitutional:       General: She is not in acute distress.     Appearance: She is ill-appearing.   HENT:      Mouth/Throat:      Mouth: Mucous membranes are dry.   Eyes:      Extraocular Movements: Extraocular movements intact.   Cardiovascular:      Rate and Rhythm: Normal rate and regular rhythm.      Heart sounds: No murmur heard.     No gallop.   Pulmonary:      Effort: Pulmonary effort is normal. No respiratory distress.      Breath sounds: Examination of the left-middle field reveals decreased breath sounds. Examination of the left-lower field reveals decreased breath sounds. Decreased breath sounds and rales present. No wheezing.      Comments: On 2L NC, decreased breath sounds in left lower lobe  Abdominal:      General: There is no distension.      Palpations: Abdomen is soft.      Tenderness: There is no abdominal tenderness.   Musculoskeletal:         General: No swelling or tenderness.      Right lower leg: Edema present.      Left lower leg: Edema present.      Comments: Bilateral lower extremity with trace edema   Skin:     General: Skin is warm and dry.   Neurological:      General: No focal  deficit present.      Mental Status: She is alert and oriented to person, place, and time.             Significant Labs: All pertinent labs within the past 24 hours have been reviewed.    Significant Imaging: I have reviewed all pertinent imaging results/findings within the past 24 hours.      Assessment/Plan:      * Acute on chronic diastolic heart failure  · BNP 3000 with elevated JVD and hypervolemic hyponatremia on admission  · Right-sided heart failure, which is why her oxygen has not changed much, but why her liver is congested, I suspect  · Likely worsened by recurrent A-flutter  · Continue IV diuresis  · Echo ordered  · Cardiology consulted  · See HF note      Atrial fibrillation with RVR  · Longstanding, permanent AFib  · Patient has been cardioverted about 4 times in the past, per daughter   · Heart rate 120 in the ED, IV metoprolol given, heart rate improved to - controlled  · Given hemochromatosis and , amiodarone on hold  · cardiology consulted: Stop amiodarone with prolonged QT. Will attempt rate control with metoprolol as tolerated. Consider repeat CV if A-flutter not controlled  · Resume eliquis after thoracentesis.    Paroxysmal atrial fibrillation  Patient with Persistent (7 days or more) atrial fibrillation which is uncontrolled currently with Amiodarone. Patient is currently in atrial fibrillation.[id number] Score: 5.  Anticoagulation indicated. Anticoagulation done with eliquis.    -history of atrial fibrillation. On amiodarone outpatient  -Pt now with elevated LFTs and prolonged   -Cardiology consulted: Stop amiodarone with prolonged QT. Attempt rate control with metoprolol as tolerated. Resume eliquis     Chronic heart failure with preserved ejection fraction  · Do not see heart failure on her history  · chronic HFpEF added as this was noted previously on ECHO (not new this admission)  · BNP approaching 3000 with elevated JVD on exam  · Patient was recently switch to Bumex,  perhaps mildly underdosed leading to increasing volume and hypervolemic hyponatremia over the last couple weeks.    · She takes 1 mg Htusreb-Osjlvaag-Jlwekyxi-Sunday; and takes 0.5 mg MWF.  · May need to increase to 1 mg daily on discharge, please assess at that pointPatient was recently switch to Bumex, perhaps mildly underdosed leading to increasing volume and hypervolemic hyponatremia over the last couple weeks.    · Cardiology consulted    Pulmonary hypertension  · PASP 46 mmHg previously  · Keep euvolemic  · Repeat echo: with EF 35-40%. There is mild stenosis of aortic valve. The estimated pulmonary artery systolic pressure is 49 mmHg.      Pleural effusion on left  · Persistent large left pleural effusion  · History of breast cancer  · IR consulted: s/p  thoracentesis with removal of 1.1L, cultures and cytology sent.      QT prolongation  ·  on admission  · Concerned that with hx of hemochromatosis and QTC prolongation, that amiodarone will not be the drug of choice moving forward  · cardiology consult: recommends to stop amiodarone and attempt rate control with metoprolol. Resume eliquis   · Hold amiodarone  · Hold solifenacin  · Avoid QT prolongation agents           Hyponatremia  · Patient has hyponatremia which is uncontrolled,We will aim to correct the sodium by 4-6mEq in 24 hours. We will monitor sodium Every 12 hours. The hyponatremia is due to heart failure/right-sided congestion versus hemochromatosis/liver dysfunction. We will obtain the following studies: Urine sodium, urine osmolality, serum osmolality or TSH, T4. We will treat the hyponatremia with Fluid restriction of:  1.5 liter and diuresis per day. The patient's sodium results have been reviewed and are listed below.    · Suspect the POC sodium of 121 was inaccurate, as POC electrolytes often are  · Sodium 127 on admission, and repeat 4 hours later was also 127, then 129  · BNP approaching 3000 and elevated JVD on exam, large left  pleural effusion  · Suspect hypervolemic hyponatremia-will continue diuresis  · IV Lasix 40 Q 8, ordered 3 doses, re-evaluate and reorder if sodium improving/necessary    Intermittent confusion  · Intermittent confusion although oriented x3  · Ammonia is within normal limits  · However it is possible she has CNS manifestation of hemochromatosis with ferritin 800 and new transaminitis, INR elevated at 1.4, does not quite meet liver failure criteria  · verses right-sided heart failure with hepatic congestion  · Verses uremic encephalopathy, however BUN not too high  · Echo of heart ordered- EF of 35-40%, mild aortic valve stenosis, +pulmonary hypertension  · Ultrasound of liver with Doppler ordered: no acute findings  · Hematology consulted  · Will diurese in setting of likely right-sided heart failure with hepatic congestion      MGUS (monoclonal gammopathy of unknown significance)  · See hemochromatosis note      History of Hemochromatosis  · Did require blood letting in early 2000's  · Ferritin elevated at 800, unclear if patient could have CNS manifestation/confusion, not necessarily due to iron itself, but poor clearance of toxins etc  ·  , INR 1.4- usually all WNL  · Will discuss with hematology in am if any intervention is necessary, however, potentially due to right sided heart failure with hepatic congestion.  Patient also has MGUS.   · Hematology consulted-Would rule out other issue or causes such as worsening CHF, infection or other causes of inflammation before ordering a phlebotomy   · Pt will need to continue follow up in heme clinic on discharge    Type 2 diabetes mellitus with stage 4 chronic kidney disease, without long-term current use of insulin  · A1c 4.9 8/27/23  · A1cs likely improved with worsening renal function, CKD 4  · Diet controlled at home  · No significant elevations noted thus far  · Monitor sugars daily with BMP -no need for frequent glucose checks    CKD (chronic kidney  disease), stage IV  -Nephrology consulted      Carcinoma of breast  · Noted history      OAB (overactive bladder)  · Holding solifenacin given   · Would discontinue at discharge, consider discussing with Urology other treatment    Chronic gout  · Decreased creatinine clearance and elevated liver enzymes, will hold allopurinol for now      Physical deconditioning  · PT OT consulted      ACP (advance care planning)  -Spent 5 minutes to review ACPs notes  -Palliative care team consulted        VTE Risk Mitigation (From admission, onward)         Ordered     apixaban tablet 2.5 mg  2 times daily         10/27/23 1146     IP VTE HIGH RISK PATIENT  Once         10/27/23 0652     Place sequential compression device  Until discontinued         10/27/23 0652     Place sequential compression device  Until discontinued         10/26/23 1919                Discharge Planning   SALLY:      Code Status: DNR   Is the patient medically ready for discharge?:     Reason for patient still in hospital (select all that apply): Patient trending condition, Treatment, Consult recommendations and PT / OT recommendations  Discharge Plan A: Home with family                  Grayson Alamo DO  Department of Hospital Medicine   [facility] - Med Surg

## 2023-10-27 NOTE — ASSESSMENT & PLAN NOTE
· Did require blood letting in early 2000's  · Ferritin elevated at 800, unclear if patient could have CNS manifestation/confusion, not necessarily due to iron itself, but poor clearance of toxins etc  ·  , INR 1.4- usually all WNL  · Will discuss with hematology in am if any intervention is necessary, however, potentially due to right sided heart failure with hepatic congestion.  Patient also has MGUS.   · Hematology consulted-Would rule out other issue or causes such as worsening CHF, infection or other causes of inflammation before ordering a phlebotomy   · Pt will need to continue follow up in heme clinic on discharge

## 2023-10-27 NOTE — PROGRESS NOTES
Date of Admission:10/26/2023    SUBJECTIVE:notes feeling ok  Not sob    Current Facility-Administered Medications   Medication    acetaminophen tablet 650 mg    apixaban tablet 2.5 mg    glucagon (human recombinant) injection 1 mg    glucose chewable tablet 16 g    glucose chewable tablet 24 g    melatonin tablet 6 mg    metoprolol tartrate (LOPRESSOR) tablet 25 mg    naloxone 0.4 mg/mL injection 0.02 mg    polyethylene glycol packet 17 g    sodium chloride 0.9% flush 10 mL     Facility-Administered Medications Ordered in Other Encounters   Medication    denosumab (PROLIA) injection 60 mg       Wt Readings from Last 3 Encounters:   10/27/23 65 kg (143 lb 4.8 oz)   10/04/23 69.5 kg (153 lb 3.5 oz)   09/25/23 72.1 kg (158 lb 15.2 oz)     Temp Readings from Last 3 Encounters:   10/27/23 98.1 °F (36.7 °C)   10/26/23 97.5 °F (36.4 °C) (Oral)   10/24/23 97.9 °F (36.6 °C)     BP Readings from Last 3 Encounters:   10/27/23 103/70   10/26/23 120/60   10/24/23 120/60     Pulse Readings from Last 3 Encounters:   10/27/23 72   10/26/23 82   10/24/23 (!) 126       Intake/Output Summary (Last 24 hours) at 10/27/2023 1835  Last data filed at 10/27/2023 1824  Gross per 24 hour   Intake 80555 ml   Output 3120 ml   Net 7600 ml       PE:  GEN:wd female in nad  HEENT:ncat,eomi,mm  CVS:s1s2 regular  PULM:no rales  ABD:bs,soft  EXT:no leg edema  NEURO:awake    Recent Labs   Lab 10/27/23  0347   GLU 84   *   K 3.6   CL 90*   CO2 27   BUN 42*   CREATININE 1.9*   CALCIUM 8.6*   MG 1.6       Lab Results   Component Value Date    CALCIUM 8.6 (L) 10/27/2023    CAION 0.95 (L) 02/03/2019    PHOS 3.3 10/27/2023       Recent Labs   Lab 10/27/23  0347   WBC 6.52   RBC 4.28   HGB 12.6   HCT 39.1      MCV 91   MCH 29.4   MCHC 32.2           A/P:  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 Stable. Cont tx.  2.hyponatremia. stable. Following.  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 No epo.  4.dm2. following sugars.  5.pleural effusion. Thoracentesis.   6.mgus.  stable.  7.acute on chronic dhf. Volume ok.  Long talk to pt. Needs to eat more. Pt is declining. Talked to pt and daughter about her decline.PT is going to do this again. Daugther went out of town. Pt will get worse.

## 2023-10-27 NOTE — ASSESSMENT & PLAN NOTE
- increasing severity since prior admission   - now with intermittent dyspnea at rest in setting of diastolic HF; contributes to appropriateness for hospice when aligns with GOC

## 2023-10-27 NOTE — ASSESSMENT & PLAN NOTE
- pt sought ER care following recommendation of PCP for abnormal labs for hyponatremia and kidney function   - pt and daughter somewhat confused/frustrated about need for admission as these have all been worse in the past and pt is closely followed by Dr. Boyle, Nephrology as OP with clear GOC for not wishing for HD in the future

## 2023-10-27 NOTE — ASSESSMENT & PLAN NOTE
·  on admission  · Concerned that with hx of hemochromatosis and QTC prolongation, that amiodarone will not be the drug of choice moving forward  · cardiology consult: recommends to stop amiodarone and attempt rate control with metoprolol. Resume eliquis   · Hold amiodarone  · Hold solifenacin  · Avoid QT prolongation agents

## 2023-10-27 NOTE — ASSESSMENT & PLAN NOTE
Currently with A-flutter mild RVR. Stop amiodarone with prolonged QT. Will attempt rate control with metoprolol as tolerated. Resume eliquis after thoracentesis. Consider repeat CV if A-flutter not controlled

## 2023-10-27 NOTE — ED NOTES
US at     Detail Level: Zone Render In Strict Bullet Format?: No Initiate Treatment: Benzaclin QD\\nTretinoin QD

## 2023-10-27 NOTE — HOSPITAL COURSE
83 y.o. female with extensive co morbidities admitted on 10/26/23 for abnormal lab findings, sent to ED by PCP. Daughter noted that patient has been having intermittent confusion. Outpatient labs showed hyponatremia, transaminitis, and elevated creatinine of 2.2.  Patient also found to have elevated BNP and troponin in the ED. Started on CHF pathway. ferritin elevated at 804,previously in the 400s. CXR with unchanged appearance of a loculated left-sided pleural effusion. Pt with afib RVR and required  IV metoprolol with improvement. QTc prolonged on EKG, home amiodarone held. Cardiology consulted-recommends to stop amiodarone and attempt rate control with metoprolol. Resume eliquis after thoracentesis. Consider repeat CV if A-flutter not controlled. IR consulted- pt s/p thoracentesis with removal of 1.1L, cultures sent. Pleural fluid studies with no growth so far. Cytology pending. Given history of hemochromatosis, hematology consulted-recommend continued outpatient follow up. Nephrology, palliative and GI consulted as well. Atrial flutter rate controlled with metorpolol. Hyponatremia present but fluctuates. Pt does not have much of an appetite. Patient and family requested SNF/rehab. PT/OT consulted and recommends home health. Pt very capable of home health. Daughter states that when the therapist is off, the patient does not continue it alone. She only works when the therapist is present.     Patient states she feel like she is at her baseline. Does not have much of an appetite. Her shortness of breath is at baseline. Pt denies any fever, headaches, vision changes, chest pain, palpitations, abdominal pain, nausea, vomiting, or any new weaknesses. Feels ready to go home. Patient's exam on discharge was as follow: Patient is alert and oriented, appears in no acute distress, heart with regular rate and irregular rhythm, lungs at her baseline, diminished, on 3L NC, abdomen soft, and no new weaknesses or focal deficits  seen. Bilateral lower extremities without any edema or calf tenderness.     Patient was counseled regarding any abnormal labs, differential diagnosis, treatment options, risk-benefit, lifestyle changes, prognosis, current condition, and medications. Patient was interactive and attentive.  Patient's questions were answered in a respectful and timely manner. Patient was instructed to follow-up with PCP within 1 week and to continue taking medications as prescribed.  Instructed to also follow up with hematologist, cardiologist, and nephrologist. Also, extensively discussed the risks, benefits, and side effects of patient's medications. Discussed with patient about any medication changes. Patient verbalized understanding and agrees to treatment plan.  Patient is stable for discharge.  Patient has no other questions or concerns at this time.  ED precautions discussed with the patient.    Vital signs are stable. Ambulating without any difficulty. Tolerating p.o. intake without any nausea or vomiting. Afebrile for over 24 hours. Patient is in stable condition and has no questions or concerns. Patient will be discharge to home with home health once transportation secured . Prescriptions sent to pharmacy.  CM/SW to assist with discharge planning.     Vitals:    10/29/23 2352 10/30/23 0413 10/30/23 0705 10/30/23 1036   BP: 129/87 112/89 110/73 106/68   BP Location: Left arm Left arm     Patient Position: Lying Lying Lying Sitting   Pulse: 108 82 98 (!) 58   Resp: 18 18 17 17   Temp: 97.4 °F (36.3 °C) 96.4 °F (35.8 °C) 97.7 °F (36.5 °C) 97.6 °F (36.4 °C)   TempSrc: Oral Axillary Oral Oral   SpO2: 97% 97% 98% 97%   Weight:       Height:

## 2023-10-27 NOTE — BRIEF OP NOTE
Radiology Post-Procedure Note    Pre Op Diagnosis: Symptomatic moderate Lt-sided pleural effusion of uncertain etiology  Post Op Diagnosis: Same    Procedure: 1. US-guided percutaneous Lt posterolateral-approach diagnostic and therapeutic thoracentesis    Procedure performed by: Julian Rahman MD    Written Informed Consent Obtained: Yes  Specimen Removed: YES, 1,100-cc of thin, straw-colored pleural fluid  Estimated Blood Loss: none    Findings:   Successful US-guided percutaneous Lt posterolateral-approach diagnostic and therapeutic thoracentesis with local anesthetic only. Patient tolerated the procedure well. No immediate post-procedural complications noted.     Immediate and 4 hour post-op CXR to exclude potential for immediate and/or delayed post-procedural complications.    Thank you for considering IR for the care of your patient.     Julian Rahman MD  Interventional Radiology

## 2023-10-27 NOTE — PLAN OF CARE
Case Management Assessment     PCP: Paras Oro MD  Pharmacy: [facility]    Patient Arrived From: Home  Existing Help at Home: Jp Arechiga (daughter [phone])    Barriers to Discharge: None    Discharge Plan:    A. Home   B. Home      SW completed brief assessment at bedside with patient and daughter Tamar. Preferred pharmacy is [facility]. Patient currently takes Eliquis. Patient is on 4 liters of oxygen at home, utilizes a rolling walker and a transport wheelchair. Patient lives with her daughter Tamar and will be going home with her at discharge.           10/27/23 1018   Discharge Planning   Assessment Type Discharge Planning Brief Assessment   Resource/Environmental Concerns none   Support Systems Children   Equipment Currently Used at Home oxygen;walker, rolling;wheelchair   Patient/Family Anticipates Transition to home with family   Patient/Family Anticipated Services at Transition none   DME Needed Upon Discharge  none   Discharge Plan A Home with family   Discharge Plan B Home

## 2023-10-27 NOTE — PT/OT/SLP EVAL
Occupational Therapy   Evaluation    Name: Barbara Rizo  MRN: [id number]  Admitting Diagnosis: Acute on chronic diastolic heart failure  Recent Surgery: * No surgery found *      Recommendations:     Discharge Recommendations: Low Intensity Therapy (w/ supervision)  Discharge Equipment Recommendations:  bedside commode  Barriers to discharge:  None    Assessment:     Barbara Rizo is a 83 y.o. female with a medical diagnosis of Acute on chronic diastolic heart failure.  Performance deficits affecting function: impaired endurance, weakness, impaired self care skills, impaired functional mobility, gait instability, impaired balance, decreased upper extremity function, decreased lower extremity function, decreased safety awareness, decreased coordination, impaired cardiopulmonary response to activity.      Rehab Prognosis: Good; patient would benefit from acute skilled OT services to address these deficits and reach maximum level of function.       Plan:     Patient to be seen  (3-5x/wk) to address the above listed problems via self-care/home management, therapeutic activities, therapeutic exercises  Plan of Care Expires: 11/10/23  Plan of Care Reviewed with: patient, daughter    Subjective     Chief Complaint: BLE weakness   Patient/Family Comments/goals: Wants to get stronger to regain independence.     Occupational Profile:  Living Environment: Pt currently lives with dtr in a [facility] with no concerns at entry.  Pt was living alone on the 1st floor apartment with no concerns prior to hospitalization back in August 2023.   Previous level of function: Patients level of function was mod I with ambulation using RW.  Roles and Routines: driving prior to August   Equipment Used at Home: cane, straight, walker, rolling, bath bench, oxygen (transport W/C)  Assistance upon Discharge: Dtr     Pain/Comfort:  Pain Rating 1: 0/10  Pain Rating Post-Intervention 1: 0/10    Patients cultural, spiritual, Evangelical conflicts  given the current situation: no    Objective:     Communicated with: Nurse prior to session.  Patient found HOB elevated with oxygen, PureWick, peripheral IV, telemetry upon OT entry to room.    General Precautions: Standard, fall, diabetic, respiratory, hearing impaired  Orthopedic Precautions: N/A  Braces: N/A  Respiratory Status: Nasal cannula, flow 4 L/min    Occupational Performance:    Bed Mobility:    Pt found sitting up at EOB upon entry; refer to PT eval     Functional Mobility/Transfers:  Patient completed Sit <> Stand Transfer with contact guard assistance and minimum assistance  with  rolling walker   Patient completed Bed <> Chair Transfer using Step Transfer technique with contact guard assistance and minimum assistance with rolling walker; pt w/ B knee buckling, requiring min-mod A for safe stand>sit   Functional Mobility: Pt ambulated functional distances in room and unit w/ close min A-CGA and use of RW. Refer to PT note for details. Pt w/ spO2 87-89% on 4L O2 NC post ambulation, requiring cueing for implementing PLB to recover to mid 90s.     Activities of Daily Living:  Upper Body Dressing: minimum assistance for donning gown over back     Cognitive/Visual Perceptual:  Cognitive/Psychosocial Skills:     -       Oriented to: Person, Place, Time, and Situation   -       Follows Commands/attention:Follows multistep  commands  -       Communication: clear/fluent  -       Memory: No Deficits noted  -       Safety awareness/insight to disability: intact     Physical Exam:  Balance:    -       good sitting balance; fair + standing   Postural examination/scapula alignment:    -       Rounded shoulders  -       Forward head  Skin integrity: dressing to L upper arm due to skin tear; covered w/ foam dressing   Edema:  None noted  Sensation:    -       Intact  Upper Extremity Range of Motion:     -       Right Upper Extremity: WFL  -       Left Upper Extremity: WFL  Upper Extremity Strength:    -       Right  Upper Extremity: WFL  -       Left Upper Extremity: WFL   Strength:    -       Right Upper Extremity: WFL  -       Left Upper Extremity: WFL    AMPAC 6 Click ADL:  AMPAC Total Score: 21    Treatment & Education:  -Initial eval complete.   -Pt educated on role of OT and POC.   -Pt educated on safe functional mobility and ADL performance.     Patient left up in chair with all lines intact and call button in reach    GOALS:   Multidisciplinary Problems       Occupational Therapy Goals          Problem: Occupational Therapy    Goal Priority Disciplines Outcome Interventions   Occupational Therapy Goal     OT, PT/OT Ongoing, Progressing    Description: Goals to be met by: 11/10/23     Patient will increase functional independence with ADLs by performing:    LE Dressing with Modified [place].  Grooming while standing with Modified [place].  Toileting from toilet with Modified [place] for hygiene and clothing management.   Step transfer with Modified [place]  Toilet transfer to toilet with Modified [place].  Upper extremity exercise program x15 reps per handout, with independence.                         History:     Past Medical History:   Diagnosis Date    A-fib     Acute respiratory failure with hypoxia 09/01/2015    Breast cancer     invasive ductal carcinoma    Chronic heart failure with preserved ejection fraction 10/26/2023    GII dd    CKD (chronic kidney disease)     Community acquired pneumonia 08/26/2023    Diabetes mellitus type II     Fatty liver     GERD (gastroesophageal reflux disease)     Hearing loss of both ears     wears bilateral hearing aids    Hemochromatosis     History of anemia due to CKD     History of pleural effusion     with thoracentesis    Hyperlipidemia     Hypertension     Macular degeneration     On home oxygen therapy     Osteoarthritis     Left knee    Osteoporosis     Vaginal delivery     x2    Vitamin D deficiency disease     Wears partial dentures      upper removable bridge         Past Surgical History:   Procedure Laterality Date    AXILLARY NODE DISSECTION Left 10/18/2021    Procedure: LYMPHADENECTOMY, AXILLARY;  Surgeon: Darlene Roca MD;  Location: [facility] OR;  Service: General;  Laterality: Left;    BREAST BIOPSY      BREAST LUMPECTOMY      invasive ductal carcinoma    BREAST SURGERY Bilateral     Reduction r/t h/o fibrocystic disease    CARDIOVERSION N/A 9/15/2023    Procedure: Cardioversion;  Surgeon: Constantine Chambers MD;  Location: [facility] CATH LAB;  Service: Cardiology;  Laterality: N/A;    CHOLECYSTECTOMY  08/2015    EYE SURGERY      Cat Ext  OU    HYSTERECTOMY      partial due to uterine fibroids    INCISION AND DRAINAGE OF KNEE Left 09/17/2020    Procedure: INCISION AND DRAINAGE, KNEE;  Surgeon: Rolando Wagoner MD;  Location: [facility] OR;  Service: Orthopedics;  Laterality: Left;    INJECTION FOR SENTINEL NODE IDENTIFICATION Left 10/18/2021    Procedure: INJECTION, FOR SENTINEL NODE IDENTIFICATION;  Surgeon: Darlene Roca MD;  Location: [facility] OR;  Service: General;  Laterality: Left;    JOINT REPLACEMENT Left 05/13/2013    TKR    JOINT REPLACEMENT Right 08/03/2015    TKR    MASTECTOMY, PARTIAL Left 10/18/2021    Procedure: MASTECTOMY, PARTIAL -- wire;  Surgeon: Darlene Roca MD;  Location: [facility];  Service: General;  Laterality: Left;  RN PREOP 10/15/2021   VACCINATED-----NEED H/P AND ORDERS    SENTINEL LYMPH NODE BIOPSY Left 10/18/2021    Procedure: BIOPSY, LYMPH NODE, SENTINEL;  Surgeon: Darlene Roca MD;  Location: [facility] OR;  Service: General;  Laterality: Left;    THORACENTESIS      TOTAL KNEE ARTHROPLASTY Right 2015    left knee done 5/2013    TREATMENT OF CARDIAC ARRHYTHMIA N/A 9/15/2023    Procedure: Cardioversion or Defibrillation;  Surgeon: Constantine Chambers MD;  Location: [facility] CATH LAB;  Service: Cardiology;  Laterality: N/A;    WOUND DRESSING Left 09/17/2020    Procedure: WOUND VAC APPLICATION;  Surgeon: Rolando Wagoner MD;  Location: [facility];   Service: Orthopedics;  Laterality: Left;       Time Tracking:     OT Date of Treatment: 10/27/23  OT Start Time: 1400  OT Stop Time: 1414  OT Total Time (min): 14 min    Billable Minutes:Evaluation 14  Total Time 14 (co-alexia w/ PT)     10/27/2023   Consent (Lip)/Introductory Paragraph: The rationale for Mohs was explained to the patient and consent was obtained. The risks, benefits and alternatives to therapy were discussed in detail. Specifically, the risks of lip deformity, changes in the oral aperture, infection, scarring, bleeding, prolonged wound healing, incomplete removal, allergy to anesthesia, nerve injury and recurrence were addressed. Prior to the procedure, the treatment site was clearly identified and confirmed by the patient. All components of Universal Protocol/PAUSE Rule completed.

## 2023-10-27 NOTE — PLAN OF CARE
Problem: Physical Therapy  Goal: Physical Therapy Goal  Description: Goals to be met by: 11/10/23     Patient will increase functional independence with mobility by performin. Supine to sit with Supervision  2. Rolling to Left and Right with Supervision  3. Sit to stand transfer with Stand-by Assistance using RW  4. Bed to chair transfer with Stand-by Assistance using Rolling Walker  5. Gait x250 feet with Stand-by Assistance using Rolling Walker and O2   6. Lower extremity exercise program 2 sets x15 reps per handout, with supervision    Outcome: Ongoing, Progressing     Pt ambulated ~100 ft with CGA using RW, 4L O2 NC, and chair to follow closely.  Pt with sudden BLE shakiness at end of gait causing LOB, mod A to recover and to sit in bedside chair.

## 2023-10-27 NOTE — PLAN OF CARE
Problem: Adult Inpatient Plan of Care  Goal: Absence of Hospital-Acquired Illness or Injury  Outcome: Ongoing, Progressing  Intervention: Identify and Manage Fall Risk  Flowsheets (Taken 10/27/2023 7570)  Safety Promotion/Fall Prevention:   assistive device/personal item within reach   bed alarm set   Fall Risk reviewed with patient/family   room near unit station   side rails raised x 2

## 2023-10-27 NOTE — ASSESSMENT & PLAN NOTE
· Patient has hyponatremia which is uncontrolled,We will aim to correct the sodium by 4-6mEq in 24 hours. We will monitor sodium Every 12 hours. The hyponatremia is due to heart failure/right-sided congestion versus hemochromatosis/liver dysfunction. We will obtain the following studies: Urine sodium, urine osmolality, serum osmolality or TSH, T4. We will treat the hyponatremia with Fluid restriction of:  1.5 liter and diuresis per day. The patient's sodium results have been reviewed and are listed below.    · Suspect the POC sodium of 121 was inaccurate, as POC electrolytes often are  · Sodium 127 on admission, and repeat 4 hours later was also 127, then 129  · BNP approaching 3000 and elevated JVD on exam, large left pleural effusion  · Suspect hypervolemic hyponatremia-will continue diuresis  · IV Lasix 40 Q 8, ordered 3 doses, re-evaluate and reorder if sodium improving/necessary

## 2023-10-27 NOTE — ASSESSMENT & PLAN NOTE
- pt previously has lived independently at home and able to perform all ADLs independently, with daughter Tamar close by for PRN assistance   - since 9/2023 admission pt has been temporarily living with daughter and as had gradual declines in abilities since (now requires assistance getting dressed etc)   - pt and family with [facility] for inpatient rehab for physical reconditioned to be able to return to living independently   - PT/OT order in place per primary for eval   - in addition to pt's increasing debility; pts daughter exhibited and confirmed caregiver burnout; opened discussion of alterative plans to living with daughter if not able to return to indecently living in her own home after discharge (see ACP)

## 2023-10-27 NOTE — ASSESSMENT & PLAN NOTE
· Do not see heart failure on her history  · Will add chronic HFpEF as this was noted previously on ECHO (not new this admission)  · BNP approaching 3000 with elevated JVD on exam  · Patient was recently switch to Bumex, perhaps mildly underdosed leading to increasing volume and hypervolemic hyponatremia over the last couple weeks.    · She takes 1 mg Amvkvph-Idpbuwfu-Xzbcbidw-Sunday; and takes 0.5 mg MWF.  · May need to increase to 1 mg daily on discharge, please assess at that point

## 2023-10-27 NOTE — ASSESSMENT & PLAN NOTE
- chronic condition and acute symptoms noted (see WILKINSON)   - pt with increasing debility in the past month   - cardiology following   - continued management per hospital primary and cardiology

## 2023-10-28 NOTE — ASSESSMENT & PLAN NOTE
· Patient has hyponatremia which is uncontrolled,We will aim to correct the sodium by 4-6mEq in 24 hours. We will monitor sodium Every 12 hours. The hyponatremia is due to heart failure/right-sided congestion versus hemochromatosis/liver dysfunction. We will obtain the following studies: Urine sodium, urine osmolality, serum osmolality or TSH, T4. We will treat the hyponatremia with Fluid restriction of:  1.5 liter and diuresis per day. The patient's sodium results have been reviewed and are listed below.    · Suspect the POC sodium of 121 was inaccurate, as POC electrolytes often are  · Sodium 127 on admission, and repeat 4 hours later was also 127, then 129  · BNP approaching 3000 and elevated JVD on exam, large left pleural effusion  · Suspect hypervolemic hyponatremia-will continue diuresis  · sodium improving

## 2023-10-28 NOTE — ASSESSMENT & PLAN NOTE
· Do not see heart failure on her history  · chronic HFpEF added as this was noted previously on ECHO (not new this admission)  · BNP approaching 3000 with elevated JVD on exam  · Patient was recently switch to Bumex, perhaps mildly underdosed leading to increasing volume and hypervolemic hyponatremia over the last couple weeks.    · She takes 1 mg Hsxaeex-Vyycgvtm-Zfjjdnwq-Sunday; and takes 0.5 mg MWF.  · May need to increase to 1 mg daily on discharge, please assess at that pointPatient was recently switch to Bumex, perhaps mildly underdosed leading to increasing volume and hypervolemic hyponatremia over the last couple weeks.    · Cardiology consulted

## 2023-10-28 NOTE — PROGRESS NOTES
[facility] Medicine  Progress Note    Patient Name: Barbara Rizo  MRN: [id number]  Patient Class: IP- Inpatient   Admission Date: 10/26/2023  Length of Stay: 2 days  Attending Physician: Grayson Alamo DO  Primary Care Provider: Paras Oro MD        Subjective:     Principal Problem:Acute on chronic diastolic heart failure        HPI:    Barbara Rizo is a 83 y.o. female who has a past medical history of A-fib, Acute respiratory failure with hypoxia, Breast cancer, CKD, Community acquired pneumonia, Diabetes mellitus type II, Fatty liver, GERD, Hearing loss of both ears, Hemochromatosis, History of anemia due to CKD, History of pleural effusion, Hyperlipidemia, Hypertension, Macular degeneration, On home oxygen therapy, Osteoarthritis, Osteoporosis, Vaginal delivery, Vitamin D deficiency disease, and Wears partial dentures, presented to the ED with CC of Confusion.    Patient was sent in by PCP/home health labs hyponatremia and elevated creatinine of 2.2.  Unclear if point of care hyponatremia was correct at BNP taken at same time (noon) was 127 and repeat BNP showed sodium again at 127 four hours later-(POC electrolytes are often wrong).  Daughter states that patient has been intermittently confused for the past week or so, has not necessarily gotten worse but has been persistent.  She is oriented, A/O x3, however will often argue with daughter about things that are apparently wrong.  Denies hallucinations.  Has had a couple falls recently without head injury, has a bruise on her right thigh, without hip pain.  In the ED her liver enzymes were elevated,  and -which were normal on the last set of labs, and for many years normal.  Patient does have hemochromatosis and fair and ferritin is elevated at 800, ultrasound of liver with Doppler ordered.  Also patient has elevated BNP at 2876 and elevated JVD, echo ordered.  She has a fairly large left pleural effusion as  well, however it is stable from previous.  She is on 4 L of home oxygen.  Denies chest pain and denies shortness of breath beyond her normal dyspnea.        Overview/Hospital Course:  83 y.o. female with extensive co morbidities admitted on 10/26/23 for abnormal lab findings, sent to ED by PCP. Daughter noted that patient has been having intermittent confusion. Outpatient labs showed hyponatremia, transaminitis, and elevated creatinine of 2.2.  Patient also found to have elevated BNP and troponin in the ED. Started on CHF pathway. ferritin elevated at 804,previously in the 400s. CXR with unchanged appearance of a loculated left-sided pleural effusion. Pt with afib RVR and required  IV metoprolol with improvement. QTc prolonged on EKG, home amiodarone held. Cardiology consulted-recommends to stop amiodarone and attempt rate control with metoprolol. Resume eliquis after thoracentesis. Consider repeat CV if A-flutter not controlled. IR consulted- pt s/p thoracentesis with removal of 1.1L, cultures sent. Pleural fluid studies with no growth so far. Given history of hemochromatosis, hematology consulted. Nephrology, palliative and GI consulted as well      Interval History:  No acute overnight events.  Patient afebrile.  Patient states she is feeling better today. Sitting up in bed eating breakfast. Denies any chest pain, abdominal pain, nausea, vomiting.  Patient with good urine output. Na level improving. Requested to go to rehab because she wants to give her daughter a break. States she has been living w/her daughter for the last two months. Discussed that PT/OT recommended home health and not rehab at this time.     Review of Systems   Constitutional:  Positive for activity change and fatigue. Negative for chills and fever.   Respiratory:  Positive for shortness of breath (intermittently, worse with exertion). Negative for cough and wheezing.    Cardiovascular:  Negative for chest pain, palpitations and leg swelling.    Gastrointestinal:  Negative for abdominal distention, abdominal pain, diarrhea, nausea and vomiting.   Genitourinary:  Negative for difficulty urinating and dysuria.   Neurological:  Positive for weakness. Negative for dizziness and headaches.   Psychiatric/Behavioral:  Positive for decreased concentration (improving). Negative for confusion (none on exam). The patient is nervous/anxious.      Objective:     Vital Signs (Most Recent):  Temp: 97.2 °F (36.2 °C) (10/28/23 0737)  Pulse: 97 (10/28/23 0737)  Resp: 17 (10/28/23 0737)  BP: 115/79 (10/28/23 0737)  SpO2: 97 % (10/28/23 0737) Vital Signs (24h Range):  Temp:  [97.2 °F (36.2 °C)-98.1 °F (36.7 °C)] 97.2 °F (36.2 °C)  Pulse:  [] 97  Resp:  [16-19] 17  SpO2:  [96 %-98 %] 97 %  BP: (103-115)/(64-79) 115/79     Weight: 65 kg (143 lb 4.8 oz)  Body mass index is 26.21 kg/m².    Intake/Output Summary (Last 24 hours) at 10/28/2023 1104  Last data filed at 10/28/2023 0854  Gross per 24 hour   Intake 1440 ml   Output 1850 ml   Net -410 ml           Physical Exam  Vitals and nursing note reviewed.   Constitutional:       General: She is not in acute distress.     Appearance: She is ill-appearing (chronic).   HENT:      Mouth/Throat:      Mouth: Mucous membranes are moist.   Eyes:      Extraocular Movements: Extraocular movements intact.   Cardiovascular:      Rate and Rhythm: Normal rate and regular rhythm.      Heart sounds: No murmur heard.     No gallop.   Pulmonary:      Effort: Pulmonary effort is normal. No respiratory distress.      Breath sounds: Examination of the left-lower field reveals decreased breath sounds. Decreased breath sounds present. No wheezing.      Comments: On 3L NC, decreased breath sounds in left lower lobe improved.   Abdominal:      General: There is no distension.      Palpations: Abdomen is soft.      Tenderness: There is no abdominal tenderness.   Musculoskeletal:         General: No swelling or tenderness.      Right lower leg: Edema  present.      Left lower leg: Edema present.      Comments: Bilateral lower extremity with trace edema   Skin:     General: Skin is warm and dry.   Neurological:      General: No focal deficit present.      Mental Status: She is alert and oriented to person, place, and time.             Significant Labs: All pertinent labs within the past 24 hours have been reviewed.    Significant Imaging: I have reviewed all pertinent imaging results/findings within the past 24 hours.      Assessment/Plan:      * Acute on chronic diastolic heart failure  · BNP 3000 with elevated JVD and hypervolemic hyponatremia on admission  · Right-sided heart failure, which is why her oxygen has not changed much, but why her liver is congested, I suspect  · Likely worsened by recurrent A-flutter  · Continue IV diuresis  · Echo ordered: estimated ejection fraction of 35 - 40%. PASP 49mmHg  · Cardiology consulted  · See HF note      Atrial fibrillation with RVR  · Longstanding, permanent AFib  · Patient has been cardioverted about 4 times in the past, per daughter   · Heart rate 120 in the ED, IV metoprolol given, heart rate improved to - controlled  · Given hemochromatosis and , amiodarone on hold  · cardiology consulted: Stop amiodarone with prolonged QT. Will attempt rate control with metoprolol as tolerated. Consider repeat CV if A-flutter not controlled  · Resumed eliquis after thoracentesis.    Paroxysmal atrial fibrillation  Patient with Persistent (7 days or more) atrial fibrillation which is uncontrolled currently with Amiodarone. Patient is currently in atrial fibrillation.[id number] Score: 5.  Anticoagulation indicated. Anticoagulation done with eliquis.    -history of atrial fibrillation. On amiodarone outpatient  -Pt now with elevated LFTs and prolonged   -Cardiology consulted: Stop amiodarone with prolonged QT. Attempt rate control with metoprolol as tolerated. Resume eliquis     Chronic heart failure with  preserved ejection fraction  · Do not see heart failure on her history  · chronic HFpEF added as this was noted previously on ECHO (not new this admission)  · BNP approaching 3000 with elevated JVD on exam  · Patient was recently switch to Bumex, perhaps mildly underdosed leading to increasing volume and hypervolemic hyponatremia over the last couple weeks.    · She takes 1 mg Rzyemhz-Oqgbkvgj-Ushplsmv-Sunday; and takes 0.5 mg MWF.  · May need to increase to 1 mg daily on discharge, please assess at that pointPatient was recently switch to Bumex, perhaps mildly underdosed leading to increasing volume and hypervolemic hyponatremia over the last couple weeks.    · Cardiology consulted    Pulmonary hypertension  · PASP 46 mmHg previously  · Keep euvolemic  · Repeat echo: with EF 35-40%. There is mild stenosis of aortic valve. The estimated pulmonary artery systolic pressure is 49 mmHg.      Pleural effusion on left  · Persistent large left pleural effusion  · History of breast cancer  · IR consulted: s/p  thoracentesis with removal of 1.1L  · Pleural cultures with no growth so far.  cytology pending.      QT prolongation  ·  on admission  · Concerned that with hx of hemochromatosis and QTC prolongation, that amiodarone will not be the drug of choice moving forward  · cardiology consult: recommends to stop amiodarone and attempt rate control with metoprolol. Resume eliquis   · Hold amiodarone  · Hold solifenacin  · Avoid QT prolongation agents           Hyponatremia  · Patient has hyponatremia which is uncontrolled,We will aim to correct the sodium by 4-6mEq in 24 hours. We will monitor sodium Every 12 hours. The hyponatremia is due to heart failure/right-sided congestion versus hemochromatosis/liver dysfunction. We will obtain the following studies: Urine sodium, urine osmolality, serum osmolality or TSH, T4. We will treat the hyponatremia with Fluid restriction of:  1.5 liter and diuresis per day. The patient's  sodium results have been reviewed and are listed below.    · Suspect the POC sodium of 121 was inaccurate, as POC electrolytes often are  · Sodium 127 on admission, and repeat 4 hours later was also 127, then 129  · BNP approaching 3000 and elevated JVD on exam, large left pleural effusion  · Suspect hypervolemic hyponatremia-will continue diuresis  · sodium improving    Intermittent confusion  · Intermittent confusion per daughter although oriented x3  · Ammonia is within normal limits  · However it is possible she has CNS manifestation of hemochromatosis with ferritin 800 and new transaminitis, INR elevated at 1.4, does not quite meet liver failure criteria  · verses right-sided heart failure with hepatic congestion  · Verses uremic encephalopathy, however BUN not too high  · Echo of heart ordered- EF of 35-40%, mild aortic valve stenosis, +pulmonary hypertension  · Ultrasound of liver with Doppler ordered: no acute findings  · Hematology consulted  · Will diurese in setting of likely right-sided heart failure with hepatic congestion  · Patient has been AOx4 since hospitalization     MGUS (monoclonal gammopathy of unknown significance)  · See hemochromatosis note      History of Hemochromatosis  · Did require blood letting in early 2000's  · Ferritin elevated at 800, unclear if patient could have CNS manifestation/confusion, not necessarily due to iron itself, but poor clearance of toxins etc  ·  , INR 1.4- usually all WNL  · Will discuss with hematology in am if any intervention is necessary, however, potentially due to right sided heart failure with hepatic congestion.  Patient also has MGUS.   · Hematology consulted-Would rule out other issue or causes such as worsening CHF, infection or other causes of inflammation before ordering a phlebotomy   · Pt will need to continue follow up in heme clinic on discharge  · Presentation likely due to CHF. Will continue diuresis and monitor     Type 2  diabetes mellitus with stage 4 chronic kidney disease, without long-term current use of insulin  · A1c 4.9 8/27/23  · A1cs likely improved with worsening renal function, CKD 4  · Diet controlled at home  · No significant elevations noted thus far  · Monitor sugars daily with BMP -no need for frequent glucose checks    CKD (chronic kidney disease), stage IV  -Nephrology consulted      Carcinoma of breast  · Noted history      OAB (overactive bladder)  · Holding solifenacin given   · Would discontinue at discharge, consider discussing with Urology other treatment    Chronic gout  · Decreased creatinine clearance and elevated liver enzymes, will hold allopurinol for now      Physical deconditioning  · PT OT consulted: recommends home health      ACP (advance care planning)  -Spent 5 minutes to review ACPs notes  -Palliative care team consulted        VTE Risk Mitigation (From admission, onward)         Ordered     apixaban tablet 2.5 mg  2 times daily         10/27/23 1146     IP VTE HIGH RISK PATIENT  Once         10/27/23 0652     Place sequential compression device  Until discontinued         10/27/23 0652     Place sequential compression device  Until discontinued         10/26/23 1919                Discharge Planning   SALLY:      Code Status: DNR   Is the patient medically ready for discharge?:     Reason for patient still in hospital (select all that apply): Patient trending condition, Treatment, Consult recommendations and PT / OT recommendations  Discharge Plan A: Home with family                  Carly-Katty Alamo DO  Department of Hospital Medicine   [facility]

## 2023-10-28 NOTE — PROGRESS NOTES
Date of Admission:10/26/2023    SUBJECTIVE:notes doing ok  Still not eating much    Current Facility-Administered Medications   Medication    acetaminophen tablet 650 mg    apixaban tablet 2.5 mg    glucagon (human recombinant) injection 1 mg    glucose chewable tablet 16 g    glucose chewable tablet 24 g    melatonin tablet 6 mg    metoprolol tartrate (LOPRESSOR) tablet 25 mg    naloxone 0.4 mg/mL injection 0.02 mg    polyethylene glycol packet 17 g    sodium chloride 0.9% flush 10 mL     Facility-Administered Medications Ordered in Other Encounters   Medication    denosumab (PROLIA) injection 60 mg       Wt Readings from Last 3 Encounters:   10/27/23 65 kg (143 lb 4.8 oz)   10/04/23 69.5 kg (153 lb 3.5 oz)   09/25/23 72.1 kg (158 lb 15.2 oz)     Temp Readings from Last 3 Encounters:   10/28/23 98.1 °F (36.7 °C) (Oral)   10/26/23 97.5 °F (36.4 °C) (Oral)   10/24/23 97.9 °F (36.6 °C)     BP Readings from Last 3 Encounters:   10/28/23 100/67   10/26/23 120/60   10/24/23 120/60     Pulse Readings from Last 3 Encounters:   10/28/23 86   10/26/23 82   10/24/23 (!) 126       Intake/Output Summary (Last 24 hours) at 10/28/2023 1617  Last data filed at 10/28/2023 1300  Gross per 24 hour   Intake 1440 ml   Output 1850 ml   Net -410 ml         PE:  GEN:wd female in nad  HEENT:ncat,eomi,mm  CVS:s1s2 regular  PULM:no rales  ABD:bs,soft  EXT:no leg edema  NEURO:awake    Recent Labs   Lab 10/28/23  0357   *   *   K 3.4*   CL 89*   CO2 31*   BUN 38*   CREATININE 1.6*   CALCIUM 8.6*   MG 1.7         Lab Results   Component Value Date    CALCIUM 8.6 (L) 10/28/2023    CAION 0.95 (L) 02/03/2019    PHOS 3.3 10/28/2023       Recent Labs   Lab 10/28/23  0357   WBC 8.10   RBC 4.53   HGB 13.3   HCT 41.0      MCV 91   MCH 29.4   MCHC 32.4             A/P:  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 Stable. Cont tx.  2.hyponatremia. better. Will go up and down.  3.poor appetite. Add marinol.  4.dm2. following sugars.  5.pleural  effusion. Thoracentesis done. Notes breathing ok.  6.mgus. stable.  7.acute on chronic dhf. Volume ok.  8.weakness. consider snf.  9.hypokalemia. replete.

## 2023-10-28 NOTE — ASSESSMENT & PLAN NOTE
Diuresis and afterload reduction as tolerated. Likely worsened by recurrent A-flutter. Repeat echo    10/28/23 EF dropped to 35-40% on echo. Likely related to A-flutter RVR. High risk for LHC with CRI. Continue medical Rx

## 2023-10-28 NOTE — NURSING
[facility], [facility]  Nurses Note -- 4 Eyes      10/28/2023       Skin assessed on: Q Shift      [x] No Pressure Injuries Present    []Prevention Measures Documented    [] Yes LDA  for Pressure Injury Previously documented     [] Yes New Pressure Injury Discovered   [] LDA for New Pressure Injury Added      Attending RN:  Miriam Kline RN     Second RN:  TOSHIA Tobar

## 2023-10-28 NOTE — ASSESSMENT & PLAN NOTE
· BNP 3000 with elevated JVD and hypervolemic hyponatremia on admission  · Right-sided heart failure, which is why her oxygen has not changed much, but why her liver is congested, I suspect  · Likely worsened by recurrent A-flutter  · Continue IV diuresis  · Echo ordered: estimated ejection fraction of 35 - 40%. PASP 49mmHg  · Cardiology consulted  · See HF note

## 2023-10-28 NOTE — PT/OT/SLP EVAL
Speech Language Pathology Evaluation  Bedside Swallow    Patient Name:  Barbara Rizo   MRN:  [id number]  Admitting Diagnosis: Acute on chronic diastolic heart failure    Recommendations:                 General Recommendations:  GI evaluation, Dysphagia therapy, and Modified barium swallow study  Diet recommendations:  Minced & Moist Diet - IDDSI Level 5, Thin   Aspiration Precautions: Alternating bites/sips, Frequent oral care, HOB to 90 degrees, Meds whole buried in puree, and Wear oxygen during intake   General Precautions: Standard, aspiration, respiratory, fall  Communication strategies:  hearing aids and provide increased time to answer    Assessment:     Barbara Rizo is a 83 y.o. female with Acute on chronic diastolic heart failure. She presents with dysphagia of a yet to be determined severity. ST recommends GI consult to assess esophageal function d/t complaints of globus sensation at the level of the upper esophagus. Also MBSS recommended d/t pt complaints of delayed swallow initiation resulting with frequent expectoration of solids during meals. ST will follow.     History:     Past Medical History:   Diagnosis Date    A-fib     Acute respiratory failure with hypoxia 09/01/2015    Breast cancer     invasive ductal carcinoma    Chronic heart failure with preserved ejection fraction 10/26/2023    GII dd    CKD (chronic kidney disease)     Community acquired pneumonia 08/26/2023    Diabetes mellitus type II     Fatty liver     GERD (gastroesophageal reflux disease)     Hearing loss of both ears     wears bilateral hearing aids    Hemochromatosis     History of anemia due to CKD     History of pleural effusion     with thoracentesis    Hyperlipidemia     Hypertension     Macular degeneration     On home oxygen therapy     Osteoarthritis     Left knee    Osteoporosis     Vaginal delivery     x2    Vitamin D deficiency disease     Wears partial dentures     upper removable bridge       Past Surgical  History:   Procedure Laterality Date    AXILLARY NODE DISSECTION Left 10/18/2021    Procedure: LYMPHADENECTOMY, AXILLARY;  Surgeon: Darlene Roca MD;  Location: [facility] OR;  Service: General;  Laterality: Left;    BREAST BIOPSY      BREAST LUMPECTOMY      invasive ductal carcinoma    BREAST SURGERY Bilateral     Reduction r/t h/o fibrocystic disease    CARDIOVERSION N/A 9/15/2023    Procedure: Cardioversion;  Surgeon: Constantine Chambers MD;  Location: [facility] CATH LAB;  Service: Cardiology;  Laterality: N/A;    CHOLECYSTECTOMY  08/2015    EYE SURGERY      Cat Ext  OU    HYSTERECTOMY      partial due to uterine fibroids    INCISION AND DRAINAGE OF KNEE Left 09/17/2020    Procedure: INCISION AND DRAINAGE, KNEE;  Surgeon: Rolando Wagoner MD;  Location: [facility] OR;  Service: Orthopedics;  Laterality: Left;    INJECTION FOR SENTINEL NODE IDENTIFICATION Left 10/18/2021    Procedure: INJECTION, FOR SENTINEL NODE IDENTIFICATION;  Surgeon: Darlene Roca MD;  Location: [facility] OR;  Service: General;  Laterality: Left;    JOINT REPLACEMENT Left 05/13/2013    TKR    JOINT REPLACEMENT Right 08/03/2015    TKR    MASTECTOMY, PARTIAL Left 10/18/2021    Procedure: MASTECTOMY, PARTIAL -- wire;  Surgeon: Darlene Roca MD;  Location: [facility] OR;  Service: General;  Laterality: Left;  RN PREOP 10/15/2021   VACCINATED-----NEED H/P AND ORDERS    SENTINEL LYMPH NODE BIOPSY Left 10/18/2021    Procedure: BIOPSY, LYMPH NODE, SENTINEL;  Surgeon: Darlene Roca MD;  Location: [facility] OR;  Service: General;  Laterality: Left;    THORACENTESIS      TOTAL KNEE ARTHROPLASTY Right 2015    left knee done 5/2013    TREATMENT OF CARDIAC ARRHYTHMIA N/A 9/15/2023    Procedure: Cardioversion or Defibrillation;  Surgeon: Constantine Chambers MD;  Location: [facility] CATH LAB;  Service: Cardiology;  Laterality: N/A;    WOUND DRESSING Left 09/17/2020    Procedure: WOUND VAC APPLICATION;  Surgeon: Rolando Wagoner MD;  Location: [facility] OR;  Service: Orthopedics;  Laterality: Left;        Modified Barium Swallow: None located in pt's chart    Chest X-Rays: CXR on 10/27/2023  FINDINGS:  Chest one view:     Heart size is upper normal.  There is aortic plaque.  There is left basal edema/infiltrate and a possible small left pleural effusion.     Impression:     Possible left lower lobe pneumonia and small pleural effusion.       Prior diet: Regular consistency/TL.      Subjective   Pt awake/alert, improved mentation noted. She responded appropriately to all ST questions; pt was oriented x3.      Patient goals: To continue safe consumption of PO intake.      Pain/Comfort:  Pain Rating 1: 0/10    Respiratory Status: Nasal cannula, flow 3 L/min    Objective:     Oral Musculature Evaluation  Oral Musculature: WFL  Dentition: present and adequate, other (see comments), partials (upper partials)  Secretion Management: adequate  Mucosal Quality: dry  Mandibular Strength and Mobility: WFL  Oral Labial Strength and Mobility: WFL  Lingual Strength and Mobility: impaired left lateral movement, other (see comments) (mild incoordination of lingual mvt when prompted)  Velar Elevation: WFL  Buccal Strength and Mobility: WFL  Volitional Cough: WFL  Volitional Swallow: Delayed  Voice Prior to PO Intake: Clear    Bedside Swallow Eval:   Consistencies Assessed:  Thin liquids - 3oz via cup and straw  Puree - 4tsp   Soft solids - x2      Oral Phase:   Dry mouth  Excess chewing (Across trials)   Prolonged mastication (Across trials)   Lingual residue (Trace lingual s/p soft solid x2)   Slow oral transit time (Across trials)     Pharyngeal Phase:   decreased hyolaryngeal excursion to palpation  delayed swallow initation    Compensatory Strategies  None    Treatment: B/s swallow evaluation completed. Education provided regarding facilitation of swallow precautions to minimize globus sensation. Information regarding POC also provided pt and daughter.     Goals:   Multidisciplinary Problems       SLP Goals          Problem:  SLP    Goal Priority Disciplines Outcome   SLP Goal     SLP Ongoing, Progressing   Description: 1. Pt will tolerate LRD without overt s/s of aspiration.   2. Pt will complete liquid/solid PO trials during MBSS for thorough assessment of swallowing function.                        Plan:     Patient to be seen:  3 x/week   Plan of Care expires:     Plan of Care reviewed with:  patient, daughter   SLP Follow-Up:  Yes       Discharge recommendations:      Barriers to Discharge:  GI consult and MBSS recommended    Time Tracking:     SLP Treatment Date:   10/28/23  Speech Start Time:  1039  Speech Stop Time:  1102     Speech Total Time (min):  23 min    Billable Minutes: Eval Swallow and Oral Function 10min and Self Care/Home Management Training 13min    10/28/2023

## 2023-10-28 NOTE — PROGRESS NOTES
[facility] Surg  Cardiology  Progress Note    Patient Name: Barbara Rizo  MRN: [id number]  Admission Date: 10/26/2023  Hospital Length of Stay: 2 days  Code Status: DNR   Attending Physician: Grayson Alamo DO   Primary Care Physician: Paras Oro MD  Expected Discharge Date:   Principal Problem:Acute on chronic diastolic heart failure    Subjective:     Hospital Course:   10/28/23 Less SOB after 1.1 L thoracentesis. Denies CP. Weak. HR  A-flutter. Back on eliquis. Poor UOP. DNR    Echo 10/27/23    Left Ventricle: The left ventricle is normal in size. There is mild concentric hypertrophy. There is moderately reduced systolic function with a visually estimated ejection fraction of 35 - 40%.    Left Atrium: Left atrium is severely dilated.    Right Ventricle: Mild right ventricular enlargement. Systolic function is mildly reduced.    Right Atrium: Right atrium is severely dilated.    Aortic Valve: There is mild stenosis. Aortic valve area by VTI is 1.22 cm². Aortic valve peak velocity is 1.70 m/s. Mean gradient is 7 mmHg. The dimensionless index is 0.41. There is mild aortic regurgitation.    Mitral Valve: There is mild to moderate regurgitation.    Tricuspid Valve: There is moderate regurgitation.    Pulmonic Valve: There is mild to moderate regurgitation.    Pulmonary Artery: The estimated pulmonary artery systolic pressure is 49 mmHg.    IVC/SVC: Normal venous pressure at 3 mmHg.      Interval History:     Review of Systems   Constitutional: Negative for decreased appetite.   HENT:  Negative for ear discharge.    Eyes:  Negative for blurred vision.   Respiratory:  Negative for hemoptysis.    Endocrine: Negative for polyphagia.   Hematologic/Lymphatic: Negative for adenopathy.   Skin:  Negative for color change.   Musculoskeletal:  Negative for joint swelling.   Genitourinary:  Negative for bladder incontinence.   Neurological:  Negative for brief paralysis.   Psychiatric/Behavioral:   Negative for hallucinations.    Allergic/Immunologic: Negative for hives.     Objective:     Vital Signs (Most Recent):  Temp: 97.9 °F (36.6 °C) (10/28/23 1114)  Pulse: 102 (10/28/23 1126)  Resp: 19 (10/28/23 1126)  BP: 100/67 (10/28/23 1126)  SpO2: 96 % (10/28/23 1126) Vital Signs (24h Range):  Temp:  [97.2 °F (36.2 °C)-98.1 °F (36.7 °C)] 97.9 °F (36.6 °C)  Pulse:  [] 102  Resp:  [16-19] 19  SpO2:  [96 %-98 %] 96 %  BP: (100-115)/(61-79) 100/67     Weight: 65 kg (143 lb 4.8 oz)  Body mass index is 26.21 kg/m².     SpO2: 96 %         Intake/Output Summary (Last 24 hours) at 10/28/2023 1136  Last data filed at 10/28/2023 0854  Gross per 24 hour   Intake 1440 ml   Output 1850 ml   Net -410 ml       Lines/Drains/Airways       Peripheral Intravenous Line  Duration                  Peripheral IV - Single Lumen 10/26/23 1616 20 G Anterior;Right Forearm 1 day                       Physical Exam  Constitutional:       Appearance: She is well-developed.   HENT:      Head: Normocephalic and atraumatic.   Eyes:      Conjunctiva/sclera: Conjunctivae normal.      Pupils: Pupils are equal, round, and reactive to light.   Cardiovascular:      Rate and Rhythm: Tachycardia present. Rhythm irregular.      Pulses: Intact distal pulses.      Heart sounds: Normal heart sounds.   Pulmonary:      Effort: Pulmonary effort is normal.      Breath sounds: Decreased air movement present. Examination of the left-middle field reveals decreased breath sounds. Examination of the left-lower field reveals decreased breath sounds. Decreased breath sounds present.   Abdominal:      General: Bowel sounds are normal.      Palpations: Abdomen is soft.   Musculoskeletal:         General: Normal range of motion.      Cervical back: Normal range of motion and neck supple.   Skin:     General: Skin is warm and dry.   Neurological:      Mental Status: She is alert and oriented to person, place, and time.            Significant Labs: All pertinent lab  results from the last 24 hours have been reviewed.    Significant Imaging: Echocardiogram: 2D echo with color flow doppler:   Results for orders placed or performed during the hospital encounter of 08/21/15   2D echo with color flow doppler   Result Value Ref Range    EF + QEF 75 55 - 65    Mitral Valve Regurgitation MILD     Est. PA Systolic Pressure 40.21 (A)     Tricuspid Valve Regurgitation MODERATE (A)     Narrative    TEST DESCRIPTION       Aorta: The aortic root is normal in size, measuring 2.1 cm at sinotubular junction and 2.8 cm at Sinuses of Valsalva. The proximal ascending aorta is normal in size, measuring 2.6 cm across.     Left Atrium: The left atrial volume index is moderately enlarged, measuring 45.37 cc/m2.     Left Ventricle: The left ventricle is normal in size, with an end-diastolic diameter of 4.9 cm, and an end-systolic diameter of 3.5 cm. LV wall thickness is normal, with the septum measuring 1.0 cm and the posterior wall measuring 0.9 cm across. Relative   wall thickness was normal at 0.37, and the LV mass index was 93.2 g/m2 consistent with normal left ventricular mass. Global left ventricular systolic function appears hyperdynamic. Visually estimated ejection fraction is >70%. The LV Doppler derived   stroke volume equals 62.0 ccs.       Right Atrium: The right atrium is enlarged, measuring 5.3 cm in length and 3.6 cm in width in the apical view.     Right Ventricle: The right ventricle is normal in size measuring 2.9 cm at the base in the apical right ventricle-focused view. Global right ventricular systolic function appears normal. Tricuspid annular plane systolic excursion (TAPSE) is 1.9 cm. The   estimated PA systolic pressure is 40 mmHg.     Aortic Valve:  The aortic valve is moderately sclerotic.     Mitral Valve:  There is mild mitral regurgitation.     Tricuspid Valve:  There is moderate tricuspid regurgitation.     Pulmonary Valve:  There is mild pulmonic regurgitation.     IVC:  IVC is normal in size and collapses > 50% with a sniff, suggesting normal right atrial pressure of 3 mmHg.     Intracavitary: There is no evidence of pericardial effusion, intracavity mass, thrombi, or vegetation.         CONCLUSIONS     1 - Hyperdynamic left ventricular systolic function (EF >70%).     2 - Biatrial enlargement.     3 - The estimated PA systolic pressure is 40 mmHg.     4 - Mild mitral regurgitation.     5 - Moderate tricuspid regurgitation.     6 - Mild pulmonic regurgitation.         This document has been electronically    SIGNED BY: Amauri Lomas MD On: 09/16/2015 13:16     Assessment and Plan:     Brief HPI:     * Acute on chronic diastolic heart failure  Diuresis and afterload reduction as tolerated. Likely worsened by recurrent A-flutter. Repeat echo    10/28/23 EF dropped to 35-40% on echo. Likely related to A-flutter RVR. High risk for LHC with CRI. Continue medical Rx    Pleural effusion on left  Agree with plan for thoracentesis    CKD (chronic kidney disease), stage IV  Diuretics per renal    Type 2 diabetes mellitus with stage 4 chronic kidney disease, without long-term current use of insulin  Per primary    Paroxysmal atrial fibrillation  Currently with A-flutter mild RVR. Stop amiodarone with prolonged QT. Will attempt rate control with metoprolol as tolerated. Resume eliquis after thoracentesis. Consider repeat CV if A-flutter not controlled    10/28/23 A-flutter rates improved some. Continue to monitor    MGUS (monoclonal gammopathy of unknown significance)  Per primary        VTE Risk Mitigation (From admission, onward)         Ordered     apixaban tablet 2.5 mg  2 times daily         10/27/23 1146     IP VTE HIGH RISK PATIENT  Once         10/27/23 0652     Place sequential compression device  Until discontinued         10/27/23 0652     Place sequential compression device  Until discontinued         10/26/23 1919                Amauri Lomas MD  Cardiology  [facility]  Surg

## 2023-10-28 NOTE — PLAN OF CARE
Problem: Adult Inpatient Plan of Care  Goal: Plan of Care Review  Outcome: Ongoing, Progressing     Problem: Adult Inpatient Plan of Care  Goal: Patient-Specific Goal (Individualized)  Outcome: Ongoing, Progressing     Problem: Adult Inpatient Plan of Care  Goal: Absence of Hospital-Acquired Illness or Injury  Outcome: Ongoing, Progressing     Problem: Adult Inpatient Plan of Care  Goal: Optimal Comfort and Wellbeing  Outcome: Ongoing, Progressing     Problem: Skin Injury Risk Increased  Goal: Skin Health and Integrity  Outcome: Ongoing, Progressing     Problem: Coping Ineffective  Goal: Effective Coping  Outcome: Ongoing, Progressing     Problem: Fall Injury Risk  Goal: Absence of Fall and Fall-Related Injury  Outcome: Ongoing, Progressing

## 2023-10-28 NOTE — PT/OT/SLP PROGRESS
"Physical Therapy Treatment    Patient Name:  Barbara Rizo   MRN:  [id number]    Recommendations:     Discharge Recommendations: Low Intensity Therapy  Discharge Equipment Recommendations: bedside commode  Barriers to discharge:  per dtr, pt with 3 hospital admissions since Aug 2023. Fall risk and decreased safety awareness    Assessment:     Barbara Rizo is a 83 y.o. female admitted with a medical diagnosis of Acute on chronic diastolic heart failure.  She presents with the following impairments/functional limitations: weakness, impaired endurance, impaired self care skills, impaired functional mobility, gait instability, impaired balance, impaired cardiopulmonary response to activity, decreased safety awareness, decreased lower extremity function, decreased upper extremity function, decreased coordination.    Pt tolerated treatment good. Pt able to perform bed mobility with SBA, transfers with RW, CGA and ambulated ~150ft with RW, CGA on 3LO2 NC with O2 sats ~93%. Pt up in B[place]air at end of session. Pt would continue to benefit from skilled PT services to address pt's deficits and improve current level of function.     Rehab Prognosis: Fair; patient would benefit from acute skilled PT services to address these deficits and reach maximum level of function.    Recent Surgery: * No surgery found *      Plan:     During this hospitalization, patient to be seen daily to address the identified rehab impairments via gait training, therapeutic activities, therapeutic exercises and progress toward the following goals:    Plan of Care Expires:  11/10/23    Subjective     Chief Complaint: wanting her bed changed  Patient/Family Comments/goals: Pt agreeable to ambulation. " You don't have a second person to push the chair. How can you hold me and the chair?"  Pain/Comfort:  Pain Rating 1: 0/10      Objective:     Communicated with pt's nurse, Carola, prior to session.  Patient found HOB elevated with peripheral IV, " PureWick, oxygen, telemetry upon PT entry to room.     General Precautions: Standard, aspiration, respiratory, fall, hearing impaired  Orthopedic Precautions: N/A  Braces: N/A  Respiratory Status: 3 L O2 NC (at rest 96%, with ambulation 93%)      Functional Mobility:  Bed Mobility:     Scooting: stand by assistance  Supine to Sit: stand by assistance  Transfers: from EOB x2 trials   Sit to Stand:  contact guard assistance with rolling walker  Gait: Pt ambulated ~150ft with RW, CGA on level tile with cues for upright posture and RW mgt/safety. Decreased jocelyn, step length, velocity of limb, postural control, safety, balance and endurance.   Balance: good sitting and fair +standing       AM-PAC 6 CLICK MOBILITY  Turning over in bed (including adjusting bedclothes, sheets and blankets)?: 3  Sitting down on and standing up from a chair with arms (e.g., wheelchair, bedside commode, etc.): 3  Moving from lying on back to sitting on the side of the bed?: 3  Moving to and from a bed to a chair (including a wheelchair)?: 3  Need to walk in hospital room?: 3  Climbing 3-5 steps with a railing?: 2  Basic Mobility Total Score: 17       Treatment & Education:  Pt performed bed mobility, transfers and ambulation.   Pt educated on PTA role/POC.   Importance of OOB activity with staff assistance.  Importance of sitting up in the chair throughout the day as tolerated, especially for meals   Safety during functional t/f and mobility with use of AD and staff  White board updated   Multiple self-care tasks/functional mobility completed- assistance level noted above   All questions/concerns answered within scope of practice    Pt encouraged to use call button for assistance for all OOB/OOchair activity 2/2 fall risk. Pt verbalized understanding. Tray table and all needs within reach.    Patient left  up in BSchair with pressure cushion, tray table, and all needs met or within reach  with all lines intact, call button in reach, pt's  nurse, Carola notified, and x2 female family members present..    GOALS:   Multidisciplinary Problems       Physical Therapy Goals          Problem: Physical Therapy    Goal Priority Disciplines Outcome Goal Variances Interventions   Physical Therapy Goal     PT, PT/OT Ongoing, Progressing     Description: Goals to be met by: 11/10/23     Patient will increase functional independence with mobility by performin. Supine to sit with Supervision  2. Rolling to Left and Right with Supervision  3. Sit to stand transfer with Stand-by Assistance using RW  4. Bed to chair transfer with Stand-by Assistance using Rolling Walker  5. Gait x250 feet with Stand-by Assistance using Rolling Walker and O2   6. Lower extremity exercise program 2 sets x15 reps per handout, with supervision                         Time Tracking:     PT Received On: 10/28/23  PT Start Time: 1332     PT Stop Time: 1355  PT Total Time (min): 23 min     Billable Minutes: Gait training 15 Therapeutic Activity 8    Treatment Type: Treatment  PT/PTA: PTA     Number of PTA visits since last PT visit: 1     10/28/2023

## 2023-10-28 NOTE — PLAN OF CARE
B/s swallow evaluation completed. GI consult recommended. ST will follow.       Problem: SLP  Goal: SLP Goal  Description: 1. Pt will tolerate LRD without overt s/s of aspiration.          2. Pt will complete liquid/solid PO trials during MBSS for thorough assessment of swallowing function.   Outcome: Ongoing, Progressing

## 2023-10-28 NOTE — ASSESSMENT & PLAN NOTE
Currently with A-flutter mild RVR. Stop amiodarone with prolonged QT. Will attempt rate control with metoprolol as tolerated. Resume eliquis after thoracentesis. Consider repeat CV if A-flutter not controlled    10/28/23 A-flutter rates improved some. Continue to monitor

## 2023-10-28 NOTE — PLAN OF CARE
Problem: Physical Therapy  Goal: Physical Therapy Goal  Description: Goals to be met by: 11/10/23     Patient will increase functional independence with mobility by performin. Supine to sit with Supervision  2. Rolling to Left and Right with Supervision  3. Sit to stand transfer with Stand-by Assistance using RW  4. Bed to chair transfer with Stand-by Assistance using Rolling Walker  5. Gait x250 feet with Stand-by Assistance using Rolling Walker and O2   6. Lower extremity exercise program 2 sets x15 reps per handout, with supervision    Outcome: Ongoing, Progressing     Pt tolerated treatment good. Pt able to perform bed mobility with SBA, transfers with RW, CGA and ambulated ~150ft with RW, CGA on 3LO2 NC with O2 sats ~93%. Pt up in B[place]air at end of session. Pt would continue to benefit from skilled PT services to address pt's deficits and improve current level of function.

## 2023-10-28 NOTE — ASSESSMENT & PLAN NOTE
· Longstanding, permanent AFib  · Patient has been cardioverted about 4 times in the past, per daughter   · Heart rate 120 in the ED, IV metoprolol given, heart rate improved to - controlled  · Given hemochromatosis and , amiodarone on hold  · cardiology consulted: Stop amiodarone with prolonged QT. Will attempt rate control with metoprolol as tolerated. Consider repeat CV if A-flutter not controlled  · Resumed eliquis after thoracentesis.

## 2023-10-28 NOTE — ASSESSMENT & PLAN NOTE
· Did require blood letting in early 2000's  · Ferritin elevated at 800, unclear if patient could have CNS manifestation/confusion, not necessarily due to iron itself, but poor clearance of toxins etc  ·  , INR 1.4- usually all WNL  · Will discuss with hematology in am if any intervention is necessary, however, potentially due to right sided heart failure with hepatic congestion.  Patient also has MGUS.   · Hematology consulted-Would rule out other issue or causes such as worsening CHF, infection or other causes of inflammation before ordering a phlebotomy   · Pt will need to continue follow up in heme clinic on discharge  · Presentation likely due to CHF. Will continue diuresis and monitor

## 2023-10-28 NOTE — HOSPITAL COURSE
10/28/23 Less SOB after 1.1 L thoracentesis. Denies CP. Weak. HR  A-flutter. Back on eliquis. Poor UOP. DNR    Echo 10/27/23    Left Ventricle: The left ventricle is normal in size. There is mild concentric hypertrophy. There is moderately reduced systolic function with a visually estimated ejection fraction of 35 - 40%.    Left Atrium: Left atrium is severely dilated.    Right Ventricle: Mild right ventricular enlargement. Systolic function is mildly reduced.    Right Atrium: Right atrium is severely dilated.    Aortic Valve: There is mild stenosis. Aortic valve area by VTI is 1.22 cm². Aortic valve peak velocity is 1.70 m/s. Mean gradient is 7 mmHg. The dimensionless index is 0.41. There is mild aortic regurgitation.    Mitral Valve: There is mild to moderate regurgitation.    Tricuspid Valve: There is moderate regurgitation.    Pulmonic Valve: There is mild to moderate regurgitation.    Pulmonary Artery: The estimated pulmonary artery systolic pressure is 49 mmHg.    IVC/SVC: Normal venous pressure at 3 mmHg.

## 2023-10-28 NOTE — ASSESSMENT & PLAN NOTE
· Intermittent confusion per daughter although oriented x3  · Ammonia is within normal limits  · However it is possible she has CNS manifestation of hemochromatosis with ferritin 800 and new transaminitis, INR elevated at 1.4, does not quite meet liver failure criteria  · verses right-sided heart failure with hepatic congestion  · Verses uremic encephalopathy, however BUN not too high  · Echo of heart ordered- EF of 35-40%, mild aortic valve stenosis, +pulmonary hypertension  · Ultrasound of liver with Doppler ordered: no acute findings  · Hematology consulted  · Will diurese in setting of likely right-sided heart failure with hepatic congestion  · Patient has been AOx4 since hospitalization

## 2023-10-28 NOTE — ASSESSMENT & PLAN NOTE
· Persistent large left pleural effusion  · History of breast cancer  · IR consulted: s/p  thoracentesis with removal of 1.1L  · Pleural cultures with no growth so far.  cytology pending.

## 2023-10-28 NOTE — SUBJECTIVE & OBJECTIVE
Interval History:     Review of Systems   Constitutional: Negative for decreased appetite.   HENT:  Negative for ear discharge.    Eyes:  Negative for blurred vision.   Respiratory:  Negative for hemoptysis.    Endocrine: Negative for polyphagia.   Hematologic/Lymphatic: Negative for adenopathy.   Skin:  Negative for color change.   Musculoskeletal:  Negative for joint swelling.   Genitourinary:  Negative for bladder incontinence.   Neurological:  Negative for brief paralysis.   Psychiatric/Behavioral:  Negative for hallucinations.    Allergic/Immunologic: Negative for hives.     Objective:     Vital Signs (Most Recent):  Temp: 97.9 °F (36.6 °C) (10/28/23 1114)  Pulse: 102 (10/28/23 1126)  Resp: 19 (10/28/23 1126)  BP: 100/67 (10/28/23 1126)  SpO2: 96 % (10/28/23 1126) Vital Signs (24h Range):  Temp:  [97.2 °F (36.2 °C)-98.1 °F (36.7 °C)] 97.9 °F (36.6 °C)  Pulse:  [] 102  Resp:  [16-19] 19  SpO2:  [96 %-98 %] 96 %  BP: (100-115)/(61-79) 100/67     Weight: 65 kg (143 lb 4.8 oz)  Body mass index is 26.21 kg/m².     SpO2: 96 %         Intake/Output Summary (Last 24 hours) at 10/28/2023 1136  Last data filed at 10/28/2023 0854  Gross per 24 hour   Intake 1440 ml   Output 1850 ml   Net -410 ml       Lines/Drains/Airways       Peripheral Intravenous Line  Duration                  Peripheral IV - Single Lumen 10/26/23 1616 20 G Anterior;Right Forearm 1 day                       Physical Exam  Constitutional:       Appearance: She is well-developed.   HENT:      Head: Normocephalic and atraumatic.   Eyes:      Conjunctiva/sclera: Conjunctivae normal.      Pupils: Pupils are equal, round, and reactive to light.   Cardiovascular:      Rate and Rhythm: Tachycardia present. Rhythm irregular.      Pulses: Intact distal pulses.      Heart sounds: Normal heart sounds.   Pulmonary:      Effort: Pulmonary effort is normal.      Breath sounds: Decreased air movement present. Examination of the left-middle field reveals  decreased breath sounds. Examination of the left-lower field reveals decreased breath sounds. Decreased breath sounds present.   Abdominal:      General: Bowel sounds are normal.      Palpations: Abdomen is soft.   Musculoskeletal:         General: Normal range of motion.      Cervical back: Normal range of motion and neck supple.   Skin:     General: Skin is warm and dry.   Neurological:      Mental Status: She is alert and oriented to person, place, and time.            Significant Labs: All pertinent lab results from the last 24 hours have been reviewed.    Significant Imaging: Echocardiogram: 2D echo with color flow doppler:   Results for orders placed or performed during the hospital encounter of 08/21/15   2D echo with color flow doppler   Result Value Ref Range    EF + QEF 75 55 - 65    Mitral Valve Regurgitation MILD     Est. PA Systolic Pressure 40.21 (A)     Tricuspid Valve Regurgitation MODERATE (A)     Narrative    TEST DESCRIPTION       Aorta: The aortic root is normal in size, measuring 2.1 cm at sinotubular junction and 2.8 cm at Sinuses of Valsalva. The proximal ascending aorta is normal in size, measuring 2.6 cm across.     Left Atrium: The left atrial volume index is moderately enlarged, measuring 45.37 cc/m2.     Left Ventricle: The left ventricle is normal in size, with an end-diastolic diameter of 4.9 cm, and an end-systolic diameter of 3.5 cm. LV wall thickness is normal, with the septum measuring 1.0 cm and the posterior wall measuring 0.9 cm across. Relative   wall thickness was normal at 0.37, and the LV mass index was 93.2 g/m2 consistent with normal left ventricular mass. Global left ventricular systolic function appears hyperdynamic. Visually estimated ejection fraction is >70%. The LV Doppler derived   stroke volume equals 62.0 ccs.       Right Atrium: The right atrium is enlarged, measuring 5.3 cm in length and 3.6 cm in width in the apical view.     Right Ventricle: The right ventricle  is normal in size measuring 2.9 cm at the base in the apical right ventricle-focused view. Global right ventricular systolic function appears normal. Tricuspid annular plane systolic excursion (TAPSE) is 1.9 cm. The   estimated PA systolic pressure is 40 mmHg.     Aortic Valve:  The aortic valve is moderately sclerotic.     Mitral Valve:  There is mild mitral regurgitation.     Tricuspid Valve:  There is moderate tricuspid regurgitation.     Pulmonary Valve:  There is mild pulmonic regurgitation.     IVC: IVC is normal in size and collapses > 50% with a sniff, suggesting normal right atrial pressure of 3 mmHg.     Intracavitary: There is no evidence of pericardial effusion, intracavity mass, thrombi, or vegetation.         CONCLUSIONS     1 - Hyperdynamic left ventricular systolic function (EF >70%).     2 - Biatrial enlargement.     3 - The estimated PA systolic pressure is 40 mmHg.     4 - Mild mitral regurgitation.     5 - Moderate tricuspid regurgitation.     6 - Mild pulmonic regurgitation.         This document has been electronically    SIGNED BY: Amauri Lomas MD On: 09/16/2015 13:16

## 2023-10-28 NOTE — SUBJECTIVE & OBJECTIVE
Interval History:  No acute overnight events.  Patient afebrile.  Patient states she is feeling better today. Sitting up in bed eating breakfast. Denies any chest pain, abdominal pain, nausea, vomiting.  Patient with good urine output. Na level improving. Requested to go to rehab because she wants to give her daughter a break. States she has been living w/her daughter for the last two months. Discussed that PT/OT recommended home health and not rehab at this time.     Review of Systems   Constitutional:  Positive for activity change and fatigue. Negative for chills and fever.   Respiratory:  Positive for shortness of breath (intermittently, worse with exertion). Negative for cough and wheezing.    Cardiovascular:  Negative for chest pain, palpitations and leg swelling.   Gastrointestinal:  Negative for abdominal distention, abdominal pain, diarrhea, nausea and vomiting.   Genitourinary:  Negative for difficulty urinating and dysuria.   Neurological:  Positive for weakness. Negative for dizziness and headaches.   Psychiatric/Behavioral:  Positive for decreased concentration (improving). Negative for confusion (none on exam). The patient is nervous/anxious.      Objective:     Vital Signs (Most Recent):  Temp: 97.2 °F (36.2 °C) (10/28/23 0737)  Pulse: 97 (10/28/23 0737)  Resp: 17 (10/28/23 0737)  BP: 115/79 (10/28/23 0737)  SpO2: 97 % (10/28/23 0737) Vital Signs (24h Range):  Temp:  [97.2 °F (36.2 °C)-98.1 °F (36.7 °C)] 97.2 °F (36.2 °C)  Pulse:  [] 97  Resp:  [16-19] 17  SpO2:  [96 %-98 %] 97 %  BP: (103-115)/(64-79) 115/79     Weight: 65 kg (143 lb 4.8 oz)  Body mass index is 26.21 kg/m².    Intake/Output Summary (Last 24 hours) at 10/28/2023 1104  Last data filed at 10/28/2023 0854  Gross per 24 hour   Intake 1440 ml   Output 1850 ml   Net -410 ml           Physical Exam  Vitals and nursing note reviewed.   Constitutional:       General: She is not in acute distress.     Appearance: She is ill-appearing  (chronic).   HENT:      Mouth/Throat:      Mouth: Mucous membranes are moist.   Eyes:      Extraocular Movements: Extraocular movements intact.   Cardiovascular:      Rate and Rhythm: Normal rate and regular rhythm.      Heart sounds: No murmur heard.     No gallop.   Pulmonary:      Effort: Pulmonary effort is normal. No respiratory distress.      Breath sounds: Examination of the left-lower field reveals decreased breath sounds. Decreased breath sounds present. No wheezing.      Comments: On 3L NC, decreased breath sounds in left lower lobe improved.   Abdominal:      General: There is no distension.      Palpations: Abdomen is soft.      Tenderness: There is no abdominal tenderness.   Musculoskeletal:         General: No swelling or tenderness.      Right lower leg: Edema present.      Left lower leg: Edema present.      Comments: Bilateral lower extremity with trace edema   Skin:     General: Skin is warm and dry.   Neurological:      General: No focal deficit present.      Mental Status: She is alert and oriented to person, place, and time.             Significant Labs: All pertinent labs within the past 24 hours have been reviewed.    Significant Imaging: I have reviewed all pertinent imaging results/findings within the past 24 hours.

## 2023-10-28 NOTE — CONSULTS
"RD consulted for, "For education on fluid and salt restriction."  Currently providing remote weekend coverage.    Pt receiving Cardiac low sodium minced and moist diet with 50-75% intake recorded.    Attached diet education handouts to discharge instructions.  On-site RD to follow up with in-person education as appropriate.    Please Re-Consult as needed.    Thank you.    "

## 2023-10-28 NOTE — PLAN OF CARE
Problem: Adult Inpatient Plan of Care  Goal: Plan of Care Review  Outcome: Ongoing, Progressing  Flowsheets (Taken 10/28/2023 0930)  Plan of Care Reviewed With: patient  Goal: Patient-Specific Goal (Individualized)  Outcome: Ongoing, Progressing  Goal: Absence of Hospital-Acquired Illness or Injury  Outcome: Ongoing, Progressing  Intervention: Identify and Manage Fall Risk  Flowsheets (Taken 10/28/2023 0930)  Safety Promotion/Fall Prevention:   assistive device/personal item within reach   bed alarm set   instructed to call staff for mobility   side rails raised x 2  Intervention: Prevent Skin Injury  Flowsheets (Taken 10/28/2023 0930)  Body Position:   position changed independently   lower extremity elevated  Skin Protection:   adhesive use limited   silicone foam dressing in place  Intervention: Prevent and Manage VTE (Venous Thromboembolism) Risk  Flowsheets (Taken 10/28/2023 0930)  Activity Management: Rolling - L1  VTE Prevention/Management:   bleeding precations maintained   bleeding risk assessed  Range of Motion: active ROM (range of motion) encouraged  Goal: Optimal Comfort and Wellbeing  Outcome: Ongoing, Progressing  Goal: Readiness for Transition of Care  Outcome: Ongoing, Progressing     Problem: Skin Injury Risk Increased  Goal: Skin Health and Integrity  Outcome: Ongoing, Progressing  Intervention: Optimize Skin Protection  Flowsheets (Taken 10/28/2023 0930)  Pressure Reduction Techniques: frequent weight shift encouraged  Pressure Reduction Devices: foam padding utilized  Skin Protection:   adhesive use limited   silicone foam dressing in place  Head of Bed (HOB) Positioning: HOB elevated     Problem: Diabetes Comorbidity  Goal: Blood Glucose Level Within Targeted Range  Outcome: Ongoing, Progressing     Problem: Impaired Wound Healing  Goal: Optimal Wound Healing  Outcome: Ongoing, Progressing     Problem: Coping Ineffective  Goal: Effective Coping  Outcome: Ongoing, Progressing     Problem: Fall  Injury Risk  Goal: Absence of Fall and Fall-Related Injury  Outcome: Ongoing, Progressing

## 2023-10-28 NOTE — NURSING
Linen changed.  Patient assisted back to the bed with minimal assist.      Dressing changed to the left arm skin tear.  Cleansed with vashe and applied Mepilex.

## 2023-10-29 NOTE — PLAN OF CARE
Problem: Physical Therapy  Goal: Physical Therapy Goal  Description: Goals to be met by: 11/10/23     Patient will increase functional independence with mobility by performin. Supine to sit with Supervision  2. Rolling to Left and Right with Supervision  3. Sit to stand transfer with Stand-by Assistance using RW  4. Bed to chair transfer with Stand-by Assistance using Rolling Walker  5. Gait x250 feet with Stand-by Assistance using Rolling Walker and O2   6. Lower extremity exercise program 2 sets x15 reps per handout, with supervision    Outcome: Ongoing, Progressing       Pt tolerated gait training well today, requiring no rest breaks using RW and CGA. O2 at rest 95%, O2 following ambulation 93%

## 2023-10-29 NOTE — PLAN OF CARE
Problem: Adult Inpatient Plan of Care  Goal: Plan of Care Review  Outcome: Ongoing, Progressing  Flowsheets (Taken 10/29/2023 1057)  Plan of Care Reviewed With: patient  Goal: Patient-Specific Goal (Individualized)  Outcome: Ongoing, Progressing  Goal: Absence of Hospital-Acquired Illness or Injury  Outcome: Ongoing, Progressing  Intervention: Prevent Skin Injury  Flowsheets (Taken 10/29/2023 1057)  Body Position: position changed independently  Intervention: Prevent Infection  Flowsheets (Taken 10/29/2023 1057)  Infection Prevention:   hand hygiene promoted   rest/sleep promoted   single patient room provided  Goal: Optimal Comfort and Wellbeing  Outcome: Ongoing, Progressing  Intervention: Monitor Pain and Promote Comfort  Flowsheets (Taken 10/29/2023 1057)  Pain Management Interventions:   pillow support provided   quiet environment facilitated  Goal: Readiness for Transition of Care  Outcome: Ongoing, Progressing     Problem: Skin Injury Risk Increased  Goal: Skin Health and Integrity  Outcome: Ongoing, Progressing  Intervention: Optimize Skin Protection  Flowsheets (Taken 10/29/2023 1057)  Pressure Reduction Techniques: frequent weight shift encouraged  Pressure Reduction Devices: foam padding utilized  Head of Bed (HOB) Positioning: HOB elevated     Problem: Diabetes Comorbidity  Goal: Blood Glucose Level Within Targeted Range  Outcome: Ongoing, Progressing     Problem: Impaired Wound Healing  Goal: Optimal Wound Healing  Outcome: Ongoing, Progressing     Problem: Coping Ineffective  Goal: Effective Coping  Outcome: Ongoing, Progressing     Problem: Fall Injury Risk  Goal: Absence of Fall and Fall-Related Injury  Outcome: Ongoing, Progressing  Intervention: Identify and Manage Contributors  Flowsheets (Taken 10/29/2023 1057)  Self-Care Promotion:   independence encouraged   meal set-up provided  Intervention: Promote Injury-Free Environment  Flowsheets (Taken 10/29/2023 1057)  Safety Promotion/Fall  Prevention:   assistive device/personal item within reach   side rails raised x 2   room near unit station   instructed to call staff for mobility

## 2023-10-29 NOTE — ASSESSMENT & PLAN NOTE
· Intermittent confusion per daughter although oriented x3  · Ammonia is within normal limits  · However it is possible she has CNS manifestation of hemochromatosis with ferritin 800 and new transaminitis, INR elevated at 1.4, does not quite meet liver failure criteria  · verses right-sided heart failure with hepatic congestion  · Verses uremic encephalopathy, however BUN not too high  · Echo of heart ordered- EF of 35-40%, mild aortic valve stenosis, +pulmonary hypertension  · Ultrasound of liver with Doppler ordered: no acute findings  · Hematology consulted  · Received diurese in setting of likely right-sided heart failure with hepatic congestion  · Patient has been AOx4 since hospitalization

## 2023-10-29 NOTE — SUBJECTIVE & OBJECTIVE
Interval History:  No acute overnight events.  Patient afebrile.  Patient feeling good this morning. Was eating breakfast. Not eating much. Ate a few bites of her grits and eggs. States she does not have much of an appetitie.     Review of Systems   Constitutional:  Positive for activity change, appetite change (poor appetite) and fatigue. Negative for chills and fever.   Respiratory:  Positive for shortness of breath (at baseline). Negative for cough and wheezing.    Cardiovascular:  Negative for chest pain, palpitations and leg swelling.   Gastrointestinal:  Negative for abdominal distention, abdominal pain, diarrhea, nausea and vomiting.   Genitourinary:  Negative for difficulty urinating and dysuria.   Neurological:  Positive for weakness. Negative for dizziness and headaches.   Psychiatric/Behavioral:  Positive for decreased concentration (improving). Negative for confusion (none on exam). The patient is not nervous/anxious (not anxious this morning).      Objective:     Vital Signs (Most Recent):  Temp: 97.5 °F (36.4 °C) (10/29/23 0710)  Pulse: 98 (10/29/23 0710)  Resp: 16 (10/29/23 0710)  BP: 97/78 (10/29/23 0710)  SpO2: 97 % (10/29/23 0710) Vital Signs (24h Range):  Temp:  [97.4 °F (36.3 °C)-98.1 °F (36.7 °C)] 97.5 °F (36.4 °C)  Pulse:  [] 98  Resp:  [16-20] 16  SpO2:  [92 %-98 %] 97 %  BP: ()/(50-83) 97/78     Weight: 65 kg (143 lb 4.8 oz)  Body mass index is 26.21 kg/m².    Intake/Output Summary (Last 24 hours) at 10/29/2023 1001  Last data filed at 10/29/2023 0910  Gross per 24 hour   Intake 580 ml   Output 650 ml   Net -70 ml           Physical Exam  Vitals and nursing note reviewed.   Constitutional:       General: She is not in acute distress.     Appearance: She is ill-appearing (chronic).   HENT:      Mouth/Throat:      Mouth: Mucous membranes are moist.   Eyes:      Extraocular Movements: Extraocular movements intact.   Cardiovascular:      Rate and Rhythm: Normal rate and regular  rhythm.      Heart sounds: No murmur heard.     No gallop.   Pulmonary:      Effort: Pulmonary effort is normal. No respiratory distress.      Breath sounds: Examination of the left-lower field reveals decreased breath sounds. Decreased breath sounds present. No wheezing.      Comments: On 3L NC, decreased breath sounds in left lower lobe improved.   Abdominal:      General: There is no distension.      Palpations: Abdomen is soft.      Tenderness: There is no abdominal tenderness.   Musculoskeletal:         General: No swelling or tenderness.      Right lower leg: No edema.      Left lower leg: No edema.      Comments: Bilateral lower extremity without edema this AM   Skin:     General: Skin is warm and dry.   Neurological:      General: No focal deficit present.      Mental Status: She is alert and oriented to person, place, and time.             Significant Labs: All pertinent labs within the past 24 hours have been reviewed.    Significant Imaging: I have reviewed all pertinent imaging results/findings within the past 24 hours.

## 2023-10-29 NOTE — PT/OT/SLP PROGRESS
"Physical Therapy Treatment    Patient Name:  Barbara Rizo   MRN:  [id number]    Recommendations:     Discharge Recommendations: Low Intensity Therapy  Discharge Equipment Recommendations: bedside commode  Barriers to discharge: Fall risk and decreased safety awareness    Assessment:     Barbara Rizo is a 83 y.o. female admitted with a medical diagnosis of Acute on chronic diastolic heart failure.  She presents with the following impairments/functional limitations: weakness, impaired endurance, impaired self care skills, impaired functional mobility, gait instability, impaired balance, impaired cardiopulmonary response to activity, decreased safety awareness, decreased lower extremity function, decreased upper extremity function, decreased coordination.    Pt tolerated gait training well today, requiring no rest breaks using RW and CGA. O2 at rest 95%, O2 following ambulation 93%.    Rehab Prognosis: Good; patient would benefit from acute skilled PT services to address these deficits and reach maximum level of function.    Recent Surgery: * No surgery found *      Plan:     During this hospitalization, patient to be seen daily to address the identified rehab impairments via gait training, therapeutic activities, therapeutic exercises and progress toward the following goals:    Plan of Care Expires:  11/10/23    Subjective     Chief Complaint: "I have to use the bathroom"  Patient/Family Comments/goals: Pt agreed to therapy session  Pain/Comfort:  Pain Rating 1: 0/10      Objective:     Communicated with Carola prior to session.  Patient found HOB elevated with PureWick, peripheral IV, telemetry upon PT entry to room.     General Precautions: Standard, aspiration, respiratory, fall, hearing impaired  Orthopedic Precautions: N/A  Braces: N/A  Respiratory Status: Nasal cannula, flow 3 L/min     Functional Mobility:  Bed Mobility:     Rolling Left:  stand by assistance  Scooting: contact guard assistance  Supine " to Sit: minimum assistance  Transfers: Gait belt donned     Sit to Stand:  contact guard assistance with rolling walker  Toilet Transfer: contact guard assistance with  rolling walker. Pt ambulated ~10ft to toilet, depended for doffing brief. Pt required Min A standing from toilet.  Gait: Pt ambulated ~150ft using RW and CGA. Pt demo decreased jocelyn, step length, velocity of limb, postural control, safety, balance and endurance.   Balance: good sitting and fair +standing. Pt static stood ~2min for brief change using RW with no LOB.      AM-PAC 6 CLICK MOBILITY  Turning over in bed (including adjusting bedclothes, sheets and blankets)?: 3  Sitting down on and standing up from a chair with arms (e.g., wheelchair, bedside commode, etc.): 3  Moving from lying on back to sitting on the side of the bed?: 3  Moving to and from a bed to a chair (including a wheelchair)?: 3  Need to walk in hospital room?: 3  Climbing 3-5 steps with a railing?: 2  Basic Mobility Total Score: 17       Treatment & Education:  Pt instructed to perform seated LAQ, AP, hip flexion, hip abd/add 2x10 each.    Pt performed x4 STS from EOB using RW standing for 30sec each trial. Pt demo mild posterior sway and shakiness during each trial, however decreased with each trial.    Patient left up in chair with all lines intact, table with lunch tray, call button in reach, and nursing notified..    GOALS:   Multidisciplinary Problems       Physical Therapy Goals          Problem: Physical Therapy    Goal Priority Disciplines Outcome Goal Variances Interventions   Physical Therapy Goal     PT, PT/OT Ongoing, Progressing     Description: Goals to be met by: 11/10/23     Patient will increase functional independence with mobility by performin. Supine to sit with Supervision  2. Rolling to Left and Right with Supervision  3. Sit to stand transfer with Stand-by Assistance using RW  4. Bed to chair transfer with Stand-by Assistance using Rolling  Walker  5. Gait x250 feet with Stand-by Assistance using Rolling Walker and O2   6. Lower extremity exercise program 2 sets x15 reps per handout, with supervision                         Time Tracking:     PT Received On: 10/29/23  PT Start Time: 1131     PT Stop Time: 1211  PT Total Time (min): 40 min     Billable Minutes: Gait Training 15, Therapeutic Activity 13, and Therapeutic Exercise 12    Treatment Type: Treatment  PT/PTA: PTA     Number of PTA visits since last PT visit: 2     10/29/2023

## 2023-10-29 NOTE — ASSESSMENT & PLAN NOTE
· Did require blood letting in early 2000's  · Ferritin elevated at 800, unclear if patient could have CNS manifestation/confusion, not necessarily due to iron itself, but poor clearance of toxins etc  ·  , INR 1.4- usually all WNL  · Will discuss with hematology in am if any intervention is necessary, however, potentially due to right sided heart failure with hepatic congestion.  Patient also has MGUS.   · Hematology consulted-Would rule out other issue or causes such as worsening CHF, infection or other causes of inflammation before ordering a phlebotomy   · Pt will need to continue follow up in heme clinic on discharge  · Presentation likely due to CHF. Improved with diuresis

## 2023-10-29 NOTE — NURSING
Received report care assumed. Patient lying in bed resting, NAD noted. Safety precautions maintained.     [facility], [facility]  Nurses Note -- 4 Eyes      10/28/2023       Skin assessed on: Q Shift      [x] No Pressure Injuries Present    [x]Prevention Measures Documented    [] Yes LDA  for Pressure Injury Previously documented     [] Yes New Pressure Injury Discovered   [] LDA for New Pressure Injury Added      Attending RN:  Oksana Barraza LPN     Second RN:  Miriam Kline RN

## 2023-10-29 NOTE — PROGRESS NOTES
[facility] Medicine  Progress Note    Patient Name: Barbara Rizo  MRN: [id number]  Patient Class: IP- Inpatient   Admission Date: 10/26/2023  Length of Stay: 3 days  Attending Physician: Grayson Alamo DO  Primary Care Provider: Paras Oro MD        Subjective:     Principal Problem:Acute on chronic diastolic heart failure        HPI:    Barbara Rizo is a 83 y.o. female who has a past medical history of A-fib, Acute respiratory failure with hypoxia, Breast cancer, CKD, Community acquired pneumonia, Diabetes mellitus type II, Fatty liver, GERD, Hearing loss of both ears, Hemochromatosis, History of anemia due to CKD, History of pleural effusion, Hyperlipidemia, Hypertension, Macular degeneration, On home oxygen therapy, Osteoarthritis, Osteoporosis, Vaginal delivery, Vitamin D deficiency disease, and Wears partial dentures, presented to the ED with CC of Confusion.    Patient was sent in by PCP/home health labs hyponatremia and elevated creatinine of 2.2.  Unclear if point of care hyponatremia was correct at BNP taken at same time (noon) was 127 and repeat BNP showed sodium again at 127 four hours later-(POC electrolytes are often wrong).  Daughter states that patient has been intermittently confused for the past week or so, has not necessarily gotten worse but has been persistent.  She is oriented, A/O x3, however will often argue with daughter about things that are apparently wrong.  Denies hallucinations.  Has had a couple falls recently without head injury, has a bruise on her right thigh, without hip pain.  In the ED her liver enzymes were elevated,  and -which were normal on the last set of labs, and for many years normal.  Patient does have hemochromatosis and fair and ferritin is elevated at 800, ultrasound of liver with Doppler ordered.  Also patient has elevated BNP at 2876 and elevated JVD, echo ordered.  She has a fairly large left pleural effusion as  well, however it is stable from previous.  She is on 4 L of home oxygen.  Denies chest pain and denies shortness of breath beyond her normal dyspnea.        Overview/Hospital Course:  83 y.o. female with extensive co morbidities admitted on 10/26/23 for abnormal lab findings, sent to ED by PCP. Daughter noted that patient has been having intermittent confusion. Outpatient labs showed hyponatremia, transaminitis, and elevated creatinine of 2.2.  Patient also found to have elevated BNP and troponin in the ED. Started on CHF pathway. ferritin elevated at 804,previously in the 400s. CXR with unchanged appearance of a loculated left-sided pleural effusion. Pt with afib RVR and required  IV metoprolol with improvement. QTc prolonged on EKG, home amiodarone held. Cardiology consulted-recommends to stop amiodarone and attempt rate control with metoprolol. Resume eliquis after thoracentesis. Consider repeat CV if A-flutter not controlled. IR consulted- pt s/p thoracentesis with removal of 1.1L, cultures sent. Pleural fluid studies with no growth so far. Cytology pending. Given history of hemochromatosis, hematology consulted-recommend continued outpatient follow up. Nephrology, palliative and GI consulted as well      Interval History:  No acute overnight events.  Patient afebrile.  Patient feeling good this morning. Was eating breakfast. Not eating much. Ate a few bites of her grits and eggs. States she does not have much of an appetitie.     Review of Systems   Constitutional:  Positive for activity change, appetite change (poor appetite) and fatigue. Negative for chills and fever.   Respiratory:  Positive for shortness of breath (at baseline). Negative for cough and wheezing.    Cardiovascular:  Negative for chest pain, palpitations and leg swelling.   Gastrointestinal:  Negative for abdominal distention, abdominal pain, diarrhea, nausea and vomiting.   Genitourinary:  Negative for difficulty urinating and dysuria.    Neurological:  Positive for weakness. Negative for dizziness and headaches.   Psychiatric/Behavioral:  Positive for decreased concentration (improving). Negative for confusion (none on exam). The patient is not nervous/anxious (not anxious this morning).      Objective:     Vital Signs (Most Recent):  Temp: 97.5 °F (36.4 °C) (10/29/23 0710)  Pulse: 98 (10/29/23 0710)  Resp: 16 (10/29/23 0710)  BP: 97/78 (10/29/23 0710)  SpO2: 97 % (10/29/23 0710) Vital Signs (24h Range):  Temp:  [97.4 °F (36.3 °C)-98.1 °F (36.7 °C)] 97.5 °F (36.4 °C)  Pulse:  [] 98  Resp:  [16-20] 16  SpO2:  [92 %-98 %] 97 %  BP: ()/(50-83) 97/78     Weight: 65 kg (143 lb 4.8 oz)  Body mass index is 26.21 kg/m².    Intake/Output Summary (Last 24 hours) at 10/29/2023 1001  Last data filed at 10/29/2023 0910  Gross per 24 hour   Intake 580 ml   Output 650 ml   Net -70 ml           Physical Exam  Vitals and nursing note reviewed.   Constitutional:       General: She is not in acute distress.     Appearance: She is ill-appearing (chronic).   HENT:      Mouth/Throat:      Mouth: Mucous membranes are moist.   Eyes:      Extraocular Movements: Extraocular movements intact.   Cardiovascular:      Rate and Rhythm: Normal rate and regular rhythm.      Heart sounds: No murmur heard.     No gallop.   Pulmonary:      Effort: Pulmonary effort is normal. No respiratory distress.      Breath sounds: Examination of the left-lower field reveals decreased breath sounds. Decreased breath sounds present. No wheezing.      Comments: On 3L NC, decreased breath sounds in left lower lobe improved.   Abdominal:      General: There is no distension.      Palpations: Abdomen is soft.      Tenderness: There is no abdominal tenderness.   Musculoskeletal:         General: No swelling or tenderness.      Right lower leg: No edema.      Left lower leg: No edema.      Comments: Bilateral lower extremity without edema this AM   Skin:     General: Skin is warm and dry.    Neurological:      General: No focal deficit present.      Mental Status: She is alert and oriented to person, place, and time.             Significant Labs: All pertinent labs within the past 24 hours have been reviewed.    Significant Imaging: I have reviewed all pertinent imaging results/findings within the past 24 hours.      Assessment/Plan:      * Acute on chronic diastolic heart failure  · BNP 3000 with elevated JVD and hypervolemic hyponatremia on admission  · Right-sided heart failure, which is why her oxygen has not changed much, but why her liver is congested, I suspect  · Likely worsened by recurrent A-flutter  · Received IV diuresis. Volume status improved. Will hold off on further diuresis today 10/29  · Echo ordered: estimated ejection fraction of 35 - 40%. PASP 49mmHg  · Cardiology consulted  · See HF note      Atrial fibrillation with RVR  · Longstanding, permanent AFib  · Patient has been cardioverted about 4 times in the past, per daughter   · Heart rate 120 in the ED, IV metoprolol given, heart rate improved to - controlled  · Given hemochromatosis and , amiodarone on hold  · cardiology consulted: Stop amiodarone with prolonged QT. Will attempt rate control with metoprolol as tolerated. Consider repeat CV if A-flutter not controlled  · Resumed eliquis after thoracentesis.    Paroxysmal atrial fibrillation  Patient with Persistent (7 days or more) atrial fibrillation which is uncontrolled currently with Amiodarone. Patient is currently in atrial fibrillation.[id number] Score: 5.  Anticoagulation indicated. Anticoagulation done with eliquis.    -history of atrial fibrillation. On amiodarone outpatient  -Pt now with elevated LFTs and prolonged   -Cardiology consulted: Stop amiodarone with prolonged QT. Attempt rate control with metoprolol as tolerated. Resume eliquis     Chronic heart failure with preserved ejection fraction  · Do not see heart failure on her history  · chronic  HFpEF added as this was noted previously on ECHO (not new this admission)  · BNP approaching 3000 with elevated JVD on exam  · Patient was recently switch to Bumex, perhaps mildly underdosed leading to increasing volume and hypervolemic hyponatremia over the last couple weeks.    · She takes 1 mg Bhgunnw-Bienhnzq-Tqnxdrsl-Sunday; and takes 0.5 mg MWF.  · May need to increase to 1 mg daily on discharge, please assess at that point  · Cardiology consulted    Pulmonary hypertension  · PASP 46 mmHg previously  · Keep euvolemic  · Repeat echo: with EF 35-40%. There is mild stenosis of aortic valve. The estimated pulmonary artery systolic pressure is 49 mmHg.      Pleural effusion on left  · Persistent large left pleural effusion  · History of breast cancer  · IR consulted: s/p  thoracentesis with removal of 1.1L  · Pleural cultures with no growth so far.  cytology pending.      QT prolongation  ·  on admission  · Concerned that with hx of hemochromatosis and QTC prolongation, that amiodarone will not be the drug of choice moving forward  · cardiology consult: recommends to stop amiodarone and attempt rate control with metoprolol. Resume eliquis   · Hold amiodarone  · Hold solifenacin  · Avoid QT prolongation agents           Hyponatremia  · Patient has hyponatremia which is uncontrolled,We will aim to correct the sodium by 4-6mEq in 24 hours. We will monitor sodium Every 12 hours. The hyponatremia is due to heart failure/right-sided congestion versus hemochromatosis/liver dysfunction. We will obtain the following studies: Urine sodium, urine osmolality, serum osmolality or TSH, T4. We will treat the hyponatremia with Fluid restriction of:  1.5 liter and diuresis per day. The patient's sodium results have been reviewed and are listed below.    · Suspect the POC sodium of 121 was inaccurate, as POC electrolytes often are  · Sodium 127 on admission, and repeat 4 hours later was also 127, then 129  · BNP approaching  3000 and elevated JVD on exam, large left pleural effusion  · Suspect hypervolemic hyponatremia-Received diuresis  · sodium fluctuating up and down, pt with poor appetite   · Nephrology following    Intermittent confusion  · Intermittent confusion per daughter although oriented x3  · Ammonia is within normal limits  · However it is possible she has CNS manifestation of hemochromatosis with ferritin 800 and new transaminitis, INR elevated at 1.4, does not quite meet liver failure criteria  · verses right-sided heart failure with hepatic congestion  · Verses uremic encephalopathy, however BUN not too high  · Echo of heart ordered- EF of 35-40%, mild aortic valve stenosis, +pulmonary hypertension  · Ultrasound of liver with Doppler ordered: no acute findings  · Hematology consulted  · Received diurese in setting of likely right-sided heart failure with hepatic congestion  · Patient has been AOx4 since hospitalization     MGUS (monoclonal gammopathy of unknown significance)  · See hemochromatosis note  · Stable      History of Hemochromatosis  · Did require blood letting in early 2000's  · Ferritin elevated at 800, unclear if patient could have CNS manifestation/confusion, not necessarily due to iron itself, but poor clearance of toxins etc  ·  , INR 1.4- usually all WNL  · Will discuss with hematology in am if any intervention is necessary, however, potentially due to right sided heart failure with hepatic congestion.  Patient also has MGUS.   · Hematology consulted-Would rule out other issue or causes such as worsening CHF, infection or other causes of inflammation before ordering a phlebotomy   · Pt will need to continue follow up in heme clinic on discharge  · Presentation likely due to CHF. Improved with diuresis     Type 2 diabetes mellitus with stage 4 chronic kidney disease, without long-term current use of insulin  · A1c 4.9 8/27/23  · A1cs likely improved with worsening renal function, CKD  4  · Diet controlled at home  · No significant elevations noted thus far  · Monitor sugars daily with BMP -no need for frequent glucose checks    CKD (chronic kidney disease), stage IV  -Nephrology consulted      Carcinoma of breast  · Noted history      OAB (overactive bladder)  · Holding solifenacin given   · Would discontinue at discharge, consider discussing with Urology other treatment    Chronic gout  · Decreased creatinine clearance and elevated liver enzymes, will hold allopurinol for now      Physical deconditioning  · PT OT consulted: recommends home health      ACP (advance care planning)  -Spent 5 minutes to review ACPs notes  -Palliative care team consulted        VTE Risk Mitigation (From admission, onward)         Ordered     apixaban tablet 2.5 mg  2 times daily         10/27/23 1146     IP VTE HIGH RISK PATIENT  Once         10/27/23 0652     Place sequential compression device  Until discontinued         10/27/23 0652     Place sequential compression device  Until discontinued         10/26/23 1919                Discharge Planning   SALLY:      Code Status: DNR   Is the patient medically ready for discharge?:     Reason for patient still in hospital (select all that apply): Patient trending condition, Treatment, Consult recommendations and PT / OT recommendations  Discharge Plan A: Home with family                  CarlyJuan Jose Alamo DO  Department of Hospital Medicine   [facility] Surg

## 2023-10-29 NOTE — ASSESSMENT & PLAN NOTE
Patient with Persistent (7 days or more) atrial fibrillation which is uncontrolled currently with Amiodarone. Patient is currently in atrial fibrillation.[id number] Score: 5.  Anticoagulation indicated. Anticoagulation done with eliquis.    -history of atrial fibrillation. On amiodarone outpatient  -Pt now with elevated LFTs and prolonged   -Cardiology consulted: Stop amiodarone with prolonged QT. Attempt rate control with metoprolol as tolerated. Resume eliquis

## 2023-10-29 NOTE — ASSESSMENT & PLAN NOTE
· Patient has hyponatremia which is uncontrolled,We will aim to correct the sodium by 4-6mEq in 24 hours. We will monitor sodium Every 12 hours. The hyponatremia is due to heart failure/right-sided congestion versus hemochromatosis/liver dysfunction. We will obtain the following studies: Urine sodium, urine osmolality, serum osmolality or TSH, T4. We will treat the hyponatremia with Fluid restriction of:  1.5 liter and diuresis per day. The patient's sodium results have been reviewed and are listed below.    · Suspect the POC sodium of 121 was inaccurate, as POC electrolytes often are  · Sodium 127 on admission, and repeat 4 hours later was also 127, then 129  · BNP approaching 3000 and elevated JVD on exam, large left pleural effusion  · Suspect hypervolemic hyponatremia-Received diuresis  · sodium fluctuating up and down, pt with poor appetite   · Nephrology following

## 2023-10-29 NOTE — ASSESSMENT & PLAN NOTE
· Do not see heart failure on her history  · chronic HFpEF added as this was noted previously on ECHO (not new this admission)  · BNP approaching 3000 with elevated JVD on exam  · Patient was recently switch to Bumex, perhaps mildly underdosed leading to increasing volume and hypervolemic hyponatremia over the last couple weeks.    · She takes 1 mg Yqttvne-Ynwqyqre-Wlrooovu-Sunday; and takes 0.5 mg MWF.  · May need to increase to 1 mg daily on discharge, please assess at that point  · Cardiology consulted

## 2023-10-29 NOTE — PLAN OF CARE
No acute distress noted, patient free from falls or injury this shift.  Bed in low position, wheels locked, call light in reach for assistance, plan of care continued.       Problem: Skin Injury Risk Increased  Goal: Skin Health and Integrity  Outcome: Ongoing, Progressing     Problem: Diabetes Comorbidity  Goal: Blood Glucose Level Within Targeted Range  Outcome: Ongoing, Progressing     Problem: Impaired Wound Healing  Goal: Optimal Wound Healing  Outcome: Ongoing, Progressing

## 2023-10-29 NOTE — NURSING
[facility]  Nurses Note -- 4 Eyes      10/29/2023       Skin assessed on: Q Shift      [x] No Pressure Injuries Present    []Prevention Measures Documented    [] Yes LDA  for Pressure Injury Previously documented     [] Yes New Pressure Injury Discovered   [] LDA for New Pressure Injury Added      Attending RN:  Miriam Kline RN     Second RN:  PAUL Gandhi

## 2023-10-30 NOTE — ASSESSMENT & PLAN NOTE
· BNP 3000 with elevated JVD and hypervolemic hyponatremia on admission  · Right-sided heart failure, which is why her oxygen has not changed much, but why her liver is congested, I suspect  · Likely worsened by recurrent A-flutter  · Received IV diuresis. Volume status improved. Will hold off on further diuresis since 10/29  · Echo ordered: estimated ejection fraction of 35 - 40%. PASP 49mmHg  · Cardiology consulted  · See HF note

## 2023-10-30 NOTE — PLAN OF CARE
10/30/23 0930   Medicare Message   Important Message from Medicare regarding Discharge Appeal Rights Given to patient/caregiver;Other (comments);Explained to patient/caregiver;Signed/date by patient/caregiver   Date IMM was signed 10/30/23   Time IMM was signed 0930

## 2023-10-30 NOTE — PROGRESS NOTES
Awake alert oriented NAD    Denies CNS ENT CP GI  RHEUM OR DERM SX  Past Medical History:   Diagnosis Date    A-fib     Acute respiratory failure with hypoxia 09/01/2015    Breast cancer     invasive ductal carcinoma    Chronic heart failure with preserved ejection fraction 10/26/2023    GII dd    CKD (chronic kidney disease)     Community acquired pneumonia 08/26/2023    Diabetes mellitus type II     Fatty liver     GERD (gastroesophageal reflux disease)     Hearing loss of both ears     wears bilateral hearing aids    Hemochromatosis     History of anemia due to CKD     History of pleural effusion     with thoracentesis    Hyperlipidemia     Hypertension     Macular degeneration     On home oxygen therapy     Osteoarthritis     Left knee    Osteoporosis     Vaginal delivery     x2    Vitamin D deficiency disease     Wears partial dentures     upper removable bridge     Past Surgical History:   Procedure Laterality Date    AXILLARY NODE DISSECTION Left 10/18/2021    Procedure: LYMPHADENECTOMY, AXILLARY;  Surgeon: Darlene Roca MD;  Location: [facility] OR;  Service: General;  Laterality: Left;    BREAST BIOPSY      BREAST LUMPECTOMY      invasive ductal carcinoma    BREAST SURGERY Bilateral     Reduction r/t h/o fibrocystic disease    CARDIOVERSION N/A 9/15/2023    Procedure: Cardioversion;  Surgeon: Constantine Chambers MD;  Location: [facility] CATH LAB;  Service: Cardiology;  Laterality: N/A;    CHOLECYSTECTOMY  08/2015    EYE SURGERY      Cat Ext  OU    HYSTERECTOMY      partial due to uterine fibroids    INCISION AND DRAINAGE OF KNEE Left 09/17/2020    Procedure: INCISION AND DRAINAGE, KNEE;  Surgeon: Rolando Wagoner MD;  Location: [facility] OR;  Service: Orthopedics;  Laterality: Left;    INJECTION FOR SENTINEL NODE IDENTIFICATION Left 10/18/2021    Procedure: INJECTION, FOR SENTINEL NODE IDENTIFICATION;  Surgeon: Darlene Roca MD;  Location: [facility] OR;  Service: General;  Laterality: Left;    JOINT REPLACEMENT Left 05/13/2013     TKR    JOINT REPLACEMENT Right 08/03/2015    TKR    MASTECTOMY, PARTIAL Left 10/18/2021    Procedure: MASTECTOMY, PARTIAL -- wire;  Surgeon: Darlene Roca MD;  Location: [facility] OR;  Service: General;  Laterality: Left;  RN PREOP 10/15/2021   VACCINATED-----NEED H/P AND ORDERS    SENTINEL LYMPH NODE BIOPSY Left 10/18/2021    Procedure: BIOPSY, LYMPH NODE, SENTINEL;  Surgeon: Darlene Roca MD;  Location: [facility] OR;  Service: General;  Laterality: Left;    THORACENTESIS      TOTAL KNEE ARTHROPLASTY Right 2015    left knee done 5/2013    TREATMENT OF CARDIAC ARRHYTHMIA N/A 9/15/2023    Procedure: Cardioversion or Defibrillation;  Surgeon: Constantine Chambers MD;  Location: [facility] CATH LAB;  Service: Cardiology;  Laterality: N/A;    WOUND DRESSING Left 09/17/2020    Procedure: WOUND VAC APPLICATION;  Surgeon: Rolando Wagoner MD;  Location: [facility] OR;  Service: Orthopedics;  Laterality: Left;     Review of patient's allergies indicates:  No Known Allergies    Current Facility-Administered Medications   Medication    acetaminophen tablet 650 mg    apixaban tablet 2.5 mg    droNABinol capsule 2.5 mg    glucagon (human recombinant) injection 1 mg    glucose chewable tablet 16 g    glucose chewable tablet 24 g    melatonin tablet 6 mg    metoprolol tartrate (LOPRESSOR) tablet 25 mg    naloxone 0.4 mg/mL injection 0.02 mg    polyethylene glycol packet 17 g    sodium chloride 0.9% flush 10 mL     Facility-Administered Medications Ordered in Other Encounters   Medication    denosumab (PROLIA) injection 60 mg       LABS    Recent Results (from the past 24 hour(s))   Phosphorus    Collection Time: 10/30/23  3:39 AM   Result Value Ref Range    Phosphorus 2.8 2.7 - 4.5 mg/dL   Magnesium    Collection Time: 10/30/23  3:39 AM   Result Value Ref Range    Magnesium 1.9 1.6 - 2.6 mg/dL   Comprehensive Metabolic Panel    Collection Time: 10/30/23  3:39 AM   Result Value Ref Range    Sodium 128 (L) 136 - 145 mmol/L    Potassium 4.3 3.5 - 5.1  mmol/L    Chloride 88 (L) 95 - 110 mmol/L    CO2 28 23 - 29 mmol/L    Glucose 106 70 - 110 mg/dL    BUN 35 (H) 8 - 23 mg/dL    Creatinine 1.6 (H) 0.5 - 1.4 mg/dL    Calcium 8.6 (L) 8.7 - 10.5 mg/dL    Total Protein 6.1 6.0 - 8.4 g/dL    Albumin 2.8 (L) 3.5 - 5.2 g/dL    Total Bilirubin 1.0 0.1 - 1.0 mg/dL    Alkaline Phosphatase 123 55 - 135 U/L    AST 16 10 - 40 U/L    ALT 96 (H) 10 - 44 U/L    eGFR 32 (A) >60 mL/min/1.73 m^2    Anion Gap 12 8 - 16 mmol/L   CBC Auto Differential    Collection Time: 10/30/23  3:39 AM   Result Value Ref Range    WBC 8.07 3.90 - 12.70 K/uL    RBC 4.64 4.00 - 5.40 M/uL    Hemoglobin 14.0 12.0 - 16.0 g/dL    Hematocrit 43.3 37.0 - 48.5 %    MCV 93 82 - 98 fL    MCH 30.2 27.0 - 31.0 pg    MCHC 32.3 32.0 - 36.0 g/dL    RDW 16.3 (H) 11.5 - 14.5 %    Platelets 149 (L) 150 - 450 K/uL    MPV 12.1 9.2 - 12.9 fL    Immature Granulocytes 0.4 0.0 - 0.5 %    Gran # (ANC) 6.1 1.8 - 7.7 K/uL    Immature Grans (Abs) 0.03 0.00 - 0.04 K/uL    Lymph # 1.2 1.0 - 4.8 K/uL    Mono # 0.7 0.3 - 1.0 K/uL    Eos # 0.1 0.0 - 0.5 K/uL    Baso # 0.02 0.00 - 0.20 K/uL    nRBC 0 0 /100 WBC    Gran % 75.5 (H) 38.0 - 73.0 %    Lymph % 14.7 (L) 18.0 - 48.0 %    Mono % 8.2 4.0 - 15.0 %    Eosinophil % 1.0 0.0 - 8.0 %    Basophil % 0.2 0.0 - 1.9 %    Differential Method Automated    ]    I/O last 3 completed shifts:  In: 460 [P.O.:460]  Out: 1000 [Urine:1000]    Vitals:    10/29/23 2352 10/30/23 0413 10/30/23 0705 10/30/23 1036   BP: 129/87 112/89 110/73 106/68   Pulse: 108 82 98 (!) 58   Resp: 18 18 17 17   Temp: 97.4 °F (36.3 °C) 96.4 °F (35.8 °C) 97.7 °F (36.5 °C) 97.6 °F (36.4 °C)   TempSrc: Oral Axillary Oral Oral   SpO2: 97% 97% 98% 97%   Weight:       Height:           No Jvd, Thyromegaly or Lymphadenopathy  Lungs: Fairly clear anteriorly and laterally  Cor: RRR no G or rubs  Abd: Soft benign good bowel sounds non tender  Ext: No E C C    A)    CKD3-4   Hyponatremia 128   Dm  Pleural effusion   MGUS (no anemia  and normal ca)   CHF (d)  Hypok corrected   Hypoalb     P)    Renal Diet miold fluid rest   Home meds  Adjust all meds to the degree of renal fx  Close follow up I/O and weights  Maintain Hydration

## 2023-10-30 NOTE — PT/OT/SLP PROGRESS
"Occupational Therapy   Treatment    Name: Barbara Rizo  MRN: [id number]  Admitting Diagnosis:  Acute on chronic diastolic heart failure       Recommendations:     Discharge Recommendations: Low Intensity Therapy (w/ supervision)  Discharge Equipment Recommendations:  bedside commode  Barriers to discharge:   (The patient will require occasional assist with self care and home management.)    Assessment:     Barbara Rizo is a 83 y.o. female with a medical diagnosis of Acute on chronic diastolic heart failure.   Performance deficits affecting function are weakness, impaired endurance, impaired self care skills, impaired functional mobility, gait instability, impaired balance, decreased upper extremity function, decreased safety awareness, impaired cardiopulmonary response to activity.     Rehab Prognosis:  Good and Fair; patient would benefit from acute skilled OT services to address these deficits and reach maximum level of function.       Plan:     Patient to be seen  (3-5x/week) to address the above listed problems via self-care/home management, therapeutic exercises, therapeutic activities  Plan of Care Expires: 11/10/23  Plan of Care Reviewed with: patient    Subjective     Chief Complaint: "I already walked today"  Patient/Family Comments/goals: agreeable to amb to the toilet and sink  Pain/Comfort:  Pain Rating 1: 0/10    Objective:     Communicated with: nurseLou prior to session.  Patient found up in chair with peripheral IV upon OT entry to room.    General Precautions: Standard, respiratory, fall, hearing impaired, aspiration    Orthopedic Precautions:N/A  Braces: N/A  Respiratory Status: Nasal cannula, flow 3 L/min     Occupational Performance:     Bed Mobility:    N/T     Functional Mobility/Transfers:  Patient completed Sit <> Stand Transfer with contact guard assistance  with  rolling walker   Patient completed Toilet Transfer Step Transfer technique with contact guard assistance with  " rolling walker  Functional Mobility: The patient amb using a RW to the toilet with CGA and VC to sit safely and dependent for O2 line management. The patient stood from the toilet with CGA and amb using a RW to the sink with VC for RW placement at the sink. The patient tolerated standing at the sink for grooming tasks and then amb back to the chair with CGA and VC, dependent for O2 line management.   SpO2 was monitored but was difficult to read 2* cold fingers. O2 sats were 93%, HR 93 and was unable to read after amb. The patient did not appear or C/O SOB.    Activities of Daily Living:  Grooming: stand by assistance to brush her teeth and wash her hands and face.   Lower Body Dressing: Dependent to mange her diaper during toileting    Toileting: dependence to wipe while seated on the toilet      [facility] 6 Click ADL: 21    Treatment & Education:  Performed self care and functional mobility as noted above  Discussed DME needed for home. The patient reports having all DME for home use      Patient left up in chair with all lines intact, call button in reach, and daughter present    GOALS:   Multidisciplinary Problems       Occupational Therapy Goals          Problem: Occupational Therapy    Goal Priority Disciplines Outcome Interventions   Occupational Therapy Goal     OT, PT/OT Ongoing, Progressing    Description: Goals to be met by: 11/10/23     Patient will increase functional independence with ADLs by performing:    LE Dressing with Modified [place].  Grooming while standing with Modified [place].  Toileting from toilet with Modified [place] for hygiene and clothing management.   Step transfer with Modified [place]  Toilet transfer to toilet with Modified [place].  Upper extremity exercise program x15 reps per handout, with independence.                         Time Tracking:     OT Date of Treatment: 10/30/23  OT Start Time: 1028  OT Stop Time: 1052  OT Total Time (min): 24 min    Billable  Minutes:Self Care/Home Management 16  Therapeutic Activity 8  Total Time 24    OT/NIKO: OT          10/30/2023

## 2023-10-30 NOTE — SUBJECTIVE & OBJECTIVE
Interval History:  No acute overnight events.  Patient afebrile.  Patient feeling okay this morning. She did not eat much dinner.  Not eating much. States she does not have much of an appetitie. States it is the hospital food and she will eat more when she gets home.     Review of Systems   Constitutional:  Positive for activity change, appetite change (poor appetite) and fatigue. Negative for chills and fever.   Respiratory:  Positive for shortness of breath (at baseline). Negative for cough and wheezing.    Cardiovascular:  Negative for chest pain, palpitations and leg swelling.   Gastrointestinal:  Negative for abdominal distention, abdominal pain, diarrhea, nausea and vomiting.   Genitourinary:  Negative for difficulty urinating and dysuria.   Neurological:  Positive for weakness. Negative for dizziness and headaches.   Psychiatric/Behavioral:  Positive for decreased concentration (improving). Negative for confusion (none on exam). The patient is not nervous/anxious (not anxious this morning).      Objective:     Vital Signs (Most Recent):  Temp: 97.6 °F (36.4 °C) (10/30/23 1036)  Pulse: (!) 58 (10/30/23 1036)  Resp: 17 (10/30/23 1036)  BP: 106/68 (10/30/23 1036)  SpO2: 97 % (10/30/23 1036) Vital Signs (24h Range):  Temp:  [96.4 °F (35.8 °C)-97.7 °F (36.5 °C)] 97.6 °F (36.4 °C)  Pulse:  [] 58  Resp:  [16-18] 17  SpO2:  [93 %-98 %] 97 %  BP: (106-129)/(60-89) 106/68     Weight: 65 kg (143 lb 4.8 oz)  Body mass index is 26.21 kg/m².    Intake/Output Summary (Last 24 hours) at 10/30/2023 1443  Last data filed at 10/30/2023 1322  Gross per 24 hour   Intake 620 ml   Output 850 ml   Net -230 ml           Physical Exam  Vitals and nursing note reviewed.   Constitutional:       General: She is not in acute distress.     Appearance: She is ill-appearing (chronic).   HENT:      Mouth/Throat:      Mouth: Mucous membranes are moist.   Eyes:      Extraocular Movements: Extraocular movements intact.   Cardiovascular:       Rate and Rhythm: Normal rate and regular rhythm.      Heart sounds: No murmur heard.     No gallop.   Pulmonary:      Effort: Pulmonary effort is normal. No respiratory distress.      Breath sounds: Examination of the left-lower field reveals decreased breath sounds. Decreased breath sounds present. No wheezing.      Comments: On 3L NC, decreased breath sounds in left lower lobe improved.   Abdominal:      General: There is no distension.      Palpations: Abdomen is soft.      Tenderness: There is no abdominal tenderness.   Musculoskeletal:         General: No swelling or tenderness.      Right lower leg: No edema.      Left lower leg: No edema.      Comments: Bilateral lower extremity without edema this AM   Skin:     General: Skin is warm and dry.   Neurological:      General: No focal deficit present.      Mental Status: She is alert and oriented to person, place, and time.             Significant Labs: All pertinent labs within the past 24 hours have been reviewed.    Significant Imaging: I have reviewed all pertinent imaging results/findings within the past 24 hours.

## 2023-10-30 NOTE — TELEPHONE ENCOUNTER
"----- Message from Mae Mccormick sent at 10/30/2023  8:34 AM CDT -----  Regarding: FW: Endo scheduling    ----- Message -----  From: Idalia Negrete NP  Sent: 10/27/2023   2:25 PM CDT  To: [facility]  Subject: Endo scheduling                                  Procedure: EGD    Diagnosis: Dysphagia    Procedure Timin-12 weeks    #If within 4 weeks selected, please cricket as high priority#    #If greater than 12 weeks, please select "4-12 weeks" and delay sending until 2 months prior to requested date#     Provider: Any endoscopist    Location: [facility]    Additional Scheduling Information: Blood thinners and Advanced cardiac or pulmonary disease    Prep Specifications:N/A    Have you attached a patient to this message: Yes        "

## 2023-10-30 NOTE — PROGRESS NOTES
Date of Admission:10/26/2023    SUBJECTIVE: notes about the same    Current Facility-Administered Medications   Medication    acetaminophen tablet 650 mg    apixaban tablet 2.5 mg    droNABinol capsule 2.5 mg    glucagon (human recombinant) injection 1 mg    glucose chewable tablet 16 g    glucose chewable tablet 24 g    melatonin tablet 6 mg    metoprolol tartrate (LOPRESSOR) tablet 25 mg    naloxone 0.4 mg/mL injection 0.02 mg    polyethylene glycol packet 17 g    sodium chloride 0.9% flush 10 mL     Facility-Administered Medications Ordered in Other Encounters   Medication    denosumab (PROLIA) injection 60 mg       Wt Readings from Last 3 Encounters:   10/27/23 65 kg (143 lb 4.8 oz)   10/04/23 69.5 kg (153 lb 3.5 oz)   09/25/23 72.1 kg (158 lb 15.2 oz)     Temp Readings from Last 3 Encounters:   10/29/23 97.5 °F (36.4 °C) (Oral)   10/26/23 97.5 °F (36.4 °C) (Oral)   10/24/23 97.9 °F (36.6 °C)     BP Readings from Last 3 Encounters:   10/29/23 110/60   10/26/23 120/60   10/24/23 120/60     Pulse Readings from Last 3 Encounters:   10/29/23 98   10/26/23 82   10/24/23 (!) 126       Intake/Output Summary (Last 24 hours) at 10/29/2023 2135  Last data filed at 10/29/2023 1801  Gross per 24 hour   Intake 300 ml   Output 400 ml   Net -100 ml         PE:  GEN:wd female in nad  HEENT:ncat,eomi,mm  CVS:s1s2 regular  PULM:ctab anteriorly  ABD:bs,soft  EXT:no leg edema  NEURO:awake,alert    Recent Labs   Lab 10/29/23  0411      *   K 4.3   CL 90*   CO2 28   BUN 36*   CREATININE 1.6*   CALCIUM 8.3*   MG 1.7         Lab Results   Component Value Date    CALCIUM 8.3 (L) 10/29/2023    CAION 0.95 (L) 02/03/2019    PHOS 3.1 10/29/2023       Recent Labs   Lab 10/29/23  0411   WBC 7.13   RBC 4.45   HGB 13.1   HCT 41.7   *   MCV 94   MCH 29.4   MCHC 31.4*             A/P:  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 Stable. Cont tx.  2.hyponatremia. better. Will go up and down.  3.poor appetite. Cont tx.  4.dm2. following  sugars.  5.pleural effusion. Thoracentesis done. Notes breathing ok.  6.mgus. stable.  7.acute on chronic dhf. Volume ok. Will need some diuretics.  8.weakness. consider snf.  9.hypokalemia. better.

## 2023-10-30 NOTE — ASSESSMENT & PLAN NOTE
· Do not see heart failure on her history  · chronic HFpEF added as this was noted previously on ECHO (not new this admission)  · BNP approaching 3000 with elevated JVD on exam  · Patient was recently switch to Bumex, perhaps mildly underdosed leading to increasing volume and hypervolemic hyponatremia over the last couple weeks.    · She takes 1 mg Qhnloqy-Xiqjgnpi-Wbkpqfij-Sunday; and takes 0.5 mg MWF.  · May need to increase to 1 mg daily on discharge, please assess at that point  · Cardiology consulted

## 2023-10-30 NOTE — PLAN OF CARE
[place] sent a secure chat to med surg nurse Lou that this patient is clear for discharge from case management's viewpoint.  Resumed [facility], [place] faxed referral to [facility] .  Bed side commode referral did not meet medicare guidelines.  Patient's daughter was informed of decline.   10/30/23 7186   Final Note   Assessment Type Final Discharge Note   Anticipated Discharge Disposition Home-Health   What phone number can be called within the next 1-3 days to see how you are doing after discharge?   (see chart)   Hospital Resources/Appts/Education Provided Provided patient/caregiver with written discharge plan information   Post-Acute Status   Post-Acute Authorization Home Health   Home Health Status Pending Payor Review  (PHN)   Patient choice form signed by patient/caregiver List with quality metrics by geographic area provided   Discharge Delays None known at this time

## 2023-10-30 NOTE — PROGRESS NOTES
Food & Nutrition  Education    Diet Education: Low Salt Diet - Fluid Restriction    Learners: Barbara Rizo      Nutrition Education provided with handouts: Low Salt/ Fluid Restrictive Diet      Comments: RD consult for education fluid restriction low salt diet. Pt educated. Educations attached to pt chart and left in patient room.        Thanks!     Estefani Reaves, Registration Eligible, Provisional LDN

## 2023-10-30 NOTE — NURSING
AVS virtually reviewed with patient's daughter  in its entirety with emphasis on medications, follow-up appointments and reasons to return to the ED or contact the [facility] On Call Nurse Care Line. Patient also encouraged to utilize their patient portal. Ease and convenience of use reiterated. Education complete and patient voiced understanding. All questions answered. Discharge teaching complete.

## 2023-10-30 NOTE — NURSING
Pt has been discharged, Pt received discharge instructions, pt verbalized understanding of discharge instructions. Pt AAOx4, respirations even and unlabored, no acute distress, no complaints of pain. Safety precautions in place, Transport brought pt to the pharmacy to get meds. case management cleared patient for discharge. IV has been taken out. Pt daughter at the bedside. Pt on 4L which is what she wear at home.

## 2023-10-30 NOTE — TELEPHONE ENCOUNTER
That is probably better evaluated with GI not ENT.  Will submit for GI referral   Statement Selected

## 2023-10-30 NOTE — PLAN OF CARE
[facility]- [phone] fx [phone] will resume care.  Faxed face sheet , discharge orders and hnp.

## 2023-10-30 NOTE — DISCHARGE SUMMARY
[facility] Medicine  Discharge Summary      Patient Name: Barbara Rizo  MRN: [id number]  [facility], [place]: [id number]  Patient Class: IP- Inpatient  Admission Date: 10/26/2023  Hospital Length of Stay: 4 days  Discharge Date and Time:  10/30/2023 2:52 PM  Attending Physician: Grayson Alamo DO   Discharging Provider: Grayson Alamo DO  Primary Care Provider: Paras Oro MD    Primary Care Team: Networked reference to record PCT     HPI:     Barbara Rizo is a 83 y.o. female who has a past medical history of A-fib, Acute respiratory failure with hypoxia, Breast cancer, CKD, Community acquired pneumonia, Diabetes mellitus type II, Fatty liver, GERD, Hearing loss of both ears, Hemochromatosis, History of anemia due to CKD, History of pleural effusion, Hyperlipidemia, Hypertension, Macular degeneration, On home oxygen therapy, Osteoarthritis, Osteoporosis, Vaginal delivery, Vitamin D deficiency disease, and Wears partial dentures, presented to the ED with CC of Confusion.    Patient was sent in by PCP/home health labs hyponatremia and elevated creatinine of 2.2.  Unclear if point of care hyponatremia was correct at BNP taken at same time (noon) was 127 and repeat BNP showed sodium again at 127 four hours later-(POC electrolytes are often wrong).  Daughter states that patient has been intermittently confused for the past week or so, has not necessarily gotten worse but has been persistent.  She is oriented, A/O x3, however will often argue with daughter about things that are apparently wrong.  Denies hallucinations.  Has had a couple falls recently without head injury, has a bruise on her right thigh, without hip pain.  In the ED her liver enzymes were elevated,  and -which were normal on the last set of labs, and for many years normal.  Patient does have hemochromatosis and fair and ferritin is elevated at 800, ultrasound of liver with Doppler ordered.  Also patient has elevated  BNP at 2876 and elevated JVD, echo ordered.  She has a fairly large left pleural effusion as well, however it is stable from previous.  She is on 4 L of home oxygen.  Denies chest pain and denies shortness of breath beyond her normal dyspnea.        * No surgery found *      Hospital Course:   83 y.o. female with extensive co morbidities admitted on 10/26/23 for abnormal lab findings, sent to ED by PCP. Daughter noted that patient has been having intermittent confusion. Outpatient labs showed hyponatremia, transaminitis, and elevated creatinine of 2.2.  Patient also found to have elevated BNP and troponin in the ED. Started on CHF pathway. ferritin elevated at 804,previously in the 400s. CXR with unchanged appearance of a loculated left-sided pleural effusion. Pt with afib RVR and required  IV metoprolol with improvement. QTc prolonged on EKG, home amiodarone held. Cardiology consulted-recommends to stop amiodarone and attempt rate control with metoprolol. Resume eliquis after thoracentesis. Consider repeat CV if A-flutter not controlled. IR consulted- pt s/p thoracentesis with removal of 1.1L, cultures sent. Pleural fluid studies with no growth so far. Cytology pending. Given history of hemochromatosis, hematology consulted-recommend continued outpatient follow up. Nephrology, palliative and GI consulted as well. Atrial flutter rate controlled with metorpolol. Hyponatremia present but fluctuates. Pt does not have much of an appetite. Patient and family requested SNF/rehab. PT/OT consulted and recommends home health. Pt very capable of home health. Daughter states that when the therapist is off, the patient does not continue it alone. She only works when the therapist is present.     Patient states she feel like she is at her baseline. Does not have much of an appetite. Her shortness of breath is at baseline. Pt denies any fever, headaches, vision changes, chest pain, palpitations, abdominal pain, nausea, vomiting,  or any new weaknesses. Feels ready to go home. Patient's exam on discharge was as follow: Patient is alert and oriented, appears in no acute distress, heart with regular rate and irregular rhythm, lungs at her baseline, diminished, on 3L NC, abdomen soft, and no new weaknesses or focal deficits seen. Bilateral lower extremities without any edema or calf tenderness.     Patient was counseled regarding any abnormal labs, differential diagnosis, treatment options, risk-benefit, lifestyle changes, prognosis, current condition, and medications. Patient was interactive and attentive.  Patient's questions were answered in a respectful and timely manner. Patient was instructed to follow-up with PCP within 1 week and to continue taking medications as prescribed.  Instructed to also follow up with hematologist, cardiologist, and nephrologist. Also, extensively discussed the risks, benefits, and side effects of patient's medications. Discussed with patient about any medication changes. Patient verbalized understanding and agrees to treatment plan.  Patient is stable for discharge.  Patient has no other questions or concerns at this time.  ED precautions discussed with the patient.    Vital signs are stable. Ambulating without any difficulty. Tolerating p.o. intake without any nausea or vomiting. Afebrile for over 24 hours. Patient is in stable condition and has no questions or concerns. Patient will be discharge to home with home health once transportation secured . Prescriptions sent to pharmacy.  CM/SW to assist with discharge planning.     Vitals:    10/29/23 2352 10/30/23 0413 10/30/23 0705 10/30/23 1036   BP: 129/87 112/89 110/73 106/68   BP Location: Left arm Left arm     Patient Position: Lying Lying Lying Sitting   Pulse: 108 82 98 (!) 58   Resp: 18 18 17 17   Temp: 97.4 °F (36.3 °C) 96.4 °F (35.8 °C) 97.7 °F (36.5 °C) 97.6 °F (36.4 °C)   TempSrc: Oral Axillary Oral Oral   SpO2: 97% 97% 98% 97%   Weight:       Height:                 Goals of Care Treatment Preferences:  Code Status: DNR       LaPOST: Yes  What is most important right now is to focus on spending time at home, avoiding the hospital, remaining as independent as possible, symptom/pain control, quality of life, even if it means sacrificing a little time.  Accordingly, we have decided that the best plan to meet the patient's goals includes continuing with treatment.      Consults:   Consults (From admission, onward)        Status Ordering Provider     Inpatient consult to Spiritual Care  Once        Provider:  (Not yet assigned)    Acknowledged TERRY DONALDSON     Inpatient consult to Palliative Care  Once        Provider:  Terry Donaldson, NP    Completed BETY BOYLE     Inpatient consult to Nephrology  Once        Provider:  Bety Boyle MD    Acknowledged TESSA FINE-MY T.     Inpatient consult to Cardiology  Once        Provider:  Amauri Lomas MD    Completed TESSA FINE-MY T.     Inpatient consult to Social Work/Case Management  Once        Provider:  (Not yet assigned)    Completed TESSA FINE-MY T.     Inpatient consult to Registered Dietitian/Nutritionist  Once        Provider:  (Not yet assigned)    Completed TESSA FINE-MY T.     Inpatient consult to Gastroenterology  Once        Provider:  Casi Michaud MD    Completed TESSA FINE-MY T.     Inpatient consult to Interventional Radiology  Once        Provider:  Julian Rahman MD    Completed CASSI LEON     Inpatient consult to Hematology/Oncology - [facility]  Once        Provider:  Mukesh Madison MD    Completed CASSI LEON          No new Assessment & Plan notes have been filed under this hospital service since the last note was generated.  Service: Hospital Medicine    Final Active Diagnoses:    Diagnosis Date Noted POA    PRINCIPAL PROBLEM:  Acute on chronic diastolic heart failure [I50.33] 10/26/2023 Yes    Atrial fibrillation with RVR [I48.91] 09/14/2023 Yes    Paroxysmal atrial  "fibrillation [I48.0] 11/27/2018 Yes     Chronic    Chronic heart failure with preserved ejection fraction [I50.32] 10/26/2023 Yes    Pulmonary hypertension [I27.20] 09/28/2020 Yes    Pleural effusion on left [J90] 09/14/2023 Yes    QT prolongation [R94.31] 08/27/2023 Yes    Hyponatremia [E87.1] 09/27/2020 Yes    Intermittent confusion [R41.0] 10/26/2023 Yes    MGUS (monoclonal gammopathy of unknown significance) [D47.2] 10/18/2018 Yes     Chronic    History of Hemochromatosis [E83.119]  Yes    Type 2 diabetes mellitus with stage 4 chronic kidney disease, without long-term current use of insulin [E11.22, N18.4] 01/25/2021 Yes    CKD (chronic kidney disease), stage IV [N18.4] 01/25/2021 Yes    Carcinoma of breast [C50.919] 08/02/2021 Yes    OAB (overactive bladder) [N32.81] 09/22/2016 Yes    Chronic gout [M1A.9XX0] 01/28/2019 Yes     Chronic    Physical deconditioning [R53.81] 10/16/2015 Yes    ACP (advance care planning) [Z71.89] 04/24/2021 Not Applicable      Problems Resolved During this Admission:    Diagnosis Date Noted Date Resolved POA    Chest pain on breathing [R07.1] 10/27/2023 10/27/2023 Yes    WILKINSON (dyspnea on exertion) [R06.09] 10/27/2023 10/27/2023 Yes       Discharged Condition: stable    Disposition: Home or Self Care    Follow Up:   Follow-up Information     Paras Oro MD Follow up in 1 week(s).    Specialties: Family Medicine, Wound Care  Contact information:  [address]  [place] [address] [phone]             Hematologist/oncologist Follow up in 1 week(s).           [facility] Follow up on 10/31/2023.    Why: Home Health  Contact information:  [address]  [place] [address] [phone]                       Patient Instructions:      COMMODE FOR HOME USE     Order Specific Question Answer Comments   Type: Standard    Height: 5' 2" (1.575 m)    Weight: 65 kg (143 lb 4.8 oz)    Does patient have medical equipment at home? cane, straight transport " W/C / transport W/C / transport W/C / transport W/C   Does patient have medical equipment at home? walker, rolling    Does patient have medical equipment at home? bath bench    Does patient have medical equipment at home? oxygen    Length of need (1-99 months): 99      Notify your health care provider if you experience any of the following:  temperature >100.4     Notify your health care provider if you experience any of the following:  persistent nausea and vomiting or diarrhea     Notify your health care provider if you experience any of the following:  difficulty breathing or increased cough     Notify your health care provider if you experience any of the following:  increased confusion or weakness     Activity as tolerated       Significant Diagnostic Studies: Labs: All labs within the past 24 hours have been reviewed       Recent Results (from the past 100 hour(s))   Urinalysis, Reflex to Urine Culture Urine, Clean Catch    Collection Time: 10/26/23 12:09 PM    Specimen: Urine   Result Value Ref Range    Specimen UA Urine, Clean Catch     Color, UA Yellow Yellow, Straw, Natasha    Appearance, UA Clear Clear    pH, UA 5.0 5.0 - 8.0    Specific Gravity, UA 1.010 1.005 - 1.030    Protein, UA Negative Negative    Glucose, UA Negative Negative    Ketones, UA Negative Negative    Bilirubin (UA) Negative Negative    Occult Blood UA Negative Negative    Nitrite, UA Negative Negative    Leukocytes, UA Negative Negative   COMPREHENSIVE METABOLIC PANEL    Collection Time: 10/26/23 12:30 PM   Result Value Ref Range    Sodium 127 (L) 136 - 145 mmol/L    Potassium 4.1 3.5 - 5.1 mmol/L    Chloride 84 (L) 95 - 110 mmol/L    CO2 32 (H) 23 - 29 mmol/L    Glucose 106 70 - 110 mg/dL    BUN 47 (H) 8 - 23 mg/dL    Creatinine 2.2 (H) 0.5 - 1.4 mg/dL    Calcium 9.0 8.7 - 10.5 mg/dL    Total Protein 6.6 6.0 - 8.4 g/dL    Albumin 3.3 (L) 3.5 - 5.2 g/dL    Total Bilirubin 1.0 0.1 - 1.0 mg/dL    Alkaline Phosphatase 230 (H) 55 - 135 U/L      (H) 10 - 40 U/L     (H) 10 - 44 U/L    eGFR 22 (A) >60 mL/min/1.73 m^2    Anion Gap 11 8 - 16 mmol/L   CBC W/ AUTO DIFFERENTIAL    Collection Time: 10/26/23 12:30 PM   Result Value Ref Range    WBC 8.14 3.90 - 12.70 K/uL    RBC 4.54 4.00 - 5.40 M/uL    Hemoglobin 13.7 12.0 - 16.0 g/dL    Hematocrit 41.5 37.0 - 48.5 %    MCV 91 82 - 98 fL    MCH 30.2 27.0 - 31.0 pg    MCHC 33.0 32.0 - 36.0 g/dL    RDW 16.2 (H) 11.5 - 14.5 %    Platelets 194 150 - 450 K/uL    MPV 11.4 9.2 - 12.9 fL    Immature Granulocytes 0.4 0.0 - 0.5 %    Gran # (ANC) 6.7 1.8 - 7.7 K/uL    Immature Grans (Abs) 0.03 0.00 - 0.04 K/uL    Lymph # 0.9 (L) 1.0 - 4.8 K/uL    Mono # 0.5 0.3 - 1.0 K/uL    Eos # 0.0 0.0 - 0.5 K/uL    Baso # 0.02 0.00 - 0.20 K/uL    nRBC 0 0 /100 WBC    Gran % 81.7 (H) 38.0 - 73.0 %    Lymph % 11.2 (L) 18.0 - 48.0 %    Mono % 6.5 4.0 - 15.0 %    Eosinophil % 0.0 0.0 - 8.0 %    Basophil % 0.2 0.0 - 1.9 %    Differential Method Automated    Magnesium    Collection Time: 10/26/23 12:30 PM   Result Value Ref Range    Magnesium 1.8 1.6 - 2.6 mg/dL   PHOSPHORUS    Collection Time: 10/26/23 12:30 PM   Result Value Ref Range    Phosphorus 2.8 2.7 - 4.5 mg/dL   POCT glucose    Collection Time: 10/26/23  4:11 PM   Result Value Ref Range    POCT Glucose 127 (H) 70 - 110 mg/dL   CBC W/ AUTO DIFFERENTIAL    Collection Time: 10/26/23  4:12 PM   Result Value Ref Range    WBC 7.85 3.90 - 12.70 K/uL    RBC 4.73 4.00 - 5.40 M/uL    Hemoglobin 14.2 12.0 - 16.0 g/dL    Hematocrit 41.9 37.0 - 48.5 %    MCV 89 82 - 98 fL    MCH 30.0 27.0 - 31.0 pg    MCHC 33.9 32.0 - 36.0 g/dL    RDW 16.0 (H) 11.5 - 14.5 %    Platelets 169 150 - 450 K/uL    MPV 10.1 9.2 - 12.9 fL    Immature Granulocytes 0.3 0.0 - 0.5 %    Gran # (ANC) 6.3 1.8 - 7.7 K/uL    Immature Grans (Abs) 0.02 0.00 - 0.04 K/uL    Lymph # 1.0 1.0 - 4.8 K/uL    Mono # 0.5 0.3 - 1.0 K/uL    Eos # 0.0 0.0 - 0.5 K/uL    Baso # 0.02 0.00 - 0.20 K/uL    nRBC 0 0 /100 WBC    Gran %  80.4 (H) 38.0 - 73.0 %    Lymph % 12.7 (L) 18.0 - 48.0 %    Mono % 6.2 4.0 - 15.0 %    Eosinophil % 0.1 0.0 - 8.0 %    Basophil % 0.3 0.0 - 1.9 %    Differential Method Automated    Comp. Metabolic Panel    Collection Time: 10/26/23  4:12 PM   Result Value Ref Range    Sodium 127 (L) 136 - 145 mmol/L    Potassium 4.6 3.5 - 5.1 mmol/L    Chloride 85 (L) 95 - 110 mmol/L    CO2 29 23 - 29 mmol/L    Glucose 118 (H) 70 - 110 mg/dL    BUN 47 (H) 8 - 23 mg/dL    Creatinine 2.1 (H) 0.5 - 1.4 mg/dL    Calcium 9.6 8.7 - 10.5 mg/dL    Total Protein 7.3 6.0 - 8.4 g/dL    Albumin 3.4 (L) 3.5 - 5.2 g/dL    Total Bilirubin 1.0 0.1 - 1.0 mg/dL    Alkaline Phosphatase 224 (H) 55 - 135 U/L     (H) 10 - 40 U/L     (H) 10 - 44 U/L    eGFR 23 (A) >60 mL/min/1.73 m^2    Anion Gap 13 8 - 16 mmol/L   Troponin I    Collection Time: 10/26/23  4:12 PM   Result Value Ref Range    Troponin I 0.059 (H) 0.000 - 0.026 ng/mL   Brain natriuretic peptide    Collection Time: 10/26/23  4:12 PM   Result Value Ref Range    BNP 2,876 (H) 0 - 99 pg/mL   Magnesium    Collection Time: 10/26/23  4:12 PM   Result Value Ref Range    Magnesium 1.8 1.6 - 2.6 mg/dL   Phosphorus    Collection Time: 10/26/23  4:12 PM   Result Value Ref Range    Phosphorus 2.9 2.7 - 4.5 mg/dL   TSH    Collection Time: 10/26/23  4:12 PM   Result Value Ref Range    TSH 2.684 0.400 - 4.000 uIU/mL   ISTAT PROCEDURE    Collection Time: 10/26/23  4:13 PM   Result Value Ref Range    POC Glucose 123 (H) 70 - 110 mg/dL    POC BUN 64 (H) 6 - 30 mg/dL    POC Creatinine 2.1 (H) 0.5 - 1.4 mg/dL    POC Sodium 121 (L) 136 - 145 mmol/L    POC Potassium 5.1 3.5 - 5.1 mmol/L    POC Chloride 83 (L) 95 - 110 mmol/L    POC TCO2 (MEASURED) 36 (H) 23 - 29 mmol/L    POC Anion Gap 8 8 - 16 mmol/L    POC Ionized Calcium 1.13 1.06 - 1.42 mmol/L    POC Hematocrit 47 36 - 54 %PCV    Sample VENOUS     Site Other     Allens Test N/A    Ammonia    Collection Time: 10/26/23  6:02 PM   Result Value Ref  Range    Ammonia 39 10 - 50 umol/L   Lactic acid, plasma    Collection Time: 10/26/23  6:02 PM   Result Value Ref Range    Lactate (Lactic Acid) 1.0 0.5 - 2.2 mmol/L   Urinalysis, Reflex to Urine Culture Urine, Clean Catch    Collection Time: 10/26/23  6:13 PM    Specimen: Urine   Result Value Ref Range    Specimen UA Urine, Clean Catch     Color, UA Yellow Yellow, Straw, Natasha    Appearance, UA Clear Clear    pH, UA 6.0 5.0 - 8.0    Specific Gravity, UA 1.005 1.005 - 1.030    Protein, UA Negative Negative    Glucose, UA Negative Negative    Ketones, UA Negative Negative    Bilirubin (UA) Negative Negative    Occult Blood UA Negative Negative    Nitrite, UA Negative Negative    Urobilinogen, UA Negative <2.0 EU/dL    Leukocytes, UA Negative Negative   Chloride, Random Urine    Collection Time: 10/26/23  6:13 PM   Result Value Ref Range    Chloride, Urine 32 25 - 200 mmol/L   Potassium, Random Urine    Collection Time: 10/26/23  6:13 PM   Result Value Ref Range    Potassium, Urine 18 15 - 95 mmol/L   Urea Nitrogen, Random Urine    Collection Time: 10/26/23  6:13 PM   Result Value Ref Range    Urine Urea Nitrogen 248 140 - 1050 mg/dL   Osmolality, Urine    Collection Time: 10/26/23  6:13 PM   Result Value Ref Range    Osmolality, Urine 191 50 - 1200 mOsm/kg   Sodium, Random Urine    Collection Time: 10/26/23  6:13 PM   Result Value Ref Range    Sodium, Urine 27 20 - 250 mmol/L   Osmolality, Serum    Collection Time: 10/26/23  6:13 PM   Result Value Ref Range    Osmolality 288 275 - 295 mOsm/kg   Comprehensive metabolic panel    Collection Time: 10/26/23  7:20 PM   Result Value Ref Range    Sodium 129 (L) 136 - 145 mmol/L    Potassium 4.1 3.5 - 5.1 mmol/L    Chloride 89 (L) 95 - 110 mmol/L    CO2 28 23 - 29 mmol/L    Glucose 103 70 - 110 mg/dL    BUN 46 (H) 8 - 23 mg/dL    Creatinine 2.0 (H) 0.5 - 1.4 mg/dL    Calcium 9.0 8.7 - 10.5 mg/dL    Total Protein 6.6 6.0 - 8.4 g/dL    Albumin 3.0 (L) 3.5 - 5.2 g/dL    Total  Bilirubin 0.9 0.1 - 1.0 mg/dL    Alkaline Phosphatase 198 (H) 55 - 135 U/L    AST 93 (H) 10 - 40 U/L     (H) 10 - 44 U/L    eGFR 24 (A) >60 mL/min/1.73 m^2    Anion Gap 12 8 - 16 mmol/L   Iron and TIBC    Collection Time: 10/26/23  7:20 PM   Result Value Ref Range    Iron 78 30 - 160 ug/dL    Transferrin 155 (L) 200 - 375 mg/dL    TIBC 229 (L) 250 - 450 ug/dL    Saturated Iron 34 20 - 50 %   Ferritin    Collection Time: 10/26/23  7:31 PM   Result Value Ref Range    Ferritin 804 (H) 20.0 - 300.0 ng/mL   Protime-INR    Collection Time: 10/26/23  7:45 PM   Result Value Ref Range    Prothrombin Time 14.8 (H) 9.0 - 12.5 sec    INR 1.4 (H) 0.8 - 1.2   Hepatitis panel, acute    Collection Time: 10/26/23 11:58 PM   Result Value Ref Range    Hepatitis B Surface Ag Non-reactive Non-reactive    Hep B C IgM Non-reactive Non-reactive    Hep A IgM Non-reactive Non-reactive    Hepatitis C Ab Non-reactive Non-reactive   Phosphorus    Collection Time: 10/27/23  3:47 AM   Result Value Ref Range    Phosphorus 3.3 2.7 - 4.5 mg/dL   Magnesium    Collection Time: 10/27/23  3:47 AM   Result Value Ref Range    Magnesium 1.6 1.6 - 2.6 mg/dL   Comprehensive Metabolic Panel    Collection Time: 10/27/23  3:47 AM   Result Value Ref Range    Sodium 129 (L) 136 - 145 mmol/L    Potassium 3.6 3.5 - 5.1 mmol/L    Chloride 90 (L) 95 - 110 mmol/L    CO2 27 23 - 29 mmol/L    Glucose 84 70 - 110 mg/dL    BUN 42 (H) 8 - 23 mg/dL    Creatinine 1.9 (H) 0.5 - 1.4 mg/dL    Calcium 8.6 (L) 8.7 - 10.5 mg/dL    Total Protein 6.1 6.0 - 8.4 g/dL    Albumin 2.9 (L) 3.5 - 5.2 g/dL    Total Bilirubin 0.8 0.1 - 1.0 mg/dL    Alkaline Phosphatase 183 (H) 55 - 135 U/L    AST 76 (H) 10 - 40 U/L     (H) 10 - 44 U/L    eGFR 26 (A) >60 mL/min/1.73 m^2    Anion Gap 12 8 - 16 mmol/L   CBC Auto Differential    Collection Time: 10/27/23  3:47 AM   Result Value Ref Range    WBC 6.52 3.90 - 12.70 K/uL    RBC 4.28 4.00 - 5.40 M/uL    Hemoglobin 12.6 12.0 - 16.0 g/dL     Hematocrit 39.1 37.0 - 48.5 %    MCV 91 82 - 98 fL    MCH 29.4 27.0 - 31.0 pg    MCHC 32.2 32.0 - 36.0 g/dL    RDW 16.1 (H) 11.5 - 14.5 %    Platelets 167 150 - 450 K/uL    MPV 11.1 9.2 - 12.9 fL    Immature Granulocytes 0.3 0.0 - 0.5 %    Gran # (ANC) 4.9 1.8 - 7.7 K/uL    Immature Grans (Abs) 0.02 0.00 - 0.04 K/uL    Lymph # 1.0 1.0 - 4.8 K/uL    Mono # 0.5 0.3 - 1.0 K/uL    Eos # 0.0 0.0 - 0.5 K/uL    Baso # 0.02 0.00 - 0.20 K/uL    nRBC 0 0 /100 WBC    Gran % 75.2 (H) 38.0 - 73.0 %    Lymph % 15.8 (L) 18.0 - 48.0 %    Mono % 7.8 4.0 - 15.0 %    Eosinophil % 0.6 0.0 - 8.0 %    Basophil % 0.3 0.0 - 1.9 %    Differential Method Automated    Gram stain    Collection Time: 10/27/23 10:31 AM    Specimen: Pleural Fluid   Result Value Ref Range    Gram Stain Result Cytospin indicates:     Gram Stain Result Moderate WBC's     Gram Stain Result No organisms seen    Aerobic culture    Collection Time: 10/27/23 10:31 AM    Specimen: Pleural Fluid   Result Value Ref Range    Aerobic Bacterial Culture No growth    Protein, Peritoneal, Pleural Fluid or LISBETH Drainage, In-House Pleural Fluid, Left    Collection Time: 10/27/23 10:31 AM   Result Value Ref Range    Body Fluid Source, Total Protein Pleural Fluid, Left     Body Fluid, Protein 3.0 Not established g/dL   WBC & Diff,Body Fluid Pleural Fluid, Left    Collection Time: 10/27/23 10:31 AM   Result Value Ref Range    Body Fluid Type Pleural Fluid, Left     Fluid Appearance Hazy     Fluid Color Yellow     WBC, Body Fluid 362 /cu mm    Segs, Fluid 42 %    Lymphs, Fluid 33 %    Monocytes/Macrophages, Fluid 16 %    Mesothelial Cells, Fluid 9 %   LDH, Peritoneal, Pleural Fluid or LISBETH Drainage, In-House Pleural Fluid, Left    Collection Time: 10/27/23 10:31 AM   Result Value Ref Range    Body Fluid Source, LDH Pleural Fluid, Left     LD, Fluid 92 Not established U/L   Glucose, Peritoneal, Pleural Fluid or LISBETH Drainage, In-House Pleural Fluid, Left    Collection Time: 10/27/23 10:31  AM   Result Value Ref Range    Body Fluid Source, Glucose Pleural Fluid, Left     Glucose, Fluid 97 Not established mg/dL   Albumin, Peritoneal, Pleural Fluid or LISBETH Drainage, In-House Pleural Fluid, Left    Collection Time: 10/27/23 10:31 AM   Result Value Ref Range    Body Fluid Source, Albumin Pleural Fluid, Left     Body Fluid, Albumin 1.7 See text g/dL   Culture, Anaerobic    Collection Time: 10/27/23 10:31 AM    Specimen: Pleural Fluid   Result Value Ref Range    Anaerobic Culture Culture in progress    Echo    Collection Time: 10/27/23 12:57 PM   Result Value Ref Range    BSA 1.75 m2    LVOT stroke volume 31.34 cm3    LVIDd 3.91 3.5 - 6.0 cm    LV Systolic Volume 53.20 mL    LV Systolic Volume Index 32.0 mL/m2    LVIDs 3.57 2.1 - 4.0 cm    LV Diastolic Volume 66.26 mL    LV Diastolic Volume Index 39.92 mL/m2    IVS 1.23 (A) 0.6 - 1.1 cm    LVOT diameter 1.95 cm    LVOT area 3.0 cm2    FS 9 (A) 28 - 44 %    Left Ventricle Relative Wall Thickness 0.63 cm    Posterior Wall 1.23 (A) 0.6 - 1.1 cm    LV mass 165.92 g    LV Mass Index 100 g/m2    MV Peak E Wes 0.98 m/s    TDI LATERAL 0.02 m/s    TDI SEPTAL 0.03 m/s    E/E' ratio 39.20 m/s    MV Peak A Wes 0.29 m/s    TR Max Wes 3.40 m/s    E/A ratio 3.38     IVRT 110.37 msec    E wave deceleration time 131.82 msec    LV SEPTAL E/E' RATIO 32.67 m/s    LV LATERAL E/E' RATIO 49.00 m/s    LVOT peak wes 0.59 m/s    Left Ventricular Outflow Tract Mean Velocity 0.43 cm/s    Left Ventricular Outflow Tract Mean Gradient 0.82 mmHg    LA size 6.27 cm    Left Atrium Minor Axis 4.84 cm    RVDD 3.58 cm    TAPSE 1.20 cm    RA Major Axis 5.86 cm    RA Width 4.50 cm    AV mean gradient 7 mmHg    AV peak gradient 12 mmHg    Ao peak wes 1.70 m/s    Ao VTI 25.70 cm    LVOT peak VTI 10.50 cm    AV valve area 1.22 cm²    AV Velocity Ratio 0.35     AV index (prosthetic) 0.41     EDUARDO by Velocity Ratio 1.04 cm²    MV stenosis pressure 1/2 time 38.23 ms    MV valve area p 1/2 method 5.75 cm2     TV peak gradient 1 mmHg    Triscuspid Valve Regurgitation Peak Gradient 46 mmHg    PV PEAK VELOCITY 0.83 m/s    PV peak gradient 3 mmHg    Sinus 2.61 cm    STJ 2.43 cm    Ascending aorta 3.54 cm    IVC diameter 1.59 cm    Mean e' 0.03 m/s    ZLVIDS 1.69     ZLVIDD -1.73     LA Volume Index 88.2 mL/m2    LA volume 146.40 cm3    Left Atrium Major Axis 7.0 cm    LA WIDTH 4.8 cm    TV resting pulmonary artery pressure 49 mmHg    RV TB RVSP 6 mmHg    Est. RA pres 3 mmHg   Phosphorus    Collection Time: 10/28/23  3:57 AM   Result Value Ref Range    Phosphorus 3.3 2.7 - 4.5 mg/dL   Magnesium    Collection Time: 10/28/23  3:57 AM   Result Value Ref Range    Magnesium 1.7 1.6 - 2.6 mg/dL   Comprehensive Metabolic Panel    Collection Time: 10/28/23  3:57 AM   Result Value Ref Range    Sodium 132 (L) 136 - 145 mmol/L    Potassium 3.4 (L) 3.5 - 5.1 mmol/L    Chloride 89 (L) 95 - 110 mmol/L    CO2 31 (H) 23 - 29 mmol/L    Glucose 113 (H) 70 - 110 mg/dL    BUN 38 (H) 8 - 23 mg/dL    Creatinine 1.6 (H) 0.5 - 1.4 mg/dL    Calcium 8.6 (L) 8.7 - 10.5 mg/dL    Total Protein 6.1 6.0 - 8.4 g/dL    Albumin 2.9 (L) 3.5 - 5.2 g/dL    Total Bilirubin 0.9 0.1 - 1.0 mg/dL    Alkaline Phosphatase 162 (H) 55 - 135 U/L    AST 40 10 - 40 U/L     (H) 10 - 44 U/L    eGFR 32 (A) >60 mL/min/1.73 m^2    Anion Gap 12 8 - 16 mmol/L   CBC Auto Differential    Collection Time: 10/28/23  3:57 AM   Result Value Ref Range    WBC 8.10 3.90 - 12.70 K/uL    RBC 4.53 4.00 - 5.40 M/uL    Hemoglobin 13.3 12.0 - 16.0 g/dL    Hematocrit 41.0 37.0 - 48.5 %    MCV 91 82 - 98 fL    MCH 29.4 27.0 - 31.0 pg    MCHC 32.4 32.0 - 36.0 g/dL    RDW 16.0 (H) 11.5 - 14.5 %    Platelets 163 150 - 450 K/uL    MPV 11.4 9.2 - 12.9 fL    Immature Granulocytes 0.4 0.0 - 0.5 %    Gran # (ANC) 6.7 1.8 - 7.7 K/uL    Immature Grans (Abs) 0.03 0.00 - 0.04 K/uL    Lymph # 0.9 (L) 1.0 - 4.8 K/uL    Mono # 0.5 0.3 - 1.0 K/uL    Eos # 0.0 0.0 - 0.5 K/uL    Baso # 0.01 0.00 - 0.20  K/uL    nRBC 0 0 /100 WBC    Gran % 82.2 (H) 38.0 - 73.0 %    Lymph % 10.5 (L) 18.0 - 48.0 %    Mono % 6.3 4.0 - 15.0 %    Eosinophil % 0.5 0.0 - 8.0 %    Basophil % 0.1 0.0 - 1.9 %    Differential Method Automated    Ceruloplasmin    Collection Time: 10/28/23  3:57 AM   Result Value Ref Range    Ceruloplasmin 31.0 15.0 - 45.0 mg/dL   Anti-smooth muscle antibody    Collection Time: 10/28/23  3:57 AM   Result Value Ref Range    Smooth Muscle Ab Negative 1:40 Negative   Phosphorus    Collection Time: 10/29/23  4:11 AM   Result Value Ref Range    Phosphorus 3.1 2.7 - 4.5 mg/dL   Magnesium    Collection Time: 10/29/23  4:11 AM   Result Value Ref Range    Magnesium 1.7 1.6 - 2.6 mg/dL   Comprehensive Metabolic Panel    Collection Time: 10/29/23  4:11 AM   Result Value Ref Range    Sodium 129 (L) 136 - 145 mmol/L    Potassium 4.3 3.5 - 5.1 mmol/L    Chloride 90 (L) 95 - 110 mmol/L    CO2 28 23 - 29 mmol/L    Glucose 102 70 - 110 mg/dL    BUN 36 (H) 8 - 23 mg/dL    Creatinine 1.6 (H) 0.5 - 1.4 mg/dL    Calcium 8.3 (L) 8.7 - 10.5 mg/dL    Total Protein 5.7 (L) 6.0 - 8.4 g/dL    Albumin 2.6 (L) 3.5 - 5.2 g/dL    Total Bilirubin 0.9 0.1 - 1.0 mg/dL    Alkaline Phosphatase 130 55 - 135 U/L    AST 22 10 - 40 U/L     (H) 10 - 44 U/L    eGFR 32 (A) >60 mL/min/1.73 m^2    Anion Gap 11 8 - 16 mmol/L   CBC Auto Differential    Collection Time: 10/29/23  4:11 AM   Result Value Ref Range    WBC 7.13 3.90 - 12.70 K/uL    RBC 4.45 4.00 - 5.40 M/uL    Hemoglobin 13.1 12.0 - 16.0 g/dL    Hematocrit 41.7 37.0 - 48.5 %    MCV 94 82 - 98 fL    MCH 29.4 27.0 - 31.0 pg    MCHC 31.4 (L) 32.0 - 36.0 g/dL    RDW 16.0 (H) 11.5 - 14.5 %    Platelets 127 (L) 150 - 450 K/uL    MPV 10.7 9.2 - 12.9 fL    Immature Granulocytes 0.3 0.0 - 0.5 %    Gran # (ANC) 5.4 1.8 - 7.7 K/uL    Immature Grans (Abs) 0.02 0.00 - 0.04 K/uL    Lymph # 1.1 1.0 - 4.8 K/uL    Mono # 0.6 0.3 - 1.0 K/uL    Eos # 0.0 0.0 - 0.5 K/uL    Baso # 0.02 0.00 - 0.20 K/uL    nRBC  0 0 /100 WBC    Gran % 75.1 (H) 38.0 - 73.0 %    Lymph % 14.7 (L) 18.0 - 48.0 %    Mono % 9.0 4.0 - 15.0 %    Eosinophil % 0.6 0.0 - 8.0 %    Basophil % 0.3 0.0 - 1.9 %    Differential Method Automated    Phosphorus    Collection Time: 10/30/23  3:39 AM   Result Value Ref Range    Phosphorus 2.8 2.7 - 4.5 mg/dL   Magnesium    Collection Time: 10/30/23  3:39 AM   Result Value Ref Range    Magnesium 1.9 1.6 - 2.6 mg/dL   Comprehensive Metabolic Panel    Collection Time: 10/30/23  3:39 AM   Result Value Ref Range    Sodium 128 (L) 136 - 145 mmol/L    Potassium 4.3 3.5 - 5.1 mmol/L    Chloride 88 (L) 95 - 110 mmol/L    CO2 28 23 - 29 mmol/L    Glucose 106 70 - 110 mg/dL    BUN 35 (H) 8 - 23 mg/dL    Creatinine 1.6 (H) 0.5 - 1.4 mg/dL    Calcium 8.6 (L) 8.7 - 10.5 mg/dL    Total Protein 6.1 6.0 - 8.4 g/dL    Albumin 2.8 (L) 3.5 - 5.2 g/dL    Total Bilirubin 1.0 0.1 - 1.0 mg/dL    Alkaline Phosphatase 123 55 - 135 U/L    AST 16 10 - 40 U/L    ALT 96 (H) 10 - 44 U/L    eGFR 32 (A) >60 mL/min/1.73 m^2    Anion Gap 12 8 - 16 mmol/L   CBC Auto Differential    Collection Time: 10/30/23  3:39 AM   Result Value Ref Range    WBC 8.07 3.90 - 12.70 K/uL    RBC 4.64 4.00 - 5.40 M/uL    Hemoglobin 14.0 12.0 - 16.0 g/dL    Hematocrit 43.3 37.0 - 48.5 %    MCV 93 82 - 98 fL    MCH 30.2 27.0 - 31.0 pg    MCHC 32.3 32.0 - 36.0 g/dL    RDW 16.3 (H) 11.5 - 14.5 %    Platelets 149 (L) 150 - 450 K/uL    MPV 12.1 9.2 - 12.9 fL    Immature Granulocytes 0.4 0.0 - 0.5 %    Gran # (ANC) 6.1 1.8 - 7.7 K/uL    Immature Grans (Abs) 0.03 0.00 - 0.04 K/uL    Lymph # 1.2 1.0 - 4.8 K/uL    Mono # 0.7 0.3 - 1.0 K/uL    Eos # 0.1 0.0 - 0.5 K/uL    Baso # 0.02 0.00 - 0.20 K/uL    nRBC 0 0 /100 WBC    Gran % 75.5 (H) 38.0 - 73.0 %    Lymph % 14.7 (L) 18.0 - 48.0 %    Mono % 8.2 4.0 - 15.0 %    Eosinophil % 1.0 0.0 - 8.0 %    Basophil % 0.2 0.0 - 1.9 %    Differential Method Automated        Microbiology Results (last 7 days)     Procedure Component Value  Units Date/Time    Aerobic culture [[id number]] Collected: 10/27/23 1031    Order Status: Completed Specimen: Pleural Fluid Updated: 10/30/23 0731     Aerobic Bacterial Culture No growth    Culture, Anaerobic [[id number]] Collected: 10/27/23 1031    Order Status: Completed Specimen: Pleural Fluid Updated: 10/29/23 1111     Anaerobic Culture Culture in progress    Gram stain [[id number]] Collected: 10/27/23 1031    Order Status: Completed Specimen: Pleural Fluid Updated: 10/27/23 1147     Gram Stain Result Cytospin indicates:      Moderate WBC's      No organisms seen          Imaging Results          US Liver with Doppler (xpd) (Final result)  Result time 10/26/23 21:44:00    Final result by Zaina Thomas MD (10/26/23 21:44:00)                 Impression:      As above described.      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  Color and spectral Doppler were performed.    COMPARISON:  None.    FINDINGS:  Examination is limited by patient movement.  The liver demonstrates homogeneous echotexture.  The liver measures 12.4 cm.  No focal hepatic lesions are seen.  No fluid collections.  No recannulized umbilical vein is detected.    The common duct is not dilated, measuring 4.7 mm.  No dilated intrahepatic radicles are seen.    The gallbladder is surgically absent.    The spleen is normal in size measuring 9.1 x 3.2 cm.    There is no ascites.    Hepatopetal flow is recorded in the SMA the, main portal vein, left portal vein, and right portal vein.  The main portal vein velocity is 13.7 cm/sec.  Typically, the peak systolic velocities range from 20-40 cm/sec.  The main portal vein measures 0.89 cm in diameter.  A low flow velocity and a caliber increase in the main  portal vein may be diagnostic features of portal hypertension.  However, this is not the case.  The caliber here is normal.    Color flow and spectral waveform analysis was performed.  The main portal vein, right portal vein, left portal vein, middle hepatic vein, right hepatic vein, left hepatic vein, and IVC are patent.    The velocity of the main hepatic artery is 40 cm/sec.  The velocity of the left hepatic artery is 51 cm/sec.  The velocity of the right hepatic artery is 60.8 cm/sec.                               X-Ray Chest 1 View (Final result)  Result time 10/26/23 17:33:36    Final result by Ced Durham MD (10/26/23 17:33:36)                 Impression:      Stable examination.      Electronically signed by: Ced Durham MD  Date:    10/26/2023  Time:    17:33             Narrative:    EXAMINATION:  XR CHEST 1 VIEW    CLINICAL HISTORY:  Other fatigue    TECHNIQUE:  Single frontal view of the chest was performed.    COMPARISON:  10/26/2023.    FINDINGS:  Monitoring EKG leads are present.  The trachea is unremarkable.  There are calcifications of the aortic knob.  The cardiomediastinal silhouette is enlarged.  There is unchanged obscuration of the left aspect of the cardiac silhouette.  There is no evidence of free air beneath the hemidiaphragms.  There is unchanged appearance of a loculated left-sided pleural effusion.  There is no evidence of a pneumothorax.  The overall aeration of the lung fields are unchanged.  There are degenerative changes in the osseous structures.                                    Pending Diagnostic Studies:     Procedure Component Value Units Date/Time    Alpha 1 Antitrypsin Defiiciency Profile [[id number]] Collected: 10/28/23 0357    Order Status: Sent Lab Status: In process Updated: 10/28/23 0413    Specimen: Blood     Antimitochondrial antibody [[id number]] Collected: 10/28/23 0357    Order Status: Sent Lab Status: In process Updated: 10/28/23 4012    Specimen: Blood      Cytology, Fluid/Wash/Brush [[id number]] Collected: 10/27/23 1051    Order Status: Sent Lab Status: In process Updated: 10/30/23 0744    Specimen: Body Fluid          Medications:  Reconciled Home Medications:      Medication List      START taking these medications    droNABinol 2.5 MG capsule  Commonly known as: MARINOL  Take 1 capsule (2.5 mg total) by mouth 2 (two) times daily.     metoprolol tartrate 25 MG tablet  Commonly known as: LOPRESSOR  Take 1 tablet (25 mg total) by mouth 2 (two) times daily.     MYRBETRIQ 25 mg Tb24 ER tablet  Generic drug: mirabegron  Take 1 tablet (25 mg total) by mouth once daily.        CONTINUE taking these medications    atorvastatin 40 MG tablet  Commonly known as: LIPITOR  TAKE ONE TABLET BY MOUTH ONCE DAILY     calcium carbonate 200 mg calcium (500 mg) chewable tablet  Commonly known as: TUMS  Take 2 tablets by mouth.     ELIQUIS 2.5 mg Tab  Generic drug: apixaban  TAKE ONE TABLET BY MOUTH TWICE DAILY     ergocalciferol 50,000 unit Cap  Commonly known as: ERGOCALCIFEROL  Take 50,000 Units by mouth every 30 days.     folic acid 1 MG tablet  Commonly known as: FOLVITE  TAKE 1 TABLET BY MOUTH EVERY DAY     mupirocin 2 % ointment  Commonly known as: BACTROBAN  Apply topically every evening.        STOP taking these medications    allopurinoL 100 MG tablet  Commonly known as: ZYLOPRIM     amiodarone 100 MG Tab  Commonly known as: PACERONE     bumetanide 0.5 MG Tab  Commonly known as: BUMEX     solifenacin 10 MG tablet  Commonly known as: VESICARE        ASK your doctor about these medications    bumetanide 1 MG tablet  Commonly known as: BUMEX  Take 1 tablet (1 mg total) by mouth once daily.            Indwelling Lines/Drains at time of discharge:   Lines/Drains/Airways     Drain  Duration           Female External Urinary Catheter 10/27/23 0650 3 days                Time spent on the discharge of patient: Greater than 35 minutes         Grayson Alamo DO  Department of [facility]  [facility] Surg

## 2023-10-30 NOTE — PROGRESS NOTES
[facility] Medicine  Progress Note    Patient Name: Barbara Rizo  MRN: [id number]  Patient Class: IP- Inpatient   Admission Date: 10/26/2023  Length of Stay: 4 days  Attending Physician: Grayson Alamo DO  Primary Care Provider: Paras Oro MD        Subjective:     Principal Problem:Acute on chronic diastolic heart failure        HPI:    Barbara Rizo is a 83 y.o. female who has a past medical history of A-fib, Acute respiratory failure with hypoxia, Breast cancer, CKD, Community acquired pneumonia, Diabetes mellitus type II, Fatty liver, GERD, Hearing loss of both ears, Hemochromatosis, History of anemia due to CKD, History of pleural effusion, Hyperlipidemia, Hypertension, Macular degeneration, On home oxygen therapy, Osteoarthritis, Osteoporosis, Vaginal delivery, Vitamin D deficiency disease, and Wears partial dentures, presented to the ED with CC of Confusion.    Patient was sent in by PCP/home health labs hyponatremia and elevated creatinine of 2.2.  Unclear if point of care hyponatremia was correct at BNP taken at same time (noon) was 127 and repeat BNP showed sodium again at 127 four hours later-(POC electrolytes are often wrong).  Daughter states that patient has been intermittently confused for the past week or so, has not necessarily gotten worse but has been persistent.  She is oriented, A/O x3, however will often argue with daughter about things that are apparently wrong.  Denies hallucinations.  Has had a couple falls recently without head injury, has a bruise on her right thigh, without hip pain.  In the ED her liver enzymes were elevated,  and -which were normal on the last set of labs, and for many years normal.  Patient does have hemochromatosis and fair and ferritin is elevated at 800, ultrasound of liver with Doppler ordered.  Also patient has elevated BNP at 2876 and elevated JVD, echo ordered.  She has a fairly large left pleural effusion as  well, however it is stable from previous.  She is on 4 L of home oxygen.  Denies chest pain and denies shortness of breath beyond her normal dyspnea.        Overview/Hospital Course:  83 y.o. female with extensive co morbidities admitted on 10/26/23 for abnormal lab findings, sent to ED by PCP. Daughter noted that patient has been having intermittent confusion. Outpatient labs showed hyponatremia, transaminitis, and elevated creatinine of 2.2.  Patient also found to have elevated BNP and troponin in the ED. Started on CHF pathway. ferritin elevated at 804,previously in the 400s. CXR with unchanged appearance of a loculated left-sided pleural effusion. Pt with afib RVR and required  IV metoprolol with improvement. QTc prolonged on EKG, home amiodarone held. Cardiology consulted-recommends to stop amiodarone and attempt rate control with metoprolol. Resume eliquis after thoracentesis. Consider repeat CV if A-flutter not controlled. IR consulted- pt s/p thoracentesis with removal of 1.1L, cultures sent. Pleural fluid studies with no growth so far. Cytology pending. Given history of hemochromatosis, hematology consulted-recommend continued outpatient follow up. Nephrology, palliative and GI consulted as well      Interval History:  No acute overnight events.  Patient afebrile.  Patient feeling okay this morning. She did not eat much dinner.  Not eating much. States she does not have much of an appetitie. States it is the hospital food and she will eat more when she gets home.     Review of Systems   Constitutional:  Positive for activity change, appetite change (poor appetite) and fatigue. Negative for chills and fever.   Respiratory:  Positive for shortness of breath (at baseline). Negative for cough and wheezing.    Cardiovascular:  Negative for chest pain, palpitations and leg swelling.   Gastrointestinal:  Negative for abdominal distention, abdominal pain, diarrhea, nausea and vomiting.   Genitourinary:  Negative  for difficulty urinating and dysuria.   Neurological:  Positive for weakness. Negative for dizziness and headaches.   Psychiatric/Behavioral:  Positive for decreased concentration (improving). Negative for confusion (none on exam). The patient is not nervous/anxious (not anxious this morning).      Objective:     Vital Signs (Most Recent):  Temp: 97.6 °F (36.4 °C) (10/30/23 1036)  Pulse: (!) 58 (10/30/23 1036)  Resp: 17 (10/30/23 1036)  BP: 106/68 (10/30/23 1036)  SpO2: 97 % (10/30/23 1036) Vital Signs (24h Range):  Temp:  [96.4 °F (35.8 °C)-97.7 °F (36.5 °C)] 97.6 °F (36.4 °C)  Pulse:  [] 58  Resp:  [16-18] 17  SpO2:  [93 %-98 %] 97 %  BP: (106-129)/(60-89) 106/68     Weight: 65 kg (143 lb 4.8 oz)  Body mass index is 26.21 kg/m².    Intake/Output Summary (Last 24 hours) at 10/30/2023 1443  Last data filed at 10/30/2023 1322  Gross per 24 hour   Intake 620 ml   Output 850 ml   Net -230 ml           Physical Exam  Vitals and nursing note reviewed.   Constitutional:       General: She is not in acute distress.     Appearance: She is ill-appearing (chronic).   HENT:      Mouth/Throat:      Mouth: Mucous membranes are moist.   Eyes:      Extraocular Movements: Extraocular movements intact.   Cardiovascular:      Rate and Rhythm: Normal rate and regular rhythm.      Heart sounds: No murmur heard.     No gallop.   Pulmonary:      Effort: Pulmonary effort is normal. No respiratory distress.      Breath sounds: Examination of the left-lower field reveals decreased breath sounds. Decreased breath sounds present. No wheezing.      Comments: On 3L NC, decreased breath sounds in left lower lobe improved.   Abdominal:      General: There is no distension.      Palpations: Abdomen is soft.      Tenderness: There is no abdominal tenderness.   Musculoskeletal:         General: No swelling or tenderness.      Right lower leg: No edema.      Left lower leg: No edema.      Comments: Bilateral lower extremity without edema this  AM   Skin:     General: Skin is warm and dry.   Neurological:      General: No focal deficit present.      Mental Status: She is alert and oriented to person, place, and time.             Significant Labs: All pertinent labs within the past 24 hours have been reviewed.    Significant Imaging: I have reviewed all pertinent imaging results/findings within the past 24 hours.      Assessment/Plan:      * Acute on chronic diastolic heart failure  · BNP 3000 with elevated JVD and hypervolemic hyponatremia on admission  · Right-sided heart failure, which is why her oxygen has not changed much, but why her liver is congested, I suspect  · Likely worsened by recurrent A-flutter  · Received IV diuresis. Volume status improved. Will hold off on further diuresis since 10/29  · Echo ordered: estimated ejection fraction of 35 - 40%. PASP 49mmHg  · Cardiology consulted  · See HF note      Atrial fibrillation with RVR  · Longstanding, permanent AFib  · Patient has been cardioverted about 4 times in the past, per daughter   · Heart rate 120 in the ED, IV metoprolol given, heart rate improved to - controlled  · Given hemochromatosis and , amiodarone on hold  · cardiology consulted: Stop amiodarone with prolonged QT. Will attempt rate control with metoprolol as tolerated. Consider repeat CV if A-flutter not controlled  · Resumed eliquis after thoracentesis.    Paroxysmal atrial fibrillation  Patient with Persistent (7 days or more) atrial fibrillation which is uncontrolled currently with Amiodarone. Patient is currently in atrial fibrillation.[id number] Score: 5.  Anticoagulation indicated. Anticoagulation done with eliquis.    -history of atrial fibrillation. On amiodarone outpatient  -Pt now with elevated LFTs and prolonged   -Cardiology consulted: Stop amiodarone with prolonged QT. Attempt rate control with metoprolol as tolerated. Resume eliquis     Chronic heart failure with preserved ejection fraction  · Do  not see heart failure on her history  · chronic HFpEF added as this was noted previously on ECHO (not new this admission)  · BNP approaching 3000 with elevated JVD on exam  · Patient was recently switch to Bumex, perhaps mildly underdosed leading to increasing volume and hypervolemic hyponatremia over the last couple weeks.    · She takes 1 mg Itzejyr-Zbjglxbp-Ctirmlak-Sunday; and takes 0.5 mg MWF.  · May need to increase to 1 mg daily on discharge, please assess at that point  · Cardiology consulted    Pulmonary hypertension  · PASP 46 mmHg previously  · Keep euvolemic  · Repeat echo: with EF 35-40%. There is mild stenosis of aortic valve. The estimated pulmonary artery systolic pressure is 49 mmHg.      Pleural effusion on left  · Persistent large left pleural effusion  · History of breast cancer  · IR consulted: s/p  thoracentesis with removal of 1.1L  · Pleural cultures with no growth so far.  cytology pending.      QT prolongation  ·  on admission  · Concerned that with hx of hemochromatosis and QTC prolongation, that amiodarone will not be the drug of choice moving forward  · cardiology consult: recommends to stop amiodarone and attempt rate control with metoprolol. Resume eliquis   · Hold amiodarone  · Hold solifenacin  · Avoid QT prolongation agents           Hyponatremia  · Patient has hyponatremia which is uncontrolled,We will aim to correct the sodium by 4-6mEq in 24 hours. We will monitor sodium Every 12 hours. The hyponatremia is due to heart failure/right-sided congestion versus hemochromatosis/liver dysfunction. We will obtain the following studies: Urine sodium, urine osmolality, serum osmolality or TSH, T4. We will treat the hyponatremia with Fluid restriction of:  1.5 liter and diuresis per day. The patient's sodium results have been reviewed and are listed below.    · Suspect the POC sodium of 121 was inaccurate, as POC electrolytes often are  · Sodium 127 on admission, and repeat 4 hours  later was also 127, then 129  · BNP approaching 3000 and elevated JVD on exam, large left pleural effusion  · Suspect hypervolemic hyponatremia-Received diuresis  · sodium fluctuating up and down, pt with poor appetite   · Nephrology following    Intermittent confusion  · Intermittent confusion per daughter although oriented x3  · Ammonia is within normal limits  · However it is possible she has CNS manifestation of hemochromatosis with ferritin 800 and new transaminitis, INR elevated at 1.4, does not quite meet liver failure criteria  · verses right-sided heart failure with hepatic congestion  · Verses uremic encephalopathy, however BUN not too high  · Echo of heart ordered- EF of 35-40%, mild aortic valve stenosis, +pulmonary hypertension  · Ultrasound of liver with Doppler ordered: no acute findings  · Hematology consulted  · Received diurese in setting of likely right-sided heart failure with hepatic congestion  · Patient has been AOx4 since hospitalization     MGUS (monoclonal gammopathy of unknown significance)  · See hemochromatosis note  · Stable      History of Hemochromatosis  · Did require blood letting in early 2000's  · Ferritin elevated at 800, unclear if patient could have CNS manifestation/confusion, not necessarily due to iron itself, but poor clearance of toxins etc  ·  , INR 1.4- usually all WNL  · Will discuss with hematology in am if any intervention is necessary, however, potentially due to right sided heart failure with hepatic congestion.  Patient also has MGUS.   · Hematology consulted-Would rule out other issue or causes such as worsening CHF, infection or other causes of inflammation before ordering a phlebotomy   · Pt will need to continue follow up in heme clinic on discharge  · Presentation likely due to CHF. Improved with diuresis     Type 2 diabetes mellitus with stage 4 chronic kidney disease, without long-term current use of insulin  · A1c 4.9 8/27/23  · A1cs likely  improved with worsening renal function, CKD 4  · Diet controlled at home  · No significant elevations noted thus far  · Monitor sugars daily with BMP -no need for frequent glucose checks    CKD (chronic kidney disease), stage IV  -Nephrology consulted      Carcinoma of breast  · Noted history      OAB (overactive bladder)  · Holding solifenacin given   · Would discontinue at discharge, consider discussing with Urology other treatment    Chronic gout  · Decreased creatinine clearance and elevated liver enzymes, will hold allopurinol for now      Physical deconditioning  · PT OT consulted: recommends home health      ACP (advance care planning)  -Spent 5 minutes to review ACPs notes  -Palliative care team consulted        VTE Risk Mitigation (From admission, onward)         Ordered     apixaban tablet 2.5 mg  2 times daily         10/27/23 1146     IP VTE HIGH RISK PATIENT  Once         10/27/23 0652     Place sequential compression device  Until discontinued         10/27/23 0652     Place sequential compression device  Until discontinued         10/26/23 1919                Discharge Planning   SALLY: 10/29/2023     Code Status: DNR   Is the patient medically ready for discharge?:     Reason for patient still in hospital (select all that apply): Patient trending condition and Consult recommendations  Discharge Plan A: Home with family                  Grayson Alamo DO  Department of Hospital Medicine   [facility] - Med Surg

## 2023-10-30 NOTE — PT/OT/SLP PROGRESS
Physical Therapy Treatment    Patient Name:  Barbara Rizo   MRN:  [id number]    Recommendations:     Discharge Recommendations: Low Intensity Therapy with caregiver support  Discharge Equipment Recommendations: bedside commode  Barriers to discharge: None    Assessment:     Barbara Rizo is a 83 y.o. female admitted with a medical diagnosis of Acute on chronic diastolic heart failure.  She presents with the following impairments/functional limitations: weakness, impaired endurance, impaired self care skills, impaired functional mobility, gait instability, impaired balance, impaired cardiopulmonary response to activity, decreased safety awareness, decreased lower extremity function, decreased upper extremity function, decreased coordination.    Rehab Prognosis: Good; patient would benefit from acute skilled PT services to address these deficits and reach maximum level of function.    Recent Surgery: * No surgery found *      Plan:     During this hospitalization, patient to be seen 5 x/week to address the identified rehab impairments via gait training, therapeutic activities, therapeutic exercises and progress toward the following goals:    Plan of Care Expires:  11/10/23    Subjective     Chief Complaint: N/A  Patient/Family Comments/goals: Pt would like to ambulate to the bathroom.  Pain/Comfort:  Pain Rating 1: 0/10      Objective:     Patient found HOB elevated with oxygen, PureWick, peripheral IV, telemetry upon PT entry to room.     General Precautions: Standard, aspiration, respiratory, fall, hearing impaired, DM  Orthopedic Precautions: N/A  Braces: N/A  Respiratory Status: Nasal cannula, flow 3 L/min     Functional Mobility:  Bed Mobility:     Scooting: contact guard assistance  Supine to Sit: contact guard assistance  Transfers:     Sit to Stand: contact guard assistance and minimum assistance with rolling walker x4 trials   Bed to Chair: contact guard assistance with  rolling walker  using  Step  Transfer  Toilet Transfer: contact guard assistance with  rolling walker  using  Step Transfer; Pt able to ambulate to the bathroom with assistance.  Pt tolerated standing for perineal cleaning and placement of new diaper.  Gait: Pt ambulated ~100 ft with CGA using RW and 3L O2 NC.  Pt with decreased step length and jocelyn.  Pt reported fatigue.   Balance: Pt with fair dynamic standing balance.      AM-PAC 6 CLICK MOBILITY  Turning over in bed (including adjusting bedclothes, sheets and blankets)?: 4  Sitting down on and standing up from a chair with arms (e.g., wheelchair, bedside commode, etc.): 3  Moving from lying on back to sitting on the side of the bed?: 3  Moving to and from a bed to a chair (including a wheelchair)?: 3  Need to walk in hospital room?: 3  Climbing 3-5 steps with a railing?: 2  Basic Mobility Total Score: 18       Treatment & Education:  BLE seated therex x15 reps: AP, LAQ, and hip flex.  Pt already received HEP for seated/supine LE therex.    Pt to call for nursing assistance with OOB activities while in the hospital.  Pt verbalized good understanding.     Patient left up in chair on seat cushion with all lines intact, call button in reach, and nurse Lou notified.  Tray table in front.     GOALS:   Multidisciplinary Problems       Physical Therapy Goals          Problem: Physical Therapy    Goal Priority Disciplines Outcome Goal Variances Interventions   Physical Therapy Goal     PT, PT/OT Ongoing, Progressing     Description: Goals to be met by: 11/10/23     Patient will increase functional independence with mobility by performin. Supine to sit with Supervision  2. Rolling to Left and Right with Supervision  3. Sit to stand transfer with Stand-by Assistance using RW  4. Bed to chair transfer with Stand-by Assistance using Rolling Walker  5. Gait x250 feet with Stand-by Assistance using Rolling Walker and O2   6. Lower extremity exercise program 2 sets x15 reps per handout, with  supervision                         Time Tracking:     PT Received On: 10/30/23  PT Start Time: 0918     PT Stop Time: 0946  PT Total Time (min): 28 min     Billable Minutes: Gait Training 18 min and Therapeutic Exercise 10 min    Treatment Type: Treatment  PT/PTA: PT     Number of PTA visits since last PT visit: 0     10/30/2023

## 2023-10-30 NOTE — NURSING
[facility] [place]  Nurses Note -- 4 Eyes      10/30/2023       Skin assessed on: Q Shift      [x] No Pressure Injuries Present    [x]Prevention Measures Documented  Skin Tear to arm     [] Yes LDA  for Pressure Injury Previously documented     [] Yes New Pressure Injury Discovered   [] LDA for New Pressure Injury Added      Attending RN:  Lou Gonzales RN     Second RN:  PAUL Gandhi

## 2023-10-30 NOTE — PLAN OF CARE
Problem: SLP  Goal: SLP Goal  Description: 1. Pt will tolerate LRD without overt s/s of aspiration.   2. Pt will complete liquid/solid PO trials during MBSS for thorough assessment of swallowing function.   Outcome: Ongoing, Progressing   Tolerating current diet well. Fair appetite.

## 2023-10-30 NOTE — PLAN OF CARE
[facility]      HOME HEALTH ORDERS  FACE TO FACE ENCOUNTER    Patient Name: Barbara Rizo  YOB: 1940    PCP: Paras Oro MD   [address]P [address] [facility] / [id number]  PCP Phone Number: [phone]  PCP Fax: [phone]    Encounter Date: 10/26/23    Admit to Home Health    Diagnoses:  Active Hospital Problems    Diagnosis  POA    *Acute on chronic diastolic heart failure [I50.33]  Yes     Priority: 1 - High    Atrial fibrillation with RVR [I48.91]  Yes     Priority: 2     Paroxysmal atrial fibrillation [I48.0]  Yes     Priority: 3      Chronic    Chronic heart failure with preserved ejection fraction [I50.32]  Yes     Priority: 4      GII dd      Pulmonary hypertension [I27.20]  Yes     Priority: 5      group 2 with ddx.  Will send for pft due to remote smoking history.  bp and fluid optimization.  Doing better since taking lasix routinely.        Pleural effusion on left [J90]  Yes     Priority: 6     QT prolongation [R94.31]  Yes     Priority: 7     Hyponatremia [E87.1]  Yes     Priority: 8     Intermittent confusion [R41.0]  Yes     Priority: 9     MGUS (monoclonal gammopathy of unknown significance) [D47.2]  Yes     Priority: 10      Chronic    History of Hemochromatosis [E83.119]  Yes     Priority: 11     Type 2 diabetes mellitus with stage 4 chronic kidney disease, without long-term current use of insulin [E11.22, N18.4]  Yes     Priority: 12     CKD (chronic kidney disease), stage IV [N18.4]  Yes     Priority: 13     Carcinoma of breast [C50.919]  Yes     Priority: 14     OAB (overactive bladder) [N32.81]  Yes     Priority: 15     Chronic gout [M1A.9XX0]  Yes     Priority: 16      Chronic    Physical deconditioning [R53.81]  Yes     Priority: 17     ACP (advance care planning) [Z71.89]  Not Applicable     Priority: 18       Resolved Hospital Problems    Diagnosis Date Resolved POA    Chest pain on breathing [R07.1] 10/27/2023 Yes    WILKINSON (dyspnea on exertion)  [R06.09] 10/27/2023 Yes       Follow Up Appointments:  Future Appointments   Date Time Provider Department Center   10/31/2023 10:00 AM CHAIR 11 [facility] WB CHEMO [facility] [place]   11/6/2023  9:00 AM Familia Lazo NP [facility] MED [place]   11/7/2023 10:00 AM CHAIR 11 [facility] WB CHEMO [facility] [place]   11/14/2023  9:40 AM Reina Camp MD [facility] URO [facility] [place] [facility]   11/14/2023  2:15 PM Amauri Lomas MD [facility] CARDIO [facility] [place]   11/15/2023  9:00 AM Mukesh Madison MD [facility] HEM ONC [facility] [place] [facility]   11/16/2023  8:00 AM [facility]   11/17/2023 11:00 AM Venus Izaguirre NP [address]   12/14/2023 12:30 PM INJECTION, [facility] INFUSION [facility]       Allergies:Review of patient's allergies indicates:  No Known Allergies    Medications: Review discharge medications with patient and family and provide education.    Current Facility-Administered Medications   Medication Dose Route Frequency Provider Last Rate Last Admin    acetaminophen tablet 650 mg  650 mg Oral Q8H PRN Bony Dalton MD        apixaban tablet 2.5 mg  2.5 mg Oral BID Grayson Alamo, DO   2.5 mg at 10/30/23 0921    droNABinol capsule 2.5 mg  2.5 mg Oral BID Nargis Boyle MD   2.5 mg at 10/30/23 0944    glucagon (human recombinant) injection 1 mg  1 mg Intramuscular PRN Bony Dalton MD        glucose chewable tablet 16 g  16 g Oral PRN Bony Dalton MD        glucose chewable tablet 24 g  24 g Oral PRN Bony Dalton MD        melatonin tablet 6 mg  6 mg Oral Nightly Bony Dalton MD   6 mg at 10/29/23 2112    metoprolol tartrate (LOPRESSOR) tablet 25 mg  25 mg Oral BID Amauri Lomas MD   25 mg at 10/30/23 0921    naloxone 0.4 mg/mL injection 0.02 mg  0.02 mg Intravenous PRN Bony Dalton MD        polyethylene glycol packet 17 g  17 g Oral BID PRN Bony Dalton MD        sodium chloride 0.9% flush 10 mL  10 mL Intravenous PRN Grayson Alamo., DO         Facility-Administered Medications Ordered in  Other Encounters   Medication Dose Route Frequency Provider Last Rate Last Admin    denosumab (PROLIA) injection 60 mg  60 mg Subcutaneous 1 time in Clinic/HOD Nargis Boyle MD         Current Discharge Medication List        START taking these medications    Details   droNABinol (MARINOL) 2.5 MG capsule Take 1 capsule (2.5 mg total) by mouth 2 (two) times daily.  Qty: 60 capsule, Refills: 0      metoprolol tartrate (LOPRESSOR) 25 MG tablet Take 1 tablet (25 mg total) by mouth 2 (two) times daily.  Qty: 60 tablet, Refills: 1    Comments: .      mirabegron (MYRBETRIQ) 25 mg Tb24 ER tablet Take 1 tablet (25 mg total) by mouth once daily.  Qty: 30 tablet, Refills: 0           CONTINUE these medications which have NOT CHANGED    Details   apixaban (ELIQUIS) 2.5 mg Tab TAKE ONE TABLET BY MOUTH TWICE DAILY  Qty: 180 tablet, Refills: 3    Associated Diagnoses: Paroxysmal atrial fibrillation      atorvastatin (LIPITOR) 40 MG tablet TAKE ONE TABLET BY MOUTH ONCE DAILY  Qty: 90 tablet, Refills: 2    Comments: This prescription was filled on 4/11/2023. Any refills authorized will be placed on file.  Associated Diagnoses: Type 2 diabetes mellitus with stage 3 chronic kidney disease, without long-term current use of insulin; Essential hypertension      bumetanide (BUMEX) 1 MG tablet Take 1 tablet (1 mg total) by mouth once daily.  Qty: 30 tablet, Refills: 11      calcium carbonate (TUMS) 200 mg calcium (500 mg) chewable tablet Take 2 tablets by mouth.      ergocalciferol (ERGOCALCIFEROL) 50,000 unit Cap Take 50,000 Units by mouth every 30 days.       folic acid (FOLVITE) 1 MG tablet TAKE 1 TABLET BY MOUTH EVERY DAY  Qty: 90 tablet, Refills: 3    Associated Diagnoses: Hemochromatosis, unspecified hemochromatosis type      mupirocin (BACTROBAN) 2 % ointment Apply topically every evening.           STOP taking these medications       allopurinoL (ZYLOPRIM) 100 MG tablet Comments:   Reason for Stopping:         amiodarone (PACERONE)  100 MG Tab Comments:   Reason for Stopping:         solifenacin (VESICARE) 10 MG tablet Comments:   Reason for Stopping:                 I have seen and examined this patient within the last 30 days. My clinical findings that support the need for the home health skilled services and home bound status are the following:no   Weakness/numbness causing balance and gait disturbance due to Weakness/Debility making it taxing to leave home.     Diet:   cardiac diet        Referrals/ Consults  Physical Therapy to evaluate and treat. Evaluate for home safety and equipment needs; Establish/upgrade home exercise program. Perform / instruct on therapeutic exercises, gait training, transfer training, and Range of Motion.  Occupational Therapy to evaluate and treat. Evaluate home environment for safety and equipment needs. Perform/Instruct on transfers, ADL training, ROM, and therapeutic exercises.    Activities:   activity as tolerated    Nursing:   Agency to admit patient within 24 hours of hospital discharge unless specified on physician order or at patient request    SN to complete comprehensive assessment including routine vital signs. Instruct on disease process and s/s of complications to report to MD. Review/verify medication list sent home with the patient at time of discharge  and instruct patient/caregiver as needed. Frequency may be adjusted depending on start of care date.     Skilled nurse to perform up to 3 visits PRN for symptoms related to diagnosis    Notify MD if SBP > 160 or < 90; DBP > 90 or < 50; HR > 120 or < 50; Temp > 101; O2 < 88%;     Ok to schedule additional visits based on staff availability and patient request on consecutive days within the home health episode.    When multiple disciplines ordered:    Start of Care occurs on Sunday - Wednesday schedule remaining discipline evaluations as ordered on separate consecutive days following the start of care.    Thursday SOC -schedule subsequent evaluations  Friday and Monday the following week.     Friday - Saturday SOC - schedule subsequent discipline evaluations on consecutive days starting Monday of the following week.    For all post-discharge communication and subsequent orders please contact patient's primary care physician.         Home Health Aide:  Physical Therapy Three times weekly and Occupational Therapy Three times weekly    Wound Care Orders  no    I certify that this patient is confined to her home and needs physical therapy and occupational therapy.

## 2023-10-30 NOTE — ASSESSMENT & PLAN NOTE
Patient with Persistent (7 days or more) atrial fibrillation which is uncontrolled currently with Amiodarone. Patient is currently in atrial fibrillation.[id number] Score: 5.  Anticoagulation indicated. Anticoagulation done with eliquis.    -history of atrial fibrillation. On amiodarone outpatient  -Pt now with elevated LFTs and prolonged   -Cardiology consulted: Stop amiodarone with prolonged QT. Attempt rate control with metoprolol as tolerated. Resume eliquis

## 2023-10-30 NOTE — PLAN OF CARE
Problem: Adult Inpatient Plan of Care  Goal: Plan of Care Review  Outcome: Adequate for Care Transition  Goal: Patient-Specific Goal (Individualized)  Outcome: Adequate for Care Transition  Goal: Absence of Hospital-Acquired Illness or Injury  Outcome: Adequate for Care Transition  Goal: Optimal Comfort and Wellbeing  Outcome: Adequate for Care Transition  Goal: Readiness for Transition of Care  Outcome: Adequate for Care Transition     Problem: Skin Injury Risk Increased  Goal: Skin Health and Integrity  Outcome: Adequate for Care Transition     Problem: Diabetes Comorbidity  Goal: Blood Glucose Level Within Targeted Range  Outcome: Adequate for Care Transition     Problem: Impaired Wound Healing  Goal: Optimal Wound Healing  Outcome: Adequate for Care Transition     Problem: Coping Ineffective  Goal: Effective Coping  Outcome: Adequate for Care Transition     Problem: Fall Injury Risk  Goal: Absence of Fall and Fall-Related Injury  Outcome: Adequate for Care Transition

## 2023-10-30 NOTE — PT/OT/SLP PROGRESS
Speech Language Pathology Treatment    Patient Name:  Barbara Rizo   MRN:  [id number]  Admitting Diagnosis: Acute on chronic diastolic heart failure    Recommendations:                 General Recommendations:  Dysphagia therapy  Diet recommendations:  Minced & Moist Diet - IDDSI Level 5, Liquid Diet Level: Thin liquids - IDDSI Level 0   Aspiration Precautions: 1 bite/sip at a time, Alternating bites/sips, Double swallow with each bite/sip, and Small bites/sips   General Precautions: Standard, aspiration, other (see comments) (mechanical soft)  Communication strategies:  none    Assessment:     Barbara Rizo is a 83 y.o. female with dysphagia of unknown severity possibly of esophageal nature. GI consult to assess esophageal function d/t complaints of globus sensation at the level of the upper esophagus. Pt tolerating a soft diet with chopped meats and thin liquids w/o overt s/s of aspiration.     Subjective     Pt awake, alert, and oriented upon SLP arrival. Pt seated in chair at bedside waiting for her daughter to pick her up for discharge from hospital today.       Pain/Comfort:   0/10    Respiratory Status: Room air    Objective:     Has the patient been evaluated by SLP for swallowing?   Yes  Keep patient NPO? No   Current Respiratory Status:        Pt reported fair appetite and discussed problematic foods with SLP. Ongoing education regarding aspiration precautions and diet modification completed. Pt v/u of education provided. Small bites/sips, slow rate of eating. Chop meats and alternate liquids with solids. Pt swallowed multiple sips of water via straw w/o overt s/s of aspiration. Pt refused other trials this session.     Goals:   Multidisciplinary Problems       SLP Goals          Problem: SLP    Goal Priority Disciplines Outcome   SLP Goal     SLP Ongoing, Progressing   Description: 1. Pt will tolerate LRD without overt s/s of aspiration.   2. Pt will complete liquid/solid PO trials during MBSS for  thorough assessment of swallowing function.                        Plan:     Patient to be seen:  3 x/week   Plan of Care expires:     Plan of Care reviewed with:  patient   SLP Follow-Up:  Yes       Discharge recommendations:      Barriers to Discharge:  None    Time Tracking:     SLP Treatment Date:   10/30/23  Speech Start Time:  1124  Speech Stop Time:  1138     Speech Total Time (min):  14 min    Billable Minutes: Treatment Swallowing Dysfunction 14 min    10/30/2023

## 2023-10-30 NOTE — NURSING
Received report care assumed. Patient lying in bed resting, NAD noted. Safety precautions maintained.     [facility]  Nurses Note -- 4 Eyes      10/29/2023       Skin assessed on: Q Shift      [x] No Pressure Injuries Present    [x]Prevention Measures Documented    [] Yes LDA  for Pressure Injury Previously documented     [] Yes New Pressure Injury Discovered   [] LDA for New Pressure Injury Added      Attending RN:  Oksana Barraza LPN     Second RN:  Miriam Kline RN

## 2023-10-31 NOTE — ASSESSMENT & PLAN NOTE
- chronic history noted   - pt would not wish to undergo any aggressive treatment/chemo in the future

## 2023-10-31 NOTE — ASSESSMENT & PLAN NOTE
- pt previously has lived independently at home and able to perform all ADLs independently, with daughter Tamar close by for PRN assistance   - since 9/2023 admission pt has been temporarily living with daughter and as had gradual declines in abilities since (now requires assistance getting dressed etc)   - pt and family with GOC for inpatient rehab for physical reconditioned to be able to return to living independently which unfortunately pt did not meet qualifications for per MDT; plan for home health with PT  - in addition to pt's increasing debility; pts daughter exhibited and confirmed caregiver burnout; opened discussion of alterative plans to living with daughter if not able to return to indecently living in her own home after discharge (see ACP)

## 2023-10-31 NOTE — ASSESSMENT & PLAN NOTE
- chronic condition and acute symptoms noted (see WILKINSON)   - pt with increasing debility in the past month; intermitent dyspnea at rest and increased further with exertion; contributes to pt's overall appropriateness for hospice when aligns with GOC  - cardiology following   - continued management per hospital primary and cardiology

## 2023-10-31 NOTE — SUBJECTIVE & OBJECTIVE
"Medications:  Continuous Infusions:  Scheduled Meds:  PRN Meds:    Objective:     Vital Signs (Most Recent):  Temp: 97.6 °F (36.4 °C) (10/30/23 1036)  Pulse: (!) 58 (10/30/23 1036)  Resp: 17 (10/30/23 1036)  BP: 106/68 (10/30/23 1036)  SpO2: 97 % (10/30/23 1036) Vital Signs (24h Range):  Temp:  [98.3 °F (36.8 °C)] 98.3 °F (36.8 °C)  Pulse:  [79] 79  Resp:  [18] 18  SpO2:  [97 %] 97 %  BP: (112)/(67) 112/67     Weight: 65 kg (143 lb 4.8 oz)  Body mass index is 26.21 kg/m².       Physical Exam  Vitals and nursing note reviewed.   Constitutional:       General: She is awake.   HENT:      Head: Normocephalic and atraumatic.   Pulmonary:      Effort: Pulmonary effort is normal. No respiratory distress.      Comments: Some dyspnea with prolonged talking (at rest)   Musculoskeletal:      Right lower leg: Edema present.      Left lower leg: Edema present.   Neurological:      Mental Status: She is alert and oriented to person, place, and time.      Comments: At baseline mental status    Psychiatric:         Mood and Affect: Mood normal.         Behavior: Behavior normal. Behavior is cooperative.         Thought Content: Thought content normal.         Judgment: Judgment normal.       Advance Care Planning   Advance Directives:   [place]: Yes    Goals of Care: What is most important right now is to focus on spending time at home, avoiding the hospital, remaining as independent as possible, symptom/pain control, quality of life, even if it means sacrificing a little time. Accordingly, we have decided that the best plan to meet the patient's goals includes continuing with treatment.     Significant Labs: All pertinent labs within the past 24 hours have been reviewed.  CBC:   Recent Labs   Lab 10/30/23  0339   WBC 8.07   HGB 14.0   HCT 43.3   MCV 93   *     BMP:  No results for input(s): "GLU", "NA", "K", "CL", "CO2", "BUN", "CREATININE", "CALCIUM", "MG" in the last 24 hours.  LFT:  Lab Results   Component Value Date    " AST 16 10/30/2023    ALKPHOS 123 10/30/2023    BILITOT 1.0 10/30/2023     Albumin:   Albumin   Date Value Ref Range Status   10/30/2023 2.8 (L) 3.5 - 5.2 g/dL Final     Protein:   Total Protein   Date Value Ref Range Status   10/30/2023 6.1 6.0 - 8.4 g/dL Final     Lactic acid:   Lab Results   Component Value Date    LACTATE 1.0 10/26/2023    LACTATE 2.4 (H) 09/13/2023       Significant Imaging: I have reviewed all pertinent imaging results/findings within the past 24 hours.

## 2023-10-31 NOTE — ASSESSMENT & PLAN NOTE
"10/30/2023   - interval chart reviewed; discussed pt with MDT   - met with pt and daughter at bedside, with discharge pending for today  - pt and daughter disappointed with pt not qualifying for Inpatient rehab   - daughter continued to express frustration and stress related to care for pt; however they have made arrangements for pt to return to her apartment, with daughter available close by, with the assistance of care givers to help pt at home   - encouraged them to begin thinking about alternative living arrangements for pt if living at home does not go well, especially after daughter shared that returning to her home long term wouldn't be an option   - recommended soon/close follow up with outpatient palliative team who has been caring for pt (later determined to be [facility], Magdalene IRVING)  - emotional support provided; answered questions   - updated MDT and coordinated with CM/SW and outpatient palliative programs to determine that pt's prior palliative team was [facility]; discussed pt with [facility] team to update on change in living arrangement and need for soon/close follow up     10/27/2023  - consult received; interval chart reviewed in detail; discussed pt with Dr. Boyle (nephrology)   - pt known to myself and palliative medicine team from previous admission (3rd admission in 3 months)   - met with patient and daughter Tamar at bedside; introduction to palliative medicine team and role in current care and admission   - when asked pt how she has been doing since prior admit she responded "I'm dying"; explored her feelings about that statement; validated concerns but also reassured that I feel she does have some life left to live and my role in her having as much QOL in this time as possible  - pt/daugther provided update on pt progress since prior recent admission in September; she typically lives independently in her own home and was previously able to perform ADLs; since Sept. Discharge " "has been living with daughterTamar, with hopes to regain strength to return to her own home independently; pt and daughter acknowledge worsening debility with each admission over the past few months   - daughter shared physical and time stress of caring for mom at home and caregiver burnout  - GOC/ACP discussion; both pt and daughter with GOC for pt to regain strength and abilities to return to living independently at home; they are hopefull for inpatient rehab upon discharge   - did gently bring up the importance of developing a "plan B" if rehab does not go well or if in the future pt requires alternative living arrangement; pt and daughter with good insight into trajectory of pt's multiple life-limiting conditions; briefly encouraged starting to think about/exploring options of assisted living facilities while pt has an opportunity to thrive in that level of care as apposed to continuing to decline through multiple admissions to the point of being only NH appropriate if living independently or with daughter isn't an option in the future   - also revisited option of hospice when aligns with GOC as an extra form of support and care either at home or at an assisted living facility if pt gets to the point of continuing to come to hospital not aligning with her goals  - emotional support provided to pt and daughter; daughter tearful during some points of discussion and shared feeling of guilt related to her inability to fully care for pt at home   - allowed time for questions/concerns   - discussed pt with hospital primary, Dr. Alamo, GRETA/SW, and therapy team   "

## 2023-10-31 NOTE — PROGRESS NOTES
[facility]  Palliative Medicine  Progress Note    Patient Name: Barbara Rizo  MRN: [id number]  Admission Date: 10/26/2023  Hospital Length of Stay: 4 days  Code Status: Prior   Attending Provider: No att. providers found  Consulting Provider: Danyell Neil NP  Primary Care Physician: Paras Oro MD  Principal Problem:Acute on chronic diastolic heart failure    Patient information was obtained from patient, relative(s) and primary team.      Assessment/Plan:   Advance Care Planning     Palliative Care  ACP (advance care planning)  10/30/2023   - interval chart reviewed; discussed pt with MDT   - met with pt and daughter at bedside, with discharge pending for today  - pt and daughter disappointed with pt not qualifying for Inpatient rehab   - daughter continued to express frustration and stress related to care for pt; however they have made arrangements for pt to return to her apartment, with daughter available close by, with the assistance of care givers to help pt at home   - encouraged them to begin thinking about alternative living arrangements for pt if living at home does not go well, especially after daughter shared that returning to her home long term wouldn't be an option   - recommended soon/close follow up with outpatient palliative team who has been caring for pt (later determined to be [facility], Magdalene IRVING)  - emotional support provided; answered questions   - updated MDT and coordinated with CM/SW and outpatient palliative programs to determine that pt's prior palliative team was [facility]; discussed pt with [facility] team to update on change in living arrangement and need for soon/close follow up     10/27/2023  - consult received; interval chart reviewed in detail; discussed pt with Dr. Boyle (nephrology)   - pt known to myself and palliative medicine team from previous admission (3rd admission in 3 months)   - met with patient and daughter Tamar at bedside;  "introduction to palliative medicine team and role in current care and admission   - when asked pt how she has been doing since prior admit she responded "I'm dying"; explored her feelings about that statement; validated concerns but also reassured that I feel she does have some life left to live and my role in her having as much QOL in this time as possible  - pt/robinaugther provided update on pt progress since prior recent admission in September; she typically lives independently in her own home and was previously able to perform ADLs; since Sept. Discharge has been living with daughterTamar, with hopes to regain strength to return to her own home independently; pt and daughter acknowledge worsening debility with each admission over the past few months   - daughter shared physical and time stress of caring for mom at home and caregiver burnout  - GOC/ACP discussion; both pt and daughter with GOC for pt to regain strength and abilities to return to living independently at home; they are hopefull for inpatient rehab upon discharge   - did gently bring up the importance of developing a "plan B" if rehab does not go well or if in the future pt requires alternative living arrangement; pt and daughter with good insight into trajectory of pt's multiple life-limiting conditions; briefly encouraged starting to think about/exploring options of assisted living facilities while pt has an opportunity to thrive in that level of care as apposed to continuing to decline through multiple admissions to the point of being only NH appropriate if living independently or with daughter isn't an option in the future   - also revisited option of hospice when aligns with GOC as an extra form of support and care either at home or at an assisted living facility if pt gets to the point of continuing to come to hospital not aligning with her goals  - emotional support provided to pt and daughter; daughter tearful during some points of discussion " and shared feeling of guilt related to her inability to fully care for pt at home   - allowed time for questions/concerns   - discussed pt with hospital primary, Dr. Alamo, CM/SW, and therapy team     Other  Physical deconditioning  - pt previously has lived independently at home and able to perform all ADLs independently, with daughter Tamar close by for PRN assistance   - since 9/2023 admission pt has been temporarily living with daughter and as had gradual declines in abilities since (now requires assistance getting dressed etc)   - pt and family with GOC for inpatient rehab for physical reconditioned to be able to return to living independently which unfortunately pt did not meet qualifications for per MDT; plan for home health with PT  - in addition to pt's increasing debility; pts daughter exhibited and confirmed caregiver burnout; opened discussion of alterative plans to living with daughter if not able to return to indecently living in her own home after discharge (see ACP)     Neuro  Intermittent confusion  - pt oriented x4, able to participate in complex medical/GOC discussion at time of visit   - daughter agrees pt is currently at mental status baseline     Pulmonary  Pleural effusion on left  - required thoracentesis upon admit   - pt reports no significant change in symptoms     Cardiac/Vascular  * Acute on chronic diastolic heart failure  - chronic condition and acute symptoms noted (see WILKINSON)   - pt with increasing debility in the past month; intermitent dyspnea at rest and increased further with exertion; contributes to pt's overall appropriateness for hospice when aligns with GOC  - cardiology following   - continued management per hospital primary and cardiology     Atrial fibrillation with RVR  - chronic history noted; has required cardioversion during previous admissions   - contributes to overall poor cardiac function and symptoms (see CHF)     Pulmonary hypertension  - chronic history noted; see  CHF    Paroxysmal atrial fibrillation  - chronic condition noted   - has required cardioversion in previous admissions   - continued management per hospital primary and cardiology     Renal/  CKD (chronic kidney disease), stage IV  - chronic condition noted  - pt is closely followed by Dr. Boyle, Nephrology as OP with clear GOC for not wishing for HD in the future   - pt's  passed from CKD; good insight into trajectory of disease process   - contributes to future appropriateness for hospice, along with symptomatic diastolic HF, when aligns with GO     Oncology  Carcinoma of breast  - chronic history noted   - pt would not wish to undergo any aggressive treatment/chemo in the future    I will follow-up with patient. Please contact us if you have any additional questions.     Total visit time: 55 minutes    35 min visit time including: face to face time in discussion of symptom assessment, and exploring options and burdens of offered treatments.  This also includes non-face to face time preparing to see the patient including chart review, obtaining and/or reviewing separately obtained history, documenting clinical information in the electronic or other health record, independently interpreting results and communicating results to the patient/family/caregiver, family discussions by phone if not able to be present, coordination of care with other specialists, and discharge planning.     20 min ACP time spent: goals of care, advanced care planning, emotional support, formulating and communicating prognosis, exploring burden/ benefit of various approaches of treatment.     Danyell Neil NP  Palliative Medicine  [facility]    Subjective:     Chief Complaint:   Chief Complaint   Patient presents with    Abnormal Lab     Pt sent to ED by PCP for abnormal labs. Wants Kidney and liver functions checked. Sodium also low. Pt went for routine visit. Pt reports fatigue        HPI:   From H&P:  ""Barbara Rizo is a 83 y.o. female who has a past medical history of A-fib, Acute respiratory failure with hypoxia, Breast cancer, CKD, Community acquired pneumonia, Diabetes mellitus type II, Fatty liver, GERD, Hearing loss of both ears, Hemochromatosis, History of anemia due to CKD, History of pleural effusion, Hyperlipidemia, Hypertension, Macular degeneration, On home oxygen therapy, Osteoarthritis, Osteoporosis, Vaginal delivery, Vitamin D deficiency disease, and Wears partial dentures, presented to the ED with CC of Confusion.     Patient was sent in by PCP/home health labs hyponatremia and elevated creatinine of 2.2.  Unclear if point of care hyponatremia was correct at BNP taken at same time (noon) was 127 and repeat BNP showed sodium again at 127 four hours later-(POC electrolytes are often wrong).  Daughter states that patient has been intermittently confused for the past week or so, has not necessarily gotten worse but has been persistent.  She is oriented, A/O x3, however will often argue with daughter about things that are apparently wrong.  Denies hallucinations.  Has had a couple falls recently without head injury, has a bruise on her right thigh, without hip pain.  In the ED her liver enzymes were elevated,  and -which were normal on the last set of labs, and for many years normal.  Patient does have hemochromatosis and fair and ferritin is elevated at 800, ultrasound of liver with Doppler ordered.  Also patient has elevated BNP at 2876 and elevated JVD, echo ordered.  She has a fairly large left pleural effusion as well, however it is stable from previous.  She is on 4 L of home oxygen.  Denies chest pain and denies shortness of breath beyond her normal dyspnea."    Palliative medicine consulted for goals of care discussion and advance care planning; for details of visit, see advance care planning section of plan.       Hospital Course:  No notes on " "file    Medications:  Continuous Infusions:  Scheduled Meds:  PRN Meds:    Objective:     Vital Signs (Most Recent):  Temp: 97.6 °F (36.4 °C) (10/30/23 1036)  Pulse: (!) 58 (10/30/23 1036)  Resp: 17 (10/30/23 1036)  BP: 106/68 (10/30/23 1036)  SpO2: 97 % (10/30/23 1036) Vital Signs (24h Range):  Temp:  [98.3 °F (36.8 °C)] 98.3 °F (36.8 °C)  Pulse:  [79] 79  Resp:  [18] 18  SpO2:  [97 %] 97 %  BP: (112)/(67) 112/67     Weight: 65 kg (143 lb 4.8 oz)  Body mass index is 26.21 kg/m².       Physical Exam  Vitals and nursing note reviewed.   Constitutional:       General: She is awake.   HENT:      Head: Normocephalic and atraumatic.   Pulmonary:      Effort: Pulmonary effort is normal. No respiratory distress.      Comments: Some dyspnea with prolonged talking (at rest)   Musculoskeletal:      Right lower leg: Edema present.      Left lower leg: Edema present.   Neurological:      Mental Status: She is alert and oriented to person, place, and time.      Comments: At baseline mental status    Psychiatric:         Mood and Affect: Mood normal.         Behavior: Behavior normal. Behavior is cooperative.         Thought Content: Thought content normal.         Judgment: Judgment normal.       Advance Care Planning  Advance Directives:   [place]: Yes    Goals of Care: What is most important right now is to focus on spending time at home, avoiding the hospital, remaining as independent as possible, symptom/pain control, quality of life, even if it means sacrificing a little time. Accordingly, we have decided that the best plan to meet the patient's goals includes continuing with treatment.     Significant Labs: All pertinent labs within the past 24 hours have been reviewed.  CBC:   Recent Labs   Lab 10/30/23  0339   WBC 8.07   HGB 14.0   HCT 43.3   MCV 93   *     BMP:  No results for input(s): "GLU", "NA", "K", "CL", "CO2", "BUN", "CREATININE", "CALCIUM", "MG" in the last 24 hours.  LFT:  Lab Results   Component Value " Date    AST 16 10/30/2023    ALKPHOS 123 10/30/2023    BILITOT 1.0 10/30/2023     Albumin:   Albumin   Date Value Ref Range Status   10/30/2023 2.8 (L) 3.5 - 5.2 g/dL Final     Protein:   Total Protein   Date Value Ref Range Status   10/30/2023 6.1 6.0 - 8.4 g/dL Final     Lactic acid:   Lab Results   Component Value Date    LACTATE 1.0 10/26/2023    LACTATE 2.4 (H) 09/13/2023       Significant Imaging: I have reviewed all pertinent imaging results/findings within the past 24 hours.      Danyell Neil NP  Palliative Medicine  [facility]

## 2023-10-31 NOTE — TELEPHONE ENCOUNTER
"----- Message from Mae Mccormick sent at 10/30/2023  8:34 AM CDT -----  Regarding: FW: Endo scheduling    ----- Message -----  From: Idalia Negrete NP  Sent: 10/27/2023   2:25 PM CDT  To: [facility]  Subject: Endo scheduling                                  Procedure: EGD    Diagnosis: Dysphagia    Procedure Timin-12 weeks    #If within 4 weeks selected, please cricket as high priority#    #If greater than 12 weeks, please select "4-12 weeks" and delay sending until 2 months prior to requested date#     Provider: Any endoscopist    Location: [facility]    Additional Scheduling Information: Blood thinners and Advanced cardiac or pulmonary disease    Prep Specifications:N/A    Have you attached a patient to this message: Yes        "

## 2023-10-31 NOTE — ASSESSMENT & PLAN NOTE
- chronic history noted; has required cardioversion during previous admissions   - contributes to overall poor cardiac function and symptoms (see CHF)

## 2023-10-31 NOTE — PROGRESS NOTES
C3 nurse attempted to contact Barbara Rizo, patient's daughter, Tamar,  for a TCC post hospital discharge follow up call. The patient is unable to conduct the call @ this time. The patient requested a callback.    The patient has a scheduled [facility] appointment with Familia Lazo NP on 11/6/2023 @ 9 AM.

## 2023-10-31 NOTE — TELEPHONE ENCOUNTER
Contacted the patient to schedule an endoscopy procedure. Spoke to pt daughter. She stated to call back in a few days because they were headed back out to the hospital. Will f/u 11/06

## 2023-11-01 PROBLEM — S70.11XA HEMATOMA OF RIGHT THIGH: Status: ACTIVE | Noted: 2023-01-01

## 2023-11-01 PROBLEM — I50.20 HEART FAILURE WITH REDUCED EJECTION FRACTION: Status: ACTIVE | Noted: 2023-01-01

## 2023-11-01 PROBLEM — S30.1XXA RECTUS SHEATH HEMATOMA: Status: ACTIVE | Noted: 2023-01-01

## 2023-11-01 PROBLEM — R57.9 SHOCK: Status: ACTIVE | Noted: 2023-01-01

## 2023-11-01 NOTE — TELEPHONE ENCOUNTER
"----- Message from Олег Alston sent at 11/1/2023  1:14 PM CDT -----  Regarding: Daughter "Tamar"  Type: Patient Callback     Who called: Tamar     What is the request in detail: Daughter stated that the patient needs a rehab facility. Pt was just released from the hospital today. Daughter is very upset and concerned because patient possibly had a stroke.  Please call the daughter as soon as possible     Can the clinic reply by [facility]? Yes     Would the patient rather a call back or a response via [facility]? Callback     Best call back number: [phone]      Additional Information:    "

## 2023-11-01 NOTE — ED PROVIDER NOTES
Encounter Date: 11/1/2023    SCRIBE #1 NOTE: I, Chris Zaragoza, am scribing for, and in the presence of,  Elizabeth Hidalgo MD. I have scribed the following portions of the note - Other sections scribed: HPI, ROS, PE.       History     Chief Complaint   Patient presents with    Fall     Pt to ED via EMS with complaints of fall. Pt just discharged from this facility earlier today (was also here for a fall.) Pt now back after falling, pt has significant swelling to R hip. No shortening/rotation noted. + blood thinners. Unknown if hit head.      Barbara Rizo is a 83 y.o. female with a PMHx of Afib, CHF (w/ EF 35-40% per last TTE 10/2023), CKD stage 4, DM type 2, GERD, anemia, HLD, HTN, presents to the ED via EMS for R hip and R knee pain 2/2 fall this morning, was seen in the ED and sent home. Returning due to persistent pain, inability to ambulate at baseline.    Patient reports of R hip pain for the last several days, reports ambulating with a walker to the bathroom this morning, when her R knee gave out and she fell on her R gluteal. States that she was able to stand up and bear weight after the fall but unable to ambulate at baseline. Denies any head trauma or LOC. Endorses that she is on 4 L O2 at home. History provided by independent historian, patient's daughter is requesting a  consult, stating that she needs help with care. Reports of multiple recent falls with the most recent fall being this morning after which she was seen in the ED here. No medications taken PTA. No alleviating or exacerbating factors noted. Denies HA or any associated symptoms. Patient is currently on anticoagulants, Eliquis.           The history is provided by the patient and a caregiver. No  was used.     Review of patient's allergies indicates:  No Known Allergies  Past Medical History:   Diagnosis Date    A-fib     Acute respiratory failure with hypoxia 09/01/2015    Breast cancer     invasive ductal  carcinoma    Chronic heart failure with preserved ejection fraction 10/26/2023    GII dd    CKD (chronic kidney disease)     Community acquired pneumonia 08/26/2023    Diabetes mellitus type II     Fatty liver     GERD (gastroesophageal reflux disease)     Hearing loss of both ears     wears bilateral hearing aids    Hemochromatosis     History of anemia due to CKD     History of pleural effusion     with thoracentesis    Hyperlipidemia     Hypertension     Macular degeneration     On home oxygen therapy     Osteoarthritis     Left knee    Osteoporosis     Vaginal delivery     x2    Vitamin D deficiency disease     Wears partial dentures     upper removable bridge     Past Surgical History:   Procedure Laterality Date    AXILLARY NODE DISSECTION Left 10/18/2021    Procedure: LYMPHADENECTOMY, AXILLARY;  Surgeon: Darlene Roca MD;  Location: [facility] OR;  Service: General;  Laterality: Left;    BREAST BIOPSY      BREAST LUMPECTOMY      invasive ductal carcinoma    BREAST SURGERY Bilateral     Reduction r/t h/o fibrocystic disease    CARDIOVERSION N/A 9/15/2023    Procedure: Cardioversion;  Surgeon: Constantine Chambers MD;  Location: [facility] CATH LAB;  Service: Cardiology;  Laterality: N/A;    CHOLECYSTECTOMY  08/2015    EYE SURGERY      Cat Ext  OU    HYSTERECTOMY      partial due to uterine fibroids    INCISION AND DRAINAGE OF KNEE Left 09/17/2020    Procedure: INCISION AND DRAINAGE, KNEE;  Surgeon: Rolando Wagoner MD;  Location: [facility] OR;  Service: Orthopedics;  Laterality: Left;    INJECTION FOR SENTINEL NODE IDENTIFICATION Left 10/18/2021    Procedure: INJECTION, FOR SENTINEL NODE IDENTIFICATION;  Surgeon: Darlene Roca MD;  Location: [facility] OR;  Service: General;  Laterality: Left;    JOINT REPLACEMENT Left 05/13/2013    TKR    JOINT REPLACEMENT Right 08/03/2015    TKR    MASTECTOMY, PARTIAL Left 10/18/2021    Procedure: MASTECTOMY, PARTIAL -- wire;  Surgeon: Darlene Roca MD;  Location: [facility] OR;  Service: General;   Laterality: Left;  RN PREOP 10/15/2021   VACCINATED-----NEED H/P AND ORDERS    SENTINEL LYMPH NODE BIOPSY Left 10/18/2021    Procedure: BIOPSY, LYMPH NODE, SENTINEL;  Surgeon: Darlene Roca MD;  Location: [facility] OR;  Service: General;  Laterality: Left;    THORACENTESIS      TOTAL KNEE ARTHROPLASTY Right 2015    left knee done 5/2013    TREATMENT OF CARDIAC ARRHYTHMIA N/A 9/15/2023    Procedure: Cardioversion or Defibrillation;  Surgeon: Constantine Chambers MD;  Location: [facility] CATH LAB;  Service: Cardiology;  Laterality: N/A;    WOUND DRESSING Left 09/17/2020    Procedure: WOUND VAC APPLICATION;  Surgeon: Rolando Wagoner MD;  Location: [facility] OR;  Service: Orthopedics;  Laterality: Left;     Family History   Problem Relation Age of Onset    Cancer Mother         stomach    Hypertension Mother     Aneurysm Father         abdominal    No Known Problems Sister     No Known Problems Sister     No Known Problems Brother     No Known Problems Daughter     No Known Problems Son      Social History     Tobacco Use    Smoking status: Former     Passive exposure: Past    Smokeless tobacco: Never   Substance Use Topics    Alcohol use: Not Currently    Drug use: No     Review of Systems   Constitutional:  Negative for fever.   HENT:  Negative for sore throat.    Respiratory:  Negative for cough and shortness of breath.    Cardiovascular:  Negative for chest pain and leg swelling.   Gastrointestinal:  Negative for abdominal pain, diarrhea, nausea and vomiting.   Genitourinary:  Negative for dysuria and hematuria.   Musculoskeletal:  Positive for arthralgias. Negative for back pain and neck pain.   Skin:  Negative for rash.   Neurological:  Negative for syncope and headaches.   Hematological:  Bruises/bleeds easily.       Physical Exam     Initial Vitals   BP Pulse Resp Temp SpO2   11/01/23 1352 11/01/23 1352 11/01/23 1352 11/01/23 1829 11/01/23 1542   118/79 96 18 96.3 °F (35.7 °C) 100 %      MAP       --                Physical  Exam    Nursing note and vitals reviewed.  Constitutional: She appears well-developed and well-nourished.   HENT:   Head: Normocephalic and atraumatic.   Eyes: Conjunctivae and EOM are normal. Pupils are equal, round, and reactive to light.   Neck:   Normal range of motion.  Cardiovascular:  Normal rate.           Pulmonary/Chest: No respiratory distress.   No chest wall tenderness.    Abdominal: She exhibits no distension.   Musculoskeletal:         General: Normal range of motion.      Cervical back: Normal range of motion.      Comments: Chronic bony protrusion to L dorsum. Leg lift intact bilaterally. Knee flexion intact bilaterally. NO C/T/L tenderness.      Neurological: She is alert. No cranial nerve deficit. GCS score is 15. GCS eye subscore is 4. GCS verbal subscore is 5. GCS motor subscore is 6.   Skin: Skin is warm and dry.   Small abrasion to R lateral elbow. Scattered ecchymosis across upper extremities and R knee. Large chronic hematoma to R hip laterally.    Psychiatric: She has a normal mood and affect. Thought content normal.               ED Course   Critical Care    Date/Time: 11/1/2023 9:42 PM    Performed by: Selwyn Buenrostro MD  Authorized by: Selwyn Buenrostro MD  Direct patient critical care time: 15 minutes  Ordering / reviewing critical care time: 10 minutes  Documentation critical care time: 10 minutes  Consult with family critical care time: 5 minutes  Total critical care time (exclusive of procedural time) : 40 minutes  Critical care time was exclusive of separately billable procedures and treating other patients and teaching time.  Critical care was necessary to treat or prevent imminent or life-threatening deterioration of the following conditions: circulatory failure.  Critical care was time spent personally by me on the following activities: blood draw for specimens, development of treatment plan with patient or surrogate, obtaining history from patient or surrogate, ordering and  performing treatments and interventions, ordering and review of laboratory studies, ordering and review of radiographic studies, pulse oximetry, re-evaluation of patient's condition, examination of patient, evaluation of patient's response to treatment, review of old charts, interpretation of cardiac output measurements and discussions with consultants.  Comments: Possible cardiogenic shock requiring vasopressors.        Labs Reviewed   CBC W/ AUTO DIFFERENTIAL - Abnormal; Notable for the following components:       Result Value    RBC 3.88 (*)     Hemoglobin 11.8 (*)     Hematocrit 35.2 (*)     RDW 16.2 (*)     Immature Granulocytes 0.6 (*)     Gran # (ANC) 10.0 (*)     Immature Grans (Abs) 0.07 (*)     Gran % 81.7 (*)     Lymph % 12.3 (*)     All other components within normal limits   COMPREHENSIVE METABOLIC PANEL - Abnormal; Notable for the following components:    Sodium 127 (*)     Chloride 85 (*)     Glucose 149 (*)     BUN 45 (*)     Creatinine 2.2 (*)     Total Protein 5.8 (*)     Albumin 2.7 (*)     Total Bilirubin 1.3 (*)     ALT 53 (*)     eGFR 22 (*)     All other components within normal limits   URINALYSIS, REFLEX TO URINE CULTURE - Abnormal; Notable for the following components:    Protein, UA Trace (*)     All other components within normal limits    Narrative:     Specimen Source->Urine   CBC W/ AUTO DIFFERENTIAL - Abnormal; Notable for the following components:    RBC 3.54 (*)     Hemoglobin 10.6 (*)     Hematocrit 32.3 (*)     RDW 16.1 (*)     Platelets 149 (*)     Gran # (ANC) 9.8 (*)     Immature Grans (Abs) 0.06 (*)     Gran % 80.2 (*)     Lymph % 14.2 (*)     All other components within normal limits   LACTIC ACID, PLASMA - Abnormal; Notable for the following components:    Lactate (Lactic Acid) 3.5 (*)     All other components within normal limits    Narrative:     LACTIC ACID   critical result(s) called and verbal readback obtained   from LEO BENOIT. by LMRachael 11/01/2023 18:45   B-TYPE  NATRIURETIC PEPTIDE - Abnormal; Notable for the following components:    BNP 2,825 (*)     All other components within normal limits   TROPONIN I - Abnormal; Notable for the following components:    Troponin I 0.040 (*)     All other components within normal limits   ISTAT PROCEDURE - Abnormal; Notable for the following components:    POC Glucose 153 (*)     POC BUN 55 (*)     POC Creatinine 2.4 (*)     POC Sodium 122 (*)     POC Chloride 83 (*)     POC TCO2 (MEASURED) 32 (*)     POC Hematocrit 34 (*)     All other components within normal limits   CK   CK   HEMOGLOBIN   ISTAT CHEM8          Imaging Results              X-Ray Femur Ap/Lat Right (Final result)  Result time 11/01/23 20:38:44      Final result by Sabrina Wagoner MD (11/01/23 20:38:44)                   Impression:      No acute displaced fracture seen.      Electronically signed by: Sabrina Wagoner MD  Date:    11/01/2023  Time:    20:38               Narrative:    EXAMINATION:  XR FEMUR 2 VIEW RIGHT    CLINICAL HISTORY:  Unspecified fall, initial encounter    TECHNIQUE:  AP and lateral views of the right femur were performed.    COMPARISON:  Concurrent CT abdomen pelvis with extension through the bilateral thighs.  Right knee radiographs December 2017.    FINDINGS:  No evidence of acute displaced fracture or dislocation.  Remote nonunited fracture is seen of the patella.  Postsurgical changes of right total knee arthroplasty are seen.  Degenerative changes are seen of the right hip.    See separate CT report for known large right proximal thigh hematoma details.                                       CT Head Without Contrast (Final result)  Result time 11/01/23 19:42:08      Final result by Shahid Bolivar MD (11/01/23 19:42:08)                   Impression:      No acute large vascular territory infarct or intracranial hemorrhage identified.  If persistent neurologic deficit, MRI brain can be obtained.      Electronically signed by: Shahid Bolivar  MD  Date:    11/01/2023  Time:    19:42               Narrative:    EXAMINATION:  CT HEAD WITHOUT CONTRAST    CLINICAL HISTORY:  Mental status change, unknown cause;    TECHNIQUE:  Low dose axial CT images obtained throughout the head without intravenous contrast. Sagittal and coronal reconstructions were performed.    COMPARISON:  Head CT most recent 08/26/2023    FINDINGS:  Intracranial compartment:    Age-related generalized cerebral volume loss.  Ventricles are midline and stable in size and configuration without distortion by mass effect or acute hydrocephalus.  No extra-axial blood or fluid collections.  Similar chronic nonspecific partial empty sella configuration.    Grossly stable distribution of patchy hypoattenuation of the supratentorial white matter.  Bilateral basal ganglia and skull base atherosclerotic vascular calcifications noted.  No parenchymal mass, hemorrhage, edema or major vascular distribution infarct.    Skull/extracranial contents (limited evaluation): No fracture.  Small retention cyst versus polyp within the right maxillary sinus.  Mastoid air cells and remaining partially imaged paranasal sinuses are essentially clear.                                       CT Abdomen Pelvis With IV Contrast (Final result)  Result time 11/01/23 20:00:06   Procedure changed from CTA Abdomen and Pelvis     Final result by Sabrina Wagoner MD (11/01/23 20:00:06)                   Impression:      1. Large right thigh hematoma.  No evidence of retroperitoneal hematoma as clinically questioned.  No obvious active arterial bleeding seen in this region on the basis of this CTA.  2. Suspected smaller hematoma versus mass lesion involving the lower left rectus abdominus musculature.  3. Left pleural effusion with left lower lobe compressive atelectasis/consolidation.  4. Postsurgical changes and additional findings as detailed above.      Electronically signed by: Sabrina Wagoner  MD  Date:    11/01/2023  Time:    20:00               Narrative:    EXAMINATION:  CT ABDOMEN PELVIS WITH IV CONTRAST    CLINICAL HISTORY:  Retroperitoneal hematoma suspected;    TECHNIQUE:  CTA of the abdomen and pelvis was obtained which extends through the bilateral thighs.  75 cc Omnipaque 350 IV contrast was administered.    COMPARISON:  CT pelvis from the same date.    FINDINGS:  There is left pleural effusion with left lower lobe compressive atelectasis/consolidation.  No significant right-sided pleural effusion.  Heart is mildly enlarged.  Peripheral calcified lesion is seen along the anterior aspect of the left hepatic lobe.  Gallbladder has been removed.  Stomach, spleen, pancreas, and adrenal glands show no acute abnormalities.  Asymmetric atrophic changes are seen of the left kidney.  No evidence of right or left-sided hydronephrosis.  No abnormalities are seen along the ureteral courses.  Urinary bladder is unremarkable.  Uterus has been removed.  No evidence of bowel obstruction.  No free air or free fluid seen.  There is redemonstration of suspected small hematoma versus mass lesion seen of the left inferior rectus abdominus musculature.  Large hematoma is again demonstrated involving the subcutaneous tissues of the proximal lateral aspect of the right thigh.  This measures at least 12 x 8 x 20 cm in maximum dimensions.  No obvious active bleeding seen on the basis of this CTA.  Note is made of a small calcification and this level which was visualized on earlier noncontrast CT pelvis.  Abdominal aorta is nonaneurysmal.  There is narrowing seen at the celiac artery origin.  SMA, bilateral renal arteries, SHAYLA, and bilateral iliacs are patent.  There is moderate abdominal aortic atherosclerosis.  Bilateral common femoral arteries and SFA's are patent.  Extensive metallic hardening artifact is seen at the knees due to bilateral knee arthroplasty changes.  There is right suprapatellar joint effusion also  noted.  Degenerative changes are seen throughout the spine.  No acute osseous abnormality identified, allowing for mild motion blurring limitations.                                       X-Ray Chest AP Portable (Final result)  Result time 11/01/23 18:52:37      Final result by Naeem Wood DO (11/01/23 18:52:37)                   Impression:      Slightly improved aeration of the left lung base.    Small left pleural effusion.      Electronically signed by: Naeem Wood  Date:    11/01/2023  Time:    18:52               Narrative:    EXAMINATION:  XR CHEST AP PORTABLE    CLINICAL HISTORY:  Hypotension, unspecified    TECHNIQUE:  Single frontal view of the chest was performed.    COMPARISON:  10/27/2023.    FINDINGS:  The lungs are hypoexpanded.  There is a bandlike opacity in the left lung base.  There is a small left pleural effusion.  The aeration of the left lung base is slightly improved from prior.  The cardiac silhouette is enlarged.  Osseous structures are intact, noting degenerative changes.  There are calcifications of the thoracic aorta.                                       CT Pelvis Without Contrast (Final result)  Result time 11/01/23 16:06:07      Final result by Dorota Meyer MD (11/01/23 16:06:07)                   Impression:      No fracture identified of the pelvis or proximal femur bilaterally.    Large hematoma (18.4 x 9.9 x 4.9 cm) right lateral thigh region, not all included in the field of view, consider ultrasound or CT of the right thigh.    Small lobulated area of abnormal attenuation (6.2 x 5.1 x 2.7 cm) within the inferior anterior left rectus abdominal muscle suggestive of a hematoma.    Mild inflammatory change of the rectal wall and perirectal soft tissue could be seen with proctitis.    Significant retention of urine in the bladder, to correlate clinically.      Electronically signed by: Dorota Meyer MD  Date:    11/01/2023  Time:    16:06                Narrative:    EXAMINATION:  CT PELVIS WITHOUT CONTRAST    CLINICAL HISTORY:  Pelvic fracture;    TECHNIQUE:  1.25 mm axial images of the pelvis obtained, coronal and sagittal images reformatted.    COMPARISON:  None    FINDINGS:  No definite proximal femur fracture identified.  No hip joint effusion.    The pubic symphysis, bilateral superior and inferior pubic rami, bilateral ischial tuberosity, bilateral iliac wings, bilateral sacroiliac joints appear normal.  The sacrum appears intact.    There is facet joint osseous hypertrophy at the lower lumbar spine.    Lower left anterior rectus abdominal muscle area of abnormal density measuring 6.2 x 5.1 x 2.7 cm suggestive of an abdominal wall hematoma.    Very large fluid collection, right to the greater trochanter, not all included in the field of view, measuring at least 9.9 x 4.9 cm extending over at least 18.4 cm in length, it is not all included in the field of view and concerning for large hematoma.    The bladder is moderately distended.  There is mild stool retention.  Mild circumferential wall thickening at the rectum with mild inflammatory change in the perirectal region.  The uterus is removed.    No intra-abdominal free air or free fluid.                                       X-Ray Pelvis Routine AP (Final result)  Result time 11/01/23 16:09:15      Final result by Wei Cortez MD (11/01/23 16:09:15)                   Impression:      No acute radiographic abnormality.  Recommend follow-up as clinically indicated.      Electronically signed by: Wei Cortez  Date:    11/01/2023  Time:    16:09               Narrative:    EXAMINATION:  XR PELVIS ROUTINE AP    CLINICAL HISTORY:  Unspecified fall, initial encounter, right hip swelling.    TECHNIQUE:  AP view of the pelvis was performed.    COMPARISON:  None.    FINDINGS:  Single limited suboptimal x-ray of the pelvis.    No acute fracture, subluxation or dislocation is identified.  Recommend follow-up as  clinically indicated.                                       Medications   NORepinephrine 4 mg in dextrose 5% 250 mL infusion (premix) (1 mcg/kg/min × 64.9 kg Intravenous Rate/Dose Change 11/1/23 2058)   lactated ringers bolus 1,000 mL (0 mLs Intravenous Stopped 11/1/23 1851)   iohexoL (OMNIPAQUE 350) injection 75 mL (75 mLs Intravenous Given 11/1/23 1921)   digoxin injection 500 mcg (500 mcg Intravenous Given 11/1/23 2142)   metoclopramide HCl injection 10 mg (10 mg Intravenous Given 11/1/23 2044)   fentaNYL 50 mcg/mL injection 25 mcg (25 mcg Intravenous Given 11/1/23 2135)     Medical Decision Making  Barbara Rizo is a 83 y.o. female with a PMHx of Afib, CHF (w/ EF 35-40% per last TTE 10/2023), CKD stage 4, DM type 2, GERD, anemia, HLD, HTN, presents to the ED via EMS for R hip and R knee pain 2/2 multiple falls, last one today, generalized weakness, unable to ambulate at baseline.    Differential: MSK vs anemia vs metabolic derangements vs infectious    -less concern for fracture given leg lift intact, knee flexion intact, bearing weight after fall, may be subacute pelvic fracture causing worsening pain with ambulation  - R lateral thigh hematoma and L lower abd wall hematoma seen on CT pelvis obtained to work up for subacute fracture    Amount and/or Complexity of Data Reviewed  Independent Historian:      Details: See HPI.   External Data Reviewed: notes.  Labs: ordered. Decision-making details documented in ED Course.  Radiology: ordered. Decision-making details documented in ED Course.  ECG/medicine tests:  Decision-making details documented in ED Course.    Risk  Prescription drug management.  Decision regarding hospitalization.        MDM  I took direction of the patient at 6:45 p.m..  Generalized weakness with recurrent falls over the last few days.  Patient had a right thigh hematoma.  There was a questionable possible fracture on CT imaging.  This was discussed with radiology who states this is  likely secondary to movement artifact.  X-ray shows no fracture.  Given this is the location of the hematoma if pain persists and in discussion with Orthopedics patient may need further imaging.  Hematoma appears stabilized at this time.  Does not appear to be expanding on physical exam.  No active bleed noted on CT.  Regarding hypotension consideration for cardiogenic versus hemorrhagic shock.  Patient's blood pressure decreased following IV fluid.  History of CHF.  The patient's extremities are cool to touch.  Elevated lactic acid.  Case discussed with cardiology for possible cardiogenic shock.  Recommendation is to start digoxin rather than amiodarone due to history of prolonged QTC.  Mild hyponatremia.  Patient was started on Levophed.    Please see ED course for orthopedic and cardiology consultations    Selwyn Buenrostro MD          Scribe Attestation:   Scribe #1: I performed the above scribed service and the documentation accurately describes the services I performed. I attest to the accuracy of the note.        ED Course as of 11/01/23 2144   Wed Nov 01, 2023   1630 Large hematoma of right lateral thigh and left lower abdominal wall seen on CT, consistent with physical exam, appears subacute on exam, CBC pending, will add on CPK [LF]   1700 Pt is unable to urinate, no LE sensation deficits, dorsiflexion equal bilaterally, no back pain, low concern for cord compression   [LF]   1703 Given large hematomas on exam with hemoglobin from 14 to 11 over the last few days, concerning for expanding hematoma, however unable to obtain CTA of pelvis/R thigh to look for arterial bleed due to elevated creatinine, likely given AC use is slow venous bleed [LF]   1759 Pt's daughter Tamar Arechiga reports patient is DNR, decision made with son, patient and daughter during last hospitalization [LF]   1806 Pt is drowsy, reports generalized pain, /61 obtained from lower leg, however unable to obtain BP in upper extremities,  radial pulses not palpable, hct stable (34 on istat) [LF]   1831 Sodium(!): 127 [JM]   1833  Left Ventricle: The left ventricle is normal in size. There is mild concentric hypertrophy. There is moderately reduced systolic function with a visually estimated ejection fraction of 35 - 40%.  ·  Left Atrium: Left atrium is severely dilated.  ·  Right Ventricle: Mild right ventricular enlargement. Systolic function is mildly reduced.   [JM]   1845 Case taken over from Dr. Hidalgo [JM]   1921 EKG 12-lead  Time 6:55 p.m.     Rate 111, not sinus, irregular rhythm, left axis deviation.   .  No ST elevation or depression.  T-wave inversion V2.    AFib RVR.   [JM]   1958 Patient re-evaluated.  Patient does have pain to the right thigh.  Suspicion of right femur fracture.  Patient appears to have nonpalpable pulses in all the extremities.  Cool to touch extremities.   [JM]   1959 Case discussed with Dr. Abebe CAMILO fib RVR   Levophed, amiodarone. Hold anticoagulation until stable.  [JM]   2009 Dr. Lomas , cardiology  States that the patient is well known to him.  States that patient has had prolonged QTC in the past and does not recommend amiodarone.    Digoxin 0.5 IV  Levophed ok [JM]   2015 Case discussed with Dr. YOSELIN Paul.  Discussed mildly displaced mid femur fracture.  Obtaining x-ray for full confirmation.  This was spotted on CT image.  Suspicion of mid femur fracture.   [JM]   2128 Patient reports she is having right leg pain.  Will be given small dose fentanyl due to the hypotension. [JM]   2136 Recommending giving digoxin based on conversation with cardiology. Discussed with nurse, Amisha.    [JM]      ED Course User Index  [JM] Selwyn Buenrostro MD  [LF] Elizabeth Hidalgo MD                  I, Selwyn Buenrostro, personally performed the services described in this documentation. All medical record entries made by the scribe were at my direction and in my presence. I have reviewed the chart and agree that the  record reflects my personal performance and is accurate and complete.                      Clinical Impression:   Final diagnoses:  [W19.XXXA] Fall  [I95.9] Hypotension  [E87.1] Hyponatremia (Primary)  [M79.604] Right leg pain  [R94.31] Prolonged Q-T interval on ECG  [R57.0] Cardiogenic shock  [S80.11XA] Leg hematoma, right, initial encounter  [N17.9] FELICIA (acute kidney injury)        ED Disposition Condition    Admit Stable                Selwyn Buenrostro MD  11/01/23 [id number]

## 2023-11-01 NOTE — ED NOTES
Upon arrival to room to perform in and out cath. Patient was midsentence and started snoring and mumbling. Patient shook by nurse and became alert. This occurred multiple times. MD notified. Manual BP 70s/40s. Patient placed on 1L LR bolus with pressure bag. Patient's fingertips noted to be cyanotic. MD at bedside performing assessment. Will obtain new CT scans and x-rays.

## 2023-11-01 NOTE — ED NOTES
Patient had a bowel movement. Small amount, soft bowel. Reports recently taking Colace after disimpaction. Mepilex placed on sacrum. Skin raw around rectum and bleeding. Barrier cream placed.

## 2023-11-01 NOTE — Clinical Note
Diagnosis: Cardiogenic shock [785.51.ICD-9-CM]   Admitting Provider:: CASSI LEON [[id number]]   Reason for IP Medical Treatment  (Clinical interventions that can only be accomplished in the IP setting? ) :: Cardiogenic shock, possible fracture/right leg hematoma   I certify that Inpatient services for greater than or equal to 2 midnights are medically necessary:: Yes   Plans for Post-Acute care--if anticipated (pick the single best option):: A. No post acute care anticipated at this time

## 2023-11-01 NOTE — TELEPHONE ENCOUNTER
Dr. Oro is currently out of the office. Patient is currently in the ER   Patient would need to complete evaluation in ER     For any sort of facility, it does require a face to face encounter and it is best to get a  involved so that this process can be streamlined.

## 2023-11-01 NOTE — TELEPHONE ENCOUNTER
Daughter called very upset crying stating her mom fell and was taken to ED where absolutely nothing  was done  to assist her with her mom, asking to speak with provider ASAP to get orders for placement in rehab or long term care. Daughter informed that she can speak with the provider caring for mom in ED about speaking with a  or  who can offer information/assistance in placement to a facility for 24 hr care. Daughter started yelling and crying that no one cares as there should be another provider here at the clinic that could look at patient chart since pcp is out and write orders for placement and ended the call abruptly

## 2023-11-01 NOTE — PLAN OF CARE
Consult addressed. JEANNE was consulted for ER evaluation. JEANNE met with patient and her daughter Tamar at her bedside. Patient's daughter stated that patient was just discharge from the hospital 2 days ago with HH. Daughter shared that patient was living her for 8 weeks and now she is back to her apartment and today she was trying to walk to the restroom with her walker her knees just gave up on her and she fell. Daughter concerns about mom not able to take care herself alone and she is not able to take care of her. Daughter stated that patient started to need 24 hours care. She shared that NH is an option but not the first one. She would like for her mother to go to a rehab/SNF to gain her strength back and then she can go back to her home.  JEANNE talked with daughter about long term care services through medicaid she stated that her mom will most likely not qualified for Medicaid.     SC informed Dr. Hidalgo  she stated that she thinks patient will need admission for her large hematomas, she needs to make sure they aren't expanding over time due to her blood thinners.

## 2023-11-01 NOTE — ED TRIAGE NOTES
Barbara Rizo, a 83 y.o. female, presents to ED via EMS with complaints of fall that occurred PTA. Reports using walker to go to restroom and her knee gave out and fell on her hip. No shortening or rotation noted. Reports her knee and hip hurt more than they did earlier. Daughter is requesting placement to NH. MD notified.

## 2023-11-02 NOTE — PLAN OF CARE
Case Management Assessment     PCP: Paras Oro  Pharmacy: [facility]    Patient Arrived From: home  Existing Help at Home: daughter    Barriers to Discharge: none    Discharge Plan:    A. Resume HH   B. Home   JEANNE completed initial assessment and discussed discharge planning with patient and her daughter Tamar at her bedside.Patient's daughter stated that patient was just discharge from the hospital 2 days ago with HH. Daughter shared that patient was living her for 8 weeks and now she is back to her apartment and today she was trying to walk to the restroom with her walker her knees just gave up on her and she fell. Daughter concerns about mom not able to take care herself alone and she is not able to take care of her. Daughter stated that patient started to need 24 hours care. She shared that NH is an option but not the first one. She would like for her mother to go to a rehab/SNF to gain her strength back and then she can go back to her home.  SW talked with daughter about long term care services through medicaid she stated that her mom will most likely not qualified for Medicaid.      11/01/23 2202   Discharge Assessment   Assessment Type Discharge Planning Assessment   Confirmed/corrected address, phone number and insurance Yes   Confirmed Demographics Correct on Facesheet   Source of Information patient   Communicated SALLY with patient/caregiver Date not available/Unable to determine   People in Home alone   Do you expect to return to your current living situation? Yes   Do you have help at home or someone to help you manage your care at home? Yes   Who are your caregiver(s) and their phone number(s)? Tamar Arechiga (Daughter)   [phone] (Mobile)   Prior to hospitilization cognitive status: Alert/Oriented   Current cognitive status: Alert/Oriented   Equipment Currently Used at Home cane, straight;walker, rolling;wheelchair;oxygen   Readmission within 30 days? Yes   Patient currently being followed by outpatient case  management? No   Do you currently have service(s) that help you manage your care at home? Yes   How Many hours does patient receive services 2   Name and Contact number of agency [facility] [phone]   Is the pt/caregiver preference to resume services with current agency Yes   Do you take prescription medications? Yes   Do you have prescription coverage? Yes   Coverage PHN   Do you have any problems affording any of your prescribed medications? No   Is the patient taking medications as prescribed? yes   Who is going to help you get home at discharge? Tamar Arechiga (Daughter)   [phone] (Mobile)   How do you get to doctors appointments? family or friend will provide   Are you on dialysis? No   Do you take coumadin? No   DME Needed Upon Discharge  none   Discharge Plan discussed with: Patient;Adult children   Transition of Care Barriers None   Discharge Plan A Home Health   Discharge Plan B Home

## 2023-11-02 NOTE — ASSESSMENT & PLAN NOTE
Patient with acute kidney injury/acute renal failure likely due to pre-renal azotemia due to IVVD or ATN 2/2 hypotension. FELICIA is currently stable. Baseline creatinine 1.6 - Labs reviewed- Renal function/electrolytes with Estimated Creatinine Clearance: 17 mL/min (A) (based on SCr of 2.2 mg/dL (H)). according to latest data. Monitor urine output and serial BMP and adjust therapy as needed. Avoid nephrotoxins and renally dose meds for GFR listed above.

## 2023-11-02 NOTE — EICU
"EICU BRIEF INITIAL EVALUATION:       HISTORY:      83-year-old woman admitted to ICU for hypotension requiring pressors.  She is on apixaban for atrial fibrillation She had a fall at home and has a left buttock and thigh hematoma.  Hemoglobin was 14 on 10/30 and is now 10.3.  BNP 2800, lactate 3.5.  She was initially on Levophed and was switched to phenylephrine due to tachycardia.  History of atrial fibrillation on Eliquis, systolic CHF, hypertension, type 2 diabetes, hyperlipidemia, CKD 4, prolonged QT, breast cancer, GERD, hemochromatosis.    Patient is DNR    DVT prophylaxis SCDs   GI prophylaxis famotidine     /85, P 100, R 16, O2 sat 96  BP 87/52, P 101, R R 30, O2 sat 99  EKG with atrial fibrillation rate 111, QRS widening, inverted T-waves anteriorly .    Echocardiogram done 10/27/2023:   "Left Ventricle: The left ventricle is normal in size. There is mild concentric hypertrophy. There is moderately reduced systolic function with a visually estimated ejection fraction of 35 - 40%.    Left Atrium: Left atrium is severely dilated.    Right Ventricle: Mild right ventricular enlargement. Systolic function is mildly reduced.    Right Atrium: Right atrium is severely dilated.    Aortic Valve: There is mild stenosis. Aortic valve area by VTI is 1.22 cm². Aortic valve peak velocity is 1.70 m/s. Mean gradient is 7 mmHg. The dimensionless index is 0.41. There is mild aortic regurgitation.    Mitral Valve: There is mild to moderate regurgitation.    Tricuspid Valve: There is moderate regurgitation.    Pulmonic Valve: There is mild to moderate regurgitation.    Pulmonary Artery: The estimated pulmonary artery systolic pressure is 49 mmHg.    IVC/SVC: Normal venous pressure at 3 mmHg.  "    Chest x-ray with right arm PICC line in good position in superior vena cava approximately 2 cm superior to the ana      CAMERA ASSESSMENT: Yes      TELEMETRY: Reviewed      NOTES: Reviewed     LABS: Reviewed    "   IMAGING: Personally reviewed.      DISCUSSED with bedside nurse        ASSESSMENT AND PLAN:       Shock most likely hemorrhagic-while patient was being assessed repeat lactate came back at 10.3 while I was assessing her and starting to make arrangements for transfusion she lost her pulse, became bradycardic and subsequently asystolic.  She was declared dead by Dr. Taveras at 12:53 a.m.      I have reviewed the plan of care for the patient and agree with the plan of care unless noted otherwise above.      JACKELYN Lares MD  [facility] Attending  [phone]    This report has been created through the use of [employer] dictation software. Typographical and content errors may occur with this process. While efforts are made to detect and correct such errors, in some cases errors will persist. For this reason, wording in this document should be considered in the proper context and not strictly verbatim

## 2023-11-02 NOTE — NURSING
Patient condition worsen as we were working with her at the bedside. Patient heart rate marlon down quickly with wide complexes, the patient stop breathing. Dr. Luda pettite in room when all this was going on. Patient is DNR. Dr. Taveras notified of patient death. Then he came by the bedside to pronounce. MD notified daughter of death.

## 2023-11-02 NOTE — ASSESSMENT & PLAN NOTE
CT pelvis without contrast showed a large hematoma (18.4 x 9.9 x 4.9 cm) right lateral thigh region, with a small lobulated area of abnormal attenuation (6.2 x 5.1 x 2.7 cm) within the inferior anterior left rectus abdominal muscle suggestive of a hematoma and mild inflammatory changes of the rectal wall (possible proctitis).    CT abdomen and pelvis with IV contrast redemonstrated a large right thigh hematoma without obvious of RP bleed or active arterial bleeding, and a suspected smaller hematoma versus mass of left rectus abdominal musculature, along with a left pleural effusion with left lower lobe compressive atelectasis/consolidation.      Hold [facility]   Orthopedic surgery consult   Trend Hb

## 2023-11-02 NOTE — ASSESSMENT & PLAN NOTE
CT pelvis without contrast showed a large hematoma (18.4 x 9.9 x 4.9 cm) right lateral thigh region, with a small lobulated area of abnormal attenuation (6.2 x 5.1 x 2.7 cm) within the inferior anterior left rectus abdominal muscle suggestive of a hematoma and mild inflammatory changes of the rectal wall (possible proctitis).   CT abdomen and pelvis with IV contrast redemonstrated a large right thigh hematoma without obvious of RP bleed or active arterial bleeding, and a suspected smaller hematoma versus mass of left rectus abdominal musculature, along with a left pleural effusion with left lower lobe compressive atelectasis/consolidation.      Hold [facility]   Orthopedic surgery consult   Trend Hb

## 2023-11-02 NOTE — HPI
This is an 83-year-old female with a past medical history of AFib (on Eliquis), HFrEF (EF: EF: 35-40%), hypertension, type 2 diabetes, hyperlipidemia, CKD 4, prolonged QT, breast cancer, who presents after a fall.    Patient had multiple recent hospitalizations, most recently (10/26-10/30) after presenting with altered mental status.  She was hospitalized for heart failure exacerbation and AFib with RVR.  She later had an ED presentation on 10/31 for rectal bleeding which was thought to be secondary to constipation/straining.  Patient returned to the ED on 11/01 after a fall on her left elbow, and was managed symptomatically.  She returned later the same day for recurrent falls, persistent pain, inability to ambulate and right hip pain.    In the ED, the patient was tachycardic (110, in AFib with RVR) and hypotensive requiring Levophed.  Labs were remarkable for worsening anemia (10.6 (11/1) < 11.8 (11/1) < 14.0 (10/30)), elevated creatinine (2.2-baseline of 1.6), hyponatremia (127), elevated lactic acid (3.0),  elevated BNP (2825 - around baseline), elevated troponin (0.040).  EKG showed AFib with RVR, and prolonged QTc (584).  CT pelvis without contrast showed a large hematoma (18.4 x 9.9 x 4.9 cm) right lateral thigh region, with a small lobulated area of abnormal attenuation (6.2 x 5.1 x 2.7 cm) within the inferior anterior left rectus abdominal muscle suggestive of a hematoma and mild inflammatory changes of the rectal wall (possible proctitis).  CT abdomen and pelvis with IV contrast redemonstrated a large right thigh hematoma without obvious of RP bleed or active arterial bleeding, and a suspected smaller hematoma versus mass of left rectus abdominal musculature, along with a left pleural effusion with left lower lobe compressive atelectasis/consolidation.  Cardiology was consulted and recommended digoxin and Levophed.  Patient was given 1 L of LR, Reglan 10 mg IV, fentanyl 25 mcg and was started on Levophed.   She was admitted for further management.

## 2023-11-02 NOTE — SUBJECTIVE & OBJECTIVE
Past Medical History:   Diagnosis Date    A-fib     Acute respiratory failure with hypoxia 09/01/2015    Breast cancer     invasive ductal carcinoma    Chronic heart failure with preserved ejection fraction 10/26/2023    GII dd    CKD (chronic kidney disease)     Community acquired pneumonia 08/26/2023    Diabetes mellitus type II     Fatty liver     GERD (gastroesophageal reflux disease)     Hearing loss of both ears     wears bilateral hearing aids    Hemochromatosis     History of anemia due to CKD     History of pleural effusion     with thoracentesis    Hyperlipidemia     Hypertension     Macular degeneration     On home oxygen therapy     Osteoarthritis     Left knee    Osteoporosis     Vaginal delivery     x2    Vitamin D deficiency disease     Wears partial dentures     upper removable bridge       Past Surgical History:   Procedure Laterality Date    AXILLARY NODE DISSECTION Left 10/18/2021    Procedure: LYMPHADENECTOMY, AXILLARY;  Surgeon: Darlene Roca MD;  Location: [facility] OR;  Service: General;  Laterality: Left;    BREAST BIOPSY      BREAST LUMPECTOMY      invasive ductal carcinoma    BREAST SURGERY Bilateral     Reduction r/t h/o fibrocystic disease    CARDIOVERSION N/A 9/15/2023    Procedure: Cardioversion;  Surgeon: Constantine Chambers MD;  Location: [facility] CATH LAB;  Service: Cardiology;  Laterality: N/A;    CHOLECYSTECTOMY  08/2015    EYE SURGERY      Cat Ext  OU    HYSTERECTOMY      partial due to uterine fibroids    INCISION AND DRAINAGE OF KNEE Left 09/17/2020    Procedure: INCISION AND DRAINAGE, KNEE;  Surgeon: Rolando Wagoner MD;  Location: [facility] OR;  Service: Orthopedics;  Laterality: Left;    INJECTION FOR SENTINEL NODE IDENTIFICATION Left 10/18/2021    Procedure: INJECTION, FOR SENTINEL NODE IDENTIFICATION;  Surgeon: Darlene Roca MD;  Location: [facility] OR;  Service: General;  Laterality: Left;    JOINT REPLACEMENT Left 05/13/2013    TKR    JOINT REPLACEMENT Right 08/03/2015    TKR    MASTECTOMY,  PARTIAL Left 10/18/2021    Procedure: MASTECTOMY, PARTIAL -- wire;  Surgeon: Darlene Roca MD;  Location: [facility] OR;  Service: General;  Laterality: Left;  RN PREOP 10/15/2021   VACCINATED-----NEED H/P AND ORDERS    SENTINEL LYMPH NODE BIOPSY Left 10/18/2021    Procedure: BIOPSY, LYMPH NODE, SENTINEL;  Surgeon: Darlene Roca MD;  Location: [facility] OR;  Service: General;  Laterality: Left;    THORACENTESIS      TOTAL KNEE ARTHROPLASTY Right 2015    left knee done 5/2013    TREATMENT OF CARDIAC ARRHYTHMIA N/A 9/15/2023    Procedure: Cardioversion or Defibrillation;  Surgeon: Constantine Chambers MD;  Location: [facility] CATH LAB;  Service: Cardiology;  Laterality: N/A;    WOUND DRESSING Left 09/17/2020    Procedure: WOUND VAC APPLICATION;  Surgeon: Rolando Wagoner MD;  Location: [facility] OR;  Service: Orthopedics;  Laterality: Left;       Review of patient's allergies indicates:  No Known Allergies    Current Facility-Administered Medications on File Prior to Encounter   Medication    [COMPLETED] acetaminophen tablet 1,000 mg    denosumab (PROLIA) injection 60 mg    [COMPLETED] neomycin-bacitracin-polymyxin ointment     Current Outpatient Medications on File Prior to Encounter   Medication Sig    acetaminophen (TYLENOL) 500 MG tablet Take 2 tablets (1,000 mg total) by mouth every 8 (eight) hours as needed.    apixaban (ELIQUIS) 2.5 mg Tab TAKE ONE TABLET BY MOUTH TWICE DAILY    atorvastatin (LIPITOR) 40 MG tablet TAKE ONE TABLET BY MOUTH ONCE DAILY    bumetanide (BUMEX) 1 MG tablet Take 1 tablet (1 mg total) by mouth once daily.    calcium carbonate (TUMS) 200 mg calcium (500 mg) chewable tablet Take 2 tablets by mouth.    docusate sodium (COLACE) 100 MG capsule Take 1 capsule (100 mg total) by mouth 2 (two) times daily.    droNABinol (MARINOL) 2.5 MG capsule Take 1 capsule (2.5 mg total) by mouth 2 (two) times daily.    ergocalciferol (ERGOCALCIFEROL) 50,000 unit Cap Take 50,000 Units by mouth every 30 days.     folic acid  (FOLVITE) 1 MG tablet TAKE 1 TABLET BY MOUTH EVERY DAY    metoprolol tartrate (LOPRESSOR) 25 MG tablet Take 1 tablet (25 mg total) by mouth 2 (two) times daily.    mirabegron (MYRBETRIQ) 25 mg Tb24 ER tablet Take 1 tablet (25 mg total) by mouth once daily.    mupirocin (BACTROBAN) 2 % ointment Apply topically every evening.    polyethylene glycol (GLYCOLAX) 17 gram PwPk Take 17 g by mouth 3 (three) times daily as needed (No stool for 2 days.).     Family History       Problem Relation (Age of Onset)    Aneurysm Father    Cancer Mother    Hypertension Mother    No Known Problems Sister, Sister, Brother, Daughter, Son          Tobacco Use    Smoking status: Former     Passive exposure: Past    Smokeless tobacco: Never   Substance and Sexual Activity    Alcohol use: Not Currently    Drug use: No    Sexual activity: Not Currently     Review of Systems   Unable to perform ROS: Unstable vital signs     Objective:     Vital Signs (Most Recent):  Temp: 96.8 °F (36 °C) (11/01/23 2158)  Pulse: 102 (11/01/23 2206)  Resp: 12 (11/01/23 2206)  BP: 91/63 (11/01/23 2206)  SpO2: 100 % (11/01/23 2016) Vital Signs (24h Range):  Temp:  [96.3 °F (35.7 °C)-97.5 °F (36.4 °C)] 96.8 °F (36 °C)  Pulse:  [] 102  Resp:  [12-20] 12  SpO2:  [100 %] 100 %  BP: ()/(50-80) 91/63     Weight: 64.9 kg (143 lb)  Body mass index is 27.02 kg/m².     Physical Exam  Vitals and nursing note reviewed.   Constitutional:       General: She is in acute distress.      Appearance: Normal appearance. She is ill-appearing.   HENT:      Head: Normocephalic and atraumatic.      Nose: Nose normal.      Mouth/Throat:      Mouth: Mucous membranes are moist.   Eyes:      Extraocular Movements: Extraocular movements intact.   Cardiovascular:      Rate and Rhythm: Tachycardia present. Rhythm irregular.      Pulses: Normal pulses.      Heart sounds: No murmur heard.  Pulmonary:      Effort: Pulmonary effort is normal. No respiratory distress.      Breath sounds:  Decreased air movement present.   Abdominal:      General: Abdomen is flat.      Palpations: Abdomen is soft.      Tenderness: There is no abdominal tenderness.   Musculoskeletal:      Right lower leg: No edema.      Left lower leg: No edema.      Comments: Pain elicited with ROM of right hip    Skin:     Comments: Multiple ecchymoses scattered throughout upper extremities    Neurological:      Mental Status: She is alert. Mental status is at baseline.                Significant Labs: All pertinent labs within the past 24 hours have been reviewed.    Significant Imaging: I have reviewed all pertinent imaging results/findings within the past 24 hours.

## 2023-11-02 NOTE — PROGRESS NOTES
Pharmacokinetic Initial Assessment: IV Vancomycin    Assessment/Plan:    Initiate intravenous vancomycin with loading dose of 1000   mg once with subsequent doses when random concentrations are less than 20 mcg/mL  Desired empiric serum trough concentration is 10 to 20 mcg/mL  Draw vancomycin random level on 11-2-23 at 1300.  Pharmacy will continue to follow and monitor vancomycin.      Please contact pharmacy at extension [phone] with any questions regarding this assessment.     Thank you for the consult,   Abbypeyman Damicoon       Patient brief summary:  Barbara Rizo is a 83 y.o. female initiated on antimicrobial therapy with IV Vancomycin for treatment of suspected sepsis    Drug Allergies:   Review of patient's allergies indicates:  No Known Allergies    Actual Body Weight:   64.9 kg    Renal Function:   Estimated Creatinine Clearance: 17 mL/min (A) (based on SCr of 2.2 mg/dL (H)).,     Dialysis Method (if applicable):  N/A    CBC (last 72 hours):  Recent Labs   Lab Result Units 10/30/23  0339 11/01/23  1622 11/01/23  1820 11/02/23  0011   WBC K/uL 8.07 12.27 12.20 15.62*   Hemoglobin g/dL 14.0 11.8* 10.6* 10.3*   Hematocrit % 43.3 35.2* 32.3* 31.7*   Platelets K/uL 149* 158 149* 148*   Gran % % 75.5* 81.7* 80.2* 87.6*   Lymph % % 14.7* 12.3* 14.2* 6.7*   Mono % % 8.2 5.1 4.9 4.9   Eosinophil % % 1.0 0.1 0.1 0.0   Basophil % % 0.2 0.2 0.1 0.1   Differential Method  Automated Automated Automated Automated       Metabolic Panel (last 72 hours):  Recent Labs   Lab Result Units 10/30/23  0339 11/01/23  1622 11/01/23  1814   Sodium mmol/L 128* 127*  --    Potassium mmol/L 4.3 5.1  --    Chloride mmol/L 88* 85*  --    CO2 mmol/L 28 28  --    Glucose mg/dL 106 149*  --    Glucose, UA   --   --  Negative   BUN mg/dL 35* 45*  --    Creatinine mg/dL 1.6* 2.2*  --    Albumin g/dL 2.8* 2.7*  --    Total Bilirubin mg/dL 1.0 1.3*  --    Alkaline Phosphatase U/L 123 110  --    AST U/L 16 19  --    ALT U/L 96* 53*  --   "  Magnesium mg/dL 1.9  --   --    Phosphorus mg/dL 2.8  --   --        Drug levels (last 3 results):  No results for input(s): "VANCOMYCINRA", "VANCORANDOM", "VANCOMYCINPE", "VANCOPEAK", "VANCOMYCINTR", "VANCOTROUGH" in the last 72 hours.    Microbiologic Results:  Microbiology Results (last 7 days)       Procedure Component Value Units Date/Time    Blood culture [[id number]] Collected: 11/01/23 2356    Order Status: Sent Specimen: Blood from Peripheral, Left Arm Updated: 11/01/23 5162            "

## 2023-11-02 NOTE — H&P
[facility] Medicine  History & Physical    Patient Name: Barbara Rizo  MRN: [id number]  Patient Class: IP- Inpatient  Admission Date: 11/1/2023  Attending Physician: Selwyn Buenrostro MD   Primary Care Provider: Paras Oro MD         Patient information was obtained from patient and ER records.     Subjective:     Principal Problem:Shock    Chief Complaint:   Chief Complaint   Patient presents with    Fall     Pt to ED via EMS with complaints of fall. Pt just discharged from this facility earlier today (was also here for a fall.) Pt now back after falling, pt has significant swelling to R hip. No shortening/rotation noted. + blood thinners. Unknown if hit head.         HPI: This is an 83-year-old female with a past medical history of AFib (on Eliquis), HFrEF (EF: EF: 35-40%), hypertension, type 2 diabetes, hyperlipidemia, CKD 4, prolonged QT, breast cancer, who presents after a fall.    Patient had multiple recent hospitalizations, most recently (10/26-10/30) after presenting with altered mental status.  She was hospitalized for heart failure exacerbation and AFib with RVR.  She later had an ED presentation on 10/31 for rectal bleeding which was thought to be secondary to constipation/straining.  Patient returned to the ED on 11/01 after a fall on her left elbow, and was managed symptomatically.  She returned later the same day for recurrent falls, persistent pain, inability to ambulate and right hip pain.    In the ED, the patient was tachycardic (110, in AFib with RVR) and hypotensive requiring Levophed.  Labs were remarkable for worsening anemia (10.6 (11/1) < 11.8 (11/1) < 14.0 (10/30)), elevated creatinine (2.2-baseline of 1.6), hyponatremia (127), elevated lactic acid (3.0),  elevated BNP (2825 - around baseline), elevated troponin (0.040).  EKG showed AFib with RVR, and prolonged QTc (584).  CT pelvis without contrast showed a large hematoma (18.4 x 9.9 x 4.9 cm) right  lateral thigh region, with a small lobulated area of abnormal attenuation (6.2 x 5.1 x 2.7 cm) within the inferior anterior left rectus abdominal muscle suggestive of a hematoma and mild inflammatory changes of the rectal wall (possible proctitis).  CT abdomen and pelvis with IV contrast redemonstrated a large right thigh hematoma without obvious of RP bleed or active arterial bleeding, and a suspected smaller hematoma versus mass of left rectus abdominal musculature, along with a left pleural effusion with left lower lobe compressive atelectasis/consolidation.  Cardiology was consulted and recommended digoxin and Levophed.  Patient was given 1 L of LR, Reglan 10 mg IV, fentanyl 25 mcg and was started on Levophed.  She was admitted for further management.      Past Medical History:   Diagnosis Date    A-fib     Acute respiratory failure with hypoxia 09/01/2015    Breast cancer     invasive ductal carcinoma    Chronic heart failure with preserved ejection fraction 10/26/2023    GII dd    CKD (chronic kidney disease)     Community acquired pneumonia 08/26/2023    Diabetes mellitus type II     Fatty liver     GERD (gastroesophageal reflux disease)     Hearing loss of both ears     wears bilateral hearing aids    Hemochromatosis     History of anemia due to CKD     History of pleural effusion     with thoracentesis    Hyperlipidemia     Hypertension     Macular degeneration     On home oxygen therapy     Osteoarthritis     Left knee    Osteoporosis     Vaginal delivery     x2    Vitamin D deficiency disease     Wears partial dentures     upper removable bridge       Past Surgical History:   Procedure Laterality Date    AXILLARY NODE DISSECTION Left 10/18/2021    Procedure: LYMPHADENECTOMY, AXILLARY;  Surgeon: Darlene Roca MD;  [facility];  Service: General;  Laterality: Left;    BREAST BIOPSY      BREAST LUMPECTOMY      invasive ductal carcinoma    BREAST SURGERY Bilateral      Reduction r/t h/o fibrocystic disease    CARDIOVERSION N/A 9/15/2023    Procedure: Cardioversion;  Surgeon: Constantine Chambers MD;  Location: [facility] CATH LAB;  Service: Cardiology;  Laterality: N/A;    CHOLECYSTECTOMY  08/2015    EYE SURGERY      Cat Ext  OU    HYSTERECTOMY      partial due to uterine fibroids    INCISION AND DRAINAGE OF KNEE Left 09/17/2020    Procedure: INCISION AND DRAINAGE, KNEE;  Surgeon: Rolando Wagoner MD;  Location: [facility] OR;  Service: Orthopedics;  Laterality: Left;    INJECTION FOR SENTINEL NODE IDENTIFICATION Left 10/18/2021    Procedure: INJECTION, FOR SENTINEL NODE IDENTIFICATION;  Surgeon: Darlene Roca MD;  Location: [facility] OR;  Service: General;  Laterality: Left;    JOINT REPLACEMENT Left 05/13/2013    TKR    JOINT REPLACEMENT Right 08/03/2015    TKR    MASTECTOMY, PARTIAL Left 10/18/2021    Procedure: MASTECTOMY, PARTIAL -- wire;  Surgeon: Darlene Roca MD;  Location: [facility] OR;  Service: General;  Laterality: Left;  RN PREOP 10/15/2021   VACCINATED-----NEED H/P AND ORDERS    SENTINEL LYMPH NODE BIOPSY Left 10/18/2021    Procedure: BIOPSY, LYMPH NODE, SENTINEL;  Surgeon: Darlene Roca MD;  Location: [facility] OR;  Service: General;  Laterality: Left;    THORACENTESIS      TOTAL KNEE ARTHROPLASTY Right 2015    left knee done 5/2013    TREATMENT OF CARDIAC ARRHYTHMIA N/A 9/15/2023    Procedure: Cardioversion or Defibrillation;  Surgeon: Constantine Chambers MD;  Location: [facility] CATH LAB;  Service: Cardiology;  Laterality: N/A;    WOUND DRESSING Left 09/17/2020    Procedure: WOUND VAC APPLICATION;  Surgeon: Rolando Wagoner MD;  Location: [facility] OR;  Service: Orthopedics;  Laterality: Left;       Review of patient's allergies indicates:  No Known Allergies    Current Facility-Administered Medications on File Prior to Encounter   Medication    [COMPLETED] acetaminophen tablet 1,000 mg    denosumab (PROLIA) injection 60 mg    [COMPLETED] neomycin-bacitracin-polymyxin ointment      Current Outpatient Medications on File Prior to Encounter   Medication Sig    acetaminophen (TYLENOL) 500 MG tablet Take 2 tablets (1,000 mg total) by mouth every 8 (eight) hours as needed.    apixaban (ELIQUIS) 2.5 mg Tab TAKE ONE TABLET BY MOUTH TWICE DAILY    atorvastatin (LIPITOR) 40 MG tablet TAKE ONE TABLET BY MOUTH ONCE DAILY    bumetanide (BUMEX) 1 MG tablet Take 1 tablet (1 mg total) by mouth once daily.    calcium carbonate (TUMS) 200 mg calcium (500 mg) chewable tablet Take 2 tablets by mouth.    docusate sodium (COLACE) 100 MG capsule Take 1 capsule (100 mg total) by mouth 2 (two) times daily.    droNABinol (MARINOL) 2.5 MG capsule Take 1 capsule (2.5 mg total) by mouth 2 (two) times daily.    ergocalciferol (ERGOCALCIFEROL) 50,000 unit Cap Take 50,000 Units by mouth every 30 days.     folic acid (FOLVITE) 1 MG tablet TAKE 1 TABLET BY MOUTH EVERY DAY    metoprolol tartrate (LOPRESSOR) 25 MG tablet Take 1 tablet (25 mg total) by mouth 2 (two) times daily.    mirabegron (MYRBETRIQ) 25 mg Tb24 ER tablet Take 1 tablet (25 mg total) by mouth once daily.    mupirocin (BACTROBAN) 2 % ointment Apply topically every evening.    polyethylene glycol (GLYCOLAX) 17 gram PwPk Take 17 g by mouth 3 (three) times daily as needed (No stool for 2 days.).     Family History       Problem Relation (Age of Onset)    Aneurysm Father    Cancer Mother    Hypertension Mother    No Known Problems Sister, Sister, Brother, Daughter, Son          Tobacco Use    Smoking status: Former     Passive exposure: Past    Smokeless tobacco: Never   Substance and Sexual Activity    Alcohol use: Not Currently    Drug use: No    Sexual activity: Not Currently     Review of Systems   Unable to perform ROS: Unstable vital signs     Objective:     Vital Signs (Most Recent):  Temp: 96.8 °F (36 °C) (11/01/23 2158)  Pulse: 102 (11/01/23 2206)  Resp: 12 (11/01/23 2206)  BP: 91/63 (11/01/23 2206)  SpO2: 100 % (11/01/23 2016) Vital  Signs (24h Range):  Temp:  [96.3 °F (35.7 °C)-97.5 °F (36.4 °C)] 96.8 °F (36 °C)  Pulse:  [] 102  Resp:  [12-20] 12  SpO2:  [100 %] 100 %  BP: ()/(50-80) 91/63     Weight: 64.9 kg (143 lb)  Body mass index is 27.02 kg/m².     Physical Exam  Vitals and nursing note reviewed.   Constitutional:       General: She is in acute distress.      Appearance: Normal appearance. She is ill-appearing.   HENT:      Head: Normocephalic and atraumatic.      Nose: Nose normal.      Mouth/Throat:      Mouth: Mucous membranes are moist.   Eyes:      Extraocular Movements: Extraocular movements intact.   Cardiovascular:      Rate and Rhythm: Tachycardia present. Rhythm irregular.      Pulses: Normal pulses.      Heart sounds: No murmur heard.  Pulmonary:      Effort: Pulmonary effort is normal. No respiratory distress.      Breath sounds: Decreased air movement present.   Abdominal:      General: Abdomen is flat.      Palpations: Abdomen is soft.      Tenderness: There is no abdominal tenderness.   Musculoskeletal:      Right lower leg: No edema.      Left lower leg: No edema.      Comments: Pain elicited with ROM of right hip    Skin:     Comments: Multiple ecchymoses scattered throughout upper extremities    Neurological:      Mental Status: She is alert. Mental status is at baseline.                Significant Labs: All pertinent labs within the past 24 hours have been reviewed.    Significant Imaging: I have reviewed all pertinent imaging results/findings within the past 24 hours.    Assessment/Plan:     * Shock  Differential diagnosis:  - hemorrhagic shock, given presence of large hematoma 2/2 falls and downtrending hemoglobin  - septic shock, given evidence of proctitis and left-sided atelectasis/effusion  - cardiogenic shock, given reduced EF, elevated BNP and reportedly worsening blood pressure with fluids    Antibiotics given-   Antibiotics (72h ago, onward)    Start     Stop Route Frequency Ordered    11/01/23  2315  piperacillin-tazobactam (ZOSYN) 4.5 g in dextrose 5 % in water (D5W) 100 mL IVPB (MB+)         -- IV Every 12 hours (non-standard times) 11/01/23 2207 11/01/23 2307  vancomycin - pharmacy to dose  (vancomycin IVPB (PEDS and ADULTS))        See James for full Linked Orders Report.    -- IV pharmacy to manage frequency 11/01/23 2207        Results for orders placed during the hospital encounter of 10/26/23    Echo    Interpretation Summary    Left Ventricle: The left ventricle is normal in size. There is mild concentric hypertrophy. There is moderately reduced systolic function with a visually estimated ejection fraction of 35 - 40%.    Left Atrium: Left atrium is severely dilated.    Right Ventricle: Mild right ventricular enlargement. Systolic function is mildly reduced.    Right Atrium: Right atrium is severely dilated.    Aortic Valve: There is mild stenosis. Aortic valve area by VTI is 1.22 cm². Aortic valve peak velocity is 1.70 m/s. Mean gradient is 7 mmHg. The dimensionless index is 0.41. There is mild aortic regurgitation.    Mitral Valve: There is mild to moderate regurgitation.    Tricuspid Valve: There is moderate regurgitation.    Pulmonic Valve: There is mild to moderate regurgitation.    Pulmonary Artery: The estimated pulmonary artery systolic pressure is 49 mmHg.    IVC/SVC: Normal venous pressure at 3 mmHg.    Latest lactate reviewed-  Recent Labs   Lab 11/01/23  1820   LACTATE 3.5*     Organ dysfunction indicated by Acute kidney injury    Fluid challenge Contraindicated- Fluid bolus is contraindicated in this patient due to Congestive Heart Failure     Post- resuscitation assessment Yes Perfusion exam was performed within 6 hours of septic shock presentation after bolus shows Adequate tissue perfusion assessed by non-invasive monitoring       Will Start Pressors- Levophed/Maurisio for MAP of 65    Plan:   - Continue broad spectrum antibiotics   - Monitor Hb closely   - Judicious  fluid use   - Cardiology consult  - Obtain ABG/VBG from central line once established to calculate OUMAR  - Switch Levophed to Amurisio given persistent tachycardia/Afib w/ RVR  - Trend perfusion parameters   - Wean pressor support as tolerated       Atrial fibrillation with RVR  Patient is currently in atrial fibrillation.[id number] Score: 5. Anticoagulation indicated, held due to bleeding   Digoxin PRN   Telemetry monitoring   Cardiology consult     Rectus sheath hematoma  CT pelvis without contrast showed a large hematoma (18.4 x 9.9 x 4.9 cm) right lateral thigh region, with a small lobulated area of abnormal attenuation (6.2 x 5.1 x 2.7 cm) within the inferior anterior left rectus abdominal muscle suggestive of a hematoma and mild inflammatory changes of the rectal wall (possible proctitis).    CT abdomen and pelvis with IV contrast redemonstrated a large right thigh hematoma without obvious of RP bleed or active arterial bleeding, and a suspected smaller hematoma versus mass of left rectus abdominal musculature, along with a left pleural effusion with left lower lobe compressive atelectasis/consolidation.      Hold Eliquis   Orthopedic surgery consult   Trend Hb    Hematoma of right thigh  CT pelvis without contrast showed a large hematoma (18.4 x 9.9 x 4.9 cm) right lateral thigh region, with a small lobulated area of abnormal attenuation (6.2 x 5.1 x 2.7 cm) within the inferior anterior left rectus abdominal muscle suggestive of a hematoma and mild inflammatory changes of the rectal wall (possible proctitis).   CT abdomen and pelvis with IV contrast redemonstrated a large right thigh hematoma without obvious of RP bleed or active arterial bleeding, and a suspected smaller hematoma versus mass of left rectus abdominal musculature, along with a left pleural effusion with left lower lobe compressive atelectasis/consolidation.      Hold Eliquis   Orthopedic surgery consult   Trend Hb    Heart failure with reduced  ejection fraction  Results for orders placed during the hospital encounter of 10/26/23    Echo    Interpretation Summary    Left Ventricle: The left ventricle is normal in size. There is mild concentric hypertrophy. There is moderately reduced systolic function with a visually estimated ejection fraction of 35 - 40%.    Left Atrium: Left atrium is severely dilated.    Right Ventricle: Mild right ventricular enlargement. Systolic function is mildly reduced.    Right Atrium: Right atrium is severely dilated.    Aortic Valve: There is mild stenosis. Aortic valve area by VTI is 1.22 cm². Aortic valve peak velocity is 1.70 m/s. Mean gradient is 7 mmHg. The dimensionless index is 0.41. There is mild aortic regurgitation.    Mitral Valve: There is mild to moderate regurgitation.    Tricuspid Valve: There is moderate regurgitation.    Pulmonic Valve: There is mild to moderate regurgitation.    Pulmonary Artery: The estimated pulmonary artery systolic pressure is 49 mmHg.    IVC/SVC: Normal venous pressure at 3 mmHg.    BNP  Recent Labs   Lab 11/01/23  1820   BNP 2,825*       Diurese when able.     QT prolongation  Noted. Avoid QTc prolonging agents       JOCELIN (obstructive sleep apnea)  Outpatient follow up       ACP (advance care planning)  Advance Care Planning     Date: 11/01/2023    Code Status  Patient has an LaPOST stating she is DNR. Code status updated in Epic.           Type 2 diabetes mellitus with stage 4 chronic kidney disease, without long-term current use of insulin  Glycemic protocol  LDSS    Acute kidney injury superimposed on chronic kidney disease  Patient with acute kidney injury/acute renal failure likely due to pre-renal azotemia due to IVVD or ATN 2/2 hypotension. FELICIA is currently stable. Baseline creatinine 1.6 - Labs reviewed- Renal function/electrolytes with Estimated Creatinine Clearance: 17 mL/min (A) (based on SCr of 2.2 mg/dL (H)). according to latest data. Monitor urine output and  serial BMP and adjust therapy as needed. Avoid nephrotoxins and renally dose meds for GFR listed above.    Essential hypertension  Hold home medications given hypotension    Hyperlipidemia  Resume statin        VTE Risk Mitigation (From admission, onward)         Ordered     IP VTE HIGH RISK PATIENT  Once         11/01/23 2211     Place sequential compression device  Until discontinued         11/01/23 2211                               Shahab Taveras MD  Department of Hospital Medicine  [facility] [place] [facility]    N/A  Family History   Problem Relation Age of Onset    Cancer Mother         stomach    Hypertension Mother     Aneurysm Father         abdominal    No Known Problems Sister     No Known Problems Sister     No Known Problems Brother     No Known Problems Daughter     No Known Problems Son      Pertinent information:

## 2023-11-02 NOTE — NURSING
[facility]  Nurses Note -- 4 Eyes      11/2/2023       Skin assessed on: Admit      [x] No Pressure Injuries Present    []Prevention Measures Documented    [] Yes LDA  for Pressure Injury Previously documented     [] Yes New Pressure Injury Discovered   [] LDA for New Pressure Injury Added      Attending RN:  Aleyda Mccormick, TOSHIA     Second RN:Hayde Duarte RN

## 2023-11-02 NOTE — ASSESSMENT & PLAN NOTE
Patient is currently in atrial fibrillation.[id number] Score: 5. Anticoagulation indicated, held due to bleeding   Digoxin PRN   Telemetry monitoring   Cardiology consult

## 2023-11-02 NOTE — DISCHARGE SUMMARY
[facility] Medicine  Discharge Summary      Patient Name: Barbara Rizo  MRN: [id number]  [facility]: [id number]  Patient Class: IP- Inpatient  Admission Date: 11/1/2023  Hospital Length of Stay: 1 days  Discharge Date and Time:  11/02/2023 2:16 AM  Attending Physician: Bony Dalton MD   Discharging Provider: Shahab Taveras MD  Primary Care Provider: Paras Oro MD    Primary Care Team: Networked reference to record PCT     HPI:   This is an 83-year-old female with a past medical history of AFib (on Eliquis), HFrEF (EF: EF: 35-40%), hypertension, type 2 diabetes, hyperlipidemia, CKD 4, prolonged QT, breast cancer, who presents after a fall.    Patient had multiple recent hospitalizations, most recently (10/26-10/30) after presenting with altered mental status.  She was hospitalized for heart failure exacerbation and AFib with RVR.  She later had an ED presentation on 10/31 for rectal bleeding which was thought to be secondary to constipation/straining.  Patient returned to the ED on 11/01 after a fall on her left elbow, and was managed symptomatically.  She returned later the same day for recurrent falls, persistent pain, inability to ambulate and right hip pain.    In the ED, the patient was tachycardic (110, in AFib with RVR) and hypotensive requiring Levophed.  Labs were remarkable for worsening anemia (10.6 (11/1) < 11.8 (11/1) < 14.0 (10/30)), elevated creatinine (2.2-baseline of 1.6), hyponatremia (127), elevated lactic acid (3.0),  elevated BNP (2825 - around baseline), elevated troponin (0.040).  EKG showed AFib with RVR, and prolonged QTc (584).  CT pelvis without contrast showed a large hematoma (18.4 x 9.9 x 4.9 cm) right lateral thigh region, with a small lobulated area of abnormal attenuation (6.2 x 5.1 x 2.7 cm) within the inferior anterior left rectus abdominal muscle suggestive of a hematoma and mild inflammatory changes of the rectal wall (possible proctitis).  CT abdomen  and pelvis with IV contrast redemonstrated a large right thigh hematoma without obvious of RP bleed or active arterial bleeding, and a suspected smaller hematoma versus mass of left rectus abdominal musculature, along with a left pleural effusion with left lower lobe compressive atelectasis/consolidation.  Cardiology was consulted and recommended digoxin and Levophed.  Patient was given 1 L of LR, Reglan 10 mg IV, fentanyl 25 mcg and was started on Levophed.  She was admitted for further management.      * No surgery found *      Hospital Course:   This is an 83-year-old female with a past medical history of AFib (on Eliquis), HFrEF (EF: EF: 35-40%), hypertension, type 2 diabetes, hyperlipidemia, CKD 4, prolonged QT, breast cancer, who presents after a fall.     Patient had multiple recent hospitalizations, most recently (10/26-10/30) after presenting with altered mental status.  She was hospitalized for heart failure exacerbation and AFib with RVR.  She later had an ED presentation on 10/31 for rectal bleeding which was thought to be secondary to constipation/straining.  Patient returned to the ED on 11/01 after a fall on her left elbow, and was managed symptomatically.  She returned later the same day for recurrent falls, persistent pain, inability to ambulate and right hip pain.     In the ED, the patient was tachycardic (110, in AFib with RVR) and hypotensive requiring Levophed.  Labs were remarkable for worsening anemia (10.6 (11/1) < 11.8 (11/1) < 14.0 (10/30)), elevated creatinine (2.2-baseline of 1.6), hyponatremia (127), elevated lactic acid (3.0),  elevated BNP (2825 - around baseline), elevated troponin (0.040).  EKG showed AFib with RVR, and prolonged QTc (584).  CT pelvis without contrast showed a large hematoma (18.4 x 9.9 x 4.9 cm) right lateral thigh region, with a small lobulated area of abnormal attenuation (6.2 x 5.1 x 2.7 cm) within the inferior anterior left rectus abdominal muscle suggestive of  a hematoma and mild inflammatory changes of the rectal wall (possible proctitis).  CT abdomen and pelvis with IV contrast redemonstrated a large right thigh hematoma without obvious of RP bleed or active arterial bleeding, and a suspected smaller hematoma versus mass of left rectus abdominal musculature, along with a left pleural effusion with left lower lobe compressive atelectasis/consolidation.  Cardiology was consulted and recommended digoxin and Levophed. Patient was given 1 L of LR, Reglan 10 mg IV, Digoxin 500 mcg, fentanyl 25 mcg and was started on Levophed.  She was admitted for further management.    Patient was admitted for management of undifferentiated shock. She was started on broad spectrum antibiotics, and levophed was switched to phenylephrine given RVR. A PICC line was placed. Further work up was pending/in process. Patient unfortunately became more hypotensive, BP was undetectable and quickly became bradycardic and went into asystole. Patient is DNR. She was pronounced dead at 12:53 AM. Family came to bedside, condolences given. Preliminary cause of death is shock, likely mixed (cardiogenic/hemorrhagic).        Goals of Care Treatment Preferences:  Code Status: DNR       LaPOST: Yes  What is most important right now is to focus on spending time at home, avoiding the hospital, remaining as independent as possible, symptom/pain control, quality of life, even if it means sacrificing a little time.  Accordingly, we have decided that the best plan to meet the patient's goals includes continuing with treatment.      Consults:   Consults (From admission, onward)        Status Ordering Provider     Pharmacy to dose Vancomycin consult  Once        Provider:  (Not yet assigned)   See Hyperspace for full Linked Orders Report.    Acknowledged SAMIRA CHOU     Inpatient consult to Orthopedic Surgery  Once        Provider:  (Not yet assigned)    Acknowledged AMANDA KAMARA     Inpatient consult to PICC team  (NIAS)  Once        Provider:  (Not yet assigned)    Acknowledged AMANDA KAMARA     Inpatient consult to Orthopedic Surgery  Once        Provider:  (Not yet assigned)    Acknowledged AMANDA KAMARA     Inpatient consult to Cardiology  Once        Provider:  (Not yet assigned)    Acknowledged AMANDA KAMARA     Inpatient consult to Social Work  Once        Provider:  (Not yet assigned)    LILIANA Covington          No new Assessment & Plan notes have been filed under this hospital service since the last note was generated.  Service: Hospital Medicine    Final Active Diagnoses:    Diagnosis Date Noted POA    PRINCIPAL PROBLEM:  Shock [R57.9] 2023 Unknown    Atrial fibrillation with RVR [I48.91] 2023 Yes    Hematoma of right thigh [S70.11XA] 2023 Unknown    Rectus sheath hematoma [S30.1XXA] 2023 Unknown    Heart failure with reduced ejection fraction [I50.20] 10/26/2023 Yes    QT prolongation [R94.31] 2023 Yes    JOCELIN (obstructive sleep apnea) [G47.33] 2022 Yes    ACP (advance care planning) [Z71.89] 2021 Not Applicable    Type 2 diabetes mellitus with stage 4 chronic kidney disease, without long-term current use of insulin [E11.22, N18.4] 2021 Yes    Acute kidney injury superimposed on chronic kidney disease [N17.9, N18.9] 2015 Unknown    Hyperlipidemia [E78.5]  Yes     Chronic    Essential hypertension [I10]  Yes     Chronic      Problems Resolved During this Admission:       Discharged Condition:     Disposition:     Follow Up:    Patient Instructions:   No discharge procedures on file.    Significant Diagnostic Studies: N/A    Pending Diagnostic Studies:     None         Medications:  None    Indwelling Lines/Drains at time of discharge:   Lines/Drains/Airways     Peripherally Inserted Central Catheter Line  Duration           PICC Triple Lumen 23 2355 right basilic <1 day                Time spent on the discharge of  patient: 60 minutes    Critical care time spent on the evaluation and treatment of severe organ dysfunction, review of pertinent labs and imaging studies, discussions with consulting providers and discussions with patient/family: 60 minutes.     Shahab Taveras MD  Department of Hospital Medicine  [facility] - Intensive Care

## 2023-11-02 NOTE — SIGNIFICANT EVENT
I was called by patient's nurse and asked to evaluate the patient.     No spontaneous movements were present. Pupils were mid-dilated and fixed, no pupillary light reflex noted. No breath sounds were appreciated over either lung field. No carotid pulses palpable, no heart sounds audible over precordium. Patient pronounced dead at 12:53 AM on 11/2/2023.    Daughter was notified over the phone.

## 2023-11-02 NOTE — PROCEDURES
"Barbara Rizo is a 83 y.o. female patient.    Temp: 97.2 °F (36.2 °C) (11/01/23 2311)  Pulse: 96 (11/01/23 2311)  Resp: 13 (11/01/23 2311)  BP: (!) 82/43 (11/01/23 2311)  SpO2: 96 % (11/01/23 2217)  Weight: 64.9 kg (143 lb) (11/01/23 1352)  Height: 5' 1" (154.9 cm) (11/01/23 1352)    PICC  Date/Time: 11/1/2023 11:55 PM  Performed by: Mehran Escobar RN  Consent Done: Yes  Time out: Immediately prior to procedure a time out was called to verify the correct patient, procedure, equipment, support staff and site/side marked as required  Indications: med administration and vascular access  Anesthesia: local infiltration  Local anesthetic: lidocaine 1% without epinephrine  Anesthetic Total (mL): 3  Preparation: skin prepped with ChloraPrep  Skin prep agent dried: skin prep agent completely dried prior to procedure  Sterile barriers: all five maximum sterile barriers used - cap, mask, sterile gown, sterile gloves, and large sterile sheet  Hand hygiene: hand hygiene performed prior to central venous catheter insertion  Location details: right basilic  Catheter type: triple lumen  Catheter size: 5 Fr  Catheter Length: 37cm    Ultrasound guidance: yes  Vessel Caliber: medium, compressibility normal  Needle advanced into vessel with real time Ultrasound guidance.  Guidewire confirmed in vessel.  Sterile sheath used.  Number of attempts: 1  Post-procedure: blood return through all ports, chlorhexidine patch and sterile dressing applied    Assessment: placement verified by x-ray, tip termination and successful placement  Comments: RUE PICC/TLC placed for vasopressors, IV abx, and frequent lab draws.        Name mehran escobar rn  11/2/2023    "

## 2023-11-02 NOTE — ASSESSMENT & PLAN NOTE
Results for orders placed during the hospital encounter of 10/26/23    Echo    Interpretation Summary    Left Ventricle: The left ventricle is normal in size. There is mild concentric hypertrophy. There is moderately reduced systolic function with a visually estimated ejection fraction of 35 - 40%.    Left Atrium: Left atrium is severely dilated.    Right Ventricle: Mild right ventricular enlargement. Systolic function is mildly reduced.    Right Atrium: Right atrium is severely dilated.    Aortic Valve: There is mild stenosis. Aortic valve area by VTI is 1.22 cm². Aortic valve peak velocity is 1.70 m/s. Mean gradient is 7 mmHg. The dimensionless index is 0.41. There is mild aortic regurgitation.    Mitral Valve: There is mild to moderate regurgitation.    Tricuspid Valve: There is moderate regurgitation.    Pulmonic Valve: There is mild to moderate regurgitation.    Pulmonary Artery: The estimated pulmonary artery systolic pressure is 49 mmHg.    IVC/SVC: Normal venous pressure at 3 mmHg.    BNP  Recent Labs   Lab 11/01/23  1820   BNP 2,825*       Difrederice when able.

## 2023-11-02 NOTE — HOSPITAL COURSE
This is an 83-year-old female with a past medical history of AFib (on Eliquis), HFrEF (EF: EF: 35-40%), hypertension, type 2 diabetes, hyperlipidemia, CKD 4, prolonged QT, breast cancer, who presents after a fall.     Patient had multiple recent hospitalizations, most recently (10/26-10/30) after presenting with altered mental status.  She was hospitalized for heart failure exacerbation and AFib with RVR.  She later had an ED presentation on 10/31 for rectal bleeding which was thought to be secondary to constipation/straining.  Patient returned to the ED on 11/01 after a fall on her left elbow, and was managed symptomatically.  She returned later the same day for recurrent falls, persistent pain, inability to ambulate and right hip pain.     In the ED, the patient was tachycardic (110, in AFib with RVR) and hypotensive requiring Levophed.  Labs were remarkable for worsening anemia (10.6 (11/1) < 11.8 (11/1) < 14.0 (10/30)), elevated creatinine (2.2-baseline of 1.6), hyponatremia (127), elevated lactic acid (3.0),  elevated BNP (2825 - around baseline), elevated troponin (0.040).  EKG showed AFib with RVR, and prolonged QTc (584).  CT pelvis without contrast showed a large hematoma (18.4 x 9.9 x 4.9 cm) right lateral thigh region, with a small lobulated area of abnormal attenuation (6.2 x 5.1 x 2.7 cm) within the inferior anterior left rectus abdominal muscle suggestive of a hematoma and mild inflammatory changes of the rectal wall (possible proctitis).  CT abdomen and pelvis with IV contrast redemonstrated a large right thigh hematoma without obvious of RP bleed or active arterial bleeding, and a suspected smaller hematoma versus mass of left rectus abdominal musculature, along with a left pleural effusion with left lower lobe compressive atelectasis/consolidation.  Cardiology was consulted and recommended digoxin and Levophed. Patient was given 1 L of LR, Reglan 10 mg IV, Digoxin 500 mcg, fentanyl 25 mcg and was  started on Levophed.  She was admitted for further management.    Patient was admitted for management of undifferentiated shock. She was started on broad spectrum antibiotics, and levophed was switched to phenylephrine given RVR. A PICC line was placed. Further work up was pending/in process. Patient unfortunately became more hypotensive, BP was undetectable and quickly became bradycardic and went into asystole. Patient is DNR. She was pronounced dead at 12:53 AM. Family came to bedside, condolences given. Preliminary cause of death is shock, likely mixed (cardiogenic/hemorrhagic).

## 2023-11-02 NOTE — ASSESSMENT & PLAN NOTE
Advance Care Planning     Date: 11/01/2023    Code Status  Patient has an [place]POST stating she is DNR. Code status updated in Epic.

## 2023-11-02 NOTE — ASSESSMENT & PLAN NOTE
Differential diagnosis:  - hemorrhagic shock, given presence of large hematoma 2/2 falls and downtrending hemoglobin  - septic shock, given evidence of proctitis and left-sided atelectasis/effusion  - cardiogenic shock, given reduced EF, elevated BNP and reportedly worsening blood pressure with fluids    Antibiotics given-   Antibiotics (72h ago, onward)    Start     Stop Route Frequency Ordered    11/01/23 2315  piperacillin-tazobactam (ZOSYN) 4.5 g in dextrose 5 % in water (D5W) 100 mL IVPB (MB+)         -- IV Every 12 hours (non-standard times) 11/01/23 2207 11/01/23 2307  vancomycin - pharmacy to dose  (vancomycin IVPB (PEDS and ADULTS))        See Hyperspace for full Linked Orders Report.    -- IV pharmacy to manage frequency 11/01/23 2207        Results for orders placed during the hospital encounter of 10/26/23    Echo    Interpretation Summary    Left Ventricle: The left ventricle is normal in size. There is mild concentric hypertrophy. There is moderately reduced systolic function with a visually estimated ejection fraction of 35 - 40%.    Left Atrium: Left atrium is severely dilated.    Right Ventricle: Mild right ventricular enlargement. Systolic function is mildly reduced.    Right Atrium: Right atrium is severely dilated.    Aortic Valve: There is mild stenosis. Aortic valve area by VTI is 1.22 cm². Aortic valve peak velocity is 1.70 m/s. Mean gradient is 7 mmHg. The dimensionless index is 0.41. There is mild aortic regurgitation.    Mitral Valve: There is mild to moderate regurgitation.    Tricuspid Valve: There is moderate regurgitation.    Pulmonic Valve: There is mild to moderate regurgitation.    Pulmonary Artery: The estimated pulmonary artery systolic pressure is 49 mmHg.    IVC/SVC: Normal venous pressure at 3 mmHg.    Latest lactate reviewed-  Recent Labs   Lab 11/01/23  1820   LACTATE 3.5*     Organ dysfunction indicated by Acute kidney injury    Fluid challenge Contraindicated-  Fluid bolus is contraindicated in this patient due to Congestive Heart Failure     Post- resuscitation assessment Yes Perfusion exam was performed within 6 hours of septic shock presentation after bolus shows Adequate tissue perfusion assessed by non-invasive monitoring       Will Start Pressors- Levophed/Maurisio for MAP of 65    Plan:   - Continue broad spectrum antibiotics   - Monitor Hb closely   - Judicious fluid use   - Cardiology consult  - Obtain ABG/VBG from central line once established to calculate OUMAR  - Switch Levophed to Maurisio given persistent tachycardia/Afib w/ RVR  - Trend perfusion parameters   - Wean pressor support as tolerated

## 2023-11-04 LAB
A1AT PHENOTYP SERPL-IMP: NORMAL
A1AT SERPL NEPH-MCNC: 178 MG/DL (ref 100–190)

## 2023-11-06 LAB — BACTERIA BLD CULT: NORMAL

## 2023-11-30 ENCOUNTER — DOCUMENT SCAN (OUTPATIENT)
Dept: HOME HEALTH SERVICES | Facility: HOSPITAL | Age: 83
End: 2023-11-30
Payer: MEDICARE

## 2023-12-20 ENCOUNTER — TELEPHONE (OUTPATIENT)
Dept: FAMILY MEDICINE | Facility: CLINIC | Age: 83
End: 2023-12-20
Payer: MEDICARE

## 2023-12-20 NOTE — TELEPHONE ENCOUNTER
----- Message from Liam Arora sent at 12/20/2023 12:54 PM CST -----  Type: Patient Call Back    Who called:Tamar    What is the request in detail:In regards to why doctor wouldn't sign death certificate     Can the clinic reply by [facility]?NO    Would the patient rather a call back or a response via [facility]? CALL    Best call back number:[phone]    Additional Information:

## 2024-01-25 NOTE — ASSESSMENT & PLAN NOTE
· BNP 3000 with elevated JVD and hypervolemic hyponatremia on admission  · Right-sided heart failure, which is why her oxygen has not changed much, but why her liver is congested, I suspect  · Likely worsened by recurrent A-flutter  · Received IV diuresis. Volume status improved. Will hold off on further diuresis today 10/29  · Echo ordered: estimated ejection fraction of 35 - 40%. PASP 49mmHg  · Cardiology consulted  · See HF note     No abnormalities noted/Unable to assess No abnormalities noted/Unable to assess No abnormalities noted/Unable to assess No abnormalities noted/Unable to assess No abnormalities noted/Unable to assess No abnormalities noted No abnormalities noted/Unable to assess No abnormalities noted/Unable to assess No abnormalities noted/Unable to assess No abnormalities noted No abnormalities noted No abnormalities noted/Unable to assess

## 2025-01-24 NOTE — ASSESSMENT & PLAN NOTE
ANTICOAGULATION MANAGEMENT     Selvin Rockwell 79 year old female is on warfarin with subtherapeutic INR result. (Goal INR 2.0-3.0)    Recent labs: (last 7 days)     01/24/25  1415   INR 1.9*       ASSESSMENT     Source(s): Chart Review  Previous INR was Subtherapeutic  Medication, diet, health changes since last INR chart reviewed; none identified  VM left for Alicia, daughter, to call [facility] back.        PLAN     Unable to reach Alicia, daughter,  today.    Left message to take 2 mg Fri and 3 mg Sat and Sun this weekend. Request call back for assessment.  Will need to discuss increasing dose with Alicia depending on assessment.     Follow up required to confirm warfarin dose taken and assess for changes    Katherine Prajapati, RN  1/24/2025  Anticoagulation Clinic  [facility] for routing messages: renee PEÑA  [facility] patient phone line: [phone]       · Do not see heart failure on her history  · chronic HFpEF added as this was noted previously on ECHO (not new this admission)  · BNP approaching 3000 with elevated JVD on exam  · Patient was recently switch to Bumex, perhaps mildly underdosed leading to increasing volume and hypervolemic hyponatremia over the last couple weeks.    · She takes 1 mg Tbochlu-Osmzmoni-Yffuaapy-Sunday; and takes 0.5 mg MWF.  · May need to increase to 1 mg daily on discharge, please assess at that pointPatient was recently switch to Bumex, perhaps mildly underdosed leading to increasing volume and hypervolemic hyponatremia over the last couple weeks.    · Cardiology consulted

## 2025-04-08 NOTE — ASSESSMENT & PLAN NOTE
She was noted to be in RVR in her PCP's clinic today for which she was instructed to present to the ED.  I have reviewed the EKG and it reveals atrial fibrillation with rapid ventricular response with an average heart rate of 146 BPMs, but it went as high as 150 BPM.  He was given a dose of diltiazem without much improvement and subsequently started on an amiodarone infusion.  She has a [id number] score of 5--unclear why she isn't on anticoagulation other than aspirin.  Her last TTE in our system was in Sept 2015 so that will be repeated.  Will consult Cardiology for further recommendations.    Amiodarone infusion  Full dose lovenox  Plan for cardioversion if does not convert to NSR on amiodarone infusion - pending improved respiratory status     Tazorac Counseling:  Patient advised that medication is irritating and drying.  Patient may need to apply sparingly and wash off after an hour before eventually leaving it on overnight.  The patient verbalized understanding of the proper use and possible adverse effects of tazorac.  All of the patient's questions and concerns were addressed. Aklief counseling:  Patient advised to apply a pea-sized amount only at bedtime and wait 30 minutes after washing their face before applying.  If too drying, patient may add a non-comedogenic moisturizer.  The most commonly reported side effects including irritation, redness, scaling, dryness, stinging, burning, itching, and increased risk of sunburn.  The patient verbalized understanding of the proper use and possible adverse effects of retinoids.  All of the patient's questions and concerns were addressed. Azelaic Acid Counseling: Patient counseled that medicine may cause skin irritation and to avoid applying near the eyes.  In the event of skin irritation, the patient was advised to reduce the amount of the drug applied or use it less frequently.   The patient verbalized understanding of the proper use and possible adverse effects of azelaic acid.  All of the patient's questions and concerns were addressed. Tetracycline Counseling: Patient counseled regarding possible photosensitivity and increased risk for sunburn.  Patient instructed to avoid sunlight, if possible.  When exposed to sunlight, patients should wear protective clothing, sunglasses, and sunscreen.  The patient was instructed to call the office immediately if the following severe adverse effects occur:  hearing changes, easy bruising/bleeding, severe headache, or vision changes.  The patient verbalized understanding of the proper use and possible adverse effects of tetracycline.  All of the patient's questions and concerns were addressed. Patient understands to avoid pregnancy while on therapy due to potential birth defects. Sarecycline Counseling: Patient advised regarding possible photosensitivity and discoloration of the teeth, skin, lips, tongue and gums.  Patient instructed to avoid sunlight, if possible.  When exposed to sunlight, patients should wear protective clothing, sunglasses, and sunscreen.  The patient was instructed to call the office immediately if the following severe adverse effects occur:  hearing changes, easy bruising/bleeding, severe headache, or vision changes.  The patient verbalized understanding of the proper use and possible adverse effects of sarecycline.  All of the patient's questions and concerns were addressed. Spironolactone Counseling: Patient advised regarding risks of diarrhea, abdominal pain, hyperkalemia, birth defects (for female patients), liver toxicity and renal toxicity. The patient may need blood work to monitor liver and kidney function and potassium levels while on therapy. The patient verbalized understanding of the proper use and possible adverse effects of spironolactone.  All of the patient's questions and concerns were addressed. High Dose Vitamin A Counseling: Side effects reviewed, pt to contact office should one occur. Erythromycin Counseling:  I discussed with the patient the risks of erythromycin including but not limited to GI upset, allergic reaction, drug rash, diarrhea, increase in liver enzymes, and yeast infections. Doxycycline Counseling:  Patient counseled regarding possible photosensitivity and increased risk for sunburn.  Patient instructed to avoid sunlight, if possible.  When exposed to sunlight, patients should wear protective clothing, sunglasses, and sunscreen.  The patient was instructed to call the office immediately if the following severe adverse effects occur:  hearing changes, easy bruising/bleeding, severe headache, or vision changes.  The patient verbalized understanding of the proper use and possible adverse effects of doxycycline.  All of the patient's questions and concerns were addressed. Minocycline Counseling: Patient advised regarding possible photosensitivity and discoloration of the teeth, skin, lips, tongue and gums.  Patient instructed to avoid sunlight, if possible.  When exposed to sunlight, patients should wear protective clothing, sunglasses, and sunscreen.  The patient was instructed to call the office immediately if the following severe adverse effects occur:  hearing changes, easy bruising/bleeding, severe headache, or vision changes.  The patient verbalized understanding of the proper use and possible adverse effects of minocycline.  All of the patient's questions and concerns were addressed. Dapsone Pregnancy And Lactation Text: This medication is Pregnancy Category C and is not considered safe during pregnancy or breast feeding. Birth Control Pills Pregnancy And Lactation Text: This medication should be avoided if pregnant and for the first 30 days post-partum. Use Enhanced Medication Counseling?: No Erythromycin Pregnancy And Lactation Text: This medication is Pregnancy Category B and is considered safe during pregnancy. It is also excreted in breast milk. Bactrim Pregnancy And Lactation Text: This medication is Pregnancy Category D and is known to cause fetal risk.  It is also excreted in breast milk. Azithromycin Pregnancy And Lactation Text: This medication is considered safe during pregnancy and is also secreted in breast milk. Winlevi Counseling:  I discussed with the patient the risks of topical clascoterone including but not limited to erythema, scaling, itching, and stinging. Patient voiced their understanding. Topical Clindamycin Counseling: Patient counseled that this medication may cause skin irritation or allergic reactions.  In the event of skin irritation, the patient was advised to reduce the amount of the drug applied or use it less frequently.   The patient verbalized understanding of the proper use and possible adverse effects of clindamycin.  All of the patient's questions and concerns were addressed. Benzoyl Peroxide Counseling: Patient counseled that medicine may cause skin irritation and bleach clothing.  In the event of skin irritation, the patient was advised to reduce the amount of the drug applied or use it less frequently.   The patient verbalized understanding of the proper use and possible adverse effects of benzoyl peroxide.  All of the patient's questions and concerns were addressed. Topical Sulfur Applications Counseling: Topical Sulfur Counseling: Patient counseled that this medication may cause skin irritation or allergic reactions.  In the event of skin irritation, the patient was advised to reduce the amount of the drug applied or use it less frequently.   The patient verbalized understanding of the proper use and possible adverse effects of topical sulfur application.  All of the patient's questions and concerns were addressed. Topical Retinoid counseling:  Patient advised to apply a pea-sized amount only at bedtime and wait 30 minutes after washing their face before applying.  If too drying, patient may add a non-comedogenic moisturizer. The patient verbalized understanding of the proper use and possible adverse effects of retinoids.  All of the patient's questions and concerns were addressed. Topical Retinoid Pregnancy And Lactation Text: This medication is Pregnancy Category C. It is unknown if this medication is excreted in breast milk. Aklief Pregnancy And Lactation Text: It is unknown if this medication is safe to use during pregnancy.  It is unknown if this medication is excreted in breast milk.  Breastfeeding women should use the topical cream on the smallest area of the skin for the shortest time needed while breastfeeding.  Do not apply to nipple and areola. Azelaic Acid Pregnancy And Lactation Text: This medication is considered safe during pregnancy and breast feeding. Spironolactone Pregnancy And Lactation Text: This medication can cause feminization of the male fetus and should be avoided during pregnancy. The active metabolite is also found in breast milk. Tetracycline Pregnancy And Lactation Text: This medication is Pregnancy Category D and not consider safe during pregnancy. It is also excreted in breast milk. Isotretinoin Pregnancy And Lactation Text: This medication is Pregnancy Category X and is considered extremely dangerous during pregnancy. It is unknown if it is excreted in breast milk. High Dose Vitamin A Pregnancy And Lactation Text: High dose vitamin A therapy is contraindicated during pregnancy and breast feeding. Doxycycline Pregnancy And Lactation Text: This medication is Pregnancy Category D and not consider safe during pregnancy. It is also excreted in breast milk but is considered safe for shorter treatment courses. Isotretinoin Counseling: Patient should get monthly blood tests, not donate blood, not drive at night if vision affected, not share medication, and not undergo elective surgery for 6 months after tx completed. Side effects reviewed, pt to contact office should one occur. Dapsone Counseling: I discussed with the patient the risks of dapsone including but not limited to hemolytic anemia, agranulocytosis, rashes, methemoglobinemia, kidney failure, peripheral neuropathy, headaches, GI upset, and liver toxicity.  Patients who start dapsone require monitoring including baseline LFTs and weekly CBCs for the first month, then every month thereafter.  The patient verbalized understanding of the proper use and possible adverse effects of dapsone.  All of the patient's questions and concerns were addressed. Birth Control Pills Counseling: Birth Control Pill Counseling: I discussed with the patient the potential side effects of OCPs including but not limited to increased risk of stroke, heart attack, thrombophlebitis, deep venous thrombosis, hepatic adenomas, breast changes, GI upset, headaches, and depression.  The patient verbalized understanding of the proper use and possible adverse effects of OCPs. All of the patient's questions and concerns were addressed. Bactrim Counseling:  I discussed with the patient the risks of sulfa antibiotics including but not limited to GI upset, allergic reaction, drug rash, diarrhea, dizziness, photosensitivity, and yeast infections.  Rarely, more serious reactions can occur including but not limited to aplastic anemia, agranulocytosis, methemoglobinemia, blood dyscrasias, liver or kidney failure, lung infiltrates or desquamative/blistering drug rashes. Detail Level: Detailed Azithromycin Counseling:  I discussed with the patient the risks of azithromycin including but not limited to GI upset, allergic reaction, drug rash, diarrhea, and yeast infections. Topical Sulfur Applications Pregnancy And Lactation Text: This medication is Pregnancy Category C and has an unknown safety profile during pregnancy. It is unknown if this topical medication is excreted in breast milk. Winlevi Pregnancy And Lactation Text: This medication is considered safe during pregnancy and breastfeeding. Tazorac Pregnancy And Lactation Text: This medication is not safe during pregnancy. It is unknown if this medication is excreted in breast milk. Topical Clindamycin Pregnancy And Lactation Text: This medication is Pregnancy Category B and is considered safe during pregnancy. It is unknown if it is excreted in breast milk. Benzoyl Peroxide Pregnancy And Lactation Text: This medication is Pregnancy Category C. It is unknown if benzoyl peroxide is excreted in breast milk.

## (undated) DEVICE — ELECTRODE DEFIB NON-RADIOPAQUE

## (undated) DEVICE — CANISTER INFOV.A.C WOUND 500ML

## (undated) DEVICE — APPLICATOR CHLORAPREP ORN 26ML

## (undated) DEVICE — UNDERGLOVE BIOGEL PI SZ 6.5 LF

## (undated) DEVICE — SUT 3/0 27IN COATED VICRYL

## (undated) DEVICE — SUT ETHILON 4-0 PS2 18 BLK

## (undated) DEVICE — GLOVE BIOGEL ECLIPSE SZ 6

## (undated) DEVICE — PACK ARTHROSCOPY W/ISO BAC

## (undated) DEVICE — Device

## (undated) DEVICE — SEE MEDLINE ITEM 146417

## (undated) DEVICE — DRAPE STERI INSTRUMENT 1018

## (undated) DEVICE — SOL SOD CHLORIDE 0.9% 10ML

## (undated) DEVICE — SYR 10CC LUER LOCK

## (undated) DEVICE — ELECTRODE REM PLYHSV RETURN 9

## (undated) DEVICE — COVER OVERHEAD SURG LT BLUE

## (undated) DEVICE — UNDERGLOVES BIOGEL PI SIZE 7.5

## (undated) DEVICE — SEE MEDLINE ITEM 154981

## (undated) DEVICE — SUT VICRYL 3-0 27 SH

## (undated) DEVICE — BANDAGE ACE ELASTIC 6"

## (undated) DEVICE — NDL 18GA X1 1/2 REG BEVEL

## (undated) DEVICE — GLOVE SURGICAL LATEX SZ 6.5

## (undated) DEVICE — ADHESIVE DERMABOND MINI HV

## (undated) DEVICE — MARGIN MARKER STANDARD 6 COLOR

## (undated) DEVICE — DRESSING INFOVAC SMALL BLK

## (undated) DEVICE — GLOVE SURG BIOGEL LATEX SZ 7.5

## (undated) DEVICE — SPONGE LAP 18X18 PREWASHED

## (undated) DEVICE — SEE MEDLINE ITEM 152622

## (undated) DEVICE — SEE MEDLINE ITEM 146231

## (undated) DEVICE — SUT ETHILON 3-0 PS2 18 BLK

## (undated) DEVICE — GLOVE SURGICAL LATEX SZ 8

## (undated) DEVICE — PAD CAST SPECIALIST STRL 6

## (undated) DEVICE — PAD PREP 50/CA

## (undated) DEVICE — SEE MEDLINE ITEM 152530

## (undated) DEVICE — SEE MEDLINE ITEM 157110

## (undated) DEVICE — KIT PROBE COVER WITH GEL

## (undated) DEVICE — BLANKET UPPER BODY 78.7X29.9IN

## (undated) DEVICE — UNDERGLOVES BIOGEL PI SZ 7 LF

## (undated) DEVICE — CONTAINER SPECIMEN OR STER 4OZ

## (undated) DEVICE — DRAPE ULTRASOUND PROBE HEAD

## (undated) DEVICE — NDL HYPO REG 25G X 1 1/2

## (undated) DEVICE — BLANKET LOWER BODY 55.9X40.2IN

## (undated) DEVICE — GAUZE FLUFF XXLG 36X36 2 PLY

## (undated) DEVICE — SUT VICRYL PLUS 4-0 P3 18IN

## (undated) DEVICE — FOAM APP FILM BARRIER NO STING

## (undated) DEVICE — APPLIER CLIP LIAGCLIP 9.375IN

## (undated) DEVICE — PACK DRAPE UNIVERSAL CONVERTOR

## (undated) DEVICE — BRA CLASSIC COMFORT 40 BLACK

## (undated) DEVICE — SOL 0.9% NACL IRRI.IN STERIL

## (undated) DEVICE — CANISTER SUCTION 2 LTR

## (undated) DEVICE — PULSAVAC ZIMMER

## (undated) DEVICE — SYS LABLNG CORECT MED 4 FLG

## (undated) DEVICE — ELECTRODE BLADE INSULATED 1 IN